# Patient Record
Sex: FEMALE | Race: WHITE | NOT HISPANIC OR LATINO | Employment: UNEMPLOYED | URBAN - METROPOLITAN AREA
[De-identification: names, ages, dates, MRNs, and addresses within clinical notes are randomized per-mention and may not be internally consistent; named-entity substitution may affect disease eponyms.]

---

## 2017-01-18 ENCOUNTER — HOSPITAL ENCOUNTER (OUTPATIENT)
Dept: BONE DENSITY | Facility: IMAGING CENTER | Age: 57
Discharge: HOME | End: 2017-01-18
Payer: COMMERCIAL

## 2017-01-18 DIAGNOSIS — Z12.31 ENCOUNTER FOR SCREENING MAMMOGRAM FOR MALIGNANT NEOPLASM OF BREAST: ICD-10-CM

## 2017-01-18 PROCEDURE — G0202 SCR MAMMO BI INCL CAD: HCPCS

## 2017-01-23 ENCOUNTER — GENERIC CONVERSION - ENCOUNTER (OUTPATIENT)
Dept: OTHER | Facility: OTHER | Age: 57
End: 2017-01-23

## 2017-01-23 LAB
ALBUMIN SERPL BCP-MCNC: 4.2 G/DL (ref 3.5–5.5)
ALP SERPL-CCNC: 121 IU/L (ref 39–117)
ALT SERPL W P-5'-P-CCNC: 73 IU/L (ref 0–32)
AMBIG ABBREV HFP7 DEFAULT (HISTORICAL): NORMAL
AST SERPL W P-5'-P-CCNC: 106 IU/L (ref 0–40)
BILIRUB SERPL-MCNC: 0.6 MG/DL (ref 0–1.2)
BILIRUBIN DIRECT (HISTORICAL): 0.18 MG/DL (ref 0–0.4)
TOTAL PROTEIN (HISTORICAL): 8 G/DL (ref 6–8.5)

## 2017-02-02 ENCOUNTER — ALLSCRIPTS OFFICE VISIT (OUTPATIENT)
Dept: OTHER | Facility: OTHER | Age: 57
End: 2017-02-02

## 2017-04-21 ENCOUNTER — ALLSCRIPTS OFFICE VISIT (OUTPATIENT)
Dept: OTHER | Facility: OTHER | Age: 57
End: 2017-04-21

## 2017-04-21 LAB
BILIRUB UR QL STRIP: NORMAL
CLARITY UR: NORMAL
COLOR UR: YELLOW
GLUCOSE (HISTORICAL): NORMAL
HGB UR QL STRIP.AUTO: NORMAL
KETONES UR STRIP-MCNC: NORMAL MG/DL
LEUKOCYTE ESTERASE UR QL STRIP: NORMAL
NITRITE UR QL STRIP: NORMAL
PH UR STRIP.AUTO: 5 [PH]
PROT UR STRIP-MCNC: NORMAL MG/DL
SP GR UR STRIP.AUTO: 1.02
UROBILINOGEN UR QL STRIP.AUTO: 0.2

## 2017-04-23 LAB
CULTURE RESULT (HISTORICAL): NORMAL
MISCELLANEOUS LAB TEST RESULT (HISTORICAL): NO GROWTH

## 2017-04-25 DIAGNOSIS — R04.2 HEMOPTYSIS: ICD-10-CM

## 2017-04-26 ENCOUNTER — GENERIC CONVERSION - ENCOUNTER (OUTPATIENT)
Dept: OTHER | Facility: OTHER | Age: 57
End: 2017-04-26

## 2017-04-28 LAB
INTERPRETATION (HISTORICAL): NORMAL
QAUNTIFERON NIL VALUE (HISTORICAL): 0.07 IU/ML
QFT TB AG MINUS NIL VALUE (HISTORICAL): <0.01 IU/ML
QUANTIFERON CRITERIA (HISTORICAL): NORMAL
QUANTIFERON INCUB COMMENT (HISTORICAL): NORMAL
QUANTIFERON MITOGEN VALUE (HISTORICAL): >10 IU/ML
QUANTIFERON TB AG VALUE (HISTORICAL): 0.03 IU/ML
QUANTIFERON TB GOLD (HISTORICAL): NEGATIVE

## 2017-05-02 ENCOUNTER — HOSPITAL ENCOUNTER (OUTPATIENT)
Dept: RADIOLOGY | Facility: HOSPITAL | Age: 57
Discharge: HOME | End: 2017-05-02
Payer: COMMERCIAL

## 2017-05-02 ENCOUNTER — TRANSCRIBE ORDERS (OUTPATIENT)
Dept: ADMINISTRATIVE | Facility: HOSPITAL | Age: 57
End: 2017-05-02

## 2017-05-02 ENCOUNTER — ALLSCRIPTS OFFICE VISIT (OUTPATIENT)
Dept: OTHER | Facility: OTHER | Age: 57
End: 2017-05-02

## 2017-05-02 DIAGNOSIS — R04.2 COUGHING BLOOD: Primary | ICD-10-CM

## 2017-05-02 DIAGNOSIS — R04.2 HEMOPTYSIS: ICD-10-CM

## 2017-05-02 PROCEDURE — 71020 HB CHEST X-RAY 2VW FRONTAL&LATL: CPT

## 2017-05-08 ENCOUNTER — TRANSCRIBE ORDERS (OUTPATIENT)
Dept: ADMINISTRATIVE | Facility: HOSPITAL | Age: 57
End: 2017-05-08

## 2017-05-08 ENCOUNTER — HOSPITAL ENCOUNTER (OUTPATIENT)
Dept: CT IMAGING | Facility: HOSPITAL | Age: 57
Discharge: HOME | End: 2017-05-08
Payer: COMMERCIAL

## 2017-05-08 DIAGNOSIS — R04.2 HEMOPTYSIS: ICD-10-CM

## 2017-05-08 PROCEDURE — 71250 CT THORAX DX C-: CPT

## 2017-05-11 ENCOUNTER — HOSPITAL ENCOUNTER (OUTPATIENT)
Dept: RADIOLOGY | Facility: HOSPITAL | Age: 57
Discharge: HOME | End: 2017-05-11
Payer: COMMERCIAL

## 2017-05-11 ENCOUNTER — TRANSCRIBE ORDERS (OUTPATIENT)
Dept: ADMINISTRATIVE | Facility: HOSPITAL | Age: 57
End: 2017-05-11

## 2017-05-11 DIAGNOSIS — R04.2 HEMOPTYSIS: ICD-10-CM

## 2017-05-11 PROCEDURE — 70220 X-RAY EXAM OF SINUSES: CPT

## 2017-05-23 ENCOUNTER — GENERIC CONVERSION - ENCOUNTER (OUTPATIENT)
Dept: OTHER | Facility: OTHER | Age: 57
End: 2017-05-23

## 2017-06-05 ENCOUNTER — GENERIC CONVERSION - ENCOUNTER (OUTPATIENT)
Dept: OTHER | Facility: OTHER | Age: 57
End: 2017-06-05

## 2017-06-07 LAB
LYME 18 KD IGG (HISTORICAL): ABNORMAL
LYME 23 KD IGG (HISTORICAL): PRESENT
LYME 23 KD IGM (HISTORICAL): PRESENT
LYME 28 KD IGG (HISTORICAL): ABNORMAL
LYME 30 KD IGG (HISTORICAL): PRESENT
LYME 39 KD IGG (HISTORICAL): ABNORMAL
LYME 39 KD IGM (HISTORICAL): PRESENT
LYME 41 KD IGG (HISTORICAL): ABNORMAL
LYME 41 KD IGM (HISTORICAL): ABNORMAL
LYME 45 KD IGG (HISTORICAL): ABNORMAL
LYME 58 KD IGG (HISTORICAL): ABNORMAL
LYME 66 KD IGG (HISTORICAL): ABNORMAL
LYME 93 KD IGG (HISTORICAL): ABNORMAL
LYME IGG WB INTERP. (HISTORICAL): NEGATIVE
LYME IGM WB INTERP. (HISTORICAL): POSITIVE

## 2017-06-23 ENCOUNTER — ALLSCRIPTS OFFICE VISIT (OUTPATIENT)
Dept: OTHER | Facility: OTHER | Age: 57
End: 2017-06-23

## 2017-07-28 LAB
A/G RATIO (HISTORICAL): 1.1 (ref 1.2–2.2)
ALBUMIN SERPL BCP-MCNC: 3.8 G/DL (ref 3.5–5.5)
ALP SERPL-CCNC: 106 IU/L (ref 39–117)
ALT SERPL W P-5'-P-CCNC: 70 IU/L (ref 0–32)
AST SERPL W P-5'-P-CCNC: 96 IU/L (ref 0–40)
BASOPHILS # BLD AUTO: 0 X10E3/UL (ref 0–0.2)
BASOPHILS # BLD AUTO: 1 %
BILIRUB SERPL-MCNC: 0.6 MG/DL (ref 0–1.2)
BUN SERPL-MCNC: 7 MG/DL (ref 6–24)
BUN/CREA RATIO (HISTORICAL): 10 (ref 9–23)
CALCIUM SERPL-MCNC: 9.2 MG/DL (ref 8.7–10.2)
CHLORIDE SERPL-SCNC: 96 MMOL/L (ref 96–106)
CHOLEST SERPL-MCNC: 159 MG/DL (ref 100–199)
CHOLEST/HDLC SERPL: 3.2 RATIO UNITS (ref 0–4.4)
CO2 SERPL-SCNC: 22 MMOL/L (ref 18–29)
CREAT SERPL-MCNC: 0.69 MG/DL (ref 0.57–1)
DEPRECATED RDW RBC AUTO: 13.4 % (ref 12.3–15.4)
EGFR AFRICAN AMERICAN (HISTORICAL): 111 ML/MIN/1.73
EGFR-AMERICAN CALC (HISTORICAL): 96 ML/MIN/1.73
EOSINOPHIL # BLD AUTO: 0.1 X10E3/UL (ref 0–0.4)
EOSINOPHIL # BLD AUTO: 1 %
GLUCOSE SERPL-MCNC: 95 MG/DL (ref 65–99)
HCT VFR BLD AUTO: 41.8 % (ref 34–46.6)
HDLC SERPL-MCNC: 49 MG/DL
HGB BLD-MCNC: 14.5 G/DL (ref 11.1–15.9)
IMM.GRANULOCYTES (CD4/8) (HISTORICAL): 0 %
IMM.GRANULOCYTES (CD4/8) (HISTORICAL): 0 X10E3/UL (ref 0–0.1)
LDLC SERPL CALC-MCNC: 88 MG/DL (ref 0–99)
LYMPHOCYTES # BLD AUTO: 2.2 X10E3/UL (ref 0.7–3.1)
LYMPHOCYTES # BLD AUTO: 37 %
MCH RBC QN AUTO: 33.8 PG (ref 26.6–33)
MCHC RBC AUTO-ENTMCNC: 34.7 G/DL (ref 31.5–35.7)
MCV RBC AUTO: 97 FL (ref 79–97)
MONOCYTES # BLD AUTO: 0.4 X10E3/UL (ref 0.1–0.9)
MONOCYTES (HISTORICAL): 7 %
NEUTROPHILS # BLD AUTO: 3.3 X10E3/UL (ref 1.4–7)
NEUTROPHILS # BLD AUTO: 54 %
PLATELET # BLD AUTO: 129 X10E3/UL (ref 150–379)
POTASSIUM SERPL-SCNC: 4.1 MMOL/L (ref 3.5–5.2)
RBC (HISTORICAL): 4.29 X10E6/UL (ref 3.77–5.28)
SODIUM SERPL-SCNC: 137 MMOL/L (ref 134–144)
TOT. GLOBULIN, SERUM (HISTORICAL): 3.6 G/DL (ref 1.5–4.5)
TOTAL PROTEIN (HISTORICAL): 7.4 G/DL (ref 6–8.5)
TRIGL SERPL-MCNC: 110 MG/DL (ref 0–149)
VLDLC SERPL CALC-MCNC: 22 MG/DL (ref 5–40)
WBC # BLD AUTO: 6 X10E3/UL (ref 3.4–10.8)

## 2017-08-10 ENCOUNTER — ALLSCRIPTS OFFICE VISIT (OUTPATIENT)
Dept: OTHER | Facility: OTHER | Age: 57
End: 2017-08-10

## 2017-09-26 ENCOUNTER — GENERIC CONVERSION - ENCOUNTER (OUTPATIENT)
Dept: OTHER | Facility: OTHER | Age: 57
End: 2017-09-26

## 2017-11-10 ENCOUNTER — ALLSCRIPTS OFFICE VISIT (OUTPATIENT)
Dept: OTHER | Facility: OTHER | Age: 57
End: 2017-11-10

## 2017-11-10 LAB
BILIRUB UR QL STRIP: NORMAL
CLARITY UR: NORMAL
COLOR UR: YELLOW
GLUCOSE (HISTORICAL): NORMAL
HGB UR QL STRIP.AUTO: NORMAL
KETONES UR STRIP-MCNC: NORMAL MG/DL
LEUKOCYTE ESTERASE UR QL STRIP: NORMAL
NITRITE UR QL STRIP: NORMAL
PH UR STRIP.AUTO: 5 [PH]
PROT UR STRIP-MCNC: NORMAL MG/DL
SP GR UR STRIP.AUTO: 1
UROBILINOGEN UR QL STRIP.AUTO: 0.2

## 2017-11-11 NOTE — PROGRESS NOTES
Assessment    1  Hypertension (401 9) (I10)   2  Fatty liver, alcoholic (697 2) (J50 8)   3  Hyperlipidemia (272 4) (E78 5)   4  Elevated LFTs (790 6) (R79 89)   5  Anxiety (300 00) (F41 9)   6  GERD without esophagitis (530 81) (K21 9)   7  Nervousness (799 21) (R45 0)   8  Nicotine dependence (305 1) (F17 200)    Plan  Elevated LFTs, Fatty liver, alcoholic, Hyperlipidemia, Hypertension    · (1) CBC/PLT/DIFF; Status:Active; Requested for:10Nov2017;    · (1) COMPREHENSIVE METABOLIC PANEL; Status:Active; Requested for:10Nov2017;    · (1) LIPID PANEL, FASTING; Status:Active; Requested for:10Nov2017;    · (1) TSH; Status:Active; Requested for:10Nov2017;                  A/P 1/ HTN:  this is good with the losartin  2  Nervousness: you are maintained on the xanax and are doing well with this  3  Hyperlipidemia: this is great  no meds  4  GERD: no meds for this and this has been good  5 fatty liver due to alcohol  you no longer want to follow with [facility]   we have refered you to Dr Don Guerra and you are scheduled to see him  6  urinary incontinence: does not want to deal with this at this time  7  tobacco use:you have done the CT screen and it was normal  Please do this again in 8/2018   8  dysuria: your urine dip is neg  please increase fluids and call me if this does not resolve  RTO 6 months with labs prior   Colonoscopy is due after 09/01/2019  Mammogram is due after 01/18/2018  Pap test is due after 05/28/2018   please come back in 3 months  no labs     Chief Complaint  Pt presents in the office today for a 3 month recheck for HTN, nervousness, lipids, GERD and tobacco use;  due 09/01/2019due 01/18/2018due 05/28/2018due 02/16/2017done 06/23/2017      History of Present Illness  Here today to carole on several chronic issues  will be seeing Dr Don Guerra in December to follow on her hepatitis and cirrhosis  still drinking alochol but states that she has limited the amount   is still smoking and does not want to quit  complaining of urinary burning over the past couple days and is concerned about a UTI      Review of Systems   Constitutional: No fever, no chills, feels well, no tiredness, no recent weight gain or weight loss  Cardiovascular: No complaints of slow heart rate, no fast heart rate, no chest pain, no palpitations, no leg claudication, no lower extremity edema  Respiratory: No complaints of shortness of breath, no wheezing, no cough, no SOB on exertion, no orthopnea, no PND  Genitourinary: as noted in HPI  Musculoskeletal: No complaints of arthralgias, no myalgias, no joint swelling or stiffness, no limb pain or swelling  Integumentary: No complaints of skin rash or lesions, no itching, no skin wounds, no breast pain or lump  Neurological: No complaints of headache, no confusion, no convulsions, no numbness, no dizziness or fainting, no tingling, no limb weakness, no difficulty walking  Active Problems  1  Anxiety (300 00) (F41 9)   2  Elevated LFTs (790 6) (R79 89)   3  Fatty liver, alcoholic (004 3) (O18 2)   4  GERD without esophagitis (530 81) (K21 9)   5  Hyperlipidemia (272 4) (E78 5)   6  Hypertension (401 9) (I10)   7  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)   8  Insomnia (780 52) (G47 00)   9  Irritable bowel syndrome (564 1) (K58 9)   10  Nervousness (799 21) (R45 0)   11  Nicotine dependence (305 1) (F17 200)    Past Medical History  1  History of insomnia (V13 89) (W30 260)    Surgical History  1  History of Knee Surgery   2  History of Laminectomy Lumbar   3  History of Total Abdominal Hysterectomy   4  History of Tubal Ligation    Family History  Mother    1  Denied: Family history of drug abuse   2  Denied: Family history of Mental health problem   3  Family history of Mother  At Age 28  Father    3  Denied: Family history of drug abuse   5  Family history of Father  At Age 80   5   Denied: Family history of Mental health problem    The family history was reviewed and updated today  Social History     · Alcohol drinker (V49 89) (Z78 9)   · Current every day smoker (305 1) (F17 200)   · Denied: History of Drug Use   · Has carbon monoxide detectors in home   · Has smoke detectors   · Uses Safety Equipment - Seatbelts  The social history was reviewed and updated today  The social history was reviewed and is unchanged  Current Meds   1  Albuterol Sulfate (2 5 MG/3ML) 0 083% Inhalation Nebulization Solution; 1 unit  3 times a day; Therapy: 54BFB5350 to (Last Rx:10Aug2017)  Requested for: 10Aug2017 Ordered   2  ALPRAZolam 1 MG Oral Tablet; take one tablet by mouth twice daily; Therapy: 75GZR1563 to (Last Rx:10Aug2017) Ordered   3  Losartan Potassium 100 MG Oral Tablet; TAKE ONE (1) TABLET(S) DAILY; Therapy: 31BKO8445 to (Lisa Christiansen)  Requested for: 41ZFF4504; Last Rx:16Vlz6046 Ordered   4  Proventil  (90 Base) MCG/ACT Inhalation Aerosol Solution; INHALE 2 PUFFS BY MOUTH EVERY 6 HOURS AS NEEDED; Therapy: 55TUB8217 to (Reserve Fines)  Requested for: 68EPI9431; Last WM:35CWC2112 Ordered   5  Symbicort 160-4 5 MCG/ACT Inhalation Aerosol; INHALE 2 PUFFS TWICE DAILY  RINSE MOUTH AFTER USE  Requested for: 98WQE7131; Last OW:23FVP5997 Ordered    Allergies  1  ACE Inhibitors  2  No Known Environmental Allergies   3  No Known Food Allergies    Vitals  Vital Signs    Recorded: 42DYI6706 03:19PM   Heart Rate 80   Respiration 16   Systolic 387   Diastolic 78   Height 5 ft 4 in   Weight 180 lb 3 2 oz   BMI Calculated 30 93   BSA Calculated 1 87       Physical Exam   Constitutional  General appearance: No acute distress, well appearing and well nourished  Pulmonary  Auscultation of lungs: Clear to auscultation  Cardiovascular  Auscultation of heart: Normal rate and rhythm, normal S1 and S2, without murmurs  Carotid pulses: Normal    Lymphatic  Palpation of lymph nodes in neck: No lymphadenopathy     Skin  Skin and subcutaneous tissue: Normal without rashes or lesions  Psychiatric  Orientation to person, place, and time: Normal    Mood and affect: Normal          Health Management  History of Colonoscopy (Fiberoptic) Screening   COLONOSCOPY; every 10 years; Last 96Qhl0582; Next Due: 99Oqr3267; Active  History of Encounter for routine gynecological examination   (1) THIN PREP PAP WITH IMAGING; every 3 years; Last 53TVA2163; Next Due: 55TDX8533; Active  History of screening mammography   * MAMMO SCREENING BILATERAL W CAD; every 1 year; Last 22BFM6203; Next Due: 54IXH1679; Active  Hypertension   EKG/ECG- POC; every 1 year; Last 86NEQ4272; Next Due: 41UKN3319;  Overdue    Future Appointments    Date/Time Provider Specialty Site   03/05/2018 01:30 PM Marty Jimenez [facility]   12/18/2017 09:00 AM Bell Cardenas MD Gastroenterology Adult [address]   Electronically signed by : JOSÉ Veliz; Nov 10 2017  3:51PM EST                       (Author)    Electronically signed by : Fuentes Reina DO; Nov 10 2017  4:29PM EST

## 2017-11-13 LAB
CULTURE RESULT (HISTORICAL): NORMAL
MISCELLANEOUS LAB TEST RESULT (HISTORICAL): NORMAL

## 2017-12-15 DIAGNOSIS — Z12.31 ENCOUNTER FOR SCREENING MAMMOGRAM FOR MALIGNANT NEOPLASM OF BREAST: ICD-10-CM

## 2017-12-18 ENCOUNTER — ALLSCRIPTS OFFICE VISIT (OUTPATIENT)
Dept: OTHER | Facility: OTHER | Age: 57
End: 2017-12-18

## 2017-12-19 NOTE — CONSULTS
Assessment  1  Irritable bowel syndrome (564 1) (K58 9)   2  Elevated LFTs (790 6) (R79 89)   3  Fatty liver, alcoholic (973 0) (C64 8)   4  Rectal bleeding (569 3) (K62 5)   5  Diarrhea in adult patient (787 91) (R19 7)    Plan  Diarrhea in adult patient, Elevated LFTs, Fatty liver, alcoholic, Irritable bowel syndrome,Rectal bleeding    · MoviPrep 100 GM Oral Solution Reconstituted; USE AS DIRECTED   Rx By: Bianca Reyes; Dispense: 0 Days ; #:1 Solution Reconstituted; Refill: 0;For: Diarrhea in adult patient, Elevated LFTs, Fatty liver, alcoholic, Irritable bowel syndrome, Rectal bleeding; MELANIA = N; Sent To: [facility] 7760   · Follow Up After Tests Complete Evaluation and Treatment  Follow-up  Status: Hold For -Scheduling  Requested for: 05FRZ6916   Ordered; For: Diarrhea in adult patient, Elevated LFTs, Fatty liver, alcoholic, Irritable bowel syndrome, Rectal bleeding; Ordered By: Bianca Reyes Performed:  Due: 47NBC0135   · COLONOSCOPY (GI, SURG); Status:Hold For - Scheduling; Requested for:78Wum2265;    Perform:[facility]; Order Comments:[facility]; Due:99Rjl4221; Ordered; Gamaliel Voss in adult patient, Elevated LFTs, Fatty liver, alcoholic, Irritable bowel syndrome, Rectal bleeding; Ordered By:China Leonardo;    Discussion/Summary  Discussion Summary:   Bloody diarrhea: She has had intermittent diarrhea and bleeding over the past here  This could be due to irritable bowel syndrome with bleeding from hemorrhoids  However it also could be due to inflammatory bowel disease, polyps, or malignancy  I will schedule her for repeat colonoscopy to evaluate further Since her last one was about nine years ago  Alcoholic liver disease: I spoke to her about the importance of alcohol abstinence  Fortunately she does not have cirrhosis but if she continues to drink she will likely develop cirrhosis  She said she understands the importance of this and will try to stop drinking alcohol        Chief Complaint  Chief Complaint Free Text Note Form: pt consult for diarrhea, blood in stool, referred by Dr Gerda Casper      History of Present Illness  HPI: She presents for evaluation because of intermittent diarrhea and intermittent rectal bleeding over the past year  She denies any abdominal pain, weight loss, reflux, or vomiting  She has never had an upper endoscopy before her last colonoscopy was normal in 2009  she denies any family history of colon polyps or colon cancer  She also has a history of alcoholic liver disease but her liver biopsy in 2016 did not show cirrhosis  History Reviewed: The history was obtained today from the patient and I agree with the documented history  Review of Systems  Complete-Female GI Adult:  Constitutional: feeling poorly-- and-- feeling tired, but-- no fever,-- no recent weight gain,-- no chills-- and-- no recent weight loss  Eyes: eyesight problems, but-- no eye pain,-- no dryness of the eyes,-- eyes not red,-- no purulent discharge from the eyes-- and-- no itching of the eyes  ENT: no complaints of earache, no loss of hearing, no nose bleeds, no nasal discharge, no sore throat, no hoarseness  Cardiovascular: No complaints of slow heart rate, no fast heart rate, no chest pain, no palpitations, no leg claudication, no lower extremity edema  Respiratory: No complaints of shortness of breath, no wheezing, no cough, no SOB on exertion, no orthopnea, no PND  Gastrointestinal: diarrhea,-- bloody stools-- and-- night sweats, but-- no abdominal pain,-- no vomiting-- and-- no constipation--   The patient presents with complaints of occasional episodes of nausea  Genitourinary: No complaints of dysuria, no incontinence, no pelvic pain, no dysmenorrhea, no vaginal discharge or bleeding  Musculoskeletal: No complaints of arthralgias, no myalgias, no joint swelling or stiffness, no limb pain or swelling  Integumentary: No complaints of skin rash or lesions, no itching, no skin wounds, no breast pain or lump  Neurological: No complaints of headache, no confusion, no convulsions, no numbness, no dizziness or fainting, no tingling, no limb weakness, no difficulty walking  Psychiatric: Not suicidal, no sleep disturbance, no anxiety or depression, no change in personality, no emotional problems  Endocrine: No complaints of proptosis, no hot flashes, no muscle weakness, no deepening of the voice, no feelings of weakness  Hematologic/Lymphatic: No complaints of swollen glands, no swollen glands in the neck, does not bleed easily, does not bruise easily  ROS Reviewed:   ROS reviewed  Active Problems  1  Anxiety (300 00) (F41 9)   2  Elevated LFTs (790 6) (R79 89)   3  Fatty liver, alcoholic (817 5) (C58 9)   4  GERD without esophagitis (530 81) (K21 9)   5  Hyperlipidemia (272 4) (E78 5)   6  Hypertension (401 9) (I10)   7  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)   8  Insomnia (780 52) (G47 00)   9  Irritable bowel syndrome (564 1) (K58 9)   10  Nervousness (799 21) (R45 0)   11  Nicotine dependence (305 1) (F17 200)   12  Symptoms involving urinary system (788 99) (R39 9)    Past Medical History  1  History of insomnia (V13 89) (Y46 703)  Active Problems And Past Medical History Reviewed: The active problems and past medical history were reviewed and updated today  Surgical History  1  History of Knee Surgery   2  History of Laminectomy Lumbar   3  History of Total Abdominal Hysterectomy   4  History of Tubal Ligation  Surgical History Reviewed: The surgical history was reviewed and updated today  Family History  Mother    1  Denied: Family history of drug abuse   2  Denied: Family history of Mental health problem   3  Family history of Mother  At Age 28  Father    3  Denied: Family history of drug abuse   5  Family history of Father  At Age 80   5  Denied: Family history of Mental health problem  Family History Reviewed:    The family history was reviewed and updated today  Social History   · Alcohol drinker (V49 89) (Z78 9)   · Current every day smoker (305 1) (F17 200)   · Denied: History of Drug Use   · Has carbon monoxide detectors in home   · Has smoke detectors   · Uses Safety Equipment - Seatbelts  Social History Reviewed: The social history was reviewed and updated today  Current Meds   1  Albuterol Sulfate (2 5 MG/3ML) 0 083% Inhalation Nebulization Solution; 1 unit  3 times a day; Therapy: 87UVZ5240 to (Last Rx:10Aug2017)  Requested for: 39Zvf3671 Ordered   2  ALPRAZolam 1 MG Oral Tablet; take one tablet by mouth twice daily; Therapy: 22DTE8323 to (Last Rx:10Aug2017) Ordered   3  Losartan Potassium 100 MG Oral Tablet; TAKE ONE (1) TABLET(S) DAILY; Therapy: 00MUY9678 to (Prudence Book)  Requested for: 72VUY8987; Last Rx:88Qci6408 Ordered   4  Proventil  (90 Base) MCG/ACT Inhalation Aerosol Solution; INHALE 2 PUFFS BY MOUTH EVERY 6 HOURS AS NEEDED; Therapy: 45HWC3723 to (Lyndia Sly)  Requested for: 45DPW5631; Last RQ:31ZHP4290 Ordered   5  Symbicort 160-4 5 MCG/ACT Inhalation Aerosol; INHALE 2 PUFFS TWICE DAILY  RINSE MOUTH AFTER USE  Requested for: 18QKK6726; Last WA:66LUO3025 Ordered  Medication List Reviewed: The medication list was reviewed and updated today  Allergies  1  ACE Inhibitors  2  No Known Environmental Allergies   3  No Known Food Allergies    Vitals  Vital Signs    Recorded: 96ISH8210 09:03AM   Temperature 98 F, Tympanic   Heart Rate 96   Systolic 513, RUE, Sitting   Diastolic 76, RUE, Sitting   Height 5 ft 4 in   Weight 179 lb 6 0 oz   BMI Calculated 30 79   BSA Calculated 1 87   O2 Saturation 95, RA     Physical Exam   Constitutional  General appearance: No acute distress, well appearing and well nourished  Eyes  Conjunctiva and lids: No swelling, erythema or discharge  Pupils and irises: Equal, round and reactive to light     Ears, Nose, Mouth, and Throat  External inspection of ears and nose: Normal    Nasal mucosa, septum, and turbinates: Normal without edema or erythema  Oropharynx: Normal with no erythema, edema, exudate or lesions  Pulmonary  Respiratory effort: No increased work of breathing or signs of respiratory distress  Auscultation of lungs: Clear to auscultation  Cardiovascular  Auscultation of heart: Normal rate and rhythm, normal S1 and S2, without murmurs  Examination of extremities for edema and/or varicosities: Normal    Abdomen  Abdomen: Non-tender, no masses  Liver and spleen: No hepatomegaly or splenomegaly  Lymphatic  Palpation of lymph nodes in neck: No lymphadenopathy  Musculoskeletal  Gait and station: Normal    Digits and nails: Normal without clubbing or cyanosis  Inspection/palpation of joints, bones, and muscles: Normal    Skin  Skin and subcutaneous tissue: Normal without rashes or lesions     Psychiatric  Orientation to person, place, and time: Normal    Mood and affect: Normal          Future Appointments    Date/Time Provider Specialty Site   03/05/2018 01:30 PM Ruby Mcmahon, [address]     Signatures   Electronically signed by : Fanta Gipson MD; Dec 18 2017 10:20AM EST                       (Author)

## 2018-01-12 VITALS
TEMPERATURE: 99.2 F | DIASTOLIC BLOOD PRESSURE: 62 MMHG | RESPIRATION RATE: 16 BRPM | HEART RATE: 100 BPM | WEIGHT: 174.5 LBS | SYSTOLIC BLOOD PRESSURE: 104 MMHG | BODY MASS INDEX: 29.07 KG/M2 | HEIGHT: 65 IN

## 2018-01-12 VITALS
HEIGHT: 64 IN | SYSTOLIC BLOOD PRESSURE: 140 MMHG | BODY MASS INDEX: 30.77 KG/M2 | WEIGHT: 180.2 LBS | RESPIRATION RATE: 16 BRPM | HEART RATE: 80 BPM | DIASTOLIC BLOOD PRESSURE: 78 MMHG

## 2018-01-12 NOTE — MISCELLANEOUS
Message  PATIENT CALLED TO SCHEDULE AN APPOINTMENT STATING SHE HAS HEMOPTYSIS I OFFERED HER AN APPOINTMENT WITH DR Angle Tompkins ON 6/12 /17 IN THE [place] OFFICE SHE ONLY WANTS TO SEE DR Aisha Mitchell AND ONLY WANTS TO GO TO [place]  I TOLD PATIENT WE WOULD CALL HER BACK I WOULD HAVE TO SHOW DR Aisha Mitchell HER CT      Active Problems    1  Anxiety (300 00) (F41 9)   2  Asthma (493 90) (J45 909)   3  Colonoscopy (Fiberoptic) Screening   4  Cough (786 2) (R05)   5  Dysuria (788 1) (R30 0)   6  Elevated LFTs (790 6) (R94 5)   7  Elevation of level of transaminase or lactic acid dehydrogenase (LDH) (790 4) (R74 0)   8  Encounter for eye exam (V72 0) (Z01 00)   9  Encounter for routine gynecological examination (V72 31) (Z01 419)   10  Encounter for screening mammogram for malignant neoplasm of breast (V76 12)    (Z12 31)   11  Fatty liver, alcoholic (410 0) (B84 5)   12  Flu vaccine need (V04 81) (Z23)   13  GERD without esophagitis (530 81) (K21 9)   14  Hemoptysis (786 30) (R04 2)   15  History of tobacco use (V15 82) (Z87 891)   16  Hyperlipidemia (272 4) (E78 5)   17  Hypertension (401 9) (I10)   18  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)   19  Insomnia (780 52) (G47 00)   20  Irritable bowel syndrome (564 1) (K58 9)   21  Irritation, nose (478 19) (J34 89)   22  Mild intermittent asthmatic bronchitis without complication (213 60) (B70 21)   23  Need for pneumococcal vaccination (V03 82) (Z23)   24  Need for Tdap vaccination (V06 1) (Z23)   25  Nervousness (799 21) (R45 0)   26  Nicotine dependence (305 1) (F17 200)   27  Other urinary incontinence (788 39) (N39 498)   28  Restless legs syndrome (333 94) (G25 81)   29  Tick bite of right lower leg (916 4,E906 4) (D74 507O,X77  XXXA)   30  Urinary symptom or sign (788 99) (R39 9)   31  Viral illness (681 99) (B34 9)    Current Meds   1  Advair Diskus 100-50 MCG/DOSE Inhalation Aerosol Powder Breath Activated; INHALE   1 PUFF EVERY 12 HOURS;    Therapy: 05BSK0930 to (Last Rx:51Xob2648)  Requested for: 87XQB4124 Ordered   2  ALPRAZolam ER 1 MG Oral Tablet Extended Release 24 Hour; TAKE ONE (1)   TABLET(S) DAILY; Therapy: 74UVK2343 to (Last F46FBR3675) Ordered   3  Cefuroxime Axetil 500 MG Oral Tablet; TAKE 2 TABLET ONCE; Therapy: 69HNT7465 to (Last OF:06JBW3710)  Requested for: 58REA0839 Ordered   4  Losartan Potassium 100 MG Oral Tablet; TAKE ONE (1) TABLET(S) DAILY; Therapy: 97LUB8423 to (Evaluate:2017)  Requested for: 15YZY9477; Last   Rx:2017 Ordered   5  Proventil  (90 Base) MCG/ACT Inhalation Aerosol Solution; INHALE 2 PUFFS 3   TIMES DAILY AS NEEDED; Therapy: 11PMR7066 to (Rah Orozco)  Requested for: 20YKX7699; Last   Rx:2015 Ordered    Allergies    1  ACE Inhibitors    2  No Known Environmental Allergies   3   No Known Food Allergies    Signatures   Electronically signed by : Radha Norris, ; May 23 2017  4:05PM EST                       (Author)

## 2018-01-13 VITALS
OXYGEN SATURATION: 96 % | HEIGHT: 65 IN | BODY MASS INDEX: 28.99 KG/M2 | TEMPERATURE: 97.2 F | SYSTOLIC BLOOD PRESSURE: 124 MMHG | HEART RATE: 80 BPM | WEIGHT: 174 LBS | RESPIRATION RATE: 14 BRPM | DIASTOLIC BLOOD PRESSURE: 82 MMHG

## 2018-01-13 VITALS
HEIGHT: 65 IN | HEART RATE: 80 BPM | WEIGHT: 168.5 LBS | BODY MASS INDEX: 28.07 KG/M2 | RESPIRATION RATE: 16 BRPM | DIASTOLIC BLOOD PRESSURE: 70 MMHG | SYSTOLIC BLOOD PRESSURE: 120 MMHG

## 2018-01-14 VITALS
HEIGHT: 65 IN | BODY MASS INDEX: 28.83 KG/M2 | WEIGHT: 173.06 LBS | RESPIRATION RATE: 16 BRPM | DIASTOLIC BLOOD PRESSURE: 70 MMHG | TEMPERATURE: 99.5 F | SYSTOLIC BLOOD PRESSURE: 146 MMHG | HEART RATE: 84 BPM

## 2018-01-14 VITALS
SYSTOLIC BLOOD PRESSURE: 116 MMHG | HEART RATE: 80 BPM | DIASTOLIC BLOOD PRESSURE: 72 MMHG | RESPIRATION RATE: 16 BRPM | BODY MASS INDEX: 28.41 KG/M2 | HEIGHT: 65 IN | WEIGHT: 170.5 LBS

## 2018-01-19 ENCOUNTER — HOSPITAL ENCOUNTER (OUTPATIENT)
Dept: BONE DENSITY | Facility: IMAGING CENTER | Age: 58
Discharge: HOME | End: 2018-01-19
Payer: COMMERCIAL

## 2018-01-19 ENCOUNTER — GENERIC CONVERSION - ENCOUNTER (OUTPATIENT)
Dept: FAMILY MEDICINE CLINIC | Facility: CLINIC | Age: 58
End: 2018-01-19

## 2018-01-19 DIAGNOSIS — Z12.31 ENCOUNTER FOR SCREENING MAMMOGRAM FOR MALIGNANT NEOPLASM OF BREAST: ICD-10-CM

## 2018-01-19 PROCEDURE — 77067 SCR MAMMO BI INCL CAD: CPT

## 2018-01-22 VITALS — BODY MASS INDEX: 30.42 KG/M2 | WEIGHT: 178.2 LBS | HEIGHT: 64 IN

## 2018-01-23 VITALS
BODY MASS INDEX: 30.63 KG/M2 | TEMPERATURE: 98 F | SYSTOLIC BLOOD PRESSURE: 126 MMHG | HEART RATE: 96 BPM | OXYGEN SATURATION: 95 % | DIASTOLIC BLOOD PRESSURE: 76 MMHG | WEIGHT: 179.38 LBS | HEIGHT: 64 IN

## 2018-01-24 ENCOUNTER — ANESTHESIA EVENT (OUTPATIENT)
Dept: GASTROENTEROLOGY | Facility: MEDICAL CENTER | Age: 58
End: 2018-01-24

## 2018-01-26 ENCOUNTER — TELEPHONE (OUTPATIENT)
Dept: GASTROENTEROLOGY | Facility: CLINIC | Age: 58
End: 2018-01-26

## 2018-01-29 ENCOUNTER — ANESTHESIA (OUTPATIENT)
Dept: GASTROENTEROLOGY | Facility: MEDICAL CENTER | Age: 58
End: 2018-01-29

## 2018-02-09 PROBLEM — R79.89 OTHER SPECIFIED ABNORMAL FINDINGS OF BLOOD CHEMISTRY: Status: ACTIVE | Noted: 2018-02-09

## 2018-02-09 PROBLEM — R19.7 DIARRHEA: Status: ACTIVE | Noted: 2018-02-09

## 2018-02-09 PROBLEM — K58.9 IRRITABLE BOWEL SYNDROME WITHOUT DIARRHEA: Status: ACTIVE | Noted: 2018-02-09

## 2018-02-09 PROBLEM — K62.5 HEMORRHAGE OF ANUS AND RECTUM: Status: ACTIVE | Noted: 2018-02-09

## 2018-02-09 PROBLEM — K70.0 ALCOHOLIC FATTY LIVER: Status: ACTIVE | Noted: 2018-02-09

## 2018-02-23 ENCOUNTER — TELEPHONE (OUTPATIENT)
Dept: FAMILY MEDICINE CLINIC | Facility: CLINIC | Age: 58
End: 2018-02-23

## 2018-02-23 NOTE — TELEPHONE ENCOUNTER
Patient called saying she has the flu  Her  HAD the flu  She is too sick to come in for a visit  She cannot afford to go to the Er  I told her that she would have to be seen before anything was called in  She didn't want to accept that  How shall we proceed?     Best number for Jose:  [phone]

## 2018-02-27 ENCOUNTER — TELEPHONE (OUTPATIENT)
Dept: FAMILY MEDICINE CLINIC | Facility: CLINIC | Age: 58
End: 2018-02-27

## 2018-02-27 NOTE — TELEPHONE ENCOUNTER
Patient calling for a refill on her xanax 1 mg 1bid  #60      [facility]      Pt # [phone]

## 2018-02-28 LAB
ALBUMIN SERPL-MCNC: 3.7 G/DL (ref 3.5–5.5)
ALBUMIN/GLOB SERPL: 0.9 {RATIO} (ref 1.2–2.2)
ALP SERPL-CCNC: 107 IU/L (ref 39–117)
ALT SERPL-CCNC: 43 IU/L (ref 0–32)
AST SERPL-CCNC: 64 IU/L (ref 0–40)
BASOPHILS # BLD AUTO: 0 X10E3/UL (ref 0–0.2)
BASOPHILS NFR BLD AUTO: 1 %
BILIRUB SERPL-MCNC: 0.5 MG/DL (ref 0–1.2)
BUN SERPL-MCNC: 11 MG/DL (ref 6–24)
BUN/CREAT SERPL: 14 (ref 9–23)
CALCIUM SERPL-MCNC: 8.9 MG/DL (ref 8.7–10.2)
CHLORIDE SERPL-SCNC: 100 MMOL/L (ref 96–106)
CHOLEST SERPL-MCNC: 153 MG/DL (ref 100–199)
CHOLEST/HDLC SERPL: 3.3 RATIO UNITS (ref 0–4.4)
CO2 SERPL-SCNC: 21 MMOL/L (ref 18–29)
CREAT SERPL-MCNC: 0.8 MG/DL (ref 0.57–1)
EOSINOPHIL # BLD AUTO: 0.1 X10E3/UL (ref 0–0.4)
EOSINOPHIL NFR BLD AUTO: 1 %
ERYTHROCYTE [DISTWIDTH] IN BLOOD BY AUTOMATED COUNT: 13 % (ref 12.3–15.4)
GLOBULIN SER-MCNC: 4.3 G/DL (ref 1.5–4.5)
GLUCOSE SERPL-MCNC: 112 MG/DL (ref 65–99)
HCT VFR BLD AUTO: 43.3 % (ref 34–46.6)
HDLC SERPL-MCNC: 46 MG/DL
HGB BLD-MCNC: 14.6 G/DL (ref 11.1–15.9)
IMM GRANULOCYTES # BLD: 0 X10E3/UL (ref 0–0.1)
IMM GRANULOCYTES NFR BLD: 0 %
LDLC SERPL CALC-MCNC: 88 MG/DL (ref 0–99)
LYMPHOCYTES # BLD AUTO: 2.6 X10E3/UL (ref 0.7–3.1)
LYMPHOCYTES NFR BLD AUTO: 36 %
MCH RBC QN AUTO: 33.6 PG (ref 26.6–33)
MCHC RBC AUTO-ENTMCNC: 33.7 G/DL (ref 31.5–35.7)
MCV RBC AUTO: 100 FL (ref 79–97)
MONOCYTES # BLD AUTO: 0.8 X10E3/UL (ref 0.1–0.9)
MONOCYTES NFR BLD AUTO: 12 %
NEUTROPHILS # BLD AUTO: 3.6 X10E3/UL (ref 1.4–7)
NEUTROPHILS NFR BLD AUTO: 50 %
PLATELET # BLD AUTO: 150 X10E3/UL (ref 150–379)
POTASSIUM SERPL-SCNC: 4.1 MMOL/L (ref 3.5–5.2)
PROT SERPL-MCNC: 8 G/DL (ref 6–8.5)
RBC # BLD AUTO: 4.34 X10E6/UL (ref 3.77–5.28)
SL AMB EGFR AFRICAN AMERICAN: 94 ML/MIN/1.73
SL AMB EGFR NON AFRICAN AMERICAN: 82 ML/MIN/1.73
SL AMB VLDL CHOLESTEROL CALC: 19 MG/DL (ref 5–40)
SODIUM SERPL-SCNC: 137 MMOL/L (ref 134–144)
TRIGL SERPL-MCNC: 96 MG/DL (ref 0–149)
TSH SERPL DL<=0.005 MIU/L-ACNC: 2.97 UIU/ML (ref 0.45–4.5)
WBC # BLD AUTO: 7.1 X10E3/UL (ref 3.4–10.8)

## 2018-03-01 ENCOUNTER — TELEPHONE (OUTPATIENT)
Dept: FAMILY MEDICINE CLINIC | Facility: CLINIC | Age: 58
End: 2018-03-01

## 2018-03-01 DIAGNOSIS — F41.9 ANXIETY: Primary | ICD-10-CM

## 2018-03-01 RX ORDER — ALPRAZOLAM 1 MG/1
1 TABLET ORAL 2 TIMES DAILY PRN
Qty: 60 TABLET | Refills: 3 | Status: SHIPPED | OUTPATIENT
Start: 2018-03-01 | End: 2018-06-26 | Stop reason: SDUPTHER

## 2018-03-01 NOTE — TELEPHONE ENCOUNTER
Patient is totally out of her Xanax and has been trying to get a refill for 4 days  She is in need of a refill      Best number for Jose:  [phone]

## 2018-03-05 ENCOUNTER — OFFICE VISIT (OUTPATIENT)
Dept: FAMILY MEDICINE CLINIC | Facility: CLINIC | Age: 58
End: 2018-03-05
Payer: COMMERCIAL

## 2018-03-05 VITALS
WEIGHT: 180 LBS | DIASTOLIC BLOOD PRESSURE: 66 MMHG | BODY MASS INDEX: 30.73 KG/M2 | RESPIRATION RATE: 16 BRPM | HEIGHT: 64 IN | SYSTOLIC BLOOD PRESSURE: 122 MMHG | HEART RATE: 82 BPM

## 2018-03-05 DIAGNOSIS — K76.0 FATTY LIVER: ICD-10-CM

## 2018-03-05 DIAGNOSIS — R79.89 ELEVATED LFTS: ICD-10-CM

## 2018-03-05 DIAGNOSIS — F41.9 ANXIETY: ICD-10-CM

## 2018-03-05 DIAGNOSIS — F17.200 NICOTINE DEPENDENCE, UNCOMPLICATED, UNSPECIFIED NICOTINE PRODUCT TYPE: ICD-10-CM

## 2018-03-05 DIAGNOSIS — K21.9 GERD WITHOUT ESOPHAGITIS: ICD-10-CM

## 2018-03-05 DIAGNOSIS — B35.9 TINEA: ICD-10-CM

## 2018-03-05 DIAGNOSIS — E78.5 HYPERLIPIDEMIA, UNSPECIFIED HYPERLIPIDEMIA TYPE: ICD-10-CM

## 2018-03-05 DIAGNOSIS — I10 ESSENTIAL HYPERTENSION: Primary | ICD-10-CM

## 2018-03-05 PROBLEM — Z00.00 HEALTHCARE MAINTENANCE: Status: ACTIVE | Noted: 2018-03-05

## 2018-03-05 PROBLEM — R19.7 DIARRHEA: Status: RESOLVED | Noted: 2018-02-09 | Resolved: 2018-03-05

## 2018-03-05 PROCEDURE — 99214 OFFICE O/P EST MOD 30 MIN: CPT | Performed by: NURSE PRACTITIONER

## 2018-03-05 RX ORDER — BUDESONIDE AND FORMOTEROL FUMARATE DIHYDRATE 160; 4.5 UG/1; UG/1
2 AEROSOL RESPIRATORY (INHALATION) 2 TIMES DAILY
COMMUNITY
End: 2018-03-05 | Stop reason: ALTCHOICE

## 2018-03-05 RX ORDER — LOSARTAN POTASSIUM 100 MG/1
1 TABLET ORAL DAILY
COMMUNITY
Start: 2014-09-30 | End: 2018-05-08 | Stop reason: SDUPTHER

## 2018-03-05 RX ORDER — ALBUTEROL SULFATE 90 UG/1
2 AEROSOL, METERED RESPIRATORY (INHALATION) EVERY 6 HOURS PRN
COMMUNITY
Start: 2015-11-06 | End: 2018-08-10 | Stop reason: SDUPTHER

## 2018-03-05 RX ORDER — ALPRAZOLAM 1 MG/1
1 TABLET ORAL 2 TIMES DAILY
COMMUNITY
Start: 2017-08-10 | End: 2018-03-05 | Stop reason: ALTCHOICE

## 2018-03-05 RX ORDER — PEG-3350, SODIUM SULFATE, SODIUM CHLORIDE, POTASSIUM CHLORIDE, SODIUM ASCORBATE AND ASCORBIC ACID 7.5-2.691G
KIT ORAL
Status: ON HOLD | COMMUNITY
Start: 2017-12-18 | End: 2018-03-19

## 2018-03-05 RX ORDER — ALBUTEROL SULFATE 2.5 MG/3ML
SOLUTION RESPIRATORY (INHALATION)
COMMUNITY
Start: 2017-08-10 | End: 2018-03-05 | Stop reason: ALTCHOICE

## 2018-03-05 NOTE — PROGRESS NOTES
Chief Complaint   Patient presents with    Follow-up     Assessment/Plan:    1  Essential hypertension  You bp is great with the losartin  We will continue this and carole this in 6 months    2  Fatty liver  This is stable and this is now 1/2 that  Good job   followed with Dr Kenneth Concepcion    3  Elevated LFTs   these have really improved form some times in the 110's to now  and is from the fatty liver  We will carole this in 6 months     4  Hyperlipidemia, unspecified hyperlipidemia type  This is great with no meds  Good job    5  Anxiety  Stable with e xanax    6  Nicotine dependence, uncomplicated, unspecified nicotine product type  Is due for the CT screen and we have given you the order for this after 5/8/2018    7  GERD without esophagitis  Will get the scope done next week with Dr Luis Grajeda may for pap and 6 months for carole      Subjective:      Patient ID: Darby Henderson is a 61 y o  female  Here today to carole on several chronic issues  Has switched her care to Dr Kenneth Concepcion from Dr Jackie Wang for her fatty liver and elevated LFT  Has a colonoscopy and egd scheduled for 2 weeks from now  Admits that she is still drinking but has cut back a lot  Still takes the xanax am and pm for her anxiety   Is coming in May for her Pap    Is due in May for her ct of  Her lungs for screening           The following portions of the patient's history were reviewed and updated as appropriate: allergies, current medications, past family history, past medical history, past social history, past surgical history and problem list     Past Medical History:   Diagnosis Date    Alcoholic fatty liver     Anxiety     Asthma     COPD (chronic obstructive pulmonary disease) ([facility] [address] )     Elevated liver function tests     GERD without esophagitis     Hyperlipidemia     Hypertension     IBS (irritable bowel syndrome)     Idiopathic benign hematuria of childhood     Insomnia     Insomnia     Nervousness     Nicotine dependence     Other specified urinary incontinence     RLS (restless legs syndrome)      Past Surgical History:   Procedure Laterality Date    BACK SURGERY      HYSTERECTOMY      KNEE SURGERY      KNEE SURGERY      LUMBAR LAMINECTOMY  04/1999    TOTAL ABDOMINAL HYSTERECTOMY  02/05/2008    TUBAL LIGATION      TUBAL LIGATION  1989     Family History   Problem Relation Age of Onset    Cancer Mother      Social History   Social History     Social History    Marital status: /Civil Union     Spouse name: N/A    Number of children: N/A    Years of education: N/A     Occupational History    Not on file  Social History Main Topics    Smoking status: Current Every Day Smoker     Packs/day: 1 00    Smokeless tobacco: Never Used      Comment: Per allscripts - 1/2 PPD cigs - smoking for 40 years    Alcohol use 1 8 oz/week     3 Cans of beer per week      Comment: Daily; just cut back from 6 beers/day  Per allscripts -  3 beers a night    Drug use: No    Sexual activity: Not on file     Other Topics Concern    Not on file     Social History Narrative    Has carbon monoxide detectors in home    Has smoke detectors    Uses safety equipment - seatbelts         Review of Systems   Constitutional: Negative  HENT: Negative  Respiratory: Negative  Cardiovascular: Negative  Musculoskeletal: Negative  Neurological: Negative            Vitals:    03/05/18 1342   BP: 122/66   BP Location: Left arm   Patient Position: Sitting   Pulse: 82   Resp: 16   Weight: 81 6 kg (180 lb)   Height: 5' 4" (1 626 m)       Objective:  Orders Only on 02/27/2018   Component Date Value Ref Range Status    SL AMB LAB WHITE BLOOD CELL COUNT 02/27/2018 7 1  3 4 - 10 8 x10E3/uL Final    SL AMB LAB RED BLOOD CELLS 02/27/2018 4 34  3 77 - 5 28 x10E6/uL Final    Hemoglobin 02/27/2018 14 6  11 1 - 15 9 g/dL Final    Hematocrit 02/27/2018 43 3  34 0 - 46 6 % Final    MCV 02/27/2018 100* 79 - 97 fL Final    MCH 02/27/2018 33 6* 26 6 - 33 0 pg Final    MCHC 02/27/2018 33 7  31 5 - 35 7 g/dL Final    RDW 02/27/2018 13 0  12 3 - 15 4 % Final    Platelet Count 83/89/7648 150  150 - 379 x10E3/uL Final    Neutrophils 02/27/2018 50  Not Estab  % Final    Lymphocytes 02/27/2018 36  Not Estab  % Final    Monocytes 02/27/2018 12  Not Estab  % Final    Eosinophils 02/27/2018 1  Not Estab  % Final    Basophils 02/27/2018 1  Not Estab  % Final    Neutrophils (Absolute) 02/27/2018 3 6  1 4 - 7 0 x10E3/uL Final    Lymphocytes (Absolute) 02/27/2018 2 6  0 7 - 3 1 x10E3/uL Final    Monocytes (Absolute) 02/27/2018 0 8  0 1 - 0 9 x10E3/uL Final    Eosinophils (Absolute) 02/27/2018 0 1  0 0 - 0 4 x10E3/uL Final    Basophils (Absolute) 02/27/2018 0 0  0 0 - 0 2 x10E3/uL Final    IMM  GRANULOCYTES (CD4/8) 02/27/2018 0  Not Estab  % Final    IIMM  GRANS,ABS (CD4/8) 02/27/2018 0 0  0 0 - 0 1 x10E3/uL Final    SL AMB GLUCOSE 02/27/2018 112* 65 - 99 mg/dL Final    BUN 02/27/2018 11  6 - 24 mg/dL Final    Creatinine, Serum 02/27/2018 0 80  0 57 - 1 00 mg/dL Final    eGFR Non  02/27/2018 82  >59 mL/min/1 73 Final    SL AMB EGFR  02/27/2018 94  >59 mL/min/1 73 Final    SL AMB BUN/CREATININE RATIO 02/27/2018 14  9 - 23 Final    SL AMB SODIUM 02/27/2018 137  134 - 144 mmol/L Final    SL AMB POTASSIUM 02/27/2018 4 1  3 5 - 5 2 mmol/L Final    SL AMB CHLORIDE 02/27/2018 100  96 - 106 mmol/L Final    SL AMB CARBON DIOXIDE 02/27/2018 21  18 - 29 mmol/L Final    CALCIUM 02/27/2018 8 9  8 7 - 10 2 mg/dL Final    SL AMB PROTEIN, TOTAL 02/27/2018 8 0  6 0 - 8 5 g/dL Final    Serum Albumin 02/27/2018 3 7  3 5 - 5 5 g/dL Final    Globulin, Total 02/27/2018 4 3  1 5 - 4 5 g/dL Final    SL AMB ALBUMIN/GLOBULIN RATIO 02/27/2018 0 9* 1 2 - 2 2 Final    SL AMB BILIRUBIN, TOTAL 02/27/2018 0 5  0 0 - 1 2 mg/dL Final    Alk Phos Isoenzymes 02/27/2018 107  39 - 117 IU/L Final    SL AMB AST 02/27/2018 64* 0 - 40 IU/L Final    SL AMB ALT 02/27/2018 43* 0 - 32 IU/L Final    Cholesterol, Total 02/27/2018 153  100 - 199 mg/dL Final    Triglycerides 02/27/2018 96  0 - 149 mg/dL Final    SL AMB HDL CHOLESTEROL 02/27/2018 46  >39 mg/dL Final    SL AMB VLDL CHOLESTEROL CALC 02/27/2018 19  5 - 40 mg/dL Final    SL AMB LDL-CHOLESTEROL 02/27/2018 88  0 - 99 mg/dL Final    T  Chol/HDL Ratio 02/27/2018 3 3  0 0 - 4 4 ratio units Final    Comment:                                   T  Chol/HDL Ratio                                              Men  Women                                1/2 Avg  Risk  3 4    3 3                                    Avg Risk  5 0    4 4                                 2X Avg  Risk  9 6    7 1                                 3X Avg  Risk 23 4   11 0      TSH 02/27/2018 2 970  0 450 - 4 500 uIU/mL Final   Allscripts Office Visit on 11/10/2017   Component Date Value Ref Range Status    Color, UA 11/10/2017 Yellow   Final    Clarity, UA 11/10/2017 Cloudy   Final    Leukocytes, UA 11/10/2017 neg   Final    Nitrite, UA 11/10/2017 neg   Final    Blood, UA 11/10/2017 large   Final    Bilirubin, UA 11/10/2017 neg   Final    Urobilinogen, UA 11/10/2017 0 2   Final    Protein, UA 11/10/2017 neg   Final    pH, UA 11/10/2017 5 0   Final    Specific Gravity, UA 11/10/2017 1 005   Final    Ketones, UA 11/10/2017 neg   Final    Glucose 11/10/2017 neg   Final    Comment:   Performed at: In Office  ,     Scarlett Finley CULTURE RESULT 11/10/2017 Final report   Final    Miscellaneous Lab Test Result 11/10/2017 No growth in 36 - 48 hours  Final    Comment: Result Comment: No growth in 36 - 48 hours    Performed at: [facility], [address], , [place], [place], [id number], [id number]  MD:  Eun Altamirano  [id number] [facility] [phone]     Generic Conversion - Encounter on 06/05/2017   Component Date Value Ref Range Status    LYME IGG WB INTERP  06/05/2017 Negative   Final    Comment: Result Comment: Positive: 5 of the following Borrelia-specific bands:                                                18,23,28,30,39,41,45,58,                                                66, and 93  Negative: No bands or banding                                                patterns which do not                                                meet positive criteria   LYME IGM WB INTERP  06/05/2017 Positive*  Final    Comment: Result Comment: Note: An equivocal or positive EIA result followed by a negative  Western Blot result is considered NEGATIVE  An equivocal or positive  EIA result followed by a positive Western Blot is considered POSITIVE  by the CDC  Positive: 2 of the following bands: 23,39 or 41  Negative: No bands or banding patterns which do not meet positive  criteria  Criteria for positivity are those recommended by CDC/ASTPHLD   p23=Osp C, t13=nxrclozkk  Note:  Sera from individuals with the following may cross react in the  Lyme Western Blot assays: other spirochetal diseases (periodontal  disease, leptospirosis, relapsing fever, yaws, and pinta);  connective autoimmune (Rheumatoid Arthritis and Systemic Lupus  Erythematosus and also individuals with Antinuclear Antibody);  other infections [facility] Spotted Fever; Nikita-Barr Virus,  and Cytomegalovirus)        LYME 18 KD IGG 06/05/2017 Absent   Final    LYME 23 KD IGG 06/05/2017 Present*  Final    LYME 28 KD IGG 06/05/2017 Absent   Final    LYME 30 KD IGG 06/05/2017 Present*  Final    LYME 39 KD IGG 06/05/2017 Absent   Final    LYME 41 KD IGG 06/05/2017 Absent   Final    LYME 45 KD IGG 06/05/2017 Absent   Final    LYME 58 KD IGG 06/05/2017 Absent   Final    LYME 66 KD IGG 06/05/2017 Absent   Final    LYME 93 KD IGG 06/05/2017 Absent   Final    LYME 23 KD IGM 06/05/2017 Present*  Final    LYME 44 KD IGM 06/05/2017 Present*  Final    LYME 41 KD IGM 06/05/2017 Absent   Final    Comment:   Performed at: [address] [place] [address] [address], , [place], [place], [id number], [id number]  MD:  [id number] [facility] [phone]     Generic Conversion - Encounter on 04/26/2017   Component Date Value Ref Range Status    QUANTIFERON INCUB COMMENT 04/26/2017 Comment   Final    Comment: Result Comment: Incubated, specimen forwarded to [place], [place] for  completion of the assay   QUANTIFERON TB GOLD 04/26/2017 Negative  Negative Final    QUANTIFERON CRITERIA (HISTORICAL) 04/26/2017 Comment   Final    Comment: Result Comment: To be considered positive a specimen should have a TB Ag minus Nil  value greater than or equal to 0 35 IU/mL and in addition the TB Ag  minus Nil value must be greater than or equal to 25% of the Nil  value  There may be insufficient information in these values to  differentiate between some negative and some indeterminate test  values   QUANTIFERON TB AG VALUE (HISTORICA* 04/26/2017 0 03  IU/mL Final    QAUNTIFERON NIL VALUE 04/26/2017 0 07  IU/mL Final    QUANTIFERON MITOGEN VALUE 04/26/2017 >10 00  IU/mL Final    QFT TB AG MINUS NIL VALUE 04/26/2017 <0 01  IU/mL Final    INTERPRETATION (HISTORICAL) 04/26/2017 Comment   Final    Comment: Result Comment: The QuantiFERON TB Gold (in Tube) assay is intended for use as an aid  in the diagnosis of TB infection  Negative results suggest that there  is no TB infection  In patients with high suspicion of exposure, a  negative test should be repeated  A positive test indicates infection  with Mycobacterium tuberculosis  Among individuals without  tuberculosis infection, a positive test may be due to exposure to  [place], M  normai or M  marinum  On the Internet, go to  cdc gov/tb for further details      Performed at: [address], , [place], [place], [id number], [id number]  MD:  [address] [phone]     [facility] Office Visit on 04/21/2017   Component Date Value Ref Range Status    Color, UA 04/21/2017 Yellow Final    Clarity, UA 04/21/2017 Transparent   Final    Leukocytes, UA 04/21/2017 neg   Final    Nitrite, UA 04/21/2017 neg   Final    Blood, UA 04/21/2017 mod   Final    Bilirubin, UA 04/21/2017 neg   Final    Urobilinogen, UA 04/21/2017 0 2   Final    Protein, UA 04/21/2017 neg   Final    pH, UA 04/21/2017 5 0   Final    Specific Gravity, UA 04/21/2017 1 020   Final    Ketones, UA 04/21/2017 neg   Final    Glucose 04/21/2017 neg   Final    Comment:   Performed at: In Office  ,     [place] CULTURE RESULT 04/21/2017 Final report   Final    Miscellaneous Lab Test Result 04/21/2017 No growth   Final    Comment:   Performed at: [facility] , , [place], [place], [id number], [id number]  MD:  [address] [phone]     Allscripts Office Visit on 02/02/2017   Component Date Value Ref Range Status    Glucose 07/28/2017 95  65 - 99 mg/dL Final    BUN 07/28/2017 7  6 - 24 mg/dL Final    Creatinine 07/28/2017 0 69  0 57 - 1 00 mg/dL Final    BUN/CREA Ratio 07/28/2017 10  9 - 23 Final    Sodium 07/28/2017 137  134 - 144 mmol/L Final    Potassium 07/28/2017 4 1  3 5 - 5 2 mmol/L Final    Chloride 07/28/2017 96  96 - 106 mmol/L Final    CO2 07/28/2017 22  18 - 29 mmol/L Final    Calcium 07/28/2017 9 2  8 7 - 10 2 mg/dL Final    Total Protein 07/28/2017 7 4  6 0 - 8 5 g/dL Final    Albumin 07/28/2017 3 8  3 5 - 5 5 g/dL Final    Tot   Globulin, Serum 07/28/2017 3 6  1 5 - 4 5 g/dL Final    A/G RATIO 07/28/2017 1 1* 1 2 - 2 2 Final    Total Bilirubin 07/28/2017 0 6  0 0 - 1 2 mg/dL Final    Alkaline Phosphatase 07/28/2017 106  39 - 117 IU/L Final    AST 07/28/2017 96* 0 - 40 IU/L Final    ALT 07/28/2017 70* 0 - 32 IU/L Final    EGFR-AMERICAN CALC 07/28/2017 96  >59 mL/min/1 73 Final    eGFR  07/28/2017 111  >59 mL/min/1 73 Final    Comment: Performed at: [facility] [address], , [place], [place], [id number], [id number]  MD:  Eun Esteves [phone]      Cholesterol 07/28/2017 159  100 - 199 mg/dL Final    Triglycerides 07/28/2017 110  0 - 149 mg/dL Final    HDL 07/28/2017 49  >39 mg/dL Final    VLDL Cholesterol Rui 07/28/2017 22  5 - 40 mg/dL Final    LDL Calculated 07/28/2017 88  0 - 99 mg/dL Final    Chol/HDL Ratio 07/28/2017 3 2  0 0 - 4 4 ratio units Final    Comment: Result Comment: T  Chol/HDL Ratio                                                             Men  Women                                               1/2 Avg  Risk  3 4    3 3                                                   Avg Risk  5 0    4 4                                                2X Avg  Risk  9 6    7 1                                                3X Avg  Risk 23 4   11 0  Performed at: [facility], [address] , , [place], [place], [id number], [id number]  MD:  [address] [phone]      WBC 07/28/2017 6 0  3 4 - 10 8 x10E3/uL Final    RBC 07/28/2017 4 29  3 77 - 5 28 x10E6/uL Final    Hemoglobin 07/28/2017 14 5  11 1 - 15 9 g/dL Final    Hematocrit 07/28/2017 41 8  34 0 - 46 6 % Final    MCV 07/28/2017 97  79 - 97 fL Final    MCH 07/28/2017 33 8* 26 6 - 33 0 pg Final    MCHC 07/28/2017 34 7  31 5 - 35 7 g/dL Final    RDW 07/28/2017 13 4  12 3 - 15 4 % Final    Platelets 90/20/5552 129* 150 - 379 x10E3/uL Final    Neutrophils Absolute 07/28/2017 54  % Final    Lymphocytes Absolute 07/28/2017 37  % Final    MONOCYTES 07/28/2017 7  % Final    Eosinophils Absolute 07/28/2017 1  % Final    Basophils Absolute 07/28/2017 1  % Final    Neutrophils Absolute 07/28/2017 3 3  1 4 - 7 0 x10E3/uL Final    Lymphocytes Absolute 07/28/2017 2 2  0 7 - 3 1 x10E3/uL Final    Monocytes Absolute 07/28/2017 0 4  0 1 - 0 9 x10E3/uL Final    Eosinophils Absolute 07/28/2017 0 1  0 0 - 0 4 x10E3/uL Final    Basophils Absolute 07/28/2017 0 0  0 0 - 0 2 x10E3/uL Final    IMM  GRANULOCYTES (CD4/8) 07/28/2017 0  % Final    IMM  GRANULOCYTES (CD4/8) 07/28/2017 0 0  0 0 - 0 1 x10E3/uL Final    Comment: Performed at: [address], , [place], [place], [id number], [id number]  MD:  Marco Freitas [phone]     Generic Conversion - Encounter on 01/23/2017   Component Date Value Ref Range Status    Vanessa Rascon HFP7 Default (HISTORI* 01/23/2017 Comment   Final    Comment: Result Comment: A hand-written panel/profile was received from your office  In  accordance with the [facility] Ambiguous Test Code Policy dated July 0190, we have completed your order by using the closest currently  or formerly recognized AMA panel  We have assigned Hepatic  Function Panel (7), Test Code #425199 to this request   If this  is not the testing you wished to receive on this specimen, please  contact the [address] [place] [address] Client Inquiry/Technical Services Department  to clarify the test order  We appreciate your business  Performed at: [address], , [place], [place], [id number], [id number]  MD:  87Yessy Freitas [phone]      Total Protein 01/23/2017 8 0  6 0 - 8 5 g/dL Final    Albumin 01/23/2017 4 2  3 5 - 5 5 g/dL Final    Total Bilirubin 01/23/2017 0 6  0 0 - 1 2 mg/dL Final    BILIRUBIN DIRECT 01/23/2017 0 18  0 00 - 0 40 mg/dL Final    Comment: Result Comment: **Effective January 30, 2017 the reference interval**                   for Bilirubin, Direct will be changing to:                               Age                Male          Female                           0 - 31 days       0 00 - 0 60    0 00 - 0 60                     Children >31 days and                     Adults:                 0 00 - 0 40    0 00 - 0 40      Alkaline Phosphatase 01/23/2017 121* 39 - 117 IU/L Final    AST 01/23/2017 106* 0 - 40 IU/L Final    ALT 01/23/2017 73* 0 - 32 IU/L Final    Comment: Performed at: [address], , [place], [place], [id number], [id number]  MD:  Eun Altamirano  [place], [place] [phone]     2333 Ayesha Dupree Visit on 07/28/2016   Component Date Value Ref Range Status    Cholesterol 01/23/2017 189  100 - 199 mg/dL Final    Triglycerides 01/23/2017 140  0 - 149 mg/dL Final    HDL 01/23/2017 43  >39 mg/dL Final    VLDL Cholesterol Rui 01/23/2017 28  5 - 40 mg/dL Final    LDL Calculated 01/23/2017 118* 0 - 99 mg/dL Final    Chol/HDL Ratio 01/23/2017 4 4  0 0 - 4 4 ratio units Final    Comment: Result Comment: T  Chol/HDL Ratio                                                             Men  Women                                               1/2 Avg  Risk  3 4    3 3                                                   Avg Risk  5 0    4 4                                                2X Avg  Risk  9 6    7 1                                                3X Avg  Risk 23 4   11 0  Performed at: [address], , [place], [place], [id number], [id number]  MD:  87Yessy Freitas [phone]      [address] 01/23/2017 2 770  0 450 - 4 500 uIU/mL Final    Comment: Performed at: [address], , [place], [place], [id number], [id number]  MD:  87Yessy Freitas [phone]      WBC 01/23/2017 6 8  3 4 - 10 8 x10E3/uL Final    RBC 01/23/2017 4 59  3 77 - 5 28 x10E6/uL Final    Hemoglobin 01/23/2017 15 5  11 1 - 15 9 g/dL Final    Hematocrit 01/23/2017 45 3  34 0 - 46 6 % Final    MCV 01/23/2017 99* 79 - 97 fL Final    MCH 01/23/2017 33 8* 26 6 - 33 0 pg Final    MCHC 01/23/2017 34 2  31 5 - 35 7 g/dL Final    RDW 01/23/2017 12 9  12 3 - 15 4 % Final    Platelets 14/53/6476 170  150 - 379 x10E3/uL Final    Neutrophils Absolute 01/23/2017 55  % Final    Lymphocytes Absolute 01/23/2017 33  % Final    MONOCYTES 01/23/2017 10  % Final    Eosinophils Absolute 01/23/2017 1  % Final    Basophils Absolute 01/23/2017 1  % Final    Neutrophils Absolute 01/23/2017 3 8  1 4 - 7 0 x10E3/uL Final    Lymphocytes Absolute 01/23/2017 2 2  0 7 - 3 1 x10E3/uL Final    Monocytes Absolute 01/23/2017 0 7  0 1 - 0 9 x10E3/uL Final    Eosinophils Absolute 01/23/2017 0 1  0 0 - 0 4 x10E3/uL Final    Basophils Absolute 01/23/2017 0 1  0 0 - 0 2 x10E3/uL Final    IMM  GRANULOCYTES (CD4/8) 01/23/2017 0  % Final    IMM  GRANULOCYTES (CD4/8) 01/23/2017 0 0  0 0 - 0 1 x10E3/uL Final    Comment: Performed at: [facility] , , [place], [place], [id number], [id number]  MD:  [phone] [facility] [phone]     Generic Conversion - Encounter on 07/20/2016   Component Date Value Ref Range Status    Please note 07/20/2016 Comment   Final    Comment: Result Comment: The date and/or time of collection was not indicated on the  requisition as required by state and federal law  The date of  receipt of the specimen was used as the collection date if not  supplied  Performed at: [address] , , [place], [place], [id number], [id number]  MD:  [address] [phone]     Hospital Outpatient Visit on 06/15/2016   Component Date Value Ref Range Status    Case Report 06/15/2016    Final                    Value:Surgical Pathology Report                         Case: [id number]                                   Authorizing Provider:  Val Wyatt MD           Collected:           06/15/2016 1234              Ordering Location:     [facility]        Received:            06/15/2016 [address] [place] [address] Radiology                                                         Pathologist:           Trinidad Qiu MD                                                               Specimen:    Liver                                                                                      Final Diagnosis 06/15/2016    Final                    Value: This result contains rich text formatting which cannot be displayed here   Microscopic Description 06/15/2016    Final                    Value: This result contains rich text formatting which cannot be displayed here   Additional Information 06/15/2016    Final                    Value: This result contains rich text formatting which cannot be displayed here  Jess Bench Description 06/15/2016    Final                    Value: This result contains rich text formatting which cannot be displayed here   Clinical Information 06/15/2016    Final                    Value:elevated LFT's and fatty liver    +EtOH: 6 cans beer/wk    6/15/16:         TB 0 80   There may be more visits with results that are not included  Physical Exam   Constitutional: She is oriented to person, place, and time  She appears well-developed and well-nourished  Cardiovascular: Normal rate, regular rhythm and normal heart sounds  Pulmonary/Chest: Effort normal and breath sounds normal    Musculoskeletal: Normal range of motion  Neurological: She is alert and oriented to person, place, and time  Skin: Skin is warm and dry  Psychiatric: She has a normal mood and affect   Her behavior is normal  Judgment and thought content normal

## 2018-03-18 ENCOUNTER — ANESTHESIA EVENT (OUTPATIENT)
Dept: GASTROENTEROLOGY | Facility: MEDICAL CENTER | Age: 58
End: 2018-03-18
Payer: COMMERCIAL

## 2018-03-19 ENCOUNTER — ANESTHESIA (OUTPATIENT)
Dept: GASTROENTEROLOGY | Facility: MEDICAL CENTER | Age: 58
End: 2018-03-19
Payer: COMMERCIAL

## 2018-03-19 ENCOUNTER — HOSPITAL ENCOUNTER (OUTPATIENT)
Facility: MEDICAL CENTER | Age: 58
Setting detail: OUTPATIENT SURGERY
Discharge: HOME | End: 2018-03-19
Attending: INTERNAL MEDICINE | Admitting: INTERNAL MEDICINE
Payer: COMMERCIAL

## 2018-03-19 VITALS
TEMPERATURE: 98 F | OXYGEN SATURATION: 96 % | WEIGHT: 179 LBS | BODY MASS INDEX: 29.82 KG/M2 | HEART RATE: 71 BPM | DIASTOLIC BLOOD PRESSURE: 62 MMHG | RESPIRATION RATE: 24 BRPM | HEIGHT: 65 IN | SYSTOLIC BLOOD PRESSURE: 107 MMHG

## 2018-03-19 DIAGNOSIS — K70.0 ALCOHOLIC FATTY LIVER: ICD-10-CM

## 2018-03-19 DIAGNOSIS — R19.7 DIARRHEA, UNSPECIFIED TYPE: ICD-10-CM

## 2018-03-19 DIAGNOSIS — R79.89 OTHER SPECIFIED ABNORMAL FINDINGS OF BLOOD CHEMISTRY: ICD-10-CM

## 2018-03-19 DIAGNOSIS — K62.5 HEMORRHAGE OF ANUS AND RECTUM: ICD-10-CM

## 2018-03-19 DIAGNOSIS — K58.9 IRRITABLE BOWEL SYNDROME WITHOUT DIARRHEA: ICD-10-CM

## 2018-03-19 PROCEDURE — 88305 TISSUE EXAM BY PATHOLOGIST: CPT | Performed by: PATHOLOGY

## 2018-03-19 PROCEDURE — 88342 IMHCHEM/IMCYTCHM 1ST ANTB: CPT | Performed by: PATHOLOGY

## 2018-03-19 PROCEDURE — 45380 COLONOSCOPY AND BIOPSY: CPT | Performed by: INTERNAL MEDICINE

## 2018-03-19 PROCEDURE — 43239 EGD BIOPSY SINGLE/MULTIPLE: CPT | Performed by: INTERNAL MEDICINE

## 2018-03-19 RX ORDER — SODIUM CHLORIDE 9 MG/ML
125 INJECTION, SOLUTION INTRAVENOUS CONTINUOUS
Status: DISCONTINUED | OUTPATIENT
Start: 2018-03-19 | End: 2018-03-19 | Stop reason: HOSPADM

## 2018-03-19 RX ORDER — PROPOFOL 10 MG/ML
INJECTION, EMULSION INTRAVENOUS AS NEEDED
Status: DISCONTINUED | OUTPATIENT
Start: 2018-03-19 | End: 2018-03-19 | Stop reason: SURG

## 2018-03-19 RX ADMIN — SODIUM CHLORIDE: 0.9 INJECTION, SOLUTION INTRAVENOUS at 12:07

## 2018-03-19 RX ADMIN — PROPOFOL 50 MG: 10 INJECTION, EMULSION INTRAVENOUS at 12:34

## 2018-03-19 RX ADMIN — PROPOFOL 100 MG: 10 INJECTION, EMULSION INTRAVENOUS at 12:24

## 2018-03-19 RX ADMIN — SODIUM CHLORIDE 125 ML/HR: 0.9 INJECTION, SOLUTION INTRAVENOUS at 10:43

## 2018-03-19 RX ADMIN — PROPOFOL 50 MG: 10 INJECTION, EMULSION INTRAVENOUS at 12:19

## 2018-03-19 RX ADMIN — PROPOFOL 100 MG: 10 INJECTION, EMULSION INTRAVENOUS at 12:09

## 2018-03-19 RX ADMIN — PROPOFOL 50 MG: 10 INJECTION, EMULSION INTRAVENOUS at 12:39

## 2018-03-19 RX ADMIN — PROPOFOL 50 MG: 10 INJECTION, EMULSION INTRAVENOUS at 12:29

## 2018-03-19 RX ADMIN — LIDOCAINE HYDROCHLORIDE 100 MG: 20 INJECTION, SOLUTION INTRAVENOUS at 12:09

## 2018-03-19 RX ADMIN — PROPOFOL 50 MG: 10 INJECTION, EMULSION INTRAVENOUS at 12:14

## 2018-03-19 NOTE — H&P
History and Physical - [facility]  Alejandrina Andersen 61 y o  female MRN: [id number]                  HPI: Alejandrina Andersen is a 61y o  year old female who presents for epigastric pain, diarrhea, and rectal bleeding  REVIEW OF SYSTEMS: Per the HPI, and otherwise unremarkable  Historical Information   Past Medical History:   Diagnosis Date    Alcoholic fatty liver     Anxiety     Asthma     COPD (chronic obstructive pulmonary disease) (HCC)     Elevated liver function tests     GERD without esophagitis     Hyperlipidemia     Hypertension     IBS (irritable bowel syndrome)     Insomnia     Insomnia     Nervousness     Nicotine dependence     Other specified urinary incontinence     RLS (restless legs syndrome)      Past Surgical History:   Procedure Laterality Date    BACK SURGERY      HYSTERECTOMY      KNEE SURGERY      KNEE SURGERY      LUMBAR LAMINECTOMY  04/1999    LYMPH NODE BIOPSY      TOTAL ABDOMINAL HYSTERECTOMY  02/05/2008    TUBAL LIGATION      TUBAL LIGATION  1989     Social History   History   Alcohol Use    1 8 oz/week    3 Cans of beer per week     Comment: Daily; just cut back from 6 beers/day   Per allscripts -  3 beers a night     History   Drug Use No     History   Smoking Status    Current Every Day Smoker    Packs/day: 0 50   Smokeless Tobacco    Never Used     Comment: Per allscripts - 1/2 PPD cigs - smoking for 40 years     Family History   Problem Relation Age of Onset    Cancer Mother     Substance Abuse Neg Hx     Mental illness Neg Hx        Meds/Allergies     Prescriptions Prior to Admission   Medication    ALPRAZolam (XANAX) 1 mg tablet    budesonide-formoterol (SYMBICORT) 160-4 5 mcg/act inhaler    econazole nitrate 1 % cream    losartan (COZAAR) 100 MG tablet    albuterol (2 5 mg/3 mL) 0 083 % nebulizer solution    albuterol (PROVENTIL HFA) 90 mcg/act inhaler    Naproxen Sodium (ALEVE PO)       Allergies   Allergen Reactions    Ace Inhibitors Cough       Objective     Blood pressure 115/62, pulse 87, temperature 98 °F (36 7 °C), temperature source Temporal, resp  rate 16, height 5' 5" (1 651 m), weight 81 2 kg (179 lb), SpO2 98 %  PHYSICAL EXAM    Gen: NAD  CV: RRR  CHEST: Clear  ABD: soft, NT/ND  EXT: no edema      ASSESSMENT/PLAN:  This is a 61y o  year old female here for epigastric pain, diarrhea, and rectal bleeding  PLAN:   Procedure:  Upper endoscopy and colonoscopy

## 2018-03-19 NOTE — ANESTHESIA PREPROCEDURE EVALUATION
Review of Systems/Medical History          Cardiovascular  Hyperlipidemia, Hypertension controlled,    Pulmonary  COPD moderate- medication dependent , Asthma: Asthma type of rescue: inhaled steroids,        GI/Hepatic    GERD well controlled, Esophageal disease: Elevated LFT's  , Liver disease, alcohol related, Bowel prep  Comment: IBS          Endo/Other     GYN       Hematology   Musculoskeletal       Neurology   Psychology   Anxiety,              Physical Exam    Airway    Mallampati score: II  TM Distance: >3 FB  Neck ROM: full     Dental   No notable dental hx     Cardiovascular  Rhythm: regular, Rate: normal, Cardiovascular exam normal    Pulmonary  Pulmonary exam normal Breath sounds clear to auscultation,     Other Findings        Anesthesia Plan  ASA Score- 3     Anesthesia Type- IV sedation with anesthesia with ASA Monitors  Additional Monitors:   Airway Plan:         Plan Factors-Patient not instructed to abstain from smoking on day of procedure  Patient did not smoke on day of surgery  Induction- intravenous  Postoperative Plan-     Informed Consent- Anesthetic plan and risks discussed with patient

## 2018-03-19 NOTE — OP NOTE
OPERATIVE REPORT  PATIENT NAME: Nedra Guerra    :  1960  MRN: [id number]  Pt Location: [facility] GI ROOM 01    SURGERY DATE: 3/19/2018    Surgeon(s) and Role:     * Seven Contreras MD - Primary    Colonoscopy Procedure Note    Procedure: Colonoscopy    Sedation: Monitored anesthesia care, check anesthesia records      ASA Class: 3    INDICATIONS:  Chronic diarrhea and rectal bleeding    POST-OP DIAGNOSIS: See the impression below    Procedure Details     Prior colonoscopy: 9 years ago  Informed consent was obtained for the procedure, including sedation  Risks of perforation, hemorrhage, adverse drug reaction and aspiration were discussed  The patient was placed in the left lateral decubitus position  Based on the pre-procedure assessment, including review of the patient's medical history, medications, allergies, and review of systems, she had been deemed to be an appropriate candidate for conscious sedation; she was therefore sedated with the medications listed below  The patient was monitored continuously with telemetry, pulse oximetry, blood pressure monitoring, and direct observations  A rectal examination was performed  The colonoscope was inserted into the rectum and advanced under direct vision to the terminal ileum  The quality of the colonic preparation was good  A careful inspection was made as the colonoscope was withdrawn, including a retroflexed view of the rectum; findings and interventions are described below  Findings: The terminal ileum appeared normal  Random biopsies were taken of the terminal ileum to rule out Crohn's disease  There were patchy areas of erythema in the colon and random biopsies were taken from these areas  Retroflexed view of the rectum revealed external hemorrhoids  Complications:  None; patient tolerated the procedure well      Impression:    Mild colitis    Recommendations:  Repeat colonoscopy in 10 years or sooner if clinically indicated    Await pathology results  Follow-up with endoscopist in the office    SIGNATURE: Colette Lala MD  DATE: March 19, 2018  TIME: 12:41 PM

## 2018-03-19 NOTE — DISCHARGE INSTRUCTIONS
Colitis   Centers for Disease Control and Prevention (CDC): Clostridium difficile infection information for patients  Centers for Disease Control and Prevention (CDC)  [place], [address] [facility]  Available from URL: Seek[employer] fr  As accessed 2017-03-30  Kamila Jackson SF: Infectious Diseases  In: Soheila Estrella PD, Amanda Sanz, et al, eds  [facility] Primary Care Geriatrics, 6th ed  [address] , [place], [place], 2014, [place] 06, [phone] M6  Daniel HANSEN & Rohit Holm MELISSA: Parasitic Infections  In: [employer], [employer] , [facility] Emergency Medicine: Concepts and Clinical Practice, 8th ed  [address] [facility], [place], [place], 2014, [place] 4444-0574 T8  Tali Bhatti: Ulcerative Colitis  In: Jameel , ed  The 5 Minute Clinical Consult Standard 2015, 23rd ed  [address], 2014  Tariq FA : Gastrointestinal System  In: Essentials of Radiology, 3rd ed  [address] [facility], [place], [place], 2014  Bel [place], & Nicole ML: Colitis, Ischemic  In: Jameel , ed  The 5-Minute Clinical Consult 2014, 22nd ed  [address], 2014  Diana HL : Approach to the patient with infectious colitis  Curr Opin Gastroenterol 2012; 28(1):39-46  Casi U & Opal RA : Infectious colitis  Curr Opin Gastroenterol 2011; 27(1):66-71  Anibal ESTRADA & Silverio Slider TL: Nonneoplastic Intestinal Diseases  In: [facility], ed  [facility] Diagnostic Surgical Pathology, 5th ed  [address] [facility],[address] [place] [address], 2010  Mariya GY : Biopsy diagnosis of non-IBD colitis  Int J Surg Pathol 2010; 18(3 Suppl):48S-52S  Maxwell LANDA & Kam SB: Inflammatory Bowel Disease and Toxic Megacolon  In: Vesna Hawthorne, & Durga: Critical Care, 4th ed  [address] [facility],[address] [place] [address], 2009 © 2017 [address] [facility] Information is for End User's use only and may not be sold, redistributed or otherwise used for commercial purposes  All illustrations and images included in CareNotes® are the copyrighted property of A D A M , Inc  or Adams Beltran  The above information is an  only  It is not intended as medical advice for individual conditions or treatments  Talk to your doctor, nurse or pharmacist before following any medical regimen to see if it is safe and effective for you  Gastritis   WHAT YOU NEED TO KNOW:   What is gastritis? Gastritis is inflammation or irritation of the lining of your stomach  What increases my risk for gastritis? · Infection with bacteria, a virus, or a parasite    · NSAIDs, aspirin, or steroid medicine    · Use of tobacco products or alcohol    · Trauma such as an injury to your stomach or intestine    · Autoimmune disorders such as diabetes, thyroid disease, or Crohn disease    · Stress    · Age older than 60 years    · Illegal drugs, such as cocaine  What are the signs and symptoms of gastritis? · Stomach pain, burning, or tenderness when you press on it    · Stomach fullness or tightness    · Nausea or vomiting    · Loss of appetite, or feeling full quickly when you eat    · Bad breath    · Fatigue or feeling more tired than usual    · Heartburn  How is gastritis diagnosed? Your healthcare provider will ask about your signs and symptoms and examine you  You may need any of the following:  · Blood tests  may be used to show an infection, dehydration, or anemia (low red blood cell levels)  · A bowel movement sample  may be tested for blood or the germ that may be causing your gastritis  · A breath test  may show if H pylori is causing your gastritis  You will be given a liquid to drink  Then you will breathe into a bag  Your healthcare provider will measure the amount of carbon dioxide in your breath  Extra amounts of carbon dioxide may mean you have an H pylori infection      · An endoscopy  may be used to look for irritation or bleeding in your stomach  Your healthcare provider will use an endoscope (tube with a light and camera on the end) during the procedure  He or she may take a sample from your stomach to be tested  How is gastritis treated? Your symptoms may go away without treatment  Treatment will depend on what is causing your gastritis  Your healthcare provider may recommend changes to the medicines you take  Medicines may be given to help treat a bacterial infection or decrease stomach acid  How can I manage or prevent gastritis? · Do not smoke  Nicotine and other chemicals in cigarettes and cigars can make your symptoms worse and cause lung damage  Ask your healthcare provider for information if you currently smoke and need help to quit  E-cigarettes or smokeless tobacco still contain nicotine  Talk to your healthcare provider before you use these products  · Do not drink alcohol  Alcohol can prevent healing and make your gastritis worse  Talk to your healthcare provider if you need help to stop drinking  · Do not take NSAIDs or aspirin unless directed  These and similar medicines can cause irritation of your stomach lining  If your healthcare provider says it is okay to take NSAIDs, take them with food  · Do not eat foods that cause irritation  Foods such as oranges and salsa can cause burning or pain  Eat a variety of healthy foods  Examples include fruits (not citrus), vegetables, low-fat dairy products, beans, whole-grain breads, and lean meats and fish  Try to eat small meals, and drink water with your meals  Do not eat for at least 3 hours before you go to bed  · Find ways to relax and decrease stress  Stress can increase stomach acid and make gastritis worse  Activities such as yoga, meditation, or listening to music can help you relax  Spend time with friends, or do things you enjoy    Call 911 for any of the following:   · You develop chest pain or shortness of breath  When should I seek immediate care? · You vomit blood  · You have black or bloody bowel movements  · You have severe stomach or back pain  When should I contact my healthcare provider? · You have a fever  · You have new or worsening symptoms, even after treatment  · You have questions or concerns about your condition or care  CARE AGREEMENT:   You have the right to help plan your care  Learn about your health condition and how it may be treated  Discuss treatment options with your caregivers to decide what care you want to receive  You always have the right to refuse treatment  The above information is an  only  It is not intended as medical advice for individual conditions or treatments  Talk to your doctor, nurse or pharmacist before following any medical regimen to see if it is safe and effective for you  © 2017 [address] Patrick Patel Information is for End User's use only and may not be sold, redistributed or otherwise used for commercial purposes  All illustrations and images included in CareNotes® are the copyrighted property of A D A M , Inc  or [facility]  Upper Endoscopy   WHAT YOU NEED TO KNOW:   An upper endoscopy is also called an upper gastrointestinal (GI) endoscopy, or an esophagogastroduodenoscopy (EGD)  You may feel bloated, gassy, or have some abdominal discomfort after your procedure  Your throat may be sore for 24 to 36 hours  You may burp or pass gas from air that is still inside your body  DISCHARGE INSTRUCTIONS:   Call 911 for any of the following:   · You have sudden chest pain or trouble breathing  Seek care immediately if:   · You feel dizzy or faint  · You have trouble swallowing  · Your bowel movements are very dark or black  · Your abdomen is hard and firm and you have severe pain  · You vomit blood  Contact your healthcare provider if:   · You feel full or bloated and cannot burp or pass gas      · You have not had a bowel movement for 3 days after your procedure  · You have neck pain  · You have a fever or chills  · You have nausea or are vomiting  · You have a rash or hives  · You have questions or concerns about your endoscopy  Relieve a sore throat:  Suck on throat lozenges or crushed ice  Gargle with a small amount of warm salt water  Mix 1 teaspoon of salt and 1 cup of warm water to make salt water  Relieve gas and discomfort from bloating:  Lie on your right side with a heating pad on your abdomen  Take short walks to help pass gas  Eat small meals until bloating is relieved  Rest after your procedure: You have been given medicine to relax you  Do not  drive or make important decisions until the day after your procedure  Return to your normal activity as directed  You can usually return to work the day after your procedure  Follow up with your healthcare provider as directed:  Write down your questions so you remember to ask them during your visits  © 2017 [address] is for End User's use only and may not be sold, redistributed or otherwise used for commercial purposes  All illustrations and images included in CareNotes® are the copyrighted property of A D A M , Inc  or Adams Beltran  The above information is an  only  It is not intended as medical advice for individual conditions or treatments  Talk to your doctor, nurse or pharmacist before following any medical regimen to see if it is safe and effective for you  Colonoscopy   WHAT YOU NEED TO KNOW:   A colonoscopy is a procedure to examine the inside of your colon (intestine) with a scope  Polyps or tissue growths may have been removed during your colonoscopy  It is normal to feel bloated and to have some abdominal discomfort  You should be passing gas  If you have hemorrhoids or you had polyps removed, you may have a small amount of bleeding         DISCHARGE INSTRUCTIONS:   Seek care immediately if:   · You have a large amount of bright red blood in your bowel movements  · Your abdomen is hard and firm and you have severe pain  · You have sudden trouble breathing  Contact your healthcare provider if:   · You develop a rash or hives  · You have a fever within 24 hours of your procedure       · You have not had a bowel movement for 3 days after your procedure  · You have questions or concerns about your condition or care  Activity:   · Do not lift, strain, or run  for 3 days after your procedure  · Rest after your procedure  You have been given medicine to relax you  Do not  drive or make important decisions until the day after your procedure  Return to your normal activity as directed  · Relieve gas and discomfort from bloating  by lying on your right side with a heating pad on your abdomen  You may need to take short walks to help the gas move out  Eat small meals until bloating is relieved  If you had polyps removed: For 7 days after your procedure:  · Do not  take aspirin  · Do not  go on long car rides  Follow up with your healthcare provider as directed:  Write down your questions so you remember to ask them during your visits  © 2017 [address] is for End User's use only and may not be sold, redistributed or otherwise used for commercial purposes  All illustrations and images included in CareNotes® are the copyrighted property of [employer]  or [address]  The above information is an  only  It is not intended as medical advice for individual conditions or treatments  Talk to your doctor, nurse or pharmacist before following any medical regimen to see if it is safe and effective for you

## 2018-03-19 NOTE — OP NOTE
OPERATIVE REPORT  PATIENT NAME: Claudio Liao    :  1960  MRN: [id number]  Pt Location: [facility] GI ROOM 01    SURGERY DATE: 3/19/2018    Surgeon(s) and Role:     * Cameron Vargas MD - Primary    ESOPHAGOGASTRODUODENOSCOPY    PROCEDURE: EGD    SEDATION: Monitored anesthesia care, check anesthesia records    ASA Class: 3    INDICATIONS:  Epigastric pain, diarrhea, and rectal bleeding    CONSENT:  Informed consent was obtained for the procedure, including sedation after explaining the risks and benefits of the procedure  Risks including but not limited to bleeding, perforation, infection, and missed lesion  PREPARATION:   Telemetry, pulse oximetry, blood pressure were monitored throughout the procedure  Patient was identified by myself both verbally and by visual inspection of ID band  DESCRIPTION:   Patient was placed in the left lateral decubitus position and was sedated with the above medication  The gastroscope was introduced in to the oropharynx and the esophagus was intubated under direct visualization  Scope was passed down the esophagus up to 2nd part of the duodenum  A careful inspection was made as the gastroscope was withdrawn, including a retroflexed view of the stomach; findings and interventions are described below  FINDINGS:    #1  Esophagus- LA class B erosive reflux esophagitis  #2  Stomach- erosive gastritis was noted in the antrum versus gastric antral vascular ectasia  Random biopsies were taken to rule out Helicobacter pylori infection  #3  Duodenum- normal  Random biopsies were taken rule out celiac sprue  IMPRESSIONS:      LA class B erosive reflux esophagitis  Erosive gastritis    RECOMMENDATIONS:     Await pathology results  Continue current medications  Colonoscopy to follow    COMPLICATIONS:  None; patient tolerated the procedure well            DISPOSITION: PACU           CONDITION: Stable      SIGNATURE: Cameron Vargas MD  DATE: 2018  TIME: 12:22 PM

## 2018-03-29 ENCOUNTER — HOSPITAL ENCOUNTER (OUTPATIENT)
Dept: CT IMAGING | Facility: HOSPITAL | Age: 58
Discharge: HOME | End: 2018-03-29
Payer: COMMERCIAL

## 2018-03-29 DIAGNOSIS — F17.200 NICOTINE DEPENDENCE, UNCOMPLICATED, UNSPECIFIED NICOTINE PRODUCT TYPE: ICD-10-CM

## 2018-04-03 ENCOUNTER — TELEPHONE (OUTPATIENT)
Dept: FAMILY MEDICINE CLINIC | Facility: CLINIC | Age: 58
End: 2018-04-03

## 2018-04-03 NOTE — TELEPHONE ENCOUNTER
Left message with results     ----- Message from Keiko Rajput, [address] Patel sent at 4/3/2018 10:19 AM EDT -----  Call pt and let her know that her CT screening was normal and she needs to carole this again in 1 year

## 2018-04-03 NOTE — TELEPHONE ENCOUNTER
Patient said the oconazol cream is not working for her , she is asking for something stronger  Should she call Dr Samuel Parrish office?    She is also asking for her CT result      [phone]

## 2018-04-03 NOTE — TELEPHONE ENCOUNTER
There is already a note in the system for her ct result: it was normal and she needs to repeat this in one year  I would recommend a derm for the rash   Dr Charli Montenegro is Gi  Dr Lv Sevilla in [facility] and his PA Morelia Cunha is great

## 2018-04-05 ENCOUNTER — OFFICE VISIT (OUTPATIENT)
Dept: FAMILY MEDICINE CLINIC | Facility: CLINIC | Age: 58
End: 2018-04-05
Payer: COMMERCIAL

## 2018-04-05 VITALS
WEIGHT: 180 LBS | DIASTOLIC BLOOD PRESSURE: 72 MMHG | HEIGHT: 65 IN | SYSTOLIC BLOOD PRESSURE: 122 MMHG | BODY MASS INDEX: 29.99 KG/M2 | HEART RATE: 71 BPM | RESPIRATION RATE: 12 BRPM

## 2018-04-05 DIAGNOSIS — L30.9 DERMATITIS: Primary | ICD-10-CM

## 2018-04-05 DIAGNOSIS — R53.83 FATIGUE, UNSPECIFIED TYPE: ICD-10-CM

## 2018-04-05 DIAGNOSIS — B35.9 TINEA: ICD-10-CM

## 2018-04-05 PROCEDURE — 99213 OFFICE O/P EST LOW 20 MIN: CPT | Performed by: NURSE PRACTITIONER

## 2018-04-05 RX ORDER — MOMETASONE FUROATE 1 MG/G
CREAM TOPICAL DAILY
Qty: 45 G | Refills: 3 | Status: SHIPPED | OUTPATIENT
Start: 2018-04-05 | End: 2019-02-05 | Stop reason: ALTCHOICE

## 2018-04-05 NOTE — PROGRESS NOTES
Assessment/Plan:   1  Dermatitis  Please use the prescription cream and call me if this is not getting better    - mometasone (ELOCON) 0 1 % cream; Apply topically daily  Dispense: 45 g; Refill: 3    2  Fatigue, unspecified type  We will carole thei lyme to reassure you  Please consider getting a sleep study/  - Lyme Antibody Profile with reflex to WB; Future  - Lyme Antibody Profile with reflex to WB    rto as scheduled  Subjective:     Patient ID: Keshia Robbins is a 61 y o  female  Here today with complaint of rash on her legs   is also concerned about fatigue  Is feeling drained  Does not snore is just fatigued  Just had labs done that were normal but is concerned about lyme  Was tested positve for this last year but now is concerned and requests testing again,        Review of Systems   Constitutional: Positive for fatigue  Respiratory: Negative  Cardiovascular: Negative  Skin: Positive for rash  Neurological: Negative  Psychiatric/Behavioral: Negative  Objective:     Physical Exam   Constitutional: She appears well-developed and well-nourished  Cardiovascular: Normal rate and regular rhythm  Pulmonary/Chest: Effort normal and breath sounds normal    Skin:   Scattered erythematous macular papular rash on legs

## 2018-05-08 DIAGNOSIS — I10 BENIGN ESSENTIAL HTN: Primary | ICD-10-CM

## 2018-05-08 RX ORDER — LOSARTAN POTASSIUM 100 MG/1
TABLET ORAL
Qty: 30 TABLET | Refills: 6 | Status: SHIPPED | OUTPATIENT
Start: 2018-05-08 | End: 2018-12-06 | Stop reason: SDUPTHER

## 2018-05-22 ENCOUNTER — DOCUMENTATION (OUTPATIENT)
Dept: PULMONOLOGY | Facility: CLINIC | Age: 58
End: 2018-05-22

## 2018-05-22 NOTE — PROGRESS NOTES
On 5/18/18, I called the patient's PCP and left a message requesting a [facility] insurance referral for their visit in the pulmonary office on 5/25/18

## 2018-05-24 ENCOUNTER — ANNUAL EXAM (OUTPATIENT)
Dept: FAMILY MEDICINE CLINIC | Facility: CLINIC | Age: 58
End: 2018-05-24
Payer: COMMERCIAL

## 2018-05-24 VITALS
HEART RATE: 93 BPM | RESPIRATION RATE: 14 BRPM | SYSTOLIC BLOOD PRESSURE: 122 MMHG | HEIGHT: 64 IN | OXYGEN SATURATION: 96 % | BODY MASS INDEX: 30.56 KG/M2 | WEIGHT: 179 LBS | DIASTOLIC BLOOD PRESSURE: 66 MMHG

## 2018-05-24 DIAGNOSIS — Z12.4 PAP SMEAR FOR CERVICAL CANCER SCREENING: ICD-10-CM

## 2018-05-24 DIAGNOSIS — Z12.31 SCREENING MAMMOGRAM, ENCOUNTER FOR: ICD-10-CM

## 2018-05-24 DIAGNOSIS — Z01.419 ENCOUNTER FOR ANNUAL ROUTINE GYNECOLOGICAL EXAMINATION: Primary | ICD-10-CM

## 2018-05-24 PROCEDURE — 99396 PREV VISIT EST AGE 40-64: CPT | Performed by: NURSE PRACTITIONER

## 2018-05-24 NOTE — PROGRESS NOTES
Subjective     Mariano Leahy is a 61 y o   female and is here for routine gyn exam The patient reports no problems  Cancer Screening  Colononoscopy Due in 2028  Mammogram Due 01/2019  Pap Done today  Abnormal pap? no  Smoker Continues to smoke with no desire to quit  Imaging Due 03/2019      Immunization History   Administered Date(s) Administered    Influenza 10/13/2008, 09/29/2009, 11/06/2010, 10/07/2011, 10/15/2016, 09/26/2017    Influenza Quadrivalent Preservative Free 3 years and older IM 09/26/2017    Influenza Quadrivalent, 6-35 Months IM 09/25/2015, 10/15/2016    Pneumococcal Polysaccharide PPV23 09/25/2015    Tdap 09/30/2008, 05/02/2017        History:  LMP: No LMP recorded  Patient has had a hysterectomy  The following portions of the patient's history were reviewed and updated as appropriate: allergies, current medications, past family history, past medical history, past social history, past surgical history and problem list       Review of Systems  Review of Systems   Constitutional: Negative  HENT: Negative  Respiratory: Negative  Cardiovascular: Negative  Genitourinary: Negative  Psychiatric/Behavioral: Negative  Objective   Vitals:    05/24/18 1321   BP: 122/66   Pulse: 93   Resp: 14   SpO2: 96%     Wt Readings from Last 3 Encounters:   05/24/18 81 2 kg (179 lb)   04/05/18 81 6 kg (180 lb)   03/19/18 81 2 kg (179 lb)     BP Readings from Last 3 Encounters:   05/24/18 122/66   04/05/18 122/72   03/19/18 107/62     Pulse Readings from Last 3 Encounters:   05/24/18 93   04/05/18 71   03/19/18 71     BMI Readings from Last 3 Encounters:   05/24/18 30 49 kg/m²   04/05/18 29 95 kg/m²   03/19/18 29 79 kg/m²     Physical Exam   Constitutional: She is oriented to person, place, and time  She appears well-developed and well-nourished  No distress  HENT:   Head: Normocephalic  Cardiovascular: Normal rate, regular rhythm and normal heart sounds      Pulmonary/Chest: Effort normal and breath sounds normal  No respiratory distress  She has no wheezes  She exhibits no tenderness  Abdominal: Soft  Bowel sounds are normal  She exhibits no distension and no mass  There is no tenderness  There is no guarding  Genitourinary: Vagina normal  No vaginal discharge found  Genitourinary Comments: Cervix present without lesions, PAP obtained  Musculoskeletal: Normal range of motion  She exhibits no edema  Neurological: She is alert and oriented to person, place, and time  Skin: Skin is warm and dry  No erythema  Psychiatric: She has a normal mood and affect  Her behavior is normal  Judgment and thought content normal        Assessment/Plan     Healthy female exam     1  GYN: pap was done and we will put a card in the mail with the results  You will be due back again in 2 years  2  Patient Counseling:  --Nutrition: Stressed importance of moderation in sodium/caffeine intake, saturated fat and cholesterol, caloric balance, sufficient intake of fresh fruits, vegetables, fiber, calcium, iron  --Exercise: Stressed the importance of regular exercise  --Injury prevention: Discussed safety belts, safety helmets, smoke detector, smoking near bedding or upholstery  --Dental health: Discussed importance of regular tooth brushing, flossing, and dental visits  --Immunizations reviewed  --Discussed benefits of screening colonoscopy    --After hours service discussed with patient     rto as scheduled

## 2018-05-25 ENCOUNTER — OFFICE VISIT (OUTPATIENT)
Dept: PULMONOLOGY | Facility: HOSPITAL | Age: 58
End: 2018-05-25
Payer: COMMERCIAL

## 2018-05-25 VITALS
DIASTOLIC BLOOD PRESSURE: 74 MMHG | OXYGEN SATURATION: 93 % | BODY MASS INDEX: 31.07 KG/M2 | SYSTOLIC BLOOD PRESSURE: 136 MMHG | HEIGHT: 64 IN | HEART RATE: 83 BPM | TEMPERATURE: 98 F | WEIGHT: 182 LBS

## 2018-05-25 DIAGNOSIS — F17.200 NICOTINE DEPENDENCE, UNCOMPLICATED, UNSPECIFIED NICOTINE PRODUCT TYPE: Primary | ICD-10-CM

## 2018-05-25 DIAGNOSIS — J44.9 MODERATE COPD (CHRONIC OBSTRUCTIVE PULMONARY DISEASE) (HCC): ICD-10-CM

## 2018-05-25 PROCEDURE — 99213 OFFICE O/P EST LOW 20 MIN: CPT | Performed by: INTERNAL MEDICINE

## 2018-05-25 NOTE — ASSESSMENT & PLAN NOTE
Overall the patient is doing okay  She continues to have some significant shortness of breath on exertion  This is likely because she is continuing to smoke and currently not using any medications  We discussed at length the importance of her stopping smoking  I have asked her to start Spiriva Respimat 2 sprays once daily as she did not tolerate Symbicort  She will continue to use albuterol MDI and nebulizer on an as-needed basis  She is up-to-date on her immunizations    She is agreeable to enroll in pulmonary rehab

## 2018-05-25 NOTE — PROGRESS NOTES
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)  Overall the patient is doing okay  She continues to have some significant shortness of breath on exertion  This is likely because she is continuing to smoke and currently not using any medications  We discussed at length the importance of her stopping smoking  I have asked her to start Spiriva Respimat 2 sprays once daily as she did not tolerate Symbicort  She will continue to use albuterol MDI and nebulizer on an as-needed basis  She is up-to-date on her immunizations  She is agreeable to enroll in pulmonary rehab    Nicotine dependence  We discussed the importance of her stopping smoking at length  She will attempt to decrease to 10 cigarettes a day by July 1st and remained there until our next office visit  She has been unable to tolerate medications to help her stop smoking in the past   At her next visit we will discuss how we can get to 5 cigarettes a day and use p r n  nicotine replacement as needed  Hopefully over the next year we will be able to get her to stop smoking completely  Unfortunately with her  and son still smoking at home this is going to be a challenge  Plan:    Diagnoses and all orders for this visit:    Nicotine dependence, uncomplicated, unspecified nicotine product type    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)  -     Tiotropium Bromide Monohydrate (SPIRIVA RESPIMAT) 2 5 MCG/ACT AERS; Inhale 2 Act (5 mcg total) daily  -     Ambulatory Referral to Pulmonary Rehabilitation;  Future        Follow-up:  4 months      HPI:  Overall doing well  Still short of breath with exertion  Still with cough, phlegm is bronw or clear - no blood  She wheezing at night, no chest pain    Still smoking - 1ppd  Can't tolerate chantix because of anxiety    She isn't taking Symbicort because of cough  She is using albuterol - 3 times a day  Nebulizer a couple times a week    Review of Systems   Constitutional: Negative for unexpected weight change  Respiratory: Positive for cough, shortness of breath and wheezing  Cardiovascular: Negative for chest pain and leg swelling  Psychiatric/Behavioral: The patient is nervous/anxious  The following portions of the patient's history were reviewed and updated as appropriate: allergies, current medications, past family history, past medical history, past social history, past surgical history and problem list     VITALS:  Vitals:    05/25/18 0923   BP: 136/74   BP Location: Right arm   Patient Position: Sitting   Pulse: 83   Temp: 98 °F (36 7 °C)   TempSrc: Tympanic   SpO2: 93%   Weight: 82 6 kg (182 lb)   Height: 5' 4" (1 626 m)       Physical Exam  General:  Patient is awake, alert, non-toxic and in no acute respiratory distress  Neck: No JVD  CV:  Regular, +S1 and S2, No murmurs, gallops or rubs appreciated  Lungs:  Decreased breath sounds in all lung fields without wheeze, rales or rhonchi   Abdomen: Soft, +BS, Non-tender, non-distended  Extremities: No clubbing, cyanosis or edema  Neuro: No focal deficits    Diagnostic Testing:    PFTs:  6/23/17:  Spirometry:  FEV1/FVC Ratio is 69  FEV1 is 1 81L which is 69% predicted  FVC is 2 61L which is 78% predicted       IMPRESSION:  Moderate obstruction with slightly decreased forced vital capacity     CBC:  Lab Results   Component Value Date    WBC 6 0 07/28/2017    HGB 14 6 02/27/2018    HCT 43 3 02/27/2018     (H) 02/27/2018     02/27/2018         BMP:   Lab Results   Component Value Date     07/28/2017    K 4 1 07/28/2017    CL 96 07/28/2017    CO2 22 07/28/2017    ANIONGAP 11 06/15/2016    BUN 11 02/27/2018    CREATININE 0 80 02/27/2018    GLUCOSE 95 07/28/2017    CALCIUM 9 2 07/28/2017    AST 96 (H) 07/28/2017    ALT 70 (H) 07/28/2017    ALKPHOS 106 07/28/2017    PROT 7 4 07/28/2017    BILITOT 0 6 07/28/2017    EGFR >60 0 06/15/2016         Imaging studies:  I have personally reviewed pertinent films in PACS  3/29/18: CT Chest  LUNGS: Upper lobe emphysema  Lungs are clear  There is no tracheal or endobronchial lesion        Lino Parrish DO

## 2018-05-25 NOTE — ASSESSMENT & PLAN NOTE
We discussed the importance of her stopping smoking at length  She will attempt to decrease to 10 cigarettes a day by July 1st and remained there until our next office visit  She has been unable to tolerate medications to help her stop smoking in the past   At her next visit we will discuss how we can get to 5 cigarettes a day and use p r n  nicotine replacement as needed  Hopefully over the next year we will be able to get her to stop smoking completely  Unfortunately with her  and son still smoking at home this is going to be a challenge

## 2018-05-29 ENCOUNTER — TELEPHONE (OUTPATIENT)
Dept: PULMONOLOGY | Facility: CLINIC | Age: 58
End: 2018-05-29

## 2018-05-29 NOTE — TELEPHONE ENCOUNTER
Spiriva is $250 00 out of pocket  She wants to know if there's an alternative for this medication  Thank you

## 2018-05-31 LAB
CYTOLOGIST CVX/VAG CYTO: NORMAL
DX ICD CODE: NORMAL
Lab: NORMAL
OTHER STN SPEC: NORMAL
OTHER STN SPEC: NORMAL
PATH REPORT.FINAL DX SPEC: NORMAL
SL AMB NOTE:: NORMAL
SL AMB SPECIMEN ADEQUACY: NORMAL

## 2018-06-11 ENCOUNTER — OFFICE VISIT (OUTPATIENT)
Dept: GASTROENTEROLOGY | Facility: MEDICAL CENTER | Age: 58
End: 2018-06-11
Payer: COMMERCIAL

## 2018-06-11 VITALS
DIASTOLIC BLOOD PRESSURE: 64 MMHG | HEIGHT: 64 IN | SYSTOLIC BLOOD PRESSURE: 100 MMHG | TEMPERATURE: 98.2 F | HEART RATE: 77 BPM | WEIGHT: 182.1 LBS | BODY MASS INDEX: 31.09 KG/M2

## 2018-06-11 DIAGNOSIS — F10.10 ALCOHOL ABUSE: ICD-10-CM

## 2018-06-11 DIAGNOSIS — K21.9 GERD WITHOUT ESOPHAGITIS: Primary | ICD-10-CM

## 2018-06-11 DIAGNOSIS — R79.89 ELEVATED LFTS: ICD-10-CM

## 2018-06-11 DIAGNOSIS — K58.0 IRRITABLE BOWEL SYNDROME WITH DIARRHEA: ICD-10-CM

## 2018-06-11 DIAGNOSIS — K76.0 FATTY LIVER: ICD-10-CM

## 2018-06-11 PROCEDURE — 99214 OFFICE O/P EST MOD 30 MIN: CPT | Performed by: PHYSICIAN ASSISTANT

## 2018-06-11 RX ORDER — OMEPRAZOLE 40 MG/1
40 CAPSULE, DELAYED RELEASE ORAL DAILY
Qty: 30 CAPSULE | Refills: 4 | Status: SHIPPED | OUTPATIENT
Start: 2018-06-11 | End: 2020-01-08

## 2018-06-11 NOTE — PROGRESS NOTES
Assessment/Plan:     Diagnoses and all orders for this visit:    GERD without esophagitis  She is found have esophagitis and gastritis on her recent upper endoscopy  She is not taking acid medication at this time  Therefore would recommend starting Prilosec 40 mg daily as well as dietary modifications  -     omeprazole (PriLOSEC) 40 MG capsule; Take 1 capsule (40 mg total) by mouth daily    Elevated LFTs/ Fatty liver/ Alcohol abuse  She has a history of elevated LFTs and fatty liver disease seen on ultrasound 3 years ago  Her LFTs continue to be elevated with her AST greater than her ALT likely related to alcohol  She continues to drink heavily on a daily basis  She was recommended to stop altogether in the past and she has not  Fortunately for her there been no signs of cirrhosis this far including no esophageal varices, or ascites  INR was not checked with last labs and therefore would recommend repeating at this time as well as repeating ultrasound  -     US right upper quadrant; Future  -     Comprehensive metabolic panel; Future  -     CBC and differential; Future  -     Protime-INR; Future  -     Comprehensive metabolic panel  -     CBC and differential  -     Protime-INR      Irritable bowel syndrome with diarrhea  She continues to complain of approximately 6 bowel movements per day  She has not tried anything to help with this  I did recommend Imodium as needed  It is possible however if she were to decrease her drinking and eating a proper diet this will also help improve her symptoms  Will see her back in 3 months and see how she is feeling or sooner if necessary  Subjective:      Patient ID: Michael Escobar is a 60 y o  female  HPI     This is a follow-up for nausea, diarrhea, elevated LFTs and to discuss her upper endoscopy and colonoscopy  The patient states she has a very poor appetite and eats very little  She describes burning sensation in her stomach   She typically has 6 bowel movements in a day  She denies any rectal bleeding  She has a history of elevated LFTs which were felt secondary to hepatic steatosis  Her last labs were checked in February which showed AST 64, ALT 43, the bilirubin and alkaline phosphatase within normal limits  Her last imaging was approximately 3 years ago  She continues to drink approximately 1 6 pack per day  She has very little other oral intake  EGD showed class B erosive reflux esophagitis, erosive gastritis versus gave  Biopsies were negative for H pylori and celiac  Colonoscopy showed patchy areas of erythema in the colon and external hemorrhoids  Biopsies showed focal hemorrhage and mild edema but no signs of active her chronic colitis, also negative for lymphocytic or collagenous colitis        Patient Active Problem List   Diagnosis    Fatty liver    Hemorrhage of anus and rectum    Other specified abnormal findings of blood chemistry    Irritable bowel syndrome    Anxiety    Elevated LFTs    GERD without esophagitis    Hyperlipidemia    Hypertension    Benign essential hematuria    Insomnia    Nicotine dependence    Healthcare maintenance    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)    Alcohol abuse     Allergies   Allergen Reactions    Ace Inhibitors Cough     Current Outpatient Prescriptions on File Prior to Visit   Medication Sig    albuterol (2 5 mg/3 mL) 0 083 % nebulizer solution Take 2 5 mg by nebulization 3 (three) times a day    albuterol (PROVENTIL HFA) 90 mcg/act inhaler Inhale 2 puffs every 6 (six) hours as needed    ALPRAZolam (XANAX) 1 mg tablet Take 1 tablet (1 mg total) by mouth 2 (two) times a day as needed (anxiety)    econazole nitrate 1 % cream Apply topically daily    losartan (COZAAR) 100 MG tablet TAKE ONE TABLET BY MOUTH EVERY DAY    mometasone (ELOCON) 0 1 % cream Apply topically daily    Tiotropium Bromide Monohydrate (SPIRIVA RESPIMAT) 2 5 MCG/ACT AERS Inhale 2 Act (5 mcg total) daily     No current facility-administered medications on file prior to visit  Family History   Problem Relation Age of Onset    Cancer Mother     Substance Abuse Neg Hx     Mental illness Neg Hx      Past Medical History:   Diagnosis Date    Alcoholic fatty liver     Anxiety     Asthma     COPD (chronic obstructive pulmonary disease) (HCC)     Elevated liver function tests     GERD without esophagitis     Hyperlipidemia     Hypertension     IBS (irritable bowel syndrome)     Insomnia     Insomnia     Nervousness     Nicotine dependence     Other specified urinary incontinence     RLS (restless legs syndrome)      Social History     Social History    Marital status: /Civil Union     Spouse name: N/A    Number of children: N/A    Years of education: N/A     Social History Main Topics    Smoking status: Current Every Day Smoker     Packs/day: 0 50     Types: Cigarettes    Smokeless tobacco: Never Used      Comment: Per allscripts - 1/2 PPD cigs - smoking for 40 years    Alcohol use 1 8 oz/week     3 Cans of beer per week      Comment: Daily; just cut back from 6 beers/day  Per allscripts -  3 beers a night    Drug use: No    Sexual activity: Not Asked     Other Topics Concern    None     Social History Narrative    Has carbon monoxide detectors in home    Has smoke detectors    Uses safety equipment - seatbelts         Past Surgical History:   Procedure Laterality Date    BACK SURGERY      EGD AND COLONOSCOPY N/A 3/19/2018    Procedure: EGD AND COLONOSCOPY;  Surgeon: Juan Alejandre MD;  Location: [facility]; Service: Gastroenterology    HYSTERECTOMY      KNEE SURGERY      KNEE SURGERY      LUMBAR LAMINECTOMY  04/1999    LYMPH NODE BIOPSY      TOTAL ABDOMINAL HYSTERECTOMY  02/05/2008    TUBAL LIGATION      TUBAL LIGATION  1989         Review of Systems   All other systems reviewed and are negative          Objective:      BP 100/64 (BP Location: Left arm, Patient Position: Sitting, Cuff Size: Large)   Pulse 77   Temp 98 2 °F (36 8 °C) (Tympanic)   Ht 5' 4 02" (1 626 m)   Wt 82 6 kg (182 lb 1 6 oz)   BMI 31 24 kg/m²          Physical Exam   Constitutional: She is oriented to person, place, and time  She appears well-developed and well-nourished  No distress  HENT:   Head: Normocephalic and atraumatic  Eyes: Conjunctivae and EOM are normal    Neck: Normal range of motion  Cardiovascular: Normal rate and regular rhythm  Pulmonary/Chest: Effort normal and breath sounds normal    Abdominal: Soft  Bowel sounds are normal  She exhibits no distension  There is no tenderness  Musculoskeletal: Normal range of motion  Neurological: She is alert and oriented to person, place, and time  Skin: Skin is warm and dry     Psychiatric:   Slightly anxious

## 2018-06-11 NOTE — LETTER
June 11, 2018     Sandor Nickerson [id number] [address]    Patient: Turner Ramirez   YOB: 1960   Date of Visit: 6/11/2018       Dear Dr Lyla Guan: Thank you for referring Rowdy Levy to me for evaluation  Below are my notes for this consultation  If you have questions, please do not hesitate to call me  I look forward to following your patient along with you  Sincerely,        Kassandra Sánchez PA-C        CC: No Recipients  René Oneill  6/11/2018 11:58 AM  Sign at close encounter  Assessment/Plan:     Diagnoses and all orders for this visit:    GERD without esophagitis  She is found have esophagitis and gastritis on her recent upper endoscopy  She is not taking acid medication at this time  Therefore would recommend starting Prilosec 40 mg daily as well as dietary modifications  -     omeprazole (PriLOSEC) 40 MG capsule; Take 1 capsule (40 mg total) by mouth daily    Elevated LFTs/ Fatty liver/ Alcohol abuse  She has a history of elevated LFTs and fatty liver disease seen on ultrasound 3 years ago  Her LFTs continue to be elevated with her AST greater than her ALT likely related to alcohol  She continues to drink heavily on a daily basis  She was recommended to stop altogether in the past and she has not  Fortunately for her there been no signs of cirrhosis this far including no esophageal varices, or ascites  INR was not checked with last labs and therefore would recommend repeating at this time as well as repeating ultrasound  -     US right upper quadrant; Future  -     Comprehensive metabolic panel; Future  -     CBC and differential; Future  -     Protime-INR; Future  -     Comprehensive metabolic panel  -     CBC and differential  -     Protime-INR      Irritable bowel syndrome with diarrhea  She continues to complain of approximately 6 bowel movements per day  She has not tried anything to help with this    I did recommend Imodium as needed  It is possible however if she were to decrease her drinking and eating a proper diet this will also help improve her symptoms  Will see her back in 3 months and see how she is feeling or sooner if necessary  Subjective:      Patient ID: Aguilar Villa is a 60 y o  female  HPI     This is a follow-up for nausea, diarrhea, elevated LFTs and to discuss her upper endoscopy and colonoscopy  The patient states she has a very poor appetite and eats very little  She describes burning sensation in her stomach  She typically has 6 bowel movements in a day  She denies any rectal bleeding  She has a history of elevated LFTs which were felt secondary to hepatic steatosis  Her last labs were checked in February which showed AST 64, ALT 43, the bilirubin and alkaline phosphatase within normal limits  Her last imaging was approximately 3 years ago  She continues to drink approximately 1 6 pack per day  She has very little other oral intake  EGD showed class B erosive reflux esophagitis, erosive gastritis versus gave  Biopsies were negative for H pylori and celiac  Colonoscopy showed patchy areas of erythema in the colon and external hemorrhoids  Biopsies showed focal hemorrhage and mild edema but no signs of active her chronic colitis, also negative for lymphocytic or collagenous colitis        Patient Active Problem List   Diagnosis    Fatty liver    Hemorrhage of anus and rectum    Other specified abnormal findings of blood chemistry    Irritable bowel syndrome    Anxiety    Elevated LFTs    GERD without esophagitis    Hyperlipidemia    Hypertension    Benign essential hematuria    Insomnia    Nicotine dependence    Healthcare maintenance    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)    Alcohol abuse     Allergies   Allergen Reactions    Ace Inhibitors Cough     Current Outpatient Prescriptions on File Prior to Visit Medication Sig    albuterol (2 5 mg/3 mL) 0 083 % nebulizer solution Take 2 5 mg by nebulization 3 (three) times a day    albuterol (PROVENTIL HFA) 90 mcg/act inhaler Inhale 2 puffs every 6 (six) hours as needed    ALPRAZolam (XANAX) 1 mg tablet Take 1 tablet (1 mg total) by mouth 2 (two) times a day as needed (anxiety)    econazole nitrate 1 % cream Apply topically daily    losartan (COZAAR) 100 MG tablet TAKE ONE TABLET BY MOUTH EVERY DAY    mometasone (ELOCON) 0 1 % cream Apply topically daily    Tiotropium Bromide Monohydrate (SPIRIVA RESPIMAT) 2 5 MCG/ACT AERS Inhale 2 Act (5 mcg total) daily     No current facility-administered medications on file prior to visit  Family History   Problem Relation Age of Onset    Cancer Mother     Substance Abuse Neg Hx     Mental illness Neg Hx      Past Medical History:   Diagnosis Date    Alcoholic fatty liver     Anxiety     Asthma     COPD (chronic obstructive pulmonary disease) (HCC)     Elevated liver function tests     GERD without esophagitis     Hyperlipidemia     Hypertension     IBS (irritable bowel syndrome)     Insomnia     Insomnia     Nervousness     Nicotine dependence     Other specified urinary incontinence     RLS (restless legs syndrome)      Social History     Social History    Marital status: /Civil Union     Spouse name: N/A    Number of children: N/A    Years of education: N/A     Social History Main Topics    Smoking status: Current Every Day Smoker     Packs/day: 0 50     Types: Cigarettes    Smokeless tobacco: Never Used      Comment: Per allscripts - 1/2 PPD cigs - smoking for 40 years    Alcohol use 1 8 oz/week     3 Cans of beer per week      Comment: Daily; just cut back from 6 beers/day   Per allscripts -  3 beers a night    Drug use: No    Sexual activity: Not Asked     Other Topics Concern    None     Social History Narrative    Has carbon monoxide detectors in home    Has smoke detectors    Uses safety equipment - seatbelts         Past Surgical History:   Procedure Laterality Date    BACK SURGERY      EGD AND COLONOSCOPY N/A 3/19/2018    Procedure: EGD AND COLONOSCOPY;  Surgeon: Shravan Patrick MD;  Location: [facility]; Service: Gastroenterology    HYSTERECTOMY      KNEE SURGERY      KNEE SURGERY      LUMBAR LAMINECTOMY  04/1999    LYMPH NODE BIOPSY      TOTAL ABDOMINAL HYSTERECTOMY  02/05/2008    TUBAL LIGATION      TUBAL LIGATION  1989         Review of Systems   All other systems reviewed and are negative  Objective:      /64 (BP Location: Left arm, Patient Position: Sitting, Cuff Size: Large)   Pulse 77   Temp 98 2 °F (36 8 °C) (Tympanic)   Ht 5' 4 02" (1 626 m)   Wt 82 6 kg (182 lb 1 6 oz)   BMI 31 24 kg/m²           Physical Exam   Constitutional: She is oriented to person, place, and time  She appears well-developed and well-nourished  No distress  HENT:   Head: Normocephalic and atraumatic  Eyes: Conjunctivae and EOM are normal    Neck: Normal range of motion  Cardiovascular: Normal rate and regular rhythm  Pulmonary/Chest: Effort normal and breath sounds normal    Abdominal: Soft  Bowel sounds are normal  She exhibits no distension  There is no tenderness  Musculoskeletal: Normal range of motion  Neurological: She is alert and oriented to person, place, and time  Skin: Skin is warm and dry     Psychiatric:   Slightly anxious

## 2018-06-13 LAB
ALBUMIN SERPL-MCNC: 3.8 G/DL (ref 3.5–5.5)
ALBUMIN/GLOB SERPL: 1 {RATIO} (ref 1.2–2.2)
ALP SERPL-CCNC: 133 IU/L (ref 39–117)
ALT SERPL-CCNC: 52 IU/L (ref 0–32)
AST SERPL-CCNC: 86 IU/L (ref 0–40)
BASOPHILS # BLD AUTO: 0 X10E3/UL (ref 0–0.2)
BASOPHILS NFR BLD AUTO: 1 %
BILIRUB SERPL-MCNC: 0.3 MG/DL (ref 0–1.2)
BUN SERPL-MCNC: 8 MG/DL (ref 6–24)
BUN/CREAT SERPL: 11 (ref 9–23)
CALCIUM SERPL-MCNC: 9.5 MG/DL (ref 8.7–10.2)
CHLORIDE SERPL-SCNC: 99 MMOL/L (ref 96–106)
CO2 SERPL-SCNC: 22 MMOL/L (ref 20–29)
CREAT SERPL-MCNC: 0.75 MG/DL (ref 0.57–1)
EOSINOPHIL # BLD AUTO: 0.1 X10E3/UL (ref 0–0.4)
EOSINOPHIL NFR BLD AUTO: 1 %
ERYTHROCYTE [DISTWIDTH] IN BLOOD BY AUTOMATED COUNT: 12.7 % (ref 12.3–15.4)
GLOBULIN SER-MCNC: 3.9 G/DL (ref 1.5–4.5)
GLUCOSE SERPL-MCNC: 108 MG/DL (ref 65–99)
HCT VFR BLD AUTO: 42.3 % (ref 34–46.6)
HGB BLD-MCNC: 15 G/DL (ref 11.1–15.9)
IMM GRANULOCYTES # BLD: 0 X10E3/UL (ref 0–0.1)
IMM GRANULOCYTES NFR BLD: 0 %
INR PPP: 1.1 (ref 0.8–1.2)
LYMPHOCYTES # BLD AUTO: 2.2 X10E3/UL (ref 0.7–3.1)
LYMPHOCYTES NFR BLD AUTO: 34 %
MCH RBC QN AUTO: 34.7 PG (ref 26.6–33)
MCHC RBC AUTO-ENTMCNC: 35.5 G/DL (ref 31.5–35.7)
MCV RBC AUTO: 98 FL (ref 79–97)
MONOCYTES # BLD AUTO: 0.6 X10E3/UL (ref 0.1–0.9)
MONOCYTES NFR BLD AUTO: 9 %
NEUTROPHILS # BLD AUTO: 3.7 X10E3/UL (ref 1.4–7)
NEUTROPHILS NFR BLD AUTO: 55 %
PLATELET # BLD AUTO: 131 X10E3/UL (ref 150–379)
POTASSIUM SERPL-SCNC: 4.4 MMOL/L (ref 3.5–5.2)
PROT SERPL-MCNC: 7.7 G/DL (ref 6–8.5)
PROTHROMBIN TIME: 11.9 SEC (ref 9.1–12)
RBC # BLD AUTO: 4.32 X10E6/UL (ref 3.77–5.28)
SL AMB EGFR AFRICAN AMERICAN: 101 ML/MIN/1.73
SL AMB EGFR NON AFRICAN AMERICAN: 88 ML/MIN/1.73
SODIUM SERPL-SCNC: 139 MMOL/L (ref 134–144)
WBC # BLD AUTO: 6.6 X10E3/UL (ref 3.4–10.8)

## 2018-06-18 ENCOUNTER — HOSPITAL ENCOUNTER (OUTPATIENT)
Dept: ULTRASOUND IMAGING | Facility: HOSPITAL | Age: 58
Discharge: HOME | End: 2018-06-18
Payer: COMMERCIAL

## 2018-06-18 DIAGNOSIS — R79.89 ELEVATED LFTS: ICD-10-CM

## 2018-06-18 PROCEDURE — 76705 ECHO EXAM OF ABDOMEN: CPT

## 2018-06-26 DIAGNOSIS — F41.9 ANXIETY: ICD-10-CM

## 2018-06-26 RX ORDER — ALPRAZOLAM 1 MG/1
1 TABLET ORAL 2 TIMES DAILY PRN
Qty: 60 TABLET | Refills: 0 | Status: SHIPPED | OUTPATIENT
Start: 2018-06-26 | End: 2018-07-17 | Stop reason: SDUPTHER

## 2018-07-02 ENCOUNTER — TELEPHONE (OUTPATIENT)
Dept: FAMILY MEDICINE CLINIC | Facility: CLINIC | Age: 58
End: 2018-07-02

## 2018-07-02 NOTE — TELEPHONE ENCOUNTER
Donnie Negron is going on mone and [facility] pharmacy told her if you put refills on the #60 just ordered, they can transfer it to the [facility] in [place]  So she is asking if you can add refills on the #60 Becca Nestor ordered 6/26    She is in a panic about not having any meds while she is on [address]

## 2018-07-10 ENCOUNTER — TELEPHONE (OUTPATIENT)
Dept: GASTROENTEROLOGY | Facility: CLINIC | Age: 58
End: 2018-07-10

## 2018-07-10 NOTE — TELEPHONE ENCOUNTER
Spoke with patient  H/O GERD,gastritis, esophagitis, Elevated LFT's, fatty liver    Patient c/o upper abdominal pain and nausea 5/10 daily  Denies vomitting  Diarrhea 3-4BM daily  She is trying to reduce her drinking  Reviewed anti-reflux measures, diet, and avoiding alcohol  She is utilizing imodium  Advised to utilize omeprazole 30 min before breakfast  She can try OTC tums at meals  Mailed patient GERD diet  She will try this and call back if symptoms persist      Any other suggestiions?

## 2018-07-10 NOTE — TELEPHONE ENCOUNTER
Dr Sarahi Louis pt    Pt called in stating she has been very nauseous to a point where she could not eat, and she has diarrhea  The medication omeprazole that was prescribed for her during the last ov is not helping   Please call back to discuss [phone]

## 2018-07-17 ENCOUNTER — TELEPHONE (OUTPATIENT)
Dept: FAMILY MEDICINE CLINIC | Facility: CLINIC | Age: 58
End: 2018-07-17

## 2018-07-17 DIAGNOSIS — F41.9 ANXIETY: ICD-10-CM

## 2018-07-17 RX ORDER — ALPRAZOLAM 1 MG/1
1 TABLET ORAL 2 TIMES DAILY PRN
Qty: 60 TABLET | Refills: 5 | Status: SHIPPED | OUTPATIENT
Start: 2018-07-17 | End: 2019-01-09 | Stop reason: SDUPTHER

## 2018-07-17 NOTE — TELEPHONE ENCOUNTER
Patient asking for her xanax to be filled and sent to [facility] in [facility] [address]        [phone]

## 2018-08-07 ENCOUNTER — OFFICE VISIT (OUTPATIENT)
Dept: FAMILY MEDICINE CLINIC | Facility: CLINIC | Age: 58
End: 2018-08-07
Payer: COMMERCIAL

## 2018-08-07 VITALS
WEIGHT: 178 LBS | HEART RATE: 80 BPM | DIASTOLIC BLOOD PRESSURE: 78 MMHG | HEIGHT: 64 IN | BODY MASS INDEX: 30.39 KG/M2 | SYSTOLIC BLOOD PRESSURE: 112 MMHG | RESPIRATION RATE: 16 BRPM

## 2018-08-07 DIAGNOSIS — Z01.818 PREOPERATIVE EXAM FOR GYNECOLOGIC SURGERY: Primary | ICD-10-CM

## 2018-08-07 DIAGNOSIS — Z01.818 PREOP EXAMINATION: ICD-10-CM

## 2018-08-07 LAB — OTHER: NORMAL

## 2018-08-07 PROCEDURE — 3008F BODY MASS INDEX DOCD: CPT | Performed by: NURSE PRACTITIONER

## 2018-08-07 PROCEDURE — 99214 OFFICE O/P EST MOD 30 MIN: CPT | Performed by: NURSE PRACTITIONER

## 2018-08-07 PROCEDURE — 93000 ELECTROCARDIOGRAM COMPLETE: CPT | Performed by: NURSE PRACTITIONER

## 2018-08-07 NOTE — PROGRESS NOTES
[facility] PRE-OPERATIVE EVALUATION  [facility] PHYSICIAN GROUP     NAME: Jose White  AGE: 60 y o  SEX: female  : 1960     DATE: 2018    [facility] Pre-Operative Evaluation      Chief Complaint: Pre-operative Evaluation     Surgery: L eye cataract then right eye   Anticipated Date of Surgery: ( OS14th, OD 28)  Referring Provider: Dr Karie Hernandez     History of Present Illness:     Joe Sierra is a 60 y o  female who presents to the office today for a preoperative consultation at the request of surgeon, Dr Karie Hernandez, who plans on performing Left and Right Cataract surgery on  and   Planned anesthesia is IV sedation  Patient has a bleeding risk of: no recent abnormal bleeding  Patient does not have objections to receiving blood products if needed  Current anti-platelet/anti-coagulation medications that the patient is prescribed includes: none  Assessment of Chronic Conditions:   COPD - Stable with prn albuterol use  Follows with pulmonary  Elevated Liver Enzymes - Follows with GI, no noted cirrhosis of liver, likely due to alcohol abuse  Alcohol Abuse - States she drinks "8 cans of beer" daily  Hypertension - Is stable on losartan 100mg daily  GERD - Seen by GI and put on omeprazole  Is currently stable  Nicotine Dependence - Currently smokes daily, last CT normal   Anxiety - Currently taking Xanax twice daily to control anxiety        Assessment of Cardiac Risk:  · Denies unstable or severe angina or MI in the last 6 weeks or history of stent placement in the last year   · Denies decompensated heart failure (e g  New onset heart failure, NYHA functional class IV heart failure, or worsening existing heart failure)  · Denies significant arrhythmias such as high grade AV block, symptomatic ventricular arrhythmia, newly recognized ventricular tachycardia, supraventricular tachycardia with resting heart rate >100, or symptomatic bradycardia  · Denies severe heart valve disease including aortic stenosis or symptomatic mitral stenosis     Exercise Capacity:  · Able to walk 4 blocks without symptoms?: No - Secondary to COPD  · Able to walk 2 flights without symptoms?: Yes    Prior Anesthesia Reactions: No     Personal history of venous thromboembolic disease? No    History of steroid use for >2 weeks within last year? No         Review of Systems:     Review of Systems   HENT: Positive for congestion and sinus pain  Negative for dental problem, mouth sores, sinus pressure and sore throat  Reports pain in bilateral nares  Eyes: Negative  Respiratory: Positive for wheezing  Negative for cough and chest tightness  Cardiovascular: Negative  Genitourinary: Negative  Musculoskeletal: Negative  Neurological: Negative  Psychiatric/Behavioral: Negative for agitation, confusion and suicidal ideas  The patient is nervous/anxious  The patient is not hyperactive  Current Problem List:     Patient Active Problem List   Diagnosis    Fatty liver    Hemorrhage of anus and rectum    Other specified abnormal findings of blood chemistry    Irritable bowel syndrome    Anxiety    Elevated LFTs    GERD without esophagitis    Hyperlipidemia    Hypertension    Benign essential hematuria    Insomnia    Nicotine dependence    Healthcare maintenance    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)    Alcohol abuse       Allergies:      Allergies   Allergen Reactions    Ace Inhibitors Cough       Current Medications:       Current Outpatient Prescriptions:     albuterol (2 5 mg/3 mL) 0 083 % nebulizer solution, Take 2 5 mg by nebulization 3 (three) times a day, Disp: , Rfl:     albuterol (PROVENTIL HFA) 90 mcg/act inhaler, Inhale 2 puffs every 6 (six) hours as needed, Disp: , Rfl:     ALPRAZolam (XANAX) 1 mg tablet, Take 1 tablet (1 mg total) by mouth 2 (two) times a day as needed (anxiety), Disp: 60 tablet, Rfl: 5   econazole nitrate 1 % cream, Apply topically daily, Disp: 30 g, Rfl: 3    losartan (COZAAR) 100 MG tablet, TAKE ONE TABLET BY MOUTH EVERY DAY, Disp: 30 tablet, Rfl: 6    mometasone (ELOCON) 0 1 % cream, Apply topically daily, Disp: 45 g, Rfl: 3    omeprazole (PriLOSEC) 40 MG capsule, Take 1 capsule (40 mg total) by mouth daily, Disp: 30 capsule, Rfl: 4    Past Medical History:       Past Medical History:   Diagnosis Date    Alcoholic fatty liver     Anxiety     Asthma     COPD (chronic obstructive pulmonary disease) (HCC)     Elevated liver function tests     GERD without esophagitis     Hyperlipidemia     Hypertension     IBS (irritable bowel syndrome)     Insomnia     Insomnia     Nervousness     Nicotine dependence     Other specified urinary incontinence     RLS (restless legs syndrome)         Past Surgical History:   Procedure Laterality Date    BACK SURGERY      EGD AND COLONOSCOPY N/A 3/19/2018    Procedure: EGD AND COLONOSCOPY;  Surgeon: Gaurav Briscoe MD;  Location: [facility] GI [facility]; Service: Gastroenterology    HYSTERECTOMY      KNEE SURGERY      KNEE SURGERY      LUMBAR LAMINECTOMY  04/1999    LYMPH NODE BIOPSY      TOTAL ABDOMINAL HYSTERECTOMY  02/05/2008    TUBAL LIGATION      TUBAL LIGATION  1989        Family History   Problem Relation Age of Onset    Cancer Mother     Substance Abuse Neg Hx     Mental illness Neg Hx         Social History     Social History    Marital status: /Civil Union     Spouse name: N/A    Number of children: N/A    Years of education: N/A     Occupational History    Not on file  Social History Main Topics    Smoking status: Current Every Day Smoker     Packs/day: 0 50     Types: Cigarettes    Smokeless tobacco: Never Used      Comment: Per allscripts - 1/2 PPD cigs - smoking for 40 years    Alcohol use 1 8 oz/week     3 Cans of beer per week      Comment: Daily; just cut back from 6 beers/day   Per allscripts -  3 beers a night  Drug use: No    Sexual activity: Yes     Partners: Male     Other Topics Concern    Not on file     Social History Narrative    Has carbon monoxide detectors in home    Has smoke detectors    Uses safety equipment - seatbelts            Physical Exam:     /78   Pulse 80   Resp 16   Ht 5' 4" (1 626 m)   Wt 80 7 kg (178 lb)   BMI 30 55 kg/m²     Physical Exam   Constitutional: She is oriented to person, place, and time  She appears well-developed and well-nourished  HENT:   Head: Normocephalic and atraumatic  Right Ear: Tympanic membrane, external ear and ear canal normal    Left Ear: Tympanic membrane, external ear and ear canal normal    Mouth/Throat: Oropharynx is clear and moist    Bilateral nasal turbinates erythematous  No noted discharge or mucus  Pulmonary/Chest: Effort normal  She has wheezes  Wheezes noted bilaterally in upper and lower lobes despite forced cough  Musculoskeletal: Normal range of motion  Neurological: She is alert and oriented to person, place, and time  She has normal reflexes  Skin: Skin is warm and dry  Psychiatric: Her behavior is normal  Judgment and thought content normal    Patient appears nervous in the exam room but has appropriate thought content and denies depressive thoughts or feelings of suicide  Data:     Pre-operative work-up    Laboratory Results: I have personally reviewed the pertinent laboratory results/reports      EKG: I have personally reviewed pertinent reports  Previous cardiopulmonary studies within the past year:  · Pulmonary Function Testing:       Assessment & Recommendations:       Pre-Op Evaluation Assessment  60 y o  female with planned surgery  Known risk factors for perioperative complications: Alcohol overuse  Current medications which may produce withdrawal symptoms if withheld perioperatively:   Pre-Op Evaluation Plan  1  Further preoperative workup as follows:   None    2   Medication Management/Recommendations:   - Patient should continue antihypertensive medications up through and including the day of surgery  3  Prophylaxis for cardiac events with perioperative beta-blockers: N/A    4  Patient requires further consultation with: None      Clearance  Patient is CLEARED for surgery without any additional cardiac testing       Christiano Beck, [address]  [address] [facility] [address] [facility] [address]  Phone#  [phone]  Fax#  [phone]

## 2018-08-10 DIAGNOSIS — J44.9 CHRONIC OBSTRUCTIVE PULMONARY DISEASE, UNSPECIFIED COPD TYPE (HCC): Primary | ICD-10-CM

## 2018-08-10 RX ORDER — ALBUTEROL SULFATE 90 UG/1
2 AEROSOL, METERED RESPIRATORY (INHALATION) EVERY 6 HOURS PRN
Qty: 1 INHALER | Refills: 5 | Status: SHIPPED | OUTPATIENT
Start: 2018-08-10 | End: 2019-01-02 | Stop reason: SDUPTHER

## 2018-08-28 LAB
ALBUMIN SERPL-MCNC: 3.6 G/DL (ref 3.5–5.5)
ALBUMIN/GLOB SERPL: 0.8 {RATIO} (ref 1.2–2.2)
ALP SERPL-CCNC: 135 IU/L (ref 39–117)
ALT SERPL-CCNC: 56 IU/L (ref 0–32)
AST SERPL-CCNC: 121 IU/L (ref 0–40)
BASOPHILS # BLD AUTO: 0.1 X10E3/UL (ref 0–0.2)
BASOPHILS NFR BLD AUTO: 1 %
BILIRUB SERPL-MCNC: 0.9 MG/DL (ref 0–1.2)
BUN SERPL-MCNC: 8 MG/DL (ref 6–24)
BUN/CREAT SERPL: 9 (ref 9–23)
CALCIUM SERPL-MCNC: 9.4 MG/DL (ref 8.7–10.2)
CHLORIDE SERPL-SCNC: 94 MMOL/L (ref 96–106)
CO2 SERPL-SCNC: 23 MMOL/L (ref 20–29)
CREAT SERPL-MCNC: 0.86 MG/DL (ref 0.57–1)
EOSINOPHIL # BLD AUTO: 0.1 X10E3/UL (ref 0–0.4)
EOSINOPHIL NFR BLD AUTO: 1 %
ERYTHROCYTE [DISTWIDTH] IN BLOOD BY AUTOMATED COUNT: 12.8 % (ref 12.3–15.4)
GLOBULIN SER-MCNC: 4.7 G/DL (ref 1.5–4.5)
GLUCOSE SERPL-MCNC: 106 MG/DL (ref 65–99)
HCT VFR BLD AUTO: 44.4 % (ref 34–46.6)
HGB BLD-MCNC: 15.3 G/DL (ref 11.1–15.9)
IMM GRANULOCYTES # BLD: 0 X10E3/UL (ref 0–0.1)
IMM GRANULOCYTES NFR BLD: 0 %
LYMPHOCYTES # BLD AUTO: 2 X10E3/UL (ref 0.7–3.1)
LYMPHOCYTES NFR BLD AUTO: 29 %
MCH RBC QN AUTO: 34.1 PG (ref 26.6–33)
MCHC RBC AUTO-ENTMCNC: 34.5 G/DL (ref 31.5–35.7)
MCV RBC AUTO: 99 FL (ref 79–97)
MONOCYTES # BLD AUTO: 0.7 X10E3/UL (ref 0.1–0.9)
MONOCYTES NFR BLD AUTO: 10 %
NEUTROPHILS # BLD AUTO: 4 X10E3/UL (ref 1.4–7)
NEUTROPHILS NFR BLD AUTO: 59 %
PLATELET # BLD AUTO: 141 X10E3/UL (ref 150–379)
POTASSIUM SERPL-SCNC: 4.7 MMOL/L (ref 3.5–5.2)
PROT SERPL-MCNC: 8.3 G/DL (ref 6–8.5)
RBC # BLD AUTO: 4.49 X10E6/UL (ref 3.77–5.28)
SL AMB EGFR AFRICAN AMERICAN: 86 ML/MIN/1.73
SL AMB EGFR NON AFRICAN AMERICAN: 74 ML/MIN/1.73
SODIUM SERPL-SCNC: 135 MMOL/L (ref 134–144)
WBC # BLD AUTO: 6.8 X10E3/UL (ref 3.4–10.8)

## 2018-09-13 ENCOUNTER — OFFICE VISIT (OUTPATIENT)
Dept: FAMILY MEDICINE CLINIC | Facility: CLINIC | Age: 58
End: 2018-09-13
Payer: COMMERCIAL

## 2018-09-13 VITALS
SYSTOLIC BLOOD PRESSURE: 120 MMHG | DIASTOLIC BLOOD PRESSURE: 80 MMHG | HEART RATE: 80 BPM | HEIGHT: 64 IN | RESPIRATION RATE: 12 BRPM | BODY MASS INDEX: 29.71 KG/M2 | WEIGHT: 174 LBS

## 2018-09-13 DIAGNOSIS — E78.5 HYPERLIPIDEMIA, UNSPECIFIED HYPERLIPIDEMIA TYPE: ICD-10-CM

## 2018-09-13 DIAGNOSIS — R79.89 ELEVATED LFTS: ICD-10-CM

## 2018-09-13 DIAGNOSIS — Z23 NEED FOR INFLUENZA VACCINATION: Primary | ICD-10-CM

## 2018-09-13 DIAGNOSIS — I10 ESSENTIAL HYPERTENSION: ICD-10-CM

## 2018-09-13 DIAGNOSIS — J44.9 MODERATE COPD (CHRONIC OBSTRUCTIVE PULMONARY DISEASE) (HCC): ICD-10-CM

## 2018-09-13 DIAGNOSIS — Z23 NEED FOR SHINGLES VACCINE: ICD-10-CM

## 2018-09-13 DIAGNOSIS — K76.0 FATTY LIVER: ICD-10-CM

## 2018-09-13 DIAGNOSIS — Z72.0 TOBACCO ABUSE: ICD-10-CM

## 2018-09-13 DIAGNOSIS — K21.9 GERD WITHOUT ESOPHAGITIS: ICD-10-CM

## 2018-09-13 DIAGNOSIS — F10.10 ALCOHOL ABUSE: ICD-10-CM

## 2018-09-13 PROCEDURE — 3079F DIAST BP 80-89 MM HG: CPT | Performed by: NURSE PRACTITIONER

## 2018-09-13 PROCEDURE — 3074F SYST BP LT 130 MM HG: CPT | Performed by: NURSE PRACTITIONER

## 2018-09-13 PROCEDURE — 99214 OFFICE O/P EST MOD 30 MIN: CPT | Performed by: NURSE PRACTITIONER

## 2018-09-13 PROCEDURE — 90472 IMMUNIZATION ADMIN EACH ADD: CPT

## 2018-09-13 PROCEDURE — 90682 RIV4 VACC RECOMBINANT DNA IM: CPT

## 2018-09-13 PROCEDURE — 90736 HZV VACCINE LIVE SUBQ: CPT

## 2018-09-13 PROCEDURE — 90471 IMMUNIZATION ADMIN: CPT

## 2018-09-13 NOTE — PROGRESS NOTES
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 ([address] )  Has a proventil MDI that she uses based on weather and has a neb that she uses when she has a uri  6  Essential hypertension  This is well controlled with the losartin  - Comprehensive metabolic panel; Future  - CBC and differential; Future  - Comprehensive metabolic panel  - CBC and differential    7  Elevated LFTs  This is followed by Dr Rebecca Mckeon  8  Hyperlipidemia, unspecified hyperlipidemia type  E will check this next ov  Is controlled with lifestyle due to the elevated LFT>   - Lipid panel; Future  - TSH, 3rd generation with Free T4 reflex; Future  - Comprehensive metabolic panel; Future  - CBC and differential; Future  - Lipid panel  - TSH, 3rd generation with Free T4 reflex  - Comprehensive metabolic panel  - CBC and differential    9  Alcohol abuse  Is working to manage this  10  Tobacco abuse  continues to smoke  Will get the CT screen of her lungs which is due  - CT lung screening program; Future    11  Anxiety: is controlled with alprazolam daily  rto 6 months or sooner  if needed  Subjective:      Patient ID: Estiven Duncan is a 60 y o  female  Here today to carole on several chronic issues  Since last ov has gotten both cataracts done and is pleased with the results  Continues to follow with Dr Chan santos for the COPD and Dr Amina Barnes for her cirrohisis     Is still consuming alcohol but states that she is drinking less due to decrease desire for it  Has no complaints  The following portions of the patient's history were reviewed and updated as appropriate: allergies, current medications, past family history, past medical history, past social history, past surgical history and problem list     Past Medical History:   Diagnosis Date    Alcoholic fatty liver     Anxiety     Asthma     COPD (chronic obstructive pulmonary disease) [address] [address]     Elevated liver function tests     GERD without esophagitis     Hyperlipidemia     Hypertension     IBS (irritable bowel syndrome)     Insomnia     Insomnia     Nervousness     Nicotine dependence     Other specified urinary incontinence     RLS (restless legs syndrome)      Past Surgical History:   Procedure Laterality Date    BACK SURGERY      CATARACT EXTRACTION, BILATERAL  08/01/2018    EGD AND COLONOSCOPY N/A 3/19/2018    Procedure: EGD AND COLONOSCOPY;  Surgeon: Jim Abdi MD;  Location: [facility]; Service: Gastroenterology    HYSTERECTOMY      KNEE SURGERY      KNEE SURGERY      LUMBAR LAMINECTOMY  04/1999    LYMPH NODE BIOPSY      TOTAL ABDOMINAL HYSTERECTOMY  02/05/2008    TUBAL LIGATION      TUBAL LIGATION  1989     Family History   Problem Relation Age of Onset    Cancer Mother     Substance Abuse Neg Hx     Mental illness Neg Hx      Social History   Social History     Social History    Marital status: /Civil Union     Spouse name: N/A    Number of children: N/A    Years of education: N/A     Occupational History    Not on file  Social History Main Topics    Smoking status: Current Every Day Smoker     Packs/day: 0 50     Types: Cigarettes    Smokeless tobacco: Never Used      Comment: Per allscripts - 1/2 PPD cigs - smoking for 40 years    Alcohol use 1 8 oz/week     3 Cans of beer per week      Comment: Daily; just cut back from 6 beers/day   Per allscripts -  3 beers a night    Drug use: No    Sexual activity: Yes     Partners: Male     Other Topics Concern    Not on file     Social History Narrative    Has carbon monoxide detectors in home    Has smoke detectors    Uses safety equipment - seatbelts         Review of Systems   Constitutional: Negative  Respiratory: Negative  Cardiovascular: Negative  Gastrointestinal: Negative  Musculoskeletal: Negative  Neurological: Negative  Psychiatric/Behavioral: Negative  Vitals:    09/13/18 1320   BP: 120/80   BP Location: Left arm   Patient Position: Sitting   Pulse: 80   Resp: 12   Weight: 78 9 kg (174 lb)   Height: 5' 4" (1 626 m)       Objective:   Wt Readings from Last 3 Encounters:   09/13/18 78 9 kg (174 lb)   08/07/18 80 7 kg (178 lb)   06/11/18 82 6 kg (182 lb 1 6 oz)     BP Readings from Last 3 Encounters:   09/13/18 120/80   08/07/18 112/78   06/11/18 100/64     Pulse Readings from Last 3 Encounters:   09/13/18 80   08/07/18 80   06/11/18 77     BMI Readings from Last 3 Encounters:   09/13/18 29 87 kg/m²   08/07/18 30 55 kg/m²   06/11/18 31 24 kg/m²     Office Visit on 08/07/2018   Component Date Value Ref Range Status    OTHER 08/07/2018 other   Corrected    08/07/2018   Office Visit on 06/11/2018   Component Date Value Ref Range Status    Glucose 06/12/2018 108* 65 - 99 mg/dL Final    BUN 06/12/2018 8  6 - 24 mg/dL Final    Creatinine 06/12/2018 0 75  0 57 - 1 00 mg/dL Final    eGFR Non  06/12/2018 88  >59 mL/min/1 73 Final    SL AMB EGFR  06/12/2018 101  >59 mL/min/1 73 Final    SL AMB BUN/CREATININE RATIO 06/12/2018 11  9 - 23 Final    Sodium 06/12/2018 139  134 - 144 mmol/L Final    SL AMB POTASSIUM 06/12/2018 4 4  3 5 - 5 2 mmol/L Final    Chloride 06/12/2018 99  96 - 106 mmol/L Final    CO2 06/12/2018 22  20 - 29 mmol/L Final                  **Please note reference interval change**    CALCIUM 06/12/2018 9 5  8 7 - 10 2 mg/dL Final    SL AMB PROTEIN, TOTAL 06/12/2018 7 7  6 0 - 8 5 g/dL Final    Albumin 06/12/2018 3 8  3 5 - 5 5 g/dL Final    Globulin, Total 06/12/2018 3 9  1 5 - 4 5 g/dL Final    SL AMB ALBUMIN/GLOBULIN RATIO 06/12/2018 1 0* 1 2 - 2 2 Final    TOTAL BILIRUBIN 06/12/2018 0 3  0 0 - 1 2 mg/dL Final    Alk Phos Isoenzymes 06/12/2018 133* 39 - 117 IU/L Final    SL AMB AST 06/12/2018 86* 0 - 40 IU/L Final    SL AMB ALT 06/12/2018 52* 0 - 32 IU/L Final    White Blood Cell Count 06/12/2018 6 6  3 4 - 10 8 x10E3/uL Final    Red Blood Cell Count 06/12/2018 4 32  3 77 - 5 28 x10E6/uL Final    Hemoglobin 06/12/2018 15 0  11 1 - 15 9 g/dL Final    HCT 06/12/2018 42 3  34 0 - 46 6 % Final    MCV 06/12/2018 98* 79 - 97 fL Final    MCH 06/12/2018 34 7* 26 6 - 33 0 pg Final    MCHC 06/12/2018 35 5  31 5 - 35 7 g/dL Final    RDW 06/12/2018 12 7  12 3 - 15 4 % Final    Platelet Count 36/54/7467 131* 150 - 379 x10E3/uL Final    Neutrophils 06/12/2018 55  Not Estab  % Final    Lymphocytes 06/12/2018 34  Not Estab  % Final    Monocytes 06/12/2018 9  Not Estab  % Final    Eosinophils 06/12/2018 1  Not Estab  % Final    Basophils Relative 06/12/2018 1  Not Estab  % Final    Neutrophils (Absolute) 06/12/2018 3 7  1 4 - 7 0 x10E3/uL Final    Lymphocytes (Absolute) 06/12/2018 2 2  0 7 - 3 1 x10E3/uL Final    Monocytes (Absolute) 06/12/2018 0 6  0 1 - 0 9 x10E3/uL Final    Eosinophils (Absolute) 06/12/2018 0 1  0 0 - 0 4 x10E3/uL Final    Basophils (Absolute) 06/12/2018 0 0  0 0 - 0 2 x10E3/uL Final    Immature Granulocytes 06/12/2018 0  Not Estab  % Final    Immature Granulocytes (Absolute) 06/12/2018 0 0  0 0 - 0 1 x10E3/uL Final    INR 06/12/2018 1 1  0 8 - 1 2 Final    Comment: Reference interval is for non-anticoagulated patients    Suggested INR therapeutic range for Vitamin K  antagonist therapy:     Standard Dose (moderate intensity                    therapeutic range):       2 0 - 3 0     Higher intensity therapeutic range       2 5 - 3  5      SL AMB PROTHROMBIN TIME 06/12/2018 11 9  9 1 - 12 0 sec Final   Annual Exam on 05/24/2018   Component Date Value Ref Range Status    Diagnosis: 05/24/2018 Comment   Final    Comment: NEGATIVE FOR INTRAEPITHELIAL LESION AND MALIGNANCY  THIS SPECIMEN WAS RESCREENED AS PART OF OUR  PROGRAM   THE CYTOLOGY PROCESSING WAS PERFORMED AT THE [facility] LOCATED AT  [facility], [address] , [address] [place] [phone]    AMB SPECIMEN ADEQUACY 05/24/2018 Comment   Final    Comment: Satisfactory for evaluation  Endocervical and/or squamous metaplastic  cells (endocervical component) are present   Clinician Provided ICD10 05/24/2018 Comment   Final    Z12 4    Performed by: 05/24/2018 Comment   Final    Angel Rankin, Cytotechnologist (ASCP)    QC reviewed by: 05/24/2018 Comment   Final    Tyrese Garcia Cytotechnologist (ASCP)     AMB   05/24/2018   Final    Note: 05/24/2018 Comment   Final    Comment: The Pap smear is a screening test designed to aid in the detection of  premalignant and malignant conditions of the uterine cervix  It is not a  diagnostic procedure and should not be used as the sole means of detecting  cervical cancer  Both false-positive and false-negative reports do occur    AMB   05/24/2018 Comment   Final    Comment: The HPV DNA reflex criteria were not met with this specimen result  therefore, no HPV testing was performed       Admission on 03/19/2018, Discharged on 03/19/2018   Component Date Value Ref Range Status    Case Report 03/19/2018    Final                    Value:Surgical Pathology Report                         Case: [id number]                                   Authorizing Provider:  Sona Castellanos MD           Collected:           03/19/2018 1216              Ordering Location:     [facility]        Received:            03/19/2018 [place] [address] Endoscopy Pathologist:           Ana Todd MD                                                              Specimens:   A) - Duodenum, cold bx's r/o celiac                                                                 B) - Stomach, gastric bx's r/o h pylori                                                             C) - Small Bowel, NOS, random TI bx's                                                               D) - Colon, random colon bx's mild colitis                                                 Final Diagnosis 03/19/2018    Final                    Value: This result contains rich text formatting which cannot be displayed here   Note 03/19/2018    Final                    Value: This result contains rich text formatting which cannot be displayed here   Additional Information 03/19/2018    Final                    Value: This result contains rich text formatting which cannot be displayed here  Lucita Chapa Gross Description 03/19/2018    Final                    Value: This result contains rich text formatting which cannot be displayed here     Office Visit on 03/05/2018   Component Date Value Ref Range Status    Glucose 08/27/2018 106* 65 - 99 mg/dL Final    BUN 08/27/2018 8  6 - 24 mg/dL Final    Creatinine 08/27/2018 0 86  0 57 - 1 00 mg/dL Final    eGFR Non  08/27/2018 74  >59 mL/min/1 73 Final    SL AMB EGFR  08/27/2018 86  >59 mL/min/1 73 Final    SL AMB BUN/CREATININE RATIO 08/27/2018 9  9 - 23 Final    Sodium 08/27/2018 135  134 - 144 mmol/L Final    SL AMB POTASSIUM 08/27/2018 4 7  3 5 - 5 2 mmol/L Final    Chloride 08/27/2018 94* 96 - 106 mmol/L Final    CO2 08/27/2018 23  20 - 29 mmol/L Final    CALCIUM 08/27/2018 9 4  8 7 - 10 2 mg/dL Final    SL AMB PROTEIN, TOTAL 08/27/2018 8 3  6 0 - 8 5 g/dL Final    Albumin 08/27/2018 3 6  3 5 - 5 5 g/dL Final    Globulin, Total 08/27/2018 4 7* 1 5 - 4 5 g/dL Final    SL AMB ALBUMIN/GLOBULIN RATIO 08/27/2018 0 8* 1 2 - 2 2 Final    TOTAL BILIRUBIN 08/27/2018 0 9  0 0 - 1 2 mg/dL Final    Alk Phos Isoenzymes 08/27/2018 135* 39 - 117 IU/L Final    SL AMB AST 08/27/2018 121* 0 - 40 IU/L Final    SL AMB ALT 08/27/2018 56* 0 - 32 IU/L Final    White Blood Cell Count 08/27/2018 6 8  3 4 - 10 8 x10E3/uL Final    Red Blood Cell Count 08/27/2018 4 49  3 77 - 5 28 x10E6/uL Final    Hemoglobin 08/27/2018 15 3  11 1 - 15 9 g/dL Final    HCT 08/27/2018 44 4  34 0 - 46 6 % Final    MCV 08/27/2018 99* 79 - 97 fL Final    MCH 08/27/2018 34 1* 26 6 - 33 0 pg Final    MCHC 08/27/2018 34 5  31 5 - 35 7 g/dL Final    RDW 08/27/2018 12 8  12 3 - 15 4 % Final    Platelet Count 64/98/6145 141* 150 - 379 x10E3/uL Final    Neutrophils 08/27/2018 59  Not Estab  % Final    Lymphocytes 08/27/2018 29  Not Estab  % Final    Monocytes 08/27/2018 10  Not Estab  % Final    Eosinophils 08/27/2018 1  Not Estab  % Final    Basophils Relative 08/27/2018 1  Not Estab  % Final    Neutrophils (Absolute) 08/27/2018 4 0  1 4 - 7 0 x10E3/uL Final    Lymphocytes (Absolute) 08/27/2018 2 0  0 7 - 3 1 x10E3/uL Final    Monocytes (Absolute) 08/27/2018 0 7  0 1 - 0 9 x10E3/uL Final    Eosinophils (Absolute) 08/27/2018 0 1  0 0 - 0 4 x10E3/uL Final    Basophils (Absolute) 08/27/2018 0 1  0 0 - 0 2 x10E3/uL Final    Immature Granulocytes 08/27/2018 0  Not Estab  % Final    Immature Granulocytes (Absolute) 08/27/2018 0 0  0 0 - 0 1 x10E3/uL Final   Orders Only on 02/27/2018   Component Date Value Ref Range Status    White Blood Cell Count 02/27/2018 7 1  3 4 - 10 8 x10E3/uL Final    Red Blood Cell Count 02/27/2018 4 34  3 77 - 5 28 x10E6/uL Final    Hemoglobin 02/27/2018 14 6  11 1 - 15 9 g/dL Final    HCT 02/27/2018 43 3  34 0 - 46 6 % Final    MCV 02/27/2018 100* 79 - 97 fL Final    MCH 02/27/2018 33 6* 26 6 - 33 0 pg Final    MCHC 02/27/2018 33 7  31 5 - 35 7 g/dL Final    RDW 02/27/2018 13 0  12 3 - 15 4 % Final    Platelet Count 25/38/3461 150  150 - 379 x10E3/uL Final    Neutrophils 02/27/2018 50  Not Estab  % Final    Lymphocytes 02/27/2018 36  Not Estab  % Final    Monocytes 02/27/2018 12  Not Estab  % Final    Eosinophils 02/27/2018 1  Not Estab  % Final    Basophils Relative 02/27/2018 1  Not Estab  % Final    Neutrophils (Absolute) 02/27/2018 3 6  1 4 - 7 0 x10E3/uL Final    Lymphocytes (Absolute) 02/27/2018 2 6  0 7 - 3 1 x10E3/uL Final    Monocytes (Absolute) 02/27/2018 0 8  0 1 - 0 9 x10E3/uL Final    Eosinophils (Absolute) 02/27/2018 0 1  0 0 - 0 4 x10E3/uL Final    Basophils (Absolute) 02/27/2018 0 0  0 0 - 0 2 x10E3/uL Final    Immature Granulocytes 02/27/2018 0  Not Estab  % Final    Immature Granulocytes (Absolute) 02/27/2018 0 0  0 0 - 0 1 x10E3/uL Final    Glucose 02/27/2018 112* 65 - 99 mg/dL Final    BUN 02/27/2018 11  6 - 24 mg/dL Final    Creatinine 02/27/2018 0 80  0 57 - 1 00 mg/dL Final    eGFR Non  02/27/2018 82  >59 mL/min/1 73 Final    SL AMB EGFR  02/27/2018 94  >59 mL/min/1 73 Final    SL AMB BUN/CREATININE RATIO 02/27/2018 14  9 - 23 Final    Sodium 02/27/2018 137  134 - 144 mmol/L Final    SL AMB POTASSIUM 02/27/2018 4 1  3 5 - 5 2 mmol/L Final    Chloride 02/27/2018 100  96 - 106 mmol/L Final    CO2 02/27/2018 21  18 - 29 mmol/L Final    CALCIUM 02/27/2018 8 9  8 7 - 10 2 mg/dL Final    SL AMB PROTEIN, TOTAL 02/27/2018 8 0  6 0 - 8 5 g/dL Final    Albumin 02/27/2018 3 7  3 5 - 5 5 g/dL Final    Globulin, Total 02/27/2018 4 3  1 5 - 4 5 g/dL Final    SL AMB ALBUMIN/GLOBULIN RATIO 02/27/2018 0 9* 1 2 - 2 2 Final    TOTAL BILIRUBIN 02/27/2018 0 5  0 0 - 1 2 mg/dL Final    Alk Phos Isoenzymes 02/27/2018 107  39 - 117 IU/L Final    SL AMB AST 02/27/2018 64* 0 - 40 IU/L Final    SL AMB ALT 02/27/2018 43* 0 - 32 IU/L Final    Cholesterol, Total 02/27/2018 153  100 - 199 mg/dL Final    Triglycerides 02/27/2018 96  0 - 149 mg/dL Final    HDL 02/27/2018 46  >39 mg/dL Final    SL AMB VLDL CHOLESTEROL CALC 02/27/2018 19  5 - 40 mg/dL Final    LDL Direct 02/27/2018 88  0 - 99 mg/dL Final    T  Chol/HDL Ratio 02/27/2018 3 3  0 0 - 4 4 ratio units Final    Comment:                                   T  Chol/HDL Ratio                                              Men  Women                                1/2 Avg  Risk  3 4    3 3                                    Avg Risk  5 0    4 4                                 2X Avg  Risk  9 6    7 1                                 3X Avg  Risk 23 4   11 0      TSH 02/27/2018 2 970  0 450 - 4 500 uIU/mL Final   Allscripts Office Visit on 11/10/2017   Component Date Value Ref Range Status    Color, UA 11/10/2017 Yellow   Final    Clarity, UA 11/10/2017 Cloudy   Final    Leukocytes, UA 11/10/2017 neg   Final    Nitrite, UA 11/10/2017 neg   Final    Blood, UA 11/10/2017 large   Final    Bilirubin, UA 11/10/2017 neg   Final    Urobilinogen, UA 11/10/2017 0 2   Final    Protein, UA 11/10/2017 neg   Final    pH, UA 11/10/2017 5 0   Final    Specific Gravity, UA 11/10/2017 1 005   Final    Ketones, UA 11/10/2017 neg   Final    Glucose 11/10/2017 neg   Final    Comment:   Performed at: In Office  ,     [facility] CULTURE RESULT 11/10/2017 Final report   Final    Miscellaneous Lab Test Result 11/10/2017 No growth in 36 - 48 hours  Final    Comment: Result Comment: No growth in 36 - 48 hours  Performed at: [facility] , [place], [place], [id number], [id number]  MD:  Eun Altamirano  [id number] [facility] [phone]     Generic Conversion - Encounter on 06/05/2017   Component Date Value Ref Range Status    LYME IGG WB INTERP  06/05/2017 Negative   Final    Comment: Result Comment: Positive: 5 of the following                                                Borrelia-specific bands:                                                18,23,28,30,39,41,45,58,                                                66, and 93  Negative: No bands or banding                                                patterns which do not                                                meet positive criteria   LYME IGM WB INTERP  06/05/2017 Positive*  Final    Comment: Result Comment: Note: An equivocal or positive EIA result followed by a negative  Western Blot result is considered NEGATIVE  An equivocal or positive  EIA result followed by a positive Western Blot is considered POSITIVE  by the CDC  Positive: 2 of the following bands: 23,39 or 41  Negative: No bands or banding patterns which do not meet positive  criteria  Criteria for positivity are those recommended by CDC/ASTPHLD   p23=Osp C, q56=[id number]  Note:  Sera from individuals with the following may cross react in the  Lyme Western Blot assays: other spirochetal diseases (periodontal  disease, leptospirosis, relapsing fever, yaws, and pinta);  connective autoimmune (Rheumatoid Arthritis and Systemic Lupus  Erythematosus and also individuals with Antinuclear Antibody);  other infections [facility] Spotted Fever; Nikita-Barr Virus,  and Cytomegalovirus)        LYME 18 KD IGG 06/05/2017 Absent   Final    LYME 23 KD IGG 06/05/2017 Present*  Final    LYME 28 KD IGG 06/05/2017 Absent   Final    LYME 30 KD IGG 06/05/2017 Present*  Final    LYME 39 KD IGG 06/05/2017 Absent   Final    LYME 41 KD IGG 06/05/2017 Absent   Final    LYME 45 KD IGG 06/05/2017 Absent   Final    LYME 58 KD IGG 06/05/2017 Absent   Final    LYME 66 KD IGG 06/05/2017 Absent   Final    LYME 93 KD IGG 06/05/2017 Absent   Final    LYME 23 KD IGM 06/05/2017 Present*  Final    LYME 39 KD IGM 06/05/2017 Present*  Final    LYME 41 KD IGM 06/05/2017 Absent   Final    Comment:   Performed at: [facility] , , [place], [place], [id number], [id number]  MD:  [id number] [facility] [phone]     Generic Conversion - Encounter on 04/26/2017   Component Date Value Ref Range Status    QUANTIFERON INCUB COMMENT 04/26/2017 Comment   Final    Comment: Result Comment: Incubated, specimen forwarded to [facility] for  completion of the assay   QUANTIFERON TB GOLD 04/26/2017 Negative  Negative Final    QUANTIFERON CRITERIA (HISTORICAL) 04/26/2017 Comment   Final    Comment: Result Comment: To be considered positive a specimen should have a TB Ag minus Nil  value greater than or equal to 0 35 IU/mL and in addition the TB Ag  minus Nil value must be greater than or equal to 25% of the Nil  value  There may be insufficient information in these values to  differentiate between some negative and some indeterminate test  values   QUANTIFERON TB AG VALUE (HISTORICA* 04/26/2017 0 03  IU/mL Final    QAUNTIFERON NIL VALUE 04/26/2017 0 07  IU/mL Final    QUANTIFERON MITOGEN VALUE 04/26/2017 >10 00  IU/mL Final    QFT TB AG MINUS NIL VALUE 04/26/2017 <0 01  IU/mL Final    INTERPRETATION (HISTORICAL) 04/26/2017 Comment   Final    Comment: Result Comment: The QuantiFERON TB Gold (in Tube) assay is intended for use as an aid  in the diagnosis of TB infection  Negative results suggest that there  is no TB infection  In patients with high suspicion of exposure, a  negative test should be repeated  A positive test indicates infection  with Mycobacterium tuberculosis  Among individuals without  tuberculosis infection, a positive test may be due to exposure to  [place], M  normai or M  marinum  On the Internet, go to  cdc gov/tb for further details      Performed at: [facility], , [place], [place], [id number], [id number]  MD:  Eun Altamirano  [place], [place] [phone]     [facility] Office Visit on 04/21/2017   Component Date Value Ref Range Status    Color, UA 04/21/2017 Yellow   Final    Clarity, UA 04/21/2017 Transparent   Final    Leukocytes, UA 04/21/2017 neg   Final    Nitrite, UA 04/21/2017 neg   Final    Blood, UA 04/21/2017 mod   Final    Bilirubin, UA 04/21/2017 neg   Final    Urobilinogen, UA 04/21/2017 0 2   Final    Protein, UA 04/21/2017 neg   Final    pH, UA 04/21/2017 5 0   Final    Specific Gravity, UA 04/21/2017 1 020   Final    Ketones, UA 04/21/2017 neg   Final    Glucose 04/21/2017 neg   Final    Comment:   Performed at: In Office  ,     [facility] CULTURE RESULT 04/21/2017 Final report   Final    Miscellaneous Lab Test Result 04/21/2017 No growth   Final    Comment:   Performed at: [facility] , , [place], [place], [id number], [id number]  MD:  Eun Altamirano  [address] [place] [phone]     There may be more visits with results that are not included  Physical Exam   Constitutional: She is oriented to person, place, and time  She appears well-developed and well-nourished  Neck: Normal range of motion  Neck supple  Cardiovascular: Normal rate and regular rhythm  Exam reveals no gallop  No murmur heard  Pulmonary/Chest: Effort normal and breath sounds normal  No respiratory distress  She has no wheezes  Musculoskeletal: Normal range of motion  Neurological: She is alert and oriented to person, place, and time  Skin: Skin is warm and dry  Psychiatric: She has a normal mood and affect   Her behavior is normal  Judgment and thought content normal

## 2018-09-27 ENCOUNTER — HOSPITAL ENCOUNTER (OUTPATIENT)
Dept: CT IMAGING | Facility: HOSPITAL | Age: 58
Discharge: HOME | End: 2018-09-27
Payer: COMMERCIAL

## 2018-09-27 DIAGNOSIS — Z72.0 TOBACCO ABUSE: ICD-10-CM

## 2018-10-01 ENCOUNTER — OFFICE VISIT (OUTPATIENT)
Dept: FAMILY MEDICINE CLINIC | Facility: CLINIC | Age: 58
End: 2018-10-01
Payer: COMMERCIAL

## 2018-10-01 VITALS
WEIGHT: 178.2 LBS | TEMPERATURE: 98.5 F | SYSTOLIC BLOOD PRESSURE: 130 MMHG | RESPIRATION RATE: 16 BRPM | BODY MASS INDEX: 30.42 KG/M2 | HEART RATE: 76 BPM | HEIGHT: 64 IN | DIASTOLIC BLOOD PRESSURE: 78 MMHG

## 2018-10-01 DIAGNOSIS — T78.40XA ALLERGIC REACTION, INITIAL ENCOUNTER: Primary | ICD-10-CM

## 2018-10-01 PROCEDURE — 94640 AIRWAY INHALATION TREATMENT: CPT | Performed by: NURSE PRACTITIONER

## 2018-10-01 PROCEDURE — 99214 OFFICE O/P EST MOD 30 MIN: CPT | Performed by: NURSE PRACTITIONER

## 2018-10-01 RX ORDER — PREDNISONE 10 MG/1
TABLET ORAL
Qty: 20 TABLET | Refills: 0 | Status: SHIPPED | OUTPATIENT
Start: 2018-10-01 | End: 2019-02-05 | Stop reason: ALTCHOICE

## 2018-10-01 NOTE — PROGRESS NOTES
Chief Complaint   Patient presents with    Rash     all over body    Facial Swelling     lip     Assessment/Plan:    1  Allergic reaction, initial encounter  Take the prednisone as prescribed  Stay cool and away from hot showers  Use your neb 3 times a day if you are feeling tight in your chest  Call if you are worsening or not getting better  - predniSONE 10 mg tablet; Take 4,4,3,3,2,2,1,1  Dispense: 20 tablet; Refill: 0    rto prn     Subjective:      Patient ID: Hanh Moscoso is a 60 y o  female  Here today with rash and whelts on her arms,  back and legs as well as swollen lips  Feels a little tight in her chest but denies any wheezing  This was abrupt onset yestrday am   Only thing that was  different was that she  ate chinese the night before   No new meds  No new laundry detergents,  Has not tried to treat this  ? Getting worse  Mini neb  Performed by: Yumiko Wright  Authorized by: Yumiko Wright     Number of treatments:  1  Treatment 1:   Pre-Procedure     Symptoms:  Wheezing and shortness of breath    Lung Sounds:  Clear hypoareated   pk fk 230    HR:  76    RR:  16    Medication Administered:  Albuterol 2 5 mg          The following portions of the patient's history were reviewed and updated as appropriate: allergies, current medications, past family history, past medical history, past social history, past surgical history and problem list     Past Medical History:   Diagnosis Date    Alcoholic fatty liver     Anxiety     Asthma     COPD (chronic obstructive pulmonary disease) ([facility] [address] )     Elevated liver function tests     GERD without esophagitis     Hyperlipidemia     Hypertension     IBS (irritable bowel syndrome)     Insomnia     Insomnia     Nervousness     Nicotine dependence     Other specified urinary incontinence     RLS (restless legs syndrome)      Past Surgical History:   Procedure Laterality Date    BACK SURGERY      CATARACT EXTRACTION, BILATERAL  08/01/2018    EGD AND COLONOSCOPY N/A 3/19/2018    Procedure: EGD AND COLONOSCOPY;  Surgeon: Juan Daniel No MD;  Location[facility]; Service: Gastroenterology    HYSTERECTOMY      KNEE SURGERY      KNEE SURGERY      LUMBAR LAMINECTOMY  04/1999    LYMPH NODE BIOPSY      TOTAL ABDOMINAL HYSTERECTOMY  02/05/2008    TUBAL LIGATION      TUBAL LIGATION  1989     Family History   Problem Relation Age of Onset    Cancer Mother     Substance Abuse Neg Hx     Mental illness Neg Hx      Social History   Social History     Social History    Marital status: /Civil Union     Spouse name: N/A    Number of children: N/A    Years of education: N/A     Occupational History    Not on file  Social History Main Topics    Smoking status: Current Every Day Smoker     Packs/day: 0 50     Types: Cigarettes    Smokeless tobacco: Never Used      Comment: Per allscripts - 1/2 PPD cigs - smoking for 40 years    Alcohol use 1 8 oz/week     3 Cans of beer per week      Comment: Daily; just cut back from 6 beers/day  Per allscripts -  3 beers a night    Drug use: No    Sexual activity: Yes     Partners: Male     Other Topics Concern    Not on file     Social History Narrative    Has carbon monoxide detectors in home    Has smoke detectors    Uses safety equipment - seatbelts         Review of Systems   Constitutional: Negative  HENT: Positive for facial swelling  Respiratory: Positive for shortness of breath  Cardiovascular: Negative  Musculoskeletal: Negative  Skin: Positive for rash  Psychiatric/Behavioral: Negative  Vitals:    10/01/18 1158   BP: 130/78   BP Location: Left arm   Patient Position: Sitting   Cuff Size: Standard   Pulse: 76   Resp: 16   Temp: 98 5 °F (36 9 °C)   Weight: 80 8 kg (178 lb 3 2 oz)   Height: 5' 4" (1 626 m)       Objective:   Wt Readings from Last 3 Encounters:   10/01/18 80 8 kg (178 lb 3 2 oz)   09/13/18 78 9 kg (174 lb)   08/07/18 80 7 kg (178 lb)     BP Readings from Last 3 Encounters:   10/01/18 130/78   09/13/18 120/80   08/07/18 112/78     Pulse Readings from Last 3 Encounters:   10/01/18 76   09/13/18 80   08/07/18 80     BMI Readings from Last 3 Encounters:   10/01/18 30 59 kg/m²   09/13/18 29 87 kg/m²   08/07/18 30 55 kg/m²     Office Visit on 08/07/2018   Component Date Value Ref Range Status    OTHER 08/07/2018 other   Corrected    08/07/2018   Office Visit on 06/11/2018   Component Date Value Ref Range Status    Glucose 06/12/2018 108* 65 - 99 mg/dL Final    BUN 06/12/2018 8  6 - 24 mg/dL Final    Creatinine 06/12/2018 0 75  0 57 - 1 00 mg/dL Final    eGFR Non  06/12/2018 88  >59 mL/min/1 73 Final    SL AMB EGFR  06/12/2018 101  >59 mL/min/1 73 Final    SL AMB BUN/CREATININE RATIO 06/12/2018 11  9 - 23 Final    Sodium 06/12/2018 139  134 - 144 mmol/L Final    SL AMB POTASSIUM 06/12/2018 4 4  3 5 - 5 2 mmol/L Final    Chloride 06/12/2018 99  96 - 106 mmol/L Final    CO2 06/12/2018 22  20 - 29 mmol/L Final                  **Please note reference interval change**    CALCIUM 06/12/2018 9 5  8 7 - 10 2 mg/dL Final    SL AMB PROTEIN, TOTAL 06/12/2018 7 7  6 0 - 8 5 g/dL Final    Albumin 06/12/2018 3 8  3 5 - 5 5 g/dL Final    Globulin, Total 06/12/2018 3 9  1 5 - 4 5 g/dL Final    SL AMB ALBUMIN/GLOBULIN RATIO 06/12/2018 1 0* 1 2 - 2 2 Final    TOTAL BILIRUBIN 06/12/2018 0 3  0 0 - 1 2 mg/dL Final    Alk Phos Isoenzymes 06/12/2018 133* 39 - 117 IU/L Final    SL AMB AST 06/12/2018 86* 0 - 40 IU/L Final    SL AMB ALT 06/12/2018 52* 0 - 32 IU/L Final    White Blood Cell Count 06/12/2018 6 6  3 4 - 10 8 x10E3/uL Final    Red Blood Cell Count 06/12/2018 4 32  3 77 - 5 28 x10E6/uL Final    Hemoglobin 06/12/2018 15 0  11 1 - 15 9 g/dL Final    HCT 06/12/2018 42 3  34 0 - 46 6 % Final    MCV 06/12/2018 98* 79 - 97 fL Final    MCH 06/12/2018 34 7* 26 6 - 33 0 pg Final    MCHC 06/12/2018 35 5  31 5 - 35 7 g/dL Final    RDW 06/12/2018 12 7  12 3 - 15 4 % Final    Platelet Count 74/15/4556 131* 150 - 379 x10E3/uL Final    Neutrophils 06/12/2018 55  Not Estab  % Final    Lymphocytes 06/12/2018 34  Not Estab  % Final    Monocytes 06/12/2018 9  Not Estab  % Final    Eosinophils 06/12/2018 1  Not Estab  % Final    Basophils Relative 06/12/2018 1  Not Estab  % Final    Neutrophils (Absolute) 06/12/2018 3 7  1 4 - 7 0 x10E3/uL Final    Lymphocytes (Absolute) 06/12/2018 2 2  0 7 - 3 1 x10E3/uL Final    Monocytes (Absolute) 06/12/2018 0 6  0 1 - 0 9 x10E3/uL Final    Eosinophils (Absolute) 06/12/2018 0 1  0 0 - 0 4 x10E3/uL Final    Basophils (Absolute) 06/12/2018 0 0  0 0 - 0 2 x10E3/uL Final    Immature Granulocytes 06/12/2018 0  Not Estab  % Final    Immature Granulocytes (Absolute) 06/12/2018 0 0  0 0 - 0 1 x10E3/uL Final    INR 06/12/2018 1 1  0 8 - 1 2 Final    Comment: Reference interval is for non-anticoagulated patients  Suggested INR therapeutic range for Vitamin K  antagonist therapy:     Standard Dose (moderate intensity                    therapeutic range):       2 0 - 3 0     Higher intensity therapeutic range       2 5 - 3 5      SL AMB PROTHROMBIN TIME 06/12/2018 11 9  9 1 - 12 0 sec Final   Annual Exam on 05/24/2018   Component Date Value Ref Range Status    Diagnosis: 05/24/2018 Comment   Final    Comment: NEGATIVE FOR INTRAEPITHELIAL LESION AND MALIGNANCY  THIS SPECIMEN WAS RESCREENED AS PART OF OUR  PROGRAM   THE CYTOLOGY PROCESSING WAS PERFORMED AT THE [facility] LOCATED AT  [facility], [address] [id number]   SL AMB SPECIMEN ADEQUACY 05/24/2018 Comment   Final    Comment: Satisfactory for evaluation  Endocervical and/or squamous metaplastic  cells (endocervical component) are present        Clinician Provided ICD10 05/24/2018 Comment   Final    Z12 4    Performed by: 05/24/2018 Comment   Final    Daron Ackerman, Cytotechnologist (ASCP)    QC reviewed by: 05/24/2018 Comment   Final    Yenny Bishop Cytotechnologist (ASCP)     AMB   05/24/2018   Final    Note: 05/24/2018 Comment   Final    Comment: The Pap smear is a screening test designed to aid in the detection of  premalignant and malignant conditions of the uterine cervix  It is not a  diagnostic procedure and should not be used as the sole means of detecting  cervical cancer  Both false-positive and false-negative reports do occur   SL AMB   05/24/2018 Comment   Final    Comment: The HPV DNA reflex criteria were not met with this specimen result  therefore, no HPV testing was performed  Admission on 03/19/2018, Discharged on 03/19/2018   Component Date Value Ref Range Status    Case Report 03/19/2018    Final                    Value:Surgical Pathology Report                         Case: [id number]                                   Authorizing Provider:  Jose Hudson MD           Collected:           03/19/2018 1216              Ordering Location:     [facility]        Received:            03/19/2018 [address]  Endoscopy                                                     Pathologist:           Idris Wilson MD                                                              Specimens:   A) - Duodenum, cold bx's r/o celiac                                                                 B) - Stomach, gastric bx's r/o h pylori                                                             C) - Small Bowel, NOS, random TI bx's                                                               D) - Colon, random colon bx's mild colitis                                                 Final Diagnosis 03/19/2018    Final                    Value: This result contains rich text formatting which cannot be displayed here   Note 03/19/2018    Final                    Value: This result contains rich text formatting which cannot be displayed here      Additional Information 03/19/2018    Final                    Value: This result contains rich text formatting which cannot be displayed here  Lashawn Courser Gross Description 03/19/2018    Final                    Value: This result contains rich text formatting which cannot be displayed here     Office Visit on 03/05/2018   Component Date Value Ref Range Status    Glucose 08/27/2018 106* 65 - 99 mg/dL Final    BUN 08/27/2018 8  6 - 24 mg/dL Final    Creatinine 08/27/2018 0 86  0 57 - 1 00 mg/dL Final    eGFR Non  08/27/2018 74  >59 mL/min/1 73 Final    SL AMB EGFR  08/27/2018 86  >59 mL/min/1 73 Final    SL AMB BUN/CREATININE RATIO 08/27/2018 9  9 - 23 Final    Sodium 08/27/2018 135  134 - 144 mmol/L Final    SL AMB POTASSIUM 08/27/2018 4 7  3 5 - 5 2 mmol/L Final    Chloride 08/27/2018 94* 96 - 106 mmol/L Final    CO2 08/27/2018 23  20 - 29 mmol/L Final    CALCIUM 08/27/2018 9 4  8 7 - 10 2 mg/dL Final    SL AMB PROTEIN, TOTAL 08/27/2018 8 3  6 0 - 8 5 g/dL Final    Albumin 08/27/2018 3 6  3 5 - 5 5 g/dL Final    Globulin, Total 08/27/2018 4 7* 1 5 - 4 5 g/dL Final    SL AMB ALBUMIN/GLOBULIN RATIO 08/27/2018 0 8* 1 2 - 2 2 Final    TOTAL BILIRUBIN 08/27/2018 0 9  0 0 - 1 2 mg/dL Final    Alk Phos Isoenzymes 08/27/2018 135* 39 - 117 IU/L Final    SL AMB AST 08/27/2018 121* 0 - 40 IU/L Final    SL AMB ALT 08/27/2018 56* 0 - 32 IU/L Final    White Blood Cell Count 08/27/2018 6 8  3 4 - 10 8 x10E3/uL Final    Red Blood Cell Count 08/27/2018 4 49  3 77 - 5 28 x10E6/uL Final    Hemoglobin 08/27/2018 15 3  11 1 - 15 9 g/dL Final    HCT 08/27/2018 44 4  34 0 - 46 6 % Final    MCV 08/27/2018 99* 79 - 97 fL Final    MCH 08/27/2018 34 1* 26 6 - 33 0 pg Final    MCHC 08/27/2018 34 5  31 5 - 35 7 g/dL Final    RDW 08/27/2018 12 8  12 3 - 15 4 % Final    Platelet Count 67/39/7909 141* 150 - 379 x10E3/uL Final    Neutrophils 08/27/2018 59  Not Estab  % Final    Lymphocytes 08/27/2018 29  Not Estab  % Final    Monocytes 08/27/2018 10  Not Estab  % Final    Eosinophils 08/27/2018 1  Not Estab  % Final    Basophils Relative 08/27/2018 1  Not Estab  % Final    Neutrophils (Absolute) 08/27/2018 4 0  1 4 - 7 0 x10E3/uL Final    Lymphocytes (Absolute) 08/27/2018 2 0  0 7 - 3 1 x10E3/uL Final    Monocytes (Absolute) 08/27/2018 0 7  0 1 - 0 9 x10E3/uL Final    Eosinophils (Absolute) 08/27/2018 0 1  0 0 - 0 4 x10E3/uL Final    Basophils (Absolute) 08/27/2018 0 1  0 0 - 0 2 x10E3/uL Final    Immature Granulocytes 08/27/2018 0  Not Estab  % Final    Immature Granulocytes (Absolute) 08/27/2018 0 0  0 0 - 0 1 x10E3/uL Final   Orders Only on 02/27/2018   Component Date Value Ref Range Status    White Blood Cell Count 02/27/2018 7 1  3 4 - 10 8 x10E3/uL Final    Red Blood Cell Count 02/27/2018 4 34  3 77 - 5 28 x10E6/uL Final    Hemoglobin 02/27/2018 14 6  11 1 - 15 9 g/dL Final    HCT 02/27/2018 43 3  34 0 - 46 6 % Final    MCV 02/27/2018 100* 79 - 97 fL Final    MCH 02/27/2018 33 6* 26 6 - 33 0 pg Final    MCHC 02/27/2018 33 7  31 5 - 35 7 g/dL Final    RDW 02/27/2018 13 0  12 3 - 15 4 % Final    Platelet Count 40/84/6039 150  150 - 379 x10E3/uL Final    Neutrophils 02/27/2018 50  Not Estab  % Final    Lymphocytes 02/27/2018 36  Not Estab  % Final    Monocytes 02/27/2018 12  Not Estab  % Final    Eosinophils 02/27/2018 1  Not Estab  % Final    Basophils Relative 02/27/2018 1  Not Estab  % Final    Neutrophils (Absolute) 02/27/2018 3 6  1 4 - 7 0 x10E3/uL Final    Lymphocytes (Absolute) 02/27/2018 2 6  0 7 - 3 1 x10E3/uL Final    Monocytes (Absolute) 02/27/2018 0 8  0 1 - 0 9 x10E3/uL Final    Eosinophils (Absolute) 02/27/2018 0 1  0 0 - 0 4 x10E3/uL Final    Basophils (Absolute) 02/27/2018 0 0  0 0 - 0 2 x10E3/uL Final    Immature Granulocytes 02/27/2018 0  Not Estab  % Final    Immature Granulocytes (Absolute) 02/27/2018 0 0  0 0 - 0 1 x10E3/uL Final    Glucose 02/27/2018 112* 65 - 99 mg/dL Final    BUN 02/27/2018 11  6 - 24 mg/dL Final    Creatinine 02/27/2018 0 80  0 57 - 1 00 mg/dL Final    eGFR Non  02/27/2018 82  >59 mL/min/1 73 Final    SL AMB EGFR  02/27/2018 94  >59 mL/min/1 73 Final    SL AMB BUN/CREATININE RATIO 02/27/2018 14  9 - 23 Final    Sodium 02/27/2018 137  134 - 144 mmol/L Final    SL AMB POTASSIUM 02/27/2018 4 1  3 5 - 5 2 mmol/L Final    Chloride 02/27/2018 100  96 - 106 mmol/L Final    CO2 02/27/2018 21  18 - 29 mmol/L Final    CALCIUM 02/27/2018 8 9  8 7 - 10 2 mg/dL Final    SL AMB PROTEIN, TOTAL 02/27/2018 8 0  6 0 - 8 5 g/dL Final    Albumin 02/27/2018 3 7  3 5 - 5 5 g/dL Final    Globulin, Total 02/27/2018 4 3  1 5 - 4 5 g/dL Final    SL AMB ALBUMIN/GLOBULIN RATIO 02/27/2018 0 9* 1 2 - 2 2 Final    TOTAL BILIRUBIN 02/27/2018 0 5  0 0 - 1 2 mg/dL Final    Alk Phos Isoenzymes 02/27/2018 107  39 - 117 IU/L Final    SL AMB AST 02/27/2018 64* 0 - 40 IU/L Final    SL AMB ALT 02/27/2018 43* 0 - 32 IU/L Final    Cholesterol, Total 02/27/2018 153  100 - 199 mg/dL Final    Triglycerides 02/27/2018 96  0 - 149 mg/dL Final    HDL 02/27/2018 46  >39 mg/dL Final    SL AMB VLDL CHOLESTEROL CALC 02/27/2018 19  5 - 40 mg/dL Final    LDL Direct 02/27/2018 88  0 - 99 mg/dL Final    T  Chol/HDL Ratio 02/27/2018 3 3  0 0 - 4 4 ratio units Final    Comment:                                   T  Chol/HDL Ratio                                              Men  Women                                1/2 Avg  Risk  3 4    3 3                                    Avg Risk  5 0    4 4                                 2X Avg  Risk  9 6    7 1                                 3X Avg  Risk 23 4   11 0      TSH 02/27/2018 2 970  0 450 - 4 500 uIU/mL Final   [employer]scripts Office Visit on 11/10/2017   Component Date Value Ref Range Status    Color, UA 11/10/2017 Yellow   Final    Clarity, UA 11/10/2017 Cloudy   Final    Leukocytes, UA 11/10/2017 neg   Final  Nitrite, UA 11/10/2017 neg   Final    Blood, UA 11/10/2017 large   Final    Bilirubin, UA 11/10/2017 neg   Final    Urobilinogen, UA 11/10/2017 0 2   Final    Protein, UA 11/10/2017 neg   Final    pH, UA 11/10/2017 5 0   Final    Specific Gravity, UA 11/10/2017 1 005   Final    Ketones, UA 11/10/2017 neg   Final    Glucose 11/10/2017 neg   Final    Comment:   Performed at: In Office  ,     Maryann Granger CULTURE RESULT 11/10/2017 Final report   Final    Miscellaneous Lab Test Result 11/10/2017 No growth in 36 - 48 hours  Final    Comment: Result Comment: No growth in 36 - 48 hours  Performed at: [facility] [address], , [place], [place], [id number], [id number]  MD:  Eun Altamirano  [id number] [facility] [phone]     Generic Conversion - Encounter on 06/05/2017   Component Date Value Ref Range Status    LYME IGG WB INTERP  06/05/2017 Negative   Final    Comment: Result Comment: Positive: 5 of the following                                                Borrelia-specific bands:                                                18,23,28,30,39,41,45,58,                                                66, and 93  Negative: No bands or banding                                                patterns which do not                                                meet positive criteria   LYME IGM WB INTERP  06/05/2017 Positive*  Final    Comment: Result Comment: Note: An equivocal or positive EIA result followed by a negative  Western Blot result is considered NEGATIVE  An equivocal or positive  EIA result followed by a positive Western Blot is considered POSITIVE  by the CDC  Positive: 2 of the following bands: 23,39 or 41  Negative: No bands or banding patterns which do not meet positive  criteria    Criteria for positivity are those recommended by CDC/ASTPHLD   p23=Osp C, h42=nmwqxjsum  Note:  Sera from individuals with the following may cross react in the  Lyme Western Blot assays: other spirochetal diseases (periodontal  disease, leptospirosis, relapsing fever, yaws, and pinta);  connective autoimmune (Rheumatoid Arthritis and Systemic Lupus  Erythematosus and also individuals with Antinuclear Antibody);  other infections COFFEE [facility] Spotted Fever; Nikita-Barr Virus,  and Cytomegalovirus)   LYME 18 KD IGG 06/05/2017 Absent   Final    LYME 23 KD IGG 06/05/2017 Present*  Final    LYME 28 KD IGG 06/05/2017 Absent   Final    LYME 30 KD IGG 06/05/2017 Present*  Final    LYME 39 KD IGG 06/05/2017 Absent   Final    LYME 41 KD IGG 06/05/2017 Absent   Final    LYME 45 KD IGG 06/05/2017 Absent   Final    LYME 58 KD IGG 06/05/2017 Absent   Final    LYME 66 KD IGG 06/05/2017 Absent   Final    LYME 93 KD IGG 06/05/2017 Absent   Final    LYME 23 KD IGM 06/05/2017 Present*  Final    LYME 44 KD IGM 06/05/2017 Present*  Final    LYME 41 KD IGM 06/05/2017 Absent   Final    Comment:   [facility] , [place], [place], [id number], [id number]  MD:  Trino [address] [phone]     Generic Conversion - Encounter on 04/26/2017   Component Date Value Ref Range Status    QUANTIFERON INCUB COMMENT 04/26/2017 Comment   Final    Comment: Result Comment: Incubated, specimen forwarded to [place], [place] for  completion of the assay   QUANTIFERON TB GOLD 04/26/2017 Negative  Negative Final    QUANTIFERON CRITERIA (HISTORICAL) 04/26/2017 Comment   Final    Comment: Result Comment: To be considered positive a specimen should have a TB Ag minus Nil  value greater than or equal to 0 35 IU/mL and in addition the TB Ag  minus Nil value must be greater than or equal to 25% of the Nil  value  There may be insufficient information in these values to  differentiate between some negative and some indeterminate test  values        QUANTIFERON TB AG VALUE (HISTORICA* 04/26/2017 0 03  IU/mL Final    QAUNTIFERON NIL VALUE 04/26/2017 0 07  IU/mL Final    QUANTIFERON MITOGEN VALUE 04/26/2017 >10 00  IU/mL Final    QFT TB AG MINUS NIL VALUE 04/26/2017 <0 01  IU/mL Final    INTERPRETATION (HISTORICAL) 04/26/2017 Comment   Final    Comment: Result Comment: The QuantiFERON TB Gold (in Tube) assay is intended for use as an aid  in the diagnosis of TB infection  Negative results suggest that there  is no TB infection  In patients with high suspicion of exposure, a  negative test should be repeated  A positive test indicates infection  with Mycobacterium tuberculosis  Among individuals without  tuberculosis infection, a positive test may be due to exposure to  [place], M  cheko or M  lakenum  On the Internet, go to  cdc gov/tb for further details  Performed at: [facility], , [place], [place], [id number], [id number]  MD:  Eun Altamirano  [place], [place] [phone]     [facility] Office Visit on 04/21/2017   Component Date Value Ref Range Status    Color, UA 04/21/2017 Yellow   Final    Clarity, UA 04/21/2017 Transparent   Final    Leukocytes, UA 04/21/2017 neg   Final    Nitrite, UA 04/21/2017 neg   Final    Blood, UA 04/21/2017 mod   Final    Bilirubin, UA 04/21/2017 neg   Final    Urobilinogen, UA 04/21/2017 0 2   Final    Protein, UA 04/21/2017 neg   Final    pH, UA 04/21/2017 5 0   Final    Specific Gravity, UA 04/21/2017 1 020   Final    Ketones, UA 04/21/2017 neg   Final    Glucose 04/21/2017 neg   Final    Comment:   Performed at: In Office  ,     Mahesh Cronin CULTURE RESULT 04/21/2017 Final report   Final    Miscellaneous Lab Test Result 04/21/2017 No growth   Final    Comment:   Performed at: [facility], , [place], [place], [id number], [id number]  MD:  Eun Altamirano  [id number] [place], [place] [phone]     There may be more visits with results that are not included  Physical Exam   Constitutional: She appears well-developed  HENT:   Lips with minor swelling  Neck: Neck supple     Cardiovascular: Normal rate, regular rhythm and normal heart sounds  Pulmonary/Chest: Effort normal and breath sounds normal    hypoaeration but cta   Skin:   Blanching hives on arms chest back and legs

## 2018-10-05 ENCOUNTER — TELEPHONE (OUTPATIENT)
Dept: FAMILY MEDICINE CLINIC | Facility: CLINIC | Age: 58
End: 2018-10-05

## 2018-10-05 NOTE — TELEPHONE ENCOUNTER
Patient had the shingles vaccine here several weeks ago  Today, her  was diagnosed with shingles  She is very concerned  What are her chances of ayah shingles?

## 2018-12-06 DIAGNOSIS — I10 BENIGN ESSENTIAL HTN: ICD-10-CM

## 2018-12-07 RX ORDER — LOSARTAN POTASSIUM 100 MG/1
TABLET ORAL
Qty: 30 TABLET | Refills: 6 | Status: SHIPPED | OUTPATIENT
Start: 2018-12-07 | End: 2019-07-07 | Stop reason: SDUPTHER

## 2018-12-31 ENCOUNTER — TELEPHONE (OUTPATIENT)
Dept: PULMONOLOGY | Facility: HOSPITAL | Age: 58
End: 2018-12-31

## 2019-01-02 ENCOUNTER — OFFICE VISIT (OUTPATIENT)
Dept: PULMONOLOGY | Facility: HOSPITAL | Age: 59
End: 2019-01-02
Payer: COMMERCIAL

## 2019-01-02 VITALS
TEMPERATURE: 98.7 F | HEIGHT: 64 IN | OXYGEN SATURATION: 95 % | BODY MASS INDEX: 29.37 KG/M2 | WEIGHT: 172 LBS | HEART RATE: 89 BPM

## 2019-01-02 DIAGNOSIS — J44.9 MODERATE COPD (CHRONIC OBSTRUCTIVE PULMONARY DISEASE) (HCC): Primary | ICD-10-CM

## 2019-01-02 DIAGNOSIS — J44.9 CHRONIC OBSTRUCTIVE PULMONARY DISEASE, UNSPECIFIED COPD TYPE (HCC): ICD-10-CM

## 2019-01-02 DIAGNOSIS — F17.200 NICOTINE DEPENDENCE, UNCOMPLICATED, UNSPECIFIED NICOTINE PRODUCT TYPE: ICD-10-CM

## 2019-01-02 PROCEDURE — 99214 OFFICE O/P EST MOD 30 MIN: CPT | Performed by: INTERNAL MEDICINE

## 2019-01-02 RX ORDER — ALBUTEROL SULFATE 90 UG/1
2 AEROSOL, METERED RESPIRATORY (INHALATION) EVERY 6 HOURS PRN
Qty: 1 INHALER | Refills: 6 | Status: SHIPPED | OUTPATIENT
Start: 2019-01-02 | End: 2021-11-17 | Stop reason: SDUPTHER

## 2019-01-02 RX ORDER — GUAIFENESIN 600 MG
1200 TABLET, EXTENDED RELEASE 12 HR ORAL EVERY 12 HOURS SCHEDULED
Qty: 60 TABLET | Refills: 2 | Status: SHIPPED | OUTPATIENT
Start: 2019-01-02 | End: 2019-09-30 | Stop reason: ALTCHOICE

## 2019-01-02 NOTE — PATIENT INSTRUCTIONS
Start Anoro 1 puff once daily, continue to use albuterol as needed for chest tightness/wheezing    Take plain Mucinex 600 mg up to twice daily for cough/phlegm production, can use as needed    Stop smoking

## 2019-01-02 NOTE — ASSESSMENT & PLAN NOTE
She will continue to work on smoking cessation  She had side effects from Chantix in the past and would like to hold off on that    She will attempt to use nicotine patches

## 2019-01-02 NOTE — ASSESSMENT & PLAN NOTE
Overall the patient is doing well however she continues to have significant symptoms likely because she is not using a chronic daily inhaler as well as the fact that she is still smoking  Medications:     I have provided her with a coupon to start Anoro 1 puff once daily  She will continue albuterol as needed  Pulmonary rehab:     She never enrolled in pulmonary rehab after our last visit     Immunizations:      Up-to-date   Lung cancer screening CT:     Due September 2019   Other testing:     No further testing needed

## 2019-01-02 NOTE — PROGRESS NOTES
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)  Overall the patient is doing well however she continues to have significant symptoms likely because she is not using a chronic daily inhaler as well as the fact that she is still smoking  Medications:     I have provided her with a coupon to start Anoro 1 puff once daily  She will continue albuterol as needed  Pulmonary rehab:     She never enrolled in pulmonary rehab after our last visit  Immunizations:      Up-to-date   Lung cancer screening CT:     Due September 2019   Other testing:     No further testing needed      Nicotine dependence  She will continue to work on smoking cessation  She had side effects from Chantix in the past and would like to hold off on that  She will attempt to use nicotine patches      Plan:    Diagnoses and all orders for this visit:    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)  -     umeclidinium-vilanterol (ANORO ELLIPTA) 62 5-25 MCG/INH inhaler; Inhale 1 puff daily  -     guaiFENesin (MUCINEX) 600 mg 12 hr tablet; Take 2 tablets (1,200 mg total) by mouth every 12 (twelve) hours  -     CT lung screening program; Future    Chronic obstructive pulmonary disease, unspecified COPD type ([facility] )  -     albuterol (PROVENTIL HFA) 90 mcg/act inhaler;  Inhale 2 puffs every 6 (six) hours as needed for wheezing or shortness of breath    Nicotine dependence, uncomplicated, unspecified nicotine product type        Follow-up:   3-4 months      HPI:  She is having coughing since our last appr with coughing  She had some hemoptysis last week - minimal amount - has resolved    Currently not taking any inhalers    She gets shortness of breath - just walking up the stairs or when lying down at night    Still smoking - 1/2 ppd but more over the holidays    No change in color or amount of phlegm  No fevers or chills    UTD on immunizations      Review of Systems Constitutional: Negative for activity change  HENT: Positive for congestion and postnasal drip  Respiratory: Positive for cough and shortness of breath  Cardiovascular: Negative for chest pain and leg swelling  Musculoskeletal: Negative for gait problem         The following portions of the patient's history were reviewed and updated as appropriate: allergies, current medications, past family history, past medical history, past social history, past surgical history and problem list     VITALS:  Vitals:    01/02/19 1210   Pulse: 89   Temp: 98 7 °F (37 1 °C)   TempSrc: Tympanic   SpO2: 95%   Weight: 78 kg (172 lb)   Height: 5' 4" (1 626 m)       Physical Exam  General:  Patient is awake, alert, non-toxic and in no acute respiratory distress  Neck: No JVD  CV:  Regular, +S1 and S2, No murmurs, gallops or rubs appreciated  Lungs:   Decreased breath sounds bilateral without significant wheeze, rales or rhonchi  Abdomen: Soft, +BS, Non-tender, non-distended  Extremities: No clubbing, cyanosis or edema  Neuro: No focal deficits        Diagnostic Testing:    CBC:  Lab Results   Component Value Date    WBC 6 8 08/27/2018    HGB 15 3 08/27/2018    HCT 44 4 08/27/2018    MCV 99 (H) 08/27/2018     (L) 08/27/2018         BMP:   Lab Results   Component Value Date     07/28/2017    K 4 7 08/27/2018    CL 94 (L) 08/27/2018    CO2 23 08/27/2018    ANIONGAP 12 05/03/2015    BUN 8 08/27/2018    CREATININE 0 69 07/28/2017    GLUCOSE 95 07/28/2017    CALCIUM 9 2 07/28/2017    AST 96 (H) 07/28/2017    ALT 70 (H) 07/28/2017    ALKPHOS 106 07/28/2017    PROT 7 4 07/28/2017    BILITOT 0 6 07/28/2017    EGFR >60 0 06/15/2016     CT chest September 2019:  Emphysema, no concerning nodules/masses    Bocanegra Seen, DO

## 2019-01-09 DIAGNOSIS — F41.9 ANXIETY: ICD-10-CM

## 2019-01-09 RX ORDER — ALPRAZOLAM 1 MG/1
TABLET ORAL
Qty: 60 TABLET | Refills: 0 | Status: SHIPPED | OUTPATIENT
Start: 2019-01-09 | End: 2019-02-05 | Stop reason: SDUPTHER

## 2019-02-05 ENCOUNTER — OFFICE VISIT (OUTPATIENT)
Dept: FAMILY MEDICINE CLINIC | Facility: CLINIC | Age: 59
End: 2019-02-05
Payer: COMMERCIAL

## 2019-02-05 VITALS
RESPIRATION RATE: 12 BRPM | WEIGHT: 172 LBS | DIASTOLIC BLOOD PRESSURE: 60 MMHG | BODY MASS INDEX: 29.37 KG/M2 | HEIGHT: 64 IN | SYSTOLIC BLOOD PRESSURE: 114 MMHG | HEART RATE: 92 BPM

## 2019-02-05 DIAGNOSIS — B37.3 YEAST VAGINITIS: Primary | ICD-10-CM

## 2019-02-05 DIAGNOSIS — F41.9 ANXIETY: ICD-10-CM

## 2019-02-05 DIAGNOSIS — Z12.31 SCREENING MAMMOGRAM, ENCOUNTER FOR: ICD-10-CM

## 2019-02-05 PROCEDURE — 3008F BODY MASS INDEX DOCD: CPT | Performed by: NURSE PRACTITIONER

## 2019-02-05 PROCEDURE — 99213 OFFICE O/P EST LOW 20 MIN: CPT | Performed by: NURSE PRACTITIONER

## 2019-02-05 RX ORDER — ALPRAZOLAM 1 MG/1
TABLET ORAL
Qty: 60 TABLET | Refills: 5 | Status: SHIPPED | OUTPATIENT
Start: 2019-02-05 | End: 2019-07-25 | Stop reason: SDUPTHER

## 2019-02-05 RX ORDER — FLUCONAZOLE 150 MG/1
TABLET ORAL
Qty: 2 TABLET | Refills: 0 | Status: SHIPPED | OUTPATIENT
Start: 2019-02-05 | End: 2019-02-09

## 2019-02-05 NOTE — PROGRESS NOTES
Assessment/Plan:    1  Tinea   you can stop the augmentin as your symptoms have resolved  Take the diflucan one now an d repeat in 3 days  Get monostat OTC and use this externally as well  Call if this is not getting better  - fluconazole (DIFLUCAN) 150 mg tablet; Take one pill and repeat in 3 days  Dispense: 2 tablet; Refill: 0      rto prn       Subjective:      Patient ID: Romain Curtis is a 60 y o  female  Here todayw ith complaint of a sore and itchy  rash in the groin  Is treating this with econozole for a couple days but this is not helping  Admits to being on augmentin for a sinus infection and has a couple more days left on the medication  States that her sinus symptoms have resolved  Denies any vaginal discharge  Rash is raw, itchy and burning  OBJECTIVE  Past Medical History:   Diagnosis Date    Alcoholic fatty liver     Anxiety     Asthma     COPD (chronic obstructive pulmonary disease) (HCC)     Elevated liver function tests     GERD without esophagitis     Hyperlipidemia     Hypertension     IBS (irritable bowel syndrome)     Insomnia     Insomnia     Nervousness     Nicotine dependence     Other specified urinary incontinence     RLS (restless legs syndrome)      temporarily  Wt Readings from Last 3 Encounters:   02/05/19 78 kg (172 lb)   01/02/19 78 kg (172 lb)   10/01/18 80 8 kg (178 lb 3 2 oz)     BP Readings from Last 3 Encounters:   02/05/19 114/60   10/01/18 130/78   09/13/18 120/80     Pulse Readings from Last 3 Encounters:   02/05/19 92   01/02/19 89   10/01/18 76     BMI Readings from Last 3 Encounters:   02/05/19 29 52 kg/m²   01/02/19 29 52 kg/m²   10/01/18 30 59 kg/m²        Physical Exam   Constitutional: She appears well-developed and well-nourished     Skin:   Groin with deeply erythematous , well demarcated macular papular rash that does not spare the creases

## 2019-02-08 ENCOUNTER — TELEPHONE (OUTPATIENT)
Dept: FAMILY MEDICINE CLINIC | Facility: CLINIC | Age: 59
End: 2019-02-08

## 2019-02-08 DIAGNOSIS — L30.9 DERMATITIS: Primary | ICD-10-CM

## 2019-02-08 RX ORDER — PREDNISONE 10 MG/1
TABLET ORAL
Qty: 20 TABLET | Refills: 0 | Status: SHIPPED | OUTPATIENT
Start: 2019-02-08 | End: 2019-03-14 | Stop reason: ALTCHOICE

## 2019-02-08 NOTE — TELEPHONE ENCOUNTER
Patient states she is not better even using the cream  She is still very sore and irritated and is wondering what else she can do

## 2019-02-11 ENCOUNTER — TELEPHONE (OUTPATIENT)
Dept: FAMILY MEDICINE CLINIC | Facility: CLINIC | Age: 59
End: 2019-02-11

## 2019-02-11 NOTE — TELEPHONE ENCOUNTER
Yes, please  She is in a lot of discomfort and will take any appointment that you can get her (day hours as she is unable to drive at night)  (Very concerned, however, about tomorrow, and is doubtful she will be able to make an appointment for tomorrow due to weather)  She is very happy that she appointment will be with a female  She can no longer handle the itching and bleeding  She thanks you

## 2019-02-11 NOTE — TELEPHONE ENCOUNTER
This rash has me baffeled   I want to send her to a derm> I can call and get her in with Kenneth Rutherford the PA at Dr Il[facility] office if Georgina Donahue can be flexible with her availability

## 2019-02-11 NOTE — TELEPHONE ENCOUNTER
Patient is still on prednisone but has run out of the cream that was prescribed as she is trying to alleviate her intense itching and discomfort  Can you prescribe another medication as the pharmacy will not refill the cream until February 19, 2019  She is very concerned as she has been scratching so badly that she has had vaginal bleeding (external) and is in desperate need of another medication to help her      Molly Dash

## 2019-03-05 LAB
ALBUMIN SERPL-MCNC: 3.5 G/DL (ref 3.5–5.5)
ALBUMIN/GLOB SERPL: 0.9 {RATIO} (ref 1.2–2.2)
ALP SERPL-CCNC: 128 IU/L (ref 39–117)
ALT SERPL-CCNC: 40 IU/L (ref 0–32)
AST SERPL-CCNC: 78 IU/L (ref 0–40)
BASOPHILS # BLD AUTO: 0.1 X10E3/UL (ref 0–0.2)
BASOPHILS NFR BLD AUTO: 2 %
BILIRUB SERPL-MCNC: 0.7 MG/DL (ref 0–1.2)
BUN SERPL-MCNC: 8 MG/DL (ref 6–24)
BUN/CREAT SERPL: 10 (ref 9–23)
CALCIUM SERPL-MCNC: 9.3 MG/DL (ref 8.7–10.2)
CHLORIDE SERPL-SCNC: 100 MMOL/L (ref 96–106)
CHOLEST SERPL-MCNC: 161 MG/DL (ref 100–199)
CHOLEST/HDLC SERPL: 3.8 RATIO (ref 0–4.4)
CO2 SERPL-SCNC: 25 MMOL/L (ref 20–29)
CREAT SERPL-MCNC: 0.79 MG/DL (ref 0.57–1)
EOSINOPHIL # BLD AUTO: 0.1 X10E3/UL (ref 0–0.4)
EOSINOPHIL NFR BLD AUTO: 1 %
ERYTHROCYTE [DISTWIDTH] IN BLOOD BY AUTOMATED COUNT: 13.6 % (ref 12.3–15.4)
GLOBULIN SER-MCNC: 4.1 G/DL (ref 1.5–4.5)
GLUCOSE SERPL-MCNC: 105 MG/DL (ref 65–99)
HCT VFR BLD AUTO: 40.8 % (ref 34–46.6)
HDLC SERPL-MCNC: 42 MG/DL
HGB BLD-MCNC: 13.7 G/DL (ref 11.1–15.9)
IMM GRANULOCYTES # BLD: 0 X10E3/UL (ref 0–0.1)
IMM GRANULOCYTES NFR BLD: 0 %
LDLC SERPL CALC-MCNC: 102 MG/DL (ref 0–99)
LYMPHOCYTES # BLD AUTO: 1.8 X10E3/UL (ref 0.7–3.1)
LYMPHOCYTES NFR BLD AUTO: 34 %
MCH RBC QN AUTO: 33.6 PG (ref 26.6–33)
MCHC RBC AUTO-ENTMCNC: 33.6 G/DL (ref 31.5–35.7)
MCV RBC AUTO: 100 FL (ref 79–97)
MONOCYTES # BLD AUTO: 0.7 X10E3/UL (ref 0.1–0.9)
MONOCYTES NFR BLD AUTO: 12 %
NEUTROPHILS # BLD AUTO: 2.8 X10E3/UL (ref 1.4–7)
NEUTROPHILS NFR BLD AUTO: 51 %
PLATELET # BLD AUTO: 104 X10E3/UL (ref 150–379)
POTASSIUM SERPL-SCNC: 4.7 MMOL/L (ref 3.5–5.2)
PROT SERPL-MCNC: 7.6 G/DL (ref 6–8.5)
RBC # BLD AUTO: 4.08 X10E6/UL (ref 3.77–5.28)
SL AMB EGFR AFRICAN AMERICAN: 95 ML/MIN/1.73
SL AMB EGFR NON AFRICAN AMERICAN: 82 ML/MIN/1.73
SL AMB VLDL CHOLESTEROL CALC: 17 MG/DL (ref 5–40)
SODIUM SERPL-SCNC: 137 MMOL/L (ref 134–144)
TRIGL SERPL-MCNC: 85 MG/DL (ref 0–149)
TSH SERPL DL<=0.005 MIU/L-ACNC: 3.49 UIU/ML (ref 0.45–4.5)
WBC # BLD AUTO: 5.4 X10E3/UL (ref 3.4–10.8)

## 2019-03-14 ENCOUNTER — OFFICE VISIT (OUTPATIENT)
Dept: FAMILY MEDICINE CLINIC | Facility: CLINIC | Age: 59
End: 2019-03-14
Payer: COMMERCIAL

## 2019-03-14 VITALS
RESPIRATION RATE: 14 BRPM | DIASTOLIC BLOOD PRESSURE: 76 MMHG | SYSTOLIC BLOOD PRESSURE: 120 MMHG | BODY MASS INDEX: 28.85 KG/M2 | WEIGHT: 169 LBS | HEART RATE: 60 BPM | HEIGHT: 64 IN

## 2019-03-14 DIAGNOSIS — E78.49 OTHER HYPERLIPIDEMIA: ICD-10-CM

## 2019-03-14 DIAGNOSIS — R79.89 ELEVATED LFTS: ICD-10-CM

## 2019-03-14 DIAGNOSIS — J44.9 MODERATE COPD (CHRONIC OBSTRUCTIVE PULMONARY DISEASE) (HCC): ICD-10-CM

## 2019-03-14 DIAGNOSIS — K21.9 GERD WITHOUT ESOPHAGITIS: Primary | ICD-10-CM

## 2019-03-14 DIAGNOSIS — Z23 NEED FOR VACCINATION: ICD-10-CM

## 2019-03-14 DIAGNOSIS — I10 ESSENTIAL HYPERTENSION: ICD-10-CM

## 2019-03-14 DIAGNOSIS — F17.200 NICOTINE DEPENDENCE, UNCOMPLICATED, UNSPECIFIED NICOTINE PRODUCT TYPE: ICD-10-CM

## 2019-03-14 PROCEDURE — 90471 IMMUNIZATION ADMIN: CPT

## 2019-03-14 PROCEDURE — 3074F SYST BP LT 130 MM HG: CPT | Performed by: NURSE PRACTITIONER

## 2019-03-14 PROCEDURE — 90750 HZV VACC RECOMBINANT IM: CPT

## 2019-03-14 PROCEDURE — 99214 OFFICE O/P EST MOD 30 MIN: CPT | Performed by: NURSE PRACTITIONER

## 2019-03-14 PROCEDURE — 3078F DIAST BP <80 MM HG: CPT | Performed by: NURSE PRACTITIONER

## 2019-03-14 RX ORDER — BETAMETHASONE DIPROPIONATE 0.5 MG/G
CREAM TOPICAL 2 TIMES DAILY
COMMUNITY
End: 2020-07-09 | Stop reason: ALTCHOICE

## 2019-03-14 NOTE — PROGRESS NOTES
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)  Doing well with the Anoro and rare use of the neb  Does follow with pulmonary   Has had a good winter  Please continue to try to stop smoking    3  Essential hypertension  BP is in good control with the losatan     4  Elevated LFTs  Remain elevated but are improved    5  Other hyperlipidemia  These remain stable and in good control with lifestyle  6  Nicotine dependence, uncomplicated, unspecified nicotine product type   refinforced need to continue to try to quit  rto 6 months       Subjective:      Patient ID: Hanh Moscoso is a 60 y o  female  Here today to carole on several chronic issues  Is following with Dr Emelia King for her fatty liver and elevated LFT in the presence of alcohol abuse  Continues to smoke but is trying to cut back  Is consistent with her medications   Does not follow her BP when she is not here         The following portions of the patient's history were reviewed and updated as appropriate: allergies, current medications, past family history, past medical history, past social history, past surgical history and problem list     Past Medical History:   Diagnosis Date    Alcoholic fatty liver     Anxiety     Asthma     COPD [facility]chronic obstructive pulmonary disease) ([facility] [address] )     Elevated liver function tests     GERD without esophagitis     Hyperlipidemia     Hypertension     IBS (irritable bowel syndrome)     Insomnia     Insomnia     Nervousness     Nicotine dependence     Other specified urinary incontinence     RLS (restless legs syndrome)      Past Surgical History:   Procedure Laterality Date    BACK SURGERY      CATARACT EXTRACTION, BILATERAL  08/01/2018    EGD AND COLONOSCOPY N/A 3/19/2018    Procedure: EGD AND COLONOSCOPY;  Surgeon: Trupti Lockhart MD;  Location: [facility]; Service: Gastroenterology    HYSTERECTOMY      KNEE SURGERY      KNEE SURGERY      LUMBAR LAMINECTOMY  04/1999    LYMPH NODE BIOPSY      TOTAL ABDOMINAL HYSTERECTOMY  02/05/2008    TUBAL LIGATION      TUBAL LIGATION  1989     Family History   Problem Relation Age of Onset    Cancer Mother     Substance Abuse Neg Hx     Mental illness Neg Hx      Social History   Social History     Socioeconomic History    Marital status: /Civil Union     Spouse name: Not on file    Number of children: Not on file    Years of education: Not on file    Highest education level: Not on file   Occupational History    Not on file   Social Needs    Financial resource strain: Not on file    Food insecurity:     Worry: Not on file     Inability: Not on file    Transportation needs:     Medical: Not on file     Non-medical: Not on file   Tobacco Use    Smoking status: Current Every Day Smoker     Packs/day: 0 50     Types: Cigarettes     Start date: 1978    Smokeless tobacco: Never Used    Tobacco comment: Per allscripts - 1/2 PPD cigs - smoking for 40 years   Substance and Sexual Activity    Alcohol use: Yes     Alcohol/week: 1 8 oz     Types: 3 Cans of beer per week     Comment: Daily; just cut back from 6 beers/day   Per allscripts -  3 beers a night    Drug use: No    Sexual activity: Yes     Partners: Male   Lifestyle    Physical activity:     Days per week: Not on file     Minutes per session: Not on file    Stress: Not on file   Relationships    Social connections:     Talks on phone: Not on file     Gets together: Not on file     Attends Taoism service: Not on file     Active member of club or organization: Not on file     Attends meetings of clubs or organizations: Not on file     Relationship status: Not on file    Intimate partner violence:     Fear of current or ex partner: Not on file     Emotionally abused: Not on file Physically abused: Not on file     Forced sexual activity: Not on file   Other Topics Concern    Not on file   Social History Narrative    Has carbon monoxide detectors in home    Has smoke detectors    Uses safety equipment - seatbelts     Review of Systems   Constitutional: Negative  Respiratory: Negative  Cardiovascular: Negative  Musculoskeletal: Negative  Skin: Negative  Neurological: Negative  Psychiatric/Behavioral: Negative  Vitals:    03/14/19 1331   BP: 120/76   BP Location: Left arm   Patient Position: Sitting   Pulse: 60   Resp: 14   Weight: 76 7 kg (169 lb)   Height: 5' 3 75" (1 619 m)       Objective: Wt Readings from Last 3 Encounters:   03/14/19 76 7 kg (169 lb)   02/05/19 78 kg (172 lb)   01/02/19 78 kg (172 lb)     BP Readings from Last 3 Encounters:   03/14/19 120/76   02/05/19 114/60   10/01/18 130/78     Pulse Readings from Last 3 Encounters:   03/14/19 60   02/05/19 92   01/02/19 89     BMI Readings from Last 3 Encounters:   03/14/19 29 24 kg/m²   02/05/19 29 52 kg/m²   01/02/19 29 52 kg/m²     Office Visit on 09/13/2018   Component Date Value Ref Range Status    Cholesterol, Total 03/04/2019 161  100 - 199 mg/dL Final    Triglycerides 03/04/2019 85  0 - 149 mg/dL Final    HDL 03/04/2019 42  >39 mg/dL Final    VLDL Cholesterol Calculated 03/04/2019 17  5 - 40 mg/dL Final    LDL Direct 03/04/2019 102* 0 - 99 mg/dL Final    T  Chol/HDL Ratio 03/04/2019 3 8  0 0 - 4 4 ratio Final    Comment:                                   T  Chol/HDL Ratio                                              Men  Women                                1/2 Avg  Risk  3 4    3 3                                    Avg Risk  5 0    4 4                                 2X Avg  Risk  9 6    7 1                                 3X Avg  Risk 23 4   11 0      TSH 03/04/2019 3 490  0 450 - 4 500 uIU/mL Final    Glucose, Random 03/04/2019 105* 65 - 99 mg/dL Final    BUN 03/04/2019 8  6 - 24 mg/dL Final    Creatinine 03/04/2019 0 79  0 57 - 1 00 mg/dL Final    eGFR Non African American 03/04/2019 82  >59 mL/min/1 73 Final    eGFR African American 03/04/2019 95  >59 mL/min/1 73 Final    SL AMB BUN/CREATININE RATIO 03/04/2019 10  9 - 23 Final    Sodium 03/04/2019 137  134 - 144 mmol/L Final    Potassium 03/04/2019 4 7  3 5 - 5 2 mmol/L Final    Chloride 03/04/2019 100  96 - 106 mmol/L Final    CO2 03/04/2019 25  20 - 29 mmol/L Final    CALCIUM 03/04/2019 9 3  8 7 - 10 2 mg/dL Final    Protein, Total 03/04/2019 7 6  6 0 - 8 5 g/dL Final    Albumin 03/04/2019 3 5  3 5 - 5 5 g/dL Final    Globulin, Total 03/04/2019 4 1  1 5 - 4 5 g/dL Final    Albumin/Globulin Ratio 03/04/2019 0 9* 1 2 - 2 2 Final    TOTAL BILIRUBIN 03/04/2019 0 7  0 0 - 1 2 mg/dL Final    Alk Phos Isoenzymes 03/04/2019 128* 39 - 117 IU/L Final    AST 03/04/2019 78* 0 - 40 IU/L Final    ALT 03/04/2019 40* 0 - 32 IU/L Final    White Blood Cell Count 03/04/2019 5 4  3 4 - 10 8 x10E3/uL Final    Red Blood Cell Count 03/04/2019 4 08  3 77 - 5 28 x10E6/uL Final    Hemoglobin 03/04/2019 13 7  11 1 - 15 9 g/dL Final    HCT 03/04/2019 40 8  34 0 - 46 6 % Final    MCV 03/04/2019 100* 79 - 97 fL Final    MCH 03/04/2019 33 6* 26 6 - 33 0 pg Final    MCHC 03/04/2019 33 6  31 5 - 35 7 g/dL Final    RDW 03/04/2019 13 6  12 3 - 15 4 % Final    Platelet Count 05/11/0037 104* 150 - 379 x10E3/uL Final    Neutrophils 03/04/2019 51  Not Estab  % Final    Lymphocytes 03/04/2019 34  Not Estab  % Final    Monocytes 03/04/2019 12  Not Estab  % Final    Eosinophils 03/04/2019 1  Not Estab  % Final    Basophils PCT 03/04/2019 2  Not Estab  % Final    Neutrophils (Absolute) 03/04/2019 2 8  1 4 - 7 0 x10E3/uL Final    Lymphocytes (Absolute) 03/04/2019 1 8  0 7 - 3 1 x10E3/uL Final    Monocytes (Absolute) 03/04/2019 0 7  0 1 - 0 9 x10E3/uL Final    Eosinophils (Absolute) 03/04/2019 0 1  0 0 - 0 4 x10E3/uL Final    Basophils ABS 03/04/2019 0 1  0 0 - 0 2 x10E3/uL Final    Immature Granulocytes 03/04/2019 0  Not Estab  % Final    Immature Granulocytes (Absolute) 03/04/2019 0 0  0 0 - 0 1 x10E3/uL Final   Office Visit on 08/07/2018   Component Date Value Ref Range Status    OTHER 08/07/2018 other   Corrected    08/07/2018   Office Visit on 06/11/2018   Component Date Value Ref Range Status    Glucose, Random 06/12/2018 108* 65 - 99 mg/dL Final    BUN 06/12/2018 8  6 - 24 mg/dL Final    Creatinine 06/12/2018 0 75  0 57 - 1 00 mg/dL Final    eGFR Non  06/12/2018 88  >59 mL/min/1 73 Final    eGFR African American 06/12/2018 101  >59 mL/min/1 73 Final    SL AMB BUN/CREATININE RATIO 06/12/2018 11  9 - 23 Final    Sodium 06/12/2018 139  134 - 144 mmol/L Final    Potassium 06/12/2018 4 4  3 5 - 5 2 mmol/L Final    Chloride 06/12/2018 99  96 - 106 mmol/L Final    CO2 06/12/2018 22  20 - 29 mmol/L Final                  **Please note reference interval change**    CALCIUM 06/12/2018 9 5  8 7 - 10 2 mg/dL Final    Protein, Total 06/12/2018 7 7  6 0 - 8 5 g/dL Final    Albumin 06/12/2018 3 8  3 5 - 5 5 g/dL Final    Globulin, Total 06/12/2018 3 9  1 5 - 4 5 g/dL Final    Albumin/Globulin Ratio 06/12/2018 1 0* 1 2 - 2 2 Final    TOTAL BILIRUBIN 06/12/2018 0 3  0 0 - 1 2 mg/dL Final    Alk Phos Isoenzymes 06/12/2018 133* 39 - 117 IU/L Final    AST 06/12/2018 86* 0 - 40 IU/L Final    ALT 06/12/2018 52* 0 - 32 IU/L Final    White Blood Cell Count 06/12/2018 6 6  3 4 - 10 8 x10E3/uL Final    Red Blood Cell Count 06/12/2018 4 32  3 77 - 5 28 x10E6/uL Final    Hemoglobin 06/12/2018 15 0  11 1 - 15 9 g/dL Final    HCT 06/12/2018 42 3  34 0 - 46 6 % Final    MCV 06/12/2018 98* 79 - 97 fL Final    MCH 06/12/2018 34 7* 26 6 - 33 0 pg Final    MCHC 06/12/2018 35 5  31 5 - 35 7 g/dL Final    RDW 06/12/2018 12 7  12 3 - 15 4 % Final    Platelet Count 00/17/0315 131* 150 - 379 x10E3/uL Final    Neutrophils 06/12/2018 55  Not Estab  % Final    Lymphocytes 06/12/2018 34  Not Estab  % Final    Monocytes 06/12/2018 9  Not Estab  % Final    Eosinophils 06/12/2018 1  Not Estab  % Final    Basophils PCT 06/12/2018 1  Not Estab  % Final    Neutrophils (Absolute) 06/12/2018 3 7  1 4 - 7 0 x10E3/uL Final    Lymphocytes (Absolute) 06/12/2018 2 2  0 7 - 3 1 x10E3/uL Final    Monocytes (Absolute) 06/12/2018 0 6  0 1 - 0 9 x10E3/uL Final    Eosinophils (Absolute) 06/12/2018 0 1  0 0 - 0 4 x10E3/uL Final    Basophils ABS 06/12/2018 0 0  0 0 - 0 2 x10E3/uL Final    Immature Granulocytes 06/12/2018 0  Not Estab  % Final    Immature Granulocytes (Absolute) 06/12/2018 0 0  0 0 - 0 1 x10E3/uL Final    INR 06/12/2018 1 1  0 8 - 1 2 Final    Comment: Reference interval is for non-anticoagulated patients  Suggested INR therapeutic range for Vitamin K  antagonist therapy:     Standard Dose (moderate intensity                    therapeutic range):       2 0 - 3 0     Higher intensity therapeutic range       2 5 - 3 5      Prothrombin Time 06/12/2018 11 9  9 1 - 12 0 sec Final   Annual Exam on 05/24/2018   Component Date Value Ref Range Status    Diagnosis: 05/24/2018 Comment   Final    Comment: NEGATIVE FOR INTRAEPITHELIAL LESION AND MALIGNANCY  THIS SPECIMEN WAS RESCREENED AS PART OF OUR  PROGRAM   THE CYTOLOGY PROCESSING WAS PERFORMED AT THE [facility] LOCATED AT  [place] [address]   Specimen Adequacy 05/24/2018 Comment   Final    Comment: Satisfactory for evaluation  Endocervical and/or squamous metaplastic  cells (endocervical component) are present   Clinician Provided ICD10 05/24/2018 Comment   Final    Z12 4    Performed by: 05/24/2018 Comment   Final    Janki Lopes, Cytotechnologist (ASCP)    QC reviewed by: 05/24/2018 Comment   Final    Krysten Gilmore, Cytotechnologist (ASCP)    SL AMB   05/24/2018      Final    Note: 05/24/2018 Comment   Final    Comment: The Pap smear is a screening test designed to aid in the detection of  premalignant and malignant conditions of the uterine cervix  It is not a  diagnostic procedure and should not be used as the sole means of detecting  cervical cancer  Both false-positive and false-negative reports do occur   SL AMB   05/24/2018 Comment   Final    Comment: The HPV DNA reflex criteria were not met with this specimen result  therefore, no HPV testing was performed  Admission on 03/19/2018, Discharged on 03/19/2018   Component Date Value Ref Range Status    Case Report 03/19/2018    Final                    Value:Surgical Pathology Report                         Case: [id number]                                   Authorizing Provider:  Dangelo Bolivar MD           Collected:           03/19/2018 1216              Ordering Location:     [address] [place] [address]        Received:            03/19/2018 [address] [facility]  [facility]                                                     Pathologist:           Rosario Bennett MD                                                              Specimens:   A) - Duodenum, cold bx's r/o celiac                                                                 B) - Stomach, gastric bx's r/o h pylori                                                             C) - Small Bowel, NOS, random TI bx's                                                               D) - Colon, random colon bx's mild colitis                                                 Final Diagnosis 03/19/2018    Final                    Value: This result contains rich text formatting which cannot be displayed here   Note 03/19/2018    Final                    Value: This result contains rich text formatting which cannot be displayed here   Additional Information 03/19/2018    Final                    Value: This result contains rich text formatting which cannot be displayed here     Cindy Blandon Description 03/19/2018 Final                    Value: This result contains rich text formatting which cannot be displayed here     Office Visit on 03/05/2018   Component Date Value Ref Range Status    Glucose, Random 08/27/2018 106* 65 - 99 mg/dL Final    BUN 08/27/2018 8  6 - 24 mg/dL Final    Creatinine 08/27/2018 0 86  0 57 - 1 00 mg/dL Final    eGFR Non  08/27/2018 74  >59 mL/min/1 73 Final    eGFR  08/27/2018 86  >59 mL/min/1 73 Final    SL AMB BUN/CREATININE RATIO 08/27/2018 9  9 - 23 Final    Sodium 08/27/2018 135  134 - 144 mmol/L Final    Potassium 08/27/2018 4 7  3 5 - 5 2 mmol/L Final    Chloride 08/27/2018 94* 96 - 106 mmol/L Final    CO2 08/27/2018 23  20 - 29 mmol/L Final    CALCIUM 08/27/2018 9 4  8 7 - 10 2 mg/dL Final    Protein, Total 08/27/2018 8 3  6 0 - 8 5 g/dL Final    Albumin 08/27/2018 3 6  3 5 - 5 5 g/dL Final    Globulin, Total 08/27/2018 4 7* 1 5 - 4 5 g/dL Final    Albumin/Globulin Ratio 08/27/2018 0 8* 1 2 - 2 2 Final    TOTAL BILIRUBIN 08/27/2018 0 9  0 0 - 1 2 mg/dL Final    Alk Phos Isoenzymes 08/27/2018 135* 39 - 117 IU/L Final    AST 08/27/2018 121* 0 - 40 IU/L Final    ALT 08/27/2018 56* 0 - 32 IU/L Final    White Blood Cell Count 08/27/2018 6 8  3 4 - 10 8 x10E3/uL Final    Red Blood Cell Count 08/27/2018 4 49  3 77 - 5 28 x10E6/uL Final    Hemoglobin 08/27/2018 15 3  11 1 - 15 9 g/dL Final    HCT 08/27/2018 44 4  34 0 - 46 6 % Final    MCV 08/27/2018 99* 79 - 97 fL Final    MCH 08/27/2018 34 1* 26 6 - 33 0 pg Final    MCHC 08/27/2018 34 5  31 5 - 35 7 g/dL Final    RDW 08/27/2018 12 8  12 3 - 15 4 % Final    Platelet Count 20/38/1362 141* 150 - 379 x10E3/uL Final    Neutrophils 08/27/2018 59  Not Estab  % Final    Lymphocytes 08/27/2018 29  Not Estab  % Final    Monocytes 08/27/2018 10  Not Estab  % Final    Eosinophils 08/27/2018 1  Not Estab  % Final    Basophils PCT 08/27/2018 1  Not Estab  % Final    Neutrophils (Absolute) 08/27/2018 4 0  1 4 - 7 0 x10E3/uL Final    Lymphocytes (Absolute) 08/27/2018 2 0  0 7 - 3 1 x10E3/uL Final    Monocytes (Absolute) 08/27/2018 0 7  0 1 - 0 9 x10E3/uL Final    Eosinophils (Absolute) 08/27/2018 0 1  0 0 - 0 4 x10E3/uL Final    Basophils ABS 08/27/2018 0 1  0 0 - 0 2 x10E3/uL Final    Immature Granulocytes 08/27/2018 0  Not Estab  % Final    Immature Granulocytes (Absolute) 08/27/2018 0 0  0 0 - 0 1 x10E3/uL Final   Orders Only on 02/27/2018   Component Date Value Ref Range Status    White Blood Cell Count 02/27/2018 7 1  3 4 - 10 8 x10E3/uL Final    Red Blood Cell Count 02/27/2018 4 34  3 77 - 5 28 x10E6/uL Final    Hemoglobin 02/27/2018 14 6  11 1 - 15 9 g/dL Final    HCT 02/27/2018 43 3  34 0 - 46 6 % Final    MCV 02/27/2018 100* 79 - 97 fL Final    MCH 02/27/2018 33 6* 26 6 - 33 0 pg Final    MCHC 02/27/2018 33 7  31 5 - 35 7 g/dL Final    RDW 02/27/2018 13 0  12 3 - 15 4 % Final    Platelet Count 87/28/2498 150  150 - 379 x10E3/uL Final    Neutrophils 02/27/2018 50  Not Estab  % Final    Lymphocytes 02/27/2018 36  Not Estab  % Final    Monocytes 02/27/2018 12  Not Estab  % Final    Eosinophils 02/27/2018 1  Not Estab  % Final    Basophils PCT 02/27/2018 1  Not Estab  % Final    Neutrophils (Absolute) 02/27/2018 3 6  1 4 - 7 0 x10E3/uL Final    Lymphocytes (Absolute) 02/27/2018 2 6  0 7 - 3 1 x10E3/uL Final    Monocytes (Absolute) 02/27/2018 0 8  0 1 - 0 9 x10E3/uL Final    Eosinophils (Absolute) 02/27/2018 0 1  0 0 - 0 4 x10E3/uL Final    Basophils ABS 02/27/2018 0 0  0 0 - 0 2 x10E3/uL Final    Immature Granulocytes 02/27/2018 0  Not Estab  % Final    Immature Granulocytes (Absolute) 02/27/2018 0 0  0 0 - 0 1 x10E3/uL Final    Glucose, Random 02/27/2018 112* 65 - 99 mg/dL Final    BUN 02/27/2018 11  6 - 24 mg/dL Final    Creatinine 02/27/2018 0 80  0 57 - 1 00 mg/dL Final    eGFR Non  02/27/2018 82  >59 mL/min/1 73 Final    eGFR  02/27/2018 94 >59 mL/min/1 73 Final    SL AMB BUN/CREATININE RATIO 02/27/2018 14  9 - 23 Final    Sodium 02/27/2018 137  134 - 144 mmol/L Final    Potassium 02/27/2018 4 1  3 5 - 5 2 mmol/L Final    Chloride 02/27/2018 100  96 - 106 mmol/L Final    CO2 02/27/2018 21  18 - 29 mmol/L Final    CALCIUM 02/27/2018 8 9  8 7 - 10 2 mg/dL Final    Protein, Total 02/27/2018 8 0  6 0 - 8 5 g/dL Final    Albumin 02/27/2018 3 7  3 5 - 5 5 g/dL Final    Globulin, Total 02/27/2018 4 3  1 5 - 4 5 g/dL Final    Albumin/Globulin Ratio 02/27/2018 0 9* 1 2 - 2 2 Final    TOTAL BILIRUBIN 02/27/2018 0 5  0 0 - 1 2 mg/dL Final    Alk Phos Isoenzymes 02/27/2018 107  39 - 117 IU/L Final    AST 02/27/2018 64* 0 - 40 IU/L Final    ALT 02/27/2018 43* 0 - 32 IU/L Final    Cholesterol, Total 02/27/2018 153  100 - 199 mg/dL Final    Triglycerides 02/27/2018 96  0 - 149 mg/dL Final    HDL 02/27/2018 46  >39 mg/dL Final    VLDL Cholesterol Calculated 02/27/2018 19  5 - 40 mg/dL Final    LDL Direct 02/27/2018 88  0 - 99 mg/dL Final    T  Chol/HDL Ratio 02/27/2018 3 3  0 0 - 4 4 ratio units Final    Comment:                                   T  Chol/HDL Ratio                                              Men  Women                                1/2 Avg  Risk  3 4    3 3                                    Avg Risk  5 0    4 4                                 2X Avg  Risk  9 6    7 1                                 3X Avg  Risk 23 4   11 0      TSH 02/27/2018 2 970  0 450 - 4 500 uIU/mL Final   [employer] Office Visit on 11/10/2017   Component Date Value Ref Range Status    Color, UA 11/10/2017 Yellow   Final    Clarity, UA 11/10/2017 Cloudy   Final    Leukocytes, UA 11/10/2017 neg   Final    Nitrite, UA 11/10/2017 neg   Final    Blood, UA 11/10/2017 large   Final    Bilirubin, UA 11/10/2017 neg   Final    Urobilinogen, UA 11/10/2017 0 2   Final    Protein, UA 11/10/2017 neg   Final    pH, UA 11/10/2017 5 0   Final    Specific Gravity, UA 11/10/2017 1 005   Final    Ketones, UA 11/10/2017 neg   Final    Glucose 11/10/2017 neg   Final    Comment:   Performed at: In Office  ,     [facility] CULTURE RESULT 11/10/2017 Final report   Final    Miscellaneous Lab Test Result 11/10/2017 No growth in 36 - 48 hours  Final    Comment: Result Comment: No growth in 36 - 48 hours  Performed at: [facility] [address], , [place], [place], [id number], [id number]  MD:  Eun Altamirano  [id number] [facility] [phone]     Generic Conversion - Encounter on 06/05/2017   Component Date Value Ref Range Status    LYME IGG WB INTERP  06/05/2017 Negative   Final    Comment: Result Comment: Positive: 5 of the following                                                Borrelia-specific bands:                                                18,23,28,30,39,41,45,58,                                                66, and 93  Negative: No bands or banding                                                patterns which do not                                                meet positive criteria   LYME IGM WB INTERP  06/05/2017 Positive*  Final    Comment: Result Comment: Note: An equivocal or positive EIA result followed by a negative  Western Blot result is considered NEGATIVE  An equivocal or positive  EIA result followed by a positive Western Blot is considered POSITIVE  by the CDC  Positive: 2 of the following bands: 23,39 or 41  Negative: No bands or banding patterns which do not meet positive  criteria    Criteria for positivity are those recommended by CDC/ASTPHLD   p23=Osp C, j57=[id number]  Note:  Sera from individuals with the following may cross react in the  Lyme Western Blot assays: other spirochetal diseases (periodontal  disease, leptospirosis, relapsing fever, yaws, and pinta);  connective autoimmune (Rheumatoid Arthritis and Systemic Lupus  Erythematosus and also individuals with Antinuclear Antibody);  other infections [facility] Spotted Fever; Nikita-Barr Virus,  and Cytomegalovirus)   LYME 18 KD IGG 06/05/2017 Absent   Final    LYME 23 KD IGG 06/05/2017 Present*  Final    LYME 28 KD IGG 06/05/2017 Absent   Final    LYME 30 KD IGG 06/05/2017 Present*  Final    LYME 39 KD IGG 06/05/2017 Absent   Final    LYME 41 KD IGG 06/05/2017 Absent   Final    LYME 45 KD IGG 06/05/2017 Absent   Final    LYME 58 KD IGG 06/05/2017 Absent   Final    LYME 66 KD IGG 06/05/2017 Absent   Final    LYME 93 KD IGG 06/05/2017 Absent   Final    LYME 23 KD IGM 06/05/2017 Present*  Final    LYME 44 KD IGM 06/05/2017 Present*  Final    LYME 41 KD IGM 06/05/2017 Absent   Final    Comment:   Performed at[facility] , [place], [place], [id number], [id number]  MD:  [address] [phone]     Generic Conversion - Encounter on 04/26/2017   Component Date Value Ref Range Status    QUANTIFERON INCUB COMMENT 04/26/2017 Comment   Final    Comment: Result Comment: Incubated, specimen forwarded to [place], [place] for  completion of the assay   QUANTIFERON TB GOLD 04/26/2017 Negative  Negative Final    QUANTIFERON CRITERIA 04/26/2017 Comment   Final    Comment: Result Comment: To be considered positive a specimen should have a TB Ag minus Nil  value greater than or equal to 0 35 IU/mL and in addition the TB Ag  minus Nil value must be greater than or equal to 25% of the Nil  value  There may be insufficient information in these values to  differentiate between some negative and some indeterminate test  values   QUANTIFERON TB AG VALUE 04/26/2017 0 03  IU/mL Final    QAUNTIFERON NIL VALUE 04/26/2017 0 07  IU/mL Final    QUANTIFERON MITOGEN VALUE 04/26/2017 >10 00  IU/mL Final    QFT TB AG MINUS NIL VALUE 04/26/2017 <0 01  IU/mL Final    INTERPRETATION 04/26/2017 Comment   Final    Comment: Result Comment: The QuantiFERON TB Gold (in Tube) assay is intended for use as an aid  in the diagnosis of TB infection  Negative results suggest that there  is no TB infection  In patients with high suspicion of exposure, a  negative test should be repeated  A positive test indicates infection  with Mycobacterium tuberculosis  Among individuals without  tuberculosis infection, a positive test may be due to exposure to  [place], M  ryl[place]ilda or M  lakek[place]  On the Internet, go to  cdc gov/tb for further details  Performed at: [facility] , , [place], [place], [id number], [id number]  MD:  Eun Altamirano  [place], [place] [phone]     There may be more visits with results that are not included  Physical Exam   Constitutional: She is oriented to person, place, and time  She appears well-developed and well-nourished  Neck: Normal range of motion  Neck supple  No thyromegaly present  Cardiovascular: Normal rate, regular rhythm, S1 normal and normal heart sounds  Exam reveals no distant heart sounds  Pulmonary/Chest: Effort normal and breath sounds normal  No respiratory distress  She has no wheezes  Musculoskeletal: Normal range of motion  Neurological: She is alert and oriented to person, place, and time  Skin: Skin is warm and dry  Psychiatric: She has a normal mood and affect   Her behavior is normal  Judgment and thought content normal

## 2019-03-18 ENCOUNTER — HOSPITAL ENCOUNTER (OUTPATIENT)
Dept: BONE DENSITY | Facility: IMAGING CENTER | Age: 59
Discharge: HOME | End: 2019-03-18
Payer: COMMERCIAL

## 2019-03-18 VITALS — WEIGHT: 169 LBS | HEIGHT: 64 IN | BODY MASS INDEX: 28.85 KG/M2

## 2019-03-18 DIAGNOSIS — Z12.31 SCREENING MAMMOGRAM, ENCOUNTER FOR: ICD-10-CM

## 2019-03-18 PROCEDURE — 77067 SCR MAMMO BI INCL CAD: CPT

## 2019-03-27 ENCOUNTER — OFFICE VISIT (OUTPATIENT)
Dept: GASTROENTEROLOGY | Facility: MEDICAL CENTER | Age: 59
End: 2019-03-27
Payer: COMMERCIAL

## 2019-03-27 VITALS
BODY MASS INDEX: 29.25 KG/M2 | WEIGHT: 170.4 LBS | HEART RATE: 76 BPM | DIASTOLIC BLOOD PRESSURE: 74 MMHG | TEMPERATURE: 96.3 F | SYSTOLIC BLOOD PRESSURE: 124 MMHG

## 2019-03-27 DIAGNOSIS — K58.0 IRRITABLE BOWEL SYNDROME WITH DIARRHEA: ICD-10-CM

## 2019-03-27 DIAGNOSIS — K21.9 GERD WITHOUT ESOPHAGITIS: Primary | ICD-10-CM

## 2019-03-27 DIAGNOSIS — K62.5 HEMORRHAGE OF ANUS AND RECTUM: ICD-10-CM

## 2019-03-27 DIAGNOSIS — R79.89 ELEVATED LFTS: ICD-10-CM

## 2019-03-27 DIAGNOSIS — K76.0 FATTY LIVER: ICD-10-CM

## 2019-03-27 PROCEDURE — 99214 OFFICE O/P EST MOD 30 MIN: CPT | Performed by: INTERNAL MEDICINE

## 2019-03-27 NOTE — PATIENT INSTRUCTIONS
Colonoscopy and EGD scheduled on 5/8/2019 with Dr Alexsander Prather at [facility]  Miralax/ Dulcolax prep instructions given to patient

## 2019-03-28 PROBLEM — K58.0 IRRITABLE BOWEL SYNDROME WITH DIARRHEA: Status: ACTIVE | Noted: 2019-03-28

## 2019-03-28 NOTE — PROGRESS NOTES
[facility] - Outpatient Follow-up Note  Richard Giles 60 y o  female MRN: [id number]  Encounter: [id number]          ASSESSMENT AND PLAN:      1  GERD without esophagitis  2  Irritable bowel syndrome with diarrhea  3  Hemorrhage of anus and rectum  Her symptoms are likely due to reflux, diarrhea predominant irritable bowel syndrome, and bleeding from known hemorrhoids  However given the severity of the worsening in her symptoms, we will plan for an upper endoscopy and colonoscopy to evaluate for peptic ulcer disease, reflux esophagitis, inflammatory bowel disease, celiac sprue, and microscopic colitis  In the meantime she can take Imodium as needed  - Case request operating room: ESOPHAGOGASTRODUODENOSCOPY (EGD), COLONOSCOPY    4  Elevated LFTs  5  Fatty liver  Her labs and imaging studies are most consistent with nonalcoholic fatty liver disease  I encouraged her to have slow sustained weight loss through a combination of diet and exercise  We will continue to monitor her labs and exam     ______________________________________________________________________    SUBJECTIVE:  She presents for evaluation of reflux, abdominal pain, diarrhea, and rectal bleeding  She said she has had the symptoms the past but she feels they are worsening  She has not had any weight loss and she denies difficulty swallowing, vomiting, and constipation  Her last upper endoscopy and colonoscopy were in March 2018 and they were notable for internal hemorrhoids, mild colitis parentheses but biopsies were negative), LA class B erosive reflux esophagitis, and erosive gastritis  She feels her symptoms have worsened dramatically since then  She has a history of elevated liver enzymes and her recent lab testing show that they are stable but still slightly elevated  Her recent ultrasound also showed evidence of hepatomegaly with steatosis  REVIEW OF SYSTEMS IS OTHERWISE NEGATIVE        Historical Information   Past Medical History:   Diagnosis Date    Alcoholic fatty liver     Anxiety     Asthma     COPD (chronic obstructive pulmonary disease) (HCC)     Elevated liver function tests     GERD without esophagitis     Hyperlipidemia     Hypertension     IBS (irritable bowel syndrome)     Insomnia     Insomnia     Nervousness     Nicotine dependence     Other specified urinary incontinence     RLS (restless legs syndrome)      Past Surgical History:   Procedure Laterality Date    BACK SURGERY      BREAST BIOPSY Right 11/01/2004    US guided, benign per MRS    CATARACT EXTRACTION, BILATERAL  08/01/2018    EGD AND COLONOSCOPY N/A 3/19/2018    Procedure: EGD AND COLONOSCOPY;  Surgeon: Ricky Guillory MD;  Location: [facility]; Service: Gastroenterology    HYSTERECTOMY      KNEE SURGERY      KNEE SURGERY      LUMBAR LAMINECTOMY  04/1999    LYMPH NODE BIOPSY      TOTAL ABDOMINAL HYSTERECTOMY  02/05/2008    TUBAL LIGATION      TUBAL LIGATION  1989     Social History   Social History     Substance and Sexual Activity   Alcohol Use Yes    Alcohol/week: 1 8 oz    Types: 3 Cans of beer per week    Comment: Daily; just cut back from 6 beers/day   Per allscripts -  3 beers a night     Social History     Substance and Sexual Activity   Drug Use No     Social History     Tobacco Use   Smoking Status Current Every Day Smoker    Packs/day: 0 50    Types: Cigarettes    Start date: 1   Smokeless Tobacco Never Used   Tobacco Comment    Per allscripts - 1/2 PPD cigs - smoking for 40 years     Family History   Problem Relation Age of Onset    Breast cancer Mother 28    Substance Abuse Neg Hx     Mental illness Neg Hx        Meds/Allergies       Current Outpatient Medications:     albuterol (2 5 mg/3 mL) 0 083 % nebulizer solution    albuterol (PROVENTIL HFA) 90 mcg/act inhaler    ALPRAZolam (XANAX) 1 mg tablet    betamethasone, augmented, (DIPROLENE-AF) 0 05 % cream    guaiFENesin (MUCINEX) 600 mg 12 hr tablet    losartan (COZAAR) 100 MG tablet    omeprazole (PriLOSEC) 40 MG capsule    umeclidinium-vilanterol (ANORO ELLIPTA) 62 5-25 MCG/INH inhaler    Allergies   Allergen Reactions    Ace Inhibitors Cough           Objective     Blood pressure 124/74, pulse 76, temperature (!) 96 3 °F (35 7 °C), temperature source Tympanic, weight 77 3 kg (170 lb 6 4 oz)  Body mass index is 29 25 kg/m²  PHYSICAL EXAM:      General Appearance:   Alert, cooperative, no distress   HEENT:   Normocephalic, atraumatic, anicteric      Neck:  Supple, symmetrical, trachea midline   Lungs:   Clear to auscultation bilaterally; no rales, rhonchi or wheezing; respirations unlabored    Heart[de-identified]   Regular rate and rhythm; no murmur, rub, or gallop  Abdomen:   Soft, lower abdominal tenderness, non-distended; normal bowel sounds; no masses, no organomegaly    Genitalia:   Deferred    Rectal:   Deferred    Extremities:  No cyanosis, clubbing or edema    Pulses:  2+ and symmetric    Skin:  No jaundice, rashes, or lesions    Lymph nodes:  No palpable cervical lymphadenopathy        Lab Results:   No visits with results within 1 Day(s) from this visit  Latest known visit with results is:   Office Visit on 09/13/2018   Component Date Value    Cholesterol, Total 03/04/2019 161     Triglycerides 03/04/2019 85     HDL 03/04/2019 42     VLDL Cholesterol Calcula* 03/04/2019 17     LDL Direct 03/04/2019 102*    T   Chol/HDL Ratio 03/04/2019 3 8     TSH 03/04/2019 3 490     Glucose, Random 03/04/2019 105*    BUN 03/04/2019 8     Creatinine 03/04/2019 0 79     eGFR Non African American 03/04/2019 82     eGFR  03/04/2019 95     SL AMB BUN/CREATININE RA* 03/04/2019 10     Sodium 03/04/2019 137     Potassium 03/04/2019 4 7     Chloride 03/04/2019 100     CO2 03/04/2019 25     CALCIUM 03/04/2019 9 3     Protein, Total 03/04/2019 7 6     Albumin 03/04/2019 3 5     Globulin, Total 03/04/2019 4 1  Albumin/Globulin Ratio 03/04/2019 0 9*    TOTAL BILIRUBIN 03/04/2019 0 7     Alk Phos Isoenzymes 03/04/2019 128*    AST 03/04/2019 78*    ALT 03/04/2019 40*    White Blood Cell Count 03/04/2019 5 4     Red Blood Cell Count 03/04/2019 4 08     Hemoglobin 03/04/2019 13 7     HCT 03/04/2019 40 8     MCV 03/04/2019 100*    MCH 03/04/2019 33 6*    MCHC 03/04/2019 33 6     RDW 03/04/2019 13 6     Platelet Count 87/38/3519 104*    Neutrophils 03/04/2019 51     Lymphocytes 03/04/2019 34     Monocytes 03/04/2019 12     Eosinophils 03/04/2019 1     Basophils PCT 03/04/2019 2     Neutrophils (Absolute) 03/04/2019 2 8     Lymphocytes (Absolute) 03/04/2019 1 8     Monocytes (Absolute) 03/04/2019 0 7     Eosinophils (Absolute) 03/04/2019 0 1     Basophils ABS 03/04/2019 0 1     Immature Granulocytes 03/04/2019 0     Immature Granulocytes (A* 03/04/2019 0 0          Radiology Results:   Mammo Screening Bilateral W Cad    Result Date: 3/19/2019  Narrative: DIAGNOSIS: Screening mammogram, encounter for RELEVANT HISTORY: Family Breast Cancer History: History of breast cancer in Mother  Family Medical history: Family medical history includes breast cancer in mother  Personal History: Hormone history includes birth control  Surgical history includes breast biopsy and hysterectomy  No relevant medical history has been documented for this patient  COMPARISONS: Prior mammograms dated: 01/19/2018, 01/18/2017, 11/06/2015, 11/05/2014, and 07/09/2013 INDICATION: Edel Parker is a 60 y o  female presenting for screening mammography  TECHNIQUE: The current study was evaluated with Computer Aided Detection  TISSUE DENSITY: There are scattered areas of fibroglandular density  The patient is scheduled in a reminder system for screening mammography  8-10% of cancers will be missed on mammography  Management of a palpable abnormality must be based on clinical grounds    Patients will be notified of their results via letter from our facility  Accredited by [employer] of Radiology and FDA  RISK ASSESSMENT: 5 Year Tyrer-Cuzick: 2 52 % 10 Year Tyrer-Cuzick: 5 38 % Lifetime Tyrer-Cuzick: 13 68 % FINDINGS: Bilateral There are no suspicious masses, grouped microcalcifications or areas of architectural distortion  The skin and nipple areolar complex are unremarkable  Impression: No mammographic evidence of malignancy  ASSESSMENT/BI-RADS CATEGORY: Left: 2 - Benign Right: 2 - Benign Overall: 2 - Benign RECOMMENDATION:      - Routine screening mammogram in 1 year for both breasts   Workstation ID: [id number]

## 2019-04-16 ENCOUNTER — OFFICE VISIT (OUTPATIENT)
Dept: PULMONOLOGY | Facility: HOSPITAL | Age: 59
End: 2019-04-16
Payer: COMMERCIAL

## 2019-04-16 VITALS
WEIGHT: 164 LBS | HEART RATE: 87 BPM | DIASTOLIC BLOOD PRESSURE: 70 MMHG | SYSTOLIC BLOOD PRESSURE: 124 MMHG | TEMPERATURE: 98.6 F | OXYGEN SATURATION: 97 % | BODY MASS INDEX: 28 KG/M2 | HEIGHT: 64 IN

## 2019-04-16 DIAGNOSIS — F17.200 NICOTINE DEPENDENCE, UNCOMPLICATED, UNSPECIFIED NICOTINE PRODUCT TYPE: ICD-10-CM

## 2019-04-16 DIAGNOSIS — J20.9 ACUTE TRACHEOBRONCHITIS: ICD-10-CM

## 2019-04-16 DIAGNOSIS — J44.9 MODERATE COPD (CHRONIC OBSTRUCTIVE PULMONARY DISEASE) (HCC): Primary | ICD-10-CM

## 2019-04-16 PROCEDURE — 99213 OFFICE O/P EST LOW 20 MIN: CPT | Performed by: INTERNAL MEDICINE

## 2019-04-16 RX ORDER — AZITHROMYCIN 250 MG/1
TABLET, FILM COATED ORAL
Qty: 6 TABLET | Refills: 0 | Status: SHIPPED | OUTPATIENT
Start: 2019-04-16 | End: 2019-04-20

## 2019-04-17 ENCOUNTER — TELEPHONE (OUTPATIENT)
Dept: PULMONOLOGY | Facility: CLINIC | Age: 59
End: 2019-04-17

## 2019-04-17 DIAGNOSIS — R05.9 COUGH: Primary | ICD-10-CM

## 2019-04-17 RX ORDER — BENZONATATE 200 MG/1
200 CAPSULE ORAL 3 TIMES DAILY PRN
Qty: 20 CAPSULE | Refills: 0 | Status: SHIPPED | OUTPATIENT
Start: 2019-04-17 | End: 2019-09-30 | Stop reason: ALTCHOICE

## 2019-05-07 ENCOUNTER — ANESTHESIA EVENT (OUTPATIENT)
Dept: GASTROENTEROLOGY | Facility: MEDICAL CENTER | Age: 59
End: 2019-05-07
Payer: COMMERCIAL

## 2019-05-08 ENCOUNTER — HOSPITAL ENCOUNTER (OUTPATIENT)
Facility: MEDICAL CENTER | Age: 59
Setting detail: OUTPATIENT SURGERY
Discharge: HOME | End: 2019-05-08
Attending: INTERNAL MEDICINE | Admitting: INTERNAL MEDICINE
Payer: COMMERCIAL

## 2019-05-08 ENCOUNTER — ANESTHESIA (OUTPATIENT)
Dept: GASTROENTEROLOGY | Facility: MEDICAL CENTER | Age: 59
End: 2019-05-08
Payer: COMMERCIAL

## 2019-05-08 VITALS
HEART RATE: 91 BPM | WEIGHT: 164 LBS | SYSTOLIC BLOOD PRESSURE: 131 MMHG | BODY MASS INDEX: 28 KG/M2 | HEIGHT: 64 IN | OXYGEN SATURATION: 98 % | DIASTOLIC BLOOD PRESSURE: 66 MMHG | TEMPERATURE: 98.5 F | RESPIRATION RATE: 16 BRPM

## 2019-05-08 DIAGNOSIS — K58.0 IRRITABLE BOWEL SYNDROME WITH DIARRHEA: ICD-10-CM

## 2019-05-08 DIAGNOSIS — K21.9 GERD WITHOUT ESOPHAGITIS: ICD-10-CM

## 2019-05-08 DIAGNOSIS — K62.5 HEMORRHAGE OF ANUS AND RECTUM: ICD-10-CM

## 2019-05-08 PROCEDURE — 88305 TISSUE EXAM BY PATHOLOGIST: CPT | Performed by: PATHOLOGY

## 2019-05-08 PROCEDURE — 45380 COLONOSCOPY AND BIOPSY: CPT | Performed by: INTERNAL MEDICINE

## 2019-05-08 PROCEDURE — 43239 EGD BIOPSY SINGLE/MULTIPLE: CPT | Performed by: INTERNAL MEDICINE

## 2019-05-08 PROCEDURE — NC001 PR NO CHARGE: Performed by: INTERNAL MEDICINE

## 2019-05-08 RX ORDER — ALBUTEROL SULFATE 2.5 MG/3ML
2.5 SOLUTION RESPIRATORY (INHALATION) ONCE
Status: COMPLETED | OUTPATIENT
Start: 2019-05-08 | End: 2019-05-08

## 2019-05-08 RX ORDER — CALCIPOTRIENE 50 UG/G
CREAM TOPICAL 2 TIMES DAILY
COMMUNITY
End: 2020-07-09 | Stop reason: ALTCHOICE

## 2019-05-08 RX ORDER — PROPOFOL 10 MG/ML
INJECTION, EMULSION INTRAVENOUS AS NEEDED
Status: DISCONTINUED | OUTPATIENT
Start: 2019-05-08 | End: 2019-05-08 | Stop reason: SURG

## 2019-05-08 RX ORDER — ALBUTEROL SULFATE 2.5 MG/3ML
2.5 SOLUTION RESPIRATORY (INHALATION) ONCE AS NEEDED
Status: DISCONTINUED | OUTPATIENT
Start: 2019-05-08 | End: 2019-05-08 | Stop reason: HOSPADM

## 2019-05-08 RX ORDER — LIDOCAINE HYDROCHLORIDE 20 MG/ML
INJECTION, SOLUTION EPIDURAL; INFILTRATION; INTRACAUDAL; PERINEURAL AS NEEDED
Status: DISCONTINUED | OUTPATIENT
Start: 2019-05-08 | End: 2019-05-08 | Stop reason: SURG

## 2019-05-08 RX ORDER — SODIUM CHLORIDE 9 MG/ML
125 INJECTION, SOLUTION INTRAVENOUS CONTINUOUS
Status: DISCONTINUED | OUTPATIENT
Start: 2019-05-08 | End: 2019-05-08 | Stop reason: HOSPADM

## 2019-05-08 RX ORDER — PROPOFOL 10 MG/ML
INJECTION, EMULSION INTRAVENOUS CONTINUOUS PRN
Status: DISCONTINUED | OUTPATIENT
Start: 2019-05-08 | End: 2019-05-08 | Stop reason: SURG

## 2019-05-08 RX ADMIN — PROPOFOL 20 MG: 10 INJECTION, EMULSION INTRAVENOUS at 09:09

## 2019-05-08 RX ADMIN — PROPOFOL 20 MG: 10 INJECTION, EMULSION INTRAVENOUS at 09:07

## 2019-05-08 RX ADMIN — PROPOFOL 120 MG: 10 INJECTION, EMULSION INTRAVENOUS at 09:03

## 2019-05-08 RX ADMIN — ALBUTEROL SULFATE 2.5 MG: 2.5 SOLUTION RESPIRATORY (INHALATION) at 08:52

## 2019-05-08 RX ADMIN — SODIUM CHLORIDE 125 ML/HR: 0.9 INJECTION, SOLUTION INTRAVENOUS at 08:22

## 2019-05-08 RX ADMIN — PROPOFOL 170 MCG/KG/MIN: 10 INJECTION, EMULSION INTRAVENOUS at 09:11

## 2019-05-08 RX ADMIN — SODIUM CHLORIDE: 0.9 INJECTION, SOLUTION INTRAVENOUS at 09:12

## 2019-05-08 RX ADMIN — PROPOFOL 20 MG: 10 INJECTION, EMULSION INTRAVENOUS at 09:05

## 2019-05-08 RX ADMIN — PROPOFOL 20 MG: 10 INJECTION, EMULSION INTRAVENOUS at 09:18

## 2019-05-08 RX ADMIN — LIDOCAINE HYDROCHLORIDE 5 ML: 20 INJECTION, SOLUTION EPIDURAL; INFILTRATION; INTRACAUDAL; PERINEURAL at 09:03

## 2019-05-16 ENCOUNTER — CLINICAL SUPPORT (OUTPATIENT)
Dept: FAMILY MEDICINE CLINIC | Facility: CLINIC | Age: 59
End: 2019-05-16
Payer: COMMERCIAL

## 2019-05-16 DIAGNOSIS — Z23 NEED FOR VACCINATION: Primary | ICD-10-CM

## 2019-05-16 PROCEDURE — 90750 HZV VACC RECOMBINANT IM: CPT

## 2019-05-16 PROCEDURE — 90471 IMMUNIZATION ADMIN: CPT

## 2019-07-07 DIAGNOSIS — I10 BENIGN ESSENTIAL HTN: ICD-10-CM

## 2019-07-08 RX ORDER — LOSARTAN POTASSIUM 100 MG/1
TABLET ORAL
Qty: 30 TABLET | Refills: 6 | Status: SHIPPED | OUTPATIENT
Start: 2019-07-08 | End: 2020-01-31 | Stop reason: SDUPTHER

## 2019-07-23 ENCOUNTER — TELEPHONE (OUTPATIENT)
Dept: PULMONOLOGY | Facility: HOSPITAL | Age: 59
End: 2019-07-23

## 2019-07-25 DIAGNOSIS — F41.9 ANXIETY: ICD-10-CM

## 2019-07-26 RX ORDER — ALPRAZOLAM 1 MG/1
TABLET ORAL
Qty: 60 TABLET | Refills: 5 | Status: SHIPPED | OUTPATIENT
Start: 2019-07-26 | End: 2020-01-20

## 2019-08-07 ENCOUNTER — OFFICE VISIT (OUTPATIENT)
Dept: FAMILY MEDICINE CLINIC | Facility: CLINIC | Age: 59
End: 2019-08-07
Payer: COMMERCIAL

## 2019-08-07 VITALS
WEIGHT: 160.6 LBS | RESPIRATION RATE: 15 BRPM | SYSTOLIC BLOOD PRESSURE: 126 MMHG | TEMPERATURE: 98.6 F | HEART RATE: 80 BPM | HEIGHT: 64 IN | DIASTOLIC BLOOD PRESSURE: 74 MMHG | BODY MASS INDEX: 27.42 KG/M2

## 2019-08-07 DIAGNOSIS — N39.0 URINARY TRACT INFECTION WITH HEMATURIA, SITE UNSPECIFIED: Primary | ICD-10-CM

## 2019-08-07 DIAGNOSIS — R31.9 URINARY TRACT INFECTION WITH HEMATURIA, SITE UNSPECIFIED: Primary | ICD-10-CM

## 2019-08-07 LAB
SL AMB  POCT GLUCOSE, UA: NEGATIVE
SL AMB LEUKOCYTE ESTERASE,UA: ABNORMAL
SL AMB POCT BILIRUBIN,UA: NEGATIVE
SL AMB POCT BLOOD,UA: ABNORMAL
SL AMB POCT CLARITY,UA: CLEAR
SL AMB POCT COLOR,UA: ABNORMAL
SL AMB POCT KETONES,UA: NEGATIVE
SL AMB POCT NITRITE,UA: NEGATIVE
SL AMB POCT PH,UA: 5
SL AMB POCT SPECIFIC GRAVITY,UA: 1.01
SL AMB POCT URINE PROTEIN: NEGATIVE
SL AMB POCT UROBILINOGEN: 0.2

## 2019-08-07 PROCEDURE — 81002 URINALYSIS NONAUTO W/O SCOPE: CPT | Performed by: PHYSICIAN ASSISTANT

## 2019-08-07 PROCEDURE — 3008F BODY MASS INDEX DOCD: CPT | Performed by: PHYSICIAN ASSISTANT

## 2019-08-07 PROCEDURE — 4004F PT TOBACCO SCREEN RCVD TLK: CPT | Performed by: PHYSICIAN ASSISTANT

## 2019-08-07 PROCEDURE — 99213 OFFICE O/P EST LOW 20 MIN: CPT | Performed by: PHYSICIAN ASSISTANT

## 2019-08-07 RX ORDER — SULFAMETHOXAZOLE AND TRIMETHOPRIM 800; 160 MG/1; MG/1
1 TABLET ORAL EVERY 12 HOURS SCHEDULED
Qty: 14 TABLET | Refills: 0 | Status: SHIPPED | OUTPATIENT
Start: 2019-08-07 | End: 2019-08-14

## 2019-08-07 NOTE — PROGRESS NOTES
Assessment/Plan:    1  Urinary tract infection with hematuria, site unspecified    - force fluids, send urine for culture, start bactrim 1 tab twice daily for 7 days, repeat urine at follow up with LB for resolution of blood  - POCT urine dip  - sulfamethoxazole-trimethoprim (BACTRIM DS) 800-160 mg per tablet; Take 1 tablet by mouth every 12 (twelve) hours for 7 days  Dispense: 14 tablet; Refill: 0    F/u 9/4 repeat urine for resolution of blood  F/u as needed      Subjective:   Chief Complaint   Patient presents with    Urinary Tract Infection      Patient ID: Mariano Leahy is a 60 y o  female  Patient here c/o dysuria, frequency, urgency, slight hematuria  Denies fever, chills, back pain  The following portions of the patient's history were reviewed and updated as appropriate: allergies, current medications, past family history, past medical history, past social history, past surgical history and problem list     Past Medical History:   Diagnosis Date    Alcoholic fatty liver     Anxiety     Asthma     COPD (chronic obstructive pulmonary disease) ([facility] [address] )     Elevated liver function tests     GERD without esophagitis     Hyperlipidemia     Hypertension     IBS (irritable bowel syndrome)     Insomnia     Insomnia     Nervousness     Nicotine dependence     Other specified urinary incontinence     RLS (restless legs syndrome)      Past Surgical History:   Procedure Laterality Date    BACK SURGERY      BREAST BIOPSY Right 11/01/2004    US guided, benign per MRS    CATARACT EXTRACTION, BILATERAL  08/01/2018    COLONOSCOPY N/A 5/8/2019    Procedure: COLONOSCOPY;  Surgeon: Gaurav Briscoe MD;  Location: [facility]; Service: Gastroenterology    EGD AND COLONOSCOPY N/A 3/19/2018    Procedure: EGD AND COLONOSCOPY;  Surgeon: Gaurav Briscoe MD;  Location: [facility];   Service: Gastroenterology    ESOPHAGOGASTRODUODENOSCOPY N/A 5/8/2019    Procedure: ESOPHAGOGASTRODUODENOSCOPY (EGD); Surgeon: Candie Meraz MD;  Location: [facility]; Service: Gastroenterology    HYSTERECTOMY      KNEE SURGERY      KNEE SURGERY      LUMBAR LAMINECTOMY  04/1999    LYMPH NODE BIOPSY      TOTAL ABDOMINAL HYSTERECTOMY  02/05/2008    TUBAL LIGATION      TUBAL LIGATION  1989     Family History   Problem Relation Age of Onset    Breast cancer Mother 28    Substance Abuse Neg Hx     Mental illness Neg Hx      Social History     Socioeconomic History    Marital status: /Civil Union     Spouse name: Not on file    Number of children: Not on file    Years of education: Not on file    Highest education level: Not on file   Occupational History    Not on file   Social Needs    Financial resource strain: Not on file    Food insecurity:     Worry: Not on file     Inability: Not on file    Transportation needs:     Medical: Not on file     Non-medical: Not on file   Tobacco Use    Smoking status: Current Every Day Smoker     Packs/day: 0 50     Types: Cigarettes     Start date: 1978    Smokeless tobacco: Never Used    Tobacco comment: Per allscripts - 1/2 PPD cigs - smoking for 40 years   Substance and Sexual Activity    Alcohol use: Yes     Alcohol/week: 3 0 standard drinks     Types: 3 Cans of beer per week     Comment: Daily; just cut back from 6 beers/day   Per allscripts -  3 beers a night    Drug use: No    Sexual activity: Yes     Partners: Male   Lifestyle    Physical activity:     Days per week: Not on file     Minutes per session: Not on file    Stress: Not on file   Relationships    Social connections:     Talks on phone: Not on file     Gets together: Not on file     Attends Protestant service: Not on file     Active member of club or organization: Not on file     Attends meetings of clubs or organizations: Not on file     Relationship status: Not on file    Intimate partner violence:     Fear of current or ex partner: Not on file     Emotionally abused: Not on file Physically abused: Not on file     Forced sexual activity: Not on file   Other Topics Concern    Not on file   Social History Narrative    Has carbon monoxide detectors in home    Has smoke detectors    Uses safety equipment - seatbelts       Current Outpatient Medications:     albuterol (2 5 mg/3 mL) 0 083 % nebulizer solution, Take 2 5 mg by nebulization 3 (three) times a day, Disp: , Rfl:     albuterol (PROVENTIL HFA) 90 mcg/act inhaler, Inhale 2 puffs every 6 (six) hours as needed for wheezing or shortness of breath, Disp: 1 Inhaler, Rfl: 6    ALPRAZolam (XANAX) 1 mg tablet, TAKE ONE TABLET BY MOUTH TWICE A DAY AS NEEDED FOR ANXIETY, Disp: 60 tablet, Rfl: 5    benzonatate (TESSALON) 200 MG capsule, Take 1 capsule (200 mg total) by mouth 3 (three) times a day as needed for cough, Disp: 20 capsule, Rfl: 0    betamethasone, augmented, (DIPROLENE-AF) 0 05 % cream, Apply topically 2 (two) times a day, Disp: , Rfl:     calcipotriene (DOVONEX) 0 005 % cream, Apply topically 2 (two) times a day, Disp: , Rfl:     guaiFENesin (MUCINEX) 600 mg 12 hr tablet, Take 2 tablets (1,200 mg total) by mouth every 12 (twelve) hours, Disp: 60 tablet, Rfl: 2    losartan (COZAAR) 100 MG tablet, TAKE ONE TABLET BY MOUTH EVERY DAY, Disp: 30 tablet, Rfl: 6    omeprazole (PriLOSEC) 40 MG capsule, Take 1 capsule (40 mg total) by mouth daily, Disp: 30 capsule, Rfl: 4    umeclidinium-vilanterol (ANORO ELLIPTA) 62 5-25 MCG/INH inhaler, Inhale 1 puff daily, Disp: 1 each, Rfl: 6    Review of Systems          Objective:    Vitals:    08/07/19 1315   BP: 126/74   BP Location: Left arm   Patient Position: Sitting   Cuff Size: Standard   Pulse: 80   Resp: 15   Temp: 98 6 °F (37 °C)   TempSrc: Oral   Weight: 72 8 kg (160 lb 9 6 oz)   Height: 5' 4" (1 626 m)        Physical Exam      Constitutional: She is oriented to person, place, and time  She appears well-developed and well-nourished     Cardiovascular: Normal rate, regular rhythm and normal heart sounds  Pulmonary/Chest: Effort normal and breath sounds normal    Abdominal: Soft  Bowel sounds are normal  She exhibits no distension and no mass  There is suprapubic tenderenss  There is no rebound and no guarding  No cva tenderness   Neurological: She is alert and oriented to person, place, and time  Skin: Skin is warm and dry  Psychiatric: She has a normal mood and affect   Judgment normal

## 2019-08-09 LAB
BACTERIA UR CULT: NORMAL
Lab: NO GROWTH

## 2019-08-12 ENCOUNTER — TELEPHONE (OUTPATIENT)
Dept: FAMILY MEDICINE CLINIC | Facility: CLINIC | Age: 59
End: 2019-08-12

## 2019-08-12 NOTE — TELEPHONE ENCOUNTER
Jose called back  She said that her symptoms have resolved on the antibiotic and will continue as she has been doing

## 2019-08-12 NOTE — TELEPHONE ENCOUNTER
----- Message from Hoa Ritter PA-C sent at 8/10/2019 12:16 PM EDT -----  Please let patient know her urine culture ws negative for growth, how are her symptoms? Patient informed

## 2019-09-04 ENCOUNTER — HOSPITAL ENCOUNTER (OUTPATIENT)
Dept: PULMONOLOGY | Facility: HOSPITAL | Age: 59
Discharge: HOME | End: 2019-09-04
Attending: INTERNAL MEDICINE
Payer: COMMERCIAL

## 2019-09-04 ENCOUNTER — TELEPHONE (OUTPATIENT)
Dept: GASTROENTEROLOGY | Facility: CLINIC | Age: 59
End: 2019-09-04

## 2019-09-04 DIAGNOSIS — J44.9 MODERATE COPD (CHRONIC OBSTRUCTIVE PULMONARY DISEASE) (HCC): ICD-10-CM

## 2019-09-04 PROCEDURE — 94060 EVALUATION OF WHEEZING: CPT | Performed by: INTERNAL MEDICINE

## 2019-09-04 PROCEDURE — 94760 N-INVAS EAR/PLS OXIMETRY 1: CPT

## 2019-09-04 PROCEDURE — 94060 EVALUATION OF WHEEZING: CPT

## 2019-09-04 PROCEDURE — 94726 PLETHYSMOGRAPHY LUNG VOLUMES: CPT | Performed by: INTERNAL MEDICINE

## 2019-09-04 PROCEDURE — 94726 PLETHYSMOGRAPHY LUNG VOLUMES: CPT

## 2019-09-04 PROCEDURE — 94729 DIFFUSING CAPACITY: CPT

## 2019-09-04 PROCEDURE — 94729 DIFFUSING CAPACITY: CPT | Performed by: INTERNAL MEDICINE

## 2019-09-04 RX ORDER — ALBUTEROL SULFATE 2.5 MG/3ML
2.5 SOLUTION RESPIRATORY (INHALATION) EVERY 6 HOURS PRN
Status: DISCONTINUED | OUTPATIENT
Start: 2019-09-04 | End: 2019-09-08 | Stop reason: HOSPADM

## 2019-09-04 RX ADMIN — ALBUTEROL SULFATE 2.5 MG: 2.5 SOLUTION RESPIRATORY (INHALATION) at 12:47

## 2019-09-04 NOTE — TELEPHONE ENCOUNTER
Patient was due for a recall colonoscopy per our last report    She had a combo done 03/9/2018 with another GI office in [facility], which mentions a 10 year recall

## 2019-09-17 ENCOUNTER — TELEPHONE (OUTPATIENT)
Dept: PULMONOLOGY | Facility: CLINIC | Age: 59
End: 2019-09-17

## 2019-09-19 ENCOUNTER — OFFICE VISIT (OUTPATIENT)
Dept: GASTROENTEROLOGY | Facility: MEDICAL CENTER | Age: 59
End: 2019-09-19
Payer: COMMERCIAL

## 2019-09-19 VITALS
SYSTOLIC BLOOD PRESSURE: 122 MMHG | HEART RATE: 68 BPM | DIASTOLIC BLOOD PRESSURE: 70 MMHG | HEIGHT: 64 IN | TEMPERATURE: 97.7 F | WEIGHT: 161 LBS | BODY MASS INDEX: 27.49 KG/M2

## 2019-09-19 DIAGNOSIS — R79.89 ELEVATED LFTS: ICD-10-CM

## 2019-09-19 DIAGNOSIS — K76.0 FATTY LIVER: ICD-10-CM

## 2019-09-19 DIAGNOSIS — K58.0 IRRITABLE BOWEL SYNDROME WITH DIARRHEA: Primary | ICD-10-CM

## 2019-09-19 DIAGNOSIS — K21.9 GERD WITHOUT ESOPHAGITIS: ICD-10-CM

## 2019-09-19 PROBLEM — K58.9 IRRITABLE BOWEL SYNDROME: Status: RESOLVED | Noted: 2018-02-09 | Resolved: 2019-09-19

## 2019-09-19 PROCEDURE — 99214 OFFICE O/P EST MOD 30 MIN: CPT | Performed by: PHYSICIAN ASSISTANT

## 2019-09-19 RX ORDER — DIPHENOXYLATE HYDROCHLORIDE AND ATROPINE SULFATE 2.5; .025 MG/1; MG/1
1 TABLET ORAL 4 TIMES DAILY PRN
Qty: 120 TABLET | Refills: 5 | Status: SHIPPED | OUTPATIENT
Start: 2019-09-19 | End: 2020-12-30 | Stop reason: ALTCHOICE

## 2019-09-19 NOTE — PROGRESS NOTES
Assessment/Plan:     Diagnoses and all orders for this visit:    Fatty liver/ Elevated LFTs  Patient has a history of fatty liver disease and elevated LFTs  She does state that she drinks approximately a 6 pack per day  She previously had a liver biopsy which showed F3 to F4 fibrosis  She also had a decreased platelet count  She states she is due for lab work next week and we will recheck her numbers  Recommend slow sustained weight loss as well as alcohol abstinence  Irritable bowel syndrome with diarrhea  She complains of diarrhea after eating  She likely has irritable bowel syndrome as her colonoscopy was negative for microscopic colitis  She has tried Imodium in the past without relief  Would recommend Lomotil as needed  GERD without esophagitis  She was previously diagnosed with GERD however she denies any symptoms at this time  She is not on any medication  She did have esophagitis and gastritis on her upper endoscopy however this is more likely associated with her alcohol consumption  Recommend alcohol abstinence as mentioned above  Subjective:      Patient ID: Lisa Villarreal is a 60 y o  female  HPI     This is a follow-up for reflux, abdominal pain, diarrhea, rectal bleeding, elevated LFTs and to discuss her endoscopy and colonoscopy  She currently denies any reflux symptoms  She is not on any reflux medication at the time  She currently denies any symptoms of this  On her endoscopy in May she did have class a esophagitis as well as gastritis  Biopsies were negative for celiac and H pylori  She states she does get some occasional right upper quadrant abdominal pain  She was previously diagnosed with fatty liver  Her most recent LFTs which were done in March show AST of 78, ALT of 40, alkaline phosphatase of 128 and total bilirubin of 0 7  She does admit to drinking approximately 1, 6 pack per day    She previously had a liver biopsy through [facility] and was told that she is F3 to F4  No signs of cirrhosis on imaging, however does have a mildly decreased platelet count of a 421  No varices were seen on endoscopy as mentioned above  INR has not been checked in more than a year but at that time was within normal limits  She also complains of diarrhea after eating  She has fecal urgency and did have an episode of incontinence  She states she has tried Imodium without relief  She did have a colonoscopy in May as well that was within normal limits  Biopsies were negative for microscopic colitis      Patient Active Problem List   Diagnosis    Fatty liver    Hemorrhage of anus and rectum    Other specified abnormal findings of blood chemistry    Anxiety    Elevated LFTs    GERD without esophagitis    Hyperlipidemia    Hypertension    Benign essential hematuria    Insomnia    Nicotine dependence    Healthcare maintenance    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)    Alcohol abuse    Irritable bowel syndrome with diarrhea    Acute tracheobronchitis     Allergies   Allergen Reactions    Ace Inhibitors Cough     Current Outpatient Medications on File Prior to Visit   Medication Sig    albuterol (2 5 mg/3 mL) 0 083 % nebulizer solution Take 2 5 mg by nebulization 3 (three) times a day    albuterol (PROVENTIL HFA) 90 mcg/act inhaler Inhale 2 puffs every 6 (six) hours as needed for wheezing or shortness of breath    ALPRAZolam (XANAX) 1 mg tablet TAKE ONE TABLET BY MOUTH TWICE A DAY AS NEEDED FOR ANXIETY    betamethasone, augmented, (DIPROLENE-AF) 0 05 % cream Apply topically 2 (two) times a day    calcipotriene (DOVONEX) 0 005 % cream Apply topically 2 (two) times a day    losartan (COZAAR) 100 MG tablet TAKE ONE TABLET BY MOUTH EVERY DAY    omeprazole (PriLOSEC) 40 MG capsule Take 1 capsule (40 mg total) by mouth daily    umeclidinium-vilanterol (ANORO ELLIPTA) 62 5-25 MCG/INH inhaler Inhale 1 puff daily    benzonatate (TESSALON) 200 MG capsule Take 1 capsule (200 mg total) by mouth 3 (three) times a day as needed for cough (Patient not taking: Reported on 9/19/2019)    guaiFENesin (MUCINEX) 600 mg 12 hr tablet Take 2 tablets (1,200 mg total) by mouth every 12 (twelve) hours (Patient not taking: Reported on 9/19/2019)     No current facility-administered medications on file prior to visit  Family History   Problem Relation Age of Onset    Breast cancer Mother 28    Substance Abuse Neg Hx     Mental illness Neg Hx      Past Medical History:   Diagnosis Date    Alcoholic fatty liver     Anxiety     Asthma     COPD (chronic obstructive pulmonary disease) (HCC)     Elevated liver function tests     GERD without esophagitis     Hyperlipidemia     Hypertension     IBS (irritable bowel syndrome)     Insomnia     Insomnia     Nervousness     Nicotine dependence     Other specified urinary incontinence     RLS (restless legs syndrome)      Social History     Socioeconomic History    Marital status: /Civil Union     Spouse name: None    Number of children: None    Years of education: None    Highest education level: None   Occupational History    None   Social Needs    Financial resource strain: None    Food insecurity:     Worry: None     Inability: None    Transportation needs:     Medical: None     Non-medical: None   Tobacco Use    Smoking status: Current Every Day Smoker     Packs/day: 0 50     Types: Cigarettes     Start date: 1978    Smokeless tobacco: Never Used    Tobacco comment: Per allscripts - 1/2 PPD cigs - smoking for 40 years   Substance and Sexual Activity    Alcohol use: Yes     Alcohol/week: 3 0 standard drinks     Types: 3 Cans of beer per week     Comment: Daily; just cut back from 6 beers/day   Per allscripts -  3 beers a night    Drug use: No    Sexual activity: Yes     Partners: Male   Lifestyle    Physical activity:     Days per week: None     Minutes per session: None    Stress: None   Relationships    Social connections:     Talks on phone: None     Gets together: None     Attends Mandaen service: None     Active member of club or organization: None     Attends meetings of clubs or organizations: None     Relationship status: None    Intimate partner violence:     Fear of current or ex partner: None     Emotionally abused: None     Physically abused: None     Forced sexual activity: None   Other Topics Concern    None   Social History Narrative    Has carbon monoxide detectors in home    Has smoke detectors    Uses safety equipment - seatbelts     Past Surgical History:   Procedure Laterality Date    BACK SURGERY      BREAST BIOPSY Right 11/01/2004    US guided, benign per MRS    CATARACT EXTRACTION, BILATERAL  08/01/2018    COLONOSCOPY N/A 5/8/2019    Procedure: COLONOSCOPY;  Surgeon: Bindu Ashley MD;  Location: [facility]; Service: Gastroenterology    EGD AND COLONOSCOPY N/A 3/19/2018    Procedure: EGD AND COLONOSCOPY;  Surgeon: Bindu Ashley MD;  Location: [facility]; Service: Gastroenterology    ESOPHAGOGASTRODUODENOSCOPY N/A 5/8/2019    Procedure: ESOPHAGOGASTRODUODENOSCOPY (EGD); Surgeon: Bindu Ashley MD;  Location: [facility]; Service: Gastroenterology    HYSTERECTOMY      KNEE SURGERY      KNEE SURGERY      LUMBAR LAMINECTOMY  04/1999    LYMPH NODE BIOPSY      TOTAL ABDOMINAL HYSTERECTOMY  02/05/2008    TUBAL LIGATION      TUBAL LIGATION  1989         Review of Systems   All other systems reviewed and are negative  Objective:      /70 (BP Location: Right arm, Patient Position: Sitting, Cuff Size: Adult)   Pulse 68   Temp 97 7 °F (36 5 °C) (Tympanic)   Ht 5' 4" (1 626 m)   Wt 73 kg (161 lb)   BMI 27 64 kg/m²          Physical Exam   Constitutional: She is oriented to person, place, and time  She appears well-developed and well-nourished  HENT:   Head: Normocephalic and atraumatic     Eyes: Conjunctivae and EOM are normal    Neck: Normal range of motion  Cardiovascular: Normal rate and regular rhythm  Pulmonary/Chest: Effort normal and breath sounds normal    Abdominal: Soft  Bowel sounds are normal  She exhibits no distension  There is tenderness (Mildly tender to palpation right upper quadrant)  Musculoskeletal: Normal range of motion  Neurological: She is alert and oriented to person, place, and time  Skin: Skin is warm and dry  Psychiatric: She has a normal mood and affect   Her behavior is normal

## 2019-09-20 ENCOUNTER — TELEPHONE (OUTPATIENT)
Dept: PULMONOLOGY | Facility: CLINIC | Age: 59
End: 2019-09-20

## 2019-09-20 NOTE — TELEPHONE ENCOUNTER
Patient of Dr Yadira Soto called requesting PFT results done 09/04/19  Patient can be reached at [phone]   Please advise

## 2019-09-20 NOTE — TELEPHONE ENCOUNTER
Called patient discussed PFT results  Stated that she is qualified as mild obstruction with evidence of air trapping with an FEV1 of 71%  Patient was told that she had no significant response with bronchodilator administration  All questions were answered and patient was advised to call our office with any further questions

## 2019-09-25 LAB
ALBUMIN SERPL-MCNC: 3.8 G/DL (ref 3.6–4.8)
ALBUMIN/GLOB SERPL: 1 {RATIO} (ref 1.2–2.2)
ALP SERPL-CCNC: 114 IU/L (ref 39–117)
ALT SERPL-CCNC: 38 IU/L (ref 0–32)
AST SERPL-CCNC: 66 IU/L (ref 0–40)
BASOPHILS # BLD AUTO: 0.1 X10E3/UL (ref 0–0.2)
BASOPHILS NFR BLD AUTO: 1 %
BILIRUB SERPL-MCNC: 0.7 MG/DL (ref 0–1.2)
BUN SERPL-MCNC: 9 MG/DL (ref 8–27)
BUN/CREAT SERPL: 12 (ref 12–28)
CALCIUM SERPL-MCNC: 8.7 MG/DL (ref 8.7–10.3)
CHLORIDE SERPL-SCNC: 102 MMOL/L (ref 96–106)
CHOLEST SERPL-MCNC: 172 MG/DL (ref 100–199)
CHOLEST/HDLC SERPL: 3.4 RATIO (ref 0–4.4)
CO2 SERPL-SCNC: 22 MMOL/L (ref 20–29)
CREAT SERPL-MCNC: 0.76 MG/DL (ref 0.57–1)
EOSINOPHIL # BLD AUTO: 0 X10E3/UL (ref 0–0.4)
EOSINOPHIL NFR BLD AUTO: 1 %
ERYTHROCYTE [DISTWIDTH] IN BLOOD BY AUTOMATED COUNT: 13.7 % (ref 12.3–15.4)
GLOBULIN SER-MCNC: 3.9 G/DL (ref 1.5–4.5)
GLUCOSE SERPL-MCNC: 89 MG/DL (ref 65–99)
HCT VFR BLD AUTO: 40.4 % (ref 34–46.6)
HDLC SERPL-MCNC: 51 MG/DL
HGB BLD-MCNC: 13.5 G/DL (ref 11.1–15.9)
IMM GRANULOCYTES # BLD: 0 X10E3/UL (ref 0–0.1)
IMM GRANULOCYTES NFR BLD: 0 %
LDLC SERPL CALC-MCNC: 99 MG/DL (ref 0–99)
LYMPHOCYTES # BLD AUTO: 2.5 X10E3/UL (ref 0.7–3.1)
LYMPHOCYTES NFR BLD AUTO: 40 %
MCH RBC QN AUTO: 33.2 PG (ref 26.6–33)
MCHC RBC AUTO-ENTMCNC: 33.4 G/DL (ref 31.5–35.7)
MCV RBC AUTO: 99 FL (ref 79–97)
MONOCYTES # BLD AUTO: 0.6 X10E3/UL (ref 0.1–0.9)
MONOCYTES NFR BLD AUTO: 9 %
NEUTROPHILS # BLD AUTO: 3 X10E3/UL (ref 1.4–7)
NEUTROPHILS NFR BLD AUTO: 49 %
PLATELET # BLD AUTO: 112 X10E3/UL (ref 150–450)
POTASSIUM SERPL-SCNC: 4.3 MMOL/L (ref 3.5–5.2)
PROT SERPL-MCNC: 7.7 G/DL (ref 6–8.5)
RBC # BLD AUTO: 4.07 X10E6/UL (ref 3.77–5.28)
SL AMB EGFR AFRICAN AMERICAN: 99 ML/MIN/1.73
SL AMB EGFR NON AFRICAN AMERICAN: 86 ML/MIN/1.73
SL AMB VLDL CHOLESTEROL CALC: 22 MG/DL (ref 5–40)
SODIUM SERPL-SCNC: 138 MMOL/L (ref 134–144)
TRIGL SERPL-MCNC: 109 MG/DL (ref 0–149)
WBC # BLD AUTO: 6.1 X10E3/UL (ref 3.4–10.8)

## 2019-09-30 ENCOUNTER — OFFICE VISIT (OUTPATIENT)
Dept: FAMILY MEDICINE CLINIC | Facility: CLINIC | Age: 59
End: 2019-09-30
Payer: COMMERCIAL

## 2019-09-30 VITALS
WEIGHT: 162 LBS | BODY MASS INDEX: 27.66 KG/M2 | RESPIRATION RATE: 14 BRPM | TEMPERATURE: 98.2 F | HEART RATE: 70 BPM | SYSTOLIC BLOOD PRESSURE: 120 MMHG | HEIGHT: 64 IN | DIASTOLIC BLOOD PRESSURE: 78 MMHG

## 2019-09-30 DIAGNOSIS — F17.200 NICOTINE DEPENDENCE, UNCOMPLICATED, UNSPECIFIED NICOTINE PRODUCT TYPE: ICD-10-CM

## 2019-09-30 DIAGNOSIS — R79.89 ELEVATED LFTS: ICD-10-CM

## 2019-09-30 DIAGNOSIS — K76.0 FATTY LIVER: ICD-10-CM

## 2019-09-30 DIAGNOSIS — I10 ESSENTIAL HYPERTENSION: Primary | ICD-10-CM

## 2019-09-30 DIAGNOSIS — J02.9 SORE THROAT: ICD-10-CM

## 2019-09-30 DIAGNOSIS — E78.49 OTHER HYPERLIPIDEMIA: ICD-10-CM

## 2019-09-30 DIAGNOSIS — K21.9 GERD WITHOUT ESOPHAGITIS: ICD-10-CM

## 2019-09-30 DIAGNOSIS — F10.10 ALCOHOL ABUSE: ICD-10-CM

## 2019-09-30 DIAGNOSIS — Z23 NEED FOR VACCINATION: ICD-10-CM

## 2019-09-30 DIAGNOSIS — N02.9 BENIGN ESSENTIAL HEMATURIA: ICD-10-CM

## 2019-09-30 DIAGNOSIS — J44.9 MODERATE COPD (CHRONIC OBSTRUCTIVE PULMONARY DISEASE) (HCC): ICD-10-CM

## 2019-09-30 LAB — S PYO AG THROAT QL: NEGATIVE

## 2019-09-30 PROCEDURE — 3074F SYST BP LT 130 MM HG: CPT | Performed by: NURSE PRACTITIONER

## 2019-09-30 PROCEDURE — 90682 RIV4 VACC RECOMBINANT DNA IM: CPT

## 2019-09-30 PROCEDURE — 93000 ELECTROCARDIOGRAM COMPLETE: CPT | Performed by: NURSE PRACTITIONER

## 2019-09-30 PROCEDURE — 87880 STREP A ASSAY W/OPTIC: CPT | Performed by: NURSE PRACTITIONER

## 2019-09-30 PROCEDURE — 3078F DIAST BP <80 MM HG: CPT | Performed by: NURSE PRACTITIONER

## 2019-09-30 PROCEDURE — 90471 IMMUNIZATION ADMIN: CPT

## 2019-09-30 PROCEDURE — 99214 OFFICE O/P EST MOD 30 MIN: CPT | Performed by: NURSE PRACTITIONER

## 2019-09-30 NOTE — PROGRESS NOTES
Chief Complaint   Patient presents with    Hypertension    Hyperlipidemia    Fever    Sore Throat     Assessment/Plan:  1  Essential hypertension  This is well controlled with the cozaar  Please try to check this at home,    2  BMI 27 0-27 9,adult  Please watch your diet and increase exercise as we discussed  Please let us know if there is anythign we can do to help you     3  Sore throat  The rapid strep was neg / please use warm salt water gargles and rest  Call if you are not feeling better and we will call if the throat culture is positive  4  Fatty liver  Numbers slightly improved  Follows with GI   5  GERD without esophagitis  This is managed with prilosec and is in good control  6  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)  Uses Anoro  Follows with pulmonary  And has has an appt there in 2 weeks     7  Other hyperlipidemia  No meds due to her elevated LFT> does watch her diet  We will carole this in     9  Elevated LFTs  These remain elevated but are down slightly  Did reinforce that she needs to quit drinking  10  Alcohol abuse  Again reinforced stopping alcohol but is not interested  rto 3 months with labs prior  Subjective:      Patient ID: Jr Starkey is a 60 y o  female  Here today to carole on several chronic issues   is still smoking but not as much   Is still followin with pulmonary and had PFT done  Is on Anoro and uses it daily  In addition continues to follow with gi for her elevated LFT but does continue to drink daily  Complaining of sore thrat that stratted 4 days ago  Had fvever to 101  Has some PND and cough   Is taking aleve for this           The following portions of the patient's history were reviewed and updated as appropriate: allergies, current medications, past family history, past medical history, past social history, past surgical history and problem list     Past Medical History:   Diagnosis Date    Alcoholic fatty liver     Anxiety     Asthma     COPD (chronic obstructive pulmonary disease) (HCC)     Elevated liver function tests     GERD without esophagitis     Hyperlipidemia     Hypertension     IBS (irritable bowel syndrome)     Insomnia     Insomnia     Nervousness     Nicotine dependence     Other specified urinary incontinence     RLS (restless legs syndrome)      Past Surgical History:   Procedure Laterality Date    BACK SURGERY      BREAST BIOPSY Right 11/01/2004    US guided, benign per MRS    CATARACT EXTRACTION, BILATERAL  08/01/2018    COLONOSCOPY N/A 5/8/2019    Procedure: COLONOSCOPY;  Surgeon: Ninfa Clemons MD;  Location: [facility] GI LAB; Service: Gastroenterology    EGD AND COLONOSCOPY N/A 3/19/2018    Procedure: EGD AND COLONOSCOPY;  Surgeon: Ninfa Clemons MD;  Location: [facility] GI LAB; Service: Gastroenterology    ESOPHAGOGASTRODUODENOSCOPY N/A 5/8/2019    Procedure: ESOPHAGOGASTRODUODENOSCOPY (EGD); Surgeon: Ninfa Clemons MD;  Location: [facility] GI LAB;   Service: Gastroenterology    HYSTERECTOMY      KNEE SURGERY      KNEE SURGERY      LUMBAR LAMINECTOMY  04/1999    LYMPH NODE BIOPSY      TOTAL ABDOMINAL HYSTERECTOMY  02/05/2008    TUBAL LIGATION      TUBAL LIGATION  1989     Family History   Problem Relation Age of Onset    Breast cancer Mother 28    Substance Abuse Neg Hx     Mental illness Neg Hx      Social History   Social History     Socioeconomic History    Marital status: /Civil Union     Spouse name: Not on file    Number of children: Not on file    Years of education: Not on file    Highest education level: Not on file   Occupational History    Not on file   Social Needs    Financial resource strain: Not on file    Food insecurity:     Worry: Not on file     Inability: Not on file    Transportation needs:     Medical: Not on file     Non-medical: Not on file   Tobacco Use    Smoking status: Current Every Day Smoker     Packs/day: 0 50 Types: Cigarettes     Start date: 1978    Smokeless tobacco: Never Used    Tobacco comment: Per allscripts - 1/2 PPD cigs - smoking for 40 years   Substance and Sexual Activity    Alcohol use: Yes     Alcohol/week: 3 0 standard drinks     Types: 3 Cans of beer per week     Comment: Daily; just cut back from 6 beers/day  Per allscripts -  3 beers a night    Drug use: No    Sexual activity: Yes     Partners: Male   Lifestyle    Physical activity:     Days per week: Not on file     Minutes per session: Not on file    Stress: Not on file   Relationships    Social connections:     Talks on phone: Not on file     Gets together: Not on file     Attends Caodaism service: Not on file     Active member of club or organization: Not on file     Attends meetings of clubs or organizations: Not on file     Relationship status: Not on file    Intimate partner violence:     Fear of current or ex partner: Not on file     Emotionally abused: Not on file     Physically abused: Not on file     Forced sexual activity: Not on file   Other Topics Concern    Not on file   Social History Narrative    Has carbon monoxide detectors in home    Has smoke detectors    Uses safety equipment - seatbelts     Review of Systems      Vitals:    09/30/19 1326   BP: 120/78   BP Location: Left arm   Patient Position: Sitting   Pulse: 70   Resp: 14   Temp: 98 2 °F (36 8 °C)   TempSrc: Oral   Weight: 73 5 kg (162 lb)   Height: 5' 4" (1 626 m)       Objective:   Wt Readings from Last 3 Encounters:   09/30/19 73 5 kg (162 lb)   09/19/19 73 kg (161 lb)   08/07/19 72 8 kg (160 lb 9 6 oz)     BP Readings from Last 3 Encounters:   09/30/19 120/78   09/19/19 122/70   08/07/19 126/74     Pulse Readings from Last 3 Encounters:   09/30/19 70   09/19/19 68   08/07/19 80     BMI Readings from Last 3 Encounters:   09/30/19 27 81 kg/m²   09/19/19 27 64 kg/m²   08/07/19 27 57 kg/m²     Office Visit on 09/30/2019   Component Date Value Ref Range Status    OTHER 09/30/2019    Final    9/30/2019   Office Visit on 08/07/2019   Component Date Value Ref Range Status    LEUKOCYTE ESTERASE,UA 08/07/2019 trace   Final    NITRITE,UA 08/07/2019 negative   Final    SL AMB POCT UROBILINOGEN 08/07/2019 0 2   Final    POCT URINE PROTEIN 08/07/2019 negative   Final     PH,UA 08/07/2019 5 0   Final    BLOOD,UA 08/07/2019 small   Final    SPECIFIC GRAVITY,UA 08/07/2019 1 010   Final    KETONES,UA 08/07/2019 negative   Final    BILIRUBIN,UA 08/07/2019 negative   Final    GLUCOSE, UA 08/07/2019 negative   Final     COLOR,UA 08/07/2019 sheryl   Final    CLARITY,UA 08/07/2019 clear   Final    Urine Culture Result 08/07/2019 Final report   Final    Result 1 08/07/2019 No growth   Final   Admission on 05/08/2019, Discharged on 05/08/2019   Component Date Value Ref Range Status    Case Report 05/08/2019    Final                    Value:Surgical Pathology Report                         Case: [id number]                                   Authorizing Provider:  Jermaine Wood MD           Collected:           05/08/2019 0907              Ordering Location:     [address] [place] [address] End        Received:            05/08/2019 [facility] Endoscopy                                                     Pathologist:           Brisa Kelsey MD                                                         Specimens:   A) - Duodenum, Duodenum r/o celiac                                                                  B) - Stomach, Stomach gastritis, r/o h pylori                                                       C) - Colon, random colon, microscopic colitis                                              Final Diagnosis 05/08/2019    Final                    Value: This result contains rich text formatting which cannot be displayed here   Additional Information 05/08/2019    Final                    Value: This result contains rich text formatting which cannot be displayed here  Marylene Castleman Description 05/08/2019    Final                    Value: This result contains rich text formatting which cannot be displayed here  Office Visit on 03/14/2019   Component Date Value Ref Range Status    Cholesterol, Total 09/24/2019 172  100 - 199 mg/dL Final    Triglycerides 09/24/2019 109  0 - 149 mg/dL Final    HDL 09/24/2019 51  >39 mg/dL Final    VLDL Cholesterol Calculated 09/24/2019 22  5 - 40 mg/dL Final    LDL Direct 09/24/2019 99  0 - 99 mg/dL Final    T  Chol/HDL Ratio 09/24/2019 3 4  0 0 - 4 4 ratio Final    Comment:                                   T  Chol/HDL Ratio                                              Men  Women                                1/2 Avg  Risk  3 4    3 3                                    Avg Risk  5 0    4 4                                 2X Avg  Risk  9 6    7 1                                 3X Avg  Risk 23 4   11 0      White Blood Cell Count 09/24/2019 6 1  3 4 - 10 8 x10E3/uL Final    Red Blood Cell Count 09/24/2019 4 07  3 77 - 5 28 x10E6/uL Final    Hemoglobin 09/24/2019 13 5  11 1 - 15 9 g/dL Final    HCT 09/24/2019 40 4  34 0 - 46 6 % Final    MCV 09/24/2019 99* 79 - 97 fL Final    MCH 09/24/2019 33 2* 26 6 - 33 0 pg Final    MCHC 09/24/2019 33 4  31 5 - 35 7 g/dL Final    RDW 09/24/2019 13 7  12 3 - 15 4 % Final    Platelet Count 00/54/7717 112* 150 - 450 x10E3/uL Final    Neutrophils 09/24/2019 49  Not Estab  % Final    Lymphocytes 09/24/2019 40  Not Estab  % Final    Monocytes 09/24/2019 9  Not Estab  % Final    Eosinophils 09/24/2019 1  Not Estab  % Final    Basophils PCT 09/24/2019 1  Not Estab  % Final    Neutrophils (Absolute) 09/24/2019 3 0  1 4 - 7 0 x10E3/uL Final    Lymphocytes (Absolute) 09/24/2019 2 5  0 7 - 3 1 x10E3/uL Final    Monocytes (Absolute) 09/24/2019 0 6  0 1 - 0 9 x10E3/uL Final    Eosinophils (Absolute) 09/24/2019 0 0  0 0 - 0 4 x10E3/uL Final    Basophils ABS 09/24/2019 0 1  0 0 - 0 2 x10E3/uL Final    Immature Granulocytes 09/24/2019 0  Not Estab  % Final    Immature Granulocytes (Absolute) 09/24/2019 0 0  0 0 - 0 1 x10E3/uL Final    Glucose, Random 09/24/2019 89  65 - 99 mg/dL Final    BUN 09/24/2019 9  8 - 27 mg/dL Final    Creatinine 09/24/2019 0 76  0 57 - 1 00 mg/dL Final    eGFR Non  09/24/2019 86  >59 mL/min/1 73 Final    eGFR  09/24/2019 99  >59 mL/min/1 73 Final    SL AMB BUN/CREATININE RATIO 09/24/2019 12  12 - 28 Final    Sodium 09/24/2019 138  134 - 144 mmol/L Final    Potassium 09/24/2019 4 3  3 5 - 5 2 mmol/L Final    Chloride 09/24/2019 102  96 - 106 mmol/L Final    CO2 09/24/2019 22  20 - 29 mmol/L Final    CALCIUM 09/24/2019 8 7  8 7 - 10 3 mg/dL Final    Protein, Total 09/24/2019 7 7  6 0 - 8 5 g/dL Final    Albumin 09/24/2019 3 8  3 6 - 4 8 g/dL Final    Globulin, Total 09/24/2019 3 9  1 5 - 4 5 g/dL Final    Albumin/Globulin Ratio 09/24/2019 1 0* 1 2 - 2 2 Final    TOTAL BILIRUBIN 09/24/2019 0 7  0 0 - 1 2 mg/dL Final    Alk Phos Isoenzymes 09/24/2019 114  39 - 117 IU/L Final    AST 09/24/2019 66* 0 - 40 IU/L Final    ALT 09/24/2019 38* 0 - 32 IU/L Final   Office Visit on 09/13/2018   Component Date Value Ref Range Status    Cholesterol, Total 03/04/2019 161  100 - 199 mg/dL Final    Triglycerides 03/04/2019 85  0 - 149 mg/dL Final    HDL 03/04/2019 42  >39 mg/dL Final    VLDL Cholesterol Calculated 03/04/2019 17  5 - 40 mg/dL Final    LDL Direct 03/04/2019 102* 0 - 99 mg/dL Final    T  Chol/HDL Ratio 03/04/2019 3 8  0 0 - 4 4 ratio Final    Comment:                                   T  Chol/HDL Ratio                                              Men  Women                                1/2 Avg  Risk  3 4    3 3                                    Avg Risk  5 0    4 4                                 2X Avg  Risk  9 6    7 1                                 3X Avg  Risk 23 4   11 0      TSH 03/04/2019 3 490  0 450 - 4 500 uIU/mL Final    Glucose, Random 03/04/2019 105* 65 - 99 mg/dL Final    BUN 03/04/2019 8  6 - 24 mg/dL Final    Creatinine 03/04/2019 0 79  0 57 - 1 00 mg/dL Final    eGFR Non African American 03/04/2019 82  >59 mL/min/1 73 Final    eGFR African American 03/04/2019 95  >59 mL/min/1 73 Final    SL AMB BUN/CREATININE RATIO 03/04/2019 10  9 - 23 Final    Sodium 03/04/2019 137  134 - 144 mmol/L Final    Potassium 03/04/2019 4 7  3 5 - 5 2 mmol/L Final    Chloride 03/04/2019 100  96 - 106 mmol/L Final    CO2 03/04/2019 25  20 - 29 mmol/L Final    CALCIUM 03/04/2019 9 3  8 7 - 10 2 mg/dL Final    Protein, Total 03/04/2019 7 6  6 0 - 8 5 g/dL Final    Albumin 03/04/2019 3 5  3 5 - 5 5 g/dL Final    Globulin, Total 03/04/2019 4 1  1 5 - 4 5 g/dL Final    Albumin/Globulin Ratio 03/04/2019 0 9* 1 2 - 2 2 Final    TOTAL BILIRUBIN 03/04/2019 0 7  0 0 - 1 2 mg/dL Final    Alk Phos Isoenzymes 03/04/2019 128* 39 - 117 IU/L Final    AST 03/04/2019 78* 0 - 40 IU/L Final    ALT 03/04/2019 40* 0 - 32 IU/L Final    White Blood Cell Count 03/04/2019 5 4  3 4 - 10 8 x10E3/uL Final    Red Blood Cell Count 03/04/2019 4 08  3 77 - 5 28 x10E6/uL Final    Hemoglobin 03/04/2019 13 7  11 1 - 15 9 g/dL Final    HCT 03/04/2019 40 8  34 0 - 46 6 % Final    MCV 03/04/2019 100* 79 - 97 fL Final    MCH 03/04/2019 33 6* 26 6 - 33 0 pg Final    MCHC 03/04/2019 33 6  31 5 - 35 7 g/dL Final    RDW 03/04/2019 13 6  12 3 - 15 4 % Final    Platelet Count 82/98/7721 104* 150 - 379 x10E3/uL Final    Neutrophils 03/04/2019 51  Not Estab  % Final    Lymphocytes 03/04/2019 34  Not Estab  % Final    Monocytes 03/04/2019 12  Not Estab  % Final    Eosinophils 03/04/2019 1  Not Estab  % Final    Basophils PCT 03/04/2019 2  Not Estab  % Final    Neutrophils (Absolute) 03/04/2019 2 8  1 4 - 7 0 x10E3/uL Final    Lymphocytes (Absolute) 03/04/2019 1 8  0 7 - 3 1 x10E3/uL Final    Monocytes (Absolute) 03/04/2019 0 7  0 1 - 0 9 x10E3/uL Final    Eosinophils (Absolute) 03/04/2019 0 1  0 0 - 0 4 x10E3/uL Final    Basophils ABS 03/04/2019 0 1  0 0 - 0 2 x10E3/uL Final    Immature Granulocytes 03/04/2019 0  Not Estab  % Final    Immature Granulocytes (Absolute) 03/04/2019 0 0  0 0 - 0 1 x10E3/uL Final   Office Visit on 08/07/2018   Component Date Value Ref Range Status    OTHER 08/07/2018 other   Corrected    08/07/2018   Office Visit on 06/11/2018   Component Date Value Ref Range Status    Glucose, Random 06/12/2018 108* 65 - 99 mg/dL Final    BUN 06/12/2018 8  6 - 24 mg/dL Final    Creatinine 06/12/2018 0 75  0 57 - 1 00 mg/dL Final    eGFR Non  06/12/2018 88  >59 mL/min/1 73 Final    eGFR African American 06/12/2018 101  >59 mL/min/1 73 Final    SL AMB BUN/CREATININE RATIO 06/12/2018 11  9 - 23 Final    Sodium 06/12/2018 139  134 - 144 mmol/L Final    Potassium 06/12/2018 4 4  3 5 - 5 2 mmol/L Final    Chloride 06/12/2018 99  96 - 106 mmol/L Final    CO2 06/12/2018 22  20 - 29 mmol/L Final                  **Please note reference interval change**    CALCIUM 06/12/2018 9 5  8 7 - 10 2 mg/dL Final    Protein, Total 06/12/2018 7 7  6 0 - 8 5 g/dL Final    Albumin 06/12/2018 3 8  3 5 - 5 5 g/dL Final    Globulin, Total 06/12/2018 3 9  1 5 - 4 5 g/dL Final    Albumin/Globulin Ratio 06/12/2018 1 0* 1 2 - 2 2 Final    TOTAL BILIRUBIN 06/12/2018 0 3  0 0 - 1 2 mg/dL Final    Alk Phos Isoenzymes 06/12/2018 133* 39 - 117 IU/L Final    AST 06/12/2018 86* 0 - 40 IU/L Final    ALT 06/12/2018 52* 0 - 32 IU/L Final    White Blood Cell Count 06/12/2018 6 6  3 4 - 10 8 x10E3/uL Final    Red Blood Cell Count 06/12/2018 4 32  3 77 - 5 28 x10E6/uL Final    Hemoglobin 06/12/2018 15 0  11 1 - 15 9 g/dL Final    HCT 06/12/2018 42 3  34 0 - 46 6 % Final    MCV 06/12/2018 98* 79 - 97 fL Final    MCH 06/12/2018 34 7* 26 6 - 33 0 pg Final    MCHC 06/12/2018 35 5  31 5 - 35 7 g/dL Final    RDW 06/12/2018 12 7  12 3 - 15 4 % Final    Platelet Count 24/96/2941 131* 150 - 379 x10E3/uL Final    Neutrophils 06/12/2018 55  Not Estab  % Final    Lymphocytes 06/12/2018 34  Not Estab  % Final    Monocytes 06/12/2018 9  Not Estab  % Final    Eosinophils 06/12/2018 1  Not Estab  % Final    Basophils PCT 06/12/2018 1  Not Estab  % Final    Neutrophils (Absolute) 06/12/2018 3 7  1 4 - 7 0 x10E3/uL Final    Lymphocytes (Absolute) 06/12/2018 2 2  0 7 - 3 1 x10E3/uL Final    Monocytes (Absolute) 06/12/2018 0 6  0 1 - 0 9 x10E3/uL Final    Eosinophils (Absolute) 06/12/2018 0 1  0 0 - 0 4 x10E3/uL Final    Basophils ABS 06/12/2018 0 0  0 0 - 0 2 x10E3/uL Final    Immature Granulocytes 06/12/2018 0  Not Estab  % Final    Immature Granulocytes (Absolute) 06/12/2018 0 0  0 0 - 0 1 x10E3/uL Final    INR 06/12/2018 1 1  0 8 - 1 2 Final    Comment: Reference interval is for non-anticoagulated patients  Suggested INR therapeutic range for Vitamin K  antagonist therapy:     Standard Dose (moderate intensity                    therapeutic range):       2 0 - 3 0     Higher intensity therapeutic range       2 5 - 3 5      Prothrombin Time 06/12/2018 11 9  9 1 - 12 0 sec Final   Annual Exam on 05/24/2018   Component Date Value Ref Range Status    Diagnosis: 05/24/2018 Comment   Final    Comment: NEGATIVE FOR INTRAEPITHELIAL LESION AND MALIGNANCY  THIS SPECIMEN WAS RESCREENED AS PART OF OUR  PROGRAM   [facility]  [place] [address], [address] [id number]   Specimen Adequacy 05/24/2018 Comment   Final    Comment: Satisfactory for evaluation  Endocervical and/or squamous metaplastic  cells (endocervical component) are present        Clinician Provided ICD10 05/24/2018 Comment   Final    Z12 4    Performed by: 05/24/2018 Comment   Final    Leonor Harvey, Cytotechnologist (ASCP)    QC reviewed by: 05/24/2018 Comment   Final    Js Milan Cytotechnologist (ASCP)     AMB   05/24/2018   Final    Note: 05/24/2018 Comment   Final    Comment: The Pap smear is a screening test designed to aid in the detection of  premalignant and malignant conditions of the uterine cervix  It is not a  diagnostic procedure and should not be used as the sole means of detecting  cervical cancer  Both false-positive and false-negative reports do occur    AMB   05/24/2018 Comment   Final    Comment: The HPV DNA reflex criteria were not met with this specimen result  therefore, no HPV testing was performed  Admission on 03/19/2018, Discharged on 03/19/2018   Component Date Value Ref Range Status    Case Report 03/19/2018    Final                    Value:Surgical Pathology Report                         Case: [id number]                                   Authorizing Provider:  Chiara Otero MD           Collected:           03/19/2018 1216              Ordering Location:     [address] [place] [address]        Received:            03/19/2018 [facility] Endoscopy                                                     Pathologist:           Genesis Graves MD                                                              Specimens:   A) - Duodenum, cold bx's r/o celiac                                                                 B) - Stomach, gastric bx's r/o h pylori                                                             C) - Small Bowel, NOS, random TI bx's                                                               D) - Colon, random colon bx's mild colitis                                                 Final Diagnosis 03/19/2018    Final                    Value: This result contains rich text formatting which cannot be displayed here   Note 03/19/2018    Final                    Value: This result contains rich text formatting which cannot be displayed here   Additional Information 03/19/2018    Final                    Value: This result contains rich text formatting which cannot be displayed here  [facility] Gross Description 03/19/2018    Final                    Value: This result contains rich text formatting which cannot be displayed here     Office Visit on 03/05/2018   Component Date Value Ref Range Status    Glucose, Random 08/27/2018 106* 65 - 99 mg/dL Final    BUN 08/27/2018 8  6 - 24 mg/dL Final    Creatinine 08/27/2018 0 86  0 57 - 1 00 mg/dL Final    eGFR Non  08/27/2018 74  >59 mL/min/1 73 Final    eGFR  08/27/2018 86  >59 mL/min/1 73 Final    SL AMB BUN/CREATININE RATIO 08/27/2018 9  9 - 23 Final    Sodium 08/27/2018 135  134 - 144 mmol/L Final    Potassium 08/27/2018 4 7  3 5 - 5 2 mmol/L Final    Chloride 08/27/2018 94* 96 - 106 mmol/L Final    CO2 08/27/2018 23  20 - 29 mmol/L Final    CALCIUM 08/27/2018 9 4  8 7 - 10 2 mg/dL Final    Protein, Total 08/27/2018 8 3  6 0 - 8 5 g/dL Final    Albumin 08/27/2018 3 6  3 5 - 5 5 g/dL Final    Globulin, Total 08/27/2018 4 7* 1 5 - 4 5 g/dL Final    Albumin/Globulin Ratio 08/27/2018 0 8* 1 2 - 2 2 Final    TOTAL BILIRUBIN 08/27/2018 0 9  0 0 - 1 2 mg/dL Final    Alk Phos Isoenzymes 08/27/2018 135* 39 - 117 IU/L Final    AST 08/27/2018 121* 0 - 40 IU/L Final    ALT 08/27/2018 56* 0 - 32 IU/L Final    White Blood Cell Count 08/27/2018 6 8  3 4 - 10 8 x10E3/uL Final    Red Blood Cell Count 08/27/2018 4 49  3 77 - 5 28 x10E6/uL Final    Hemoglobin 08/27/2018 15 3  11 1 - 15 9 g/dL Final    HCT 08/27/2018 44 4  34 0 - 46 6 % Final    MCV 08/27/2018 99* 79 - 97 fL Final    MCH 08/27/2018 34 1* 26 6 - 33 0 pg Final    MCHC 08/27/2018 34 5  31 5 - 35 7 g/dL Final    RDW 08/27/2018 12 8  12 3 - 15 4 % Final    Platelet Count 05/90/2043 141* 150 - 379 x10E3/uL Final    Neutrophils 08/27/2018 59  Not Estab  % Final    Lymphocytes 08/27/2018 29  Not Estab  % Final    Monocytes 08/27/2018 10  Not Estab  % Final    Eosinophils 08/27/2018 1 Not Estab  % Final    Basophils PCT 08/27/2018 1  Not Estab  % Final    Neutrophils (Absolute) 08/27/2018 4 0  1 4 - 7 0 x10E3/uL Final    Lymphocytes (Absolute) 08/27/2018 2 0  0 7 - 3 1 x10E3/uL Final    Monocytes (Absolute) 08/27/2018 0 7  0 1 - 0 9 x10E3/uL Final    Eosinophils (Absolute) 08/27/2018 0 1  0 0 - 0 4 x10E3/uL Final    Basophils ABS 08/27/2018 0 1  0 0 - 0 2 x10E3/uL Final    Immature Granulocytes 08/27/2018 0  Not Estab  % Final    Immature Granulocytes (Absolute) 08/27/2018 0 0  0 0 - 0 1 x10E3/uL Final   There may be more visits with results that are not included  Physical Exam   Constitutional: She is oriented to person, place, and time  She appears well-developed and well-nourished  HENT:   Right Ear: Tympanic membrane normal    Left Ear: Tympanic membrane normal    Nose: Mucosal edema and rhinorrhea present  Mouth/Throat: Mucous membranes are normal  Posterior oropharyngeal erythema present  Neck: Normal range of motion  Neck supple  No thyromegaly present  Cardiovascular: Normal rate, regular rhythm, S1 normal and normal heart sounds  Exam reveals no distant heart sounds  Pulmonary/Chest: Effort normal and breath sounds normal  No respiratory distress  She has no wheezes  Musculoskeletal: Normal range of motion  Neurological: She is alert and oriented to person, place, and time  Skin: Skin is warm and dry  Psychiatric: She has a normal mood and affect  Her behavior is normal  Judgment and thought content normal        BMI Counseling: Body mass index is 27 81 kg/m²  The BMI is above normal  Nutrition recommendations include reducing portion sizes, decreasing overall calorie intake and 3-5 servings of fruits/vegetables daily  Exercise recommendations include moderate aerobic physical activity for 150 minutes/week

## 2019-10-03 LAB — B-HEM STREP SPEC QL CULT: NEGATIVE

## 2019-11-15 ENCOUNTER — OFFICE VISIT (OUTPATIENT)
Dept: PULMONOLOGY | Facility: HOSPITAL | Age: 59
End: 2019-11-15
Payer: COMMERCIAL

## 2019-11-15 VITALS
HEART RATE: 81 BPM | TEMPERATURE: 98 F | WEIGHT: 171 LBS | BODY MASS INDEX: 29.19 KG/M2 | DIASTOLIC BLOOD PRESSURE: 80 MMHG | SYSTOLIC BLOOD PRESSURE: 114 MMHG | OXYGEN SATURATION: 96 % | HEIGHT: 64 IN

## 2019-11-15 DIAGNOSIS — F17.200 NICOTINE DEPENDENCE, UNCOMPLICATED, UNSPECIFIED NICOTINE PRODUCT TYPE: ICD-10-CM

## 2019-11-15 DIAGNOSIS — J44.9 MODERATE COPD (CHRONIC OBSTRUCTIVE PULMONARY DISEASE) (HCC): Primary | ICD-10-CM

## 2019-11-15 DIAGNOSIS — Z23 NEED FOR VACCINATION AGAINST STREPTOCOCCUS PNEUMONIAE USING PNEUMOCOCCAL CONJUGATE VACCINE 13: ICD-10-CM

## 2019-11-15 PROCEDURE — 90670 PCV13 VACCINE IM: CPT

## 2019-11-15 PROCEDURE — 99213 OFFICE O/P EST LOW 20 MIN: CPT | Performed by: PHYSICIAN ASSISTANT

## 2019-11-15 PROCEDURE — 90471 IMMUNIZATION ADMIN: CPT

## 2019-11-22 ENCOUNTER — HOSPITAL ENCOUNTER (OUTPATIENT)
Dept: CT IMAGING | Facility: HOSPITAL | Age: 59
Discharge: HOME | End: 2019-11-22
Payer: COMMERCIAL

## 2019-11-22 ENCOUNTER — TELEPHONE (OUTPATIENT)
Dept: FAMILY MEDICINE CLINIC | Facility: CLINIC | Age: 59
End: 2019-11-22

## 2019-11-22 DIAGNOSIS — F17.200 NICOTINE DEPENDENCE, UNCOMPLICATED, UNSPECIFIED NICOTINE PRODUCT TYPE: ICD-10-CM

## 2019-11-22 PROCEDURE — G0297 LDCT FOR LUNG CA SCREEN: HCPCS

## 2019-11-22 NOTE — TELEPHONE ENCOUNTER
Patient called and wants you to know that she had the pneumococcal 13 vaccine on November 15, 2019 (see immunization record)  She also would like you to copy Dr Marley Gallagher on her CT results from tonight  I explained that if she is a Mery Langford dr, the results will be in her chart but she was insistent that this is not always true  CT scan is tonight

## 2019-11-29 ENCOUNTER — TELEPHONE (OUTPATIENT)
Dept: FAMILY MEDICINE CLINIC | Facility: CLINIC | Age: 59
End: 2019-11-29

## 2019-11-29 NOTE — TELEPHONE ENCOUNTER
----- Message from Aiden Marin Dr sent at 11/29/2019  2:47 PM EST -----  Call pt and let her know that her ct screen was normal    I did send the result to Dr Eleanora Cowden per her request

## 2020-01-07 DIAGNOSIS — K21.9 GERD WITHOUT ESOPHAGITIS: ICD-10-CM

## 2020-01-08 RX ORDER — OMEPRAZOLE 40 MG/1
CAPSULE, DELAYED RELEASE ORAL
Qty: 30 CAPSULE | Refills: 5 | Status: SHIPPED | OUTPATIENT
Start: 2020-01-08 | End: 2021-10-01

## 2020-01-19 DIAGNOSIS — F41.9 ANXIETY: ICD-10-CM

## 2020-01-20 ENCOUNTER — TELEPHONE (OUTPATIENT)
Dept: FAMILY MEDICINE CLINIC | Facility: CLINIC | Age: 60
End: 2020-01-20

## 2020-01-20 RX ORDER — ALPRAZOLAM 1 MG/1
TABLET ORAL
Qty: 60 TABLET | Refills: 5 | Status: SHIPPED | OUTPATIENT
Start: 2020-01-20 | End: 2020-06-25 | Stop reason: SDUPTHER

## 2020-01-20 NOTE — TELEPHONE ENCOUNTER
Patient would like a refill sent in for her Xanax to [facility] in [facility]

## 2020-01-23 LAB
ALBUMIN SERPL-MCNC: 3.5 G/DL (ref 3.8–4.9)
ALBUMIN/GLOB SERPL: 0.8 {RATIO} (ref 1.2–2.2)
ALP SERPL-CCNC: 136 IU/L (ref 39–117)
ALT SERPL-CCNC: 35 IU/L (ref 0–32)
AST SERPL-CCNC: 64 IU/L (ref 0–40)
BASOPHILS # BLD AUTO: 0.1 X10E3/UL (ref 0–0.2)
BASOPHILS NFR BLD AUTO: 1 %
BILIRUB SERPL-MCNC: 0.4 MG/DL (ref 0–1.2)
BUN SERPL-MCNC: 13 MG/DL (ref 8–27)
BUN/CREAT SERPL: 15 (ref 12–28)
CALCIUM SERPL-MCNC: 8.4 MG/DL (ref 8.7–10.3)
CHLORIDE SERPL-SCNC: 102 MMOL/L (ref 96–106)
CHOLEST SERPL-MCNC: 171 MG/DL (ref 100–199)
CHOLEST/HDLC SERPL: 3.2 RATIO (ref 0–4.4)
CO2 SERPL-SCNC: 21 MMOL/L (ref 20–29)
CREAT SERPL-MCNC: 0.86 MG/DL (ref 0.57–1)
EOSINOPHIL # BLD AUTO: 0.1 X10E3/UL (ref 0–0.4)
EOSINOPHIL NFR BLD AUTO: 1 %
ERYTHROCYTE [DISTWIDTH] IN BLOOD BY AUTOMATED COUNT: 13.1 % (ref 11.7–15.4)
GLOBULIN SER-MCNC: 4.3 G/DL (ref 1.5–4.5)
GLUCOSE SERPL-MCNC: 87 MG/DL (ref 65–99)
HCT VFR BLD AUTO: 39.5 % (ref 34–46.6)
HDLC SERPL-MCNC: 54 MG/DL
HGB BLD-MCNC: 13.5 G/DL (ref 11.1–15.9)
IMM GRANULOCYTES # BLD: 0 X10E3/UL (ref 0–0.1)
IMM GRANULOCYTES NFR BLD: 0 %
LDLC SERPL CALC-MCNC: 97 MG/DL (ref 0–99)
LYMPHOCYTES # BLD AUTO: 2.7 X10E3/UL (ref 0.7–3.1)
LYMPHOCYTES NFR BLD AUTO: 45 %
MCH RBC QN AUTO: 33.8 PG (ref 26.6–33)
MCHC RBC AUTO-ENTMCNC: 34.2 G/DL (ref 31.5–35.7)
MCV RBC AUTO: 99 FL (ref 79–97)
MONOCYTES # BLD AUTO: 0.7 X10E3/UL (ref 0.1–0.9)
MONOCYTES NFR BLD AUTO: 11 %
NEUTROPHILS # BLD AUTO: 2.5 X10E3/UL (ref 1.4–7)
NEUTROPHILS NFR BLD AUTO: 42 %
PLATELET # BLD AUTO: 117 X10E3/UL (ref 150–450)
POTASSIUM SERPL-SCNC: 4.2 MMOL/L (ref 3.5–5.2)
PROT SERPL-MCNC: 7.8 G/DL (ref 6–8.5)
RBC # BLD AUTO: 4 X10E6/UL (ref 3.77–5.28)
SL AMB EGFR AFRICAN AMERICAN: 85 ML/MIN/1.73
SL AMB EGFR NON AFRICAN AMERICAN: 74 ML/MIN/1.73
SL AMB VLDL CHOLESTEROL CALC: 20 MG/DL (ref 5–40)
SODIUM SERPL-SCNC: 135 MMOL/L (ref 134–144)
TRIGL SERPL-MCNC: 102 MG/DL (ref 0–149)
TSH SERPL DL<=0.005 MIU/L-ACNC: 3.47 UIU/ML (ref 0.45–4.5)
WBC # BLD AUTO: 6 X10E3/UL (ref 3.4–10.8)

## 2020-01-29 ENCOUNTER — TELEPHONE (OUTPATIENT)
Dept: FAMILY MEDICINE CLINIC | Facility: CLINIC | Age: 60
End: 2020-01-29

## 2020-01-29 NOTE — TELEPHONE ENCOUNTER
Patient called, asking for her labs to be faxed to Dr Jorje Zelaya office  I told her that they could see her labs since they are [facility]il[facility] Salazar, she still wants me to fax them    What labs do you want me to fax    She does not have an appointment yet    Jorje Zelaya fax [phone] 12-Jun-2019

## 2020-01-31 ENCOUNTER — TELEPHONE (OUTPATIENT)
Dept: FAMILY MEDICINE CLINIC | Facility: CLINIC | Age: 60
End: 2020-01-31

## 2020-01-31 DIAGNOSIS — I10 BENIGN ESSENTIAL HTN: ICD-10-CM

## 2020-01-31 RX ORDER — LOSARTAN POTASSIUM 100 MG/1
100 TABLET ORAL DAILY
Qty: 30 TABLET | Refills: 6 | Status: SHIPPED | OUTPATIENT
Start: 2020-01-31 | End: 2020-08-21 | Stop reason: SDUPTHER

## 2020-02-04 ENCOUNTER — OFFICE VISIT (OUTPATIENT)
Dept: FAMILY MEDICINE CLINIC | Facility: CLINIC | Age: 60
End: 2020-02-04
Payer: COMMERCIAL

## 2020-02-04 VITALS
SYSTOLIC BLOOD PRESSURE: 132 MMHG | BODY MASS INDEX: 29.72 KG/M2 | WEIGHT: 174.1 LBS | RESPIRATION RATE: 16 BRPM | DIASTOLIC BLOOD PRESSURE: 80 MMHG | HEART RATE: 80 BPM | HEIGHT: 64 IN

## 2020-02-04 DIAGNOSIS — Z01.419 ENCOUNTER FOR GYNECOLOGICAL EXAMINATION WITHOUT ABNORMAL FINDING: Primary | ICD-10-CM

## 2020-02-04 DIAGNOSIS — Z12.39 SCREENING FOR MALIGNANT NEOPLASM OF BREAST: ICD-10-CM

## 2020-02-04 DIAGNOSIS — K76.0 FATTY LIVER: ICD-10-CM

## 2020-02-04 DIAGNOSIS — Z01.419 ENCOUNTER FOR GYNECOLOGICAL EXAMINATION WITH PAPANICOLAOU SMEAR OF CERVIX: ICD-10-CM

## 2020-02-04 DIAGNOSIS — R79.89 ELEVATED LFTS: ICD-10-CM

## 2020-02-04 DIAGNOSIS — E78.49 OTHER HYPERLIPIDEMIA: ICD-10-CM

## 2020-02-04 DIAGNOSIS — K21.9 GERD WITHOUT ESOPHAGITIS: ICD-10-CM

## 2020-02-04 DIAGNOSIS — I10 ESSENTIAL HYPERTENSION: ICD-10-CM

## 2020-02-04 DIAGNOSIS — J44.9 MODERATE COPD (CHRONIC OBSTRUCTIVE PULMONARY DISEASE) (HCC): ICD-10-CM

## 2020-02-04 PROCEDURE — 3079F DIAST BP 80-89 MM HG: CPT | Performed by: NURSE PRACTITIONER

## 2020-02-04 PROCEDURE — 3075F SYST BP GE 130 - 139MM HG: CPT | Performed by: NURSE PRACTITIONER

## 2020-02-04 PROCEDURE — 3008F BODY MASS INDEX DOCD: CPT | Performed by: NURSE PRACTITIONER

## 2020-02-04 PROCEDURE — 4004F PT TOBACCO SCREEN RCVD TLK: CPT | Performed by: NURSE PRACTITIONER

## 2020-02-04 PROCEDURE — 99396 PREV VISIT EST AGE 40-64: CPT | Performed by: NURSE PRACTITIONER

## 2020-02-04 PROCEDURE — 99214 OFFICE O/P EST MOD 30 MIN: CPT | Performed by: NURSE PRACTITIONER

## 2020-02-04 NOTE — PROGRESS NOTES
Mela Das is a 60 y o  female here for a routine gynecologic exam       Current complaints:   none     Gynecologic History  No LMP recorded  Patient has had a hysterectomy  cervis retained   Contraception: none  Last Pap: 2016  Results were: normal  Last mammogram: 2019  Results were: normal  Last colonoscopy: 2016  Results were: normal    Obstetric History  OB History        The following portions of the patient's history were reviewed and updated as appropriate: allergies, current medications, past family history, past medical history, past social history, past surgical history and problem list     Review of Systems   Constitutional: Negative  HENT: Negative  Eyes: Negative  Respiratory: Negative  Cardiovascular: Negative  Gastrointestinal: Negative  Endocrine: Negative  Genitourinary: Negative  Musculoskeletal: Negative  Skin: Negative  Allergic/Immunologic: Negative  Neurological: Negative  Hematological: Negative  Psychiatric/Behavioral: Negative  Objective    Vitals:    02/04/20 1322   BP: 132/80   BP Location: Left arm   Patient Position: Sitting   Cuff Size: Standard   Pulse: 80   Resp: 16   Weight: 79 kg (174 lb 1 6 oz)   Height: 5' 4" (1 626 m)       Physical Exam   Constitutional: She is oriented to person, place, and time  She appears well-developed and well-nourished  HENT:   Head: Normocephalic  Eyes: Pupils are equal, round, and reactive to light  Neck: Normal range of motion  Neck supple  Cardiovascular: Normal rate and regular rhythm  Pulmonary/Chest: Effort normal and breath sounds normal    Abdominal: Soft  Bowel sounds are normal    Genitourinary: Vagina normal and uterus normal  There is no rash, tenderness or lesion on the right labia  There is no rash, tenderness or lesion on the left labia  Cervix exhibits no discharge and no friability  Right adnexum displays no mass, no tenderness and no fullness   Left adnexum displays no mass, no tenderness and no fullness  Genitourinary Comments: Pap obtained    Musculoskeletal: Normal range of motion  Neurological: She is alert and oriented to person, place, and time  Skin: Skin is warm  Psychiatric: She has a normal mood and affect  Her behavior is normal  Judgment and thought content normal        Assessment     Healthy female exam     we will put a card in the mail with the results  Plan     Mammogram ordered

## 2020-02-04 NOTE — PROGRESS NOTES
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 ([address] )  Follows with pulmonary   Prevnar was updated  Consistent with the anoro  5  Essential hypertension  This good with the losartan   Try to follow you BP at home if you can  6  Other hyperlipidemia  Lipids are good  continue to carole this in 6 months     7  Elevated LFTs  These are stable  Follows with GI      8  Anxiety  This is good with the xanax  Continue same  rto 6 months  No labs as gi has been ordering these  Subjective:      Patient ID: Fran Harvey is a 60 y o  female  Here today to follow up on several chronic issues   follows with GI for her fatty liver and elevated LFT  Does continue to drink at least  1 6 pack a day  Follows with pulmonary for her COPD  Is on Anoro and is doing well with this  Did get her flu shot this year  Continues to smoke and is not interested in quitting  Is on xanax daily for her anxiety and this has been very successful for her for years and desires to continue this  Is consistent with her medications and does not check her BP when she is not here  The following portions of the patient's history were reviewed and updated as appropriate: allergies, current medications, past family history, past medical history, past social history, past surgical history and problem list   BMI Counseling: Body mass index is 29 88 kg/m²   The BMI is above normal  Nutrition recommendations include reducing portion sizes, decreasing overall calorie intake and 3-5 servings of fruits/vegetables daily  Exercise recommendations include moderate aerobic physical activity for 150 minutes/week  Past Medical History:   Diagnosis Date    Alcoholic fatty liver     Anxiety     Asthma     COPD (chronic obstructive pulmonary disease) (HCC)     Elevated liver function tests     GERD without esophagitis     Hyperlipidemia     Hypertension     IBS (irritable bowel syndrome)     Insomnia     Insomnia     Nervousness     Nicotine dependence     Other specified urinary incontinence     RLS (restless legs syndrome)      Past Surgical History:   Procedure Laterality Date    BACK SURGERY      BREAST BIOPSY Right 11/01/2004    US guided, benign per MRS    CATARACT EXTRACTION, BILATERAL  08/01/2018    COLONOSCOPY N/A 5/8/2019    Procedure: COLONOSCOPY;  Surgeon: Laura Egan MD;  Location: [facility]; Service: Gastroenterology    EGD AND COLONOSCOPY N/A 3/19/2018    Procedure: EGD AND COLONOSCOPY;  Surgeon: Laura Egan MD;  Location: [facility]; Service: Gastroenterology    ESOPHAGOGASTRODUODENOSCOPY N/A 5/8/2019    Procedure: ESOPHAGOGASTRODUODENOSCOPY (EGD); Surgeon: Laura Egan MD;  Location: [facility];   Service: Gastroenterology    HYSTERECTOMY      KNEE SURGERY      KNEE SURGERY      LUMBAR LAMINECTOMY  04/1999    LYMPH NODE BIOPSY      TOTAL ABDOMINAL HYSTERECTOMY  02/05/2008    TUBAL LIGATION      TUBAL LIGATION  1989     Family History   Problem Relation Age of Onset    Breast cancer Mother 28    Substance Abuse Neg Hx     Mental illness Neg Hx      Social History   Social History     Socioeconomic History    Marital status: /Civil Union     Spouse name: Not on file    Number of children: Not on file    Years of education: Not on file    Highest education level: Not on file   Occupational History    Not on file   Social Needs    Financial resource strain: Not on file    Food insecurity:     Worry: Not on file     Inability: Not on file    Transportation needs:     Medical: Not on file     Non-medical: Not on file   Tobacco Use    Smoking status: Current Every Day Smoker     Packs/day: 0 50     Types: Cigarettes     Start date: 1    Smokeless tobacco: Never Used    Tobacco comment: Per allscripts - 1/2 PPD cigs - smoking for 40 years   Substance and Sexual Activity    Alcohol use: Yes     Alcohol/week: 3 0 standard drinks     Types: 3 Cans of beer per week     Comment: Daily; just cut back from 6 beers/day  Per allscripts -  3 beers a night    Drug use: No    Sexual activity: Yes     Partners: Male   Lifestyle    Physical activity:     Days per week: Not on file     Minutes per session: Not on file    Stress: Not on file   Relationships    Social connections:     Talks on phone: Not on file     Gets together: Not on file     Attends Congregational service: Not on file     Active member of club or organization: Not on file     Attends meetings of clubs or organizations: Not on file     Relationship status: Not on file    Intimate partner violence:     Fear of current or ex partner: Not on file     Emotionally abused: Not on file     Physically abused: Not on file     Forced sexual activity: Not on file   Other Topics Concern    Not on file   Social History Narrative    Has carbon monoxide detectors in home    Has smoke detectors    Uses safety equipment - seatbelts     Review of Systems   Constitutional: Negative  Respiratory: Negative  Cardiovascular: Negative  Musculoskeletal: Negative  Skin: Negative  Neurological: Negative  Psychiatric/Behavioral: Negative  Vitals:    02/04/20 1322   BP: 132/80   BP Location: Left arm   Patient Position: Sitting   Cuff Size: Standard   Pulse: 80   Resp: 16   Weight: 79 kg (174 lb 1 6 oz)   Height: 5' 4" (1 626 m)       Objective:   Wt Readings from Last 3 Encounters:   02/04/20 79 kg (174 lb 1 6 oz)   11/15/19 77 6 kg (171 lb)   09/30/19 73 5 kg (162 lb)     BP Readings from Last 3 Encounters:   02/04/20 132/80   11/15/19 114/80   09/30/19 120/78     Pulse Readings from Last 3 Encounters:   02/04/20 80   11/15/19 81   09/30/19 70     BMI Readings from Last 3 Encounters:   02/04/20 29 88 kg/m²   11/15/19 29 35 kg/m²   09/30/19 27 81 kg/m²     Office Visit on 09/30/2019   Component Date Value Ref Range Status    OTHER 09/30/2019    Final    9/30/2019     RAPID STREP A 09/30/2019 Negative  Negative Final    Cholesterol, Total 01/22/2020 171  100 - 199 mg/dL Final    Triglycerides 01/22/2020 102  0 - 149 mg/dL Final    HDL 01/22/2020 54  >39 mg/dL Final    VLDL Cholesterol Calculated 01/22/2020 20  5 - 40 mg/dL Final    LDL Direct 01/22/2020 97  0 - 99 mg/dL Final    T  Chol/HDL Ratio 01/22/2020 3 2  0 0 - 4 4 ratio Final    Comment:                                   T  Chol/HDL Ratio                                              Men  Women                                1/2 Avg  Risk  3 4    3 3                                    Avg Risk  5 0    4 4                                 2X Avg  Risk  9 6    7 1                                 3X Avg  Risk 23 4   11 0      White Blood Cell Count 01/22/2020 6 0  3 4 - 10 8 x10E3/uL Final    Red Blood Cell Count 01/22/2020 4 00  3 77 - 5 28 x10E6/uL Final    Hemoglobin 01/22/2020 13 5  11 1 - 15 9 g/dL Final    HCT 01/22/2020 39 5  34 0 - 46 6 % Final    MCV 01/22/2020 99* 79 - 97 fL Final    MCH 01/22/2020 33 8* 26 6 - 33 0 pg Final    MCHC 01/22/2020 34 2  31 5 - 35 7 g/dL Final    RDW 01/22/2020 13 1  11 7 - 15 4 % Final    Platelet Count 27/05/8060 117* 150 - 450 x10E3/uL Final    Neutrophils 01/22/2020 42  Not Estab  % Final    Lymphocytes 01/22/2020 45  Not Estab  % Final    Monocytes 01/22/2020 11  Not Estab  % Final    Eosinophils 01/22/2020 1  Not Estab  % Final    Basophils PCT 01/22/2020 1  Not Estab  % Final    Neutrophils (Absolute) 01/22/2020 2 5  1 4 - 7 0 x10E3/uL Final    Lymphocytes (Absolute) 01/22/2020 2 7  0 7 - 3 1 x10E3/uL Final    Monocytes (Absolute) 01/22/2020 0 7  0 1 - 0 9 x10E3/uL Final    Eosinophils (Absolute) 01/22/2020 0 1  0 0 - 0 4 x10E3/uL Final    Basophils ABS 01/22/2020 0 1  0 0 - 0 2 x10E3/uL Final    Immature Granulocytes 01/22/2020 0  Not Estab  % Final    Immature Granulocytes (Absolute) 01/22/2020 0 0  0 0 - 0 1 x10E3/uL Final    Glucose, Random 01/22/2020 87  65 - 99 mg/dL Final    BUN 01/22/2020 13  8 - 27 mg/dL Final    Creatinine 01/22/2020 0 86  0 57 - 1 00 mg/dL Final    eGFR Non African American 01/22/2020 74  >59 mL/min/1 73 Final    eGFR African American 01/22/2020 85  >59 mL/min/1 73 Final    SL AMB BUN/CREATININE RATIO 01/22/2020 15  12 - 28 Final    Sodium 01/22/2020 135  134 - 144 mmol/L Final    Potassium 01/22/2020 4 2  3 5 - 5 2 mmol/L Final    Chloride 01/22/2020 102  96 - 106 mmol/L Final    CO2 01/22/2020 21  20 - 29 mmol/L Final    CALCIUM 01/22/2020 8 4* 8 7 - 10 3 mg/dL Final    Protein, Total 01/22/2020 7 8  6 0 - 8 5 g/dL Final    Albumin 01/22/2020 3 5* 3 8 - 4 9 g/dL Final                  **Please note reference interval change**    Globulin, Total 01/22/2020 4 3  1 5 - 4 5 g/dL Final    Albumin/Globulin Ratio 01/22/2020 0 8* 1 2 - 2 2 Final    TOTAL BILIRUBIN 01/22/2020 0 4  0 0 - 1 2 mg/dL Final    Alk Phos Isoenzymes 01/22/2020 136* 39 - 117 IU/L Final    AST 01/22/2020 64* 0 - 40 IU/L Final    ALT 01/22/2020 35* 0 - 32 IU/L Final    TSH 01/22/2020 3 470  0 450 - 4 500 uIU/mL Final    Beta Strep Grp A Culture 09/30/2019 Negative   Final   Office Visit on 08/07/2019   Component Date Value Ref Range Status    LEUKOCYTE ESTERASE,UA 08/07/2019 trace   Final    NITRITE,UA 08/07/2019 negative   Final    SL AMB POCT UROBILINOGEN 08/07/2019 0 2   Final    POCT URINE PROTEIN 08/07/2019 negative   Final     PH,UA 08/07/2019 5 0   Final    BLOOD,UA 08/07/2019 small   Final    SPECIFIC GRAVITY,UA 08/07/2019 1 010   Final    KETONES,UA 08/07/2019 negative   Final    BILIRUBIN,UA 08/07/2019 negative   Final    GLUCOSE, UA 08/07/2019 negative   Final     COLOR,UA 08/07/2019 sheryl   Final    CLARITY,UA 08/07/2019 clear   Final    Urine Culture Result 08/07/2019 Final report   Final    Result 1 08/07/2019 No growth   Final   Admission on 05/08/2019, Discharged on 05/08/2019   Component Date Value Ref Range Status    Case Report 05/08/2019    Final                    Value:Surgical Pathology Report                         Case: [id number]                                   Authorizing Provider:  Mart Neri MD           Collected:           05/08/2019 7671              Ordering Location:     [address] [place] [address]        Received:            05/08/2019 [facility]                                                     Pathologist:           Jolie Wolfe MD                                                         Specimens:   A) - Duodenum, Duodenum r/o celiac                                                                  B) - Stomach, Stomach gastritis, r/o h pylori                                                       C) - Colon, random colon, microscopic colitis                                              Final Diagnosis 05/08/2019    Final                    Value: This result contains rich text formatting which cannot be displayed here   Additional Information 05/08/2019    Final                    Value: This result contains rich text formatting which cannot be displayed here  Francis Andrew Description 05/08/2019    Final                    Value: This result contains rich text formatting which cannot be displayed here     Office Visit on 03/14/2019   Component Date Value Ref Range Status    Cholesterol, Total 09/24/2019 172  100 - 199 mg/dL Final    Triglycerides 09/24/2019 109  0 - 149 mg/dL Final    HDL 09/24/2019 51  >39 mg/dL Final    VLDL Cholesterol Calculated 09/24/2019 22  5 - 40 mg/dL Final    LDL Direct 09/24/2019 99  0 - 99 mg/dL Final    T  Chol/HDL Ratio 09/24/2019 3 4  0 0 - 4 4 ratio Final    Comment:                                   T  Chol/HDL Ratio                                              Men  Women                                1/2 Avg  Risk  3 4    3 3                                    Avg Risk  5 0    4 4                                 2X Avg  Risk  9 6    7 1                                 3X Avg  Risk 23 4   11 0      White Blood Cell Count 09/24/2019 6 1  3 4 - 10 8 x10E3/uL Final    Red Blood Cell Count 09/24/2019 4 07  3 77 - 5 28 x10E6/uL Final    Hemoglobin 09/24/2019 13 5  11 1 - 15 9 g/dL Final    HCT 09/24/2019 40 4  34 0 - 46 6 % Final    MCV 09/24/2019 99* 79 - 97 fL Final    MCH 09/24/2019 33 2* 26 6 - 33 0 pg Final    MCHC 09/24/2019 33 4  31 5 - 35 7 g/dL Final    RDW 09/24/2019 13 7  12 3 - 15 4 % Final    Platelet Count 99/28/1300 112* 150 - 450 x10E3/uL Final    Neutrophils 09/24/2019 49  Not Estab  % Final    Lymphocytes 09/24/2019 40  Not Estab  % Final    Monocytes 09/24/2019 9  Not Estab  % Final    Eosinophils 09/24/2019 1  Not Estab  % Final    Basophils PCT 09/24/2019 1  Not Estab  % Final    Neutrophils (Absolute) 09/24/2019 3 0  1 4 - 7 0 x10E3/uL Final    Lymphocytes (Absolute) 09/24/2019 2 5  0 7 - 3 1 x10E3/uL Final    Monocytes (Absolute) 09/24/2019 0 6  0 1 - 0 9 x10E3/uL Final    Eosinophils (Absolute) 09/24/2019 0 0  0 0 - 0 4 x10E3/uL Final    Basophils ABS 09/24/2019 0 1  0 0 - 0 2 x10E3/uL Final    Immature Granulocytes 09/24/2019 0  Not Estab  % Final    Immature Granulocytes (Absolute) 09/24/2019 0 0  0 0 - 0 1 x10E3/uL Final    Glucose, Random 09/24/2019 89  65 - 99 mg/dL Final    BUN 09/24/2019 9  8 - 27 mg/dL Final    Creatinine 09/24/2019 0 76  0 57 - 1 00 mg/dL Final    eGFR Non  09/24/2019 86  >59 mL/min/1 73 Final    eGFR  09/24/2019 99  >59 mL/min/1 73 Final    SL AMB BUN/CREATININE RATIO 09/24/2019 12  12 - 28 Final    Sodium 09/24/2019 138  134 - 144 mmol/L Final    Potassium 09/24/2019 4 3  3 5 - 5 2 mmol/L Final    Chloride 09/24/2019 102  96 - 106 mmol/L Final    CO2 09/24/2019 22  20 - 29 mmol/L Final    CALCIUM 09/24/2019 8 7  8 7 - 10 3 mg/dL Final    Protein, Total 09/24/2019 7 7  6 0 - 8 5 g/dL Final    Albumin 09/24/2019 3 8  3 6 - 4 8 g/dL Final    Globulin, Total 09/24/2019 3 9  1 5 - 4 5 g/dL Final    Albumin/Globulin Ratio 09/24/2019 1 0* 1 2 - 2 2 Final    TOTAL BILIRUBIN 09/24/2019 0 7  0 0 - 1 2 mg/dL Final    Alk Phos Isoenzymes 09/24/2019 114  39 - 117 IU/L Final    AST 09/24/2019 66* 0 - 40 IU/L Final    ALT 09/24/2019 38* 0 - 32 IU/L Final   Office Visit on 09/13/2018   Component Date Value Ref Range Status    Cholesterol, Total 03/04/2019 161  100 - 199 mg/dL Final    Triglycerides 03/04/2019 85  0 - 149 mg/dL Final    HDL 03/04/2019 42  >39 mg/dL Final    VLDL Cholesterol Calculated 03/04/2019 17  5 - 40 mg/dL Final    LDL Direct 03/04/2019 102* 0 - 99 mg/dL Final    T  Chol/HDL Ratio 03/04/2019 3 8  0 0 - 4 4 ratio Final    Comment:                                   T  Chol/HDL Ratio                                              Men  Women                                1/2 Avg  Risk  3 4    3 3                                    Avg Risk  5 0    4 4                                 2X Avg  Risk  9 6    7 1                                 3X Avg  Risk 23 4   11 0      TSH 03/04/2019 3 490  0 450 - 4 500 uIU/mL Final    Glucose, Random 03/04/2019 105* 65 - 99 mg/dL Final    BUN 03/04/2019 8  6 - 24 mg/dL Final    Creatinine 03/04/2019 0 79  0 57 - 1 00 mg/dL Final    eGFR Non African American 03/04/2019 82  >59 mL/min/1 73 Final    eGFR African American 03/04/2019 95  >59 mL/min/1 73 Final    SL AMB BUN/CREATININE RATIO 03/04/2019 10  9 - 23 Final    Sodium 03/04/2019 137  134 - 144 mmol/L Final    Potassium 03/04/2019 4 7  3 5 - 5 2 mmol/L Final    Chloride 03/04/2019 100  96 - 106 mmol/L Final    CO2 03/04/2019 25  20 - 29 mmol/L Final    CALCIUM 03/04/2019 9 3  8 7 - 10 2 mg/dL Final    Protein, Total 03/04/2019 7 6  6 0 - 8 5 g/dL Final    Albumin 03/04/2019 3 5  3 5 - 5 5 g/dL Final    Globulin, Total 03/04/2019 4 1  1 5 - 4 5 g/dL Final    Albumin/Globulin Ratio 03/04/2019 0 9* 1 2 - 2 2 Final    TOTAL BILIRUBIN 03/04/2019 0 7  0 0 - 1 2 mg/dL Final    Alk Phos Isoenzymes 03/04/2019 128* 39 - 117 IU/L Final    AST 03/04/2019 78* 0 - 40 IU/L Final    ALT 03/04/2019 40* 0 - 32 IU/L Final    White Blood Cell Count 03/04/2019 5 4  3 4 - 10 8 x10E3/uL Final    Red Blood Cell Count 03/04/2019 4 08  3 77 - 5 28 x10E6/uL Final    Hemoglobin 03/04/2019 13 7  11 1 - 15 9 g/dL Final    HCT 03/04/2019 40 8  34 0 - 46 6 % Final    MCV 03/04/2019 100* 79 - 97 fL Final    MCH 03/04/2019 33 6* 26 6 - 33 0 pg Final    MCHC 03/04/2019 33 6  31 5 - 35 7 g/dL Final    RDW 03/04/2019 13 6  12 3 - 15 4 % Final    Platelet Count 46/03/2887 104* 150 - 379 x10E3/uL Final    Neutrophils 03/04/2019 51  Not Estab  % Final    Lymphocytes 03/04/2019 34  Not Estab  % Final    Monocytes 03/04/2019 12  Not Estab  % Final    Eosinophils 03/04/2019 1  Not Estab  % Final    Basophils PCT 03/04/2019 2  Not Estab  % Final    Neutrophils (Absolute) 03/04/2019 2 8  1 4 - 7 0 x10E3/uL Final    Lymphocytes (Absolute) 03/04/2019 1 8  0 7 - 3 1 x10E3/uL Final    Monocytes (Absolute) 03/04/2019 0 7  0 1 - 0 9 x10E3/uL Final    Eosinophils (Absolute) 03/04/2019 0 1  0 0 - 0 4 x10E3/uL Final    Basophils ABS 03/04/2019 0 1  0 0 - 0 2 x10E3/uL Final    Immature Granulocytes 03/04/2019 0  Not Estab  % Final    Immature Granulocytes (Absolute) 03/04/2019 0 0  0 0 - 0 1 x10E3/uL Final   Office Visit on 08/07/2018   Component Date Value Ref Range Status    OTHER 08/07/2018 other   Corrected    08/07/2018   Office Visit on 06/11/2018   Component Date Value Ref Range Status    Glucose, Random 06/12/2018 108* 65 - 99 mg/dL Final    BUN 06/12/2018 8  6 - 24 mg/dL Final    Creatinine 06/12/2018 0 75  0 57 - 1 00 mg/dL Final    eGFR Non  06/12/2018 88  >59 mL/min/1 73 Final    eGFR African American 06/12/2018 101  >59 mL/min/1 73 Final    SL AMB BUN/CREATININE RATIO 06/12/2018 11  9 - 23 Final    Sodium 06/12/2018 139  134 - 144 mmol/L Final    Potassium 06/12/2018 4 4  3 5 - 5 2 mmol/L Final    Chloride 06/12/2018 99  96 - 106 mmol/L Final    CO2 06/12/2018 22  20 - 29 mmol/L Final                  **Please note reference interval change**    CALCIUM 06/12/2018 9 5  8 7 - 10 2 mg/dL Final    Protein, Total 06/12/2018 7 7  6 0 - 8 5 g/dL Final    Albumin 06/12/2018 3 8  3 5 - 5 5 g/dL Final    Globulin, Total 06/12/2018 3 9  1 5 - 4 5 g/dL Final    Albumin/Globulin Ratio 06/12/2018 1 0* 1 2 - 2 2 Final    TOTAL BILIRUBIN 06/12/2018 0 3  0 0 - 1 2 mg/dL Final    Alk Phos Isoenzymes 06/12/2018 133* 39 - 117 IU/L Final    AST 06/12/2018 86* 0 - 40 IU/L Final    ALT 06/12/2018 52* 0 - 32 IU/L Final    White Blood Cell Count 06/12/2018 6 6  3 4 - 10 8 x10E3/uL Final    Red Blood Cell Count 06/12/2018 4 32  3 77 - 5 28 x10E6/uL Final    Hemoglobin 06/12/2018 15 0  11 1 - 15 9 g/dL Final    HCT 06/12/2018 42 3  34 0 - 46 6 % Final    MCV 06/12/2018 98* 79 - 97 fL Final    MCH 06/12/2018 34 7* 26 6 - 33 0 pg Final    MCHC 06/12/2018 35 5  31 5 - 35 7 g/dL Final    RDW 06/12/2018 12 7  12 3 - 15 4 % Final    Platelet Count 51/33/9090 131* 150 - 379 x10E3/uL Final    Neutrophils 06/12/2018 55  Not Estab  % Final    Lymphocytes 06/12/2018 34  Not Estab  % Final    Monocytes 06/12/2018 9  Not Estab  % Final    Eosinophils 06/12/2018 1  Not Estab  % Final    Basophils PCT 06/12/2018 1  Not Estab  % Final    Neutrophils (Absolute) 06/12/2018 3 7  1 4 - 7 0 x10E3/uL Final    Lymphocytes (Absolute) 06/12/2018 2 2  0 7 - 3 1 x10E3/uL Final    Monocytes (Absolute) 06/12/2018 0 6  0 1 - 0 9 x10E3/uL Final    Eosinophils (Absolute) 06/12/2018 0 1  0 0 - 0 4 x10E3/uL Final    Basophils ABS 06/12/2018 0 0  0 0 - 0 2 x10E3/uL Final    Immature Granulocytes 06/12/2018 0  Not Estab  % Final    Immature Granulocytes (Absolute) 06/12/2018 0 0  0 0 - 0 1 x10E3/uL Final    INR 06/12/2018 1 1  0 8 - 1 2 Final    Comment: Reference interval is for non-anticoagulated patients  Suggested INR therapeutic range for Vitamin K  antagonist therapy:     Standard Dose (moderate intensity                    therapeutic range):       2 0 - 3 0     Higher intensity therapeutic range       2 5 - 3 5      Prothrombin Time 06/12/2018 11 9  9 1 - 12 0 sec Final   Annual Exam on 05/24/2018   Component Date Value Ref Range Status    Diagnosis: 05/24/2018 Comment   Final    Comment: NEGATIVE FOR INTRAEPITHELIAL LESION AND MALIGNANCY  THIS SPECIMEN WAS RESCREENED AS PART OF OUR  PROGRAM   THE CYTOLOGY PROCESSING WAS PERFORMED AT THE [facility] LOCATED AT  [place] [address], [address] [place], [place] [id number]   Specimen Adequacy 05/24/2018 Comment   Final    Comment: Satisfactory for evaluation  Endocervical and/or squamous metaplastic  cells (endocervical component) are present   Clinician Provided ICD10 05/24/2018 Comment   Final    Z12 4    Performed by: 05/24/2018 Comment   Final    Liz Dueñas, Cytotechnologist (ASCP)    QC reviewed by: 05/24/2018 Comment   Final    Lizette Perez, Cytotechnologist (ASCP)    SL AMB   05/24/2018   Final    Note: 05/24/2018 Comment   Final    Comment: The Pap smear is a screening test designed to aid in the detection of  premalignant and malignant conditions of the uterine cervix  It is not a  diagnostic procedure and should not be used as the sole means of detecting  cervical cancer  Both false-positive and false-negative reports do occur   SL AMB   05/24/2018 Comment   Final    Comment: The HPV DNA reflex criteria were not met with this specimen result  therefore, no HPV testing was performed  Admission on 03/19/2018, Discharged on 03/19/2018   Component Date Value Ref Range Status    Case Report 03/19/2018    Final                    Value:Surgical Pathology Report                         Case: [id number]                                   Authorizing Provider:  Garry Mena MD           Collected:           03/19/2018 1216              Ordering Location:     [address] [place] [address]        Received:            03/19/2018 [address] [place] [address] Endoscopy                                                     Pathologist:           Toby Yuan MD                                                              Specimens:   A) - Duodenum, cold bx's r/o celiac                                                                 B) - Stomach, gastric bx's r/o h pylori                                                             C) - Small Bowel, NOS, random TI bx's                                                               D) - Colon, random colon bx's mild colitis                                                 Final Diagnosis 03/19/2018    Final                    Value: This result contains rich text formatting which cannot be displayed here   Note 03/19/2018    Final                    Value: This result contains rich text formatting which cannot be displayed here   Additional Information 03/19/2018    Final                    Value: This result contains rich text formatting which cannot be displayed here  Thelma Nair Gross Description 03/19/2018    Final                    Value: This result contains rich text formatting which cannot be displayed here     Office Visit on 03/05/2018   Component Date Value Ref Range Status    Glucose, Random 08/27/2018 106* 65 - 99 mg/dL Final    BUN 08/27/2018 8  6 - 24 mg/dL Final    Creatinine 08/27/2018 0  86  0 57 - 1 00 mg/dL Final    eGFR Non  08/27/2018 74  >59 mL/min/1 73 Final    eGFR  08/27/2018 86  >59 mL/min/1 73 Final    SL AMB BUN/CREATININE RATIO 08/27/2018 9  9 - 23 Final    Sodium 08/27/2018 135  134 - 144 mmol/L Final    Potassium 08/27/2018 4 7  3 5 - 5 2 mmol/L Final    Chloride 08/27/2018 94* 96 - 106 mmol/L Final    CO2 08/27/2018 23  20 - 29 mmol/L Final    CALCIUM 08/27/2018 9 4  8 7 - 10 2 mg/dL Final    Protein, Total 08/27/2018 8 3  6 0 - 8 5 g/dL Final    Albumin 08/27/2018 3 6  3 5 - 5 5 g/dL Final    Globulin, Total 08/27/2018 4 7* 1 5 - 4 5 g/dL Final    Albumin/Globulin Ratio 08/27/2018 0 8* 1 2 - 2 2 Final    TOTAL BILIRUBIN 08/27/2018 0 9  0 0 - 1 2 mg/dL Final    Alk Phos Isoenzymes 08/27/2018 135* 39 - 117 IU/L Final    AST 08/27/2018 121* 0 - 40 IU/L Final    ALT 08/27/2018 56* 0 - 32 IU/L Final    White Blood Cell Count 08/27/2018 6 8  3 4 - 10 8 x10E3/uL Final    Red Blood Cell Count 08/27/2018 4 49  3 77 - 5 28 x10E6/uL Final    Hemoglobin 08/27/2018 15 3  11 1 - 15 9 g/dL Final    HCT 08/27/2018 44 4  34 0 - 46 6 % Final    MCV 08/27/2018 99* 79 - 97 fL Final    MCH 08/27/2018 34 1* 26 6 - 33 0 pg Final    MCHC 08/27/2018 34 5  31 5 - 35 7 g/dL Final    RDW 08/27/2018 12 8  12 3 - 15 4 % Final    Platelet Count 73/97/2418 141* 150 - 379 x10E3/uL Final    Neutrophils 08/27/2018 59  Not Estab  % Final    Lymphocytes 08/27/2018 29  Not Estab  % Final    Monocytes 08/27/2018 10  Not Estab  % Final    Eosinophils 08/27/2018 1  Not Estab  % Final    Basophils PCT 08/27/2018 1  Not Estab  % Final    Neutrophils (Absolute) 08/27/2018 4 0  1 4 - 7 0 x10E3/uL Final    Lymphocytes (Absolute) 08/27/2018 2 0  0 7 - 3 1 x10E3/uL Final    Monocytes (Absolute) 08/27/2018 0 7  0 1 - 0 9 x10E3/uL Final    Eosinophils (Absolute) 08/27/2018 0 1  0 0 - 0 4 x10E3/uL Final    Basophils ABS 08/27/2018 0 1  0 0 - 0 2 x10E3/uL Final    Immature Granulocytes 08/27/2018 0  Not Estab  % Final    Immature Granulocytes (Absolute) 08/27/2018 0 0  0 0 - 0 1 x10E3/uL Final   There may be more visits with results that are not included  Physical Exam   Constitutional: She is oriented to person, place, and time  She appears well-developed and well-nourished  Neck: Normal range of motion  Neck supple  No thyromegaly present  Cardiovascular: Normal rate, regular rhythm, S1 normal and normal heart sounds  Exam reveals no distant heart sounds  Pulmonary/Chest: Effort normal and breath sounds normal  No respiratory distress  She has no wheezes  Musculoskeletal: Normal range of motion  Neurological: She is alert and oriented to person, place, and time  Skin: Skin is warm and dry  Psychiatric: She has a normal mood and affect   Her behavior is normal  Judgment and thought content normal

## 2020-02-09 LAB
CYTOLOGIST CVX/VAG CYTO: NORMAL
DX ICD CODE: NORMAL
OTHER STN SPEC: NORMAL
OTHER STN SPEC: NORMAL
PATH REPORT.FINAL DX SPEC: NORMAL
PATHOLOGIST CVX/VAG CYTO: NORMAL
RECOM F/U CVX/VAG CYTO: NORMAL
SL AMB NOTE:: NORMAL
SL AMB SPECIMEN ADEQUACY: NORMAL

## 2020-03-19 ENCOUNTER — HOSPITAL ENCOUNTER (OUTPATIENT)
Dept: BONE DENSITY | Facility: IMAGING CENTER | Age: 60
Discharge: HOME | End: 2020-03-19
Payer: COMMERCIAL

## 2020-03-19 VITALS — BODY MASS INDEX: 29.02 KG/M2 | WEIGHT: 170 LBS | HEIGHT: 64 IN

## 2020-03-19 DIAGNOSIS — Z12.39 SCREENING FOR MALIGNANT NEOPLASM OF BREAST: ICD-10-CM

## 2020-03-19 PROCEDURE — 77063 BREAST TOMOSYNTHESIS BI: CPT

## 2020-03-19 PROCEDURE — 77067 SCR MAMMO BI INCL CAD: CPT

## 2020-06-24 ENCOUNTER — TELEPHONE (OUTPATIENT)
Dept: FAMILY MEDICINE CLINIC | Facility: CLINIC | Age: 60
End: 2020-06-24

## 2020-06-25 DIAGNOSIS — F41.9 ANXIETY: ICD-10-CM

## 2020-06-25 RX ORDER — ALPRAZOLAM 1 MG/1
TABLET ORAL
Qty: 60 TABLET | Refills: 5 | Status: SHIPPED | OUTPATIENT
Start: 2020-06-25 | End: 2020-12-30 | Stop reason: SDUPTHER

## 2020-07-09 ENCOUNTER — TELEMEDICINE (OUTPATIENT)
Dept: FAMILY MEDICINE CLINIC | Facility: CLINIC | Age: 60
End: 2020-07-09
Payer: COMMERCIAL

## 2020-07-09 VITALS — TEMPERATURE: 97.6 F | WEIGHT: 171.8 LBS | BODY MASS INDEX: 29.49 KG/M2

## 2020-07-09 DIAGNOSIS — Z20.828 EXPOSURE TO SARS-ASSOCIATED CORONAVIRUS: ICD-10-CM

## 2020-07-09 DIAGNOSIS — Z20.828 EXPOSURE TO SARS-ASSOCIATED CORONAVIRUS: Primary | ICD-10-CM

## 2020-07-09 PROBLEM — Z20.822 SUSPECTED COVID-19 VIRUS INFECTION: Status: ACTIVE | Noted: 2020-07-09

## 2020-07-09 PROCEDURE — U0003 INFECTIOUS AGENT DETECTION BY NUCLEIC ACID (DNA OR RNA); SEVERE ACUTE RESPIRATORY SYNDROME CORONAVIRUS 2 (SARS-COV-2) (CORONAVIRUS DISEASE [COVID-19]), AMPLIFIED PROBE TECHNIQUE, MAKING USE OF HIGH THROUGHPUT TECHNOLOGIES AS DESCRIBED BY CMS-2020-01-R: HCPCS | Performed by: FAMILY MEDICINE

## 2020-07-09 PROCEDURE — 99214 OFFICE O/P EST MOD 30 MIN: CPT | Performed by: FAMILY MEDICINE

## 2020-07-09 RX ORDER — GUSELKUMAB 100 MG/ML
INJECTION SUBCUTANEOUS
COMMUNITY
Start: 2020-07-02 | End: 2021-10-08 | Stop reason: ALTCHOICE

## 2020-07-09 NOTE — PROGRESS NOTES
COVID-19 Virtual Visit     Assessment/Plan    The patient is lost taste and smell for about 2 weeks, does not have a fever but woke up with a red rash that blanches all over her body  On her stomach back and chest   It is not as red as it was earlier this morning  She is not eating anything different  She is not taking any different medication or using new products  She has been at home this whole time but her  works and she baby-sits her grandchild  Her son comes to pick the grandchild up  Plan  COVID testing today  Isolation discussed  Patient has vacation the Saturday down [facility] and just spent 6200 dollars to rent a place  Told her my advice is the isolation as per the [facility]    Patient's 's iPhone is [phone]        Problem List Items Addressed This Visit     None        This virtual check-in was done via [employer] and patient was informed that this is not a secure, HIPAA-complaint platform  She agrees to proceed     Disposition:      I referred Jose to one of our centralized sites for a COVID-19 swab    I spent 15 minutes directly with the patient during this visit    Encounter provider Cole Schwarz DO    Provider located at [address] [place] [phone]    Recent Visits  No visits were found meeting these conditions  Showing recent visits within past 7 days and meeting all other requirements     Today's Visits  Date Type Provider Dept   07/09/20 Telemedicine Cole Schwarz DO [address]   Showing today's visits and meeting all other requirements     Future Appointments  No visits were found meeting these conditions     Showing future appointments within next 150 days and meeting all other requirements        Patient agrees to participate in a virtual check in via telephone or video visit instead of presenting to the office to address urgent/immediate medical needs  Patient is aware this is a billable service  After connecting through [employer], the patient was identified by name and date of birth  Louie Burkitt was informed that this was a telemedicine visit and that the exam was being conducted confidentially over secure lines  My office door was closed  No one else was in the room  Louie Burkitt acknowledged consent and understanding of privacy and security of the telemedicine visit  I informed the patient that I have reviewed her record in Epic and presented the opportunity for her to ask any questions regarding the visit today  The patient agreed to participate  Subjective  Louie Burkitt is a 63 y o  female who is concerned about COVID-19  She reports anosmia  She has not experienced fever She has not had contact with a person who is under investigation for or who is positive for COVID-19 within the last 14 days  She has not been hospitalized recently for fever and/or lower respiratory symptoms  Past Medical History:   Diagnosis Date    Alcoholic fatty liver     Anxiety     Asthma     COPD (chronic obstructive pulmonary disease) (HCC)     Elevated liver function tests     GERD without esophagitis     Hyperlipidemia     Hypertension     IBS (irritable bowel syndrome)     Insomnia     Insomnia     Nervousness     Nicotine dependence     Other specified urinary incontinence     RLS (restless legs syndrome)        Past Surgical History:   Procedure Laterality Date    BACK SURGERY      BREAST EXCISIONAL BIOPSY Right 11/01/2004    benign    CATARACT EXTRACTION, BILATERAL  08/01/2018    COLONOSCOPY N/A 5/8/2019    Procedure: COLONOSCOPY;  Surgeon: Dangelo Bolivar MD;  Location: [facility]; Service: Gastroenterology    EGD AND COLONOSCOPY N/A 3/19/2018    Procedure: EGD AND COLONOSCOPY;  Surgeon: Dangelo Bolivar MD;  Location: [facility];   Service: Gastroenterology   Lyndsay Donis ESOPHAGOGASTRODUODENOSCOPY N/A 5/8/2019    Procedure: ESOPHAGOGASTRODUODENOSCOPY (EGD); Surgeon: Keyla Alcala MD;  Location: [facility]; Service: Gastroenterology    KNEE SURGERY      KNEE SURGERY      LUMBAR LAMINECTOMY  04/1999    LYMPH NODE BIOPSY      TOTAL ABDOMINAL HYSTERECTOMY  02/05/2008    TUBAL LIGATION      TUBAL LIGATION  1989       Current Outpatient Medications   Medication Sig Dispense Refill    albuterol (2 5 mg/3 mL) 0 083 % nebulizer solution Take 2 5 mg by nebulization 3 (three) times a day      albuterol (PROVENTIL HFA) 90 mcg/act inhaler Inhale 2 puffs every 6 (six) hours as needed for wheezing or shortness of breath 1 Inhaler 6    ALPRAZolam (XANAX) 1 mg tablet TAKE ONE TABLET TWO TIMES A DAY AS NEEDED FOR ANXIETY 60 tablet 5    diphenoxylate-atropine (LOMOTIL) 2 5-0 025 mg per tablet Take 1 tablet by mouth 4 (four) times a day as needed for diarrhea 120 tablet 5    losartan (COZAAR) 100 MG tablet Take 1 tablet (100 mg total) by mouth daily 30 tablet 6    omeprazole (PriLOSEC) 40 MG capsule TAKE ONE CAPSULE BY MOUTH EVERY DAY 30 capsule 5    TREMFYA subcutaneous injection       umeclidinium-vilanterol (ANORO ELLIPTA) 62 5-25 MCG/INH inhaler Inhale 1 puff daily 1 each 6     No current facility-administered medications for this visit  Allergies   Allergen Reactions    Ace Inhibitors Cough       Review of Systems    Video Exam    Vitals:    07/09/20 1053   Temp: 97 6 °F (36 4 °C)   TempSrc: Oral   Weight: 77 9 kg (171 lb 12 8 oz)         Jose appears healthy  Physical Exam     General  Patient in no acute distress, well appearing, well nourished and appears stated age    Mental status  Usual nervous self  Good judgment and insight, oriented to time person and place, recent and remote memory is intact, mood and affect are normal, cooperative, and patient is reasonable      Skin  Small patches of pink skin that tremayne when pressed  VIRTUAL VISIT DISCLAIMER    Jose CONNER Cindy acknowledges that she has consented to an online visit or consultation  She understands that the online visit is based solely on information provided by her, and that, in the absence of a face-to-face physical evaluation by the physician, the diagnosis she receives is both limited and provisional in terms of accuracy and completeness  This is not intended to replace a full medical face-to-face evaluation by the physician  Rashida White understands and accepts these terms

## 2020-07-15 LAB — SARS-COV-2 RNA SPEC QL NAA+PROBE: NOT DETECTED

## 2020-07-16 ENCOUNTER — TELEPHONE (OUTPATIENT)
Dept: FAMILY MEDICINE CLINIC | Facility: CLINIC | Age: 60
End: 2020-07-16

## 2020-07-16 NOTE — RESULT ENCOUNTER NOTE
Call patient regarding [facility] test which is NEGATIVE  Patient to continue to socially distancing and wearing a mask when in public

## 2020-07-16 NOTE — TELEPHONE ENCOUNTER
----- Message from Chavez Espinal DO sent at 7/15/2020  8:40 PM EDT -----  Call patient regarding [address] test which is NEGATIVE  Patient to continue to socially distancing and wearing a mask when in public    Left message to call back

## 2020-08-21 ENCOUNTER — OFFICE VISIT (OUTPATIENT)
Dept: FAMILY MEDICINE CLINIC | Facility: CLINIC | Age: 60
End: 2020-08-21
Payer: COMMERCIAL

## 2020-08-21 VITALS
WEIGHT: 173.6 LBS | OXYGEN SATURATION: 97 % | HEIGHT: 64 IN | BODY MASS INDEX: 29.64 KG/M2 | HEART RATE: 84 BPM | RESPIRATION RATE: 16 BRPM | DIASTOLIC BLOOD PRESSURE: 70 MMHG | TEMPERATURE: 98.2 F | SYSTOLIC BLOOD PRESSURE: 118 MMHG

## 2020-08-21 DIAGNOSIS — E78.49 OTHER HYPERLIPIDEMIA: ICD-10-CM

## 2020-08-21 DIAGNOSIS — R79.89 ELEVATED LFTS: ICD-10-CM

## 2020-08-21 DIAGNOSIS — I10 ESSENTIAL HYPERTENSION: ICD-10-CM

## 2020-08-21 DIAGNOSIS — Z12.31 SCREENING MAMMOGRAM, ENCOUNTER FOR: Primary | ICD-10-CM

## 2020-08-21 DIAGNOSIS — J44.9 MODERATE COPD (CHRONIC OBSTRUCTIVE PULMONARY DISEASE) (HCC): ICD-10-CM

## 2020-08-21 DIAGNOSIS — I10 BENIGN ESSENTIAL HTN: ICD-10-CM

## 2020-08-21 DIAGNOSIS — F10.10 ALCOHOL ABUSE: ICD-10-CM

## 2020-08-21 DIAGNOSIS — F17.200 NICOTINE DEPENDENCE, UNCOMPLICATED, UNSPECIFIED NICOTINE PRODUCT TYPE: ICD-10-CM

## 2020-08-21 PROCEDURE — 99214 OFFICE O/P EST MOD 30 MIN: CPT | Performed by: NURSE PRACTITIONER

## 2020-08-21 PROCEDURE — 3074F SYST BP LT 130 MM HG: CPT | Performed by: NURSE PRACTITIONER

## 2020-08-21 PROCEDURE — 3078F DIAST BP <80 MM HG: CPT | Performed by: NURSE PRACTITIONER

## 2020-08-21 PROCEDURE — 3008F BODY MASS INDEX DOCD: CPT | Performed by: NURSE PRACTITIONER

## 2020-08-21 PROCEDURE — 4004F PT TOBACCO SCREEN RCVD TLK: CPT | Performed by: NURSE PRACTITIONER

## 2020-08-21 RX ORDER — LOSARTAN POTASSIUM 100 MG/1
100 TABLET ORAL DAILY
Qty: 30 TABLET | Refills: 6 | Status: SHIPPED | OUTPATIENT
Start: 2020-08-21 | End: 2020-12-11 | Stop reason: HOSPADM

## 2020-08-21 NOTE — PROGRESS NOTES
@[email or web address]  Chief Complaint   Patient presents with    Hypertension    Hyperlipidemia     Assessment/Plan:    1  Screening mammogram, encounter for  Script given for this and will get this in November when due   2  Elevated LFTs  Follows with GI and due to get this   3  Other hyperlipidemia  Diet controlled due to her elevated LFT> has been good  4  Essential hypertension  Losartan with good control  5  Nicotine dependence, uncomplicated, unspecified nicotine product type  BMI Counseling: Body mass index is 29 8 kg/m²  The BMI is above normal  Nutrition recommendations include reducing portion sizes, decreasing overall calorie intake and 3-5 servings of fruits/vegetables daily  Exercise recommendations include moderate aerobic physical activity for 150 minutes/week  Please get the ct screening done when due in Feb 2021  - CT lung screening program; Future      6  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 ([address] )  Has Anoro and is consistent with its use  Rare use of albuterol MDI  Follows with pulmonary   Does continue to smoke with no desire to quit  7  Alcohol abuse  Continued to stess importance of cutting back and to quit  rto 3 months for carole   Will get labs done soon and I will call her with the results  Subjective:      Patient ID: Martina Goodman is a 63 y o  female  Here today to carole on several chronic issues  States that she is due for her follow up with gi that was scheduled during [facility]   Will reschedule this   continues to follow with Dr Leeann Man for her COPD> does continue to smoke with no desire to quit     Is still using the zanax for her anxiety and refuses to try anything else  Does continue to consume alcohol but states that it is less than in the past  Has not gotten the blood work done that was ordered for todays appt but will do that   Is UTD on mammo,pap and colon      The following portions of the patient's history were reviewed and updated as appropriate: allergies, current medications, past family history, past medical history, past social history, past surgical history and problem list     Past Medical History:   Diagnosis Date    Alcoholic fatty liver     Anxiety     Asthma     COPD (chronic obstructive pulmonary disease) ([facility] [address] )     Elevated liver function tests     GERD without esophagitis     Hyperlipidemia     Hypertension     IBS (irritable bowel syndrome)     Insomnia     Insomnia     Nervousness     Nicotine dependence     Other specified urinary incontinence     RLS (restless legs syndrome)      Past Surgical History:   Procedure Laterality Date    BACK SURGERY      BREAST EXCISIONAL BIOPSY Right 11/01/2004    benign    CATARACT EXTRACTION, BILATERAL  08/01/2018    COLONOSCOPY N/A 5/8/2019    Procedure: COLONOSCOPY;  Surgeon: Colette Lala MD;  Location: [facility]; Service: Gastroenterology    EGD AND COLONOSCOPY N/A 3/19/2018    Procedure: EGD AND COLONOSCOPY;  Surgeon: Colette Lala MD;  Location: [facility]; Service: Gastroenterology    ESOPHAGOGASTRODUODENOSCOPY N/A 5/8/2019    Procedure: ESOPHAGOGASTRODUODENOSCOPY (EGD); Surgeon: Colette Lala MD;  Location: [facility];   Service: Gastroenterology    KNEE SURGERY      KNEE SURGERY      LUMBAR LAMINECTOMY  54/3044    LYMPH NODE BIOPSY      TOTAL ABDOMINAL HYSTERECTOMY  02/05/2008    TUBAL LIGATION      TUBAL LIGATION  1989     Family History   Problem Relation Age of Onset    Breast cancer Mother 28    No Known Problems Father     No Known Problems Sister     No Known Problems Brother     No Known Problems Son     No Known Problems Maternal Grandmother     No Known Problems Maternal Grandfather     No Known Problems Paternal Grandmother     No Known Problems Paternal Grandfather     No Known Problems Maternal Aunt     No Known Problems Maternal Aunt     No Known Problems Paternal Aunt     Substance Abuse Neg Hx     Mental illness Neg Hx      Social History   Social History     Socioeconomic History    Marital status: /Civil Union     Spouse name: Not on file    Number of children: Not on file    Years of education: Not on file    Highest education level: Not on file   Occupational History    Not on file   Social Needs    Financial resource strain: Not on file    Food insecurity     Worry: Not on file     Inability: Not on file   [employer] needs     Medical: Not on file     Non-medical: Not on file   Tobacco Use    Smoking status: Current Every Day Smoker     Packs/day: 0 50     Types: Cigarettes     Start date: 1978    Smokeless tobacco: Never Used    Tobacco comment: Per allscripts - 1/2 PPD cigs - smoking for 40 years   Substance and Sexual Activity    Alcohol use: Yes     Alcohol/week: 3 0 standard drinks     Types: 3 Cans of beer per week     Comment: Daily; just cut back from 6 beers/day  Per allscripts -  3 beers a night    Drug use: No    Sexual activity: Yes     Partners: Male   Lifestyle    Physical activity     Days per week: Not on file     Minutes per session: Not on file    Stress: Not on file   Relationships    Social connections     Talks on phone: Not on file     Gets together: Not on file     Attends Islam service: Not on file     Active member of club or organization: Not on file     Attends meetings of clubs or organizations: Not on file     Relationship status: Not on file    Intimate partner violence     Fear of current or ex partner: Not on file     Emotionally abused: Not on file     Physically abused: Not on file     Forced sexual activity: Not on file   Other Topics Concern    Not on file   Social History Narrative    Has carbon monoxide detectors in home    Has smoke detectors    Uses safety equipment - seatbelts     Review of Systems   Constitutional: Negative  Respiratory: Negative  Cardiovascular: Negative  Musculoskeletal: Negative  Skin: Negative  Neurological: Negative  Psychiatric/Behavioral: Negative  Vitals:    08/21/20 1407   BP: 118/70   BP Location: Left arm   Patient Position: Sitting   Cuff Size: Standard   Pulse: 84   Resp: 16   Temp: 98 2 °F (36 8 °C)   SpO2: 97%   Weight: 78 7 kg (173 lb 9 6 oz)   Height: 5' 4" (1 626 m)       Objective: Wt Readings from Last 3 Encounters:   08/21/20 78 7 kg (173 lb 9 6 oz)   07/09/20 77 9 kg (171 lb 12 8 oz)   03/19/20 77 1 kg (170 lb)     BP Readings from Last 3 Encounters:   08/21/20 118/70   02/04/20 132/80   11/15/19 114/80     Pulse Readings from Last 3 Encounters:   08/21/20 84   02/04/20 80   11/15/19 81     BMI Readings from Last 3 Encounters:   08/21/20 29 80 kg/m²   07/09/20 29 49 kg/m²   03/19/20 29 18 kg/m²     Orders Only on 07/09/2020   Component Date Value Ref Range Status    SARS-CoV-2  07/09/2020 Not Detected  Not Detected Final    Comment: Testing was performed using the .comima SARS-CoV-2 assay  This test was developed and its performance characteristics determined  by [employer]  This test has not been FDA cleared or  approved  This test has been authorized by FDA under an Emergency Use  Authorization (EUA)  This test is only authorized for the duration of  time the declaration that circumstances exist justifying the  authorization of the emergency use of in vitro diagnostic tests for  detection of SARS-CoV-2 virus and/or diagnosis of COVID-19 infection  under section 564(b)(1) of the Act, 21 U  S C  972XLO-8(L)(1), unless  the authorization is terminated or revoked sooner  When diagnostic testing is negative, the possibility of a false  negative result should be considered in the context of a patient's  recent exposures and the presence of clinical signs and symptoms  consistent with COVID-19   An individual without symptoms of COVID-19  and who is not shedding SARS-CoV-2 virus would expect to have                            a  negative (not detected) result in this assay    Office Visit on 02/04/2020   Component Date Value Ref Range Status    Diagnosis: 02/04/2020 Comment   Final    Comment: NEGATIVE FOR INTRAEPITHELIAL LESION OR MALIGNANCY  REACTIVE CELLULAR CHANGES AND/OR REPAIR ARE PRESENT  THE CYTOLOGY PROCESSING WAS PERFORMED AT THE [facility] LOCATED AT  [place] [address], [address], [address] [place] [phone]   Recommendation: 02/04/2020 Comment   Final    Suggest follow up as clinically appropriate   Specimen Adequacy 02/04/2020 Comment   Final    Comment: Satisfactory for evaluation  Endocervical and/or squamous metaplastic  cells (endocervical component) are present   Clinician Provided ICD10 02/04/2020 Comment   Final    [id number]    Performed by: 02/04/2020 Comment   Final    Joe Lepe, Cytotechnologist (ASCP)    Electronically signed by: 02/04/2020 Comment   Final    Diogenes Garces MD, Pathologist     AMB   02/04/2020   Final    Note: 02/04/2020 Comment   Final    Comment: The Pap smear is a screening test designed to aid in the detection of  premalignant and malignant conditions of the uterine cervix  It is not a  diagnostic procedure and should not be used as the sole means of detecting  cervical cancer  Both false-positive and false-negative reports do occur    AMB   02/04/2020 Comment   Final    Comment: The HPV DNA reflex criteria were not met with this specimen result  therefore, no HPV testing was performed  Office Visit on 09/30/2019   Component Date Value Ref Range Status    OTHER 09/30/2019    Final    9/30/2019     RAPID STREP A 09/30/2019 Negative  Negative Final    Cholesterol, Total 01/22/2020 171  100 - 199 mg/dL Final    Triglycerides 01/22/2020 102  0 - 149 mg/dL Final    HDL 01/22/2020 54  >39 mg/dL Final    VLDL Cholesterol Calculated 01/22/2020 20  5 - 40 mg/dL Final    LDL Calculated 01/22/2020 97  0 - 99 mg/dL Final    T   Chol/HDL Ratio 01/22/2020 3 2  0 0 - 4 4 ratio Final    Comment: T  Chol/HDL Ratio                                              Men  Women                                1/2 Avg  Risk  3 4    3 3                                    Avg Risk  5 0    4 4                                 2X Avg  Risk  9 6    7 1                                 3X Avg  Risk 23 4   11 0      White Blood Cell Count 01/22/2020 6 0  3 4 - 10 8 x10E3/uL Final    Red Blood Cell Count 01/22/2020 4 00  3 77 - 5 28 x10E6/uL Final    Hemoglobin 01/22/2020 13 5  11 1 - 15 9 g/dL Final    HCT 01/22/2020 39 5  34 0 - 46 6 % Final    MCV 01/22/2020 99* 79 - 97 fL Final    MCH 01/22/2020 33 8* 26 6 - 33 0 pg Final    MCHC 01/22/2020 34 2  31 5 - 35 7 g/dL Final    RDW 01/22/2020 13 1  11 7 - 15 4 % Final    Platelet Count 57/06/0994 117* 150 - 450 x10E3/uL Final    Neutrophils 01/22/2020 42  Not Estab  % Final    Lymphocytes 01/22/2020 45  Not Estab  % Final    Monocytes 01/22/2020 11  Not Estab  % Final    Eosinophils 01/22/2020 1  Not Estab  % Final    Basophils PCT 01/22/2020 1  Not Estab  % Final    Neutrophils (Absolute) 01/22/2020 2 5  1 4 - 7 0 x10E3/uL Final    Lymphocytes (Absolute) 01/22/2020 2 7  0 7 - 3 1 x10E3/uL Final    Monocytes (Absolute) 01/22/2020 0 7  0 1 - 0 9 x10E3/uL Final    Eosinophils (Absolute) 01/22/2020 0 1  0 0 - 0 4 x10E3/uL Final    Basophils ABS 01/22/2020 0 1  0 0 - 0 2 x10E3/uL Final    Immature Granulocytes 01/22/2020 0  Not Estab  % Final    Immature Granulocytes (Absolute) 01/22/2020 0 0  0 0 - 0 1 x10E3/uL Final    Glucose, Random 01/22/2020 87  65 - 99 mg/dL Final    BUN 01/22/2020 13  8 - 27 mg/dL Final    Creatinine 01/22/2020 0 86  0 57 - 1 00 mg/dL Final    eGFR Non African American 01/22/2020 74  >59 mL/min/1 73 Final    eGFR African American 01/22/2020 85  >59 mL/min/1 73 Final    SL AMB BUN/CREATININE RATIO 01/22/2020 15  12 - 28 Final    Sodium 01/22/2020 135  134 - 144 mmol/L Final    Potassium 01/22/2020 4 2  3 5 - 5 2 mmol/L Final    Chloride 01/22/2020 102  96 - 106 mmol/L Final    CO2 01/22/2020 21  20 - 29 mmol/L Final    CALCIUM 01/22/2020 8 4* 8 7 - 10 3 mg/dL Final    Protein, Total 01/22/2020 7 8  6 0 - 8 5 g/dL Final    Albumin 01/22/2020 3 5* 3 8 - 4 9 g/dL Final                  **Please note reference interval change**    Globulin, Total 01/22/2020 4 3  1 5 - 4 5 g/dL Final    Albumin/Globulin Ratio 01/22/2020 0 8* 1 2 - 2 2 Final    TOTAL BILIRUBIN 01/22/2020 0 4  0 0 - 1 2 mg/dL Final    Alk Phos Isoenzymes 01/22/2020 136* 39 - 117 IU/L Final    AST 01/22/2020 64* 0 - 40 IU/L Final    ALT 01/22/2020 35* 0 - 32 IU/L Final    TSH 01/22/2020 3 470  0 450 - 4 500 uIU/mL Final    Beta Strep Grp A Culture 09/30/2019 Negative   Final   Office Visit on 08/07/2019   Component Date Value Ref Range Status    LEUKOCYTE ESTERASE,UA 08/07/2019 trace   Final    NITRITE,UA 08/07/2019 negative   Final    SL AMB POCT UROBILINOGEN 08/07/2019 0 2   Final    POCT URINE PROTEIN 08/07/2019 negative   Final     PH,UA 08/07/2019 5 0   Final    BLOOD,UA 08/07/2019 small   Final    SPECIFIC GRAVITY,UA 08/07/2019 1 010   Final    KETONES,UA 08/07/2019 negative   Final    BILIRUBIN,UA 08/07/2019 negative   Final    GLUCOSE, UA 08/07/2019 negative   Final     COLOR,UA 08/07/2019 sheryl   Final    CLARITY,UA 08/07/2019 clear   Final    Urine Culture Result 08/07/2019 Final report   Final    Result 1 08/07/2019 No growth   Final   Admission on 05/08/2019, Discharged on 05/08/2019   Component Date Value Ref Range Status    Case Report 05/08/2019    Final                    Value:Surgical Pathology Report                         Case: [id number]                                   Authorizing Provider:  Seven Contreras MD           Collected:           05/08/2019 0907              Ordering Location:     [address] [place] [address] End        [facility]            [facility]ologist:           Denzel Garza MD                                                         Specimens:   A) - Duodenum, Duodenum r/o celiac                                                                  B) - Stomach, Stomach gastritis, r/o h pylori                                                       C) - Colon, random colon, microscopic colitis                                              Final Diagnosis 05/08/2019    Final                    Value: This result contains rich text formatting which cannot be displayed here   Additional Information 05/08/2019    Final                    Value: This result contains rich text formatting which cannot be displayed here  Marcelo Pastel Description 05/08/2019    Final                    Value: This result contains rich text formatting which cannot be displayed here  Office Visit on 03/14/2019   Component Date Value Ref Range Status    Cholesterol, Total 09/24/2019 172  100 - 199 mg/dL Final    Triglycerides 09/24/2019 109  0 - 149 mg/dL Final    HDL 09/24/2019 51  >39 mg/dL Final    VLDL Cholesterol Calculated 09/24/2019 22  5 - 40 mg/dL Final    LDL Calculated 09/24/2019 99  0 - 99 mg/dL Final    T  Chol/HDL Ratio 09/24/2019 3 4  0 0 - 4 4 ratio Final    Comment:                                   T  Chol/HDL Ratio                                              Men  Women                                1/2 Avg  Risk  3 4    3 3                                    Avg Risk  5 0    4 4                                 2X Avg  Risk  9 6    7 1                                 3X Avg  Risk 23 4   11 0      White Blood Cell Count 09/24/2019 6 1  3 4 - 10 8 x10E3/uL Final    Red Blood Cell Count 09/24/2019 4 07  3 77 - 5 28 x10E6/uL Final    Hemoglobin 09/24/2019 13 5  11 1 - 15 9 g/dL Final    HCT 09/24/2019 40 4  34 0 - 46 6 % Final    MCV 09/24/2019 99* 79 - 97 fL Final    MCH 09/24/2019 33 2* 26 6 - 33 0 pg Final  MCHC 09/24/2019 33 4  31 5 - 35 7 g/dL Final    RDW 09/24/2019 13 7  12 3 - 15 4 % Final    Platelet Count 99/16/5978 112* 150 - 450 x10E3/uL Final    Neutrophils 09/24/2019 49  Not Estab  % Final    Lymphocytes 09/24/2019 40  Not Estab  % Final    Monocytes 09/24/2019 9  Not Estab  % Final    Eosinophils 09/24/2019 1  Not Estab  % Final    Basophils PCT 09/24/2019 1  Not Estab  % Final    Neutrophils (Absolute) 09/24/2019 3 0  1 4 - 7 0 x10E3/uL Final    Lymphocytes (Absolute) 09/24/2019 2 5  0 7 - 3 1 x10E3/uL Final    Monocytes (Absolute) 09/24/2019 0 6  0 1 - 0 9 x10E3/uL Final    Eosinophils (Absolute) 09/24/2019 0 0  0 0 - 0 4 x10E3/uL Final    Basophils ABS 09/24/2019 0 1  0 0 - 0 2 x10E3/uL Final    Immature Granulocytes 09/24/2019 0  Not Estab  % Final    Immature Granulocytes (Absolute) 09/24/2019 0 0  0 0 - 0 1 x10E3/uL Final    Glucose, Random 09/24/2019 89  65 - 99 mg/dL Final    BUN 09/24/2019 9  8 - 27 mg/dL Final    Creatinine 09/24/2019 0 76  0 57 - 1 00 mg/dL Final    eGFR Non  09/24/2019 86  >59 mL/min/1 73 Final    eGFR  09/24/2019 99  >59 mL/min/1 73 Final    SL AMB BUN/CREATININE RATIO 09/24/2019 12  12 - 28 Final    Sodium 09/24/2019 138  134 - 144 mmol/L Final    Potassium 09/24/2019 4 3  3 5 - 5 2 mmol/L Final    Chloride 09/24/2019 102  96 - 106 mmol/L Final    CO2 09/24/2019 22  20 - 29 mmol/L Final    CALCIUM 09/24/2019 8 7  8 7 - 10 3 mg/dL Final    Protein, Total 09/24/2019 7 7  6 0 - 8 5 g/dL Final    Albumin 09/24/2019 3 8  3 6 - 4 8 g/dL Final    Globulin, Total 09/24/2019 3 9  1 5 - 4 5 g/dL Final    Albumin/Globulin Ratio 09/24/2019 1 0* 1 2 - 2 2 Final    TOTAL BILIRUBIN 09/24/2019 0 7  0 0 - 1 2 mg/dL Final    Alk Phos Isoenzymes 09/24/2019 114  39 - 117 IU/L Final    AST 09/24/2019 66* 0 - 40 IU/L Final    ALT 09/24/2019 38* 0 - 32 IU/L Final   Office Visit on 09/13/2018   Component Date Value Ref Range Status  Cholesterol, Total 03/04/2019 161  100 - 199 mg/dL Final    Triglycerides 03/04/2019 85  0 - 149 mg/dL Final    HDL 03/04/2019 42  >39 mg/dL Final    VLDL Cholesterol Calculated 03/04/2019 17  5 - 40 mg/dL Final    LDL Calculated 03/04/2019 102* 0 - 99 mg/dL Final    T  Chol/HDL Ratio 03/04/2019 3 8  0 0 - 4 4 ratio Final    Comment:                                   T  Chol/HDL Ratio                                              Men  Women                                1/2 Avg  Risk  3 4    3 3                                    Avg Risk  5 0    4 4                                 2X Avg  Risk  9 6    7 1                                 3X Avg  Risk 23 4   11 0      TSH 03/04/2019 3 490  0 450 - 4 500 uIU/mL Final    Glucose, Random 03/04/2019 105* 65 - 99 mg/dL Final    BUN 03/04/2019 8  6 - 24 mg/dL Final    Creatinine 03/04/2019 0 79  0 57 - 1 00 mg/dL Final    eGFR Non African American 03/04/2019 82  >59 mL/min/1 73 Final    eGFR African American 03/04/2019 95  >59 mL/min/1 73 Final    SL AMB BUN/CREATININE RATIO 03/04/2019 10  9 - 23 Final    Sodium 03/04/2019 137  134 - 144 mmol/L Final    Potassium 03/04/2019 4 7  3 5 - 5 2 mmol/L Final    Chloride 03/04/2019 100  96 - 106 mmol/L Final    CO2 03/04/2019 25  20 - 29 mmol/L Final    CALCIUM 03/04/2019 9 3  8 7 - 10 2 mg/dL Final    Protein, Total 03/04/2019 7 6  6 0 - 8 5 g/dL Final    Albumin 03/04/2019 3 5  3 5 - 5 5 g/dL Final    Globulin, Total 03/04/2019 4 1  1 5 - 4 5 g/dL Final    Albumin/Globulin Ratio 03/04/2019 0 9* 1 2 - 2 2 Final    TOTAL BILIRUBIN 03/04/2019 0 7  0 0 - 1 2 mg/dL Final    Alk Phos Isoenzymes 03/04/2019 128* 39 - 117 IU/L Final    AST 03/04/2019 78* 0 - 40 IU/L Final    ALT 03/04/2019 40* 0 - 32 IU/L Final    White Blood Cell Count 03/04/2019 5 4  3 4 - 10 8 x10E3/uL Final    Red Blood Cell Count 03/04/2019 4 08  3 77 - 5 28 x10E6/uL Final    Hemoglobin 03/04/2019 13 7  11 1 - 15 9 g/dL Final    HCT 03/04/2019 40 8  34 0 - 46 6 % Final    MCV 03/04/2019 100* 79 - 97 fL Final    MCH 03/04/2019 33 6* 26 6 - 33 0 pg Final    MCHC 03/04/2019 33 6  31 5 - 35 7 g/dL Final    RDW 03/04/2019 13 6  12 3 - 15 4 % Final    Platelet Count 17/88/1141 104* 150 - 379 x10E3/uL Final    Neutrophils 03/04/2019 51  Not Estab  % Final    Lymphocytes 03/04/2019 34  Not Estab  % Final    Monocytes 03/04/2019 12  Not Estab  % Final    Eosinophils 03/04/2019 1  Not Estab  % Final    Basophils PCT 03/04/2019 2  Not Estab  % Final    Neutrophils (Absolute) 03/04/2019 2 8  1 4 - 7 0 x10E3/uL Final    Lymphocytes (Absolute) 03/04/2019 1 8  0 7 - 3 1 x10E3/uL Final    Monocytes (Absolute) 03/04/2019 0 7  0 1 - 0 9 x10E3/uL Final    Eosinophils (Absolute) 03/04/2019 0 1  0 0 - 0 4 x10E3/uL Final    Basophils ABS 03/04/2019 0 1  0 0 - 0 2 x10E3/uL Final    Immature Granulocytes 03/04/2019 0  Not Estab  % Final    Immature Granulocytes (Absolute) 03/04/2019 0 0  0 0 - 0 1 x10E3/uL Final          Physical Exam  Constitutional:       Appearance: She is well-developed  Neck:      Musculoskeletal: Normal range of motion and neck supple  Thyroid: No thyromegaly  Cardiovascular:      Rate and Rhythm: Normal rate and regular rhythm  Heart sounds: Normal heart sounds and S1 normal  Heart sounds not distant  Pulmonary:      Effort: Pulmonary effort is normal  No respiratory distress  Breath sounds: Normal breath sounds  No wheezing  Musculoskeletal: Normal range of motion  Skin:     General: Skin is warm and dry  Neurological:      Mental Status: She is alert and oriented to person, place, and time  Psychiatric:         Behavior: Behavior normal          Thought Content:  Thought content normal          Judgment: Judgment normal

## 2020-08-27 LAB
ALBUMIN SERPL-MCNC: 3 G/DL (ref 3.8–4.8)
ALBUMIN/GLOB SERPL: 0.7 {RATIO} (ref 1.2–2.2)
ALP SERPL-CCNC: 144 IU/L (ref 39–117)
ALT SERPL-CCNC: 54 IU/L (ref 0–32)
AST SERPL-CCNC: 136 IU/L (ref 0–40)
BASOPHILS # BLD AUTO: 0.1 X10E3/UL (ref 0–0.2)
BASOPHILS NFR BLD AUTO: 1 %
BILIRUB SERPL-MCNC: 1.6 MG/DL (ref 0–1.2)
BUN SERPL-MCNC: 8 MG/DL (ref 8–27)
BUN/CREAT SERPL: 11 (ref 12–28)
CALCIUM SERPL-MCNC: 8.7 MG/DL (ref 8.7–10.3)
CHLORIDE SERPL-SCNC: 97 MMOL/L (ref 96–106)
CHOLEST SERPL-MCNC: 128 MG/DL (ref 100–199)
CHOLEST/HDLC SERPL: 3.4 RATIO (ref 0–4.4)
CO2 SERPL-SCNC: 23 MMOL/L (ref 20–29)
CREAT SERPL-MCNC: 0.73 MG/DL (ref 0.57–1)
EOSINOPHIL # BLD AUTO: 0.1 X10E3/UL (ref 0–0.4)
EOSINOPHIL NFR BLD AUTO: 1 %
ERYTHROCYTE [DISTWIDTH] IN BLOOD BY AUTOMATED COUNT: 13 % (ref 11.7–15.4)
GLOBULIN SER-MCNC: 4.6 G/DL (ref 1.5–4.5)
GLUCOSE SERPL-MCNC: 92 MG/DL (ref 65–99)
HCT VFR BLD AUTO: 31 % (ref 34–46.6)
HDLC SERPL-MCNC: 38 MG/DL
HGB BLD-MCNC: 10.9 G/DL (ref 11.1–15.9)
IMM GRANULOCYTES # BLD: 0 X10E3/UL (ref 0–0.1)
IMM GRANULOCYTES NFR BLD: 0 %
LDLC SERPL CALC-MCNC: 74 MG/DL (ref 0–99)
LYMPHOCYTES # BLD AUTO: 1.7 X10E3/UL (ref 0.7–3.1)
LYMPHOCYTES NFR BLD AUTO: 28 %
MCH RBC QN AUTO: 34.9 PG (ref 26.6–33)
MCHC RBC AUTO-ENTMCNC: 35.2 G/DL (ref 31.5–35.7)
MCV RBC AUTO: 99 FL (ref 79–97)
MONOCYTES # BLD AUTO: 0.7 X10E3/UL (ref 0.1–0.9)
MONOCYTES NFR BLD AUTO: 12 %
MORPHOLOGY BLD-IMP: ABNORMAL
NEUTROPHILS # BLD AUTO: 3.4 X10E3/UL (ref 1.4–7)
NEUTROPHILS NFR BLD AUTO: 58 %
PLATELET # BLD AUTO: 76 X10E3/UL (ref 150–450)
POTASSIUM SERPL-SCNC: 4.7 MMOL/L (ref 3.5–5.2)
PROT SERPL-MCNC: 7.6 G/DL (ref 6–8.5)
RBC # BLD AUTO: 3.12 X10E6/UL (ref 3.77–5.28)
SL AMB EGFR AFRICAN AMERICAN: 103 ML/MIN/1.73
SL AMB EGFR NON AFRICAN AMERICAN: 89 ML/MIN/1.73
SL AMB VLDL CHOLESTEROL CALC: 16 MG/DL (ref 5–40)
SODIUM SERPL-SCNC: 129 MMOL/L (ref 134–144)
TRIGL SERPL-MCNC: 81 MG/DL (ref 0–149)
TSH SERPL DL<=0.005 MIU/L-ACNC: 3.72 UIU/ML (ref 0.45–4.5)
WBC # BLD AUTO: 5.9 X10E3/UL (ref 3.4–10.8)

## 2020-09-02 ENCOUNTER — OFFICE VISIT (OUTPATIENT)
Dept: GASTROENTEROLOGY | Facility: CLINIC | Age: 60
End: 2020-09-02
Payer: COMMERCIAL

## 2020-09-02 ENCOUNTER — TRANSCRIBE ORDERS (OUTPATIENT)
Dept: GASTROENTEROLOGY | Facility: CLINIC | Age: 60
End: 2020-09-02

## 2020-09-02 ENCOUNTER — TELEPHONE (OUTPATIENT)
Dept: FAMILY MEDICINE CLINIC | Facility: CLINIC | Age: 60
End: 2020-09-02

## 2020-09-02 VITALS
HEIGHT: 64 IN | DIASTOLIC BLOOD PRESSURE: 68 MMHG | BODY MASS INDEX: 29.67 KG/M2 | WEIGHT: 173.8 LBS | TEMPERATURE: 98.4 F | HEART RATE: 87 BPM | SYSTOLIC BLOOD PRESSURE: 120 MMHG

## 2020-09-02 DIAGNOSIS — K70.10 STEATOHEPATITIS, ALCOHOLIC: Primary | ICD-10-CM

## 2020-09-02 DIAGNOSIS — R79.89 ELEVATED LFTS: ICD-10-CM

## 2020-09-02 DIAGNOSIS — D53.9 MACROCYTIC ANEMIA: ICD-10-CM

## 2020-09-02 DIAGNOSIS — K92.1 MELENA: ICD-10-CM

## 2020-09-02 PROCEDURE — 99214 OFFICE O/P EST MOD 30 MIN: CPT | Performed by: PHYSICIAN ASSISTANT

## 2020-09-02 NOTE — PROGRESS NOTES
[facility] - Outpatient Follow-up Note  Martina Goodman 63 y o  female MRN: [id number]  Encounter: [id number]          ASSESSMENT AND PLAN:      1  Steatohepatitis, alcoholic  She has biopsy proven alcoholic steatohepatitis and now presents with worsening liver enzymes, thrombocytopenia, hypoalbuminemia concerning for progression to cirrhosis  , ALT 54, Alk Phos 144, Total Bili 1 6, platelets 76, albumin 3 0  No recent INR to review  Most recent liver US from 2018 shows hepatomegaly and steatosis  At this time, I recommend checking INR to assess synthetic liver function  Order complete abdominal ultrasound to reassess hepatic architecture and rule out liver mass and ascites  Schedule EGD to assess for esophageal and gastric varices given recent melena  She will follow-up closely in the office to discuss work-up and further management  I explained the critical importance of complete alcohol cessation - patient states she will stop drinking after the Labor Day holiday but I told her she must stop drinking NOW  She expressed understanding of the risks of continued alcohol use  - Protime-INR; Future  - US abdomen complete; Future  - Protime-INR  - EGD; Future    2  Elevated LFTs  Her worsening liver enzymes are likely related underlying alcoholic steatohepatitis but cannot rule out cirrhosis  She also started Tremfya for psoriasis a few months ago which can cause elevated liver enzymes  Check INR to assess synthetic liver function  Work-up for cirrhosis as discussed above  Complete alcohol cessation is essential      3  Melena  She reports intermittent dark stools for the past few months  She is anemic with hemoglobin 10 9 (down from 13 5 in January)  EGD from May 2019 showed mild esophagitis and gastritis  Differential diagnosis includes erosive esophagitis/gastritis, peptic ulcer disease, esophageal/gastric varices, AVM  We will repeat EGD now      - EGD; Future    4  Macrocytic anemia  She has macrocytic anemia hemoglobin 10 9 and MCV 99  Potential etiologies include GI bleeding, bone marrow suppression from alcohol, vitamin B12 and folate deficiency  Check iron panel, vitamin B12 and folate  Perform EGD to rule out sources of upper GI bleeding      - Iron Panel (Includes Ferritin, Iron Sat%, Iron, and TIBC); Future  - Vitamin B12; Future  - Folate; Future  - Vitamin B12  - Folate  - EGD; Future    Follow-up closely after EGD  ______________________________________________________________________    SUBJECTIVE:  70-year-old female with history of alcoholic steatohepatitis (biopsy proven from 2016) presenting for evaluation of multiple GI complaints and abnormal blood work  She reports feeling generally unwell for the past few months  She has been drinking 8 Bush beers per day  She complains of nausea, postprandial epigastric discomfort, poor appetite, abdominal distention, intermittent diarrhea, melena, and occasional hematochezia over this time  Blood work last week shows macrocytic anemia, thrombocytopenia, hyponatremia, hypoalbuminemia, and worsening liver enzymes  Last abdominal imaging from 2018 shows hepatomegaly and steatosis  Liver biopsy from 2016 showed steatohepatitis and bridging and perisinusoidal fibrosis  She had EGD and colonoscopy from May 2019 showed mild esophagitis and gastritis (biopsies negative) and normal entire colon (random biopsies negative)  REVIEW OF SYSTEMS IS OTHERWISE NEGATIVE        Historical Information   Past Medical History:   Diagnosis Date    Alcoholic fatty liver     Anxiety     Asthma     COPD (chronic obstructive pulmonary disease) (HCC)     Elevated liver function tests     GERD without esophagitis     Hyperlipidemia     Hypertension     IBS (irritable bowel syndrome)     Insomnia     Insomnia     Nervousness     Nicotine dependence     Other specified urinary incontinence     RLS (restless legs syndrome) Past Surgical History:   Procedure Laterality Date    BACK SURGERY      BREAST EXCISIONAL BIOPSY Right 11/01/2004    benign    CATARACT EXTRACTION, BILATERAL  08/01/2018    COLONOSCOPY N/A 5/8/2019    Procedure: COLONOSCOPY;  Surgeon: Nani Dominguez MD;  Location: [facility] GI [facility]; Service: Gastroenterology    EGD AND COLONOSCOPY N/A 3/19/2018    Procedure: EGD AND COLONOSCOPY;  Surgeon: Nani Dominguez MD;  Location: [facility] GI [facility]; Service: Gastroenterology    ESOPHAGOGASTRODUODENOSCOPY N/A 5/8/2019    Procedure: ESOPHAGOGASTRODUODENOSCOPY (EGD); Surgeon: Nani Dominguez MD;  Location: [facility]; Service: Gastroenterology    KNEE SURGERY      KNEE SURGERY      LUMBAR LAMINECTOMY  04/1999    LYMPH NODE BIOPSY      TOTAL ABDOMINAL HYSTERECTOMY  02/05/2008    TUBAL LIGATION      TUBAL LIGATION  1989    UPPER GASTROINTESTINAL ENDOSCOPY       Social History   Social History     Substance and Sexual Activity   Alcohol Use Yes    Alcohol/week: 3 0 standard drinks    Types: 3 Cans of beer per week    Comment: Daily; just cut back from 6 beers/day   Per allscripts -  3 beers a night     Social History     Substance and Sexual Activity   Drug Use No     Social History     Tobacco Use   Smoking Status Current Every Day Smoker    Packs/day: 0 50    Types: Cigarettes    Start date: 1   Smokeless Tobacco Never Used   Tobacco Comment    Per allscripts - 1/2 PPD cigs - smoking for 40 years     Family History   Problem Relation Age of Onset    Breast cancer Mother 28    No Known Problems Father     No Known Problems Sister     No Known Problems Brother     No Known Problems Son     No Known Problems Maternal Grandmother     No Known Problems Maternal Grandfather     No Known Problems Paternal Grandmother     No Known Problems Paternal Grandfather     No Known Problems Maternal Aunt     No Known Problems Maternal Aunt     No Known Problems Paternal Aunt     Substance Abuse Neg Hx     Mental illness Neg Hx        Meds/Allergies       Current Outpatient Medications:     albuterol (2 5 mg/3 mL) 0 083 % nebulizer solution    albuterol (PROVENTIL HFA) 90 mcg/act inhaler    ALPRAZolam (XANAX) 1 mg tablet    diphenoxylate-atropine (LOMOTIL) 2 5-0 025 mg per tablet    losartan (COZAAR) 100 MG tablet    omeprazole (PriLOSEC) 40 MG capsule    TREMFYA subcutaneous injection    umeclidinium-vilanterol (ANORO ELLIPTA) 62 5-25 MCG/INH inhaler    Allergies   Allergen Reactions    Ace Inhibitors Cough           Objective     Blood pressure 120/68, pulse 87, temperature 98 4 °F (36 9 °C), temperature source Tympanic, height 5' 4" (1 626 m), weight 78 8 kg (173 lb 12 8 oz)  Body mass index is 29 83 kg/m²  PHYSICAL EXAM:      General Appearance:   Alert and oriented x 3, cooperative, no distress   HEENT:   Normocephalic, atraumatic, anicteric      Neck:  Supple, symmetrical, trachea midline   Lungs:   Clear to auscultation bilaterally    Heart[de-identified]   Regular rate and rhythm   Abdomen:   Mild distention, no appreciable ascites, soft, non-tender, non-distended, normal bowel sounds    Genitalia:   Deferred    Rectal:   Deferred    Extremities:  No cyanosis, clubbing or edema    Pulses:  2+ and symmetric    Skin:  No jaundice, rashes, or lesions    Lymph nodes:  No palpable cervical lymphadenopathy        Lab Results:   No visits with results within 1 Day(s) from this visit  Latest known visit with results is:   Office Visit on 08/21/2020   Component Date Value    Cholesterol, Total 08/26/2020 128     Triglycerides 08/26/2020 81     HDL 08/26/2020 38*    VLDL Cholesterol Calcula* 08/26/2020 16     LDL Calculated 08/26/2020 74     T   Chol/HDL Ratio 08/26/2020 3 4     White Blood Cell Count 08/26/2020 5 9     Red Blood Cell Count 08/26/2020 3 12*    Hemoglobin 08/26/2020 10 9*    HCT 08/26/2020 31 0*    MCV 08/26/2020 99*    MCH 08/26/2020 34 9*    MCHC 08/26/2020 35 2     RDW 08/26/2020 13 0     Platelet Count 09/93/9862 76*    Neutrophils 08/26/2020 58     Lymphocytes 08/26/2020 28     Monocytes 08/26/2020 12     Eosinophils 08/26/2020 1     Basophils PCT 08/26/2020 1     Neutrophils (Absolute) 08/26/2020 3 4     Lymphocytes (Absolute) 08/26/2020 1 7     Monocytes (Absolute) 08/26/2020 0 7     Eosinophils (Absolute) 08/26/2020 0 1     Basophils ABS 08/26/2020 0 1     Immature Granulocytes 08/26/2020 0     Immature Granulocytes (A* 08/26/2020 0 0     Hematology Comments 08/26/2020 Note:     Glucose, Random 08/26/2020 92     BUN 08/26/2020 8     Creatinine 08/26/2020 0 73     eGFR Non  08/26/2020 89     eGFR  08/26/2020 103     SL AMB BUN/CREATININE RA* 08/26/2020 11*    Sodium 08/26/2020 129*    Potassium 08/26/2020 4 7     Chloride 08/26/2020 97     CO2 08/26/2020 23     CALCIUM 08/26/2020 8 7     Protein, Total 08/26/2020 7 6     Albumin 08/26/2020 3 0*    Globulin, Total 08/26/2020 4 6*    Albumin/Globulin Ratio 08/26/2020 0 7*    TOTAL BILIRUBIN 08/26/2020 1 6*    Alk Phos Isoenzymes 08/26/2020 144*    AST 08/26/2020 136*    ALT 08/26/2020 54*    TSH 08/26/2020 3 720          Radiology Results:   No results found

## 2020-09-02 NOTE — LETTER
September 2, 2020     Kirill Ashby, [address]  [address] [place] [address]    Patient: Michelle Lares   YOB: 1960   Date of Visit: 9/2/2020       Dear Dr Avis Marshall:    Thank you for referring Greg Billy to me for evaluation  Below are my notes for this consultation  If you have questions, please do not hesitate to call me  I look forward to following your patient along with you  Sincerely,        Marcello Robles PA-C        CC: No Recipients  Marcello Robles PA-C  9/2/2020  1:24 PM  Sign when Signing Visit  [facility] - Outpatient Follow-up Note  Michelle Lares 63 y o  female MRN: [id number]  Encounter: [id number]          ASSESSMENT AND PLAN:      1  Steatohepatitis, alcoholic  She has biopsy proven alcoholic steatohepatitis and now presents with worsening liver enzymes, thrombocytopenia, hypoalbuminemia concerning for progression to cirrhosis  , ALT 54, Alk Phos 144, Total Bili 1 6, platelets 76, albumin 3 0  No recent INR to review  Most recent liver US from 2018 shows hepatomegaly and steatosis  At this time, I recommend checking INR to assess synthetic liver function  Order complete abdominal ultrasound to reassess hepatic architecture and rule out liver mass and ascites  Schedule EGD to assess for esophageal and gastric varices given recent melena  She will follow-up closely in the office to discuss work-up and further management  I explained the critical importance of complete alcohol cessation - patient states she will stop drinking after the Labor Day holiday but I told her she must stop drinking NOW  She expressed understanding of the risks of continued alcohol use  - Protime-INR; Future  - US abdomen complete; Future  - Protime-INR  - EGD; Future    2  Elevated LFTs  Her worsening liver enzymes are likely related underlying alcoholic steatohepatitis but cannot rule out cirrhosis   She also started Tremfya for psoriasis a few months ago which can cause elevated liver enzymes  Check INR to assess synthetic liver function  Work-up for cirrhosis as discussed above  Complete alcohol cessation is essential      3  Melena  She reports intermittent dark stools for the past few months  She is anemic with hemoglobin 10 9 (down from 13 5 in January)  EGD from May 2019 showed mild esophagitis and gastritis  Differential diagnosis includes erosive esophagitis/gastritis, peptic ulcer disease, esophageal/gastric varices, AVM  We will repeat EGD now      - EGD; Future    4  Macrocytic anemia  She has macrocytic anemia hemoglobin 10 9 and MCV 99  Potential etiologies include GI bleeding, bone marrow suppression from alcohol, vitamin B12 and folate deficiency  Check iron panel, vitamin B12 and folate  Perform EGD to rule out sources of upper GI bleeding      - Iron Panel (Includes Ferritin, Iron Sat%, Iron, and TIBC); Future  - Vitamin B12; Future  - Folate; Future  - Vitamin B12  - Folate  - EGD; Future    Follow-up closely after EGD  ______________________________________________________________________    SUBJECTIVE:  78-year-old female with history of alcoholic steatohepatitis (biopsy proven from 2016) presenting for evaluation of multiple GI complaints and abnormal blood work  She reports feeling generally unwell for the past few months  She has been drinking 8 Bush beers per day  She complains of nausea, postprandial epigastric discomfort, poor appetite, abdominal distention, intermittent diarrhea, melena, and occasional hematochezia over this time  Blood work last week shows macrocytic anemia, thrombocytopenia, hyponatremia, hypoalbuminemia, and worsening liver enzymes  Last abdominal imaging from 2018 shows hepatomegaly and steatosis  Liver biopsy from 2016 showed steatohepatitis and bridging and perisinusoidal fibrosis   She had EGD and colonoscopy from May 2019 showed mild esophagitis and gastritis (biopsies negative) and normal entire colon (random biopsies negative)  REVIEW OF SYSTEMS IS OTHERWISE NEGATIVE  Historical Information   Past Medical History:   Diagnosis Date    Alcoholic fatty liver     Anxiety     Asthma     COPD (chronic obstructive pulmonary disease) (HCC)     Elevated liver function tests     GERD without esophagitis     Hyperlipidemia     Hypertension     IBS (irritable bowel syndrome)     Insomnia     Insomnia     Nervousness     Nicotine dependence     Other specified urinary incontinence     RLS (restless legs syndrome)      Past Surgical History:   Procedure Laterality Date    BACK SURGERY      BREAST EXCISIONAL BIOPSY Right 11/01/2004    benign    CATARACT EXTRACTION, BILATERAL  08/01/2018    COLONOSCOPY N/A 5/8/2019    Procedure: COLONOSCOPY;  Surgeon: Madi Ruff MD;  Location: [facility] GI LAB; Service: Gastroenterology    EGD AND COLONOSCOPY N/A 3/19/2018    Procedure: EGD AND COLONOSCOPY;  Surgeon: Madi Ruff MD;  Location: [facility]; Service: Gastroenterology    ESOPHAGOGASTRODUODENOSCOPY N/A 5/8/2019    Procedure: ESOPHAGOGASTRODUODENOSCOPY (EGD); Surgeon: Madi Ruff MD;  Location: [facility]; Service: Gastroenterology    KNEE SURGERY      KNEE SURGERY      LUMBAR LAMINECTOMY  04/1999    LYMPH NODE BIOPSY      TOTAL ABDOMINAL HYSTERECTOMY  02/05/2008    TUBAL LIGATION      TUBAL LIGATION  1989    UPPER GASTROINTESTINAL ENDOSCOPY       Social History   Social History     Substance and Sexual Activity   Alcohol Use Yes    Alcohol/week: 3 0 standard drinks    Types: 3 Cans of beer per week    Comment: Daily; just cut back from 6 beers/day   Per allscripts -  3 beers a night     Social History     Substance and Sexual Activity   Drug Use No     Social History     Tobacco Use   Smoking Status Current Every Day Smoker    Packs/day: 0 50    Types: Cigarettes    Start date: 1   Smokeless Tobacco Never Used   Tobacco Comment    Per allscripts - 1/2 PPD cigs - smoking for 40 years     Family History   Problem Relation Age of Onset    Breast cancer Mother 28    No Known Problems Father     No Known Problems Sister     No Known Problems Brother     No Known Problems Son     No Known Problems Maternal Grandmother     No Known Problems Maternal Grandfather     No Known Problems Paternal Grandmother     No Known Problems Paternal Grandfather     No Known Problems Maternal Aunt     No Known Problems Maternal Aunt     No Known Problems Paternal Aunt     Substance Abuse Neg Hx     Mental illness Neg Hx        Meds/Allergies       Current Outpatient Medications:     albuterol (2 5 mg/3 mL) 0 083 % nebulizer solution    albuterol (PROVENTIL HFA) 90 mcg/act inhaler    ALPRAZolam (XANAX) 1 mg tablet    diphenoxylate-atropine (LOMOTIL) 2 5-0 025 mg per tablet    losartan (COZAAR) 100 MG tablet    omeprazole (PriLOSEC) 40 MG capsule    TREMFYA subcutaneous injection    umeclidinium-vilanterol (ANORO ELLIPTA) 62 5-25 MCG/INH inhaler    Allergies   Allergen Reactions    Ace Inhibitors Cough           Objective     Blood pressure 120/68, pulse 87, temperature 98 4 °F (36 9 °C), temperature source Tympanic, height 5' 4" (1 626 m), weight 78 8 kg (173 lb 12 8 oz)  Body mass index is 29 83 kg/m²        PHYSICAL EXAM:      General Appearance:   Alert and oriented x 3, cooperative, no distress   HEENT:   Normocephalic, atraumatic, anicteric      Neck:  Supple, symmetrical, trachea midline   Lungs:   Clear to auscultation bilaterally    Heart[de-identified]   Regular rate and rhythm   Abdomen:   Mild distention, no appreciable ascites, soft, non-tender, non-distended, normal bowel sounds    Genitalia:   Deferred    Rectal:   Deferred    Extremities:  No cyanosis, clubbing or edema    Pulses:  2+ and symmetric    Skin:  No jaundice, rashes, or lesions    Lymph nodes:  No palpable cervical lymphadenopathy        Lab Results:   No visits with results within 1 Day(s) from this visit  Latest known visit with results is:   Office Visit on 08/21/2020   Component Date Value    Cholesterol, Total 08/26/2020 128     Triglycerides 08/26/2020 81     HDL 08/26/2020 38*    VLDL Cholesterol Calcula* 08/26/2020 16     LDL Calculated 08/26/2020 74     T  Chol/HDL Ratio 08/26/2020 3 4     White Blood Cell Count 08/26/2020 5 9     Red Blood Cell Count 08/26/2020 3 12*    Hemoglobin 08/26/2020 10 9*    HCT 08/26/2020 31 0*    MCV 08/26/2020 99*    MCH 08/26/2020 34 9*    MCHC 08/26/2020 35 2     RDW 08/26/2020 13 0     Platelet Count 35/34/3913 76*    Neutrophils 08/26/2020 58     Lymphocytes 08/26/2020 28     Monocytes 08/26/2020 12     Eosinophils 08/26/2020 1     Basophils PCT 08/26/2020 1     Neutrophils (Absolute) 08/26/2020 3 4     Lymphocytes (Absolute) 08/26/2020 1 7     Monocytes (Absolute) 08/26/2020 0 7     Eosinophils (Absolute) 08/26/2020 0 1     Basophils ABS 08/26/2020 0 1     Immature Granulocytes 08/26/2020 0     Immature Granulocytes (A* 08/26/2020 0 0     Hematology Comments 08/26/2020 Note:     Glucose, Random 08/26/2020 92     BUN 08/26/2020 8     Creatinine 08/26/2020 0 73     eGFR Non  08/26/2020 89     eGFR  08/26/2020 103     SL AMB BUN/CREATININE RA* 08/26/2020 11*    Sodium 08/26/2020 129*    Potassium 08/26/2020 4 7     Chloride 08/26/2020 97     CO2 08/26/2020 23     CALCIUM 08/26/2020 8 7     Protein, Total 08/26/2020 7 6     Albumin 08/26/2020 3 0*    Globulin, Total 08/26/2020 4 6*    Albumin/Globulin Ratio 08/26/2020 0 7*    TOTAL BILIRUBIN 08/26/2020 1 6*    Alk Phos Isoenzymes 08/26/2020 144*    AST 08/26/2020 136*    ALT 08/26/2020 54*    TSH 08/26/2020 3 720          Radiology Results:   No results found

## 2020-09-02 NOTE — PATIENT INSTRUCTIONS
1  If you cannot eat a full meal, I would recommend Boost or Ensure (high protein) shakes once or twice a day  2  Check blood work  3  Check abdominal ultrasound to look at liver and make sure you don't have fluid in the abdomen  4  Schedule upper endoscopy to assess for source of bleeding   5  Do not drink any more alcohol       EGD scheduled on 9/17/20 with Dr Leonardo at [facility] gave patient verbal instructions/paper work given  Patient will schedule US

## 2020-09-10 LAB
FERRITIN SERPL-MCNC: 1498 NG/ML (ref 15–150)
FOLATE SERPL-MCNC: 4.9 NG/ML
INR PPP: 1.5 (ref 0.8–1.2)
IRON SATN MFR SERPL: >89 % (ref 15–55)
IRON SERPL-MCNC: 133 UG/DL (ref 27–139)
PROTHROMBIN TIME: 14.7 SEC (ref 9.1–12)
TIBC SERPL-MCNC: <150 UG/DL (ref 250–450)
UIBC SERPL-MCNC: <17 UG/DL (ref 118–369)
VIT B12 SERPL-MCNC: 1232 PG/ML (ref 232–1245)

## 2020-09-14 ENCOUNTER — HOSPITAL ENCOUNTER (OUTPATIENT)
Dept: ULTRASOUND IMAGING | Facility: HOSPITAL | Age: 60
Discharge: HOME | End: 2020-09-14
Payer: COMMERCIAL

## 2020-09-14 DIAGNOSIS — K70.10 STEATOHEPATITIS, ALCOHOLIC: ICD-10-CM

## 2020-09-14 PROCEDURE — 76700 US EXAM ABDOM COMPLETE: CPT

## 2020-09-15 ENCOUNTER — TELEPHONE (OUTPATIENT)
Dept: OTHER | Facility: HOSPITAL | Age: 60
End: 2020-09-15

## 2020-09-15 ENCOUNTER — TELEPHONE (OUTPATIENT)
Dept: FAMILY MEDICINE CLINIC | Facility: CLINIC | Age: 60
End: 2020-09-15

## 2020-09-15 DIAGNOSIS — L50.9 HIVES: Primary | ICD-10-CM

## 2020-09-15 DIAGNOSIS — E83.19 IRON OVERLOAD: ICD-10-CM

## 2020-09-15 DIAGNOSIS — K70.10 STEATOHEPATITIS, ALCOHOLIC: ICD-10-CM

## 2020-09-15 DIAGNOSIS — R74.8 ELEVATED LIVER ENZYMES: ICD-10-CM

## 2020-09-15 DIAGNOSIS — K83.8 DILATED BILE DUCT: Primary | ICD-10-CM

## 2020-09-15 RX ORDER — PREDNISONE 10 MG/1
TABLET ORAL
Qty: 20 TABLET | Refills: 0 | Status: SHIPPED | OUTPATIENT
Start: 2020-09-15 | End: 2020-10-02 | Stop reason: ALTCHOICE

## 2020-09-15 NOTE — TELEPHONE ENCOUNTER
Patient called to let you know that she has welts on her hands and feet  This has been happening for a week now and her GI doctor recommended that she call us  She said you know of all of the  Medication she is on and doesn't know what to take  She did make an appointment with Corinna Aguilar but could only get in on 9/29/20      She did not want to go to an urgent care due to the cost     She wanted to know what you would recommend that she do?    [phone]

## 2020-09-16 ENCOUNTER — ANESTHESIA EVENT (OUTPATIENT)
Dept: GASTROENTEROLOGY | Facility: HOSPITAL | Age: 60
End: 2020-09-16

## 2020-09-16 RX ORDER — SODIUM CHLORIDE, SODIUM LACTATE, POTASSIUM CHLORIDE, CALCIUM CHLORIDE 600; 310; 30; 20 MG/100ML; MG/100ML; MG/100ML; MG/100ML
125 INJECTION, SOLUTION INTRAVENOUS CONTINUOUS
Status: CANCELLED | OUTPATIENT
Start: 2020-09-16

## 2020-09-17 ENCOUNTER — ANESTHESIA (OUTPATIENT)
Dept: GASTROENTEROLOGY | Facility: HOSPITAL | Age: 60
End: 2020-09-17

## 2020-09-17 ENCOUNTER — HOSPITAL ENCOUNTER (OUTPATIENT)
Dept: GASTROENTEROLOGY | Facility: HOSPITAL | Age: 60
Setting detail: OUTPATIENT SURGERY
Discharge: HOME | End: 2020-09-17
Attending: INTERNAL MEDICINE
Payer: COMMERCIAL

## 2020-09-17 VITALS
HEART RATE: 77 BPM | WEIGHT: 173 LBS | RESPIRATION RATE: 17 BRPM | BODY MASS INDEX: 29.53 KG/M2 | DIASTOLIC BLOOD PRESSURE: 56 MMHG | TEMPERATURE: 97.6 F | OXYGEN SATURATION: 98 % | HEIGHT: 64 IN | SYSTOLIC BLOOD PRESSURE: 112 MMHG

## 2020-09-17 VITALS — HEART RATE: 80 BPM

## 2020-09-17 DIAGNOSIS — K92.1 MELENA: ICD-10-CM

## 2020-09-17 DIAGNOSIS — D53.9 MACROCYTIC ANEMIA: ICD-10-CM

## 2020-09-17 DIAGNOSIS — K70.10 STEATOHEPATITIS, ALCOHOLIC: ICD-10-CM

## 2020-09-17 PROCEDURE — C1726 CATH, BAL DIL, NON-VASCULAR: HCPCS

## 2020-09-17 PROCEDURE — 88305 TISSUE EXAM BY PATHOLOGIST: CPT | Performed by: PATHOLOGY

## 2020-09-17 PROCEDURE — 43249 ESOPH EGD DILATION <30 MM: CPT | Performed by: INTERNAL MEDICINE

## 2020-09-17 PROCEDURE — 43239 EGD BIOPSY SINGLE/MULTIPLE: CPT | Performed by: INTERNAL MEDICINE

## 2020-09-17 RX ORDER — PROPOFOL 10 MG/ML
INJECTION, EMULSION INTRAVENOUS AS NEEDED
Status: DISCONTINUED | OUTPATIENT
Start: 2020-09-17 | End: 2020-09-17

## 2020-09-17 RX ORDER — LIDOCAINE HYDROCHLORIDE 10 MG/ML
INJECTION, SOLUTION EPIDURAL; INFILTRATION; INTRACAUDAL; PERINEURAL AS NEEDED
Status: DISCONTINUED | OUTPATIENT
Start: 2020-09-17 | End: 2020-09-17

## 2020-09-17 RX ORDER — SODIUM CHLORIDE 9 MG/ML
125 INJECTION, SOLUTION INTRAVENOUS CONTINUOUS
Status: DISCONTINUED | OUTPATIENT
Start: 2020-09-17 | End: 2020-09-21 | Stop reason: HOSPADM

## 2020-09-17 RX ADMIN — PROPOFOL 100 MG: 10 INJECTION, EMULSION INTRAVENOUS at 09:49

## 2020-09-17 RX ADMIN — SODIUM CHLORIDE 125 ML/HR: 0.9 INJECTION, SOLUTION INTRAVENOUS at 08:47

## 2020-09-17 RX ADMIN — PROPOFOL 30 MG: 10 INJECTION, EMULSION INTRAVENOUS at 09:51

## 2020-09-17 RX ADMIN — LIDOCAINE HYDROCHLORIDE 50 MG: 10 INJECTION, SOLUTION EPIDURAL; INFILTRATION; INTRACAUDAL; PERINEURAL at 09:49

## 2020-09-17 RX ADMIN — PROPOFOL 20 MG: 10 INJECTION, EMULSION INTRAVENOUS at 09:57

## 2020-09-17 RX ADMIN — PROPOFOL 20 MG: 10 INJECTION, EMULSION INTRAVENOUS at 10:04

## 2020-09-17 RX ADMIN — PROPOFOL 20 MG: 10 INJECTION, EMULSION INTRAVENOUS at 09:53

## 2020-09-17 RX ADMIN — PROPOFOL 20 MG: 10 INJECTION, EMULSION INTRAVENOUS at 10:00

## 2020-09-17 NOTE — ANESTHESIA POSTPROCEDURE EVALUATION
Post-Op Assessment Note    CV Status:  Stable  Pain Score: 0    Pain management: adequate     Mental Status:  Awake   Hydration Status:  Euvolemic   PONV Controlled:  None   Airway Patency:  Patent      Post Op Vitals Reviewed: Yes      Staff: Anesthesiologist, CRNA   Comments: report given to RN; VSS        No complications documented      /53 (09/17/20 1010)    Temp      Pulse 86 (09/17/20 1010)   Resp 18 (09/17/20 1010)    SpO2 96 % (09/17/20 1010)

## 2020-09-17 NOTE — ANESTHESIA PREPROCEDURE EVALUATION
Procedure:  EGD    ECG: NSR  Liver disease INR 1 5  COPD at baseline well controlled      Denies CP/SOB with exertion, MIKE, stroke/TIA, seizure    Relevant Problems   CARDIO   (+) Hyperlipidemia   (+) Hypertension      GI/HEPATIC   (+) Fatty liver   (+) GERD without esophagitis   (+) Hemorrhage of anus and rectum      NEURO/PSYCH   (+) Anxiety      PULMONARY   (+) 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)        Physical Exam    Airway    Mallampati score: I  TM Distance: >3 FB  Neck ROM: full     Dental   No notable dental hx     Cardiovascular  Rhythm: regular, Rate: normal,     Pulmonary  Breath sounds clear to auscultation,     Other Findings        Anesthesia Plan  ASA Score- 3     Anesthesia Type- IV sedation with anesthesia with ASA Monitors  Additional Monitors:   Airway Plan:           Plan Factors-Exercise tolerance (METS): >4 METS  Chart reviewed  EKG reviewed  Existing labs reviewed  Patient summary reviewed  Obstructive sleep apnea risk education given perioperatively  Induction- intravenous  Postoperative Plan-     Informed Consent- Anesthetic plan and risks discussed with patient and spouse  I personally reviewed this patient with the CRNA  Discussed and agreed on the Anesthesia Plan with the CRNA  Ulises Orta

## 2020-09-17 NOTE — H&P
History and Physical - [facility]  Jennifer Rey 63 y o  female MRN: [id number]                  HPI: Jennifer Rey is a 63y o  year old female who presents for egd for melena  REVIEW OF SYSTEMS: Per the HPI, and otherwise unremarkable  Historical Information   Past Medical History:   Diagnosis Date    Alcoholic fatty liver     Anxiety     Asthma     COPD (chronic obstructive pulmonary disease) (HCC)     Elevated liver function tests     GERD without esophagitis     Hyperlipidemia     Hypertension     IBS (irritable bowel syndrome)     Insomnia     Insomnia     Nervousness     Nicotine dependence     Other specified urinary incontinence     RLS (restless legs syndrome)      Past Surgical History:   Procedure Laterality Date    BACK SURGERY      BREAST EXCISIONAL BIOPSY Right 11/01/2004    benign    CATARACT EXTRACTION, BILATERAL  08/01/2018    COLONOSCOPY N/A 5/8/2019    Procedure: COLONOSCOPY;  Surgeon: Isa Davis MD;  Location: [facility]; Service: Gastroenterology    EGD AND COLONOSCOPY N/A 3/19/2018    Procedure: EGD AND COLONOSCOPY;  Surgeon: Isa Davis MD;  Location: [facility]; Service: Gastroenterology    ESOPHAGOGASTRODUODENOSCOPY N/A 5/8/2019    Procedure: ESOPHAGOGASTRODUODENOSCOPY (EGD); Surgeon: Isa Davis MD;  Location: [facility]; Service: Gastroenterology    KNEE SURGERY      KNEE SURGERY      LUMBAR LAMINECTOMY  04/1999    LYMPH NODE BIOPSY      TOTAL ABDOMINAL HYSTERECTOMY  02/05/2008    TUBAL LIGATION      TUBAL LIGATION  1989    UPPER GASTROINTESTINAL ENDOSCOPY       Social History   Social History     Substance and Sexual Activity   Alcohol Use Yes    Alcohol/week: 3 0 standard drinks    Types: 3 Cans of beer per week    Comment: Daily; just cut back from 6 beers/day   Per allscripts -  3 beers a night     Social History     Substance and Sexual Activity   Drug Use No     Social History     Tobacco Use Smoking Status Current Every Day Smoker    Packs/day: 0 50    Types: Cigarettes    Start date: 1   Smokeless Tobacco Never Used   Tobacco Comment    Per allscripts - 1/2 PPD cigs - smoking for 40 years     Family History   Problem Relation Age of Onset    Breast cancer Mother 28    No Known Problems Father     No Known Problems Sister     No Known Problems Brother     No Known Problems Son     No Known Problems Maternal Grandmother     No Known Problems Maternal Grandfather     No Known Problems Paternal Grandmother     No Known Problems Paternal Grandfather     No Known Problems Maternal Aunt     No Known Problems Maternal Aunt     No Known Problems Paternal Aunt     Substance Abuse Neg Hx     Mental illness Neg Hx        Meds/Allergies     (Not in a hospital admission)      Allergies   Allergen Reactions    Ace Inhibitors Cough       Objective     /59   Pulse 84   Temp 97 6 °F (36 4 °C) (Oral)   Resp 18   Ht 5' 4" (1 626 m)   Wt 78 5 kg (173 lb)   SpO2 98%   BMI 29 70 kg/m²       PHYSICAL EXAM    Gen: NAD  CV: RRR  CHEST: Clear  ABD: soft, NT/ND  EXT: no edema      ASSESSMENT/PLAN:   Claudio Liao is a 63y o  year old female who presents for egd for melena  The patient is stable and optimized for the procedure, we reviewed risk and benefits  Risk include but not limited to infection, bleeding, perforation and missing a lesion

## 2020-09-18 ENCOUNTER — TELEPHONE (OUTPATIENT)
Dept: GASTROENTEROLOGY | Facility: CLINIC | Age: 60
End: 2020-09-18

## 2020-09-18 ENCOUNTER — OFFICE VISIT (OUTPATIENT)
Dept: FAMILY MEDICINE CLINIC | Facility: CLINIC | Age: 60
End: 2020-09-18
Payer: COMMERCIAL

## 2020-09-18 VITALS
HEIGHT: 64 IN | WEIGHT: 173.6 LBS | DIASTOLIC BLOOD PRESSURE: 70 MMHG | HEART RATE: 68 BPM | BODY MASS INDEX: 29.64 KG/M2 | TEMPERATURE: 97.8 F | SYSTOLIC BLOOD PRESSURE: 110 MMHG | RESPIRATION RATE: 16 BRPM

## 2020-09-18 DIAGNOSIS — Z23 NEED FOR PROPHYLACTIC VACCINATION AND INOCULATION AGAINST INFLUENZA: Primary | ICD-10-CM

## 2020-09-18 DIAGNOSIS — R21 RASH: ICD-10-CM

## 2020-09-18 PROCEDURE — 99213 OFFICE O/P EST LOW 20 MIN: CPT | Performed by: NURSE PRACTITIONER

## 2020-09-18 RX ORDER — HYDROXYZINE HYDROCHLORIDE 10 MG/1
10 TABLET, FILM COATED ORAL EVERY 6 HOURS PRN
Qty: 60 TABLET | Refills: 0 | Status: SHIPPED | OUTPATIENT
Start: 2020-09-18 | End: 2020-10-02 | Stop reason: ALTCHOICE

## 2020-09-18 NOTE — PROGRESS NOTES
Assessment/Plan:      1  Rash  Atarax for the itch  Cool compresses  Call derm ( is established) on Monday if not resolving    - hydrOXYzine HCL (ATARAX) 10 mg tablet; Take 1 tablet (10 mg total) by mouth every 6 (six) hours as needed for itching  Dispense: 60 tablet; Refill: 0    Rto prn     Subjective:      Patient ID: Jennifer Chambers is a 63 y o  female  Here today with complaint of ongoing rash  Is on her arms and legs  Is very itchy  Has been on a pred taper for this and is not helping  No known exposures  No new products  OBJECTIVE  Past Medical History:   Diagnosis Date    Alcoholic fatty liver     Anxiety     Asthma     COPD (chronic obstructive pulmonary disease) (HCC)     Elevated liver function tests     GERD without esophagitis     Hyperlipidemia     Hypertension     IBS (irritable bowel syndrome)     Insomnia     Insomnia     Nervousness     Nicotine dependence     Other specified urinary incontinence     RLS (restless legs syndrome)      temporarily  Wt Readings from Last 3 Encounters:   09/18/20 78 7 kg (173 lb 9 6 oz)   09/17/20 78 5 kg (173 lb)   09/02/20 78 8 kg (173 lb 12 8 oz)     BP Readings from Last 3 Encounters:   09/18/20 110/70   09/17/20 112/56   09/02/20 120/68     Pulse Readings from Last 3 Encounters:   09/18/20 68   09/17/20 80   09/17/20 77     BMI Readings from Last 3 Encounters:   09/18/20 29 80 kg/m²   09/17/20 29 70 kg/m²   09/02/20 29 83 kg/m²        Physical Exam  Constitutional:       Appearance: Normal appearance  Skin:     Comments: Arms and legs with diffuse macular erythematous blanching rash  No vesicles  No welts or hives  areas of excoriation from scratching  Neurological:      Mental Status: She is alert

## 2020-09-18 NOTE — TELEPHONE ENCOUNTER
----- Message from Shana Dinh MA sent at 9/18/2020 11:41 AM EDT -----    ----- Message -----  From: John Rosa PA-C  Sent: 9/15/2020   5:48 PM EDT  To: , #    Hi can someone please fax outstanding labs (hepatic function panel and hemochromatosis mutation) to [place], [place]? Thanks!

## 2020-09-22 ENCOUNTER — TELEPHONE (OUTPATIENT)
Dept: GASTROENTEROLOGY | Facility: AMBULARY SURGERY CENTER | Age: 60
End: 2020-09-22

## 2020-09-22 NOTE — TELEPHONE ENCOUNTER
[address] [facility] Gastroenterology Nurse               Hi,     I called into [facility] to start the auth for this patient  Per Luanne Hein  It did not meet medical necessity and they suggest ordering provider calls in to do peer to peer  [facility] only allows doctors or PA's to call in  Please call [phone] and her ID is [id number]       Thank you,     Dalila

## 2020-09-24 ENCOUNTER — HOSPITAL ENCOUNTER (OUTPATIENT)
Dept: MRI IMAGING | Facility: HOSPITAL | Age: 60
Discharge: HOME | End: 2020-09-24
Payer: COMMERCIAL

## 2020-09-24 DIAGNOSIS — K83.8 DILATED BILE DUCT: ICD-10-CM

## 2020-09-24 DIAGNOSIS — K70.10 STEATOHEPATITIS, ALCOHOLIC: ICD-10-CM

## 2020-09-24 PROCEDURE — 74183 MRI ABD W/O CNTR FLWD CNTR: CPT

## 2020-09-24 PROCEDURE — G1004 CDSM NDSC: HCPCS

## 2020-09-24 PROCEDURE — A9585 GADOBUTROL INJECTION: HCPCS | Performed by: PHYSICIAN ASSISTANT

## 2020-09-24 RX ADMIN — GADOBUTROL 7 ML: 604.72 INJECTION INTRAVENOUS at 12:13

## 2020-09-25 ENCOUNTER — TELEPHONE (OUTPATIENT)
Dept: GASTROENTEROLOGY | Facility: AMBULARY SURGERY CENTER | Age: 60
End: 2020-09-25

## 2020-09-25 ENCOUNTER — TELEPHONE (OUTPATIENT)
Dept: FAMILY MEDICINE CLINIC | Facility: CLINIC | Age: 60
End: 2020-09-25

## 2020-09-25 DIAGNOSIS — R79.89 ELEVATED LFTS: Primary | ICD-10-CM

## 2020-09-25 DIAGNOSIS — K80.50 CHOLEDOCHOLITHIASIS: ICD-10-CM

## 2020-09-25 NOTE — TELEPHONE ENCOUNTER
I have entered MRI order as well as ERCP order and let surgery coordinator know however, was unable to speak with the patient today

## 2020-09-25 NOTE — TELEPHONE ENCOUNTER
Patients GI provider:  Kyrie Calderón    Number to return call: (  [phone]    Reason for call: Pt has immediate findings on mri abdomen ready in epic, sent tiger text    Scheduled procedure/appointment date if applicable: Apt/procedure 10-28-20 ov

## 2020-09-26 ENCOUNTER — TELEPHONE (OUTPATIENT)
Dept: GASTROENTEROLOGY | Facility: CLINIC | Age: 60
End: 2020-09-26

## 2020-09-26 NOTE — TELEPHONE ENCOUNTER
ERCP scheduled on 10/2/20 with Dr Ann at [facility]  I gave patient verbal instructions/mailed

## 2020-09-26 NOTE — TELEPHONE ENCOUNTER
----- Message from Fadia Ashford PA-C sent at 9/25/2020  4:09 PM EDT -----  Please schedule patient for ERCP at next available for choledocholithiasis, preferably next week   Thank you

## 2020-09-26 NOTE — TELEPHONE ENCOUNTER
On call telephone:    Called patient and updated her about her recent MR abdomen findings  Will need a repeat MR which was ordered and scheduled for December for LIRADS 3 lesion  Also planning on scheduling ERCP for choledocholithiasis  Patient understood and appreciated the call

## 2020-09-28 ENCOUNTER — TELEPHONE (OUTPATIENT)
Dept: FAMILY MEDICINE CLINIC | Facility: CLINIC | Age: 60
End: 2020-09-28

## 2020-09-28 ENCOUNTER — IMMUNIZATIONS (OUTPATIENT)
Dept: FAMILY MEDICINE CLINIC | Facility: CLINIC | Age: 60
End: 2020-09-28
Payer: COMMERCIAL

## 2020-09-28 DIAGNOSIS — Z23 ENCOUNTER FOR IMMUNIZATION: ICD-10-CM

## 2020-09-28 LAB
ALBUMIN SERPL-MCNC: 2.9 G/DL (ref 3.8–4.8)
ALP SERPL-CCNC: 126 IU/L (ref 39–117)
ALT SERPL-CCNC: 45 IU/L (ref 0–32)
AST SERPL-CCNC: 70 IU/L (ref 0–40)
BILIRUB DIRECT SERPL-MCNC: 0.41 MG/DL (ref 0–0.4)
BILIRUB SERPL-MCNC: 1.2 MG/DL (ref 0–1.2)
HFE GENE MUT ANL BLD/T: NORMAL
PROT SERPL-MCNC: 7.3 G/DL (ref 6–8.5)

## 2020-09-28 PROCEDURE — 90471 IMMUNIZATION ADMIN: CPT

## 2020-09-28 PROCEDURE — 90682 RIV4 VACC RECOMBINANT DNA IM: CPT

## 2020-09-28 NOTE — TELEPHONE ENCOUNTER
Patient wanted to let you know she had a MRI done , and she is going to have a procedure done   ( FYI)

## 2020-09-29 ENCOUNTER — TRANSCRIBE ORDERS (OUTPATIENT)
Dept: GASTROENTEROLOGY | Facility: CLINIC | Age: 60
End: 2020-09-29

## 2020-09-30 ENCOUNTER — TELEPHONE (OUTPATIENT)
Dept: FAMILY MEDICINE CLINIC | Facility: CLINIC | Age: 60
End: 2020-09-30

## 2020-10-02 ENCOUNTER — HOSPITAL ENCOUNTER (OUTPATIENT)
Dept: RADIOLOGY | Facility: HOSPITAL | Age: 60
Discharge: HOME | End: 2020-10-02
Payer: COMMERCIAL

## 2020-10-02 ENCOUNTER — ANESTHESIA (OUTPATIENT)
Dept: GASTROENTEROLOGY | Facility: HOSPITAL | Age: 60
End: 2020-10-02

## 2020-10-02 ENCOUNTER — HOSPITAL ENCOUNTER (OUTPATIENT)
Dept: GASTROENTEROLOGY | Facility: HOSPITAL | Age: 60
Setting detail: OUTPATIENT SURGERY
Discharge: HOME | End: 2020-10-02
Attending: INTERNAL MEDICINE | Admitting: INTERNAL MEDICINE
Payer: COMMERCIAL

## 2020-10-02 ENCOUNTER — ANESTHESIA EVENT (OUTPATIENT)
Dept: GASTROENTEROLOGY | Facility: HOSPITAL | Age: 60
End: 2020-10-02

## 2020-10-02 VITALS — HEART RATE: 102 BPM

## 2020-10-02 VITALS
HEART RATE: 61 BPM | SYSTOLIC BLOOD PRESSURE: 115 MMHG | WEIGHT: 173 LBS | BODY MASS INDEX: 29.53 KG/M2 | RESPIRATION RATE: 18 BRPM | OXYGEN SATURATION: 100 % | HEIGHT: 64 IN | TEMPERATURE: 98.9 F | DIASTOLIC BLOOD PRESSURE: 56 MMHG

## 2020-10-02 DIAGNOSIS — K80.50 CHOLEDOCHOLITHIASIS: ICD-10-CM

## 2020-10-02 DIAGNOSIS — K80.50 CHOLEDOCHOLITHIASIS: Primary | ICD-10-CM

## 2020-10-02 PROCEDURE — 43264 ERCP REMOVE DUCT CALCULI: CPT | Performed by: INTERNAL MEDICINE

## 2020-10-02 PROCEDURE — 43274 ERCP DUCT STENT PLACEMENT: CPT | Performed by: INTERNAL MEDICINE

## 2020-10-02 PROCEDURE — C1769 GUIDE WIRE: HCPCS

## 2020-10-02 PROCEDURE — C2617 STENT, NON-COR, TEM W/O DEL: HCPCS

## 2020-10-02 PROCEDURE — 74328 X-RAY BILE DUCT ENDOSCOPY: CPT

## 2020-10-02 RX ORDER — PROPOFOL 10 MG/ML
INJECTION, EMULSION INTRAVENOUS CONTINUOUS PRN
Status: DISCONTINUED | OUTPATIENT
Start: 2020-10-02 | End: 2020-10-02

## 2020-10-02 RX ORDER — BETAMETHASONE DIPROPIONATE 0.5 MG/G
CREAM TOPICAL 2 TIMES DAILY
COMMUNITY
End: 2020-12-30 | Stop reason: ALTCHOICE

## 2020-10-02 RX ORDER — PROPOFOL 10 MG/ML
INJECTION, EMULSION INTRAVENOUS AS NEEDED
Status: DISCONTINUED | OUTPATIENT
Start: 2020-10-02 | End: 2020-10-02

## 2020-10-02 RX ORDER — SUCCINYLCHOLINE/SOD CL,ISO/PF 100 MG/5ML
SYRINGE (ML) INTRAVENOUS AS NEEDED
Status: DISCONTINUED | OUTPATIENT
Start: 2020-10-02 | End: 2020-10-02

## 2020-10-02 RX ORDER — ONDANSETRON 2 MG/ML
INJECTION INTRAMUSCULAR; INTRAVENOUS AS NEEDED
Status: DISCONTINUED | OUTPATIENT
Start: 2020-10-02 | End: 2020-10-02

## 2020-10-02 RX ORDER — SODIUM CHLORIDE, SODIUM LACTATE, POTASSIUM CHLORIDE, CALCIUM CHLORIDE 600; 310; 30; 20 MG/100ML; MG/100ML; MG/100ML; MG/100ML
INJECTION, SOLUTION INTRAVENOUS CONTINUOUS PRN
Status: DISCONTINUED | OUTPATIENT
Start: 2020-10-02 | End: 2020-10-02

## 2020-10-02 RX ORDER — DEXAMETHASONE SODIUM PHOSPHATE 10 MG/ML
INJECTION, SOLUTION INTRAMUSCULAR; INTRAVENOUS AS NEEDED
Status: DISCONTINUED | OUTPATIENT
Start: 2020-10-02 | End: 2020-10-02

## 2020-10-02 RX ORDER — SODIUM CHLORIDE 9 MG/ML
INJECTION, SOLUTION INTRAVENOUS CONTINUOUS PRN
Status: DISCONTINUED | OUTPATIENT
Start: 2020-10-02 | End: 2020-10-02

## 2020-10-02 RX ORDER — ROCURONIUM BROMIDE 10 MG/ML
INJECTION, SOLUTION INTRAVENOUS AS NEEDED
Status: DISCONTINUED | OUTPATIENT
Start: 2020-10-02 | End: 2020-10-02

## 2020-10-02 RX ORDER — NEOSTIGMINE METHYLSULFATE 1 MG/ML
INJECTION INTRAVENOUS AS NEEDED
Status: DISCONTINUED | OUTPATIENT
Start: 2020-10-02 | End: 2020-10-02

## 2020-10-02 RX ORDER — GLYCOPYRROLATE 0.2 MG/ML
INJECTION INTRAMUSCULAR; INTRAVENOUS AS NEEDED
Status: DISCONTINUED | OUTPATIENT
Start: 2020-10-02 | End: 2020-10-02

## 2020-10-02 RX ORDER — FENTANYL CITRATE 50 UG/ML
INJECTION, SOLUTION INTRAMUSCULAR; INTRAVENOUS AS NEEDED
Status: DISCONTINUED | OUTPATIENT
Start: 2020-10-02 | End: 2020-10-02

## 2020-10-02 RX ORDER — LIDOCAINE HYDROCHLORIDE 10 MG/ML
INJECTION, SOLUTION EPIDURAL; INFILTRATION; INTRACAUDAL; PERINEURAL AS NEEDED
Status: DISCONTINUED | OUTPATIENT
Start: 2020-10-02 | End: 2020-10-02

## 2020-10-02 RX ADMIN — GLYCOPYRROLATE 0.4 MG: 0.2 INJECTION, SOLUTION INTRAMUSCULAR; INTRAVENOUS at 11:06

## 2020-10-02 RX ADMIN — PROPOFOL 120 MCG/KG/MIN: 10 INJECTION, EMULSION INTRAVENOUS at 10:05

## 2020-10-02 RX ADMIN — NEOSTIGMINE METHYLSULFATE 3 MG: 1 INJECTION, SOLUTION INTRAVENOUS at 11:06

## 2020-10-02 RX ADMIN — DEXAMETHASONE SODIUM PHOSPHATE 5 MG: 10 INJECTION, SOLUTION INTRAMUSCULAR; INTRAVENOUS at 10:26

## 2020-10-02 RX ADMIN — INDOMETHACIN 100 MG: 50 SUPPOSITORY RECTAL at 10:18

## 2020-10-02 RX ADMIN — PHENYLEPHRINE HYDROCHLORIDE 200 MCG: 10 INJECTION INTRAVENOUS at 10:38

## 2020-10-02 RX ADMIN — FENTANYL CITRATE 100 MCG: 50 INJECTION, SOLUTION INTRAMUSCULAR; INTRAVENOUS at 09:59

## 2020-10-02 RX ADMIN — SODIUM CHLORIDE: 0.9 INJECTION, SOLUTION INTRAVENOUS at 09:59

## 2020-10-02 RX ADMIN — ONDANSETRON 4 MG: 2 INJECTION INTRAMUSCULAR; INTRAVENOUS at 10:52

## 2020-10-02 RX ADMIN — GLYCOPYRROLATE 0.1 MG: 0.2 INJECTION, SOLUTION INTRAMUSCULAR; INTRAVENOUS at 09:59

## 2020-10-02 RX ADMIN — LIDOCAINE HYDROCHLORIDE 50 MG: 10 INJECTION, SOLUTION EPIDURAL; INFILTRATION; INTRACAUDAL; PERINEURAL at 10:03

## 2020-10-02 RX ADMIN — SODIUM CHLORIDE: 0.9 INJECTION, SOLUTION INTRAVENOUS at 10:45

## 2020-10-02 RX ADMIN — PROPOFOL 180 MG: 10 INJECTION, EMULSION INTRAVENOUS at 10:03

## 2020-10-02 RX ADMIN — ROCURONIUM BROMIDE 20 MG: 10 INJECTION, SOLUTION INTRAVENOUS at 10:23

## 2020-10-02 RX ADMIN — PHENYLEPHRINE HYDROCHLORIDE 200 MCG: 10 INJECTION INTRAVENOUS at 10:32

## 2020-10-02 RX ADMIN — Medication 100 MG: at 10:04

## 2020-10-02 RX ADMIN — IOHEXOL 24 ML: 240 INJECTION, SOLUTION INTRATHECAL; INTRAVASCULAR; INTRAVENOUS; ORAL at 10:27

## 2020-10-02 RX ADMIN — SODIUM CHLORIDE, SODIUM LACTATE, POTASSIUM CHLORIDE, AND CALCIUM CHLORIDE: .6; .31; .03; .02 INJECTION, SOLUTION INTRAVENOUS at 10:19

## 2020-10-02 RX ADMIN — PHENYLEPHRINE HYDROCHLORIDE 300 MCG: 10 INJECTION INTRAVENOUS at 10:45

## 2020-10-07 ENCOUNTER — TELEPHONE (OUTPATIENT)
Dept: PULMONOLOGY | Facility: CLINIC | Age: 60
End: 2020-10-07

## 2020-10-08 ENCOUNTER — OFFICE VISIT (OUTPATIENT)
Dept: PULMONOLOGY | Facility: HOSPITAL | Age: 60
End: 2020-10-08
Payer: COMMERCIAL

## 2020-10-08 ENCOUNTER — HOSPITAL ENCOUNTER (OUTPATIENT)
Dept: RADIOLOGY | Facility: HOSPITAL | Age: 60
Discharge: HOME | End: 2020-10-08
Attending: INTERNAL MEDICINE
Payer: COMMERCIAL

## 2020-10-08 VITALS
OXYGEN SATURATION: 98 % | TEMPERATURE: 96.8 F | SYSTOLIC BLOOD PRESSURE: 118 MMHG | BODY MASS INDEX: 30.59 KG/M2 | WEIGHT: 179.2 LBS | HEART RATE: 82 BPM | HEIGHT: 64 IN | DIASTOLIC BLOOD PRESSURE: 72 MMHG

## 2020-10-08 DIAGNOSIS — F17.200 NICOTINE DEPENDENCE, UNCOMPLICATED, UNSPECIFIED NICOTINE PRODUCT TYPE: ICD-10-CM

## 2020-10-08 DIAGNOSIS — R05.9 COUGH: ICD-10-CM

## 2020-10-08 DIAGNOSIS — M79.89 LEG SWELLING: ICD-10-CM

## 2020-10-08 DIAGNOSIS — J44.9 MODERATE COPD (CHRONIC OBSTRUCTIVE PULMONARY DISEASE) (HCC): Primary | ICD-10-CM

## 2020-10-08 DIAGNOSIS — J30.1 NON-SEASONAL ALLERGIC RHINITIS DUE TO POLLEN: ICD-10-CM

## 2020-10-08 PROBLEM — J20.9 ACUTE TRACHEOBRONCHITIS: Status: RESOLVED | Noted: 2019-04-16 | Resolved: 2020-10-08

## 2020-10-08 PROBLEM — Z20.822 SUSPECTED COVID-19 VIRUS INFECTION: Status: RESOLVED | Noted: 2020-07-09 | Resolved: 2020-10-08

## 2020-10-08 PROBLEM — R60.0 LOWER EXTREMITY EDEMA: Status: ACTIVE | Noted: 2020-10-08

## 2020-10-08 PROCEDURE — 4004F PT TOBACCO SCREEN RCVD TLK: CPT | Performed by: INTERNAL MEDICINE

## 2020-10-08 PROCEDURE — 71046 X-RAY EXAM CHEST 2 VIEWS: CPT

## 2020-10-08 PROCEDURE — 3078F DIAST BP <80 MM HG: CPT | Performed by: INTERNAL MEDICINE

## 2020-10-08 PROCEDURE — 99215 OFFICE O/P EST HI 40 MIN: CPT | Performed by: INTERNAL MEDICINE

## 2020-10-08 PROCEDURE — 3074F SYST BP LT 130 MM HG: CPT | Performed by: INTERNAL MEDICINE

## 2020-10-08 RX ORDER — BENZONATATE 100 MG/1
100 CAPSULE ORAL 3 TIMES DAILY PRN
Qty: 20 CAPSULE | Refills: 0 | Status: SHIPPED | OUTPATIENT
Start: 2020-10-08 | End: 2020-12-17 | Stop reason: SDUPTHER

## 2020-10-08 RX ORDER — FLUTICASONE PROPIONATE 50 MCG
2 SPRAY, SUSPENSION (ML) NASAL DAILY
Qty: 1 BOTTLE | Refills: 5 | Status: SHIPPED | OUTPATIENT
Start: 2020-10-08 | End: 2020-12-30 | Stop reason: ALTCHOICE

## 2020-10-08 RX ORDER — FEXOFENADINE HCL 180 MG/1
180 TABLET ORAL DAILY
Qty: 30 TABLET | Refills: 5 | Status: SHIPPED | OUTPATIENT
Start: 2020-10-08 | End: 2020-12-30 | Stop reason: ALTCHOICE

## 2020-10-08 RX ORDER — BETAMETHASONE DIPROPIONATE 0.5 MG/G
CREAM TOPICAL
COMMUNITY
Start: 2020-10-01 | End: 2020-12-30 | Stop reason: ALTCHOICE

## 2020-10-08 RX ORDER — ALBUTEROL SULFATE 2.5 MG/3ML
2.5 SOLUTION RESPIRATORY (INHALATION) AS NEEDED
Qty: 90 VIAL | Refills: 5 | Status: SHIPPED | OUTPATIENT
Start: 2020-10-08 | End: 2020-12-30 | Stop reason: ALTCHOICE

## 2020-10-26 ENCOUNTER — HOSPITAL ENCOUNTER (OUTPATIENT)
Dept: NON INVASIVE DIAGNOSTICS | Age: 60
Discharge: HOME | End: 2020-10-26
Payer: COMMERCIAL

## 2020-10-26 DIAGNOSIS — M79.89 LEG SWELLING: ICD-10-CM

## 2020-10-26 PROCEDURE — 93306 TTE W/DOPPLER COMPLETE: CPT

## 2020-10-26 PROCEDURE — 93306 TTE W/DOPPLER COMPLETE: CPT | Performed by: INTERNAL MEDICINE

## 2020-10-28 ENCOUNTER — OFFICE VISIT (OUTPATIENT)
Dept: GASTROENTEROLOGY | Facility: CLINIC | Age: 60
End: 2020-10-28
Payer: COMMERCIAL

## 2020-10-28 VITALS
TEMPERATURE: 98.1 F | DIASTOLIC BLOOD PRESSURE: 70 MMHG | WEIGHT: 175 LBS | SYSTOLIC BLOOD PRESSURE: 114 MMHG | HEIGHT: 64 IN | BODY MASS INDEX: 29.88 KG/M2

## 2020-10-28 DIAGNOSIS — K21.9 GERD WITHOUT ESOPHAGITIS: ICD-10-CM

## 2020-10-28 DIAGNOSIS — R79.89 ELEVATED LFTS: ICD-10-CM

## 2020-10-28 DIAGNOSIS — K58.0 IRRITABLE BOWEL SYNDROME WITH DIARRHEA: ICD-10-CM

## 2020-10-28 DIAGNOSIS — K76.0 FATTY LIVER: Primary | ICD-10-CM

## 2020-10-28 PROCEDURE — 99214 OFFICE O/P EST MOD 30 MIN: CPT | Performed by: INTERNAL MEDICINE

## 2020-11-02 ENCOUNTER — HOSPITAL ENCOUNTER (OUTPATIENT)
Dept: NON INVASIVE DIAGNOSTICS | Facility: HOSPITAL | Age: 60
Discharge: HOME | End: 2020-11-02
Attending: INTERNAL MEDICINE
Payer: COMMERCIAL

## 2020-11-02 ENCOUNTER — HOSPITAL ENCOUNTER (OUTPATIENT)
Dept: RADIOLOGY | Facility: HOSPITAL | Age: 60
Discharge: HOME | End: 2020-11-02
Attending: INTERNAL MEDICINE
Payer: COMMERCIAL

## 2020-11-02 DIAGNOSIS — R05.9 COUGH: ICD-10-CM

## 2020-11-02 DIAGNOSIS — R05.9 COUGH: Primary | ICD-10-CM

## 2020-11-02 DIAGNOSIS — M79.89 LEG SWELLING: ICD-10-CM

## 2020-11-02 DIAGNOSIS — K80.50 CHOLEDOCHOLITHIASIS: ICD-10-CM

## 2020-11-02 PROCEDURE — 93970 EXTREMITY STUDY: CPT

## 2020-11-02 PROCEDURE — U0003 INFECTIOUS AGENT DETECTION BY NUCLEIC ACID (DNA OR RNA); SEVERE ACUTE RESPIRATORY SYNDROME CORONAVIRUS 2 (SARS-COV-2) (CORONAVIRUS DISEASE [COVID-19]), AMPLIFIED PROBE TECHNIQUE, MAKING USE OF HIGH THROUGHPUT TECHNOLOGIES AS DESCRIBED BY CMS-2020-01-R: HCPCS | Performed by: NURSE PRACTITIONER

## 2020-11-02 PROCEDURE — 74018 RADEX ABDOMEN 1 VIEW: CPT

## 2020-11-02 PROCEDURE — 93970 EXTREMITY STUDY: CPT | Performed by: INTERNAL MEDICINE

## 2020-11-02 RX ORDER — AZITHROMYCIN 250 MG/1
TABLET, FILM COATED ORAL
Qty: 6 TABLET | Refills: 0 | Status: SHIPPED | OUTPATIENT
Start: 2020-11-02 | End: 2020-11-07

## 2020-11-03 LAB — SARS-COV-2 RNA SPEC QL NAA+PROBE: NOT DETECTED

## 2020-11-05 ENCOUNTER — OFFICE VISIT (OUTPATIENT)
Dept: FAMILY MEDICINE CLINIC | Facility: CLINIC | Age: 60
End: 2020-11-05
Payer: COMMERCIAL

## 2020-11-05 VITALS
TEMPERATURE: 97.8 F | DIASTOLIC BLOOD PRESSURE: 76 MMHG | SYSTOLIC BLOOD PRESSURE: 118 MMHG | OXYGEN SATURATION: 95 % | HEIGHT: 64 IN | RESPIRATION RATE: 14 BRPM | HEART RATE: 80 BPM | BODY MASS INDEX: 30.37 KG/M2 | WEIGHT: 177.9 LBS

## 2020-11-05 DIAGNOSIS — J44.1 ACUTE EXACERBATION OF CHRONIC OBSTRUCTIVE PULMONARY DISEASE (COPD) (HCC): Primary | ICD-10-CM

## 2020-11-05 PROCEDURE — 3008F BODY MASS INDEX DOCD: CPT | Performed by: NURSE PRACTITIONER

## 2020-11-05 PROCEDURE — 4004F PT TOBACCO SCREEN RCVD TLK: CPT | Performed by: NURSE PRACTITIONER

## 2020-11-05 PROCEDURE — 99213 OFFICE O/P EST LOW 20 MIN: CPT | Performed by: NURSE PRACTITIONER

## 2020-11-05 PROCEDURE — 3074F SYST BP LT 130 MM HG: CPT | Performed by: NURSE PRACTITIONER

## 2020-11-05 PROCEDURE — 3078F DIAST BP <80 MM HG: CPT | Performed by: NURSE PRACTITIONER

## 2020-11-09 ENCOUNTER — TELEPHONE (OUTPATIENT)
Dept: GASTROENTEROLOGY | Facility: CLINIC | Age: 60
End: 2020-11-09

## 2020-11-09 DIAGNOSIS — R93.89 ABNORMAL FINDING ON IMAGING: Primary | ICD-10-CM

## 2020-11-20 ENCOUNTER — HOSPITAL ENCOUNTER (OUTPATIENT)
Dept: CT IMAGING | Facility: HOSPITAL | Age: 60
Discharge: HOME | End: 2020-11-20
Attending: INTERNAL MEDICINE
Payer: COMMERCIAL

## 2020-11-20 DIAGNOSIS — R93.89 ABNORMAL FINDING ON IMAGING: ICD-10-CM

## 2020-11-20 PROCEDURE — 74176 CT ABD & PELVIS W/O CONTRAST: CPT

## 2020-11-20 PROCEDURE — G1004 CDSM NDSC: HCPCS

## 2020-11-23 ENCOUNTER — HOSPITAL ENCOUNTER (OUTPATIENT)
Dept: MRI IMAGING | Facility: HOSPITAL | Age: 60
Discharge: HOME | End: 2020-11-23
Payer: COMMERCIAL

## 2020-11-23 DIAGNOSIS — K70.30 ALCOHOLIC CIRRHOSIS OF LIVER WITHOUT ASCITES (HCC): ICD-10-CM

## 2020-11-23 DIAGNOSIS — K76.9 LIVER LESION: ICD-10-CM

## 2020-11-23 PROCEDURE — G1004 CDSM NDSC: HCPCS

## 2020-11-23 PROCEDURE — A9585 GADOBUTROL INJECTION: HCPCS | Performed by: PHYSICIAN ASSISTANT

## 2020-11-23 PROCEDURE — 74183 MRI ABD W/O CNTR FLWD CNTR: CPT

## 2020-11-23 RX ADMIN — GADOBUTROL 8 ML: 604.72 INJECTION INTRAVENOUS at 13:18

## 2020-11-25 ENCOUNTER — HOSPITAL ENCOUNTER (EMERGENCY)
Facility: HOSPITAL | Age: 60
Discharge: HOME | End: 2020-11-25
Attending: EMERGENCY MEDICINE
Payer: COMMERCIAL

## 2020-11-25 ENCOUNTER — TELEPHONE (OUTPATIENT)
Dept: GASTROENTEROLOGY | Facility: MEDICAL CENTER | Age: 60
End: 2020-11-25

## 2020-11-25 ENCOUNTER — TELEPHONE (OUTPATIENT)
Dept: GASTROENTEROLOGY | Facility: AMBULARY SURGERY CENTER | Age: 60
End: 2020-11-25

## 2020-11-25 VITALS
RESPIRATION RATE: 22 BRPM | TEMPERATURE: 97.9 F | DIASTOLIC BLOOD PRESSURE: 61 MMHG | HEIGHT: 64 IN | SYSTOLIC BLOOD PRESSURE: 114 MMHG | BODY MASS INDEX: 31.58 KG/M2 | OXYGEN SATURATION: 98 % | HEART RATE: 80 BPM | WEIGHT: 185 LBS

## 2020-11-25 DIAGNOSIS — R18.8 CIRRHOSIS OF LIVER WITH ASCITES, UNSPECIFIED HEPATIC CIRRHOSIS TYPE (HCC): Primary | ICD-10-CM

## 2020-11-25 DIAGNOSIS — E87.1 HYPONATREMIA: ICD-10-CM

## 2020-11-25 DIAGNOSIS — K70.11 ASCITES DUE TO CHRONIC ALCOHOLIC HEPATITIS: Primary | ICD-10-CM

## 2020-11-25 DIAGNOSIS — D64.9 ANEMIA: ICD-10-CM

## 2020-11-25 DIAGNOSIS — R18.8 OTHER ASCITES: Primary | ICD-10-CM

## 2020-11-25 DIAGNOSIS — K74.60 CIRRHOSIS OF LIVER WITH ASCITES, UNSPECIFIED HEPATIC CIRRHOSIS TYPE (HCC): Primary | ICD-10-CM

## 2020-11-25 LAB
ALBUMIN SERPL BCP-MCNC: 1.9 G/DL (ref 3.5–5)
ALP SERPL-CCNC: 143 U/L (ref 46–116)
ALT SERPL W P-5'-P-CCNC: 35 U/L (ref 12–78)
ANION GAP SERPL CALCULATED.3IONS-SCNC: 6 MMOL/L (ref 4–13)
APTT PPP: 38 SECONDS (ref 23–37)
AST SERPL W P-5'-P-CCNC: 87 U/L (ref 5–45)
BASOPHILS # BLD AUTO: 0.07 THOUSANDS/ΜL (ref 0–0.1)
BASOPHILS NFR BLD AUTO: 1 % (ref 0–1)
BILIRUB SERPL-MCNC: 1.5 MG/DL (ref 0.2–1)
BUN SERPL-MCNC: 8 MG/DL (ref 5–25)
CALCIUM ALBUM COR SERPL-MCNC: 9.5 MG/DL (ref 8.3–10.1)
CALCIUM SERPL-MCNC: 7.8 MG/DL (ref 8.3–10.1)
CHLORIDE SERPL-SCNC: 94 MMOL/L (ref 100–108)
CO2 SERPL-SCNC: 24 MMOL/L (ref 21–32)
CREAT SERPL-MCNC: 0.91 MG/DL (ref 0.6–1.3)
EOSINOPHIL # BLD AUTO: 0.16 THOUSAND/ΜL (ref 0–0.61)
EOSINOPHIL NFR BLD AUTO: 3 % (ref 0–6)
ERYTHROCYTE [DISTWIDTH] IN BLOOD BY AUTOMATED COUNT: 13.6 % (ref 11.6–15.1)
ETHANOL SERPL-MCNC: 63 MG/DL (ref 0–3)
GFR SERPL CREATININE-BSD FRML MDRD: 68 ML/MIN/1.73SQ M
GLUCOSE SERPL-MCNC: 100 MG/DL (ref 65–140)
HCT VFR BLD AUTO: 26.4 % (ref 34.8–46.1)
HGB BLD-MCNC: 9.4 G/DL (ref 11.5–15.4)
IMM GRANULOCYTES # BLD AUTO: 0.02 THOUSAND/UL (ref 0–0.2)
IMM GRANULOCYTES NFR BLD AUTO: 0 % (ref 0–2)
INR PPP: 1.76 (ref 0.84–1.19)
LIPASE SERPL-CCNC: 225 U/L (ref 73–393)
LYMPHOCYTES # BLD AUTO: 1.65 THOUSANDS/ΜL (ref 0.6–4.47)
LYMPHOCYTES NFR BLD AUTO: 27 % (ref 14–44)
MCH RBC QN AUTO: 34.7 PG (ref 26.8–34.3)
MCHC RBC AUTO-ENTMCNC: 35.6 G/DL (ref 31.4–37.4)
MCV RBC AUTO: 97 FL (ref 82–98)
MONOCYTES # BLD AUTO: 0.73 THOUSAND/ΜL (ref 0.17–1.22)
MONOCYTES NFR BLD AUTO: 12 % (ref 4–12)
NEUTROPHILS # BLD AUTO: 3.39 THOUSANDS/ΜL (ref 1.85–7.62)
NEUTS SEG NFR BLD AUTO: 57 % (ref 43–75)
NRBC BLD AUTO-RTO: 0 /100 WBCS
PLATELET # BLD AUTO: 60 THOUSANDS/UL (ref 149–390)
PMV BLD AUTO: 11 FL (ref 8.9–12.7)
POTASSIUM SERPL-SCNC: 4.1 MMOL/L (ref 3.5–5.3)
PROT SERPL-MCNC: 8.2 G/DL (ref 6.4–8.2)
PROTHROMBIN TIME: 20.5 SECONDS (ref 11.6–14.5)
RBC # BLD AUTO: 2.71 MILLION/UL (ref 3.81–5.12)
SODIUM SERPL-SCNC: 124 MMOL/L (ref 136–145)
WBC # BLD AUTO: 6.02 THOUSAND/UL (ref 4.31–10.16)

## 2020-11-25 PROCEDURE — 99284 EMERGENCY DEPT VISIT MOD MDM: CPT

## 2020-11-25 PROCEDURE — 85025 COMPLETE CBC W/AUTO DIFF WBC: CPT

## 2020-11-25 PROCEDURE — 83690 ASSAY OF LIPASE: CPT

## 2020-11-25 PROCEDURE — NC001 PR NO CHARGE: Performed by: RADIOLOGY

## 2020-11-25 PROCEDURE — 85610 PROTHROMBIN TIME: CPT | Performed by: EMERGENCY MEDICINE

## 2020-11-25 PROCEDURE — 99284 EMERGENCY DEPT VISIT MOD MDM: CPT | Performed by: EMERGENCY MEDICINE

## 2020-11-25 PROCEDURE — 80053 COMPREHEN METABOLIC PANEL: CPT

## 2020-11-25 PROCEDURE — 96374 THER/PROPH/DIAG INJ IV PUSH: CPT

## 2020-11-25 PROCEDURE — 85730 THROMBOPLASTIN TIME PARTIAL: CPT | Performed by: EMERGENCY MEDICINE

## 2020-11-25 PROCEDURE — 36415 COLL VENOUS BLD VENIPUNCTURE: CPT

## 2020-11-25 PROCEDURE — 80320 DRUG SCREEN QUANTALCOHOLS: CPT | Performed by: EMERGENCY MEDICINE

## 2020-11-25 RX ORDER — LORAZEPAM 2 MG/ML
1 INJECTION INTRAMUSCULAR ONCE
Status: COMPLETED | OUTPATIENT
Start: 2020-11-25 | End: 2020-11-25

## 2020-11-25 RX ADMIN — LORAZEPAM 1 MG: 2 INJECTION INTRAMUSCULAR; INTRAVENOUS at 17:15

## 2020-11-27 ENCOUNTER — HOSPITAL ENCOUNTER (OUTPATIENT)
Dept: INTERVENTIONAL RADIOLOGY/VASCULAR | Facility: HOSPITAL | Age: 60
Discharge: HOME | End: 2020-11-27
Admitting: RADIOLOGY
Payer: COMMERCIAL

## 2020-11-27 ENCOUNTER — TELEPHONE (OUTPATIENT)
Dept: GASTROENTEROLOGY | Facility: AMBULARY SURGERY CENTER | Age: 60
End: 2020-11-27

## 2020-11-27 VITALS — DIASTOLIC BLOOD PRESSURE: 59 MMHG | OXYGEN SATURATION: 100 % | HEART RATE: 76 BPM | SYSTOLIC BLOOD PRESSURE: 117 MMHG

## 2020-11-27 DIAGNOSIS — R18.8 OTHER ASCITES: ICD-10-CM

## 2020-11-27 LAB
ALBUMIN FLD-MCNC: 0.6 G/DL
APPEARANCE FLD: NORMAL
COLOR FLD: NORMAL
HISTIOCYTES NFR FLD: 45 %
LDH SERPL-CCNC: 74 U/L (ref 81–234)
LYMPHOCYTES NFR BLD AUTO: 16 %
MONO+MESO NFR FLD MANUAL: 27 %
MONOCYTES NFR BLD AUTO: 3 %
NEUTS SEG NFR BLD AUTO: 9 %
PROT FLD-MCNC: <2 G/DL
SITE: NORMAL
TOTAL CELLS COUNTED SPEC: 100
WBC # FLD MANUAL: 173 /UL

## 2020-11-27 PROCEDURE — 83615 LACTATE (LD) (LDH) ENZYME: CPT | Performed by: RADIOLOGY

## 2020-11-27 PROCEDURE — 49083 ABD PARACENTESIS W/IMAGING: CPT | Performed by: RADIOLOGY

## 2020-11-27 PROCEDURE — 82042 OTHER SOURCE ALBUMIN QUAN EA: CPT | Performed by: RADIOLOGY

## 2020-11-27 PROCEDURE — 49083 ABD PARACENTESIS W/IMAGING: CPT

## 2020-11-27 PROCEDURE — 87205 SMEAR GRAM STAIN: CPT | Performed by: RADIOLOGY

## 2020-11-27 PROCEDURE — 88305 TISSUE EXAM BY PATHOLOGIST: CPT | Performed by: PATHOLOGY

## 2020-11-27 PROCEDURE — 84157 ASSAY OF PROTEIN OTHER: CPT | Performed by: RADIOLOGY

## 2020-11-27 PROCEDURE — 88112 CYTOPATH CELL ENHANCE TECH: CPT | Performed by: PATHOLOGY

## 2020-11-27 PROCEDURE — 87070 CULTURE OTHR SPECIMN AEROBIC: CPT | Performed by: RADIOLOGY

## 2020-11-27 PROCEDURE — 89051 BODY FLUID CELL COUNT: CPT | Performed by: RADIOLOGY

## 2020-11-27 RX ORDER — LIDOCAINE WITH 8.4% SOD BICARB 0.9%(10ML)
SYRINGE (ML) INJECTION CODE/TRAUMA/SEDATION MEDICATION
Status: COMPLETED | OUTPATIENT
Start: 2020-11-27 | End: 2020-11-27

## 2020-11-27 RX ADMIN — Medication 10 ML: at 13:42

## 2020-11-29 DIAGNOSIS — R18.8 CIRRHOSIS OF LIVER WITH ASCITES, UNSPECIFIED HEPATIC CIRRHOSIS TYPE (HCC): Primary | ICD-10-CM

## 2020-11-29 DIAGNOSIS — E87.1 HYPONATREMIA: ICD-10-CM

## 2020-11-29 DIAGNOSIS — K74.60 CIRRHOSIS OF LIVER WITH ASCITES, UNSPECIFIED HEPATIC CIRRHOSIS TYPE (HCC): Primary | ICD-10-CM

## 2020-11-30 ENCOUNTER — APPOINTMENT (OUTPATIENT)
Dept: LAB | Facility: HOSPITAL | Age: 60
End: 2020-11-30
Attending: INTERNAL MEDICINE
Payer: COMMERCIAL

## 2020-11-30 ENCOUNTER — TRANSCRIBE ORDERS (OUTPATIENT)
Dept: ADMINISTRATIVE | Facility: HOSPITAL | Age: 60
End: 2020-11-30

## 2020-11-30 DIAGNOSIS — K74.60 HEPATIC CIRRHOSIS, UNSPECIFIED HEPATIC CIRRHOSIS TYPE, UNSPECIFIED WHETHER ASCITES PRESENT (HCC): ICD-10-CM

## 2020-11-30 DIAGNOSIS — K74.60 HEPATIC CIRRHOSIS, UNSPECIFIED HEPATIC CIRRHOSIS TYPE, UNSPECIFIED WHETHER ASCITES PRESENT (HCC): Primary | ICD-10-CM

## 2020-11-30 LAB
ANION GAP SERPL CALCULATED.3IONS-SCNC: 6 MMOL/L (ref 4–13)
BACTERIA SPEC BFLD CULT: NO GROWTH
BUN SERPL-MCNC: 12 MG/DL (ref 5–25)
CALCIUM SERPL-MCNC: 7.6 MG/DL (ref 8.3–10.1)
CHLORIDE SERPL-SCNC: 101 MMOL/L (ref 100–108)
CO2 SERPL-SCNC: 24 MMOL/L (ref 21–32)
CREAT SERPL-MCNC: 1.19 MG/DL (ref 0.6–1.3)
GFR SERPL CREATININE-BSD FRML MDRD: 49 ML/MIN/1.73SQ M
GLUCOSE SERPL-MCNC: 86 MG/DL (ref 65–140)
GRAM STN SPEC: NORMAL
OSMOLALITY UR/SERPL-RTO: 280 MMOL/KG (ref 282–298)
OSMOLALITY UR: 259 MMOL/KG
POTASSIUM SERPL-SCNC: 4.4 MMOL/L (ref 3.5–5.3)
SODIUM SERPL-SCNC: 131 MMOL/L (ref 136–145)
URATE SERPL-MCNC: 4.5 MG/DL (ref 2–6.8)

## 2020-11-30 PROCEDURE — 36415 COLL VENOUS BLD VENIPUNCTURE: CPT

## 2020-11-30 PROCEDURE — 83930 ASSAY OF BLOOD OSMOLALITY: CPT

## 2020-11-30 PROCEDURE — 84550 ASSAY OF BLOOD/URIC ACID: CPT

## 2020-11-30 PROCEDURE — 80048 BASIC METABOLIC PNL TOTAL CA: CPT

## 2020-11-30 PROCEDURE — 83935 ASSAY OF URINE OSMOLALITY: CPT | Performed by: INTERNAL MEDICINE

## 2020-12-05 ENCOUNTER — APPOINTMENT (EMERGENCY)
Dept: RADIOLOGY | Facility: HOSPITAL | Age: 60
DRG: 433 | End: 2020-12-05
Payer: COMMERCIAL

## 2020-12-05 ENCOUNTER — HOSPITAL ENCOUNTER (INPATIENT)
Facility: HOSPITAL | Age: 60
LOS: 6 days | Discharge: HOME | DRG: 433 | End: 2020-12-11
Attending: EMERGENCY MEDICINE | Admitting: INTERNAL MEDICINE
Payer: COMMERCIAL

## 2020-12-05 ENCOUNTER — APPOINTMENT (EMERGENCY)
Dept: CT IMAGING | Facility: HOSPITAL | Age: 60
DRG: 433 | End: 2020-12-05
Payer: COMMERCIAL

## 2020-12-05 DIAGNOSIS — E87.1 HYPONATREMIA: ICD-10-CM

## 2020-12-05 DIAGNOSIS — N17.9 ACUTE KIDNEY INJURY SUPERIMPOSED ON CHRONIC KIDNEY DISEASE (HCC): ICD-10-CM

## 2020-12-05 DIAGNOSIS — N18.9 ACUTE KIDNEY INJURY SUPERIMPOSED ON CHRONIC KIDNEY DISEASE (HCC): ICD-10-CM

## 2020-12-05 DIAGNOSIS — K76.6 PORTAL HYPERTENSION (HCC): ICD-10-CM

## 2020-12-05 DIAGNOSIS — K70.31 ALCOHOLIC CIRRHOSIS OF LIVER WITH ASCITES (HCC): ICD-10-CM

## 2020-12-05 DIAGNOSIS — E87.1 ACUTE HYPONATREMIA: Primary | ICD-10-CM

## 2020-12-05 PROBLEM — F10.230 ALCOHOL DEPENDENCE WITH UNCOMPLICATED WITHDRAWAL (HCC): Status: ACTIVE | Noted: 2020-12-05

## 2020-12-05 LAB
ABO GROUP BLD: NORMAL
ALBUMIN SERPL BCP-MCNC: 1.9 G/DL (ref 3.5–5)
ALP SERPL-CCNC: 143 U/L (ref 46–116)
ALT SERPL W P-5'-P-CCNC: 35 U/L (ref 12–78)
ANION GAP SERPL CALCULATED.3IONS-SCNC: 9 MMOL/L (ref 4–13)
APTT PPP: 36 SECONDS (ref 23–37)
AST SERPL W P-5'-P-CCNC: 78 U/L (ref 5–45)
BASOPHILS # BLD AUTO: 0.09 THOUSANDS/ΜL (ref 0–0.1)
BASOPHILS NFR BLD AUTO: 2 % (ref 0–1)
BILIRUB SERPL-MCNC: 1.5 MG/DL (ref 0.2–1)
BLD GP AB SCN SERPL QL: NEGATIVE
BUN SERPL-MCNC: 11 MG/DL (ref 5–25)
CALCIUM ALBUM COR SERPL-MCNC: 9.5 MG/DL (ref 8.3–10.1)
CALCIUM SERPL-MCNC: 7.8 MG/DL (ref 8.3–10.1)
CHLORIDE SERPL-SCNC: 93 MMOL/L (ref 100–108)
CO2 SERPL-SCNC: 22 MMOL/L (ref 21–32)
CREAT SERPL-MCNC: 1.02 MG/DL (ref 0.6–1.3)
EOSINOPHIL # BLD AUTO: 0.1 THOUSAND/ΜL (ref 0–0.61)
EOSINOPHIL NFR BLD AUTO: 2 % (ref 0–6)
ERYTHROCYTE [DISTWIDTH] IN BLOOD BY AUTOMATED COUNT: 14 % (ref 11.6–15.1)
ETHANOL SERPL-MCNC: 43 MG/DL (ref 0–3)
FLUAV RNA RESP QL NAA+PROBE: NEGATIVE
FLUBV RNA RESP QL NAA+PROBE: NEGATIVE
GFR SERPL CREATININE-BSD FRML MDRD: 60 ML/MIN/1.73SQ M
GLUCOSE SERPL-MCNC: 95 MG/DL (ref 65–140)
HCT VFR BLD AUTO: 26.3 % (ref 34.8–46.1)
HGB BLD-MCNC: 9.4 G/DL (ref 11.5–15.4)
IMM GRANULOCYTES # BLD AUTO: 0.03 THOUSAND/UL (ref 0–0.2)
IMM GRANULOCYTES NFR BLD AUTO: 1 % (ref 0–2)
INR PPP: 1.64 (ref 0.84–1.19)
LACTATE SERPL-SCNC: 2.1 MMOL/L (ref 0.5–2)
LACTATE SERPL-SCNC: 2.3 MMOL/L (ref 0.5–2)
LIPASE SERPL-CCNC: 157 U/L (ref 73–393)
LYMPHOCYTES # BLD AUTO: 2.08 THOUSANDS/ΜL (ref 0.6–4.47)
LYMPHOCYTES NFR BLD AUTO: 34 % (ref 14–44)
MCH RBC QN AUTO: 34.8 PG (ref 26.8–34.3)
MCHC RBC AUTO-ENTMCNC: 35.7 G/DL (ref 31.4–37.4)
MCV RBC AUTO: 97 FL (ref 82–98)
MONOCYTES # BLD AUTO: 0.75 THOUSAND/ΜL (ref 0.17–1.22)
MONOCYTES NFR BLD AUTO: 12 % (ref 4–12)
NEUTROPHILS # BLD AUTO: 3.12 THOUSANDS/ΜL (ref 1.85–7.62)
NEUTS SEG NFR BLD AUTO: 49 % (ref 43–75)
NRBC BLD AUTO-RTO: 0 /100 WBCS
PLATELET # BLD AUTO: 81 THOUSANDS/UL (ref 149–390)
PMV BLD AUTO: 10.8 FL (ref 8.9–12.7)
POTASSIUM SERPL-SCNC: 3.9 MMOL/L (ref 3.5–5.3)
PROT SERPL-MCNC: 8.3 G/DL (ref 6.4–8.2)
PROTHROMBIN TIME: 19.4 SECONDS (ref 11.6–14.5)
RBC # BLD AUTO: 2.7 MILLION/UL (ref 3.81–5.12)
RH BLD: POSITIVE
RSV RNA RESP QL NAA+PROBE: NEGATIVE
SARS-COV-2 RNA RESP QL NAA+PROBE: NEGATIVE
SODIUM SERPL-SCNC: 124 MMOL/L (ref 136–145)
SPECIMEN EXPIRATION DATE: NORMAL
WBC # BLD AUTO: 6.17 THOUSAND/UL (ref 4.31–10.16)

## 2020-12-05 PROCEDURE — 85730 THROMBOPLASTIN TIME PARTIAL: CPT | Performed by: EMERGENCY MEDICINE

## 2020-12-05 PROCEDURE — 71045 X-RAY EXAM CHEST 1 VIEW: CPT

## 2020-12-05 PROCEDURE — 83690 ASSAY OF LIPASE: CPT | Performed by: EMERGENCY MEDICINE

## 2020-12-05 PROCEDURE — 99285 EMERGENCY DEPT VISIT HI MDM: CPT | Performed by: EMERGENCY MEDICINE

## 2020-12-05 PROCEDURE — 83605 ASSAY OF LACTIC ACID: CPT | Performed by: EMERGENCY MEDICINE

## 2020-12-05 PROCEDURE — 36415 COLL VENOUS BLD VENIPUNCTURE: CPT | Performed by: EMERGENCY MEDICINE

## 2020-12-05 PROCEDURE — G1004 CDSM NDSC: HCPCS

## 2020-12-05 PROCEDURE — 85610 PROTHROMBIN TIME: CPT | Performed by: EMERGENCY MEDICINE

## 2020-12-05 PROCEDURE — 85025 COMPLETE CBC W/AUTO DIFF WBC: CPT | Performed by: EMERGENCY MEDICINE

## 2020-12-05 PROCEDURE — 96374 THER/PROPH/DIAG INJ IV PUSH: CPT

## 2020-12-05 PROCEDURE — 93005 ELECTROCARDIOGRAM TRACING: CPT

## 2020-12-05 PROCEDURE — 86900 BLOOD TYPING SEROLOGIC ABO: CPT | Performed by: EMERGENCY MEDICINE

## 2020-12-05 PROCEDURE — 99223 1ST HOSP IP/OBS HIGH 75: CPT | Performed by: INTERNAL MEDICINE

## 2020-12-05 PROCEDURE — 96376 TX/PRO/DX INJ SAME DRUG ADON: CPT

## 2020-12-05 PROCEDURE — 86901 BLOOD TYPING SEROLOGIC RH(D): CPT | Performed by: EMERGENCY MEDICINE

## 2020-12-05 PROCEDURE — 80320 DRUG SCREEN QUANTALCOHOLS: CPT | Performed by: EMERGENCY MEDICINE

## 2020-12-05 PROCEDURE — 80053 COMPREHEN METABOLIC PANEL: CPT | Performed by: EMERGENCY MEDICINE

## 2020-12-05 PROCEDURE — 0241U HB NFCT DS VIR RESP RNA 4 TRGT: CPT | Performed by: EMERGENCY MEDICINE

## 2020-12-05 PROCEDURE — 74177 CT ABD & PELVIS W/CONTRAST: CPT

## 2020-12-05 PROCEDURE — 86850 RBC ANTIBODY SCREEN: CPT | Performed by: EMERGENCY MEDICINE

## 2020-12-05 PROCEDURE — 99285 EMERGENCY DEPT VISIT HI MDM: CPT

## 2020-12-05 RX ORDER — FOLIC ACID 1 MG/1
1 TABLET ORAL DAILY
Status: DISCONTINUED | OUTPATIENT
Start: 2020-12-06 | End: 2020-12-11 | Stop reason: HOSPADM

## 2020-12-05 RX ORDER — HEPARIN SODIUM 5000 [USP'U]/ML
5000 INJECTION, SOLUTION INTRAVENOUS; SUBCUTANEOUS EVERY 8 HOURS SCHEDULED
Status: DISCONTINUED | OUTPATIENT
Start: 2020-12-05 | End: 2020-12-11 | Stop reason: HOSPADM

## 2020-12-05 RX ORDER — SPIRONOLACTONE 25 MG/1
100 TABLET ORAL EVERY MORNING
Status: DISCONTINUED | OUTPATIENT
Start: 2020-12-06 | End: 2020-12-06

## 2020-12-05 RX ORDER — FUROSEMIDE 40 MG/1
40 TABLET ORAL ONCE
Status: COMPLETED | OUTPATIENT
Start: 2020-12-05 | End: 2020-12-05

## 2020-12-05 RX ORDER — LORAZEPAM 1 MG/1
1 TABLET ORAL ONCE
Status: DISCONTINUED | OUTPATIENT
Start: 2020-12-05 | End: 2020-12-05

## 2020-12-05 RX ORDER — NICOTINE 21 MG/24HR
1 PATCH, TRANSDERMAL 24 HOURS TRANSDERMAL DAILY
Status: DISCONTINUED | OUTPATIENT
Start: 2020-12-06 | End: 2020-12-11 | Stop reason: HOSPADM

## 2020-12-05 RX ORDER — PANTOPRAZOLE SODIUM 40 MG/1
40 TABLET, DELAYED RELEASE ORAL
Status: DISCONTINUED | OUTPATIENT
Start: 2020-12-06 | End: 2020-12-11 | Stop reason: HOSPADM

## 2020-12-05 RX ORDER — FUROSEMIDE 40 MG/1
40 TABLET ORAL EVERY MORNING
Status: DISCONTINUED | OUTPATIENT
Start: 2020-12-06 | End: 2020-12-06

## 2020-12-05 RX ORDER — MULTIVITAMIN/IRON/FOLIC ACID 18MG-0.4MG
1 TABLET ORAL DAILY
Status: DISCONTINUED | OUTPATIENT
Start: 2020-12-06 | End: 2020-12-11 | Stop reason: HOSPADM

## 2020-12-05 RX ORDER — THIAMINE MONONITRATE (VIT B1) 100 MG
100 TABLET ORAL DAILY
Status: DISCONTINUED | OUTPATIENT
Start: 2020-12-06 | End: 2020-12-11 | Stop reason: HOSPADM

## 2020-12-05 RX ORDER — FENTANYL CITRATE 50 UG/ML
50 INJECTION, SOLUTION INTRAMUSCULAR; INTRAVENOUS
Status: DISCONTINUED | OUTPATIENT
Start: 2020-12-05 | End: 2020-12-05

## 2020-12-05 RX ORDER — SPIRONOLACTONE 25 MG/1
100 TABLET ORAL ONCE
Status: COMPLETED | OUTPATIENT
Start: 2020-12-05 | End: 2020-12-05

## 2020-12-05 RX ORDER — ALBUTEROL SULFATE 90 UG/1
2 AEROSOL, METERED RESPIRATORY (INHALATION) EVERY 6 HOURS PRN
Status: DISCONTINUED | OUTPATIENT
Start: 2020-12-05 | End: 2020-12-11 | Stop reason: HOSPADM

## 2020-12-05 RX ORDER — LORAZEPAM 2 MG/ML
1 INJECTION INTRAMUSCULAR EVERY 4 HOURS PRN
Status: DISCONTINUED | OUTPATIENT
Start: 2020-12-05 | End: 2020-12-06

## 2020-12-05 RX ORDER — OXYCODONE HYDROCHLORIDE 5 MG/1
2.5 TABLET ORAL EVERY 6 HOURS PRN
Status: DISCONTINUED | OUTPATIENT
Start: 2020-12-05 | End: 2020-12-11 | Stop reason: HOSPADM

## 2020-12-05 RX ORDER — FLUTICASONE FUROATE AND VILANTEROL 100; 25 UG/1; UG/1
1 POWDER RESPIRATORY (INHALATION) DAILY
Status: DISCONTINUED | OUTPATIENT
Start: 2020-12-06 | End: 2020-12-11 | Stop reason: HOSPADM

## 2020-12-05 RX ORDER — ONDANSETRON 2 MG/ML
4 INJECTION INTRAMUSCULAR; INTRAVENOUS EVERY 6 HOURS PRN
Status: DISCONTINUED | OUTPATIENT
Start: 2020-12-05 | End: 2020-12-11 | Stop reason: HOSPADM

## 2020-12-05 RX ORDER — OXAZEPAM 10 MG
10 CAPSULE ORAL EVERY 8 HOURS SCHEDULED
Status: DISCONTINUED | OUTPATIENT
Start: 2020-12-05 | End: 2020-12-06

## 2020-12-05 RX ORDER — FLUTICASONE PROPIONATE 50 MCG
2 SPRAY, SUSPENSION (ML) NASAL DAILY
Status: DISCONTINUED | OUTPATIENT
Start: 2020-12-06 | End: 2020-12-11 | Stop reason: HOSPADM

## 2020-12-05 RX ADMIN — SPIRONOLACTONE 100 MG: 25 TABLET, FILM COATED ORAL at 19:09

## 2020-12-05 RX ADMIN — FUROSEMIDE 40 MG: 40 TABLET ORAL at 19:09

## 2020-12-05 RX ADMIN — OXAZEPAM 10 MG: 10 CAPSULE, GELATIN COATED ORAL at 21:37

## 2020-12-05 RX ADMIN — HEPARIN SODIUM 5000 UNITS: 5000 INJECTION INTRAVENOUS; SUBCUTANEOUS at 21:38

## 2020-12-05 RX ADMIN — FENTANYL CITRATE 50 MCG: 50 INJECTION, SOLUTION INTRAMUSCULAR; INTRAVENOUS at 14:52

## 2020-12-05 RX ADMIN — FENTANYL CITRATE 50 MCG: 50 INJECTION, SOLUTION INTRAMUSCULAR; INTRAVENOUS at 15:57

## 2020-12-05 RX ADMIN — IOHEXOL 100 ML: 350 INJECTION, SOLUTION INTRAVENOUS at 15:58

## 2020-12-06 LAB
ALBUMIN SERPL BCP-MCNC: 1.5 G/DL (ref 3.5–5)
ALP SERPL-CCNC: 94 U/L (ref 46–116)
ALT SERPL W P-5'-P-CCNC: 26 U/L (ref 12–78)
AMMONIA PLAS-SCNC: 33 UMOL/L (ref 11–35)
ANION GAP SERPL CALCULATED.3IONS-SCNC: 8 MMOL/L (ref 4–13)
AST SERPL W P-5'-P-CCNC: 60 U/L (ref 5–45)
ATRIAL RATE: 76 BPM
BILIRUB SERPL-MCNC: 2.2 MG/DL (ref 0.2–1)
BUN SERPL-MCNC: 11 MG/DL (ref 5–25)
CALCIUM ALBUM COR SERPL-MCNC: 10 MG/DL (ref 8.3–10.1)
CALCIUM SERPL-MCNC: 8 MG/DL (ref 8.3–10.1)
CHLORIDE SERPL-SCNC: 100 MMOL/L (ref 100–108)
CO2 SERPL-SCNC: 22 MMOL/L (ref 21–32)
CREAT SERPL-MCNC: 0.98 MG/DL (ref 0.6–1.3)
ERYTHROCYTE [DISTWIDTH] IN BLOOD BY AUTOMATED COUNT: 14.3 % (ref 11.6–15.1)
GFR SERPL CREATININE-BSD FRML MDRD: 62 ML/MIN/1.73SQ M
GLUCOSE SERPL-MCNC: 86 MG/DL (ref 65–140)
HCT VFR BLD AUTO: 22.6 % (ref 34.8–46.1)
HGB BLD-MCNC: 7.9 G/DL (ref 11.5–15.4)
MCH RBC QN AUTO: 34.3 PG (ref 26.8–34.3)
MCHC RBC AUTO-ENTMCNC: 35 G/DL (ref 31.4–37.4)
MCV RBC AUTO: 98 FL (ref 82–98)
P AXIS: 66 DEGREES
PLATELET # BLD AUTO: 70 THOUSANDS/UL (ref 149–390)
PMV BLD AUTO: 11.2 FL (ref 8.9–12.7)
POTASSIUM SERPL-SCNC: 4.3 MMOL/L (ref 3.5–5.3)
PR INTERVAL: 132 MS
PROT SERPL-MCNC: 6.5 G/DL (ref 6.4–8.2)
QRS AXIS: 89 DEGREES
QRSD INTERVAL: 84 MS
QT INTERVAL: 404 MS
QTC INTERVAL: 454 MS
RBC # BLD AUTO: 2.3 MILLION/UL (ref 3.81–5.12)
SODIUM SERPL-SCNC: 130 MMOL/L (ref 136–145)
T WAVE AXIS: 73 DEGREES
VENTRICULAR RATE: 76 BPM
WBC # BLD AUTO: 5.36 THOUSAND/UL (ref 4.31–10.16)

## 2020-12-06 PROCEDURE — 99232 SBSQ HOSP IP/OBS MODERATE 35: CPT | Performed by: INTERNAL MEDICINE

## 2020-12-06 PROCEDURE — 85027 COMPLETE CBC AUTOMATED: CPT | Performed by: INTERNAL MEDICINE

## 2020-12-06 PROCEDURE — 93010 ELECTROCARDIOGRAM REPORT: CPT | Performed by: INTERNAL MEDICINE

## 2020-12-06 PROCEDURE — 99254 IP/OBS CNSLTJ NEW/EST MOD 60: CPT | Performed by: INTERNAL MEDICINE

## 2020-12-06 PROCEDURE — 80053 COMPREHEN METABOLIC PANEL: CPT | Performed by: INTERNAL MEDICINE

## 2020-12-06 PROCEDURE — 82140 ASSAY OF AMMONIA: CPT | Performed by: INTERNAL MEDICINE

## 2020-12-06 RX ORDER — ALBUMIN (HUMAN) 12.5 G/50ML
25 SOLUTION INTRAVENOUS ONCE
Status: COMPLETED | OUTPATIENT
Start: 2020-12-06 | End: 2020-12-06

## 2020-12-06 RX ORDER — FUROSEMIDE 10 MG/ML
20 INJECTION INTRAMUSCULAR; INTRAVENOUS DAILY
Status: DISCONTINUED | OUTPATIENT
Start: 2020-12-06 | End: 2020-12-08

## 2020-12-06 RX ORDER — SPIRONOLACTONE 25 MG/1
50 TABLET ORAL DAILY
Status: DISCONTINUED | OUTPATIENT
Start: 2020-12-06 | End: 2020-12-08

## 2020-12-06 RX ORDER — ALPRAZOLAM 0.5 MG/1
2 TABLET ORAL
Status: DISCONTINUED | OUTPATIENT
Start: 2020-12-06 | End: 2020-12-11 | Stop reason: HOSPADM

## 2020-12-06 RX ORDER — ACETAMINOPHEN 325 MG/1
650 TABLET ORAL EVERY 6 HOURS PRN
Status: DISCONTINUED | OUTPATIENT
Start: 2020-12-06 | End: 2020-12-11 | Stop reason: HOSPADM

## 2020-12-06 RX ADMIN — OXAZEPAM 10 MG: 10 CAPSULE, GELATIN COATED ORAL at 05:36

## 2020-12-06 RX ADMIN — OXYCODONE HYDROCHLORIDE 2.5 MG: 5 TABLET ORAL at 09:09

## 2020-12-06 RX ADMIN — PANTOPRAZOLE SODIUM 40 MG: 40 TABLET, DELAYED RELEASE ORAL at 05:36

## 2020-12-06 RX ADMIN — HEPARIN SODIUM 5000 UNITS: 5000 INJECTION INTRAVENOUS; SUBCUTANEOUS at 16:11

## 2020-12-06 RX ADMIN — FLUTICASONE FUROATE AND VILANTEROL TRIFENATATE 1 PUFF: 100; 25 POWDER RESPIRATORY (INHALATION) at 08:07

## 2020-12-06 RX ADMIN — ALPRAZOLAM 2 MG: 0.5 TABLET ORAL at 22:33

## 2020-12-06 RX ADMIN — ALBUMIN (HUMAN) 25 G: 0.25 INJECTION, SOLUTION INTRAVENOUS at 08:04

## 2020-12-06 RX ADMIN — THIAMINE HCL TAB 100 MG 100 MG: 100 TAB at 08:05

## 2020-12-06 RX ADMIN — HEPARIN SODIUM 5000 UNITS: 5000 INJECTION INTRAVENOUS; SUBCUTANEOUS at 22:28

## 2020-12-06 RX ADMIN — FOLIC ACID 1 MG: 1 TABLET ORAL at 08:05

## 2020-12-06 RX ADMIN — OXYCODONE HYDROCHLORIDE 2.5 MG: 5 TABLET ORAL at 16:19

## 2020-12-06 RX ADMIN — ACETAMINOPHEN 650 MG: 325 TABLET ORAL at 19:42

## 2020-12-06 RX ADMIN — ALBUMIN (HUMAN) 25 G: 0.25 INJECTION, SOLUTION INTRAVENOUS at 19:35

## 2020-12-06 RX ADMIN — HEPARIN SODIUM 5000 UNITS: 5000 INJECTION INTRAVENOUS; SUBCUTANEOUS at 05:36

## 2020-12-06 RX ADMIN — Medication 1 TABLET: at 08:05

## 2020-12-07 PROBLEM — R50.9 FEVER: Status: ACTIVE | Noted: 2020-12-07

## 2020-12-07 PROBLEM — D64.9 ANEMIA: Status: ACTIVE | Noted: 2020-12-07

## 2020-12-07 PROBLEM — D69.6 THROMBOCYTOPENIA (HCC): Status: ACTIVE | Noted: 2020-12-07

## 2020-12-07 LAB
ALBUMIN SERPL BCP-MCNC: 1.9 G/DL (ref 3.5–5)
ALP SERPL-CCNC: 76 U/L (ref 46–116)
ALT SERPL W P-5'-P-CCNC: 23 U/L (ref 12–78)
ANION GAP SERPL CALCULATED.3IONS-SCNC: 6 MMOL/L (ref 4–13)
AST SERPL W P-5'-P-CCNC: 50 U/L (ref 5–45)
BILIRUB SERPL-MCNC: 2.6 MG/DL (ref 0.2–1)
BUN SERPL-MCNC: 12 MG/DL (ref 5–25)
CALCIUM ALBUM COR SERPL-MCNC: 9.8 MG/DL (ref 8.3–10.1)
CALCIUM SERPL-MCNC: 8.1 MG/DL (ref 8.3–10.1)
CHLORIDE SERPL-SCNC: 101 MMOL/L (ref 100–108)
CO2 SERPL-SCNC: 23 MMOL/L (ref 21–32)
CREAT SERPL-MCNC: 1.21 MG/DL (ref 0.6–1.3)
ERYTHROCYTE [DISTWIDTH] IN BLOOD BY AUTOMATED COUNT: 14.6 % (ref 11.6–15.1)
FERRITIN SERPL-MCNC: 1132 NG/ML (ref 8–388)
GFR SERPL CREATININE-BSD FRML MDRD: 48 ML/MIN/1.73SQ M
GLUCOSE SERPL-MCNC: 109 MG/DL (ref 65–140)
HAV AB SER QL IA: NORMAL
HBV SURFACE AB SER-ACNC: 13.26 MIU/ML
HBV SURFACE AG SER QL: NORMAL
HCT VFR BLD AUTO: 19.7 % (ref 34.8–46.1)
HCV AB SER QL: NORMAL
HGB BLD-MCNC: 7 G/DL (ref 11.5–15.4)
IRON SATN MFR SERPL: 64 %
IRON SERPL-MCNC: 77 UG/DL (ref 50–170)
LIPASE SERPL-CCNC: 90 U/L (ref 73–393)
MCH RBC QN AUTO: 35.2 PG (ref 26.8–34.3)
MCHC RBC AUTO-ENTMCNC: 35.5 G/DL (ref 31.4–37.4)
MCV RBC AUTO: 99 FL (ref 82–98)
PLATELET # BLD AUTO: 57 THOUSANDS/UL (ref 149–390)
POTASSIUM SERPL-SCNC: 4.4 MMOL/L (ref 3.5–5.3)
PROT SERPL-MCNC: 6.4 G/DL (ref 6.4–8.2)
RBC # BLD AUTO: 1.99 MILLION/UL (ref 3.81–5.12)
SODIUM SERPL-SCNC: 130 MMOL/L (ref 136–145)
TIBC SERPL-MCNC: 121 UG/DL (ref 250–450)
WBC # BLD AUTO: 6.01 THOUSAND/UL (ref 4.31–10.16)

## 2020-12-07 PROCEDURE — 87340 HEPATITIS B SURFACE AG IA: CPT | Performed by: INTERNAL MEDICINE

## 2020-12-07 PROCEDURE — 83690 ASSAY OF LIPASE: CPT | Performed by: INTERNAL MEDICINE

## 2020-12-07 PROCEDURE — 86803 HEPATITIS C AB TEST: CPT | Performed by: INTERNAL MEDICINE

## 2020-12-07 PROCEDURE — 86708 HEPATITIS A ANTIBODY: CPT | Performed by: INTERNAL MEDICINE

## 2020-12-07 PROCEDURE — 80053 COMPREHEN METABOLIC PANEL: CPT | Performed by: INTERNAL MEDICINE

## 2020-12-07 PROCEDURE — 86706 HEP B SURFACE ANTIBODY: CPT | Performed by: INTERNAL MEDICINE

## 2020-12-07 PROCEDURE — 83540 ASSAY OF IRON: CPT | Performed by: NURSE PRACTITIONER

## 2020-12-07 PROCEDURE — 82728 ASSAY OF FERRITIN: CPT | Performed by: NURSE PRACTITIONER

## 2020-12-07 PROCEDURE — 83550 IRON BINDING TEST: CPT | Performed by: NURSE PRACTITIONER

## 2020-12-07 PROCEDURE — 85027 COMPLETE CBC AUTOMATED: CPT | Performed by: INTERNAL MEDICINE

## 2020-12-07 PROCEDURE — 99232 SBSQ HOSP IP/OBS MODERATE 35: CPT | Performed by: NURSE PRACTITIONER

## 2020-12-07 RX ORDER — CEFTRIAXONE 1 G/50ML
1000 INJECTION, SOLUTION INTRAVENOUS EVERY 24 HOURS
Status: DISCONTINUED | OUTPATIENT
Start: 2020-12-07 | End: 2020-12-10

## 2020-12-07 RX ORDER — BENZONATATE 100 MG/1
100 CAPSULE ORAL 3 TIMES DAILY PRN
Status: DISCONTINUED | OUTPATIENT
Start: 2020-12-07 | End: 2020-12-11 | Stop reason: HOSPADM

## 2020-12-07 RX ADMIN — ACETAMINOPHEN 650 MG: 325 TABLET ORAL at 03:51

## 2020-12-07 RX ADMIN — THIAMINE HCL TAB 100 MG 100 MG: 100 TAB at 08:48

## 2020-12-07 RX ADMIN — Medication 1 TABLET: at 08:48

## 2020-12-07 RX ADMIN — ALPRAZOLAM 2 MG: 0.5 TABLET ORAL at 20:11

## 2020-12-07 RX ADMIN — HEPARIN SODIUM 5000 UNITS: 5000 INJECTION INTRAVENOUS; SUBCUTANEOUS at 21:48

## 2020-12-07 RX ADMIN — BENZONATATE 100 MG: 100 CAPSULE ORAL at 22:31

## 2020-12-07 RX ADMIN — HEPARIN SODIUM 5000 UNITS: 5000 INJECTION INTRAVENOUS; SUBCUTANEOUS at 13:43

## 2020-12-07 RX ADMIN — FOLIC ACID 1 MG: 1 TABLET ORAL at 08:48

## 2020-12-07 RX ADMIN — FUROSEMIDE 20 MG: 10 INJECTION, SOLUTION INTRAVENOUS at 08:48

## 2020-12-07 RX ADMIN — OXYCODONE HYDROCHLORIDE 2.5 MG: 5 TABLET ORAL at 22:31

## 2020-12-07 RX ADMIN — FLUTICASONE FUROATE AND VILANTEROL TRIFENATATE 1 PUFF: 100; 25 POWDER RESPIRATORY (INHALATION) at 08:51

## 2020-12-07 RX ADMIN — PANTOPRAZOLE SODIUM 40 MG: 40 TABLET, DELAYED RELEASE ORAL at 05:55

## 2020-12-07 RX ADMIN — ACETAMINOPHEN 650 MG: 325 TABLET ORAL at 13:46

## 2020-12-07 RX ADMIN — HEPARIN SODIUM 5000 UNITS: 5000 INJECTION INTRAVENOUS; SUBCUTANEOUS at 05:55

## 2020-12-07 RX ADMIN — OXYCODONE HYDROCHLORIDE 2.5 MG: 5 TABLET ORAL at 13:49

## 2020-12-07 RX ADMIN — CEFTRIAXONE 1000 MG: 1 INJECTION, SOLUTION INTRAVENOUS at 08:49

## 2020-12-08 ENCOUNTER — APPOINTMENT (INPATIENT)
Dept: INTERVENTIONAL RADIOLOGY/VASCULAR | Facility: HOSPITAL | Age: 60
DRG: 433 | End: 2020-12-08
Attending: INTERNAL MEDICINE
Payer: COMMERCIAL

## 2020-12-08 LAB
ANION GAP SERPL CALCULATED.3IONS-SCNC: 6 MMOL/L (ref 4–13)
ANION GAP SERPL CALCULATED.3IONS-SCNC: 6 MMOL/L (ref 4–13)
APPEARANCE FLD: CLEAR
BASOPHILS # BLD MANUAL: 0.04 THOUSAND/UL (ref 0–0.1)
BASOPHILS NFR MAR MANUAL: 1 % (ref 0–1)
BUN SERPL-MCNC: 11 MG/DL (ref 5–25)
BUN SERPL-MCNC: 11 MG/DL (ref 5–25)
CALCIUM SERPL-MCNC: 7.9 MG/DL (ref 8.3–10.1)
CALCIUM SERPL-MCNC: 8.2 MG/DL (ref 8.3–10.1)
CHLORIDE SERPL-SCNC: 98 MMOL/L (ref 100–108)
CHLORIDE SERPL-SCNC: 99 MMOL/L (ref 100–108)
CO2 SERPL-SCNC: 23 MMOL/L (ref 21–32)
CO2 SERPL-SCNC: 23 MMOL/L (ref 21–32)
COLOR FLD: YELLOW
CREAT SERPL-MCNC: 1.18 MG/DL (ref 0.6–1.3)
CREAT SERPL-MCNC: 1.23 MG/DL (ref 0.6–1.3)
EOSINOPHIL # BLD MANUAL: 0 THOUSAND/UL (ref 0–0.4)
EOSINOPHIL NFR BLD MANUAL: 0 % (ref 0–6)
ERYTHROCYTE [DISTWIDTH] IN BLOOD BY AUTOMATED COUNT: 14.9 % (ref 11.6–15.1)
GFR SERPL CREATININE-BSD FRML MDRD: 47 ML/MIN/1.73SQ M
GFR SERPL CREATININE-BSD FRML MDRD: 50 ML/MIN/1.73SQ M
GLUCOSE SERPL-MCNC: 116 MG/DL (ref 65–140)
GLUCOSE SERPL-MCNC: 93 MG/DL (ref 65–140)
HCT VFR BLD AUTO: 21 % (ref 34.8–46.1)
HGB BLD-MCNC: 7.4 G/DL (ref 11.5–15.4)
LYMPHOCYTES # BLD AUTO: 0.85 THOUSAND/UL (ref 0.6–4.47)
LYMPHOCYTES # BLD AUTO: 20 % (ref 14–44)
LYMPHOCYTES NFR BLD AUTO: 16 %
MCH RBC QN AUTO: 35.2 PG (ref 26.8–34.3)
MCHC RBC AUTO-ENTMCNC: 35.2 G/DL (ref 31.4–37.4)
MCV RBC AUTO: 100 FL (ref 82–98)
MONOCYTES # BLD AUTO: 0.34 THOUSAND/UL (ref 0–1.22)
MONOCYTES NFR BLD AUTO: 80 %
MONOCYTES NFR BLD: 8 % (ref 4–12)
NEUTROPHILS # BLD MANUAL: 2.99 THOUSAND/UL (ref 1.85–7.62)
NEUTS SEG NFR BLD AUTO: 4 %
NEUTS SEG NFR BLD AUTO: 70 % (ref 43–75)
NRBC BLD AUTO-RTO: 0 /100 WBCS
OSMOLALITY UR/SERPL-RTO: 278 MMOL/KG (ref 282–298)
OSMOLALITY UR: 512 MMOL/KG
PLATELET # BLD AUTO: 51 THOUSANDS/UL (ref 149–390)
PLATELET BLD QL SMEAR: ABNORMAL
PMV BLD AUTO: 11.5 FL (ref 8.9–12.7)
POTASSIUM SERPL-SCNC: 4.1 MMOL/L (ref 3.5–5.3)
POTASSIUM SERPL-SCNC: 4.3 MMOL/L (ref 3.5–5.3)
PROCALCITONIN SERPL-MCNC: 0.28 NG/ML
RBC # BLD AUTO: 2.1 MILLION/UL (ref 3.81–5.12)
SITE: NORMAL
SODIUM 24H UR-SCNC: 61 MOL/L
SODIUM SERPL-SCNC: 127 MMOL/L (ref 136–145)
SODIUM SERPL-SCNC: 128 MMOL/L (ref 136–145)
SODIUM SERPL-SCNC: 128 MMOL/L (ref 136–145)
T4 FREE SERPL-MCNC: 0.84 NG/DL (ref 0.76–1.46)
TOTAL CELLS COUNTED SPEC: 100
TOTAL CELLS COUNTED SPEC: 100
TSH SERPL DL<=0.05 MIU/L-ACNC: 6.89 UIU/ML (ref 0.36–3.74)
VARIANT LYMPHS # BLD AUTO: 1 %
WBC # BLD AUTO: 4.27 THOUSAND/UL (ref 4.31–10.16)
WBC # FLD MANUAL: 141 /UL

## 2020-12-08 PROCEDURE — 99255 IP/OBS CONSLTJ NEW/EST HI 80: CPT | Performed by: INTERNAL MEDICINE

## 2020-12-08 PROCEDURE — 49083 ABD PARACENTESIS W/IMAGING: CPT

## 2020-12-08 PROCEDURE — 80048 BASIC METABOLIC PNL TOTAL CA: CPT | Performed by: NURSE PRACTITIONER

## 2020-12-08 PROCEDURE — 87070 CULTURE OTHR SPECIMN AEROBIC: CPT | Performed by: NURSE PRACTITIONER

## 2020-12-08 PROCEDURE — 84295 ASSAY OF SERUM SODIUM: CPT | Performed by: NURSE PRACTITIONER

## 2020-12-08 PROCEDURE — 83935 ASSAY OF URINE OSMOLALITY: CPT | Performed by: NURSE PRACTITIONER

## 2020-12-08 PROCEDURE — 85027 COMPLETE CBC AUTOMATED: CPT | Performed by: NURSE PRACTITIONER

## 2020-12-08 PROCEDURE — 89051 BODY FLUID CELL COUNT: CPT | Performed by: NURSE PRACTITIONER

## 2020-12-08 PROCEDURE — 87205 SMEAR GRAM STAIN: CPT | Performed by: NURSE PRACTITIONER

## 2020-12-08 PROCEDURE — 85007 BL SMEAR W/DIFF WBC COUNT: CPT | Performed by: NURSE PRACTITIONER

## 2020-12-08 PROCEDURE — 0W9G3ZX DRAINAGE OF PERITONEAL CAVITY, PERCUTANEOUS APPROACH, DIAGNOSTIC: ICD-10-PCS | Performed by: RADIOLOGY

## 2020-12-08 PROCEDURE — 84300 ASSAY OF URINE SODIUM: CPT | Performed by: NURSE PRACTITIONER

## 2020-12-08 PROCEDURE — 83930 ASSAY OF BLOOD OSMOLALITY: CPT | Performed by: NURSE PRACTITIONER

## 2020-12-08 PROCEDURE — 99232 SBSQ HOSP IP/OBS MODERATE 35: CPT | Performed by: NURSE PRACTITIONER

## 2020-12-08 PROCEDURE — 84439 ASSAY OF FREE THYROXINE: CPT | Performed by: NURSE PRACTITIONER

## 2020-12-08 PROCEDURE — 84145 PROCALCITONIN (PCT): CPT | Performed by: NURSE PRACTITIONER

## 2020-12-08 PROCEDURE — 99232 SBSQ HOSP IP/OBS MODERATE 35: CPT | Performed by: INTERNAL MEDICINE

## 2020-12-08 PROCEDURE — 84443 ASSAY THYROID STIM HORMONE: CPT | Performed by: INTERNAL MEDICINE

## 2020-12-08 RX ORDER — ALBUMIN (HUMAN) 12.5 G/50ML
25 SOLUTION INTRAVENOUS EVERY 8 HOURS
Status: DISCONTINUED | OUTPATIENT
Start: 2020-12-08 | End: 2020-12-09

## 2020-12-08 RX ADMIN — OXYCODONE HYDROCHLORIDE 2.5 MG: 5 TABLET ORAL at 20:10

## 2020-12-08 RX ADMIN — ALBUMIN (HUMAN) 25 G: 0.25 INJECTION, SOLUTION INTRAVENOUS at 17:45

## 2020-12-08 RX ADMIN — THIAMINE HCL TAB 100 MG 100 MG: 100 TAB at 08:32

## 2020-12-08 RX ADMIN — ALPRAZOLAM 2 MG: 0.5 TABLET ORAL at 20:02

## 2020-12-08 RX ADMIN — OXYCODONE HYDROCHLORIDE 2.5 MG: 5 TABLET ORAL at 11:31

## 2020-12-08 RX ADMIN — METRONIDAZOLE 500 MG: 500 INJECTION, SOLUTION INTRAVENOUS at 11:33

## 2020-12-08 RX ADMIN — BENZONATATE 100 MG: 100 CAPSULE ORAL at 05:28

## 2020-12-08 RX ADMIN — FOLIC ACID 1 MG: 1 TABLET ORAL at 08:32

## 2020-12-08 RX ADMIN — ACETAMINOPHEN 650 MG: 325 TABLET ORAL at 11:33

## 2020-12-08 RX ADMIN — ALBUMIN (HUMAN) 25 G: 0.25 INJECTION, SOLUTION INTRAVENOUS at 11:33

## 2020-12-08 RX ADMIN — CEFTRIAXONE 1000 MG: 1 INJECTION, SOLUTION INTRAVENOUS at 08:31

## 2020-12-08 RX ADMIN — HEPARIN SODIUM 5000 UNITS: 5000 INJECTION INTRAVENOUS; SUBCUTANEOUS at 05:27

## 2020-12-08 RX ADMIN — METRONIDAZOLE 500 MG: 500 INJECTION, SOLUTION INTRAVENOUS at 17:45

## 2020-12-08 RX ADMIN — Medication 1 TABLET: at 08:32

## 2020-12-08 RX ADMIN — BENZONATATE 100 MG: 100 CAPSULE ORAL at 20:02

## 2020-12-08 RX ADMIN — FLUTICASONE FUROATE AND VILANTEROL TRIFENATATE 1 PUFF: 100; 25 POWDER RESPIRATORY (INHALATION) at 08:34

## 2020-12-08 RX ADMIN — OXYCODONE HYDROCHLORIDE 2.5 MG: 5 TABLET ORAL at 05:27

## 2020-12-08 RX ADMIN — HEPARIN SODIUM 5000 UNITS: 5000 INJECTION INTRAVENOUS; SUBCUTANEOUS at 14:31

## 2020-12-08 RX ADMIN — PANTOPRAZOLE SODIUM 40 MG: 40 TABLET, DELAYED RELEASE ORAL at 05:27

## 2020-12-08 RX ADMIN — NICOTINE 1 PATCH: 21 PATCH, EXTENDED RELEASE TRANSDERMAL at 08:31

## 2020-12-09 PROBLEM — N17.9 ACUTE KIDNEY INJURY SUPERIMPOSED ON CHRONIC KIDNEY DISEASE (HCC): Status: ACTIVE | Noted: 2020-12-09

## 2020-12-09 PROBLEM — N18.9 ACUTE KIDNEY INJURY SUPERIMPOSED ON CHRONIC KIDNEY DISEASE (HCC): Status: ACTIVE | Noted: 2020-12-09

## 2020-12-09 LAB
ALBUMIN SERPL BCP-MCNC: 2.6 G/DL (ref 3.5–5)
ALP SERPL-CCNC: 64 U/L (ref 46–116)
ALT SERPL W P-5'-P-CCNC: 21 U/L (ref 12–78)
ANION GAP SERPL CALCULATED.3IONS-SCNC: 7 MMOL/L (ref 4–13)
AST SERPL W P-5'-P-CCNC: 50 U/L (ref 5–45)
BILIRUB SERPL-MCNC: 0.9 MG/DL (ref 0.2–1)
BUN SERPL-MCNC: 10 MG/DL (ref 5–25)
CALCIUM ALBUM COR SERPL-MCNC: 9.4 MG/DL (ref 8.3–10.1)
CALCIUM SERPL-MCNC: 8.3 MG/DL (ref 8.3–10.1)
CHLORIDE SERPL-SCNC: 100 MMOL/L (ref 100–108)
CO2 SERPL-SCNC: 24 MMOL/L (ref 21–32)
CORTIS AM PEAK SERPL-MCNC: 4.8 UG/DL (ref 4.2–22.4)
CREAT SERPL-MCNC: 1.14 MG/DL (ref 0.6–1.3)
ERYTHROCYTE [DISTWIDTH] IN BLOOD BY AUTOMATED COUNT: 15 % (ref 11.6–15.1)
GFR SERPL CREATININE-BSD FRML MDRD: 52 ML/MIN/1.73SQ M
GLUCOSE SERPL-MCNC: 93 MG/DL (ref 65–140)
HCT VFR BLD AUTO: 21 % (ref 34.8–46.1)
HGB BLD-MCNC: 7.2 G/DL (ref 11.5–15.4)
MAGNESIUM SERPL-MCNC: 1.5 MG/DL (ref 1.6–2.6)
MCH RBC QN AUTO: 34.4 PG (ref 26.8–34.3)
MCHC RBC AUTO-ENTMCNC: 34.3 G/DL (ref 31.4–37.4)
MCV RBC AUTO: 101 FL (ref 82–98)
NRBC BLD AUTO-RTO: 0 /100 WBCS
PLATELET # BLD AUTO: 52 THOUSANDS/UL (ref 149–390)
PMV BLD AUTO: 11 FL (ref 8.9–12.7)
POTASSIUM SERPL-SCNC: 4.2 MMOL/L (ref 3.5–5.3)
PROCALCITONIN SERPL-MCNC: 0.26 NG/ML
PROT SERPL-MCNC: 6.7 G/DL (ref 6.4–8.2)
RBC # BLD AUTO: 2.09 MILLION/UL (ref 3.81–5.12)
SODIUM SERPL-SCNC: 131 MMOL/L (ref 136–145)
WBC # BLD AUTO: 4.92 THOUSAND/UL (ref 4.31–10.16)

## 2020-12-09 PROCEDURE — 84145 PROCALCITONIN (PCT): CPT | Performed by: NURSE PRACTITIONER

## 2020-12-09 PROCEDURE — 85027 COMPLETE CBC AUTOMATED: CPT | Performed by: NURSE PRACTITIONER

## 2020-12-09 PROCEDURE — 82533 TOTAL CORTISOL: CPT | Performed by: INTERNAL MEDICINE

## 2020-12-09 PROCEDURE — 99232 SBSQ HOSP IP/OBS MODERATE 35: CPT | Performed by: NURSE PRACTITIONER

## 2020-12-09 PROCEDURE — 99232 SBSQ HOSP IP/OBS MODERATE 35: CPT | Performed by: INTERNAL MEDICINE

## 2020-12-09 PROCEDURE — 83735 ASSAY OF MAGNESIUM: CPT | Performed by: NURSE PRACTITIONER

## 2020-12-09 PROCEDURE — 99233 SBSQ HOSP IP/OBS HIGH 50: CPT | Performed by: INTERNAL MEDICINE

## 2020-12-09 PROCEDURE — 80053 COMPREHEN METABOLIC PANEL: CPT | Performed by: NURSE PRACTITIONER

## 2020-12-09 RX ORDER — ALBUMIN (HUMAN) 12.5 G/50ML
25 SOLUTION INTRAVENOUS EVERY 8 HOURS
Status: COMPLETED | OUTPATIENT
Start: 2020-12-09 | End: 2020-12-09

## 2020-12-09 RX ORDER — MAGNESIUM SULFATE HEPTAHYDRATE 40 MG/ML
2 INJECTION, SOLUTION INTRAVENOUS ONCE
Status: COMPLETED | OUTPATIENT
Start: 2020-12-09 | End: 2020-12-09

## 2020-12-09 RX ADMIN — HEPARIN SODIUM 5000 UNITS: 5000 INJECTION INTRAVENOUS; SUBCUTANEOUS at 00:03

## 2020-12-09 RX ADMIN — HEPARIN SODIUM 5000 UNITS: 5000 INJECTION INTRAVENOUS; SUBCUTANEOUS at 06:02

## 2020-12-09 RX ADMIN — HEPARIN SODIUM 5000 UNITS: 5000 INJECTION INTRAVENOUS; SUBCUTANEOUS at 23:41

## 2020-12-09 RX ADMIN — Medication 1 TABLET: at 09:09

## 2020-12-09 RX ADMIN — METRONIDAZOLE 500 MG: 500 INJECTION, SOLUTION INTRAVENOUS at 17:15

## 2020-12-09 RX ADMIN — BENZONATATE 100 MG: 100 CAPSULE ORAL at 20:59

## 2020-12-09 RX ADMIN — PANTOPRAZOLE SODIUM 40 MG: 40 TABLET, DELAYED RELEASE ORAL at 06:02

## 2020-12-09 RX ADMIN — CEFTRIAXONE 1000 MG: 1 INJECTION, SOLUTION INTRAVENOUS at 09:07

## 2020-12-09 RX ADMIN — METRONIDAZOLE 500 MG: 500 INJECTION, SOLUTION INTRAVENOUS at 02:12

## 2020-12-09 RX ADMIN — OXYCODONE HYDROCHLORIDE 2.5 MG: 5 TABLET ORAL at 20:58

## 2020-12-09 RX ADMIN — METRONIDAZOLE 500 MG: 500 INJECTION, SOLUTION INTRAVENOUS at 09:58

## 2020-12-09 RX ADMIN — HEPARIN SODIUM 5000 UNITS: 5000 INJECTION INTRAVENOUS; SUBCUTANEOUS at 14:06

## 2020-12-09 RX ADMIN — FOLIC ACID 1 MG: 1 TABLET ORAL at 09:09

## 2020-12-09 RX ADMIN — THIAMINE HCL TAB 100 MG 100 MG: 100 TAB at 09:10

## 2020-12-09 RX ADMIN — BENZONATATE 100 MG: 100 CAPSULE ORAL at 09:17

## 2020-12-09 RX ADMIN — MAGNESIUM SULFATE HEPTAHYDRATE 2 G: 40 INJECTION, SOLUTION INTRAVENOUS at 11:39

## 2020-12-09 RX ADMIN — ALPRAZOLAM 2 MG: 0.5 TABLET ORAL at 20:50

## 2020-12-09 RX ADMIN — ALBUMIN (HUMAN) 25 G: 0.25 INJECTION, SOLUTION INTRAVENOUS at 14:01

## 2020-12-09 RX ADMIN — OXYCODONE HYDROCHLORIDE 2.5 MG: 5 TABLET ORAL at 09:17

## 2020-12-09 RX ADMIN — ALBUMIN (HUMAN) 25 G: 0.25 INJECTION, SOLUTION INTRAVENOUS at 02:48

## 2020-12-09 RX ADMIN — ALBUMIN (HUMAN) 25 G: 0.25 INJECTION, SOLUTION INTRAVENOUS at 23:41

## 2020-12-10 PROBLEM — K72.90 DECOMPENSATED HEPATIC CIRRHOSIS (HCC): Status: ACTIVE | Noted: 2020-12-10

## 2020-12-10 PROBLEM — K74.60 DECOMPENSATED HEPATIC CIRRHOSIS (HCC): Status: ACTIVE | Noted: 2020-12-10

## 2020-12-10 LAB
ABO GROUP BLD: NORMAL
ALBUMIN SERPL BCP-MCNC: 2.9 G/DL (ref 3.5–5)
ALP SERPL-CCNC: 58 U/L (ref 46–116)
ALT SERPL W P-5'-P-CCNC: 23 U/L (ref 12–78)
ANION GAP SERPL CALCULATED.3IONS-SCNC: 7 MMOL/L (ref 4–13)
AST SERPL W P-5'-P-CCNC: 46 U/L (ref 5–45)
BASOPHILS NFR MAR MANUAL: 1 % (ref 0–1)
BILIRUB SERPL-MCNC: 1 MG/DL (ref 0.2–1)
BLD GP AB SCN SERPL QL: NEGATIVE
BUN SERPL-MCNC: 10 MG/DL (ref 5–25)
CALCIUM ALBUM COR SERPL-MCNC: 9.1 MG/DL (ref 8.3–10.1)
CALCIUM SERPL-MCNC: 8.2 MG/DL (ref 8.3–10.1)
CHLORIDE SERPL-SCNC: 100 MMOL/L (ref 100–108)
CO2 SERPL-SCNC: 24 MMOL/L (ref 21–32)
CREAT SERPL-MCNC: 0.97 MG/DL (ref 0.6–1.3)
EOSINOPHIL NFR BLD MANUAL: 3 % (ref 0–6)
ERYTHROCYTE [DISTWIDTH] IN BLOOD BY AUTOMATED COUNT: 15.3 % (ref 11.6–15.1)
GFR SERPL CREATININE-BSD FRML MDRD: 63 ML/MIN/1.73SQ M
GLUCOSE SERPL-MCNC: 97 MG/DL (ref 65–140)
HCT VFR BLD AUTO: 20.1 % (ref 34.8–46.1)
HGB BLD-MCNC: 6.9 G/DL (ref 11.5–15.4)
LYMPHOCYTES # BLD AUTO: 38 % (ref 14–44)
MAGNESIUM SERPL-MCNC: 2.2 MG/DL (ref 1.6–2.6)
MCH RBC QN AUTO: 35 PG (ref 26.8–34.3)
MCHC RBC AUTO-ENTMCNC: 34.3 G/DL (ref 31.4–37.4)
MCV RBC AUTO: 102 FL (ref 82–98)
MONOCYTES NFR BLD: 18 % (ref 4–12)
NEUTS SEG NFR BLD AUTO: 40 % (ref 43–75)
NRBC BLD AUTO-RTO: 0 /100 WBCS
PHOSPHATE SERPL-MCNC: 3.5 MG/DL (ref 2.3–4.1)
PLATELET # BLD AUTO: 52 THOUSANDS/UL (ref 149–390)
PLATELET BLD QL SMEAR: ABNORMAL
PMV BLD AUTO: 11.3 FL (ref 8.9–12.7)
POTASSIUM SERPL-SCNC: 4.2 MMOL/L (ref 3.5–5.3)
PROT SERPL-MCNC: 6.6 G/DL (ref 6.4–8.2)
RBC # BLD AUTO: 1.97 MILLION/UL (ref 3.81–5.12)
RH BLD: POSITIVE
SODIUM SERPL-SCNC: 131 MMOL/L (ref 136–145)
SPECIMEN EXPIRATION DATE: NORMAL
WBC # BLD AUTO: 5.1 THOUSAND/UL (ref 4.31–10.16)

## 2020-12-10 PROCEDURE — 86850 RBC ANTIBODY SCREEN: CPT | Performed by: INTERNAL MEDICINE

## 2020-12-10 PROCEDURE — 84100 ASSAY OF PHOSPHORUS: CPT | Performed by: NURSE PRACTITIONER

## 2020-12-10 PROCEDURE — 85007 BL SMEAR W/DIFF WBC COUNT: CPT | Performed by: NURSE PRACTITIONER

## 2020-12-10 PROCEDURE — 99232 SBSQ HOSP IP/OBS MODERATE 35: CPT | Performed by: INTERNAL MEDICINE

## 2020-12-10 PROCEDURE — 86900 BLOOD TYPING SEROLOGIC ABO: CPT | Performed by: INTERNAL MEDICINE

## 2020-12-10 PROCEDURE — 85027 COMPLETE CBC AUTOMATED: CPT | Performed by: NURSE PRACTITIONER

## 2020-12-10 PROCEDURE — 80053 COMPREHEN METABOLIC PANEL: CPT | Performed by: NURSE PRACTITIONER

## 2020-12-10 PROCEDURE — 86923 COMPATIBILITY TEST ELECTRIC: CPT

## 2020-12-10 PROCEDURE — 30233N1 TRANSFUSION OF NONAUTOLOGOUS RED BLOOD CELLS INTO PERIPHERAL VEIN, PERCUTANEOUS APPROACH: ICD-10-PCS | Performed by: INTERNAL MEDICINE

## 2020-12-10 PROCEDURE — 83735 ASSAY OF MAGNESIUM: CPT | Performed by: NURSE PRACTITIONER

## 2020-12-10 PROCEDURE — P9016 RBC LEUKOCYTES REDUCED: HCPCS

## 2020-12-10 PROCEDURE — 86901 BLOOD TYPING SEROLOGIC RH(D): CPT | Performed by: INTERNAL MEDICINE

## 2020-12-10 RX ORDER — FUROSEMIDE 40 MG/1
40 TABLET ORAL DAILY
Status: DISCONTINUED | OUTPATIENT
Start: 2020-12-10 | End: 2020-12-10

## 2020-12-10 RX ORDER — SPIRONOLACTONE 25 MG/1
50 TABLET ORAL DAILY
Status: DISCONTINUED | OUTPATIENT
Start: 2020-12-11 | End: 2020-12-11 | Stop reason: HOSPADM

## 2020-12-10 RX ORDER — FUROSEMIDE 40 MG/1
40 TABLET ORAL DAILY
Status: DISCONTINUED | OUTPATIENT
Start: 2020-12-10 | End: 2020-12-11 | Stop reason: HOSPADM

## 2020-12-10 RX ORDER — SPIRONOLACTONE 25 MG/1
50 TABLET ORAL DAILY
Status: DISCONTINUED | OUTPATIENT
Start: 2020-12-10 | End: 2020-12-10

## 2020-12-10 RX ADMIN — FLUTICASONE FUROATE AND VILANTEROL TRIFENATATE 1 PUFF: 100; 25 POWDER RESPIRATORY (INHALATION) at 09:07

## 2020-12-10 RX ADMIN — HEPARIN SODIUM 5000 UNITS: 5000 INJECTION INTRAVENOUS; SUBCUTANEOUS at 07:32

## 2020-12-10 RX ADMIN — PANTOPRAZOLE SODIUM 40 MG: 40 TABLET, DELAYED RELEASE ORAL at 07:32

## 2020-12-10 RX ADMIN — ALPRAZOLAM 2 MG: 0.5 TABLET ORAL at 21:30

## 2020-12-10 RX ADMIN — BENZONATATE 100 MG: 100 CAPSULE ORAL at 07:37

## 2020-12-10 RX ADMIN — OXYCODONE HYDROCHLORIDE 2.5 MG: 5 TABLET ORAL at 07:37

## 2020-12-10 RX ADMIN — FOLIC ACID 1 MG: 1 TABLET ORAL at 09:06

## 2020-12-10 RX ADMIN — Medication 1 TABLET: at 09:06

## 2020-12-10 RX ADMIN — BENZONATATE 100 MG: 100 CAPSULE ORAL at 21:30

## 2020-12-10 RX ADMIN — THIAMINE HCL TAB 100 MG 100 MG: 100 TAB at 09:06

## 2020-12-10 RX ADMIN — OXYCODONE HYDROCHLORIDE 2.5 MG: 5 TABLET ORAL at 21:30

## 2020-12-10 RX ADMIN — HEPARIN SODIUM 5000 UNITS: 5000 INJECTION INTRAVENOUS; SUBCUTANEOUS at 14:17

## 2020-12-10 RX ADMIN — METRONIDAZOLE 500 MG: 500 INJECTION, SOLUTION INTRAVENOUS at 02:08

## 2020-12-10 RX ADMIN — HEPARIN SODIUM 5000 UNITS: 5000 INJECTION INTRAVENOUS; SUBCUTANEOUS at 21:29

## 2020-12-11 ENCOUNTER — TRANSITIONAL CARE MANAGEMENT (OUTPATIENT)
Dept: FAMILY MEDICINE CLINIC | Facility: CLINIC | Age: 60
End: 2020-12-11

## 2020-12-11 ENCOUNTER — TELEPHONE (OUTPATIENT)
Dept: GASTROENTEROLOGY | Facility: CLINIC | Age: 60
End: 2020-12-11

## 2020-12-11 VITALS
OXYGEN SATURATION: 96 % | HEART RATE: 71 BPM | SYSTOLIC BLOOD PRESSURE: 114 MMHG | TEMPERATURE: 98.6 F | HEIGHT: 64 IN | WEIGHT: 183.6 LBS | RESPIRATION RATE: 18 BRPM | BODY MASS INDEX: 31.34 KG/M2 | DIASTOLIC BLOOD PRESSURE: 53 MMHG

## 2020-12-11 LAB
ABO GROUP BLD BPU: NORMAL
ANION GAP SERPL CALCULATED.3IONS-SCNC: 6 MMOL/L (ref 4–13)
BACTERIA SPEC BFLD CULT: NO GROWTH
BASOPHILS # BLD AUTO: 0.06 THOUSANDS/ΜL (ref 0–0.1)
BASOPHILS NFR BLD AUTO: 1 % (ref 0–1)
BPU ID: NORMAL
BUN SERPL-MCNC: 11 MG/DL (ref 5–25)
CALCIUM SERPL-MCNC: 8.3 MG/DL (ref 8.3–10.1)
CHLORIDE SERPL-SCNC: 100 MMOL/L (ref 100–108)
CO2 SERPL-SCNC: 24 MMOL/L (ref 21–32)
CREAT SERPL-MCNC: 0.9 MG/DL (ref 0.6–1.3)
CROSSMATCH: NORMAL
EOSINOPHIL # BLD AUTO: 0.15 THOUSAND/ΜL (ref 0–0.61)
EOSINOPHIL NFR BLD AUTO: 3 % (ref 0–6)
ERYTHROCYTE [DISTWIDTH] IN BLOOD BY AUTOMATED COUNT: 15.9 % (ref 11.6–15.1)
GFR SERPL CREATININE-BSD FRML MDRD: 69 ML/MIN/1.73SQ M
GLUCOSE SERPL-MCNC: 91 MG/DL (ref 65–140)
GRAM STN SPEC: NORMAL
HCT VFR BLD AUTO: 23.1 % (ref 34.8–46.1)
HGB BLD-MCNC: 8.1 G/DL (ref 11.5–15.4)
IMM GRANULOCYTES # BLD AUTO: 0.02 THOUSAND/UL (ref 0–0.2)
IMM GRANULOCYTES NFR BLD AUTO: 0 % (ref 0–2)
LYMPHOCYTES # BLD AUTO: 1.95 THOUSANDS/ΜL (ref 0.6–4.47)
LYMPHOCYTES NFR BLD AUTO: 37 % (ref 14–44)
MCH RBC QN AUTO: 34.3 PG (ref 26.8–34.3)
MCHC RBC AUTO-ENTMCNC: 35.1 G/DL (ref 31.4–37.4)
MCV RBC AUTO: 98 FL (ref 82–98)
MONOCYTES # BLD AUTO: 0.8 THOUSAND/ΜL (ref 0.17–1.22)
MONOCYTES NFR BLD AUTO: 15 % (ref 4–12)
NEUTROPHILS # BLD AUTO: 2.26 THOUSANDS/ΜL (ref 1.85–7.62)
NEUTS SEG NFR BLD AUTO: 44 % (ref 43–75)
NRBC BLD AUTO-RTO: 0 /100 WBCS
PLATELET # BLD AUTO: 52 THOUSANDS/UL (ref 149–390)
PMV BLD AUTO: 11.4 FL (ref 8.9–12.7)
POTASSIUM SERPL-SCNC: 5 MMOL/L (ref 3.5–5.3)
RBC # BLD AUTO: 2.36 MILLION/UL (ref 3.81–5.12)
SODIUM SERPL-SCNC: 130 MMOL/L (ref 136–145)
UNIT DISPENSE STATUS: NORMAL
UNIT PRODUCT CODE: NORMAL
UNIT RH: NORMAL
WBC # BLD AUTO: 5.24 THOUSAND/UL (ref 4.31–10.16)

## 2020-12-11 PROCEDURE — 85025 COMPLETE CBC W/AUTO DIFF WBC: CPT | Performed by: INTERNAL MEDICINE

## 2020-12-11 PROCEDURE — 99239 HOSP IP/OBS DSCHRG MGMT >30: CPT | Performed by: INTERNAL MEDICINE

## 2020-12-11 PROCEDURE — 99232 SBSQ HOSP IP/OBS MODERATE 35: CPT | Performed by: NURSE PRACTITIONER

## 2020-12-11 PROCEDURE — 80048 BASIC METABOLIC PNL TOTAL CA: CPT | Performed by: NURSE PRACTITIONER

## 2020-12-11 RX ORDER — SPIRONOLACTONE 50 MG/1
50 TABLET, FILM COATED ORAL DAILY
Qty: 30 TABLET | Refills: 0 | Status: SHIPPED | OUTPATIENT
Start: 2020-12-12 | End: 2020-12-30 | Stop reason: SDUPTHER

## 2020-12-11 RX ORDER — FUROSEMIDE 40 MG/1
40 TABLET ORAL DAILY
Qty: 30 TABLET | Refills: 0 | Status: SHIPPED | OUTPATIENT
Start: 2020-12-12 | End: 2020-12-30 | Stop reason: SDUPTHER

## 2020-12-11 RX ADMIN — THIAMINE HCL TAB 100 MG 100 MG: 100 TAB at 08:18

## 2020-12-11 RX ADMIN — Medication 1 TABLET: at 08:17

## 2020-12-11 RX ADMIN — HEPARIN SODIUM 5000 UNITS: 5000 INJECTION INTRAVENOUS; SUBCUTANEOUS at 05:09

## 2020-12-11 RX ADMIN — PANTOPRAZOLE SODIUM 40 MG: 40 TABLET, DELAYED RELEASE ORAL at 05:08

## 2020-12-11 RX ADMIN — FOLIC ACID 1 MG: 1 TABLET ORAL at 08:17

## 2020-12-11 RX ADMIN — SPIRONOLACTONE 50 MG: 25 TABLET, FILM COATED ORAL at 08:21

## 2020-12-11 RX ADMIN — FUROSEMIDE 40 MG: 40 TABLET ORAL at 08:21

## 2020-12-15 ENCOUNTER — TELEPHONE (OUTPATIENT)
Dept: NEPHROLOGY | Facility: HOSPITAL | Age: 60
End: 2020-12-15

## 2020-12-16 ENCOUNTER — TELEPHONE (OUTPATIENT)
Dept: NEPHROLOGY | Facility: CLINIC | Age: 60
End: 2020-12-16

## 2020-12-16 ENCOUNTER — DOCUMENTATION (OUTPATIENT)
Dept: NEPHROLOGY | Facility: CLINIC | Age: 60
End: 2020-12-16

## 2020-12-16 ENCOUNTER — TELEPHONE (OUTPATIENT)
Dept: GASTROENTEROLOGY | Facility: AMBULARY SURGERY CENTER | Age: 60
End: 2020-12-16

## 2020-12-16 ENCOUNTER — TELEPHONE (OUTPATIENT)
Dept: GASTROENTEROLOGY | Facility: CLINIC | Age: 60
End: 2020-12-16

## 2020-12-16 LAB
EXT GLUCOSE BLD: 95
EXTERNAL BUN: 13
EXTERNAL CALCIUM: 8.9
EXTERNAL CHLORIDE: 101
EXTERNAL CO2: 24
EXTERNAL CREATININE: 0.87
EXTERNAL POTASSIUM: 3.9
EXTERNAL SODIUM: 137

## 2020-12-17 ENCOUNTER — TELEMEDICINE (OUTPATIENT)
Dept: GASTROENTEROLOGY | Facility: CLINIC | Age: 60
End: 2020-12-17
Payer: COMMERCIAL

## 2020-12-17 DIAGNOSIS — R18.8 CIRRHOSIS OF LIVER WITH ASCITES, UNSPECIFIED HEPATIC CIRRHOSIS TYPE (HCC): Primary | ICD-10-CM

## 2020-12-17 DIAGNOSIS — K58.0 IRRITABLE BOWEL SYNDROME WITH DIARRHEA: ICD-10-CM

## 2020-12-17 DIAGNOSIS — R05.9 COUGH: ICD-10-CM

## 2020-12-17 DIAGNOSIS — R53.83 OTHER FATIGUE: ICD-10-CM

## 2020-12-17 DIAGNOSIS — K74.60 CIRRHOSIS OF LIVER WITH ASCITES, UNSPECIFIED HEPATIC CIRRHOSIS TYPE (HCC): Primary | ICD-10-CM

## 2020-12-17 DIAGNOSIS — E87.1 HYPONATREMIA: ICD-10-CM

## 2020-12-17 DIAGNOSIS — K21.9 GERD WITHOUT ESOPHAGITIS: ICD-10-CM

## 2020-12-17 PROCEDURE — 1111F DSCHRG MED/CURRENT MED MERGE: CPT | Performed by: INTERNAL MEDICINE

## 2020-12-17 PROCEDURE — 99214 OFFICE O/P EST MOD 30 MIN: CPT | Performed by: INTERNAL MEDICINE

## 2020-12-17 RX ORDER — BENZONATATE 100 MG/1
100 CAPSULE ORAL 2 TIMES DAILY PRN
Qty: 60 CAPSULE | Refills: 2 | Status: SHIPPED | OUTPATIENT
Start: 2020-12-17 | End: 2021-04-07

## 2020-12-18 ENCOUNTER — OFFICE VISIT (OUTPATIENT)
Dept: NEPHROLOGY | Facility: HOSPITAL | Age: 60
End: 2020-12-18
Attending: INTERNAL MEDICINE
Payer: COMMERCIAL

## 2020-12-18 VITALS — WEIGHT: 163.4 LBS | HEIGHT: 64 IN | BODY MASS INDEX: 27.9 KG/M2 | RESPIRATION RATE: 16 BRPM

## 2020-12-18 DIAGNOSIS — K70.31 ALCOHOLIC CIRRHOSIS OF LIVER WITH ASCITES (HCC): Chronic | ICD-10-CM

## 2020-12-18 DIAGNOSIS — F10.230 ALCOHOL DEPENDENCE WITH UNCOMPLICATED WITHDRAWAL (HCC): ICD-10-CM

## 2020-12-18 DIAGNOSIS — K21.9 GERD WITHOUT ESOPHAGITIS: ICD-10-CM

## 2020-12-18 DIAGNOSIS — I10 ESSENTIAL HYPERTENSION: ICD-10-CM

## 2020-12-18 DIAGNOSIS — E87.1 HYPONATREMIA: Primary | ICD-10-CM

## 2020-12-18 PROCEDURE — 99214 OFFICE O/P EST MOD 30 MIN: CPT | Performed by: NURSE PRACTITIONER

## 2020-12-18 PROCEDURE — 3008F BODY MASS INDEX DOCD: CPT | Performed by: NURSE PRACTITIONER

## 2020-12-18 PROCEDURE — 1111F DSCHRG MED/CURRENT MED MERGE: CPT | Performed by: NURSE PRACTITIONER

## 2020-12-18 PROCEDURE — 4004F PT TOBACCO SCREEN RCVD TLK: CPT | Performed by: NURSE PRACTITIONER

## 2020-12-28 DIAGNOSIS — F41.9 ANXIETY: ICD-10-CM

## 2020-12-29 LAB
ALBUMIN SERPL-MCNC: 3.4 G/DL (ref 3.8–4.8)
ALBUMIN/GLOB SERPL: 0.5 {RATIO} (ref 1.2–2.2)
ALP SERPL-CCNC: 86 IU/L (ref 39–117)
ALT SERPL-CCNC: 29 IU/L (ref 0–32)
AST SERPL-CCNC: 65 IU/L (ref 0–40)
BILIRUB SERPL-MCNC: 1.1 MG/DL (ref 0–1.2)
BUN SERPL-MCNC: 21 MG/DL (ref 8–27)
BUN/CREAT SERPL: 21 (ref 12–28)
CALCIUM SERPL-MCNC: 8.8 MG/DL (ref 8.7–10.3)
CHLORIDE SERPL-SCNC: 100 MMOL/L (ref 96–106)
CO2 SERPL-SCNC: 24 MMOL/L (ref 20–29)
CREAT SERPL-MCNC: 1 MG/DL (ref 0.57–1)
ERYTHROCYTE [DISTWIDTH] IN BLOOD BY AUTOMATED COUNT: 14.8 % (ref 11.7–15.4)
GLOBULIN SER-MCNC: 6.2 G/DL (ref 1.5–4.5)
GLUCOSE SERPL-MCNC: 94 MG/DL (ref 65–99)
HCT VFR BLD AUTO: 33.3 % (ref 34–46.6)
HGB BLD-MCNC: 11.8 G/DL (ref 11.1–15.9)
MCH RBC QN AUTO: 34.5 PG (ref 26.6–33)
MCHC RBC AUTO-ENTMCNC: 35.4 G/DL (ref 31.5–35.7)
MCV RBC AUTO: 97 FL (ref 79–97)
PLATELET # BLD AUTO: 114 X10E3/UL (ref 150–450)
POTASSIUM SERPL-SCNC: 4.1 MMOL/L (ref 3.5–5.2)
PROT SERPL-MCNC: 9.6 G/DL (ref 6–8.5)
RBC # BLD AUTO: 3.42 X10E6/UL (ref 3.77–5.28)
SL AMB EGFR AFRICAN AMERICAN: 70 ML/MIN/1.73
SL AMB EGFR NON AFRICAN AMERICAN: 61 ML/MIN/1.73
SODIUM SERPL-SCNC: 135 MMOL/L (ref 134–144)
WBC # BLD AUTO: 6.5 X10E3/UL (ref 3.4–10.8)

## 2020-12-30 ENCOUNTER — OFFICE VISIT (OUTPATIENT)
Dept: FAMILY MEDICINE CLINIC | Facility: CLINIC | Age: 60
End: 2020-12-30
Payer: COMMERCIAL

## 2020-12-30 VITALS
WEIGHT: 150.4 LBS | DIASTOLIC BLOOD PRESSURE: 82 MMHG | RESPIRATION RATE: 16 BRPM | SYSTOLIC BLOOD PRESSURE: 122 MMHG | BODY MASS INDEX: 25.68 KG/M2 | HEIGHT: 64 IN | TEMPERATURE: 98.5 F | HEART RATE: 76 BPM

## 2020-12-30 DIAGNOSIS — F41.9 ANXIETY: ICD-10-CM

## 2020-12-30 DIAGNOSIS — K70.31 ALCOHOLIC CIRRHOSIS OF LIVER WITH ASCITES (HCC): Primary | ICD-10-CM

## 2020-12-30 DIAGNOSIS — J40 BRONCHITIS: ICD-10-CM

## 2020-12-30 LAB
APPEARANCE UR: CLEAR
BACTERIA UR CULT: NORMAL
BACTERIA URNS QL MICRO: ABNORMAL
BILIRUB UR QL STRIP: NEGATIVE
COLOR UR: YELLOW
EPI CELLS #/AREA URNS HPF: ABNORMAL /HPF (ref 0–10)
GLUCOSE UR QL: NEGATIVE
HGB UR QL STRIP: ABNORMAL
KETONES UR QL STRIP: NEGATIVE
LEUKOCYTE ESTERASE UR QL STRIP: ABNORMAL
Lab: NORMAL
MICRO URNS: ABNORMAL
MUCOUS THREADS URNS QL MICRO: PRESENT
NITRITE UR QL STRIP: NEGATIVE
PH UR STRIP: 6 [PH] (ref 5–7.5)
PROT UR QL STRIP: NEGATIVE
RBC #/AREA URNS HPF: ABNORMAL /HPF (ref 0–2)
SL AMB URINALYSIS REFLEX: ABNORMAL
SP GR UR: 1.01 (ref 1–1.03)
UROBILINOGEN UR STRIP-ACNC: 0.2 MG/DL (ref 0.2–1)
WBC #/AREA URNS HPF: ABNORMAL /HPF (ref 0–5)

## 2020-12-30 PROCEDURE — 3008F BODY MASS INDEX DOCD: CPT | Performed by: NURSE PRACTITIONER

## 2020-12-30 PROCEDURE — 99214 OFFICE O/P EST MOD 30 MIN: CPT | Performed by: PHYSICIAN ASSISTANT

## 2020-12-30 RX ORDER — SPIRONOLACTONE 50 MG/1
50 TABLET, FILM COATED ORAL DAILY
Qty: 30 TABLET | Refills: 0 | Status: SHIPPED | OUTPATIENT
Start: 2020-12-30 | End: 2021-02-09 | Stop reason: SDUPTHER

## 2020-12-30 RX ORDER — CEFUROXIME AXETIL 500 MG/1
500 TABLET ORAL EVERY 12 HOURS SCHEDULED
Qty: 20 TABLET | Refills: 0 | Status: SHIPPED | OUTPATIENT
Start: 2020-12-30 | End: 2021-01-09

## 2020-12-30 RX ORDER — FUROSEMIDE 40 MG/1
40 TABLET ORAL DAILY
Qty: 30 TABLET | Refills: 0 | Status: SHIPPED | OUTPATIENT
Start: 2020-12-30 | End: 2021-02-09 | Stop reason: SDUPTHER

## 2020-12-30 RX ORDER — PREDNISONE 10 MG/1
TABLET ORAL
Qty: 20 TABLET | Refills: 0 | Status: SHIPPED | OUTPATIENT
Start: 2020-12-30 | End: 2021-05-07

## 2020-12-30 RX ORDER — ALPRAZOLAM 1 MG/1
TABLET ORAL
Qty: 60 TABLET | Refills: 5 | OUTPATIENT
Start: 2020-12-30

## 2020-12-30 RX ORDER — ALPRAZOLAM 1 MG/1
TABLET ORAL
Qty: 60 TABLET | Refills: 5 | Status: SHIPPED | OUTPATIENT
Start: 2020-12-30 | End: 2021-06-22

## 2020-12-30 NOTE — PROGRESS NOTES
Assessment/Plan:    1  Alcoholic cirrhosis of liver with ascites (HCC)    - c/w Dr Farzaneh Garcia, labs stable  Reviewed labs, GI plan at length with patient  - furosemide (LASIX) 40 mg tablet; Take 1 tablet (40 mg total) by mouth daily  Dispense: 30 tablet; Refill: 0  - spironolactone (ALDACTONE) 50 mg tablet; Take 1 tablet (50 mg total) by mouth daily  Dispense: 30 tablet; Refill: 0    2  Bronchitis    - covid negative while inpatient  will treat with prednisone and ceftin as prescribed, if no improvement in 72 hours call and will do CXR  - predniSONE 10 mg tablet; Take 4 tabs on day 1,2 with food, 3 tabs on day 3,4 with food, 2 tabs on day 5,6 with food, 1 tab on day 7,8 with food  Dispense: 20 tablet; Refill: 0  - cefuroxime (CEFTIN) 500 mg tablet; Take 1 tablet (500 mg total) by mouth every 12 (twelve) hours for 10 days  Dispense: 20 tablet; Refill: 0    F/u as needed      Subjective:   Chief Complaint   Patient presents with    Follow-up     From Hospital     Nasal Congestion    Cough      Patient ID: Annie Wilson is a 63 y o  female  Patient here in follow up  Was inpatient from 12/5 to 12/11 for cirrhosis  Following with [facility], plans to quit alcohol all together and not go back this time  Shortly after discharge started with head congestion and coughing  Productive yellow mucus  Denies fever, chills, sob  Has been "covid tested many times, negative"  Taking no OTC, hard due to multiple restrictions due to cirrohsis now        The following portions of the patient's history were reviewed and updated as appropriate: allergies, current medications, past family history, past medical history, past social history, past surgical history and problem list     Past Medical History:   Diagnosis Date    Alcoholic fatty liver     Anxiety     Asthma     COPD (chronic obstructive pulmonary disease) ([facility] [address] )     Elevated liver function tests     GERD (gastroesophageal reflux disease)     GERD without esophagitis  Hyperlipidemia     Hypertension     IBS (irritable bowel syndrome)     Insomnia     Insomnia     Liver disease     Nervousness     Nicotine dependence     Other specified urinary incontinence     RLS (restless legs syndrome)      Past Surgical History:   Procedure Laterality Date    BACK SURGERY      BREAST EXCISIONAL BIOPSY Right 11/01/2004    benign    CATARACT EXTRACTION, BILATERAL  08/01/2018    COLONOSCOPY N/A 5/8/2019    Procedure: COLONOSCOPY;  Surgeon: Brayden Gallegos MD;  Location: [facility]; Service: Gastroenterology    EGD AND COLONOSCOPY N/A 3/19/2018    Procedure: EGD AND COLONOSCOPY;  Surgeon: Brayden Gallegos MD;  Location: [facility]; Service: Gastroenterology    ESOPHAGOGASTRODUODENOSCOPY N/A 5/8/2019    Procedure: ESOPHAGOGASTRODUODENOSCOPY (EGD); Surgeon: Brayden Gallegos MD;  Location: [facility];   Service: Gastroenterology    IR PARACENTESIS  11/27/2020    IR PARACENTESIS  12/8/2020    KNEE SURGERY      KNEE SURGERY      LUMBAR LAMINECTOMY  04/1999    LYMPH NODE BIOPSY      TOTAL ABDOMINAL HYSTERECTOMY  02/05/2008    TUBAL LIGATION      TUBAL LIGATION  1989    UPPER GASTROINTESTINAL ENDOSCOPY       Family History   Problem Relation Age of Onset    Breast cancer Mother 28    No Known Problems Father     No Known Problems Sister     No Known Problems Brother     No Known Problems Son     No Known Problems Maternal Grandmother     No Known Problems Maternal Grandfather     No Known Problems Paternal Grandmother     No Known Problems Paternal Grandfather     No Known Problems Maternal Aunt     No Known Problems Maternal Aunt     No Known Problems Paternal Aunt     Substance Abuse Neg Hx     Mental illness Neg Hx      Social History     Socioeconomic History    Marital status: /Civil Union     Spouse name: Not on file    Number of children: Not on file    Years of education: Not on file    Highest education level: Not on file Occupational History    Not on file   Social Needs    Financial resource strain: Not on file    Food insecurity     Worry: Not on file     Inability: Not on file    Transportation needs     Medical: Not on file     Non-medical: Not on file   Tobacco Use    Smoking status: Current Every Day Smoker     Packs/day: 0 50     Years: 45 00     Pack years: 22 50     Types: Cigarettes     Start date: 1978    Smokeless tobacco: Never Used    Tobacco comment: Per allscripts - 1/2 PPD cigs - smoking for 40 years   Substance and Sexual Activity    Alcohol use: Not Currently     Alcohol/week: 24 0 standard drinks     Types: 24 Cans of beer per week     Frequency: 4 or more times a week     Drinks per session: 5 or 6     Binge frequency: Daily or almost daily     Comment: Daily; just cut back from 6 beers/day   Per allscripts -  3 beers a night    Drug use: No    Sexual activity: Yes     Partners: Male   Lifestyle    Physical activity     Days per week: Not on file     Minutes per session: Not on file    Stress: Not on file   Relationships    Social connections     Talks on phone: Not on file     Gets together: Not on file     Attends [employer] service: Not on file     Active member of club or organization: Not on file     Attends meetings of clubs or organizations: Not on file     Relationship status: Not on file    Intimate partner violence     Fear of current or ex partner: Not on file     Emotionally abused: Not on file     Physically abused: Not on file     Forced sexual activity: Not on file   Other Topics Concern    Not on file   Social History Narrative    Has carbon monoxide detectors in home    Has smoke detectors    Uses safety equipment - seatbelts       Current Outpatient Medications:     ALPRAZolam (XANAX) 1 mg tablet, TAKE ONE TABLET TWO TIMES A DAY AS NEEDED FOR ANXIETY, Disp: 60 tablet, Rfl: 5    benzonatate (TESSALON PERLES) 100 mg capsule, Take 1 capsule (100 mg total) by mouth 2 (two) times a day as needed for cough, Disp: 60 capsule, Rfl: 2    furosemide (LASIX) 40 mg tablet, Take 1 tablet (40 mg total) by mouth daily, Disp: 30 tablet, Rfl: 0    omeprazole (PriLOSEC) 40 MG capsule, TAKE ONE CAPSULE BY MOUTH EVERY DAY (Patient taking differently: as needed ), Disp: 30 capsule, Rfl: 5    spironolactone (ALDACTONE) 50 mg tablet, Take 1 tablet (50 mg total) by mouth daily, Disp: 30 tablet, Rfl: 0    TREMFYA subcutaneous injection, , Disp: , Rfl:     umeclidinium-vilanterol (ANORO ELLIPTA) 62 5-25 MCG/INH inhaler, Inhale 1 puff daily (Patient taking differently: Inhale 1 puff as needed ), Disp: 1 each, Rfl: 6    albuterol (PROVENTIL HFA) 90 mcg/act inhaler, Inhale 2 puffs every 6 (six) hours as needed for wheezing or shortness of breath (Patient not taking: Reported on 12/30/2020), Disp: 1 Inhaler, Rfl: 6    Review of Systems          Objective:    Vitals:    12/30/20 1407   BP: 122/82   Pulse: 76   Resp: 16   Temp: 98 5 °F (36 9 °C)   Weight: 68 2 kg (150 lb 6 4 oz)   Height: 5' 4" (1 626 m)        Physical Exam  Constitutional:       Appearance: She is well-developed  HENT:      Head: Normocephalic and atraumatic  Right Ear: Tympanic membrane normal       Left Ear: Tympanic membrane normal       Nose: Congestion present  Mouth/Throat:      Mouth: Mucous membranes are moist       Pharynx: Oropharynx is clear  Neck:      Musculoskeletal: Normal range of motion and neck supple  Cardiovascular:      Rate and Rhythm: Normal rate and regular rhythm  Pulses: Normal pulses  Heart sounds: Normal heart sounds  Pulmonary:      Effort: Pulmonary effort is normal       Breath sounds: Wheezing and rhonchi present  Lymphadenopathy:      Cervical: No cervical adenopathy  Skin:     General: Skin is warm  Neurological:      General: No focal deficit present  Mental Status: She is alert and oriented to person, place, and time     Psychiatric:         Mood and Affect: Mood normal  Behavior: Behavior normal          Thought Content:  Thought content normal          Judgment: Judgment normal

## 2020-12-31 ENCOUNTER — TELEPHONE (OUTPATIENT)
Dept: NEPHROLOGY | Facility: HOSPITAL | Age: 60
End: 2020-12-31

## 2021-01-26 LAB
ALBUMIN SERPL-MCNC: 3 G/DL (ref 3.8–4.8)
ALBUMIN/GLOB SERPL: 0.6 {RATIO} (ref 1.2–2.2)
ALP SERPL-CCNC: 108 IU/L (ref 39–117)
ALT SERPL-CCNC: 25 IU/L (ref 0–32)
AST SERPL-CCNC: 48 IU/L (ref 0–40)
BILIRUB SERPL-MCNC: 1.2 MG/DL (ref 0–1.2)
BUN SERPL-MCNC: 19 MG/DL (ref 8–27)
BUN/CREAT SERPL: 23 (ref 12–28)
CALCIUM SERPL-MCNC: 8.8 MG/DL (ref 8.7–10.3)
CHLORIDE SERPL-SCNC: 102 MMOL/L (ref 96–106)
CO2 SERPL-SCNC: 23 MMOL/L (ref 20–29)
CREAT SERPL-MCNC: 0.83 MG/DL (ref 0.57–1)
ERYTHROCYTE [DISTWIDTH] IN BLOOD BY AUTOMATED COUNT: 13.6 % (ref 11.7–15.4)
GLOBULIN SER-MCNC: 4.8 G/DL (ref 1.5–4.5)
GLUCOSE SERPL-MCNC: 93 MG/DL (ref 65–99)
HCT VFR BLD AUTO: 35.5 % (ref 34–46.6)
HGB BLD-MCNC: 12.8 G/DL (ref 11.1–15.9)
MCH RBC QN AUTO: 35.4 PG (ref 26.6–33)
MCHC RBC AUTO-ENTMCNC: 36.1 G/DL (ref 31.5–35.7)
MCV RBC AUTO: 98 FL (ref 79–97)
MORPHOLOGY BLD-IMP: NORMAL
PLATELET # BLD AUTO: 92 X10E3/UL (ref 150–450)
POTASSIUM SERPL-SCNC: 3.8 MMOL/L (ref 3.5–5.2)
PROT SERPL-MCNC: 7.8 G/DL (ref 6–8.5)
RBC # BLD AUTO: 3.62 X10E6/UL (ref 3.77–5.28)
SL AMB EGFR AFRICAN AMERICAN: 88 ML/MIN/1.73
SL AMB EGFR NON AFRICAN AMERICAN: 76 ML/MIN/1.73
SODIUM SERPL-SCNC: 135 MMOL/L (ref 134–144)
WBC # BLD AUTO: 5.1 X10E3/UL (ref 3.4–10.8)

## 2021-02-07 DIAGNOSIS — K70.31 ALCOHOLIC CIRRHOSIS OF LIVER WITH ASCITES (HCC): ICD-10-CM

## 2021-02-08 RX ORDER — FUROSEMIDE 40 MG/1
TABLET ORAL
Qty: 30 TABLET | Refills: 0 | OUTPATIENT
Start: 2021-02-08

## 2021-02-08 RX ORDER — SPIRONOLACTONE 50 MG/1
TABLET, FILM COATED ORAL
Qty: 30 TABLET | Refills: 0 | OUTPATIENT
Start: 2021-02-08

## 2021-02-08 NOTE — TELEPHONE ENCOUNTER
Patient has an appointment with GI on tomorrow  She is going to ask them at her appointment if she should be taking the meds still and if they will be prescribing them   MRP

## 2021-02-09 ENCOUNTER — TELEMEDICINE (OUTPATIENT)
Dept: GASTROENTEROLOGY | Facility: CLINIC | Age: 61
End: 2021-02-09
Payer: COMMERCIAL

## 2021-02-09 VITALS
BODY MASS INDEX: 26.02 KG/M2 | HEIGHT: 64 IN | DIASTOLIC BLOOD PRESSURE: 55 MMHG | WEIGHT: 152.4 LBS | SYSTOLIC BLOOD PRESSURE: 89 MMHG

## 2021-02-09 DIAGNOSIS — K58.0 IRRITABLE BOWEL SYNDROME WITH DIARRHEA: ICD-10-CM

## 2021-02-09 DIAGNOSIS — K62.5 HEMORRHAGE OF ANUS AND RECTUM: ICD-10-CM

## 2021-02-09 DIAGNOSIS — K21.9 GERD WITHOUT ESOPHAGITIS: ICD-10-CM

## 2021-02-09 DIAGNOSIS — K70.31 ALCOHOLIC CIRRHOSIS OF LIVER WITH ASCITES (HCC): Primary | Chronic | ICD-10-CM

## 2021-02-09 PROCEDURE — 99442 PR PHYS/QHP TELEPHONE EVALUATION 11-20 MIN: CPT | Performed by: INTERNAL MEDICINE

## 2021-02-09 RX ORDER — FUROSEMIDE 40 MG/1
40 TABLET ORAL DAILY
Qty: 30 TABLET | Refills: 5 | Status: SHIPPED | OUTPATIENT
Start: 2021-02-09 | End: 2021-08-09

## 2021-02-09 RX ORDER — SPIRONOLACTONE 50 MG/1
50 TABLET, FILM COATED ORAL DAILY
Qty: 30 TABLET | Refills: 5 | Status: SHIPPED | OUTPATIENT
Start: 2021-02-09 | End: 2021-08-09

## 2021-02-09 NOTE — PROGRESS NOTES
Virtual Brief Visit    Assessment/Plan:    Problem List Items Addressed This Visit        Digestive    Alcoholic cirrhosis of liver with ascites ([facility] [address] ) - Primary (Chronic)    Relevant Medications    furosemide (LASIX) 40 mg tablet    spironolactone (ALDACTONE) 50 mg tablet    Hemorrhage of anus and rectum    GERD without esophagitis    Irritable bowel syndrome with diarrhea        1  Alcoholic cirrhosis of liver with ascites ([facility] [address] )    She has alcoholic cirrhosis complicated by ascites but denies any signs of bleeding or encephalopathy  Fortunately her sodium and renal function have improved and her last sodium was 135 with a normal creatinine  I will continue the Lasix and Aldactone at the current doses for now and have her follow up in the office in one month  At that time if there is no clinical evidence of ascites I will try stopping her diuretics  If the ascites recurs then I will restart the diuretics  I again spoke to her about the importance of complete alcohol avoidance  - furosemide (LASIX) 40 mg tablet; Take 1 tablet (40 mg total) by mouth daily  Dispense: 30 tablet; Refill: 5  - spironolactone (ALDACTONE) 50 mg tablet; Take 1 tablet (50 mg total) by mouth daily  Dispense: 30 tablet; Refill: 5    2  GERD without esophagitis    I encouraged her to take Pepcid as needed for the reflux since there is a concern about omeprazole affecting her diuretics and renal function  3  Hemorrhage of anus and rectum    Her rectal bleeding was probably due to hemorrhoids I have asked her to let me know if this recurs  4  Irritable bowel syndrome with diarrhea    She has diarrhea predominant irritable bowel syndrome but this seems to have improved recently  I encouraged her to take Imodium as needed if the diarrhea recurs          Reason for visit is   Chief Complaint   Patient presents with    Virtual Regular Visit     F/U 2000 [facility] Virtual Brief Visit        Encounter provider Sally Severe, MD    Provider located at [address] [place] [address] [address]  [address]  [phone]    Recent Visits  No visits were found meeting these conditions  Showing recent visits within past 7 days and meeting all other requirements     Today's Visits  Date Type Provider Dept   02/09/21 Telemedicine Sally Severe, [address] today's visits and meeting all other requirements     Future Appointments  No visits were found meeting these conditions  Showing future appointments within next 150 days and meeting all other requirements        After connecting through telephone, the patient was identified by name and date of birth  Yolie Delatorre was informed that this is a telemedicine visit and that the visit is being conducted through telephone  My office door was closed  No one else was in the room  She acknowledged consent and understanding of privacy and security of the platform  The patient has agreed to participate and understands she can discontinue the visit at any time  Patient is aware this is a billable service  Subjective    Yolie Delatorre is a 63 y o  female  with alcoholic cirrhosis, reflux, rectal bleeding, and diarrhea predominant irritable bowel syndrome who presents for follow-up  She has not drank any alcohol since her last visit  She has occasional rectal bleeding and occasional diarrhea symptoms but denies any abdominal pain, vomiting, or weight loss  She occasionally has reflux symptoms and said she is not taking any medication for this now        Past Medical History:   Diagnosis Date    Alcoholic fatty liver     Anxiety     Asthma     COPD (chronic obstructive pulmonary disease) (HCC)     Elevated liver function tests     GERD (gastroesophageal reflux disease)     GERD without esophagitis     Hyperlipidemia     Hypertension     IBS (irritable bowel syndrome)     Insomnia     Insomnia     Liver disease     Nervousness     Nicotine dependence     Other specified urinary incontinence     RLS (restless legs syndrome)        Past Surgical History:   Procedure Laterality Date    BACK SURGERY      BREAST EXCISIONAL BIOPSY Right 11/01/2004    benign    CATARACT EXTRACTION, BILATERAL  08/01/2018    COLONOSCOPY N/A 5/8/2019    Procedure: COLONOSCOPY;  Surgeon: Gaurang Camilo MD;  Location: [facility]; Service: Gastroenterology    EGD AND COLONOSCOPY N/A 3/19/2018    Procedure: EGD AND COLONOSCOPY;  Surgeon: Gaurang Camilo MD;  Location: [facility]; Service: Gastroenterology    ESOPHAGOGASTRODUODENOSCOPY N/A 5/8/2019    Procedure: ESOPHAGOGASTRODUODENOSCOPY (EGD); Surgeon: Gaurang Camilo MD;  Location: [facility];   Service: Gastroenterology    IR PARACENTESIS  11/27/2020    IR PARACENTESIS  12/8/2020    KNEE SURGERY      KNEE SURGERY      LUMBAR LAMINECTOMY  04/1999    LYMPH NODE BIOPSY      TOTAL ABDOMINAL HYSTERECTOMY  02/05/2008    TUBAL LIGATION      TUBAL LIGATION  1989    UPPER GASTROINTESTINAL ENDOSCOPY         Current Outpatient Medications   Medication Sig Dispense Refill    ALPRAZolam (XANAX) 1 mg tablet TAKE ONE TABLET TWO TIMES A DAY AS NEEDED FOR ANXIETY 60 tablet 5    benzonatate (TESSALON PERLES) 100 mg capsule Take 1 capsule (100 mg total) by mouth 2 (two) times a day as needed for cough 60 capsule 2    furosemide (LASIX) 40 mg tablet Take 1 tablet (40 mg total) by mouth daily 30 tablet 5    predniSONE 10 mg tablet Take 4 tabs on day 1,2 with food, 3 tabs on day 3,4 with food, 2 tabs on day 5,6 with food, 1 tab on day 7,8 with food 20 tablet 0    spironolactone (ALDACTONE) 50 mg tablet Take 1 tablet (50 mg total) by mouth daily 30 tablet 5    albuterol (PROVENTIL HFA) 90 mcg/act inhaler Inhale 2 puffs every 6 (six) hours as needed for wheezing or shortness of breath (Patient not taking: Reported on 12/30/2020) 1 Inhaler 6    omeprazole (PriLOSEC) 40 MG capsule TAKE ONE CAPSULE BY MOUTH EVERY DAY (Patient not taking: No sig reported) 30 capsule 5    TREMFYA subcutaneous injection       umeclidinium-vilanterol (ANORO ELLIPTA) 62 5-25 MCG/INH inhaler Inhale 1 puff daily (Patient not taking: Reported on 2/9/2021) 1 each 6     No current facility-administered medications for this visit  Allergies   Allergen Reactions    Ace Inhibitors Cough       REVIEW OF SYSTEMS:    CONSTITUTIONAL: Denies any fever, chills, rigors, and weight loss, but feels tired  HEENT: No earache or tinnitus  Denies hearing loss or visual disturbances  CARDIOVASCULAR: No chest pain or palpitations  RESPIRATORY: Denies any cough, hemoptysis, shortness of breath or dyspnea on exertion  GASTROINTESTINAL: As noted in the History of Present Illness  GENITOURINARY: No problems with urination  Denies any hematuria or dysuria  NEUROLOGIC: No dizziness or vertigo, denies headaches  MUSCULOSKELETAL: Denies any muscle or joint pain  SKIN: Denies skin rashes or itching  ENDOCRINE: Feels thirsty  Denies intolerance to heat or cold  PSYCHOSOCIAL: Denies depression or anxiety  Denies any recent memory loss  Vitals:    02/09/21 0803   BP: (!) 89/55   Weight: 69 1 kg (152 lb 6 4 oz)   Height: 5' 4" (1 626 m)         I spent 15 minutes directly with the patient during this visit    VIRTUAL VISIT [facility] acknowledges that she has consented to an online visit or consultation  She understands that the online visit is based solely on information provided by her, and that, in the absence of a face-to-face physical evaluation by the physician, the diagnosis she receives is both limited and provisional in terms of accuracy and completeness  This is not intended to replace a full medical face-to-face evaluation by the physician  Kadie White understands and accepts these terms

## 2021-02-19 ENCOUNTER — HOSPITAL ENCOUNTER (EMERGENCY)
Facility: HOSPITAL | Age: 61
Discharge: HOME | End: 2021-02-19
Attending: EMERGENCY MEDICINE
Payer: COMMERCIAL

## 2021-02-19 ENCOUNTER — APPOINTMENT (EMERGENCY)
Dept: CT IMAGING | Facility: HOSPITAL | Age: 61
End: 2021-02-19
Payer: COMMERCIAL

## 2021-02-19 ENCOUNTER — APPOINTMENT (OUTPATIENT)
Dept: RADIOLOGY | Facility: CLINIC | Age: 61
End: 2021-02-19
Payer: COMMERCIAL

## 2021-02-19 ENCOUNTER — OFFICE VISIT (OUTPATIENT)
Dept: URGENT CARE | Facility: CLINIC | Age: 61
End: 2021-02-19
Payer: COMMERCIAL

## 2021-02-19 VITALS
SYSTOLIC BLOOD PRESSURE: 133 MMHG | HEART RATE: 73 BPM | WEIGHT: 159 LBS | OXYGEN SATURATION: 97 % | RESPIRATION RATE: 30 BRPM | TEMPERATURE: 97.3 F | DIASTOLIC BLOOD PRESSURE: 68 MMHG | BODY MASS INDEX: 27.29 KG/M2

## 2021-02-19 VITALS
WEIGHT: 159 LBS | BODY MASS INDEX: 27.14 KG/M2 | TEMPERATURE: 97.3 F | OXYGEN SATURATION: 96 % | SYSTOLIC BLOOD PRESSURE: 126 MMHG | HEIGHT: 64 IN | HEART RATE: 88 BPM | DIASTOLIC BLOOD PRESSURE: 68 MMHG | RESPIRATION RATE: 18 BRPM

## 2021-02-19 DIAGNOSIS — R06.03: Primary | ICD-10-CM

## 2021-02-19 DIAGNOSIS — J90 PLEURAL EFFUSION, BILATERAL: ICD-10-CM

## 2021-02-19 DIAGNOSIS — K74.60 CIRRHOSIS (HCC): ICD-10-CM

## 2021-02-19 DIAGNOSIS — J43.9 EMPHYSEMA LUNG (HCC): ICD-10-CM

## 2021-02-19 DIAGNOSIS — R07.81 RIB PAIN ON LEFT SIDE: ICD-10-CM
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
## 2021-02-19 DIAGNOSIS — S22.49XA RIB FRACTURES: Primary | ICD-10-CM

## 2021-02-19 LAB
ALBUMIN SERPL BCP-MCNC: 2.7 G/DL (ref 3.5–5)
ALP SERPL-CCNC: 137 U/L (ref 46–116)
ALT SERPL W P-5'-P-CCNC: 41 U/L (ref 12–78)
ANION GAP SERPL CALCULATED.3IONS-SCNC: 9 MMOL/L (ref 4–13)
AST SERPL W P-5'-P-CCNC: 57 U/L (ref 5–45)
BASOPHILS # BLD AUTO: 0.08 THOUSANDS/ΜL (ref 0–0.1)
BASOPHILS NFR BLD AUTO: 1 % (ref 0–1)
BILIRUB SERPL-MCNC: 1.2 MG/DL (ref 0.2–1)
BUN SERPL-MCNC: 22 MG/DL (ref 5–25)
CALCIUM ALBUM COR SERPL-MCNC: 9.9 MG/DL (ref 8.3–10.1)
CALCIUM SERPL-MCNC: 8.9 MG/DL (ref 8.3–10.1)
CHLORIDE SERPL-SCNC: 102 MMOL/L (ref 100–108)
CO2 SERPL-SCNC: 26 MMOL/L (ref 21–32)
CREAT SERPL-MCNC: 1.19 MG/DL (ref 0.6–1.3)
EOSINOPHIL # BLD AUTO: 0.1 THOUSAND/ΜL (ref 0–0.61)
EOSINOPHIL NFR BLD AUTO: 2 % (ref 0–6)
ERYTHROCYTE [DISTWIDTH] IN BLOOD BY AUTOMATED COUNT: 13.8 % (ref 11.6–15.1)
GFR SERPL CREATININE-BSD FRML MDRD: 49 ML/MIN/1.73SQ M
GLUCOSE SERPL-MCNC: 92 MG/DL (ref 65–140)
HCT VFR BLD AUTO: 41 % (ref 34.8–46.1)
HGB BLD-MCNC: 14 G/DL (ref 11.5–15.4)
IMM GRANULOCYTES # BLD AUTO: 0.01 THOUSAND/UL (ref 0–0.2)
IMM GRANULOCYTES NFR BLD AUTO: 0 % (ref 0–2)
LYMPHOCYTES # BLD AUTO: 2.62 THOUSANDS/ΜL (ref 0.6–4.47)
LYMPHOCYTES NFR BLD AUTO: 38 % (ref 14–44)
MCH RBC QN AUTO: 34 PG (ref 26.8–34.3)
MCHC RBC AUTO-ENTMCNC: 34.1 G/DL (ref 31.4–37.4)
MCV RBC AUTO: 100 FL (ref 82–98)
MONOCYTES # BLD AUTO: 0.8 THOUSAND/ΜL (ref 0.17–1.22)
MONOCYTES NFR BLD AUTO: 12 % (ref 4–12)
NEUTROPHILS # BLD AUTO: 3.25 THOUSANDS/ΜL (ref 1.85–7.62)
NEUTS SEG NFR BLD AUTO: 47 % (ref 43–75)
NRBC BLD AUTO-RTO: 0 /100 WBCS
PLATELET # BLD AUTO: 96 THOUSANDS/UL (ref 149–390)
PMV BLD AUTO: 10.7 FL (ref 8.9–12.7)
POTASSIUM SERPL-SCNC: 4 MMOL/L (ref 3.5–5.3)
PROT SERPL-MCNC: 8.8 G/DL (ref 6.4–8.2)
RBC # BLD AUTO: 4.12 MILLION/UL (ref 3.81–5.12)
SODIUM SERPL-SCNC: 137 MMOL/L (ref 136–145)
TROPONIN I SERPL-MCNC: <0.02 NG/ML
WBC # BLD AUTO: 6.86 THOUSAND/UL (ref 4.31–10.16)

## 2021-02-19 PROCEDURE — 71046 X-RAY EXAM CHEST 2 VIEWS: CPT

## 2021-02-19 PROCEDURE — 99213 OFFICE O/P EST LOW 20 MIN: CPT | Performed by: FAMILY MEDICINE

## 2021-02-19 PROCEDURE — 71260 CT THORAX DX C+: CPT

## 2021-02-19 PROCEDURE — 99285 EMERGENCY DEPT VISIT HI MDM: CPT | Performed by: EMERGENCY MEDICINE

## 2021-02-19 PROCEDURE — 99284 EMERGENCY DEPT VISIT MOD MDM: CPT

## 2021-02-19 PROCEDURE — 93005 ELECTROCARDIOGRAM TRACING: CPT

## 2021-02-19 PROCEDURE — 36415 COLL VENOUS BLD VENIPUNCTURE: CPT | Performed by: EMERGENCY MEDICINE

## 2021-02-19 PROCEDURE — 80053 COMPREHEN METABOLIC PANEL: CPT | Performed by: EMERGENCY MEDICINE

## 2021-02-19 PROCEDURE — 84484 ASSAY OF TROPONIN QUANT: CPT | Performed by: EMERGENCY MEDICINE

## 2021-02-19 PROCEDURE — 96374 THER/PROPH/DIAG INJ IV PUSH: CPT

## 2021-02-19 PROCEDURE — 96376 TX/PRO/DX INJ SAME DRUG ADON: CPT

## 2021-02-19 PROCEDURE — 96361 HYDRATE IV INFUSION ADD-ON: CPT

## 2021-02-19 PROCEDURE — 85025 COMPLETE CBC W/AUTO DIFF WBC: CPT | Performed by: EMERGENCY MEDICINE

## 2021-02-19 RX ORDER — LIDOCAINE 50 MG/G
1 PATCH TOPICAL ONCE
Status: DISCONTINUED | OUTPATIENT
Start: 2021-02-19 | End: 2021-02-19 | Stop reason: HOSPADM

## 2021-02-19 RX ORDER — OXYCODONE HYDROCHLORIDE 5 MG/1
5 TABLET ORAL EVERY 6 HOURS PRN
Qty: 15 TABLET | Refills: 0 | Status: SHIPPED | OUTPATIENT
Start: 2021-02-19 | End: 2021-02-26 | Stop reason: SDUPTHER

## 2021-02-19 RX ORDER — METHOCARBAMOL 500 MG/1
500 TABLET, FILM COATED ORAL 2 TIMES DAILY
Qty: 20 TABLET | Refills: 0 | Status: SHIPPED | OUTPATIENT
Start: 2021-02-19 | End: 2021-10-08 | Stop reason: ALTCHOICE

## 2021-02-19 RX ORDER — IBUPROFEN 600 MG/1
600 TABLET ORAL EVERY 6 HOURS PRN
Qty: 20 TABLET | Refills: 0 | Status: SHIPPED | OUTPATIENT
Start: 2021-02-19 | End: 2021-10-08 | Stop reason: ALTCHOICE

## 2021-02-19 RX ORDER — KETOROLAC TROMETHAMINE 30 MG/ML
30 INJECTION, SOLUTION INTRAMUSCULAR; INTRAVENOUS ONCE
Status: COMPLETED | OUTPATIENT
Start: 2021-02-19 | End: 2021-02-19

## 2021-02-19 RX ORDER — METHOCARBAMOL 500 MG/1
500 TABLET, FILM COATED ORAL ONCE
Status: COMPLETED | OUTPATIENT
Start: 2021-02-19 | End: 2021-02-19

## 2021-02-19 RX ORDER — HYDROMORPHONE HCL/PF 1 MG/ML
0.5 SYRINGE (ML) INJECTION ONCE
Status: COMPLETED | OUTPATIENT
Start: 2021-02-19 | End: 2021-02-19

## 2021-02-19 RX ADMIN — IOHEXOL 85 ML: 350 INJECTION, SOLUTION INTRAVENOUS at 17:00

## 2021-02-19 RX ADMIN — METHOCARBAMOL TABLETS 500 MG: 500 TABLET, COATED ORAL at 15:47

## 2021-02-19 RX ADMIN — SODIUM CHLORIDE 1000 ML: 0.9 INJECTION, SOLUTION INTRAVENOUS at 15:45

## 2021-02-19 RX ADMIN — KETOROLAC TROMETHAMINE 30 MG: 30 INJECTION, SOLUTION INTRAMUSCULAR; INTRAVENOUS at 13:53

## 2021-02-19 RX ADMIN — LIDOCAINE 1 PATCH: 50 PATCH TOPICAL at 15:47

## 2021-02-19 RX ADMIN — HYDROMORPHONE HYDROCHLORIDE 0.5 MG: 1 INJECTION, SOLUTION INTRAMUSCULAR; INTRAVENOUS; SUBCUTANEOUS at 15:47

## 2021-02-19 RX ADMIN — HYDROMORPHONE HYDROCHLORIDE 0.5 MG: 1 INJECTION, SOLUTION INTRAMUSCULAR; INTRAVENOUS; SUBCUTANEOUS at 17:14

## 2021-02-19 NOTE — ED PROVIDER NOTES
History  Chief Complaint   Patient presents with    Rib Injury     To ED with left sided rib/chest wall pain after falling 5 days ago  Was referred from urgent care  42-year-old female presents for evaluation left-sided chest wall pain after mechanical fall 4 days ago  Patient was walking her dog and the dog ran out in front of her and pulled her down  Patient landed on her left chest wall  Denies any further injuries  Was evaluated at urgent care today and chest x-ray revealed:    "Trace left effusion and mild left base atelectasis  Focal increased opacity along the left lateral chest wall, new from 12/5/2020  While there is no displaced rib fracture, this could represent a small extrapleural hematoma from a nondisplaced rib fracture "    Patient was advised to come to the emergency department for further evaluation  Patient received pain medication at Urgent Care prior to arrival   Denies fever, cough  Pain is worse with palpation and movement  Prior to Admission Medications   Prescriptions Last Dose Informant Patient Reported? Taking?    ALPRAZolam (XANAX) 1 mg tablet  Self No No   Sig: TAKE ONE TABLET TWO TIMES A DAY AS NEEDED FOR ANXIETY   TREMFYA subcutaneous injection  Self Yes No   albuterol (PROVENTIL HFA) 90 mcg/act inhaler  Self No No   Sig: Inhale 2 puffs every 6 (six) hours as needed for wheezing or shortness of breath   Patient not taking: Reported on 12/30/2020   benzonatate (TESSALON PERLES) 100 mg capsule  Self No No   Sig: Take 1 capsule (100 mg total) by mouth 2 (two) times a day as needed for cough   furosemide (LASIX) 40 mg tablet   No No   Sig: Take 1 tablet (40 mg total) by mouth daily   omeprazole (PriLOSEC) 40 MG capsule  Self No No   Sig: TAKE ONE CAPSULE BY MOUTH EVERY DAY   Patient not taking: No sig reported   predniSONE 10 mg tablet  Self No No   Sig: Take 4 tabs on day 1,2 with food, 3 tabs on day 3,4 with food, 2 tabs on day 5,6 with food, 1 tab on day 7,8 with food   Patient not taking: Reported on 2/19/2021   spironolactone (ALDACTONE) 50 mg tablet   No No   Sig: Take 1 tablet (50 mg total) by mouth daily   umeclidinium-vilanterol (ANORO ELLIPTA) 62 5-25 MCG/INH inhaler  Self No No   Sig: Inhale 1 puff daily   Patient not taking: Reported on 2/9/2021      Facility-Administered Medications Last Administration Doses Remaining   ketorolac (TORADOL) injection 30 mg 2/19/2021  1:53 PM 0          Past Medical History:   Diagnosis Date    Alcoholic fatty liver     Anxiety     Asthma     COPD (chronic obstructive pulmonary disease) (HCC)     Elevated liver function tests     GERD (gastroesophageal reflux disease)     GERD without esophagitis     Hyperlipidemia     Hypertension     IBS (irritable bowel syndrome)     Insomnia     Insomnia     Liver disease     Nervousness     Nicotine dependence     Other specified urinary incontinence     RLS (restless legs syndrome)        Past Surgical History:   Procedure Laterality Date    BACK SURGERY      BREAST EXCISIONAL BIOPSY Right 11/01/2004    benign    CATARACT EXTRACTION, BILATERAL  08/01/2018    COLONOSCOPY N/A 5/8/2019    Procedure: COLONOSCOPY;  Surgeon: Latasha Reddy MD;  Location: [facility]; Service: Gastroenterology    EGD AND COLONOSCOPY N/A 3/19/2018    Procedure: EGD AND COLONOSCOPY;  Surgeon: Latasha Reddy MD;  Location: [facility]; Service: Gastroenterology    ESOPHAGOGASTRODUODENOSCOPY N/A 5/8/2019    Procedure: ESOPHAGOGASTRODUODENOSCOPY (EGD); Surgeon: Latasha Reddy MD;  Location: [facility];   Service: Gastroenterology    IR PARACENTESIS  11/27/2020    IR PARACENTESIS  12/8/2020    KNEE SURGERY      KNEE SURGERY      LUMBAR LAMINECTOMY  04/1999    LYMPH NODE BIOPSY      TOTAL ABDOMINAL HYSTERECTOMY  02/05/2008    TUBAL LIGATION      TUBAL LIGATION  1989    UPPER GASTROINTESTINAL ENDOSCOPY         Family History   Problem Relation Age of Onset    Breast cancer Mother 28  No Known Problems Father     No Known Problems Sister     No Known Problems Brother     No Known Problems Son     No Known Problems Maternal Grandmother     No Known Problems Maternal Grandfather     No Known Problems Paternal Grandmother     No Known Problems Paternal Grandfather     No Known Problems Maternal Aunt     No Known Problems Maternal Aunt     No Known Problems Paternal Aunt     Substance Abuse Neg Hx     Mental illness Neg Hx      I have reviewed and agree with the history as documented  E-Cigarette/Vaping    E-Cigarette Use Never User      E-Cigarette/Vaping Substances     Social History     Tobacco Use    Smoking status: Current Every Day Smoker     Packs/day: 0 50     Years: 45 00     Pack years: 22 50     Types: Cigarettes     Start date: 1978    Smokeless tobacco: Never Used    Tobacco comment: Per allscripts - 1/2 PPD cigs - smoking for 40 years   Substance Use Topics    Alcohol use: Not Currently     Alcohol/week: 24 0 standard drinks     Types: 24 Cans of beer per week     Frequency: 4 or more times a week     Drinks per session: 5 or 6     Binge frequency: Daily or almost daily     Comment: Daily; just cut back from 6 beers/day  Per allscripts -  3 beers a night    Drug use: No       Review of Systems   Constitutional: Negative for fever  Respiratory: Positive for shortness of breath  Cardiovascular: Positive for chest pain  All other systems reviewed and are negative  Physical Exam  Physical Exam  Vitals signs and nursing note reviewed  Constitutional:       Appearance: She is well-developed  HENT:      Head: Normocephalic and atraumatic  Eyes:      Conjunctiva/sclera: Conjunctivae normal    Neck:      Musculoskeletal: Normal range of motion and neck supple  Cardiovascular:      Rate and Rhythm: Normal rate and regular rhythm  Pulmonary:      Effort: Pulmonary effort is normal  No respiratory distress     Abdominal:      Palpations: Abdomen is soft       Tenderness: There is no abdominal tenderness  Musculoskeletal: Normal range of motion  General: Tenderness present  Comments: Left lateral chest wall tender to palpation  No ecchymosis or erythema  No CTL spine tenderness  Full ROM B/L UE and LE   Skin:     General: Skin is warm  Findings: No erythema  Neurological:      Mental Status: She is alert and oriented to person, place, and time     Psychiatric:         Behavior: Behavior normal          Vital Signs  ED Triage Vitals [02/19/21 1521]   Temperature Pulse Respirations Blood Pressure SpO2   (!) 97 3 °F (36 3 °C) 89 20 150/73 99 %      Temp Source Heart Rate Source Patient Position - Orthostatic VS BP Location FiO2 (%)   Tympanic Monitor Sitting Right arm --      Pain Score       Worst Possible Pain           Vitals:    02/19/21 1521 02/19/21 1630 02/19/21 1715 02/19/21 1730   BP: 150/73 129/59 144/66 133/68   Pulse: 89 71 75 73   Patient Position - Orthostatic VS: Sitting            Visual Acuity      ED Medications  Medications   lidocaine (LIDODERM) 5 % patch 1 patch (1 patch Topical Medication Applied 2/19/21 1547)   HYDROmorphone (DILAUDID) injection 0 5 mg (0 5 mg Intravenous Given 2/19/21 1547)   sodium chloride 0 9 % bolus 1,000 mL (0 mL Intravenous Stopped 2/19/21 1642)   methocarbamol (ROBAXIN) tablet 500 mg (500 mg Oral Given 2/19/21 1547)   iohexol (OMNIPAQUE) 350 MG/ML injection (SINGLE-DOSE) 85 mL (85 mL Intravenous Given 2/19/21 1700)   HYDROmorphone (DILAUDID) injection 0 5 mg (0 5 mg Intravenous Given 2/19/21 1714)       Diagnostic Studies  Results Reviewed     Procedure Component Value Units Date/Time    CBC and differential [[id number]]  (Abnormal) Collected: 02/19/21 1538    Lab Status: Final result Specimen: Blood from Arm, Left Updated: 02/19/21 1634     WBC 6 86 Thousand/uL      RBC 4 12 Million/uL      Hemoglobin 14 0 g/dL      Hematocrit 41 0 %       fL      MCH 34 0 pg      MCHC 34 1 g/dL      RDW 13 8 %      MPV 10 7 fL      Platelets 96 Thousands/uL      nRBC 0 /100 WBCs      Neutrophils Relative 47 %      Immat GRANS % 0 %      Lymphocytes Relative 38 %      Monocytes Relative 12 %      Eosinophils Relative 2 %      Basophils Relative 1 %      Neutrophils Absolute 3 25 Thousands/µL      Immature Grans Absolute 0 01 Thousand/uL      Lymphocytes Absolute 2 62 Thousands/µL      Monocytes Absolute 0 80 Thousand/µL      Eosinophils Absolute 0 10 Thousand/µL      Basophils Absolute 0 08 Thousands/µL     Troponin I [[id number]]  (Normal) Collected: 02/19/21 1544    Lab Status: Final result Specimen: Blood from Arm, Left Updated: 02/19/21 1621     Troponin I <0 02 ng/mL     Comprehensive metabolic panel [[id number]]  (Abnormal) Collected: 02/19/21 1538    Lab Status: Final result Specimen: Blood from Arm, Left Updated: 02/19/21 1619     Sodium 137 mmol/L      Potassium 4 0 mmol/L      Chloride 102 mmol/L      CO2 26 mmol/L      ANION GAP 9 mmol/L      BUN 22 mg/dL      Creatinine 1 19 mg/dL      Glucose 92 mg/dL      Calcium 8 9 mg/dL      Corrected Calcium 9 9 mg/dL      AST 57 U/L      ALT 41 U/L      Alkaline Phosphatase 137 U/L      Total Protein 8 8 g/dL      Albumin 2 7 g/dL      Total Bilirubin 1 20 mg/dL      eGFR 49 ml/min/1 73sq m     Narrative:      [place] guidelines for Chronic Kidney Disease (CKD):     Stage 1 with normal or high GFR (GFR > 90 mL/min/1 73 square meters)    Stage 2 Mild CKD (GFR = 60-89 mL/min/1 73 square meters)    Stage 3A Moderate CKD (GFR = 45-59 mL/min/1 73 square meters)    Stage 3B Moderate CKD (GFR = 30-44 mL/min/1 73 square meters)    Stage 4 Severe CKD (GFR = 15-29 mL/min/1 73 square meters)    Stage 5 End Stage CKD (GFR <15 mL/min/1 73 square meters)  Note: GFR calculation is accurate only with a steady state creatinine                 CT chest with contrast   Final Result by Darnell Reyes MD (02/19 1749)      There are acute nondisplaced left lateral 6th, 7th, and 8th rib fractures  There is no associated pneumothorax or hemothorax  Moderate emphysema  Cirrhotic liver  Workstation performed: [id number]                    Procedures  Procedures         ED Course                             SBIRT 22yo+      Most Recent Value   SBIRT (24 yo +)   In order to provide better care to our patients, we are screening all of our patients for alcohol and drug use  Would it be okay to ask you these screening questions? Yes Filed at: 02/19/2021 1642   Initial Alcohol Screen: US AUDIT-C    1  How often do you have a drink containing alcohol? 1 Filed at: 02/19/2021 1642   2  How many drinks containing alcohol do you have on a typical day you are drinking? 0 Filed at: 02/19/2021 1642   3a  Male UNDER 65: How often do you have five or more drinks on one occasion? 0 Filed at: 02/19/2021 1642   3b  FEMALE Any Age, or MALE 65+: How often do you have 4 or more drinks on one occassion? 0 Filed at: 02/19/2021 1642   Audit-C Score  1 Filed at: 02/19/2021 1642   CELSO: How many times in the past year have you    Used an illegal drug or used a prescription medication for non-medical reasons? Never Filed at: 02/19/2021 1642                    MDM  Number of Diagnoses or Management Options  Cirrhosis ([facility] ):   Emphysema lung [facility]):   Rib fractures:   Diagnosis management comments: 63 yof with mech fall 4 days ago  Labs, ekg, CT chest  Pain control, IS  Discussed all results with patient  Discussed importance of incentive spirometry  Follow up with primary care provider  Return precautions discussed        Disposition  Final diagnoses:   Rib fractures   Cirrhosis ([facility] )   Emphysema lung ([facility] )     Time reflects when diagnosis was documented in both MDM as applicable and the Disposition within this note     Time User Action Codes Description Comment    2/19/2021  5:57 PM Alvina Mcdonald Add [S22 39XA] Rib fractures     2/19/2021  5:57 PM Alvina Mcdonald Add [K74 60] Cirrhosis ([address] )     2/19/2021  5:57 PM Jules Knox Add [J43 9] Emphysema lung [facility])       ED Disposition     ED Disposition Condition Date/Time Comment    Discharge Stable Fri Feb 19, 2021  5:57 PM Clint White discharge to home/self care  Follow-up Information     Follow up With Specialties Details Why Contact Info Additional Information    Mere Chavez PA-C Family Medicine, Physician Assistant In 1 week  [address] [phone]  [facility] Emergency Department Emergency Medicine  If symptoms worsen [address] [id number]  [address] [facility] Emergency Department, [address] [place], [facility] 10          Patient's Medications   Discharge Prescriptions    IBUPROFEN (MOTRIN) 600 MG TABLET    Take 1 tablet (600 mg total) by mouth every 6 (six) hours as needed for mild pain       Start Date: 2/19/2021 End Date: --       Order Dose: 600 mg       Quantity: 20 tablet    Refills: 0    METHOCARBAMOL (ROBAXIN) 500 MG TABLET    Take 1 tablet (500 mg total) by mouth 2 (two) times a day       Start Date: 2/19/2021 End Date: --       Order Dose: 500 mg       Quantity: 20 tablet    Refills: 0    OXYCODONE (ROXICODONE) 5 MG IMMEDIATE RELEASE TABLET    Take 1 tablet (5 mg total) by mouth every 6 (six) hours as needed for moderate pain for up to 10 daysMax Daily Amount: 20 mg       Start Date: 2/19/2021 End Date: 3/1/2021       Order Dose: 5 mg       Quantity: 15 tablet    Refills: 0     No discharge procedures on file      PDMP Review       Value Time User    PDMP Reviewed  Yes 12/30/2020  2:48 PM Mere Chavez PA-C          ED Provider  Electronically Signed by           Joo Sapp DO  02/19/21 [id number]

## 2021-02-19 NOTE — PROGRESS NOTES
[address] Now        NAME: Ariadne Cadet is a 63 y o  female  : 1960    MRN: [id number]  DATE: 2021  TIME: 2:50 PM    Assessment and Plan   Respiratory distress following trauma [R06 03]  1  Respiratory distress following trauma  Ambulatory Referral to Emergency Medicine   2  Rib pain on left side  ketorolac (TORADOL) injection 30 mg    XR chest pa & lateral    Ambulatory Referral to Emergency Medicine   3  Pleural effusion, bilateral  Ambulatory Referral to Emergency Medicine         Patient Instructions       Follow up with PCP in 3-5 days  Proceed to  ER if symptoms worsen  Chief Complaint     Chief Complaint   Patient presents with    Fall     fell on left side 4 days ago   Rib Injury     after fall on snow         History of Present Illness        60-year-old female reports falling 4 days ago and striking her left ribcage on the ground  Since that time she has had ongoing pain in the left ribs  She states last evening falling in bed, she attempted to reach up and grab a bedpost and doing self cell increased straining along the left ribs  Pain is a 10/10 at this time  Patient also reports having difficulty with breathing and is feeling short of breath  She denies any fevers chills  She denies any chest pain or headaches  Review of Systems   Review of Systems   Constitutional: Negative  HENT: Negative  Eyes: Negative  Respiratory: Positive for shortness of breath  Cardiovascular: Negative  Negative for chest pain  Gastrointestinal: Negative  Genitourinary: Negative  Musculoskeletal: Positive for arthralgias and myalgias  Skin: Negative  Allergic/Immunologic: Negative  Neurological: Negative  Hematological: Negative  Psychiatric/Behavioral: Negative            Current Medications       Current Outpatient Medications:     ALPRAZolam (XANAX) 1 mg tablet, TAKE ONE TABLET TWO TIMES A DAY AS NEEDED FOR ANXIETY, Disp: 60 tablet, Rfl: 5    benzonatate (TESSALON PERLES) 100 mg capsule, Take 1 capsule (100 mg total) by mouth 2 (two) times a day as needed for cough, Disp: 60 capsule, Rfl: 2    furosemide (LASIX) 40 mg tablet, Take 1 tablet (40 mg total) by mouth daily, Disp: 30 tablet, Rfl: 5    spironolactone (ALDACTONE) 50 mg tablet, Take 1 tablet (50 mg total) by mouth daily, Disp: 30 tablet, Rfl: 5    albuterol (PROVENTIL HFA) 90 mcg/act inhaler, Inhale 2 puffs every 6 (six) hours as needed for wheezing or shortness of breath (Patient not taking: Reported on 12/30/2020), Disp: 1 Inhaler, Rfl: 6    omeprazole (PriLOSEC) 40 MG capsule, TAKE ONE CAPSULE BY MOUTH EVERY DAY (Patient not taking: No sig reported), Disp: 30 capsule, Rfl: 5    predniSONE 10 mg tablet, Take 4 tabs on day 1,2 with food, 3 tabs on day 3,4 with food, 2 tabs on day 5,6 with food, 1 tab on day 7,8 with food (Patient not taking: Reported on 2/19/2021), Disp: 20 tablet, Rfl: 0    TREMFYA subcutaneous injection, , Disp: , Rfl:     umeclidinium-vilanterol (ANORO ELLIPTA) 62 5-25 MCG/INH inhaler, Inhale 1 puff daily (Patient not taking: Reported on 2/9/2021), Disp: 1 each, Rfl: 6  No current facility-administered medications for this visit       Current Allergies     Allergies as of 02/19/2021 - Reviewed 02/19/2021   Allergen Reaction Noted    Ace inhibitors Cough 08/29/2014            The following portions of the patient's history were reviewed and updated as appropriate: allergies, current medications, past family history, past medical history, past social history, past surgical history and problem list      Past Medical History:   Diagnosis Date    Alcoholic fatty liver     Anxiety     Asthma     COPD (chronic obstructive pulmonary disease) ([facility] )     Elevated liver function tests     GERD (gastroesophageal reflux disease)     GERD without esophagitis     Hyperlipidemia     Hypertension     IBS (irritable bowel syndrome)     Insomnia     Insomnia     Liver disease     Nervousness     Nicotine dependence     Other specified urinary incontinence     RLS (restless legs syndrome)        Past Surgical History:   Procedure Laterality Date    BACK SURGERY      BREAST EXCISIONAL BIOPSY Right 11/01/2004    benign    CATARACT EXTRACTION, BILATERAL  08/01/2018    COLONOSCOPY N/A 5/8/2019    Procedure: COLONOSCOPY;  Surgeon: Oliver Crenshaw MD;  Location: [facility]; Service: Gastroenterology    EGD AND COLONOSCOPY N/A 3/19/2018    Procedure: EGD AND COLONOSCOPY;  Surgeon: Oliver Crenshaw MD;  Location: [facility]; Service: Gastroenterology    ESOPHAGOGASTRODUODENOSCOPY N/A 5/8/2019    Procedure: ESOPHAGOGASTRODUODENOSCOPY (EGD); Surgeon: Oliver Crenshaw MD;  Location: [facility]; Service: Gastroenterology    IR PARACENTESIS  11/27/2020    IR PARACENTESIS  12/8/2020    KNEE SURGERY      KNEE SURGERY      LUMBAR LAMINECTOMY  04/1999    LYMPH NODE BIOPSY      TOTAL ABDOMINAL HYSTERECTOMY  02/05/2008    TUBAL LIGATION      TUBAL LIGATION  1989    UPPER GASTROINTESTINAL ENDOSCOPY         Family History   Problem Relation Age of Onset    Breast cancer Mother 28    No Known Problems Father     No Known Problems Sister     No Known Problems Brother     No Known Problems Son     No Known Problems Maternal Grandmother     No Known Problems Maternal Grandfather     No Known Problems Paternal Grandmother     No Known Problems Paternal Grandfather     No Known Problems Maternal Aunt     No Known Problems Maternal Aunt     No Known Problems Paternal Aunt     Substance Abuse Neg Hx     Mental illness Neg Hx          Medications have been verified  Objective   /68   Pulse 88   Temp (!) 97 3 °F (36 3 °C)   Resp 18   Ht 5' 4" (1 626 m)   Wt 72 1 kg (159 lb)   SpO2 96%   BMI 27 29 kg/m²   No LMP recorded  Patient has had a hysterectomy  Physical Exam     Physical Exam  Vitals signs and nursing note reviewed     Constitutional: Appearance: She is well-developed  HENT:      Head: Normocephalic  Eyes:      Pupils: Pupils are equal, round, and reactive to light  Pulmonary:      Effort: Tachypnea, accessory muscle usage and respiratory distress present  Breath sounds: Examination of the right-upper field reveals decreased breath sounds  Examination of the left-upper field reveals decreased breath sounds  Decreased breath sounds present  Chest:      Chest wall: Tenderness present  Abdominal:      General: There is distension  Musculoskeletal: Normal range of motion  General: Tenderness and signs of injury present  Skin:     General: Skin is warm and dry  Neurological:      Mental Status: She is alert and oriented to person, place, and time

## 2021-02-22 ENCOUNTER — TELEPHONE (OUTPATIENT)
Dept: FAMILY MEDICINE CLINIC | Facility: CLINIC | Age: 61
End: 2021-02-22

## 2021-02-22 NOTE — TELEPHONE ENCOUNTER
Patient states that she is extremely sore  The patient fell on Monday and she went to the ER and a CT scan was done showing 3 fractured ribs  She was given pain meds and with the punctured lung she would have possibly gotten pneumonia

## 2021-02-24 LAB
ATRIAL RATE: 82 BPM
P AXIS: 60 DEGREES
PR INTERVAL: 114 MS
QRS AXIS: 79 DEGREES
QRSD INTERVAL: 84 MS
QT INTERVAL: 402 MS
QTC INTERVAL: 469 MS
T WAVE AXIS: 62 DEGREES
VENTRICULAR RATE: 82 BPM

## 2021-02-24 PROCEDURE — 93010 ELECTROCARDIOGRAM REPORT: CPT | Performed by: INTERNAL MEDICINE

## 2021-02-26 ENCOUNTER — OFFICE VISIT (OUTPATIENT)
Dept: FAMILY MEDICINE CLINIC | Facility: CLINIC | Age: 61
End: 2021-02-26
Payer: COMMERCIAL

## 2021-02-26 VITALS
DIASTOLIC BLOOD PRESSURE: 70 MMHG | TEMPERATURE: 98 F | RESPIRATION RATE: 16 BRPM | SYSTOLIC BLOOD PRESSURE: 120 MMHG | BODY MASS INDEX: 26.84 KG/M2 | HEART RATE: 72 BPM | HEIGHT: 64 IN | WEIGHT: 157.2 LBS

## 2021-02-26 DIAGNOSIS — S22.49XA RIB FRACTURES: Primary | ICD-10-CM

## 2021-02-26 DIAGNOSIS — R07.81 RIB PAIN: ICD-10-CM

## 2021-02-26 DIAGNOSIS — Z78.0 MENOPAUSE: ICD-10-CM

## 2021-02-26 DIAGNOSIS — Z12.31 ENCOUNTER FOR SCREENING MAMMOGRAM FOR MALIGNANT NEOPLASM OF BREAST: ICD-10-CM

## 2021-02-26 PROCEDURE — 99214 OFFICE O/P EST MOD 30 MIN: CPT | Performed by: PHYSICIAN ASSISTANT

## 2021-02-26 PROCEDURE — 3074F SYST BP LT 130 MM HG: CPT | Performed by: PHYSICIAN ASSISTANT

## 2021-02-26 PROCEDURE — 3008F BODY MASS INDEX DOCD: CPT | Performed by: PHYSICIAN ASSISTANT

## 2021-02-26 PROCEDURE — 4004F PT TOBACCO SCREEN RCVD TLK: CPT | Performed by: PHYSICIAN ASSISTANT

## 2021-02-26 PROCEDURE — 3078F DIAST BP <80 MM HG: CPT | Performed by: PHYSICIAN ASSISTANT

## 2021-02-26 RX ORDER — LIDOCAINE 50 MG/G
1 PATCH TOPICAL EVERY 12 HOURS
Qty: 15 PATCH | Refills: 0 | Status: SHIPPED | OUTPATIENT
Start: 2021-02-26 | End: 2021-10-08 | Stop reason: ALTCHOICE

## 2021-02-26 RX ORDER — OXYCODONE HYDROCHLORIDE 5 MG/1
5 TABLET ORAL EVERY 6 HOURS PRN
Qty: 10 TABLET | Refills: 0 | Status: SHIPPED | OUTPATIENT
Start: 2021-02-26 | End: 2021-03-08

## 2021-02-26 NOTE — PROGRESS NOTES
Assessment/Plan:    1  Rib fractures    - reviewed ER records with patient  - will try lidoderm patch during day and oxycodone at night as needed to be used sparingly, cannot take tylenol due to cirrhosis, NSAIDS due to HTN and decrease platelets  Continue spirometry, reassurance  - oxyCODONE (ROXICODONE) 5 mg immediate release tablet; Take 1 tablet (5 mg total) by mouth every 6 (six) hours as needed for moderate pain for up to 10 daysMax Daily Amount: 20 mg  Dispense: 10 tablet; Refill: 0    2  BMI 26 0-26 9,adult    - encouraged patient to work on [employer], exercise  and adequate sleep for weight loss  Follow up if more help is needed    F/u March as scheduled  F/u as needed      Subjective:   Chief Complaint   Patient presents with    ER follow-up      Patient ID: Aleksey Flores is a 63 y o  female  Patient here for follow up  Jahaira Mendez talking out dog on Monday, went to ER on Friday and they found rib fracture 6, 7, 8  No pneumothorax or pulmonary contusion on CT, just fracture of 3 ribs  Was given oxycodone at night, cannot take tylenol or NSAIDs due to medical conditions  Feels she is improving, her pain level was over a 10 now down to a 7  Nights are worse, can't get comfortable  Doing incentive spirometry getting up to 1200  Denies fever and chills        The following portions of the patient's history were reviewed and updated as appropriate: allergies, current medications, past family history, past medical history, past social history, past surgical history and problem list     Past Medical History:   Diagnosis Date    Alcoholic fatty liver     Anxiety     Asthma     COPD (chronic obstructive pulmonary disease) ([facility] [address] )     Elevated liver function tests     GERD (gastroesophageal reflux disease)     GERD without esophagitis     Hyperlipidemia     Hypertension     IBS (irritable bowel syndrome)     Insomnia     Insomnia     Liver disease     Nervousness     Nicotine dependence     Other specified urinary incontinence     RLS (restless legs syndrome)      Past Surgical History:   Procedure Laterality Date    BACK SURGERY      BREAST EXCISIONAL BIOPSY Right 11/01/2004    benign    CATARACT EXTRACTION, BILATERAL  08/01/2018    COLONOSCOPY N/A 5/8/2019    Procedure: COLONOSCOPY;  Surgeon: Sally Severe, MD;  Location: [facility]; Service: Gastroenterology    EGD AND COLONOSCOPY N/A 3/19/2018    Procedure: EGD AND COLONOSCOPY;  Surgeon: Sally Severe, MD;  Location: [facility]; Service: Gastroenterology    ESOPHAGOGASTRODUODENOSCOPY N/A 5/8/2019    Procedure: ESOPHAGOGASTRODUODENOSCOPY (EGD); Surgeon: Sally Severe, MD;  Location: [facility];   Service: Gastroenterology    IR PARACENTESIS  11/27/2020    IR PARACENTESIS  12/8/2020    KNEE SURGERY      KNEE SURGERY      LUMBAR LAMINECTOMY  04/1999    LYMPH NODE BIOPSY      TOTAL ABDOMINAL HYSTERECTOMY  02/05/2008    TUBAL LIGATION      TUBAL LIGATION  1989    UPPER GASTROINTESTINAL ENDOSCOPY       Family History   Problem Relation Age of Onset    Breast cancer Mother 28    No Known Problems Father     No Known Problems Sister     No Known Problems Brother     No Known Problems Son     No Known Problems Maternal Grandmother     No Known Problems Maternal Grandfather     No Known Problems Paternal Grandmother     No Known Problems Paternal Grandfather     No Known Problems Maternal Aunt     No Known Problems Maternal Aunt     No Known Problems Paternal Aunt     Substance Abuse Neg Hx     Mental illness Neg Hx      Social History     Socioeconomic History    Marital status: /Civil Union     Spouse name: Not on file    Number of children: Not on file    Years of education: Not on file    Highest education level: Not on file   Occupational History    Not on file   Social Needs    Financial resource strain: Not on file    Food insecurity     Worry: Not on file     Inability: Not on file   [facility] Transportation needs     Medical: Not on file     Non-medical: Not on file   Tobacco Use    Smoking status: Current Every Day Smoker     Packs/day: 0 50     Years: 45 00     Pack years: 22 50     Types: Cigarettes     Start date: 1978    Smokeless tobacco: Never Used    Tobacco comment: Per allscripts - 1/2 PPD cigs - smoking for 40 years   Substance and Sexual Activity    Alcohol use: Not Currently     Alcohol/week: 24 0 standard drinks     Types: 24 Cans of beer per week     Frequency: 4 or more times a week     Drinks per session: 5 or 6     Binge frequency: Daily or almost daily     Comment: Daily; just cut back from 6 beers/day   Per allscripts -  3 beers a night    Drug use: No    Sexual activity: Yes     Partners: Male   Lifestyle    Physical activity     Days per week: Not on file     Minutes per session: Not on file    Stress: Not on file   Relationships    Social connections     Talks on phone: Not on file     Gets together: Not on file     Attends [employer] service: Not on file     Active member of club or organization: Not on file     Attends meetings of clubs or organizations: Not on file     Relationship status: Not on file    Intimate partner violence     Fear of current or ex partner: Not on file     Emotionally abused: Not on file     Physically abused: Not on file     Forced sexual activity: Not on file   Other Topics Concern    Not on file   Social History Narrative    Has carbon monoxide detectors in home    Has smoke detectors    Uses safety equipment - seatbelts       Current Outpatient Medications:     ALPRAZolam (XANAX) 1 mg tablet, TAKE ONE TABLET TWO TIMES A DAY AS NEEDED FOR ANXIETY, Disp: 60 tablet, Rfl: 5    benzonatate (TESSALON PERLES) 100 mg capsule, Take 1 capsule (100 mg total) by mouth 2 (two) times a day as needed for cough, Disp: 60 capsule, Rfl: 2    furosemide (LASIX) 40 mg tablet, Take 1 tablet (40 mg total) by mouth daily, Disp: 30 tablet, Rfl: 5    ibuprofen (MOTRIN) 600 mg tablet, Take 1 tablet (600 mg total) by mouth every 6 (six) hours as needed for mild pain, Disp: 20 tablet, Rfl: 0    methocarbamol (ROBAXIN) 500 mg tablet, Take 1 tablet (500 mg total) by mouth 2 (two) times a day, Disp: 20 tablet, Rfl: 0    omeprazole (PriLOSEC) 40 MG capsule, TAKE ONE CAPSULE BY MOUTH EVERY DAY, Disp: 30 capsule, Rfl: 5    oxyCODONE (ROXICODONE) 5 mg immediate release tablet, Take 1 tablet (5 mg total) by mouth every 6 (six) hours as needed for moderate pain for up to 10 daysMax Daily Amount: 20 mg, Disp: 15 tablet, Rfl: 0    predniSONE 10 mg tablet, Take 4 tabs on day 1,2 with food, 3 tabs on day 3,4 with food, 2 tabs on day 5,6 with food, 1 tab on day 7,8 with food, Disp: 20 tablet, Rfl: 0    spironolactone (ALDACTONE) 50 mg tablet, Take 1 tablet (50 mg total) by mouth daily, Disp: 30 tablet, Rfl: 5    TREMFYA subcutaneous injection, , Disp: , Rfl:     umeclidinium-vilanterol (ANORO ELLIPTA) 62 5-25 MCG/INH inhaler, Inhale 1 puff daily, Disp: 1 each, Rfl: 6    albuterol (PROVENTIL HFA) 90 mcg/act inhaler, Inhale 2 puffs every 6 (six) hours as needed for wheezing or shortness of breath (Patient not taking: Reported on 2/26/2021), Disp: 1 Inhaler, Rfl: 6    Review of Systems          Objective:    Vitals:    02/26/21 1332   BP: 120/70   BP Location: Left arm   Patient Position: Sitting   Cuff Size: Standard   Pulse: 72   Resp: 16   Temp: 98 °F (36 7 °C)   TempSrc: Oral   Weight: 71 3 kg (157 lb 3 2 oz)   Height: 5' 4" (1 626 m)        Physical Exam  Constitutional:       Appearance: Normal appearance  She is well-developed  Cardiovascular:      Rate and Rhythm: Normal rate and regular rhythm  Pulses: Normal pulses  Heart sounds: Normal heart sounds  Pulmonary:      Effort: Pulmonary effort is normal       Breath sounds: Normal breath sounds     Musculoskeletal:      Comments: Left ribs area of 6-7-8 mid axillary with slight swelling and tender, no ecchymosis Skin:     General: Skin is warm  Neurological:      General: No focal deficit present  Mental Status: She is alert and oriented to person, place, and time  Psychiatric:         Mood and Affect: Mood normal          Behavior: Behavior normal          Thought Content: Thought content normal          Judgment: Judgment normal            BMI Counseling: Body mass index is 26 98 kg/m²  The BMI is above normal  Nutrition recommendations include reducing portion sizes

## 2021-03-15 ENCOUNTER — TELEPHONE (OUTPATIENT)
Dept: NEPHROLOGY | Facility: CLINIC | Age: 61
End: 2021-03-15

## 2021-03-15 NOTE — TELEPHONE ENCOUNTER
I called and left a detail message asking patient to contact the office to schedule the April follow up in [place] with Dr Tovar  Called patient from the recall list  Please schedule when patient returns the call back

## 2021-03-16 NOTE — TELEPHONE ENCOUNTER
I called and left a second detail message asking patient to contact the office to schedule the April follow up appointment with Susan Cruz at the [place] office  Called patient from the recall list  Please schedule when patient returns the call back  I will mail patient a reminder letter asking to contact the office to schedule the follow up visit

## 2021-03-17 DIAGNOSIS — K70.31 ALCOHOLIC CIRRHOSIS OF LIVER WITH ASCITES (HCC): Primary | ICD-10-CM

## 2021-03-29 DIAGNOSIS — Z23 ENCOUNTER FOR IMMUNIZATION: ICD-10-CM

## 2021-04-02 LAB
ALBUMIN SERPL-MCNC: 3.2 G/DL (ref 3.8–4.8)
ALBUMIN/GLOB SERPL: 0.7 {RATIO} (ref 1.2–2.2)
ALP SERPL-CCNC: 168 IU/L (ref 39–117)
ALT SERPL-CCNC: 29 IU/L (ref 0–32)
AST SERPL-CCNC: 50 IU/L (ref 0–40)
BILIRUB SERPL-MCNC: 1.3 MG/DL (ref 0–1.2)
BUN SERPL-MCNC: 23 MG/DL (ref 8–27)
BUN SERPL-MCNC: 23 MG/DL (ref 8–27)
BUN/CREAT SERPL: 23 (ref 12–28)
BUN/CREAT SERPL: 25 (ref 12–28)
CALCIUM SERPL-MCNC: 9 MG/DL (ref 8.7–10.3)
CALCIUM SERPL-MCNC: 9.1 MG/DL (ref 8.7–10.3)
CHLORIDE SERPL-SCNC: 104 MMOL/L (ref 96–106)
CHLORIDE SERPL-SCNC: 105 MMOL/L (ref 96–106)
CO2 SERPL-SCNC: 20 MMOL/L (ref 20–29)
CO2 SERPL-SCNC: 21 MMOL/L (ref 20–29)
CREAT SERPL-MCNC: 0.93 MG/DL (ref 0.57–1)
CREAT SERPL-MCNC: 0.98 MG/DL (ref 0.57–1)
ERYTHROCYTE [DISTWIDTH] IN BLOOD BY AUTOMATED COUNT: 13.2 % (ref 11.7–15.4)
ERYTHROCYTE [DISTWIDTH] IN BLOOD BY AUTOMATED COUNT: 13.5 % (ref 11.7–15.4)
GLOBULIN SER-MCNC: 4.8 G/DL (ref 1.5–4.5)
GLUCOSE SERPL-MCNC: 101 MG/DL (ref 65–99)
GLUCOSE SERPL-MCNC: 98 MG/DL (ref 65–99)
HCT VFR BLD AUTO: 37.6 % (ref 34–46.6)
HCT VFR BLD AUTO: 38.7 % (ref 34–46.6)
HGB BLD-MCNC: 13.4 G/DL (ref 11.1–15.9)
HGB BLD-MCNC: 13.6 G/DL (ref 11.1–15.9)
MCH RBC QN AUTO: 33.3 PG (ref 26.6–33)
MCH RBC QN AUTO: 34.2 PG (ref 26.6–33)
MCHC RBC AUTO-ENTMCNC: 35.1 G/DL (ref 31.5–35.7)
MCHC RBC AUTO-ENTMCNC: 35.6 G/DL (ref 31.5–35.7)
MCV RBC AUTO: 93 FL (ref 79–97)
MCV RBC AUTO: 97 FL (ref 79–97)
MORPHOLOGY BLD-IMP: NORMAL
MORPHOLOGY BLD-IMP: NORMAL
PLATELET # BLD AUTO: 92 X10E3/UL (ref 150–450)
PLATELET # BLD AUTO: 95 X10E3/UL (ref 150–450)
POTASSIUM SERPL-SCNC: 4.5 MMOL/L (ref 3.5–5.2)
POTASSIUM SERPL-SCNC: 4.5 MMOL/L (ref 3.5–5.2)
PROT SERPL-MCNC: 8 G/DL (ref 6–8.5)
RBC # BLD AUTO: 3.98 X10E6/UL (ref 3.77–5.28)
RBC # BLD AUTO: 4.03 X10E6/UL (ref 3.77–5.28)
SL AMB EGFR AFRICAN AMERICAN: 72 ML/MIN/1.73
SL AMB EGFR AFRICAN AMERICAN: 77 ML/MIN/1.73
SL AMB EGFR NON AFRICAN AMERICAN: 62 ML/MIN/1.73
SL AMB EGFR NON AFRICAN AMERICAN: 67 ML/MIN/1.73
SODIUM SERPL-SCNC: 138 MMOL/L (ref 134–144)
SODIUM SERPL-SCNC: 138 MMOL/L (ref 134–144)
WBC # BLD AUTO: 6.5 X10E3/UL (ref 3.4–10.8)
WBC # BLD AUTO: 6.6 X10E3/UL (ref 3.4–10.8)

## 2021-04-03 NOTE — RESULT ENCOUNTER NOTE
I called her with her results and left a voicemail to call me back  She has known thrombocytopenia and mild elevation of liver enzymes (slightly higher than before)   Potassium and creatinine are normal

## 2021-04-07 DIAGNOSIS — R05.9 COUGH: ICD-10-CM

## 2021-04-07 RX ORDER — BENZONATATE 100 MG/1
CAPSULE ORAL
Qty: 60 CAPSULE | Refills: 2 | Status: SHIPPED | OUTPATIENT
Start: 2021-04-07 | End: 2021-05-07

## 2021-04-08 ENCOUNTER — OFFICE VISIT (OUTPATIENT)
Dept: GASTROENTEROLOGY | Facility: CLINIC | Age: 61
End: 2021-04-08
Payer: COMMERCIAL

## 2021-04-08 VITALS
HEART RATE: 81 BPM | SYSTOLIC BLOOD PRESSURE: 120 MMHG | HEIGHT: 64 IN | BODY MASS INDEX: 27.79 KG/M2 | WEIGHT: 162.8 LBS | DIASTOLIC BLOOD PRESSURE: 70 MMHG | TEMPERATURE: 98.7 F

## 2021-04-08 DIAGNOSIS — K21.9 GERD WITHOUT ESOPHAGITIS: ICD-10-CM

## 2021-04-08 DIAGNOSIS — K70.31 ALCOHOLIC CIRRHOSIS OF LIVER WITH ASCITES (HCC): Primary | Chronic | ICD-10-CM

## 2021-04-08 DIAGNOSIS — K58.0 IRRITABLE BOWEL SYNDROME WITH DIARRHEA: ICD-10-CM

## 2021-04-08 PROCEDURE — 99214 OFFICE O/P EST MOD 30 MIN: CPT | Performed by: INTERNAL MEDICINE

## 2021-04-08 NOTE — PROGRESS NOTES
[facility] - Outpatient Follow-up Note  Janki Neal 63 y o  female MRN: [id number]  Encounter: [id number]          ASSESSMENT AND PLAN:      1  Alcoholic cirrhosis of liver with ascites ([facility] [address] )    I strongly encouraged her to avoid  All alcohol  She said she drank some alcohol last weekend but is planning to quit all alcohol again  I will recheck her labs in approximately three months and continue her current diuretic dosing at Lasix 40 mg daily and spironolactone 50 mg daily  If she continues to have improvement in her ascites based on exam and weight, then I may decrease her dosing at that time  - Comprehensive metabolic panel; Future  - Comprehensive metabolic panel    2  GERD without esophagitis    I encouraged her to continue diet lifestyle modification for her reflux  She can take omeprazole if her symptoms worsen  3  Irritable bowel syndrome with diarrhea    Her diarrhea predominant irritable bowel syndrome has been well controlled  She can take Imodium as needed  ______________________________________________________________________    SUBJECTIVE:   She presents for follow-up of her alcoholic cirrhosis complicated by ascites  She  Denies any confusion or bleeding  She feels her ascites has been stable and denies any abdominal pain  Her recent labs showed a normal creatinine and potassium but mild elevation of her liver enzymes  She denies any diarrhea, constipation, bleeding, or weight loss and her reflux has been well controlled with diet and lifestyle modifications  REVIEW OF SYSTEMS IS OTHERWISE NEGATIVE        Historical Information   Past Medical History:   Diagnosis Date    Alcoholic fatty liver     Anxiety     Asthma     COPD (chronic obstructive pulmonary disease) (HCC)     Elevated liver function tests     GERD (gastroesophageal reflux disease)     GERD without esophagitis     Hyperlipidemia     Hypertension     IBS (irritable bowel syndrome)     Insomnia     Insomnia     Liver disease     Nervousness     Nicotine dependence     Other specified urinary incontinence     RLS (restless legs syndrome)      Past Surgical History:   Procedure Laterality Date    BACK SURGERY      BREAST EXCISIONAL BIOPSY Right 11/01/2004    benign    CATARACT EXTRACTION, BILATERAL  08/01/2018    COLONOSCOPY N/A 5/8/2019    Procedure: COLONOSCOPY;  Surgeon: Karo Mendoza MD;  Location: [facility]; Service: Gastroenterology    EGD AND COLONOSCOPY N/A 3/19/2018    Procedure: EGD AND COLONOSCOPY;  Surgeon: Karo Mendoza MD;  Location: [facility]; Service: Gastroenterology    ESOPHAGOGASTRODUODENOSCOPY N/A 5/8/2019    Procedure: ESOPHAGOGASTRODUODENOSCOPY (EGD); Surgeon: Karo Mendoza MD;  Location: [facility]; Service: Gastroenterology    IR PARACENTESIS  11/27/2020    IR PARACENTESIS  12/8/2020    KNEE SURGERY      KNEE SURGERY      LUMBAR LAMINECTOMY  04/1999    LYMPH NODE BIOPSY      TOTAL ABDOMINAL HYSTERECTOMY  02/05/2008    TUBAL LIGATION      TUBAL LIGATION  1989    UPPER GASTROINTESTINAL ENDOSCOPY       Social History   Social History     Substance and Sexual Activity   Alcohol Use Not Currently    Alcohol/week: 24 0 standard drinks    Types: 24 Cans of beer per week    Frequency: 4 or more times a week    Drinks per session: 5 or 6    Binge frequency: Daily or almost daily    Comment: Daily; just cut back from 6 beers/day   Per allscripts -  3 beers a night     Social History     Substance and Sexual Activity   Drug Use No     Social History     Tobacco Use   Smoking Status Current Every Day Smoker    Packs/day: 0 50    Years: 45 00    Pack years: 22 50    Types: Cigarettes    Start date: 1978   Smokeless Tobacco Never Used   Tobacco Comment    Per allscripts - 1/2 PPD cigs - smoking for 40 years     Family History   Problem Relation Age of Onset    Breast cancer Mother 28    No Known Problems Father     No Known Problems Sister     No Known Problems Brother     No Known Problems Son     No Known Problems Maternal Grandmother     No Known Problems Maternal Grandfather     No Known Problems Paternal Grandmother     No Known Problems Paternal Grandfather     No Known Problems Maternal Aunt     No Known Problems Maternal Aunt     No Known Problems Paternal Aunt     Substance Abuse Neg Hx     Mental illness Neg Hx        Meds/Allergies       Current Outpatient Medications:     ALPRAZolam (XANAX) 1 mg tablet    benzonatate (TESSALON PERLES) 100 mg capsule    furosemide (LASIX) 40 mg tablet    ibuprofen (MOTRIN) 600 mg tablet    spironolactone (ALDACTONE) 50 mg tablet    albuterol (PROVENTIL HFA) 90 mcg/act inhaler    lidocaine (LIDODERM) 5 %    methocarbamol (ROBAXIN) 500 mg tablet    omeprazole (PriLOSEC) 40 MG capsule    predniSONE 10 mg tablet    TREMFYA subcutaneous injection    umeclidinium-vilanterol (ANORO ELLIPTA) 62 5-25 MCG/INH inhaler    Allergies   Allergen Reactions    Ace Inhibitors Cough           Objective     Blood pressure 120/70, pulse 81, temperature 98 7 °F (37 1 °C), temperature source Tympanic, height 5' 4" (1 626 m), weight 73 8 kg (162 lb 12 8 oz), not currently breastfeeding  Body mass index is 27 94 kg/m²  PHYSICAL EXAM:      General Appearance:   Alert, cooperative, no distress, no asterixis   HEENT:   Normocephalic, atraumatic, anicteric      Neck:  Supple, symmetrical, trachea midline   Lungs:   Clear to auscultation bilaterally; no rales, rhonchi or wheezing; respirations unlabored    Heart[de-identified]   Regular rate and rhythm; no murmur, rub, or gallop     Abdomen:   Soft, mild to moderate ascites, non-distended; normal bowel sounds; no masses, no organomegaly    Genitalia:   Deferred    Rectal:   Deferred    Extremities:  No cyanosis, clubbing or edema    Pulses:  2+ and symmetric    Skin:  No jaundice, rashes, or lesions    Lymph nodes:  No palpable cervical lymphadenopathy  Lab Results:   No visits with results within 1 Day(s) from this visit  Latest known visit with results is:   Orders Only on 04/01/2021   Component Date Value    Glucose, Random 04/01/2021 101*    BUN 04/01/2021 23     Creatinine 04/01/2021 0 93     eGFR Non  04/01/2021 67     eGFR  04/01/2021 77     SL AMB BUN/CREATININE RA* 04/01/2021 25     Sodium 04/01/2021 138     Potassium 04/01/2021 4 5     Chloride 04/01/2021 104     CO2 04/01/2021 21     CALCIUM 04/01/2021 9 1     Protein, Total 04/01/2021 8 0     Albumin 04/01/2021 3 2*    Globulin, Total 04/01/2021 4 8*    Albumin/Globulin Ratio 04/01/2021 0 7*    TOTAL BILIRUBIN 04/01/2021 1 3*    Alk Phos Isoenzymes 04/01/2021 168*    AST 04/01/2021 50*    ALT 04/01/2021 29     White Blood Cell Count 04/01/2021 6 5     Red Blood Cell Count 04/01/2021 4 03     Hemoglobin 04/01/2021 13 4     HCT 04/01/2021 37 6     MCV 04/01/2021 93     MCH 04/01/2021 33 3*    MCHC 04/01/2021 35 6     RDW 04/01/2021 13 2     Platelet Count 89/80/2095 95*    Hematology Comments 04/01/2021 Note:          Radiology Results:   No results found

## 2021-04-23 ENCOUNTER — OFFICE VISIT (OUTPATIENT)
Dept: NEPHROLOGY | Facility: HOSPITAL | Age: 61
End: 2021-04-23
Payer: COMMERCIAL

## 2021-04-23 VITALS
HEIGHT: 64 IN | BODY MASS INDEX: 27.14 KG/M2 | SYSTOLIC BLOOD PRESSURE: 116 MMHG | DIASTOLIC BLOOD PRESSURE: 72 MMHG | RESPIRATION RATE: 16 BRPM | WEIGHT: 159 LBS | HEART RATE: 80 BPM

## 2021-04-23 DIAGNOSIS — N17.9 ACUTE KIDNEY INJURY SUPERIMPOSED ON CHRONIC KIDNEY DISEASE (HCC): Primary | ICD-10-CM

## 2021-04-23 DIAGNOSIS — R60.0 LOWER EXTREMITY EDEMA: ICD-10-CM

## 2021-04-23 DIAGNOSIS — N18.9 ACUTE KIDNEY INJURY SUPERIMPOSED ON CHRONIC KIDNEY DISEASE (HCC): Primary | ICD-10-CM

## 2021-04-23 DIAGNOSIS — I10 ESSENTIAL HYPERTENSION: ICD-10-CM

## 2021-04-23 PROBLEM — D64.9 ANEMIA: Status: RESOLVED | Noted: 2020-12-07 | Resolved: 2021-04-23

## 2021-04-23 PROCEDURE — 99213 OFFICE O/P EST LOW 20 MIN: CPT | Performed by: INTERNAL MEDICINE

## 2021-04-23 PROCEDURE — 4004F PT TOBACCO SCREEN RCVD TLK: CPT | Performed by: INTERNAL MEDICINE

## 2021-04-23 NOTE — PROGRESS NOTES
NEPHROLOGY OUTPATIENT PROGRESS NOTE   Donnie Cali 63 y o  female MRN: [id number]  DATE: 4/23/2021  Reason for visit:   Chief Complaint   Patient presents with    Chronic Kidney Disease    Follow-up        Patient Instructions   1  CORY on CKD - CORY resolved    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 hypertensive nephrosclerosis +/- alcoholic cirrhosis   -would avoid ibuprofen as this can lead to worsened kidney function  -on furosemide 40mg daily, spironolactone 50mg daily    2  HTN - BP well controlled in office on diuretics as above    3  Hyponatremia - sNa normal on diuretics as above, last sNa 138 as of 4/1/21    4  Anemia - resolved on last CBC performed Feb 2021    5  Hx cirrhosis with ascites - on diuretics as above, monitor daily weight, s/p 4L paracentesis in Dec  2020    6  LE edema - in part due to hypoalbuminemia in setting of cirrhosis  Avoid high salt/sodium diet  Consider compression stockings  Continue diuretics as above  Monitor daily weight  Call GI office if weight increases 3-5lbs in a short period of time  RTC in 6 months  Obtain blood and urine testing on Oct  2020  Mamta Hernández was seen today for chronic kidney disease and follow-up  Diagnoses and all orders for this visit:    Acute kidney injury superimposed on chronic kidney disease ([facility] [address] )  -     Comprehensive metabolic panel; Future  -     Urinalysis with microscopic; Future  -     Protein / creatinine ratio, urine; Future  -     Comprehensive metabolic panel  -     Urinalysis with microscopic  -     Protein / creatinine ratio, urine    Essential hypertension    Lower extremity edema        Assessment/Plan:  1  CORY on CKD - CORY resolved    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 hypertensive nephrosclerosis +/- alcoholic cirrhosis   -would avoid ibuprofen as this can lead to worsened kidney function  -on furosemide 40mg daily, spironolactone 50mg daily    2  HTN - BP well controlled in office on diuretics as above    3   Hyponatremia - sNa normal on diuretics as above, last sNa 138 as of 4/1/21    4  Anemia - resolved on last CBC performed Feb 2021    5  Hx cirrhosis with ascites - on diuretics as above, monitor daily weight, s/p 4L paracentesis in Dec  2020    6  LE edema - in part due to hypoalbuminemia in setting of cirrhosis  Avoid high salt/sodium diet  Consider compression stockings  Continue diuretics as above  Monitor daily weight  Call GI office if weight increases 3-5lbs in a short period of time  SUBJECTIVE / INTERVAL HISTORY:  63 y o  female presents in follow up of CORY [address] denies any recent illness/hospitalizations/medication changes since last office visit  Denies regular NSAID use  HTN - does not check BP at home  Sometimes orthostatic  Denies recent falls  Denies LE edema  Does have some abdominal distension  Review of Systems   Constitutional: Negative for chills and fever  HENT: Negative for sore throat and trouble swallowing  Eyes: Negative for visual disturbance  Respiratory: Positive for cough ("chronic, smokes")  Negative for shortness of breath  Cardiovascular: Negative for chest pain and leg swelling  Gastrointestinal: Positive for constipation  Negative for abdominal pain, diarrhea, nausea and vomiting  Endocrine: Negative for polyuria  Genitourinary: Negative for difficulty urinating, dysuria and hematuria  Musculoskeletal: Negative for back pain and neck pain  Skin: Negative for rash  Itches over right breast   Neurological: Negative for dizziness, light-headedness and numbness  Psychiatric/Behavioral: The patient is not nervous/anxious  OBJECTIVE:  /72 (BP Location: Left arm, Patient Position: Sitting, Cuff Size: Standard)   Pulse 80   Resp 16   Ht 5' 4" (1 626 m)   Wt 72 1 kg (159 lb)   BMI 27 29 kg/m²  Body mass index is 27 29 kg/m²  Physical exam:  Physical Exam  Vitals signs and nursing note reviewed     Constitutional:       General: She is not in acute distress  Appearance: Normal appearance  She is well-developed  She is not diaphoretic  HENT:      Head: Normocephalic and atraumatic  Nose: Nose normal       Mouth/Throat:      Mouth: Mucous membranes are moist       Pharynx: No oropharyngeal exudate  Eyes:      General: No scleral icterus  Right eye: No discharge  Left eye: No discharge  Neck:      Musculoskeletal: Normal range of motion and neck supple  Thyroid: No thyromegaly  Cardiovascular:      Rate and Rhythm: Normal rate and regular rhythm  Heart sounds: No murmur  Pulmonary:      Effort: Pulmonary effort is normal  No respiratory distress  Breath sounds: Normal breath sounds  No wheezing  Abdominal:      General: Bowel sounds are normal  There is no distension  Palpations: Abdomen is soft  Musculoskeletal: Normal range of motion  General: Swelling (trace LE ankles) present  Skin:     General: Skin is warm and dry  Findings: No rash  Neurological:      General: No focal deficit present  Mental Status: She is alert  Motor: No abnormal muscle tone        Comments: awake   Psychiatric:         Mood and Affect: Mood normal          Behavior: Behavior normal          Medications:    Current Outpatient Medications:     albuterol (PROVENTIL HFA) 90 mcg/act inhaler, Inhale 2 puffs every 6 (six) hours as needed for wheezing or shortness of breath, Disp: 1 Inhaler, Rfl: 6    ALPRAZolam (XANAX) 1 mg tablet, TAKE ONE TABLET TWO TIMES A DAY AS NEEDED FOR ANXIETY, Disp: 60 tablet, Rfl: 5    furosemide (LASIX) 40 mg tablet, Take 1 tablet (40 mg total) by mouth daily, Disp: 30 tablet, Rfl: 5    ibuprofen (MOTRIN) 600 mg tablet, Take 1 tablet (600 mg total) by mouth every 6 (six) hours as needed for mild pain, Disp: 20 tablet, Rfl: 0    spironolactone (ALDACTONE) 50 mg tablet, Take 1 tablet (50 mg total) by mouth daily, Disp: 30 tablet, Rfl: 5    umeclidinium-vilanterol (ANORO ELLIPTA) 62 5-25 MCG/INH inhaler, Inhale 1 puff daily, Disp: 1 each, Rfl: 6    benzonatate (TESSALON PERLES) 100 mg capsule, TAKE 1 CAPSULE BY MOUTH 2 TIMES A DAY AS NEEDED FOR COUGH (Patient not taking: Reported on 4/23/2021), Disp: 60 capsule, Rfl: 2    lidocaine (LIDODERM) 5 %, Apply 1 patch topically every 12 (twelve) hours Remove & Discard patch within 12 hours or as directed by MD (Patient not taking: Reported on 4/8/2021), Disp: 15 patch, Rfl: 0    methocarbamol (ROBAXIN) 500 mg tablet, Take 1 tablet (500 mg total) by mouth 2 (two) times a day (Patient not taking: Reported on 4/8/2021), Disp: 20 tablet, Rfl: 0    omeprazole (PriLOSEC) 40 MG capsule, TAKE ONE CAPSULE BY MOUTH EVERY DAY (Patient not taking: Reported on 4/8/2021), Disp: 30 capsule, Rfl: 5    predniSONE 10 mg tablet, Take 4 tabs on day 1,2 with food, 3 tabs on day 3,4 with food, 2 tabs on day 5,6 with food, 1 tab on day 7,8 with food (Patient not taking: Reported on 4/8/2021), Disp: 20 tablet, Rfl: 0    TREMFYA subcutaneous injection, , Disp: , Rfl:     Allergies:   Allergies as of 04/23/2021 - Reviewed 04/23/2021   Allergen Reaction Noted    Ace inhibitors Cough 08/29/2014       The following portions of the patient's history were reviewed and updated as appropriate: past family history, past surgical history and problem list     Laboratory Results:  Lab Results   Component Value Date    SODIUM 138 04/01/2021    SODIUM 138 04/01/2021    K 4 5 04/01/2021    K 4 5 04/01/2021     04/01/2021     04/01/2021    CO2 20 04/01/2021    CO2 21 04/01/2021    BUN 23 04/01/2021    BUN 23 04/01/2021    CREATININE 0 98 04/01/2021    CREATININE 0 93 04/01/2021    GLUC 98 04/01/2021    GLUC 101 (H) 04/01/2021    CALCIUM 8 9 02/19/2021        Lab Results   Component Value Date    CALCIUM 8 9 02/19/2021    PHOS 3 5 12/10/2020       Portions of the record may have been created with voice recognition software   Occasional wrong word or "sound a like" substitutions may have occurred due to the inherent limitations of voice recognition software   Read the chart carefully and recognize, using context, where substitutions have occurred

## 2021-04-23 NOTE — PATIENT INSTRUCTIONS
1  CORY on CKD - CORY resolved    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 hypertensive nephrosclerosis +/- alcoholic cirrhosis   -would avoid ibuprofen as this can lead to worsened kidney function  -on furosemide 40mg daily, spironolactone 50mg daily    2  HTN - BP well controlled in office on diuretics as above    3  Hyponatremia - sNa normal on diuretics as above, last sNa 138 as of 4/1/21    4  Anemia - resolved on last CBC performed Feb 2021    5  Hx cirrhosis with ascites - on diuretics as above, monitor daily weight, s/p 4L paracentesis in Dec  2020    6  LE edema - in part due to hypoalbuminemia in setting of cirrhosis  Avoid high salt/sodium diet  Consider compression stockings  Continue diuretics as above  Monitor daily weight  Call GI office if weight increases 3-5lbs in a short period of time  RTC in 6 months  Obtain blood and urine testing on Oct  2020

## 2021-05-03 ENCOUNTER — TELEPHONE (OUTPATIENT)
Dept: FAMILY MEDICINE CLINIC | Facility: CLINIC | Age: 61
End: 2021-05-03

## 2021-05-03 ENCOUNTER — OFFICE VISIT (OUTPATIENT)
Dept: FAMILY MEDICINE CLINIC | Facility: CLINIC | Age: 61
End: 2021-05-03
Payer: COMMERCIAL

## 2021-05-03 VITALS
WEIGHT: 159.4 LBS | HEIGHT: 64 IN | SYSTOLIC BLOOD PRESSURE: 120 MMHG | HEART RATE: 72 BPM | BODY MASS INDEX: 27.21 KG/M2 | RESPIRATION RATE: 16 BRPM | DIASTOLIC BLOOD PRESSURE: 70 MMHG

## 2021-05-03 DIAGNOSIS — I10 ESSENTIAL HYPERTENSION: ICD-10-CM

## 2021-05-03 DIAGNOSIS — F41.9 ANXIETY: ICD-10-CM

## 2021-05-03 DIAGNOSIS — K70.31 ALCOHOLIC CIRRHOSIS OF LIVER WITH ASCITES (HCC): Chronic | ICD-10-CM

## 2021-05-03 DIAGNOSIS — R79.89 ABNORMAL TSH: Primary | ICD-10-CM

## 2021-05-03 DIAGNOSIS — J44.9 MODERATE COPD (CHRONIC OBSTRUCTIVE PULMONARY DISEASE) (HCC): ICD-10-CM

## 2021-05-03 DIAGNOSIS — E78.49 OTHER HYPERLIPIDEMIA: ICD-10-CM

## 2021-05-03 DIAGNOSIS — F10.10 ALCOHOL ABUSE: ICD-10-CM

## 2021-05-03 DIAGNOSIS — L30.9 DERMATITIS: ICD-10-CM

## 2021-05-03 DIAGNOSIS — R07.81 RIB PAIN: ICD-10-CM

## 2021-05-03 PROCEDURE — 99214 OFFICE O/P EST MOD 30 MIN: CPT | Performed by: PHYSICIAN ASSISTANT

## 2021-05-03 PROCEDURE — 3008F BODY MASS INDEX DOCD: CPT | Performed by: INTERNAL MEDICINE

## 2021-05-03 RX ORDER — MOMETASONE FUROATE 1 MG/G
CREAM TOPICAL DAILY
Qty: 45 G | Refills: 0 | Status: SHIPPED | OUTPATIENT
Start: 2021-05-03 | End: 2021-10-08 | Stop reason: ALTCHOICE

## 2021-05-03 RX ORDER — PREDNISOLONE ACETATE 10 MG/ML
SUSPENSION/ DROPS OPHTHALMIC
COMMUNITY
Start: 2021-04-20 | End: 2021-10-08 | Stop reason: ALTCHOICE

## 2021-05-03 NOTE — TELEPHONE ENCOUNTER
Pt called regarding her scripts from today's visit  She said you gave her an order for a DEXA scan, but she already had that done  She was told she is due for a lung scan, and that is the order that she needs  Can you put that in and cancel the DEXA?

## 2021-05-03 NOTE — PROGRESS NOTES
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)    - on anora ellipta, rare use albuterol    7  Essential hypertension    - controlled with diet     8  Anxiety    - takes xanax prn    9  Alcohol abuse    - drinking again, again discussed abstaining    F/u 6 months with labs or sooner if needed    Subjective:   Chief Complaint   Patient presents with    COPD    Hyperlipidemia    Hypertension      Patient ID: Ashia Paz is a 63 y o  female  Patient here in follow up  No complaints  Continues to see GI  Has started drinking again "only a few beers, can control it"  Saw nephro for CKD, discussed fluid restrictions  Will continue to follow with them  Continues to smoke  Anxiety controlled with xanx as needed        The following portions of the patient's history were reviewed and updated as appropriate: allergies, current medications, past family history, past medical history, past social history, past surgical history and problem list     Past Medical History:   Diagnosis Date    Alcoholic fatty liver     Anxiety     Asthma     COPD (chronic obstructive pulmonary disease) ([facility] )     Elevated liver function tests     GERD (gastroesophageal reflux disease)     GERD without esophagitis     Hyperlipidemia     Hypertension     IBS (irritable bowel syndrome)     Insomnia     Insomnia     Liver disease     Nervousness     Nicotine dependence     Other specified urinary incontinence     RLS (restless legs syndrome)      Past Surgical History:   Procedure Laterality Date    BACK SURGERY      BREAST EXCISIONAL BIOPSY Right 11/01/2004    benign    CATARACT EXTRACTION, BILATERAL  08/01/2018    COLONOSCOPY N/A 5/8/2019    Procedure: COLONOSCOPY;  Surgeon: Robb Campos MD;  Location: [facility] GI LAB; Service: Gastroenterology    EGD AND COLONOSCOPY N/A 3/19/2018    Procedure: EGD AND COLONOSCOPY;  Surgeon: Robb Campos MD;  Location: [facility] GI LAB; Service: Gastroenterology    ESOPHAGOGASTRODUODENOSCOPY N/A 5/8/2019    Procedure: ESOPHAGOGASTRODUODENOSCOPY (EGD); Surgeon: Robb Campos MD;  Location: [facility] GI LAB;   Service: Gastroenterology    IR PARACENTESIS  11/27/2020    IR PARACENTESIS  12/8/2020    KNEE SURGERY      KNEE SURGERY      LUMBAR LAMINECTOMY  04/1999    LYMPH NODE BIOPSY      TOTAL ABDOMINAL HYSTERECTOMY  02/05/2008    TUBAL LIGATION      TUBAL LIGATION  1989    UPPER GASTROINTESTINAL ENDOSCOPY       Family History   Problem Relation Age of Onset    Breast cancer Mother 28    No Known Problems Father     No Known Problems Sister     No Known Problems Brother     No Known Problems Son     No Known Problems Maternal Grandmother     No Known Problems Maternal Grandfather     No Known Problems Paternal Grandmother     No Known Problems Paternal Grandfather     No Known Problems Maternal Aunt     No Known Problems Maternal Aunt     No Known Problems Paternal Aunt     Substance Abuse Neg Hx     Mental illness Neg Hx      Social History     Socioeconomic History    Marital status: /Civil Union     Spouse name: Not on file    Number of children: Not on file    Years of education: Not on file    Highest education level: Not on file   Occupational History    Not on file   Social Needs    Financial resource strain: Not on file    Food insecurity     Worry: Not on file     Inability: Not on file    Transportation needs     Medical: Not on file     Non-medical: Not on file   Tobacco Use    Smoking status: Current Every Day Smoker     Packs/day: 0 50     Years: 45 00     Pack years: 22 50     Types: Cigarettes     Start date: 1978    Smokeless tobacco: Never Used    Tobacco comment: Per allscripts - 1/2 PPD cigs - smoking for 40 years   Substance and Sexual Activity    Alcohol use: Not Currently     Alcohol/week: 24 0 standard drinks     Types: 24 Cans of beer per week     Frequency: 4 or more times a week     Drinks per session: 5 or 6     Binge frequency: Daily or almost daily     Comment: Daily; just cut back from 6 beers/day   Per allscripts -  3 beers a night    Drug use: No    Sexual activity: Yes     Partners: Male   Lifestyle    Physical activity     Days per week: Not on file     Minutes per session: Not on file    Stress: Not on file   Relationships    Social connections     Talks on phone: Not on file     Gets together: Not on file     Attends Yazdanism service: Not on file     Active member of club or organization: Not on file     Attends meetings of clubs or organizations: Not on file     Relationship status: Not on file    Intimate partner violence     Fear of current or ex partner: Not on file     Emotionally abused: Not on file     Physically abused: Not on file     Forced sexual activity: Not on file   Other Topics Concern    Not on file   Social History Narrative    Has carbon monoxide detectors in home    Has smoke detectors    Uses safety equipment - seatbelts       Current Outpatient Medications:     albuterol (PROVENTIL HFA) 90 mcg/act inhaler, Inhale 2 puffs every 6 (six) hours as needed for wheezing or shortness of breath, Disp: 1 Inhaler, Rfl: 6    ALPRAZolam (XANAX) 1 mg tablet, TAKE ONE TABLET TWO TIMES A DAY AS NEEDED FOR ANXIETY, Disp: 60 tablet, Rfl: 5    furosemide (LASIX) 40 mg tablet, Take 1 tablet (40 mg total) by mouth daily, Disp: 30 tablet, Rfl: 5    ibuprofen (MOTRIN) 600 mg tablet, Take 1 tablet (600 mg total) by mouth every 6 (six) hours as needed for mild pain, Disp: 20 tablet, Rfl: 0    lidocaine (LIDODERM) 5 %, Apply 1 patch topically every 12 (twelve) hours Remove & Discard patch within 12 hours or as directed by MD, Disp: 15 patch, Rfl: 0    methocarbamol (ROBAXIN) 500 mg tablet, Take 1 tablet (500 mg total) by mouth 2 (two) times a day, Disp: 20 tablet, Rfl: 0    omeprazole (PriLOSEC) 40 MG capsule, TAKE ONE CAPSULE BY MOUTH EVERY DAY, Disp: 30 capsule, Rfl: 5    prednisoLONE acetate (PRED FORTE) 1 % ophthalmic suspension, PLACE 1 DROP IN LEFT EYE 4 TIMES DAILY FOR 3 DAYS STARTING AFTER LASER PROCEDURE, Disp: , Rfl:     predniSONE 10 mg tablet, Take 4 tabs on day 1,2 with food, 3 tabs on day 3,4 with food, 2 tabs on day 5,6 with food, 1 tab on day 7,8 with food, Disp: 20 tablet, Rfl: 0    spironolactone (ALDACTONE) 50 mg tablet, Take 1 tablet (50 mg total) by mouth daily, Disp: 30 tablet, Rfl: 5    TREMFYA subcutaneous injection, , Disp: , Rfl:     umeclidinium-vilanterol (ANORO ELLIPTA) 62 5-25 MCG/INH inhaler, Inhale 1 puff daily, Disp: 1 each, Rfl: 6    benzonatate (TESSALON PERLES) 100 mg capsule, TAKE 1 CAPSULE BY MOUTH 2 TIMES A DAY AS NEEDED FOR COUGH (Patient not taking: Reported on 5/3/2021), Disp: 60 capsule, Rfl: 2    Review of Systems   Constitutional: Negative for chills and fever  HENT: Negative for ear pain and sore throat  Eyes: Negative for pain and visual disturbance  Respiratory: Negative for cough and shortness of breath  Cardiovascular: Negative for chest pain and palpitations  Gastrointestinal: Negative for abdominal pain and vomiting  Genitourinary: Negative for dysuria and hematuria  Musculoskeletal: Negative for arthralgias and back pain  Skin: Negative for color change and rash  Neurological: Negative for seizures and syncope  All other systems reviewed and are negative  Objective:    Vitals:    05/03/21 1134   BP: 120/70   BP Location: Left arm   Patient Position: Sitting   Cuff Size: Standard   Pulse: 72   Resp: 16   Weight: 72 3 kg (159 lb 6 4 oz)   Height: 5' 4 25" (1 632 m)        Physical Exam  Constitutional:       Appearance: Normal appearance  She is well-developed  HENT:      Head: Normocephalic and atraumatic  Cardiovascular:      Rate and Rhythm: Normal rate and regular rhythm  Heart sounds: Normal heart sounds  Pulmonary:      Effort: Pulmonary effort is normal       Breath sounds: Normal breath sounds  Skin:     General: Skin is warm  Neurological:      General: No focal deficit present  Mental Status: She is alert and oriented to person, place, and time  Psychiatric:         Mood and Affect: Mood normal          Behavior: Behavior normal          Thought Content:  Thought content normal          Judgment: Judgment normal

## 2021-05-03 NOTE — TELEPHONE ENCOUNTER
DEXA, Mammo, Lung Ct are all ordered, all she has to do is call and schedule  I believe both mammo and dexa are scheduled 5/11  So she just needs to call and schedule Lung CT

## 2021-05-07 PROBLEM — N17.9 ACUTE KIDNEY INJURY SUPERIMPOSED ON CHRONIC KIDNEY DISEASE (HCC): Status: RESOLVED | Noted: 2020-12-09 | Resolved: 2021-05-07

## 2021-05-07 PROBLEM — R60.0 LOWER EXTREMITY EDEMA: Status: RESOLVED | Noted: 2020-10-08 | Resolved: 2021-05-07

## 2021-05-07 PROBLEM — K62.5 HEMORRHAGE OF ANUS AND RECTUM: Status: RESOLVED | Noted: 2018-02-09 | Resolved: 2021-05-07

## 2021-05-07 PROBLEM — N18.9 ACUTE KIDNEY INJURY SUPERIMPOSED ON CHRONIC KIDNEY DISEASE (HCC): Status: RESOLVED | Noted: 2020-12-09 | Resolved: 2021-05-07

## 2021-05-07 PROBLEM — R06.03 RESPIRATORY DISTRESS FOLLOWING TRAUMA: Status: RESOLVED | Noted: 2021-02-19 | Resolved: 2021-05-07

## 2021-05-07 PROBLEM — R50.9 FEVER: Status: RESOLVED | Noted: 2020-12-07 | Resolved: 2021-05-07

## 2021-05-07 PROBLEM — R05.9 COUGH: Status: RESOLVED | Noted: 2020-10-08 | Resolved: 2021-05-07

## 2021-05-07 PROBLEM — E87.1 HYPONATREMIA: Status: RESOLVED | Noted: 2020-12-05 | Resolved: 2021-05-07

## 2021-05-07 PROBLEM — R07.81 RIB PAIN ON LEFT SIDE: Status: RESOLVED | Noted: 2021-02-19 | Resolved: 2021-05-07

## 2021-05-07 PROBLEM — J90 PLEURAL EFFUSION, BILATERAL: Status: RESOLVED | Noted: 2021-02-19 | Resolved: 2021-05-07

## 2021-05-11 ENCOUNTER — HOSPITAL ENCOUNTER (OUTPATIENT)
Dept: BONE DENSITY | Facility: IMAGING CENTER | Age: 61
Discharge: HOME | End: 2021-05-11
Payer: COMMERCIAL

## 2021-05-11 ENCOUNTER — HOSPITAL ENCOUNTER (OUTPATIENT)
Dept: MAMMOGRAPHY | Facility: IMAGING CENTER | Age: 61
Discharge: HOME | End: 2021-05-11
Payer: COMMERCIAL

## 2021-05-11 VITALS — BODY MASS INDEX: 28.42 KG/M2 | WEIGHT: 160.38 LBS | HEIGHT: 63 IN

## 2021-05-11 DIAGNOSIS — Z78.0 MENOPAUSE: ICD-10-CM

## 2021-05-11 DIAGNOSIS — Z12.31 ENCOUNTER FOR SCREENING MAMMOGRAM FOR MALIGNANT NEOPLASM OF BREAST: ICD-10-CM

## 2021-05-11 PROCEDURE — 77063 BREAST TOMOSYNTHESIS BI: CPT

## 2021-05-11 PROCEDURE — 77067 SCR MAMMO BI INCL CAD: CPT

## 2021-05-11 PROCEDURE — 77080 DXA BONE DENSITY AXIAL: CPT

## 2021-05-12 ENCOUNTER — OFFICE VISIT (OUTPATIENT)
Dept: PULMONOLOGY | Facility: HOSPITAL | Age: 61
End: 2021-05-12
Payer: COMMERCIAL

## 2021-05-12 VITALS
DIASTOLIC BLOOD PRESSURE: 72 MMHG | TEMPERATURE: 98.5 F | SYSTOLIC BLOOD PRESSURE: 128 MMHG | WEIGHT: 161.2 LBS | HEART RATE: 93 BPM | OXYGEN SATURATION: 94 % | HEIGHT: 63 IN | BODY MASS INDEX: 28.56 KG/M2

## 2021-05-12 DIAGNOSIS — J44.9 MODERATE COPD (CHRONIC OBSTRUCTIVE PULMONARY DISEASE) (HCC): Primary | ICD-10-CM

## 2021-05-12 DIAGNOSIS — F17.200 NICOTINE DEPENDENCE, UNCOMPLICATED, UNSPECIFIED NICOTINE PRODUCT TYPE: ICD-10-CM

## 2021-05-12 PROCEDURE — 99214 OFFICE O/P EST MOD 30 MIN: CPT | Performed by: INTERNAL MEDICINE

## 2021-05-12 RX ORDER — BENZONATATE 100 MG/1
100 CAPSULE ORAL 3 TIMES DAILY PRN
Qty: 60 CAPSULE | Refills: 2 | Status: SHIPPED | OUTPATIENT
Start: 2021-05-12 | End: 2021-10-05

## 2021-05-12 NOTE — ASSESSMENT & PLAN NOTE
We discussed the importance of smoking cessation  I recommended that she do smoking cessation Clinic, but she states that she is unable to use her phone for video visit  At this time she does not appear interested in stopping

## 2021-05-12 NOTE — PROGRESS NOTES
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)    Overall the patient  Is doing well  She clearly would benefit most from smoking cessation  She is agreeable to enrolling in pulmonary rehabilitation  I have asked her to start using Stiolto 2 sprays once daily in the morning (  I demonstrated use in the office and provided 2 samples)  She can use albuterol p r n  Bowen Manifold She is up-to-date on influenza, pneumonia and COVID vaccines  she will be due for lung cancer screening CT February 2022    Nicotine dependence   We discussed the importance of smoking cessation  I recommended that she do smoking cessation Clinic, but she states that she is unable to use her phone for video visit  At this time she does not appear interested in stopping  Plan:    Diagnoses and all orders for this visit:    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)  -     benzonatate (TESSALON PERLES) 100 mg capsule; Take 1 capsule (100 mg total) by mouth 3 (three) times a day as needed for cough  -     tiotropium-olodaterol (STIOLTO RESPIMAT) 2 5-2 5 MCG/ACT inhaler; Inhale 2 puffs daily  -     Ambulatory Referral to Pulmonary Rehabilitation; Future    Nicotine dependence, uncomplicated, unspecified nicotine product type        Follow-up:   4 months      HPI:  She was hospitalized in Dec 2020 for decompensated liver cirrhosis  She had 2 paracentesis while hospitalized  With medications she has remained stable  Her breathing is ok  She gets dyspnea when climbing stairs  She has a cough that is worse at night  Tessalon perles help  Wheezing and chest pain happen consistently   Not using inhalers at all  Smoking 0 5 ppd    She fractured 3 ribs in Feb  CT chest showed emphysema without nodules    Review of Systems   Constitutional: Negative for activity change  HENT: Negative for congestion  Respiratory: Positive for cough and shortness of breath   Negative for wheezing  Cardiovascular: Positive for leg swelling  Negative for chest pain  Gastrointestinal: Positive for abdominal distention  Musculoskeletal: Negative for gait problem  Skin: Negative for rash  Psychiatric/Behavioral: Negative for sleep disturbance         The following portions of the patient's history were reviewed and updated as appropriate: allergies, current medications, past family history, past medical history, past social history, past surgical history and problem list     VITALS:  Vitals:    05/12/21 1541   BP: 128/72   BP Location: Left arm   Patient Position: Sitting   Pulse: 93   Temp: 98 5 °F (36 9 °C)   TempSrc: Tympanic   SpO2: 94%   Weight: 73 1 kg (161 lb 3 2 oz)   Height: 5' 3" (1 6 m)       Physical Exam  General:  Patient is awake, alert, non-toxic and in no acute respiratory distress  Neck: No JVD  CV:  Regular, +S1 and S2, No murmurs, gallops or rubs appreciated  Lungs:  Slightly diminished breath sounds bilateral without wheeze, rales or rhonchi  Abdomen: Soft, +BS, Non-tender, non-distended  Extremities: No clubbing, cyanosis or edema  Neuro: No focal deficits        Diagnostic Testing:    CBC:  Lab Results   Component Value Date    WBC 6 6 04/01/2021    WBC 6 5 04/01/2021    HGB 13 6 04/01/2021    HGB 13 4 04/01/2021    HCT 38 7 04/01/2021    HCT 37 6 04/01/2021    MCV 97 04/01/2021    MCV 93 04/01/2021    PLT 92 (LL) 04/01/2021    PLT 95 (LL) 04/01/2021         BMP:   Lab Results   Component Value Date     07/28/2017    K 4 5 04/01/2021    K 4 5 04/01/2021     04/01/2021     04/01/2021    CO2 20 04/01/2021    CO2 21 04/01/2021    ANIONGAP 12 05/03/2015    BUN 23 04/01/2021    BUN 23 04/01/2021    CREATININE 0 98 04/01/2021    CREATININE 0 93 04/01/2021    GLUCOSE 95 07/28/2017    CALCIUM 8 9 02/19/2021    CORRECTEDCA 9 9 02/19/2021    AST 50 (H) 04/01/2021    ALT 29 04/01/2021    ALKPHOS 137 (H) 02/19/2021    PROT 7 4 07/28/2017    BILITOT 0 6 07/28/2017    EGFR 49 02/19/2021         Imaging studies:  I have personally reviewed pertinent films in PACS   CT chest February 2021-emphysema, no focal infiltrates or pneumothorax, rib fractures      Marii Whitley DO

## 2021-05-12 NOTE — ASSESSMENT & PLAN NOTE
Overall the patient  Is doing well  She clearly would benefit most from smoking cessation  She is agreeable to enrolling in pulmonary rehabilitation  I have asked her to start using Stiolto 2 sprays once daily in the morning (  I demonstrated use in the office and provided 2 samples)  She can use albuterol p r n  Lajean Cassette She is up-to-date on influenza, pneumonia and COVID vaccines     she will be due for lung cancer screening CT February 2022

## 2021-05-13 ENCOUNTER — TELEPHONE (OUTPATIENT)
Dept: FAMILY MEDICINE CLINIC | Facility: CLINIC | Age: 61
End: 2021-05-13

## 2021-05-13 NOTE — TELEPHONE ENCOUNTER
----- Message from Dread Avalos PA-C sent at 5/12/2021  8:02 PM EDT -----  Please let patient know DEXA is normal

## 2021-05-19 ENCOUNTER — HOSPITAL ENCOUNTER (OUTPATIENT)
Dept: ULTRASOUND IMAGING | Facility: CLINIC | Age: 61
Discharge: HOME | End: 2021-05-19
Payer: COMMERCIAL

## 2021-05-19 ENCOUNTER — HOSPITAL ENCOUNTER (OUTPATIENT)
Dept: MAMMOGRAPHY | Facility: CLINIC | Age: 61
Discharge: HOME | End: 2021-05-19
Payer: COMMERCIAL

## 2021-05-19 VITALS — BODY MASS INDEX: 28.53 KG/M2 | HEIGHT: 63 IN | WEIGHT: 161 LBS

## 2021-05-19 DIAGNOSIS — R92.8 ABNORMAL SCREENING MAMMOGRAM: ICD-10-CM

## 2021-05-19 PROCEDURE — 77065 DX MAMMO INCL CAD UNI: CPT

## 2021-05-19 NOTE — PROGRESS NOTES
Met with patient and Dr Rhona Beach                  regarding recommendation for;      __x___ RIGHT ______LEFT      _____Ultrasound guided  __x____Stereotactic  Breast biopsy  __x___Verbalized understanding  Blood thinners:  _____yes ___x__no    Date stopped: _____x______    Biopsy teaching sheet given:  _____x__yes ______no    Pertinent medical history includes Right breast benign exc  Biopsy in 2004, COPD, HTN, and anxiety  Consent reviewed  I encouraged Jose to wait for call from [facility] for results

## 2021-05-21 ENCOUNTER — HOSPITAL ENCOUNTER (OUTPATIENT)
Dept: MAMMOGRAPHY | Facility: CLINIC | Age: 61
Discharge: HOME | End: 2021-05-21
Payer: COMMERCIAL

## 2021-05-21 VITALS
DIASTOLIC BLOOD PRESSURE: 62 MMHG | HEART RATE: 76 BPM | BODY MASS INDEX: 28.53 KG/M2 | SYSTOLIC BLOOD PRESSURE: 118 MMHG | HEIGHT: 63 IN | WEIGHT: 161 LBS

## 2021-05-21 DIAGNOSIS — R92.8 ABNORMAL MAMMOGRAM: ICD-10-CM

## 2021-05-21 DIAGNOSIS — Z98.890 STATUS POST BREAST BIOPSY: ICD-10-CM

## 2021-05-21 PROCEDURE — A4648 IMPLANTABLE TISSUE MARKER: HCPCS

## 2021-05-21 PROCEDURE — 19082 BX BREAST ADD LESION STRTCTC: CPT

## 2021-05-21 PROCEDURE — 88341 IMHCHEM/IMCYTCHM EA ADD ANTB: CPT | Performed by: PATHOLOGY

## 2021-05-21 PROCEDURE — 88305 TISSUE EXAM BY PATHOLOGIST: CPT | Performed by: PATHOLOGY

## 2021-05-21 PROCEDURE — 19081 BX BREAST 1ST LESION STRTCTC: CPT

## 2021-05-21 PROCEDURE — 88342 IMHCHEM/IMCYTCHM 1ST ANTB: CPT | Performed by: PATHOLOGY

## 2021-05-21 PROCEDURE — 88361 TUMOR IMMUNOHISTOCHEM/COMPUT: CPT | Performed by: PATHOLOGY

## 2021-05-21 RX ORDER — LIDOCAINE HYDROCHLORIDE 10 MG/ML
5 INJECTION, SOLUTION EPIDURAL; INFILTRATION; INTRACAUDAL; PERINEURAL ONCE
Status: COMPLETED | OUTPATIENT
Start: 2021-05-21 | End: 2021-05-21

## 2021-05-21 RX ORDER — LIDOCAINE HYDROCHLORIDE AND EPINEPHRINE 10; 10 MG/ML; UG/ML
10 INJECTION, SOLUTION INFILTRATION; PERINEURAL ONCE
Status: COMPLETED | OUTPATIENT
Start: 2021-05-21 | End: 2021-05-21

## 2021-05-21 RX ORDER — LIDOCAINE HYDROCHLORIDE AND EPINEPHRINE 10; 10 MG/ML; UG/ML
10 INJECTION, SOLUTION INFILTRATION; PERINEURAL ONCE
Status: DISCONTINUED | OUTPATIENT
Start: 2021-05-21 | End: 2021-05-21

## 2021-05-21 RX ORDER — LIDOCAINE HYDROCHLORIDE 10 MG/ML
5 INJECTION, SOLUTION EPIDURAL; INFILTRATION; INTRACAUDAL; PERINEURAL ONCE
Status: DISCONTINUED | OUTPATIENT
Start: 2021-05-21 | End: 2021-05-21

## 2021-05-21 RX ADMIN — LIDOCAINE HYDROCHLORIDE,EPINEPHRINE BITARTRATE 10 ML: 10; .01 INJECTION, SOLUTION INFILTRATION; PERINEURAL at 09:53

## 2021-05-21 RX ADMIN — LIDOCAINE HYDROCHLORIDE,EPINEPHRINE BITARTRATE 10 ML: 10; .01 INJECTION, SOLUTION INFILTRATION; PERINEURAL at 09:30

## 2021-05-21 RX ADMIN — LIDOCAINE HYDROCHLORIDE 5 ML: 10 INJECTION, SOLUTION EPIDURAL; INFILTRATION; INTRACAUDAL; PERINEURAL at 09:30

## 2021-05-21 RX ADMIN — LIDOCAINE HYDROCHLORIDE 5 ML: 10 INJECTION, SOLUTION EPIDURAL; INFILTRATION; INTRACAUDAL; PERINEURAL at 09:53

## 2021-05-21 NOTE — PROGRESS NOTES
Ice pack given:    __x___yes _____no    Discharge instructions signed by patient:    __x___yes _____no    Discharge instructions given to patient:    __x___yes _____no    Discharged via:    _x____ambulatory    _____wheelchair    _____stretcher    Stable on discharge:    __x___yes ____no    Site assessed before discharge-steri strips intact; tape and guaze applied

## 2021-05-21 NOTE — DISCHARGE INSTR - OTHER ORDERS
POST LARGE CORE BREAST BIOPSY PATIENT INFORMATION      1  Place an ice pack inside your bra over the top of the dressing every hour for 20 minutes (20 minutes on, 60 minutes off)  Do this until bedtime  2  Do not shower or bathe until the following morning  3  You may bathe your breast carefully with the steri-strips in place  Be careful    Not to loosen them  The steri-strips will fall off in 3-5 days  4  You may have mild discomfort, and you may have some bruising where the   Needle entered the skin  This should clear within 5-7 days  5  If you need medicine for discomfort, take acetaminophen products such as   Tylenol  You may also take Advil or Motrin products  6  Do not participate in strenuous activities such as-tennis, aerobics, skiing,  Weight lifting, etc  for 24 hours  Refrain from swimming/soaking for 72 hours  7  Wearing a bra for sleeping may be more comfortable for the first 24-48 hours  8  Watch for continued bleeding, pain or fever over 101; please call with any questions or concerns  For procedures done at the [facility] "[place] call:  Liliam Car RN at \[facility]\"" 81 RN at [phone]                    *After 4 PM call the Interventional Radiology Department                    [phone] and ask to speak with the nurse on call  For procedures done at the [address] [place] [address] call:         Randy Stevenson RN at   *After 4 PM call the Interventional Radiology Department   [phone] and ask to speak with the nurse on call  For procedures done at [address] [place] [address] call: The Radiology Nurse at [phone]  *After 4 PM call your physician, or go to the Emergency Department  9          The final results of your biopsy are usually available within one week

## 2021-05-21 NOTE — PROGRESS NOTES
Procedure type:    _____ultrasound guided __x___stereotactic    Breast:    _____Left __x___Right    Location: 3 oclock     Needle: 8g Revolve    # of passes: 1 core with calcs     Clip: Mammormark Triple Twist    Performed by: Sam Grady held for 5 minutes by: Liliana Mcgee    Steri Strips:    __x___yes _____no    Band aid:    __x___yes_____no    Tape and guaze:    ___x__yes _____no    Tolerated procedure:    __x___yes _____no            Procedure type:    _____ultrasound guided __x___stereotactic    Breast:    _____Left __x___Right    Location: 9 oclock     Needle: 8g Revolve    # of passes: 3 cores (2 cores with calcs, 1 core without calcs)    Clip: Mammomark Bowtie    Performed by: Dr Sandy Asrhaf held for 5 minutes by: Liliana Rocki Strips:    ___x__yes _____no    Band aid:    ___x__yes_____no    Tape and guaze:    _x___yes _____no    Tolerated procedure:    __x___yes _____no

## 2021-05-24 ENCOUNTER — TELEPHONE (OUTPATIENT)
Dept: MAMMOGRAPHY | Facility: CLINIC | Age: 61
End: 2021-05-24

## 2021-05-24 NOTE — PROGRESS NOTES
Post procedure call completed 5/24/21 at 11:24    Bleeding: _____yes __X___no    Pain: _____yes ___X___no    Redness/Swelling: ______yes ___X___no    Band aid removed: __X___yes _____no    Steri-Strips intact: ___X___yes _____no (discussed with patient to remove steri strips on     if they have not come off on their own)    Pt with no questions at this time, adv will call when results available, adv to call with any questions or concerns, has name/# for contact

## 2021-05-26 ENCOUNTER — TELEPHONE (OUTPATIENT)
Dept: HEMATOLOGY ONCOLOGY | Facility: CLINIC | Age: 61
End: 2021-05-26

## 2021-05-26 ENCOUNTER — TELEPHONE (OUTPATIENT)
Dept: MAMMOGRAPHY | Facility: CLINIC | Age: 61
End: 2021-05-26

## 2021-05-26 NOTE — TELEPHONE ENCOUNTER
I just wanted to inform you that the above patient was given her positive breast biopsy results today by Dr Isatu Soto  The patient was referred and set up with surgeon Dr Lupe Gómez, and has an appointment on June 14, 2021 at 9:00 am     If you have any questions or if I can be of any further assistance please let me know      Thank you,  Kwabena Cedeno, LORIN, RN  Breast Nurse Navigator

## 2021-05-26 NOTE — TELEPHONE ENCOUNTER
Call placed to patient after pt given biopsy results from radiologist, questions answered, adv next step is to set patient up with surgeon, options discussed and patient wanted to have appt made with Dr Ash Batista, determined pt availability and adv pt I would make appt for her and call her back

## 2021-05-26 NOTE — TELEPHONE ENCOUNTER
New Patient Breast Form   Patient Details:     Torin Leong     1960     [id number]     Background Information:   [address] starts by opening a telephone encounter and gathering the following information   Who is calling to schedule and relationship?  patient navigator   Referring Provider MILAGROS / Carole Newell   To which speciality is the referral?  Surgical Oncology   Reason for Visit? 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 Yes   Date of Tissue Diagnosis (If done outside of [facility] please obtain report and slides) 5/2021   Is patient aware of diagnosis, and who made them aware? Yes / RBC   If no tissue diagnosis, please stop and discuss with Navigator prior to scheduling   When was the diagnosis made? 5/2021   Were outside slides requested  NA   If biopsy done externally, obtain reports and slides for internal review   Have you had any imaging or labs done? Yes   If YES, when and  where was the blood work and imaging done? [facility]   If outside of [facility] obtain records   Was the patient told to bring a disk? NA   Are records in EPIC? Yes   Is there a personal history of cancer? No   If YES please list type and YR diagnosed    If patient has a prior history of breast cancer were old records obtained? NA   Have you ever had genetic testing done for breast cancer? If so, can you please bring a copy to appointment for timely treatment planning No   Is there a family history of cancer? yes   If YES please list type breast   Scheduling Information:   Preferred [address]   Are there any days the patient cannot be seen?  no   How was New Patient Packet Sent? [employer] Postal [employer]   Miscellaneous:   After completing the above information, please route to finance, nurse navigation and clinical trials for review

## 2021-05-27 NOTE — TELEPHONE ENCOUNTER
Intake received  Spoke with Corazon Melendez from [facility], who confirm that practice is Tier 1 Call ref# [id number]  Patient outreach to follow

## 2021-06-14 ENCOUNTER — CONSULT (OUTPATIENT)
Dept: SURGICAL ONCOLOGY | Facility: CLINIC | Age: 61
End: 2021-06-14
Payer: COMMERCIAL

## 2021-06-14 ENCOUNTER — TELEPHONE (OUTPATIENT)
Dept: FAMILY MEDICINE CLINIC | Facility: CLINIC | Age: 61
End: 2021-06-14

## 2021-06-14 ENCOUNTER — PATIENT OUTREACH (OUTPATIENT)
Dept: SURGICAL ONCOLOGY | Facility: CLINIC | Age: 61
End: 2021-06-14

## 2021-06-14 VITALS
WEIGHT: 160 LBS | TEMPERATURE: 97.3 F | SYSTOLIC BLOOD PRESSURE: 130 MMHG | DIASTOLIC BLOOD PRESSURE: 80 MMHG | HEART RATE: 94 BPM | BODY MASS INDEX: 28.35 KG/M2 | HEIGHT: 63 IN

## 2021-06-14 DIAGNOSIS — D05.11 DUCTAL CARCINOMA IN SITU (DCIS) OF RIGHT BREAST: Primary | ICD-10-CM

## 2021-06-14 DIAGNOSIS — K70.31 ALCOHOLIC CIRRHOSIS OF LIVER WITH ASCITES (HCC): Chronic | ICD-10-CM

## 2021-06-14 DIAGNOSIS — Z78.9 NEED FOR FOLLOW-UP BY SOCIAL WORKER: ICD-10-CM

## 2021-06-14 LAB
ALBUMIN SERPL-MCNC: 3.6 G/DL (ref 3.8–4.8)
ALBUMIN/GLOB SERPL: 0.7 {RATIO} (ref 1.2–2.2)
ALP SERPL-CCNC: 197 IU/L (ref 48–121)
ALT SERPL-CCNC: 44 IU/L (ref 0–32)
AST SERPL-CCNC: 95 IU/L (ref 0–40)
BILIRUB SERPL-MCNC: 1.2 MG/DL (ref 0–1.2)
BUN SERPL-MCNC: 19 MG/DL (ref 8–27)
BUN/CREAT SERPL: 20 (ref 12–28)
CALCIUM SERPL-MCNC: 9.2 MG/DL (ref 8.7–10.3)
CHLORIDE SERPL-SCNC: 96 MMOL/L (ref 96–106)
CO2 SERPL-SCNC: 27 MMOL/L (ref 20–29)
CREAT SERPL-MCNC: 0.95 MG/DL (ref 0.57–1)
GLOBULIN SER-MCNC: 4.9 G/DL (ref 1.5–4.5)
GLUCOSE SERPL-MCNC: 107 MG/DL (ref 65–99)
POTASSIUM SERPL-SCNC: 3.8 MMOL/L (ref 3.5–5.2)
PROT SERPL-MCNC: 8.5 G/DL (ref 6–8.5)
SL AMB EGFR AFRICAN AMERICAN: 74 ML/MIN/1.73
SL AMB EGFR NON AFRICAN AMERICAN: 64 ML/MIN/1.73
SODIUM SERPL-SCNC: 135 MMOL/L (ref 134–144)

## 2021-06-14 PROCEDURE — 99245 OFF/OP CONSLTJ NEW/EST HI 55: CPT | Performed by: SURGERY

## 2021-06-14 PROCEDURE — 3008F BODY MASS INDEX DOCD: CPT | Performed by: SURGERY

## 2021-06-14 PROCEDURE — 3079F DIAST BP 80-89 MM HG: CPT | Performed by: SURGERY

## 2021-06-14 PROCEDURE — 3075F SYST BP GE 130 - 139MM HG: CPT | Performed by: SURGERY

## 2021-06-14 NOTE — LETTER
2021     [address]    Patient: Ashia Paz   YOB: 1960   Date of Visit: 2021       Dear Dr Melvina Cheadle: Thank you for referring Ashutosh Rose to me for evaluation  Below are my notes for this consultation  If you have questions, please do not hesitate to call me  I look forward to following your patient along with you  Sincerely,        Melanie Martinez MD        CC: MD Melanie Núñez MD  2021  9:52 AM  Incomplete    Breast Consultation-Surgical Oncology     [facility] SURGICAL ONCOLOGY [place] [facility] [address]    Name:  Ashia Paz  YOB: 1960  MRN:  [id number]    Assessment/Plan   {Assess/Plan SmartLinks:42096}    Assessment:***    Plan:***    HPI: Ashia Paz is a 57y o  year old female who presents with ***    Surgical treatment to date consisted of {BREAST CA TREATMENT:79055}      Oncology History:    Oncology History   Ductal carcinoma in situ (DCIS) of right breast   2021 Initial Diagnosis    Ductal carcinoma in situ (DCIS) of right breast     2021 -  Cancer Staged    Staging form: Breast, AJCC 8th Edition  - Clinical: Stage 0 (cTis (DCIS), cN0, cM0, ER+, IN+, HER2: Not Assessed) - Signed by Melanie Martinez MD on 2021  Stage prefix: Initial diagnosis  Method of lymph node assessment: Clinical  Nuclear grade: G2           Pertinent reproductive history:  Age at menarche:  ***  OB History        2    Para   2    Term   2            AB        Living           SAB        TAB        Ectopic        Multiple        Live Births               Obstetric Comments   Menarche-13  Age at first pregnancy-23  bcp- about 5yrs           Age at first live birth:  ***  Age at menopause:  ***  Hysterectomy/Oophrectomy:  {YES / ZQ:72122}  Hormone replacement therapy:  {YES/NO:34855}  Birth control pills:  {YES/NO:91370}    Problem List:   Patient Active Problem List   Diagnosis    Fatty liver    Other specified abnormal findings of blood chemistry    Anxiety    Elevated LFTs    GERD without esophagitis    Hyperlipidemia    Essential hypertension    Insomnia    Nicotine dependence    Healthcare maintenance    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)    Alcohol abuse    Irritable bowel syndrome with diarrhea    Non-seasonal allergic rhinitis due to pollen    Alcoholic cirrhosis of liver with ascites ([facility] [address] )    [facility] dependence [facility]complicated withdrawal [facility]    Thrombocytopenia (HCC)    Decompensated hepatic cirrhosis ([facility] )    Ductal carcinoma in situ (DCIS) of right breast     Past Medical History:   Diagnosis Date    Alcoholic fatty liver     Anxiety     Asthma     COPD (chronic obstructive pulmonary disease) (HCC)     Elevated liver function tests     GERD (gastroesophageal reflux disease)     GERD without esophagitis     Hyperlipidemia     Hypertension     IBS (irritable bowel syndrome)     Insomnia     Insomnia     Liver disease     Nervousness     Nicotine dependence     Other specified urinary incontinence     RLS (restless legs syndrome)      Past Surgical History:   Procedure Laterality Date    BACK SURGERY      BREAST EXCISIONAL BIOPSY Right 11/01/2004    benign    CATARACT EXTRACTION, BILATERAL  08/01/2018    COLONOSCOPY N/A 5/8/2019    Procedure: COLONOSCOPY;  Surgeon: Radha Xaveir MD;  Location: [facility]; Service: Gastroenterology    EGD AND COLONOSCOPY N/A 3/19/2018    Procedure: EGD AND COLONOSCOPY;  Surgeon: Radha Xavier MD;  Location: [facility]; Service: Gastroenterology    ESOPHAGOGASTRODUODENOSCOPY N/A 5/8/2019    Procedure: ESOPHAGOGASTRODUODENOSCOPY (EGD); Surgeon: Radha Xavier MD;  Location: [facility];   Service: Gastroenterology    IR PARACENTESIS  11/27/2020    IR PARACENTESIS 12/8/2020    KNEE SURGERY      KNEE SURGERY      LUMBAR LAMINECTOMY  04/1999    LYMPH NODE BIOPSY      MAMMO STEREOTACTIC BREAST BIOPSY RIGHT (ALL INC) Right 5/21/2021    MAMMO STEREOTACTIC BREAST BIOPSY RIGHT (ALL INC) EACH ADD Right 5/21/2021    TOTAL ABDOMINAL HYSTERECTOMY  02/05/2008    TUBAL LIGATION      TUBAL LIGATION  1989    UPPER GASTROINTESTINAL ENDOSCOPY       Family History   Problem Relation Age of Onset    Breast cancer Mother 28    No Known Problems Father     No Known Problems Sister     No Known Problems Brother     No Known Problems Son     No Known Problems Maternal Grandmother     No Known Problems Maternal Grandfather     No Known Problems Paternal Grandmother     No Known Problems Paternal Grandfather     No Known Problems Maternal Aunt     No Known Problems Maternal Aunt     No Known Problems Paternal Aunt     Substance Abuse Neg Hx     Mental illness Neg Hx      Social History     Socioeconomic History    Marital status: /Civil Union     Spouse name: Not on file    Number of children: Not on file    Years of education: Not on file    Highest education level: Not on file   Occupational History    Not on file   Tobacco Use    Smoking status: Current Every Day Smoker     Packs/day: 0 50     Years: 45 00     Pack years: 22 50     Types: Cigarettes     Start date: 1978    Smokeless tobacco: Never Used    Tobacco comment: Per allscripts - 1/2 PPD cigs - smoking for 40 years   Vaping Use    Vaping Use: Never used   Substance and Sexual Activity    Alcohol use: Not Currently     Alcohol/week: 24 0 standard drinks     Types: 24 Cans of beer per week     Comment: Daily; just cut back from 6 beers/day   Per allscripts -  3 beers a night    Drug use: No    Sexual activity: Yes     Partners: Male   Other Topics Concern    Not on file   Social History Narrative    Has carbon monoxide detectors in home    Has smoke detectors    Uses safety equipment - seatbelts Social Determinants of Health     Financial Resource Strain:     Difficulty of Paying Living Expenses:    Food Insecurity:     Worried About Running Out of Food in the Last Year:     [address] in the Last Year:    Transportation Needs:     Lack of Transportation (Medical):      Lack of Transportation (Non-Medical):    Physical Activity:     Days of Exercise per Week:     Minutes of Exercise per Session:    Stress:     Feeling of Stress :    Social Connections:     Frequency of Communication with Friends and Family:     Frequency of Social Gatherings with Friends and Family:     Attends Jainism Services:     Active Member of Clubs or Organizations:     Attends Club or Organization Meetings:     Marital Status:    Intimate Partner Violence:     Fear of Current or Ex-Partner:     Emotionally Abused:     Physically Abused:     Sexually Abused:      Current Outpatient Medications   Medication Sig Dispense Refill    albuterol (PROVENTIL HFA) 90 mcg/act inhaler Inhale 2 puffs every 6 (six) hours as needed for wheezing or shortness of breath 1 Inhaler 6    ALPRAZolam (XANAX) 1 mg tablet TAKE ONE TABLET TWO TIMES A DAY AS NEEDED FOR ANXIETY 60 tablet 5    benzonatate (TESSALON PERLES) 100 mg capsule Take 1 capsule (100 mg total) by mouth 3 (three) times a day as needed for cough 60 capsule 2    furosemide (LASIX) 40 mg tablet Take 1 tablet (40 mg total) by mouth daily 30 tablet 5    spironolactone (ALDACTONE) 50 mg tablet Take 1 tablet (50 mg total) by mouth daily 30 tablet 5    ibuprofen (MOTRIN) 600 mg tablet Take 1 tablet (600 mg total) by mouth every 6 (six) hours as needed for mild pain (Patient not taking: Reported on 5/12/2021) 20 tablet 0    lidocaine (LIDODERM) 5 % Apply 1 patch topically every 12 (twelve) hours Remove & Discard patch within 12 hours or as directed by MD (Patient not taking: Reported on 5/12/2021) 15 patch 0    methocarbamol (ROBAXIN) 500 mg tablet Take 1 tablet (500 mg total) by mouth 2 (two) times a day (Patient not taking: Reported on 2021) 20 tablet 0    mometasone (ELOCON) 0 1 % cream Apply topically daily (Patient not taking: Reported on 2021) 45 g 0    omeprazole (PriLOSEC) 40 MG capsule TAKE ONE CAPSULE BY MOUTH EVERY DAY (Patient not taking: Reported on 2021) 30 capsule 5    prednisoLONE acetate (PRED FORTE) 1 % ophthalmic suspension PLACE 1 DROP IN LEFT EYE 4 TIMES DAILY FOR 3 DAYS STARTING AFTER LASER PROCEDURE      tiotropium-olodaterol (STIOLTO RESPIMAT) 2 5-2 5 MCG/ACT inhaler Inhale 2 puffs daily (Patient not taking: Reported on 2021) 4 g 6    TREMFYA subcutaneous injection        No current facility-administered medications for this visit       Allergies   Allergen Reactions    Ace Inhibitors Cough         {Common H&P Review SmartLinks:38399}    Review of Systems:  Review of Systems    Physical Exam:  Physical Exam    Laboratory:  {LABS HEM/ONC:48976}    Pathology revealed: {PATHOLOGY BREAST:14425}    Histologic grade: {HISTOLOGIC GRADE:08080}     Angiolymphatic invasion:  {PRESENT (GRADE )/ GKSKIC:54480}    Tumor node status: *** of *** {POSITIVE/NE}    Hormone receptor status:  {BREAST HORMONE:25588}    Other studies: {IMAGING IVIGAER:29043}    Imaging:  ***        Treatment options include:  ***    Discussion/Summary:***

## 2021-06-14 NOTE — PROGRESS NOTES
Breast Consultation-Surgical Oncology     [facility]  [facility] [address]  [facility] [address]    Name:  Nedra Guerra  YOB: 1960  MRN:  [id number]    Assessment/Plan   Diagnoses and all orders for this visit:    Ductal carcinoma in situ (DCIS) of right breast  -     CBC and differential; Future  -     Protime-INR; Future  -     AFP tumor marker; Future  -     CBC and differential  -     Protime-INR  -     AFP tumor marker    Need for follow-up by   -     Ambulatory referral to social work care management program; Future    Alcoholic cirrhosis of liver with ascites ([facility] [address] )  -     CBC and differential; Future  -     Protime-INR; Future  -     AFP tumor marker; Future  -     CBC and differential  -     Protime-INR  -     AFP tumor marker            HPI: Nedra Guerra is a 57y o  year old female who presents with  Newly diagnosed ductal carcinoma in situ of the right breast   She was asymptomatic referable to the breast   She does report family history of breast cancer in her mother  She had an abnormal screening mammogram which led to diagnostic imaging and a biopsy  Surgical treatment to date consisted of   Not applicable      Oncology History:    Oncology History   Ductal carcinoma in situ (DCIS) of right breast   2021 Initial Diagnosis    Ductal carcinoma in situ (DCIS) of right breast     2021 -  Cancer Staged    Staging form: Breast, AJCC 8th Edition  - Clinical: Stage 0 (cTis (DCIS), cN0, cM0, ER+, OR+, HER2: Not Assessed) - Signed by Kanu Jorge MD on 2021  Stage prefix: Initial diagnosis  Method of lymph node assessment: Clinical  Nuclear grade: G2           Pertinent reproductive history:  Age at menarche:    OB History        2    Para   2    Term   2            AB        Living           SAB        TAB        Ectopic        Multiple        Live Births               Obstetric Comments   Menarche-13  Age at first pregnancy-23  bcp- about 5yrs               Problem List:   Patient Active Problem List   Diagnosis    Fatty liver    Other specified abnormal findings of blood chemistry    Anxiety    Elevated LFTs    GERD without esophagitis    Hyperlipidemia    Essential hypertension    Insomnia    Nicotine dependence    Healthcare maintenance    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)    Alcohol abuse    Irritable bowel syndrome with diarrhea    Non-seasonal allergic rhinitis due to pollen    Alcoholic cirrhosis of liver with ascites ([facility] [address] )    Alcohol dependence with uncomplicated withdrawal ([facility] )    Thrombocytopenia (HCC)    Decompensated hepatic cirrhosis ([facility] )    Ductal carcinoma in situ (DCIS) of right breast     Past Medical History:   Diagnosis Date    Alcoholic fatty liver     Anxiety     Asthma     COPD (chronic obstructive pulmonary disease) (HCC)     Elevated liver function tests     GERD (gastroesophageal reflux disease)     GERD without esophagitis     Hyperlipidemia     Hypertension     IBS (irritable bowel syndrome)     Insomnia     Insomnia     Liver disease     Nervousness     Nicotine dependence     Other specified urinary incontinence     RLS (restless legs syndrome)      Past Surgical History:   Procedure Laterality Date    BACK SURGERY      BREAST EXCISIONAL BIOPSY Right 11/01/2004    benign    CATARACT EXTRACTION, BILATERAL  08/01/2018    COLONOSCOPY N/A 5/8/2019    Procedure: COLONOSCOPY;  Surgeon: Marcella Gallego MD;  Location: [facility]; Service: Gastroenterology    EGD AND COLONOSCOPY N/A 3/19/2018    Procedure: EGD AND COLONOSCOPY;  Surgeon: Marcella Gallego MD;  Location: [facility]; Service: Gastroenterology    ESOPHAGOGASTRODUODENOSCOPY N/A 5/8/2019    Procedure: ESOPHAGOGASTRODUODENOSCOPY (EGD); Surgeon: Marcella Gallego MD;  Location: [facility];   Service: Gastroenterology    IR PARACENTESIS  11/27/2020    IR PARACENTESIS  12/8/2020    KNEE SURGERY      KNEE SURGERY      LUMBAR LAMINECTOMY  04/1999    LYMPH NODE BIOPSY      MAMMO STEREOTACTIC BREAST BIOPSY RIGHT (ALL INC) Right 5/21/2021    MAMMO STEREOTACTIC BREAST BIOPSY RIGHT (ALL INC) EACH ADD Right 5/21/2021    TOTAL ABDOMINAL HYSTERECTOMY  02/05/2008    TUBAL LIGATION      TUBAL LIGATION  1989    UPPER GASTROINTESTINAL ENDOSCOPY       Family History   Problem Relation Age of Onset    Breast cancer Mother 28    No Known Problems Father     No Known Problems Sister     No Known Problems Brother     No Known Problems Son     No Known Problems Maternal Grandmother     No Known Problems Maternal Grandfather     No Known Problems Paternal Grandmother     No Known Problems Paternal Grandfather     No Known Problems Maternal Aunt     No Known Problems Maternal Aunt     No Known Problems Paternal Aunt     Substance Abuse Neg Hx     Mental illness Neg Hx      Social History     Socioeconomic History    Marital status: /Civil Union     Spouse name: Not on file    Number of children: Not on file    Years of education: Not on file    Highest education level: Not on file   Occupational History    Not on file   Tobacco Use    Smoking status: Current Every Day Smoker     Packs/day: 0 50     Years: 45 00     Pack years: 22 50     Types: Cigarettes     Start date: 1978    Smokeless tobacco: Never Used    Tobacco comment: Per allscripts - 1/2 PPD cigs - smoking for 40 years   Vaping Use    Vaping Use: Never used   Substance and Sexual Activity    Alcohol use: Not Currently     Alcohol/week: 24 0 standard drinks     Types: 24 Cans of beer per week     Comment: Daily; just cut back from 6 beers/day   Per allscripts -  3 beers a night    Drug use: No    Sexual activity: Yes     Partners: Male   Other Topics Concern    Not on file   Social History Narrative    Has carbon monoxide detectors in home    Has smoke detectors    Uses safety equipment - seatbelts     Social Determinants of Health     Financial Resource Strain:     Difficulty of Paying Living Expenses:    Food Insecurity:     Worried About Running Out of Food in the Last Year:     [address] in the Last Year:    Transportation Needs:     Lack of Transportation (Medical):      Lack of Transportation (Non-Medical):    Physical Activity:     Days of Exercise per Week:     Minutes of Exercise per Session:    Stress:     Feeling of Stress :    Social Connections:     Frequency of Communication with Friends and Family:     Frequency of Social Gatherings with Friends and Family:     Attends [employer] Services:     Active Member of Clubs or Organizations:     Attends Club or Organization Meetings:     Marital Status:    Intimate Partner Violence:     Fear of Current or Ex-Partner:     Emotionally Abused:     Physically Abused:     Sexually Abused:      Current Outpatient Medications   Medication Sig Dispense Refill    albuterol (PROVENTIL HFA) 90 mcg/act inhaler Inhale 2 puffs every 6 (six) hours as needed for wheezing or shortness of breath 1 Inhaler 6    ALPRAZolam (XANAX) 1 mg tablet TAKE ONE TABLET TWO TIMES A DAY AS NEEDED FOR ANXIETY 60 tablet 5    benzonatate (TESSALON PERLES) 100 mg capsule Take 1 capsule (100 mg total) by mouth 3 (three) times a day as needed for cough 60 capsule 2    furosemide (LASIX) 40 mg tablet Take 1 tablet (40 mg total) by mouth daily 30 tablet 5    spironolactone (ALDACTONE) 50 mg tablet Take 1 tablet (50 mg total) by mouth daily 30 tablet 5    ibuprofen (MOTRIN) 600 mg tablet Take 1 tablet (600 mg total) by mouth every 6 (six) hours as needed for mild pain (Patient not taking: Reported on 5/12/2021) 20 tablet 0    lidocaine (LIDODERM) 5 % Apply 1 patch topically every 12 (twelve) hours Remove & Discard patch within 12 hours or as directed by MD (Patient not taking: Reported on 5/12/2021) 15 patch 0    methocarbamol (ROBAXIN) 500 mg tablet Take 1 tablet (500 mg total) by mouth 2 (two) times a day (Patient not taking: Reported on 5/12/2021) 20 tablet 0    mometasone (ELOCON) 0 1 % cream Apply topically daily (Patient not taking: Reported on 5/12/2021) 45 g 0    omeprazole (PriLOSEC) 40 MG capsule TAKE ONE CAPSULE BY MOUTH EVERY DAY (Patient not taking: Reported on 5/12/2021) 30 capsule 5    prednisoLONE acetate (PRED FORTE) 1 % ophthalmic suspension PLACE 1 DROP IN LEFT EYE 4 TIMES DAILY FOR 3 DAYS STARTING AFTER LASER PROCEDURE      tiotropium-olodaterol (STIOLTO RESPIMAT) 2 5-2 5 MCG/ACT inhaler Inhale 2 puffs daily (Patient not taking: Reported on 6/14/2021) 4 g 6    TREMFYA subcutaneous injection        No current facility-administered medications for this visit  Allergies   Allergen Reactions    Ace Inhibitors Cough         The following portions of the patient's history were reviewed and updated as appropriate: allergies, current medications, past family history, past medical history, past social history, past surgical history and problem list     Review of Systems:  Review of Systems   Constitutional: Negative  Negative for appetite change and fever  Eyes: Negative  Respiratory: Positive for cough  Negative for shortness of breath  Cardiovascular: Negative  Gastrointestinal: Negative  Endocrine: Negative  Genitourinary: Negative  Musculoskeletal: Negative  Negative for arthralgias and myalgias  Skin: Negative  Allergic/Immunologic: Negative  Neurological: Negative  Hematological: Negative  Negative for adenopathy  Does not bruise/bleed easily  Psychiatric/Behavioral: Negative  Physical Exam:  Physical Exam  Constitutional:       Appearance: She is well-developed  HENT:      Head: Normocephalic and atraumatic  Cardiovascular:      Heart sounds: Normal heart sounds  Pulmonary:      Breath sounds: Normal breath sounds     Chest:      Breasts: Right: Skin change (  Resolving ecchymosis from recent biopsies) present  No inverted nipple, mass, nipple discharge or tenderness  Left: No inverted nipple, mass, nipple discharge, skin change or tenderness  Abdominal:      General: There is distension (mild)  Musculoskeletal:      Right lower leg: No edema  Left lower leg: No edema  Lymphadenopathy:      Upper Body:      Right upper body: No supraclavicular, axillary or pectoral adenopathy  Left upper body: No supraclavicular, axillary or pectoral adenopathy  Neurological:      Mental Status: She is alert and oriented to person, place, and time  Psychiatric:         Mood and Affect: Mood normal          Laboratory:   2021 CBC with decreased platelet count at 95   2021 CMP with normal BUN and creatinine as well as sodium, decreased albumin at 3 2, elevated bilirubin at 1 3, alk-phos 168, AST 50, ALT normal   2020 PT- INR were elevated at 19 4 and 1 64 respectively     2021 stereotactic biopsy of the right breast 0900 hours was benign and concordant   2021 stereotactic biopsy of the right breast 0300 hours    Pathology revealed: ductal carcinoma in situ    Histologic grade:  Intermediate grade     Angiolymphatic invasion:  absent    Tumor node status:  clinically Negative    Hormone receptor status:   %, LA 70%    Other studies:  Meld score is 13 based on the above laboratory values    Imagin2021 bilateral 3D screening mammogram was a BI-RADS 0 secondary to calcifications   2021 right diagnostic mammogram was a BI-RADS four secondary to calcifications in both the inner and outer breast,  Biopsy results as noted above            Discussion/Summary: 78-year-old female here today secondary to newly diagnosed ductal carcinoma in situ of the right breast   She does have family history of breast cancer in her mother  She is not aware of any genetic testing    Given the patient's complex medical history, I am not referring her for genetic testing at this time  She does have alcoholic cirrhosis with ascites  Her meld score based on available labs is 13; however her last INR was over six months ago  The additional labs were done about two and half months ago  I discussed her early stage noninvasive breast cancer with her  If we were to pursue surgery, I would recommend a lumpectomy  She would need to have a BETSY reflector placed preoperatively for localization purposes  I would refer her to Radiation and Medical Oncology postoperatively  However, prior to proceeding with any surgery, I am ordering additional labs now and referring her back to her gastroenterologist   I did communicate with him and her PCP via the electronic medical record  In addition, I will present her at our breast working group

## 2021-06-14 NOTE — TELEPHONE ENCOUNTER
Patient is calling just found out she has breast cancer  She can not sleep and would like you to prescribe something to help her sleep  She asked if I could message you has too many apts to schedule another doctor apt

## 2021-06-14 NOTE — PROGRESS NOTES
Breast Oncology Nurse Navigator    Met with patient, who was accompanied by her spouse, during consult with Dr Chavarria Man  Explained the role of breast oncology nurse navigator as well as additional cancer support services available to utilize as needed  Patient appears to have good understanding of pathology finding and treatment recommendations at this time  She has no current questions or support needs  Provided with my contact information and availability, encouraged to call as needed throughout treatment course with any questions or support needs  I will follow up with patient as needed

## 2021-06-14 NOTE — LETTER
June 14, 2021     Mavis Davila [address], [address]    Patient: Edel Parker   YOB: 1960   Date of Visit: 6/14/2021       Dear Dr Juana Marte: Thank you for referring Johnnie Joe to me for evaluation  Below are my notes for this consultation  If you have questions, please do not hesitate to call me  I look forward to following your patient along with you  Sincerely,        Leila Ernst MD        CC: MD Leila Villaseñor MD  6/14/2021 10:12 AM  Sign when Signing Visit    Breast Consultation-Surgical Oncology     [facility]  [address] [id number]    Name:  Edel Parker  YOB: 1960  MRN:  [id number]    Assessment/Plan   Diagnoses and all orders for this visit:    Ductal carcinoma in situ (DCIS) of right breast  -     CBC and differential; Future  -     Protime-INR; Future  -     AFP tumor marker; Future  -     CBC and differential  -     Protime-INR  -     AFP tumor marker    Need for follow-up by   -     Ambulatory referral to social work care management program; Future    Alcoholic cirrhosis of liver with ascites ([facility] [address] )  -     CBC and differential; Future  -     Protime-INR; Future  -     AFP tumor marker; Future  -     CBC and differential  -     Protime-INR  -     AFP tumor marker            HPI: Edel Parker is a 57y o  year old female who presents with  Newly diagnosed ductal carcinoma in situ of the right breast   She was asymptomatic referable to the breast   She does report family history of breast cancer in her mother  She had an abnormal screening mammogram which led to diagnostic imaging and a biopsy  Surgical treatment to date consisted of   Not applicable      Oncology History:    Oncology History   Ductal carcinoma in situ (DCIS) of right breast   6/14/2021 Initial Diagnosis    Ductal carcinoma in situ (DCIS) of right breast     2021 -  Cancer Staged    Staging form: Breast, AJCC 8th Edition  - Clinical: Stage 0 (cTis (DCIS), cN0, cM0, ER+, VA+, HER2: Not Assessed) - Signed by Leila Ernst MD on 2021  Stage prefix: Initial diagnosis  Method of lymph node assessment: Clinical  Nuclear grade: G2           Pertinent reproductive history:  Age at menarche:    OB History        2    Para   2    Term   2            AB        Living           SAB        TAB        Ectopic        Multiple        Live Births               Obstetric Comments   Menarche-13  Age at first pregnancy-23  bcp- about 5yrs               Problem List:   Patient Active Problem List   Diagnosis    Fatty liver    Other specified abnormal findings of blood chemistry    Anxiety    Elevated LFTs    GERD without esophagitis    Hyperlipidemia    Essential hypertension    Insomnia    Nicotine dependence    Healthcare maintenance    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)    Alcohol abuse    Irritable bowel syndrome with diarrhea    Non-seasonal allergic rhinitis due to pollen    Alcoholic cirrhosis of liver with ascites ([address] )    Alcohol dependence with uncomplicated withdrawal ([address] )    Thrombocytopenia ([address]    Decompensated hepatic cirrhosis ([address] )    Ductal carcinoma in situ (DCIS) of right breast     Past Medical History:   Diagnosis Date    Alcoholic fatty liver     Anxiety     Asthma     COPD (chronic obstructive pulmonary disease) (HCC)     Elevated liver function tests     GERD (gastroesophageal reflux disease)     GERD without esophagitis     Hyperlipidemia     Hypertension     IBS (irritable bowel syndrome)     Insomnia     Insomnia     Liver disease     Nervousness     Nicotine dependence     Other specified urinary incontinence     RLS (restless legs syndrome)      Past Surgical History:   Procedure Laterality Date    BACK SURGERY      BREAST EXCISIONAL BIOPSY Right 11/01/2004    benign    CATARACT EXTRACTION, BILATERAL  08/01/2018    COLONOSCOPY N/A 5/8/2019    Procedure: COLONOSCOPY;  Surgeon: Clif Gaffney MD;  Location: [facility]; Service: Gastroenterology    EGD AND COLONOSCOPY N/A 3/19/2018    Procedure: EGD AND COLONOSCOPY;  Surgeon: Clif Gaffney MD;  Location: [facility]; Service: Gastroenterology    ESOPHAGOGASTRODUODENOSCOPY N/A 5/8/2019    Procedure: ESOPHAGOGASTRODUODENOSCOPY (EGD); Surgeon: Clif Gaffney MD;  Location: [facility];   Service: Gastroenterology    IR PARACENTESIS  11/27/2020    IR PARACENTESIS  12/8/2020    KNEE SURGERY      KNEE SURGERY      LUMBAR LAMINECTOMY  04/1999    LYMPH NODE BIOPSY      MAMMO STEREOTACTIC BREAST BIOPSY RIGHT (ALL INC) Right 5/21/2021    MAMMO STEREOTACTIC BREAST BIOPSY RIGHT (ALL INC) EACH ADD Right 5/21/2021    TOTAL ABDOMINAL HYSTERECTOMY  02/05/2008    TUBAL LIGATION      TUBAL LIGATION  1989    UPPER GASTROINTESTINAL ENDOSCOPY       Family History   Problem Relation Age of Onset    Breast cancer Mother 28    No Known Problems Father     No Known Problems Sister     No Known Problems Brother     No Known Problems Son     No Known Problems Maternal Grandmother     No Known Problems Maternal Grandfather     No Known Problems Paternal Grandmother     No Known Problems Paternal Grandfather     No Known Problems Maternal Aunt     No Known Problems Maternal Aunt     No Known Problems Paternal Aunt     Substance Abuse Neg Hx     Mental illness Neg Hx      Social History     Socioeconomic History    Marital status: /Civil Union     Spouse name: Not on file    Number of children: Not on file    Years of education: Not on file    Highest education level: Not on file   Occupational History    Not on file   Tobacco Use    Smoking status: Current Every Day Smoker     Packs/day: 0 50     Years: 45 00     Pack years: 22 50     Types: Cigarettes     Start date: 1978    Smokeless tobacco: Never Used    Tobacco comment: Per allscripts - 1/2 PPD cigs - smoking for 40 years   Vaping Use    Vaping Use: Never used   Substance and Sexual Activity    Alcohol use: Not Currently     Alcohol/week: 24 0 standard drinks     Types: 24 Cans of beer per week     Comment: Daily; just cut back from 6 beers/day  Per allscripts -  3 beers a night    Drug use: No    Sexual activity: Yes     Partners: Male   Other Topics Concern    Not on file   Social History Narrative    Has carbon monoxide detectors in home    Has smoke detectors    Uses safety equipment - seatbelts     Social Determinants of Health     Financial Resource Strain:     Difficulty of Paying Living Expenses:    Food Insecurity:     Worried About Running Out of Food in the Last Year:     [address] in the Last Year:    Transportation Needs:     Lack of Transportation (Medical):      Lack of Transportation (Non-Medical):    Physical Activity:     Days of Exercise per Week:     Minutes of Exercise per Session:    Stress:     Feeling of Stress :    Social Connections:     Frequency of Communication with Friends and Family:     Frequency of Social Gatherings with Friends and Family:     Attends Hoahaoism Services:     Active Member of Clubs or Organizations:     Attends Club or Organization Meetings:     Marital Status:    Intimate Partner Violence:     Fear of Current or Ex-Partner:     Emotionally Abused:     Physically Abused:     Sexually Abused:      Current Outpatient Medications   Medication Sig Dispense Refill    albuterol (PROVENTIL HFA) 90 mcg/act inhaler Inhale 2 puffs every 6 (six) hours as needed for wheezing or shortness of breath 1 Inhaler 6    ALPRAZolam (XANAX) 1 mg tablet TAKE ONE TABLET TWO TIMES A DAY AS NEEDED FOR ANXIETY 60 tablet 5    benzonatate (TESSALON PERLES) 100 mg capsule Take 1 capsule (100 mg total) by mouth 3 (three) times a day as needed for cough 60 capsule 2  furosemide (LASIX) 40 mg tablet Take 1 tablet (40 mg total) by mouth daily 30 tablet 5    spironolactone (ALDACTONE) 50 mg tablet Take 1 tablet (50 mg total) by mouth daily 30 tablet 5    ibuprofen (MOTRIN) 600 mg tablet Take 1 tablet (600 mg total) by mouth every 6 (six) hours as needed for mild pain (Patient not taking: Reported on 5/12/2021) 20 tablet 0    lidocaine (LIDODERM) 5 % Apply 1 patch topically every 12 (twelve) hours Remove & Discard patch within 12 hours or as directed by MD (Patient not taking: Reported on 5/12/2021) 15 patch 0    methocarbamol (ROBAXIN) 500 mg tablet Take 1 tablet (500 mg total) by mouth 2 (two) times a day (Patient not taking: Reported on 5/12/2021) 20 tablet 0    mometasone (ELOCON) 0 1 % cream Apply topically daily (Patient not taking: Reported on 5/12/2021) 45 g 0    omeprazole (PriLOSEC) 40 MG capsule TAKE ONE CAPSULE BY MOUTH EVERY DAY (Patient not taking: Reported on 5/12/2021) 30 capsule 5    prednisoLONE acetate (PRED FORTE) 1 % ophthalmic suspension PLACE 1 DROP IN LEFT EYE 4 TIMES DAILY FOR 3 DAYS STARTING AFTER LASER PROCEDURE      tiotropium-olodaterol (STIOLTO RESPIMAT) 2 5-2 5 MCG/ACT inhaler Inhale 2 puffs daily (Patient not taking: Reported on 6/14/2021) 4 g 6    TREMFYA subcutaneous injection        No current facility-administered medications for this visit  Allergies   Allergen Reactions    Ace Inhibitors Cough         The following portions of the patient's history were reviewed and updated as appropriate: allergies, current medications, past family history, past medical history, past social history, past surgical history and problem list     Review of Systems:  Review of Systems   Constitutional: Negative  Negative for appetite change and fever  Eyes: Negative  Respiratory: Positive for cough  Negative for shortness of breath  Cardiovascular: Negative  Gastrointestinal: Negative  Endocrine: Negative  Genitourinary: Negative  Musculoskeletal: Negative  Negative for arthralgias and myalgias  Skin: Negative  Allergic/Immunologic: Negative  Neurological: Negative  Hematological: Negative  Negative for adenopathy  Does not bruise/bleed easily  Psychiatric/Behavioral: Negative  Physical Exam:  Physical Exam  Constitutional:       Appearance: She is well-developed  HENT:      Head: Normocephalic and atraumatic  Cardiovascular:      Heart sounds: Normal heart sounds  Pulmonary:      Breath sounds: Normal breath sounds  Chest:      Breasts:         Right: Skin change (  Resolving ecchymosis from recent biopsies) present  No inverted nipple, mass, nipple discharge or tenderness  Left: No inverted nipple, mass, nipple discharge, skin change or tenderness  Abdominal:      General: There is distension (mild)  Musculoskeletal:      Right lower leg: No edema  Left lower leg: No edema  Lymphadenopathy:      Upper Body:      Right upper body: No supraclavicular, axillary or pectoral adenopathy  Left upper body: No supraclavicular, axillary or pectoral adenopathy  Neurological:      Mental Status: She is alert and oriented to person, place, and time     Psychiatric:         Mood and Affect: Mood normal          Laboratory:   04/01/2021 CBC with decreased platelet count at 95   04/01/2021 CMP with normal BUN and creatinine as well as sodium, decreased albumin at 3 2, elevated bilirubin at 1 3, alk-phos 168, AST 50, ALT normal   12/05/2020 PT- INR were elevated at 19 4 and 1 64 respectively     05/21/2021 stereotactic biopsy of the right breast 0900 hours was benign and concordant   05/21/2021 stereotactic biopsy of the right breast 0300 hours    Pathology revealed: ductal carcinoma in situ    Histologic grade:  Intermediate grade     Angiolymphatic invasion:  absent    Tumor node status:  clinically Negative    Hormone receptor status:   %, ND 70%    Other studies:  Meld score is 13 based on the above laboratory values    Imagin2021 bilateral 3D screening mammogram was a BI-RADS 0 secondary to calcifications   2021 right diagnostic mammogram was a BI-RADS four secondary to calcifications in both the inner and outer breast,  Biopsy results as noted above            Discussion/Summary: 79-year-old female here today secondary to newly diagnosed ductal carcinoma in situ of the right breast   She does have family history of breast cancer in her mother  She is not aware of any genetic testing  Given the patient's complex medical history, I am not referring her for genetic testing at this time  She does have alcoholic cirrhosis with ascites  Her meld score based on available labs is 13; however her last INR was over six months ago  The additional labs were done about two and half months ago  I discussed her early stage noninvasive breast cancer with her  If we were to pursue surgery, I would recommend a lumpectomy  She would need to have a BETSY reflector placed preoperatively for localization purposes  I would refer her to Radiation and Medical Oncology postoperatively  However, prior to proceeding with any surgery, I am ordering additional labs now and referring her back to her gastroenterologist   I did communicate with him and her PCP via the electronic medical record  In addition, I will present her at our breast working group

## 2021-06-15 ENCOUNTER — PATIENT OUTREACH (OUTPATIENT)
Dept: CASE MANAGEMENT | Facility: HOSPITAL | Age: 61
End: 2021-06-15

## 2021-06-15 ENCOUNTER — TELEPHONE (OUTPATIENT)
Dept: GASTROENTEROLOGY | Facility: CLINIC | Age: 61
End: 2021-06-15

## 2021-06-15 ENCOUNTER — TELEPHONE (OUTPATIENT)
Dept: FAMILY MEDICINE CLINIC | Facility: CLINIC | Age: 61
End: 2021-06-15

## 2021-06-15 LAB
AFP-TM SERPL-MCNC: 7.1 NG/ML (ref 0–8.3)
BASOPHILS # BLD AUTO: 0.1 X10E3/UL (ref 0–0.2)
BASOPHILS NFR BLD AUTO: 1 %
EOSINOPHIL # BLD AUTO: 0.1 X10E3/UL (ref 0–0.4)
EOSINOPHIL NFR BLD AUTO: 1 %
ERYTHROCYTE [DISTWIDTH] IN BLOOD BY AUTOMATED COUNT: 13.6 % (ref 11.7–15.4)
HCT VFR BLD AUTO: 40.4 % (ref 34–46.6)
HGB BLD-MCNC: 14.1 G/DL (ref 11.1–15.9)
IMM GRANULOCYTES # BLD: 0 X10E3/UL (ref 0–0.1)
IMM GRANULOCYTES NFR BLD: 0 %
INR PPP: 1.3 (ref 0.9–1.2)
LYMPHOCYTES # BLD AUTO: 1.9 X10E3/UL (ref 0.7–3.1)
LYMPHOCYTES NFR BLD AUTO: 31 %
MCH RBC QN AUTO: 34.7 PG (ref 26.6–33)
MCHC RBC AUTO-ENTMCNC: 34.9 G/DL (ref 31.5–35.7)
MCV RBC AUTO: 100 FL (ref 79–97)
MONOCYTES # BLD AUTO: 0.8 X10E3/UL (ref 0.1–0.9)
MONOCYTES NFR BLD AUTO: 13 %
MORPHOLOGY BLD-IMP: ABNORMAL
NEUTROPHILS # BLD AUTO: 3.4 X10E3/UL (ref 1.4–7)
NEUTROPHILS NFR BLD AUTO: 54 %
PLATELET # BLD AUTO: 91 X10E3/UL (ref 150–450)
PROTHROMBIN TIME: 13.8 SEC (ref 9.1–12)
RBC # BLD AUTO: 4.06 X10E6/UL (ref 3.77–5.28)
WBC # BLD AUTO: 6.3 X10E3/UL (ref 3.4–10.8)

## 2021-06-15 NOTE — TELEPHONE ENCOUNTER
Patient is asking if she can take 1 xanax in the morning and 2 xanax at night see if that would help her relax  Or if you think another anxiety medication which one

## 2021-06-15 NOTE — PROGRESS NOTES
Oncology LSW received pt's completed distress thermometer in which pt self scored a 5/10 and noted fears, nervousness, sadness, and worry as emotional concerns  Pt has been diagnosed with breast cancer  Supportive outreach call placed today  Pt's spouse answered phone and explained Lizeth Medel is sleeping, which she has been endorsing poor sleep since her diagnosis  LSW provided message and return call information  Will continue to follow

## 2021-06-15 NOTE — TELEPHONE ENCOUNTER
Patient would like to be contacted for procedure clearance she stated that Dr Jacqueline Romeo (cancer specialist) will not schedule her for breast surgery without clearance from you due to cirrhosis of the liver  Patient would like to be contacted on [phone]

## 2021-06-15 NOTE — TELEPHONE ENCOUNTER
Sorry, but with the Xanax 1 mg twice daily I am hesitant to add on another medication to help her sleep  We could add a daily anxiety medication, but I do not feel another sedative type medication is needed

## 2021-06-15 NOTE — TELEPHONE ENCOUNTER
I would try something like Zoloft daily in the morning to help with anxiety, no I do not want her taking 2 mg of Xanax

## 2021-06-16 DIAGNOSIS — F41.9 ANXIETY: ICD-10-CM

## 2021-06-16 RX ORDER — ALPRAZOLAM 1 MG/1
TABLET ORAL
Qty: 60 TABLET | Refills: 5 | Status: CANCELLED | OUTPATIENT
Start: 2021-06-16

## 2021-06-16 RX ORDER — SERTRALINE HYDROCHLORIDE 25 MG/1
25 TABLET, FILM COATED ORAL DAILY
Qty: 90 TABLET | Refills: 1 | Status: SHIPPED | OUTPATIENT
Start: 2021-06-16 | End: 2021-10-08 | Stop reason: ALTCHOICE

## 2021-06-16 NOTE — TELEPHONE ENCOUNTER
I'm going to call the patient this AM and see if she is willing to try the Tramadol that was suggested   I will let you know what happens and if you can add it to her meds

## 2021-06-16 NOTE — TELEPHONE ENCOUNTER
I discussed with Dr LARIOS last night  Due to her liver we really should not be changing or increasing any benzo or sleeping meds  We can certainly try the daily anti anxiety if she would like, Zoloft       (it was trazodone, not tramadol)

## 2021-06-16 NOTE — TELEPHONE ENCOUNTER
I sent the Zoloft 25 mg daily in morning with water and food, however we last refilled her Xanax 5/22/2021, I cannot authorize a refill at this time 5/21/2021

## 2021-06-16 NOTE — TELEPHONE ENCOUNTER
Spoke to Vicki De La Fuente, she is willing now to try Zoloft, however she still wants her Xanax rx filled 1am, 1 pm

## 2021-06-16 NOTE — TELEPHONE ENCOUNTER
Patient called asking again for the Xanax, I explained to her that the rx was too soon to be filled and she started crying, she said she just can not stop the med  I told her that she has to take them as prescribed and not any extra! She said she was doubling up because she has been so upset about her breast cancer  She sounded intoxicated, her speech was slurred and she was belching   I told her that I'll remind you to fill the xanax in 6 days'

## 2021-06-17 ENCOUNTER — PATIENT OUTREACH (OUTPATIENT)
Dept: FAMILY MEDICINE CLINIC | Facility: CLINIC | Age: 61
End: 2021-06-17

## 2021-06-17 ENCOUNTER — PATIENT OUTREACH (OUTPATIENT)
Dept: CASE MANAGEMENT | Facility: HOSPITAL | Age: 61
End: 2021-06-17

## 2021-06-17 DIAGNOSIS — F10.10 ALCOHOL ABUSE: ICD-10-CM

## 2021-06-17 DIAGNOSIS — K76.0 FATTY LIVER: ICD-10-CM

## 2021-06-17 DIAGNOSIS — K72.90 DECOMPENSATED HEPATIC CIRRHOSIS (HCC): ICD-10-CM

## 2021-06-17 DIAGNOSIS — F10.230 ALCOHOL DEPENDENCE WITH UNCOMPLICATED WITHDRAWAL (HCC): ICD-10-CM

## 2021-06-17 DIAGNOSIS — K74.60 DECOMPENSATED HEPATIC CIRRHOSIS (HCC): ICD-10-CM

## 2021-06-17 DIAGNOSIS — K70.31 ALCOHOLIC CIRRHOSIS OF LIVER WITH ASCITES (HCC): Primary | ICD-10-CM

## 2021-06-17 NOTE — PROGRESS NOTES
Oncology LSW received call from outpatient LONA VALLE regarding recent discussion between care team providers  It appears Sis Bañuelos is not a surgical candidate for her breast cancer because she is actively drinking, and has a h/o cirrhosis  Providers discussed possibility of pt pursuing substance use disorder rehabilitation if she agrees  Because this writer attempted outreach to pt earlier this week, LSW agreed to contact pt again to discuss her options for detox/rehab if she is interested  Phoned Sis Board today  She stated "they can't do anything for my cancer "  Initially, she reported this is due to low platelets, however as she continued to talk she disclosed she has liver cirrhosis and that she has 1 glass of red wine/night and drinks on weekends  LSW explained she is likely high risk and would need to stop actively drinking to be considered for surgery  She stated "once this bottle of wine is done, then that will be it " LSW explained she could start ETOH withdrawal and that a substance use disorder rehabilitation center may be helpful  She declined, and stated she can manage ok, because she did when she was in the hospital in December  Sis Board then stated she wants to live her life and has plans to go on vacation in August with her  and friends who also drink alcohol  She is aware of a "meeting" next week of providers who will discuss her case  She is likely referring to the surgical optimization clinic  LSW again tried to discuss medical concerns if she quits "cold turkey" and offered help for rehab options, however she declined  She stated her insurance won't cover rehab and she would owe $1000 00/week (LSW would need to verify this as accurate by calling her insurance, and would need pt permission)  Pt expressed appreciation for call, and politely stated she will be alright  LSW sent updated information to care team  Will assist with any other support in the future if needed

## 2021-06-17 NOTE — PROGRESS NOTES
Request received from Marla SANCHEZ and surgical oncology team for [facility] to outreach patient, discuss alcohol cessation, and inform of available resources  Per chart review, Oncology Social Worker Mel Mcwilliams is following patient as well  OPCM SW spoke with Shyanne Kwon who will talk with Nurse Navigator and Dr Trell Batista in greater detail regarding this  Shyanne Kwon will then outreach patient to further address alcohol cessation resources/programs  OPCM SW will remain available to assist as needed  Informed Mansoor Odom and Oncology team of this plan

## 2021-06-18 NOTE — TELEPHONE ENCOUNTER
Amendment to note written 6/16/21:  I also wanted to note that the patient said she did not believe in the way  that Brittany Cervantes practices, and she does not feel you are helping her or want to help her either  She is very upset that no one here will prescribe her Xanax, she said she has been on it for 30 years and she admitted that she is addicted to it  She said if we don't give it to her, she will go see another Dr  Like Dr Bibiana Gore  She said he is a very good doctor and she has been hearing good things about him  She then went on to plea her case again about the Xanax refill  I encouraged her to start the Zoloft, she said she would try

## 2021-06-18 NOTE — TELEPHONE ENCOUNTER
If patient is unhappy with the care provided she should find a new PCP that can care for her, we can forward all notes as needed

## 2021-06-21 ENCOUNTER — DOCUMENTATION (OUTPATIENT)
Dept: SURGICAL ONCOLOGY | Facility: CLINIC | Age: 61
End: 2021-06-21

## 2021-06-21 ENCOUNTER — TELEPHONE (OUTPATIENT)
Dept: FAMILY MEDICINE CLINIC | Facility: CLINIC | Age: 61
End: 2021-06-21

## 2021-06-21 DIAGNOSIS — F41.9 ANXIETY: ICD-10-CM

## 2021-06-21 DIAGNOSIS — D05.11 DUCTAL CARCINOMA IN SITU (DCIS) OF RIGHT BREAST: Primary | ICD-10-CM

## 2021-06-21 NOTE — TELEPHONE ENCOUNTER
She threatened to go there , but has not gone to [facility] yet   She did not ask for a record release as of yet either

## 2021-06-21 NOTE — PROGRESS NOTES
Try calling Diamond Moe on her home phone number  There was no answer and no voicemail  I therefore called her  spouse Roni Degroot on his cellphone as per her communication consent  I wanted to relay what we discussed at our breast working group  The group did agree with holding off on surgery for now  Recommendation is for Jose to meet with Medical Oncology to discuss hormone therapy  A consult was placed in the chart for this

## 2021-06-21 NOTE — TELEPHONE ENCOUNTER
She has not seen [facility] yet, and did not ask me for a record release either   I have a feeling she was threatening to go there since we did not refill her rx

## 2021-06-21 NOTE — TELEPHONE ENCOUNTER
Patient calling for her Xanax refill, she said " she has to give it to me, my  called our insurance company and they told him that you could order it 4 days in advance"  I asked her if she started the Zoloft, she said she  took it for a few days and then stopped as it made her :" too jittery" I explained to her that is one of the side effects until her body gets use to it, it would only last a few days if that  and she still refused to listen or go on it  She also said it " makes my stomach upset, and people were looking at me like I was crazy"      [facility]

## 2021-06-21 NOTE — TELEPHONE ENCOUNTER
I thought she was transferring to Dr Sofía Perez? Elizabeth, I think it is time we get you involved in this  Patient does not want to follow our recommendations, increased her Xanax dose even though we advised against it  Is mixing alcohol and xanax  This was a [facility] patient that switched her care to me recently  What are my options?

## 2021-06-21 NOTE — TELEPHONE ENCOUNTER
Patient called again regarding her xanax prescription  Agitated  Said she never had a problem before and she doesn't have meds for [facility]  Patient waiting for a call tomorrow      [phone]

## 2021-06-22 RX ORDER — ALPRAZOLAM 1 MG/1
TABLET ORAL
Qty: 60 TABLET | Refills: 5 | Status: SHIPPED | OUTPATIENT
Start: 2021-06-22 | End: 2021-12-09 | Stop reason: SDUPTHER

## 2021-06-22 NOTE — TELEPHONE ENCOUNTER
Patient called again for her Xanax refill , she said   " this is getting ridicules now, she's messing around with my meds and I don't like that  I've been on these meds for 30 years and I can't not have them, I HAVE TO HAVE THEM  And another thing, She and Kaci Robert kept hanging up on me last night and that's not right, my  Roni Degroot is pissed off and he's going to come up there and find out what is going on  I called Salo Handley again last night, and she did not call me back"  I told her that that is just simply not true that they would not hang up, I told her when we put people on hold, there is no music  And maybe she thought the phone was disconnected  She said " you trust your co-workers too much Nita"  I told her I was going to put her on hold to check if her refill has been sent    Came back and told her it was  Her attitude changed and she became very nice and thankful

## 2021-06-23 ENCOUNTER — TELEPHONE (OUTPATIENT)
Dept: SURGICAL ONCOLOGY | Facility: CLINIC | Age: 61
End: 2021-06-23

## 2021-06-23 NOTE — TELEPHONE ENCOUNTER
Intra-Department Referral    Patient Details:  Analilia Gonzalez  1960  [id number]   Background Information:  [address] starts by opening a telephone encounter and gathering the following information   Who is calling to schedule and relationship? Patient   Diagnosis Breast ca   Is this Cancer or Non-Cancer? Cancer   What department was patient seen in last? Surg Onc   Scheduling Information:    Preferred [address]   Are there any days the patient cannot be seen? na   Miscellaneous:    After completing the above information, please route to nurse navigation, clinical trials (for re-evaluation) and Linda

## 2021-06-24 ENCOUNTER — TELEPHONE (OUTPATIENT)
Dept: HEMATOLOGY ONCOLOGY | Facility: CLINIC | Age: 61
End: 2021-06-24

## 2021-06-24 NOTE — PROGRESS NOTES
06/21/2021 Breast Working Group Recommended Treatment plan to be considered       Patient is not the best surgical candidate due to current liver disease and alcoholism  Referral to medical oncology for possible AI therapy  The final treatment plan will be left to the discretion of the patient and the treating physician  DISCLAIMERS:    TO THE TREATING PHYSICIAN:  This conference is a meeting of clinicians from various specialty areas who evaluate and discuss patients for whom a multidisciplinary treatment approach is being considered  Please note that the above opinion was a consensus of the conference attendees and is intended only to assist in quality care of the discussed patient  The responsibility for follow up on the input given during the conference, along with any final decisions regarding plan of care, is that of the patient and the patient's provider  Accordingly, appointments have only been recommended based on this information and have NOT been scheduled unless otherwise noted  TO THE PATIENT:  This summary is a brief record of major aspects of your cancer treatment  You may choose to share a copy with any of your doctors or nurses  However, this is not a detailed or comprehensive record of your care

## 2021-06-24 NOTE — TELEPHONE ENCOUNTER
I left a message to see if the patient will be willing to be seen earlier in the day on Monday 6/28 with Dr Lakeisha Loza

## 2021-06-25 ENCOUNTER — TELEPHONE (OUTPATIENT)
Dept: HEMATOLOGY ONCOLOGY | Facility: HOSPITAL | Age: 61
End: 2021-06-25

## 2021-06-28 ENCOUNTER — CONSULT (OUTPATIENT)
Dept: HEMATOLOGY ONCOLOGY | Facility: HOSPITAL | Age: 61
End: 2021-06-28
Payer: COMMERCIAL

## 2021-06-28 VITALS
WEIGHT: 158 LBS | DIASTOLIC BLOOD PRESSURE: 60 MMHG | OXYGEN SATURATION: 95 % | HEART RATE: 101 BPM | SYSTOLIC BLOOD PRESSURE: 100 MMHG | BODY MASS INDEX: 28 KG/M2 | TEMPERATURE: 98.3 F | HEIGHT: 63 IN | RESPIRATION RATE: 16 BRPM

## 2021-06-28 DIAGNOSIS — D05.11 DUCTAL CARCINOMA IN SITU (DCIS) OF RIGHT BREAST: Primary | ICD-10-CM

## 2021-06-28 PROCEDURE — 3078F DIAST BP <80 MM HG: CPT | Performed by: INTERNAL MEDICINE

## 2021-06-28 PROCEDURE — 3074F SYST BP LT 130 MM HG: CPT | Performed by: INTERNAL MEDICINE

## 2021-06-28 PROCEDURE — 99245 OFF/OP CONSLTJ NEW/EST HI 55: CPT | Performed by: INTERNAL MEDICINE

## 2021-06-28 PROCEDURE — 4004F PT TOBACCO SCREEN RCVD TLK: CPT | Performed by: INTERNAL MEDICINE

## 2021-06-28 PROCEDURE — 3008F BODY MASS INDEX DOCD: CPT | Performed by: INTERNAL MEDICINE

## 2021-06-28 RX ORDER — ANASTROZOLE 1 MG/1
1 TABLET ORAL DAILY
Qty: 90 TABLET | Refills: 3 | Status: SHIPPED | OUTPATIENT
Start: 2021-06-28 | End: 2022-06-02 | Stop reason: SDUPTHER

## 2021-06-28 NOTE — PROGRESS NOTES
Oncology Outpatient Consult Note  Ad Prasad 57 y o  female MRN: @ Encounter: [id number]        Date:  6/28/2021        CC:   DCIS of the breast  On breast biopsy which is ER positive IL positive      HPI:  Ad Prasad is seen for initial consultation 6/28/2021 regarding  Newly diagnosed DCIS of the breast on the right side status post stereotactic biopsy which revealed ER positive IL positive disease  This was in relation to an abnormal mammogram   The patient apparently was discussed in breast tumor board  And based on her alcoholic cirrhosis along with active drinking and  Ascites she was recommended to see us for consideration of an aromatase inhibitor until she was a candidate for surgery  Per the note from breast working groove she was not the best surgical candidate due to current liver disease and alcoholism  Liver function tests are slightly elevated  The patient also has extensive documentation where she appears to be very dependent/addicted to Xanax  As far symptoms today are concerned denies any nausea denies any vomiting denies any diarrhea  The rest of her 14 point review of systems today was negative      Cancer Staging:  Cancer Staging  Ductal carcinoma in situ (DCIS) of right breast  Staging form: Breast, AJCC 8th Edition  - Clinical: Stage 0 (cTis (DCIS), cN0, cM0, ER+, IL+, HER2: Not Assessed) - Signed by Jacklyn Riggins MD on 6/14/2021  Stage prefix: Initial diagnosis  Method of lymph node assessment: Clinical  Nuclear grade: G2      Molecular Testing:     Previous Hematologic/ Oncologic History:    Oncology History   Ductal carcinoma in situ (DCIS) of right breast   6/14/2021 Initial Diagnosis    Ductal carcinoma in situ (DCIS) of right breast     6/14/2021 -  Cancer Staged    Staging form: Breast, AJCC 8th Edition  - Clinical: Stage 0 (cTis (DCIS), cN0, cM0, ER+, IL+, HER2: Not Assessed) - Signed by Jacklyn Riggins MD on 6/14/2021  Stage prefix: Initial diagnosis  Method of lymph node assessment: Clinical  Nuclear grade: G2           Test Results:    Imaging: No results found  Labs:   Lab Results   Component Value Date    WBC 6 3 06/14/2021    HGB 14 1 06/14/2021    HCT 40 4 06/14/2021     (H) 06/14/2021    PLT 91 (LL) 06/14/2021     Lab Results   Component Value Date     07/28/2017    K 3 8 06/14/2021    CL 96 06/14/2021    CO2 27 06/14/2021    ANIONGAP 12 05/03/2015    BUN 19 06/14/2021    CREATININE 0 95 06/14/2021    GLUCOSE 95 07/28/2017    CALCIUM 8 9 02/19/2021    CORRECTEDCA 9 9 02/19/2021    AST 95 (H) 06/14/2021    ALT 44 (H) 06/14/2021    ALKPHOS 137 (H) 02/19/2021    PROT 7 4 07/28/2017    BILITOT 0 6 07/28/2017    EGFR 49 02/19/2021       Lab Results   Component Value Date    IRON 77 12/07/2020    TIBC 121 (L) 12/07/2020    FERRITIN 1,132 (H) 12/07/2020       Lab Results   Component Value Date    WOLURGKP17 1,232 09/09/2020           ROS: As stated in history of present illness otherwise her 14 point review of systems today was negative          Active Problems:   Patient Active Problem List   Diagnosis    Fatty liver    Other specified abnormal findings of blood chemistry    Anxiety    Elevated LFTs    GERD without esophagitis    Hyperlipidemia    Essential hypertension    Insomnia    Nicotine dependence    Healthcare maintenance    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)    Alcohol abuse    Irritable bowel syndrome with diarrhea    Non-seasonal allergic rhinitis due to pollen    Alcoholic cirrhosis of liver with ascites (HCC)    Alcohol dependence with uncomplicated withdrawal (HCC)    Thrombocytopenia (HCC)    Decompensated hepatic cirrhosis (HCC)    Ductal carcinoma in situ (DCIS) of right breast       Past Medical History:   Past Medical History:   Diagnosis Date    Alcoholic fatty liver     Anxiety     Asthma     COPD (chronic obstructive pulmonary disease) (HCC)     Elevated liver function tests     GERD (gastroesophageal reflux disease)     GERD without esophagitis     Hyperlipidemia     Hypertension     IBS (irritable bowel syndrome)     Insomnia     Insomnia     Liver disease     Nervousness     Nicotine dependence     Other specified urinary incontinence     RLS (restless legs syndrome)     Wears glasses        Surgical History:   Past Surgical History:   Procedure Laterality Date    BACK SURGERY      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    BREAST EXCISIONAL BIOPSY Right 11/01/2004    benign    CATARACT EXTRACTION, BILATERAL  08/01/2018    COLONOSCOPY N/A 5/8/2019    Procedure: COLONOSCOPY;  Surgeon: Jaclyn Hwang MD;  Location: [facility]; Service: Gastroenterology    EGD AND COLONOSCOPY N/A 3/19/2018    Procedure: EGD AND COLONOSCOPY;  Surgeon: Jaclyn Hwang MD;  Location: [facility]; Service: Gastroenterology    ESOPHAGOGASTRODUODENOSCOPY N/A 5/8/2019    Procedure: ESOPHAGOGASTRODUODENOSCOPY (EGD); Surgeon: Jaclyn Hwang MD;  Location: [facility];   Service: Gastroenterology    IR PARACENTESIS  11/27/2020    IR PARACENTESIS  12/8/2020    KNEE SURGERY      KNEE SURGERY      LUMBAR LAMINECTOMY  04/1999    LYMPH NODE BIOPSY      MAMMO STEREOTACTIC BREAST BIOPSY RIGHT (ALL INC) Right 5/21/2021    MAMMO STEREOTACTIC BREAST BIOPSY RIGHT (ALL INC) EACH ADD Right 5/21/2021    TOTAL ABDOMINAL HYSTERECTOMY  02/05/2008    TUBAL LIGATION      TUBAL LIGATION  1989    UPPER GASTROINTESTINAL ENDOSCOPY         Family History:    Family History   Problem Relation Age of Onset    Breast cancer Mother 28    No Known Problems Father     No Known Problems Sister     No Known Problems Brother     No Known Problems Son     No Known Problems Maternal Grandmother     No Known Problems Maternal Grandfather     No Known Problems Paternal Grandmother     No Known Problems Paternal Grandfather     No Known Problems Maternal Aunt     No Known Problems Maternal Aunt  No Known Problems Paternal Aunt     Substance Abuse Neg Hx     Mental illness Neg Hx        Cancer-related family history includes Breast cancer (age of onset: 28) in her mother  Social History:   Social History     Socioeconomic History    Marital status: /Civil Union     Spouse name: Not on file    Number of children: Not on file    Years of education: Not on file    Highest education level: Not on file   Occupational History    Not on file   Tobacco Use    Smoking status: Current Every Day Smoker     Packs/day: 0 50     Years: 45 00     Pack years: 22 50     Types: Cigarettes     Start date: 1978    Smokeless tobacco: Never Used    Tobacco comment: Per allscripts - 1/2 PPD cigs - smoking for 40 years   Vaping Use    Vaping Use: Never used   Substance and Sexual Activity    Alcohol use: Not Currently     Alcohol/week: 24 0 standard drinks     Types: 24 Cans of beer per week     Comment: Daily; just cut back from 6 beers/day  Per allscripts -  3 beers a night    Drug use: No    Sexual activity: Yes     Partners: Male   Other Topics Concern    Not on file   Social History Narrative    Has carbon monoxide detectors in home    Has smoke detectors    Uses safety equipment - seatbelts     Social Determinants of Health     Financial Resource Strain:     Difficulty of Paying Living Expenses:    Food Insecurity:     Worried About Running Out of Food in the Last Year:     [address] in the Last Year:    Transportation Needs:     Lack of Transportation (Medical):      Lack of Transportation (Non-Medical):    Physical Activity:     Days of Exercise per Week:     Minutes of Exercise per Session:    Stress:     Feeling of Stress :    Social Connections:     Frequency of Communication with Friends and Family:     Frequency of Social Gatherings with Friends and Family:     Attends Mosque Services:     Active Member of Clubs or Organizations:     Attends Club or Organization Meetings:     Marital Status:    Intimate Partner Violence:     Fear of Current or Ex-Partner:     Emotionally Abused:     Physically Abused:     Sexually Abused:        Current Medications:   Current Outpatient Medications   Medication Sig Dispense Refill    albuterol (PROVENTIL HFA) 90 mcg/act inhaler Inhale 2 puffs every 6 (six) hours as needed for wheezing or shortness of breath 1 Inhaler 6    ALPRAZolam (XANAX) 1 mg tablet TAKE ONE TABLET BY MOUTH TWICE A DAY AS NEEDED FOR ANXIETY 60 tablet 5    benzonatate (TESSALON PERLES) 100 mg capsule Take 1 capsule (100 mg total) by mouth 3 (three) times a day as needed for cough 60 capsule 2    furosemide (LASIX) 40 mg tablet Take 1 tablet (40 mg total) by mouth daily 30 tablet 5    ibuprofen (MOTRIN) 600 mg tablet Take 1 tablet (600 mg total) by mouth every 6 (six) hours as needed for mild pain (Patient not taking: Reported on 5/12/2021) 20 tablet 0    lidocaine (LIDODERM) 5 % Apply 1 patch topically every 12 (twelve) hours Remove & Discard patch within 12 hours or as directed by MD (Patient not taking: Reported on 5/12/2021) 15 patch 0    methocarbamol (ROBAXIN) 500 mg tablet Take 1 tablet (500 mg total) by mouth 2 (two) times a day (Patient not taking: Reported on 5/12/2021) 20 tablet 0    mometasone (ELOCON) 0 1 % cream Apply topically daily (Patient not taking: Reported on 5/12/2021) 45 g 0    omeprazole (PriLOSEC) 40 MG capsule TAKE ONE CAPSULE BY MOUTH EVERY DAY (Patient not taking: Reported on 5/12/2021) 30 capsule 5    prednisoLONE acetate (PRED FORTE) 1 % ophthalmic suspension PLACE 1 DROP IN LEFT EYE 4 TIMES DAILY FOR 3 DAYS STARTING AFTER LASER PROCEDURE      sertraline (ZOLOFT) 25 mg tablet Take 1 tablet (25 mg total) by mouth daily 90 tablet 1    spironolactone (ALDACTONE) 50 mg tablet Take 1 tablet (50 mg total) by mouth daily 30 tablet 5    tiotropium-olodaterol (STIOLTO RESPIMAT) 2 5-2 5 MCG/ACT inhaler Inhale 2 puffs daily (Patient not taking: Reported on 6/14/2021) 4 g 6    TREMFYA subcutaneous injection        No current facility-administered medications for this visit  Allergies: Allergies   Allergen Reactions    Ace Inhibitors Cough         Physical Exam:    There is no height or weight on file to calculate BSA  Wt Readings from Last 3 Encounters:   06/14/21 72 6 kg (160 lb)   05/21/21 73 kg (161 lb)   05/19/21 73 kg (161 lb)        Temp Readings from Last 3 Encounters:   06/14/21 (!) 97 3 °F (36 3 °C) (Tympanic)   05/12/21 98 5 °F (36 9 °C) (Tympanic)   04/08/21 98 7 °F (37 1 °C) (Tympanic)        BP Readings from Last 3 Encounters:   06/14/21 130/80   05/21/21 118/62   05/12/21 128/72         Pulse Readings from Last 3 Encounters:   06/14/21 94   05/21/21 76   05/12/21 93       Physical Exam     Constitutional   General appearance: No acute distress, well appearing and well nourished  Eyes   Conjunctiva and lids: No swelling, erythema or discharge  Pupils and irises: Equal, round and reactive to light  Ears, Nose, Mouth, and Throat   External inspection of ears and nose: Normal     Nasal mucosa, septum, and turbinates: Normal without edema or erythema  Oropharynx: Normal with no erythema, edema, exudate or lesions  Pulmonary   Respiratory effort: No increased work of breathing or signs of respiratory distress  Auscultation of lungs: Clear to auscultation  Cardiovascular   Palpation of heart: Normal PMI, no thrills  Auscultation of heart: Normal rate and rhythm, normal S1 and S2, without murmurs  Examination of extremities for edema and/or varicosities: Normal     Carotid pulses: Normal     Abdomen   Abdomen: Non-tender, no masses  Liver and spleen: No hepatomegaly or splenomegaly  Lymphatic   Palpation of lymph nodes in neck: No lymphadenopathy  Musculoskeletal   Gait and station: Normal     Digits and nails: Normal without clubbing or cyanosis      Inspection/palpation of joints, bones, and muscles: Normal     Skin   Skin and subcutaneous tissue: Normal without rashes or lesions  Neurologic   Cranial nerves: Cranial nerves 2-12 intact  Sensation: No sensory loss  Psychiatric   Orientation to person, place, and time: Normal     Mood and affect: Normal           Assessment/ Plan:      The patient is an unfortunate 50-year-old female with a past medical history of alcoholic liver cirrhosis and dependence on benzodiazepines was referred to see us for newly diagnosed DCIS which is ER positive  She was discussed at breast working group and it was decided to start her on an aromatase inhibitor to evaluate for disease response and I suspect until she is optimized medically for surgery which would be the ultimate treatment for this with consideration of radiation  I went over the risks benefits and alternatives of antiestrogen therapy  I explained the possible side effects to include but not limited to hot flashes, vaginal dryness, signs and symptoms of menopause, osteoporosis  The patient is agreeable to proceed and will sign a consent form today  I will initiate her aromatase inhibitor  Eventually she will have to see our colleagues in breast surgery and consider her lumpectomy with possible radiation  I explained that would be up to the patient, her primary care physician and the breast surgeon  They stated they may  Seek a 2nd opinion for the surgery which I think is reasonable  Keeping her on aromatase inhibitor while it would have a survival benefit is not curative for what she has  I explained this to her and she understands  I advised her she needs to stop drinking so she can go for surgery  Again this will be determined by our colleagues in breast surgery  Goals and Barriers:  Current Goal:  Prolong Survival from   DCIS  Barriers: None  Patient's Capacity to Self Care:  Patient able to self care      Portions of the record may have been created with voice recognition software   Occasional wrong word or "sound a like" substitutions may have occurred due to the inherent limitations of voice recognition software   Read the chart carefully and recognize, using context, where substitutions have occurred

## 2021-07-30 ENCOUNTER — TELEPHONE (OUTPATIENT)
Dept: HEMATOLOGY ONCOLOGY | Facility: CLINIC | Age: 61
End: 2021-07-30

## 2021-07-30 NOTE — TELEPHONE ENCOUNTER
Reschedule Appointment     Who is calling in Patient    Doctor Appointment Scheduled with Dr Birder Mcardle date and time 09/27 at 10:40am   New date and time 10/08 at 10:40am   Location [facility]   Patient verbalized understanding    yes

## 2021-08-09 DIAGNOSIS — K70.31 ALCOHOLIC CIRRHOSIS OF LIVER WITH ASCITES (HCC): Chronic | ICD-10-CM

## 2021-08-09 RX ORDER — SPIRONOLACTONE 50 MG/1
50 TABLET, FILM COATED ORAL DAILY
Qty: 30 TABLET | Refills: 5 | OUTPATIENT
Start: 2021-08-09

## 2021-08-09 RX ORDER — FUROSEMIDE 40 MG/1
TABLET ORAL
Qty: 30 TABLET | Refills: 5 | Status: SHIPPED | OUTPATIENT
Start: 2021-08-09 | End: 2022-01-28 | Stop reason: SDUPTHER

## 2021-08-09 RX ORDER — SPIRONOLACTONE 50 MG/1
TABLET, FILM COATED ORAL
Qty: 30 TABLET | Refills: 5 | Status: SHIPPED | OUTPATIENT
Start: 2021-08-09 | End: 2022-01-28 | Stop reason: SDUPTHER

## 2021-08-09 RX ORDER — FUROSEMIDE 40 MG/1
40 TABLET ORAL DAILY
Qty: 30 TABLET | Refills: 5 | OUTPATIENT
Start: 2021-08-09

## 2021-08-09 NOTE — TELEPHONE ENCOUNTER
Pt of Dr Geronimo Paris last seen in office 2/9/1989  HX: alcoholic cirrhosis of liver w/ ascites, GERD, IBS w/diarrha     Requesting refill of furosemide 40mg and spirolactone 50 mg

## 2021-08-09 NOTE — TELEPHONE ENCOUNTER
GI Physician: Dr Demian Watson     Refill Request for Medication: Furosemide    Dose: 40 mg    Quantity: 30    Pharmacy and Location: [facility]      Refill Request for Medication: Spironolactone    Dose: 50 mg    Quantity: 30    Pharmacy and Location: [facility]

## 2021-08-26 ENCOUNTER — TELEPHONE (OUTPATIENT)
Dept: FAMILY MEDICINE CLINIC | Facility: CLINIC | Age: 61
End: 2021-08-26

## 2021-08-26 NOTE — TELEPHONE ENCOUNTER
You can answer this Friday, no hurry today    [place] was [place] for a [place] , she was in [place]  She is vaccinated, however wants to know if she should be tested for covid before her 3rd booster   She is also scheduled for breast surgery mid Sept    [phone]

## 2021-09-04 ENCOUNTER — IMMUNIZATIONS (OUTPATIENT)
Dept: FAMILY MEDICINE CLINIC | Facility: MEDICAL CENTER | Age: 61
End: 2021-09-04

## 2021-09-04 DIAGNOSIS — Z23 ENCOUNTER FOR IMMUNIZATION: Primary | ICD-10-CM

## 2021-09-04 PROCEDURE — 91300 SARSCOV2 VAC 30MCG/0.3ML IM: CPT

## 2021-09-07 ENCOUNTER — TELEMEDICINE (OUTPATIENT)
Dept: FAMILY MEDICINE CLINIC | Facility: CLINIC | Age: 61
End: 2021-09-07
Payer: COMMERCIAL

## 2021-09-07 VITALS — WEIGHT: 158 LBS | TEMPERATURE: 99.4 F | HEIGHT: 63 IN | BODY MASS INDEX: 28 KG/M2

## 2021-09-07 DIAGNOSIS — Z20.822 CLOSE EXPOSURE TO COVID-19 VIRUS: Primary | ICD-10-CM

## 2021-09-07 PROCEDURE — 4004F PT TOBACCO SCREEN RCVD TLK: CPT | Performed by: NURSE PRACTITIONER

## 2021-09-07 PROCEDURE — 3008F BODY MASS INDEX DOCD: CPT | Performed by: NURSE PRACTITIONER

## 2021-09-07 PROCEDURE — 99213 OFFICE O/P EST LOW 20 MIN: CPT | Performed by: NURSE PRACTITIONER

## 2021-09-07 PROCEDURE — U0003 INFECTIOUS AGENT DETECTION BY NUCLEIC ACID (DNA OR RNA); SEVERE ACUTE RESPIRATORY SYNDROME CORONAVIRUS 2 (SARS-COV-2) (CORONAVIRUS DISEASE [COVID-19]), AMPLIFIED PROBE TECHNIQUE, MAKING USE OF HIGH THROUGHPUT TECHNOLOGIES AS DESCRIBED BY CMS-2020-01-R: HCPCS | Performed by: NURSE PRACTITIONER

## 2021-09-07 PROCEDURE — U0005 INFEC AGEN DETEC AMPLI PROBE: HCPCS | Performed by: NURSE PRACTITIONER

## 2021-09-07 NOTE — PROGRESS NOTES
COVID-19 Outpatient Progress Note    Assessment/Plan:    Pt presents via televideo for symptoms of cough, loss of taste/smell  She was exposed to her son all of last week 08/30-09/03 who tested positive over the weekend  Pt had a booster covid vaccine of [place] on 09/04  She has an upcoming surgery on 09/15 and wanted to get covid tested given exposure and symptoms  Covid swab ordered  Patient encouraged to continue symptom management & maintain adequate oral fluid intake at home  Pt notified that our office will contact them once their results are final  In the meantime, pt is to self-isolate & quarantine at home until results final  Pt to call office if symptoms worsen  Pt states they understand & agree with treatment plan  Problem List Items Addressed This Visit     None      Visit Diagnoses     Close exposure to COVID-19 virus    -  Primary    Relevant Orders    Novel Coronavirus (COVID-19), PCR [facility] Collected in Office         Disposition:     I recommended self-quarantine for 10 days and to watch for symptoms until 14 days after exposure  If patient were to develop symptoms, they should self isolate and call our office for further guidance  I referred patient to one of our centralized sites for a COVID-19 swab  I have spent 15 minutes directly with the patient  Greater than 50% of this time was spent in counseling/coordination of care regarding: instructions for management and patient and family education  Verification of patient location:    Patient is located in the following state in which I hold an active license [place]    Encounter provider JOSÉ Ferreira    Provider located at [address] [place] [address]  [phone]    Recent Visits  No visits were found meeting these conditions    Showing recent visits within past 7 days and meeting all other requirements  Today's Visits  Date Type Provider Dept   09/07/21 Telemedicine JOSÉ [address]   Showing today's visits and meeting all other requirements  Future Appointments  No visits were found meeting these conditions  Showing future appointments within next 150 days and meeting all other requirements     This virtual check-in was done via [employer] and patient was informed that this is a secure, HIPAA-compliant platform  She agrees to proceed  Patient agrees to participate in a virtual check in via telephone or video visit instead of presenting to the office to address urgent/immediate medical needs  Patient is aware this is a billable service  After connecting through [facility], the patient was identified by name and date of birth  Jr Starkey was informed that this was a telemedicine visit and that the exam was being conducted confidentially over secure lines  My office door was closed  No one else was in the room  Jr Starkey acknowledged consent and understanding of privacy and security of the telemedicine visit  I informed the patient that I have reviewed her record in Epic and presented the opportunity for her to ask any questions regarding the visit today  The patient agreed to participate  Subjective:   Jr Starkey is a 57 y o  female who is concerned about COVID-19  Patient's symptoms include nasal congestion, rhinorrhea, sore throat, anosmia, loss of taste and cough  Patient denies fever, chills, fatigue, malaise, shortness of breath, chest tightness, abdominal pain, nausea, vomiting, diarrhea, myalgias and headaches       Date of symptom onset: 9/5/2021  Date of exposure: 8/30/2021  COVID-19 vaccination status: Fully vaccinated    Exposure:   Contact with a person who is under investigation (PUI) for or who is positive for COVID-19 within the last 14 days?: Yes    Hospitalized recently for fever and/or lower respiratory symptoms?: No      Currently a healthcare worker that is involved in direct patient care?: No      Works in a special setting where the risk of COVID-19 transmission may be high? (this may include long-term care, correctional and [facility] facilities; homeless shelters; assisted-living facilities and group homes ): No      Resident in a special setting where the risk of COVID-19 transmission may be high? (this may include long-term care, correctional and [facility] facilities; homeless shelters; assisted-living facilities and group homes ): No      Pt presents via televideo for symptoms of cough, loss of taste/smell  She was exposed to her son all of last week 08/30-09/03 who tested positive over the weekend  Pt had a booster covid vaccine of [employer] on 09/04  She has an upcoming surgery on 09/15 and wanted to get covid tested given exposure and symptoms  Lab Results   Component Value Date    SARSCOV2 Negative 12/05/2020    SARSCOV2 Not Detected 11/02/2020     Past Medical History:   Diagnosis Date    Alcoholic fatty liver     Anxiety     Asthma     COPD (chronic obstructive pulmonary disease) (HCC)     Elevated liver function tests     GERD (gastroesophageal reflux disease)     GERD without esophagitis     Hyperlipidemia     Hypertension     IBS (irritable bowel syndrome)     Insomnia     Insomnia     Liver disease     Nervousness     Nicotine dependence     Other specified urinary incontinence     RLS (restless legs syndrome)     Wears glasses      Past Surgical History:   Procedure Laterality Date    BACK SURGERY      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    BREAST EXCISIONAL BIOPSY Right 11/01/2004    benign    CATARACT EXTRACTION, BILATERAL  08/01/2018    COLONOSCOPY N/A 5/8/2019    Procedure: COLONOSCOPY;  Surgeon: Fanta Gipson MD;  Location: [facility] GI LAB;   Service: Gastroenterology    EGD AND COLONOSCOPY N/A 3/19/2018    Procedure: EGD AND COLONOSCOPY;  Surgeon: Fanta Gipson MD;  Location: [facility] GI LAB; Service: Gastroenterology    ESOPHAGOGASTRODUODENOSCOPY N/A 5/8/2019    Procedure: ESOPHAGOGASTRODUODENOSCOPY (EGD); Surgeon: Juan Alejandre MD;  Location[facility];   Service: Gastroenterology    IR PARACENTESIS  11/27/2020    IR PARACENTESIS  12/8/2020    KNEE SURGERY      KNEE SURGERY      LUMBAR LAMINECTOMY  04/1999    LYMPH NODE BIOPSY      MAMMO STEREOTACTIC BREAST BIOPSY RIGHT (ALL INC) Right 5/21/2021    MAMMO STEREOTACTIC BREAST BIOPSY RIGHT (ALL INC) EACH ADD Right 5/21/2021    TOTAL ABDOMINAL HYSTERECTOMY  02/05/2008    TUBAL LIGATION      TUBAL LIGATION  1989    UPPER GASTROINTESTINAL ENDOSCOPY       Current Outpatient Medications   Medication Sig Dispense Refill    albuterol (PROVENTIL HFA) 90 mcg/act inhaler Inhale 2 puffs every 6 (six) hours as needed for wheezing or shortness of breath 1 Inhaler 6    ALPRAZolam (XANAX) 1 mg tablet TAKE ONE TABLET BY MOUTH TWICE A DAY AS NEEDED FOR ANXIETY 60 tablet 5    anastrozole (ARIMIDEX) 1 mg tablet Take 1 tablet (1 mg total) by mouth daily 90 tablet 3    benzonatate (TESSALON PERLES) 100 mg capsule Take 1 capsule (100 mg total) by mouth 3 (three) times a day as needed for cough 60 capsule 2    furosemide (LASIX) 40 mg tablet TAKE ONE TABLET BY MOUTH EVERY DAY 30 tablet 5    ibuprofen (MOTRIN) 600 mg tablet Take 1 tablet (600 mg total) by mouth every 6 (six) hours as needed for mild pain 20 tablet 0    lidocaine (LIDODERM) 5 % Apply 1 patch topically every 12 (twelve) hours Remove & Discard patch within 12 hours or as directed by MD 15 patch 0    methocarbamol (ROBAXIN) 500 mg tablet Take 1 tablet (500 mg total) by mouth 2 (two) times a day 20 tablet 0    mometasone (ELOCON) 0 1 % cream Apply topically daily 45 g 0    omeprazole (PriLOSEC) 40 MG capsule TAKE ONE CAPSULE BY MOUTH EVERY DAY 30 capsule 5    prednisoLONE acetate (PRED FORTE) 1 % ophthalmic suspension PLACE 1 DROP IN LEFT EYE 4 TIMES DAILY FOR 3 DAYS STARTING AFTER LASER PROCEDURE      sertraline (ZOLOFT) 25 mg tablet Take 1 tablet (25 mg total) by mouth daily 90 tablet 1    spironolactone (ALDACTONE) 50 mg tablet TAKE ONE TABLET BY MOUTH EVERY DAY 30 tablet 5    tiotropium-olodaterol (STIOLTO RESPIMAT) 2 5-2 5 MCG/ACT inhaler Inhale 2 puffs daily 4 g 6    TREMFYA subcutaneous injection        No current facility-administered medications for this visit  Allergies   Allergen Reactions    Ace Inhibitors Cough and Other (See Comments)     coughing       Review of Systems   Constitutional: Negative for chills, fatigue and fever  HENT: Positive for congestion, rhinorrhea and sore throat  Respiratory: Positive for cough  Negative for chest tightness and shortness of breath  Gastrointestinal: Negative for abdominal pain, diarrhea, nausea and vomiting  Musculoskeletal: Negative for myalgias  Neurological: Negative for headaches  Objective:    Vitals:    09/07/21 0901   Temp: 99 4 °F (37 4 °C)   TempSrc: Tympanic   Weight: 71 7 kg (158 lb)   Height: 5' 3" (1 6 m)       Physical Exam  Constitutional:       Appearance: Normal appearance  Pulmonary:      Effort: Pulmonary effort is normal    Neurological:      Mental Status: She is alert and oriented to person, place, and time  Psychiatric:         Mood and Affect: Mood normal          Behavior: Behavior normal          Thought Content: Thought content normal          Judgment: Judgment normal          VIRTUAL VISIT DISCLAIMER    Jose White verbally agrees to participate in [employer]  Pt is aware that [employer] could be limited without vital signs or the ability to perform a full hands-on physical Ricardo Toyin understands she or the provider may request at any time to terminate the video visit and request the patient to seek care or treatment in person

## 2021-09-08 ENCOUNTER — TELEPHONE (OUTPATIENT)
Dept: FAMILY MEDICINE CLINIC | Facility: CLINIC | Age: 61
End: 2021-09-08

## 2021-09-08 LAB — SARS-COV-2 RNA RESP QL NAA+PROBE: NEGATIVE

## 2021-09-08 NOTE — TELEPHONE ENCOUNTER
----- Message from [address] [place] [address] sent at 9/8/2021  1:35 PM EDT -----  Please let patient know her covid test results are negative  Thanks!

## 2021-09-21 ENCOUNTER — TELEPHONE (OUTPATIENT)
Dept: FAMILY MEDICINE CLINIC | Facility: CLINIC | Age: 61
End: 2021-09-21

## 2021-09-21 NOTE — TELEPHONE ENCOUNTER
Patient would like to schedule her prevnar and Flu together am I able to schedule that? Patient had her 3rd booster for Covid through [address]  Had it 3rd booster 9/4/2021

## 2021-09-22 ENCOUNTER — IMMUNIZATIONS (OUTPATIENT)
Dept: FAMILY MEDICINE CLINIC | Facility: CLINIC | Age: 61
End: 2021-09-22
Payer: COMMERCIAL

## 2021-09-22 DIAGNOSIS — Z23 NEED FOR INFLUENZA VACCINATION: Primary | ICD-10-CM

## 2021-09-23 ENCOUNTER — TELEPHONE (OUTPATIENT)
Dept: FAMILY MEDICINE CLINIC | Facility: CLINIC | Age: 61
End: 2021-09-23

## 2021-09-23 NOTE — TELEPHONE ENCOUNTER
Patient is asking for something for pain, she had her biop and she is in a lot of pain  I told her to call [facility] for the refill and she said they would not give her anything  She is currently taking her husbands Tramadol    FYI  She also wanted to come in today to be seen because she said she tore her ACL and is in a lot of pain with that as well  I told her yesterday that I will call her this morning for an appointment and that I could get her in, I called and there was no answer and no way to leave a voice mail

## 2021-09-23 NOTE — TELEPHONE ENCOUNTER
I cannot call in pain medications for a procedure done by someone else  Person who did procedure if responsible for pain management for (I believe) 30 days

## 2021-09-24 PROCEDURE — 90682 RIV4 VACC RECOMBINANT DNA IM: CPT

## 2021-09-24 PROCEDURE — 90471 IMMUNIZATION ADMIN: CPT

## 2021-09-28 LAB
ALBUMIN SERPL-MCNC: 3.2 G/DL (ref 3.8–4.8)
ALBUMIN SERPL-MCNC: 3.3 G/DL (ref 3.8–4.8)
ALBUMIN/GLOB SERPL: 0.6 {RATIO} (ref 1.2–2.2)
ALBUMIN/GLOB SERPL: 0.7 {RATIO} (ref 1.2–2.2)
ALP SERPL-CCNC: 154 IU/L (ref 44–121)
ALP SERPL-CCNC: 157 IU/L (ref 44–121)
ALT SERPL-CCNC: 44 IU/L (ref 0–32)
ALT SERPL-CCNC: 46 IU/L (ref 0–32)
APPEARANCE UR: CLEAR
AST SERPL-CCNC: 90 IU/L (ref 0–40)
AST SERPL-CCNC: 93 IU/L (ref 0–40)
BACTERIA URNS QL MICRO: ABNORMAL
BASOPHILS # BLD AUTO: 0.1 X10E3/UL (ref 0–0.2)
BASOPHILS NFR BLD AUTO: 2 %
BILIRUB SERPL-MCNC: 1 MG/DL (ref 0–1.2)
BILIRUB SERPL-MCNC: 1 MG/DL (ref 0–1.2)
BILIRUB UR QL STRIP: NEGATIVE
BUN SERPL-MCNC: 6 MG/DL (ref 8–27)
BUN SERPL-MCNC: 7 MG/DL (ref 8–27)
BUN/CREAT SERPL: 11 (ref 12–28)
BUN/CREAT SERPL: 8 (ref 12–28)
CALCIUM SERPL-MCNC: 9 MG/DL (ref 8.7–10.3)
CALCIUM SERPL-MCNC: 9.3 MG/DL (ref 8.7–10.3)
CANCER AG27-29 SERPL-ACNC: 28.4 U/ML (ref 0–38.6)
CASTS URNS QL MICRO: ABNORMAL /LPF
CHLORIDE SERPL-SCNC: 101 MMOL/L (ref 96–106)
CHLORIDE SERPL-SCNC: 102 MMOL/L (ref 96–106)
CO2 SERPL-SCNC: 24 MMOL/L (ref 20–29)
CO2 SERPL-SCNC: 24 MMOL/L (ref 20–29)
COLOR UR: YELLOW
CREAT SERPL-MCNC: 0.62 MG/DL (ref 0.57–1)
CREAT SERPL-MCNC: 0.74 MG/DL (ref 0.57–1)
CREAT UR-MCNC: 82.7 MG/DL
EOSINOPHIL # BLD AUTO: 0.2 X10E3/UL (ref 0–0.4)
EOSINOPHIL NFR BLD AUTO: 2 %
EPI CELLS #/AREA URNS HPF: ABNORMAL /HPF (ref 0–10)
ERYTHROCYTE [DISTWIDTH] IN BLOOD BY AUTOMATED COUNT: 12.3 % (ref 11.7–15.4)
GLOBULIN SER-MCNC: 5 G/DL (ref 1.5–4.5)
GLOBULIN SER-MCNC: 5.4 G/DL (ref 1.5–4.5)
GLUCOSE SERPL-MCNC: 95 MG/DL (ref 65–99)
GLUCOSE SERPL-MCNC: 99 MG/DL (ref 65–99)
GLUCOSE UR QL: NEGATIVE
HCT VFR BLD AUTO: 37 % (ref 34–46.6)
HGB BLD-MCNC: 13 G/DL (ref 11.1–15.9)
HGB UR QL STRIP: NEGATIVE
IMM GRANULOCYTES # BLD: 0.1 X10E3/UL (ref 0–0.1)
IMM GRANULOCYTES NFR BLD: 1 %
KETONES UR QL STRIP: NEGATIVE
LEUKOCYTE ESTERASE UR QL STRIP: NEGATIVE
LYMPHOCYTES # BLD AUTO: 3.2 X10E3/UL (ref 0.7–3.1)
LYMPHOCYTES NFR BLD AUTO: 35 %
MCH RBC QN AUTO: 37 PG (ref 26.6–33)
MCHC RBC AUTO-ENTMCNC: 35.1 G/DL (ref 31.5–35.7)
MCV RBC AUTO: 105 FL (ref 79–97)
MICRO URNS: NORMAL
MICRO URNS: NORMAL
MONOCYTES # BLD AUTO: 0.8 X10E3/UL (ref 0.1–0.9)
MONOCYTES NFR BLD AUTO: 8 %
MUCOUS THREADS URNS QL MICRO: PRESENT
NEUTROPHILS # BLD AUTO: 4.8 X10E3/UL (ref 1.4–7)
NEUTROPHILS NFR BLD AUTO: 52 %
NITRITE UR QL STRIP: NEGATIVE
PH UR STRIP: 6.5 [PH] (ref 5–7.5)
PLATELET # BLD AUTO: 117 X10E3/UL (ref 150–450)
POTASSIUM SERPL-SCNC: 4.7 MMOL/L (ref 3.5–5.2)
POTASSIUM SERPL-SCNC: 4.8 MMOL/L (ref 3.5–5.2)
PROT SERPL-MCNC: 8.3 G/DL (ref 6–8.5)
PROT SERPL-MCNC: 8.6 G/DL (ref 6–8.5)
PROT UR QL STRIP: NEGATIVE
PROT UR-MCNC: 13.2 MG/DL
PROT/CREAT UR: 160 MG/G CREAT (ref 0–200)
RBC # BLD AUTO: 3.51 X10E6/UL (ref 3.77–5.28)
RBC #/AREA URNS HPF: ABNORMAL /HPF (ref 0–2)
SL AMB EGFR AFRICAN AMERICAN: 100 ML/MIN/1.73
SL AMB EGFR AFRICAN AMERICAN: 112 ML/MIN/1.73
SL AMB EGFR NON AFRICAN AMERICAN: 87 ML/MIN/1.73
SL AMB EGFR NON AFRICAN AMERICAN: 97 ML/MIN/1.73
SODIUM SERPL-SCNC: 135 MMOL/L (ref 134–144)
SODIUM SERPL-SCNC: 137 MMOL/L (ref 134–144)
SP GR UR: 1.01 (ref 1–1.03)
UROBILINOGEN UR STRIP-ACNC: 0.2 MG/DL (ref 0.2–1)
WBC # BLD AUTO: 9.2 X10E3/UL (ref 3.4–10.8)
WBC #/AREA URNS HPF: ABNORMAL /HPF (ref 0–5)

## 2021-09-29 ENCOUNTER — TELEPHONE (OUTPATIENT)
Dept: HEMATOLOGY ONCOLOGY | Facility: CLINIC | Age: 61
End: 2021-09-29

## 2021-10-01 ENCOUNTER — OFFICE VISIT (OUTPATIENT)
Dept: GASTROENTEROLOGY | Facility: MEDICAL CENTER | Age: 61
End: 2021-10-01
Payer: COMMERCIAL

## 2021-10-01 VITALS
TEMPERATURE: 97.7 F | DIASTOLIC BLOOD PRESSURE: 75 MMHG | HEART RATE: 80 BPM | BODY MASS INDEX: 27.42 KG/M2 | WEIGHT: 154.8 LBS | SYSTOLIC BLOOD PRESSURE: 117 MMHG

## 2021-10-01 DIAGNOSIS — K70.31 ALCOHOLIC CIRRHOSIS OF LIVER WITH ASCITES (HCC): Primary | Chronic | ICD-10-CM

## 2021-10-01 PROCEDURE — 99213 OFFICE O/P EST LOW 20 MIN: CPT | Performed by: PHYSICIAN ASSISTANT

## 2021-10-02 ENCOUNTER — HOSPITAL ENCOUNTER (OUTPATIENT)
Dept: ULTRASOUND IMAGING | Facility: HOSPITAL | Age: 61
Discharge: HOME | End: 2021-10-02
Payer: COMMERCIAL

## 2021-10-02 DIAGNOSIS — K70.31 ALCOHOLIC CIRRHOSIS OF LIVER WITH ASCITES (HCC): Chronic | ICD-10-CM

## 2021-10-02 PROCEDURE — 76705 ECHO EXAM OF ABDOMEN: CPT

## 2021-10-04 DIAGNOSIS — J44.9 MODERATE COPD (CHRONIC OBSTRUCTIVE PULMONARY DISEASE) (HCC): ICD-10-CM

## 2021-10-05 RX ORDER — BENZONATATE 100 MG/1
CAPSULE ORAL
Qty: 60 CAPSULE | Refills: 2 | Status: SHIPPED | OUTPATIENT
Start: 2021-10-05 | End: 2021-12-21

## 2021-10-08 ENCOUNTER — TELEPHONE (OUTPATIENT)
Dept: HEMATOLOGY ONCOLOGY | Facility: HOSPITAL | Age: 61
End: 2021-10-08

## 2021-10-08 ENCOUNTER — OFFICE VISIT (OUTPATIENT)
Dept: HEMATOLOGY ONCOLOGY | Facility: HOSPITAL | Age: 61
End: 2021-10-08
Payer: COMMERCIAL

## 2021-10-08 VITALS
BODY MASS INDEX: 27.46 KG/M2 | HEART RATE: 81 BPM | HEIGHT: 63 IN | WEIGHT: 155 LBS | DIASTOLIC BLOOD PRESSURE: 60 MMHG | TEMPERATURE: 97.8 F | RESPIRATION RATE: 16 BRPM | SYSTOLIC BLOOD PRESSURE: 108 MMHG

## 2021-10-08 DIAGNOSIS — D05.11 DUCTAL CARCINOMA IN SITU (DCIS) OF RIGHT BREAST: Primary | ICD-10-CM

## 2021-10-08 PROCEDURE — 99214 OFFICE O/P EST MOD 30 MIN: CPT | Performed by: INTERNAL MEDICINE

## 2021-10-12 ENCOUNTER — TELEPHONE (OUTPATIENT)
Dept: RADIATION ONCOLOGY | Facility: CLINIC | Age: 61
End: 2021-10-12

## 2021-10-13 ENCOUNTER — TELEMEDICINE (OUTPATIENT)
Dept: FAMILY MEDICINE CLINIC | Facility: CLINIC | Age: 61
End: 2021-10-13
Payer: COMMERCIAL

## 2021-10-13 VITALS — TEMPERATURE: 98.9 F | HEIGHT: 63 IN | BODY MASS INDEX: 27.46 KG/M2 | WEIGHT: 155 LBS

## 2021-10-13 DIAGNOSIS — B34.9 VIRAL INFECTION, UNSPECIFIED: Primary | ICD-10-CM

## 2021-10-13 PROCEDURE — 3008F BODY MASS INDEX DOCD: CPT | Performed by: PHYSICIAN ASSISTANT

## 2021-10-13 PROCEDURE — 99213 OFFICE O/P EST LOW 20 MIN: CPT | Performed by: PHYSICIAN ASSISTANT

## 2021-10-13 PROCEDURE — 4004F PT TOBACCO SCREEN RCVD TLK: CPT | Performed by: PHYSICIAN ASSISTANT

## 2021-10-13 PROCEDURE — 3725F SCREEN DEPRESSION PERFORMED: CPT | Performed by: PHYSICIAN ASSISTANT

## 2021-10-13 PROCEDURE — U0005 INFEC AGEN DETEC AMPLI PROBE: HCPCS | Performed by: PHYSICIAN ASSISTANT

## 2021-10-13 PROCEDURE — U0003 INFECTIOUS AGENT DETECTION BY NUCLEIC ACID (DNA OR RNA); SEVERE ACUTE RESPIRATORY SYNDROME CORONAVIRUS 2 (SARS-COV-2) (CORONAVIRUS DISEASE [COVID-19]), AMPLIFIED PROBE TECHNIQUE, MAKING USE OF HIGH THROUGHPUT TECHNOLOGIES AS DESCRIBED BY CMS-2020-01-R: HCPCS | Performed by: PHYSICIAN ASSISTANT

## 2021-10-14 ENCOUNTER — TELEPHONE (OUTPATIENT)
Dept: FAMILY MEDICINE CLINIC | Facility: CLINIC | Age: 61
End: 2021-10-14

## 2021-10-14 LAB — SARS-COV-2 RNA RESP QL NAA+PROBE: NEGATIVE

## 2021-10-18 ENCOUNTER — TELEPHONE (OUTPATIENT)
Dept: FAMILY MEDICINE CLINIC | Facility: CLINIC | Age: 61
End: 2021-10-18

## 2021-10-30 LAB
CHOLEST SERPL-MCNC: 142 MG/DL (ref 100–199)
HDLC SERPL-MCNC: 48 MG/DL
LDLC SERPL CALC-MCNC: 80 MG/DL (ref 0–99)
LDLC/HDLC SERPL: 1.7 RATIO (ref 0–3.2)
SL AMB VLDL CHOLESTEROL CALC: 14 MG/DL (ref 5–40)
TRIGL SERPL-MCNC: 67 MG/DL (ref 0–149)
TSH SERPL DL<=0.005 MIU/L-ACNC: 2.09 UIU/ML (ref 0.45–4.5)

## 2021-11-08 ENCOUNTER — OFFICE VISIT (OUTPATIENT)
Dept: FAMILY MEDICINE CLINIC | Facility: CLINIC | Age: 61
End: 2021-11-08
Payer: COMMERCIAL

## 2021-11-08 VITALS
BODY MASS INDEX: 25.03 KG/M2 | SYSTOLIC BLOOD PRESSURE: 122 MMHG | DIASTOLIC BLOOD PRESSURE: 62 MMHG | RESPIRATION RATE: 16 BRPM | HEIGHT: 64 IN | WEIGHT: 146.6 LBS | HEART RATE: 100 BPM

## 2021-11-08 DIAGNOSIS — D05.11 BREAST NEOPLASM, TIS (DCIS), RIGHT: ICD-10-CM

## 2021-11-08 DIAGNOSIS — D05.11 DUCTAL CARCINOMA IN SITU (DCIS) OF RIGHT BREAST: ICD-10-CM

## 2021-11-08 DIAGNOSIS — I10 ESSENTIAL HYPERTENSION: ICD-10-CM

## 2021-11-08 DIAGNOSIS — K70.31 ALCOHOLIC CIRRHOSIS OF LIVER WITH ASCITES (HCC): Chronic | ICD-10-CM

## 2021-11-08 DIAGNOSIS — E78.49 OTHER HYPERLIPIDEMIA: ICD-10-CM

## 2021-11-08 DIAGNOSIS — J44.9 MODERATE COPD (CHRONIC OBSTRUCTIVE PULMONARY DISEASE) (HCC): ICD-10-CM

## 2021-11-08 DIAGNOSIS — F10.10 ALCOHOL ABUSE: ICD-10-CM

## 2021-11-08 DIAGNOSIS — D69.6 THROMBOCYTOPENIA (HCC): ICD-10-CM

## 2021-11-08 DIAGNOSIS — F10.230 ALCOHOL DEPENDENCE WITH UNCOMPLICATED WITHDRAWAL (HCC): ICD-10-CM

## 2021-11-08 PROBLEM — N60.91 ATYPICAL DUCTAL HYPERPLASIA OF RIGHT BREAST: Status: ACTIVE | Noted: 2021-07-20

## 2021-11-08 PROCEDURE — 99214 OFFICE O/P EST MOD 30 MIN: CPT | Performed by: PHYSICIAN ASSISTANT

## 2021-11-17 ENCOUNTER — OFFICE VISIT (OUTPATIENT)
Dept: PULMONOLOGY | Facility: HOSPITAL | Age: 61
End: 2021-11-17
Payer: COMMERCIAL

## 2021-11-17 VITALS
WEIGHT: 149.8 LBS | HEIGHT: 64 IN | HEART RATE: 86 BPM | OXYGEN SATURATION: 96 % | BODY MASS INDEX: 25.57 KG/M2 | DIASTOLIC BLOOD PRESSURE: 64 MMHG | TEMPERATURE: 97.3 F | SYSTOLIC BLOOD PRESSURE: 110 MMHG

## 2021-11-17 DIAGNOSIS — D05.11 DUCTAL CARCINOMA IN SITU (DCIS) OF RIGHT BREAST: ICD-10-CM

## 2021-11-17 DIAGNOSIS — J43.1 PANLOBULAR EMPHYSEMA (HCC): Primary | ICD-10-CM

## 2021-11-17 DIAGNOSIS — J44.9 MODERATE COPD (CHRONIC OBSTRUCTIVE PULMONARY DISEASE) (HCC): ICD-10-CM

## 2021-11-17 DIAGNOSIS — Z72.0 TOBACCO ABUSE: ICD-10-CM

## 2021-11-17 DIAGNOSIS — F17.210 NICOTINE DEPENDENCE, CIGARETTES, UNCOMPLICATED: ICD-10-CM

## 2021-11-17 DIAGNOSIS — K70.31 ALCOHOLIC CIRRHOSIS OF LIVER WITH ASCITES (HCC): Chronic | ICD-10-CM

## 2021-11-17 PROCEDURE — 99214 OFFICE O/P EST MOD 30 MIN: CPT | Performed by: PHYSICIAN ASSISTANT

## 2021-11-17 PROCEDURE — 3008F BODY MASS INDEX DOCD: CPT | Performed by: PHYSICIAN ASSISTANT

## 2021-11-17 PROCEDURE — 4004F PT TOBACCO SCREEN RCVD TLK: CPT | Performed by: PHYSICIAN ASSISTANT

## 2021-11-17 RX ORDER — ALBUTEROL SULFATE 90 UG/1
2 AEROSOL, METERED RESPIRATORY (INHALATION) EVERY 6 HOURS PRN
Qty: 18 G | Refills: 3 | Status: SHIPPED | OUTPATIENT
Start: 2021-11-17 | End: 2022-02-16 | Stop reason: SDUPTHER

## 2021-11-17 RX ORDER — AMOXICILLIN 875 MG/1
TABLET, COATED ORAL
COMMUNITY
Start: 2021-11-16 | End: 2022-02-16 | Stop reason: ALTCHOICE

## 2021-12-09 DIAGNOSIS — F41.9 ANXIETY: ICD-10-CM

## 2021-12-09 RX ORDER — ALPRAZOLAM 1 MG/1
TABLET ORAL
Qty: 60 TABLET | Refills: 5 | Status: SHIPPED | OUTPATIENT
Start: 2021-12-09 | End: 2022-05-11 | Stop reason: SDUPTHER

## 2021-12-20 DIAGNOSIS — J44.9 MODERATE COPD (CHRONIC OBSTRUCTIVE PULMONARY DISEASE) (HCC): ICD-10-CM

## 2021-12-21 RX ORDER — BENZONATATE 100 MG/1
CAPSULE ORAL
Qty: 60 CAPSULE | Refills: 2 | Status: SHIPPED | OUTPATIENT
Start: 2021-12-21 | End: 2022-03-30 | Stop reason: SDUPTHER

## 2022-01-03 ENCOUNTER — OFFICE VISIT (OUTPATIENT)
Dept: URGENT CARE | Facility: CLINIC | Age: 62
End: 2022-01-03
Payer: COMMERCIAL

## 2022-01-03 VITALS
TEMPERATURE: 98.4 F | OXYGEN SATURATION: 95 % | HEART RATE: 86 BPM | WEIGHT: 149 LBS | RESPIRATION RATE: 16 BRPM | SYSTOLIC BLOOD PRESSURE: 102 MMHG | BODY MASS INDEX: 25.58 KG/M2 | DIASTOLIC BLOOD PRESSURE: 62 MMHG

## 2022-01-03 DIAGNOSIS — L03.90 CELLULITIS, UNSPECIFIED CELLULITIS SITE: ICD-10-CM

## 2022-01-03 DIAGNOSIS — T23.232A 2ND DEGREE BURN OF MULTIPLE FINGERS OF LEFT HAND NOT INCLUDING THUMB, INITIAL ENCOUNTER: Primary | ICD-10-CM

## 2022-01-03 PROCEDURE — 16020 DRESS/DEBRID P-THICK BURN S: CPT | Performed by: PHYSICIAN ASSISTANT

## 2022-01-03 PROCEDURE — 99213 OFFICE O/P EST LOW 20 MIN: CPT | Performed by: PHYSICIAN ASSISTANT

## 2022-01-03 RX ORDER — CEPHALEXIN 500 MG/1
500 CAPSULE ORAL EVERY 8 HOURS SCHEDULED
Qty: 21 CAPSULE | Refills: 0 | Status: SHIPPED | OUTPATIENT
Start: 2022-01-03 | End: 2022-01-10

## 2022-01-03 NOTE — PATIENT INSTRUCTIONS
Cellulitis   WHAT YOU NEED TO KNOW:   Cellulitis is a skin infection caused by bacteria  Cellulitis is common and can become severe  Cellulitis usually appears on the lower legs  It can also appear on the arms, face, and other areas  Cellulitis develops when bacteria enter a crack or break in your skin, such as a scratch, bite, or cut  DISCHARGE INSTRUCTIONS:   Return to the emergency department if:   · Your wound gets larger and more painful  · You feel a crackling under your skin when you touch it  · You have purple dots or bumps on your skin, or you see bleeding under your skin  · You see red streaks coming from the infected area  Call your doctor if:   · The red, warm, swollen area gets larger  · Your fever or pain does not go away or gets worse  · The area does not get smaller after 3 days of antibiotics  · You have questions or concerns about your condition or care  Medicines: You should start to see improvement in 3 days  If cellulitis is not treated, the infection can spread through your body and become life-threatening  You may need any of the following medicines:  · Antibiotics  help treat the bacterial infection  · Acetaminophen  decreases pain and fever  It is available without a doctor's order  Ask how much to take and how often to take it  Follow directions  Read the labels of all other medicines you are using to see if they also contain acetaminophen, or ask your doctor or pharmacist  Acetaminophen can cause liver damage if not taken correctly  Do not use more than 4 grams (4,000 milligrams) total of acetaminophen in one day  · NSAIDs , such as ibuprofen, help decrease swelling, pain, and fever  This medicine is available with or without a doctor's order  NSAIDs can cause stomach bleeding or kidney problems in certain people  If you take blood thinner medicine, always ask your healthcare provider if NSAIDs are safe for you   Always read the medicine label and follow directions  · Take your medicine as directed  Contact your healthcare provider if you think your medicine is not helping or if you have side effects  Tell him or her if you are allergic to any medicine  Keep a list of the medicines, vitamins, and herbs you take  Include the amounts, and when and why you take them  Bring the list or the pill bottles to follow-up visits  Carry your medicine list with you in case of an emergency  Self-care:   · Wash the area with soap and water every day  Gently pat dry  Use bandages if directed by your healthcare provider  · Elevate the area above the level of your heart  as often as you can  This will help decrease swelling and pain  Prop the area on pillows or blankets to keep it elevated comfortably  · Place a cool, damp cloth on the area  Use clean cloths and clean water  You can do this as often as you need to  Cool, damp cloths may help decrease pain  · Apply cream or ointment as directed  These help protect the area  Most over-the-counter products, such as petroleum jelly, are good to use  Ask your healthcare provider about specific creams or ointments you should use  Prevent cellulitis:   · Do not scratch bug bites or areas of injury  You increase your risk for cellulitis by scratching these areas  · Do not share personal items, such as towels, clothing, and razors  · Clean exercise equipment  with germ-killing  before and after you use it  · Treat athlete's foot  This can help prevent the spread of a bacterial skin infection  · Wash your hands often  Use soap and water  Wash your hands after you use the bathroom, change a child's diapers, or sneeze  Wash your hands before you prepare or eat food  Use lotion to prevent dry, cracked skin  Follow up with your doctor within 3 days, or as directed:  He or she will check if your cellulitis is getting better   Write down your questions so you remember to ask them during your visits  © Copyright [employer] 2021 Information is for End User's use only and may not be sold, redistributed or otherwise used for commercial purposes  All illustrations and images included in CareNotes® are the copyrighted property of A D A M , Inc  or Damon Patel  The above information is an  only  It is not intended as medical advice for individual conditions or treatments  Talk to your doctor, nurse or pharmacist before following any medical regimen to see if it is safe and effective for you  Second-Degree Burn   WHAT YOU NEED TO KNOW:   A second-degree burn is also called a partial-thickness burn  A second-degree burn occurs when the first layer and some of the second layer of skin are burned  A superficial second-degree burn usually heals within 2 to 3 weeks with some scarring  A deep second-degree burn can take longer to heal  A second-degree burn can also get worse after a few days and become a third-degree burn  DISCHARGE INSTRUCTIONS:   Return to the emergency department if:   · You have a fast heartbeat or breathing  · You are not urinating  Call your doctor or burn specialist if:   · You have a fever  · You have increased redness, numbness, or swelling in the burn area  · Your wound or bandage is leaking pus and has a bad smell  · Your pain does not get better, or gets worse, even after you take pain medicine  · You have a dry mouth or eyes  · You are overly thirsty or tired  · You have dark yellow urine or urinate less than usual     · You have a headache or feel dizzy  · You have questions or concerns about your condition or care  Medicines:   · Medicines  may be given to decrease pain, prevent infection, or help your burn heal  They may be given as a pill or as an ointment applied to your skin  · Take your medicine as directed  Contact your healthcare provider if you think your medicine is not helping or if you have side effects  Tell him or her if you are allergic to any medicine  Keep a list of the medicines, vitamins, and herbs you take  Include the amounts, and when and why you take them  Bring the list or the pill bottles to follow-up visits  Carry your medicine list with you in case of an emergency  Burn care:   · Wash your hands with soap and water  Dry your hands with a clean towel or paper towel  · Remove old bandages  You may need to soak the bandage in water before you remove it so it will not stick to your wound  · Gently clean the burned area daily with mild soap and water  Pat the area dry  Look for any swelling or redness around the burn  Do not break closed blisters  You may cause a skin infection  · Apply cream or ointment to the burn with a cotton swab  Place a nonstick bandage over your burn  · Wrap a layer of gauze around the bandage to hold it in place  The wrap should be snug but not tight  It is too tight if you feel tingling or lose feeling in that area  · Apply gentle pressure for a few minutes if bleeding occurs  · Elevate your burned arm or leg above the level of your heart as often as you can  This will help decrease swelling and pain  Prop your burned arm or leg on pillows or blankets to keep it elevated comfortably  Self-care:   · Drink liquids as directed  You may need to drink extra liquid to help prevent dehydration  Ask how much liquid to drink each day and which liquids are best for you  · Go to physical therapy, if directed  Your muscles and joints may not work well after a second-degree burn  A physical therapist teaches you exercises to help improve movement and strength, and to decrease pain  Prevent second-degree burns:   · Do not leave cups, mugs, or bowls containing hot liquids at the edge of a table  Keep pot handles turned away from the stove front  · Do not leave a lit cigarette  Make sure it is no longer lit  Then dispose of it safely      · Store dangerous items out of the reach of children  Store cigarette lighters, matches, and chemicals where children cannot reach them  Use child safety latches on the door of the safe storage area  · Keep your water heater setting to low or medium  (90°F to 120°F, or 32°C to 48°C)  · Wear sunscreen that has a sun protectant factor (SPF) of 15 or higher  The sunscreen should also have ultraviolet A (UVA) and ultraviolet B (UVB) protection  Follow the directions on the label when you use sunscreen  Put on more sunscreen if you are in the sun for more than an hour  Reapply sunscreen often if you go swimming or are sweating  Follow up with your doctor or burn specialist as directed: You may need to return to have your wound checked and your bandage changed  Write down your questions so you remember to ask them during your visits  © Copyright [address] 2021 Information is for End User's use only and may not be sold, redistributed or otherwise used for commercial purposes  All illustrations and images included in CareNotes® are the copyrighted property of [employer]  or [address] [place], [place] [address]hima   The above information is an  only  It is not intended as medical advice for individual conditions or treatments  Talk to your doctor, nurse or pharmacist before following any medical regimen to see if it is safe and effective for you  Acute Wound Care   AMBULATORY CARE:   An acute wound  is an injury that causes a break in the skin  An acute wound can happen suddenly, last a short time, and may heal on its own     Common signs and symptoms of an acute wound:   · A cut, tear, or gash in your skin    · Bleeding, swelling, pain, or trouble moving the affected area    · Dirt or foreign objects inside the wound     · Milky, yellow, green, or brown pus in the wound     · Red, tender, or warm area around the pus    · Fever  Seek care immediately if:   · You have pus or a foul odor coming from the wound     · You have sudden trouble breathing or chest pain  · Blood soaks through your bandage  Contact your healthcare provider if:   · You have muscle, joint, or body aches, sweating, or a fever  · You have more swelling, redness, or bleeding in your wound  · Your skin is itchy, swollen, or you have a rash  · You have questions or concerns about your condition or care  Treatment for an acute wound  may include any of the following:  · Cleansing  is done with soap and water to wash away germs and decrease the risk of infection  Sterile water further cleans the wound  The cleaning is done under high pressure with a catheter tip and large syringe  A solution that kills germs may also be used  · Debridement  is done to clean and remove objects, dirt, or dead tissues from the open wound  Healthcare providers may also drain the wound to clean out pus  · Closure of the wound  is done with stitches, staples, skin adhesive, or other treatments  This may be done if the wound is wide or deep  Stitches may be needed if the wound is in an area that moves a lot, such as the hands, feet, and joints  Stitches may help to keep the wound from getting infected  They may also decrease the amount of scarring you have  Some wounds may heal better without stitches  Wound care:   · If your wound was closed with thin strips of medical tape, keep them clean and dry  The strips of medical tape will fall off on their own  Do not pull them off  · Keep the bandage clean and dry  Do not remove the bandage over your wound unless your healthcare provider says it is okay  · Wash your hands before and after you take care of your wound to prevent infection  · Clean the wound as directed  If you cannot reach the wound, have someone help you  · If you have packing, make sure all the gauze used to pack the wound is taken out and replaced as directed   Keep track of how many gauze dressings are placed inside the wound   Follow up with your healthcare provider as directed:  Write down your questions so you remember to ask them during your visits  © 2016 [address] is for End User's use only and may not be sold, redistributed or otherwise used for commercial purposes  All illustrations and images included in CareNotes® are the copyrighted property of A [employer] M [employer] Inc  or Adams Beltran  The above information is an  only  It is not intended as medical advice for individual conditions or treatments  Talk to your doctor, nurse or pharmacist before following any medical regimen to see if it is safe and effective for you
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
## 2022-01-10 NOTE — TELEPHONE ENCOUNTER
Call made to patient regarding her upcoming appointment, Iona Webster on Monday June 14, 2021 at 9:00 , directions given to office, adv new patient paperwork would be sent mail  to her for completion prior to arrival, pt with no questions at this time, pt has name/# for contact, adv to contact me anytime for questions/concerns/help 7 no

## 2022-01-18 ENCOUNTER — CLINICAL SUPPORT (OUTPATIENT)
Dept: FAMILY MEDICINE CLINIC | Facility: CLINIC | Age: 62
End: 2022-01-18

## 2022-01-18 DIAGNOSIS — B34.9 VIRAL ILLNESS: Primary | ICD-10-CM

## 2022-01-18 LAB — AFP-TM SERPL-MCNC: 4.9 NG/ML (ref 0–8.3)

## 2022-01-18 PROCEDURE — U0005 INFEC AGEN DETEC AMPLI PROBE: HCPCS | Performed by: PHYSICIAN ASSISTANT

## 2022-01-18 PROCEDURE — U0003 INFECTIOUS AGENT DETECTION BY NUCLEIC ACID (DNA OR RNA); SEVERE ACUTE RESPIRATORY SYNDROME CORONAVIRUS 2 (SARS-COV-2) (CORONAVIRUS DISEASE [COVID-19]), AMPLIFIED PROBE TECHNIQUE, MAKING USE OF HIGH THROUGHPUT TECHNOLOGIES AS DESCRIBED BY CMS-2020-01-R: HCPCS | Performed by: PHYSICIAN ASSISTANT

## 2022-01-20 LAB — SARS-COV-2 RNA RESP QL NAA+PROBE: NEGATIVE

## 2022-01-21 ENCOUNTER — TELEPHONE (OUTPATIENT)
Dept: FAMILY MEDICINE CLINIC | Facility: CLINIC | Age: 62
End: 2022-01-21

## 2022-01-21 NOTE — TELEPHONE ENCOUNTER
Pt called asking about her covid test results  I gave her your message  She did not see it because she cannot get into her [employer] account  She asked if you could please not message her that way because she can't see anything

## 2022-01-24 ENCOUNTER — OFFICE VISIT (OUTPATIENT)
Dept: URGENT CARE | Facility: CLINIC | Age: 62
End: 2022-01-24
Payer: COMMERCIAL

## 2022-01-24 VITALS
OXYGEN SATURATION: 96 % | HEART RATE: 86 BPM | HEIGHT: 64 IN | BODY MASS INDEX: 25.44 KG/M2 | TEMPERATURE: 97.9 F | WEIGHT: 149 LBS | RESPIRATION RATE: 18 BRPM

## 2022-01-24 DIAGNOSIS — J01.90 ACUTE SINUSITIS, RECURRENCE NOT SPECIFIED, UNSPECIFIED LOCATION: Primary | ICD-10-CM

## 2022-01-24 PROCEDURE — 99213 OFFICE O/P EST LOW 20 MIN: CPT | Performed by: PHYSICIAN ASSISTANT

## 2022-01-24 RX ORDER — AMOXICILLIN AND CLAVULANATE POTASSIUM 875; 125 MG/1; MG/1
1 TABLET, FILM COATED ORAL EVERY 12 HOURS SCHEDULED
Qty: 20 TABLET | Refills: 0 | Status: SHIPPED | OUTPATIENT
Start: 2022-01-24 | End: 2022-02-03

## 2022-01-24 RX ORDER — BENZONATATE 100 MG/1
100 CAPSULE ORAL 3 TIMES DAILY PRN
Qty: 20 CAPSULE | Refills: 0 | Status: SHIPPED | OUTPATIENT
Start: 2022-01-24 | End: 2022-05-10

## 2022-01-24 NOTE — PROGRESS NOTES
NAME: Shane Rosario is a 57 y o  female  : 1960    MRN: [id number]      Assessment and Plan   Acute sinusitis, recurrence not specified, unspecified location [J01 90]  1  Acute sinusitis, recurrence not specified, unspecified location  benzonatate (TESSALON PERLES) 100 mg capsule    amoxicillin-clavulanate (AUGMENTIN) 875-125 mg per tablet     discussed trial of augmentin  Continue OTC meds, fluids and rest  If no improvement in 3-4 days f/u with PCP  She acknowledges  Patient Instructions     Patient Instructions   augmentin as directed  Continue OTC meds, fluids and rest  If no improvement f/u with PCP    Proceed to ER if symptoms worsen  Chief Complaint     Chief Complaint   Patient presents with    Sinusitis     started last Monday, stuffiness, coughing, sinus pressure  History of Present Illness   Patient with hx of HTN, hyperlipidemia, cirrhosis, and COPD presents complaining of congestion x 1 week  States she has sinus p/p, cough, PND, runny nose, frontal headache, sore throat and fatigue  Was having fevers earlier in the week  Has been taking OTC meds without relief  Denies CP, palpitations, SOB, dyspnea, nausea, vomiting or diarrhea  Had covid test last week and it was negative  Review of Systems   Review of Systems   Constitutional: Positive for chills, fatigue and fever  HENT: Positive for congestion, postnasal drip, rhinorrhea, sinus pressure, sinus pain and sore throat  Respiratory: Positive for cough  Negative for chest tightness, shortness of breath, wheezing and stridor  Cardiovascular: Negative for chest pain and palpitations  Gastrointestinal: Negative for diarrhea, nausea and vomiting  Musculoskeletal: Negative for myalgias  Neurological: Positive for headaches  Negative for dizziness, weakness and numbness           Current Medications       Current Outpatient Medications:     albuterol (Proventil HFA) 90 mcg/act inhaler, Inhale 2 puffs every 6 (six) hours as needed for wheezing or shortness of breath, Disp: 18 g, Rfl: 3    ALPRAZolam (XANAX) 1 mg tablet, TAKE ONE TABLET BY MOUTH TWICE A DAY AS NEEDED FOR ANXIETY, Disp: 60 tablet, Rfl: 5    anastrozole (ARIMIDEX) 1 mg tablet, Take 1 tablet (1 mg total) by mouth daily, Disp: 90 tablet, Rfl: 3    benzonatate (TESSALON PERLES) 100 mg capsule, TAKE ONE CAPSULE BY MOUTH THREE TIMES A DAY AS NEEDED FOR COUGH, Disp: 60 capsule, Rfl: 2    furosemide (LASIX) 40 mg tablet, TAKE ONE TABLET BY MOUTH EVERY DAY, Disp: 30 tablet, Rfl: 5    spironolactone (ALDACTONE) 50 mg tablet, TAKE ONE TABLET BY MOUTH EVERY DAY, Disp: 30 tablet, Rfl: 5    amoxicillin (AMOXIL) 875 mg tablet, , Disp: , Rfl:     amoxicillin-clavulanate (AUGMENTIN) 875-125 mg per tablet, Take 1 tablet by mouth every 12 (twelve) hours for 10 days, Disp: 20 tablet, Rfl: 0    benzonatate (TESSALON PERLES) 100 mg capsule, Take 1 capsule (100 mg total) by mouth 3 (three) times a day as needed for cough, Disp: 20 capsule, Rfl: 0    predniSONE 10 mg tablet, Take 4 tabs on day 1,2 with food, 3 tabs on day 3,4 with food, 2 tabs on day 5,6 with food, 1 tab on day 7,8 with food (Patient not taking: Reported on 1/3/2022 ), Disp: 20 tablet, Rfl: 0    tiotropium-olodaterol (STIOLTO RESPIMAT) 2 5-2 5 MCG/ACT inhaler, Inhale 2 puffs daily (Patient not taking: Reported on 1/24/2022 ), Disp: 4 g, Rfl: 6    Current Allergies     Allergies as of 01/24/2022 - Reviewed 01/24/2022   Allergen Reaction Noted    Sulfa antibiotics Shortness Of Breath 01/24/2022    Ace inhibitors Cough and Other (See Comments) 08/29/2014              Past Medical History:   Diagnosis Date    Alcoholic fatty liver     Anxiety     Asthma     COPD (chronic obstructive pulmonary disease) (HCC)     Elevated liver function tests     GERD (gastroesophageal reflux disease)     GERD without esophagitis     Hyperlipidemia     Hypertension     IBS (irritable bowel syndrome)     Insomnia  Insomnia     Liver disease     Nervousness     Nicotine dependence     Other specified urinary incontinence     RLS (restless legs syndrome)     Wears glasses        Past Surgical History:   Procedure Laterality Date    BACK SURGERY      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    BREAST EXCISIONAL BIOPSY Right 11/01/2004    benign    CATARACT EXTRACTION, BILATERAL  08/01/2018    COLONOSCOPY N/A 5/8/2019    Procedure: COLONOSCOPY;  Surgeon: Robb Campos MD;  Location: [facility] LAB; Service: Gastroenterology    EGD AND COLONOSCOPY N/A 3/19/2018    Procedure: EGD AND COLONOSCOPY;  Surgeon: Robb Campos MD;  Location: [facility] LAB; Service: Gastroenterology    ESOPHAGOGASTRODUODENOSCOPY N/A 5/8/2019    Procedure: ESOPHAGOGASTRODUODENOSCOPY (EGD); Surgeon: Robb Campos MD;  Location: [facility]; Service: Gastroenterology    IR PARACENTESIS  11/27/2020    IR PARACENTESIS  12/8/2020    KNEE SURGERY      KNEE SURGERY      LUMBAR LAMINECTOMY  04/1999    LYMPH NODE BIOPSY      MAMMO STEREOTACTIC BREAST BIOPSY RIGHT (ALL INC) Right 5/21/2021    MAMMO STEREOTACTIC BREAST BIOPSY RIGHT (ALL INC) EACH ADD Right 5/21/2021    TOTAL ABDOMINAL HYSTERECTOMY  02/05/2008    TUBAL LIGATION      TUBAL LIGATION  1989    UPPER GASTROINTESTINAL ENDOSCOPY         Family History   Problem Relation Age of Onset    Breast cancer Mother 28    No Known Problems Father     No Known Problems Sister     No Known Problems Brother     No Known Problems Son     No Known Problems Maternal Grandmother     No Known Problems Maternal Grandfather     No Known Problems Paternal Grandmother     No Known Problems Paternal Grandfather     No Known Problems Maternal Aunt     No Known Problems Maternal Aunt     No Known Problems Paternal Aunt     Substance Abuse Neg Hx     Mental illness Neg Hx          Medications have been verified      The following portions of the patient's history were reviewed and updated as appropriate: allergies, current medications, past family history, past medical history, past social history, past surgical history and problem list     Objective   Pulse 86   Temp 97 9 °F (36 6 °C)   Resp 18   Ht 5' 4" (1 626 m)   Wt 67 6 kg (149 lb)   SpO2 96%   BMI 25 58 kg/m²      Physical Exam     Physical Exam  Vitals and nursing note reviewed  Constitutional:       General: She is not in acute distress  Appearance: Normal appearance  She is not ill-appearing, toxic-appearing or diaphoretic  HENT:      Right Ear: Tympanic membrane normal       Left Ear: Tympanic membrane normal       Mouth/Throat:      Mouth: Mucous membranes are moist       Pharynx: Oropharynx is clear  No oropharyngeal exudate or posterior oropharyngeal erythema  Cardiovascular:      Rate and Rhythm: Normal rate and regular rhythm  Heart sounds: Normal heart sounds  Pulmonary:      Effort: Pulmonary effort is normal  No respiratory distress  Breath sounds: No stridor  No wheezing, rhonchi or rales  Comments: Decreased breath sounds throughout, no absent breath sounds  Musculoskeletal:      Cervical back: Normal range of motion  Skin:     Capillary Refill: Capillary refill takes less than 2 seconds  Neurological:      Mental Status: She is alert and oriented to person, place, and time

## 2022-01-28 ENCOUNTER — OFFICE VISIT (OUTPATIENT)
Dept: GASTROENTEROLOGY | Facility: CLINIC | Age: 62
End: 2022-01-28
Payer: COMMERCIAL

## 2022-01-28 VITALS
HEIGHT: 64 IN | WEIGHT: 149.2 LBS | SYSTOLIC BLOOD PRESSURE: 120 MMHG | TEMPERATURE: 98.3 F | BODY MASS INDEX: 25.47 KG/M2 | DIASTOLIC BLOOD PRESSURE: 70 MMHG

## 2022-01-28 DIAGNOSIS — K58.0 IRRITABLE BOWEL SYNDROME WITH DIARRHEA: ICD-10-CM

## 2022-01-28 DIAGNOSIS — K21.9 GERD WITHOUT ESOPHAGITIS: ICD-10-CM

## 2022-01-28 DIAGNOSIS — K70.31 ASCITES DUE TO ALCOHOLIC CIRRHOSIS (HCC): Primary | ICD-10-CM

## 2022-01-28 DIAGNOSIS — K70.31 ALCOHOLIC CIRRHOSIS OF LIVER WITH ASCITES (HCC): Chronic | ICD-10-CM

## 2022-01-28 PROCEDURE — 3008F BODY MASS INDEX DOCD: CPT | Performed by: INTERNAL MEDICINE

## 2022-01-28 PROCEDURE — 99214 OFFICE O/P EST MOD 30 MIN: CPT | Performed by: INTERNAL MEDICINE

## 2022-01-28 PROCEDURE — 4004F PT TOBACCO SCREEN RCVD TLK: CPT | Performed by: INTERNAL MEDICINE

## 2022-01-28 RX ORDER — FUROSEMIDE 40 MG/1
40 TABLET ORAL DAILY
Qty: 30 TABLET | Refills: 5 | Status: SHIPPED | OUTPATIENT
Start: 2022-01-28 | End: 2022-05-13 | Stop reason: SDUPTHER

## 2022-01-28 RX ORDER — SPIRONOLACTONE 50 MG/1
50 TABLET, FILM COATED ORAL DAILY
Qty: 30 TABLET | Refills: 5 | Status: SHIPPED | OUTPATIENT
Start: 2022-01-28 | End: 2022-05-13 | Stop reason: SDUPTHER

## 2022-01-28 NOTE — PROGRESS NOTES
[facility] - Outpatient Follow-up Note  Anatoliy Perez 57 y o  female MRN: [id number]  Encounter: [id number]          ASSESSMENT AND PLAN:      1  Ascites due to [facility] cirrhosis ([facility] )  2  Alcoholic cirrhosis of liver with ascites ([facility] )  I will renew her Lasix and Aldactone and recheck her labs to make sure her potassium, creatinine, sodium, and liver function are stable  She did not appear to have a lot of ascites on exam today  I spoke to her about the importance of complete alcohol cessation and she agreed to pursue this  - furosemide (LASIX) 40 mg tablet; Take 1 tablet (40 mg total) by mouth daily  Dispense: 30 tablet; Refill: 5  - spironolactone (ALDACTONE) 50 mg tablet; Take 1 tablet (50 mg total) by mouth daily  Dispense: 30 tablet; Refill: 5  - CBC; Future  - Comprehensive metabolic panel; Future  - Protime-INR; Future    3  Irritable bowel syndrome with diarrhea  She has diarrhea predominant irritable bowel syndrome but this has been well controlled  She can take Imodium as needed  4  GERD without esophagitis  I encouraged her to continue diet and lifestyle modification for her reflux     ______________________________________________________________________    SUBJECTIVE:  She presents for follow-up of her alcoholic cirrhosis complicated by ascites  She recently had surgical resection of breast cancer at [facility]  She recovered well from that surgery  She feels her ascites has been well controlled with Lasix and spironolactone  She believes he had lab work checked last week at [facility] although I do not see them in our system yet  She feels her reflux is well controlled with diet and lifestyle modification  She denies any bleeding or weight loss  REVIEW OF SYSTEMS IS OTHERWISE NEGATIVE        Historical Information   Past Medical History:   Diagnosis Date    Alcoholic fatty liver     Anxiety     Asthma     COPD [facility]chronic obstructive [facility] disease) ([facility] )     Elevated liver function tests     GERD (gastroesophageal reflux disease)     GERD without esophagitis     Hyperlipidemia     Hypertension     IBS (irritable bowel syndrome)     Insomnia     Insomnia     Liver disease     Nervousness     Nicotine dependence     Other specified urinary incontinence     RLS (restless legs syndrome)     Wears glasses      Past Surgical History:   Procedure Laterality Date    BACK SURGERY      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    BREAST EXCISIONAL BIOPSY Right 11/01/2004    benign    CATARACT EXTRACTION, BILATERAL  08/01/2018    COLONOSCOPY N/A 5/8/2019    Procedure: COLONOSCOPY;  Surgeon: Desmond Carr MD;  Location: [facility] GI LAB; Service: Gastroenterology    EGD AND COLONOSCOPY N/A 3/19/2018    Procedure: EGD AND COLONOSCOPY;  Surgeon: Desmond Carr MD;  Location: [facility] GI LAB; Service: Gastroenterology    ESOPHAGOGASTRODUODENOSCOPY N/A 5/8/2019    Procedure: ESOPHAGOGASTRODUODENOSCOPY (EGD); Surgeon: Desmond Carr MD;  Location: [facility] GI LAB; Service: Gastroenterology    IR PARACENTESIS  11/27/2020    IR PARACENTESIS  12/8/2020    KNEE SURGERY      KNEE SURGERY      LUMBAR LAMINECTOMY  04/1999    LYMPH NODE BIOPSY      MAMMO STEREOTACTIC BREAST BIOPSY RIGHT (ALL INC) Right 5/21/2021    MAMMO STEREOTACTIC BREAST BIOPSY RIGHT (ALL INC) EACH ADD Right 5/21/2021    TOTAL ABDOMINAL HYSTERECTOMY  02/05/2008    TUBAL LIGATION      TUBAL LIGATION  1989    UPPER GASTROINTESTINAL ENDOSCOPY       Social History   Social History     Substance and Sexual Activity   Alcohol Use Not Currently    Alcohol/week: 24 0 standard drinks    Types: 24 Cans of beer per week    Comment: Daily; just cut back from 6 beers/day   Per allscripts -  3 beers a night     Social History     Substance and Sexual Activity   Drug Use No     Social History     Tobacco Use   Smoking Status Current Every Day Smoker    Packs/day: 1 00    Years: 45 00    Pack years: 45 00    Types: Cigarettes    Start date: 1978   Smokeless Tobacco Never Used     Family History   Problem Relation Age of Onset    Breast cancer Mother 28    No Known Problems Father     No Known Problems Sister     No Known Problems Brother     No Known Problems Son     No Known Problems Maternal Grandmother     No Known Problems Maternal Grandfather     No Known Problems Paternal Grandmother     No Known Problems Paternal Grandfather     No Known Problems Maternal Aunt     No Known Problems Maternal Aunt     No Known Problems Paternal Aunt     Substance Abuse Neg Hx     Mental illness Neg Hx        Meds/Allergies       Current Outpatient Medications:     albuterol (Proventil HFA) 90 mcg/act inhaler    ALPRAZolam (XANAX) 1 mg tablet    amoxicillin-clavulanate (AUGMENTIN) 875-125 mg per tablet    anastrozole (ARIMIDEX) 1 mg tablet    benzonatate (TESSALON PERLES) 100 mg capsule    benzonatate (TESSALON PERLES) 100 mg capsule    furosemide (LASIX) 40 mg tablet    amoxicillin (AMOXIL) 875 mg tablet    predniSONE 10 mg tablet    spironolactone (ALDACTONE) 50 mg tablet    tiotropium-olodaterol (STIOLTO RESPIMAT) 2 5-2 5 MCG/ACT inhaler    Allergies   Allergen Reactions    Sulfa Antibiotics Shortness Of Breath    Ace Inhibitors Cough and Other (See Comments)     coughing           Objective     Blood pressure 120/70, temperature 98 3 °F (36 8 °C), temperature source Tympanic, height 5' 4" (1 626 m), weight 67 7 kg (149 lb 3 2 oz), not currently breastfeeding  Body mass index is 25 61 kg/m²  PHYSICAL EXAM:      General Appearance:   Alert, cooperative, no distress, no asterixis   HEENT:   Normocephalic, atraumatic, anicteric      Neck:  Supple, symmetrical, trachea midline   Lungs:   Clear to auscultation bilaterally; no rales, rhonchi or wheezing; respirations unlabored    Heart[de-identified]   Regular rate and rhythm; no murmur, rub, or gallop     Abdomen:   Soft, mild ascites, non-tender, non-distended; normal bowel sounds; no masses, no organomegaly    Genitalia:   Deferred    Rectal:   Deferred    Extremities:  No cyanosis, clubbing or edema    Pulses:  2+ and symmetric    Skin:  No jaundice, rashes, or lesions    Lymph nodes:  No palpable cervical lymphadenopathy        Lab Results:   No visits with results within 1 Day(s) from this visit  Latest known visit with results is:   Clinical Support on 01/18/2022   Component Date Value    SARS-CoV-2 01/18/2022 Negative          Radiology Results:   No results found

## 2022-02-14 ENCOUNTER — CLINICAL SUPPORT (OUTPATIENT)
Dept: FAMILY MEDICINE CLINIC | Facility: CLINIC | Age: 62
End: 2022-02-14

## 2022-02-14 DIAGNOSIS — Z20.822 SUSPECTED COVID-19 VIRUS INFECTION: Primary | ICD-10-CM

## 2022-02-14 PROCEDURE — U0003 INFECTIOUS AGENT DETECTION BY NUCLEIC ACID (DNA OR RNA); SEVERE ACUTE RESPIRATORY SYNDROME CORONAVIRUS 2 (SARS-COV-2) (CORONAVIRUS DISEASE [COVID-19]), AMPLIFIED PROBE TECHNIQUE, MAKING USE OF HIGH THROUGHPUT TECHNOLOGIES AS DESCRIBED BY CMS-2020-01-R: HCPCS | Performed by: PHYSICIAN ASSISTANT

## 2022-02-14 PROCEDURE — U0005 INFEC AGEN DETEC AMPLI PROBE: HCPCS | Performed by: PHYSICIAN ASSISTANT

## 2022-02-15 ENCOUNTER — TELEPHONE (OUTPATIENT)
Dept: FAMILY MEDICINE CLINIC | Facility: CLINIC | Age: 62
End: 2022-02-15

## 2022-02-15 LAB — SARS-COV-2 RNA RESP QL NAA+PROBE: POSITIVE

## 2022-02-15 NOTE — TELEPHONE ENCOUNTER
----- Message from Chad Yeager PA-C sent at 2/15/2022  3:26 PM EST -----  Please let patient know she is positive for covid, see if she needs a telemedicine visit

## 2022-02-16 ENCOUNTER — NURSE TRIAGE (OUTPATIENT)
Dept: OTHER | Facility: OTHER | Age: 62
End: 2022-02-16

## 2022-02-16 ENCOUNTER — TELEMEDICINE (OUTPATIENT)
Dept: FAMILY MEDICINE CLINIC | Facility: CLINIC | Age: 62
End: 2022-02-16
Payer: COMMERCIAL

## 2022-02-16 VITALS
HEIGHT: 64 IN | SYSTOLIC BLOOD PRESSURE: 99 MMHG | DIASTOLIC BLOOD PRESSURE: 66 MMHG | BODY MASS INDEX: 25.27 KG/M2 | WEIGHT: 148 LBS | TEMPERATURE: 99 F | HEART RATE: 89 BPM

## 2022-02-16 DIAGNOSIS — K70.31 ALCOHOLIC CIRRHOSIS OF LIVER WITH ASCITES (HCC): Chronic | ICD-10-CM

## 2022-02-16 DIAGNOSIS — U07.1 COVID-19: Primary | ICD-10-CM

## 2022-02-16 DIAGNOSIS — R51.9 ACUTE NONINTRACTABLE HEADACHE, UNSPECIFIED HEADACHE TYPE: ICD-10-CM

## 2022-02-16 DIAGNOSIS — D05.11 DUCTAL CARCINOMA IN SITU (DCIS) OF RIGHT BREAST: ICD-10-CM

## 2022-02-16 DIAGNOSIS — J44.9 MODERATE COPD (CHRONIC OBSTRUCTIVE PULMONARY DISEASE) (HCC): ICD-10-CM

## 2022-02-16 PROCEDURE — 4004F PT TOBACCO SCREEN RCVD TLK: CPT | Performed by: PHYSICIAN ASSISTANT

## 2022-02-16 PROCEDURE — 3008F BODY MASS INDEX DOCD: CPT | Performed by: PHYSICIAN ASSISTANT

## 2022-02-16 PROCEDURE — 99214 OFFICE O/P EST MOD 30 MIN: CPT | Performed by: PHYSICIAN ASSISTANT

## 2022-02-16 RX ORDER — TRAMADOL HYDROCHLORIDE 50 MG/1
50 TABLET ORAL EVERY 8 HOURS PRN
Qty: 10 TABLET | Refills: 0 | Status: SHIPPED | OUTPATIENT
Start: 2022-02-16 | End: 2022-05-10

## 2022-02-16 RX ORDER — ALBUTEROL SULFATE 90 UG/1
2 AEROSOL, METERED RESPIRATORY (INHALATION) EVERY 6 HOURS PRN
Qty: 18 G | Refills: 3 | Status: SHIPPED | OUTPATIENT
Start: 2022-02-16

## 2022-02-16 NOTE — TELEPHONE ENCOUNTER
Patient states she heard back from her PCP and was advised not to take the Tramadol due to the fact that she was having black stools and has a history of Cirrhosis  She also stated she was advised to go to the ED to be evaluated for her black stools  Tramadol is not a recommended treatment for COVID headaches      Reason for Disposition   Caller has already spoken with the PCP (or office), and has no further questions    Protocols used: NO CONTACT OR DUPLICATE CONTACT CALL-ADULT-[place]

## 2022-02-16 NOTE — TELEPHONE ENCOUNTER
Regarding: Tramadol question  ----- Message from Jakob i[employer] sent at 2/16/2022 10:08 AM EST -----  "I have COVID and I would like to know if I am able to take Tramadol for my pain "

## 2022-02-16 NOTE — PROGRESS NOTES
COVID-19 Outpatient Progress Note    Assessment/Plan:    1  COVID-19    - on day 5, coughing, congestion, nausea and diarrhea  Staying hydrated, eating  Had some blood bowel movements, per patient she spoke with GI who advised her "they were not concerned about bloody bowel movements because it was only one incident and has resolved"  Discussed possible ER evaluation but patient said her GI is "not concerned"  I have reached out to GI twice and have not received a response  Advised hydration, fluids, rest  Will take Tramadol for headache as she was told not to take tylenol or NSAIDs but GI  Discussed monoclonal antibody treatment and patient is not interested at this time, will revisit this again tomorrow  Patient does not have a pulse ox and  is now at work  Albuterol helps with cough, also taking robuitussin  Patient only notes SOB on exertion but gets that with her COPD  Advised if s/s get worse should go to ER  2  Alcoholic cirrhosis of liver with ascites ([facility] [address] )    - under care Dr Debbie Sebastian, reached out to his office with regards to melena/hematochezia with no response  Per patient she was told because this was one isolated incidence yesterday and has returned to normal today no concern    3  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)    - smoking cessation, c/w albuterol prn  - albuterol (Proventil HFA) 90 mcg/act inhaler; Inhale 2 puffs every 6 (six) hours as needed for wheezing or shortness of breath  Dispense: 18 g; Refill: 3    4  Ductal carcinoma in situ (DCIS) of right breast    - finished treatment with [facility] last year    5  Acute nonintractable headache, unspecified headache type    - from covid, tramadol working well, requesting refill  Will call in 10 tablets only  - traMADol (Ultram) 50 mg tablet; Take 1 tablet (50 mg total) by mouth every 8 (eight) hours as needed for moderate pain  Dispense: 10 tablet;  Refill: 0     f/u as needed  F/u tomorrow    Disposition: I recommended continued isolation until at least 24 hours have passed since recovery defined as resolution of fever without the use of fever-reducing medications AND improvement in COVID symptoms AND 10 days have passed since onset of symptoms (or 10 days have passed since date of first positive viral diagnostic test for asymptomatic patients)  I have spent 15 minutes directly with the patient  Greater than 50% of this time was spent in counseling/coordination of care regarding: diagnostic results, prognosis, risks and benefits of treatment options, instructions for management, patient and family education, importance of treatment compliance, risk factor reductions and impressions  Encounter provider Marianne Khalil PA-C    Provider located at [address] [place] [id number] [phone]    Recent Visits  Date Type Provider Dept   02/15/22 Telephone [facility] [address] [place] [address],[address]    02/15/22 Telephone [id number]  [facility]   Showing recent visits within past 7 days and meeting all other requirements  Today's Visits  Date Type Provider Dept   02/16/22 Telemedicine Marianne Khalil PA-C [address] [facility]   Showing today's visits and meeting all other requirements  Future Appointments  No visits were found meeting these conditions  Showing future appointments within next 150 days and meeting all other requirements     This virtual check-in was done via [email or web address] and patient was informed that this is a secure, HIPAA-compliant platform  She agrees to proceed  Patient agrees to participate in a virtual check in via telephone or video visit instead of presenting to the office to address urgent/immediate medical needs  Patient is aware this is a billable service  After connecting through [facility], the patient was identified by name and date of birth  Omar Gonzalez was informed that this was a telemedicine visit and that the exam was being conducted confidentially over secure lines  My office door was closed  No one else was in the room  Omar Gonzalez acknowledged consent and understanding of privacy and security of the telemedicine visit  I informed the patient that I have reviewed her record in Epic and presented the opportunity for her to ask any questions regarding the visit today  The patient agreed to participate  Verification of patient location:  Patient is located in the following state in which I hold an active license: [place]    Subjective:   Omar Gonzalez is a 57 y o  female who has been screened for COVID-19  Symptom change since last report: unchanged  Patient's symptoms include fever, chills, fatigue, malaise, nasal congestion, rhinorrhea, cough, shortness of breath, nausea, diarrhea, myalgias and headache  Patient denies sore throat, anosmia, loss of taste, chest tightness, abdominal pain and vomiting      - Date of exposure: 2/12/2022  - Date of positive COVID-19 test: 2/14/2022  Type of test: PCR  COVID-19 vaccination status: Fully vaccinated with booster    [facility] has been staying home and has isolated themselves in her home  She is taking care to not share personal items and is cleaning all surfaces that are touched often, like counters, tabletops, and doorknobs using household cleaning sprays or wipes  She is wearing a mask when she leaves her room       Lab Results   Component Value Date    SARSCOV2 Positive (A) 02/14/2022    SARSCOV2 Not Detected 11/02/2020     Past Medical History:   Diagnosis Date    Alcoholic fatty liver     Anxiety     Asthma     COPD (chronic obstructive pulmonary disease) (HCC)     Elevated liver function tests     GERD (gastroesophageal reflux disease)     GERD without esophagitis     Hyperlipidemia     Hypertension     IBS (irritable bowel syndrome)     Insomnia     Insomnia     Liver disease     Nervousness     Nicotine dependence     Other specified urinary incontinence     RLS (restless legs syndrome)     Wears glasses      Past Surgical History:   Procedure Laterality Date    BACK SURGERY      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    BREAST EXCISIONAL BIOPSY Right 11/01/2004    benign    CATARACT EXTRACTION, BILATERAL  08/01/2018    COLONOSCOPY N/A 5/8/2019    Procedure: COLONOSCOPY;  Surgeon: Juan Daniel No MD;  Location: [facility]; Service: Gastroenterology    EGD AND COLONOSCOPY N/A 3/19/2018    Procedure: EGD AND COLONOSCOPY;  Surgeon: Juan Daniel No MD;  Location: [facility]; Service: Gastroenterology    ESOPHAGOGASTRODUODENOSCOPY N/A 5/8/2019    Procedure: ESOPHAGOGASTRODUODENOSCOPY (EGD); Surgeon: Juan Daniel No MD;  Location: [facility];   Service: Gastroenterology    IR PARACENTESIS  11/27/2020    IR PARACENTESIS  12/8/2020    KNEE SURGERY      KNEE SURGERY      LUMBAR LAMINECTOMY  04/1999    LYMPH NODE BIOPSY      MAMMO STEREOTACTIC BREAST BIOPSY RIGHT (ALL INC) Right 5/21/2021    MAMMO STEREOTACTIC BREAST BIOPSY RIGHT (ALL INC) EACH ADD Right 5/21/2021    TOTAL ABDOMINAL HYSTERECTOMY  02/05/2008    TUBAL LIGATION      TUBAL LIGATION  1989    UPPER GASTROINTESTINAL ENDOSCOPY       Current Outpatient Medications   Medication Sig Dispense Refill    albuterol (Proventil HFA) 90 mcg/act inhaler Inhale 2 puffs every 6 (six) hours as needed for wheezing or shortness of breath 18 g 3    ALPRAZolam (XANAX) 1 mg tablet TAKE ONE TABLET BY MOUTH TWICE A DAY AS NEEDED FOR ANXIETY 60 tablet 5    anastrozole (ARIMIDEX) 1 mg tablet Take 1 tablet (1 mg total) by mouth daily 90 tablet 3    benzonatate (TESSALON PERLES) 100 mg capsule TAKE ONE CAPSULE BY MOUTH THREE TIMES A DAY AS NEEDED FOR COUGH 60 capsule 2    benzonatate (TESSALON PERLES) 100 mg capsule Take 1 capsule (100 mg total) by mouth 3 (three) times a day as needed for cough 20 capsule 0    furosemide (LASIX) 40 mg tablet Take 1 tablet (40 mg total) by mouth daily 30 tablet 5    spironolactone (ALDACTONE) 50 mg tablet Take 1 tablet (50 mg total) by mouth daily 30 tablet 5    amoxicillin (AMOXIL) 875 mg tablet        predniSONE 10 mg tablet Take 4 tabs on day 1,2 with food, 3 tabs on day 3,4 with food, 2 tabs on day 5,6 with food, 1 tab on day 7,8 with food (Patient not taking: Reported on 1/3/2022 ) 20 tablet 0    tiotropium-olodaterol (STIOLTO RESPIMAT) 2 5-2 5 MCG/ACT inhaler Inhale 2 puffs daily (Patient not taking: Reported on 1/24/2022 ) 4 g 6     No current facility-administered medications for this visit  Allergies   Allergen Reactions    Sulfa Antibiotics Shortness Of Breath    Ace Inhibitors Cough and Other (See Comments)     coughing       Review of Systems   Constitutional: Positive for chills, fatigue and fever  HENT: Positive for congestion and rhinorrhea  Negative for sore throat  Respiratory: Positive for cough and shortness of breath  Negative for chest tightness  Gastrointestinal: Positive for diarrhea and nausea  Negative for abdominal pain and vomiting  Musculoskeletal: Positive for myalgias  Neurological: Positive for headaches  Objective:    Vitals:    02/16/22 1233   BP: 99/66   BP Location: Left arm   Patient Position: Sitting   Cuff Size: Standard   Pulse: 89   Temp: 99 °F (37 2 °C)   TempSrc: Temporal   Weight: 67 1 kg (148 lb)   Height: 5' 4" (1 626 m)       Physical Exam  Constitutional:       General: She is not in acute distress  Appearance: Normal appearance  She is ill-appearing  She is not toxic-appearing  Pulmonary:      Effort: Pulmonary effort is normal  No respiratory distress  Neurological:      General: No focal deficit present  Mental Status: She is alert and oriented to person, place, and time  Psychiatric:         Mood and Affect: Mood normal          Behavior: Behavior normal          Thought Content:  Thought content normal          Judgment: Judgment normal          VIRTUAL VISIT DISCLAIMER    Jose White verbally agrees to participate in [employer]  Pt is aware that [employer] could be limited without vital signs or the ability to perform a full hands-on physical Mar Galeano understands she or the provider may request at any time to terminate the video visit and request the patient to seek care or treatment in person

## 2022-02-16 NOTE — TELEPHONE ENCOUNTER
We actually told her we don't prescribe tramadol for covid related headaches  She told us yesterday she was having bloody and black bowel movements  She also has coughed up blood recently  I asked that she relay this to you for Dr Mon More opinion on her next steps  I am not sure if she relayed this information to you and if any advise was given with regards to these symptoms  I also send you a message in epic regarding this

## 2022-02-17 ENCOUNTER — TELEPHONE (OUTPATIENT)
Dept: OTHER | Facility: OTHER | Age: 62
End: 2022-02-17

## 2022-02-17 ENCOUNTER — TELEPHONE (OUTPATIENT)
Dept: FAMILY MEDICINE CLINIC | Facility: CLINIC | Age: 62
End: 2022-02-17

## 2022-02-17 NOTE — TELEPHONE ENCOUNTER
Thank you  Tomorrow will be day 7, check in with her tomorrow just incase she starts to decline before the weekend  Also it would be the last day she could do the monoclonal antibody treatment  Thanks

## 2022-02-17 NOTE — TELEPHONE ENCOUNTER
Patient of Dr Vel Canales is calling stating she has to complete lab work for GI prior to an appointment however does not have labs and has an appt at [id number] tomorrow at 56 and is wondering if labs can be faxed to [place] Clerk stated she needed to complete hgb

## 2022-02-17 NOTE — TELEPHONE ENCOUNTER
Spoke to patient today, she said:  Tramadol really helped with the headache and the body aches  Inhaler helped with the breathing  Eating ok  Drinking a lot  Temp is 98 7  She has a little more energy  She feels " much better"  She will call Monday and let us know how she is doing

## 2022-02-18 ENCOUNTER — TELEPHONE (OUTPATIENT)
Dept: GASTROENTEROLOGY | Facility: CLINIC | Age: 62
End: 2022-02-18

## 2022-02-18 ENCOUNTER — TELEPHONE (OUTPATIENT)
Dept: FAMILY MEDICINE CLINIC | Facility: CLINIC | Age: 62
End: 2022-02-18

## 2022-02-18 NOTE — TELEPHONE ENCOUNTER
Tough breathing, post nasal drip, coughing still has fever of 100 drinking water and Gatorade  Face pain feels like sinus inf   Took hot shower to loosen things up

## 2022-02-18 NOTE — TELEPHONE ENCOUNTER
Instructed pt, she said she will get a pulse ox and  her  new meds   I asked if she is drinking enough fluids and she said she is and she heats up wine and has been drinking that because it makes her feel better and she falls asleep

## 2022-02-18 NOTE — TELEPHONE ENCOUNTER
Sarah Guadarrama said she feels good this AM  I asked her to call me before 3 today and let me know how she is feeling

## 2022-02-18 NOTE — TELEPHONE ENCOUNTER
Patients GI provider:  Dr Nahun Cullen    Number to return call: [phone]    Reason for call: Pt calling stating she needs her blood work faxed over to [facility]  [employer] is [phone]    Scheduled procedure/appointment date if applicable: Appt: 7/05/5188

## 2022-02-18 NOTE — TELEPHONE ENCOUNTER
----- Message from Yvette Blancas MD sent at 2/17/2022 12:02 PM EST -----  Tim Smith, thank you for the update  I spoke with her and asked her to get her CBC, CMP, and INR checked (already ordered)  She said the bleeding resolved but if she is anemic, I will send her to the ER     ----- Message -----  From: Samantha Licea PA-C  Sent: 2/16/2022   1:38 PM EST  To: Yvette Blancas MD, MELECIO Vazquez and Dr Alexsander Prather    I can see in the encounters that Larisa Puente reached out to your office today  I was doing a virtual visit with her for her covid infection when you called her  Prior to her talking with you, she told me yesterday she had bloody, black diarrhea  Today she said she had a brown bowel movement  She also said she was coughing up blood with forceful coughs  I am not sure if she brought this up as I asked her to  If she did bring it up where there any recommendations from your office about what you would like her to do      Initially I was going to recommend monoclonal antibody treatment but due to the new symptoms I was going to recommend ER eval  Thank you for your help  Tim Smith

## 2022-02-18 NOTE — TELEPHONE ENCOUNTER
Her  needs to go buy a pulse oximeter  If it stays below 90% she needs to go to ER  I called in a budensonide inhaler she needs to do 4 INH twice daily for 2 weeks  She needs to use her albuterol inhaler every 4 hours   Fluids and rest  If she is feeling worse must go to ER  She does not have a sinus infection, she has covid  Moist steam, nasal saline spray

## 2022-02-21 NOTE — TELEPHONE ENCOUNTER
Spoke to patient this morning, she said she feels good today  She is still coughing, feels sluggish and tired a bit  She did not  the Pulmicort inhales as it was 110 00 with insurance and her spouse said NO    She is looking for something else that would be cheaper  She would have to call her insurance for that    I'm not thinking she will  I called the pharmacy to ask them and their computers are down and can not check

## 2022-02-22 LAB
ALBUMIN SERPL-MCNC: 3 G/DL (ref 3.8–4.8)
ALBUMIN/GLOB SERPL: 0.6 {RATIO} (ref 1.2–2.2)
ALP SERPL-CCNC: 198 IU/L (ref 44–121)
ALT SERPL-CCNC: 35 IU/L (ref 0–32)
AST SERPL-CCNC: 73 IU/L (ref 0–40)
BILIRUB SERPL-MCNC: 0.8 MG/DL (ref 0–1.2)
BUN SERPL-MCNC: 10 MG/DL (ref 8–27)
BUN/CREAT SERPL: 12 (ref 12–28)
CALCIUM SERPL-MCNC: 8.6 MG/DL (ref 8.7–10.3)
CHLORIDE SERPL-SCNC: 99 MMOL/L (ref 96–106)
CO2 SERPL-SCNC: 23 MMOL/L (ref 20–29)
CREAT SERPL-MCNC: 0.83 MG/DL (ref 0.57–1)
ERYTHROCYTE [DISTWIDTH] IN BLOOD BY AUTOMATED COUNT: 12.3 % (ref 11.7–15.4)
GLOBULIN SER-MCNC: 5.3 G/DL (ref 1.5–4.5)
GLUCOSE SERPL-MCNC: 101 MG/DL (ref 65–99)
HCT VFR BLD AUTO: 35.8 % (ref 34–46.6)
HGB BLD-MCNC: 12.9 G/DL (ref 11.1–15.9)
INR PPP: 1.3 (ref 0.9–1.2)
MCH RBC QN AUTO: 36.3 PG (ref 26.6–33)
MCHC RBC AUTO-ENTMCNC: 36 G/DL (ref 31.5–35.7)
MCV RBC AUTO: 101 FL (ref 79–97)
MORPHOLOGY BLD-IMP: NORMAL
PLATELET # BLD AUTO: 64 X10E3/UL (ref 150–450)
POTASSIUM SERPL-SCNC: 4.1 MMOL/L (ref 3.5–5.2)
PROT SERPL-MCNC: 8.3 G/DL (ref 6–8.5)
PROTHROMBIN TIME: 13.5 SEC (ref 9.1–12)
RBC # BLD AUTO: 3.55 X10E6/UL (ref 3.77–5.28)
SL AMB EGFR AFRICAN AMERICAN: 87 ML/MIN/1.73
SL AMB EGFR NON AFRICAN AMERICAN: 76 ML/MIN/1.73
SODIUM SERPL-SCNC: 133 MMOL/L (ref 134–144)
WBC # BLD AUTO: 5.2 X10E3/UL (ref 3.4–10.8)

## 2022-02-28 ENCOUNTER — PREP FOR PROCEDURE (OUTPATIENT)
Dept: GASTROENTEROLOGY | Facility: CLINIC | Age: 62
End: 2022-02-28

## 2022-03-03 ENCOUNTER — TELEPHONE (OUTPATIENT)
Dept: HEMATOLOGY ONCOLOGY | Facility: CLINIC | Age: 62
End: 2022-03-03

## 2022-03-03 NOTE — TELEPHONE ENCOUNTER
Spoke with pt informing Dr Luis Cruz will be out of office and confirmed with pt and scheduled a new appt  Pt is happy with the date and time  Pt also knows to call our office there is any questions until then

## 2022-03-30 ENCOUNTER — TELEPHONE (OUTPATIENT)
Dept: HEMATOLOGY ONCOLOGY | Facility: CLINIC | Age: 62
End: 2022-03-30

## 2022-03-30 ENCOUNTER — OFFICE VISIT (OUTPATIENT)
Dept: PULMONOLOGY | Facility: HOSPITAL | Age: 62
End: 2022-03-30
Payer: COMMERCIAL

## 2022-03-30 VITALS
DIASTOLIC BLOOD PRESSURE: 72 MMHG | HEIGHT: 64 IN | WEIGHT: 151 LBS | RESPIRATION RATE: 20 BRPM | SYSTOLIC BLOOD PRESSURE: 138 MMHG | BODY MASS INDEX: 25.78 KG/M2

## 2022-03-30 DIAGNOSIS — K70.31 ALCOHOLIC CIRRHOSIS OF LIVER WITH ASCITES (HCC): Chronic | ICD-10-CM

## 2022-03-30 DIAGNOSIS — D05.11 DUCTAL CARCINOMA IN SITU (DCIS) OF RIGHT BREAST: ICD-10-CM

## 2022-03-30 DIAGNOSIS — J44.9 MODERATE COPD (CHRONIC OBSTRUCTIVE PULMONARY DISEASE) (HCC): Primary | ICD-10-CM

## 2022-03-30 DIAGNOSIS — T65.294A TOXIC EFFECT OF TOBACCO, UNDETERMINED INTENT, INITIAL ENCOUNTER: ICD-10-CM

## 2022-03-30 PROCEDURE — 99214 OFFICE O/P EST MOD 30 MIN: CPT | Performed by: PHYSICIAN ASSISTANT

## 2022-03-30 PROCEDURE — 3008F BODY MASS INDEX DOCD: CPT | Performed by: PHYSICIAN ASSISTANT

## 2022-03-30 RX ORDER — BENZONATATE 100 MG/1
100 CAPSULE ORAL 3 TIMES DAILY PRN
Qty: 60 CAPSULE | Refills: 1 | Status: SHIPPED | OUTPATIENT
Start: 2022-03-30 | End: 2022-05-10 | Stop reason: SDUPTHER

## 2022-03-30 NOTE — PROGRESS NOTES
Assessment & Plan:    1  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)  benzonatate (TESSALON PERLES) 100 mg capsule    Complete PFT with post bronchodilator   2  Toxic effect of tobacco, undetermined intent, initial encounter     3  Alcoholic cirrhosis of liver with ascites ([facility] [address] )     4  Ductal carcinoma in situ (DCIS) of right breast         · Patient presenting for follow-up, respiratory symptoms have been stable  · Obtain up-to-date complete PFTs  · Patient discontinued use of Stiolto on her own  Currently only using albuterol PRN  Refusing RX for maintenance inhaler at this time despite education provided  Will re-evaluate following PFT results  · Reminded her to go for CT lung screening  · Advised to quit smoking and drinking  · Currently on hormone therapy, following with [facility]    Subjective:     Patient ID: Leeann Rosas is a 57 y o  female  Chief Complaint:    Domingo Neri is a 57-year-old female with past medical history including COPD, cirrhosis of the liver, DCIS, tobacco and alcohol abuse presenting for follow-up  Patient reports no changes in her breathing  She stopped using her Stiolto  She does not require albuterol every single day  She has chronic cough and congestion  She is still smoking and drinking alcohol and does not want to quit  She had COVID-19 infection last month however thankfully symptoms were mild  The following portions of the patient's history were reviewed in this encounter and updated as appropriate: Past medical, social, surgical, family, allergies    Review of Systems   Respiratory: Positive for shortness of breath  Cardiovascular: Negative for chest pain  All other systems reviewed and are negative          Objective:  Vitals:    03/30/22 1340   BP: 138/72   BP Location: Left arm   Patient Position: Sitting   Cuff Size: Adult   Resp: 20   Weight: 68 5 kg (151 lb)   Height: 5' 4" (1 626 m)       Physical Exam  Vitals and nursing note reviewed  Constitutional:       General: She is not in acute distress  Appearance: She is well-developed  She is not diaphoretic  HENT:      Head: Normocephalic and atraumatic  Right Ear: External ear normal       Left Ear: External ear normal       Nose: Nose normal    Eyes:      General: No scleral icterus  Right eye: No discharge  Left eye: No discharge  Conjunctiva/sclera: Conjunctivae normal    Neck:      Trachea: No tracheal deviation  Cardiovascular:      Rate and Rhythm: Normal rate and regular rhythm  Heart sounds: Normal heart sounds  No murmur heard  No friction rub  No gallop  Pulmonary:      Effort: Pulmonary effort is normal  No respiratory distress  Breath sounds: Normal breath sounds  No stridor  No wheezing or rales  Abdominal:      General: There is no distension  Tenderness: There is no guarding  Musculoskeletal:         General: No tenderness or deformity  Normal range of motion  Cervical back: Normal range of motion and neck supple  Skin:     General: Skin is warm and dry  Coloration: Skin is not pale  Findings: No erythema or rash  Neurological:      Mental Status: She is alert and oriented to person, place, and time  Cranial Nerves: No cranial nerve deficit  Psychiatric:         Behavior: Behavior normal          Thought Content:  Thought content normal          Judgment: Judgment normal          Lab Review:   Clinical Support on 02/14/2022   Component Date Value    SARS-CoV-2 02/14/2022 Positive*

## 2022-03-30 NOTE — TELEPHONE ENCOUNTER
Appointment Confirmation (to confirm pre existing appointments - ONLY)     Appointment with  Dr Shirley Larose   Appointment date & time  4/25 2:20P   Location [place]   Patient verbilized Understanding  Yes

## 2022-04-14 LAB
ALBUMIN SERPL-MCNC: 2.9 G/DL (ref 3.8–4.8)
ALBUMIN/GLOB SERPL: 0.5 {RATIO} (ref 1.2–2.2)
ALP SERPL-CCNC: 183 IU/L (ref 44–121)
ALT SERPL-CCNC: 36 IU/L (ref 0–32)
AST SERPL-CCNC: 73 IU/L (ref 0–40)
BASOPHILS # BLD AUTO: 0.1 X10E3/UL (ref 0–0.2)
BASOPHILS NFR BLD AUTO: 1 %
BILIRUB SERPL-MCNC: 1.3 MG/DL (ref 0–1.2)
BUN SERPL-MCNC: 9 MG/DL (ref 8–27)
BUN/CREAT SERPL: 13 (ref 12–28)
CALCIUM SERPL-MCNC: 8.5 MG/DL (ref 8.7–10.3)
CHLORIDE SERPL-SCNC: 95 MMOL/L (ref 96–106)
CO2 SERPL-SCNC: 22 MMOL/L (ref 20–29)
CREAT SERPL-MCNC: 0.7 MG/DL (ref 0.57–1)
EGFR: 98 ML/MIN/1.73
EOSINOPHIL # BLD AUTO: 0.1 X10E3/UL (ref 0–0.4)
EOSINOPHIL NFR BLD AUTO: 2 %
ERYTHROCYTE [DISTWIDTH] IN BLOOD BY AUTOMATED COUNT: 12.8 % (ref 11.7–15.4)
GLOBULIN SER-MCNC: 5.5 G/DL (ref 1.5–4.5)
GLUCOSE SERPL-MCNC: 83 MG/DL (ref 65–99)
HCT VFR BLD AUTO: 34.5 % (ref 34–46.6)
HGB BLD-MCNC: 12.5 G/DL (ref 11.1–15.9)
IMM GRANULOCYTES # BLD: 0 X10E3/UL (ref 0–0.1)
IMM GRANULOCYTES NFR BLD: 0 %
LYMPHOCYTES # BLD AUTO: 1.6 X10E3/UL (ref 0.7–3.1)
LYMPHOCYTES NFR BLD AUTO: 27 %
MCH RBC QN AUTO: 36.4 PG (ref 26.6–33)
MCHC RBC AUTO-ENTMCNC: 36.2 G/DL (ref 31.5–35.7)
MCV RBC AUTO: 101 FL (ref 79–97)
MONOCYTES # BLD AUTO: 0.8 X10E3/UL (ref 0.1–0.9)
MONOCYTES NFR BLD AUTO: 13 %
MORPHOLOGY BLD-IMP: ABNORMAL
NEUTROPHILS # BLD AUTO: 3.3 X10E3/UL (ref 1.4–7)
NEUTROPHILS NFR BLD AUTO: 57 %
PLATELET # BLD AUTO: 85 X10E3/UL (ref 150–450)
POTASSIUM SERPL-SCNC: 3.5 MMOL/L (ref 3.5–5.2)
PROT SERPL-MCNC: 8.4 G/DL (ref 6–8.5)
RBC # BLD AUTO: 3.43 X10E6/UL (ref 3.77–5.28)
SODIUM SERPL-SCNC: 134 MMOL/L (ref 134–144)
WBC # BLD AUTO: 5.9 X10E3/UL (ref 3.4–10.8)

## 2022-04-22 ENCOUNTER — TELEPHONE (OUTPATIENT)
Dept: FAMILY MEDICINE CLINIC | Facility: CLINIC | Age: 62
End: 2022-04-22

## 2022-04-22 NOTE — TELEPHONE ENCOUNTER
Patient says she is due for a mammo and received a call from [facility] and they said she needs us to put a mammo order in  I asked what kind of order and she said she doesn't know, she had breast cancer so whatever we think is appropriate      Can you put the order in?

## 2022-04-25 ENCOUNTER — OFFICE VISIT (OUTPATIENT)
Dept: HEMATOLOGY ONCOLOGY | Facility: HOSPITAL | Age: 62
End: 2022-04-25
Payer: COMMERCIAL

## 2022-04-25 VITALS
DIASTOLIC BLOOD PRESSURE: 76 MMHG | HEIGHT: 64 IN | RESPIRATION RATE: 14 BRPM | TEMPERATURE: 98 F | WEIGHT: 152 LBS | SYSTOLIC BLOOD PRESSURE: 120 MMHG | BODY MASS INDEX: 25.95 KG/M2 | OXYGEN SATURATION: 98 % | HEART RATE: 90 BPM

## 2022-04-25 DIAGNOSIS — D05.11 DUCTAL CARCINOMA IN SITU (DCIS) OF RIGHT BREAST: Primary | ICD-10-CM

## 2022-04-25 PROCEDURE — 3008F BODY MASS INDEX DOCD: CPT | Performed by: INTERNAL MEDICINE

## 2022-04-25 PROCEDURE — 99214 OFFICE O/P EST MOD 30 MIN: CPT | Performed by: INTERNAL MEDICINE

## 2022-04-25 PROCEDURE — 4004F PT TOBACCO SCREEN RCVD TLK: CPT | Performed by: INTERNAL MEDICINE

## 2022-04-25 NOTE — PROGRESS NOTES
Hematology/Oncology Outpatient Follow- up Note  Bora Mccullough 57 y o  female MRN: @ Encounter: [id number]        Date:  4/25/2022    Presenting Complaint/Diagnosis : DCIS of the breast  On breast biopsy which is ER positive OH positive    HPI:    Jose White is seen for initial consultation 6/28/2021 regarding [facility] diagnosed DCIS of the breast on the right side status post stereotactic biopsy which revealed ER positive OH positive disease   This was in relation to an abnormal mammogram   The patient apparently was discussed in breast tumor board  And based on her alcoholic cirrhosis along with active drinking and  Ascites she was recommended to see us for consideration of an aromatase inhibitor until she was a candidate for surgery   Per the note from breast working groove she was not the best surgical candidate due to current liver disease and alcoholism   Liver function tests are slightly elevated   The patient also has extensive documentation where she appears to be very dependent/addicted to Xanax  Previous Hematologic/ Oncologic History:    Oncology History   Ductal carcinoma in situ (DCIS) of right breast   6/14/2021 Initial Diagnosis    Ductal carcinoma in situ (DCIS) of right breast     6/14/2021 -  Cancer Staged    Staging form: Breast, AJCC 8th Edition  - Clinical: Stage 0 (cTis (DCIS), cN0, cM0, ER+, OH+, HER2: Not Assessed) - Signed by Carol Tao MD on 6/14/2021  Stage prefix: Initial diagnosis  Method of lymph node assessment: Clinical  Nuclear grade: G2         Surgery  Radiation  Current Hematologic/ Oncologic Treatment:    the patient is currently on Arimidex  Interval History:    Patient returns for follow-up visit  She is doing well on her aromatase inhibitor  She previously had surgery followed by radiation  Denies any nausea denies any vomiting denies any diarrhea  Overall is doing well  The rest of her 14 point review of systems today is negative    She is following regularly with her GI physician  Still has a beer now and then according to her so has not completely stop drinking  Cancer Staging:  Cancer Staging  Ductal carcinoma in situ (DCIS) of right breast  Staging form: Breast, AJCC 8th Edition  - Clinical: Stage 0 (cTis (DCIS), cN0, cM0, ER+, [place]+, HER2: Not Assessed) - Signed by Renetta Craig MD on 6/14/2021  Stage prefix: Initial diagnosis  Method of lymph node assessment: Clinical  Nuclear grade: G2      Test Results:    Imaging: No results found  Labs:   Lab Results   Component Value Date    WBC 5 9 04/13/2022    HGB 12 5 04/13/2022    HCT 34 5 04/13/2022     (H) 04/13/2022    PLT 85 (LL) 04/13/2022     Lab Results   Component Value Date     07/28/2017    K 3 5 04/13/2022    CL 95 (L) 04/13/2022    CO2 22 04/13/2022    ANIONGAP 12 05/03/2015    BUN 9 04/13/2022    CREATININE 0 70 04/13/2022    GLUCOSE 95 07/28/2017    CALCIUM 8 9 02/19/2021    CORRECTEDCA 9 9 02/19/2021    AST 73 (H) 04/13/2022    ALT 36 (H) 04/13/2022    ALKPHOS 137 (H) 02/19/2021    PROT 7 4 07/28/2017    BILITOT 0 6 07/28/2017    EGFR 98 04/13/2022       Lab Results   Component Value Date    IRON 77 12/07/2020    TIBC 121 (L) 12/07/2020    FERRITIN 1,132 (H) 12/07/2020       Lab Results   Component Value Date    TBRWFRZH86 1,232 09/09/2020         ROS: As stated in the history of present illness otherwise his 14 point review of systems today was negative        Active Problems:   Patient Active Problem List   Diagnosis    Fatty liver    Other specified abnormal findings of blood chemistry    Anxiety    Elevated LFTs    GERD without esophagitis    Hyperlipidemia    Essential hypertension    Insomnia    Nicotine dependence    Healthcare maintenance    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)    Alcohol abuse    Irritable bowel syndrome with diarrhea    Non-seasonal allergic rhinitis due to pollen    Alcoholic cirrhosis of liver with ascites (HCC)    Alcohol dependence with uncomplicated withdrawal (HCC)    Thrombocytopenia (HCC)    Decompensated hepatic cirrhosis ([facility] [address] )    Ductal carcinoma in situ (DCIS) of right breast    Atypical ductal hyperplasia of right breast    Breast neoplasm, Tis (DCIS), right       Past Medical History:   Past Medical History:   Diagnosis Date    Alcoholic fatty liver     Anxiety     Asthma     COPD (chronic obstructive pulmonary disease) (HCC)     Elevated liver function tests     GERD (gastroesophageal reflux disease)     GERD without esophagitis     Hyperlipidemia     Hypertension     IBS (irritable bowel syndrome)     Insomnia     Insomnia     Liver disease     Nervousness     Nicotine dependence     Other specified urinary incontinence     RLS (restless legs syndrome)     Wears glasses        Surgical History:   Past Surgical History:   Procedure Laterality Date    BACK SURGERY      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    BREAST EXCISIONAL BIOPSY Right 11/01/2004    benign    CATARACT EXTRACTION, BILATERAL  08/01/2018    COLONOSCOPY N/A 5/8/2019    Procedure: COLONOSCOPY;  Surgeon: Gustavo Hart MD;  Location: [facility]; Service: Gastroenterology    EGD AND COLONOSCOPY N/A 3/19/2018    Procedure: EGD AND COLONOSCOPY;  Surgeon: Gustavo Hart MD;  Location: [facility]; Service: Gastroenterology    ESOPHAGOGASTRODUODENOSCOPY N/A 5/8/2019    Procedure: ESOPHAGOGASTRODUODENOSCOPY (EGD); Surgeon: Gustavo Hart MD;  Location: [facility];   Service: Gastroenterology    IR PARACENTESIS  11/27/2020    IR PARACENTESIS  12/8/2020    KNEE SURGERY      KNEE SURGERY      LUMBAR LAMINECTOMY  04/1999    LYMPH NODE BIOPSY      MAMMO STEREOTACTIC BREAST BIOPSY RIGHT (ALL INC) Right 5/21/2021    MAMMO STEREOTACTIC BREAST BIOPSY RIGHT (ALL INC) EACH ADD Right 5/21/2021    TOTAL ABDOMINAL HYSTERECTOMY  02/05/2008    TUBAL LIGATION      TUBAL LIGATION  1989    UPPER GASTROINTESTINAL ENDOSCOPY         Family History:    Family History   Problem Relation Age of Onset    Breast cancer Mother 28    No Known Problems Father     No Known Problems Sister     No Known Problems Brother     No Known Problems Son     No Known Problems Maternal Grandmother     No Known Problems Maternal Grandfather     No Known Problems Paternal Grandmother     No Known Problems Paternal Grandfather     No Known Problems Maternal Aunt     No Known Problems Maternal Aunt     No Known Problems Paternal Aunt     Substance Abuse Neg Hx     Mental illness Neg Hx        Cancer-related family history includes Breast cancer (age of onset: 28) in her mother  Social History:   Social History     Socioeconomic History    Marital status: /Civil Union     Spouse name: Not on file    Number of children: Not on file    Years of education: Not on file    Highest education level: Not on file   Occupational History    Not on file   Tobacco Use    Smoking status: Current Every Day Smoker     Packs/day: 1 00     Years: 45 00     Pack years: 45 00     Types: Cigarettes     Start date: 1978    Smokeless tobacco: Never Used   Vaping Use    Vaping Use: Never used   Substance and Sexual Activity    Alcohol use: Not Currently     Alcohol/week: 24 0 standard drinks     Types: 24 Cans of beer per week     Comment: Daily; just cut back from 6 beers/day   Per allscripts -  3 beers a night    Drug use: No    Sexual activity: Yes     Partners: Male   Other Topics Concern    Not on file   Social History Narrative    Has carbon monoxide detectors in home    Has smoke detectors    Uses safety equipment - seatbelts     Social Determinants of Health     Financial Resource Strain: Not on file   Food Insecurity: Not on file   Transportation Needs: Not on file   Physical Activity: Not on file   Stress: Not on file   Social Connections: Not on file   Intimate Partner Violence: Not on file   Housing Stability: Not on file       Current Medications:   Current Outpatient Medications   Medication Sig Dispense Refill    albuterol (Proventil HFA) 90 mcg/act inhaler Inhale 2 puffs every 6 (six) hours as needed for wheezing or shortness of breath 18 g 3    ALPRAZolam (XANAX) 1 mg tablet TAKE ONE TABLET BY MOUTH TWICE A DAY AS NEEDED FOR ANXIETY 60 tablet 5    anastrozole (ARIMIDEX) 1 mg tablet Take 1 tablet (1 mg total) by mouth daily 90 tablet 3    benzonatate (TESSALON PERLES) 100 mg capsule Take 1 capsule (100 mg total) by mouth 3 (three) times a day as needed for cough 20 capsule 0    furosemide (LASIX) 40 mg tablet Take 1 tablet (40 mg total) by mouth daily 30 tablet 5    spironolactone (ALDACTONE) 50 mg tablet Take 1 tablet (50 mg total) by mouth daily 30 tablet 5    benzonatate (TESSALON PERLES) 100 mg capsule Take 1 capsule (100 mg total) by mouth 3 (three) times a day as needed for cough (Patient not taking: Reported on 4/25/2022 ) 60 capsule 1    budesonide (Pulmicort Flexhaler) 180 MCG/ACT inhaler Inhale 4 puffs 2 (two) times a day for 14 days Rinse mouth after use  (Patient not taking: Reported on 4/25/2022 ) 1 each 0    traMADol (Ultram) 50 mg tablet Take 1 tablet (50 mg total) by mouth every 8 (eight) hours as needed for moderate pain (Patient not taking: Reported on 4/25/2022 ) 10 tablet 0     No current facility-administered medications for this visit  Allergies: Allergies   Allergen Reactions    Sulfa Antibiotics Shortness Of Breath    Ace Inhibitors Cough and Other (See Comments)     coughing       Physical Exam:    Body surface area is 1 74 meters squared      Wt Readings from Last 3 Encounters:   04/25/22 68 9 kg (152 lb)   03/30/22 68 5 kg (151 lb)   02/16/22 67 1 kg (148 lb)        Temp Readings from Last 3 Encounters:   04/25/22 98 °F (36 7 °C) (Temporal)   02/16/22 99 °F (37 2 °C) (Temporal)   01/28/22 98 3 °F (36 8 °C) (Tympanic)        BP Readings from Last 3 Encounters:   04/25/22 120/76   03/30/22 138/72   02/16/22 99/66         Pulse Readings from Last 3 Encounters:   04/25/22 90   02/16/22 89   01/24/22 86        Physical Exam     Constitutional   General appearance: No acute distress, well appearing and well nourished  Eyes   Conjunctiva and lids: No swelling, erythema or discharge  Pupils and irises: Equal, round and reactive to light  Ears, Nose, Mouth, and Throat   External inspection of ears and nose: Normal     Nasal mucosa, septum, and turbinates: Normal without edema or erythema  Oropharynx: Normal with no erythema, edema, exudate or lesions  Pulmonary   Respiratory effort: No increased work of breathing or signs of respiratory distress  Auscultation of lungs: Clear to auscultation  Cardiovascular   Palpation of heart: Normal PMI, no thrills  Auscultation of heart: Normal rate and rhythm, normal S1 and S2, without murmurs  Examination of extremities for edema and/or varicosities: Normal     Carotid pulses: Normal     Abdomen   Abdomen: Non-tender, no masses  Liver and spleen: No hepatomegaly or splenomegaly  Lymphatic   Palpation of lymph nodes in neck: No lymphadenopathy  Musculoskeletal   Gait and station: Normal     Digits and nails: Normal without clubbing or cyanosis  Inspection/palpation of joints, bones, and muscles: Normal     Skin   Skin and subcutaneous tissue: Normal without rashes or lesions  Neurologic   Cranial nerves: Cranial nerves 2-12 intact  Sensation: No sensory loss      Psychiatric   Orientation to person, place, and time: Normal     Mood and affect: Normal         Assessment / Plan:    The patient is an unfortunate 57-year-old female with a past medical history of alcoholic liver cirrhosis and dependence on benzodiazepines was referred to see us for newly diagnosed DCIS which is ER positive   She was discussed at breast working group and it was decided to start her on an aromatase inhibitor to evaluate for disease response  We did this at her last visit  She has done well on it  She then went to [facility] and had her surgery  She is doing well on her aromatase inhibitor  Last imaging of the liver had shown an enlarged liver with cirrhotic morphology  Liver function tests are elevated in relation to her alcoholic liver cirrhosis probably  At this point she will stay on aromatase inhibitor  I will see her back in 6 months with repeat blood work and imaging  Goals and Barriers:  Current Goal:  Prolong Survival from alcoholic liver cirrhosis with DCIS  Barriers: None  Patient's Capacity to Self Care:  Patient  able to self care  Portions of the record may have been created with voice recognition software  Occasional wrong word or "sound a like" substitutions may have occurred due to the inherent limitations of voice recognition software  Read the chart carefully and recognize, using context, where substitutions have occurred

## 2022-05-09 ENCOUNTER — HOSPITAL ENCOUNTER (OUTPATIENT)
Dept: PULMONOLOGY | Facility: HOSPITAL | Age: 62
Discharge: HOME | End: 2022-05-09
Payer: COMMERCIAL

## 2022-05-09 DIAGNOSIS — J44.9 MODERATE COPD (CHRONIC OBSTRUCTIVE PULMONARY DISEASE) (HCC): ICD-10-CM

## 2022-05-09 PROCEDURE — 94060 EVALUATION OF WHEEZING: CPT | Performed by: INTERNAL MEDICINE

## 2022-05-09 PROCEDURE — 94729 DIFFUSING CAPACITY: CPT | Performed by: INTERNAL MEDICINE

## 2022-05-09 PROCEDURE — 94060 EVALUATION OF WHEEZING: CPT

## 2022-05-09 PROCEDURE — 94726 PLETHYSMOGRAPHY LUNG VOLUMES: CPT | Performed by: INTERNAL MEDICINE

## 2022-05-09 PROCEDURE — 94760 N-INVAS EAR/PLS OXIMETRY 1: CPT

## 2022-05-09 PROCEDURE — 94726 PLETHYSMOGRAPHY LUNG VOLUMES: CPT

## 2022-05-09 PROCEDURE — 94729 DIFFUSING CAPACITY: CPT

## 2022-05-09 RX ORDER — ALBUTEROL SULFATE 2.5 MG/3ML
2.5 SOLUTION RESPIRATORY (INHALATION) EVERY 6 HOURS PRN
Status: DISCONTINUED | OUTPATIENT
Start: 2022-05-09 | End: 2022-05-10

## 2022-05-09 RX ADMIN — ALBUTEROL SULFATE 2.5 MG: 2.5 SOLUTION RESPIRATORY (INHALATION) at 14:24

## 2022-05-10 ENCOUNTER — OFFICE VISIT (OUTPATIENT)
Dept: FAMILY MEDICINE CLINIC | Facility: CLINIC | Age: 62
End: 2022-05-10
Payer: COMMERCIAL

## 2022-05-10 VITALS
SYSTOLIC BLOOD PRESSURE: 122 MMHG | HEIGHT: 64 IN | OXYGEN SATURATION: 98 % | RESPIRATION RATE: 18 BRPM | WEIGHT: 151.8 LBS | DIASTOLIC BLOOD PRESSURE: 80 MMHG | BODY MASS INDEX: 25.92 KG/M2 | HEART RATE: 70 BPM

## 2022-05-10 DIAGNOSIS — K70.31 ALCOHOLIC CIRRHOSIS OF LIVER WITH ASCITES (HCC): Chronic | ICD-10-CM

## 2022-05-10 DIAGNOSIS — R69 TAKING MEDICATION FOR CHRONIC DISEASE: ICD-10-CM

## 2022-05-10 DIAGNOSIS — K76.0 FATTY LIVER: ICD-10-CM

## 2022-05-10 DIAGNOSIS — D69.6 THROMBOCYTOPENIA (HCC): ICD-10-CM

## 2022-05-10 DIAGNOSIS — F10.10 ALCOHOL ABUSE: ICD-10-CM

## 2022-05-10 DIAGNOSIS — E78.49 OTHER HYPERLIPIDEMIA: ICD-10-CM

## 2022-05-10 DIAGNOSIS — I10 ESSENTIAL HYPERTENSION: ICD-10-CM

## 2022-05-10 DIAGNOSIS — F10.20 UNCOMPLICATED ALCOHOL DEPENDENCE (HCC): ICD-10-CM

## 2022-05-10 DIAGNOSIS — F17.200 SMOKER: Primary | ICD-10-CM

## 2022-05-10 DIAGNOSIS — F41.9 ANXIETY: ICD-10-CM

## 2022-05-10 DIAGNOSIS — L08.9 SKIN INFECTION: ICD-10-CM

## 2022-05-10 DIAGNOSIS — J44.9 MODERATE COPD (CHRONIC OBSTRUCTIVE PULMONARY DISEASE) (HCC): ICD-10-CM

## 2022-05-10 DIAGNOSIS — F11.20 CONTINUOUS OPIOID DEPENDENCE (HCC): ICD-10-CM

## 2022-05-10 DIAGNOSIS — D05.11 DUCTAL CARCINOMA IN SITU (DCIS) OF RIGHT BREAST: ICD-10-CM

## 2022-05-10 DIAGNOSIS — K72.10 CHRONIC LIVER FAILURE WITHOUT HEPATIC COMA (HCC): ICD-10-CM

## 2022-05-10 PROCEDURE — 3725F SCREEN DEPRESSION PERFORMED: CPT | Performed by: PHYSICIAN ASSISTANT

## 2022-05-10 PROCEDURE — 99214 OFFICE O/P EST MOD 30 MIN: CPT | Performed by: PHYSICIAN ASSISTANT

## 2022-05-10 NOTE — PROGRESS NOTES
Assessment/Plan:    1  Abnormal TSH     - TSH normal now, repeat 1 year 10/2022     2  Other hyperlipidemia     - LDL 80, currently controls with diet, continue as is repeat 10/2022     3  Breast cancer     - under care Deanne Cortez, s/p lumpectomy, doing radiation at 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)     - rare use albuterol, had PFT yesterday, CT lung screen ordered  - Due for appt with Dr Augustina Marks     7  Essential hypertension     - controlled with diet      8  Anxiety     - takes xanax prn    9  Burn on left digit 4 with infection    - apply bactroban ointment 3 x a day for 7 days, keep covered     PAP due 2023  F/u 6 months or sooner if needed        Subjective:   Chief Complaint   Patient presents with    Heartburn    Hypertension    Hyperlipidemia      Patient ID: Yanni Connor is a 57 y o  female  Patient here in follow up  Admits drinking again  Has follow up with Beulah Sotelo next week, compliant with diuretics  Had PFT done yesterday, has appointment with pulmonary coming up  Platlets still low, saw Dr Melodie Boggs, repeat labs in 6 months  Has follow up for breast cancer with Deanne Cortez next week, mammo scheduled, on arimidex  Burtn left hand finger 4 on steam a week ago, tight, sore, bleeds still        The following portions of the patient's history were reviewed and updated as appropriate: allergies, current medications, past family history, past medical history, past social history, past surgical history and problem list     Past Medical History:   Diagnosis Date    Alcoholic fatty liver     Anxiety     Asthma     COPD (chronic obstructive pulmonary disease) ([facility] [address] )     Elevated liver function tests     GERD (gastroesophageal reflux disease)     GERD without esophagitis     Hyperlipidemia     Hypertension     IBS (irritable bowel syndrome)     Insomnia     Insomnia     Liver disease     Nervousness     Nicotine dependence     Other specified urinary incontinence     RLS (restless legs syndrome)     Wears glasses      Past Surgical History:   Procedure Laterality Date    BACK SURGERY      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    BREAST EXCISIONAL BIOPSY Right 11/01/2004    benign    CATARACT EXTRACTION, BILATERAL  08/01/2018    COLONOSCOPY N/A 5/8/2019    Procedure: COLONOSCOPY;  Surgeon: Kehinde Murry MD;  Location: [facility]; Service: Gastroenterology    EGD AND COLONOSCOPY N/A 3/19/2018    Procedure: EGD AND COLONOSCOPY;  Surgeon: Kehinde Murry MD;  Location: [facility]; Service: Gastroenterology    ESOPHAGOGASTRODUODENOSCOPY N/A 5/8/2019    Procedure: ESOPHAGOGASTRODUODENOSCOPY (EGD); Surgeon: Kehinde Murry MD;  Location: [facility];   Service: Gastroenterology    IR PARACENTESIS  11/27/2020    IR PARACENTESIS  12/8/2020    KNEE SURGERY      KNEE SURGERY      LUMBAR LAMINECTOMY  04/1999    LYMPH NODE BIOPSY      MAMMO STEREOTACTIC BREAST BIOPSY RIGHT (ALL INC) Right 5/21/2021    MAMMO STEREOTACTIC BREAST BIOPSY RIGHT (ALL INC) EACH ADD Right 5/21/2021    TOTAL ABDOMINAL HYSTERECTOMY  02/05/2008    TUBAL LIGATION      TUBAL LIGATION  1989    UPPER GASTROINTESTINAL ENDOSCOPY       Family History   Problem Relation Age of Onset    Breast cancer Mother 28    No Known Problems Father     No Known Problems Sister     No Known Problems Brother     No Known Problems Son     No Known Problems Maternal Grandmother     No Known Problems Maternal Grandfather     No Known Problems Paternal Grandmother     No Known Problems Paternal Grandfather     No Known Problems Maternal Aunt     No Known Problems Maternal Aunt     No Known Problems Paternal Aunt     Substance Abuse Neg Hx     Mental illness Neg Hx      Social History     Socioeconomic History    Marital status: /Civil Union     Spouse name: Not on file    Number of children: Not on file    Years of education: Not on file    Highest education level: Not on file   Occupational History    Not on file   Tobacco Use    Smoking status: Current Every Day Smoker     Packs/day: 1 00     Years: 45 00     Pack years: 45 00     Types: Cigarettes     Start date: 1978    Smokeless tobacco: Never Used   Vaping Use    Vaping Use: Never used   Substance and Sexual Activity    Alcohol use: Not Currently     Alcohol/week: 24 0 standard drinks     Types: 24 Cans of beer per week     Comment: Daily; just cut back from 6 beers/day   Per allscripts -  3 beers a night    Drug use: No    Sexual activity: Yes     Partners: Male   Other Topics Concern    Not on file   Social History Narrative    Has carbon monoxide detectors in home    Has smoke detectors    Uses safety equipment - seatbelts     Social Determinants of Health     Financial Resource Strain: Not on file   Food Insecurity: Not on file   Transportation Needs: Not on file   Physical Activity: Not on file   Stress: Not on file   Social Connections: Not on file   Intimate Partner Violence: Not on file   Housing Stability: Not on file       Current Outpatient Medications:     albuterol (Proventil HFA) 90 mcg/act inhaler, Inhale 2 puffs every 6 (six) hours as needed for wheezing or shortness of breath, Disp: 18 g, Rfl: 3    ALPRAZolam (XANAX) 1 mg tablet, TAKE ONE TABLET BY MOUTH TWICE A DAY AS NEEDED FOR ANXIETY, Disp: 60 tablet, Rfl: 5    anastrozole (ARIMIDEX) 1 mg tablet, Take 1 tablet (1 mg total) by mouth daily, Disp: 90 tablet, Rfl: 3    benzonatate (TESSALON PERLES) 100 mg capsule, Take 1 capsule (100 mg total) by mouth 3 (three) times a day as needed for cough, Disp: 20 capsule, Rfl: 0    furosemide (LASIX) 40 mg tablet, Take 1 tablet (40 mg total) by mouth daily, Disp: 30 tablet, Rfl: 5    spironolactone (ALDACTONE) 50 mg tablet, Take 1 tablet (50 mg total) by mouth daily, Disp: 30 tablet, Rfl: 5    benzonatate (TESSALON PERLES) 100 mg capsule, Take 1 capsule (100 mg total) by mouth 3 (three) times a day as needed for cough, Disp: 60 capsule, Rfl: 1    budesonide (Pulmicort Flexhaler) 180 MCG/ACT inhaler, Inhale 4 puffs 2 (two) times a day for 14 days Rinse mouth after use  (Patient not taking: Reported on 4/25/2022 ), Disp: 1 each, Rfl: 0    traMADol (Ultram) 50 mg tablet, Take 1 tablet (50 mg total) by mouth every 8 (eight) hours as needed for moderate pain (Patient not taking: Reported on 5/10/2022 ), Disp: 10 tablet, Rfl: 0  No current facility-administered medications for this visit  Facility-Administered Medications Ordered in Other Visits:     albuterol inhalation solution 2 5 mg, 2 5 mg, Nebulization, Q6H PRN, Joselyn Zazueta PA-C, 2 5 mg at 05/09/22 1424    Review of Systems   Constitutional: Negative for chills and fever  HENT: Negative for ear pain and sore throat  Eyes: Negative for pain and visual disturbance  Respiratory: Negative for cough and shortness of breath  Cardiovascular: Negative for chest pain and palpitations  Gastrointestinal: Negative for abdominal pain and vomiting  Genitourinary: Negative for dysuria and hematuria  Musculoskeletal: Negative for arthralgias and back pain  Skin: Negative for color change and rash  Neurological: Negative for seizures and syncope  All other systems reviewed and are negative  Objective:    Vitals:    05/10/22 1055   BP: 122/80   Pulse: 70   Resp: 18   SpO2: 98%   Weight: 68 9 kg (151 lb 12 8 oz)   Height: 5' 3 75" (1 619 m)        Physical Exam  Constitutional:       Appearance: Normal appearance  She is well-developed  HENT:      Head: Normocephalic and atraumatic  Neck:      Vascular: No carotid bruit     Cardiovascular:      Rate and Rhythm: Normal rate and regular rhythm  Pulses: Normal pulses  Heart sounds: Normal heart sounds  Pulmonary:      Effort: Pulmonary effort is normal       Breath sounds: Normal breath sounds  Musculoskeletal:         General: Normal range of motion  Cervical back: Normal range of motion and neck supple  Right lower leg: No edema  Left lower leg: No edema  Lymphadenopathy:      Cervical: No cervical adenopathy  Skin:     General: Skin is warm  Neurological:      General: No focal deficit present  Mental Status: She is alert and oriented to person, place, and time  Psychiatric:         Mood and Affect: Mood normal          Behavior: Behavior normal          Thought Content: Thought content normal          Judgment: Judgment normal              BMI Counseling: Body mass index is 26 26 kg/m²  The BMI is above normal  Nutrition recommendations include reducing portion sizes

## 2022-05-13 ENCOUNTER — OFFICE VISIT (OUTPATIENT)
Dept: GASTROENTEROLOGY | Facility: CLINIC | Age: 62
End: 2022-05-13
Payer: COMMERCIAL

## 2022-05-13 VITALS
SYSTOLIC BLOOD PRESSURE: 120 MMHG | WEIGHT: 151 LBS | DIASTOLIC BLOOD PRESSURE: 60 MMHG | HEIGHT: 63 IN | BODY MASS INDEX: 26.75 KG/M2 | TEMPERATURE: 98.4 F

## 2022-05-13 DIAGNOSIS — K70.31 ALCOHOLIC CIRRHOSIS OF LIVER WITH ASCITES (HCC): Primary | Chronic | ICD-10-CM

## 2022-05-13 DIAGNOSIS — K21.9 GERD WITHOUT ESOPHAGITIS: ICD-10-CM

## 2022-05-13 DIAGNOSIS — K58.0 IRRITABLE BOWEL SYNDROME WITH DIARRHEA: ICD-10-CM

## 2022-05-13 PROCEDURE — 99214 OFFICE O/P EST MOD 30 MIN: CPT | Performed by: INTERNAL MEDICINE

## 2022-05-13 PROCEDURE — 3008F BODY MASS INDEX DOCD: CPT | Performed by: INTERNAL MEDICINE

## 2022-05-13 PROCEDURE — 4004F PT TOBACCO SCREEN RCVD TLK: CPT | Performed by: INTERNAL MEDICINE

## 2022-05-13 RX ORDER — FUROSEMIDE 40 MG/1
40 TABLET ORAL DAILY
Qty: 30 TABLET | Refills: 5 | Status: SHIPPED | OUTPATIENT
Start: 2022-05-13

## 2022-05-13 RX ORDER — SPIRONOLACTONE 50 MG/1
50 TABLET, FILM COATED ORAL DAILY
Qty: 30 TABLET | Refills: 5 | Status: SHIPPED | OUTPATIENT
Start: 2022-05-13

## 2022-05-13 NOTE — PROGRESS NOTES
Anastacia Contreras [facility] - Outpatient Follow-up Note  Yolie Delatorre 57 y o  female MRN: [id number]  Encounter: [id number]          ASSESSMENT AND PLAN:      1  Alcoholic cirrhosis of liver with ascites ([facility] [address] )  She has cirrhosis complicated by ascites but she does not have encephalopathy or bleeding  She feels increased abdominal distention so I will check an abdominal ultrasound to evaluate for ascites  This will also be an opportunity to make sure she does not have a liver lesion since her last ultrasound was about six months ago  I will refill her Lasix and Aldactone but may adjust the dose based on her ultrasound results  - furosemide (LASIX) 40 mg tablet; Take 1 tablet (40 mg total) by mouth in the morning  Dispense: 30 tablet; Refill: 5  - spironolactone (ALDACTONE) 50 mg tablet; Take 1 tablet (50 mg total) by mouth in the morning  Dispense: 30 tablet; Refill: 5  - US abdomen complete; Future    2  GERD without esophagitis  Her reflux has been well controlled with diet and lifestyle modifications so I asked her to continue this  3  Irritable bowel syndrome with diarrhea  She has diarrhea predominant irritable bowel syndrome but has not had any recent diarrhea  If her diarrhea recurs she can take Imodium as needed  ______________________________________________________________________    SUBJECTIVE:  She presents for follow-up of her alcoholic cirrhosis complicated by ascites  Fortunately she has not had any bleeding or confusion  She feels the ascites has been well controlled with the Lasix and spironolactone but she has noted slight increased abdominal distention  She denies any nausea, vomiting and feels like her reflux is well controlled with diet and lifestyle changes  She denies bleeding, weight loss, and any recent diarrhea or constipation  REVIEW OF SYSTEMS IS OTHERWISE NEGATIVE        Historical Information   Past Medical History:   Diagnosis Date    Alcoholic fatty liver     Anxiety     Asthma     COPD (chronic obstructive pulmonary disease) (HCC)     Elevated liver function tests     GERD (gastroesophageal reflux disease)     GERD without esophagitis     Hyperlipidemia     Hypertension     IBS (irritable bowel syndrome)     Insomnia     Insomnia     Liver disease     Nervousness     Nicotine dependence     Other specified urinary incontinence     RLS (restless legs syndrome)     Wears glasses      Past Surgical History:   Procedure Laterality Date    BACK SURGERY      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    BREAST EXCISIONAL BIOPSY Right 11/01/2004    benign    CATARACT EXTRACTION, BILATERAL  08/01/2018    COLONOSCOPY N/A 5/8/2019    Procedure: COLONOSCOPY;  Surgeon: Oliver Crenshaw MD;  Location: [facility]; Service: Gastroenterology    EGD AND COLONOSCOPY N/A 3/19/2018    Procedure: EGD AND COLONOSCOPY;  Surgeon: Oliver Crenshaw MD;  Location: [facility]; Service: Gastroenterology    ESOPHAGOGASTRODUODENOSCOPY N/A 5/8/2019    Procedure: ESOPHAGOGASTRODUODENOSCOPY (EGD); Surgeon: Oliver Crenshaw MD;  Location: [facility]; Service: Gastroenterology    IR PARACENTESIS  11/27/2020    IR PARACENTESIS  12/8/2020    KNEE SURGERY      KNEE SURGERY      LUMBAR LAMINECTOMY  04/1999    LYMPH NODE BIOPSY      MAMMO STEREOTACTIC BREAST BIOPSY RIGHT (ALL INC) Right 5/21/2021    MAMMO STEREOTACTIC BREAST BIOPSY RIGHT (ALL INC) EACH ADD Right 5/21/2021    TOTAL ABDOMINAL HYSTERECTOMY  02/05/2008    TUBAL LIGATION      TUBAL LIGATION  1989    UPPER GASTROINTESTINAL ENDOSCOPY       Social History   Social History     Substance and Sexual Activity   Alcohol Use Not Currently    Alcohol/week: 24 0 standard drinks    Types: 24 Cans of beer per week    Comment: Daily; just cut back from 6 beers/day   Per allscripts -  3 beers a night     Social History     Substance and Sexual Activity   Drug Use No     Social History     Tobacco Use Smoking Status Current Every Day Smoker    Packs/day: 1 00    Years: 45 00    Pack years: 45 00    Types: Cigarettes    Start date: 1978   Smokeless Tobacco Never Used     Family History   Problem Relation Age of Onset    Breast cancer Mother 28    No Known Problems Father     No Known Problems Sister     No Known Problems Brother     No Known Problems Son     No Known Problems Maternal Grandmother     No Known Problems Maternal Grandfather     No Known Problems Paternal Grandmother     No Known Problems Paternal Grandfather     No Known Problems Maternal Aunt     No Known Problems Maternal Aunt     No Known Problems Paternal Aunt     Substance Abuse Neg Hx     Mental illness Neg Hx        Meds/Allergies       Current Outpatient Medications:     albuterol (Proventil HFA) 90 mcg/act inhaler    ALPRAZolam (XANAX) 1 mg tablet    anastrozole (ARIMIDEX) 1 mg tablet    furosemide (LASIX) 40 mg tablet    mupirocin (BACTROBAN) 2 % ointment    spironolactone (ALDACTONE) 50 mg tablet    Allergies   Allergen Reactions    Sulfa Antibiotics Shortness Of Breath    Ace Inhibitors Cough and Other (See Comments)     coughing           Objective     Blood pressure 120/60, temperature 98 4 °F (36 9 °C), temperature source Tympanic, height 5' 3" (1 6 m), weight 68 5 kg (151 lb), not currently breastfeeding  Body mass index is 26 75 kg/m²  PHYSICAL EXAM:      General Appearance:   Alert, cooperative, no distress, no asterixis   HEENT:   Normocephalic, atraumatic, anicteric      Neck:  Supple, symmetrical, trachea midline   Lungs:   Clear to auscultation bilaterally; no rales, rhonchi or wheezing; respirations unlabored    Heart[de-identified]   Regular rate and rhythm; no murmur, rub, or gallop     Abdomen:   Soft, mild tenderness, non-distended; normal bowel sounds; no masses, no organomegaly    Genitalia:   Deferred    Rectal:   Deferred    Extremities:  No cyanosis, clubbing or edema    Pulses:  2+ and symmetric  Skin:  No jaundice, rashes, or lesions    Lymph nodes:  No palpable cervical lymphadenopathy        Lab Results:   No visits with results within 1 Day(s) from this visit  Latest known visit with results is:   Clinical Support on 2022   Component Date Value    SARS-CoV-2 2022 Positive (A)         Radiology Results:   Complete PFT with post bronchodilator    Result Date: 2022  Narrative: Anand White 57 y o  :1960 [id number] PULMONARY FUNCTION TEST Indication: Moderate COPD Requesting provider: Colie Hatchet Results: Patient gave good effort and cooperation  The testing met ATS Standards for Acceptability and Repeatability  Baseline oxygen saturation was 96% on room air with a heart rate of 90  Spirometry: FEV1/FVC Ratio is 69  FEV1 is 1 93L which is 79% predicted  FVC is 2 81L which is 90% predicted  There is no significant postbronchodilator improvement in FEV1 or FVC  Flow volume loop: Mild obstructive pattern Lung volumes: Total lung capacity is 5 55L which is 09% predicted  Residual volume is 135% predicted  Diffusing capacity: 69% predicted     Impression: 1  Mild obstruction without bronchodilator responsiveness 2  Normal lung capacity with elevated residual volume consistent with mild air trapping 3  Mild diffusion impairment Portions of the record may have been created with voice recognition software  Occasional wrong word or "sound a like" substitutions may have occurred due to the inherent limitations of voice recognition software  Read the chart carefully and recognize, using context, where substitutions have occurred

## 2022-05-14 DIAGNOSIS — J44.9 MODERATE COPD (CHRONIC OBSTRUCTIVE PULMONARY DISEASE) (HCC): Primary | ICD-10-CM

## 2022-05-17 RX ORDER — BENZONATATE 100 MG/1
CAPSULE ORAL
Qty: 60 CAPSULE | Refills: 2 | Status: SHIPPED | OUTPATIENT
Start: 2022-05-17 | End: 2022-07-22

## 2022-05-26 ENCOUNTER — HOSPITAL ENCOUNTER (OUTPATIENT)
Dept: ULTRASOUND IMAGING | Facility: HOSPITAL | Age: 62
Discharge: HOME | End: 2022-05-26
Attending: INTERNAL MEDICINE
Payer: COMMERCIAL

## 2022-05-26 DIAGNOSIS — K70.31 ALCOHOLIC CIRRHOSIS OF LIVER WITH ASCITES (HCC): Chronic | ICD-10-CM

## 2022-05-26 PROCEDURE — 76700 US EXAM ABDOM COMPLETE: CPT

## 2022-06-02 ENCOUNTER — TELEPHONE (OUTPATIENT)
Dept: HEMATOLOGY ONCOLOGY | Facility: HOSPITAL | Age: 62
End: 2022-06-02

## 2022-06-02 ENCOUNTER — HOSPITAL ENCOUNTER (OUTPATIENT)
Dept: CT IMAGING | Facility: HOSPITAL | Age: 62
Discharge: HOME | End: 2022-06-02
Payer: COMMERCIAL

## 2022-06-02 DIAGNOSIS — F17.200 SMOKER: ICD-10-CM

## 2022-06-02 DIAGNOSIS — D05.11 DUCTAL CARCINOMA IN SITU (DCIS) OF RIGHT BREAST: ICD-10-CM

## 2022-06-02 PROCEDURE — 71271 CT THORAX LUNG CANCER SCR C-: CPT

## 2022-06-02 RX ORDER — ANASTROZOLE 1 MG/1
1 TABLET ORAL DAILY
Qty: 90 TABLET | Refills: 3 | Status: SHIPPED | OUTPATIENT
Start: 2022-06-02 | End: 2022-06-09 | Stop reason: SDUPTHER

## 2022-06-02 NOTE — TELEPHONE ENCOUNTER
Patient came into the office today needing a refill on Arimidex (Anastrozole) 90 tablets  Patient is going away 7/1/22 and needed the refill  [facility]

## 2022-06-04 NOTE — RESULT ENCOUNTER NOTE
I called her with her results  US unchanged (cirrhosis with small ascites and probable gallstones, but no liver mass)

## 2022-06-07 ENCOUNTER — TELEPHONE (OUTPATIENT)
Dept: FAMILY MEDICINE CLINIC | Facility: CLINIC | Age: 62
End: 2022-06-07

## 2022-06-07 NOTE — TELEPHONE ENCOUNTER
----- Message from Glenis Pinzon PA-C sent at 6/7/2022  1:29 PM EDT -----  CT lung stable, repeat 1 year  Please let patient know

## 2022-06-09 ENCOUNTER — TELEPHONE (OUTPATIENT)
Dept: HEMATOLOGY ONCOLOGY | Facility: CLINIC | Age: 62
End: 2022-06-09

## 2022-06-09 DIAGNOSIS — D05.11 DUCTAL CARCINOMA IN SITU (DCIS) OF RIGHT BREAST: Primary | ICD-10-CM

## 2022-06-09 RX ORDER — ANASTROZOLE 1 MG/1
1 TABLET ORAL DAILY
Qty: 90 TABLET | Refills: 3 | Status: SHIPPED | OUTPATIENT
Start: 2022-06-09

## 2022-06-09 NOTE — TELEPHONE ENCOUNTER
Medication Refill     Who is Calling  Patient   Medication  anastrozole (ARIMIDEX) 1 mg tablet        How many pills left none   Preferred Pharmacy / Address  [facility]  ([facility]) [address]  [address], [address]   [address] [address]   Phone:  [phone]  Fax:  [phone]    Who is your Physician?  Dr Opal Gamboa   Call back number [phone]   Relevant Information  [phone] [facility] may be handling this [facility] account

## 2022-06-20 DIAGNOSIS — F41.9 ANXIETY: ICD-10-CM

## 2022-06-20 RX ORDER — ALPRAZOLAM 1 MG/1
TABLET ORAL
Qty: 60 TABLET | Refills: 0 | Status: SHIPPED | OUTPATIENT
Start: 2022-06-20 | End: 2022-08-01 | Stop reason: SDUPTHER

## 2022-06-20 NOTE — TELEPHONE ENCOUNTER
Patient asked if you can please send this script to the pharmacy  The pharmacy will not fill them though till 6/31 she said  [place] them

## 2022-07-21 DIAGNOSIS — J44.9 MODERATE COPD (CHRONIC OBSTRUCTIVE PULMONARY DISEASE) (HCC): ICD-10-CM

## 2022-07-22 RX ORDER — BENZONATATE 100 MG/1
CAPSULE ORAL
Qty: 60 CAPSULE | Refills: 2 | Status: SHIPPED | OUTPATIENT
Start: 2022-07-22 | End: 2022-09-29

## 2022-08-01 ENCOUNTER — TELEPHONE (OUTPATIENT)
Dept: FAMILY MEDICINE CLINIC | Facility: CLINIC | Age: 62
End: 2022-08-01

## 2022-08-01 DIAGNOSIS — F41.9 ANXIETY: ICD-10-CM

## 2022-08-01 RX ORDER — ALPRAZOLAM 1 MG/1
TABLET ORAL
Qty: 60 TABLET | Refills: 0 | Status: SHIPPED | OUTPATIENT
Start: 2022-08-01 | End: 2022-08-26 | Stop reason: SDUPTHER

## 2022-08-03 ENCOUNTER — OFFICE VISIT (OUTPATIENT)
Dept: FAMILY MEDICINE CLINIC | Facility: CLINIC | Age: 62
End: 2022-08-03
Payer: COMMERCIAL

## 2022-08-03 VITALS
HEIGHT: 63 IN | TEMPERATURE: 99 F | WEIGHT: 149.3 LBS | DIASTOLIC BLOOD PRESSURE: 70 MMHG | BODY MASS INDEX: 26.45 KG/M2 | RESPIRATION RATE: 19 BRPM | SYSTOLIC BLOOD PRESSURE: 118 MMHG | HEART RATE: 66 BPM | OXYGEN SATURATION: 98 %

## 2022-08-03 DIAGNOSIS — Z17.0 MALIGNANT NEOPLASM OF LOWER-OUTER QUADRANT OF RIGHT BREAST OF FEMALE, ESTROGEN RECEPTOR POSITIVE (HCC): ICD-10-CM

## 2022-08-03 DIAGNOSIS — C50.511 MALIGNANT NEOPLASM OF LOWER-OUTER QUADRANT OF RIGHT BREAST OF FEMALE, ESTROGEN RECEPTOR POSITIVE (HCC): ICD-10-CM

## 2022-08-03 DIAGNOSIS — J44.1 COPD WITH ACUTE EXACERBATION (HCC): Primary | ICD-10-CM

## 2022-08-03 PROCEDURE — 99214 OFFICE O/P EST MOD 30 MIN: CPT | Performed by: PHYSICIAN ASSISTANT

## 2022-08-03 RX ORDER — CEFUROXIME AXETIL 500 MG/1
500 TABLET ORAL EVERY 12 HOURS SCHEDULED
Qty: 20 TABLET | Refills: 0 | Status: SHIPPED | OUTPATIENT
Start: 2022-08-03 | End: 2022-08-13

## 2022-08-03 RX ORDER — ALBUTEROL SULFATE 2.5 MG/3ML
2.5 SOLUTION RESPIRATORY (INHALATION) EVERY 6 HOURS PRN
Qty: 180 ML | Refills: 5 | Status: SHIPPED | OUTPATIENT
Start: 2022-08-03

## 2022-08-03 RX ORDER — PREDNISONE 10 MG/1
TABLET ORAL
Qty: 20 TABLET | Refills: 0 | Status: ON HOLD | OUTPATIENT
Start: 2022-08-03 | End: 2022-09-17

## 2022-08-03 NOTE — PROGRESS NOTES
Assessment/Plan:    1  Exacerbation of COPD - will start prednisone taper, ceftin1 tab twice daily for 10 days and albuterol nebulizer, fluids, rest  If no better by Friday patient is to call and we will order a CXR    F/u as needed        Subjective:   Chief Complaint   Patient presents with    cough for the past week       Patient ID: Michelle Lares is a 60 y o  female  Patient here complaining of coughing for a week and a half, temp variable   Coughing productive, sob, wheezing  Taking robitussin dm, not helping coughing all night  Nasal congestion  The following portions of the patient's history were reviewed and updated as appropriate: allergies, current medications, past family history, past medical history, past social history, past surgical history and problem list     Past Medical History:   Diagnosis Date    Alcoholic fatty liver     Anxiety     Asthma     COPD (chronic obstructive pulmonary disease) ([facility] [address] )     Elevated liver function tests     GERD (gastroesophageal reflux disease)     GERD without esophagitis     Hyperlipidemia     Hypertension     IBS (irritable bowel syndrome)     Insomnia     Insomnia     Liver disease     Nervousness     Nicotine dependence     Other specified urinary incontinence     RLS (restless legs syndrome)     Wears glasses      Past Surgical History:   Procedure Laterality Date    BACK SURGERY      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    BREAST EXCISIONAL BIOPSY Right 11/01/2004    benign    CATARACT EXTRACTION, BILATERAL  08/01/2018    COLONOSCOPY N/A 5/8/2019    Procedure: COLONOSCOPY;  Surgeon: Keyla Alcala MD;  Location: [facility]; Service: Gastroenterology    EGD AND COLONOSCOPY N/A 3/19/2018    Procedure: EGD AND COLONOSCOPY;  Surgeon: Keyla Alcala MD;  Location: [facility]; Service: Gastroenterology    ESOPHAGOGASTRODUODENOSCOPY N/A 5/8/2019    Procedure: ESOPHAGOGASTRODUODENOSCOPY (EGD);   Surgeon: Keyla Alcala MD;  Location: [facility]; Service: Gastroenterology    IR PARACENTESIS  11/27/2020    IR PARACENTESIS  12/8/2020    KNEE SURGERY      KNEE SURGERY      LUMBAR LAMINECTOMY  04/1999    LYMPH NODE BIOPSY      MAMMO STEREOTACTIC BREAST BIOPSY RIGHT (ALL INC) Right 5/21/2021    MAMMO STEREOTACTIC BREAST BIOPSY RIGHT (ALL INC) EACH ADD Right 5/21/2021    TOTAL ABDOMINAL HYSTERECTOMY  02/05/2008    TUBAL LIGATION      TUBAL LIGATION  1989    UPPER GASTROINTESTINAL ENDOSCOPY       Family History   Problem Relation Age of Onset    Breast cancer Mother 28    No Known Problems Father     No Known Problems Sister     No Known Problems Brother     No Known Problems Son     No Known Problems Maternal Grandmother     No Known Problems Maternal Grandfather     No Known Problems Paternal Grandmother     No Known Problems Paternal Grandfather     No Known Problems Maternal Aunt     No Known Problems Maternal Aunt     No Known Problems Paternal Aunt     Substance Abuse Neg Hx     Mental illness Neg Hx      Social History     Socioeconomic History    Marital status: /Civil Union     Spouse name: Not on file    Number of children: Not on file    Years of education: Not on file    Highest education level: Not on file   Occupational History    Not on file   Tobacco Use    Smoking status: Current Every Day Smoker     Packs/day: 1 00     Years: 45 00     Pack years: 45 00     Types: Cigarettes     Start date: 1978    Smokeless tobacco: Never Used   Vaping Use    Vaping Use: Never used   Substance and Sexual Activity    Alcohol use: Not Currently     Alcohol/week: 24 0 standard drinks     Types: 24 Cans of beer per week     Comment: Daily; just cut back from 6 beers/day   Per allscripts -  3 beers a night    Drug use: No    Sexual activity: Yes     Partners: Male   Other Topics Concern    Not on file   Social History Narrative    Has carbon monoxide detectors in home    Has smoke detectors Uses safety equipment - seatbelts     Social Determinants of Health     Financial Resource Strain: Not on file   Food Insecurity: Not on file   Transportation Needs: Not on file   Physical Activity: Not on file   Stress: Not on file   Social Connections: Not on file   Intimate Partner Violence: Not on file   Housing Stability: Not on file       Current Outpatient Medications:     albuterol (Proventil HFA) 90 mcg/act inhaler, Inhale 2 puffs every 6 (six) hours as needed for wheezing or shortness of breath, Disp: 18 g, Rfl: 3    ALPRAZolam (XANAX) 1 mg tablet, TAKE ONE TABLET BY MOUTH TWICE A DAY AS NEEDED FOR ANXIETY, Disp: 60 tablet, Rfl: 0    anastrozole (ARIMIDEX) 1 mg tablet, Take 1 tablet (1 mg total) by mouth daily, Disp: 90 tablet, Rfl: 3    benzonatate (TESSALON PERLES) 100 mg capsule, TAKE ONE CAPSULE BY MOUTH THREE TIMES A DAY AS NEEDED FOR COUGH, Disp: 60 capsule, Rfl: 2    furosemide (LASIX) 40 mg tablet, Take 1 tablet (40 mg total) by mouth in the morning , Disp: 30 tablet, Rfl: 5    mupirocin (BACTROBAN) 2 % ointment, Apply topically 3 (three) times a day, Disp: 22 g, Rfl: 0    spironolactone (ALDACTONE) 50 mg tablet, Take 1 tablet (50 mg total) by mouth in the morning , Disp: 30 tablet, Rfl: 5    Review of Systems          Objective:    Vitals:    08/03/22 1305   BP: 118/70   Pulse: 66   Resp: 19   Temp: 99 °F (37 2 °C)   TempSrc: Oral   SpO2: 98%   Weight: 67 7 kg (149 lb 4 8 oz)   Height: 5' 3" (1 6 m)        Physical Exam  Constitutional:       Appearance: Normal appearance  HENT:      Head: Normocephalic and atraumatic  Right Ear: Tympanic membrane, ear canal and external ear normal       Left Ear: Tympanic membrane, ear canal and external ear normal       Nose: Congestion and rhinorrhea present  Mouth/Throat:      Mouth: Mucous membranes are moist       Pharynx: Oropharynx is clear     Eyes:      Conjunctiva/sclera: Conjunctivae normal    Cardiovascular:      Rate and Rhythm: Normal rate and regular rhythm  Pulses: Normal pulses  Heart sounds: Normal heart sounds  Pulmonary:      Effort: Pulmonary effort is normal  No respiratory distress  Breath sounds: No stridor  Wheezing and rhonchi present  No rales  Musculoskeletal:      Cervical back: Normal range of motion and neck supple  Lymphadenopathy:      Cervical: No cervical adenopathy  Skin:     General: Skin is warm  Neurological:      General: No focal deficit present  Mental Status: She is alert  Psychiatric:         Mood and Affect: Mood normal          Thought Content:  Thought content normal          Judgment: Judgment normal

## 2022-08-10 ENCOUNTER — HOSPITAL ENCOUNTER (EMERGENCY)
Facility: HOSPITAL | Age: 62
Discharge: HOME | End: 2022-08-11
Attending: EMERGENCY MEDICINE | Admitting: EMERGENCY MEDICINE
Payer: COMMERCIAL

## 2022-08-10 ENCOUNTER — APPOINTMENT (EMERGENCY)
Dept: RADIOLOGY | Facility: HOSPITAL | Age: 62
End: 2022-08-10
Payer: COMMERCIAL

## 2022-08-10 ENCOUNTER — APPOINTMENT (EMERGENCY)
Dept: CT IMAGING | Facility: HOSPITAL | Age: 62
End: 2022-08-10
Payer: COMMERCIAL

## 2022-08-10 DIAGNOSIS — E87.6 HYPOKALEMIA: Primary | ICD-10-CM

## 2022-08-10 DIAGNOSIS — N39.0 URINARY TRACT INFECTION: ICD-10-CM

## 2022-08-10 DIAGNOSIS — R05.9 COUGH: ICD-10-CM

## 2022-08-10 DIAGNOSIS — R53.1 GENERALIZED WEAKNESS: ICD-10-CM

## 2022-08-10 DIAGNOSIS — R93.89 ABNORMAL CHEST CT: ICD-10-CM

## 2022-08-10 LAB
ALBUMIN SERPL BCP-MCNC: 2 G/DL (ref 3.5–5)
ALP SERPL-CCNC: 119 U/L (ref 46–116)
ALT SERPL W P-5'-P-CCNC: 54 U/L (ref 12–78)
ANION GAP SERPL CALCULATED.3IONS-SCNC: 5 MMOL/L (ref 4–13)
ANISOCYTOSIS BLD QL SMEAR: PRESENT
APTT PPP: 33 SECONDS (ref 23–37)
AST SERPL W P-5'-P-CCNC: 87 U/L (ref 5–45)
BASOPHILS # BLD MANUAL: 0.06 THOUSAND/UL (ref 0–0.1)
BASOPHILS NFR MAR MANUAL: 1 % (ref 0–1)
BILIRUB SERPL-MCNC: 2.1 MG/DL (ref 0.2–1)
BUN SERPL-MCNC: 11 MG/DL (ref 5–25)
CALCIUM ALBUM COR SERPL-MCNC: 10.5 MG/DL (ref 8.3–10.1)
CALCIUM SERPL-MCNC: 8.9 MG/DL (ref 8.3–10.1)
CHLORIDE SERPL-SCNC: 94 MMOL/L (ref 96–108)
CO2 SERPL-SCNC: 32 MMOL/L (ref 21–32)
CREAT SERPL-MCNC: 1.12 MG/DL (ref 0.6–1.3)
D DIMER PPP FEU-MCNC: 8.84 UG/ML FEU
EOSINOPHIL # BLD MANUAL: 0 THOUSAND/UL (ref 0–0.4)
EOSINOPHIL NFR BLD MANUAL: 0 % (ref 0–6)
ERYTHROCYTE [DISTWIDTH] IN BLOOD BY AUTOMATED COUNT: 14.8 % (ref 11.6–15.1)
FLUAV RNA RESP QL NAA+PROBE: NEGATIVE
FLUBV RNA RESP QL NAA+PROBE: NEGATIVE
GFR SERPL CREATININE-BSD FRML MDRD: 52 ML/MIN/1.73SQ M
GLUCOSE SERPL-MCNC: 183 MG/DL (ref 65–140)
HCT VFR BLD AUTO: 39 % (ref 34.8–46.1)
HGB BLD-MCNC: 13.8 G/DL (ref 11.5–15.4)
INR PPP: 1.69 (ref 0.84–1.19)
LACTATE SERPL-SCNC: 2 MMOL/L (ref 0.5–2)
LYMPHOCYTES # BLD AUTO: 0.8 THOUSAND/UL (ref 0.6–4.47)
LYMPHOCYTES # BLD AUTO: 13 % (ref 14–44)
MACROCYTES BLD QL AUTO: PRESENT
MCH RBC QN AUTO: 38.9 PG (ref 26.8–34.3)
MCHC RBC AUTO-ENTMCNC: 35.4 G/DL (ref 31.4–37.4)
MCV RBC AUTO: 110 FL (ref 82–98)
METAMYELOCYTES NFR BLD MANUAL: 2 % (ref 0–1)
MONOCYTES # BLD AUTO: 0.37 THOUSAND/UL (ref 0–1.22)
MONOCYTES NFR BLD: 6 % (ref 4–12)
NEUTROPHILS # BLD MANUAL: 4.79 THOUSAND/UL (ref 1.85–7.62)
NEUTS SEG NFR BLD AUTO: 78 % (ref 43–75)
NRBC BLD AUTO-RTO: 1 /100 WBC (ref 0–2)
NT-PROBNP SERPL-MCNC: 168 PG/ML
PLATELET # BLD AUTO: 76 THOUSANDS/UL (ref 149–390)
PLATELET BLD QL SMEAR: ABNORMAL
PMV BLD AUTO: 10.2 FL (ref 8.9–12.7)
POLYCHROMASIA BLD QL SMEAR: PRESENT
POTASSIUM SERPL-SCNC: 2.9 MMOL/L (ref 3.5–5.3)
PROCALCITONIN SERPL-MCNC: 0.1 NG/ML
PROT SERPL-MCNC: 8.9 G/DL (ref 6.4–8.4)
PROTHROMBIN TIME: 20.9 SECONDS (ref 11.6–14.5)
RBC # BLD AUTO: 3.55 MILLION/UL (ref 3.81–5.12)
RSV RNA RESP QL NAA+PROBE: NEGATIVE
SARS-COV-2 RNA RESP QL NAA+PROBE: NEGATIVE
SODIUM SERPL-SCNC: 131 MMOL/L (ref 135–147)
WBC # BLD AUTO: 6.14 THOUSAND/UL (ref 4.31–10.16)

## 2022-08-10 PROCEDURE — 0241U HB NFCT DS VIR RESP RNA 4 TRGT: CPT | Performed by: EMERGENCY MEDICINE

## 2022-08-10 PROCEDURE — 99285 EMERGENCY DEPT VISIT HI MDM: CPT

## 2022-08-10 PROCEDURE — 80053 COMPREHEN METABOLIC PANEL: CPT | Performed by: EMERGENCY MEDICINE

## 2022-08-10 PROCEDURE — 83605 ASSAY OF LACTIC ACID: CPT | Performed by: EMERGENCY MEDICINE

## 2022-08-10 PROCEDURE — 85610 PROTHROMBIN TIME: CPT | Performed by: EMERGENCY MEDICINE

## 2022-08-10 PROCEDURE — 85027 COMPLETE CBC AUTOMATED: CPT | Performed by: EMERGENCY MEDICINE

## 2022-08-10 PROCEDURE — 99285 EMERGENCY DEPT VISIT HI MDM: CPT | Performed by: EMERGENCY MEDICINE

## 2022-08-10 PROCEDURE — 87040 BLOOD CULTURE FOR BACTERIA: CPT | Performed by: EMERGENCY MEDICINE

## 2022-08-10 PROCEDURE — 96361 HYDRATE IV INFUSION ADD-ON: CPT

## 2022-08-10 PROCEDURE — 85007 BL SMEAR W/DIFF WBC COUNT: CPT | Performed by: EMERGENCY MEDICINE

## 2022-08-10 PROCEDURE — 85379 FIBRIN DEGRADATION QUANT: CPT | Performed by: EMERGENCY MEDICINE

## 2022-08-10 PROCEDURE — 93005 ELECTROCARDIOGRAM TRACING: CPT

## 2022-08-10 PROCEDURE — 96365 THER/PROPH/DIAG IV INF INIT: CPT

## 2022-08-10 PROCEDURE — 96368 THER/DIAG CONCURRENT INF: CPT

## 2022-08-10 PROCEDURE — 84145 PROCALCITONIN (PCT): CPT | Performed by: EMERGENCY MEDICINE

## 2022-08-10 PROCEDURE — 71275 CT ANGIOGRAPHY CHEST: CPT

## 2022-08-10 PROCEDURE — 36415 COLL VENOUS BLD VENIPUNCTURE: CPT | Performed by: EMERGENCY MEDICINE

## 2022-08-10 PROCEDURE — 85730 THROMBOPLASTIN TIME PARTIAL: CPT | Performed by: EMERGENCY MEDICINE

## 2022-08-10 PROCEDURE — 71046 X-RAY EXAM CHEST 2 VIEWS: CPT

## 2022-08-10 PROCEDURE — 96366 THER/PROPH/DIAG IV INF ADDON: CPT

## 2022-08-10 PROCEDURE — G1004 CDSM NDSC: HCPCS

## 2022-08-10 PROCEDURE — 83880 ASSAY OF NATRIURETIC PEPTIDE: CPT | Performed by: EMERGENCY MEDICINE

## 2022-08-10 RX ORDER — BENZONATATE 100 MG/1
100 CAPSULE ORAL ONCE
Status: COMPLETED | OUTPATIENT
Start: 2022-08-10 | End: 2022-08-10

## 2022-08-10 RX ORDER — SODIUM CHLORIDE 9 MG/ML
3 INJECTION INTRAVENOUS
Status: DISCONTINUED | OUTPATIENT
Start: 2022-08-10 | End: 2022-08-11 | Stop reason: HOSPADM

## 2022-08-10 RX ORDER — POTASSIUM CHLORIDE 20 MEQ/1
20 TABLET, EXTENDED RELEASE ORAL ONCE
Status: COMPLETED | OUTPATIENT
Start: 2022-08-10 | End: 2022-08-10

## 2022-08-10 RX ORDER — SODIUM CHLORIDE, SODIUM GLUCONATE, SODIUM ACETATE, POTASSIUM CHLORIDE, MAGNESIUM CHLORIDE, SODIUM PHOSPHATE, DIBASIC, AND POTASSIUM PHOSPHATE .53; .5; .37; .037; .03; .012; .00082 G/100ML; G/100ML; G/100ML; G/100ML; G/100ML; G/100ML; G/100ML
500 INJECTION, SOLUTION INTRAVENOUS ONCE
Status: COMPLETED | OUTPATIENT
Start: 2022-08-10 | End: 2022-08-10

## 2022-08-10 RX ORDER — LIDOCAINE 50 MG/G
1 PATCH TOPICAL ONCE
Status: DISCONTINUED | OUTPATIENT
Start: 2022-08-10 | End: 2022-08-11 | Stop reason: HOSPADM

## 2022-08-10 RX ORDER — MAGNESIUM SULFATE HEPTAHYDRATE 40 MG/ML
2 INJECTION, SOLUTION INTRAVENOUS ONCE
Status: COMPLETED | OUTPATIENT
Start: 2022-08-10 | End: 2022-08-11

## 2022-08-10 RX ORDER — POTASSIUM CHLORIDE 14.9 MG/ML
20 INJECTION INTRAVENOUS ONCE
Status: COMPLETED | OUTPATIENT
Start: 2022-08-10 | End: 2022-08-11

## 2022-08-10 RX ADMIN — SODIUM CHLORIDE 1000 ML: 0.9 INJECTION, SOLUTION INTRAVENOUS at 18:37

## 2022-08-10 RX ADMIN — IOHEXOL 70 ML: 350 INJECTION, SOLUTION INTRAVENOUS at 23:08

## 2022-08-10 RX ADMIN — SODIUM CHLORIDE, SODIUM GLUCONATE, SODIUM ACETATE, POTASSIUM CHLORIDE, MAGNESIUM CHLORIDE, SODIUM PHOSPHATE, DIBASIC, AND POTASSIUM PHOSPHATE 500 ML: .53; .5; .37; .037; .03; .012; .00082 INJECTION, SOLUTION INTRAVENOUS at 21:34

## 2022-08-10 RX ADMIN — POTASSIUM CHLORIDE 20 MEQ: 14.9 INJECTION, SOLUTION INTRAVENOUS at 21:17

## 2022-08-10 RX ADMIN — LIDOCAINE 5% 1 PATCH: 700 PATCH TOPICAL at 18:37

## 2022-08-10 RX ADMIN — MAGNESIUM SULFATE HEPTAHYDRATE 2 G: 40 INJECTION, SOLUTION INTRAVENOUS at 21:17

## 2022-08-10 RX ADMIN — BENZONATATE 100 MG: 100 CAPSULE ORAL at 18:37

## 2022-08-10 RX ADMIN — POTASSIUM CHLORIDE 20 MEQ: 1500 TABLET, EXTENDED RELEASE ORAL at 21:15

## 2022-08-10 NOTE — ED PROVIDER NOTES
History  Chief Complaint   Patient presents with    Weakness - Generalized     Patient presents to the ED with c/o weakness that has worsened since early July, has been taking steroids for a URI recently      40-year-old female with history of COPD, asthma and hypertension complains of dyspnea and paroxysmal cough with sputum of unknown color  This started about 10-12 days ago  She was seen by the family doctor at that time hand was started on Ceftin, prednisone taper and albuterol nebulizer but she has had no relief  She also complains of progressive generalized weakness since July 4th  She has history of alcoholic cirrhosis but does not feel that her abdomen is any more tender or distended than usual   Her  developed similar symptoms about 7 days ago and tested positive for COVID at home  She tested negative for COVID today at home  Patient has been eating less and therefore has had less bowel movements but denies bloody stool and diarrhea  Patient has been urinating less; this looks orange in color but there is no dysuria or flank pain  Patient denies skin infection or wounds, retrosternal chest pain and palpitations  Prior to Admission Medications   Prescriptions Last Dose Informant Patient Reported? Taking?    ALPRAZolam (XANAX) 1 mg tablet   No No   Sig: TAKE ONE TABLET BY MOUTH TWICE A DAY AS NEEDED FOR ANXIETY   albuterol (2 5 mg/3 mL) 0 083 % nebulizer solution   No No   Sig: Take 3 mL (2 5 mg total) by nebulization every 6 (six) hours as needed for wheezing or shortness of breath   albuterol (Proventil HFA) 90 mcg/act inhaler  Self No No   Sig: Inhale 2 puffs every 6 (six) hours as needed for wheezing or shortness of breath   anastrozole (ARIMIDEX) 1 mg tablet   No No   Sig: Take 1 tablet (1 mg total) by mouth daily   benzonatate (TESSALON PERLES) 100 mg capsule   No No   Sig: TAKE ONE CAPSULE BY MOUTH THREE TIMES A DAY AS NEEDED FOR COUGH   cefuroxime (CEFTIN) 500 mg tablet   No No Sig: Take 1 tablet (500 mg total) by mouth every 12 (twelve) hours for 10 days   furosemide (LASIX) 40 mg tablet   No No   Sig: Take 1 tablet (40 mg total) by mouth in the morning  mupirocin (BACTROBAN) 2 % ointment Not Taking at Unknown time Self No No   Sig: Apply topically 3 (three) times a day   Patient not taking: Reported on 8/10/2022   predniSONE 10 mg tablet   No No   Sig: Take 4 tabs on day 1,2 with food, 3 tabs on day 3,4 with food, 2 tabs on day 5,6 with food, 1 tab on day 7,8 with food   spironolactone (ALDACTONE) 50 mg tablet   No No   Sig: Take 1 tablet (50 mg total) by mouth in the morning  Facility-Administered Medications: None       Past Medical History:   Diagnosis Date    Alcoholic fatty liver     Anxiety     Asthma     COPD (chronic obstructive pulmonary disease) (HCC)     Elevated liver function tests     GERD (gastroesophageal reflux disease)     GERD without esophagitis     Hyperlipidemia     Hypertension     IBS (irritable bowel syndrome)     Insomnia     Insomnia     Liver disease     Nervousness     Nicotine dependence     Other specified urinary incontinence     RLS (restless legs syndrome)     Wears glasses        Past Surgical History:   Procedure Laterality Date    BACK SURGERY      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    BREAST EXCISIONAL BIOPSY Right 11/01/2004    benign    CATARACT EXTRACTION, BILATERAL  08/01/2018    COLONOSCOPY N/A 5/8/2019    Procedure: COLONOSCOPY;  Surgeon: Keyla Alcala MD;  Location: [facility]; Service: Gastroenterology    EGD AND COLONOSCOPY N/A 3/19/2018    Procedure: EGD AND COLONOSCOPY;  Surgeon: Keyla Alcala MD;  Location: [facility]; Service: Gastroenterology    ESOPHAGOGASTRODUODENOSCOPY N/A 5/8/2019    Procedure: ESOPHAGOGASTRODUODENOSCOPY (EGD); Surgeon: Keyla Alcala MD;  Location: [facility];   Service: Gastroenterology    IR PARACENTESIS  11/27/2020    IR PARACENTESIS  12/8/2020    KNEE SURGERY      KNEE SURGERY      LUMBAR LAMINECTOMY  04/1999    LYMPH NODE BIOPSY      MAMMO STEREOTACTIC BREAST BIOPSY RIGHT (ALL INC) Right 5/21/2021    MAMMO STEREOTACTIC BREAST BIOPSY RIGHT (ALL INC) EACH ADD Right 5/21/2021    TOTAL ABDOMINAL HYSTERECTOMY  02/05/2008    TUBAL LIGATION      TUBAL LIGATION  1989    UPPER GASTROINTESTINAL ENDOSCOPY         Family History   Problem Relation Age of Onset    Breast cancer Mother 28    No Known Problems Father     No Known Problems Sister     No Known Problems Brother     No Known Problems Son     No Known Problems Maternal Grandmother     No Known Problems Maternal Grandfather     No Known Problems Paternal Grandmother     No Known Problems Paternal Grandfather     No Known Problems Maternal Aunt     No Known Problems Maternal Aunt     No Known Problems Paternal Aunt     Substance Abuse Neg Hx     Mental illness Neg Hx      I have reviewed and agree with the history as documented  E-Cigarette/Vaping    E-Cigarette Use Never User      E-Cigarette/Vaping Substances     Social History     Tobacco Use    Smoking status: Current Every Day Smoker     Packs/day: 1 00     Years: 45 00     Pack years: 45 00     Types: Cigarettes     Start date: 1978    Smokeless tobacco: Never Used   Vaping Use    Vaping Use: Never used   Substance Use Topics    Alcohol use: Not Currently     Alcohol/week: 24 0 standard drinks     Types: 24 Cans of beer per week     Comment: not lately    Drug use: No       Review of Systems   Constitutional: Positive for activity change, appetite change and fatigue  Negative for fever  HENT: Negative for congestion, ear pain, postnasal drip, rhinorrhea and sore throat  Respiratory: Positive for cough and shortness of breath  Cardiovascular: Positive for chest pain (Chest wall pain, left costal margin)  Negative for palpitations and leg swelling     Gastrointestinal: Negative for abdominal pain, blood in stool, constipation, diarrhea, nausea and vomiting  Genitourinary: Positive for decreased urine volume  Negative for difficulty urinating and dysuria  Musculoskeletal: Positive for myalgias  Skin: Negative for rash and wound  Neurological: Positive for weakness  Negative for seizures, syncope, numbness and headaches  All other systems reviewed and are negative  Physical Exam  Physical Exam  Vitals and nursing note reviewed  Constitutional:       General: She is not in acute distress  Appearance: She is well-developed  She is ill-appearing  She is not diaphoretic  HENT:      Head: Normocephalic and atraumatic  Right Ear: External ear normal       Left Ear: External ear normal       Nose: Nose normal       Mouth/Throat:      Mouth: Mucous membranes are moist       Pharynx: Oropharynx is clear  No posterior oropharyngeal erythema  Eyes:      General: No scleral icterus  Conjunctiva/sclera: Conjunctivae normal       Pupils: Pupils are equal, round, and reactive to light  Cardiovascular:      Rate and Rhythm: Normal rate and regular rhythm  Pulses: Normal pulses  Heart sounds: Normal heart sounds  No murmur heard  Pulmonary:      Effort: Pulmonary effort is normal       Breath sounds: Normal breath sounds  Chest:      Chest wall: Tenderness (Left costal margin  No crepitance or deformity ) present  Abdominal:      General: Bowel sounds are normal       Palpations: Abdomen is soft  Tenderness: There is no abdominal tenderness  There is no guarding or rebound  Musculoskeletal:         General: No tenderness  Normal range of motion  Cervical back: Normal range of motion and neck supple  No tenderness  Right lower leg: No edema  Left lower leg: No edema  Lymphadenopathy:      Cervical: No cervical adenopathy  Skin:     General: Skin is warm and dry  Capillary Refill: Capillary refill takes less than 2 seconds  Findings: No lesion or rash  Neurological:      General: No focal deficit present  Mental Status: She is alert and oriented to person, place, and time  Mental status is at baseline  Cranial Nerves: No cranial nerve deficit  Sensory: No sensory deficit  Coordination: Coordination normal       Deep Tendon Reflexes: Reflexes are normal and symmetric     Psychiatric:         Mood and Affect: Mood normal          Behavior: Behavior normal          Vital Signs  ED Triage Vitals   Temperature Pulse Respirations Blood Pressure SpO2   08/10/22 1722 08/10/22 1722 08/10/22 1722 08/10/22 1722 08/10/22 1722   98 8 °F (37 1 °C) 79 22 124/62 (!) 87 %      Temp Source Heart Rate Source Patient Position - Orthostatic VS BP Location FiO2 (%)   08/10/22 1722 08/10/22 1722 08/10/22 2200 08/10/22 1722 --   Oral Monitor Lying Left arm       Pain Score       08/10/22 1722       9           Vitals:    08/10/22 2200 08/10/22 2230 08/10/22 2300 08/11/22 0030   BP: 120/58 118/60 110/58 129/65   Pulse: 55 58 68 65   Patient Position - Orthostatic VS: Lying Lying Lying          Visual Acuity  Visual Acuity    Flowsheet Row Most Recent Value   L Pupil Size (mm) 3   R Pupil Size (mm) 3          ED Medications  Medications   sodium chloride 0 9 % bolus 1,000 mL (0 mL Intravenous Stopped 8/10/22 1937)   benzonatate (TESSALON PERLES) capsule 100 mg (100 mg Oral Given 8/10/22 1837)   potassium chloride 20 mEq IVPB (premix) (0 mEq Intravenous Stopped 8/11/22 0045)   potassium chloride (K-DUR,KLOR-CON) CR tablet 20 mEq (20 mEq Oral Given 8/10/22 2115)   magnesium sulfate 2 g/50 mL IVPB (premix) 2 g (0 g Intravenous Stopped 8/11/22 0045)   multi-electrolyte (PLASMALYTE-A/ISOLYTE-S PH 7 4) IV solution 500 mL (0 mL Intravenous Stopped 8/10/22 2319)   iohexol (OMNIPAQUE) 350 MG/ML injection (SINGLE-DOSE) 100 mL (70 mL Intravenous Given 8/10/22 2308)   nitrofurantoin (MACROBID) extended-release capsule 100 mg (100 mg Oral Given 8/11/22 0127)       Diagnostic Studies  Results Reviewed     Procedure Component Value Units Date/Time    Blood culture #1 [[id number]] Collected: 08/10/22 1818    Lab Status: Preliminary result Specimen: Blood from Line, Venous Updated: 08/11/22 0804     Blood Culture Received in Microbiology Lab  Culture in Progress  Blood culture #2 [[id number]] Collected: 08/10/22 1851    Lab Status: Preliminary result Specimen: Blood Updated: 08/11/22 0804     Blood Culture Received in Microbiology Lab  Culture in Progress  Urine Microscopic [[id number]]  (Normal) Collected: 08/11/22 0106    Lab Status: Final result Specimen: Urine, Clean Catch Updated: 08/11/22 0133     RBC, UA 2-4 /hpf      WBC, UA 1-2 /hpf      Epithelial Cells Occasional /hpf      Bacteria, UA Occasional /hpf     UA w Reflex to Microscopic w Reflex to Culture [[id number]]  (Abnormal) Collected: 08/11/22 0106    Lab Status: Final result Specimen: Urine, Clean Catch Updated: 08/11/22 0114     Color, UA Yellow     Clarity, UA Clear     Specific Gravity, UA 1 010     pH, UA 7 5     Leukocytes, UA Small     Nitrite, UA Positive     Protein, UA Negative mg/dl      Glucose, UA Negative mg/dl      Ketones, UA Negative mg/dl      Urobilinogen, UA 0 2 E U /dl      Bilirubin, UA Negative     Occult Blood, UA Trace-Intact    D-Dimer [[id number]]  (Abnormal) Collected: 08/10/22 1818    Lab Status: Final result Specimen: Blood Updated: 08/10/22 2215     D-Dimer, Quant 8 84 ug/ml FEU     Narrative: In the evaluation for possible pulmonary embolism, in the appropriate (Well's Score of 4 or less) patient, the age adjusted d-dimer cutoff for this patient can be calculated as:    Age x 0 01 (in ug/mL) for Age-adjusted D-dimer exclusion threshold for a patient over 50 years      CBC and differential [[id number]]  (Abnormal) Collected: 08/10/22 1818    Lab Status: Final result Specimen: Blood from Arm, Left Updated: 08/10/22 1949     WBC 6 14 Thousand/uL      RBC 3 55 Million/uL      Hemoglobin 13 8 g/dL Hematocrit 39 0 %       fL      MCH 38 9 pg      MCHC 35 4 g/dL      RDW 14 8 %      MPV 10 2 fL      Platelets 76 Thousands/uL     Manual Differential(PHLEBS Do Not Order) [[id number]]  (Abnormal) Collected: 08/10/22 1818    Lab Status: Final result Specimen: Blood from Arm, Left Updated: 08/10/22 1949     Segmented % 78 %      Lymphocytes % 13 %      Monocytes % 6 %      Eosinophils, % 0 %      Basophils % 1 %      Metamyelocytes% 2 %      Absolute Neutrophils 4 79 Thousand/uL      Lymphocytes Absolute 0 80 Thousand/uL      Monocytes Absolute 0 37 Thousand/uL      Eosinophils Absolute 0 00 Thousand/uL      Basophils Absolute 0 06 Thousand/uL      Total Counted --     nRBC 1 /100 WBC      Anisocytosis Present     Macrocytes Present     Polychromasia Present     Platelet Estimate Decreased    Procalcitonin [[id number]]  (Normal) Collected: 08/10/22 1818    Lab Status: Final result Specimen: Blood from Arm, Left Updated: 08/10/22 1946     Procalcitonin 0 10 ng/ml     Comprehensive metabolic panel [[id number]]  (Abnormal) Collected: 08/10/22 1818    Lab Status: Final result Specimen: Blood from Arm, Left Updated: 08/10/22 1934     Sodium 131 mmol/L      Potassium 2 9 mmol/L      Chloride 94 mmol/L      CO2 32 mmol/L      ANION GAP 5 mmol/L      BUN 11 mg/dL      Creatinine 1 12 mg/dL      Glucose 183 mg/dL      Calcium 8 9 mg/dL      Corrected Calcium 10 5 mg/dL      AST 87 U/L      ALT 54 U/L      Alkaline Phosphatase 119 U/L      Total Protein 8 9 g/dL      Albumin 2 0 g/dL      Total Bilirubin 2 10 mg/dL      eGFR 52 ml/min/1 73sq m     Narrative:      Destiney guidelines for Chronic Kidney Disease (CKD):     Stage 1 with normal or high GFR (GFR > 90 mL/min/1 73 square meters)    Stage 2 Mild CKD (GFR = 60-89 mL/min/1 73 square meters)    Stage 3A Moderate CKD (GFR = 45-59 mL/min/1 73 square meters)    Stage 3B Moderate CKD (GFR = 30-44 mL/min/1 73 square meters)    Stage 4 Severe CKD (GFR = 15-29 mL/min/1 73 square meters)    Stage 5 End Stage CKD (GFR <15 mL/min/1 73 square meters)  Note: GFR calculation is accurate only with a steady state creatinine    FLU/RSV/COVID - if FLU/RSV clinically relevant [[id number]]  (Normal) Collected: 08/10/22 1830    Lab Status: Final result Specimen: Nares from Nasopharyngeal Swab Updated: 08/10/22 1932     SARS-CoV-2 Negative     INFLUENZA A PCR Negative     INFLUENZA B PCR Negative     RSV PCR Negative    Narrative:      FOR PEDIATRIC PATIENTS - copy/paste COVID Guidelines URL to browser: [email or web address]  [employer]    SARS-CoV-2 assay is a Nucleic Acid Amplification assay intended for the  qualitative detection of nucleic acid from SARS-CoV-2 in nasopharyngeal  swabs  Results are for the presumptive identification of SARS-CoV-2 RNA  Positive results are indicative of infection with SARS-CoV-2, the virus  causing COVID-19, but do not rule out bacterial infection or co-infection  with other viruses  Laboratories within the [place] and its  territories are required to report all positive results to the appropriate  public health authorities  Negative results do not preclude SARS-CoV-2  infection and should not be used as the sole basis for treatment or other  patient management decisions  Negative results must be combined with  clinical observations, patient history, and epidemiological information  This test has not been FDA cleared or approved  This test has been authorized by FDA under an Emergency Use Authorization  (EUA)  This test is only authorized for the duration of time the  declaration that circumstances exist justifying the authorization of the  emergency use of an in vitro diagnostic tests for detection of SARS-CoV-2  virus and/or diagnosis of COVID-19 infection under section 564(b)(1) of  the Act, 21 U  S C  008AFI-2(W)(5), unless the authorization is terminated  or revoked sooner  The test has been validated but independent review by FDA  and CLIA is pending  Test performed using [employer] GeneXpert: This RT-PCR assay targets N2,  a region unique to SARS-CoV-2  A conserved region in the E-gene was chosen  for pan-Sarbecovirus detection which includes SARS-CoV-2  NT-BNP PRO [[id number]]  (Abnormal) Collected: 08/10/22 1818    Lab Status: Final result Specimen: Blood from Arm, Left Updated: 08/10/22 1923     NT-proBNP 168 pg/mL     Lactic Acid [[id number]]  (Normal) Collected: 08/10/22 1818    Lab Status: Final result Specimen: Blood from Arm, Left Updated: 08/10/22 1920     LACTIC ACID 2 0 mmol/L     Narrative:      Result may be elevated if tourniquet was used during collection  Protime-INR [[id number]]  (Abnormal) Collected: 08/10/22 1818    Lab Status: Final result Specimen: Blood from Arm, Left Updated: 08/10/22 1901     Protime 20 9 seconds      INR 1 69    APTT [[id number]]  (Normal) Collected: 08/10/22 1818    Lab Status: Final result Specimen: Blood from Arm, Left Updated: 08/10/22 1901     PTT 33 seconds                  CTA ED chest PE Study   Final Result by Mariella Michel DO (08/10 2346)      No pulmonary embolus or other acute abnormality  Groundglass opacity with central lucency in the right upper lobe measuring 1 1 cm   This may represent focal emphysema and scarring  Six-month follow-up noncontrast CT of the chest recommended to assess stability  Emphysema         The study was marked in EPIC for immediate notification  Workstation performed: [id number]         XR chest 2 views   ED Interpretation by Shelton Leigh DO (08/10 2052)   No active disease      Final Result by Morgan Crockett MD (08/11 0645)      No acute cardiopulmonary disease  Please refer to subsequent chest CT for follow-up recommendations                 Workstation performed: [id number]                    Procedures  ECG 12 Lead Documentation Only    Date/Time: 8/10/2022 6:05 PM  Performed by: Medina Leonardo DO  Authorized by: Medina Leonardo DO     ECG reviewed by me, the ED Provider: yes    Patient location:  ED  Previous ECG:     Previous ECG:  Compared to current    Similarity:  No change    Comparison to cardiac monitor: Yes    Interpretation:     Interpretation: normal               ED Course  ED Course as of 08/11/22 2108   Wed Aug 10, 2022   2057 Chest x-ray appears clear  Patient hypokalemia  Will give IV and p o  potassium as well as IV magnesium  Patient was hypoxemic on arrival has no pneumothorax or pneumonia, no sign of CHF  Wells score of 1 5 but will check D-dimer  SBIRT 22yo+    Flowsheet Row Most Recent Value   SBIRT (23 yo +)    In order to provide better care to our patients, we are screening all of our patients for alcohol and drug use  Would it be okay to ask you these screening questions? Yes Filed at: 08/10/2022 1741   Initial Alcohol Screen: US AUDIT-C     1  How often do you have a drink containing alcohol? 6  [States that she drinks regullary but stopped a few weeks ago] Filed at: 08/10/2022 1741   2  How many drinks containing alcohol do you have on a typical day you are drinking? 5 Filed at: 08/10/2022 1741   3b  FEMALE Any Age, or MALE 65+: How often do you have 4 or more drinks on one occassion? 6 Filed at: 08/10/2022 1741   Audit-C Score 17 Filed at: 08/10/2022 1741   Full Alcohol Screen: US AUDIT    4  How often during the last year have you found that you were not able to stop drinking once you had started? 0 Filed at: 08/10/2022 1741   5  How often during past year have you failed to do what was normally expected of you because of drinking? 0 Filed at: 08/10/2022 1741   6  How often in past year have you needed a first drink in the morning to get yourself going after a heavy drinking session? 0 Filed at: 08/10/2022 1741   7   How often in past year have you had feeling of guilt or remorse after drinking?  0 Filed at: 08/10/2022 1741   8  How often in past year have you been unable to remember what happened night before because you had been drinking? 0 Filed at: 08/10/2022 1741   9  Have you or someone else been injured as a result of your drinking? 0 Filed at: 08/10/2022 1741   10  Has a relative, friend, doctor or other health worker been concerned about your drinking and suggested you cut down? 2 Filed at: 08/10/2022 1741   AUDIT Total Score 19 Filed at: 08/10/2022 1741   CELSO: How many times in the past year have you    Used an illegal drug or used a prescription medication for non-medical reasons? Never Filed at: 08/10/2022 1741          Wells' Criteria for PE    Flowsheet Row Most Recent Value   Wells' Criteria for PE    Clinical signs and symptoms of DVT 0 Filed at: 08/10/2022 2052   PE is primary diagnosis or equally likely 0 Filed at: 08/10/2022 2052   HR >100 0 Filed at: 08/10/2022 2052   Immobilization at least 3 days or Surgery in the previous 4 weeks 1 5 Filed at: 08/10/2022 2052   Previous, objectively diagnosed PE or DVT 0 Filed at: 08/10/2022 2052   Hemoptysis 0 Filed at: 08/10/2022 2052   Malignancy with treatment within 6 months or palliative 0 Filed at: 08/10/2022 2052   Abad' Criteria Total 1 5 Filed at: 08/10/2022 2052                MDM  Number of Diagnoses or Management Options  Abnormal chest CT: new and requires workup  Cough: established and worsening  Generalized weakness: established and worsening  Hypokalemia: new and requires workup  Urinary tract infection: new and requires workup  Diagnosis management comments: Lays female with emphysema history has had weakness for several months as well as symptoms of exacerbation COPD for several weeks  No relief with medications prescribed recently  It patient was hypoxemic the waiting  Will assess for pneumonia, pneumothorax, PE, electrolyte abnormality, CORY, UTI etc   Doubt CHF and ACS    Appears stable on supplemental oxygen currently  Chest x-ray unremarkable  Potassium and magnesium supplementation ongoing  Signed out to Dr Stiven Villa at end of shift  If D-dimer elevated will check CTA of chest to rule out PE  Amount and/or Complexity of Data Reviewed  Clinical lab tests: ordered and reviewed  Tests in the radiology section of CPT®: ordered and reviewed  Review and summarize past medical records: yes  Discuss the patient with other providers: yes  Independent visualization of images, tracings, or specimens: yes        Disposition  Final diagnoses:   Hypokalemia   Cough   Abnormal chest CT   Generalized weakness   Urinary tract infection     Time reflects when diagnosis was documented in both MDM as applicable and the Disposition within this note     Time User Action Codes Description Comment    8/11/2022 12:01 AM Moiz Bernardo Add [E87 6] Hypokalemia     8/11/2022 12:01 AM Moiz Bernardo Add [R05 9] Cough     8/11/2022 12:01 AM Moiz Bernardo Add [R93 89] Abnormal chest CT     8/11/2022 12:02 AM Moiz Bernardo Add [R53 1] Generalized weakness     8/11/2022  1:21 AM Moiz Bernardo Add [N39 0] Urinary tract infection       ED Disposition     ED Disposition   Discharge    Condition   Stable    Date/Time   Th Aug 11, 2022 12:49 AM    Comment   Yaima White discharge to home/self care                 Follow-up Information     Follow up With Specialties Details Why Contact Info Additional Information    Oh Arndt PA-C Family Medicine, Physician Assistant Schedule an appointment as soon as possible for a visit  Potassium recheck, Repeat CT scan [address] [address] [address] [address] Emergency Department Emergency Medicine  If symptoms worsen [address] [place], [id number]  [address] [facility] Emergency Department, [address] [place] [address] Rosi Marc Luige Tee [address]          Discharge Medication List as of 8/11/2022  1:22 AM      START taking these medications    Details   nitrofurantoin (MACROBID) 100 mg capsule Take 1 capsule (100 mg total) by mouth 2 (two) times a day, Starting Thu 8/11/2022, Normal         CONTINUE these medications which have NOT CHANGED    Details   albuterol (2 5 mg/3 mL) 0 083 % nebulizer solution Take 3 mL (2 5 mg total) by nebulization every 6 (six) hours as needed for wheezing or shortness of breath, Starting Wed 8/3/2022, Normal      albuterol (Proventil HFA) 90 mcg/act inhaler Inhale 2 puffs every 6 (six) hours as needed for wheezing or shortness of breath, Starting Wed 2/16/2022, Normal      ALPRAZolam (XANAX) 1 mg tablet TAKE ONE TABLET BY MOUTH TWICE A DAY AS NEEDED FOR ANXIETY, Normal      anastrozole (ARIMIDEX) 1 mg tablet Take 1 tablet (1 mg total) by mouth daily, Starting Thu 6/9/2022, Normal      benzonatate (TESSALON PERLES) 100 mg capsule TAKE ONE CAPSULE BY MOUTH THREE TIMES A DAY AS NEEDED FOR COUGH, Normal      cefuroxime (CEFTIN) 500 mg tablet Take 1 tablet (500 mg total) by mouth every 12 (twelve) hours for 10 days, Starting Wed 8/3/2022, Until Sat 8/13/2022, Normal      furosemide (LASIX) 40 mg tablet Take 1 tablet (40 mg total) by mouth in the morning , Starting Fri 5/13/2022, Normal      mupirocin (BACTROBAN) 2 % ointment Apply topically 3 (three) times a day, Starting Tue 5/10/2022, Normal      predniSONE 10 mg tablet Take 4 tabs on day 1,2 with food, 3 tabs on day 3,4 with food, 2 tabs on day 5,6 with food, 1 tab on day 7,8 with food, Normal      spironolactone (ALDACTONE) 50 mg tablet Take 1 tablet (50 mg total) by mouth in the morning , Starting Fri 5/13/2022, Normal             No discharge procedures on file      PDMP Review       Value Time User    PDMP Reviewed  Yes 8/1/2022 12:35 PM Cristhian Stevenson PA-C          ED Provider  Electronically Signed by           Kelli Menendez DO  08/11/22 [id number]

## 2022-08-11 ENCOUNTER — TELEPHONE (OUTPATIENT)
Dept: GASTROENTEROLOGY | Facility: CLINIC | Age: 62
End: 2022-08-11

## 2022-08-11 VITALS
HEART RATE: 65 BPM | BODY MASS INDEX: 26.58 KG/M2 | HEIGHT: 63 IN | OXYGEN SATURATION: 97 % | TEMPERATURE: 98.8 F | DIASTOLIC BLOOD PRESSURE: 65 MMHG | RESPIRATION RATE: 19 BRPM | WEIGHT: 150 LBS | SYSTOLIC BLOOD PRESSURE: 129 MMHG

## 2022-08-11 DIAGNOSIS — K70.31 ALCOHOLIC CIRRHOSIS OF LIVER WITH ASCITES (HCC): Primary | ICD-10-CM

## 2022-08-11 LAB
BACTERIA UR QL AUTO: NORMAL /HPF
BILIRUB UR QL STRIP: NEGATIVE
CLARITY UR: CLEAR
COLOR UR: YELLOW
GLUCOSE UR STRIP-MCNC: NEGATIVE MG/DL
HGB UR QL STRIP.AUTO: ABNORMAL
KETONES UR STRIP-MCNC: NEGATIVE MG/DL
LEUKOCYTE ESTERASE UR QL STRIP: ABNORMAL
NITRITE UR QL STRIP: POSITIVE
NON-SQ EPI CELLS URNS QL MICRO: NORMAL /HPF
PH UR STRIP.AUTO: 7.5 [PH]
PROT UR STRIP-MCNC: NEGATIVE MG/DL
RBC #/AREA URNS AUTO: NORMAL /HPF
SP GR UR STRIP.AUTO: 1.01 (ref 1–1.03)
UROBILINOGEN UR QL STRIP.AUTO: 0.2 E.U./DL
WBC #/AREA URNS AUTO: NORMAL /HPF

## 2022-08-11 PROCEDURE — 81001 URINALYSIS AUTO W/SCOPE: CPT | Performed by: EMERGENCY MEDICINE

## 2022-08-11 RX ORDER — FUROSEMIDE 40 MG/1
40 TABLET ORAL DAILY
Qty: 10 TABLET | Refills: 0 | Status: SHIPPED | OUTPATIENT
Start: 2022-08-11 | End: 2022-08-23

## 2022-08-11 RX ORDER — POTASSIUM CHLORIDE 20 MEQ/1
20 TABLET, EXTENDED RELEASE ORAL DAILY
Qty: 7 TABLET | Refills: 0 | Status: CANCELLED | OUTPATIENT
Start: 2022-08-11 | End: 2022-08-18

## 2022-08-11 RX ORDER — NITROFURANTOIN 25; 75 MG/1; MG/1
100 CAPSULE ORAL 2 TIMES DAILY
Qty: 10 CAPSULE | Refills: 0 | Status: SHIPPED | OUTPATIENT
Start: 2022-08-11 | End: 2022-09-19

## 2022-08-11 RX ORDER — CEPHALEXIN 250 MG/1
500 CAPSULE ORAL ONCE
Status: DISCONTINUED | OUTPATIENT
Start: 2022-08-11 | End: 2022-08-11

## 2022-08-11 RX ORDER — NITROFURANTOIN 25; 75 MG/1; MG/1
100 CAPSULE ORAL 2 TIMES DAILY WITH MEALS
Status: DISCONTINUED | OUTPATIENT
Start: 2022-08-11 | End: 2022-08-11

## 2022-08-11 RX ORDER — CIPROFLOXACIN 500 MG/1
500 TABLET, FILM COATED ORAL ONCE
Status: DISCONTINUED | OUTPATIENT
Start: 2022-08-11 | End: 2022-08-11

## 2022-08-11 RX ORDER — NITROFURANTOIN 25; 75 MG/1; MG/1
100 CAPSULE ORAL ONCE
Status: COMPLETED | OUTPATIENT
Start: 2022-08-11 | End: 2022-08-11

## 2022-08-11 RX ADMIN — NITROFURANTOIN (MONOHYDRATE/MACROCRYSTALS) 100 MG: 75; 25 CAPSULE ORAL at 01:27

## 2022-08-11 NOTE — TELEPHONE ENCOUNTER
Patients GI provider:  Dr Mon More    Number to return call: [phone]    Reason for call: Pt called in stating that she was in the ED yesterday and was given Lasix  When discharged she had all of her meds and now cant find the lasix  Pt's  was able to get a 90 day supply with [facility] but will not be available for 10 days  Pt states she does not have anything and is requesting a 10 day supply until it comes in  Above is her number       Scheduled procedure/appointment date if applicable: Appt 0/69/49

## 2022-08-11 NOTE — DISCHARGE INSTRUCTIONS
Your potassium was low at today's visit you were given a potassium supplement please have your primary care provider recheck  your potassium level at the next visit, please schedule a follow-up visit within the next week given continued symptoms      If symptoms worsen please return to emergency department    You had an abnormality noted on the CT scan of your chest which will need follow-up with your primary care provider with a repeat CT scan in the next 6 months  Ct chest:   CTA ED chest PE Study: No pulmonary embolus or other acute abnormality  , Groundglass opacity with central lucency in the right upper lobe measuring 1 1 cm   This may represent focal emphysema and scarring  Six-month follow-up noncontrast CT of the chest recommended to assess stability  , Emphysema

## 2022-08-12 ENCOUNTER — TELEPHONE (OUTPATIENT)
Dept: FAMILY MEDICINE CLINIC | Facility: CLINIC | Age: 62
End: 2022-08-12

## 2022-08-12 LAB
ATRIAL RATE: 67 BPM
P AXIS: 84 DEGREES
PR INTERVAL: 138 MS
QRS AXIS: 83 DEGREES
QRSD INTERVAL: 90 MS
QT INTERVAL: 424 MS
QTC INTERVAL: 448 MS
T WAVE AXIS: 81 DEGREES
VENTRICULAR RATE: 67 BPM

## 2022-08-12 PROCEDURE — 93010 ELECTROCARDIOGRAM REPORT: CPT | Performed by: INTERNAL MEDICINE

## 2022-08-12 NOTE — TELEPHONE ENCOUNTER
She needs an appointment for an ER follow up  A lot happened at that Er visit  She can try OTC cough medicine  Mucinex

## 2022-08-12 NOTE — TELEPHONE ENCOUNTER
Pt states her other doctor told her she can't take Mucinex  I offered her an appt with Dr Lauren Purdy on Monday, but would not schedule an appt with anyone but Tim Smith or [facility] and made an anand for 8/26  I told her that her only option is to go back to the ER if she is getting worse

## 2022-08-12 NOTE — TELEPHONE ENCOUNTER
Pt is calling because she was in the [place] she still can't stop coughing  She would like to know what she can do about it at this point

## 2022-08-16 LAB
BACTERIA BLD CULT: NORMAL
BACTERIA BLD CULT: NORMAL

## 2022-08-23 ENCOUNTER — OFFICE VISIT (OUTPATIENT)
Dept: GASTROENTEROLOGY | Facility: CLINIC | Age: 62
End: 2022-08-23
Payer: COMMERCIAL

## 2022-08-23 VITALS
DIASTOLIC BLOOD PRESSURE: 70 MMHG | SYSTOLIC BLOOD PRESSURE: 110 MMHG | TEMPERATURE: 97.7 F | WEIGHT: 152 LBS | HEART RATE: 65 BPM | RESPIRATION RATE: 18 BRPM | BODY MASS INDEX: 26.93 KG/M2 | OXYGEN SATURATION: 98 % | HEIGHT: 63 IN

## 2022-08-23 DIAGNOSIS — K70.31 ALCOHOLIC CIRRHOSIS OF LIVER WITH ASCITES (HCC): Primary | Chronic | ICD-10-CM

## 2022-08-23 DIAGNOSIS — K21.9 GERD WITHOUT ESOPHAGITIS: ICD-10-CM

## 2022-08-23 DIAGNOSIS — K58.0 IRRITABLE BOWEL SYNDROME WITH DIARRHEA: ICD-10-CM

## 2022-08-23 PROCEDURE — 99214 OFFICE O/P EST MOD 30 MIN: CPT | Performed by: INTERNAL MEDICINE

## 2022-08-23 RX ORDER — SPIRONOLACTONE 100 MG/1
100 TABLET, FILM COATED ORAL DAILY
Qty: 30 TABLET | Refills: 5 | Status: SHIPPED | OUTPATIENT
Start: 2022-08-23 | End: 2022-09-19

## 2022-08-23 RX ORDER — FUROSEMIDE 40 MG/1
40 TABLET ORAL DAILY
Qty: 30 TABLET | Refills: 5 | Status: SHIPPED | OUTPATIENT
Start: 2022-08-23 | End: 2022-10-07 | Stop reason: SDUPTHER

## 2022-08-23 NOTE — PROGRESS NOTES
[facility] - Outpatient Follow-up Note  Anatoliy Perez 60 y o  female MRN: [id number]  Encounter: [id number]          ASSESSMENT AND PLAN:      1  Alcoholic cirrhosis of liver with ascites ([facility] )  She has alcoholic cirrhosis complicated by ascites and since her ascites has increased recently and she had hypokalemia, I will increase her spironolactone from 50 mg to 100 mg per day and continue her Lasix at 40 mg daily  I will schedule her for a lab draw next week to recheck a comprehensive metabolic panel and specifically her potassium and creatinine  I'll also schedule her for a follow-up visit with my advanced practitioner in 4 to 6 weeks and with myself in 2 to 3 months  - furosemide (LASIX) 40 mg tablet; Take 1 tablet (40 mg total) by mouth daily  Dispense: 30 tablet; Refill: 5  - spironolactone (ALDACTONE) 100 mg tablet; Take 1 tablet (100 mg total) by mouth daily  Dispense: 30 tablet; Refill: 5  - Comprehensive metabolic panel; Future  - Comprehensive metabolic panel    2  GERD without esophagitis  Her reflux has been stable so I have asked her to continue her diet and lifestyle modifications  3  Irritable bowel syndrome with diarrhea  She has not had any recent diarrhea or abdominal pain  I asked her to let me know if this recurs  ______________________________________________________________________    SUBJECTIVE:  She presents for follow-up of her alcoholic cirrhosis complicated by ascites and her reflux and diarrhea predominant irritable bowel syndrome  She has not had any issues with encephalopathy or bleeding  She feels her ascites has increased recently and she had an issue with hypokalemia requiring intravenous potassium supplementation  She feels her reflux and irritable bowel syndrome symptoms have been at baseline  REVIEW OF SYSTEMS IS OTHERWISE NEGATIVE        Historical Information   Past Medical History:   Diagnosis Date    Alcoholic fatty liver  Anxiety     Asthma     COPD (chronic obstructive pulmonary disease) (HCC)     Elevated liver function tests     GERD (gastroesophageal reflux disease)     GERD without esophagitis     Hyperlipidemia     Hypertension     IBS (irritable bowel syndrome)     Insomnia     Insomnia     Liver disease     Nervousness     Nicotine dependence     Other specified urinary incontinence     RLS (restless legs syndrome)     Wears glasses      Past Surgical History:   Procedure Laterality Date    BACK SURGERY      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    BREAST EXCISIONAL BIOPSY Right 11/01/2004    benign    CATARACT EXTRACTION, BILATERAL  08/01/2018    COLONOSCOPY N/A 5/8/2019    Procedure: COLONOSCOPY;  Surgeon: Radha Xavier MD;  Location: [facility] GI LAB; Service: Gastroenterology    EGD AND COLONOSCOPY N/A 3/19/2018    Procedure: EGD AND COLONOSCOPY;  Surgeon: Radha Xavier MD;  Location: [facility] GI LAB; Service: Gastroenterology    ESOPHAGOGASTRODUODENOSCOPY N/A 5/8/2019    Procedure: ESOPHAGOGASTRODUODENOSCOPY (EGD); Surgeon: Radha Xavier MD;  Location: [facility] GI LAB;   Service: Gastroenterology    IR PARACENTESIS  11/27/2020    IR PARACENTESIS  12/8/2020    KNEE SURGERY      KNEE SURGERY      LUMBAR LAMINECTOMY  04/1999    LYMPH NODE BIOPSY      MAMMO STEREOTACTIC BREAST BIOPSY RIGHT (ALL INC) Right 5/21/2021    MAMMO STEREOTACTIC BREAST BIOPSY RIGHT (ALL INC) EACH ADD Right 5/21/2021    TOTAL ABDOMINAL HYSTERECTOMY  02/05/2008    TUBAL LIGATION      TUBAL LIGATION  1989    UPPER GASTROINTESTINAL ENDOSCOPY       Social History   Social History     Substance and Sexual Activity   Alcohol Use Not Currently    Alcohol/week: 24 0 standard drinks    Types: 24 Cans of beer per week    Comment: not lately     Social History     Substance and Sexual Activity   Drug Use No     Social History     Tobacco Use   Smoking Status Current Every Day Smoker    Packs/day: 1 00    Years: 45 00    Pack years: 45 00    Types: Cigarettes    Start date: 1978   Smokeless Tobacco Never Used     Family History   Problem Relation Age of Onset    Breast cancer Mother 28    No Known Problems Father     No Known Problems Sister     No Known Problems Brother     No Known Problems Son     No Known Problems Maternal Grandmother     No Known Problems Maternal Grandfather     No Known Problems Paternal Grandmother     No Known Problems Paternal Grandfather     No Known Problems Maternal Aunt     No Known Problems Maternal Aunt     No Known Problems Paternal Aunt     Substance Abuse Neg Hx     Mental illness Neg Hx        Meds/Allergies       Current Outpatient Medications:     albuterol (2 5 mg/3 mL) 0 083 % nebulizer solution    albuterol (Proventil HFA) 90 mcg/act inhaler    ALPRAZolam (XANAX) 1 mg tablet    anastrozole (ARIMIDEX) 1 mg tablet    benzonatate (TESSALON PERLES) 100 mg capsule    furosemide (LASIX) 40 mg tablet    spironolactone (ALDACTONE) 100 mg tablet    mupirocin (BACTROBAN) 2 % ointment    nitrofurantoin (MACROBID) 100 mg capsule    predniSONE 10 mg tablet    Allergies   Allergen Reactions    Sulfa Antibiotics Shortness Of Breath    Ace Inhibitors Cough and Other (See Comments)     coughing           Objective     Blood pressure 110/70, pulse 65, temperature 97 7 °F (36 5 °C), temperature source Tympanic, resp  rate 18, height 5' 3" (1 6 m), weight 68 9 kg (152 lb), SpO2 98 %, not currently breastfeeding  Body mass index is 26 93 kg/m²  PHYSICAL EXAM:      General Appearance:   Alert, cooperative, no distress, no asterixis   HEENT:   Normocephalic, atraumatic, anicteric      Neck:  Supple, symmetrical, trachea midline   Lungs:   Clear to auscultation bilaterally; no rales, rhonchi or wheezing; respirations unlabored    Heart[de-identified]   Regular rate and rhythm; no murmur, rub, or gallop     Abdomen:   Soft, non-tender, moderate ascites; normal bowel sounds; no masses, no organomegaly    Genitalia:   Deferred    Rectal:   Deferred    Extremities:  No cyanosis, clubbing or edema    Pulses:  2+ and symmetric    Skin:  No jaundice, rashes, or lesions    Lymph nodes:  No palpable cervical lymphadenopathy        Lab Results:   No visits with results within 1 Day(s) from this visit  Latest known visit with results is:   Admission on 08/10/2022, Discharged on 08/11/2022   Component Date Value    WBC 08/10/2022 6 14     RBC 08/10/2022 3 55 (A)    Hemoglobin 08/10/2022 13 8     Hematocrit 08/10/2022 39 0     MCV 08/10/2022 110 (A)    MCH 08/10/2022 38 9 (A)    MCHC 08/10/2022 35 4     RDW 08/10/2022 14 8     MPV 08/10/2022 10 2     Platelets 42/91/5745 76 (A)    Sodium 08/10/2022 131 (A)    Potassium 08/10/2022 2 9 (A)    Chloride 08/10/2022 94 (A)    CO2 08/10/2022 32     ANION GAP 08/10/2022 5     BUN 08/10/2022 11     Creatinine 08/10/2022 1 12     Glucose 08/10/2022 183 (A)    Calcium 08/10/2022 8 9     Corrected Calcium 08/10/2022 10 5 (A)    AST 08/10/2022 87 (A)    ALT 08/10/2022 54     Alkaline Phosphatase 08/10/2022 119 (A)    Total Protein 08/10/2022 8 9 (A)    Albumin 08/10/2022 2 0 (A)    Total Bilirubin 08/10/2022 2 10 (A)    eGFR 08/10/2022 52     LACTIC ACID 08/10/2022 2 0     Protime 08/10/2022 20 9 (A)    INR 08/10/2022 1 69 (A)    PTT 08/10/2022 33     Procalcitonin 08/10/2022 0 10     Blood Culture 08/10/2022 No Growth After 5 Days   Blood Culture 08/10/2022 No Growth After 5 Days       Color, UA 08/11/2022 Yellow     Clarity, UA 08/11/2022 Clear     Specific Gravity, UA 08/11/2022 1 010     pH, UA 08/11/2022 7 5     Leukocytes, UA 08/11/2022 Small (A)    Nitrite, UA 08/11/2022 Positive (A)    Protein, UA 08/11/2022 Negative     Glucose, UA 08/11/2022 Negative     Ketones, UA 08/11/2022 Negative     Urobilinogen, UA 08/11/2022 0 2     Bilirubin, UA 08/11/2022 Negative     Occult Blood, UA 08/11/2022 Trace-Intact (A)    NT-proBNP 08/10/2022 168 (A)    SARS-CoV-2 08/10/2022 Negative     INFLUENZA A PCR 08/10/2022 Negative     INFLUENZA B PCR 08/10/2022 Negative     RSV PCR 08/10/2022 Negative     Segmented % 08/10/2022 78 (A)    Lymphocytes % 08/10/2022 13 (A)    Monocytes % 08/10/2022 6     Eosinophils, % 08/10/2022 0     Basophils % 08/10/2022 1     Metamyelocytes% 08/10/2022 2 (A)    Absolute Neutrophils 08/10/2022 4 79     Lymphocytes Absolute 08/10/2022 0 80     Monocytes Absolute 08/10/2022 0 37     Eosinophils Absolute 08/10/2022 0 00     Basophils Absolute 08/10/2022 0 06     nRBC 08/10/2022 1     Anisocytosis 08/10/2022 Present     Macrocytes 08/10/2022 Present     Polychromasia 08/10/2022 Present     Platelet Estimate 78/12/9555 Decreased (A)    D-Dimer, Quant 08/10/2022 8 84 (A)    RBC, UA 08/11/2022 2-4     WBC, UA 08/11/2022 1-2     Epithelial Cells 08/11/2022 Occasional     Bacteria, UA 08/11/2022 Occasional     Ventricular Rate 08/10/2022 67     Atrial Rate 08/10/2022 67     CO Interval 08/10/2022 138     QRSD Interval 08/10/2022 90     QT Interval 08/10/2022 424     QTC Interval 08/10/2022 448     P Axis 08/10/2022 84     QRS Axis 08/10/2022 83     T Wave Axis 08/10/2022 81          Radiology Results:   XR chest 2 views    Result Date: 8/11/2022  Narrative: CHEST INDICATION:   Dyspnea, hypoxemia, cough  COMPARISON:  Chest radiograph February 19, 2021  Correlation with subsequently performed chest CT (August 10, 2022 at 23:08 EXAM PERFORMED/VIEWS:  XR CHEST PA & LATERAL FINDINGS: Cardiomediastinal silhouette appears unremarkable  No focal consolidation, pneumothorax or pleural effusion  No acute osseous abnormality  Right breast surgical clips  Impression: No acute cardiopulmonary disease  Please refer to subsequent chest CT for follow-up recommendations   Workstation performed: [id number]     CTA ED chest PE Study    Result Date: 8/10/2022  Narrative: CTA - CHEST WITH IV CONTRAST - PULMONARY ANGIOGRAM INDICATION:   Pulmonary embolism (PE) suspected, positive D-dimer SOB, elevated D-dimer  COMPARISON: Multiple priors most recently same day chest radiographs TECHNIQUE: CTA examination of the chest was performed using angiographic technique according to a protocol specifically tailored to evaluate for pulmonary embolism  Axial, sagittal, and coronal 2D reformatted images were created from the source data and  submitted for interpretation  In addition, coronal 3D MIP postprocessing was performed on the acquisition scanner  Radiation dose length product (DLP) for this visit:  219 61 mGy-cm   This examination, like all CT scans performed in the [facility], was performed utilizing techniques to minimize radiation dose exposure, including the use of iterative  reconstruction and automated exposure control  IV Contrast:  70 mL of iohexol (OMNIPAQUE)  FINDINGS: PULMONARY ARTERIAL TREE:  No pulmonary embolus is seen  LUNGS:  Emphysema  Bibasilar subsegmental atelectasis/scarring  No focal consolidation  Groundglass opacity with central lucency in the right upper lobe measuring 1 1 cm (series 3, image 44)  This is not definitively visualized on recent CT  PLEURA:  Thickening of the anterior pleural along the right upper and middle lobes, probably postradiation change  HEART/GREAT VESSELS:  Coronary artery atherosclerosis  No thoracic aortic aneurysm  MEDIASTINUM AND DIEGO:  Unremarkable  CHEST WALL AND LOWER NECK:   Postprocedural changes of the right breast  VISUALIZED STRUCTURES IN THE UPPER ABDOMEN:  Cirrhosis and ascites  OSSEOUS STRUCTURES:  Spinal degenerative changes are noted  No acute fracture or destructive osseous lesion  Impression: No pulmonary embolus or other acute abnormality  Groundglass opacity with central lucency in the right upper lobe measuring 1 1 cm   This may represent focal emphysema and scarring    Six-month follow-up noncontrast CT of the chest recommended to assess stability  Emphysema The study was marked in EPIC for immediate notification   Workstation performed: [id number]

## 2022-08-26 ENCOUNTER — OFFICE VISIT (OUTPATIENT)
Dept: FAMILY MEDICINE CLINIC | Facility: CLINIC | Age: 62
End: 2022-08-26
Payer: COMMERCIAL

## 2022-08-26 VITALS
HEART RATE: 84 BPM | WEIGHT: 153.5 LBS | TEMPERATURE: 99.3 F | BODY MASS INDEX: 27.2 KG/M2 | DIASTOLIC BLOOD PRESSURE: 74 MMHG | OXYGEN SATURATION: 98 % | HEIGHT: 63 IN | RESPIRATION RATE: 15 BRPM | SYSTOLIC BLOOD PRESSURE: 112 MMHG

## 2022-08-26 DIAGNOSIS — R93.89 ABNORMAL CHEST CT: ICD-10-CM

## 2022-08-26 DIAGNOSIS — E87.6 HYPOKALEMIA: Primary | ICD-10-CM

## 2022-08-26 DIAGNOSIS — R39.9 URINARY SYMPTOM OR SIGN: ICD-10-CM

## 2022-08-26 DIAGNOSIS — M25.552 LEFT HIP PAIN: ICD-10-CM

## 2022-08-26 DIAGNOSIS — F41.9 ANXIETY: ICD-10-CM

## 2022-08-26 DIAGNOSIS — K70.31 ALCOHOLIC CIRRHOSIS OF LIVER WITH ASCITES (HCC): ICD-10-CM

## 2022-08-26 LAB
SL AMB  POCT GLUCOSE, UA: NEGATIVE
SL AMB LEUKOCYTE ESTERASE,UA: NEGATIVE
SL AMB POCT BILIRUBIN,UA: NEGATIVE
SL AMB POCT BLOOD,UA: ABNORMAL
SL AMB POCT CLARITY,UA: CLEAR
SL AMB POCT COLOR,UA: YELLOW
SL AMB POCT KETONES,UA: NEGATIVE
SL AMB POCT NITRITE,UA: NEGATIVE
SL AMB POCT PH,UA: 5
SL AMB POCT SPECIFIC GRAVITY,UA: 1.01
SL AMB POCT URINE PROTEIN: NEGATIVE
SL AMB POCT UROBILINOGEN: 0.2

## 2022-08-26 PROCEDURE — 99214 OFFICE O/P EST MOD 30 MIN: CPT | Performed by: NURSE PRACTITIONER

## 2022-08-26 PROCEDURE — 81002 URINALYSIS NONAUTO W/O SCOPE: CPT | Performed by: NURSE PRACTITIONER

## 2022-08-26 RX ORDER — ALPRAZOLAM 1 MG/1
TABLET ORAL
Qty: 60 TABLET | Refills: 0 | Status: SHIPPED | OUTPATIENT
Start: 2022-08-26 | End: 2022-09-23 | Stop reason: SDUPTHER

## 2022-08-26 NOTE — PROGRESS NOTES
Assessment/Plan:     Diagnoses and all orders for this visit:    Hypokalemia    Last K at 2 9 in ER on 08/10/22  Replaced with IV and PO potassium  Level never rechecked  Pt is on Spironolactone and Lasix  Repeat CMP  This was already ordered by GI  Urinary symptom or sign  -     POCT urine dip  -     UA (URINE) with reflex to Scope  -     Urine culture    Urine dip showing moderate blood  Will send for UA and culture  Will contact pt with results once available  Patient is encouraged to call our office for any questions/concerns, persistent or worsening symptoms  Patient states they understand and agree with treatment plan  Alcoholic cirrhosis of liver with ascites (HCC)  -     CBC and differential; Future    I did try to  patient today about her ETOH consumption and she did cut me off and states "I don't want to hear it or talk about this"  Will check CBC  Anxiety  -     ALPRAZolam (XANAX) 1 mg tablet; TAKE ONE TABLET BY MOUTH TWICE A DAY AS NEEDED FOR ANXIETY    Refills prescribed per pt request  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, 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 substances  She verbalizes understanding  Left hip pain  -     XR hip/pelv 2-3 vws left if performed; Future      Normal exam  Xray ordered  We will contact pt with results once available  Patient is encouraged to call our office for any questions/concerns, persistent or worsening symptoms  Patient states they understand and agree with treatment plan  Abnormal Chest CT    CT showing "Groundglass opacity with central lucency in the right upper lobe measuring 1 1 cm   This may represent focal emphysema and scarring  Six-month follow-up noncontrast CT of the chest recommended to assess stability " Repeat CT ordered in 6 months      Emphysema    Pt to f/u PRN or as scheduled  Subjective:      Patient ID: Mitzi Gotti is a 60 y o  female      Pt presents today for an ER follow up from 08/10  Per ER notes pt "with history of COPD, asthma and hypertension complains of dyspnea and paroxysmal cough with sputum of unknown color  This started about 10-12 days ago  She was seen by the family doctor at that time hand was started on Ceftin, prednisone taper and albuterol nebulizer but she has had no relief  She also complains of progressive generalized weakness since July 4th  She has history of alcoholic cirrhosis but does not feel that her abdomen is any more tender or distended than usual   Her  developed similar symptoms about 7 days ago and tested positive for COVID at home  She tested negative for COVID today at home  Patient has been eating less and therefore has had less bowel movements but denies bloody stool and diarrhea  Patient has been urinating less; this looks orange in color but there is no dysuria or flank pain  Patient denies skin infection or wounds, retrosternal chest pain and palpitations "  Pt was found to have potassium of 2 9 that was replaced via IV and PO supplementation  This level was never rechecked  Pt was also treated for UTI with Macrobid  She feels like she may still have some urgency, frequency and her urine seems to be more orange colored  Pt denies fever, chills  Pt was seen by her GI doctor on 08/23 and she complained of worsening ascites  Plan from GI was to increase Spironolactone from  mg and check BMP next week  Pt is also on 40 mg Lasix  Today she notes her breathing and coughing have improved since the ER  Additionally, pt did have CT scan during ER visit which shows "No pulmonary embolus or other acute abnormality  Groundglass opacity with central lucency in the right upper lobe measuring 1 1 cm   This may represent focal emphysema and scarring    Six-month follow-up noncontrast CT of the chest recommended to assess stability "    During the end of my visit w/ Trina Ormond, she does note some right hip pain that has been going on for some time  No decrease in ROM  Pt would like xray  The following portions of the patient's history were reviewed and updated as appropriate: allergies, current medications, past family history, past medical history, past social history, past surgical history and problem list     Review of Systems    As noted per HPI  Objective:      /74   Pulse 84   Temp 99 3 °F (37 4 °C) (Oral)   Resp 15   Ht 5' 3" (1 6 m)   Wt 69 6 kg (153 lb 8 oz)   SpO2 98%   BMI 27 19 kg/m²          Physical Exam  Vitals reviewed  Constitutional:       General: She is not in acute distress  Appearance: Normal appearance  She is not ill-appearing  Cardiovascular:      Rate and Rhythm: Normal rate and regular rhythm  Pulses: Normal pulses  Heart sounds: Normal heart sounds  No murmur heard  Pulmonary:      Effort: Pulmonary effort is normal  No respiratory distress  Breath sounds: Normal breath sounds  No wheezing  Abdominal:      Palpations: Abdomen is soft  Comments: Moderate ascites   Musculoskeletal:         General: Normal range of motion  Neurological:      Mental Status: She is alert  Psychiatric:         Mood and Affect: Mood normal          Behavior: Behavior normal          Thought Content:  Thought content normal          Judgment: Judgment normal

## 2022-08-28 LAB
APPEARANCE UR: CLEAR
BACTERIA UR CULT: NORMAL
BILIRUB UR QL STRIP: NEGATIVE
COLOR UR: YELLOW
GLUCOSE UR QL: NEGATIVE
HGB UR QL STRIP: NEGATIVE
KETONES UR QL STRIP: NEGATIVE
LEUKOCYTE ESTERASE UR QL STRIP: NEGATIVE
Lab: NORMAL
MICRO URNS: NORMAL
NITRITE UR QL STRIP: NEGATIVE
PH UR STRIP: 6 [PH] (ref 5–7.5)
PROT UR QL STRIP: NEGATIVE
SP GR UR: 1.01 (ref 1–1.03)
UROBILINOGEN UR STRIP-ACNC: 0.2 MG/DL (ref 0.2–1)

## 2022-08-29 ENCOUNTER — TELEPHONE (OUTPATIENT)
Dept: FAMILY MEDICINE CLINIC | Facility: CLINIC | Age: 62
End: 2022-08-29

## 2022-08-29 NOTE — TELEPHONE ENCOUNTER
----- Message from [place], [place] sent at 8/29/2022  7:43 AM EDT -----  Please let patient know her urine culture was negative for UTI      patient was informed

## 2022-09-16 ENCOUNTER — TELEPHONE (OUTPATIENT)
Dept: OTHER | Facility: OTHER | Age: 62
End: 2022-09-16

## 2022-09-16 ENCOUNTER — TELEPHONE (OUTPATIENT)
Dept: GASTROENTEROLOGY | Facility: AMBULARY SURGERY CENTER | Age: 62
End: 2022-09-16

## 2022-09-16 ENCOUNTER — HOSPITAL ENCOUNTER (INPATIENT)
Facility: HOSPITAL | Age: 62
LOS: 3 days | Discharge: HOME | DRG: 641 | End: 2022-09-19
Attending: EMERGENCY MEDICINE | Admitting: INTERNAL MEDICINE
Payer: COMMERCIAL

## 2022-09-16 ENCOUNTER — TELEPHONE (OUTPATIENT)
Dept: HEMATOLOGY ONCOLOGY | Facility: CLINIC | Age: 62
End: 2022-09-16

## 2022-09-16 DIAGNOSIS — K70.31 ALCOHOLIC CIRRHOSIS OF LIVER WITH ASCITES (HCC): Chronic | ICD-10-CM

## 2022-09-16 DIAGNOSIS — E87.1 HYPONATREMIA: Primary | ICD-10-CM

## 2022-09-16 DIAGNOSIS — E87.1 ACUTE HYPONATREMIA: Primary | ICD-10-CM

## 2022-09-16 DIAGNOSIS — I10 ESSENTIAL HYPERTENSION: ICD-10-CM

## 2022-09-16 LAB
ALBUMIN SERPL BCP-MCNC: 1.9 G/DL (ref 3.5–5)
ALBUMIN SERPL-MCNC: 2.4 G/DL (ref 3.8–4.8)
ALBUMIN SERPL-MCNC: 2.6 G/DL (ref 3.8–4.8)
ALBUMIN/GLOB SERPL: 0.4 {RATIO} (ref 1.2–2.2)
ALBUMIN/GLOB SERPL: 0.5 {RATIO} (ref 1.2–2.2)
ALP SERPL-CCNC: 144 IU/L (ref 44–121)
ALP SERPL-CCNC: 153 IU/L (ref 44–121)
ALP SERPL-CCNC: 172 U/L (ref 46–116)
ALT SERPL W P-5'-P-CCNC: 52 U/L (ref 12–78)
ALT SERPL-CCNC: 41 IU/L (ref 0–32)
ALT SERPL-CCNC: 42 IU/L (ref 0–32)
AMMONIA PLAS-SCNC: <10 UMOL/L (ref 11–35)
ANION GAP SERPL CALCULATED.3IONS-SCNC: 4 MMOL/L (ref 4–13)
ANION GAP SERPL CALCULATED.3IONS-SCNC: 4 MMOL/L (ref 4–13)
APPEARANCE UR: CLEAR
APTT PPP: 37 SECONDS (ref 23–37)
AST SERPL W P-5'-P-CCNC: 96 U/L (ref 5–45)
AST SERPL-CCNC: 101 IU/L (ref 0–40)
AST SERPL-CCNC: 98 IU/L (ref 0–40)
ATRIAL RATE: 83 BPM
BACTERIA UR QL AUTO: ABNORMAL /HPF
BASOPHILS # BLD AUTO: 0.07 THOUSANDS/ΜL (ref 0–0.1)
BASOPHILS # BLD AUTO: 0.1 X10E3/UL (ref 0–0.2)
BASOPHILS # BLD AUTO: 0.1 X10E3/UL (ref 0–0.2)
BASOPHILS NFR BLD AUTO: 1 %
BASOPHILS NFR BLD AUTO: 1 %
BASOPHILS NFR BLD AUTO: 1 % (ref 0–1)
BILIRUB SERPL-MCNC: 2.3 MG/DL (ref 0.2–1)
BILIRUB SERPL-MCNC: 2.5 MG/DL (ref 0–1.2)
BILIRUB SERPL-MCNC: 2.7 MG/DL (ref 0–1.2)
BILIRUB UR QL STRIP: ABNORMAL
BILIRUB UR QL STRIP: NEGATIVE
BUN SERPL-MCNC: 5 MG/DL (ref 8–27)
BUN SERPL-MCNC: 6 MG/DL (ref 8–27)
BUN SERPL-MCNC: 7 MG/DL (ref 5–25)
BUN SERPL-MCNC: 7 MG/DL (ref 5–25)
BUN/CREAT SERPL: 7 (ref 12–28)
BUN/CREAT SERPL: 9 (ref 12–28)
CALCIUM ALBUM COR SERPL-MCNC: 10.2 MG/DL (ref 8.3–10.1)
CALCIUM SERPL-MCNC: 8.4 MG/DL (ref 8.7–10.3)
CALCIUM SERPL-MCNC: 8.5 MG/DL (ref 8.3–10.1)
CALCIUM SERPL-MCNC: 8.6 MG/DL (ref 8.3–10.1)
CALCIUM SERPL-MCNC: 8.6 MG/DL (ref 8.7–10.3)
CHLORIDE SERPL-SCNC: 80 MMOL/L (ref 96–106)
CHLORIDE SERPL-SCNC: 82 MMOL/L (ref 96–106)
CHLORIDE SERPL-SCNC: 87 MMOL/L (ref 96–108)
CHLORIDE SERPL-SCNC: 87 MMOL/L (ref 96–108)
CHOLEST SERPL-MCNC: 102 MG/DL (ref 100–199)
CLARITY UR: CLEAR
CO2 SERPL-SCNC: 24 MMOL/L (ref 20–29)
CO2 SERPL-SCNC: 26 MMOL/L (ref 20–29)
CO2 SERPL-SCNC: 29 MMOL/L (ref 21–32)
CO2 SERPL-SCNC: 29 MMOL/L (ref 21–32)
COLOR UR: YELLOW
COLOR UR: YELLOW
CREAT SERPL-MCNC: 0.68 MG/DL (ref 0.57–1)
CREAT SERPL-MCNC: 0.71 MG/DL (ref 0.57–1)
CREAT SERPL-MCNC: 0.82 MG/DL (ref 0.6–1.3)
CREAT SERPL-MCNC: 0.9 MG/DL (ref 0.6–1.3)
EGFR: 95 ML/MIN/1.73
EGFR: 98 ML/MIN/1.73
EOSINOPHIL # BLD AUTO: 0 X10E3/UL (ref 0–0.4)
EOSINOPHIL # BLD AUTO: 0 X10E3/UL (ref 0–0.4)
EOSINOPHIL # BLD AUTO: 0.05 THOUSAND/ΜL (ref 0–0.61)
EOSINOPHIL NFR BLD AUTO: 0 %
EOSINOPHIL NFR BLD AUTO: 1 %
EOSINOPHIL NFR BLD AUTO: 1 % (ref 0–6)
ERYTHROCYTE [DISTWIDTH] IN BLOOD BY AUTOMATED COUNT: 12.2 % (ref 11.7–15.4)
ERYTHROCYTE [DISTWIDTH] IN BLOOD BY AUTOMATED COUNT: 12.8 % (ref 11.7–15.4)
ERYTHROCYTE [DISTWIDTH] IN BLOOD BY AUTOMATED COUNT: 14.3 % (ref 11.6–15.1)
ETHANOL SERPL-MCNC: 34 MG/DL (ref 0–3)
GFR SERPL CREATININE-BSD FRML MDRD: 68 ML/MIN/1.73SQ M
GFR SERPL CREATININE-BSD FRML MDRD: 76 ML/MIN/1.73SQ M
GLOBULIN SER-MCNC: 5.6 G/DL (ref 1.5–4.5)
GLOBULIN SER-MCNC: 5.8 G/DL (ref 1.5–4.5)
GLUCOSE SERPL-MCNC: 102 MG/DL (ref 65–140)
GLUCOSE SERPL-MCNC: 132 MG/DL (ref 65–140)
GLUCOSE SERPL-MCNC: 96 MG/DL (ref 65–99)
GLUCOSE SERPL-MCNC: 98 MG/DL (ref 65–99)
GLUCOSE UR QL: NEGATIVE
GLUCOSE UR STRIP-MCNC: NEGATIVE MG/DL
HCT VFR BLD AUTO: 29.4 % (ref 34–46.6)
HCT VFR BLD AUTO: 29.6 % (ref 34.8–46.1)
HCT VFR BLD AUTO: 30 % (ref 34–46.6)
HDLC SERPL-MCNC: 39 MG/DL
HGB BLD-MCNC: 10.8 G/DL (ref 11.5–15.4)
HGB BLD-MCNC: 11 G/DL (ref 11.1–15.9)
HGB BLD-MCNC: 11.8 G/DL (ref 11.1–15.9)
HGB UR QL STRIP.AUTO: NEGATIVE
HGB UR QL STRIP: NEGATIVE
IMM GRANULOCYTES # BLD AUTO: 0.04 THOUSAND/UL (ref 0–0.2)
IMM GRANULOCYTES # BLD: 0 X10E3/UL (ref 0–0.1)
IMM GRANULOCYTES NFR BLD AUTO: 1 % (ref 0–2)
IMM GRANULOCYTES NFR BLD: 1 %
INR PPP: 1.74 (ref 0.84–1.19)
KETONES UR QL STRIP: NEGATIVE
KETONES UR STRIP-MCNC: NEGATIVE MG/DL
LDLC SERPL CALC-MCNC: 49 MG/DL (ref 0–99)
LDLC/HDLC SERPL: 1.3 RATIO (ref 0–3.2)
LEUKOCYTE ESTERASE UR QL STRIP: NEGATIVE
LEUKOCYTE ESTERASE UR QL STRIP: NEGATIVE
LYMPHOCYTES # BLD AUTO: 0.6 X10E3/UL (ref 0.7–3.1)
LYMPHOCYTES # BLD AUTO: 0.8 THOUSANDS/ΜL (ref 0.6–4.47)
LYMPHOCYTES # BLD AUTO: 0.8 X10E3/UL (ref 0.7–3.1)
LYMPHOCYTES NFR BLD AUTO: 11 %
LYMPHOCYTES NFR BLD AUTO: 14 % (ref 14–44)
LYMPHOCYTES NFR BLD AUTO: 15 %
MCH RBC QN AUTO: 39.3 PG (ref 26.6–33)
MCH RBC QN AUTO: 39.3 PG (ref 26.8–34.3)
MCH RBC QN AUTO: 40.1 PG (ref 26.6–33)
MCHC RBC AUTO-ENTMCNC: 36.5 G/DL (ref 31.4–37.4)
MCHC RBC AUTO-ENTMCNC: 37.4 G/DL (ref 31.5–35.7)
MCHC RBC AUTO-ENTMCNC: 39.3 G/DL (ref 31.5–35.7)
MCV RBC AUTO: 102 FL (ref 79–97)
MCV RBC AUTO: 105 FL (ref 79–97)
MCV RBC AUTO: 108 FL (ref 82–98)
MICRO URNS: NORMAL
MONOCYTES # BLD AUTO: 0.7 X10E3/UL (ref 0.1–0.9)
MONOCYTES # BLD AUTO: 0.8 X10E3/UL (ref 0.1–0.9)
MONOCYTES # BLD AUTO: 0.86 THOUSAND/ΜL (ref 0.17–1.22)
MONOCYTES NFR BLD AUTO: 13 %
MONOCYTES NFR BLD AUTO: 15 %
MONOCYTES NFR BLD AUTO: 15 % (ref 4–12)
MORPHOLOGY BLD-IMP: ABNORMAL
MORPHOLOGY BLD-IMP: ABNORMAL
NEUTROPHILS # BLD AUTO: 3.7 X10E3/UL (ref 1.4–7)
NEUTROPHILS # BLD AUTO: 3.75 THOUSANDS/ΜL (ref 1.85–7.62)
NEUTROPHILS # BLD AUTO: 3.8 X10E3/UL (ref 1.4–7)
NEUTROPHILS NFR BLD AUTO: 69 %
NEUTROPHILS NFR BLD AUTO: 73 %
NEUTS SEG NFR BLD AUTO: 68 % (ref 43–75)
NITRITE UR QL STRIP: NEGATIVE
NITRITE UR QL STRIP: NEGATIVE
NON-SQ EPI CELLS URNS QL MICRO: ABNORMAL /HPF
NRBC BLD AUTO-RTO: 0 /100 WBCS
OSMOLALITY UR/SERPL-RTO: 256 MMOL/KG (ref 282–298)
OSMOLALITY UR: 267 MMOL/KG
P AXIS: 56 DEGREES
PH UR STRIP.AUTO: 6 [PH]
PH UR STRIP: 5.5 [PH] (ref 5–7.5)
PLATELET # BLD AUTO: 75 X10E3/UL (ref 150–450)
PLATELET # BLD AUTO: 81 THOUSANDS/UL (ref 149–390)
PLATELET # BLD AUTO: 86 X10E3/UL (ref 150–450)
PMV BLD AUTO: 9.7 FL (ref 8.9–12.7)
POTASSIUM SERPL-SCNC: 3.6 MMOL/L (ref 3.5–5.3)
POTASSIUM SERPL-SCNC: 3.7 MMOL/L (ref 3.5–5.3)
POTASSIUM SERPL-SCNC: 3.9 MMOL/L (ref 3.5–5.2)
POTASSIUM SERPL-SCNC: 4 MMOL/L (ref 3.5–5.2)
PR INTERVAL: 148 MS
PROT SERPL-MCNC: 8.2 G/DL (ref 6–8.5)
PROT SERPL-MCNC: 8.2 G/DL (ref 6–8.5)
PROT SERPL-MCNC: 8.5 G/DL (ref 6.4–8.4)
PROT UR QL STRIP: NEGATIVE
PROT UR STRIP-MCNC: NEGATIVE MG/DL
PROTHROMBIN TIME: 21.3 SECONDS (ref 11.6–14.5)
QRS AXIS: 79 DEGREES
QRSD INTERVAL: 92 MS
QT INTERVAL: 392 MS
QTC INTERVAL: 460 MS
RBC # BLD AUTO: 2.75 MILLION/UL (ref 3.81–5.12)
RBC # BLD AUTO: 2.8 X10E6/UL (ref 3.77–5.28)
RBC # BLD AUTO: 2.94 X10E6/UL (ref 3.77–5.28)
RBC #/AREA URNS AUTO: ABNORMAL /HPF
SL AMB VLDL CHOLESTEROL CALC: 14 MG/DL (ref 5–40)
SODIUM 24H UR-SCNC: 13 MOL/L
SODIUM SERPL-SCNC: 117 MMOL/L (ref 134–144)
SODIUM SERPL-SCNC: 117 MMOL/L (ref 134–144)
SODIUM SERPL-SCNC: 120 MMOL/L (ref 135–147)
SODIUM SERPL-SCNC: 120 MMOL/L (ref 135–147)
SP GR UR STRIP.AUTO: 1.02 (ref 1–1.03)
SP GR UR: 1.01 (ref 1–1.03)
T WAVE AXIS: 46 DEGREES
TRIGL SERPL-MCNC: 61 MG/DL (ref 0–149)
TSH SERPL DL<=0.005 MIU/L-ACNC: 2.58 UIU/ML (ref 0.45–4.5)
TSH SERPL DL<=0.05 MIU/L-ACNC: 3.9 UIU/ML (ref 0.45–4.5)
UROBILINOGEN UR QL STRIP.AUTO: 1 E.U./DL
UROBILINOGEN UR STRIP-ACNC: 1 MG/DL (ref 0.2–1)
VENTRICULAR RATE: 83 BPM
WBC # BLD AUTO: 5.2 X10E3/UL (ref 3.4–10.8)
WBC # BLD AUTO: 5.4 X10E3/UL (ref 3.4–10.8)
WBC # BLD AUTO: 5.57 THOUSAND/UL (ref 4.31–10.16)
WBC #/AREA URNS AUTO: ABNORMAL /HPF

## 2022-09-16 PROCEDURE — 83935 ASSAY OF URINE OSMOLALITY: CPT | Performed by: PHYSICIAN ASSISTANT

## 2022-09-16 PROCEDURE — 99285 EMERGENCY DEPT VISIT HI MDM: CPT

## 2022-09-16 PROCEDURE — 84443 ASSAY THYROID STIM HORMONE: CPT | Performed by: EMERGENCY MEDICINE

## 2022-09-16 PROCEDURE — 85730 THROMBOPLASTIN TIME PARTIAL: CPT | Performed by: EMERGENCY MEDICINE

## 2022-09-16 PROCEDURE — 84540 ASSAY OF URINE/UREA-N: CPT | Performed by: PHYSICIAN ASSISTANT

## 2022-09-16 PROCEDURE — 99285 EMERGENCY DEPT VISIT HI MDM: CPT | Performed by: EMERGENCY MEDICINE

## 2022-09-16 PROCEDURE — 82607 VITAMIN B-12: CPT | Performed by: PHYSICIAN ASSISTANT

## 2022-09-16 PROCEDURE — 84300 ASSAY OF URINE SODIUM: CPT | Performed by: PHYSICIAN ASSISTANT

## 2022-09-16 PROCEDURE — 81001 URINALYSIS AUTO W/SCOPE: CPT | Performed by: PHYSICIAN ASSISTANT

## 2022-09-16 PROCEDURE — 99223 1ST HOSP IP/OBS HIGH 75: CPT | Performed by: INTERNAL MEDICINE

## 2022-09-16 PROCEDURE — 83930 ASSAY OF BLOOD OSMOLALITY: CPT | Performed by: PHYSICIAN ASSISTANT

## 2022-09-16 PROCEDURE — 93010 ELECTROCARDIOGRAM REPORT: CPT | Performed by: INTERNAL MEDICINE

## 2022-09-16 PROCEDURE — 82746 ASSAY OF FOLIC ACID SERUM: CPT | Performed by: PHYSICIAN ASSISTANT

## 2022-09-16 PROCEDURE — 82077 ASSAY SPEC XCP UR&BREATH IA: CPT | Performed by: EMERGENCY MEDICINE

## 2022-09-16 PROCEDURE — 82570 ASSAY OF URINE CREATININE: CPT | Performed by: NURSE PRACTITIONER

## 2022-09-16 PROCEDURE — 85025 COMPLETE CBC W/AUTO DIFF WBC: CPT | Performed by: EMERGENCY MEDICINE

## 2022-09-16 PROCEDURE — 82140 ASSAY OF AMMONIA: CPT | Performed by: PHYSICIAN ASSISTANT

## 2022-09-16 PROCEDURE — 80053 COMPREHEN METABOLIC PANEL: CPT | Performed by: EMERGENCY MEDICINE

## 2022-09-16 PROCEDURE — 36415 COLL VENOUS BLD VENIPUNCTURE: CPT | Performed by: EMERGENCY MEDICINE

## 2022-09-16 PROCEDURE — 99291 CRITICAL CARE FIRST HOUR: CPT | Performed by: INTERNAL MEDICINE

## 2022-09-16 PROCEDURE — 80048 BASIC METABOLIC PNL TOTAL CA: CPT | Performed by: PHYSICIAN ASSISTANT

## 2022-09-16 PROCEDURE — 85610 PROTHROMBIN TIME: CPT | Performed by: EMERGENCY MEDICINE

## 2022-09-16 PROCEDURE — 93005 ELECTROCARDIOGRAM TRACING: CPT

## 2022-09-16 RX ORDER — LANOLIN ALCOHOL/MO/W.PET/CERES
100 CREAM (GRAM) TOPICAL DAILY
Status: DISCONTINUED | OUTPATIENT
Start: 2022-09-16 | End: 2022-09-19 | Stop reason: HOSPADM

## 2022-09-16 RX ORDER — HEPARIN SODIUM 5000 [USP'U]/ML
5000 INJECTION, SOLUTION INTRAVENOUS; SUBCUTANEOUS EVERY 8 HOURS SCHEDULED
Status: DISCONTINUED | OUTPATIENT
Start: 2022-09-16 | End: 2022-09-17

## 2022-09-16 RX ORDER — ANASTROZOLE 1 MG/1
1 TABLET ORAL DAILY
Status: DISCONTINUED | OUTPATIENT
Start: 2022-09-16 | End: 2022-09-19 | Stop reason: HOSPADM

## 2022-09-16 RX ORDER — BENZONATATE 100 MG/1
100 CAPSULE ORAL 3 TIMES DAILY PRN
Status: DISCONTINUED | OUTPATIENT
Start: 2022-09-16 | End: 2022-09-19 | Stop reason: HOSPADM

## 2022-09-16 RX ORDER — SPIRONOLACTONE 25 MG/1
50 TABLET ORAL DAILY
Status: DISCONTINUED | OUTPATIENT
Start: 2022-09-16 | End: 2022-09-17

## 2022-09-16 RX ORDER — ALBUTEROL SULFATE 90 UG/1
2 AEROSOL, METERED RESPIRATORY (INHALATION) EVERY 6 HOURS PRN
Status: DISCONTINUED | OUTPATIENT
Start: 2022-09-16 | End: 2022-09-19 | Stop reason: HOSPADM

## 2022-09-16 RX ORDER — FOLIC ACID 1 MG/1
1 TABLET ORAL DAILY
Status: DISCONTINUED | OUTPATIENT
Start: 2022-09-16 | End: 2022-09-19 | Stop reason: HOSPADM

## 2022-09-16 RX ORDER — FUROSEMIDE 10 MG/ML
40 INJECTION INTRAMUSCULAR; INTRAVENOUS DAILY
Status: DISCONTINUED | OUTPATIENT
Start: 2022-09-16 | End: 2022-09-17

## 2022-09-16 RX ORDER — ALPRAZOLAM 0.5 MG/1
1 TABLET ORAL 2 TIMES DAILY PRN
Status: DISCONTINUED | OUTPATIENT
Start: 2022-09-16 | End: 2022-09-19 | Stop reason: HOSPADM

## 2022-09-16 RX ORDER — FUROSEMIDE 10 MG/ML
40 INJECTION INTRAMUSCULAR; INTRAVENOUS ONCE
Status: DISCONTINUED | OUTPATIENT
Start: 2022-09-16 | End: 2022-09-16

## 2022-09-16 RX ADMIN — Medication 100 MG: at 14:43

## 2022-09-16 RX ADMIN — ANASTROZOLE 1 MG: 1 TABLET, COATED ORAL at 14:55

## 2022-09-16 RX ADMIN — ALPRAZOLAM 1 MG: 0.5 TABLET ORAL at 19:48

## 2022-09-16 RX ADMIN — HEPARIN SODIUM 5000 UNITS: 5000 INJECTION INTRAVENOUS; SUBCUTANEOUS at 14:44

## 2022-09-16 RX ADMIN — SPIRONOLACTONE 50 MG: 25 TABLET ORAL at 16:34

## 2022-09-16 RX ADMIN — BENZONATATE 100 MG: 100 CAPSULE ORAL at 19:48

## 2022-09-16 RX ADMIN — FOLIC ACID 1 MG: 1 TABLET ORAL at 14:43

## 2022-09-16 RX ADMIN — FUROSEMIDE 40 MG: 10 INJECTION, SOLUTION INTRAMUSCULAR; INTRAVENOUS at 16:34

## 2022-09-16 RX ADMIN — HEPARIN SODIUM 5000 UNITS: 5000 INJECTION INTRAVENOUS; SUBCUTANEOUS at 21:25

## 2022-09-16 RX ADMIN — MULTIPLE VITAMINS W/ MINERALS TAB 1 TABLET: TAB ORAL at 14:43

## 2022-09-16 NOTE — ASSESSMENT & PLAN NOTE
· History of alcoholic cirrhosis, follows with [facility] GI outpatient- Dr Alvis Closs  · Recently seen on 8/23/22 and spironolactone increased from 50 mg to 100 mg daily 2/2 increased ascites and hypokalemia  · Hyponatremia likely partially caused by increased dose of spironolactone  · Hold spironolactone for now, will likely restart lasix- will discuss with Nephrology  · Check an ammonia  · INR: 1 74  · Serial abdominal exams to monitor for ascites

## 2022-09-16 NOTE — ASSESSMENT & PLAN NOTE
· Presented to the ED after outpatient labs revealed hyponatremia with Na < 117; asymptomatic  · Na 120 on admission  · Pt reports hx of alcoholic cirrhosis and drinks about 6-7 beers a night   Last drank last evening  · Hyponatremia likely 2/2 beer potomania and increased dose of spironolactone  · Nephrology consulted  · Check UA, urine Na, urine osm, serum osm, urine urea- will likely be skewed 2/2 diuretic use  · BMP q4 hours  · Slow correction of Na with goal to increase Na by 6-8 mmol/L in 24 hours  · Current Na 123 with goal of 124-126 by 1300 today  · 250 ml D5 bolus given overnight 9/17

## 2022-09-16 NOTE — ASSESSMENT & PLAN NOTE
· Hx of COPD, recent PFTs showed mild obstructive pattern  · Not in acute exacerbation  · Continue PRN albuterol

## 2022-09-16 NOTE — ASSESSMENT & PLAN NOTE
· Hx of COPD, recent PFTs showed mild obstructive pattern  · Not in acute exacerbation  · Continue PRN albuterol  · Tessalon added PRN

## 2022-09-16 NOTE — PLAN OF CARE
Problem: Potential for Falls  Goal: Patient will remain free of falls  Description: INTERVENTIONS:  - Educate patient/family on patient safety including physical limitations  - Instruct patient to call for assistance with activity   - Consult OT/PT to assist with strengthening/mobility   - Keep Call bell within reach  - Keep bed low and locked with side rails adjusted as appropriate  - Keep care items and personal belongings within reach  - Initiate and maintain comfort rounds  - Make Fall Risk Sign visible to staff  - Offer Toileting every 2 Hours, in advance of need  - Initiate/Maintain bed alarm  - Apply yellow socks and bracelet for high fall risk patients  - Consider moving patient to room near nurses station  Outcome: Progressing     Problem: PAIN - ADULT  Goal: Verbalizes/displays adequate comfort level or baseline comfort level  Description: Interventions:  - Encourage patient to monitor pain and request assistance  - Assess pain using appropriate pain scale  - Administer analgesics based on type and severity of pain and evaluate response  - Implement non-pharmacological measures as appropriate and evaluate response  - Consider cultural and social influences on pain and pain management  - Notify physician/advanced practitioner if interventions unsuccessful or patient reports new pain  Outcome: Progressing     Problem: INFECTION - ADULT  Goal: Absence or prevention of progression during hospitalization  Description: INTERVENTIONS:  - Assess and monitor for signs and symptoms of infection  - Monitor lab/diagnostic results  - Monitor all insertion sites, i e  indwelling lines, tubes, and drains  - Monitor endotracheal if appropriate and nasal secretions for changes in amount and color  - [place] appropriate cooling/warming therapies per order  - Administer medications as ordered  - Instruct and encourage patient and family to use good hand hygiene technique  - Identify and instruct in appropriate isolation precautions for identified infection/condition  Outcome: Progressing  Goal: Absence of fever/infection during neutropenic period  Description: INTERVENTIONS:  - Monitor WBC    Outcome: Progressing     Problem: SAFETY ADULT  Goal: Patient will remain free of falls  Description: INTERVENTIONS:  - Educate patient/family on patient safety including physical limitations  - Instruct patient to call for assistance with activity   - Consult OT/PT to assist with strengthening/mobility   - Keep Call bell within reach  - Keep bed low and locked with side rails adjusted as appropriate  - Keep care items and personal belongings within reach  - Initiate and maintain comfort rounds  - Make Fall Risk Sign visible to staff  - Offer Toileting every 2 Hours, in advance of need  - Apply yellow socks and bracelet for high fall risk patients  - Consider moving patient to room near nurses station  Outcome: Progressing  Goal: Maintain or return to baseline ADL function  Description: INTERVENTIONS:  -  Assess patient's ability to carry out ADLs; assess patient's baseline for ADL function and identify physical deficits which impact ability to perform ADLs (bathing, care of mouth/teeth, toileting, grooming, dressing, etc )  - Assess/evaluate cause of self-care deficits   - Assess range of motion  - Assess patient's mobility; develop plan if impaired  - Assess patient's need for assistive devices and provide as appropriate  - Encourage maximum independence but intervene and supervise when necessary  - Involve family in performance of ADLs  - Assess for home care needs following discharge   - Consider OT consult to assist with ADL evaluation and planning for discharge  - Provide patient education as appropriate  Outcome: Progressing  Goal: Maintains/Returns to pre admission functional level  Description: INTERVENTIONS:  - Perform BMAT or MOVE assessment daily    - Set and communicate daily mobility goal to care team and patient/family/caregiver  - Collaborate with rehabilitation services on mobility goals if consulted  - Perform Range of Motion 2 times a day  - Reposition patient every 2 hours  - Out of bed for toileting  - Record patient progress and toleration of activity level   Outcome: Progressing     Problem: DISCHARGE PLANNING  Goal: Discharge to home or other facility with appropriate resources  Description: INTERVENTIONS:  - Identify barriers to discharge w/patient and caregiver  - Arrange for needed discharge resources and transportation as appropriate  - Identify discharge learning needs (meds, wound care, etc )  - Arrange for interpretive services to assist at discharge as needed  - Refer to Case Management Department for coordinating discharge planning if the patient needs post-hospital services based on physician/advanced practitioner order or complex needs related to functional status, cognitive ability, or social support system  Outcome: Progressing     Problem: Knowledge Deficit  Goal: Patient/family/caregiver demonstrates understanding of disease process, treatment plan, medications, and discharge instructions  Description: Complete learning assessment and assess knowledge base  Interventions:  - Provide teaching at level of understanding  - Provide teaching via preferred learning methods  Outcome: Progressing     Pt educated on treatment for hyponatremia  Pt educated on the purpose of a fluid restriction and that it would be dangerous for her sodium levels to increase too quickly  Pt verbalized understanding of all education  Pt chooses to drink more fluids than what is ordered and eating Rhetta Camera despite understanding of education

## 2022-09-16 NOTE — H&P
[place]  H&P- Modi Liter 1960, 60 y o  female MRN: [id number]  Unit/Bed#: -01 Encounter: [id number]  Primary Care Provider: Oh Arndt PA-C   Date and time admitted to hospital: 9/16/2022 10:06 AM    * Hyponatremia  Assessment & Plan  · Presented to the ED after outpatient labs revealed hyponatremia with Na < 117; asymptomatic  · Na 120 on admission  · Pt reports hx of alcoholic cirrhosis and drinks about 6-7 beers a night   Last drank last evening  · Hyponatremia likely 2/2 beer potomania and increased dose of spironolactone  · Nephrology consulted  · Check UA, urine Na, urine osm, serum osm, urine urea- will likely be skewed 2/2 diuretic use  · Will likely hold spironolactone and continue lasix- will discuss with Nephro  · Bmp q 4 hours  · Slow correction of Na with goal to increase Na by 6-8 mmol/L in 24 hours    Malignant neoplasm of lower-outer quadrant of right breast of female, estrogen receptor positive (HCC)  Assessment & Plan  · Current diagnosis of breast cancer, follows with Heme/Onc  · Currently on anastrozole until she is a surgical candidate    Anemia  Assessment & Plan  · Chronic anemia in the setting of cirrhosis  · Hgb 10 8  · Check Q03 and Folic acid level with macrocytic anemia  · Continue to trend hgb and maintain hgb > 6    Thrombocytopenia (HCC)  Assessment & Plan  · Chronic thrombocytopenia 2/2 cirrhosis, currently 81k  · Continue to trend    Alcoholic cirrhosis of liver with ascites ([facility] [address] )  Assessment & Plan  · History of alcoholic cirrhosis, follows with [facility] GI outpatient- Dr Mon More  · Recently seen on 8/23/22 and spironolactone increased from 50 mg to 100 mg daily 2/2 increased ascites and hypokalemia  · Hyponatremia likely partially caused by increased dose of spironolactone  · Hold spironolactone for now, will likely restart lasix- will discuss with Nephrology  · Check an ammonia  · INR: 1 74  · Serial abdominal exams to monitor for ascites    Alcohol abuse  Assessment & Plan  · History of alcohol abuse with alcoholic cirrhosis  Pt states she has never gone through alcohol withdrawal or had a seizure  However, pt does admit to developing tremors if she doesn't drink  · Current drinks a 6-7 cans of beer a day  · Last drink on 9/15 around 10pm; Ethanol 34 on admission  · CIWA ordered  · PRN IV valium if developing symptoms of withdrawal  · Start thiamine, folic acid, and multivitamin    COPD (chronic obstructive pulmonary disease) (HCC)  Assessment & Plan  · Hx of COPD, recent PFTs showed mild obstructive pattern  · Not in acute exacerbation  · Continue PRN albuterol    Nicotine dependence  Assessment & Plan  · Will offer NRT if patient requests  · Encourage cessation    Elevated LFTs  Assessment & Plan  · In the setting of alcoholic cirrhosis  · AST: 96, ALT: 52, ALP: 172, T  Bili: 2 3  · Continue to trend    Anxiety  Assessment & Plan  · Pt takes Xanax 2mg q hs for many years  · Will continue for now but if patient requires IV valium 2/2 alcohol withdrawal, will likely discontinue PO Xanax    -------------------------------------------------------------------------------------------------------------  Chief Complaint: "My sodium was low and my doctor told me to come to the hospital "    History of Present Illness     Anatoliy Perez is a 60 y o  female with PMHx significant for alcoholic cirrhosis, COPD, breast cancer, and nicotine abuse who presented to the ED today at the recommendation of her doctor 2/2 hyponatremia on outpatient labs  Pt states that she was recently seen by GI and her spironolactone was increased from 50 mg daily to 100 mg daily 2/2 increased ascites and hypokalemia  She had repeat labs and her Na was found to be 117- pt reports no associated symptoms  On arrival to the ED her Na was 120  Pt admits to drinking 6-7 cans of Devin light beer every night- last drink was around 10pm last night   She states that she has never had a seizure or gone through alcohol withdrawal, but she states that she will get tremulous if she doesn't drink for a prolonged period of time  She will be admitted SD1 under CC service for management of hyponatremia and likely alcohol withdrawal      History obtained from chart review and the patient   -------------------------------------------------------------------------------------------------------------  Dispo: Admit to Stepdown Level 1    Code Status: Level 1 - Full Code  --------------------------------------------------------------------------------------------------------------  Review of Systems   Constitutional: Negative for activity change, appetite change and fever  HENT: Negative for congestion  Respiratory: Negative for cough, shortness of breath and wheezing  Cardiovascular: Negative for chest pain, palpitations and leg swelling  Gastrointestinal: Positive for abdominal distention  Negative for abdominal pain, constipation, diarrhea, nausea and vomiting  Genitourinary: Negative for dysuria  Neurological: Negative for tremors, weakness, light-headedness and headaches  All other systems reviewed and are negative  A 12-point, complete review of systems was reviewed and negative except as stated above     Physical Exam  Vitals reviewed  Constitutional:       General: She is not in acute distress  Appearance: She is not ill-appearing, toxic-appearing or diaphoretic  HENT:      Head: Normocephalic and atraumatic  Nose: Nose normal       Mouth/Throat:      Mouth: Mucous membranes are moist    Eyes:      Extraocular Movements: Extraocular movements intact  Pupils: Pupils are equal, round, and reactive to light  Cardiovascular:      Rate and Rhythm: Normal rate and regular rhythm  Heart sounds: No murmur heard  No friction rub  No gallop  Pulmonary:      Effort: Pulmonary effort is normal       Breath sounds: Normal breath sounds   No wheezing, rhonchi or rales  Abdominal:      General: There is distension  Palpations: Abdomen is soft  Tenderness: There is no abdominal tenderness  There is no guarding or rebound  Musculoskeletal:      Right lower leg: No edema  Left lower leg: No edema  Skin:     General: Skin is warm and dry  Capillary Refill: Capillary refill takes less than 2 seconds  Neurological:      General: No focal deficit present  Mental Status: She is alert and oriented to person, place, and time  Cranial Nerves: No cranial nerve deficit  Psychiatric:         Mood and Affect: Mood is anxious        --------------------------------------------------------------------------------------------------------------  Vitals:   Vitals:    09/16/22 1330 09/16/22 1340 09/16/22 1348 09/16/22 1429   BP: 123/57   120/64   BP Location: Right arm   Right arm   Pulse: 87   83   Resp: 20   (!) 43   Temp:    97 9 °F (36 6 °C)   TempSrc:    Oral   SpO2: 98%  99%    Weight:  67 kg (147 lb 11 3 oz)     Height:         Temp  Min: 97 8 °F (36 6 °C)  Max: 97 9 °F (36 6 °C)     Height: 5' 3" (160 cm)  Body mass index is 26 17 kg/m²      Laboratory and Diagnostics:  Results from last 7 days   Lab Units 09/16/22  1045 09/15/22  1341 09/15/22  1332   WBC Thousand/uL 5 57  --   --    WHITE BLOOD CELL COUNT  x10E3/uL  --  5 2 5 4   HEMOGLOBIN g/dL 10 8*  --   --    HEMOGLOBIN  g/dL  --  11 8 11 0*   HEMATOCRIT % 29 6*  --   --    HEMATOCRIT  %  --  30 0* 29 4*   PLATELETS Thousands/uL 81*  --   --    PLATELETS  Q67P9/MORRIS  --  86* 75*   NEUTROS PCT % 68  --   --    NEUTROS PCT  %  --  73 69   MONOS PCT % 15*  --   --    MONOS PCT  %  --  15 13     Results from last 7 days   Lab Units 09/16/22  1045 09/15/22  1341 09/15/22  1332   SODIUM mmol/L 120* 117* 117*   POTASSIUM mmol/L 3 6 3 9 4 0   CHLORIDE mmol/L 87* 82* 80*   CO2 mmol/L 29 26 24   ANION GAP mmol/L 4  --   --    BUN mg/dL 7 6* 5*   CREATININE mg/dL 0 90 0 68 0 71 CALCIUM mg/dL 8 5  --   --    GLUCOSE RANDOM mg/dL 132 98 96   ALT U/L 52 42* 41*   AST U/L 96* 101* 98*   ALK PHOS U/L 172*  --   --    ALBUMIN g/dL 1 9* 2 6* 2 4*   TOTAL BILIRUBIN mg/dL 2 30* 2 7* 2 5*          Results from last 7 days   Lab Units 09/16/22  1045   INR  1 74*   PTT seconds 37              ABG:    VBG:          Micro:        EKG: NSR  Imaging: I have personally reviewed pertinent reports  Historical Information   Past Medical History:   Diagnosis Date    Alcoholic fatty liver     Anxiety     Asthma     COPD (chronic obstructive pulmonary disease) (HCC)     Elevated liver function tests     GERD (gastroesophageal reflux disease)     GERD without esophagitis     Hyperlipidemia     Hypertension     IBS (irritable bowel syndrome)     Insomnia     Insomnia     Liver disease     Nervousness     Nicotine dependence     Other specified urinary incontinence     RLS (restless legs syndrome)     Wears glasses      Past Surgical History:   Procedure Laterality Date    BACK SURGERY      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    BREAST EXCISIONAL BIOPSY Right 11/01/2004    benign    CATARACT EXTRACTION, BILATERAL  08/01/2018    COLONOSCOPY N/A 5/8/2019    Procedure: COLONOSCOPY;  Surgeon: Ricky Guillory MD;  Location: [facility] GI LAB; Service: Gastroenterology    EGD AND COLONOSCOPY N/A 3/19/2018    Procedure: EGD AND COLONOSCOPY;  Surgeon: Ricky Guillory MD;  Location: [facility] GI [facility]; Service: Gastroenterology    ESOPHAGOGASTRODUODENOSCOPY N/A 5/8/2019    Procedure: ESOPHAGOGASTRODUODENOSCOPY (EGD); Surgeon: Ricky Guillory MD;  Location: [facility] GI [facility];   Service: Gastroenterology    IR PARACENTESIS  11/27/2020    IR PARACENTESIS  12/8/2020    KNEE SURGERY      KNEE SURGERY      LUMBAR LAMINECTOMY  04/1999    LYMPH NODE BIOPSY      MAMMO STEREOTACTIC BREAST BIOPSY RIGHT (ALL INC) Right 5/21/2021    MAMMO STEREOTACTIC BREAST BIOPSY RIGHT (ALL INC) EACH ADD Right 5/21/2021  TOTAL ABDOMINAL HYSTERECTOMY  02/05/2008    TUBAL LIGATION      TUBAL LIGATION  1989    UPPER GASTROINTESTINAL ENDOSCOPY       Social History   Social History     Substance and Sexual Activity   Alcohol Use Not Currently    Alcohol/week: 24 0 standard drinks    Types: 24 Cans of beer per week    Comment: not lately     Social History     Substance and Sexual Activity   Drug Use No     Social History     Tobacco Use   Smoking Status Current Every Day Smoker    Packs/day: 1 00    Years: 45 00    Pack years: 45 00    Types: Cigarettes    Start date: 1978   Smokeless Tobacco Never Used       Family History:   Family History   Problem Relation Age of Onset    Breast cancer Mother 28    No Known Problems Father     No Known Problems Sister     No Known Problems Brother     No Known Problems Son     No Known Problems Maternal Grandmother     No Known Problems Maternal Grandfather     No Known Problems Paternal Grandmother     No Known Problems Paternal Grandfather     No Known Problems Maternal Aunt     No Known Problems Maternal Aunt     No Known Problems Paternal Aunt     Substance Abuse Neg Hx     Mental illness Neg Hx      I have reviewed this patient's family history and commented on sigificant items within the HPI      Medications:  Current Facility-Administered Medications   Medication Dose Route Frequency    albuterol (PROVENTIL HFA,VENTOLIN HFA) inhaler 2 puff  2 puff Inhalation Q6H PRN    anastrozole (ARIMIDEX) tablet 1 mg  1 mg Oral Daily    folic acid (FOLVITE) tablet 1 mg  1 mg Oral Daily    heparin (porcine) subcutaneous injection 5,000 Units  5,000 Units Subcutaneous Q8H [facility]    multivitamin-minerals (CENTRUM) tablet 1 tablet  1 tablet Oral Daily    thiamine tablet 100 mg  100 mg Oral Daily     Home medications:  Prior to Admission Medications   Prescriptions Last Dose Informant Patient Reported? Taking?    ALPRAZolam (XANAX) 1 mg tablet   No No   Sig: TAKE ONE TABLET BY MOUTH TWICE A DAY AS NEEDED FOR ANXIETY   albuterol (2 5 mg/3 mL) 0 083 % nebulizer solution  Self No No   Sig: Take 3 mL (2 5 mg total) by nebulization every 6 (six) hours as needed for wheezing or shortness of breath   albuterol (Proventil HFA) 90 mcg/act inhaler  Self No No   Sig: Inhale 2 puffs every 6 (six) hours as needed for wheezing or shortness of breath   anastrozole (ARIMIDEX) 1 mg tablet  Self No No   Sig: Take 1 tablet (1 mg total) by mouth daily   benzonatate (TESSALON PERLES) 100 mg capsule  Self No No   Sig: TAKE ONE CAPSULE BY MOUTH THREE TIMES A DAY AS NEEDED FOR COUGH   furosemide (LASIX) 40 mg tablet   No No   Sig: Take 1 tablet (40 mg total) by mouth daily   mupirocin (BACTROBAN) 2 % ointment   No No   Sig: Apply topically 3 (three) times a day   Patient not taking: Reported on 8/26/2022   nitrofurantoin (MACROBID) 100 mg capsule  Self No No   Sig: Take 1 capsule (100 mg total) by mouth 2 (two) times a day   predniSONE 10 mg tablet  Self No No   Sig: Take 4 tabs on day 1,2 with food, 3 tabs on day 3,4 with food, 2 tabs on day 5,6 with food, 1 tab on day 7,8 with food   Patient not taking: Reported on 8/26/2022   spironolactone (ALDACTONE) 100 mg tablet   No No   Sig: Take 1 tablet (100 mg total) by mouth daily      Facility-Administered Medications: None     Allergies: Allergies   Allergen Reactions    Sulfa Antibiotics Shortness Of Breath    Ace Inhibitors Cough and Other (See Comments)     coughing       ------------------------------------------------------------------------------------------------------------  Advance Directive and Living Will:      Power of :    POLST:    ------------------------------------------------------------------------------------------------------------  Anticipated Length of Stay is > 2 midnights    Care Time Delivered:   No Critical Care time spent       Marvel Reynoso PA-C        Portions of the record may have been created with voice recognition software  Occasional wrong word or "sound a like" substitutions may have occurred due to the inherent limitations of voice recognition software    Read the chart carefully and recognize, using context, where substitutions have occurred

## 2022-09-16 NOTE — ASSESSMENT & PLAN NOTE
· Current diagnosis of breast cancer, follows with Heme/Onc  · Pt reports she is s/p breast resection at [facility]  · Continue home on anastrozole

## 2022-09-16 NOTE — TELEPHONE ENCOUNTER
Received a critical lab of Na 117  Called both numbers listed  Did not answer  Left sa message to give a call back to Dr Ty Steele [facility] to discuss lab results

## 2022-09-16 NOTE — ASSESSMENT & PLAN NOTE
· Chronic anemia in the setting of cirrhosis  · Hgb 10 8  · Check K46 and Folic acid level with macrocytic anemia  · Continue to trend hgb and maintain hgb > 6  · Continue folic acid

## 2022-09-16 NOTE — TELEPHONE ENCOUNTER
Lab Result: Critical Lab: Sodium 117 verified by repeat analysis   Date/Time Drawn: 09-15-22 at 1332   Ordering Provider: Dr René Espinoza Name: [phone]/FTT [id number]/[id number] from [facility]       The following critical/stat result was read back to the lab as stated above and [employer] to the on-call provider  The provider confirmed receipt of the message

## 2022-09-16 NOTE — ASSESSMENT & PLAN NOTE
· Chronic anemia in the setting of cirrhosis  · Hgb 10 8  · Check H73 and Folic acid level with macrocytic anemia  · Continue to trend hgb and maintain hgb > 6

## 2022-09-16 NOTE — ED PROVIDER NOTES
History  Chief Complaint   Patient presents with    Abnormal Lab     Patient presents to the ED with c/o low sodium levels from bloodwork taken yesterday       This is a 58-year-old female who had routine blood work done yesterday  Results showed hyponatremia and she was referred here for further evaluation  Does have a history of alcohol abuse and cirrhosis did have alcohol yesterday denies any nausea vomiting fevers or chills  Did have some recent diarrhea  Reading was reported at 117 for the sodium      History provided by:  Patient  Medical Problem  Location:   sodium  Quality:   low  Severity:  Severe  Onset quality:  Unable to specify  Chronicity:  New  Context:   low sodium on blood work done yesterday  Worsened by:   alcohol abuse  Associated symptoms: diarrhea and fatigue        Prior to Admission Medications   Prescriptions Last Dose Informant Patient Reported? Taking?    ALPRAZolam (XANAX) 1 mg tablet   No No   Sig: TAKE ONE TABLET BY MOUTH TWICE A DAY AS NEEDED FOR ANXIETY   albuterol (2 5 mg/3 mL) 0 083 % nebulizer solution  Self No No   Sig: Take 3 mL (2 5 mg total) by nebulization every 6 (six) hours as needed for wheezing or shortness of breath   albuterol (Proventil HFA) 90 mcg/act inhaler  Self No No   Sig: Inhale 2 puffs every 6 (six) hours as needed for wheezing or shortness of breath   anastrozole (ARIMIDEX) 1 mg tablet  Self No No   Sig: Take 1 tablet (1 mg total) by mouth daily   benzonatate (TESSALON PERLES) 100 mg capsule  Self No No   Sig: TAKE ONE CAPSULE BY MOUTH THREE TIMES A DAY AS NEEDED FOR COUGH   furosemide (LASIX) 40 mg tablet   No No   Sig: Take 1 tablet (40 mg total) by mouth daily   mupirocin (BACTROBAN) 2 % ointment   No No   Sig: Apply topically 3 (three) times a day   Patient not taking: Reported on 8/26/2022   nitrofurantoin (MACROBID) 100 mg capsule  Self No No   Sig: Take 1 capsule (100 mg total) by mouth 2 (two) times a day   predniSONE 10 mg tablet  Self No No   Sig: Take 4 tabs on day 1,2 with food, 3 tabs on day 3,4 with food, 2 tabs on day 5,6 with food, 1 tab on day 7,8 with food   Patient not taking: Reported on 8/26/2022   spironolactone (ALDACTONE) 100 mg tablet   No No   Sig: Take 1 tablet (100 mg total) by mouth daily      Facility-Administered Medications: None       Past Medical History:   Diagnosis Date    Alcoholic fatty liver     Anxiety     Asthma     COPD (chronic obstructive pulmonary disease) (HCC)     Elevated liver function tests     GERD (gastroesophageal reflux disease)     GERD without esophagitis     Hyperlipidemia     Hypertension     IBS (irritable bowel syndrome)     Insomnia     Insomnia     Liver disease     Nervousness     Nicotine dependence     Other specified urinary incontinence     RLS (restless legs syndrome)     Wears glasses        Past Surgical History:   Procedure Laterality Date    BACK SURGERY      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    BREAST EXCISIONAL BIOPSY Right 11/01/2004    benign    CATARACT EXTRACTION, BILATERAL  08/01/2018    COLONOSCOPY N/A 5/8/2019    Procedure: COLONOSCOPY;  Surgeon: Mariusz Kent MD;  Location: [facility]; Service: Gastroenterology    EGD AND COLONOSCOPY N/A 3/19/2018    Procedure: EGD AND COLONOSCOPY;  Surgeon: Mariusz Kent MD;  Location: [facility]; Service: Gastroenterology    ESOPHAGOGASTRODUODENOSCOPY N/A 5/8/2019    Procedure: ESOPHAGOGASTRODUODENOSCOPY (EGD); Surgeon: Mariusz Kent MD;  Location: [facility];   Service: Gastroenterology    IR PARACENTESIS  11/27/2020    IR PARACENTESIS  12/8/2020    KNEE SURGERY      KNEE SURGERY      LUMBAR LAMINECTOMY  04/1999    LYMPH NODE BIOPSY      MAMMO STEREOTACTIC BREAST BIOPSY RIGHT (ALL INC) Right 5/21/2021    MAMMO STEREOTACTIC BREAST BIOPSY RIGHT (ALL INC) EACH ADD Right 5/21/2021    TOTAL ABDOMINAL HYSTERECTOMY  02/05/2008    TUBAL LIGATION      TUBAL LIGATION  1989    UPPER GASTROINTESTINAL ENDOSCOPY         Family History   Problem Relation Age of Onset    Breast cancer Mother 28    No Known Problems Father     No Known Problems Sister     No Known Problems Brother     No Known Problems Son     No Known Problems Maternal Grandmother     No Known Problems Maternal Grandfather     No Known Problems Paternal Grandmother     No Known Problems Paternal Grandfather     No Known Problems Maternal Aunt     No Known Problems Maternal Aunt     No Known Problems Paternal Aunt     Substance Abuse Neg Hx     Mental illness Neg Hx      I have reviewed and agree with the history as documented  E-Cigarette/Vaping    E-Cigarette Use Never User      E-Cigarette/Vaping Substances     Social History     Tobacco Use    Smoking status: Current Every Day Smoker     Packs/day: 1 00     Years: 45 00     Pack years: 45 00     Types: Cigarettes     Start date: 1978    Smokeless tobacco: Never Used   Vaping Use    Vaping Use: Never used   Substance Use Topics    Alcohol use: Not Currently     Alcohol/week: 24 0 standard drinks     Types: 24 Cans of beer per week     Comment: not lately    Drug use: No       Review of Systems   Constitutional: Positive for fatigue  Gastrointestinal: Positive for diarrhea  All other systems reviewed and are negative  Physical Exam  Physical Exam  Vitals and nursing note reviewed  Constitutional:       General: She is not in acute distress  Appearance: She is not ill-appearing, toxic-appearing or diaphoretic  HENT:      Head: Normocephalic and atraumatic  Right Ear: Tympanic membrane, ear canal and external ear normal       Left Ear: Tympanic membrane, ear canal and external ear normal       Nose: Nose normal       Mouth/Throat:      Mouth: Mucous membranes are moist    Eyes:      General: No scleral icterus  Right eye: No discharge  Left eye: No discharge  Extraocular Movements: Extraocular movements intact        Pupils: Pupils are equal, round, and reactive to light  Cardiovascular:      Rate and Rhythm: Regular rhythm  Tachycardia present  Pulses: Normal pulses  Heart sounds: No murmur heard  No friction rub  No gallop  Pulmonary:      Effort: Pulmonary effort is normal  No respiratory distress  Breath sounds: No stridor  No wheezing, rhonchi or rales  Abdominal:      General: There is distension  Tenderness: There is no abdominal tenderness  There is no guarding or rebound  Musculoskeletal:         General: No swelling, tenderness, deformity or signs of injury  Normal range of motion  Cervical back: Normal range of motion and neck supple  No rigidity or tenderness  Right lower leg: No edema  Left lower leg: No edema  Skin:     General: Skin is warm and dry  Coloration: Skin is not jaundiced  Findings: No bruising, erythema or rash  Neurological:      General: No focal deficit present  Mental Status: She is alert and oriented to person, place, and time  Sensory: No sensory deficit        Coordination: Coordination normal    Psychiatric:         Mood and Affect: Mood normal          Behavior: Behavior normal          Vital Signs  ED Triage Vitals   Temperature Pulse Respirations Blood Pressure SpO2   09/16/22 1009 09/16/22 1009 09/16/22 1009 09/16/22 1009 09/16/22 1009   97 8 °F (36 6 °C) 103 18 136/62 96 %      Temp Source Heart Rate Source Patient Position - Orthostatic VS BP Location FiO2 (%)   09/16/22 1009 09/16/22 1009 09/16/22 1119 09/16/22 1119 --   Temporal Monitor Lying Right arm       Pain Score       09/16/22 1009       No Pain           Vitals:    09/16/22 1009 09/16/22 1119 09/16/22 1230   BP: 136/62 108/56 109/57   Pulse: 103 77 77   Patient Position - Orthostatic VS:  Lying Lying         Visual Acuity      ED Medications  Medications - No data to display    Diagnostic Studies  Results Reviewed     Procedure Component Value Units Date/Time    Ethanol [[id number]]  (Abnormal) Collected: 09/16/22 1045    Lab Status: Final result Specimen: Blood from Arm, Left Updated: 09/16/22 1135     Ethanol Lvl 34 mg/dL     Protime-INR [[id number]]  (Abnormal) Collected: 09/16/22 1045    Lab Status: Final result Specimen: Blood from Arm, Left Updated: 09/16/22 1129     Protime 21 3 seconds      INR 1 74    APTT [[id number]]  (Normal) Collected: 09/16/22 1045    Lab Status: Final result Specimen: Blood from Arm, Left Updated: 09/16/22 1129     PTT 37 seconds     Comprehensive metabolic panel [[id number]]  (Abnormal) Collected: 09/16/22 1045    Lab Status: Final result Specimen: Blood from Arm, Left Updated: 09/16/22 1127     Sodium 120 mmol/L      Potassium 3 6 mmol/L      Chloride 87 mmol/L      CO2 29 mmol/L      ANION GAP 4 mmol/L      BUN 7 mg/dL      Creatinine 0 90 mg/dL      Glucose 132 mg/dL      Calcium 8 5 mg/dL      Corrected Calcium 10 2 mg/dL      AST 96 U/L      ALT 52 U/L      Alkaline Phosphatase 172 U/L      Total Protein 8 5 g/dL      Albumin 1 9 g/dL      Total Bilirubin 2 30 mg/dL      eGFR 68 ml/min/1 73sq m     Narrative:      [place] guidelines for Chronic Kidney Disease (CKD):     Stage 1 with normal or high GFR (GFR > 90 mL/min/1 73 square meters)    Stage 2 Mild CKD (GFR = 60-89 mL/min/1 73 square meters)    Stage 3A Moderate CKD (GFR = 45-59 mL/min/1 73 square meters)    Stage 3B Moderate CKD (GFR = 30-44 mL/min/1 73 square meters)    Stage 4 Severe CKD (GFR = 15-29 mL/min/1 73 square meters)    Stage 5 End Stage CKD (GFR <15 mL/min/1 73 square meters)  Note: GFR calculation is accurate only with a steady state creatinine    TSH [[id number]]  (Normal) Collected: 09/16/22 1045    Lab Status: Final result Specimen: Blood from Arm, Left Updated: 09/16/22 1127     TSH 3RD GENERATON 3 903 uIU/mL     Narrative:      Patients undergoing fluorescein dye angiography may retain small amounts of fluorescein in the body for 48-72 hours post procedure  Samples containing fluorescein can produce falsely depressed TSH values  If the patient had this procedure,a specimen should be resubmitted post fluorescein clearance        CBC and differential [[id number]]  (Abnormal) Collected: 09/16/22 1045    Lab Status: Final result Specimen: Blood from Arm, Left Updated: 09/16/22 1122     WBC 5 57 Thousand/uL      RBC 2 75 Million/uL      Hemoglobin 10 8 g/dL      Hematocrit 29 6 %       fL      MCH 39 3 pg      MCHC 36 5 g/dL      RDW 14 3 %      MPV 9 7 fL      Platelets 81 Thousands/uL      nRBC 0 /100 WBCs      Neutrophils Relative 68 %      Immat GRANS % 1 %      Lymphocytes Relative 14 %      Monocytes Relative 15 %      Eosinophils Relative 1 %      Basophils Relative 1 %      Neutrophils Absolute 3 75 Thousands/µL      Immature Grans Absolute 0 04 Thousand/uL      Lymphocytes Absolute 0 80 Thousands/µL      Monocytes Absolute 0 86 Thousand/µL      Eosinophils Absolute 0 05 Thousand/µL      Basophils Absolute 0 07 Thousands/µL                  No orders to display              Procedures  ECG 12 Lead Documentation Only    Date/Time: 9/16/2022 10:37 AM  Performed by: Jennifer Bower DO  Authorized by: Jennifer Bower DO     ECG reviewed by me, the ED Provider: yes    Patient location:  ED  Rate:     ECG rate:  83  Rhythm:     Rhythm: sinus rhythm    Conduction:     Conduction: normal    ST segments:     ST segments:  Non-specific             ED Course                                             MDM  Number of Diagnoses or Management Options  Diagnosis management comments:  Hyponatremia will need to recheck labs today may be secondary to alcohol  abuse       Amount and/or Complexity of Data Reviewed  Clinical lab tests: ordered        Disposition  Final diagnoses:   Hyponatremia     Time reflects when diagnosis was documented in both MDM as applicable and the Disposition within this note     Time User Action Codes Description Comment    9/16/2022 1:08 PM Naye Cordero Add [E87 1] Hyponatremia       ED Disposition     ED Disposition   Admit    Condition   Stable    Date/Time   Fri Sep 16, 2022  1:08 PM    Comment   Case was discussed with **Dr Remington Bunch* and the patient's admission status was agreed to be Admission Status: inpatient status to the service of Dr Remington Bunch   Follow-up Information    None         Patient's Medications   Discharge Prescriptions    No medications on file       No discharge procedures on file      PDMP Review       Value Time User    PDMP Reviewed  Yes 8/26/2022 11:23 AM JOSÉ Levy          ED Provider  Electronically Signed by           Merry Fischer DO  09/16/22 2617

## 2022-09-16 NOTE — ASSESSMENT & PLAN NOTE
· History of alcoholic cirrhosis, follows with [facility] GI outpatient- Dr Gary Mckinney  · Recently seen on 8/23/22 and spironolactone increased from 50 mg to 100 mg daily 2/2 increased ascites and hypokalemia  · Hyponatremia likely partially caused by increased dose of spironolactone  · Spironolactone decreased to 50 mg daily this admission, continue  · Continue lasix   · 9/16 Ammonia <10  · INR: 1 74  · Serial abdominal exams to monitor for ascites  · Soft blood pressure overnight 9/17; 5% albumin 500 ml given

## 2022-09-16 NOTE — ASSESSMENT & PLAN NOTE
· Current diagnosis of breast cancer, follows with Heme/Onc  · Currently on anastrozole until she is a surgical candidate

## 2022-09-16 NOTE — ASSESSMENT & PLAN NOTE
· History of alcohol abuse with alcoholic cirrhosis  Pt states she has never gone through alcohol withdrawal or had a seizure   However, pt does admit to developing tremors if she doesn't drink  · Current drinks a 6-7 cans of beer a day  · Last drink on 9/15 around 10pm; Ethanol 34 on admission  · CIWA ordered  · PRN IV valium if developing symptoms of withdrawal  · Continue thiamine, folic acid, and multivitamin

## 2022-09-16 NOTE — ASSESSMENT & PLAN NOTE
· History of alcohol abuse with alcoholic cirrhosis  Pt states she has never gone through alcohol withdrawal or had a seizure   However, pt does admit to developing tremors if she doesn't drink  · Current drinks a 6-7 cans of beer a day  · Last drink on 9/15 around 10pm; Ethanol 34 on admission  · CIWA ordered  · PRN IV valium if developing symptoms of withdrawal

## 2022-09-16 NOTE — TELEPHONE ENCOUNTER
Spoke with patients spouse, reviewed critical Na level and the need to proceed to the ED as soon as possible for evaluation  Reviewed the importance of evaluation today  He was agreeable to this and stated he will notify Sarah Guadarrama  Pt will report to [id number]

## 2022-09-16 NOTE — ASSESSMENT & PLAN NOTE
· Presented to the ED after outpatient labs revealed hyponatremia with Na < 117; asymptomatic  · Na 120 on admission  · Pt reports hx of alcoholic cirrhosis and drinks about 6-7 beers a night   Last drank last evening  · Hyponatremia likely 2/2 beer potomania and increased dose of spironolactone  · Nephrology consulted  · Check UA, urine Na, urine osm, serum osm, urine urea- will likely be skewed 2/2 diuretic use  · Will likely hold spironolactone and continue lasix- will discuss with Nephro  · Bmp q 4 hours  · Slow correction of Na with goal to increase Na by 6-8 mmol/L in 24 hours

## 2022-09-16 NOTE — ASSESSMENT & PLAN NOTE
· In the setting of alcoholic cirrhosis  · AST: 96, ALT: 52, ALP: 172, T   Bili: 2 3  · Continue to trend

## 2022-09-16 NOTE — ASSESSMENT & PLAN NOTE
· History of alcoholic cirrhosis, follows with [facility]- Dr Hamilton Hidden  · Recently seen on 8/23/22 and spironolactone increased from 50 mg to 100 mg daily 2/2 increased ascites and hypokalemia  · Bedside u/s completed--sm to moderate ascites will attempt to manage with diuretics first as least invasive

## 2022-09-16 NOTE — ASSESSMENT & PLAN NOTE
· Pt takes Xanax 2mg q hs for many years  · Will continue for now but if patient requires IV valium 2/2 alcohol withdrawal, will likely discontinue PO Xanax

## 2022-09-16 NOTE — CONSULTS
Consultation - Nephrology   Rai Salazar 60 y o  female MRN: [id number]  Unit/Bed#: -01 Encounter: [id number]      ASSESSMENT & PLAN    69-year-old female with a past medical history of alcoholic cirrhosis, actively drinking, breast cancer, who was found to be hyponatremic    1  Hyponatremia-multifactorial likely SIADH in the setting of active malignancy, beer potomania in the setting of cirrhosis  o Serum sodium on presentation was 120 outpatient labs showed a sodium of 117  o Patient was drinking upwards of 6 to 8 beers daily  o Baseline serum sodium has been above 130 in August was 131  o Clinically with signs of volume overload, tense abdomen some lower extremity edema, oral mucosa is moist, blood pressure is not low  o Checking urine sodium, urine osmolality, serum osmolality, can check a uric acid,  o Would start furosemide 40 mg IV daily to b i d , continue spironolactone  o Check BMPs every 6 hours with a target sodium of 124-126 his she is at high risk for CPM  o Fluid restriction 1 2 L and increase solute intake  o Therapeutic paracentesis can be done as well     2  Breast CA  o She does follow with Oncology currently is not a surgical candidate on Anostrozole  o Some mild hyponatremia in the past but overall sodiums have been above 130    3  Decompensated alcohol cirrhosis with likely ascites  o Her abdomen is somewhat tense no documented imaging has had therapeutic paracenteses in the past does not get them routinely  o Does follow with gastroenterology  o Recent increase in her spironolactone  o Continue to monitor  o No signs of hepatorenal syndrome  o Will attempt diuresis  o Monitor for withdrawal symptoms  o Consider detox unit if she is willing    4   hypercalcemia  o Corrected calcium 10 2 mildly elevated  o Continue to monitor with diuresis    5   Anemia in the setting of cirrhosis and malignancy  o Will trend at this point per primary team    This case was discussed with the ICU team specifically regarding hyponatremia treatment and management     HISTORY OF PRESENT ILLNESS:  Requesting Physician: Bishop Shan MD  Reason for Consult:  Hyponatremia    Louie Burkitt is a 60y o  year old female who was admitted for hyponatremia and abnormal labs  She has a history breast CA, currently awaiting potential surgery and being treated with anistrozole, alcoholic cirrhosis actively drinking 6-8 beers a day  She presents with abnormal labs as an outpatient with a sodium down to 117 on presentation her sodium was 120 she states she has had some confusion but overall has been stable she has not had any seizures she has no chest pain or shortness of breath currently no fevers or chills nausea vomiting diarrhea or constipation    PAST MEDICAL HISTORY:  Past Medical History:   Diagnosis Date    Alcoholic fatty liver     Anxiety     Asthma     COPD (chronic obstructive pulmonary disease) (HCC)     Elevated liver function tests     GERD (gastroesophageal reflux disease)     GERD without esophagitis     Hyperlipidemia     Hypertension     IBS (irritable bowel syndrome)     Insomnia     Insomnia     Liver disease     Nervousness     Nicotine dependence     Other specified urinary incontinence     RLS (restless legs syndrome)     Wears glasses        PAST SURGICAL HISTORY:  Past Surgical History:   Procedure Laterality Date    BACK SURGERY      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    BREAST EXCISIONAL BIOPSY Right 11/01/2004    benign    CATARACT EXTRACTION, BILATERAL  08/01/2018    COLONOSCOPY N/A 5/8/2019    Procedure: COLONOSCOPY;  Surgeon: Dangelo Bolivar MD;  Location: [facility] GI LAB; Service: Gastroenterology    EGD AND COLONOSCOPY N/A 3/19/2018    Procedure: EGD AND COLONOSCOPY;  Surgeon: Dangelo Bolivar MD;  Location: [facility]; Service: Gastroenterology    ESOPHAGOGASTRODUODENOSCOPY N/A 5/8/2019    Procedure: ESOPHAGOGASTRODUODENOSCOPY (EGD);   Surgeon: Jayy Reddy Lianet Olvera MD;  [facility];   Service: Gastroenterology    IR PARACENTESIS  11/27/2020    IR PARACENTESIS  12/8/2020    KNEE SURGERY      KNEE SURGERY      LUMBAR LAMINECTOMY  04/1999    LYMPH NODE BIOPSY      MAMMO STEREOTACTIC BREAST BIOPSY RIGHT (ALL INC) Right 5/21/2021    MAMMO STEREOTACTIC BREAST BIOPSY RIGHT (ALL INC) EACH ADD Right 5/21/2021    TOTAL ABDOMINAL HYSTERECTOMY  02/05/2008    TUBAL LIGATION      TUBAL LIGATION  1989    UPPER GASTROINTESTINAL ENDOSCOPY         ALLERGIES:  Allergies   Allergen Reactions    Sulfa Antibiotics Shortness Of Breath    Ace Inhibitors Cough and Other (See Comments)     coughing       SOCIAL HISTORY:  Social History     Substance and Sexual Activity   Alcohol Use Not Currently    Alcohol/week: 24 0 standard drinks    Types: 24 Cans of beer per week    Comment: not lately     Social History     Substance and Sexual Activity   Drug Use No     Social History     Tobacco Use   Smoking Status Current Every Day Smoker    Packs/day: 1 00    Years: 45 00    Pack years: 45 00    Types: Cigarettes    Start date: 1978   Smokeless Tobacco Never Used       FAMILY HISTORY:  Family History   Problem Relation Age of Onset    Breast cancer Mother 28    No Known Problems Father     No Known Problems Sister     No Known Problems Brother     No Known Problems Son     No Known Problems Maternal Grandmother     No Known Problems Maternal Grandfather     No Known Problems Paternal Grandmother     No Known Problems Paternal Grandfather     No Known Problems Maternal Aunt     No Known Problems Maternal Aunt     No Known Problems Paternal Aunt     Substance Abuse Neg Hx     Mental illness Neg Hx        MEDICATIONS:    Current Facility-Administered Medications:     albuterol (PROVENTIL HFA,VENTOLIN HFA) inhaler 2 puff, 2 puff, Inhalation, Q6H PRN, Alexa Frazier PA-C    anastrozole (ARIMIDEX) tablet 1 mg, 1 mg, Oral, Daily, Alexa Frazier PA-C, 1 mg at 99/77/88 7062    folic acid (FOLVITE) tablet 1 mg, 1 mg, Oral, Daily, Welford Cue, PA-C, 1 mg at 09/16/22 1443    furosemide (LASIX) injection 40 mg, 40 mg, Intravenous, Once, Welford Cue, PA-C    heparin (porcine) subcutaneous injection 5,000 Units, 5,000 Units, Subcutaneous, Q8H [address], 5,000 Units at 09/16/22 1444 **AND** [CANCELED] Platelet count, , , Once, Welford Cue, PA-C    multivitamin-minerals (CENTRUM) tablet 1 tablet, 1 tablet, Oral, Daily, Welford Cue, PA-C, 1 tablet at 09/16/22 1443    spironolactone (ALDACTONE) tablet 50 mg, 50 mg, Oral, Daily, Welford Cue, PA-C    thiamine tablet 100 mg, 100 mg, Oral, Daily, Welford Cue, PA-C, 100 mg at 09/16/22 1443    REVIEW OF SYSTEMS:    A 12 point review of systems was performed and was negative besides what is mentioned in HPI    OBJECTIVE:    Vitals:    09/16/22 1330 09/16/22 1340 09/16/22 1348 09/16/22 1429   BP: 123/57   120/64   BP Location: Right arm   Right arm   Pulse: 87   83   Resp: 20   (!) 30   Temp:    97 9 °F (36 6 °C)   TempSrc:    Oral   SpO2: 98%  99%    Weight:  67 kg (147 lb 11 3 oz)     Height:            Temp:  [97 8 °F (36 6 °C)-97 9 °F (36 6 °C)] 97 9 °F (36 6 °C)  HR:  [] 83  Resp:  [18-30] 30  BP: (108-136)/(56-64) 120/64  SpO2:  [96 %-99 %] 99 %   Body mass index is 26 17 kg/m²  No intake/output data recorded  No intake/output data recorded      Weight (last 2 days)     Date/Time Weight    09/16/22 1340 67 (147 71)    09/16/22 1009 66 2 (146)           Physical exam:    General: no acute distress, cooperative  Eyes:  Positive scleral icterus  ENT: lips and mucous membranes moist, no exudates, normal external ears  Neck: ROM intact, no JVD  Chest: No respiratory distress, no accessory muscle use  CVS: normal rate, non pericardial friction rub  Abdomen:  Distended abdomen mildly tender  Extremities:  SCDs, trace edema  Skin: no rash  Neuro:  No asterixis or myoclonus  Psych:  Pleasant affect      Invasive Devices:      Lab Results: Results from last 7 days   Lab Units 09/16/22  1045 09/15/22  1341 09/15/22  1339 09/15/22  1332   WBC Thousand/uL 5 57  --   --   --    WHITE BLOOD CELL COUNT  x10E3/uL  --  5 2  --  5 4   HEMOGLOBIN g/dL 10 8*  --   --   --    HEMOGLOBIN  g/dL  --  11 8  --  11 0*   HEMATOCRIT % 29 6*  --   --   --    HEMATOCRIT  %  --  30 0*  --  29 4*   PLATELETS Thousands/uL 81*  --   --   --    PLATELETS  M64I7/MZ  --  86*  --  75*   POTASSIUM mmol/L 3 6 3 9  --  4 0   CHLORIDE mmol/L 87* 82*  --  80*   CO2 mmol/L 29 26  --  24   BUN mg/dL 7 6*  --  5*   CREATININE mg/dL 0 90 0 68  --  0 71   CALCIUM mg/dL 8 5  --   --   --    ALK PHOS U/L 172*  --   --   --    ALT U/L 52 42*  --  41*   AST U/L 96* 101*  --  98*   NITRITE UA   --   --  Negative  --    BLOOD UA   --   --  Negative  --    LEUKOCYTES UA   --   --  Negative  --          Portions of the record may have been created with voice recognition software  Occasional wrong word or "sound a like" substitutions may have occurred due to the inherent limitations of voice recognition software  Read the chart carefully and recognize, using context, where substitutions have occurred  If you have any questions, please contact the dictating provider

## 2022-09-17 LAB
ALBUMIN SERPL BCP-MCNC: 1.8 G/DL (ref 3.5–5)
ALP SERPL-CCNC: 125 U/L (ref 46–116)
ALT SERPL W P-5'-P-CCNC: 38 U/L (ref 12–78)
ANION GAP SERPL CALCULATED.3IONS-SCNC: 3 MMOL/L (ref 4–13)
ANION GAP SERPL CALCULATED.3IONS-SCNC: 5 MMOL/L (ref 4–13)
ANION GAP SERPL CALCULATED.3IONS-SCNC: 6 MMOL/L (ref 4–13)
ANION GAP SERPL CALCULATED.3IONS-SCNC: 6 MMOL/L (ref 4–13)
AST SERPL W P-5'-P-CCNC: 76 U/L (ref 5–45)
BILIRUB DIRECT SERPL-MCNC: 0.95 MG/DL (ref 0–0.2)
BILIRUB SERPL-MCNC: 2.9 MG/DL (ref 0.2–1)
BUN SERPL-MCNC: 8 MG/DL (ref 5–25)
BUN SERPL-MCNC: 8 MG/DL (ref 5–25)
BUN SERPL-MCNC: 9 MG/DL (ref 5–25)
BUN SERPL-MCNC: 9 MG/DL (ref 5–25)
CALCIUM SERPL-MCNC: 8.3 MG/DL (ref 8.3–10.1)
CALCIUM SERPL-MCNC: 8.6 MG/DL (ref 8.3–10.1)
CALCIUM SERPL-MCNC: 8.8 MG/DL (ref 8.3–10.1)
CALCIUM SERPL-MCNC: 8.9 MG/DL (ref 8.3–10.1)
CHLORIDE SERPL-SCNC: 89 MMOL/L (ref 96–108)
CHLORIDE SERPL-SCNC: 90 MMOL/L (ref 96–108)
CHLORIDE SERPL-SCNC: 93 MMOL/L (ref 96–108)
CHLORIDE SERPL-SCNC: 95 MMOL/L (ref 96–108)
CO2 SERPL-SCNC: 27 MMOL/L (ref 21–32)
CO2 SERPL-SCNC: 28 MMOL/L (ref 21–32)
CO2 SERPL-SCNC: 29 MMOL/L (ref 21–32)
CO2 SERPL-SCNC: 30 MMOL/L (ref 21–32)
CREAT SERPL-MCNC: 0.84 MG/DL (ref 0.6–1.3)
CREAT SERPL-MCNC: 0.87 MG/DL (ref 0.6–1.3)
CREAT SERPL-MCNC: 0.92 MG/DL (ref 0.6–1.3)
CREAT SERPL-MCNC: 0.97 MG/DL (ref 0.6–1.3)
CREAT UR-MCNC: 103 MG/DL
ERYTHROCYTE [DISTWIDTH] IN BLOOD BY AUTOMATED COUNT: 14.1 % (ref 11.6–15.1)
FOLATE SERPL-MCNC: >20 NG/ML (ref 3.1–17.5)
GFR SERPL CREATININE-BSD FRML MDRD: 62 ML/MIN/1.73SQ M
GFR SERPL CREATININE-BSD FRML MDRD: 66 ML/MIN/1.73SQ M
GFR SERPL CREATININE-BSD FRML MDRD: 71 ML/MIN/1.73SQ M
GFR SERPL CREATININE-BSD FRML MDRD: 74 ML/MIN/1.73SQ M
GLUCOSE SERPL-MCNC: 100 MG/DL (ref 65–140)
GLUCOSE SERPL-MCNC: 101 MG/DL (ref 65–140)
GLUCOSE SERPL-MCNC: 107 MG/DL (ref 65–140)
GLUCOSE SERPL-MCNC: 107 MG/DL (ref 65–140)
HCT VFR BLD AUTO: 26.3 % (ref 34.8–46.1)
HGB BLD-MCNC: 9.5 G/DL (ref 11.5–15.4)
INR PPP: 1.91 (ref 0.84–1.19)
MCH RBC QN AUTO: 38.5 PG (ref 26.8–34.3)
MCHC RBC AUTO-ENTMCNC: 36.1 G/DL (ref 31.4–37.4)
MCV RBC AUTO: 107 FL (ref 82–98)
PLATELET # BLD AUTO: 63 THOUSANDS/UL (ref 149–390)
PMV BLD AUTO: 9.5 FL (ref 8.9–12.7)
POTASSIUM SERPL-SCNC: 3.6 MMOL/L (ref 3.5–5.3)
POTASSIUM SERPL-SCNC: 3.6 MMOL/L (ref 3.5–5.3)
POTASSIUM SERPL-SCNC: 4 MMOL/L (ref 3.5–5.3)
POTASSIUM SERPL-SCNC: 4.1 MMOL/L (ref 3.5–5.3)
PROT SERPL-MCNC: 7.4 G/DL (ref 6.4–8.4)
PROTHROMBIN TIME: 23 SECONDS (ref 11.6–14.5)
RBC # BLD AUTO: 2.47 MILLION/UL (ref 3.81–5.12)
SODIUM SERPL-SCNC: 123 MMOL/L (ref 135–147)
SODIUM SERPL-SCNC: 123 MMOL/L (ref 135–147)
SODIUM SERPL-SCNC: 126 MMOL/L (ref 135–147)
SODIUM SERPL-SCNC: 129 MMOL/L (ref 135–147)
UUN 24H UR-MCNC: 354 MG/DL
VIT B12 SERPL-MCNC: 2355 PG/ML (ref 100–900)
WBC # BLD AUTO: 4.68 THOUSAND/UL (ref 4.31–10.16)

## 2022-09-17 PROCEDURE — 80048 BASIC METABOLIC PNL TOTAL CA: CPT | Performed by: NURSE PRACTITIONER

## 2022-09-17 PROCEDURE — 80048 BASIC METABOLIC PNL TOTAL CA: CPT | Performed by: INTERNAL MEDICINE

## 2022-09-17 PROCEDURE — 99291 CRITICAL CARE FIRST HOUR: CPT | Performed by: INTERNAL MEDICINE

## 2022-09-17 PROCEDURE — 85610 PROTHROMBIN TIME: CPT | Performed by: PHYSICIAN ASSISTANT

## 2022-09-17 PROCEDURE — 99232 SBSQ HOSP IP/OBS MODERATE 35: CPT | Performed by: INTERNAL MEDICINE

## 2022-09-17 PROCEDURE — 85027 COMPLETE CBC AUTOMATED: CPT | Performed by: PHYSICIAN ASSISTANT

## 2022-09-17 PROCEDURE — 80076 HEPATIC FUNCTION PANEL: CPT | Performed by: PHYSICIAN ASSISTANT

## 2022-09-17 PROCEDURE — 93308 TTE F-UP OR LMTD: CPT | Performed by: NURSE PRACTITIONER

## 2022-09-17 PROCEDURE — 80048 BASIC METABOLIC PNL TOTAL CA: CPT | Performed by: PHYSICIAN ASSISTANT

## 2022-09-17 RX ORDER — FUROSEMIDE 40 MG/1
40 TABLET ORAL DAILY
Status: DISCONTINUED | OUTPATIENT
Start: 2022-09-17 | End: 2022-09-19 | Stop reason: HOSPADM

## 2022-09-17 RX ORDER — ALBUMIN (HUMAN) 12.5 G/50ML
25 SOLUTION INTRAVENOUS 2 TIMES DAILY
Status: COMPLETED | OUTPATIENT
Start: 2022-09-17 | End: 2022-09-18

## 2022-09-17 RX ORDER — ALBUMIN, HUMAN INJ 5% 5 %
12.5 SOLUTION INTRAVENOUS ONCE
Status: COMPLETED | OUTPATIENT
Start: 2022-09-17 | End: 2022-09-17

## 2022-09-17 RX ORDER — DEXTROSE AND SODIUM CHLORIDE 5; .45 G/100ML; G/100ML
125 INJECTION, SOLUTION INTRAVENOUS CONTINUOUS
Status: DISCONTINUED | OUTPATIENT
Start: 2022-09-17 | End: 2022-09-17

## 2022-09-17 RX ORDER — ENOXAPARIN SODIUM 100 MG/ML
40 INJECTION SUBCUTANEOUS DAILY
Status: DISCONTINUED | OUTPATIENT
Start: 2022-09-18 | End: 2022-09-19 | Stop reason: HOSPADM

## 2022-09-17 RX ORDER — SPIRONOLACTONE 25 MG/1
100 TABLET ORAL DAILY
Status: DISCONTINUED | OUTPATIENT
Start: 2022-09-17 | End: 2022-09-17

## 2022-09-17 RX ORDER — POTASSIUM CHLORIDE 20 MEQ/1
40 TABLET, EXTENDED RELEASE ORAL ONCE
Status: COMPLETED | OUTPATIENT
Start: 2022-09-17 | End: 2022-09-17

## 2022-09-17 RX ORDER — ALBUMIN, HUMAN INJ 5% 5 %
12.5 SOLUTION INTRAVENOUS ONCE
Status: COMPLETED | OUTPATIENT
Start: 2022-09-17 | End: 2022-09-18

## 2022-09-17 RX ORDER — SPIRONOLACTONE 25 MG/1
50 TABLET ORAL DAILY
Status: DISCONTINUED | OUTPATIENT
Start: 2022-09-18 | End: 2022-09-19 | Stop reason: HOSPADM

## 2022-09-17 RX ORDER — MIDODRINE HYDROCHLORIDE 5 MG/1
2.5 TABLET ORAL
Status: DISCONTINUED | OUTPATIENT
Start: 2022-09-17 | End: 2022-09-18

## 2022-09-17 RX ADMIN — FUROSEMIDE 40 MG: 40 TABLET ORAL at 11:24

## 2022-09-17 RX ADMIN — ALBUMIN (HUMAN) 12.5 G: 12.5 INJECTION, SOLUTION INTRAVENOUS at 04:28

## 2022-09-17 RX ADMIN — ALPRAZOLAM 1 MG: 0.5 TABLET ORAL at 09:17

## 2022-09-17 RX ADMIN — FOLIC ACID 1 MG: 1 TABLET ORAL at 08:53

## 2022-09-17 RX ADMIN — MIDODRINE HYDROCHLORIDE 2.5 MG: 5 TABLET ORAL at 11:23

## 2022-09-17 RX ADMIN — ALBUMIN (HUMAN) 25 G: 0.25 INJECTION, SOLUTION INTRAVENOUS at 17:09

## 2022-09-17 RX ADMIN — MIDODRINE HYDROCHLORIDE 2.5 MG: 5 TABLET ORAL at 16:30

## 2022-09-17 RX ADMIN — ALPRAZOLAM 1 MG: 0.5 TABLET ORAL at 21:22

## 2022-09-17 RX ADMIN — DEXTROSE AND SODIUM CHLORIDE 125 ML/HR: 5; .45 INJECTION, SOLUTION INTRAVENOUS at 00:47

## 2022-09-17 RX ADMIN — Medication 100 MG: at 08:51

## 2022-09-17 RX ADMIN — ANASTROZOLE 1 MG: 1 TABLET, COATED ORAL at 08:53

## 2022-09-17 RX ADMIN — ALBUMIN (HUMAN) 12.5 G: 12.5 INJECTION, SOLUTION INTRAVENOUS at 05:34

## 2022-09-17 RX ADMIN — SPIRONOLACTONE 100 MG: 25 TABLET ORAL at 11:24

## 2022-09-17 RX ADMIN — HEPARIN SODIUM 5000 UNITS: 5000 INJECTION INTRAVENOUS; SUBCUTANEOUS at 05:05

## 2022-09-17 RX ADMIN — MULTIPLE VITAMINS W/ MINERALS TAB 1 TABLET: TAB ORAL at 08:53

## 2022-09-17 RX ADMIN — POTASSIUM CHLORIDE 40 MEQ: 20 TABLET, EXTENDED RELEASE ORAL at 08:53

## 2022-09-17 RX ADMIN — BENZONATATE 100 MG: 100 CAPSULE ORAL at 21:22

## 2022-09-17 NOTE — PROCEDURES
POC FAST US    Date/Time: 9/17/2022 11:04 AM  Performed by: Alfreda Redd by: Neha Onofre, [address]     Patient location:  Bedside  Other Assisting Provider: Yes (comment) (dr Tiera Kern)    Procedure details:     Exam Type:  Diagnostic    Assess for:  Intra-abdominal fluid    Image quality: limited diagnostic      Image availability:  Images available in PACS (via media unable to transfer from u/s)  Comments:      sm-mod ascites

## 2022-09-17 NOTE — ASSESSMENT & PLAN NOTE
· Acute on chronic baseline 130-133  · 2/2 cirrhosis/ascites and beer potomania  · W/u: Serum osm 256/urine osm 267/U na 13 (however on diuretics)/TSH 3 9/UA  · Nephrology following  · Spirinolactone recently increased outpt to 100mg on 8/23  · IV lasix change back to oral po lasix today 9/17  · nephro following, IV albumin per nephrology  · Na 130 today  · Fluid restrict  · Educated pt and son's on need to stop drinking beer

## 2022-09-17 NOTE — H&P
Naveen Avila  H&P- Martina Goodman 1960, 60 y o  female MRN: [id number]  Unit/Bed#: -01 Encounter: [id number]  Primary Care Provider: Corbin Hough PA-C   Date and time admitted to hospital: 9/16/2022 10:06 AM    * Hyponatremia  Assessment & Plan  · Presented to the ED after outpatient labs revealed hyponatremia with Na < 117; asymptomatic  · Na 120 on admission  · Pt reports hx of alcoholic cirrhosis and drinks about 6-7 beers a night   Last drank last evening  · Hyponatremia likely 2/2 beer potomania and increased dose of spironolactone  · Nephrology consulted  · Check UA, urine Na, urine osm, serum osm, urine urea- will likely be skewed 2/2 diuretic use  · BMP q4 hours  · Slow correction of Na with goal to increase Na by 6-8 mmol/L in 24 hours  · Current Na 123 with goal of 124-126 by 1300 today  · 250 ml D5 bolus given overnight 9/17    Malignant neoplasm of lower-outer quadrant of right breast of female, estrogen receptor positive ([facility] [address] )  Assessment & Plan  · Current diagnosis of breast cancer, follows with Heme/Onc  · Pt reports she is s/p breast resection at [facility]  · Continue home on anastrozole     Anemia  Assessment & Plan  · Chronic anemia in the setting of cirrhosis  · Hgb 10 8  · Check F94 and Folic acid level with macrocytic anemia  · Continue to trend hgb and maintain hgb > 6  · Continue folic acid     Thrombocytopenia (HCC)  Assessment & Plan  · Chronic thrombocytopenia 2/2 cirrhosis, currently 81k  · Continue to trend    Alcoholic cirrhosis of liver with ascites (HCC)  Assessment & Plan  · History of alcoholic cirrhosis, follows with [facility] GI outpatient- Dr Bisi Nesbitt  · Recently seen on 8/23/22 and spironolactone increased from 50 mg to 100 mg daily 2/2 increased ascites and hypokalemia  · Hyponatremia likely partially caused by increased dose of spironolactone  · Spironolactone decreased to 50 mg daily this admission, continue  · Continue lasix   · 9/16 Ammonia <10  · INR: 1 74  · Serial abdominal exams to monitor for ascites  · Soft blood pressure overnight 9/17; 5% albumin 500 ml given     Alcohol abuse  Assessment & Plan  · History of alcohol abuse with alcoholic cirrhosis  Pt states she has never gone through alcohol withdrawal or had a seizure  However, pt does admit to developing tremors if she doesn't drink  · Current drinks a 6-7 cans of beer a day  · Last drink on 9/15 around 10pm; Ethanol 34 on admission  · CIWA ordered  · PRN IV valium if developing symptoms of withdrawal  · Continue thiamine, folic acid, and multivitamin    COPD (chronic obstructive pulmonary disease) (HCC)  Assessment & Plan  · Hx of COPD, recent PFTs showed mild obstructive pattern  · Not in acute exacerbation  · Continue PRN albuterol  · Tessalon added PRN    Nicotine dependence  Assessment & Plan  · Will offer NRT if patient requests  · Encourage cessation    Elevated LFTs  Assessment & Plan  · In the setting of alcoholic cirrhosis  · Continue to trend LFTs    Anxiety  Assessment & Plan  · Pt takes Xanax 2mg q hs for many years  · Will continue for now but if patient requires IV valium 2/2 alcohol withdrawal, will likely discontinue PO Xanax    ----------------------------------------------------------------------------------------  HPI/24hr events: Spironolactone dose decreased and lasix continued  We will continue to monitor BMP q4 hour to ensure slow correction of hyponatremia  Current Na 123 with goal to be 124-126 at 1300 today  Overnight 250 ml bolus of D5 was given  Two albumin boluses ordered overnight for blood pressures with MAPs in the low 60's          Patient appropriate for transfer out of the ICU today?: No  Disposition: Continue Critical Care   Code Status: Level 1 - Full Code  ---------------------------------------------------------------------------------------  SUBJECTIVE  Patient states she feels fine and would like to go home     Review of Systems   TIA: Negative  Respiratory: Negative  Gastrointestinal: Positive for abdominal distention and abdominal pain  Patient reports occasional abdominal cramping when moving around in bed    Genitourinary: Negative  Musculoskeletal: Negative  Skin: Negative  Neurological: Negative  Hematological: Negative  Psychiatric/Behavioral: Negative  All other systems reviewed and are negative  Review of systems was reviewed and negative unless stated above in HPI/24-hour events   ---------------------------------------------------------------------------------------  OBJECTIVE    Vitals   Vitals:    22 0000 22 0100 22 0200 22 0300   BP: (!) 89/53 97/50 90/51 93/54   Pulse: 87 85 83 79   Resp: 19 19 18 18   Temp:       TempSrc:       SpO2: 95% 95% 95% 96%   Weight:       Height:         Temp (24hrs), Av 1 °F (36 7 °C), Min:97 8 °F (36 6 °C), Max:98 7 °F (37 1 °C)  Current: Temperature: 98 7 °F (37 1 °C)          Respiratory:  SpO2: SpO2: 96 %   Room Air    Physical Exam  Vitals and nursing note reviewed  Constitutional:       General: She is awake  Appearance: Normal appearance  HENT:      Head: Normocephalic  Mouth/Throat:      Mouth: Mucous membranes are moist       Pharynx: Oropharynx is clear  Eyes:      Pupils: Pupils are equal, round, and reactive to light  Cardiovascular:      Rate and Rhythm: Normal rate and regular rhythm  Pulses: Normal pulses  Radial pulses are 2+ on the right side and 2+ on the left side  Dorsalis pedis pulses are 2+ on the right side and 2+ on the left side  Heart sounds: Normal heart sounds  Pulmonary:      Effort: Pulmonary effort is normal       Breath sounds: Normal breath sounds and air entry  Abdominal:      General: Bowel sounds are normal  There is distension  Palpations: Abdomen is soft  Musculoskeletal:         General: Normal range of motion        Cervical back: Normal range of motion  Right lower le+ Edema present  Left lower le+ Edema present  Skin:     General: Skin is warm and dry  Capillary Refill: Capillary refill takes less than 2 seconds  Neurological:      General: No focal deficit present  Mental Status: She is alert and oriented to person, place, and time  Mental status is at baseline  GCS: GCS eye subscore is 4  GCS verbal subscore is 5  GCS motor subscore is 6  Psychiatric:         Attention and Perception: Attention normal          Mood and Affect: Mood and affect normal          Speech: Speech normal          Behavior: Behavior normal  Behavior is cooperative               Laboratory and Diagnostics:  Results from last 7 days   Lab Units 22  1045 09/15/22  1341 09/15/22  1332   WBC Thousand/uL 5 57  --   --    WHITE BLOOD CELL COUNT  x10E3/uL  --  5 2 5 4   HEMOGLOBIN g/dL 10 8*  --   --    HEMOGLOBIN  g/dL  --  11 8 11 0*   HEMATOCRIT % 29 6*  --   --    HEMATOCRIT  %  --  30 0* 29 4*   PLATELETS Thousands/uL 81*  --   --    PLATELETS  X96N7/LV  --  86* 75*   NEUTROS PCT % 68  --   --    NEUTROS PCT  %  --  73 69   MONOS PCT % 15*  --   --    MONOS PCT  %  --  15 13     Results from last 7 days   Lab Units 22  2333 22  1753 22  1045 09/15/22  1341 09/15/22  1332   SODIUM mmol/L 123* 120* 120* 117* 117*   POTASSIUM mmol/L 3 6 3 7 3 6 3 9 4 0   CHLORIDE mmol/L 89* 87* 87* 82* 80*   CO2 mmol/L 28 29 29 26 24   ANION GAP mmol/L 6 4 4  --   --    BUN mg/dL 8 7 7 6* 5*   CREATININE mg/dL 0 84 0 82 0 90 0 68 0 71   CALCIUM mg/dL 8 8 8 6 8 5  --   --    GLUCOSE RANDOM mg/dL 107 102 132 98 96   ALT U/L  --   --  52 42* 41*   AST U/L  --   --  96* 101* 98*   ALK PHOS U/L  --   --  172*  --   --    ALBUMIN g/dL  --   --  1 9* 2 6* 2 4*   TOTAL BILIRUBIN mg/dL  --   --  2 30* 2 7* 2 5*          Results from last 7 days   Lab Units 22  1045   INR  1 74*   PTT seconds 37                Imaging: I have personally reviewed pertinent reports  Intake and Output  I/O       09/15 0701 09/16 0700 09/16 0701  09/17 0700    P  O   750    I V  (mL/kg)  250 (3 7)    Total Intake(mL/kg)  1000 (14 9)    Urine (mL/kg/hr)  1180    Total Output  1180    Net  -180                Height and Weights   Height: 5' 3" (160 cm)     Body mass index is 26 17 kg/m²  Weight (last 2 days)     Date/Time Weight    09/16/22 1340 67 (147 71)    09/16/22 1009 66 2 (146)            Nutrition       Diet Orders   (From admission, onward)             Start     Ordered    09/16/22 1535  Diet Regular; Regular House; Fluid Restriction 1200 ML  Diet effective now        References:    Nutrtion Support Algorithm Enteral vs  Parenteral   Question Answer Comment   Diet Type Regular    Regular Regular House    Other Restriction(s): Fluid Restriction 1200 ML    RD to adjust diet per protocol?  No        09/16/22 1535                  Active Medications  Scheduled Meds:  Current Facility-Administered Medications   Medication Dose Route Frequency Provider Last Rate    albuterol  2 puff Inhalation Q6H PRN Cathy Mccarty PA-C      ALPRAZolam  1 mg Oral BID PRN JOSÉ Patrick      anastrozole  1 mg Oral Daily Cathy Mccarty PA-C      benzonatate  100 mg Oral TID PRN JOSÉ Patrick      folic acid  1 mg Oral Daily Cathy Mccarty PA-C      furosemide  40 mg Intravenous Daily Cathy Mccarty PA-C      heparin (porcine)  5,000 Units Subcutaneous [facility] Cathy Mccarty PA-C      multivitamin-minerals  1 tablet Oral Daily Cathy Mccarty PA-C      spironolactone  50 mg Oral Daily Cathy Mccarty PA-C      thiamine  100 mg Oral Daily Cathy Mccarty PA-C       Continuous Infusions:     PRN Meds:   albuterol, 2 puff, Q6H PRN  ALPRAZolam, 1 mg, BID PRN  benzonatate, 100 mg, TID PRN        Invasive Devices Review  Invasive Devices  Report    Peripheral Intravenous Line  Duration           Peripheral IV 09/16/22 Left Antecubital <1 day    Peripheral IV 09/16/22 Right Antecubital <1 day Rationale for remaining devices: n/a  ---------------------------------------------------------------------------------------  Advance Directive and Living Will:      Power of :    POLST:    ---------------------------------------------------------------------------------------  Care Time Delivered:   Upon my evaluation, this patient had a high probability of imminent or life-threatening deterioration due to hyponatremia , which required my direct attention, intervention, and personal management  I have personally provided 30 minutes (0300 to 0330) of critical care time, exclusive of procedures, teaching, family meetings, and any prior time recorded by providers other than myself  JOSÉ James      Portions of the record may have been created with voice recognition software  Occasional wrong word or "sound a like" substitutions may have occurred due to the inherent limitations of voice recognition software    Read the chart carefully and recognize, using context, where substitutions have occurred

## 2022-09-17 NOTE — PROGRESS NOTES
[address] [facility] NOTE   Anatoliy Perez 60 y o  female MRN: [id number]  Unit/Bed#: -01 Encounter: [id number]  Reason for Consult:  Hyponatremia    ASSESSMENT and PLAN:  69-year-old female with a history of alcoholic cirrhosis, actively drinking, breast cancer who is being followed for management of hyponatremia  Hypoosmolar Hyponatremia:  · Etiology:  · Drinking 6-8 beers per day  · Sodium level 120 on admission (outpatient labs sodium level 117)  · Presented on 09/16 at approximately 1800 hours  · Six point increase in sodium level in approximately 24 hours which is appropriate  · Prior baseline sodium level 130-131  · Presented with signs of volume overload  Started on 40 mg of Lasix IV b i d  And spirolactone  · Currently on Lasix 40 mg p o  And spirolactone  · Workup:  · Urine sodium 13, urine osmolality 267  · Serum osmolality 256  · TSH normal  · Imaging:  CTA of the chest:  Ground-glass opacity right upper lobe which may represent emphysema/scarring follow-up recommended   · On some fluid restriction 1 2 L, encouraged to eat normal meals, increase solute intake  · Status post therapeutic paracentesis  · Plan:  · Goal sodium level:  132-134 by 1000 hours 9/18  · Severe hypoalbuminemia:  Will give 2 doses of albumin 25 g to promote water excretion along with loop diuretic and spirolactone  · Encourage normal oral intake  · Fluid restriction 1 2 L  · Check labs in the a m  Breast cancer:  · Follows with Hematology/Oncology    Alcoholic cirrhosis with ascites:  · Abnormal liver function studies, elevated total bilirubin, AST elevated  · On spirolactone and loop diuretic  Adding 2 doses of albumin  · Given 500 mL of 5% albumin overnight due to low blood pressures    Alcohol dependence:  Monitor for withdrawal    COPD/nicotine dependence:  Stable, no acute exacerbation  Encourage cessation      SUBJECTIVE / 24H INTERVAL HISTORY:  Became tearful, wants to go home today    States that she is more comfortable outside of the ICU  OBJECTIVE:  Current Weight: Weight - Scale: 72 kg (158 lb 11 7 oz)  Vitals:    09/17/22 0800 09/17/22 0900 09/17/22 1122 09/17/22 1440   BP: 100/57 122/57 97/54 (!) 90/45   BP Location:    Right arm   Pulse: 87 96 79 83   Resp: (!) 28 20 (!) 23    Temp: 97 9 °F (36 6 °C)  98 1 °F (36 7 °C) 98 3 °F (36 8 °C)   TempSrc: Oral  Oral Oral   SpO2: 95% 95% 98% 98%   Weight:       Height:           Intake/Output Summary (Last 24 hours) at 9/17/2022 1633  Last data filed at 9/17/2022 0800  Gross per 24 hour   Intake 1500 ml   Output 1880 ml   Net -380 ml     General: NAD, chronically ill-appearing female  Skin: no rash, warm and dry  Eyes: anicteric sclera  ENT: moist mucous membrane  Neck: supple  Chest: CTA b/l, no ronchii, no wheeze, no rubs, no rales  CVS: s1s2, no murmur, no gallop, no rub  Abdomen: soft, nontender, nl sounds    Distended, positive fluid wave  Extremities: no edema LE b/l, no mottling or cyanosis  : no blanco  Neuro: AAOX3  Psych:  Tearful, upset  Medications:    Current Facility-Administered Medications:     albuterol (PROVENTIL HFA,VENTOLIN HFA) inhaler 2 puff, 2 puff, Inhalation, Q6H PRN, Polo Remak, CRNP    ALPRAZolam Osie Cools) tablet 1 mg, 1 mg, Oral, BID PRN, Polo Remak, CRNP, 1 mg at 09/17/22 7897    anastrozole (ARIMIDEX) tablet 1 mg, 1 mg, Oral, Daily, JOSÉ Kevin, 1 mg at 09/17/22 4436    benzonatate (TESSALON PERLES) capsule 100 mg, 100 mg, Oral, TID PRN, Polo Remak, CRNP, 100 mg at 09/16/22 1948    [START ON 9/18/2022] enoxaparin (LOVENOX) subcutaneous injection 40 mg, 40 mg, Subcutaneous, Daily, Polo Remak, CRNP    folic acid (FOLVITE) tablet 1 mg, 1 mg, Oral, Daily, JOSÉ Kevin, 1 mg at 09/17/22 0853    furosemide (LASIX) tablet 40 mg, 40 mg, Oral, Daily, JOSÉ Kevin, 40 mg at 09/17/22 1124    midodrine (PROAMATINE) tablet 2 5 mg, 2 5 mg, Oral, TID AC, Leif Haque, CRNP, 2 5 mg at 09/17/22 1123    multivitamin-minerals (CENTRUM) tablet 1 tablet, 1 tablet, Oral, Daily, Jorge Luis Lomeli, CRNP, 1 tablet at 09/17/22 0853    spironolactone (ALDACTONE) tablet 100 mg, 100 mg, Oral, Daily, Karrie Barboza, CRNP, 100 mg at 09/17/22 1124    thiamine tablet 100 mg, 100 mg, Oral, Daily, Jorge Luis Lomeli, CRNP, 100 mg at 09/17/22 0645    Laboratory Results:  Results from last 7 days   Lab Units 09/17/22  1435 09/17/22  0459 09/17/22  0443 09/16/22  2333 09/16/22  1753 09/16/22  1045 09/15/22  1341 09/15/22  1332   WBC Thousand/uL  --   --  4 68  --   --  5 57  --   --    WHITE BLOOD CELL COUNT  x10E3/uL  --   --   --   --   --   --  5 2 5 4   HEMOGLOBIN g/dL  --   --  9 5*  --   --  10 8*  --   --    HEMOGLOBIN  g/dL  --   --   --   --   --   --  11 8 11 0*   HEMATOCRIT %  --   --  26 3*  --   --  29 6*  --   --    HEMATOCRIT  %  --   --   --   --   --   --  30 0* 29 4*   PLATELETS Thousands/uL  --   --  63*  --   --  81*  --   --    PLATELETS  G75T6/LC  --   --   --   --   --   --  86* 75*   POTASSIUM mmol/L 4 1 3 6  --  3 6 3 7 3 6 3 9 4 0   CHLORIDE mmol/L 93* 90*  --  89* 87* 87* 82* 80*   CO2 mmol/L 30 27  --  28 29 29 26 24   BUN mg/dL 9 9  --  8 7 7 6* 5*   CREATININE mg/dL 0 97 0 87  --  0 84 0 82 0 90 0 68 0 71   CALCIUM mg/dL 8 6 8 3  --  8 8 8 6 8 5  --   --

## 2022-09-17 NOTE — PLAN OF CARE
Problem: Potential for Falls  Goal: Patient will remain free of falls  Description: INTERVENTIONS:  - Educate patient/family on patient safety including physical limitations  - Instruct patient to call for assistance with activity   - Consult OT/PT to assist with strengthening/mobility   - Keep Call bell within reach  - Keep bed low and locked with side rails adjusted as appropriate  - Keep care items and personal belongings within reach  - Initiate and maintain comfort rounds  - Make Fall Risk Sign visible to staff  - Offer Toileting every 2 Hours, in advance of need  - Initiate/Maintain bed alarm  - Apply yellow socks and bracelet for high fall risk patients  - Consider moving patient to room near nurses station  Outcome: Progressing     Problem: PAIN - ADULT  Goal: Verbalizes/displays adequate comfort level or baseline comfort level  Description: Interventions:  - Encourage patient to monitor pain and request assistance  - Assess pain using appropriate pain scale  - Administer analgesics based on type and severity of pain and evaluate response  - Implement non-pharmacological measures as appropriate and evaluate response  - Consider cultural and social influences on pain and pain management  - Notify physician/advanced practitioner if interventions unsuccessful or patient reports new pain  Outcome: Progressing     Problem: INFECTION - ADULT  Goal: Absence or prevention of progression during hospitalization  Description: INTERVENTIONS:  - Assess and monitor for signs and symptoms of infection  - Monitor lab/diagnostic results  - Monitor all insertion sites, i e  indwelling lines, tubes, and drains  - Monitor endotracheal if appropriate and nasal secretions for changes in amount and color  - [place] appropriate cooling/warming therapies per order  - Administer medications as ordered  - Instruct and encourage patient and family to use good hand hygiene technique  - Identify and instruct in appropriate isolation precautions for identified infection/condition  Outcome: Progressing  Goal: Absence of fever/infection during neutropenic period  Description: INTERVENTIONS:  - Monitor WBC    Outcome: Progressing     Problem: SAFETY ADULT  Goal: Patient will remain free of falls  Description: INTERVENTIONS:  - Educate patient/family on patient safety including physical limitations  - Instruct patient to call for assistance with activity   - Consult OT/PT to assist with strengthening/mobility   - Keep Call bell within reach  - Keep bed low and locked with side rails adjusted as appropriate  - Keep care items and personal belongings within reach  - Initiate and maintain comfort rounds  - Make Fall Risk Sign visible to staff  - Offer Toileting every 2 Hours, in advance of need  - Initiate/Maintain bed alarm  - Apply yellow socks and bracelet for high fall risk patients  - Consider moving patient to room near nurses station  Outcome: Progressing  Goal: Maintain or return to baseline ADL function  Description: INTERVENTIONS:  -  Assess patient's ability to carry out ADLs; assess patient's baseline for ADL function and identify physical deficits which impact ability to perform ADLs (bathing, care of mouth/teeth, toileting, grooming, dressing, etc )  - Assess/evaluate cause of self-care deficits   - Assess range of motion  - Assess patient's mobility; develop plan if impaired  - Assess patient's need for assistive devices and provide as appropriate  - Encourage maximum independence but intervene and supervise when necessary  - Involve family in performance of ADLs  - Assess for home care needs following discharge   - Consider OT consult to assist with ADL evaluation and planning for discharge  - Provide patient education as appropriate  Outcome: Progressing  Goal: Maintains/Returns to pre admission functional level  Description: INTERVENTIONS:  - Perform BMAT or MOVE assessment daily    - Set and communicate daily mobility goal to care team and patient/family/caregiver  - Collaborate with rehabilitation services on mobility goals if consulted  - Perform Range of Motion 3 times a day  - Reposition patient every 2 hours  - Dangle patient 3 times a day  - Stand patient 3 times a day  - Ambulate patient 3 times a day  - Out of bed to chair 3 times a day   - Out of bed for meals 3 times a day  - Out of bed for toileting  - Record patient progress and toleration of activity level   Outcome: Progressing     Problem: DISCHARGE PLANNING  Goal: Discharge to home or other facility with appropriate resources  Description: INTERVENTIONS:  - Identify barriers to discharge w/patient and caregiver  - Arrange for needed discharge resources and transportation as appropriate  - Identify discharge learning needs (meds, wound care, etc )  - Arrange for interpretive services to assist at discharge as needed  - Refer to Case Management Department for coordinating discharge planning if the patient needs post-hospital services based on physician/advanced practitioner order or complex needs related to functional status, cognitive ability, or social support system  Outcome: Progressing     Problem: Knowledge Deficit  Goal: Patient/family/caregiver demonstrates understanding of disease process, treatment plan, medications, and discharge instructions  Description: Complete learning assessment and assess knowledge base    Interventions:  - Provide teaching at level of understanding  - Provide teaching via preferred learning methods  Outcome: Progressing

## 2022-09-18 LAB
ANION GAP SERPL CALCULATED.3IONS-SCNC: 6 MMOL/L (ref 4–13)
BUN SERPL-MCNC: 9 MG/DL (ref 5–25)
CALCIUM SERPL-MCNC: 8.4 MG/DL (ref 8.3–10.1)
CHLORIDE SERPL-SCNC: 97 MMOL/L (ref 96–108)
CO2 SERPL-SCNC: 27 MMOL/L (ref 21–32)
CREAT SERPL-MCNC: 0.83 MG/DL (ref 0.6–1.3)
GFR SERPL CREATININE-BSD FRML MDRD: 75 ML/MIN/1.73SQ M
GLUCOSE SERPL-MCNC: 89 MG/DL (ref 65–140)
POTASSIUM SERPL-SCNC: 3.9 MMOL/L (ref 3.5–5.3)
SODIUM SERPL-SCNC: 130 MMOL/L (ref 135–147)

## 2022-09-18 PROCEDURE — 99232 SBSQ HOSP IP/OBS MODERATE 35: CPT | Performed by: INTERNAL MEDICINE

## 2022-09-18 PROCEDURE — 99232 SBSQ HOSP IP/OBS MODERATE 35: CPT | Performed by: HOSPITALIST

## 2022-09-18 PROCEDURE — 80048 BASIC METABOLIC PNL TOTAL CA: CPT | Performed by: INTERNAL MEDICINE

## 2022-09-18 RX ORDER — MIDODRINE HYDROCHLORIDE 5 MG/1
5 TABLET ORAL
Status: DISCONTINUED | OUTPATIENT
Start: 2022-09-18 | End: 2022-09-19 | Stop reason: HOSPADM

## 2022-09-18 RX ADMIN — ANASTROZOLE 1 MG: 1 TABLET, COATED ORAL at 07:21

## 2022-09-18 RX ADMIN — FOLIC ACID 1 MG: 1 TABLET ORAL at 07:21

## 2022-09-18 RX ADMIN — MIDODRINE HYDROCHLORIDE 2.5 MG: 5 TABLET ORAL at 12:07

## 2022-09-18 RX ADMIN — BENZONATATE 100 MG: 100 CAPSULE ORAL at 22:02

## 2022-09-18 RX ADMIN — MIDODRINE HYDROCHLORIDE 2.5 MG: 5 TABLET ORAL at 06:26

## 2022-09-18 RX ADMIN — Medication 100 MG: at 07:21

## 2022-09-18 RX ADMIN — ALBUMIN (HUMAN) 25 G: 0.25 INJECTION, SOLUTION INTRAVENOUS at 07:32

## 2022-09-18 RX ADMIN — MIDODRINE HYDROCHLORIDE 5 MG: 5 TABLET ORAL at 17:00

## 2022-09-18 RX ADMIN — ALPRAZOLAM 1 MG: 0.5 TABLET ORAL at 22:02

## 2022-09-18 RX ADMIN — MULTIPLE VITAMINS W/ MINERALS TAB 1 TABLET: TAB ORAL at 07:21

## 2022-09-18 NOTE — PROGRESS NOTES
[place]  Progress Note - Analilia Gonzalez 1960, 60 y o  female MRN: [id number]  Unit/Bed#: -Nitesh Encounter: [id number]  Primary Care Provider: Burak Phan PA-C   Date and time admitted to hospital: 9/16/2022 10:06 AM    Anemia  Assessment & Plan  hgb 9 5  No sx of active bleeding      Alcoholic cirrhosis of liver with ascites (HCC)  Assessment & Plan  · History of alcoholic cirrhosis, follows with [facility] GI outpatient- Dr Harmeet Giles  · Recently seen on 8/23/22 and spironolactone increased from 50 mg to 100 mg daily 2/2 increased ascites and hypokalemia  · Bedside u/s completed--sm to moderate ascites will attempt to manage with diuretics first as least invasive    COPD (chronic obstructive pulmonary disease) ([facility] [address] )  Assessment & Plan  Not in exacerbation  Cont inhalers    Elevated LFTs  Assessment & Plan  · With ETOH cirrhosis      Anxiety  Assessment & Plan  · Xanax prn home dose    * Hyponatremia  Assessment & Plan  · Acute on chronic baseline 130-133  · 2/2 cirrhosis/ascites and beer potomania  · W/u: Serum osm 256/urine osm 267/U na 13 (however on diuretics)/TSH 3 9/UA  · Nephrology following  · Spirinolactone recently increased outpt to 100mg on 8/23  · IV lasix change back to oral po lasix today 9/17  · nephro following, IV albumin per nephrology  · Na 130 today  · Fluid restrict  · Educated pt and son's on need to stop drinking beer         VTE  Prophylaxis:   Pharmacologic: in place    Patient Centered Rounds: I have performed bedside rounds with nursing staff today  Discussions with Specialists or Other Care Team Provider: case management    Education and Discussions with Family / Patient: pt      Current Length of Stay: 2 day(s)    Current Patient Status: Inpatient        Code Status: Level 1 - Full Code      Subjective:   Pt states  She is going home tomorrow  No complaints  No fatigue or nausea/vomiting      Patient is seen and examined at bedside    All other ROS are negative  Objective:     Vitals:   Temp (24hrs), Av 2 °F (36 8 °C), Min:98 1 °F (36 7 °C), Max:98 3 °F (36 8 °C)    Temp:  [98 1 °F (36 7 °C)-98 3 °F (36 8 °C)] 98 1 °F (36 7 °C)  HR:  [77-83] 77  Resp:  [20-21] 21  BP: (88-98)/(45-56) 97/53  SpO2:  [96 %-98 %] 96 %  Body mass index is 27 46 kg/m²  Input and Output Summary (last 24 hours): Intake/Output Summary (Last 24 hours) at 2022 1145  Last data filed at 2022 0901  Gross per 24 hour   Intake 700 ml   Output --   Net 700 ml       Physical Exam:       GEN: No acute distress, comfortable  HEEENT: No JVD, PERRLA, no scleral icterus  RESP: Lungs clear to auscultation bilaterally  CV: RRR, +s1/s2   ABD: SOFT NON TENDER, POSITIVE BOWEL SOUNDS, NO DISTENTION  PSYCH: CALM  NEURO: A X O X 3, NO FOCAL DEFICITS  SKIN: NO RASH  EXTREM: NO EDEMA    Additional Data:     Labs:    Results from last 7 days   Lab Units 22  0443 22  1045   WBC Thousand/uL 4 68 5 57   HEMOGLOBIN g/dL 9 5* 10 8*   HEMATOCRIT % 26 3* 29 6*   PLATELETS Thousands/uL 63* 81*   NEUTROS PCT %  --  68   LYMPHS PCT %  --  14   MONOS PCT %  --  15*   EOS PCT %  --  1     Results from last 7 days   Lab Units 22  0450 22  0459 22  0443   SODIUM mmol/L 130*   < >  --    POTASSIUM mmol/L 3 9   < >  --    CHLORIDE mmol/L 97   < >  --    CO2 mmol/L 27   < >  --    BUN mg/dL 9   < >  --    CREATININE mg/dL 0 83   < >  --    ANION GAP mmol/L 6   < >  --    CALCIUM mg/dL 8 4   < >  --    ALBUMIN g/dL  --   --  1 8*   TOTAL BILIRUBIN mg/dL  --   --  2 90*   ALK PHOS U/L  --   --  125*   ALT U/L  --   --  38   AST U/L  --   --  76*   GLUCOSE RANDOM mg/dL 89   < >  --     < > = values in this interval not displayed  Results from last 7 days   Lab Units 22  0443   INR  1 91*                       * I Have Reviewed All Lab Data Listed Above             Imaging:     Results for orders placed during the hospital encounter of 20    XR chest 1 view portable    Narrative  CHEST    INDICATION:   Shortness of breath  Patient has suspected COVID-19  COMPARISON:  10/8/2020    EXAM PERFORMED/VIEWS:  XR CHEST PORTABLE      FINDINGS:    Cardiomediastinal silhouette appears unremarkable  The lungs are clear  No pneumothorax or pleural effusion  Osseous structures appear within normal limits for patient age  Impression  No acute cardiopulmonary disease  Workstation performed: [id number]    Results for orders placed during the hospital encounter of 08/10/22    XR chest 2 views    Narrative  CHEST    INDICATION:   Dyspnea, hypoxemia, cough  COMPARISON:  Chest radiograph February 19, 2021  Correlation with subsequently performed chest CT (August 10, 2022 at 23:08    EXAM PERFORMED/VIEWS:  XR CHEST PA & LATERAL      FINDINGS:    Cardiomediastinal silhouette appears unremarkable  No focal consolidation, pneumothorax or pleural effusion  No acute osseous abnormality  Right breast surgical clips  Impression  No acute cardiopulmonary disease  Please refer to subsequent chest CT for follow-up recommendations            Workstation performed: [id number]      *I have reviewed all imaging reports listed above      Recent Cultures (last 7 days):           Last 24 Hours Medication List:   Current Facility-Administered Medications   Medication Dose Route Frequency Provider Last Rate    albuterol  2 puff Inhalation Q6H PRN Floria Millet, CRNP      ALPRAZolam  1 mg Oral BID PRN Floria Millet, CRNP      anastrozole  1 mg Oral Daily Floria Millet, CRNP      benzonatate  100 mg Oral TID PRN Floria Millet, CRNP      enoxaparin  40 mg Subcutaneous Daily Karrie Barboza, CRNP      folic acid  1 mg Oral Daily Karrie Barboza, CRNP      furosemide  40 mg Oral Daily Karrie Barboza, CRNP      midodrine  2 5 mg Oral TID AC Floria Millet, CRNP      multivitamin-minerals  1 tablet Oral Daily Matthew Sotelo JOSÉ Barboza      spironolactone  50 mg Oral Daily Zoran Puente MD      thiamine  100 mg Oral Daily JOSÉ Gallagher          Today, Patient Was Seen By: Og Hassan MD    ** Please Note: Dictation voice to text software may have been used in the creation of this document   **

## 2022-09-18 NOTE — PROGRESS NOTES
[address] [facility] NOTE   Rai Salazar 60 y o  female MRN: [id number]  Unit/Bed#: -01 Encounter: [id number]  Reason for Consult:  Hyponatremia    ASSESSMENT and PLAN:  58-year-old female with a history of alcoholic cirrhosis, actively drinking, breast cancer who is being followed for management of hyponatremia  Hypoosmolar Hyponatremia:  · Etiology:  · Drinking 6-8 beers per day  · Sodium level 120 on admission (outpatient labs sodium level 117)  · Presented on 09/16 at approximately 1800 hours  · Six point increase in sodium level in approximately 24 hours which is appropriate  · Prior baseline sodium level 130-131  · Presented with signs of volume overload  Started on 40 mg of Lasix IV b i d  And spirolactone  · Currently on Lasix 40 mg p o  And spirolactone-dose reduced to 50 mg daily due to lower blood pressures  · Workup:  · Urine sodium 13, urine osmolality 267  · Serum osmolality 256  · TSH normal  · Imaging:  CTA of the chest:  Ground-glass opacity right upper lobe which may represent emphysema/scarring follow-up recommended   · Status post therapeutic paracentesis  · Plan:  · Sodium level improving appropriately  Will likely continue to stabilize with normal diet, modest fluid restriction  · Encouraged to avoid alcohol intake  · Avoiding salt tablets in the setting of cirrhosis  · Severe hypoalbuminemia:  Given 2 more doses of albumin 25 g which completed this morning  · Continue Fluid restriction 1 2 L  · BMP every 12 hours    Breast cancer:  · Follows with Hematology/Oncology    Alcoholic cirrhosis with ascites:  · Abnormal liver function studies, elevated total bilirubin, AST elevated  · On spirolactone and loop diuretic  Given 2 doses of albumin which completed this morning  · Blood pressure on the low side/intermittently low  · Spirolactone dose decreased from 100 mg to 50 mg  Patient concerned about potassium level  · Plan:  · Increase midodrine to 5 mg t i d      Alcohol dependence:  Monitor for withdrawal    Anemia  · Hemoglobin 9 5 as of 9/17  No sign of acute blood loss  · Thrombocytopenia in the setting of cirrhosis    COPD/nicotine dependence:    · Stable, no acute exacerbation  · Encourage tobacco cessation    SUBJECTIVE / 24H INTERVAL HISTORY:  States she is going home tomorrow  States that she feels fine at this time  She tells me she is eating well  Her son is in attendance and encouraging good nutrition  OBJECTIVE:  Current Weight: Weight - Scale: 70 3 kg (155 lb)  Vitals:    09/17/22 2333 09/18/22 0526 09/18/22 0625 09/18/22 0751   BP: 98/56  (!) 88/49 97/53   BP Location: Right arm  Right arm Right arm   Pulse: 79   77   Resp:    21   Temp: 98 3 °F (36 8 °C)   98 1 °F (36 7 °C)   TempSrc: Oral   Oral   SpO2: 96%   96%   Weight:  70 3 kg (155 lb)     Height:           Intake/Output Summary (Last 24 hours) at 9/18/2022 1308  Last data filed at 9/18/2022 0901  Gross per 24 hour   Intake 700 ml   Output --   Net 700 ml     General: NAD  Skin: no rash warm and dry  Eyes: anicteric sclera  ENT: moist mucous membrane  Neck: supple  Chest: CTA b/l, no ronchii, no wheeze, no rubs, no rales  Normal effort  CVS: s1s2, no murmur, no gallop, no rub    Normal rate regular rhythm  Abdomen: soft, nontender, nl sounds, nondistended  Extremities: no edema LE b/l  : no blanco  Neuro: AAOX3  Psych: normal affect  Medications:    Current Facility-Administered Medications:     albuterol (PROVENTIL HFA,VENTOLIN HFA) inhaler 2 puff, 2 puff, Inhalation, Q6H PRN, JOSÉ Galvez    ALPRAZolam Lees Niece) tablet 1 mg, 1 mg, Oral, BID PRN, JOSÉ Galvez, 1 mg at 09/17/22 2122    anastrozole (ARIMIDEX) tablet 1 mg, 1 mg, Oral, Daily, JOSÉ Kevin, 1 mg at 09/18/22 4002    benzonatate (TESSALON PERLES) capsule 100 mg, 100 mg, Oral, TID PRN, JOSÉ Galvez, 100 mg at 09/17/22 2122    enoxaparin (LOVENOX) subcutaneous injection 40 mg, 40 mg, Subcutaneous, Daily, JOSÉ Hardy    folic acid (FOLVITE) tablet 1 mg, 1 mg, Oral, Daily, JOSÉ Kevin, 1 mg at 09/18/22 9843    furosemide (LASIX) tablet 40 mg, 40 mg, Oral, Daily, JOSÉ Kevin, 40 mg at 09/17/22 1124    midodrine (PROAMATINE) tablet 2 5 mg, 2 5 mg, Oral, TID AC, JOSÉ Kevin, 2 5 mg at 09/18/22 1207    multivitamin-minerals (CENTRUM) tablet 1 tablet, 1 tablet, Oral, Daily, JOSÉ Hardy, 1 tablet at 09/18/22 0929    spironolactone (ALDACTONE) tablet 50 mg, 50 mg, Oral, Daily, Nazia Kulkarni MD    thiamine tablet 100 mg, 100 mg, Oral, Daily, JOSÉ Hardy, 100 mg at 09/18/22 0539    Laboratory Results:  Results from last 7 days   Lab Units 09/18/22  0450 09/17/22  1801 09/17/22  1435 09/17/22  0459 09/17/22  0443 09/16/22  2333 09/16/22  1753 09/16/22  1045 09/15/22  1341 09/15/22  1332   WBC Thousand/uL  --   --   --   --  4 68  --   --  5 57  --   --    WHITE BLOOD CELL COUNT  x10E3/uL  --   --   --   --   --   --   --   --  5 2 5 4   HEMOGLOBIN g/dL  --   --   --   --  9 5*  --   --  10 8*  --   --    HEMOGLOBIN  g/dL  --   --   --   --   --   --   --   --  11 8 11 0*   HEMATOCRIT %  --   --   --   --  26 3*  --   --  29 6*  --   --    HEMATOCRIT  %  --   --   --   --   --   --   --   --  30 0* 29 4*   PLATELETS Thousands/uL  --   --   --   --  63*  --   --  81*  --   --    PLATELETS  W85V8/LF  --   --   --   --   --   --   --   --  86* 75*   POTASSIUM mmol/L 3 9 4 0 4 1 3 6  --  3 6 3 7 3 6 3 9 4 0   CHLORIDE mmol/L 97 95* 93* 90*  --  89* 87* 87* 82* 80*   CO2 mmol/L 27 29 30 27  --  28 29 29 26 24   BUN mg/dL 9 8 9 9  --  8 7 7 6* 5*   CREATININE mg/dL 0 83 0 92 0 97 0 87  --  0 84 0 82 0 90 0 68 0 71   CALCIUM mg/dL 8 4 8 9 8 6 8 3  --  8 8 8 6 8 5  --   --

## 2022-09-18 NOTE — PLAN OF CARE
Problem: Potential for Falls  Goal: Patient will remain free of falls  Description: INTERVENTIONS:  - Educate patient/family on patient safety including physical limitations  - Instruct patient to call for assistance with activity   - Consult OT/PT to assist with strengthening/mobility   - Keep Call bell within reach  - Keep bed low and locked with side rails adjusted as appropriate  - Keep care items and personal belongings within reach  - Initiate and maintain comfort rounds  - Make Fall Risk Sign visible to staff  - Offer Toileting every self   - Initiate/Maintain N/A alarm  - Obtain necessary fall risk management equipment: independent   - Apply yellow socks and bracelet for high fall risk patients  - Consider moving patient to room near nurses station  Outcome: Progressing

## 2022-09-18 NOTE — PLAN OF CARE
Problem: PAIN - ADULT  Goal: Verbalizes/displays adequate comfort level or baseline comfort level  Description: Interventions:  - Encourage patient to monitor pain and request assistance  - Assess pain using appropriate pain scale  - Administer analgesics based on type and severity of pain and evaluate response  - Implement non-pharmacological measures as appropriate and evaluate response  - Consider cultural and social influences on pain and pain management  - Notify physician/advanced practitioner if interventions unsuccessful or patient reports new pain  Outcome: Progressing     Problem: INFECTION - ADULT  Goal: Absence or prevention of progression during hospitalization  Description: INTERVENTIONS:  - Assess and monitor for signs and symptoms of infection  - Monitor lab/diagnostic results  - Monitor all insertion sites, i e  indwelling lines, tubes, and drains  - Monitor endotracheal if appropriate and nasal secretions for changes in amount and color  - Avenue appropriate cooling/warming therapies per order  - Administer medications as ordered  - Instruct and encourage patient and family to use good hand hygiene technique  - Identify and instruct in appropriate isolation precautions for identified infection/condition  Outcome: Progressing     Problem: DISCHARGE PLANNING  Goal: Discharge to home or other facility with appropriate resources  Description: INTERVENTIONS:  - Identify barriers to discharge w/patient and caregiver  - Arrange for needed discharge resources and transportation as appropriate  - Identify discharge learning needs (meds, wound care, etc )  - Arrange for interpretive services to assist at discharge as needed  - Refer to Case Management Department for coordinating discharge planning if the patient needs post-hospital services based on physician/advanced practitioner order or complex needs related to functional status, cognitive ability, or social support system  Outcome: Progressing Problem: Knowledge Deficit  Goal: Patient/family/caregiver demonstrates understanding of disease process, treatment plan, medications, and discharge instructions  Description: Complete learning assessment and assess knowledge base    Interventions:  - Provide teaching at level of understanding  - Provide teaching via preferred learning methods  Outcome: Progressing

## 2022-09-18 NOTE — UTILIZATION REVIEW
Initial Clinical Review    Admission: Date/Time/Statement:   Admission Orders (From admission, onward)     Ordered        09/16/22 1335  INPATIENT ADMISSION  Once                      Orders Placed This Encounter   Procedures    INPATIENT ADMISSION     Standing Status:   Standing     Number of Occurrences:   1     Order Specific Question:   Level of Care     Answer:   Level 1 Stepdown [13]     Order Specific Question:   Estimated length of stay     Answer:   More than 2 Midnights     Order Specific Question:   Certification     Answer:   I certify that inpatient services are medically necessary for this patient for a duration of greater than two midnights  See H&P and MD Progress Notes for additional information about the patient's course of treatment  ED Arrival Information     Expected   9/16/2022     Arrival   9/16/2022 10:05    Acuity   Emergent            Means of arrival   Walk-In    Escorted by   Self    Service   Hospitalist    Admission type   Emergency            Arrival complaint   Acute hyponatremia           Chief Complaint   Patient presents with    Abnormal Lab     Patient presents to the ED with c/o low sodium levels from bloodwork taken yesterday        Initial Presentation: 60 y o  female presented to ED from home as inpatient admission for hyponatremia  PMHx significant for alcoholic cirrhosis, COPD, breast cancer, and nicotine abuse  Pt states that she was recently seen by GI and her spironolactone was increased from 50 mg daily to 100 mg daily 2/2 increased ascites and hypokalemia  She had repeat labs and her Na was found to be 117- pt reports no associated symptoms  On arrival to the ED her Na was 120  Pt admits to drinking 6-7 cans of Devin light beer every night- last drink was around 10pm last night  She states that she has never had a seizure or gone through alcohol withdrawal, but she states that she will get tremulous if she doesn't drink for a prolonged period of time     On exam abdominal distention and anxious low b/l leg edema  Plan 1200 ml fluid restriction, BMP q 4 hrs CIWA 2    Consult Nephrology:  1  Hyponatremia-multifactorial likely SIADH in the setting of active malignancy, beer potomania in the setting of cirrhosis  ? Serum sodium on presentation was 120 outpatient labs showed a sodium of 117  ? Patient was drinking upwards of 6 to 8 beers daily  ? Baseline serum sodium has been above 130 in August was 131  ? Clinically with signs of volume overload, tense abdomen some lower extremity edema, oral mucosa is moist, blood pressure is not low  ? Checking urine sodium, urine osmolality, serum osmolality, can check a uric acid,  ? Would start furosemide 40 mg IV daily to b i d , continue spironolactone  ? Check BMPs every 6 hours with a target sodium of 124-126 his she is at high risk for CPM  ? Fluid restriction 1 2 L and increase solute intake  ? Therapeutic paracentesis can be done as well    · Date: 09-17-22 cont fluid restriction Electrolytes monitor and replete Current Na 123 with goal of 124-126 by 1300 today Hyponatremia likely partially caused by increased dose of spironolactone  Spironolactone decreased to 50 mg daily this admission, continue Continue lasix Soft blood pressure overnight 9/17; 5% albumin 500 ml given X 2 continues to had abdominal distention and cramping  (+) lower leg b/l edema  1200 ml fluid restriction Decrease Aldactone to 50 mg p o   Q day continue daily wts and I/O  CIWA 4/11/4/4  (+) tremors and anxiety agitation       Intake/Output Summary (Last 24 hours) at 9/17/2022 1633  Last data filed at 9/17/2022 0800  Gross per 24 hour   Intake 1500 ml   Output 1880 ml   Net -380 ml         ED Triage Vitals   Temperature Pulse Respirations Blood Pressure SpO2   09/16/22 1009 09/16/22 1009 09/16/22 1009 09/16/22 1009 09/16/22 1009   97 8 °F (36 6 °C) 103 18 136/62 96 %      Temp Source Heart Rate Source Patient Position - Orthostatic VS BP Location FiO2 (%) 09/16/22 1009 09/16/22 1009 09/16/22 1119 09/16/22 1119 --   Temporal Monitor Lying Right arm       Pain Score       09/16/22 1009       No Pain          Wt Readings from Last 1 Encounters:   09/18/22 70 3 kg (155 lb)     Additional Vital Signs:   09/17/22 2333 98 3 °F (36 8 °C) 79 -- 98/56 66 96 % None (Room air) Lying   09/17/22 2108 -- -- -- -- -- 98 % None (Room air) --   09/17/22 1440 98 3 °F (36 8 °C) 83 20 90/45 Abnormal  65 98 % None (Room air) Lying   09/17/22 1122 98 1 °F (36 7 °C) 79 23 Abnormal  97/54 72 98 % None (Room air) --   09/17/22 0900 -- 96 20 122/57 82 95 % -- --   09/17/22 0800 97 9 °F (36 6 °C) 87 28 Abnormal  100/57 74 95 % None (Room air) --   09/17/22 0400 98 4 °F (36 9 °C) 82 19 83/49 Abnormal  60 Abnormal  93 % None (Room air) Lying   09/17/22 0300 -- 79 18 93/54 68 96 % -- --   09/17/22 0200 -- 83 18 90/51 64 Abnormal  95 % -- --   09/17/22 0100 -- 85 19 97/50 68 95 % -- --   09/17/22 0000 -- 87 19 89/53 Abnormal  66 95 % -- --   09/16/22 2300 98 7 °F (37 1 °C) 89 21 93/54 70 97 % None (Room air) Lying   09/16/22 2000 -- 87 22 103/52 74 97 % -- --   09/16/22 1800 -- 89 29 Abnormal  125/58 83 97 % None (Room air) Sitting   09/16/22 1600 -- 81 28 Abnormal  119/57 82 97 % None (Room air) Sitting   09/16/22 1548 97 9 °F (36 6 °C) 80 18 119/57 82 97 % None (Room air) Sitting   09/16/22 1429 97 9 °F (36 6 °C) 83 30 Abnormal  120/64 86 -- -- Sitting   09/16/22 1348 -- -- -- -- -- 99 % None (Room air) --   09/16/22 1330 -- 87 20 123/57 82 98 % -- --   09/16/22 1230 -- 77 23 Abnormal  109/57 77 99 % None (Room air) Lying   09/16/22 1119 -- 77 18 108/56 76 --         09/17/22 0539 72 kg (158 lb 11 7 oz)   09/16/22 1340 67 kg (147 lb 11 3 oz)   09/16/22 1009 66 2 kg (146 lb)           Pertinent Labs/Diagnostic Test Results:   EKG 09-16-22  NSR      Results from last 7 days   Lab Units 09/17/22  0443 09/16/22  1045 09/15/22  1341 09/15/22  1332   WBC Thousand/uL 4 68 5 57  --   --    WHITE BLOOD CELL COUNT  x10E3/uL  --   --  5 2 5 4   HEMOGLOBIN g/dL 9 5* 10 8*  --   --    HEMOGLOBIN  g/dL  --   --  11 8 11 0*   HEMATOCRIT % 26 3* 29 6*  --   --    HEMATOCRIT  %  --   --  30 0* 29 4*   PLATELETS Thousands/uL 63* 81*  --   --    PLATELETS  Z99W2/ZT  --   --  86* 75*   NEUTROS ABS Thousands/µL  --  3 75  --   --    NEUTROS ABS   x10E3/uL  --   --  3 8 3 7         Results from last 7 days   Lab Units 09/18/22  0450 09/17/22  1801 09/17/22  1435 09/17/22  0459 09/16/22  2333   SODIUM mmol/L 130* 129* 126* 123* 123*   POTASSIUM mmol/L 3 9 4 0 4 1 3 6 3 6   CHLORIDE mmol/L 97 95* 93* 90* 89*   CO2 mmol/L 27 29 30 27 28   ANION GAP mmol/L 6 5 3* 6 6   BUN mg/dL 9 8 9 9 8   CREATININE mg/dL 0 83 0 92 0 97 0 87 0 84   EGFR ml/min/1 73sq m 75 66 62 71 74   CALCIUM mg/dL 8 4 8 9 8 6 8 3 8 8     Results from last 7 days   Lab Units 09/17/22  0443 09/16/22  1606 09/16/22  1045 09/15/22  1341 09/15/22  1332   AST U/L 76*  --  96* 101* 98*   ALT U/L 38  --  52 42* 41*   ALK PHOS U/L 125*  --  172*  --   --    TOTAL PROTEIN g/dL 7 4  --  8 5* 8 2 8 2   ALBUMIN g/dL 1 8*  --  1 9* 2 6* 2 4*   TOTAL BILIRUBIN mg/dL 2 90*  --  2 30* 2 7* 2 5*   BILIRUBIN DIRECT mg/dL 0 95*  --   --   --   --    AMMONIA umol/L  --  <10*  --   --   --          Results from last 7 days   Lab Units 09/18/22  0450 09/17/22  1801 09/17/22  1435 09/17/22  0459 09/16/22  2333 09/16/22  1753 09/16/22  1045 09/15/22  1341 09/15/22  1332   GLUCOSE RANDOM mg/dL 89 101 107 100 107 102 132 98 96     Results from last 7 days   Lab Units 09/16/22  1606   OSMOLALITY, SERUM mmol/*       Results from last 7 days   Lab Units 09/15/22  1339   PH  5 5     Results from last 7 days   Lab Units 09/17/22  0443 09/16/22  1045   PROTIME seconds 23 0* 21 3*   INR  1 91* 1 74*   PTT seconds  --  37     Results from last 7 days   Lab Units 09/16/22  1045   TSH 3RD GENERATON uIU/mL 3 903       Results from last 7 days   Lab Units 09/16/22  1606 09/16/22  1457 OSMOLALITY, SERUM mmol/*  --    OSMO UR mmol/KG  --  267     Results from last 7 days   Lab Units 09/16/22  1457 09/15/22  1339   CLARITY UA  Clear  --    COLOR UA  Yellow Yellow   SPEC GRAV UA  1 020 1 010   PH UA  6 0  --    GLUCOSE UA mg/dl Negative  --    KETONES UA mg/dl Negative Negative   BLOOD UA  Negative Negative   PROTEIN UA mg/dl Negative  --    NITRITE UA  Negative Negative   BILIRUBIN UA  Small*  --    BILIRUBIN, URINE   --  Negative   UROBILINOGEN UA E U /dl 1 0 1 0   LEUKOCYTES UA  Negative Negative   WBC UA /hpf 0-1*  --    RBC UA /hpf None Seen  --    BACTERIA UA /hpf Occasional  --    EPITHELIAL CELLS WET PREP /hpf Occasional  --    SODIUM UR  13  --    CREATININE UR mg/dL 103 0  --          Results from last 7 days   Lab Units 09/16/22  1045   ETHANOL LVL mg/dL 34*           Past Medical History:   Diagnosis Date    Alcoholic fatty liver     Anxiety     Asthma     COPD (chronic obstructive pulmonary disease) (HCC)     Elevated liver function tests     GERD (gastroesophageal reflux disease)     GERD without esophagitis     Hyperlipidemia     Hypertension     IBS (irritable bowel syndrome)     Insomnia     Insomnia     Liver disease     Nervousness     Nicotine dependence     Other specified urinary incontinence     RLS (restless legs syndrome)     Wears glasses      Present on Admission:   Hyponatremia   Alcohol abuse   Alcoholic cirrhosis of liver with ascites (HCC)   Anxiety   Elevated LFTs   Thrombocytopenia (HCC)   Nicotine dependence      Admitting Diagnosis: Hyponatremia [E87 1]  Abnormal laboratory test [R89 9]  Age/Sex: 60 y o  female  Admission Orders:  1200 ml fluid restrictions   Daily wts  I/O  CIWA  Neuro q 4 hrs  SCD    Scheduled Medications:  albumin human (FLEXBUMIN) 25 % injection 25 g daily   anastrozole, 1 mg, Oral, Daily  enoxaparin, 40 mg, Subcutaneous, Daily  folic acid, 1 mg, Oral, Daily  furosemide, 40 mg, Oral, Daily  midodrine, 5 mg, Oral, TID AC  multivitamin-minerals, 1 tablet, Oral, Daily  spironolactone, 50 mg, Oral, Daily  thiamine, 100 mg, Oral, Daily      Continuous IV Infusions:  dextrose 5 % and sodium chloride 0 45 % infusion @ 125 ml/hr     PRN Meds:  albuterol, 2 puff, Inhalation, Q6H PRN  ALPRAZolam, 1 mg, Oral, BID PRN  X 3   benzonatate, 100 mg, Oral, TID PRN  X 2         IP CONSULT TO CASE MANAGEMENT  IP CONSULT TO NEPHROLOGY    Network Utilization Review Department  ATTENTION: Please call with any questions or concerns to [phone] and carefully listen to the prompts so that you are directed to the right person  All voicemails are confidential   Candia Siemens all requests for admission clinical reviews, approved or denied determinations and any other requests to dedicated fax number below belonging to the campus where the patient is receiving treatment   List of dedicated fax numbers for the Facilities:  [address] DENIALS (Administrative/Medical Necessity) [phone]   [address] (Maternity/NICU/Pediatrics) [phone]   [address]  [phone] [place] [address] [address] [facility] [place] [address] [phone]

## 2022-09-19 ENCOUNTER — TRANSITIONAL CARE MANAGEMENT (OUTPATIENT)
Dept: FAMILY MEDICINE CLINIC | Facility: CLINIC | Age: 62
End: 2022-09-19

## 2022-09-19 VITALS
HEART RATE: 79 BPM | OXYGEN SATURATION: 100 % | DIASTOLIC BLOOD PRESSURE: 54 MMHG | TEMPERATURE: 98.3 F | WEIGHT: 156.21 LBS | SYSTOLIC BLOOD PRESSURE: 113 MMHG | HEIGHT: 63 IN | BODY MASS INDEX: 27.68 KG/M2 | RESPIRATION RATE: 18 BRPM

## 2022-09-19 LAB
ALBUMIN SERPL BCP-MCNC: 2.1 G/DL (ref 3.5–5)
ALP SERPL-CCNC: 105 U/L (ref 46–116)
ALT SERPL W P-5'-P-CCNC: 28 U/L (ref 12–78)
ANION GAP SERPL CALCULATED.3IONS-SCNC: 7 MMOL/L (ref 4–13)
AST SERPL W P-5'-P-CCNC: 49 U/L (ref 5–45)
BASOPHILS # BLD AUTO: 0.08 THOUSANDS/ΜL (ref 0–0.1)
BASOPHILS NFR BLD AUTO: 2 % (ref 0–1)
BILIRUB SERPL-MCNC: 2.2 MG/DL (ref 0.2–1)
BUN SERPL-MCNC: 8 MG/DL (ref 5–25)
CALCIUM ALBUM COR SERPL-MCNC: 10 MG/DL (ref 8.3–10.1)
CALCIUM SERPL-MCNC: 8.5 MG/DL (ref 8.3–10.1)
CHLORIDE SERPL-SCNC: 99 MMOL/L (ref 96–108)
CO2 SERPL-SCNC: 24 MMOL/L (ref 21–32)
CREAT SERPL-MCNC: 0.76 MG/DL (ref 0.6–1.3)
EOSINOPHIL # BLD AUTO: 0.11 THOUSAND/ΜL (ref 0–0.61)
EOSINOPHIL NFR BLD AUTO: 2 % (ref 0–6)
ERYTHROCYTE [DISTWIDTH] IN BLOOD BY AUTOMATED COUNT: 14.8 % (ref 11.6–15.1)
GFR SERPL CREATININE-BSD FRML MDRD: 83 ML/MIN/1.73SQ M
GLUCOSE SERPL-MCNC: 96 MG/DL (ref 65–140)
HCT VFR BLD AUTO: 24.4 % (ref 34.8–46.1)
HGB BLD-MCNC: 8.7 G/DL (ref 11.5–15.4)
IMM GRANULOCYTES # BLD AUTO: 0.02 THOUSAND/UL (ref 0–0.2)
IMM GRANULOCYTES NFR BLD AUTO: 0 % (ref 0–2)
LYMPHOCYTES # BLD AUTO: 0.96 THOUSANDS/ΜL (ref 0.6–4.47)
LYMPHOCYTES NFR BLD AUTO: 20 % (ref 14–44)
MCH RBC QN AUTO: 39.4 PG (ref 26.8–34.3)
MCHC RBC AUTO-ENTMCNC: 35.7 G/DL (ref 31.4–37.4)
MCV RBC AUTO: 110 FL (ref 82–98)
MONOCYTES # BLD AUTO: 0.96 THOUSAND/ΜL (ref 0.17–1.22)
MONOCYTES NFR BLD AUTO: 20 % (ref 4–12)
NEUTROPHILS # BLD AUTO: 2.61 THOUSANDS/ΜL (ref 1.85–7.62)
NEUTS SEG NFR BLD AUTO: 56 % (ref 43–75)
NRBC BLD AUTO-RTO: 0 /100 WBCS
PLATELET # BLD AUTO: 51 THOUSANDS/UL (ref 149–390)
PMV BLD AUTO: 9.4 FL (ref 8.9–12.7)
POTASSIUM SERPL-SCNC: 4.2 MMOL/L (ref 3.5–5.3)
PROT SERPL-MCNC: 6.6 G/DL (ref 6.4–8.4)
RBC # BLD AUTO: 2.21 MILLION/UL (ref 3.81–5.12)
SODIUM SERPL-SCNC: 130 MMOL/L (ref 135–147)
WBC # BLD AUTO: 4.74 THOUSAND/UL (ref 4.31–10.16)

## 2022-09-19 PROCEDURE — 80053 COMPREHEN METABOLIC PANEL: CPT | Performed by: HOSPITALIST

## 2022-09-19 PROCEDURE — 99239 HOSP IP/OBS DSCHRG MGMT >30: CPT | Performed by: STUDENT IN AN ORGANIZED HEALTH CARE EDUCATION/TRAINING PROGRAM

## 2022-09-19 PROCEDURE — 85025 COMPLETE CBC W/AUTO DIFF WBC: CPT | Performed by: HOSPITALIST

## 2022-09-19 PROCEDURE — 99232 SBSQ HOSP IP/OBS MODERATE 35: CPT | Performed by: NURSE PRACTITIONER

## 2022-09-19 RX ORDER — MIDODRINE HYDROCHLORIDE 5 MG/1
5 TABLET ORAL
Qty: 90 TABLET | Refills: 0 | Status: SHIPPED | OUTPATIENT
Start: 2022-09-19 | End: 2022-09-19 | Stop reason: SDUPTHER

## 2022-09-19 RX ORDER — SPIRONOLACTONE 50 MG/1
50 TABLET, FILM COATED ORAL DAILY
Qty: 30 TABLET | Refills: 0 | Status: SHIPPED | OUTPATIENT
Start: 2022-09-19 | End: 2022-10-07

## 2022-09-19 RX ORDER — MIDODRINE HYDROCHLORIDE 5 MG/1
5 TABLET ORAL
Qty: 90 TABLET | Refills: 0 | Status: SHIPPED | OUTPATIENT
Start: 2022-09-19 | End: 2022-10-28

## 2022-09-19 RX ORDER — SPIRONOLACTONE 50 MG/1
50 TABLET, FILM COATED ORAL DAILY
Qty: 30 TABLET | Refills: 0 | Status: SHIPPED | OUTPATIENT
Start: 2022-09-19 | End: 2022-09-19 | Stop reason: SDUPTHER

## 2022-09-19 RX ADMIN — FOLIC ACID 1 MG: 1 TABLET ORAL at 10:59

## 2022-09-19 RX ADMIN — MIDODRINE HYDROCHLORIDE 5 MG: 5 TABLET ORAL at 10:59

## 2022-09-19 RX ADMIN — ANASTROZOLE 1 MG: 1 TABLET, COATED ORAL at 10:59

## 2022-09-19 RX ADMIN — MULTIPLE VITAMINS W/ MINERALS TAB 1 TABLET: TAB ORAL at 10:59

## 2022-09-19 RX ADMIN — Medication 100 MG: at 11:00

## 2022-09-19 NOTE — ASSESSMENT & PLAN NOTE
· Acute on chronic baseline 130-133  · Presented with sodium of 120  · 2/2 cirrhosis/ascites and beer potomania  · W/u: Serum osm 256/urine osm 267/U na 13 (however on diuretics)/TSH 3 9/UA  · Nephrology following  · Spirinolactone recently increased outpt to 100mg on 8/23  · IV lasix change back to oral po lasix today 9/17  · Patient was also give IV albumin per nephrology  · Na 130 today  · Fluid restrict  · Educated pt and son's on need to stop drinking beer  · Continue lasix and aldactone 50 mg on discharge  · Nephro to schedule follow up appointment

## 2022-09-19 NOTE — DISCHARGE INSTRUCTIONS
Hyponatremia   WHAT YOU NEED TO KNOW:   Hyponatremia occurs when the amount of sodium (salt) in your blood is lower than normal  Sodium is an electrolyte (mineral) that helps your muscles, heart, and digestive system work properly  It helps control blood pressure and fluid balance  DISCHARGE INSTRUCTIONS:   Follow up with your healthcare provider as directed: You may need to return for more tests  Write down your questions so you remember to ask them during your visits  Nutrition:  You may need to increase your intake of sodium  Foods that are high in sodium include milk, packaged snacks such as pretzels, or processed meats (harrington, sausage, and ham)  Ask your dietitian to help you create a meal plan that is right for you  Liquids: Follow your healthcare provider's advice if you need to limit the amount of liquid you drink  Ask how much liquid to drink each day and which liquids are best for you  You may be asked to drink liquids that have water, sugar, and salt, such as juices, milk, or sports drinks  These liquids help your body hold in fluid and prevent dehydration  Contact your healthcare provider if:   You have muscle cramps or twitching  You feel very weak or tired  You have nausea or are vomiting  You have questions or concerns about your condition or care  Return to the emergency department if:   You have a seizure  You have an irregular heartbeat  You have trouble breathing  You cannot move your arms and legs  You are confused or cannot think clearly  © Copyright [employer] 2022 Information is for End User's use only and may not be sold, redistributed or otherwise used for commercial purposes  All illustrations and images included in CareNotes® are the copyrighted property of [employer]  or Damon Patel  The above information is an  only  It is not intended as medical advice for individual conditions or treatments   Talk to your doctor, nurse or pharmacist before following any medical regimen to see if it is safe and effective for you

## 2022-09-19 NOTE — ASSESSMENT & PLAN NOTE
· History of alcoholic cirrhosis, follows [facility]- Dr Jerrilyn Spatz  · Recently seen on 8/23/22 and spironolactone increased from 50 mg to 100 mg daily 2/2 increased ascites and hypokalemia  · Bedside u/s completed--sm to moderate ascites will attempt to manage with diuretics first as least invasive

## 2022-09-19 NOTE — PROGRESS NOTES
NEPHROLOGY PROGRESS NOTE   Bruce Austin 60 y o  female MRN: [id number]  Unit/Bed#: -01 Encounter: [id number]  Reason for Consult: acute on chronic hyponatremia    ASSESSMENT/PLAN:  Acute on chronic hyponatremia:  Suspected due to alcohol intake   -presented with sodium of 120   -baseline sodium 130-131   -repeat sodium level this morning 130    -currently on Lasix 40 mg daily + aldactone 50 mg po daily  -maintained on fluid restriction 1 2 L daily   -received albumin   -creatinine remains stable   -avoiding salt tablets in the setting of cirrhosis  -workup:  Urine sodium 13, urine osmolality 267, serum osmolality 256, TSH normal   -imaging showed ground-glass opacity in the right upper lobe which may represent emphysema/scarring   -trending BMP  -message sent office to arrange outpatient follow-up with repeat BMP prior to appointment  Blood pressure/hypotension:  Blood pressure in the 90s to 100s  -currently on Lasix 40 mg daily, Aldactone 50 mg daily, + midodrine 5 mg t i d  (increased this admission)  -recommend avoiding hypotension or high fluctuations in blood pressure  Alcoholic cirrhosis:  Reports drinking 6-8 beers per day  -s/p paracentesis  -continues on fluid restriction  -currently on Lasix and Aldactone + midodrine   -completed albumin   -maintained on folic acid, thiamine, and multivitamin  Anemia:  -hemoglobin currently 8 7   -continue to monitor and transfuse as needed for hemoglobin less than 7 0  Other:  COPD  Disposition:  Okay to discharge from [facility] on fluid restriction, midodrine, Lasix, and Aldactone  Message sent office to arrange follow-up  SUBJECTIVE:  The patient is resting in her bed  She denies chest pain or diarrhea  She denies nausea, vomiting, or diarrhea, She is urinating well  She is eating and drinking well  We discussed fluid restriction and her diuretics       OBJECTIVE:  Current Weight: Weight - Scale: 70 9 kg (156 lb 3 4 oz)  Vitals: 09/18/22 2323 09/19/22 0440 09/19/22 0545 09/19/22 0751   BP: (!) 98/47 97/51  113/54   BP Location: Right arm Right arm  Right arm   Pulse: 82 79     Resp:    18   Temp: 98 °F (36 7 °C)   98 3 °F (36 8 °C)   TempSrc: Oral   Oral   SpO2: 98% 100%     Weight:  70 9 kg (156 lb 3 4 oz) 70 9 kg (156 lb 3 4 oz)    Height:           Intake/Output Summary (Last 24 hours) at 9/19/2022 5942  Last data filed at 9/19/2022 0800  Gross per 24 hour   Intake 720 ml   Output --   Net 720 ml     General: NAD  Skin: warm, dry, intact, no rash  HEENT: Moist mucous membranes, sclera anicteric, normocephalic, atraumatic  Neck: No apparent JVD appreciated  Chest: lung sounds clear B/L, on RA   CVS:Regular rate and rhythm, no murmer   Abdomen: Soft, round, non-tender, +BS  Extremities: No B/L LE edema present  Neuro: alert and oriented  Psych: appropriate mood and affect     Medications:    Current Facility-Administered Medications:     albuterol (PROVENTIL HFA,VENTOLIN HFA) inhaler 2 puff, 2 puff, Inhalation, Q6H PRN, JOSÉ Koch    ALPRAZolam Bharat Delgado) tablet 1 mg, 1 mg, Oral, BID PRN, JOSÉ Koch, 1 mg at 09/18/22 2202    anastrozole (ARIMIDEX) tablet 1 mg, 1 mg, Oral, Daily, JOSÉ Kevin, 1 mg at 09/18/22 9643    benzonatate (TESSALON PERLES) capsule 100 mg, 100 mg, Oral, TID PRN, JOSÉ Koch, 100 mg at 09/18/22 2202    enoxaparin (LOVENOX) subcutaneous injection 40 mg, 40 mg, Subcutaneous, Daily, JOSÉ Koch    folic acid (FOLVITE) tablet 1 mg, 1 mg, Oral, Daily, JOSÉ Kevin, 1 mg at 09/18/22 2629    furosemide (LASIX) tablet 40 mg, 40 mg, Oral, Daily, JOSÉ Kevin, 40 mg at 09/17/22 1124    midodrine (PROAMATINE) tablet 5 mg, 5 mg, Oral, TID AC, JOSÉ Marrero, 5 mg at 09/18/22 1700    multivitamin-minerals (CENTRUM) tablet 1 tablet, 1 tablet, Oral, Daily, JOSÉ Koch, 1 tablet at 09/18/22 1232    spironolactone (ALDACTONE) tablet 50 mg, 50 mg, Oral, Daily, Hiwot Gunn MD    thiamine tablet 100 mg, 100 mg, Oral, Daily, JOSÉ Antoine, 100 mg at 09/18/22 0050    Laboratory Results:  Results from last 7 days   Lab Units 09/19/22  0439 09/18/22  0450 09/17/22  1801 09/17/22  0459 09/17/22  0443 09/16/22  1753 09/16/22  1045   WBC Thousand/uL 4 74  --   --   --  4 68  --  5 57   HEMOGLOBIN g/dL 8 7*  --   --   --  9 5*  --  10 8*   HEMATOCRIT % 24 4*  --   --   --  26 3*  --  29 6*   PLATELETS Thousands/uL 51*  --   --   --  63*  --  81*   SODIUM mmol/L 130* 130* 129*   < >  --    < > 120*   POTASSIUM mmol/L 4 2 3 9 4 0   < >  --    < > 3 6   CHLORIDE mmol/L 99 97 95*   < >  --    < > 87*   CO2 mmol/L 24 27 29   < >  --    < > 29   BUN mg/dL 8 9 8   < >  --    < > 7   CREATININE mg/dL 0 76 0 83 0 92   < >  --    < > 0 90   CALCIUM mg/dL 8 5 8 4 8 9   < >  --    < > 8 5   ALK PHOS U/L 105  --   --   --  125*  --  172*   ALT U/L 28  --   --   --  38  --  52   AST U/L 49*  --   --   --  76*  --  96*    < > = values in this interval not displayed

## 2022-09-19 NOTE — DISCHARGE SUMMARY
[place]  Discharge- Darby Henderson 1960, 60 y o  female MRN: [id number]  Unit/Bed#: -01 Encounter: [id number]  Primary Care Provider: Jasmyn Schafer PA-C   Date and time admitted to hospital: 9/16/2022 10:06 AM    * Hyponatremia  Assessment & Plan  · Acute on chronic baseline 130-133  · Presented with sodium of 120  · 2/2 cirrhosis/ascites and beer potomania  · W/u: Serum osm 256/urine osm 267/U na 13 (however on diuretics)/TSH 3 9/UA  · Nephrology following  · Spirinolactone recently increased outpt to 100mg on 8/23  · IV lasix change back to oral po lasix today 9/17  · Patient was also give IV albumin per nephrology  · Na 130 today  · Fluid restrict  · Educated pt and son's on need to stop drinking beer  · Continue lasix and aldactone 50 mg on discharge  · Nephro to schedule follow up appointment    Anemia  Assessment & Plan  hgb 9 5  No sx of active bleeding      Alcoholic cirrhosis of liver with ascites ([facility] [address] )  Assessment & Plan  · History of alcoholic cirrhosis, follows with [facility] GI outpatient- Dr Hank Smith  · Recently seen on 8/23/22 and spironolactone increased from 50 mg to 100 mg daily 2/2 increased ascites and hypokalemia  · Bedside u/s completed--sm to moderate ascites will attempt to manage with diuretics first as least invasive    COPD (chronic obstructive pulmonary disease) (HCC)  Assessment & Plan  Not in exacerbation  Cont inhalers    Elevated LFTs  Assessment & Plan  · With ETOH cirrhosis  · Ciwa protocol  · Folate, thiamine, MV      Anxiety  Assessment & Plan  · Xanax prn home dose      Medical Problems             Resolved Problems  Date Reviewed: 9/19/2022   None               Discharging Physician / Practitioner: Radha Morse DO  PCP: Jasmyn Schafer PA-C  Admission Date:   Admission Orders (From admission, onward)     Ordered        09/16/22 1335  INPATIENT ADMISSION  Once                      Discharge Date: 09/19/22    Consultations During Hospital Stay:  · nephrology    Procedures Performed:   · none    Significant Findings / Test Results:   · Hyponatremia    Incidental Findings:   · none     Test Results Pending at Discharge (will require follow up):   · none     Outpatient Tests Requested:  · none    Complications:  none    Reason for Admission: hyponatremia    Hospital Course:   Cherelle Alvarez is a 60 y o  female patient who originally presented to the hospital on 9/16/2022 due to hyponatremia in the setting of cirrhosis and beer potomania  Sodium on presentation was noted to be  120  With baseline being around 130-133  Nephrology was consulted  Initially patient was placed on IV Lasix then transition back to oral Lasix  Patient's Aldactone was also decreased to 50 mg daily from 100 mg daily  Patient's sodium slowly improved to her baseline and is being discharged with sodium of 130  Nephrology also started patient on midodrine 5 mg t i d  For borderline blood pressures and recommend continuing on discharge  Please see above list of diagnoses and related plan for additional information  Condition at Discharge: fair    Discharge Day Visit / Exam:   Subjective:  Patient seen examined bedside  No acute events overnight  States that she feels well like to be discharged  Vitals: Blood Pressure: 113/54 (09/19/22 0751)  Pulse: 79 (09/19/22 0440)  Temperature: 98 3 °F (36 8 °C) (09/19/22 0751)  Temp Source: Oral (09/19/22 0751)  Respirations: 18 (09/19/22 0751)  Height: 5' 3" (160 cm) (09/16/22 1009)  Weight - Scale: 70 9 kg (156 lb 3 4 oz) (09/19/22 0545)  SpO2: 100 % (09/19/22 0440)  Exam:   Physical Exam  Vitals reviewed  HENT:      Head: Normocephalic and atraumatic  Right Ear: External ear normal       Left Ear: External ear normal       Nose: Nose normal       Mouth/Throat:      Mouth: Mucous membranes are moist       Pharynx: Oropharynx is clear  Eyes:      Extraocular Movements: Extraocular movements intact  Cardiovascular:      Rate and Rhythm: Normal rate and regular rhythm  Heart sounds: Normal heart sounds  Pulmonary:      Effort: Pulmonary effort is normal       Breath sounds: Normal breath sounds  Abdominal:      General: Abdomen is flat  Palpations: Abdomen is soft  Tenderness: There is no abdominal tenderness  Musculoskeletal:      Cervical back: Normal range of motion  Right lower leg: No edema  Left lower leg: No edema  Skin:     General: Skin is warm and dry  Neurological:      Mental Status: She is alert  Mental status is at baseline  Psychiatric:         Mood and Affect: Mood normal          Behavior: Behavior normal           Discussion with Family: Patient declined call to   Discharge instructions/Information to patient and family:   See after visit summary for information provided to patient and family  Provisions for Follow-Up Care:  See after visit summary for information related to follow-up care and any pertinent home health orders  Disposition:   Home    Planned Readmission: no     Discharge Statement:  I spent 45 minutes discharging the patient  This time was spent on the day of discharge  I had direct contact with the patient on the day of discharge  Greater than 50% of the total time was spent examining patient, answering all patient questions, arranging and discussing plan of care with patient as well as directly providing post-discharge instructions  Additional time then spent on discharge activities  Discharge Medications:  See after visit summary for reconciled discharge medications provided to patient and/or family        **Please Note: This note may have been constructed using a voice recognition system**

## 2022-09-19 NOTE — PLAN OF CARE
Problem: Potential for Falls  Goal: Patient will remain free of falls  Description: INTERVENTIONS:  - Educate patient/family on patient safety including physical limitations  - Instruct patient to call for assistance with activity   - Consult OT/PT to assist with strengthening/mobility   - Keep Call bell within reach  - Keep bed low and locked with side rails adjusted as appropriate  - Keep care items and personal belongings within reach  - Initiate and maintain comfort rounds  - Make Fall Risk Sign visible to staff  - Offer Toileting every   independent   - Initiate/Maintain N/A alarm  - Obtain necessary fall risk management equipment: independent   - Apply yellow socks and bracelet for high fall risk patients  - Consider moving patient to room near nurses station  Outcome: Progressing

## 2022-09-19 NOTE — CASE MANAGEMENT
Case Management Assessment & Discharge Planning Note    Patient name Ashia Paz  Location /-27 MRN [id number]  : 1960 Date 2022       Current Admission Date: 2022  Current Admission Diagnosis:Hyponatremia   Patient Active Problem List    Diagnosis Date Noted    Malignant neoplasm of lower-outer quadrant of right breast of female, estrogen receptor positive ([address] ) 2022    Continuous opioid dependence ([facility] [address] ) 05/10/2022    Breast neoplasm, Tis (DCIS), right 2021    Atypical ductal hyperplasia of right breast 2021    Ductal carcinoma in situ (DCIS) of right breast 2021    Decompensated hepatic cirrhosis ([address] ) 12/10/2020    Thrombocytopenia ([facility] [address] ) 2020    Anemia     Alcoholic cirrhosis of liver with ascites ([facility] [address] ) 2020    Hyponatremia     Uncomplicated alcohol dependence ([facility] [address] ) 2020    Non-seasonal allergic rhinitis due to pollen 10/08/2020    Irritable bowel syndrome with diarrhea 2019    Alcohol abuse 2018    COPD (chronic obstructive pulmonary disease) ([address] ) 2018   [address] maintenance 2018    Fatty liver 2018    Other specified abnormal findings of blood chemistry 2018    Elevated LFTs 2016    Insomnia 2015    Essential hypertension 2014    GERD without esophagitis 2012    Hyperlipidemia 2012    Nicotine dependence 2012    Anxiety 2012      LOS (days): 3  Geometric Mean LOS (GMLOS) (days):   Days to GMLOS:     OBJECTIVE:    Risk of Unplanned Readmission Score: 25 6         Current admission status: Inpatient       Preferred Pharmacy:   [facility]  ([address] [facility]  [address] [facility]  [address] [address]  Phone: [phone] Fax: [address] [phone] [facility]   [address]  [place]  [address]   [facility] [address]  Phone: [phone] Fax: [phone]    Primary Care Provider: Octavio Flores PA-C    Primary Insurance: BLUE CROSS  Secondary Insurance:     ASSESSMENT:  Tima Watt Representative - Spouse   Primary Phone: [phone] (Home)               Advance Directives  Does patient have a [address]?: No  Was patient offered paperwork?: Yes (declined)  Does patient currently have a Health Care decision maker?: Yes, please see Health Care Proxy section  Does patient have Advance Directives?: No  Was patient offered paperwork?: Yes (declined)    Readmission Root Cause  30 Day Readmission: No    Patient Information  Admitted from[de-identified] Home  Mental Status: Alert  During Assessment patient was accompanied by: Not accompanied during assessment  Assessment information provided by[de-identified] Patient  Primary Caregiver: Self  Support Systems: Spouse/significant other  Home entry access options   Select all that apply : Stairs  Number of steps to enter home : 1  Type of Current Residence: Ranch  In the last 12 months, was there a time when you were not able to pay the mortgage or rent on time?: No  In the last 12 months, how many places have you lived?: 1  In the last 12 months, was there a time when you did not have a steady place to sleep or slept in a shelter (including now)?: No  Homeless/housing insecurity resource given?: N/A  Living Arrangements: Lives w/ Spouse/significant other  Is patient a ?: No    Activities of Daily Living Prior to Admission  Functional Status: Independent  Completes ADLs independently?: Yes  Ambulates independently?: Yes  Does patient use assisted devices?: No  Does patient currently own DME?: No  Does patient have a history of Outpatient Therapy (PT/OT)?: No  Does the patient have a history of Short-Term Rehab?: No  Does patient have a history of HHC?: No  Does patient currently have [facility]?: No    Patient Information Continued  Does patient have prescription coverage?: Yes  Within the past 12 months, you worried that your food would run out before you got the money to buy more : Never true  Within the past 12 months, the food you bought just didn't last and you didn't have money to get more : Never true  Food insecurity resource given?: N/A  Does patient receive dialysis treatments?: No  Does patient have a history of substance abuse?: No  Does patient have a history of Mental Health Diagnosis?: No    Means of Transportation  Means of Transport to Appts[de-identified] Drives Self  In the past 12 months, has lack of transportation kept you from medical appointments or from getting medications?: No  In the past 12 months, has lack of transportation kept you from meetings, work, or from getting things needed for daily living?: No  Was application for public transport provided?: N/A    DISCHARGE DETAILS:    Discharge planning discussed with[de-identified] patient  Freedom of Choice: Yes  Comments - Freedom of Choice: no anticipated dc needs  CM contacted family/caregiver?: No- see comments (declined; reports being in contact with spouse)  Were Treatment Team discharge recommendations reviewed with patient/caregiver?: Yes  Did patient/caregiver verbalize understanding of patient care needs?: Yes  Were patient/caregiver advised of the risks associated with not following Treatment Team discharge recommendations?: Yes    [address]         Is the patient [facility] in [facility] at discharge?: No    DME Referral Provided  Referral made for DME?: No    Treatment Team Recommendation: Home  Discharge Destination Plan[de-identified] Home  Transport at Discharge : Family    Additional Comments: Met with pt to discuss the role of CM and to discuss any help pt may need prior to dc  Pt lives with her  Essence Garcia in a ranch home with [address]  Pt performed ADL's indptly pta, no use of DME  NO hx of HHC or rehab  No hx of mental health or D&A treatment  [facility]   Pt drives  Pt's  will transport home at dc

## 2022-09-19 NOTE — PLAN OF CARE
Problem: PAIN - ADULT  Goal: Verbalizes/displays adequate comfort level or baseline comfort level  Description: Interventions:  - Encourage patient to monitor pain and request assistance  - Assess pain using appropriate pain scale  - Administer analgesics based on type and severity of pain and evaluate response  - Implement non-pharmacological measures as appropriate and evaluate response  - Consider cultural and social influences on pain and pain management  - Notify physician/advanced practitioner if interventions unsuccessful or patient reports new pain  Outcome: Progressing     Problem: INFECTION - ADULT  Goal: Absence or prevention of progression during hospitalization  Description: INTERVENTIONS:  - Assess and monitor for signs and symptoms of infection  - Monitor lab/diagnostic results  - Monitor all insertion sites, i e  indwelling lines, tubes, and drains  - Monitor endotracheal if appropriate and nasal secretions for changes in amount and color  - [place] appropriate cooling/warming therapies per order  - Administer medications as ordered  - Instruct and encourage patient and family to use good hand hygiene technique  - Identify and instruct in appropriate isolation precautions for identified infection/condition  Outcome: Progressing     Problem: Knowledge Deficit  Goal: Patient/family/caregiver demonstrates understanding of disease process, treatment plan, medications, and discharge instructions  Description: Complete learning assessment and assess knowledge base    Interventions:  - Provide teaching at level of understanding  - Provide teaching via preferred learning methods  Outcome: Progressing     Problem: DISCHARGE PLANNING  Goal: Discharge to home or other facility with appropriate resources  Description: INTERVENTIONS:  - Identify barriers to discharge w/patient and caregiver  - Arrange for needed discharge resources and transportation as appropriate  - Identify discharge learning needs (meds, wound care, etc )  - Arrange for interpretive services to assist at discharge as needed  - Refer to Case Management Department for coordinating discharge planning if the patient needs post-hospital services based on physician/advanced practitioner order or complex needs related to functional status, cognitive ability, or social support system  Outcome: Progressing

## 2022-09-20 ENCOUNTER — OFFICE VISIT (OUTPATIENT)
Dept: FAMILY MEDICINE CLINIC | Facility: CLINIC | Age: 62
End: 2022-09-20
Payer: COMMERCIAL

## 2022-09-20 ENCOUNTER — TELEPHONE (OUTPATIENT)
Dept: NEPHROLOGY | Facility: CLINIC | Age: 62
End: 2022-09-20

## 2022-09-20 VITALS
DIASTOLIC BLOOD PRESSURE: 62 MMHG | WEIGHT: 152.2 LBS | RESPIRATION RATE: 15 BRPM | HEART RATE: 60 BPM | OXYGEN SATURATION: 98 % | HEIGHT: 63 IN | BODY MASS INDEX: 26.97 KG/M2 | SYSTOLIC BLOOD PRESSURE: 100 MMHG

## 2022-09-20 DIAGNOSIS — F10.10 ALCOHOL ABUSE: ICD-10-CM

## 2022-09-20 DIAGNOSIS — K74.60 DECOMPENSATED HEPATIC CIRRHOSIS (HCC): ICD-10-CM

## 2022-09-20 DIAGNOSIS — D69.6 THROMBOCYTOPENIA (HCC): ICD-10-CM

## 2022-09-20 DIAGNOSIS — E87.1 HYPONATREMIA: Primary | ICD-10-CM

## 2022-09-20 DIAGNOSIS — I10 ESSENTIAL HYPERTENSION: ICD-10-CM

## 2022-09-20 DIAGNOSIS — D05.11 DUCTAL CARCINOMA IN SITU (DCIS) OF RIGHT BREAST: ICD-10-CM

## 2022-09-20 DIAGNOSIS — Z17.0 MALIGNANT NEOPLASM OF LOWER-OUTER QUADRANT OF RIGHT BREAST OF FEMALE, ESTROGEN RECEPTOR POSITIVE (HCC): ICD-10-CM

## 2022-09-20 DIAGNOSIS — K72.90 DECOMPENSATED HEPATIC CIRRHOSIS (HCC): ICD-10-CM

## 2022-09-20 DIAGNOSIS — J44.9 CHRONIC OBSTRUCTIVE PULMONARY DISEASE, UNSPECIFIED COPD TYPE (HCC): ICD-10-CM

## 2022-09-20 DIAGNOSIS — K70.31 ALCOHOLIC CIRRHOSIS OF LIVER WITH ASCITES (HCC): Chronic | ICD-10-CM

## 2022-09-20 DIAGNOSIS — C50.511 MALIGNANT NEOPLASM OF LOWER-OUTER QUADRANT OF RIGHT BREAST OF FEMALE, ESTROGEN RECEPTOR POSITIVE (HCC): ICD-10-CM

## 2022-09-20 PROBLEM — F10.20 UNCOMPLICATED ALCOHOL DEPENDENCE (HCC): Status: RESOLVED | Noted: 2020-12-05 | Resolved: 2022-09-20

## 2022-09-20 PROCEDURE — 99496 TRANSJ CARE MGMT HIGH F2F 7D: CPT | Performed by: PHYSICIAN ASSISTANT

## 2022-09-20 NOTE — UTILIZATION REVIEW
Notification of Discharge   This is a Notification of Discharge from our facility [address]  Please be advised that this patient has been discharge from our facility  Below you will find the admission and discharge date and time including the patients disposition  UTILIZATION REVIEW CONTACT:  Jose Recio  Utilization   Network Utilization Review Department  Phone: [phone] carefully listen to the prompts  All voicemails are confidential   Email: [email or web address]     PHYSICIAN ADVISORY SERVICES:  FOR [id number] REVIEW - MEDICAL NECESSITY DENIAL  Phone: [phone]  Fax: [phone]  Email: [email or web address]     PRESENTATION DATE: 9/16/2022 10:06 AM  OBERVATION ADMISSION DATE:   INPATIENT ADMISSION DATE: 9/16/22  1:09 PM   DISCHARGE DATE: 9/19/2022 12:25 PM  DISPOSITION: Home/Self Care Home/Self Care      IMPORTANT INFORMATION:  Send all requests for admission clinical reviews, approved or denied determinations and any other requests to dedicated fax number below belonging to the campus where the patient is receiving treatment   List of dedicated fax numbers:  [address] DENIALS (Administrative/Medical Necessity) [phone]   [address] (Maternity/NICU/Pediatrics) [phone]   Tiffanie Gastelum [phone]   130 [facility] [phone]   [address] [place] [address] [phone]   2000 [facility] [address] [place] [address],[address] [place] [address] [phone]   [facility]  [phone]   [address] [facility] [address]  24[address] [facility] 1000 [facility] [phone]

## 2022-09-20 NOTE — PROGRESS NOTES
TCM Call     Date and time call was made  9/19/2022 12:41 PM    Hospital care reviewed  Records reviewed    Patient was hospitialized at  [address]  [facility]    Date of Admission  09/16/22    Date of discharge  09/19/22    Diagnosis  Hyponatremia    Disposition  Home    Were the patients medications reviewed and updated  No    Current Symptoms  None      TCM Call     Post hospital issues  None    Should patient be enrolled in anticoag monitoring? No    Scheduled for follow up? Yes    I have advised the patient to call PCP with any new or worsening symptoms  Chan Kinney        Assessment/Plan:    1  Hyponatremia - will repeat sodium today for specialist appt, referral placed to nephro, patient to continue lasix 40 mg and aldactone 50 mg as discharged, STRONGLY discussed that the root cause of this is drinking beer and she must not start again, appt with GI tomorrow    2  Alcoholic cirrhosis - appt with GI 9/21/2022    3  COPD - c/w albuterol    4  HTN - c/w midodrine 1 tab 3 x a day, continue have BP monitored at many appts pending and with nephrology    5  Thrombocytopenia - under care Dr Jesús Sheridan    6  DCIS right breast - on anatrazole, under care Rhodes    F/u as needed  F/u as scheduled in November        Subjective:   Chief Complaint   Patient presents with    Transition of Care Management      Patient ID: Francoise Patricia is a 60 y o  female  Francoise Patricia is a 60 y o  female patient who originally presented to the hospital on 9/16/2022 due to hyponatremia in the setting of cirrhosis and beer potomania  Sodium on presentation was noted to be  120  With baseline being around 130-133  Nephrology was consulted  Initially patient was placed on IV Lasix then transition back to oral Lasix  Patient's Aldactone was also decreased to 50 mg daily from 100 mg daily  Patient's sodium slowly improved to her baseline and is being discharged with sodium of 130   Nephrology also started patient on midodrine 5 mg t i d  For borderline blood pressures and recommend continuing on discharge  The following portions of the patient's history were reviewed and updated as appropriate: allergies, current medications, past family history, past medical history, past social history, past surgical history and problem list     Past Medical History:   Diagnosis Date    Alcoholic fatty liver     Anxiety     Asthma     COPD (chronic obstructive pulmonary disease) ([facility] [address] )     Elevated liver function tests     GERD (gastroesophageal reflux disease)     GERD without esophagitis     Hyperlipidemia     Hypertension     IBS (irritable bowel syndrome)     Insomnia     Insomnia     Liver disease     Nervousness     Nicotine dependence     Other specified urinary incontinence     RLS (restless legs syndrome)     Wears glasses      Past Surgical History:   Procedure Laterality Date    BACK SURGERY      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    BREAST EXCISIONAL BIOPSY Right 11/01/2004    benign    CATARACT EXTRACTION, BILATERAL  08/01/2018    COLONOSCOPY N/A 5/8/2019    Procedure: COLONOSCOPY;  Surgeon: Milli Guerrero MD;  Location: [facility]; Service: Gastroenterology    EGD AND COLONOSCOPY N/A 3/19/2018    Procedure: EGD AND COLONOSCOPY;  Surgeon: Milli Guerrero MD;  Location: [facility]; Service: Gastroenterology    ESOPHAGOGASTRODUODENOSCOPY N/A 5/8/2019    Procedure: ESOPHAGOGASTRODUODENOSCOPY (EGD); Surgeon: Milli Guerrero MD;  Location: [facility];   Service: Gastroenterology    IR PARACENTESIS  11/27/2020    IR PARACENTESIS  12/8/2020    KNEE SURGERY      KNEE SURGERY      LUMBAR LAMINECTOMY  04/1999    LYMPH NODE BIOPSY      MAMMO STEREOTACTIC BREAST BIOPSY RIGHT (ALL INC) Right 5/21/2021    MAMMO STEREOTACTIC BREAST BIOPSY RIGHT (ALL INC) EACH ADD Right 5/21/2021    TOTAL ABDOMINAL HYSTERECTOMY  02/05/2008    TUBAL LIGATION      TUBAL LIGATION  1989    UPPER GASTROINTESTINAL ENDOSCOPY       Family History   Problem Relation Age of Onset    Breast cancer Mother 28    No Known Problems Father     No Known Problems Sister     No Known Problems Brother     No Known Problems Son     No Known Problems Maternal Grandmother     No Known Problems Maternal Grandfather     No Known Problems Paternal Grandmother     No Known Problems Paternal Grandfather     No Known Problems Maternal Aunt     No Known Problems Maternal Aunt     No Known Problems Paternal Aunt     Substance Abuse Neg Hx     Mental illness Neg Hx      Social History     Socioeconomic History    Marital status: /Civil Union     Spouse name: Not on file    Number of children: Not on file    Years of education: Not on file    Highest education level: Not on file   Occupational History    Not on file   Tobacco Use    Smoking status: Current Every Day Smoker     Packs/day: 1 00     Years: 45 00     Pack years: 45 00     Types: Cigarettes     Start date: 1978    Smokeless tobacco: Never Used   Vaping Use    Vaping Use: Never used   Substance and Sexual Activity    Alcohol use: Not Currently     Alcohol/week: 24 0 standard drinks     Types: 24 Cans of beer per week     Comment: not lately    Drug use: No    Sexual activity: Yes     Partners: Male   Other Topics Concern    Not on file   Social History Narrative    Has carbon monoxide detectors in home    Has smoke detectors    Uses safety equipment - seatbelts     Social Determinants of Health     Financial Resource Strain: Not on file   Food Insecurity: No Food Insecurity    Worried About Running Out of Food in the Last Year: Never true    Jorje of Food in the Last Year: Never true   Transportation Needs: No Transportation Needs    Lack of Transportation (Medical): No    Lack of Transportation (Non-Medical):  No   Physical Activity: Not on file   Stress: Not on file   Social Connections: Not on file   Intimate Partner Violence: Not on file   Housing Stability: Low Risk     Unable to Pay for Housing in the Last Year: No    Number of Places Lived in the Last Year: 1    Unstable Housing in the Last Year: No       Current Outpatient Medications:     albuterol (2 5 mg/3 mL) 0 083 % nebulizer solution, Take 3 mL (2 5 mg total) by nebulization every 6 (six) hours as needed for wheezing or shortness of breath, Disp: 180 mL, Rfl: 5    albuterol (Proventil HFA) 90 mcg/act inhaler, Inhale 2 puffs every 6 (six) hours as needed for wheezing or shortness of breath, Disp: 18 g, Rfl: 3    ALPRAZolam (XANAX) 1 mg tablet, TAKE ONE TABLET BY MOUTH TWICE A DAY AS NEEDED FOR ANXIETY, Disp: 60 tablet, Rfl: 0    anastrozole (ARIMIDEX) 1 mg tablet, Take 1 tablet (1 mg total) by mouth daily, Disp: 90 tablet, Rfl: 3    benzonatate (TESSALON PERLES) 100 mg capsule, TAKE ONE CAPSULE BY MOUTH THREE TIMES A DAY AS NEEDED FOR COUGH, Disp: 60 capsule, Rfl: 2    furosemide (LASIX) 40 mg tablet, Take 1 tablet (40 mg total) by mouth daily, Disp: 30 tablet, Rfl: 5    midodrine (PROAMATINE) 5 mg tablet, Take 1 tablet (5 mg total) by mouth 3 (three) times a day before meals, Disp: 90 tablet, Rfl: 0    spironolactone (ALDACTONE) 50 mg tablet, Take 1 tablet (50 mg total) by mouth daily, Disp: 30 tablet, Rfl: 0  No current facility-administered medications for this visit  Review of Systems   Constitutional: Negative for chills and fever  HENT: Negative for ear pain and sore throat  Eyes: Negative for pain and visual disturbance  Respiratory: Negative for cough and shortness of breath  Cardiovascular: Negative for chest pain and palpitations  Gastrointestinal: Negative for abdominal pain and vomiting  Genitourinary: Negative for dysuria and hematuria  Musculoskeletal: Negative for arthralgias and back pain  Skin: Negative for color change and rash  Neurological: Negative for seizures and syncope  All other systems reviewed and are negative  Objective:    Vitals:    09/20/22 1229   BP: 100/62   Pulse: 60   Resp: 15   SpO2: 98%   Weight: 69 kg (152 lb 3 2 oz)   Height: 5' 3" (1 6 m)        Physical Exam  Constitutional:       Appearance: Normal appearance  She is normal weight  HENT:      Head: Normocephalic and atraumatic  Skin:     General: Skin is warm  Neurological:      General: No focal deficit present  Mental Status: She is alert and oriented to person, place, and time  Psychiatric:         Mood and Affect: Mood normal          Behavior: Behavior normal          Thought Content:  Thought content normal          Judgment: Judgment normal

## 2022-09-20 NOTE — TELEPHONE ENCOUNTER
----- Message from Dagmar Fullerreagankaiser Houser sent at 9/19/2022  9:12 AM EDT -----  Please arrange hyponatremia hospital follow-up with repeat BMP prior to the appointment   Thank you

## 2022-09-21 ENCOUNTER — OFFICE VISIT (OUTPATIENT)
Dept: GASTROENTEROLOGY | Facility: CLINIC | Age: 62
End: 2022-09-21
Payer: COMMERCIAL

## 2022-09-21 VITALS
HEIGHT: 63 IN | DIASTOLIC BLOOD PRESSURE: 50 MMHG | SYSTOLIC BLOOD PRESSURE: 110 MMHG | BODY MASS INDEX: 27.04 KG/M2 | WEIGHT: 152.6 LBS | TEMPERATURE: 97.5 F

## 2022-09-21 DIAGNOSIS — K58.0 IRRITABLE BOWEL SYNDROME WITH DIARRHEA: ICD-10-CM

## 2022-09-21 DIAGNOSIS — K70.31 ALCOHOLIC CIRRHOSIS OF LIVER WITH ASCITES (HCC): Primary | Chronic | ICD-10-CM

## 2022-09-21 DIAGNOSIS — E87.1 HYPONATREMIA: ICD-10-CM

## 2022-09-21 DIAGNOSIS — K21.9 GERD WITHOUT ESOPHAGITIS: ICD-10-CM

## 2022-09-21 PROCEDURE — 99214 OFFICE O/P EST MOD 30 MIN: CPT | Performed by: PHYSICIAN ASSISTANT

## 2022-09-21 NOTE — PROGRESS NOTES
[facility] - Outpatient Follow-up Note  Leeann Rosas 60 y o  female MRN: [id number]  Encounter: [id number]          ASSESSMENT AND PLAN:      1  Alcoholic cirrhosis of liver with ascites (HCC)  MELD-Na 21 based on labs from 9/19  She has been sober for the past 5 days since she was admitted to the hospital  I encouraged continued alcohol abstinence  Her  is supportive, so if she can remain sober, we can refer to liver transplant    Ascites     Her abdomen is distended although soft and non-tender  If her abdominal distention worsens or her abdomen becomes tense/tight, she will contact the office so we can schedule paracentesis  Continue Lasix 40 mg daily and Aldactone 50 mg daily  Follow-up with Nephrology as scheduled for diuretic management  Stressed importance of 2 g sodium diet    Varices screening  She had no evidence of varices per EGD from 2020  She is due for repeat EGD to screen for varices however would prefer to see stable sodium level prior to scheduling  We can schedule EGD at next visit if upcoming blood work shows stability    - EGD; Future    [address]  screening  She is due for liver US for [address]  screening in November - [address] [facility]; Future    Hepatic encephalopathy  None    2  Hyponatremia  She was hospitalized a few days ago for severe hyponatremia (117)  She was treated in the ICU and sodium on discharge improved (130)  Nephrology believes her hyponatremia is due to alcohol abuse  She has been sober for the past 5 days  She is due to get repeat BMP later this week   Follow-up with PCP and Nephrology     3  GERD without esophagitis  Stable off PPI therapy    4  Irritable bowel syndrome with diarrhea  Stable off medications    Follow-up in 2 weeks with Dr Virginia Acuna as scheduled  ______________________________________________________________________    SUBJECTIVE:  79-year-old female with alcoholic cirrhosis complicated by ascites presenting for follow-up   She was hospitalized last week with severe hyponatremia  She was treated in the ICU  She saw Nephrology who did a work-up  They feel her low-sodium is due to alcohol abuse  She has been sober for the past 5 days  Her primary complaint today is abdominal distention  Her abdomen is soft and she denies pain  She is having good bowel movements  She denies any lower extremity edema  She denies confusion  She denies GI bleeding  REVIEW OF SYSTEMS IS OTHERWISE NEGATIVE  Historical Information   Past Medical History:   Diagnosis Date    Alcoholic fatty liver     Anxiety     Asthma     COPD (chronic obstructive pulmonary disease) (HCC)     Elevated liver function tests     GERD (gastroesophageal reflux disease)     GERD without esophagitis     Hyperlipidemia     Hypertension     IBS (irritable bowel syndrome)     Insomnia     Insomnia     Liver disease     Nervousness     Nicotine dependence     Other specified urinary incontinence     RLS (restless legs syndrome)     Wears glasses      Past Surgical History:   Procedure Laterality Date    BACK SURGERY      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    BREAST EXCISIONAL BIOPSY Right 11/01/2004    benign    CATARACT EXTRACTION, BILATERAL  08/01/2018    COLONOSCOPY N/A 5/8/2019    Procedure: COLONOSCOPY;  Surgeon: Nils Moritz, MD;  Location: [facility]; Service: Gastroenterology    EGD AND COLONOSCOPY N/A 3/19/2018    Procedure: EGD AND COLONOSCOPY;  Surgeon: Nils Moritz, MD;  Location: [facility]; Service: Gastroenterology    ESOPHAGOGASTRODUODENOSCOPY N/A 5/8/2019    Procedure: ESOPHAGOGASTRODUODENOSCOPY (EGD); Surgeon: Nils Moritz, MD;  Location: [facility];   Service: Gastroenterology    IR PARACENTESIS  11/27/2020    IR PARACENTESIS  12/8/2020    KNEE SURGERY      KNEE SURGERY      LUMBAR LAMINECTOMY  04/1999    LYMPH NODE BIOPSY      MAMMO STEREOTACTIC BREAST BIOPSY RIGHT (ALL INC) Right 5/21/2021    MAMMO STEREOTACTIC BREAST BIOPSY RIGHT (ALL INC) EACH ADD Right 5/21/2021    TOTAL ABDOMINAL HYSTERECTOMY  02/05/2008    TUBAL LIGATION      TUBAL LIGATION  1989    UPPER GASTROINTESTINAL ENDOSCOPY       Social History   Social History     Substance and Sexual Activity   Alcohol Use Not Currently    Alcohol/week: 24 0 standard drinks    Types: 24 Cans of beer per week    Comment: not lately     Social History     Substance and Sexual Activity   Drug Use No     Social History     Tobacco Use   Smoking Status Current Every Day Smoker    Packs/day: 1 00    Years: 45 00    Pack years: 45 00    Types: Cigarettes    Start date: 1978   Smokeless Tobacco Never Used     Family History   Problem Relation Age of Onset    Breast cancer Mother 28    No Known Problems Father     No Known Problems Sister     No Known Problems Brother     No Known Problems Son     No Known Problems Maternal Grandmother     No Known Problems Maternal Grandfather     No Known Problems Paternal Grandmother     No Known Problems Paternal Grandfather     No Known Problems Maternal Aunt     No Known Problems Maternal Aunt     No Known Problems Paternal Aunt     Substance Abuse Neg Hx     Mental illness Neg Hx        Meds/Allergies       Current Outpatient Medications:     albuterol (2 5 mg/3 mL) 0 083 % nebulizer solution    albuterol (Proventil HFA) 90 mcg/act inhaler    ALPRAZolam (XANAX) 1 mg tablet    anastrozole (ARIMIDEX) 1 mg tablet    benzonatate (TESSALON PERLES) 100 mg capsule    furosemide (LASIX) 40 mg tablet    midodrine (PROAMATINE) 5 mg tablet    spironolactone (ALDACTONE) 50 mg tablet    Allergies   Allergen Reactions    Sulfa Antibiotics Shortness Of Breath    Ace Inhibitors Cough and Other (See Comments)     coughing           Objective     not currently breastfeeding  There is no height or weight on file to calculate BMI        PHYSICAL EXAM:      General Appearance:   Alert and oriented x 3, chronically ill-appearing female, cooperative, no distress   HEENT:   Normocephalic, atraumatic, anicteric      Neck:  Supple, symmetrical, trachea midline   Lungs:   Clear to auscultation bilaterally   Heart[de-identified]   Regular rate and rhythm   Abdomen:   Distended  Diastasis recti  Soft, non-tender  No significant fluid wave  Genitalia:   Deferred    Rectal:   Deferred    Extremities:  No cyanosis, clubbing or edema    Pulses:  2+ and symmetric    Skin:  No jaundice   Lymph nodes:  No palpable cervical lymphadenopathy        Lab Results:   No visits with results within 1 Day(s) from this visit     Latest known visit with results is:   Admission on 09/16/2022, Discharged on 09/19/2022   Component Date Value    Ventricular Rate 09/16/2022 83     Atrial Rate 09/16/2022 83     CO Interval 09/16/2022 148     QRSD Interval 09/16/2022 92     QT Interval 09/16/2022 392     QTC Interval 09/16/2022 460     P Axis 09/16/2022 56     QRS Axis 09/16/2022 79     T Wave Axis 09/16/2022 46     WBC 09/16/2022 5 57     RBC 09/16/2022 2 75 (A)    Hemoglobin 09/16/2022 10 8 (A)    Hematocrit 09/16/2022 29 6 (A)    MCV 09/16/2022 108 (A)    MCH 09/16/2022 39 3 (A)    MCHC 09/16/2022 36 5     RDW 09/16/2022 14 3     MPV 09/16/2022 9 7     Platelets 38/78/7687 81 (A)    nRBC 09/16/2022 0     Neutrophils Relative 09/16/2022 68     Immat GRANS % 09/16/2022 1     Lymphocytes Relative 09/16/2022 14     Monocytes Relative 09/16/2022 15 (A)    Eosinophils Relative 09/16/2022 1     Basophils Relative 09/16/2022 1     Neutrophils Absolute 09/16/2022 3 75     Immature Grans Absolute 09/16/2022 0 04     Lymphocytes Absolute 09/16/2022 0 80     Monocytes Absolute 09/16/2022 0 86     Eosinophils Absolute 09/16/2022 0 05     Basophils Absolute 09/16/2022 0 07     Protime 09/16/2022 21 3 (A)    INR 09/16/2022 1 74 (A)    PTT 09/16/2022 37     Sodium 09/16/2022 120 (A)    Potassium 09/16/2022 3 6     Chloride 09/16/2022 87 (A)    CO2 09/16/2022 [place] 09/16/2022 4     BUN 09/16/2022 7     Creatinine 09/16/2022 0 90     Glucose 09/16/2022 132     Calcium 09/16/2022 8 5     Corrected Calcium 09/16/2022 10 2 (A)    AST 09/16/2022 96 (A)    ALT 09/16/2022 52     Alkaline Phosphatase 09/16/2022 172 (A)    Total Protein 09/16/2022 8 5 (A)    Albumin 09/16/2022 1 9 (A)    Total Bilirubin 09/16/2022 2 30 (A)    eGFR 09/16/2022 68     TSH 3RD GENERATON 09/16/2022 3 903     Ethanol Lvl 09/16/2022 34 (A)    Sodium, Ur 09/16/2022 13     Osmolality, Ur 09/16/2022 267     Osmolality Serum 09/16/2022 256 (A)    Urea Nitrogen, Ur 09/16/2022 354     Color, UA 09/16/2022 Yellow     Clarity, UA 09/16/2022 Clear     Specific Gravity, UA 09/16/2022 1 020     pH, UA 09/16/2022 6 0     Leukocytes, UA 09/16/2022 Negative     Nitrite, UA 09/16/2022 Negative     Protein, UA 09/16/2022 Negative     Glucose, UA 09/16/2022 Negative     Ketones, UA 09/16/2022 Negative     Urobilinogen, UA 09/16/2022 1 0     Bilirubin, UA 09/16/2022 Small (A)    Occult Blood, UA 09/16/2022 Negative     RBC, UA 09/16/2022 None Seen     WBC, UA 09/16/2022 0-1 (A)    Epithelial Cells 09/16/2022 Occasional     Bacteria, UA 09/16/2022 Occasional     Folate 09/16/2022 >20 0 (A)    Vitamin B-12 09/16/2022 2,355 (A)    Ammonia 09/16/2022 <10 (A)    Sodium 09/16/2022 120 (A)    Potassium 09/16/2022 3 7     Chloride 09/16/2022 87 (A)    CO2 09/16/2022 29     ANION GAP 09/16/2022 4     BUN 09/16/2022 7     Creatinine 09/16/2022 0 82     Glucose 09/16/2022 102     Calcium 09/16/2022 8 6     eGFR 09/16/2022 76     Sodium 09/16/2022 123 (A)    Potassium 09/16/2022 3 6     Chloride 09/16/2022 89 (A)    CO2 09/16/2022 28     ANION GAP 09/16/2022 6     BUN 09/16/2022 8     Creatinine 09/16/2022 0 84     Glucose 09/16/2022 107     Calcium 09/16/2022 8 8     eGFR 09/16/2022 74     WBC 09/17/2022 4 68     RBC 09/17/2022 2 47 (A)    Hemoglobin 09/17/2022 9 5 (A)    Hematocrit 09/17/2022 26 3 (A)    MCV 09/17/2022 107 (A)    MCH 09/17/2022 38 5 (A)    MCHC 09/17/2022 36 1     RDW 09/17/2022 14 1     Platelets 20/74/3533 63 (A)    MPV 09/17/2022 9 5     Protime 09/17/2022 23 0 (A)    INR 09/17/2022 1 91 (A)    Total Bilirubin 09/17/2022 2 90 (A)    Bilirubin, Direct 09/17/2022 0 95 (A)    Alkaline Phosphatase 09/17/2022 125 (A)    AST 09/17/2022 76 (A)    ALT 09/17/2022 38     Total Protein 09/17/2022 7 4     Albumin 09/17/2022 1 8 (A)    Sodium 09/17/2022 123 (A)    Potassium 09/17/2022 3 6     Chloride 09/17/2022 90 (A)    CO2 09/17/2022 27     ANION GAP 09/17/2022 6     BUN 09/17/2022 9     Creatinine 09/17/2022 0 87     Glucose 09/17/2022 100     Calcium 09/17/2022 8 3     eGFR 09/17/2022 71     Creatinine, Ur 09/16/2022 103 0     Sodium 09/17/2022 126 (A)    Potassium 09/17/2022 4 1     Chloride 09/17/2022 93 (A)    CO2 09/17/2022 30     ANION GAP 09/17/2022 3 (A)    BUN 09/17/2022 9     Creatinine 09/17/2022 0 97     Glucose 09/17/2022 107     Calcium 09/17/2022 8 6     eGFR 09/17/2022 62     Sodium 09/17/2022 129 (A)    Potassium 09/17/2022 4 0     Chloride 09/17/2022 95 (A)    CO2 09/17/2022 29     ANION GAP 09/17/2022 5     BUN 09/17/2022 8     Creatinine 09/17/2022 0 92     Glucose 09/17/2022 101     Calcium 09/17/2022 8 9     eGFR 09/17/2022 66     Sodium 09/18/2022 130 (A)    Potassium 09/18/2022 3 9     Chloride 09/18/2022 97     CO2 09/18/2022 27     ANION GAP 09/18/2022 6     BUN 09/18/2022 9     Creatinine 09/18/2022 0 83     Glucose 09/18/2022 89     Calcium 09/18/2022 8 4     eGFR 09/18/2022 75     WBC 09/19/2022 4 74     RBC 09/19/2022 2 21 (A)    Hemoglobin 09/19/2022 8 7 (A)    Hematocrit 09/19/2022 24 4 (A)    MCV 09/19/2022 110 (A)    MCH 09/19/2022 39 4 (A)    MCHC 09/19/2022 35 7     RDW 09/19/2022 14 8     MPV 09/19/2022 9 4     Platelets 79/19/9004 51 (A)    nRBC 09/19/2022 0     Neutrophils Relative 09/19/2022 56     Immat GRANS % 09/19/2022 0     Lymphocytes Relative 09/19/2022 20     Monocytes Relative 09/19/2022 20 (A)    Eosinophils Relative 09/19/2022 2     Basophils Relative 09/19/2022 2 (A)    Neutrophils Absolute 09/19/2022 2 61     Immature Grans Absolute 09/19/2022 0 02     Lymphocytes Absolute 09/19/2022 0 96     Monocytes Absolute 09/19/2022 0 96     Eosinophils Absolute 09/19/2022 0 11     Basophils Absolute 09/19/2022 0 08     Sodium 09/19/2022 130 (A)    Potassium 09/19/2022 4 2     Chloride 09/19/2022 99     CO2 09/19/2022 24     ANION GAP 09/19/2022 7     BUN 09/19/2022 8     Creatinine 09/19/2022 0 76     Glucose 09/19/2022 96     Calcium 09/19/2022 8 5     Corrected Calcium 09/19/2022 10 0     AST 09/19/2022 49 (A)    ALT 09/19/2022 28     Alkaline Phosphatase 09/19/2022 105     Total Protein 09/19/2022 6 6     Albumin 09/19/2022 2 1 (A)    Total Bilirubin 09/19/2022 2 20 (A)    eGFR 09/19/2022 83          Radiology Results:   US bedside procedure    Result Date: 9/18/2022  Narrative: 1 2 [phone] [id number]  2 [id number]  1 1

## 2022-09-21 NOTE — LETTER
September 21, 2022     Joni Montalvo PA-C  [address] [address] [address] [place] [address]    Patient: Philly Hall   YOB: 1960   Date of Visit: 9/21/2022       Dear Dr Garcia Drop: Thank you for referring Stefanie Ivey to me for evaluation  Below are my notes for this consultation  If you have questions, please do not hesitate to call me  I look forward to following your patient along with you  Sincerely,        FAYE Hermosillo        CC: No Recipients  FAYE Hermosillo  9/21/2022  1:24 PM  Sign when Signing Visit  [facility] - Outpatient Follow-up Note  Philly Hall 60 y o  female MRN: [id number]  Encounter: [id number]          ASSESSMENT AND PLAN:      1  Alcoholic cirrhosis of liver with ascites (HCC)  MELD-Na 21 based on labs from 9/19  She has been sober for the past 5 days since she was admitted to the hospital  I encouraged continued alcohol abstinence  Her  is supportive, so if she can remain sober, we can refer to liver transplant    Ascites     Her abdomen is distended although soft and non-tender  If her abdominal distention worsens or her abdomen becomes tense/tight, she will contact the office so we can schedule paracentesis  Continue Lasix 40 mg daily and Aldactone 50 mg daily  Follow-up with Nephrology as scheduled for diuretic management  Stressed importance of 2 g sodium diet    Varices screening  She had no evidence of varices per EGD from 2020  She is due for repeat EGD to screen for varices however would prefer to see stable sodium level prior to scheduling  We can schedule EGD at next visit if upcoming blood work shows stability    - EGD; Future    [address]  screening  She is due for liver US for [address]  screening in November - [facility]; Future    Hepatic encephalopathy  None    2   Hyponatremia  She was hospitalized a few days ago for severe hyponatremia (117)  She was treated in the ICU and sodium on discharge improved (130)  Nephrology believes her hyponatremia is due to alcohol abuse  She has been sober for the past 5 days  She is due to get repeat BMP later this week   Follow-up with PCP and Nephrology     3  GERD without esophagitis  Stable off PPI therapy    4  Irritable bowel syndrome with diarrhea  Stable off medications    Follow-up in 2 weeks with Dr Alexsander Prather as scheduled  ______________________________________________________________________    SUBJECTIVE:  58-year-old female with alcoholic cirrhosis complicated by ascites presenting for follow-up  She was hospitalized last week with severe hyponatremia  She was treated in the ICU  She saw Nephrology who did a work-up  They feel her low-sodium is due to alcohol abuse  She has been sober for the past 5 days  Her primary complaint today is abdominal distention  Her abdomen is soft and she denies pain  She is having good bowel movements  She denies any lower extremity edema  She denies confusion  She denies GI bleeding  REVIEW OF SYSTEMS IS OTHERWISE NEGATIVE  Historical Information   Past Medical History:   Diagnosis Date    Alcoholic fatty liver     Anxiety     Asthma     COPD (chronic obstructive pulmonary disease) (HCC)     Elevated liver function tests     GERD (gastroesophageal reflux disease)     GERD without esophagitis     Hyperlipidemia     Hypertension     IBS (irritable bowel syndrome)     Insomnia     Insomnia     Liver disease     Nervousness     Nicotine dependence     Other specified urinary incontinence     RLS (restless legs syndrome)     Wears glasses      Past Surgical History:   Procedure Laterality Date    BACK SURGERY      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    BREAST EXCISIONAL BIOPSY Right 11/01/2004    benign    CATARACT EXTRACTION, BILATERAL  08/01/2018    COLONOSCOPY N/A 5/8/2019    Procedure: COLONOSCOPY;  Surgeon: Yvette Blancas MD;  Location: [facility] GI LAB;   Service: Gastroenterology   Olga Child EGD AND COLONOSCOPY N/A 3/19/2018    Procedure: EGD AND COLONOSCOPY;  Surgeon: Marcella Gallego MD;  Location: [facility] GI LAB; Service: Gastroenterology    ESOPHAGOGASTRODUODENOSCOPY N/A 5/8/2019    Procedure: ESOPHAGOGASTRODUODENOSCOPY (EGD); Surgeon: Marcella Gallego MD;  Location: [facility] GI LAB;   Service: Gastroenterology    IR PARACENTESIS  11/27/2020    IR PARACENTESIS  12/8/2020    KNEE SURGERY      KNEE SURGERY      LUMBAR LAMINECTOMY  04/1999    LYMPH NODE BIOPSY      MAMMO STEREOTACTIC BREAST BIOPSY RIGHT (ALL INC) Right 5/21/2021    MAMMO STEREOTACTIC BREAST BIOPSY RIGHT (ALL INC) EACH ADD Right 5/21/2021    TOTAL ABDOMINAL HYSTERECTOMY  02/05/2008    TUBAL LIGATION      TUBAL LIGATION  1989    UPPER GASTROINTESTINAL ENDOSCOPY       Social History   Social History     Substance and Sexual Activity   Alcohol Use Not Currently    Alcohol/week: 24 0 standard drinks    Types: 24 Cans of beer per week    Comment: not lately     Social History     Substance and Sexual Activity   Drug Use No     Social History     Tobacco Use   Smoking Status Current Every Day Smoker    Packs/day: 1 00    Years: 45 00    Pack years: 45 00    Types: Cigarettes    Start date: 1978   Smokeless Tobacco Never Used     Family History   Problem Relation Age of Onset    Breast cancer Mother 28    No Known Problems Father     No Known Problems Sister     No Known Problems Brother     No Known Problems Son     No Known Problems Maternal Grandmother     No Known Problems Maternal Grandfather     No Known Problems Paternal Grandmother     No Known Problems Paternal Grandfather     No Known Problems Maternal Aunt     No Known Problems Maternal Aunt     No Known Problems Paternal Aunt     Substance Abuse Neg Hx     Mental illness Neg Hx        Meds/Allergies       Current Outpatient Medications:     albuterol (2 5 mg/3 mL) 0 083 % nebulizer solution    albuterol (Proventil HFA) 90 mcg/act inhaler    ALPRAZolam Chrissie Tomás) 1 mg tablet    anastrozole (ARIMIDEX) 1 mg tablet    benzonatate (TESSALON PERLES) 100 mg capsule    furosemide (LASIX) 40 mg tablet    midodrine (PROAMATINE) 5 mg tablet    spironolactone (ALDACTONE) 50 mg tablet    Allergies   Allergen Reactions    Sulfa Antibiotics Shortness Of Breath    Ace Inhibitors Cough and Other (See Comments)     coughing           Objective     not currently breastfeeding  There is no height or weight on file to calculate BMI  PHYSICAL EXAM:      General Appearance:   Alert and oriented x 3, chronically ill-appearing female, cooperative, no distress   HEENT:   Normocephalic, atraumatic, anicteric      Neck:  Supple, symmetrical, trachea midline   Lungs:   Clear to auscultation bilaterally   Heart[de-identified]   Regular rate and rhythm   Abdomen:   Distended  Diastasis recti  Soft, non-tender  No significant fluid wave  Genitalia:   Deferred    Rectal:   Deferred    Extremities:  No cyanosis, clubbing or edema    Pulses:  2+ and symmetric    Skin:  No jaundice   Lymph nodes:  No palpable cervical lymphadenopathy        Lab Results:   No visits with results within 1 Day(s) from this visit     Latest known visit with results is:   Admission on 09/16/2022, Discharged on 09/19/2022   Component Date Value    Ventricular Rate 09/16/2022 83     Atrial Rate 09/16/2022 83     TX Interval 09/16/2022 148     QRSD Interval 09/16/2022 92     QT Interval 09/16/2022 392     QTC Interval 09/16/2022 460     P Axis 09/16/2022 56     QRS Axis 09/16/2022 79     T Wave Axis 09/16/2022 46     WBC 09/16/2022 5 57     RBC 09/16/2022 2 75 (A)    Hemoglobin 09/16/2022 10 8 (A)    Hematocrit 09/16/2022 29 6 (A)    MCV 09/16/2022 108 (A)    MCH 09/16/2022 39 3 (A)    MCHC 09/16/2022 36 5     RDW 09/16/2022 14 3     MPV 09/16/2022 9 7     Platelets 91/45/7524 81 (A)    nRBC 09/16/2022 0     Neutrophils Relative 09/16/2022 68     Immat GRANS % 09/16/2022 1     Lymphocytes Relative 09/16/2022 14     Monocytes Relative 09/16/2022 15 (A)    Eosinophils Relative 09/16/2022 1     Basophils Relative 09/16/2022 1     Neutrophils Absolute 09/16/2022 3 75     Immature Grans Absolute 09/16/2022 0 04     Lymphocytes Absolute 09/16/2022 0 80     Monocytes Absolute 09/16/2022 0 86     Eosinophils Absolute 09/16/2022 0 05     Basophils Absolute 09/16/2022 0 07     Protime 09/16/2022 21 3 (A)    INR 09/16/2022 1 74 (A)    PTT 09/16/2022 37     Sodium 09/16/2022 120 (A)    Potassium 09/16/2022 3 6     Chloride 09/16/2022 87 (A)    CO2 09/16/2022 29     ANION GAP 09/16/2022 4     BUN 09/16/2022 7     Creatinine 09/16/2022 0 90     Glucose 09/16/2022 132     Calcium 09/16/2022 8 5     Corrected Calcium 09/16/2022 10 2 (A)    AST 09/16/2022 96 (A)    ALT 09/16/2022 52     Alkaline Phosphatase 09/16/2022 172 (A)    Total Protein 09/16/2022 8 5 (A)    Albumin 09/16/2022 1 9 (A)    Total Bilirubin 09/16/2022 2 30 (A)    eGFR 09/16/2022 68     TSH 3RD GENERATON 09/16/2022 3 903     Ethanol Lvl 09/16/2022 34 (A)    Sodium, Ur 09/16/2022 13     Osmolality, Ur 09/16/2022 267     Osmolality Serum 09/16/2022 256 (A)    Urea Nitrogen, Ur 09/16/2022 354     Color, UA 09/16/2022 Yellow     Clarity, UA 09/16/2022 Clear     Specific Gravity, UA 09/16/2022 1 020     pH, UA 09/16/2022 6 0     Leukocytes, UA 09/16/2022 Negative     Nitrite, UA 09/16/2022 Negative     Protein, UA 09/16/2022 Negative     Glucose, UA 09/16/2022 Negative     Ketones, UA 09/16/2022 Negative     Urobilinogen, UA 09/16/2022 1 0     Bilirubin, UA 09/16/2022 Small (A)    Occult Blood, UA 09/16/2022 Negative     RBC, UA 09/16/2022 None Seen     WBC, UA 09/16/2022 0-1 (A)    Epithelial Cells 09/16/2022 Occasional     Bacteria, UA 09/16/2022 Occasional     Folate 09/16/2022 >20 0 (A)    Vitamin B-12 09/16/2022 2,355 (A)    Ammonia 09/16/2022 <10 (A)    Sodium 09/16/2022 120 (A)    Potassium 09/16/2022 3 7     Chloride 09/16/2022 87 (A)    CO2 09/16/2022 29     ANION GAP 09/16/2022 4     BUN 09/16/2022 7     Creatinine 09/16/2022 0 82     Glucose 09/16/2022 102     Calcium 09/16/2022 8 6     eGFR 09/16/2022 76     Sodium 09/16/2022 123 (A)    Potassium 09/16/2022 3 6     Chloride 09/16/2022 89 (A)    CO2 09/16/2022 28     ANION GAP 09/16/2022 6     BUN 09/16/2022 8     Creatinine 09/16/2022 0 84     Glucose 09/16/2022 107     Calcium 09/16/2022 8 8     eGFR 09/16/2022 74     WBC 09/17/2022 4 68     RBC 09/17/2022 2 47 (A)    Hemoglobin 09/17/2022 9 5 (A)    Hematocrit 09/17/2022 26 3 (A)    MCV 09/17/2022 107 (A)    MCH 09/17/2022 38 5 (A)    MCHC 09/17/2022 36 1     RDW 09/17/2022 14 1     Platelets 18/58/4187 63 (A)    MPV 09/17/2022 9 5     Protime 09/17/2022 23 0 (A)    INR 09/17/2022 1 91 (A)    Total Bilirubin 09/17/2022 2 90 (A)    Bilirubin, Direct 09/17/2022 0 95 (A)    Alkaline Phosphatase 09/17/2022 125 (A)    AST 09/17/2022 76 (A)    ALT 09/17/2022 38     Total Protein 09/17/2022 7 4     Albumin 09/17/2022 1 8 (A)    Sodium 09/17/2022 123 (A)    Potassium 09/17/2022 3 6     Chloride 09/17/2022 90 (A)    CO2 09/17/2022 27     ANION GAP 09/17/2022 6     BUN 09/17/2022 9     Creatinine 09/17/2022 0 87     Glucose 09/17/2022 100     Calcium 09/17/2022 8 3     eGFR 09/17/2022 71     Creatinine, Ur 09/16/2022 103 0     Sodium 09/17/2022 126 (A)    Potassium 09/17/2022 4 1     Chloride 09/17/2022 93 (A)    CO2 09/17/2022 30     ANION GAP 09/17/2022 3 (A)    BUN 09/17/2022 9     Creatinine 09/17/2022 0 97     Glucose 09/17/2022 107     Calcium 09/17/2022 8 6     eGFR 09/17/2022 62     Sodium 09/17/2022 129 (A)    Potassium 09/17/2022 4 0     Chloride 09/17/2022 95 (A)    CO2 09/17/2022 29     ANION GAP 09/17/2022 5     BUN 09/17/2022 8     Creatinine 09/17/2022 0 92     Glucose 09/17/2022 101     Calcium 09/17/2022 8 9     eGFR 09/17/2022 66  Sodium 09/18/2022 130 (A)    Potassium 09/18/2022 3 9     Chloride 09/18/2022 97     CO2 09/18/2022 27     ANION GAP 09/18/2022 6     BUN 09/18/2022 9     Creatinine 09/18/2022 0 83     Glucose 09/18/2022 89     Calcium 09/18/2022 8 4     eGFR 09/18/2022 75     WBC 09/19/2022 4 74     RBC 09/19/2022 2 21 (A)    Hemoglobin 09/19/2022 8 7 (A)    Hematocrit 09/19/2022 24 4 (A)    MCV 09/19/2022 110 (A)    MCH 09/19/2022 39 4 (A)    MCHC 09/19/2022 35 7     RDW 09/19/2022 14 8     MPV 09/19/2022 9 4     Platelets 03/32/6761 51 (A)    nRBC 09/19/2022 0     Neutrophils Relative 09/19/2022 56     Immat GRANS % 09/19/2022 0     Lymphocytes Relative 09/19/2022 20     Monocytes Relative 09/19/2022 20 (A)    Eosinophils Relative 09/19/2022 2     Basophils Relative 09/19/2022 2 (A)    Neutrophils Absolute 09/19/2022 2 61     Immature Grans Absolute 09/19/2022 0 02     Lymphocytes Absolute 09/19/2022 0 96     Monocytes Absolute 09/19/2022 0 96     Eosinophils Absolute 09/19/2022 0 11     Basophils Absolute 09/19/2022 0 08     Sodium 09/19/2022 130 (A)    Potassium 09/19/2022 4 2     Chloride 09/19/2022 99     CO2 09/19/2022 24     ANION GAP 09/19/2022 7     BUN 09/19/2022 8     Creatinine 09/19/2022 0 76     Glucose 09/19/2022 96     Calcium 09/19/2022 8 5     Corrected Calcium 09/19/2022 10 0     AST 09/19/2022 49 (A)    ALT 09/19/2022 28     Alkaline Phosphatase 09/19/2022 105     Total Protein 09/19/2022 6 6     Albumin 09/19/2022 2 1 (A)    Total Bilirubin 09/19/2022 2 20 (A)    eGFR 09/19/2022 83          Radiology Results:   US bedside procedure    Result Date: 9/18/2022  Narrative: 1 2 [phone] [id number]  2 446 921 [id number]  1 1

## 2022-09-23 DIAGNOSIS — F41.9 ANXIETY: ICD-10-CM

## 2022-09-23 RX ORDER — ALPRAZOLAM 1 MG/1
TABLET ORAL
Qty: 60 TABLET | Refills: 0 | Status: SHIPPED | OUTPATIENT
Start: 2022-09-23 | End: 2022-10-21

## 2022-09-24 LAB
ALBUMIN SERPL-MCNC: 2.9 G/DL (ref 3.8–4.8)
ALBUMIN/GLOB SERPL: 0.6 {RATIO} (ref 1.2–2.2)
ALP SERPL-CCNC: 112 IU/L (ref 44–121)
ALT SERPL-CCNC: 24 IU/L (ref 0–32)
AST SERPL-CCNC: 55 IU/L (ref 0–40)
BASOPHILS # BLD AUTO: 0.1 X10E3/UL (ref 0–0.2)
BASOPHILS NFR BLD AUTO: 2 %
BILIRUB SERPL-MCNC: 2.5 MG/DL (ref 0–1.2)
BUN SERPL-MCNC: 10 MG/DL (ref 8–27)
BUN SERPL-MCNC: 10 MG/DL (ref 8–27)
BUN/CREAT SERPL: 12 (ref 12–28)
BUN/CREAT SERPL: 12 (ref 12–28)
CALCIUM SERPL-MCNC: 8.3 MG/DL (ref 8.7–10.3)
CALCIUM SERPL-MCNC: 8.3 MG/DL (ref 8.7–10.3)
CHLORIDE SERPL-SCNC: 95 MMOL/L (ref 96–106)
CHLORIDE SERPL-SCNC: 95 MMOL/L (ref 96–106)
CO2 SERPL-SCNC: 20 MMOL/L (ref 20–29)
CO2 SERPL-SCNC: 21 MMOL/L (ref 20–29)
CREAT SERPL-MCNC: 0.82 MG/DL (ref 0.57–1)
CREAT SERPL-MCNC: 0.85 MG/DL (ref 0.57–1)
EGFR: 77 ML/MIN/1.73
EGFR: 80 ML/MIN/1.73
EOSINOPHIL # BLD AUTO: 0.1 X10E3/UL (ref 0–0.4)
EOSINOPHIL NFR BLD AUTO: 1 %
ERYTHROCYTE [DISTWIDTH] IN BLOOD BY AUTOMATED COUNT: 12.3 % (ref 11.7–15.4)
GLOBULIN SER-MCNC: 4.9 G/DL (ref 1.5–4.5)
GLUCOSE SERPL-MCNC: 89 MG/DL (ref 65–99)
GLUCOSE SERPL-MCNC: 92 MG/DL (ref 65–99)
HCT VFR BLD AUTO: 29 % (ref 34–46.6)
HGB BLD-MCNC: 10.9 G/DL (ref 11.1–15.9)
IMM GRANULOCYTES # BLD: 0 X10E3/UL (ref 0–0.1)
IMM GRANULOCYTES NFR BLD: 0 %
LYMPHOCYTES # BLD AUTO: 1 X10E3/UL (ref 0.7–3.1)
LYMPHOCYTES NFR BLD AUTO: 17 %
MCH RBC QN AUTO: 39.2 PG (ref 26.6–33)
MCHC RBC AUTO-ENTMCNC: 37.6 G/DL (ref 31.5–35.7)
MCV RBC AUTO: 104 FL (ref 79–97)
MONOCYTES # BLD AUTO: 0.6 X10E3/UL (ref 0.1–0.9)
MONOCYTES NFR BLD AUTO: 11 %
MORPHOLOGY BLD-IMP: ABNORMAL
NEUTROPHILS # BLD AUTO: 3.8 X10E3/UL (ref 1.4–7)
NEUTROPHILS NFR BLD AUTO: 69 %
PLATELET # BLD AUTO: 86 X10E3/UL (ref 150–450)
POTASSIUM SERPL-SCNC: 3.9 MMOL/L (ref 3.5–5.2)
POTASSIUM SERPL-SCNC: 4 MMOL/L (ref 3.5–5.2)
PROT SERPL-MCNC: 7.8 G/DL (ref 6–8.5)
RBC # BLD AUTO: 2.78 X10E6/UL (ref 3.77–5.28)
SODIUM SERPL-SCNC: 128 MMOL/L (ref 134–144)
SODIUM SERPL-SCNC: 129 MMOL/L (ref 134–144)
WBC # BLD AUTO: 5.6 X10E3/UL (ref 3.4–10.8)

## 2022-09-26 ENCOUNTER — TELEPHONE (OUTPATIENT)
Dept: NEPHROLOGY | Facility: CLINIC | Age: 62
End: 2022-09-26

## 2022-09-26 ENCOUNTER — TELEPHONE (OUTPATIENT)
Dept: FAMILY MEDICINE CLINIC | Facility: CLINIC | Age: 62
End: 2022-09-26

## 2022-09-26 NOTE — TELEPHONE ENCOUNTER
----- Message from Dagmar Houser sent at 9/26/2022  2:55 PM EDT -----  Please call patient and let her know that we received her most recent blood work results  Her sodium is 129  She should continue on a strict 1 2 L fluid restriction as well as her diuretics    She has an appointment in November for follow-up   ----- Message -----  From: Yolande [facility] Results In  Sent: 9/24/2022   8:06 AM EDT  To: Dagmar Houser

## 2022-09-26 NOTE — TELEPHONE ENCOUNTER
Pt is calling because she is upset about the instructions from nephrology to restrict her liquids to 1 2L per day  She is asking if there is something else you can put her on to help with her sodium levels  I did advise her to discuss with nephrology and wait for the doctor's response, but she wanted me to message you anyway

## 2022-09-26 NOTE — TELEPHONE ENCOUNTER
Patient was advised  Stated that she is not on a fluid restriction that she will be thirsty all day on only 1 2L   Stated "that's not enough water to flush down my pills "

## 2022-09-29 DIAGNOSIS — J44.9 MODERATE COPD (CHRONIC OBSTRUCTIVE PULMONARY DISEASE) (HCC): ICD-10-CM

## 2022-09-29 RX ORDER — BENZONATATE 100 MG/1
CAPSULE ORAL
Qty: 60 CAPSULE | Refills: 2 | Status: SHIPPED | OUTPATIENT
Start: 2022-09-29

## 2022-10-05 ENCOUNTER — TELEPHONE (OUTPATIENT)
Dept: PULMONOLOGY | Facility: CLINIC | Age: 62
End: 2022-10-05

## 2022-10-05 NOTE — TELEPHONE ENCOUNTER
LM for patient to give a call back to set up a follow up Appt in [facility] with Dr Gerhardt Leriche or Christiana Mena

## 2022-10-07 ENCOUNTER — IMMUNIZATIONS (OUTPATIENT)
Dept: FAMILY MEDICINE CLINIC | Facility: CLINIC | Age: 62
End: 2022-10-07
Payer: COMMERCIAL

## 2022-10-07 ENCOUNTER — OFFICE VISIT (OUTPATIENT)
Dept: GASTROENTEROLOGY | Facility: CLINIC | Age: 62
End: 2022-10-07
Payer: COMMERCIAL

## 2022-10-07 ENCOUNTER — TELEPHONE (OUTPATIENT)
Dept: OTHER | Facility: OTHER | Age: 62
End: 2022-10-07

## 2022-10-07 VITALS
DIASTOLIC BLOOD PRESSURE: 60 MMHG | HEIGHT: 63 IN | TEMPERATURE: 99.8 F | SYSTOLIC BLOOD PRESSURE: 124 MMHG | WEIGHT: 161.4 LBS | BODY MASS INDEX: 28.6 KG/M2

## 2022-10-07 DIAGNOSIS — K70.31 ALCOHOLIC CIRRHOSIS OF LIVER WITH ASCITES (HCC): Primary | Chronic | ICD-10-CM

## 2022-10-07 DIAGNOSIS — K58.0 IRRITABLE BOWEL SYNDROME WITH DIARRHEA: ICD-10-CM

## 2022-10-07 DIAGNOSIS — K21.9 GERD WITHOUT ESOPHAGITIS: ICD-10-CM

## 2022-10-07 DIAGNOSIS — Z23 ENCOUNTER FOR IMMUNIZATION: Primary | ICD-10-CM

## 2022-10-07 PROCEDURE — 90471 IMMUNIZATION ADMIN: CPT

## 2022-10-07 PROCEDURE — 90682 RIV4 VACC RECOMBINANT DNA IM: CPT

## 2022-10-07 PROCEDURE — 99214 OFFICE O/P EST MOD 30 MIN: CPT | Performed by: INTERNAL MEDICINE

## 2022-10-07 RX ORDER — FUROSEMIDE 40 MG/1
40 TABLET ORAL DAILY
Qty: 30 TABLET | Refills: 5 | Status: SHIPPED | OUTPATIENT
Start: 2022-10-07

## 2022-10-07 RX ORDER — SPIRONOLACTONE 100 MG/1
100 TABLET, FILM COATED ORAL DAILY
Qty: 30 TABLET | Refills: 5 | Status: SHIPPED | OUTPATIENT
Start: 2022-10-07

## 2022-10-07 NOTE — PROGRESS NOTES
[facility] - Outpatient Follow-up Note  Timbo Conway 60 y o  female MRN: [id number]  Encounter: [id number]          ASSESSMENT AND PLAN:      1  Alcoholic cirrhosis of liver with ascites ([facility] [address] )  She has alcoholic cirrhosis complicated by ascites and hyponatremia  Because she has had worsening ascites I checked and Nephrology is okay with me increasing her spironolactone 100 mg daily  We will continue Lasix 40 mg daily and recheck her metabolic panel in two weeks  She should have an elective upper endoscopy to screen for varices but we will wait until her sodium has improved  She does not currently have any encephalopathy  Since she has now quit alcohol she is open to referral for liver transplant evaluation so I will schedule her for an appointment with our transplant hepatologist Dr Elicia Dodson  - Comprehensive metabolic panel; Future  - furosemide (LASIX) 40 mg tablet; Take 1 tablet (40 mg total) by mouth daily  Dispense: 30 tablet; Refill: 5  - spironolactone (ALDACTONE) 100 mg tablet; Take 1 tablet (100 mg total) by mouth daily  Dispense: 30 tablet; Refill: 5    2  Irritable bowel syndrome with diarrhea  She has diarrhea predominant irritable bowel syndrome but has not had any recent symptoms  3  GERD without esophagitis  She has reflux but has not required any recent proton pump inhibitor therapy  I encouraged her to continue diet and lifestyle modification  ______________________________________________________________________    SUBJECTIVE:  She presents for follow-up of her alcoholic cirrhosis complicated by ascites  She has been following up with Nephrology and has been taking Lasix 40 mg daily and spironolactone 50 mg daily  She feels the ascites has worsened  She has also had issues with hyponatremia and she finally agreed to stop drinking alcohol due to the effect on her sodium and her liver  She denies any confusion, bleeding, or weight loss    She has not had any recent reflux symptoms, diarrhea, or abdominal pain  REVIEW OF SYSTEMS IS OTHERWISE NEGATIVE  Historical Information   Past Medical History:   Diagnosis Date    Alcoholic fatty liver     Anxiety     Asthma     COPD (chronic obstructive pulmonary disease) (HCC)     Elevated liver function tests     GERD (gastroesophageal reflux disease)     GERD without esophagitis     Hyperlipidemia     Hypertension     IBS (irritable bowel syndrome)     Insomnia     Insomnia     Liver disease     Nervousness     Nicotine dependence     Other specified urinary incontinence     RLS (restless legs syndrome)     Wears glasses      Past Surgical History:   Procedure Laterality Date    BACK SURGERY      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    BREAST EXCISIONAL BIOPSY Right 11/01/2004    benign    CATARACT EXTRACTION, BILATERAL  08/01/2018    COLONOSCOPY N/A 5/8/2019    Procedure: COLONOSCOPY;  Surgeon: Jaclyn Hwang MD;  Location: [facility] GI LAB; Service: Gastroenterology    EGD AND COLONOSCOPY N/A 3/19/2018    Procedure: EGD AND COLONOSCOPY;  Surgeon: Jaclyn Hwang MD;  Location: [facility] GI LAB; Service: Gastroenterology    ESOPHAGOGASTRODUODENOSCOPY N/A 5/8/2019    Procedure: ESOPHAGOGASTRODUODENOSCOPY (EGD); Surgeon: Jaclyn Hwang MD;  Location: [facility] GI LAB;   Service: Gastroenterology    IR PARACENTESIS  11/27/2020    IR PARACENTESIS  12/8/2020    KNEE SURGERY      KNEE SURGERY      LUMBAR LAMINECTOMY  04/1999    LYMPH NODE BIOPSY      MAMMO STEREOTACTIC BREAST BIOPSY RIGHT (ALL INC) Right 5/21/2021    MAMMO STEREOTACTIC BREAST BIOPSY RIGHT (ALL INC) EACH ADD Right 5/21/2021    TOTAL ABDOMINAL HYSTERECTOMY  02/05/2008    TUBAL LIGATION      TUBAL LIGATION  1989    UPPER GASTROINTESTINAL ENDOSCOPY       Social History   Social History     Substance and Sexual Activity   Alcohol Use Not Currently    Alcohol/week: 24 0 standard drinks    Types: 24 Cans of beer per week Comment: not lately     Social History     Substance and Sexual Activity   Drug Use No     Social History     Tobacco Use   Smoking Status Current Every Day Smoker    Packs/day: 1 00    Years: 45 00    Pack years: 45 00    Types: Cigarettes    Start date: 1978   Smokeless Tobacco Never Used     Family History   Problem Relation Age of Onset    Breast cancer Mother 28    No Known Problems Father     No Known Problems Sister     No Known Problems Brother     No Known Problems Son     No Known Problems Maternal Grandmother     No Known Problems Maternal Grandfather     No Known Problems Paternal Grandmother     No Known Problems Paternal Grandfather     No Known Problems Maternal Aunt     No Known Problems Maternal Aunt     No Known Problems Paternal Aunt     Substance Abuse Neg Hx     Mental illness Neg Hx        Meds/Allergies       Current Outpatient Medications:     albuterol (2 5 mg/3 mL) 0 083 % nebulizer solution    albuterol (Proventil HFA) 90 mcg/act inhaler    ALPRAZolam (XANAX) 1 mg tablet    anastrozole (ARIMIDEX) 1 mg tablet    benzonatate (TESSALON PERLES) 100 mg capsule    furosemide (LASIX) 40 mg tablet    midodrine (PROAMATINE) 5 mg tablet    spironolactone (ALDACTONE) 100 mg tablet    Allergies   Allergen Reactions    Sulfa Antibiotics Shortness Of Breath    Ace Inhibitors Cough and Other (See Comments)     coughing           Objective     Blood pressure 124/60, temperature 99 8 °F (37 7 °C), temperature source Tympanic, height 5' 3" (1 6 m), weight 73 2 kg (161 lb 6 4 oz), not currently breastfeeding  Body mass index is 28 59 kg/m²        PHYSICAL EXAM:      General Appearance:   Alert, cooperative, no distress, no asterixis   HEENT:   Normocephalic, atraumatic, anicteric      Neck:  Supple, symmetrical, trachea midline   Lungs:   Clear to auscultation bilaterally; no rales, rhonchi or wheezing; respirations unlabored    Heart[de-identified]   Regular rate and rhythm; no murmur, rub, or gallop  Abdomen:   Soft, non-tender, moderate ascites; normal bowel sounds; no masses, no organomegaly    Genitalia:   Deferred    Rectal:   Deferred    Extremities:  No cyanosis, clubbing or edema    Pulses:  2+ and symmetric    Skin:  No rashes or lesions, but has jaundice    Lymph nodes:  No palpable cervical lymphadenopathy        Lab Results:   No visits with results within 1 Day(s) from this visit  Latest known visit with results is:   Telephone on 09/20/2022   Component Date Value    Glucose, Random 09/23/2022 92     BUN 09/23/2022 10     Creatinine 09/23/2022 0 85     eGFR 09/23/2022 77     SL AMB BUN/CREATININE RA* 09/23/2022 12     Sodium 09/23/2022 129 (A)    Potassium 09/23/2022 4 0     Chloride 09/23/2022 95 (A)    CO2 09/23/2022 20     CALCIUM 09/23/2022 8 3 (A)         Radiology Results:   [address] bedside procedure    Result Date: 9/18/2022  Narrative: 1 2 840 [id number]  2 [phone] [id number]  1 1

## 2022-10-07 NOTE — PATIENT INSTRUCTIONS
Scheduled date of EGD(as of today):10 13 22  Physician performing EGD:dr kaufman  Location of EGD:[facility]  Instructions reviewed with patient by:RODOLFO  Clearances: N/A

## 2022-10-13 ENCOUNTER — HOSPITAL ENCOUNTER (OUTPATIENT)
Dept: GASTROENTEROLOGY | Facility: HOSPITAL | Age: 62
Setting detail: OUTPATIENT SURGERY
End: 2022-10-13
Attending: INTERNAL MEDICINE
Payer: COMMERCIAL

## 2022-10-13 ENCOUNTER — ANESTHESIA EVENT (OUTPATIENT)
Dept: GASTROENTEROLOGY | Facility: HOSPITAL | Age: 62
End: 2022-10-13

## 2022-10-13 ENCOUNTER — HOSPITAL ENCOUNTER (EMERGENCY)
Facility: HOSPITAL | Age: 62
Discharge: HOME | End: 2022-10-13
Attending: EMERGENCY MEDICINE
Payer: COMMERCIAL

## 2022-10-13 ENCOUNTER — ANESTHESIA (OUTPATIENT)
Dept: GASTROENTEROLOGY | Facility: HOSPITAL | Age: 62
End: 2022-10-13

## 2022-10-13 ENCOUNTER — APPOINTMENT (OUTPATIENT)
Dept: RADIOLOGY | Facility: HOSPITAL | Age: 62
End: 2022-10-13
Payer: COMMERCIAL

## 2022-10-13 VITALS
BODY MASS INDEX: 28.52 KG/M2 | TEMPERATURE: 97.8 F | SYSTOLIC BLOOD PRESSURE: 114 MMHG | WEIGHT: 161 LBS | DIASTOLIC BLOOD PRESSURE: 58 MMHG | RESPIRATION RATE: 29 BRPM | HEART RATE: 78 BPM | OXYGEN SATURATION: 98 %

## 2022-10-13 VITALS
WEIGHT: 161 LBS | HEART RATE: 77 BPM | HEIGHT: 63 IN | RESPIRATION RATE: 18 BRPM | TEMPERATURE: 97 F | DIASTOLIC BLOOD PRESSURE: 56 MMHG | OXYGEN SATURATION: 97 % | BODY MASS INDEX: 28.53 KG/M2 | SYSTOLIC BLOOD PRESSURE: 118 MMHG

## 2022-10-13 DIAGNOSIS — S80.219A KNEE ABRASION: Primary | ICD-10-CM
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
## 2022-10-13 DIAGNOSIS — K70.31 ALCOHOLIC CIRRHOSIS OF LIVER WITH ASCITES (HCC): Chronic | ICD-10-CM

## 2022-10-13 DIAGNOSIS — K74.60 DECOMPENSATED HEPATIC CIRRHOSIS (HCC): ICD-10-CM

## 2022-10-13 DIAGNOSIS — K72.90 DECOMPENSATED HEPATIC CIRRHOSIS (HCC): ICD-10-CM

## 2022-10-13 LAB
ALBUMIN SERPL BCP-MCNC: 2.2 G/DL (ref 3.5–5)
ALP SERPL-CCNC: 82 U/L (ref 46–116)
ALT SERPL W P-5'-P-CCNC: 25 U/L (ref 12–78)
ANION GAP SERPL CALCULATED.3IONS-SCNC: 6 MMOL/L (ref 4–13)
APPEARANCE FLD: CLEAR
AST SERPL W P-5'-P-CCNC: 43 U/L (ref 5–45)
BASOPHILS # BLD AUTO: 0.05 THOUSANDS/ΜL (ref 0–0.1)
BASOPHILS NFR BLD AUTO: 1 % (ref 0–1)
BILIRUB SERPL-MCNC: 1.67 MG/DL (ref 0.2–1)
BUN SERPL-MCNC: 9 MG/DL (ref 5–25)
CALCIUM ALBUM COR SERPL-MCNC: 10 MG/DL (ref 8.3–10.1)
CALCIUM SERPL-MCNC: 8.6 MG/DL (ref 8.3–10.1)
CHLORIDE SERPL-SCNC: 102 MMOL/L (ref 96–108)
CO2 SERPL-SCNC: 25 MMOL/L (ref 21–32)
COLOR FLD: YELLOW
CREAT SERPL-MCNC: 0.98 MG/DL (ref 0.6–1.3)
EOSINOPHIL # BLD AUTO: 0.06 THOUSAND/ΜL (ref 0–0.61)
EOSINOPHIL NFR BLD AUTO: 1 % (ref 0–6)
ERYTHROCYTE [DISTWIDTH] IN BLOOD BY AUTOMATED COUNT: 13.2 % (ref 11.6–15.1)
GFR SERPL CREATININE-BSD FRML MDRD: 61 ML/MIN/1.73SQ M
GLUCOSE SERPL-MCNC: 121 MG/DL (ref 65–140)
GLUCOSE SERPL-MCNC: 131 MG/DL (ref 65–140)
HCT VFR BLD AUTO: 33.1 % (ref 34.8–46.1)
HGB BLD-MCNC: 10.9 G/DL (ref 11.5–15.4)
IMM GRANULOCYTES # BLD AUTO: 0.02 THOUSAND/UL (ref 0–0.2)
IMM GRANULOCYTES NFR BLD AUTO: 0 % (ref 0–2)
LYMPHOCYTES # BLD AUTO: 1.01 THOUSANDS/ΜL (ref 0.6–4.47)
LYMPHOCYTES NFR BLD AUTO: 21 % (ref 14–44)
LYMPHOCYTES NFR BLD AUTO: 45 %
MCH RBC QN AUTO: 35.2 PG (ref 26.8–34.3)
MCHC RBC AUTO-ENTMCNC: 32.9 G/DL (ref 31.4–37.4)
MCV RBC AUTO: 107 FL (ref 82–98)
MONO+MESO NFR FLD MANUAL: 14 %
MONOCYTES # BLD AUTO: 0.43 THOUSAND/ΜL (ref 0.17–1.22)
MONOCYTES NFR BLD AUTO: 36 %
MONOCYTES NFR BLD AUTO: 9 % (ref 4–12)
NEUTROPHILS # BLD AUTO: 3.17 THOUSANDS/ΜL (ref 1.85–7.62)
NEUTS SEG NFR BLD AUTO: 5 %
NEUTS SEG NFR BLD AUTO: 68 % (ref 43–75)
NRBC BLD AUTO-RTO: 0 /100 WBCS
PLATELET # BLD AUTO: 111 THOUSANDS/UL (ref 149–390)
PMV BLD AUTO: 10.1 FL (ref 8.9–12.7)
POTASSIUM SERPL-SCNC: 3.3 MMOL/L (ref 3.5–5.3)
PROT SERPL-MCNC: 8.8 G/DL (ref 6.4–8.4)
RBC # BLD AUTO: 3.1 MILLION/UL (ref 3.81–5.12)
SITE: NORMAL
SODIUM SERPL-SCNC: 133 MMOL/L (ref 135–147)
TOTAL CELLS COUNTED SPEC: 100
WBC # BLD AUTO: 4.74 THOUSAND/UL (ref 4.31–10.16)
WBC # FLD MANUAL: 74 /UL

## 2022-10-13 PROCEDURE — 43235 EGD DIAGNOSTIC BRUSH WASH: CPT | Performed by: INTERNAL MEDICINE

## 2022-10-13 PROCEDURE — 80053 COMPREHEN METABOLIC PANEL: CPT | Performed by: EMERGENCY MEDICINE

## 2022-10-13 PROCEDURE — 99284 EMERGENCY DEPT VISIT MOD MDM: CPT

## 2022-10-13 PROCEDURE — 36415 COLL VENOUS BLD VENIPUNCTURE: CPT

## 2022-10-13 PROCEDURE — 49083 ABD PARACENTESIS W/IMAGING: CPT | Performed by: EMERGENCY MEDICINE

## 2022-10-13 PROCEDURE — 89051 BODY FLUID CELL COUNT: CPT

## 2022-10-13 PROCEDURE — 82948 REAGENT STRIP/BLOOD GLUCOSE: CPT

## 2022-10-13 PROCEDURE — 99285 EMERGENCY DEPT VISIT HI MDM: CPT | Performed by: EMERGENCY MEDICINE

## 2022-10-13 PROCEDURE — 93005 ELECTROCARDIOGRAM TRACING: CPT

## 2022-10-13 PROCEDURE — 85025 COMPLETE CBC W/AUTO DIFF WBC: CPT | Performed by: EMERGENCY MEDICINE

## 2022-10-13 RX ORDER — FENTANYL CITRATE 50 UG/ML
INJECTION, SOLUTION INTRAMUSCULAR; INTRAVENOUS AS NEEDED
Status: DISCONTINUED | OUTPATIENT
Start: 2022-10-13 | End: 2022-10-13

## 2022-10-13 RX ORDER — SUCCINYLCHOLINE/SOD CL,ISO/PF 100 MG/5ML
SYRINGE (ML) INTRAVENOUS AS NEEDED
Status: DISCONTINUED | OUTPATIENT
Start: 2022-10-13 | End: 2022-10-13

## 2022-10-13 RX ORDER — LIDOCAINE HYDROCHLORIDE 10 MG/ML
INJECTION, SOLUTION EPIDURAL; INFILTRATION; INTRACAUDAL; PERINEURAL AS NEEDED
Status: DISCONTINUED | OUTPATIENT
Start: 2022-10-13 | End: 2022-10-13

## 2022-10-13 RX ORDER — SODIUM CHLORIDE 9 MG/ML
INJECTION, SOLUTION INTRAVENOUS CONTINUOUS PRN
Status: DISCONTINUED | OUTPATIENT
Start: 2022-10-13 | End: 2022-10-13

## 2022-10-13 RX ORDER — ONDANSETRON 2 MG/ML
INJECTION INTRAMUSCULAR; INTRAVENOUS AS NEEDED
Status: DISCONTINUED | OUTPATIENT
Start: 2022-10-13 | End: 2022-10-13

## 2022-10-13 RX ORDER — PROPOFOL 10 MG/ML
INJECTION, EMULSION INTRAVENOUS AS NEEDED
Status: DISCONTINUED | OUTPATIENT
Start: 2022-10-13 | End: 2022-10-13

## 2022-10-13 RX ORDER — LIDOCAINE HYDROCHLORIDE AND EPINEPHRINE 10; 10 MG/ML; UG/ML
5 INJECTION, SOLUTION INFILTRATION; PERINEURAL ONCE
Status: COMPLETED | OUTPATIENT
Start: 2022-10-13 | End: 2022-10-13

## 2022-10-13 RX ADMIN — PROPOFOL 120 MG: 10 INJECTION, EMULSION INTRAVENOUS at 11:32

## 2022-10-13 RX ADMIN — LIDOCAINE HYDROCHLORIDE 50 MG: 10 INJECTION, SOLUTION EPIDURAL; INFILTRATION; INTRACAUDAL; PERINEURAL at 11:32

## 2022-10-13 RX ADMIN — LIDOCAINE HYDROCHLORIDE,EPINEPHRINE BITARTRATE 5 ML: 10; .01 INJECTION, SOLUTION INFILTRATION; PERINEURAL at 14:07

## 2022-10-13 RX ADMIN — Medication 100 MG: at 11:32

## 2022-10-13 RX ADMIN — SODIUM CHLORIDE: 0.9 INJECTION, SOLUTION INTRAVENOUS at 11:27

## 2022-10-13 RX ADMIN — FENTANYL CITRATE 50 MCG: 50 INJECTION INTRAMUSCULAR; INTRAVENOUS at 11:32

## 2022-10-13 RX ADMIN — ONDANSETRON 4 MG: 2 INJECTION INTRAMUSCULAR; INTRAVENOUS at 11:40

## 2022-10-13 NOTE — ANESTHESIA PREPROCEDURE EVALUATION
Procedure:  EGD    Relevant Problems   CARDIO   (+) Essential hypertension   (+) Hyperlipidemia      GI/HEPATIC   (+) Alcoholic cirrhosis of liver with ascites (HCC)   (+) Decompensated hepatic cirrhosis (HCC)   (+) Fatty liver   (+) GERD without esophagitis      GYN   (+) Malignant neoplasm of lower-outer quadrant of right breast of female, estrogen receptor positive (HCC)      HEMATOLOGY   (+) Anemia   (+) Thrombocytopenia (HCC)      NEURO/PSYCH   (+) Anxiety   (+) Continuous opioid dependence (HCC)      PULMONARY   (+) COPD (chronic obstructive pulmonary disease) (HCC)        Physical Exam    Airway    Mallampati score: I  TM Distance: >3 FB  Neck ROM: full     Dental   No notable dental hx     Cardiovascular  Cardiovascular exam normal    Pulmonary  Pulmonary exam normal     Other Findings      Pt has significant ascites which makes it hard to breath when lying flat  Says she desires another tap  Last earlier this year  Compensate COPD    HGB 10 9  Plt 86  Anesthesia Plan  ASA Score- 4     Anesthesia Type- general with ASA Monitors  Additional Monitors:   Airway Plan: ETT  Plan Factors-Exercise tolerance (METS): >4 METS  Chart reviewed  EKG reviewed  Existing labs reviewed  Patient summary reviewed  Patient is a current smoker  Patient smoked on day of surgery  Induction- intravenous  Postoperative Plan-     Informed Consent- Anesthetic plan and risks discussed with patient  I personally reviewed this patient with the CRNA  Discussed and agreed on the Anesthesia Plan with the CRNA  Franky Leigh SUMMARY     LEFT VENTRICLE:  Systolic function was normal  Ejection fraction was estimated to be 60 %  There were no regional wall motion abnormalities      MITRAL VALVE:  There was trace regurgitation      TRICUSPID VALVE:  There was mild regurgitation  Pulmonary artery systolic pressure was within the normal range      PERICARDIUM:  Ascites was noted

## 2022-10-13 NOTE — DISCHARGE INSTRUCTIONS
Thank you for coming to the ED for your care  Please continue to follow up with your outside providers as previously scheduled  Please return to the ED with any new or worsening symptoms 
Statement Selected

## 2022-10-13 NOTE — ED PROVIDER NOTES
History  Chief Complaint   Patient presents with   • Syncope     Patient at GI lab for esophageal dilation today  Upon discharge patient's legs felt weak and she had a syncope episode  - headstrike - thinners -loc +abrasion to right knee     60 y o  female with PMH significant for alcoholic fatty liver c/b ascites, COPD, HLD, HTN, presents due to a fall after leaving GI suite for evaluation of variceal screening  She notes that when she was walking out of the office, her legs felt weak and collapsed underneath her so she landed on her right knee before her  was able to catch her  She denies any prodromal symptoms such as chest pain, lightheadedness, or others  She does note that her abdomen has significant swelling in the past 2-3 weeks  She notes it is causing her to have some generalized abdominal discomfort and difficulty taking deep breaths  No other complaints at this time  She is scheduled to f/u for ultrasound in 2 weeks prior to potential further paracentesis on outpt basis  Prior to Admission Medications   Prescriptions Last Dose Informant Patient Reported? Taking?    ALPRAZolam (XANAX) 1 mg tablet  Self No No   Sig: TAKE ONE TABLET BY MOUTH TWICE A DAY AS NEEDED FOR ANXIETY   albuterol (2 5 mg/3 mL) 0 083 % nebulizer solution  Self No No   Sig: Take 3 mL (2 5 mg total) by nebulization every 6 (six) hours as needed for wheezing or shortness of breath   albuterol (Proventil HFA) 90 mcg/act inhaler  Self No No   Sig: Inhale 2 puffs every 6 (six) hours as needed for wheezing or shortness of breath   anastrozole (ARIMIDEX) 1 mg tablet  Self No No   Sig: Take 1 tablet (1 mg total) by mouth daily   benzonatate (TESSALON PERLES) 100 mg capsule  Self No No   Sig: TAKE 1 CAPSULE BY MOUTH 3 TIMES DAILY AS NEEDED FOR COUGH   furosemide (LASIX) 40 mg tablet   No No   Sig: Take 1 tablet (40 mg total) by mouth daily   midodrine (PROAMATINE) 5 mg tablet  Self No No   Sig: Take 1 tablet (5 mg total) by mouth 3 (three) times a day before meals   spironolactone (ALDACTONE) 100 mg tablet   No No   Sig: Take 1 tablet (100 mg total) by mouth daily      Facility-Administered Medications: None       Past Medical History:   Diagnosis Date   • Alcoholic fatty liver    • Anxiety    • Asthma    • COPD (chronic obstructive pulmonary disease) (HCC)    • Elevated liver function tests    • GERD (gastroesophageal reflux disease)    • GERD without esophagitis    • Hyperlipidemia    • Hypertension    • IBS (irritable bowel syndrome)    • Insomnia    • Insomnia    • Liver disease    • Nervousness    • Nicotine dependence    • Other specified urinary incontinence    • RLS (restless legs syndrome)    • Wears glasses        Past Surgical History:   Procedure Laterality Date   • BACK SURGERY     • 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   • BREAST EXCISIONAL BIOPSY Right 11/01/2004    benign   • CATARACT EXTRACTION, BILATERAL  08/01/2018   • COLONOSCOPY N/A 5/8/2019    Procedure: COLONOSCOPY;  Surgeon: Caitlyn Ace MD;  Location: [facility] GI LAB; Service: Gastroenterology   • EGD AND COLONOSCOPY N/A 3/19/2018    Procedure: EGD AND COLONOSCOPY;  Surgeon: Caitlyn Ace MD;  Location: [facility] GI LAB; Service: Gastroenterology   • ESOPHAGOGASTRODUODENOSCOPY N/A 5/8/2019    Procedure: ESOPHAGOGASTRODUODENOSCOPY (EGD); Surgeon: Caitlyn Ace MD;  Location: [facility] GI LAB;   Service: Gastroenterology   • IR PARACENTESIS  11/27/2020   • IR PARACENTESIS  12/8/2020   • KNEE SURGERY     • KNEE SURGERY     • LUMBAR LAMINECTOMY  04/1999   • LYMPH NODE BIOPSY     • MAMMO STEREOTACTIC BREAST BIOPSY RIGHT (ALL INC) Right 5/21/2021   • MAMMO STEREOTACTIC BREAST BIOPSY RIGHT (ALL INC) EACH ADD Right 5/21/2021   • TOTAL ABDOMINAL HYSTERECTOMY  02/05/2008   • TUBAL LIGATION     • TUBAL LIGATION  1989   • UPPER GASTROINTESTINAL ENDOSCOPY         Family History   Problem Relation Age of Onset   • Breast cancer Mother 28   • No Known Problems Father    • No Known Problems Sister    • No Known Problems Brother    • No Known Problems Son    • No Known Problems Maternal Grandmother    • No Known Problems Maternal Grandfather    • No Known Problems Paternal Grandmother    • No Known Problems Paternal Grandfather    • No Known Problems Maternal Aunt    • No Known Problems Maternal Aunt    • No Known Problems Paternal Aunt    • Substance Abuse Neg Hx    • Mental illness Neg Hx      I have reviewed and agree with the history as documented  E-Cigarette/Vaping   • E-Cigarette Use Never User      E-Cigarette/Vaping Substances     Social History     Tobacco Use   • Smoking status: Current Every Day Smoker     Packs/day: 1 00     Years: 45 00     Pack years: 45 00     Types: Cigarettes     Start date: 1978   • Smokeless tobacco: Never Used   Vaping Use   • Vaping Use: Never used   Substance Use Topics   • Alcohol use: Not Currently     Alcohol/week: 24 0 standard drinks     Types: 24 Cans of beer per week     Comment: not lately, non alcoholic beer   • Drug use: No        Review of Systems   Constitutional: Negative for chills and fever  HENT: Negative for ear pain and sore throat  Eyes: Negative for pain and visual disturbance  Respiratory: Positive for shortness of breath  Negative for cough  Cardiovascular: Negative for chest pain and palpitations  Gastrointestinal: Positive for abdominal distention and abdominal pain  Negative for vomiting  Genitourinary: Negative for dysuria and hematuria  Musculoskeletal: Negative for arthralgias and back pain  Skin: Negative for color change and rash  Neurological: Negative for seizures and syncope  All other systems reviewed and are negative        Physical Exam  ED Triage Vitals [10/13/22 1325]   Temperature Pulse Respirations Blood Pressure SpO2   97 8 °F (36 6 °C) 78 (!) 24 115/66 96 %      Temp Source Heart Rate Source Patient Position - Orthostatic VS BP Location FiO2 (%)   Oral Monitor Lying Left arm --      Pain Score       --             Orthostatic Vital Signs  Vitals:    10/13/22 1325 10/13/22 1530 10/13/22 1600 10/13/22 1700   BP: 115/66 108/58 121/60 114/58   Pulse: 78 72 70 78   Patient Position - Orthostatic VS: Lying Lying Lying Lying       Physical Exam  Vitals and nursing note reviewed  Constitutional:       General: She is not in acute distress  Appearance: She is well-developed  HENT:      Head: Normocephalic and atraumatic  Right Ear: External ear normal       Left Ear: External ear normal       Nose: Nose normal    Eyes:      Conjunctiva/sclera: Conjunctivae normal    Cardiovascular:      Rate and Rhythm: Normal rate and regular rhythm  Heart sounds: No murmur heard  Pulmonary:      Effort: No respiratory distress  Breath sounds: Normal breath sounds  Comments: tachypneic  Abdominal:      General: There is distension  Palpations: Abdomen is soft  Tenderness: There is abdominal tenderness (diffusely)  Musculoskeletal:         General: No tenderness  Normal range of motion  Cervical back: Neck supple  Right lower leg: No edema  Left lower leg: No edema  Skin:     General: Skin is warm and dry  Comments: Superficial abrasion to R knee  Neurological:      General: No focal deficit present  Mental Status: She is alert  Psychiatric:         Mood and Affect: Mood normal          ED Medications  Medications   lidocaine-epinephrine (XYLOCAINE/EPINEPHRINE) 1 %-1:100,000 injection 5 mL (5 mL Infiltration Given 10/13/22 1407)       Diagnostic Studies  Results Reviewed     Procedure Component Value Units Date/Time    Body fluid white cell count with differential [[id number]] Collected: 10/13/22 1452    Lab Status: Final result Specimen:  Body Fluid from Paracentesis Updated: 10/13/22 1835     Site Paracentesis     Color, Fluid Yellow     Clarity, Fluid Clear     WBC, Fluid 74 /ul     Body Fluid Diff [[id number]] Collected: 10/13/22 1452    Lab Status: Final result Specimen: Body Fluid from Paracentesis Updated: 10/13/22 1835     Total Counted 100     Neutrophils % (Fluid) 5 %      Lymphs % (Fluid) 45 %      Mesothelial % (Fluid) 14 %      Monocytes % (Fluid) 36 %     Body fluid culture and Gram stain [[id number]] Collected: 10/13/22 4599    Lab Status: In process Specimen:  Body Fluid from Paracentesis Updated: 10/13/22 1504    Comprehensive metabolic panel [[id number]]  (Abnormal) Collected: 10/13/22 1334    Lab Status: Final result Specimen: Blood from Arm, Right Updated: 10/13/22 1424     Sodium 133 mmol/L      Potassium 3 3 mmol/L      Chloride 102 mmol/L      CO2 25 mmol/L      ANION GAP 6 mmol/L      BUN 9 mg/dL      Creatinine 0 98 mg/dL      Glucose 131 mg/dL      Calcium 8 6 mg/dL      Corrected Calcium 10 0 mg/dL      AST 43 U/L      ALT 25 U/L      Alkaline Phosphatase 82 U/L      Total Protein 8 8 g/dL      Albumin 2 2 g/dL      Total Bilirubin 1 67 mg/dL      eGFR 61 ml/min/1 73sq m     Narrative:      [place] guidelines for Chronic Kidney Disease (CKD):   •  Stage 1 with normal or high GFR (GFR > 90 mL/min/1 73 square meters)  •  Stage 2 Mild CKD (GFR = 60-89 mL/min/1 73 square meters)  •  Stage 3A Moderate CKD (GFR = 45-59 mL/min/1 73 square meters)  •  Stage 3B Moderate CKD (GFR = 30-44 mL/min/1 73 square meters)  •  Stage 4 Severe CKD (GFR = 15-29 mL/min/1 73 square meters)  •  Stage 5 End Stage CKD (GFR <15 mL/min/1 73 square meters)  Note: GFR calculation is accurate only with a steady state creatinine    CBC and differential [[id number]]  (Abnormal) Collected: 10/13/22 1334    Lab Status: Final result Specimen: Blood from Arm, Right Updated: 10/13/22 1359     WBC 4 74 Thousand/uL      RBC 3 10 Million/uL      Hemoglobin 10 9 g/dL      Hematocrit 33 1 %       fL      MCH 35 2 pg      MCHC 32 9 g/dL      RDW 13 2 %      MPV 10 1 fL      Platelets 784 Thousands/uL      nRBC 0 /100 WBCs      Neutrophils Relative 68 % Immat GRANS % 0 %      Lymphocytes Relative 21 %      Monocytes Relative 9 %      Eosinophils Relative 1 %      Basophils Relative 1 %      Neutrophils Absolute 3 17 Thousands/µL      Immature Grans Absolute 0 02 Thousand/uL      Lymphocytes Absolute 1 01 Thousands/µL      Monocytes Absolute 0 43 Thousand/µL      Eosinophils Absolute 0 06 Thousand/µL      Basophils Absolute 0 05 Thousands/µL                  No orders to display         Procedures  General Procedure    Date/Time: 10/13/2022 5:05 PM  Performed by: Komal Chang MD  Authorized by: Komal Chang MD     Patient location:  ED  Assisting Provider(s): Yes (comment) (Pester)    Consent:     Consent obtained:  Verbal    Consent given by:  Patient    Risks discussed:  Bleeding, infection, pain and incomplete drainage    Alternatives discussed:  No treatment and delayed treatment  Indications:     Indications:  Ascites w/   Pre-procedure details:     Skin preparation:  ChloraPrep    Preparation: Patient was prepped and draped in the usual sterile fashion    Anesthesia (see MAR for exact dosages): Anesthesia method:  Local infiltration    Local anesthetic:  Lidocaine 1% WITH epi  Procedure Detail:     Equipment used:  Paracentesis kit    Procedure note (site, laterality, method, findings):  Ultrasound visualization of pocket of fluid for placement of paracentesis catheter  Also visualized inferior epigastric arteries to ensure this was not injured with placement  Patient tolerated placement and 7 1L was drained  Patient reports significant improvement in symptoms  Post-procedure details:     Patient tolerance of procedure:   Tolerated well, no immediate complications          ED Course  ED Course as of 10/17/22 0730   Thu Oct 13, 2022   1715 7 1 L of clear/yellow ascitic fluid removed                             SBIRT 22yo+    Flowsheet Row Most Recent Value   SBIRT (23 yo +)    In order to provide better care to our patients, we are screening all of our patients for alcohol and drug use  Would it be okay to ask you these screening questions? No Filed at: 10/13/2022 1421                MDM  Number of Diagnoses or Management Options  Decompensated hepatic cirrhosis ([facility] )  Fall, initial encounter  Knee abrasion  Diagnosis management comments: 60 y o F w/ mechanical fall and minor abrasion to right knee  Paracentesis performed for patient symptomatic relief as she was not scheduled for this in about 2+ weeks  Patient tolerated procedure well and had near-resolution of symptoms  Discharged with return precautions and recommend continued f/u as planned with outpt providers  Disposition  Final diagnoses:   Fall, initial encounter   Knee abrasion   Decompensated hepatic cirrhosis ([facility] )     Time reflects when diagnosis was documented in both MDM as applicable and the Disposition within this note     Time User Action Codes Description Comment    10/13/2022  5:06 PM Derby Danger Add [W19  TBAW] Fall, initial encounter     10/13/2022  5:06 PM Derby Danger Add [E46 316O] Knee abrasion     10/13/2022  5:06 PM Yokubaitis, Tomasz Add [K72 90,  V88 50] Decompensated hepatic cirrhosis ([facility] )     10/13/2022  5:06 PM Derby Danger Modify [Q22  PVHN] Fall, initial encounter     10/13/2022  5:06 PM Derby Danger Modify [W27 698R] Knee abrasion       ED Disposition     ED Disposition   Discharge    Condition   Stable    Date/Time   Thu Oct 13, 2022  5:05 PM    Comment   Jono White discharge to home/self care                 Follow-up Information     Follow up With Specialties Details Why Contact Info    Rose Wen PA-C Family Medicine, Physician Assistant   [address] [place] [address]  [phone]            Discharge Medication List as of 10/13/2022  5:07 PM      CONTINUE these medications which have NOT CHANGED    Details   ALPRAZolam (XANAX) 1 mg tablet TAKE ONE TABLET BY MOUTH TWICE A DAY AS NEEDED FOR ANXIETY, Normal      anastrozole (ARIMIDEX) 1 mg tablet Take 1 tablet (1 mg total) by mouth daily, Starting Thu 6/9/2022, Normal      benzonatate (TESSALON PERLES) 100 mg capsule TAKE 1 CAPSULE BY MOUTH 3 TIMES DAILY AS NEEDED FOR COUGH, Normal      furosemide (LASIX) 40 mg tablet Take 1 tablet (40 mg total) by mouth daily, Starting Fri 10/7/2022, Normal      midodrine (PROAMATINE) 5 mg tablet Take 1 tablet (5 mg total) by mouth 3 (three) times a day before meals, Starting Mon 9/19/2022, Until Wed 10/19/2022, Normal      spironolactone (ALDACTONE) 100 mg tablet Take 1 tablet (100 mg total) by mouth daily, Starting Fri 10/7/2022, Normal      albuterol (2 5 mg/3 mL) 0 083 % nebulizer solution Take 3 mL (2 5 mg total) by nebulization every 6 (six) hours as needed for wheezing or shortness of breath, Starting Wed 8/3/2022, Normal      albuterol (Proventil HFA) 90 mcg/act inhaler Inhale 2 puffs every 6 (six) hours as needed for wheezing or shortness of breath, Starting Wed 2/16/2022, Normal           No discharge procedures on file  PDMP Review       Value Time User    PDMP Reviewed  Yes 8/26/2022 11:23 AM Omar Luna, [facility]           ED Provider  Attending physically available and evaluated Jose White I managed the patient along with the ED Attending      Electronically Signed by         Shae Jauregui MD  10/17/22 7395

## 2022-10-13 NOTE — H&P
History and Physical - [facility]  Hanh Moscoso 60 y o  female MRN: [id number]                  HPI: Hanh Moscoso is a 60y o  year old female who presents for varcieal screening  REVIEW OF SYSTEMS: Per the HPI, and otherwise unremarkable  Historical Information   Past Medical History:   Diagnosis Date   • Alcoholic fatty liver    • Anxiety    • Asthma    • COPD (chronic obstructive pulmonary disease) ([facility] )    • Elevated liver function tests    • GERD (gastroesophageal reflux disease)    • GERD without esophagitis    • Hyperlipidemia    • Hypertension    • IBS (irritable bowel syndrome)    • Insomnia    • Insomnia    • Liver disease    • Nervousness    • Nicotine dependence    • Other specified urinary incontinence    • RLS (restless legs syndrome)    • Wears glasses      Past Surgical History:   Procedure Laterality Date   • BACK SURGERY     • 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   • BREAST EXCISIONAL BIOPSY Right 11/01/2004    benign   • CATARACT EXTRACTION, BILATERAL  08/01/2018   • COLONOSCOPY N/A 5/8/2019    Procedure: COLONOSCOPY;  Surgeon: Naa Gomez MD;  Location: [facility] GI LAB; Service: Gastroenterology   • EGD AND COLONOSCOPY N/A 3/19/2018    Procedure: EGD AND COLONOSCOPY;  Surgeon: Naa Gomez MD;  Location: [facility] GI LAB; Service: Gastroenterology   • ESOPHAGOGASTRODUODENOSCOPY N/A 5/8/2019    Procedure: ESOPHAGOGASTRODUODENOSCOPY (EGD); Surgeon: Naa Gomez MD;  Location: [facility] GI LAB;   Service: Gastroenterology   • IR PARACENTESIS  11/27/2020   • IR PARACENTESIS  12/8/2020   • KNEE SURGERY     • KNEE SURGERY     • LUMBAR LAMINECTOMY  04/1999   • LYMPH NODE BIOPSY     • MAMMO STEREOTACTIC BREAST BIOPSY RIGHT (ALL INC) Right 5/21/2021   • MAMMO STEREOTACTIC BREAST BIOPSY RIGHT (ALL INC) EACH ADD Right 5/21/2021   • TOTAL ABDOMINAL HYSTERECTOMY  02/05/2008   • TUBAL LIGATION     • TUBAL LIGATION  1989   • UPPER GASTROINTESTINAL ENDOSCOPY Social History   Social History     Substance and Sexual Activity   Alcohol Use Not Currently   • Alcohol/week: 24 0 standard drinks   • Types: 24 Cans of beer per week    Comment: not lately, non alcoholic beer     Social History     Substance and Sexual Activity   Drug Use No     Social History     Tobacco Use   Smoking Status Current Every Day Smoker   • Packs/day: 1 00   • Years: 45 00   • Pack years: 45 00   • Types: Cigarettes   • Start date: 1978   Smokeless Tobacco Never Used     Family History   Problem Relation Age of Onset   • Breast cancer Mother 28   • No Known Problems Father    • No Known Problems Sister    • No Known Problems Brother    • No Known Problems Son    • No Known Problems Maternal Grandmother    • No Known Problems Maternal Grandfather    • No Known Problems Paternal Grandmother    • No Known Problems Paternal Grandfather    • No Known Problems Maternal Aunt    • No Known Problems Maternal Aunt    • No Known Problems Paternal Aunt    • Substance Abuse Neg Hx    • Mental illness Neg Hx        Meds/Allergies       Current Outpatient Medications:   •  ALPRAZolam (XANAX) 1 mg tablet  •  anastrozole (ARIMIDEX) 1 mg tablet  •  benzonatate (TESSALON PERLES) 100 mg capsule  •  furosemide (LASIX) 40 mg tablet  •  midodrine (PROAMATINE) 5 mg tablet  •  spironolactone (ALDACTONE) 100 mg tablet  •  albuterol (2 5 mg/3 mL) 0 083 % nebulizer solution  •  albuterol (Proventil HFA) 90 mcg/act inhaler    Allergies   Allergen Reactions   • Sulfa Antibiotics Shortness Of Breath   • Ace Inhibitors Cough and Other (See Comments)     coughing       Objective     /58   Pulse 79   Temp 97 6 °F (36 4 °C) (Tympanic)   Resp 18   Ht 5' 3" (1 6 m)   Wt 73 kg (161 lb)   SpO2 96%   BMI 28 52 kg/m²       PHYSICAL EXAM    Gen: NAD  Head: NCAT  CV: RRR  CHEST: Clear  ABD: soft, NT/ND  EXT: no edema      ASSESSMENT/PLAN:  This is a 60y o  year old female here for EGD, and she is stable and optimized for her procedure

## 2022-10-13 NOTE — ED ATTENDING ATTESTATION
10/13/2022  Selam Leblanc DO, saw and evaluated the patient  I have discussed the patient with the resident/non-physician practitioner and agree with the resident's/non-physician practitioner's findings, Plan of Care, and MDM as documented in the resident's/non-physician practitioner's note, except where noted  All available labs and Radiology studies were reviewed  I was present for key portions of any procedure(s) performed by the resident/non-physician practitioner and I was immediately available to provide assistance  At this point I agree with the current assessment done in the Emergency Department  I have conducted an independent evaluation of this patient a history and physical is as follows:      61 yo female presents for evaluation of near syncope following EGD+dilation  Didn't strike head, no LOC   helped her down  Has chronic cirrhosis, ascites  Currently really only c/o abd distention causing dyspnea  No other c/o at this time  abd soft distended, no TTP  No r/g bs+ +succussion splash    Imp: near syncope following EGD  Currently only c/o ascites  Plan: ECG, therapeutic paracentesis        ED Course         Critical Care Time  Procedures

## 2022-10-13 NOTE — ANESTHESIA POSTPROCEDURE EVALUATION
Post-Op Assessment Note    CV Status:  Stable    Pain management: adequate     Mental Status:  Awake   Hydration Status:  Euvolemic   PONV Controlled:  Controlled   Airway Patency:  Patent      Post Op Vitals Reviewed: Yes            No complications documented      BP      Temp      Pulse     Resp      SpO2

## 2022-10-14 DIAGNOSIS — K70.31 ALCOHOLIC CIRRHOSIS OF LIVER WITH ASCITES (HCC): Primary | ICD-10-CM

## 2022-10-14 DIAGNOSIS — I85.00 ESOPHAGEAL VARICES WITHOUT BLEEDING, UNSPECIFIED ESOPHAGEAL VARICES TYPE (HCC): ICD-10-CM

## 2022-10-14 LAB
ATRIAL RATE: 76 BPM
P AXIS: 61 DEGREES
PR INTERVAL: 160 MS
QRS AXIS: 78 DEGREES
QRSD INTERVAL: 82 MS
QT INTERVAL: 400 MS
QTC INTERVAL: 450 MS
T WAVE AXIS: 26 DEGREES
VENTRICULAR RATE: 76 BPM

## 2022-10-14 PROCEDURE — 93010 ELECTROCARDIOGRAM REPORT: CPT | Performed by: INTERNAL MEDICINE

## 2022-10-14 RX ORDER — NADOLOL 40 MG/1
40 TABLET ORAL DAILY
Qty: 60 TABLET | Refills: 0 | Status: SHIPPED | OUTPATIENT
Start: 2022-10-14 | End: 2022-12-13

## 2022-10-21 DIAGNOSIS — F41.9 ANXIETY: ICD-10-CM

## 2022-10-21 RX ORDER — ALPRAZOLAM 1 MG/1
TABLET ORAL
Qty: 60 TABLET | Refills: 0 | Status: SHIPPED | OUTPATIENT
Start: 2022-10-21

## 2022-10-26 ENCOUNTER — TELEPHONE (OUTPATIENT)
Dept: GASTROENTEROLOGY | Facility: CLINIC | Age: 62
End: 2022-10-26

## 2022-10-26 DIAGNOSIS — K70.31 ALCOHOLIC CIRRHOSIS OF LIVER WITH ASCITES (HCC): Primary | ICD-10-CM

## 2022-10-26 DIAGNOSIS — K70.31 ASCITES DUE TO ALCOHOLIC CIRRHOSIS (HCC): ICD-10-CM

## 2022-10-26 NOTE — TELEPHONE ENCOUNTER
Patients GI provider:  Dr Dee Giordano    Number to return call: [phone]    Reason for call: Pt calling stating her ED co-pay is too expensive for her to be going back and forth for fluid drainage  Pt would like to know if there is another less expensive option  Scheduled procedure/appointment date if applicable: Appt   12/1/22

## 2022-10-26 NOTE — TELEPHONE ENCOUNTER
Spoke with pt who complains of extreme ascites  She sounded SOB and appeared to be in pain  I explained she should go to ED if she is SOB and in pain and I would discuss with Dr Aparna Burch a plan of care for the future  Pt however stated for now she can handle symptoms  I stated although a paracentesis can be ordered now, pt might not be able to get in asap which is why I advised ED  Pt verbalized understanding and explained she cannot afford to go to ED and therefore would prefer to have paracentesis ordered outpatient  I stated I would reach out to provider for advice  Pt has recent paracentesis on 10/13 where she had 7L removed  She mentioned multiple times she does not understand why this is happening and why her stomach is filling up so quickly  She stated she has been off of alcohol for 2 months  I explained to pt that her disease process causes ascites  We reviewed alarm symptoms that would prompt ED visit  She verbalized understanding  Pt also had questions about nadolol and if she should take it  I explained reasoning for medication and instructed her to start medication  Pt also had questions about Dr Aba Acosta and office visit 12/1  Pt had confusion about provider as she was under the impression that the provider she was mycharting with/the one that sent the nadalol was the same as the one seeing her in December  I explained that Dr Carolyn Solomon is the hepatologist she will see in December but Dr Manan Hernandez is the one who competed EGD and prescribed nadolol  Pt verbalized understanding  Pt would really like a call back on how to proceed and if paracentesis outpatient can be arranged

## 2022-10-26 NOTE — TELEPHONE ENCOUNTER
I called and spoke to patient  She says that she does not have any abdominal pain, fevers or chills, or chest pain; she says she is only getting SOB with certain movements, which is similar to what occurred prior to her last paracentesis  She is very hesitant to go to the ED and continued to refuse  Thus, I placed an OP paracentesis order STAT and wrote in the order that I would like this done by tomorrow  I did explain to the pt that if for whatever reason they cannot get her in by tomorrow, or if she experiences any of the above alarm symptoms, I would strongly encourage going to the ED  Thanks! Hepatology clerical: pt is wondering if her appt with Dr Real Thompson can be earlier than December, which I think is reasonable due to her ongoing issues  If no availability with him, she is ok with seeing Dr Sandra Ball

## 2022-10-27 ENCOUNTER — TELEPHONE (OUTPATIENT)
Dept: GASTROENTEROLOGY | Facility: CLINIC | Age: 62
End: 2022-10-27

## 2022-10-27 LAB
ALBUMIN SERPL-MCNC: 2.2 G/DL (ref 3.8–4.8)
ALBUMIN/GLOB SERPL: 0.4 {RATIO} (ref 1.2–2.2)
ALP SERPL-CCNC: 87 IU/L (ref 44–121)
ALT SERPL-CCNC: 21 IU/L (ref 0–32)
AST SERPL-CCNC: 39 IU/L (ref 0–40)
BILIRUB SERPL-MCNC: 1.7 MG/DL (ref 0–1.2)
BUN SERPL-MCNC: 8 MG/DL (ref 8–27)
BUN/CREAT SERPL: 10 (ref 12–28)
CALCIUM SERPL-MCNC: 8.3 MG/DL (ref 8.7–10.3)
CHLORIDE SERPL-SCNC: 99 MMOL/L (ref 96–106)
CO2 SERPL-SCNC: 22 MMOL/L (ref 20–29)
CREAT SERPL-MCNC: 0.84 MG/DL (ref 0.57–1)
EGFR: 78 ML/MIN/1.73
GLOBULIN SER-MCNC: 5.3 G/DL (ref 1.5–4.5)
GLUCOSE SERPL-MCNC: 90 MG/DL (ref 70–99)
POTASSIUM SERPL-SCNC: 3.9 MMOL/L (ref 3.5–5.2)
PROT SERPL-MCNC: 7.5 G/DL (ref 6–8.5)
SODIUM SERPL-SCNC: 132 MMOL/L (ref 134–144)

## 2022-10-27 NOTE — TELEPHONE ENCOUNTER
Spoke with IR and they explained that they no longer needed albumin orders given that they scheduled pt at [facility] who have albumin in house  For future, if pt needs to be scheduled for [place] or locally, will need albumin orders

## 2022-10-27 NOTE — TELEPHONE ENCOUNTER
Patients GI provider:  LAURA Nath    Number to return call: [phone]     Reason for call:  [facility] called and requested a call back to speak with a nurse regarding IR Paracentesis PLEASE REACH OUT AT [phone] Thank you       Scheduled procedure/appointment date if applicable: n/a

## 2022-10-27 NOTE — TELEPHONE ENCOUNTER
Called patient and scheduled ov with Dr Tamiko Lopez on Mon 10/31 at 9:30 AM at the [facility]

## 2022-10-27 NOTE — TELEPHONE ENCOUNTER
Pt was put on for paracentesis but OV with hepatology needs to be moved up asap   Currently on for 12/, please reschedule this to earlier date

## 2022-10-28 ENCOUNTER — OFFICE VISIT (OUTPATIENT)
Dept: HEMATOLOGY ONCOLOGY | Facility: HOSPITAL | Age: 62
End: 2022-10-28

## 2022-10-28 ENCOUNTER — HOSPITAL ENCOUNTER (OUTPATIENT)
Dept: INTERVENTIONAL RADIOLOGY/VASCULAR | Facility: HOSPITAL | Age: 62
Discharge: HOME | End: 2022-10-28
Payer: COMMERCIAL

## 2022-10-28 VITALS
DIASTOLIC BLOOD PRESSURE: 57 MMHG | RESPIRATION RATE: 18 BRPM | SYSTOLIC BLOOD PRESSURE: 98 MMHG | OXYGEN SATURATION: 100 % | HEART RATE: 60 BPM

## 2022-10-28 VITALS
HEART RATE: 72 BPM | DIASTOLIC BLOOD PRESSURE: 54 MMHG | RESPIRATION RATE: 14 BRPM | OXYGEN SATURATION: 98 % | SYSTOLIC BLOOD PRESSURE: 96 MMHG | WEIGHT: 140 LBS | BODY MASS INDEX: 24.8 KG/M2 | TEMPERATURE: 98 F | HEIGHT: 63 IN

## 2022-10-28 DIAGNOSIS — R18.8 CIRRHOSIS OF LIVER WITH ASCITES, UNSPECIFIED HEPATIC CIRRHOSIS TYPE (HCC): ICD-10-CM

## 2022-10-28 DIAGNOSIS — R91.1 LUNG NODULE: ICD-10-CM

## 2022-10-28 DIAGNOSIS — K70.31 ASCITES DUE TO ALCOHOLIC CIRRHOSIS (HCC): ICD-10-CM

## 2022-10-28 DIAGNOSIS — D05.11 DUCTAL CARCINOMA IN SITU (DCIS) OF RIGHT BREAST: Primary | ICD-10-CM

## 2022-10-28 DIAGNOSIS — K74.60 CIRRHOSIS OF LIVER WITH ASCITES, UNSPECIFIED HEPATIC CIRRHOSIS TYPE (HCC): ICD-10-CM

## 2022-10-28 DIAGNOSIS — K70.31 ALCOHOLIC CIRRHOSIS OF LIVER WITH ASCITES (HCC): ICD-10-CM

## 2022-10-28 PROCEDURE — 49083 ABD PARACENTESIS W/IMAGING: CPT

## 2022-10-28 RX ORDER — ALBUMIN (HUMAN) 12.5 G/50ML
SOLUTION INTRAVENOUS
Status: COMPLETED | OUTPATIENT
Start: 2022-10-28 | End: 2022-10-28

## 2022-10-28 RX ADMIN — ALBUMIN (HUMAN) 25 G: 0.25 INJECTION, SOLUTION INTRAVENOUS at 11:12

## 2022-10-28 RX ADMIN — ALBUMIN (HUMAN) 25 G: 0.25 INJECTION, SOLUTION INTRAVENOUS at 11:20

## 2022-10-28 NOTE — PROGRESS NOTES
Hematology/Oncology Outpatient Follow- up Note  Christina Ernst 60 y o  female MRN: @ Encounter: [id number]        Date:  10/28/2022    Presenting Complaint/Diagnosis : DCIS of the breast  On breast biopsy which is ER positive MT positive    HPI:    Jose White is seen for initial consultation 6/28/2021 regarding  Newly diagnosed DCIS of the breast on the right side status post stereotactic biopsy which revealed ER positive MT positive disease   This was in relation to an abnormal mammogram   The patient apparently was discussed in breast tumor board  And based on her alcoholic cirrhosis along with active drinking and  Ascites she was recommended to see us for consideration of an aromatase inhibitor until she was a candidate for surgery   Per the note from breast working groove she was not the best surgical candidate due to current liver disease and alcoholism   Liver function tests are slightly elevated   The patient also has extensive documentation where she appears to be very dependent/addicted to Xanax  Previous Hematologic/ Oncologic History:    Oncology History   Ductal carcinoma in situ (DCIS) of right breast   6/14/2021 Initial Diagnosis    Ductal carcinoma in situ (DCIS) of right breast     6/14/2021 -  Cancer Staged    Staging form: Breast, AJCC 8th Edition  - Clinical: Stage 0 (cTis (DCIS), cN0, cM0, ER+, MT+, HER2: Not Assessed) - Signed by Rocael De La Rosa MD on 6/14/2021  Stage prefix: Initial diagnosis  Method of lymph node assessment: Clinical  Nuclear grade: G2         Surgery  Radiation    Current Hematologic/ Oncologic Treatment:    Arimidex daily    Interval History:    Patient returns for follow-up visit  She is doing well  Had ascites drained today  Is in slight discomfort from this  Overall states she is otherwise doing reasonably well  Has stop drinking    Was in the emergency room in August and had a CT angiogram done which showed some lung findings for his 6 month follow-up was recommended and I will arrange this  I will also do a CT scan of the abdomen pelvis at the time because of her cirrhosis and ascites  I do not see any liver imaging in the last year  She is taking Arimidex  States she is losing insurance so will be getting her medications at [facility] so will be updating her pharmacy for [facility]  The rest of her 14 point review of systems today was negative  Cancer Staging:  Cancer Staging  Ductal carcinoma in situ (DCIS) of right breast  Staging form: Breast, AJCC 8th Edition  - Clinical: Stage 0 (cTis (DCIS), cN0, cM0, ER+, CO+, HER2: Not Assessed) - Signed by Ruby Allen MD on 6/14/2021  Stage prefix: Initial diagnosis  Method of lymph node assessment: Clinical  Nuclear grade: G2        Test Results:    Imaging: EGD    Result Date: 10/13/2022  Narrative: [facility] [id number] [address] [phone] DATE OF SERVICE: 10/13/22 PHYSICIAN(S): Attending: Maddy Rinaldi MD Fellow: Derek Sanchez MD INDICATION: Alcoholic cirrhosis of liver with ascites ([facility] [address] ) POST-OP DIAGNOSIS: See the impression below  PREPROCEDURE: Informed consent was obtained for the procedure, including sedation  Risks of perforation, hemorrhage, adverse drug reaction and aspiration were discussed  The patient was placed in the left lateral decubitus position  Patient was explained about the risks and benefits of the procedure  Risks including but not limited to bleeding, infection, and perforation were explained in detail  Also explained about less than 100% sensitivity with the exam and other alternatives  DETAILS OF PROCEDURE: Patient was taken to the procedure room where a time out was performed to confirm correct patient and correct procedure   The patient underwent monitored anesthesia care, which was administered by an anesthesia professional  The patient's blood pressure, heart rate, level of consciousness, respirations and oxygen were monitored throughout the procedure  The scope was advanced to the second part of the duodenum  Retroflexion was performed in the fundus  The patient experienced no blood loss  The procedure was not difficult  The patient tolerated the procedure well  There were no apparent complications  ANESTHESIA INFORMATION: ASA: IV Anesthesia Type: Anesthesia type not filed in the log  MEDICATIONS: No administrations occurring from 1127 to 1144 on 10/13/22 FINDINGS: Grade I varices with no bleeding (no red carol sign) in the lower third of the esophagus Abnormal mucosa  Consistent with mild portal hypertensive gastropathy  Normal duodenum SPECIMENS: * No specimens in log *     Impression: Small grade 1 varices disappeared on insufflation  Mild portal hypertensive gastropathy  Normal duodenum RECOMMENDATION: Repeat EGD in 1 year  Recommend starting nadolol 40 mg daily  Continue outpatient GI follow up  Resume diet  ATTENDING ATTESTATION:  I was present throughout the entire procedure from insertion to complete withdrawal of the scope  I performed all interventions myself or oversaw the fellow  Wilmer Allen MD     US bedside procedure    Result Date: 10/13/2022  Narrative: [id number] [id number]  2 446 [id number] 1      Labs:   Lab Results   Component Value Date    WBC 4 74 10/13/2022    HGB 10 9 (L) 10/13/2022    HCT 33 1 (L) 10/13/2022     (H) 10/13/2022     (L) 10/13/2022     Lab Results   Component Value Date     07/28/2017    K 3 9 10/26/2022    CL 99 10/26/2022    CO2 22 10/26/2022    ANIONGAP 12 05/03/2015    BUN 8 10/26/2022    CREATININE 0 84 10/26/2022    GLUCOSE 95 07/28/2017    CALCIUM 8 6 10/13/2022    CORRECTEDCA 10 0 10/13/2022    AST 39 10/26/2022    ALT 21 10/26/2022    ALKPHOS 82 10/13/2022    PROT 7 4 07/28/2017    BILITOT 0 6 07/28/2017    EGFR 78 10/26/2022     Lab Results   Component Value Date    IRON 77 12/07/2020    TIBC 121 (L) 12/07/2020    FERRITIN 1,132 (H) 12/07/2020       Lab Results   Component Value Date    OSDQJZAC83 2,355 (H) 09/16/2022         ROS: As stated in the history of present illness otherwise his 14 point review of systems today was negative        Active Problems:   Patient Active Problem List   Diagnosis   • Fatty liver   • Other specified abnormal findings of blood chemistry   • Anxiety   • Elevated LFTs   • GERD without esophagitis   • Hyperlipidemia   • Essential hypertension   • Insomnia   • Nicotine dependence   • Healthcare maintenance   • COPD (chronic obstructive pulmonary disease) (HCC)   • Alcohol abuse   • Irritable bowel syndrome with diarrhea   • Non-seasonal allergic rhinitis due to pollen   • Alcoholic cirrhosis of liver with ascites ([facility] [address] )   • Hyponatremia   • Thrombocytopenia (HCC)   • Anemia   • Decompensated hepatic cirrhosis (HCC)   • Ductal carcinoma in situ (DCIS) of right breast   • Atypical ductal hyperplasia of right breast   • Breast neoplasm, Tis (DCIS), right   • Continuous opioid dependence ([facility] [address] )   • Malignant neoplasm of lower-outer quadrant of right breast of female, estrogen receptor positive ([facility] [address] )       Past Medical History:   Past Medical History:   Diagnosis Date   • Alcoholic fatty liver    • Anxiety    • Asthma    • COPD (chronic obstructive pulmonary disease) (HCC)    • Elevated liver function tests    • GERD (gastroesophageal reflux disease)    • GERD without esophagitis    • Hyperlipidemia    • Hypertension    • IBS (irritable bowel syndrome)    • Insomnia    • Insomnia    • Liver disease    • Nervousness    • Nicotine dependence    • Other specified urinary incontinence    • RLS (restless legs syndrome)    • Wears glasses        Surgical History:   Past Surgical History:   Procedure Laterality Date   • BACK SURGERY     • 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   • BREAST EXCISIONAL BIOPSY Right 11/01/2004    benign   • CATARACT EXTRACTION, BILATERAL  08/01/2018   • COLONOSCOPY N/A 5/8/2019    Procedure: COLONOSCOPY;  Surgeon: Zulma Humphrey Prem Lowry MD;  Location: [facility] GI LAB; Service: Gastroenterology   • EGD AND COLONOSCOPY N/A 3/19/2018    Procedure: EGD AND COLONOSCOPY;  Surgeon: Andre Estrella MD;  Location: [facility] GI LAB; Service: Gastroenterology   • ESOPHAGOGASTRODUODENOSCOPY N/A 5/8/2019    Procedure: ESOPHAGOGASTRODUODENOSCOPY (EGD); Surgeon: Andre Estrella MD;  Location: [facility] GI LAB; Service: Gastroenterology   • IR PARACENTESIS  11/27/2020   • IR PARACENTESIS  12/8/2020   • KNEE SURGERY     • KNEE SURGERY     • LUMBAR LAMINECTOMY  04/1999   • LYMPH NODE BIOPSY     • MAMMO STEREOTACTIC BREAST BIOPSY RIGHT (ALL INC) Right 5/21/2021   • MAMMO STEREOTACTIC BREAST BIOPSY RIGHT (ALL INC) EACH ADD Right 5/21/2021   • TOTAL ABDOMINAL HYSTERECTOMY  02/05/2008   • TUBAL LIGATION     • TUBAL LIGATION  1989   • UPPER GASTROINTESTINAL ENDOSCOPY         Family History:    Family History   Problem Relation Age of Onset   • Breast cancer Mother 28   • No Known Problems Father    • No Known Problems Sister    • No Known Problems Brother    • No Known Problems Son    • No Known Problems Maternal Grandmother    • No Known Problems Maternal Grandfather    • No Known Problems Paternal Grandmother    • No Known Problems Paternal Grandfather    • No Known Problems Maternal Aunt    • No Known Problems Maternal Aunt    • No Known Problems Paternal Aunt    • Substance Abuse Neg Hx    • Mental illness Neg Hx        Cancer-related family history includes Breast cancer (age of onset: 28) in her mother      Social History:   Social History     Socioeconomic History   • Marital status: /Civil Union     Spouse name: Not on file   • Number of children: Not on file   • Years of education: Not on file   • Highest education level: Not on file   Occupational History   • Not on file   Tobacco Use   • Smoking status: Current Every Day Smoker     Packs/day: 1 00     Years: 45 00     Pack years: 45 00     Types: Cigarettes     Start date: 1   • Smokeless tobacco: Never Used   Vaping Use   • Vaping Use: Never used   Substance and Sexual Activity   • Alcohol use: Not Currently     Alcohol/week: 24 0 standard drinks     Types: 24 Cans of beer per week     Comment: not lately, non alcoholic beer   • Drug use: No   • Sexual activity: Yes     Partners: Male   Other Topics Concern   • Not on file   Social History Narrative    Has carbon monoxide detectors in home    Has smoke detectors    Uses safety equipment - seatbelts     Social Determinants of Health     Financial Resource Strain: Not on file   Food Insecurity: No Food Insecurity   • Worried About Running Out of Food in the Last Year: Never true   • Ran Out of Food in the Last Year: Never true   Transportation Needs: No Transportation Needs   • Lack of Transportation (Medical): No   • Lack of Transportation (Non-Medical):  No   Physical Activity: Not on file   Stress: Not on file   Social Connections: Not on file   Intimate Partner Violence: Not on file   Housing Stability: Low Risk    • Unable to Pay for Housing in the Last Year: No   • Number of Places Lived in the Last Year: 1   • Unstable Housing in the Last Year: No       Current Medications:   Current Outpatient Medications   Medication Sig Dispense Refill   • albuterol (2 5 mg/3 mL) 0 083 % nebulizer solution Take 3 mL (2 5 mg total) by nebulization every 6 (six) hours as needed for wheezing or shortness of breath 180 mL 5   • albuterol (Proventil HFA) 90 mcg/act inhaler Inhale 2 puffs every 6 (six) hours as needed for wheezing or shortness of breath 18 g 3   • ALPRAZolam (XANAX) 1 mg tablet TAKE ONE TABLET BY MOUTH TWICE A DAY AS NEEDED FOR ANXIETY 60 tablet 0   • anastrozole (ARIMIDEX) 1 mg tablet Take 1 tablet (1 mg total) by mouth daily 90 tablet 3   • benzonatate (TESSALON PERLES) 100 mg capsule TAKE 1 CAPSULE BY MOUTH 3 TIMES DAILY AS NEEDED FOR COUGH 60 capsule 2   • furosemide (LASIX) 40 mg tablet Take 1 tablet (40 mg total) by mouth daily 30 tablet 5   • midodrine (PROAMATINE) 5 mg tablet Take 1 tablet (5 mg total) by mouth 3 (three) times a day before meals 90 tablet 0   • nadolol (CORGARD) 40 mg tablet Take 1 tablet (40 mg total) by mouth daily 60 tablet 0   • spironolactone (ALDACTONE) 100 mg tablet Take 1 tablet (100 mg total) by mouth daily 30 tablet 5     No current facility-administered medications for this visit  Allergies: Allergies   Allergen Reactions   • Sulfa Antibiotics Shortness Of Breath   • Ace Inhibitors Cough and Other (See Comments)     coughing       Physical Exam:    Body surface area is 1 66 meters squared  Wt Readings from Last 3 Encounters:   10/28/22 63 5 kg (140 lb)   10/13/22 73 kg (161 lb)   10/13/22 73 kg (161 lb)        Temp Readings from Last 3 Encounters:   10/28/22 98 °F (36 7 °C) (Temporal)   10/13/22 97 8 °F (36 6 °C) (Oral)   10/13/22 (!) 97 °F (36 1 °C) (Tympanic)        BP Readings from Last 3 Encounters:   10/28/22 96/54   10/28/22 98/57   10/13/22 114/58         Pulse Readings from Last 3 Encounters:   10/28/22 72   10/28/22 60   10/13/22 78        Physical Exam     Constitutional   General appearance: No acute distress, well appearing and well nourished  Eyes   Conjunctiva and lids: No swelling, erythema or discharge  Pupils and irises: Equal, round and reactive to light  Ears, Nose, Mouth, and Throat   External inspection of ears and nose: Normal     Nasal mucosa, septum, and turbinates: Normal without edema or erythema  Oropharynx: Normal with no erythema, edema, exudate or lesions  Pulmonary   Respiratory effort: No increased work of breathing or signs of respiratory distress  Auscultation of lungs: Clear to auscultation  Cardiovascular   Palpation of heart: Normal PMI, no thrills  Auscultation of heart: Normal rate and rhythm, normal S1 and S2, without murmurs      Examination of extremities for edema and/or varicosities: Normal     Carotid pulses: Normal     Abdomen   Abdomen: Non-tender, no masses  Liver and spleen: No hepatomegaly or splenomegaly  Lymphatic   Palpation of lymph nodes in neck: No lymphadenopathy  Musculoskeletal   Gait and station: Normal     Digits and nails: Normal without clubbing or cyanosis  Inspection/palpation of joints, bones, and muscles: Normal     Skin   Skin and subcutaneous tissue: Normal without rashes or lesions  Neurologic   Cranial nerves: Cranial nerves 2-12 intact  Sensation: No sensory loss  Psychiatric   Orientation to person, place, and time: Normal     Mood and affect: Normal         Assessment / Plan:      The patient is an unfortunate 27-year-old female with a past medical history of alcoholic liver cirrhosis and dependence on benzodiazepines was referred to see us for newly diagnosed DCIS which is ER positive   She was discussed at breast working group and it was decided to start her on an aromatase inhibitor to evaluate for disease response  We did this  She then went to [facility] and had her surgery  She is doing well on her aromatase inhibitor  Just had a paracentesis today because of her alcoholic liver cirrhosis  Most recent CT of the chest was in the emergency room in August which showed a ground-glass opacity with central lucency in the right upper lobe measuring 1 1 cm which may represent focal emphysema and scarring and a six-month follow-up was recommended  The patient is coming due for this as she also has a history of emphysema  CMP shows an elevated bilirubin but creatinine was normal at 0 84  Most recent white count was 4 74 with a hemoglobin of 10 9 and platelet count of 202  Patient will stay on aromatase inhibitor  I will repeat imaging to include a CT scan of the abdomen for her cirrhosis along with the lung findings and see her back in March which will be her 6 month follow-up for the pulmonary findings also    Her pharmacy has changed because of insurance so she will call us 10 days before her next prescription is due and her a new prescription for Arimidex will go to [facility] in [address]  Goals and Barriers:  Current Goal:  Prolong Survival from DCIS and cirrhosis  Barriers: None  Patient's Capacity to Self Care:  Patient  able to self care  Portions of the record may have been created with voice recognition software  Occasional wrong word or "sound a like" substitutions may have occurred due to the inherent limitations of voice recognition software  Read the chart carefully and recognize, using context, where substitutions have occurred

## 2022-10-28 NOTE — DISCHARGE INSTRUCTIONS
Abdominal Paracentesis     WHAT YOU NEED TO KNOW:   Abdominal paracentesis is a procedure to remove abnormal fluid buildup in your abdomen  Fluid builds up because of liver problems, such as swelling and scarring  Heart failure, kidney disease, a mass, or problems with your pancreas may also cause fluid buildup  DISCHARGE INSTRUCTIONS:     Follow up with your healthcare provider as directed: Write down your questions so you remember to ask them during your visits  Wound care: Remove dressing after 24 hours  Leave glue in place  Return to your normal activities    Contact Interventional Radiology at [phone] Nga PATIENTS: Contact Interventional Radiology at [phone]) Bessie Conti PATIENTS: Contact Interventional Radiology at [phone]) if:  You have a fever and your wound is red and swollen  You have yellow, green, or bad-smelling discharge coming from your wound  You have pain or swelling in your abdomen  You have an upset stomach or you vomit  You have sudden, sharp pain in your abdomen  You urinate very little or not at all  You feel confused and more tired than usual    Your arm or leg feels warm, tender, and painful  It may look swollen and red  You suddenly feel lightheaded and have trouble breathing

## 2022-10-31 ENCOUNTER — OFFICE VISIT (OUTPATIENT)
Dept: GASTROENTEROLOGY | Facility: CLINIC | Age: 62
End: 2022-10-31

## 2022-10-31 VITALS
SYSTOLIC BLOOD PRESSURE: 96 MMHG | HEIGHT: 63 IN | BODY MASS INDEX: 25.16 KG/M2 | TEMPERATURE: 97.3 F | DIASTOLIC BLOOD PRESSURE: 56 MMHG | OXYGEN SATURATION: 99 % | HEART RATE: 60 BPM | WEIGHT: 142 LBS

## 2022-10-31 DIAGNOSIS — K70.31 ALCOHOLIC CIRRHOSIS OF LIVER WITH ASCITES (HCC): Primary | ICD-10-CM

## 2022-10-31 DIAGNOSIS — I85.00 ESOPHAGEAL VARICES WITHOUT BLEEDING, UNSPECIFIED ESOPHAGEAL VARICES TYPE (HCC): ICD-10-CM

## 2022-10-31 DIAGNOSIS — E87.1 HYPONATREMIA: ICD-10-CM

## 2022-10-31 DIAGNOSIS — K70.31 ALCOHOLIC CIRRHOSIS OF LIVER WITH ASCITES (HCC): Chronic | ICD-10-CM

## 2022-10-31 RX ORDER — FUROSEMIDE 40 MG/1
40 TABLET ORAL DAILY
Qty: 90 TABLET | Refills: 3 | OUTPATIENT
Start: 2022-10-31 | End: 2023-01-29

## 2022-10-31 RX ORDER — SPIRONOLACTONE 100 MG/1
100 TABLET, FILM COATED ORAL DAILY
Qty: 90 TABLET | Refills: 3 | OUTPATIENT
Start: 2022-10-31 | End: 2023-01-29

## 2022-10-31 RX ORDER — ALBUMIN (HUMAN) 12.5 G/50ML
12.5 SOLUTION INTRAVENOUS ONCE
Status: CANCELLED | OUTPATIENT
Start: 2022-10-31 | End: 2022-10-31

## 2022-10-31 NOTE — PATIENT INSTRUCTIONS
As discussed today:    Medications:  Continue taking your current medications without changes  Laboratory Testing (Listed below):  Please have blood work drawn in 2 weeks  Schedule a paracentesis if needed  I placed the order  Avoid alcohol and tobacco products  Also avoid raw seafood/oysters and avoid swimming/wading in unchlorinated neely, particularly from the [facility] of [address] [place] [address], due to increased risk of infection from a bacteria called Vibrio Vulnificus  Avoid medications called NSAID's (Motrin/Ibuprofen, Naproxen/Naprosyn/Aleve) if possible, due to increase risk of kidney dysfunction, and if you use medications containing acetaminophen (Tylenol, percocet, Endocet, and many cough/cold medications), the dose should not exceed 2 grams/day  Seek immediate medical attention if you develop fevers over 101 F, have evidence of GI bleeding (black or bloody stools, bloody or coffee ground emesis) or confusion  Call our office if you develop worsening symptoms related to lower extremity edema, ascites, jaundice or excessive bruising/bleeding  Patient will schedule Paracentesis

## 2022-10-31 NOTE — PROGRESS NOTES
[facility] - Outpatient Follow-up Note  Robbie Richardson 60 y o  female MRN: [id number]  Encounter: [id number]          ASSESSMENT AND PLAN:      Summary:    Ms Tomas Marlow is a 60y o  year old female with cirrhosis secondary to Chronic alcohol abuse which is decompensated with ascites, lower extremity edema, thrombocytopenia, esophageal varices, hypoalbuminemia and hypoprothrombinemia  Problem List and Plan:    End Stage Liver Disease: Current MELD-Na Score is 22  She has clinical indication for liver transplant, however with recent breast Ca diagnosis with resection and current hormonal therapy, she is not a transplant candidate  Volume: Ascites/Edema not well controlled:  Diuretics have been limited due to hyponatremia  Ok with continuing lasix 40 mg daily and aldactone 100 mg daily for now and continuing to follow-up BMP  Advised to follow a low sodium diet (she has been eating a lot of salt)  Paracentesis as needed  Order placed  Discussed TIPS including indications, risks and benefits  Current MELD of 22 places patient at too high a high, however I have requested updated blood work  Hepatic Encephalopathy: No history of hepatic encephalopathy  Ms Tomas Marlow and her family/care giver are aware of the signs/symptoms of hepatic encephalopathy and understand to seek immediate medical attention should they arise     Esophageal Varices: Last EGD done on 10/13/22 showing non-bleeding grade 1 varices and mild PHG  Patient started on nadolol 40 mg daily  If ascites is indeed refractory (required paracentesis every 2 weeks or more frequently), I will d/c nadolol  Repeat EGD due in 1 year  Hepatoma Screening: Last imaging done in May  Ultrasound ordered for November  Nutritional status: Mild to moderate sarcopenia noted  Encouraged high protein snacking, low salt diet  If weight loss evident, will refer to nutritional counseling       Health Maintenance:  Ms Tomas Marlow was counseled to avoid alcohol and tobacco products  Ms Maxwell Herman was also advised to avoid raw seafood/oysters and from swimming in unchlorinated neely, particularly from the [facility], due to increased risk of infection from Vibrio Vulnificus  Ms Maxwell Herman was also instructed to seek immediate medical attention should she develop fevers over 80 F, evidence of GI bleeding (black or bloody stools, bloody or coffee ground emesis) or confusion  Ms Maxwell Herman was advised to avoid NSAIDs, if possible, due to increase risk of renal dysfunction, and advised that if she should use acetaminophen, dose should not exceed 2 grams/day  Ms Maxwell Herman was recommend to remain up to date with adult vaccination, including influenza, pneumovax and meningococcus  Inactivated HSV and zoster vaccine is safe and encouraged by PCP  Ms Maxwell Herman was instructed to call either our office or that of her referring doctor should she develop worsening symptoms related to lower extremity edema, ascites, jaundice or excessive bruising/bleeding  FOLLOW-UP:  Return in about 5 weeks (around 12/5/2022)  VISIT DIAGNOSES AND ORDERS:      1  Alcoholic cirrhosis of liver with ascites ([address] )    2  Esophageal varices without bleeding, unspecified esophageal varices type ([address] )    3   Hyponatremia      Orders Placed This Encounter   Procedures   • IR AMB Paracentesis   • AFP tumor marker   • CBC and differential   • Protime-INR   • Comprehensive metabolic panel     ______________________________________________________________________    SUBJECTIVE:           Ms Analilia Gonzalez is a 60 y o  female with medical history as outlined below including cirrhosis secondary to Chronic alcohol abuse which is decompensated with ascites, lower extremity edema, thrombocytopenia, esophageal varices, hypoalbuminemia and hypoprothrombinemia, who comes in today to establish care with hepatology after last seeing my colleague, Dr Harmeet Giles, 3 weeks ago  At that time, her spironolactone dose was increased to 100 mg daily and lasix 40 mg daily was continued as she continued to have worsening ascites  Repeat labs were requested  She feels the ascites is marginally better, but she did require a 7 7L paracentesis a few days ago  She had an EGD done on 9/21/22 showing grade 1 varices with no bleeding and mild PHG  She denies jaundice, GI bleeding, confusion, pruritus  She does complain of easy bruising  She had GERD and IBS-D, neither of which has been significantly symptomatic  REVIEW OF SYSTEMS     Review of Systems   All other systems reviewed and are negative        Historical Information   Patient Active Problem List   Diagnosis   • Fatty liver   • Other specified abnormal findings of blood chemistry   • Anxiety   • Elevated LFTs   • GERD without esophagitis   • Hyperlipidemia   • Essential hypertension   • Insomnia   • Nicotine dependence   • Healthcare maintenance   • COPD (chronic obstructive pulmonary disease) (HCC)   • Alcohol abuse   • Irritable bowel syndrome with diarrhea   • Non-seasonal allergic rhinitis due to pollen   • Alcoholic cirrhosis of liver with ascites (HCC)   • Hyponatremia   • Thrombocytopenia (HCC)   • Anemia   • Decompensated hepatic cirrhosis (HCC)   • Ductal carcinoma in situ (DCIS) of right breast   • Atypical ductal hyperplasia of right breast   • Breast neoplasm, Tis (DCIS), right   • Continuous opioid dependence ([facility] )   • Malignant neoplasm of lower-outer quadrant of right breast of female, estrogen receptor positive (HCC)     Social History     Substance and Sexual Activity   Alcohol Use Not Currently   • Alcohol/week: 24 0 standard drinks   • Types: 24 Cans of beer per week    Comment: not lately, non alcoholic beer     Social History     Substance and Sexual Activity   Drug Use No     Social History     Tobacco Use   Smoking Status Every Day   • Packs/day: 1 00   • Years: 45 00   • Pack years: 45 00   • Types: Cigarettes   • Start date: 1/1/1978   Smokeless Tobacco Never       Meds/Allergies       Current Outpatient Medications:   •  albuterol (2 5 mg/3 mL) 0 083 % nebulizer solution  •  albuterol (Proventil HFA) 90 mcg/act inhaler  •  ALPRAZolam (XANAX) 1 mg tablet  •  anastrozole (ARIMIDEX) 1 mg tablet  •  benzonatate (TESSALON PERLES) 100 mg capsule  •  furosemide (LASIX) 40 mg tablet  •  midodrine (PROAMATINE) 5 mg tablet  •  nadolol (CORGARD) 40 mg tablet  •  Promethazine-DM (PHENERGAN-DM) 6 25-15 mg/5 mL oral syrup  •  spironolactone (ALDACTONE) 100 mg tablet    Allergies   Allergen Reactions   • Sulfa Antibiotics Shortness Of Breath   • Ace Inhibitors Cough and Other (See Comments)     coughing           Objective     Blood pressure 96/56, pulse 60, temperature (!) 97 3 °F (36 3 °C), temperature source Tympanic, height 5' 3" (1 6 m), weight 64 4 kg (142 lb), SpO2 99 %, not currently breastfeeding  Body mass index is 25 15 kg/m²  PHYSICAL EXAM:      Physical Exam  Vitals reviewed  Constitutional:       General: She is not in acute distress  Appearance: She is ill-appearing  HENT:      Head: Normocephalic and atraumatic  Nose: Nose normal       Mouth/Throat:      Mouth: Mucous membranes are moist       Pharynx: Oropharynx is clear  Eyes:      General: No scleral icterus  Extraocular Movements: Extraocular movements intact  Cardiovascular:      Rate and Rhythm: Normal rate and regular rhythm  Heart sounds: No murmur heard  Pulmonary:      Effort: Pulmonary effort is normal  No respiratory distress  Breath sounds: Normal breath sounds  Abdominal:      General: Abdomen is flat  There is distension  Palpations: Abdomen is soft  There is shifting dullness  There is no fluid wave, hepatomegaly or splenomegaly  Tenderness: There is no abdominal tenderness  Hernia: No hernia is present     Genitourinary:     Comments: deferred  Musculoskeletal: General: Swelling (trace) present  Normal range of motion  Cervical back: Normal range of motion and neck supple  No tenderness  Skin:     General: Skin is warm  Coloration: Skin is not jaundiced  Findings: No bruising or rash  Neurological:      General: No focal deficit present  Mental Status: She is alert and oriented to person, place, and time  Psychiatric:         Mood and Affect: Mood normal          Lab Results:   Lab Results   Component Value Date     07/28/2017    K 4 3 12/19/2022    CO2 25 12/19/2022    CL 99 12/19/2022    BUN 15 12/19/2022    CREATININE 1 10 (H) 12/19/2022    GLUCOSE 95 07/28/2017     Lab Results   Component Value Date    WBC 5 9 12/19/2022    HGB 11 9 12/19/2022    HCT 33 0 (L) 12/19/2022    MCV 91 12/19/2022     (L) 12/19/2022     Lab Results   Component Value Date    TP 7 9 12/19/2022    AST 43 (H) 12/19/2022    ALT 20 12/19/2022    BILITOT 0 6 07/28/2017    BILIDIR 0 95 (H) 09/17/2022    INR 1 4 (H) 12/19/2022      Lab Results   Component Value Date    IRON 77 12/07/2020    FERRITIN 1,132 (H) 12/07/2020     Lab Results   Component Value Date    CHOL 159 07/28/2017    HDL 39 (L) 09/15/2022    TRIG 61 09/15/2022         Radiology Results:   EGD    Result Date: 10/13/2022  Narrative: [address] [place] [id number] [phone] DATE OF SERVICE: 10/13/22 PHYSICIAN(S): Attending: Deepa Cuellar MD Fellow: Say Smith MD INDICATION: Alcoholic cirrhosis of liver with ascites ([facility] [address] ) POST-OP DIAGNOSIS: See the impression below  PREPROCEDURE: Informed consent was obtained for the procedure, including sedation  Risks of perforation, hemorrhage, adverse drug reaction and aspiration were discussed  The patient was placed in the left lateral decubitus position  Patient was explained about the risks and benefits of the procedure   Risks including but not limited to bleeding, infection, and perforation were explained in detail  Also explained about less than 100% sensitivity with the exam and other alternatives  DETAILS OF PROCEDURE: Patient was taken to the procedure room where a time out was performed to confirm correct patient and correct procedure  The patient underwent monitored anesthesia care, which was administered by an anesthesia professional  The patient's blood pressure, heart rate, level of consciousness, respirations and oxygen were monitored throughout the procedure  The scope was advanced to the second part of the duodenum  Retroflexion was performed in the fundus  The patient experienced no blood loss  The procedure was not difficult  The patient tolerated the procedure well  There were no apparent complications  ANESTHESIA INFORMATION: ASA: IV Anesthesia Type: Anesthesia type not filed in the log  MEDICATIONS: No administrations occurring from 1127 to 1144 on 10/13/22 FINDINGS: Grade I varices with no bleeding (no red carol sign) in the lower third of the esophagus Abnormal mucosa  Consistent with mild portal hypertensive gastropathy  Normal duodenum SPECIMENS: * No specimens in log *     Impression: Small grade 1 varices disappeared on insufflation  Mild portal hypertensive gastropathy  Normal duodenum RECOMMENDATION: Repeat EGD in 1 year  Recommend starting nadolol 40 mg daily  Continue outpatient GI follow up  Resume diet  ATTENDING ATTESTATION:  I was present throughout the entire procedure from insertion to complete withdrawal of the scope  I performed all interventions myself or oversaw the fellow  Erasto Mcmahon MD     IR paracentesis    Result Date: 10/28/2022  Narrative: INDICATION: Recurrent ascites PROCEDURE: 1  Ultrasound-guided paracentesis FINDINGS: 1   7750 mL clear yellow ascites removed  Impression: Paracentesis   _________________________________________________________________ COMPARISON: None PROCEDURE DETAILS: Operators: Dr Diana Chew Anesthesia: Local Medications: 1% lidocaine COMMENTS: A preprocedure timeout was performed per [facility] protocol  The right abdomen was prepped and draped in the usual sterile fashion  5-Kyrgyz Yueh catheter was advanced using ultrasound guidance into ascites  Following drainage, the skin was cleansed and sterile dressings were applied  Workstation performed: [id number]     [address] [facility] bedside procedure    Result Date: 10/13/2022  Narrative: 1 [phone]  [phone] [id number] [id number]        Elicia Dodson MD

## 2022-10-31 NOTE — TELEPHONE ENCOUNTER
Medication Refill Request     Name spironolactone (ALDACTONE) 100 mg tablet  Dose/Frequency Take 1 tablet (100 mg total) by mouth daily  Quantity 90  Verified pharmacy   [x]  Verified ordering Provider   [x]  Does patient have enough for the next 3 days? Yes [] No [x]    Medication Refill Request     Name  furosemide (LASIX) 40 mg tablet  Dose/Frequency Take 1 tablet (40 mg total) by mouth daily  Quantity 90  Verified pharmacy   [x]  Verified ordering Provider   [x]  Does patient have enough for the next 3 days?  Yes [] No [x]

## 2022-11-01 ENCOUNTER — TELEPHONE (OUTPATIENT)
Dept: NEPHROLOGY | Facility: CLINIC | Age: 62
End: 2022-11-01

## 2022-11-01 NOTE — TELEPHONE ENCOUNTER
Reschedule Appointment   Person speaking to  patient    Date of original appointment 11/16   New appointment date 11/02   Patient on dialysis NO   Location [place]    Provider SISSY   Additional Information SISSY HAD A SOONER APPOINTMENT

## 2022-11-02 ENCOUNTER — TELEPHONE (OUTPATIENT)
Dept: FAMILY MEDICINE CLINIC | Facility: CLINIC | Age: 62
End: 2022-11-02

## 2022-11-02 ENCOUNTER — OFFICE VISIT (OUTPATIENT)
Dept: NEPHROLOGY | Facility: HOSPITAL | Age: 62
End: 2022-11-02

## 2022-11-02 VITALS
SYSTOLIC BLOOD PRESSURE: 98 MMHG | HEIGHT: 64 IN | DIASTOLIC BLOOD PRESSURE: 60 MMHG | BODY MASS INDEX: 24.92 KG/M2 | HEART RATE: 55 BPM | WEIGHT: 146 LBS | OXYGEN SATURATION: 99 %

## 2022-11-02 DIAGNOSIS — C50.511 MALIGNANT NEOPLASM OF LOWER-OUTER QUADRANT OF RIGHT BREAST OF FEMALE, ESTROGEN RECEPTOR POSITIVE (HCC): ICD-10-CM

## 2022-11-02 DIAGNOSIS — I10 ESSENTIAL HYPERTENSION: ICD-10-CM

## 2022-11-02 DIAGNOSIS — J44.9 CHRONIC OBSTRUCTIVE PULMONARY DISEASE, UNSPECIFIED COPD TYPE (HCC): ICD-10-CM

## 2022-11-02 DIAGNOSIS — Z17.0 MALIGNANT NEOPLASM OF LOWER-OUTER QUADRANT OF RIGHT BREAST OF FEMALE, ESTROGEN RECEPTOR POSITIVE (HCC): ICD-10-CM

## 2022-11-02 DIAGNOSIS — K21.9 GERD WITHOUT ESOPHAGITIS: ICD-10-CM

## 2022-11-02 DIAGNOSIS — K58.0 IRRITABLE BOWEL SYNDROME WITH DIARRHEA: ICD-10-CM

## 2022-11-02 DIAGNOSIS — E87.1 HYPONATREMIA: Primary | ICD-10-CM

## 2022-11-02 DIAGNOSIS — K70.31 ALCOHOLIC CIRRHOSIS OF LIVER WITH ASCITES (HCC): Chronic | ICD-10-CM

## 2022-11-02 RX ORDER — MIDODRINE HYDROCHLORIDE 5 MG/1
7.5 TABLET ORAL
Qty: 135 TABLET | Refills: 0 | Status: SHIPPED | OUTPATIENT
Start: 2022-11-02 | End: 2022-12-02

## 2022-11-02 NOTE — TELEPHONE ENCOUNTER
Spoke to patient she took her blood pressure and it was 97/61 right now on her home blood pressure cuff  Pt does not want to go to ER      Is patient ok to wait to discuss at nephrology apt?

## 2022-11-02 NOTE — PROGRESS NOTES
OFFICE FOLLOW UP - Nephrology   Ayah Clifford 60 y o  female MRN: [id number]       ASSESSMENT and PLAN:  Janee Abarca was seen today for follow-up  Diagnoses and all orders for this visit:    Hyponatremia  -     Ambulatory Referral to Nephrology    Alcoholic cirrhosis of liver with ascites ([facility] )  -     midodrine (PROAMATINE) 5 mg tablet; Take 1 5 tablets (7 5 mg total) by mouth 3 (three) times a day before meals    GERD without esophagitis    Irritable bowel syndrome with diarrhea    Chronic obstructive pulmonary disease, unspecified COPD type ([facility] )    Essential hypertension    Malignant neoplasm of lower-outer quadrant of right breast of female, estrogen receptor positive ([facility] )        Hyponatremia:  The patient had hospitalization in mid September and was found to have acute on chronic hyponatremia  Baseline sodium level 130-133  Etiology suspected due to cirrhosis/recurrent ascites, and beer potomania  -repeat sodium level 132  -continue to recommend 1 2 L fluid restriction, Lasix 40 mg daily, Aldactone 100 mg daily  Not on sodium chloride tabs due to ascites  -continue paracentesis as needed  Receiving weekly paracentesis  -continue abstinence of alcohol   -creatinine remains stable between 0 8 and 0 9   -check BMP in 3 months  Blood pressure/hypotension: Blood pressure currently   This morning her BP <90 mmHg  Today she brought her cuff into the office and her cuff BP was 105/58 mmHg  Manual reading 98/60 mmHg  Reports feeling symptomatic last night    -Current medications:  Midodrine 5 mg t i d  -medication changes: will increase midodrine to 7 5 mg po TID  -recommend low salt diet    -please check blood pressure daily and keep record   If home BP <100 mmHg, will increase midodrine to 10 mg TID  Cirrhosis:  With end-stage liver disease, decompensated with ascites, lower extremity edema, and esophageal varices  Following with GI team   Currently not a transplant candidate due to recent diagnosis of breast cancer with resection and current hormonal therapy  -currently on Lasix 40 mg daily and Aldactone 100 mg daily  Diuretics have been limited due to hyponatremia   -will continue paracentesis as needed  Last paracentesis 10/28/2022  Received weekly paracentesis  -continue to recommend low-sodium diet as well as fluid restriction  Ductal carcinoma in-situ:  Following with Hematology/Oncology and Maksim brooks  Newly diagnosed, status post right-sided stereotactic biopsy and radiation  Other: COPD, anxiety, IBS  Patient will follow up in 4 months  with Dr Mart Pineda  Age related screening: completed  Your primary caregiver may do yearly screening for colorectal cancer  It is recommended in all men and women over 48years old  You may have screening earlier if you have colon disease or a family history of colorectal cancer  HPI: Galen Beltran is a 60 y o  female with multiple comorbidities including cirrhosis complicated by esophageal varices, ascites, hypotension, hyponatremia, COPD, recent ductal carcinoma diagnosis, who is here for hyponatremia follow up  The patient reports feeling symptomatic last night  She felt weak and reports blacking out  She states that her blood pressure this morning was less than 90  She contacted her PCP who told her to go to the emergency department  She took a dose of her midodrine  Her repeat blood pressure was in the 90s  She reports feeling slightly better  She does report having a severe headache  She denies any fevers but reports persistent chills  She denies nausea, vomiting, diarrhea  She continues to abstain from alcohol  She is receiving her paracentesis every week    Currently in the office she denies feeling dizzy  She was able to drive herself here today  We did discuss that if she continues to have the symptoms, she should go to the emergency room for further evaluation  ROS:   A complete review of systems was done  Pertinent positives and negatives as noted in the HPI, otherwise the review of systems is negative      Allergies: Sulfa antibiotics and Ace inhibitors    Medications:   Current Outpatient Medications:   •  albuterol (2 5 mg/3 mL) 0 083 % nebulizer solution, Take 3 mL (2 5 mg total) by nebulization every 6 (six) hours as needed for wheezing or shortness of breath, Disp: 180 mL, Rfl: 5  •  albuterol (Proventil HFA) 90 mcg/act inhaler, Inhale 2 puffs every 6 (six) hours as needed for wheezing or shortness of breath, Disp: 18 g, Rfl: 3  •  ALPRAZolam (XANAX) 1 mg tablet, TAKE ONE TABLET BY MOUTH TWICE A DAY AS NEEDED FOR ANXIETY, Disp: 60 tablet, Rfl: 0  •  anastrozole (ARIMIDEX) 1 mg tablet, Take 1 tablet (1 mg total) by mouth daily, Disp: 90 tablet, Rfl: 3  •  benzonatate (TESSALON PERLES) 100 mg capsule, TAKE 1 CAPSULE BY MOUTH 3 TIMES DAILY AS NEEDED FOR COUGH, Disp: 60 capsule, Rfl: 2  •  furosemide (LASIX) 40 mg tablet, Take 1 tablet (40 mg total) by mouth daily, Disp: 30 tablet, Rfl: 5  •  midodrine (PROAMATINE) 5 mg tablet, Take 1 5 tablets (7 5 mg total) by mouth 3 (three) times a day before meals, Disp: 135 tablet, Rfl: 0  •  nadolol (CORGARD) 40 mg tablet, Take 1 tablet (40 mg total) by mouth daily, Disp: 60 tablet, Rfl: 0  •  spironolactone (ALDACTONE) 100 mg tablet, Take 1 tablet (100 mg total) by mouth daily, Disp: 30 tablet, Rfl: 5    Past Medical History:   Diagnosis Date   • Alcoholic fatty liver    • Anxiety    • Asthma    • COPD (chronic obstructive pulmonary disease) (HCC)    • Elevated liver function tests    • GERD (gastroesophageal reflux disease)    • GERD without esophagitis    • Hyperlipidemia    • Hypertension    • IBS (irritable bowel syndrome)    • Insomnia    • Insomnia    • Liver disease    • Nervousness    • Nicotine dependence    • Other specified urinary incontinence    • RLS (restless legs syndrome)    • Wears glasses      Past Surgical History:   Procedure Laterality Date   • BACK SURGERY     • 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   • BREAST EXCISIONAL BIOPSY Right 11/01/2004    benign   • CATARACT EXTRACTION, BILATERAL  08/01/2018   • COLONOSCOPY N/A 5/8/2019    Procedure: COLONOSCOPY;  Surgeon: Lester Deluna MD;  Location: [facility]; Service: Gastroenterology   • EGD AND COLONOSCOPY N/A 3/19/2018    Procedure: EGD AND COLONOSCOPY;  Surgeon: Lester Deluna MD;  Location: [facility]; Service: Gastroenterology   • ESOPHAGOGASTRODUODENOSCOPY N/A 5/8/2019    Procedure: ESOPHAGOGASTRODUODENOSCOPY (EGD); Surgeon: Lester Deluna MD;  Location: [facility] GI [facility]; Service: Gastroenterology   • IR PARACENTESIS  11/27/2020   • IR PARACENTESIS  12/8/2020   • IR PARACENTESIS  10/28/2022   • KNEE SURGERY     • KNEE SURGERY     • LUMBAR LAMINECTOMY  04/1999   • LYMPH NODE BIOPSY     • MAMMO STEREOTACTIC BREAST BIOPSY RIGHT (ALL INC) Right 5/21/2021   • MAMMO STEREOTACTIC BREAST BIOPSY RIGHT (ALL INC) EACH ADD Right 5/21/2021   • TOTAL ABDOMINAL HYSTERECTOMY  02/05/2008   • TUBAL LIGATION     • TUBAL LIGATION  1989   • UPPER GASTROINTESTINAL ENDOSCOPY       Family History   Problem Relation Age of Onset   • Breast cancer Mother 28   • No Known Problems Father    • No Known Problems Sister    • No Known Problems Brother    • No Known Problems Son    • No Known Problems Maternal Grandmother    • No Known Problems Maternal Grandfather    • No Known Problems Paternal Grandmother    • No Known Problems Paternal Grandfather    • No Known Problems Maternal Aunt    • No Known Problems Maternal Aunt    • No Known Problems Paternal Aunt    • Substance Abuse Neg Hx    • Mental illness Neg Hx       reports that she has been smoking cigarettes  She started smoking about 44 years ago  She has a 45 00 pack-year smoking history  She has never used smokeless tobacco  She reports previous alcohol use of about 24 0 standard drinks of alcohol per week  She reports that she does not use drugs  Physical Exam:   Vitals:    11/02/22 1257   BP: 98/60   BP Location: Left arm   Patient Position: Sitting   Cuff Size: Large   Pulse: 55   SpO2: 99%   Weight: 66 2 kg (146 lb)   Height: 5' 4" (1 626 m)     Body mass index is 25 06 kg/m²  General: no acute distress   Eyes: conjunctivae pink, anicteric sclerae  ENT: mucous membranes moist  Neck: supple, no JVD  Chest: clear to auscultation bilaterally with no wheezes, rale or rhochi  CVS: regular rate and rhythm   Abdomen: soft, non-tender, non-distended  Extremities: no lower extremity edema   Skin: no rash  Neuro: awake and alert       Lab Results:  Results for orders placed or performed in visit on 10/26/22   Comprehensive metabolic panel   Result Value Ref Range    Glucose, Random 90 70 - 99 mg/dL    BUN 8 8 - 27 mg/dL    Creatinine 0 84 0 57 - 1 00 mg/dL    eGFR 78 >59 mL/min/1 73    SL AMB BUN/CREATININE RATIO 10 (L) 12 - 28    Sodium 132 (L) 134 - 144 mmol/L    Potassium 3 9 3 5 - 5 2 mmol/L    Chloride 99 96 - 106 mmol/L    CO2 22 20 - 29 mmol/L    CALCIUM 8 3 (L) 8 7 - 10 3 mg/dL    Protein, Total 7 5 6 0 - 8 5 g/dL    Albumin 2 2 (L) 3 8 - 4 8 g/dL    Globulin, Total 5 3 (H) 1 5 - 4 5 g/dL    Albumin/Globulin Ratio 0 4 (L) 1 2 - 2 2    TOTAL BILIRUBIN 1 7 (H) 0 0 - 1 2 mg/dL    Alk Phos Isoenzymes 87 44 - 121 IU/L    AST 39 0 - 40 IU/L    ALT 21 0 - 32 IU/L             Invalid input(s): ALBUMIN      Portions of the record may have been created with voice recognition software  Occasional wrong word or "sound a like" substitutions may have occurred due to the inherent limitations of voice recognition software  Read the chart carefully and recognize, using context, where substitutions have occurred  If you have any questions, please contact the dictating provider

## 2022-11-02 NOTE — PATIENT INSTRUCTIONS
We are seeing you today for follow-up on your sodium level  Your most recent sodium level is at baseline  Please continue to monitor your fluid intake throughout the day  Please continue to take your medications as prescribed  Please continue with your paracentesis as prescribed by your GI doctor  Your blood pressure is low  We have increased your midodrine to 7 5 mg 3 times per day  This was sent to your pharmacy  If your feeling symptomatic, please check your blood pressure at that time  Our goal is to get your blood pressure greater than 100  If this is not achieved with the increase in her medication, please call the office so that we can increase your medication further  Hyponatremia   WHAT YOU NEED TO KNOW:   What is hyponatremia? Hyponatremia occurs when the amount of sodium (salt) in your blood is lower than normal  Sodium is an electrolyte (mineral) that helps your muscles, heart, and digestive system work properly  It helps control blood pressure and fluid balance  What causes hyponatremia? Hyponatremia happens when too much sodium leaves your body, or when more water than sodium stays in your blood  Any of the following conditions can lead to hyponatremia:  A diet that is low in sodium    Drinking too much water or receiving too much fluid through an IV    Intense and prolonged exercise that causes excessive sweating    Medical conditions, such as Tim[place] disease, kidney disease, congestive heart failure, liver cirrhosis, or cancer    Medicines, such as diuretics, antidepressants, pain medicines, or illegal drugs such as ecstasy    Dehydration    What are the signs and symptoms of hyponatremia? You may have no signs or symptoms   Symptoms may start to appear when the amount of sodium in your blood drops too low or too fast  You may have any of the following:  Abdominal cramps, nausea, or vomiting    Headache, confusion, hallucinations, or trouble staying awake    Muscle weakness or cramps     Seizures or coma    How is hyponatremia diagnosed? Your healthcare provider will ask you about the medicines you take  He will do a physical exam to look for signs of swelling caused by fluid retention (extra water in your body)  Blood tests  will be done to check the level of sodium in your blood  They may also be done to find the cause of your hyponatremia  Urine sodium  is a test that checks the level of sodium in your urine  A sample of your urine is collected and is sent to a lab for tests  How is hyponatremia treated? Treatment depends on the cause of your hyponatremia and how severe it is  Healthcare providers may limit the amount of liquids you drink if you are retaining water  A salt solution may be given through an IV to increase the amount of sodium in your blood  Medicines may also be given to help get rid of extra fluid in your body  You may urinate more often while taking these medicines  When should I contact my healthcare provider? You have muscle cramps or twitching  You feel very weak or tired  You have nausea or are vomiting  You have questions or concerns about your condition or care  When should I seek immediate care or call 911? You have a seizure  You have an irregular heartbeat  You have trouble breathing  You cannot move your arms and legs  You are confused or cannot think clearly  CARE AGREEMENT:   You have the right to help plan your care  Learn about your health condition and how it may be treated  Discuss treatment options with your healthcare providers to decide what care you want to receive  You always have the right to refuse treatment  The above information is an  only  It is not intended as medical advice for individual conditions or treatments  Talk to your doctor, nurse or pharmacist before following any medical regimen to see if it is safe and effective for you    © Copyright [employer] 2022 Information is for End User's use only and may not be sold, redistributed or otherwise used for commercial purposes   All illustrations and images included in CareNotes® are the copyrighted property of A D A M , [employer]  or Damon Patel

## 2022-11-02 NOTE — TELEPHONE ENCOUNTER
Patient is calling because her blood pressure has been low lately  Today her Blood pressure was 88/58    Patient could not come in today because seeing her kidney doctor  Last night patient was confused did not even know who she was  Patient wanted you aware of what is going on  How did you want me to handle this? No more openings after 10:30 and she can not make it in time  Pt did state she will not go to ER  Patient stated she is on blood pressure medication but I do not see anything for blood pressure

## 2022-11-02 NOTE — TELEPHONE ENCOUNTER
If her BP is 88/58 she should go to the ER  That is too low  Especially if she was confused  If she rechecks it and it is within a normal range, nephrology can address this at 3 pm today at her appointment  However, if her BP stays at 88/58 she SHOULD GO TO THE ER!

## 2022-11-03 ENCOUNTER — APPOINTMENT (EMERGENCY)
Dept: RADIOLOGY | Facility: HOSPITAL | Age: 62
End: 2022-11-03

## 2022-11-03 ENCOUNTER — HOSPITAL ENCOUNTER (EMERGENCY)
Facility: HOSPITAL | Age: 62
Discharge: HOME | End: 2022-11-03
Attending: EMERGENCY MEDICINE

## 2022-11-03 VITALS
HEART RATE: 52 BPM | RESPIRATION RATE: 20 BRPM | DIASTOLIC BLOOD PRESSURE: 50 MMHG | OXYGEN SATURATION: 99 % | TEMPERATURE: 97.9 F | SYSTOLIC BLOOD PRESSURE: 102 MMHG

## 2022-11-03 DIAGNOSIS — R53.1 WEAKNESS: ICD-10-CM

## 2022-11-03 DIAGNOSIS — R55 SYNCOPE: Primary | ICD-10-CM

## 2022-11-03 LAB
2HR DELTA HS TROPONIN: 0 NG/L
ALBUMIN SERPL BCP-MCNC: 1.9 G/DL (ref 3.5–5)
ALP SERPL-CCNC: 83 U/L (ref 46–116)
ALT SERPL W P-5'-P-CCNC: 27 U/L (ref 12–78)
AMMONIA PLAS-SCNC: 51 UMOL/L (ref 11–35)
ANION GAP SERPL CALCULATED.3IONS-SCNC: 6 MMOL/L (ref 4–13)
AST SERPL W P-5'-P-CCNC: 41 U/L (ref 5–45)
ATRIAL RATE: 51 BPM
ATRIAL RATE: 53 BPM
BASOPHILS # BLD AUTO: 0.05 THOUSANDS/ÂΜL (ref 0–0.1)
BASOPHILS NFR BLD AUTO: 1 % (ref 0–1)
BILIRUB SERPL-MCNC: 1.51 MG/DL (ref 0.2–1)
BUN SERPL-MCNC: 13 MG/DL (ref 5–25)
CALCIUM ALBUM COR SERPL-MCNC: 10 MG/DL (ref 8.3–10.1)
CALCIUM SERPL-MCNC: 8.3 MG/DL (ref 8.3–10.1)
CARDIAC TROPONIN I PNL SERPL HS: 5 NG/L
CARDIAC TROPONIN I PNL SERPL HS: 5 NG/L
CHLORIDE SERPL-SCNC: 102 MMOL/L (ref 96–108)
CO2 SERPL-SCNC: 25 MMOL/L (ref 21–32)
CREAT SERPL-MCNC: 1.14 MG/DL (ref 0.6–1.3)
EOSINOPHIL # BLD AUTO: 0.11 THOUSAND/ÂΜL (ref 0–0.61)
EOSINOPHIL NFR BLD AUTO: 2 % (ref 0–6)
ERYTHROCYTE [DISTWIDTH] IN BLOOD BY AUTOMATED COUNT: 13.6 % (ref 11.6–15.1)
GFR SERPL CREATININE-BSD FRML MDRD: 51 ML/MIN/1.73SQ M
GLUCOSE SERPL-MCNC: 167 MG/DL (ref 65–140)
HCT VFR BLD AUTO: 32.4 % (ref 34.8–46.1)
HGB BLD-MCNC: 11 G/DL (ref 11.5–15.4)
IMM GRANULOCYTES # BLD AUTO: 0.02 THOUSAND/UL (ref 0–0.2)
IMM GRANULOCYTES NFR BLD AUTO: 0 % (ref 0–2)
LYMPHOCYTES # BLD AUTO: 1.24 THOUSANDS/ÂΜL (ref 0.6–4.47)
LYMPHOCYTES NFR BLD AUTO: 22 % (ref 14–44)
MCH RBC QN AUTO: 33.8 PG (ref 26.8–34.3)
MCHC RBC AUTO-ENTMCNC: 34 G/DL (ref 31.4–37.4)
MCV RBC AUTO: 100 FL (ref 82–98)
MONOCYTES # BLD AUTO: 0.71 THOUSAND/ÂΜL (ref 0.17–1.22)
MONOCYTES NFR BLD AUTO: 13 % (ref 4–12)
NEUTROPHILS # BLD AUTO: 3.51 THOUSANDS/ÂΜL (ref 1.85–7.62)
NEUTS SEG NFR BLD AUTO: 62 % (ref 43–75)
NRBC BLD AUTO-RTO: 0 /100 WBCS
P AXIS: 42 DEGREES
P AXIS: 74 DEGREES
PLATELET # BLD AUTO: 122 THOUSANDS/UL (ref 149–390)
PMV BLD AUTO: 10.2 FL (ref 8.9–12.7)
POTASSIUM SERPL-SCNC: 3.5 MMOL/L (ref 3.5–5.3)
PR INTERVAL: 148 MS
PR INTERVAL: 168 MS
PROT SERPL-MCNC: 8.1 G/DL (ref 6.4–8.4)
QRS AXIS: 73 DEGREES
QRS AXIS: 79 DEGREES
QRSD INTERVAL: 76 MS
QRSD INTERVAL: 84 MS
QT INTERVAL: 444 MS
QT INTERVAL: 452 MS
QTC INTERVAL: 416 MS
QTC INTERVAL: 416 MS
RBC # BLD AUTO: 3.25 MILLION/UL (ref 3.81–5.12)
SODIUM SERPL-SCNC: 133 MMOL/L (ref 135–147)
T WAVE AXIS: 63 DEGREES
T WAVE AXIS: 75 DEGREES
TSH SERPL DL<=0.05 MIU/L-ACNC: 2.98 UIU/ML (ref 0.45–4.5)
VENTRICULAR RATE: 51 BPM
VENTRICULAR RATE: 53 BPM
WBC # BLD AUTO: 5.64 THOUSAND/UL (ref 4.31–10.16)

## 2022-11-03 RX ADMIN — SODIUM CHLORIDE 1000 ML: 0.9 INJECTION, SOLUTION INTRAVENOUS at 17:56

## 2022-11-03 NOTE — DISCHARGE INSTRUCTIONS
You were seen in the ED for syncope and weakness  Return to the ED for any worsening symptoms or new symptoms  Follow up with your primary care doctor as soon as possible

## 2022-11-03 NOTE — ED PROVIDER NOTES
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, presenting with episodes of syncope and weakness onset 3 days ago  Patient states has been feeling generalized weakness  States 2 days ago, during the night, had an episode of syncope  States was sititng when episode occurred  States was witnessed by her son  Denies any seizure-like activity  Patient had another episode last night where she fell and hit her head  Patient states episode of vomiting after her previous syncope  Patient denies any fever, chest pain, shortness of breath, abdominal pain, nausea, vomiting, diarrhea but admits to lightheadedness  Denies previous history of this in the past   Patient states she has some mild abdominal discomfort due to her ascites  States that she is getting paracentesis next week  Prior to Admission Medications   Prescriptions Last Dose Informant Patient Reported? Taking?    ALPRAZolam (XANAX) 1 mg tablet   No No   Sig: TAKE ONE TABLET BY MOUTH TWICE A DAY AS NEEDED FOR ANXIETY   albuterol (2 5 mg/3 mL) 0 083 % nebulizer solution  Self No No   Sig: Take 3 mL (2 5 mg total) by nebulization every 6 (six) hours as needed for wheezing or shortness of breath   albuterol (Proventil HFA) 90 mcg/act inhaler  Self No No   Sig: Inhale 2 puffs every 6 (six) hours as needed for wheezing or shortness of breath   anastrozole (ARIMIDEX) 1 mg tablet  Self No No   Sig: Take 1 tablet (1 mg total) by mouth daily   benzonatate (TESSALON PERLES) 100 mg capsule  Self No No   Sig: TAKE 1 CAPSULE BY MOUTH 3 TIMES DAILY AS NEEDED FOR COUGH   furosemide (LASIX) 40 mg tablet   No No   Sig: Take 1 tablet (40 mg total) by mouth daily   midodrine (PROAMATINE) 5 mg tablet   No No   Sig: Take 1 5 tablets (7 5 mg total) by mouth 3 (three) times a day before meals   nadolol (CORGARD) 40 mg tablet   No No   Sig: Take 1 tablet (40 mg total) by mouth daily   spironolactone (ALDACTONE) 100 mg tablet   No No   Sig: Take 1 tablet (100 mg total) by mouth daily      Facility-Administered Medications: None       Past Medical History:   Diagnosis Date   • Alcoholic fatty liver    • Anxiety    • Asthma    • COPD (chronic obstructive pulmonary disease) (HCC)    • Elevated liver function tests    • GERD (gastroesophageal reflux disease)    • GERD without esophagitis    • Hyperlipidemia    • Hypertension    • IBS (irritable bowel syndrome)    • Insomnia    • Insomnia    • Liver disease    • Nervousness    • Nicotine dependence    • Other specified urinary incontinence    • RLS (restless legs syndrome)    • Wears glasses        Past Surgical History:   Procedure Laterality Date   • BACK SURGERY     • 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   • BREAST EXCISIONAL BIOPSY Right 11/01/2004    benign   • CATARACT EXTRACTION, BILATERAL  08/01/2018   • COLONOSCOPY N/A 5/8/2019    Procedure: COLONOSCOPY;  Surgeon: Dominga Mittal MD;  Location: [facility]; Service: Gastroenterology   • EGD AND COLONOSCOPY N/A 3/19/2018    Procedure: EGD AND COLONOSCOPY;  Surgeon: Dominga Mittal MD;  Location: [facility]; Service: Gastroenterology   • ESOPHAGOGASTRODUODENOSCOPY N/A 5/8/2019    Procedure: ESOPHAGOGASTRODUODENOSCOPY (EGD); Surgeon: Dominga Mittal MD;  Location: [facility];   Service: Gastroenterology   • IR PARACENTESIS  11/27/2020   • IR PARACENTESIS  12/8/2020   • IR PARACENTESIS  10/28/2022   • KNEE SURGERY     • KNEE SURGERY     • LUMBAR LAMINECTOMY  04/1999   • LYMPH NODE BIOPSY     • MAMMO STEREOTACTIC BREAST BIOPSY RIGHT (ALL INC) Right 5/21/2021   • MAMMO STEREOTACTIC BREAST BIOPSY RIGHT (ALL INC) EACH ADD Right 5/21/2021   • TOTAL ABDOMINAL HYSTERECTOMY  02/05/2008   • TUBAL LIGATION     • TUBAL LIGATION  1989   • UPPER GASTROINTESTINAL ENDOSCOPY         Family History   Problem Relation Age of Onset   • Breast cancer Mother 28   • No Known Problems Father    • No Known Problems Sister    • No Known Problems Brother    • No Known Problems Son    • No Known Problems Maternal Grandmother    • No Known Problems Maternal Grandfather    • No Known Problems Paternal Grandmother    • No Known Problems Paternal Grandfather    • No Known Problems Maternal Aunt    • No Known Problems Maternal Aunt    • No Known Problems Paternal Aunt    • Substance Abuse Neg Hx    • Mental illness Neg Hx      I have reviewed and agree with the history as documented  E-Cigarette/Vaping   • E-Cigarette Use Never User      E-Cigarette/Vaping Substances     Social History     Tobacco Use   • Smoking status: Current Every Day Smoker     Packs/day: 1 00     Years: 45 00     Pack years: 45 00     Types: Cigarettes     Start date: 1978   • Smokeless tobacco: Never Used   Vaping Use   • Vaping Use: Never used   Substance Use Topics   • Alcohol use: Not Currently     Alcohol/week: 24 0 standard drinks     Types: 24 Cans of beer per week     Comment: not lately, non alcoholic beer   • Drug use: No        Review of Systems   Constitutional: Negative for chills, fatigue and fever  HENT: Negative for congestion, rhinorrhea and sore throat  Eyes: Negative for pain and visual disturbance  Respiratory: Negative for cough, chest tightness and shortness of breath  Cardiovascular: Negative for chest pain and leg swelling  Gastrointestinal: Negative for abdominal distention, abdominal pain, diarrhea, nausea and vomiting  Genitourinary: Negative for difficulty urinating and dysuria  Musculoskeletal: Negative for arthralgias, back pain and myalgias  Skin: Negative for rash and wound  Neurological: Positive for syncope, weakness and light-headedness  Negative for dizziness and headaches  All other systems reviewed and are negative        Physical Exam  ED Triage Vitals [11/03/22 1320]   Temperature Pulse Respirations Blood Pressure SpO2   97 9 °F (36 6 °C) (!) 54 18 110/61 98 %      Temp Source Heart Rate Source Patient Position - Orthostatic VS BP Location FiO2 (%)   Temporal Monitor Sitting Left arm --      Pain Score       No Pain             Orthostatic Vital Signs  Vitals:    11/03/22 1430 11/03/22 1700 11/03/22 1800 11/03/22 1830   BP: 102/52 92/55 99/51 102/50   Pulse: (!) 50 (!) 52 (!) 52 (!) 52   Patient Position - Orthostatic VS: Lying Lying Lying Lying       Physical Exam  Vitals reviewed  Constitutional:       Appearance: Normal appearance  HENT:      Head: Normocephalic and atraumatic  Nose: Nose normal       Mouth/Throat:      Mouth: Mucous membranes are moist       Pharynx: Oropharynx is clear  Eyes:      Extraocular Movements: Extraocular movements intact  Conjunctiva/sclera: Conjunctivae normal    Cardiovascular:      Rate and Rhythm: Regular rhythm  Bradycardia present  Pulses: Normal pulses  Heart sounds: Normal heart sounds  Pulmonary:      Effort: Pulmonary effort is normal       Breath sounds: Normal breath sounds  Abdominal:      General: Bowel sounds are normal  There is distension (mild abdominal distension)  Palpations: Abdomen is soft  Tenderness: There is no abdominal tenderness  Musculoskeletal:         General: Normal range of motion  Cervical back: Normal range of motion  Skin:     General: Skin is warm and dry  Neurological:      General: No focal deficit present  Mental Status: She is alert and oriented to person, place, and time  Mental status is at baseline  Cranial Nerves: No cranial nerve deficit  Sensory: No sensory deficit  Motor: No weakness        Coordination: Coordination normal          ED Medications  Medications   sodium chloride 0 9 % bolus 1,000 mL (0 mL Intravenous Stopped 11/3/22 6846)       Diagnostic Studies  Results Reviewed     Procedure Component Value Units Date/Time    HS Troponin I 2hr [[id number]]  (Normal) Collected: 11/03/22 1725    Lab Status: Final result Specimen: Blood from Arm, Left Updated: 11/03/22 1852     hs TnI 2hr 5 ng/L      Delta 2hr hsTnI 0 ng/L     Comprehensive metabolic panel [[id number]]  (Abnormal) Collected: 11/03/22 1446    Lab Status: Final result Specimen: Blood from Arm, Left Updated: 11/03/22 1531     Sodium 133 mmol/L      Potassium 3 5 mmol/L      Chloride 102 mmol/L      CO2 25 mmol/L      ANION GAP 6 mmol/L      BUN 13 mg/dL      Creatinine 1 14 mg/dL      Glucose 167 mg/dL      Calcium 8 3 mg/dL      Corrected Calcium 10 0 mg/dL      AST 41 U/L      ALT 27 U/L      Alkaline Phosphatase 83 U/L      Total Protein 8 1 g/dL      Albumin 1 9 g/dL      Total Bilirubin 1 51 mg/dL      eGFR 51 ml/min/1 73sq m     Narrative:      [facility] guidelines for Chronic Kidney Disease (CKD):   •  Stage 1 with normal or high GFR (GFR > 90 mL/min/1 73 square meters)  •  Stage 2 Mild CKD (GFR = 60-89 mL/min/1 73 square meters)  •  Stage 3A Moderate CKD (GFR = 45-59 mL/min/1 73 square meters)  •  Stage 3B Moderate CKD (GFR = 30-44 mL/min/1 73 square meters)  •  Stage 4 Severe CKD (GFR = 15-29 mL/min/1 73 square meters)  •  Stage 5 End Stage CKD (GFR <15 mL/min/1 73 square meters)  Note: GFR calculation is accurate only with a steady state creatinine    Ammonia [[id number]]  (Abnormal) Collected: 11/03/22 1446    Lab Status: Final result Specimen: Blood from Arm, Left Updated: 11/03/22 1531     Ammonia 51 umol/L     TSH, 3rd generation with Free T4 reflex [[id number]]  (Normal) Collected: 11/03/22 1446    Lab Status: Final result Specimen: Blood from Arm, Left Updated: 11/03/22 1531     TSH 3RD GENERATON 2 980 uIU/mL     Narrative:      Patients undergoing fluorescein dye angiography may retain small amounts of fluorescein in the body for 48-72 hours post procedure   Samples containing fluorescein can produce falsely depressed TSH values  If the patient had this procedure,a specimen should be resubmitted post fluorescein clearance  HS Troponin 0hr (reflex protocol) [[id number]]  (Normal) Collected: 11/03/22 1446    Lab Status: Final result Specimen: Blood from Arm, Left Updated: 11/03/22 1522     hs TnI 0hr 5 ng/L     CBC and differential [[id number]]  (Abnormal) Collected: 11/03/22 1446    Lab Status: Final result Specimen: Blood from Arm, Left Updated: 11/03/22 1456     WBC 5 64 Thousand/uL      RBC 3 25 Million/uL      Hemoglobin 11 0 g/dL      Hematocrit 32 4 %       fL      MCH 33 8 pg      MCHC 34 0 g/dL      RDW 13 6 %      MPV 10 2 fL      Platelets 200 Thousands/uL      nRBC 0 /100 WBCs      Neutrophils Relative 62 %      Immat GRANS % 0 %      Lymphocytes Relative 22 %      Monocytes Relative 13 %      Eosinophils Relative 2 %      Basophils Relative 1 %      Neutrophils Absolute 3 51 Thousands/µL      Immature Grans Absolute 0 02 Thousand/uL      Lymphocytes Absolute 1 24 Thousands/µL      Monocytes Absolute 0 71 Thousand/µL      Eosinophils Absolute 0 11 Thousand/µL      Basophils Absolute 0 05 Thousands/µL                  XR shoulder 2+ vw right   ED Interpretation by J Luis Hernandez DO (11/03 1918)   Right shoulder x-ray interpreted me shows no fracture, no dislocation, no acute osseous abnormality      Final Result by Tio Russell MD (11/04 0601)      No acute osseous abnormality  Mild acromioclavicular joint osteoarthritis  Workstation performed: [id number]         CT head without contrast   Final Result by Marsha Edwards MD (11/03 1627)      No acute intracranial abnormality  Workstation performed: [id number]         CT spine cervical without contrast   Final Result by Marsha Edwards MD (11/03 1634)      No cervical spine fracture or traumatic malalignment                     Workstation performed: [id number]         XR chest 1 view portable   ED Interpretation by Dana Ragland DO (11/03 1623)   Chest x-ray interpreted me shows no acute cardiopulmonary disease      Final Result by Maegan Medeiros MD (11/03 1625)      No acute cardiopulmonary disease  Workstation performed: [id number]               Procedures  Procedures      ED Course  ED Course as of 11/05/22 1801   Thu Nov 03, 2022   1752 Spoke with patient and family  Offered admission for observation however patient wants to tbe discharged  States she will be with her son that will watch over her  Discussed that I recommended stay for observation however patient wants to go home  SBIRT 20yo+    Flowsheet Row Most Recent Value   SBIRT (23 yo +)    In order to provide better care to our patients, we are screening all of our patients for alcohol and drug use  Would it be okay to ask you these screening questions? No Filed at: 11/03/2022 1359                MDM  Number of Diagnoses or Management Options  Syncope  Weakness  Diagnosis management comments: 68-year-old female patient with history of cirrhosis presenting with an episodes of syncope onset 2 days ago  Patient states that she had an episode of syncope couple days ago and then had another episode last night  Patient states had an episode of vomiting after her last syncope  Denies any chest pain or shortness of breath  Patient states that she has been feeling lightheaded  Exam shows abdominal distention secondary to ascites  Labs show mildly elevated ammonia, negative troponin  X-ray of right shoulder negative for acute fractures  CT head and CT C-spine negative  Patient was given fluids in the ED  Discussed with patient and family about admission versus discharge  Offered patient admission however patient states that she would like to go home  Discussed risks and benefits including missed arrhythmia she goes home  Patient states she would like to go home    Stable for discharge with return precautions given  Follow up with PCP         Amount and/or Complexity of Data Reviewed  Clinical lab tests: ordered and reviewed  Tests in the radiology section of CPT®: ordered and reviewed        Disposition  Final diagnoses:   Syncope   Weakness     Time reflects when diagnosis was documented in both MDM as applicable and the Disposition within this note     Time User Action Codes Description Comment    11/3/2022  7:07 PM Kirk Jt ISABELLE [facility] Add [R55] Syncope     11/3/2022  7:07 PM Julio César Navarrete Add [R53 1] Weakness       ED Disposition     ED Disposition   Discharge    Condition   --    Date/Time   u Nov 3, 2022  6:15 PM    Comment                Follow-up Information     Follow up With Specialties Details Why Contact Info    Alicia Pastor PA-C Family Medicine, Physician Assistant Schedule an appointment as soon as possible for a visit   [address] [place] [address], [address] [place] [address]  [phone]            Discharge Medication List as of 11/3/2022  7:07 PM      CONTINUE these medications which have NOT CHANGED    Details   albuterol (2 5 mg/3 mL) 0 083 % nebulizer solution Take 3 mL (2 5 mg total) by nebulization every 6 (six) hours as needed for wheezing or shortness of breath, Starting Wed 8/3/2022, Normal      albuterol (Proventil HFA) 90 mcg/act inhaler Inhale 2 puffs every 6 (six) hours as needed for wheezing or shortness of breath, Starting Wed 2/16/2022, Normal      ALPRAZolam (XANAX) 1 mg tablet TAKE ONE TABLET BY MOUTH TWICE A DAY AS NEEDED FOR ANXIETY, Normal      anastrozole (ARIMIDEX) 1 mg tablet Take 1 tablet (1 mg total) by mouth daily, Starting Thu 6/9/2022, Normal      benzonatate (TESSALON PERLES) 100 mg capsule TAKE 1 CAPSULE BY MOUTH 3 TIMES DAILY AS NEEDED FOR COUGH, Normal      furosemide (LASIX) 40 mg tablet Take 1 tablet (40 mg total) by mouth daily, Starting Fri 10/7/2022, Normal      midodrine (PROAMATINE) 5 mg tablet Take 1 5 tablets (7 5 mg total) by mouth 3 (three) times a day before meals, Starting Wed 11/2/2022, Until Fri 12/2/2022, Normal      nadolol (CORGARD) 40 mg tablet Take 1 tablet (40 mg total) by mouth daily, Starting Fri 10/14/2022, Until Tue 12/13/2022, Normal      spironolactone (ALDACTONE) 100 mg tablet Take 1 tablet (100 mg total) by mouth daily, Starting Fri 10/7/2022, Normal           No discharge procedures on file  PDMP Review       Value Time User    PDMP Reviewed  Yes 10/21/2022 12:16 PM Katty Medrano PA-C           ED Provider  Attending physically available and evaluated [address]ol[address]ie [address]  I managed the patient along with the ED Attending      Electronically Signed by         Ela Mak MD  11/05/22 1338

## 2022-11-03 NOTE — ED NOTES
Pt endorses intermittent dizziness  MD Miller notified, states plan for EKG and fluid bolus  EKG performed with 2 hr troponin draw         Courtney Frankel, RN  11/03/22 2774

## 2022-11-03 NOTE — ED NOTES
Report given to MELECIO Morrow for transition of care  Pt's  left and son at bedside       Caridad Rico RN  11/03/22 1919

## 2022-11-03 NOTE — ED ATTENDING ATTESTATION
11/3/2022  IJ Luis DO, saw and evaluated the patient  I have discussed the patient with the resident/non-physician practitioner and agree with the resident's/non-physician practitioner's findings, Plan of Care, and MDM as documented in the resident's/non-physician practitioner's note, except where noted  All available labs and Radiology studies were reviewed  I was present for key portions of any procedure(s) performed by the resident/non-physician practitioner and I was immediately available to provide assistance  At this point I agree with the current assessment done in the Emergency Department  I have conducted an independent evaluation of this patient a history and physical is as follows:    Patient is 70-year-old female, says 2 days ago she got up from the table, felt lightheaded, thought she may pass out but her son caught her, she does not believe there was any particular direct trauma  Yesterday she was down in her basement when she moved quickly, felt lightheaded, fell forward landing on her head in her right shoulder  Did not pass out, able to ambulate afterwards but since then has had a mild dull headache which actually started for 5 days ago, as well as some mild pain in her right shoulder  No chest pain, no palpitations, no dysuria or hematuria  Does have chronic ascites which she says her abdomen is a little swollen and that is relatively normal for her  She has no difficulty speaking, thinking, or concentrating    No one else sick with similar headaches at home, no recent head or neck infections or surgeries    General:  Patient is well-appearing  Head:  Atraumatic  Eyes:  Conjunctiva pink, Extraocular muscle intact, no periorbital ecchymosis, PERRL  ENT:  Mucous membranes are moist, no dental malocclusion, no craniofacial instability, no Sainz signs  Neck:  No midline tenderness or step-offs or deformities  Cardiac:  S1-S2, without murmurs, no chest wall tenderness  Lungs: Clear to auscultation bilaterally  Abdomen:  Soft, nontender, normal bowel sounds, no CVA tenderness, slightly distended and tympanitic,no guarding  Extremities:  Right shoulder has slight tenderness to the humeral head, slight discomfort to range of motion but no limitations to passive range of motion  No tenderness to the clavicle or acromioclavicular joint  No tenderness admitted as humerus, elbow, forearm, wrist or hand  Normal, painless range of motion of the right elbow and wrist   Normal sensation throughout the right arm including no sensory deficit over the deltoid  Radial pulses equal symmetric bilaterally  The other 3 extremities are atraumatic, nontender with normal, painless range of motion  Back: No midline thoracic, lumbar, sacral tenderness, deformities, or step-offs  Neurologic:  Awake, fluent speech, normal comprehension, AAOx3, No deficit on finger to nose testing, no pronator drift, cranial nerves II through XII are intact, no facial droop, no slurred speech, normal sensation, strength 5/5 in b/l upper & lower extremities  Skin:  Pink warm and dry  Psychiatric:  Alert, pleasant, cooperative      ED Course  ED Course as of 11/03/22 2203   Thu Nov 03, 2022   1640 ECG interpreted me, sinus rhythm, rate 51, no ischemic or infarct changes, no delta wave, normal intervals, no acute change from October 13, 2022     XR shoulder 2+ vw right   ED Interpretation   Right shoulder x-ray interpreted me shows no fracture, no dislocation, no acute osseous abnormality      CT head without contrast   Final Result      No acute intracranial abnormality  Workstation performed: [id number]         CT spine cervical without contrast   Final Result      No cervical spine fracture or traumatic malalignment                     Workstation performed: [id number]         XR chest 1 view portable   ED Interpretation   Chest x-ray interpreted me shows no acute cardiopulmonary disease      Final Result No acute cardiopulmonary disease  Workstation performed: [id number]           Labs Reviewed   CBC AND DIFFERENTIAL - Abnormal       Result Value Ref Range Status    WBC 5 64  4 31 - 10 16 Thousand/uL Final    RBC 3 25 (*) 3 81 - 5 12 Million/uL Final    Hemoglobin 11 0 (*) 11 5 - 15 4 g/dL Final    Hematocrit 32 4 (*) 34 8 - 46 1 % Final     (*) 82 - 98 fL Final    MCH 33 8  26 8 - 34 3 pg Final    MCHC 34 0  31 4 - 37 4 g/dL Final    RDW 13 6  11 6 - 15 1 % Final    MPV 10 2  8 9 - 12 7 fL Final    Platelets 652 (*) 392 - 390 Thousands/uL Final    nRBC 0  /100 WBCs Final    Neutrophils Relative 62  43 - 75 % Final    Immat GRANS % 0  0 - 2 % Final    Lymphocytes Relative 22  14 - 44 % Final    Monocytes Relative 13 (*) 4 - 12 % Final    Eosinophils Relative 2  0 - 6 % Final    Basophils Relative 1  0 - 1 % Final    Neutrophils Absolute 3 51  1 85 - 7 62 Thousands/µL Final    Immature Grans Absolute 0 02  0 00 - 0 20 Thousand/uL Final    Lymphocytes Absolute 1 24  0 60 - 4 47 Thousands/µL Final    Monocytes Absolute 0 71  0 17 - 1 22 Thousand/µL Final    Eosinophils Absolute 0 11  0 00 - 0 61 Thousand/µL Final    Basophils Absolute 0 05  0 00 - 0 10 Thousands/µL Final   COMPREHENSIVE METABOLIC PANEL - Abnormal    Sodium 133 (*) 135 - 147 mmol/L Final    Potassium 3 5  3 5 - 5 3 mmol/L Final    Chloride 102  96 - 108 mmol/L Final    CO2 25  21 - 32 mmol/L Final    ANION GAP 6  4 - 13 mmol/L Final    BUN 13  5 - 25 mg/dL Final    Creatinine 1 14  0 60 - 1 30 mg/dL Final    Comment: Standardized to IDMS reference method    Glucose 167 (*) 65 - 140 mg/dL Final    Comment: If the patient is fasting, the ADA then defines impaired fasting glucose as > 100 mg/dL and diabetes as > or equal to 123 mg/dL  Specimen collection should occur prior to Sulfasalazine administration due to the potential for falsely depressed results   Specimen collection should occur prior to Sulfapyridine administration due to the potential for falsely elevated results  Calcium 8 3  8 3 - 10 1 mg/dL Final    Corrected Calcium 10 0  8 3 - 10 1 mg/dL Final    AST 41  5 - 45 U/L Final    Comment: Specimen collection should occur prior to Sulfasalazine administration due to the potential for falsely depressed results  ALT 27  12 - 78 U/L Final    Comment: Specimen collection should occur prior to Sulfasalazine and/or Sulfapyridine administration due to the potential for falsely depressed results  Alkaline Phosphatase 83  46 - 116 U/L Final    Total Protein 8 1  6 4 - 8 4 g/dL Final    Albumin 1 9 (*) 3 5 - 5 0 g/dL Final    Total Bilirubin 1 51 (*) 0 20 - 1 00 mg/dL Final    Comment: Use of this assay is not recommended for patients undergoing treatment with eltrombopag due to the potential for falsely elevated results  eGFR 51  ml/min/1 73sq m Final    Narrative:     [place] guidelines for Chronic Kidney Disease (CKD):   •  Stage 1 with normal or high GFR (GFR > 90 mL/min/1 73 square meters)  •  Stage 2 Mild CKD (GFR = 60-89 mL/min/1 73 square meters)  •  Stage 3A Moderate CKD (GFR = 45-59 mL/min/1 73 square meters)  •  Stage 3B Moderate CKD (GFR = 30-44 mL/min/1 73 square meters)  •  Stage 4 Severe CKD (GFR = 15-29 mL/min/1 73 square meters)  •  Stage 5 End Stage CKD (GFR <15 mL/min/1 73 square meters)  Note: GFR calculation is accurate only with a steady state creatinine   AMMONIA - Abnormal    Ammonia 51 (*) 11 - 35 umol/L Final    Comment: Specimen collection should occur prior to Sulfasalazine administration due to the potential for falsely elevated results  Specimen collection should occur prior to Sulfapyridine administration due to the potential for falsely depressed results     HS TROPONIN I 0HR - Normal    hs TnI 0hr 5  "Refer to ACS Flowchart"- see link ng/L Final    Comment:                                              Initial (time 0) result  If >=50 ng/L, Myocardial injury suggested ; Type of myocardial injury and treatment strategy  to be determined  If 5-49 ng/L, a delta result at 2 hours and or 4 hours will be needed to further evaluate  If <4 ng/L, and chest pain has been >3 hours since onset, patient may qualify for discharge based on the HEART score in the ED  If <5 ng/L and <3hours since onset of chest pain, a delta result at 2 hours will be needed to further evaluate  HS Troponin 99th Percentile URL of a Health Population=12 ng/L with a 95% Confidence Interval of 8-18 ng/L  Second Troponin (time 2 hours)  If calculated delta >= 20 ng/L,  Myocardial injury suggested ; Type of myocardial injury and treatment strategy to be determined  If 5-49 ng/L and the calculated delta is 5-19 ng/L, consult medical service for evaluation  Continue evaluation for ischemia on ecg and other possible etiology and repeat hs troponin at 4 hours  If delta is <5 ng/L at 2 hours, consider discharge based on risk stratification via the HEART score (if in ED), or CYNTHIA risk score in IP/Observation  HS Troponin 99th Percentile URL of a Health Population=12 ng/L with a 95% Confidence Interval of 8-18 ng/L    TSH, 3RD GENERATION WITH FREE T4 REFLEX - Normal    TSH 3RD GENERATON 2 980  0 450 - 4 500 uIU/mL Final    Comment: The recommended reference ranges for TSH during pregnancy are as follows:   First trimester 0 1 to 2 5 uIU/mL   Second trimester  0 2 to 3 0 uIU/mL   Third trimester 0 3 to 3 0 uIU/m    Note: Normal ranges may not apply to patients who are transgender, non-binary, or whose legal sex, sex at birth, and gender identity differ  Adult TSH (3rd generation) reference range follows the recommended guidelines of the [employer], January, 2020  Narrative:     Patients undergoing fluorescein dye angiography may retain small amounts of fluorescein in the body for 48-72 hours post procedure  Samples containing fluorescein can produce falsely depressed TSH values   If the patient had this procedure,a specimen should be resubmitted post fluorescein clearance  HS TROPONIN I 2HR - Normal    hs TnI 2hr 5  "Refer to ACS Flowchart"- see link ng/L Final    Comment:                                              Initial (time 0) result  If >=50 ng/L, Myocardial injury suggested ;  Type of myocardial injury and treatment strategy  to be determined  If 5-49 ng/L, a delta result at 2 hours and or 4 hours will be needed to further evaluate  If <4 ng/L, and chest pain has been >3 hours since onset, patient may qualify for discharge based on the HEART score in the ED  If <5 ng/L and <3hours since onset of chest pain, a delta result at 2 hours will be needed to further evaluate  HS Troponin 99th Percentile URL of a Health Population=12 ng/L with a 95% Confidence Interval of 8-18 ng/L  Second Troponin (time 2 hours)  If calculated delta >= 20 ng/L,  Myocardial injury suggested ; Type of myocardial injury and treatment strategy to be determined  If 5-49 ng/L and the calculated delta is 5-19 ng/L, consult medical service for evaluation  Continue evaluation for ischemia on ecg and other possible etiology and repeat hs troponin at 4 hours  If delta is <5 ng/L at 2 hours, consider discharge based on risk stratification via the HEART score (if in ED), or CYNTHIA risk score in IP/Observation  HS Troponin 99th Percentile URL of a Health Population=12 ng/L with a 95% Confidence Interval of 8-18 ng/L  Delta 2hr hsTnI 0  <20 ng/L Final       Patient overall well appearing, given the increasing frequency of her falls, patient was offered admission however she said she felt comfortable going home, that if she had recurrence of her symptoms or felt were she would come back to the ED  Supportive care, importance of follow-up and return precautions were discussed with the patient, who expressed understanding            Time reflects when diagnosis was documented in both MDM as applicable and the Disposition within this note     Time User Action Codes Description Comment    11/3/2022  7:07 PM Lisa WALTON [facility] Add [R55] Syncope     11/3/2022  7:07 PM Dominguez Buck Add [R53 1] Weakness       ED Disposition     ED Disposition   Discharge    Condition   --    Date/Time   Thu Nov 3, 2022  6:15 PM    Comment                Follow-up Information     Follow up With Specialties Details Why Contact Info    Jovon Gottlieb PA-C Family Medicine, Physician Assistant Schedule an appointment as soon as possible for a visit   [address] [place] [address]  [phone]              Critical Care Time  Procedures

## 2022-11-07 ENCOUNTER — HOSPITAL ENCOUNTER (OUTPATIENT)
Dept: INTERVENTIONAL RADIOLOGY/VASCULAR | Facility: HOSPITAL | Age: 62
Discharge: HOME | End: 2022-11-07
Attending: INTERNAL MEDICINE

## 2022-11-07 DIAGNOSIS — K70.31 ALCOHOLIC CIRRHOSIS OF LIVER WITH ASCITES (HCC): ICD-10-CM

## 2022-11-07 DIAGNOSIS — E87.1 HYPONATREMIA: ICD-10-CM

## 2022-11-07 DIAGNOSIS — I85.00 ESOPHAGEAL VARICES WITHOUT BLEEDING, UNSPECIFIED ESOPHAGEAL VARICES TYPE (HCC): ICD-10-CM

## 2022-11-07 RX ORDER — LIDOCAINE HYDROCHLORIDE 10 MG/ML
INJECTION, SOLUTION EPIDURAL; INFILTRATION; INTRACAUDAL; PERINEURAL CODE/TRAUMA/SEDATION MEDICATION
Status: COMPLETED | OUTPATIENT
Start: 2022-11-07 | End: 2022-11-07

## 2022-11-07 RX ORDER — ALBUMIN (HUMAN) 12.5 G/50ML
SOLUTION INTRAVENOUS
Status: COMPLETED | OUTPATIENT
Start: 2022-11-07 | End: 2022-11-07

## 2022-11-07 RX ADMIN — LIDOCAINE HYDROCHLORIDE 9 ML: 10 INJECTION, SOLUTION EPIDURAL; INFILTRATION; INTRACAUDAL; PERINEURAL at 10:17

## 2022-11-07 RX ADMIN — ALBUMIN (HUMAN) 25 G: 0.25 INJECTION, SOLUTION INTRAVENOUS at 10:45

## 2022-11-07 NOTE — SEDATION DOCUMENTATION
4700ml clear yellow fluid removed before patient accidentally pulled catheter out  Re-scanned by Dr Mervat Cyr and no additional paracentesis performed at this time  Albumin replacement given and scheduled for next paracentesis in 7-10 days  AVS reviewed and ambulated home

## 2022-11-07 NOTE — DISCHARGE INSTRUCTIONS
Abdominal Paracentesis     WHAT YOU NEED TO KNOW:   Abdominal paracentesis is a procedure to remove abnormal fluid buildup in your abdomen  Fluid builds up because of liver problems, such as swelling and scarring  Heart failure, kidney disease, a mass, or problems with your pancreas may also cause fluid buildup  DISCHARGE INSTRUCTIONS:     Follow up with your healthcare provider as directed: Write down your questions so you remember to ask them during your visits  Wound care: Remove dressing after 24 hours  Leave glue in place  Return to your normal activities    Contact Interventional Radiology at [phone] Nga PATIENTS: Contact Interventional Radiology at [phone]) Dm Eddy PATIENTS: Contact Interventional Radiology at [phone]) if:  You have a fever and your wound is red and swollen  You have yellow, green, or bad-smelling discharge coming from your wound  You have pain or swelling in your abdomen  You have an upset stomach or you vomit  You have sudden, sharp pain in your abdomen  You urinate very little or not at all  You feel confused and more tired than usual    Your arm or leg feels warm, tender, and painful  It may look swollen and red  You suddenly feel lightheaded and have trouble breathing

## 2022-11-11 ENCOUNTER — OFFICE VISIT (OUTPATIENT)
Dept: FAMILY MEDICINE CLINIC | Facility: CLINIC | Age: 62
End: 2022-11-11

## 2022-11-11 VITALS
OXYGEN SATURATION: 99 % | DIASTOLIC BLOOD PRESSURE: 52 MMHG | WEIGHT: 143 LBS | SYSTOLIC BLOOD PRESSURE: 98 MMHG | HEART RATE: 53 BPM | TEMPERATURE: 97 F | HEIGHT: 64 IN | BODY MASS INDEX: 24.41 KG/M2

## 2022-11-11 DIAGNOSIS — J01.00 ACUTE NON-RECURRENT MAXILLARY SINUSITIS: ICD-10-CM

## 2022-11-11 DIAGNOSIS — J44.9 MODERATE COPD (CHRONIC OBSTRUCTIVE PULMONARY DISEASE) (HCC): ICD-10-CM

## 2022-11-11 DIAGNOSIS — K70.31 ALCOHOLIC CIRRHOSIS OF LIVER WITH ASCITES (HCC): Chronic | ICD-10-CM

## 2022-11-11 DIAGNOSIS — R05.1 ACUTE COUGH: Primary | ICD-10-CM

## 2022-11-11 RX ORDER — DEXTROMETHORPHAN HYDROBROMIDE AND PROMETHAZINE HYDROCHLORIDE 15; 6.25 MG/5ML; MG/5ML
5 SOLUTION ORAL 4 TIMES DAILY PRN
Qty: 118 ML | Refills: 0 | Status: SHIPPED | OUTPATIENT
Start: 2022-11-11

## 2022-11-11 RX ORDER — CEFUROXIME AXETIL 500 MG/1
500 TABLET ORAL EVERY 12 HOURS SCHEDULED
Qty: 20 TABLET | Refills: 0 | Status: SHIPPED | OUTPATIENT
Start: 2022-11-11 | End: 2022-11-21

## 2022-11-11 RX ORDER — BENZONATATE 100 MG/1
100 CAPSULE ORAL 3 TIMES DAILY PRN
Qty: 60 CAPSULE | Refills: 2 | Status: SHIPPED | OUTPATIENT
Start: 2022-11-11

## 2022-11-11 NOTE — PROGRESS NOTES
Assessment/Plan:    1  Sinusitis - start ceftin 1 tab twice daily for 10 days, tessalon during day, promethazine dm at night    2  Moderate COPD - continue with albuterol as needed    3  Alcoholic cirrhosis - under care GI    F/u as needed      Subjective:   Chief Complaint   Patient presents with   • Cold Like Symptoms     Patient started with cough runny nose sinus pressure on Tuesday covid negative 2 days ago    • Follow-up      Patient ID: Annie Wilson is a 60 y o  female  Patient here feeling sick since Tuesday night, post nasal drip, cough keeping patient up all night  Mucus green, ears clogged, sore throat  Denies fever, chills  The following portions of the patient's history were reviewed and updated as appropriate: allergies, current medications, past family history, past medical history, past social history, past surgical history and problem list     Past Medical History:   Diagnosis Date   • Alcoholic fatty liver    • Anxiety    • Asthma    • COPD (chronic obstructive pulmonary disease) ([facility] [address] )    • Elevated liver function tests    • GERD (gastroesophageal reflux disease)    • GERD without esophagitis    • Hyperlipidemia    • Hypertension    • IBS (irritable bowel syndrome)    • Insomnia    • Insomnia    • Liver disease    • Nervousness    • Nicotine dependence    • Other specified urinary incontinence    • RLS (restless legs syndrome)    • Wears glasses      Past Surgical History:   Procedure Laterality Date   • BACK SURGERY     • 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   • BREAST EXCISIONAL BIOPSY Right 11/01/2004    benign   • CATARACT EXTRACTION, BILATERAL  08/01/2018   • COLONOSCOPY N/A 5/8/2019    Procedure: COLONOSCOPY;  Surgeon: John Franklin MD;  Location: [facility] GI LAB; Service: Gastroenterology   • EGD AND COLONOSCOPY N/A 3/19/2018    Procedure: EGD AND COLONOSCOPY;  Surgeon: John Franklin MD;  Location: [facility] GI LAB;   Service: Gastroenterology   • ESOPHAGOGASTRODUODENOSCOPY N/A 5/8/2019    Procedure: ESOPHAGOGASTRODUODENOSCOPY (EGD); Surgeon: Chetna Hoskins MD;  Location: [facility] GI LAB;   Service: Gastroenterology   • IR PARACENTESIS  11/27/2020   • IR PARACENTESIS  12/8/2020   • IR PARACENTESIS  10/28/2022   • IR PARACENTESIS  11/7/2022   • KNEE SURGERY     • KNEE SURGERY     • LUMBAR LAMINECTOMY  04/1999   • LYMPH NODE BIOPSY     • MAMMO STEREOTACTIC BREAST BIOPSY RIGHT (ALL INC) Right 5/21/2021   • MAMMO STEREOTACTIC BREAST BIOPSY RIGHT (ALL INC) EACH ADD Right 5/21/2021   • TOTAL ABDOMINAL HYSTERECTOMY  02/05/2008   • TUBAL LIGATION     • TUBAL LIGATION  1989   • UPPER GASTROINTESTINAL ENDOSCOPY       Family History   Problem Relation Age of Onset   • Breast cancer Mother 28   • No Known Problems Father    • No Known Problems Sister    • No Known Problems Brother    • No Known Problems Son    • No Known Problems Maternal Grandmother    • No Known Problems Maternal Grandfather    • No Known Problems Paternal Grandmother    • No Known Problems Paternal Grandfather    • No Known Problems Maternal Aunt    • No Known Problems Maternal Aunt    • No Known Problems Paternal Aunt    • Substance Abuse Neg Hx    • Mental illness Neg Hx      Social History     Socioeconomic History   • Marital status: /Civil Union     Spouse name: Not on file   • Number of children: Not on file   • Years of education: Not on file   • Highest education level: Not on file   Occupational History   • Not on file   Tobacco Use   • Smoking status: Current Every Day Smoker     Packs/day: 1 00     Years: 45 00     Pack years: 45 00     Types: Cigarettes     Start date: 1/1/1978   • Smokeless tobacco: Never Used   Vaping Use   • Vaping Use: Never used   Substance and Sexual Activity   • Alcohol use: Not Currently     Alcohol/week: 24 0 standard drinks     Types: 24 Cans of beer per week     Comment: not lately, non alcoholic beer   • Drug use: No   • Sexual activity: Yes     Partners: Male   Other Topics Concern   • Not on file   Social History Narrative    Has carbon monoxide detectors in home    Has smoke detectors    Uses safety equipment - seatbelts     Social Determinants of Health     Financial Resource Strain: Not on file   Food Insecurity: No Food Insecurity   • Worried About Running Out of Food in the Last Year: Never true   • Ran Out of Food in the Last Year: Never true   Transportation Needs: No Transportation Needs   • Lack of Transportation (Medical): No   • Lack of Transportation (Non-Medical):  No   Physical Activity: Not on file   Stress: Not on file   Social Connections: Not on file   Intimate Partner Violence: Not on file   Housing Stability: Low Risk    • Unable to Pay for Housing in the Last Year: No   • Number of Places Lived in the Last Year: 1   • Unstable Housing in the Last Year: No       Current Outpatient Medications:   •  albuterol (2 5 mg/3 mL) 0 083 % nebulizer solution, Take 3 mL (2 5 mg total) by nebulization every 6 (six) hours as needed for wheezing or shortness of breath, Disp: 180 mL, Rfl: 5  •  albuterol (Proventil HFA) 90 mcg/act inhaler, Inhale 2 puffs every 6 (six) hours as needed for wheezing or shortness of breath, Disp: 18 g, Rfl: 3  •  ALPRAZolam (XANAX) 1 mg tablet, TAKE ONE TABLET BY MOUTH TWICE A DAY AS NEEDED FOR ANXIETY, Disp: 60 tablet, Rfl: 0  •  anastrozole (ARIMIDEX) 1 mg tablet, Take 1 tablet (1 mg total) by mouth daily, Disp: 90 tablet, Rfl: 3  •  benzonatate (TESSALON PERLES) 100 mg capsule, TAKE 1 CAPSULE BY MOUTH 3 TIMES DAILY AS NEEDED FOR COUGH, Disp: 60 capsule, Rfl: 2  •  furosemide (LASIX) 40 mg tablet, Take 1 tablet (40 mg total) by mouth daily, Disp: 30 tablet, Rfl: 5  •  midodrine (PROAMATINE) 5 mg tablet, Take 1 5 tablets (7 5 mg total) by mouth 3 (three) times a day before meals, Disp: 135 tablet, Rfl: 0  •  nadolol (CORGARD) 40 mg tablet, Take 1 tablet (40 mg total) by mouth daily, Disp: 60 tablet, Rfl: 0  •  Promethazine-DM (PHENERGAN-DM) 6 25-15 mg/5 mL oral syrup, Take 5 mL by mouth 4 (four) times a day as needed for cough, Disp: 118 mL, Rfl: 0  •  spironolactone (ALDACTONE) 100 mg tablet, Take 1 tablet (100 mg total) by mouth daily, Disp: 30 tablet, Rfl: 5    Review of Systems          Objective:    Vitals:    11/11/22 1057   BP: 98/52   Pulse: (!) 53   Temp: (!) 97 °F (36 1 °C)   SpO2: 99%   Weight: 64 9 kg (143 lb)   Height: 5' 4" (1 626 m)        Physical Exam      Constitutional: Patient is oriented to person, place, and time  appears well-developed and well-nourished  HENT:   Head: Normocephalic and atraumatic  Right Ear: Tympanic membrane, external ear and ear canal normal    Left Ear: Tympanic membrane, external ear and ear canal normal    Nose: Mucosal edema present  Mouth/Throat: Posterior oropharyngeal erythema present  Turbinates inflamed with purulent mucus, pharynx with purulent post nasal drip and erythema   Eyes: Conjunctivae are normal    Neck: Neck supple  Cardiovascular: Normal rate, regular rhythm and normal heart sounds  Pulmonary/Chest: Effort normal and breath sounds normal    Lymphadenopathy: no cervical adenopathy  Neurological: Patient is alert and oriented to person, place, and time  Skin: Skin is warm  Psychiatric: Patient has a normal mood and affect

## 2022-11-21 ENCOUNTER — HOSPITAL ENCOUNTER (OUTPATIENT)
Dept: ULTRASOUND IMAGING | Facility: HOSPITAL | Age: 62
Discharge: HOME | End: 2022-11-21

## 2022-11-21 ENCOUNTER — TELEPHONE (OUTPATIENT)
Dept: OTHER | Facility: OTHER | Age: 62
End: 2022-11-21

## 2022-11-21 DIAGNOSIS — F41.9 ANXIETY: ICD-10-CM

## 2022-11-21 DIAGNOSIS — K70.31 ALCOHOLIC CIRRHOSIS OF LIVER WITH ASCITES (HCC): Chronic | ICD-10-CM

## 2022-11-21 RX ORDER — ALPRAZOLAM 1 MG/1
1 TABLET ORAL 2 TIMES DAILY PRN
Qty: 60 TABLET | Refills: 0 | Status: SHIPPED | OUTPATIENT
Start: 2022-11-21

## 2022-11-21 NOTE — TELEPHONE ENCOUNTER
Patient is requesting that her stomach needs to be drained again there is a lot of fluid building up  She wants an order placed in and she usually gets this done at [address]  Please call her back to advise, she is leaving for a [place] at noon leave info with her

## 2022-11-21 NOTE — TELEPHONE ENCOUNTER
Attempted to call patient's home and mobile numbers to discuss concerns  No answer, LVM to return call

## 2022-11-22 ENCOUNTER — TELEPHONE (OUTPATIENT)
Dept: GASTROENTEROLOGY | Facility: CLINIC | Age: 62
End: 2022-11-22

## 2022-11-22 NOTE — TELEPHONE ENCOUNTER
----- Message from Tali Damian PA-C sent at 11/22/2022  9:30 AM EST -----  Hankabdullahikaiser Solomonjose miguel needs a large volume paracentesis  I entered IR paracentesis and IV albumin orders  Can a staff member assist her with scheduling?  Thank you

## 2022-11-22 NOTE — TELEPHONE ENCOUNTER
Called pt to schedule paracentesis  Was able to schedule for Wednesday nov 30th at 11:15am  Pt is aware

## 2022-11-23 ENCOUNTER — TELEPHONE (OUTPATIENT)
Dept: NEPHROLOGY | Facility: CLINIC | Age: 62
End: 2022-11-23

## 2022-11-23 NOTE — TELEPHONE ENCOUNTER
Spoke with Patient and schedule appointment for 5 [phone] 23  10:00 with Dr Александр Beck in the [facility]

## 2022-11-30 ENCOUNTER — HOSPITAL ENCOUNTER (OUTPATIENT)
Dept: INTERVENTIONAL RADIOLOGY/VASCULAR | Facility: HOSPITAL | Age: 62
Discharge: HOME | End: 2022-11-30

## 2022-11-30 VITALS
RESPIRATION RATE: 18 BRPM | SYSTOLIC BLOOD PRESSURE: 104 MMHG | HEART RATE: 55 BPM | OXYGEN SATURATION: 100 % | DIASTOLIC BLOOD PRESSURE: 74 MMHG

## 2022-11-30 DIAGNOSIS — K70.31 ALCOHOLIC CIRRHOSIS OF LIVER WITH ASCITES (HCC): ICD-10-CM

## 2022-11-30 RX ORDER — ALBUMIN (HUMAN) 12.5 G/50ML
SOLUTION INTRAVENOUS
Status: COMPLETED | OUTPATIENT
Start: 2022-11-30 | End: 2022-11-30

## 2022-11-30 RX ORDER — LIDOCAINE HYDROCHLORIDE 10 MG/ML
INJECTION, SOLUTION EPIDURAL; INFILTRATION; INTRACAUDAL; PERINEURAL AS NEEDED
Status: COMPLETED | OUTPATIENT
Start: 2022-11-30 | End: 2022-11-30

## 2022-11-30 RX ADMIN — LIDOCAINE HYDROCHLORIDE 10 ML: 10 INJECTION, SOLUTION EPIDURAL; INFILTRATION; INTRACAUDAL; PERINEURAL at 11:11

## 2022-11-30 RX ADMIN — ALBUMIN (HUMAN) 25 G: 0.25 INJECTION, SOLUTION INTRAVENOUS at 11:07

## 2022-11-30 NOTE — SEDATION DOCUMENTATION
Therapeutic paracentesis  Removed 5550ml clear yellow fluid, RLQ  Patient tolerated the procedure well

## 2022-11-30 NOTE — DISCHARGE INSTRUCTIONS
Abdominal Paracentesis     WHAT YOU NEED TO KNOW:   Abdominal paracentesis is a procedure to remove abnormal fluid buildup in your abdomen  Fluid builds up because of liver problems, such as swelling and scarring  Heart failure, kidney disease, a mass, or problems with your pancreas may also cause fluid buildup  DISCHARGE INSTRUCTIONS:     Follow up with your healthcare provider as directed: Write down your questions so you remember to ask them during your visits  Wound care: Remove dressing after 24 hours  Leave glue in place  Return to your normal activities    Contact Interventional Radiology at [phone] Nga PATIENTS: Contact Interventional Radiology at [phone]) Cristin Tatevyesteal PATIENTS: Contact Interventional Radiology at [phone]) if:  You have a fever and your wound is red and swollen  You have yellow, green, or bad-smelling discharge coming from your wound  You have pain or swelling in your abdomen  You have an upset stomach or you vomit  You have sudden, sharp pain in your abdomen  You urinate very little or not at all  You feel confused and more tired than usual    Your arm or leg feels warm, tender, and painful  It may look swollen and red  You suddenly feel lightheaded and have trouble breathing

## 2022-12-19 ENCOUNTER — TELEPHONE (OUTPATIENT)
Dept: HEMATOLOGY ONCOLOGY | Facility: CLINIC | Age: 62
End: 2022-12-19

## 2022-12-19 ENCOUNTER — OFFICE VISIT (OUTPATIENT)
Dept: GASTROENTEROLOGY | Facility: CLINIC | Age: 62
End: 2022-12-19

## 2022-12-19 VITALS
TEMPERATURE: 97.9 F | HEIGHT: 64 IN | DIASTOLIC BLOOD PRESSURE: 70 MMHG | SYSTOLIC BLOOD PRESSURE: 116 MMHG | WEIGHT: 146.6 LBS | BODY MASS INDEX: 25.03 KG/M2

## 2022-12-19 DIAGNOSIS — K21.9 GERD WITHOUT ESOPHAGITIS: ICD-10-CM

## 2022-12-19 DIAGNOSIS — K70.31 ALCOHOLIC CIRRHOSIS OF LIVER WITH ASCITES (HCC): Primary | Chronic | ICD-10-CM

## 2022-12-19 DIAGNOSIS — D05.11 DUCTAL CARCINOMA IN SITU (DCIS) OF RIGHT BREAST: ICD-10-CM

## 2022-12-19 DIAGNOSIS — K74.60 ESOPHAGEAL VARICES IN CIRRHOSIS (HCC): ICD-10-CM

## 2022-12-19 DIAGNOSIS — K76.0 FATTY LIVER: ICD-10-CM

## 2022-12-19 DIAGNOSIS — I85.10 ESOPHAGEAL VARICES IN CIRRHOSIS (HCC): ICD-10-CM

## 2022-12-19 DIAGNOSIS — R19.7 DIARRHEA OF PRESUMED INFECTIOUS ORIGIN: ICD-10-CM

## 2022-12-19 DIAGNOSIS — K58.0 IRRITABLE BOWEL SYNDROME WITH DIARRHEA: ICD-10-CM

## 2022-12-19 DIAGNOSIS — F41.9 ANXIETY: ICD-10-CM

## 2022-12-19 RX ORDER — ANASTROZOLE 1 MG/1
1 TABLET ORAL DAILY
Qty: 90 TABLET | Refills: 1 | Status: SHIPPED | OUTPATIENT
Start: 2022-12-19

## 2022-12-19 RX ORDER — ANASTROZOLE 1 MG/1
1 TABLET ORAL DAILY
Qty: 90 TABLET | Refills: 3 | Status: CANCELLED | OUTPATIENT
Start: 2022-12-19

## 2022-12-19 RX ORDER — ALPRAZOLAM 1 MG/1
TABLET ORAL
Qty: 60 TABLET | Refills: 0 | Status: SHIPPED | OUTPATIENT
Start: 2022-12-19

## 2022-12-19 NOTE — TELEPHONE ENCOUNTER
MEDICATION REFILL ROUTE TO RN    Who is Calling  Patient   Medication anastrozole (ARIMIDEX) 1 mg tablet       How many pills left 3   Preferred Pharmacy / Address [facility] [address]    Who is your Physician?  Juan F Hart   Call back number [phone]   Relevant Information She wants to know if a 90day supply can be sent

## 2022-12-19 NOTE — PROGRESS NOTES
[facility] - Outpatient Follow-up Note  Mann Buchanan 60 y o  female MRN: [id number]  Encounter: [id number]          ASSESSMENT AND PLAN:      1  Alcoholic cirrhosis of liver with ascites ([address] )  2  Fatty liver  3  Esophageal varices in cirrhosis (HCC)  She has alcoholic cirrhosis complicated by ascites and esophageal varices but she does not have any hepatic encephalopathy  She will be seeing her hepatologist Dr Yanet Tay tomorrow so I asked her to have her labs drawn today in anticipation of that visit  Depending on her labs and her clinical course, Dr Yanet Tay may increase her diuretics or hold her nadolol to improve the diuresis she received with her current diuretics  4  Irritable bowel syndrome with diarrhea  5  Diarrhea of presumed infectious origin  She has a history of diarrhea predominant irritable bowel syndrome and had a recent change with an acute worsening of her diarrhea 1 week ago  This is probably due to an infectious gastroenteritis so I will check her stools  If this is negative and her symptoms persist we may consider a colonoscopy as the next step to rule out inflammatory bowel disease and microscopic colitis  - Stool Enteric Bacterial Panel by PCR; Future  - Occult Blood, Fecal Immunochemical; Future  - Clostridium difficile toxin by PCR; Future  - Calprotectin,Fecal; Future  - Stool Enteric Bacterial Panel by PCR  - Clostridium difficile toxin by PCR  - Calprotectin,Fecal    6  GERD without esophagitis  She has reflux but has not had any recent symptoms so she will continue diet and lifestyle modification and not take any medication for this     ______________________________________________________________________    SUBJECTIVE: She presents for follow-up of her alcoholic cirrhosis complicated by ascites and small esophageal varices  Her recent upper endoscopy 2 months ago showed small esophageal varices but no red carol signs    She has been taking nadolol for this  She denies any bleeding  She has been taking Lasix 40 mg daily and spironolactone 100 mg daily for her ascites and feels it is slowly accumulating despite the diuretics  She has a history of reflux but denies any recent reflux and she denies nausea, vomiting, and dysphagia  Over the past week she has had diarrhea and lower abdominal cramping  She denies any sick contacts or recent travel history  REVIEW OF SYSTEMS IS OTHERWISE NEGATIVE  Historical Information   Past Medical History:   Diagnosis Date   • Alcoholic fatty liver    • Anxiety    • Asthma    • COPD (chronic obstructive pulmonary disease) ([facility] [address] )    • Elevated liver function tests    • GERD (gastroesophageal reflux disease)    • GERD without esophagitis    • Hyperlipidemia    • Hypertension    • IBS (irritable bowel syndrome)    • Insomnia    • Insomnia    • Liver disease    • Nervousness    • Nicotine dependence    • Other specified urinary incontinence    • RLS (restless legs syndrome)    • Wears glasses      Past Surgical History:   Procedure Laterality Date   • BACK SURGERY     • 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   • BREAST EXCISIONAL BIOPSY Right 11/01/2004    benign   • CATARACT EXTRACTION, BILATERAL  08/01/2018   • COLONOSCOPY N/A 5/8/2019    Procedure: COLONOSCOPY;  Surgeon: Bhavna Hernandez MD;  Location: [facility] GI LAB; Service: Gastroenterology   • EGD AND COLONOSCOPY N/A 3/19/2018    Procedure: EGD AND COLONOSCOPY;  Surgeon: Bhavna Hernandez MD;  Location: [facility] GI LAB; Service: Gastroenterology   • ESOPHAGOGASTRODUODENOSCOPY N/A 5/8/2019    Procedure: ESOPHAGOGASTRODUODENOSCOPY (EGD); Surgeon: Bhavna Hernandez MD;  Location: [facility] GI LAB;   Service: Gastroenterology   • IR PARACENTESIS  11/27/2020   • IR PARACENTESIS  12/8/2020   • IR PARACENTESIS  10/28/2022   • IR PARACENTESIS  11/7/2022   • IR PARACENTESIS  11/30/2022   • KNEE SURGERY     • KNEE SURGERY     • LUMBAR LAMINECTOMY  04/1999   • LYMPH NODE BIOPSY     • MAMMO STEREOTACTIC BREAST BIOPSY RIGHT (ALL INC) Right 5/21/2021   • MAMMO STEREOTACTIC BREAST BIOPSY RIGHT (ALL INC) EACH ADD Right 5/21/2021   • TOTAL ABDOMINAL HYSTERECTOMY  02/05/2008   • TUBAL LIGATION     • TUBAL LIGATION  1989   • UPPER GASTROINTESTINAL ENDOSCOPY       Social History   Social History     Substance and Sexual Activity   Alcohol Use Not Currently   • Alcohol/week: 24 0 standard drinks   • Types: 24 Cans of beer per week    Comment: not lately, non alcoholic beer     Social History     Substance and Sexual Activity   Drug Use No     Social History     Tobacco Use   Smoking Status Every Day   • Packs/day: 1 00   • Years: 45 00   • Pack years: 45 00   • Types: Cigarettes   • Start date: 1/1/1978   Smokeless Tobacco Never     Family History   Problem Relation Age of Onset   • Breast cancer Mother 28   • No Known Problems Father    • No Known Problems Sister    • No Known Problems Brother    • No Known Problems Son    • No Known Problems Maternal Grandmother    • No Known Problems Maternal Grandfather    • No Known Problems Paternal Grandmother    • No Known Problems Paternal Grandfather    • No Known Problems Maternal Aunt    • No Known Problems Maternal Aunt    • No Known Problems Paternal Aunt    • Substance Abuse Neg Hx    • Mental illness Neg Hx        Meds/Allergies       Current Outpatient Medications:   •  albuterol (2 5 mg/3 mL) 0 083 % nebulizer solution  •  albuterol (Proventil HFA) 90 mcg/act inhaler  •  ALPRAZolam (XANAX) 1 mg tablet  •  anastrozole (ARIMIDEX) 1 mg tablet  •  benzonatate (TESSALON PERLES) 100 mg capsule  •  furosemide (LASIX) 40 mg tablet  •  Promethazine-DM (PHENERGAN-DM) 6 25-15 mg/5 mL oral syrup  •  spironolactone (ALDACTONE) 100 mg tablet  •  midodrine (PROAMATINE) 5 mg tablet  •  nadolol (CORGARD) 40 mg tablet    Allergies   Allergen Reactions   • Sulfa Antibiotics Shortness Of Breath   • Ace Inhibitors Cough and Other (See Comments)     coughing Objective     Blood pressure 116/70, temperature 97 9 °F (36 6 °C), temperature source Tympanic, height 5' 4" (1 626 m), weight 66 5 kg (146 lb 9 6 oz), not currently breastfeeding  Body mass index is 25 16 kg/m²  PHYSICAL EXAM:      General Appearance:   Alert, cooperative, no distress, no asterixis   HEENT:   Normocephalic, atraumatic, anicteric      Neck:  Supple, symmetrical, trachea midline   Lungs:   Clear to auscultation bilaterally; no rales, rhonchi or wheezing; respirations unlabored    Heart[de-identified]   Regular rate and rhythm; no murmur, rub, or gallop  Abdomen:   Soft, mildly tender with moderate ascites; normal bowel sounds; no masses, no organomegaly    Genitalia:   Deferred    Rectal:   Deferred    Extremities:  No cyanosis, clubbing or edema    Pulses:  2+ and symmetric    Skin:  No rashes, or lesions, but has jaundice    Lymph nodes:  No palpable cervical lymphadenopathy        Lab Results:   No visits with results within 1 Day(s) from this visit     Latest known visit with results is:   Admission on 11/03/2022, Discharged on 11/03/2022   Component Date Value   • Ventricular Rate 11/03/2022 51    • Atrial Rate 11/03/2022 51    • LA Interval 11/03/2022 148    • QRSD Interval 11/03/2022 84    • QT Interval 11/03/2022 452    • QTC Interval 11/03/2022 416    • P Axis 11/03/2022 74    • QRS Axis 11/03/2022 73    • T Wave Axis 11/03/2022 63    • WBC 11/03/2022 5 64    • RBC 11/03/2022 3 25 (L)    • Hemoglobin 11/03/2022 11 0 (L)    • Hematocrit 11/03/2022 32 4 (L)    • MCV 11/03/2022 100 (H)    • MCH 11/03/2022 33 8    • MCHC 11/03/2022 34 0    • RDW 11/03/2022 13 6    • MPV 11/03/2022 10 2    • Platelets 81/70/8264 122 (L)    • nRBC 11/03/2022 0    • Neutrophils Relative 11/03/2022 62    • Immat GRANS % 11/03/2022 0    • Lymphocytes Relative 11/03/2022 22    • Monocytes Relative 11/03/2022 13 (H)    • Eosinophils Relative 11/03/2022 2    • Basophils Relative 11/03/2022 1    • Neutrophils Absolute 11/03/2022 3 51    • Immature Grans Absolute 11/03/2022 0 02    • Lymphocytes Absolute 11/03/2022 1 24    • Monocytes Absolute 11/03/2022 0 71    • Eosinophils Absolute 11/03/2022 0 11    • Basophils Absolute 11/03/2022 0 05    • Sodium 11/03/2022 133 (L)    • Potassium 11/03/2022 3 5    • Chloride 11/03/2022 102    • CO2 11/03/2022 25    • ANION GAP 11/03/2022 6    • BUN 11/03/2022 13    • Creatinine 11/03/2022 1 14    • Glucose 11/03/2022 167 (H)    • Calcium 11/03/2022 8 3    • Corrected Calcium 11/03/2022 10 0    • AST 11/03/2022 41    • ALT 11/03/2022 27    • Alkaline Phosphatase 11/03/2022 83    • Total Protein 11/03/2022 8 1    • Albumin 11/03/2022 1 9 (L)    • Total Bilirubin 11/03/2022 1 51 (H)    • eGFR 11/03/2022 51    • hs TnI 0hr 11/03/2022 5    • Ammonia 11/03/2022 51 (H)    • TSH 3RD GENERATON 11/03/2022 2 980    • hs TnI 2hr 11/03/2022 5    • Delta 2hr hsTnI 11/03/2022 0    • Ventricular Rate 11/03/2022 53    • Atrial Rate 11/03/2022 53    • OR Interval 11/03/2022 168    • QRSD Interval 11/03/2022 76    • QT Interval 11/03/2022 444    • QTC Interval 11/03/2022 416    • P Axis 11/03/2022 42    • QRS Axis 11/03/2022 79    • T Wave [place] 11/03/2022 75          Radiology Results:   IR AMB Paracentesis    Result Date: 11/30/2022  Narrative: INDICATION: Recurrent ascites PROCEDURE: 1  Ultrasound-guided paracentesis FINDINGS: 1   5550 mL clear yellow ascites removed  Impression: Paracentesis  _________________________________________________________________ COMPARISON: None PROCEDURE DETAILS: Operators: Dr Kadie Rajput Anesthesia: Local Medications: 1% lidocaine COMMENTS: A preprocedure timeout was performed per [facility] protocol  The right abdomen was prepped and draped in the usual sterile fashion  5-Albanian Yueh catheter was advanced using ultrasound guidance into ascites  Following drainage, the skin was cleansed and sterile dressings were applied   Workstation performed: [id number]     [address] [facility] right upper quadrant    Result Date: 11/26/2022  Narrative: RIGHT UPPER QUADRANT ULTRASOUND INDICATION:     K70 31: Alcoholic cirrhosis of liver with ascites  COMPARISON:  Abdominal ultrasound 5/26/2022 TECHNIQUE:   Real-time ultrasound of the right upper quadrant was performed with a curvilinear transducer with both volumetric sweeps and still imaging techniques  FINDINGS: PANCREAS:  Visualized portions of the pancreas are within normal limits  AORTA AND IVC:  Visualized portions are normal for patient age  LIVER: Size:  Enlarged  The liver measures 18 4 cm in the midclavicular line  Contour:  Surface contour is nodular  Parenchyma: There is diffuse coarsened heterogeneous echotexture suggesting underlying cirrhotic changes  No liver mass identified  Limited imaging of the main portal vein shows it to be patent and hepatopetal  Recanalized umbilical vein noted  BILIARY: The gallbladder is normal in caliber  No pericholecystic fluid  There is gallbladder wall thickening to 6 mm, likely reactive in the setting of cirrhosis and ascites  Shadowing gallstone(s) identified  No sonographic Head's sign  No intrahepatic biliary dilatation  CBD measures 5 0 mm  No choledocholithiasis  KIDNEY: Right kidney measures 8 8 x 4 3 x 4 7 cm  Volume 94 1 mL Kidney within normal limits  ASCITES:   Small to moderate volume of ascites  Impression: Cirrhotic liver with evidence of portal hypertension given ascites and recanalized umbilical vein  No solid hepatic mass  Cholelithiasis   Workstation performed: [id number]

## 2022-12-19 NOTE — LETTER
December 19, 2022     Nima Saucedo, [address]  [address]  [address] [place] [address]    Patient: Luciana Oliveira   YOB: 1960   Date of Visit: 12/19/2022       Dear Dr Mayi Jeffries: Thank you for referring Soni Walters to me for evaluation  Below are my notes for this consultation  If you have questions, please do not hesitate to call me  I look forward to following your patient along with you  Sincerely,        Jackelin Lewis MD        CC: No Recipients  Jackelin Lewis MD  12/19/2022  2:58 PM  Sign when Signing Visit  [facility] - Outpatient Follow-up Note  Luciana Oliveira 60 y o  female MRN: [id number]  Encounter: [id number]          ASSESSMENT AND PLAN:      1  Alcoholic cirrhosis of liver with ascites ([address] )  2  Fatty liver  3  Esophageal varices in cirrhosis (HCC)  She has alcoholic cirrhosis complicated by ascites and esophageal varices but she does not have any hepatic encephalopathy  She will be seeing her hepatologist Dr Mayi Jeffries tomorrow so I asked her to have her labs drawn today in anticipation of that visit  Depending on her labs and her clinical course, Dr Mayi Jeffries may increase her diuretics or hold her nadolol to improve the diuresis she received with her current diuretics  4  Irritable bowel syndrome with diarrhea  5  Diarrhea of presumed infectious origin  She has a history of diarrhea predominant irritable bowel syndrome and had a recent change with an acute worsening of her diarrhea 1 week ago  This is probably due to an infectious gastroenteritis so I will check her stools  If this is negative and her symptoms persist we may consider a colonoscopy as the next step to rule out inflammatory bowel disease and microscopic colitis  - Stool Enteric Bacterial Panel by PCR; Future  - Occult Blood, Fecal Immunochemical; Future  - Clostridium difficile toxin by PCR;  Future  - Calprotectin,Fecal; Future  - Stool Enteric Bacterial Panel by PCR  - Clostridium difficile toxin by PCR  - Calprotectin,Fecal    6  GERD without esophagitis  She has reflux but has not had any recent symptoms so she will continue diet and lifestyle modification and not take any medication for this     ______________________________________________________________________    SUBJECTIVE: She presents for follow-up of her alcoholic cirrhosis complicated by ascites and small esophageal varices  Her recent upper endoscopy 2 months ago showed small esophageal varices but no red carol signs  She has been taking nadolol for this  She denies any bleeding  She has been taking Lasix 40 mg daily and spironolactone 100 mg daily for her ascites and feels it is slowly accumulating despite the diuretics  She has a history of reflux but denies any recent reflux and she denies nausea, vomiting, and dysphagia  Over the past week she has had diarrhea and lower abdominal cramping  She denies any sick contacts or recent travel history  REVIEW OF SYSTEMS IS OTHERWISE NEGATIVE  Historical Information   Past Medical History:   Diagnosis Date   • Alcoholic fatty liver    • Anxiety    • Asthma    • COPD (chronic obstructive pulmonary disease) ([facility] [address] )    • Elevated liver function tests    • GERD (gastroesophageal reflux disease)    • GERD without esophagitis    • Hyperlipidemia    • Hypertension    • IBS (irritable bowel syndrome)    • Insomnia    • Insomnia    • Liver disease    • Nervousness    • Nicotine dependence    • Other specified urinary incontinence    • RLS (restless legs syndrome)    • Wears glasses      Past Surgical History:   Procedure Laterality Date   • BACK SURGERY     • 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   • BREAST EXCISIONAL BIOPSY Right 11/01/2004    benign   • CATARACT EXTRACTION, BILATERAL  08/01/2018   • COLONOSCOPY N/A 5/8/2019    Procedure: COLONOSCOPY;  Surgeon: Letitia Esparza MD;  Location: [facility] GI LAB;   Service: Gastroenterology   • EGD AND COLONOSCOPY N/A 3/19/2018    Procedure: EGD AND COLONOSCOPY;  Surgeon: Eleuterio Escobar MD;  Location: [facility] GI LAB; Service: Gastroenterology   • ESOPHAGOGASTRODUODENOSCOPY N/A 5/8/2019    Procedure: ESOPHAGOGASTRODUODENOSCOPY (EGD); Surgeon: Eleuterio Escobar MD;  Location: [facility] GI LAB;   Service: Gastroenterology   • IR PARACENTESIS  11/27/2020   • IR PARACENTESIS  12/8/2020   • IR PARACENTESIS  10/28/2022   • IR PARACENTESIS  11/7/2022   • IR PARACENTESIS  11/30/2022   • KNEE SURGERY     • KNEE SURGERY     • LUMBAR LAMINECTOMY  04/1999   • LYMPH NODE BIOPSY     • MAMMO STEREOTACTIC BREAST BIOPSY RIGHT (ALL INC) Right 5/21/2021   • MAMMO STEREOTACTIC BREAST BIOPSY RIGHT (ALL INC) EACH ADD Right 5/21/2021   • TOTAL ABDOMINAL HYSTERECTOMY  02/05/2008   • TUBAL LIGATION     • TUBAL LIGATION  1989   • UPPER GASTROINTESTINAL ENDOSCOPY       Social History   Social History     Substance and Sexual Activity   Alcohol Use Not Currently   • Alcohol/week: 24 0 standard drinks   • Types: 24 Cans of beer per week    Comment: not lately, non alcoholic beer     Social History     Substance and Sexual Activity   Drug Use No     Social History     Tobacco Use   Smoking Status Every Day   • Packs/day: 1 00   • Years: 45 00   • Pack years: 45 00   • Types: Cigarettes   • Start date: 1/1/1978   Smokeless Tobacco Never     Family History   Problem Relation Age of Onset   • Breast cancer Mother 28   • No Known Problems Father    • No Known Problems Sister    • No Known Problems Brother    • No Known Problems Son    • No Known Problems Maternal Grandmother    • No Known Problems Maternal Grandfather    • No Known Problems Paternal Grandmother    • No Known Problems Paternal Grandfather    • No Known Problems Maternal Aunt    • No Known Problems Maternal Aunt    • No Known Problems Paternal Aunt    • Substance Abuse Neg Hx    • Mental illness Neg Hx        Meds/Allergies        Current Outpatient Medications:   •  albuterol (2 5 mg/3 mL) 0 083 % nebulizer solution  •  albuterol (Proventil HFA) 90 mcg/act inhaler  •  ALPRAZolam (XANAX) 1 mg tablet  •  anastrozole (ARIMIDEX) 1 mg tablet  •  benzonatate (TESSALON PERLES) 100 mg capsule  •  furosemide (LASIX) 40 mg tablet  •  Promethazine-DM (PHENERGAN-DM) 6 25-15 mg/5 mL oral syrup  •  spironolactone (ALDACTONE) 100 mg tablet  •  midodrine (PROAMATINE) 5 mg tablet  •  nadolol (CORGARD) 40 mg tablet    Allergies   Allergen Reactions   • Sulfa Antibiotics Shortness Of Breath   • Ace Inhibitors Cough and Other (See Comments)     coughing           Objective      Blood pressure 116/70, temperature 97 9 °F (36 6 °C), temperature source Tympanic, height 5' 4" (1 626 m), weight 66 5 kg (146 lb 9 6 oz), not currently breastfeeding  Body mass index is 25 16 kg/m²  PHYSICAL EXAM:      General Appearance:   Alert, cooperative, no distress, no asterixis   HEENT:   Normocephalic, atraumatic, anicteric      Neck:  Supple, symmetrical, trachea midline   Lungs:   Clear to auscultation bilaterally; no rales, rhonchi or wheezing; respirations unlabored    Heart[de-identified]   Regular rate and rhythm; no murmur, rub, or gallop  Abdomen:   Soft, mildly tender with moderate ascites; normal bowel sounds; no masses, no organomegaly    Genitalia:   Deferred    Rectal:   Deferred    Extremities:  No cyanosis, clubbing or edema    Pulses:  2+ and symmetric    Skin:  No rashes, or lesions, but has jaundice    Lymph nodes:  No palpable cervical lymphadenopathy        Lab Results:   No visits with results within 1 Day(s) from this visit     Latest known visit with results is:   Admission on 11/03/2022, Discharged on 11/03/2022   Component Date Value   • Ventricular Rate 11/03/2022 51    • Atrial Rate 11/03/2022 51    • [place] Interval 11/03/2022 148    • QRSD Interval 11/03/2022 84    • QT Interval 11/03/2022 452    • QTC Interval 11/03/2022 416    • P Axis 11/03/2022 74    • QRS Axis 11/03/2022 73    • T Wave Axis 11/03/2022 63    • WBC 11/03/2022 5 64    • RBC 11/03/2022 3 25 (L)    • Hemoglobin 11/03/2022 11 0 (L)    • Hematocrit 11/03/2022 32 4 (L)    • MCV 11/03/2022 100 (H)    • MCH 11/03/2022 33 8    • MCHC 11/03/2022 34 0    • RDW 11/03/2022 13 6    • MPV 11/03/2022 10 2    • Platelets 58/77/5527 122 (L)    • nRBC 11/03/2022 0    • Neutrophils Relative 11/03/2022 62    • Immat GRANS % 11/03/2022 0    • Lymphocytes Relative 11/03/2022 22    • Monocytes Relative 11/03/2022 13 (H)    • Eosinophils Relative 11/03/2022 2    • Basophils Relative 11/03/2022 1    • Neutrophils Absolute 11/03/2022 3 51    • Immature Grans Absolute 11/03/2022 0 02    • Lymphocytes Absolute 11/03/2022 1 24    • Monocytes Absolute 11/03/2022 0 71    • Eosinophils Absolute 11/03/2022 0 11    • Basophils Absolute 11/03/2022 0 05    • Sodium 11/03/2022 133 (L)    • Potassium 11/03/2022 3 5    • Chloride 11/03/2022 102    • CO2 11/03/2022 25    • ANION GAP 11/03/2022 6    • BUN 11/03/2022 13    • Creatinine 11/03/2022 1 14    • Glucose 11/03/2022 167 (H)    • Calcium 11/03/2022 8 3    • Corrected Calcium 11/03/2022 10 0    • AST 11/03/2022 41    • ALT 11/03/2022 27    • Alkaline Phosphatase 11/03/2022 83    • Total Protein 11/03/2022 8 1    • Albumin 11/03/2022 1 9 (L)    • Total Bilirubin 11/03/2022 1 51 (H)    • eGFR 11/03/2022 51    • hs TnI 0hr 11/03/2022 5    • Ammonia 11/03/2022 51 (H)    • TSH 3RD GENERATON 11/03/2022 2 980    • hs TnI 2hr 11/03/2022 5    • Delta 2hr hsTnI 11/03/2022 0    • Ventricular Rate 11/03/2022 53    • Atrial Rate 11/03/2022 53    • [place] Interval 11/03/2022 168    • QRSD Interval 11/03/2022 76    • QT Interval 11/03/2022 444    • QTC Interval 11/03/2022 416    • P Axis 11/03/2022 42    • QRS Axis 11/03/2022 79    • T Wave [place] 11/03/2022 75          Radiology Results:   IR AMB Paracentesis    Result Date: 11/30/2022  Narrative: INDICATION: Recurrent ascites PROCEDURE: 1   Ultrasound-guided paracentesis FINDINGS: 1   5550 mL clear yellow ascites removed  Impression: Paracentesis  _________________________________________________________________ COMPARISON: None PROCEDURE DETAILS: Operators: Dr Yarelis Diggs Anesthesia: Local Medications: 1% lidocaine COMMENTS: A preprocedure timeout was performed per [facility] protocol  The right abdomen was prepped and draped in the usual sterile fashion  5-Martiniquais Yueh catheter was advanced using ultrasound guidance into ascites  Following drainage, the skin was cleansed and sterile dressings were applied  Workstation performed: [id number]     US right upper quadrant    Result Date: 11/26/2022  Narrative: RIGHT UPPER QUADRANT ULTRASOUND INDICATION:     K70 31: Alcoholic cirrhosis of liver with ascites  COMPARISON:  Abdominal ultrasound 5/26/2022 TECHNIQUE:   Real-time ultrasound of the right upper quadrant was performed with a curvilinear transducer with both volumetric sweeps and still imaging techniques  FINDINGS: PANCREAS:  Visualized portions of the pancreas are within normal limits  AORTA AND IVC:  Visualized portions are normal for patient age  LIVER: Size:  Enlarged  The liver measures 18 4 cm in the midclavicular line  Contour:  Surface contour is nodular  Parenchyma: There is diffuse coarsened heterogeneous echotexture suggesting underlying cirrhotic changes  No liver mass identified  Limited imaging of the main portal vein shows it to be patent and hepatopetal  Recanalized umbilical vein noted  BILIARY: The gallbladder is normal in caliber  No pericholecystic fluid  There is gallbladder wall thickening to 6 mm, likely reactive in the setting of cirrhosis and ascites  Shadowing gallstone(s) identified  No sonographic Head's sign  No intrahepatic biliary dilatation  CBD measures 5 0 mm  No choledocholithiasis  KIDNEY: Right kidney measures 8 8 x 4 3 x 4 7 cm  Volume 94 1 mL Kidney within normal limits  ASCITES:   Small to moderate volume of ascites       Impression: Cirrhotic liver with evidence of portal hypertension given ascites and recanalized umbilical vein  No solid hepatic mass  Cholelithiasis   Workstation performed: [id number]

## 2022-12-20 ENCOUNTER — OFFICE VISIT (OUTPATIENT)
Dept: GASTROENTEROLOGY | Facility: CLINIC | Age: 62
End: 2022-12-20

## 2022-12-20 VITALS
HEART RATE: 59 BPM | BODY MASS INDEX: 24.92 KG/M2 | DIASTOLIC BLOOD PRESSURE: 68 MMHG | HEIGHT: 64 IN | WEIGHT: 146 LBS | TEMPERATURE: 97.2 F | SYSTOLIC BLOOD PRESSURE: 118 MMHG | OXYGEN SATURATION: 99 %

## 2022-12-20 DIAGNOSIS — R18.8 REFRACTORY ASCITES: ICD-10-CM

## 2022-12-20 DIAGNOSIS — K70.31 ALCOHOLIC CIRRHOSIS OF LIVER WITH ASCITES (HCC): Primary | ICD-10-CM

## 2022-12-20 DIAGNOSIS — Z00.00 HEALTHCARE MAINTENANCE: ICD-10-CM

## 2022-12-20 DIAGNOSIS — I85.00 ESOPHAGEAL VARICES DETERMINED BY ENDOSCOPY (HCC): ICD-10-CM

## 2022-12-20 LAB
AFP-TM SERPL-MCNC: 3.9 NG/ML (ref 0–9.2)
ALBUMIN SERPL-MCNC: 2.7 G/DL (ref 3.8–4.8)
ALBUMIN/GLOB SERPL: 0.5 {RATIO} (ref 1.2–2.2)
ALP SERPL-CCNC: 121 IU/L (ref 44–121)
ALT SERPL-CCNC: 20 IU/L (ref 0–32)
AST SERPL-CCNC: 43 IU/L (ref 0–40)
BASOPHILS # BLD AUTO: 0.1 X10E3/UL (ref 0–0.2)
BASOPHILS NFR BLD AUTO: 1 %
BILIRUB SERPL-MCNC: 1.1 MG/DL (ref 0–1.2)
BUN SERPL-MCNC: 15 MG/DL (ref 8–27)
BUN/CREAT SERPL: 14 (ref 12–28)
CALCIUM SERPL-MCNC: 8.5 MG/DL (ref 8.7–10.3)
CHLORIDE SERPL-SCNC: 99 MMOL/L (ref 96–106)
CO2 SERPL-SCNC: 25 MMOL/L (ref 20–29)
CREAT SERPL-MCNC: 1.1 MG/DL (ref 0.57–1)
EGFR: 56 ML/MIN/1.73
EOSINOPHIL # BLD AUTO: 0.1 X10E3/UL (ref 0–0.4)
EOSINOPHIL NFR BLD AUTO: 2 %
ERYTHROCYTE [DISTWIDTH] IN BLOOD BY AUTOMATED COUNT: 14.9 % (ref 11.7–15.4)
GLOBULIN SER-MCNC: 5.2 G/DL (ref 1.5–4.5)
GLUCOSE SERPL-MCNC: 94 MG/DL (ref 70–99)
HCT VFR BLD AUTO: 33 % (ref 34–46.6)
HGB BLD-MCNC: 11.9 G/DL (ref 11.1–15.9)
IMM GRANULOCYTES # BLD: 0 X10E3/UL (ref 0–0.1)
IMM GRANULOCYTES NFR BLD: 0 %
INR PPP: 1.4 (ref 0.9–1.2)
LYMPHOCYTES # BLD AUTO: 1.6 X10E3/UL (ref 0.7–3.1)
LYMPHOCYTES NFR BLD AUTO: 26 %
MCH RBC QN AUTO: 32.9 PG (ref 26.6–33)
MCHC RBC AUTO-ENTMCNC: 36.1 G/DL (ref 31.5–35.7)
MCV RBC AUTO: 91 FL (ref 79–97)
MONOCYTES # BLD AUTO: 0.8 X10E3/UL (ref 0.1–0.9)
MONOCYTES NFR BLD AUTO: 14 %
NEUTROPHILS # BLD AUTO: 3.4 X10E3/UL (ref 1.4–7)
NEUTROPHILS NFR BLD AUTO: 57 %
PLATELET # BLD AUTO: 116 X10E3/UL (ref 150–450)
POTASSIUM SERPL-SCNC: 4.3 MMOL/L (ref 3.5–5.2)
PROT SERPL-MCNC: 7.9 G/DL (ref 6–8.5)
PROTHROMBIN TIME: 14 SEC (ref 9.1–12)
RBC # BLD AUTO: 3.62 X10E6/UL (ref 3.77–5.28)
SODIUM SERPL-SCNC: 132 MMOL/L (ref 134–144)
WBC # BLD AUTO: 5.9 X10E3/UL (ref 3.4–10.8)

## 2022-12-20 RX ORDER — ALBUMIN (HUMAN) 12.5 G/50ML
12.5 SOLUTION INTRAVENOUS ONCE
Status: CANCELLED | OUTPATIENT
Start: 2022-12-20 | End: 2022-12-20

## 2022-12-20 NOTE — PROGRESS NOTES
[facility] - Outpatient Follow-up  Barbara Boucher 60 y o  female MRN: [id number]  Encounter: [id number]          ASSESSMENT AND PLAN:      1  Alcoholic cirrhosis of liver with ascites ([facility] [address] )  2  Esophageal varices determined by endoscopy ([facility] )  3  Refractory ascites    Summary: Patient is a 60 y o  female with cirrhosis secondary to EtOH d/b ascites and esophageal varcies  Current MELD-Na (11)  Serologies also reflect thrombocytopenia, hypoalbuminemia, and coagulopathy  Patient with ascites, previously well controlled with oral diuretics, but has developed refractory ascites requiring q2-3 week paracentesis  Patient reports maintained sobriety since 9/2022, previous LVP with fluid analysis negative for SBP, and recent U/S without focal hepatic lesion/AFP within normal limits  Unfortunately, patient's renal function will not allow for an increase of her diuretics  Patient is interested in possible TIPS to prevent the need for frequent LVPs - No history of HE  Patient was also seen by Dr Mariella Morales for further discussion regarding TIPS and it was agreed to present the patient at our [facility] multidisciplinary discussion for further evaluation  Ascites   - Previously well controlled on oral diuretics with new refractory ascites requiring q2-3 week LVP  - Plan for U/S with Doppler and Echo prior to presenting patient for discussion at the portal HTN multidisciplinary meeting for further evaluation of TIPS  - Continue with IR paracentesis q2 weeks PRN, standing order placed  - Continue Lasix 40/Aldactone 100 and recheck BMP x2 weeks  - If Cr continues to rise, would recommend decreasing/holding diuretics  Esophageal Varices   -  EGD 10/13/2022 with small grade 1 varices and mild portal hypertensive gastropathy    - Patient advised to d/c nadolol due to worsening ascites  - Plan for repeat EGD x1 year (10/2023) for EV screening      Hepatic Encephalopathy   - No history or evidence of HE on exam    - Patient aware of signs/sxs's of HE and advised to promptly inform provider if experiencing such  [address]  Screening   - UTD with RUQ U/S 11/21 without focal hepatic lesion and AFP 12/19 WNL  - Continue imaging q6 months with either US or MRI and AFP annually  Nutritional Status  - Sarcopenia noted on exam today  - Encouraged high-protein and low-sodium (<2000 mg daily) diet  Transplant Candidacy   - 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     - Patient must demonstrate 5 years of remission prior to evaluation for transplant     - Echo complete w/ contrast if indicated; Future  - US abdomen complete with doppler; Future  - CBC and Platelet; Future  - Comprehensive metabolic panel; Future  - Protime-INR; Future  - CBC and Platelet  - Comprehensive metabolic panel  - Protime-INR  - IR AMB Paracentesis; Future    4  Healthcare maintenance  Patient overdue for CRC screening  Follows with gastroenterology, Dr Aamir Mckee, and may undergo colonoscopy for further evaluation of diarrhea  Follow-up in 3 months      ______________________________________________________________________    HPI: Patient is a 60 y o  female with PMH significant for anxiety, COPD, hyperlipidemia, hypertension, insomnia, GERD, and recent diagnosis of breast CA who presents today for a follow-up regarding cirrhosis secondary to EtOH d/b ascites and esophageal varices  In short, patient was diagnosed with cirrhosis in late 2020 based on imaging and labs  She had had a previous biopsy in 2016 with steatohepatitis and bridging and perisoinousidal firboris, but has not had a biopsy since diagnosis of cirrhosis  Her liver disease has been decompensated by ascites requiring oral diuresis (Lasix 40/Aldactone 100) and recently q2 week paracentesis since 10/28/2022  States she has maintained sobriety since 9/2022 and previous LVP with fluid analysis negative for SBP   She has had recent U/S with focal hepatic mass and AFP WNL, but did not have Dopplers to evaluate for thrombus  Patient also has a history of choledocholithiasis requiring ERCP with pancreatic stent placement (2020) with subsequent cholecystectomy  Denies pruritus, confusion, dark urine, bruising easily, yellowing of the eyes and/or skin, new/worsening abdominal distension or swelling of the lower extremities, abdominal pain, overt evidence of GI bleeding (hematemesis, coffee-ground emesis, hematochezia, melena), or unintentional weight loss  MELD-Na score: 11 at 12/19/2022 12:41 PM  MELD score: 11 at 12/19/2022 12:41 PM  Calculated from:  Serum Creatinine: 1 10 mg/dL at 12/19/2022 12:41 PM  Serum Sodium: 132 mmol/L at 12/19/2022 12:41 PM  Total Bilirubin: 1 1 mg/dL at 12/19/2022 12:41 PM  INR(ratio): 1 4 at 12/19/2022 12:41 PM  Age: 60 years      REVIEW OF SYSTEMS:    CONSTITUTIONAL: Denies any fever, chills, rigors, and weight loss  HEENT: No earache or tinnitus  Denies hearing loss or visual disturbances  CARDIOVASCULAR: No chest pain or palpitations  RESPIRATORY: Denies any cough, hemoptysis, shortness of breath or dyspnea on exertion  GASTROINTESTINAL: As noted in the History of Present Illness  GENITOURINARY: No problems with urination  Denies any hematuria or dysuria  NEUROLOGIC: No dizziness or vertigo, denies headaches  MUSCULOSKELETAL: Denies any muscle or joint pain  SKIN: Denies skin rashes or itching  ENDOCRINE: Denies excessive thirst  Denies intolerance to heat or cold  PSYCHOSOCIAL: Denies depression or anxiety  Denies any recent memory loss         Historical Information   Past Medical History:   Diagnosis Date   • Alcoholic fatty liver    • Anxiety    • Asthma    • COPD (chronic obstructive pulmonary disease) ([facility] [address] )    • Elevated liver function tests    • GERD (gastroesophageal reflux disease)    • GERD without esophagitis    • Hyperlipidemia    • Hypertension    • IBS (irritable bowel syndrome) • Insomnia    • Insomnia    • Liver disease    • Nervousness    • Nicotine dependence    • Other specified urinary incontinence    • RLS (restless legs syndrome)    • Wears glasses      Past Surgical History:   Procedure Laterality Date   • BACK SURGERY     • 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   • BREAST EXCISIONAL BIOPSY Right 11/01/2004    benign   • CATARACT EXTRACTION, BILATERAL  08/01/2018   • COLONOSCOPY N/A 5/8/2019    Procedure: COLONOSCOPY;  Surgeon: Shala Honeycutt MD;  Location: [facility] GI LAB; Service: Gastroenterology   • EGD AND COLONOSCOPY N/A 3/19/2018    Procedure: EGD AND COLONOSCOPY;  Surgeon: Shala Honeycutt MD;  Location: [facility] GI LAB; Service: Gastroenterology   • ESOPHAGOGASTRODUODENOSCOPY N/A 5/8/2019    Procedure: ESOPHAGOGASTRODUODENOSCOPY (EGD); Surgeon: Shala Honeycutt MD;  Location: [facility] GI LAB;   Service: Gastroenterology   • IR PARACENTESIS  11/27/2020   • IR PARACENTESIS  12/8/2020   • IR PARACENTESIS  10/28/2022   • IR PARACENTESIS  11/7/2022   • IR PARACENTESIS  11/30/2022   • KNEE SURGERY     • KNEE SURGERY     • LUMBAR LAMINECTOMY  04/1999   • LYMPH NODE BIOPSY     • MAMMO STEREOTACTIC BREAST BIOPSY RIGHT (ALL INC) Right 5/21/2021   • MAMMO STEREOTACTIC BREAST BIOPSY RIGHT (ALL INC) EACH ADD Right 5/21/2021   • TOTAL ABDOMINAL HYSTERECTOMY  02/05/2008   • TUBAL LIGATION     • TUBAL LIGATION  1989   • UPPER GASTROINTESTINAL ENDOSCOPY       Social History   Social History     Substance and Sexual Activity   Alcohol Use Not Currently   • Alcohol/week: 24 0 standard drinks   • Types: 24 Cans of beer per week    Comment: not lately, non alcoholic beer     Social History     Substance and Sexual Activity   Drug Use No     Social History     Tobacco Use   Smoking Status Every Day   • Packs/day: 1 00   • Years: 45 00   • Pack years: 45 00   • Types: Cigarettes   • Start date: 1/1/1978   Smokeless Tobacco Never     Family History   Problem Relation Age of Onset   • Breast cancer Mother 28   • No Known Problems Father    • No Known Problems Sister    • No Known Problems Brother    • No Known Problems Son    • No Known Problems Maternal Grandmother    • No Known Problems Maternal Grandfather    • No Known Problems Paternal Grandmother    • No Known Problems Paternal Grandfather    • No Known Problems Maternal Aunt    • No Known Problems Maternal Aunt    • No Known Problems Paternal Aunt    • Substance Abuse Neg Hx    • Mental illness Neg Hx        Meds/Allergies       Current Outpatient Medications:   •  albuterol (2 5 mg/3 mL) 0 083 % nebulizer solution  •  albuterol (Proventil HFA) 90 mcg/act inhaler  •  ALPRAZolam (XANAX) 1 mg tablet  •  anastrozole (ARIMIDEX) 1 mg tablet  •  benzonatate (TESSALON PERLES) 100 mg capsule  •  furosemide (LASIX) 40 mg tablet  •  midodrine (PROAMATINE) 5 mg tablet  •  nadolol (CORGARD) 40 mg tablet  •  Promethazine-DM (PHENERGAN-DM) 6 25-15 mg/5 mL oral syrup  •  spironolactone (ALDACTONE) 100 mg tablet    Allergies   Allergen Reactions   • Sulfa Antibiotics Shortness Of Breath   • Ace Inhibitors Cough and Other (See Comments)     coughing           Objective     Blood pressure 118/68, pulse 59, temperature (!) 97 2 °F (36 2 °C), temperature source Tympanic, height 5' 4" (1 626 m), weight 66 2 kg (146 lb), SpO2 99 %, not currently breastfeeding  Body mass index is 25 06 kg/m²  PHYSICAL EXAM:      General Appearance:   Alert, cooperative, no distress; AAOx3, no asterixis   HEENT:   Normocephalic, atraumatic, anicteric; No scleral icterus     Neck:  Supple, symmetrical, trachea midline   Lungs:   Clear to auscultation bilaterally; no rales, rhonchi or wheezing; respirations unlabored    Heart[de-identified]   Regular rate and rhythm; no murmur, rub, or gallop  Abdomen:   +Moderate abdominal distention with fluid wave present;  Soft, non-tender; normal bowel sounds; no masses, no organomegaly    Genitalia:   Deferred    Rectal:   Deferred    Extremities:  No palmar erythema, cyanosis, clubbing or edema    Pulses:  2+ and symmetric    Skin:  No spider angiomas, jaundice, rashes, or lesions    Lymph nodes:  No palpable cervical lymphadenopathy        Lab Results:   No visits with results within 1 Day(s) from this visit  Latest known visit with results is:   Orders Only on 12/19/2022   Component Date Value   • White Blood Cell Count 12/19/2022 5 9    • Red Blood Cell Count 12/19/2022 3 62 (L)    • Hemoglobin 12/19/2022 11 9    • HCT 12/19/2022 33 0 (L)    • MCV 12/19/2022 91    • MCH 12/19/2022 32 9    • MCHC 12/19/2022 36 1 (H)    • RDW 12/19/2022 14 9    • Platelet Count 53/71/3691 116 (L)    • Neutrophils 12/19/2022 57    • Lymphocytes 12/19/2022 26    • Monocytes 12/19/2022 14    • Eosinophils 12/19/2022 2    • Basophils PCT 12/19/2022 1    • Neutrophils (Absolute) 12/19/2022 3 4    • Lymphocytes (Absolute) 12/19/2022 1 6    • Monocytes (Absolute) 12/19/2022 0 8    • Eosinophils (Absolute) 12/19/2022 0 1    • Basophils ABS 12/19/2022 0 1    • Immature Granulocytes 12/19/2022 0    • Immature Granulocytes (A* 12/19/2022 0 0    • Glucose, Random 12/19/2022 94    • BUN 12/19/2022 15    • Creatinine 12/19/2022 1 10 (H)    • eGFR 12/19/2022 56 (L)    • SL AMB BUN/CREATININE RA* 12/19/2022 14    • Sodium 12/19/2022 132 (L)    • Potassium 12/19/2022 4 3    • Chloride 12/19/2022 99    • CO2 12/19/2022 25    • CALCIUM 12/19/2022 8 5 (L)    • Protein, Total 12/19/2022 7 9    • Albumin 12/19/2022 2 7 (L)    • Globulin, Total 12/19/2022 5 2 (H)    • Albumin/Globulin Ratio 12/19/2022 0 5 (L)    • TOTAL BILIRUBIN 12/19/2022 1 1    • Alk Phos Isoenzymes 12/19/2022 121    • AST 12/19/2022 43 (H)    • ALT 12/19/2022 20    • INR 12/19/2022 1 4 (H)    • Prothrombin Time 12/19/2022 14 0 (H)    • AFP-Tumor Marker 12/19/2022 3 9          Radiology Results:   IR AMB Paracentesis    Result Date: 11/30/2022  Narrative: INDICATION: Recurrent ascites PROCEDURE: 1   Ultrasound-guided paracentesis FINDINGS: 1   5550 mL clear yellow ascites removed  Impression: Paracentesis  _________________________________________________________________ COMPARISON: None PROCEDURE DETAILS: Operators: Dr Yarelis Diggs Anesthesia: Local Medications: 1% lidocaine COMMENTS: A preprocedure timeout was performed per [facility] protocol  The right abdomen was prepped and draped in the usual sterile fashion  5-Nepali Yueh catheter was advanced using ultrasound guidance into ascites  Following drainage, the skin was cleansed and sterile dressings were applied  Workstation performed: [id number]     US right upper quadrant    Result Date: 11/26/2022  Narrative: RIGHT UPPER QUADRANT ULTRASOUND INDICATION:     K70 31: Alcoholic cirrhosis of liver with ascites  COMPARISON:  Abdominal ultrasound 5/26/2022 TECHNIQUE:   Real-time ultrasound of the right upper quadrant was performed with a curvilinear transducer with both volumetric sweeps and still imaging techniques  FINDINGS: PANCREAS:  Visualized portions of the pancreas are within normal limits  AORTA AND IVC:  Visualized portions are normal for patient age  LIVER: Size:  Enlarged  The liver measures 18 4 cm in the midclavicular line  Contour:  Surface contour is nodular  Parenchyma: There is diffuse coarsened heterogeneous echotexture suggesting underlying cirrhotic changes  No liver mass identified  Limited imaging of the main portal vein shows it to be patent and hepatopetal  Recanalized umbilical vein noted  BILIARY: The gallbladder is normal in caliber  No pericholecystic fluid  There is gallbladder wall thickening to 6 mm, likely reactive in the setting of cirrhosis and ascites  Shadowing gallstone(s) identified  No sonographic Head's sign  No intrahepatic biliary dilatation  CBD measures 5 0 mm  No choledocholithiasis  KIDNEY: Right kidney measures 8 8 x 4 3 x 4 7 cm  Volume 94 1 mL Kidney within normal limits  ASCITES:   Small to moderate volume of ascites       Impression: Cirrhotic liver with evidence of portal hypertension given ascites and recanalized umbilical vein  No solid hepatic mass  Cholelithiasis   Workstation performed: [id number]

## 2022-12-20 NOTE — PATIENT INSTRUCTIONS
Paracentesis scheduled on 12/30 at [facility]  ECHO scheduled on 1/9 at [address] scheduled on 1/9 at [address] [place] [address]

## 2022-12-30 ENCOUNTER — HOSPITAL ENCOUNTER (OUTPATIENT)
Dept: INTERVENTIONAL RADIOLOGY/VASCULAR | Facility: HOSPITAL | Age: 62
Discharge: HOME | End: 2022-12-30

## 2022-12-30 VITALS
OXYGEN SATURATION: 100 % | RESPIRATION RATE: 18 BRPM | HEART RATE: 53 BPM | SYSTOLIC BLOOD PRESSURE: 114 MMHG | DIASTOLIC BLOOD PRESSURE: 59 MMHG

## 2022-12-30 DIAGNOSIS — K70.31 ALCOHOLIC CIRRHOSIS OF LIVER WITH ASCITES (HCC): ICD-10-CM

## 2022-12-30 NOTE — DISCHARGE INSTRUCTIONS
Abdominal Paracentesis     WHAT YOU NEED TO KNOW:   Abdominal paracentesis is a procedure to remove abnormal fluid buildup in your abdomen  Fluid builds up because of liver problems, such as swelling and scarring  Heart failure, kidney disease, a mass, or problems with your pancreas may also cause fluid buildup  DISCHARGE INSTRUCTIONS:     Follow up with your healthcare provider as directed: Write down your questions so you remember to ask them during your visits  Wound care: Remove dressing after 24 hours  Leave glue in place  Return to your normal activities    Contact Interventional Radiology at [phone] N[place] PATIENTS: Contact Interventional Radiology at [phone]) Hector Oliveros PATIENTS: Contact Interventional Radiology at [phone]) if:  You have a fever and your wound is red and swollen  You have yellow, green, or bad-smelling discharge coming from your wound  You have pain or swelling in your abdomen  You have an upset stomach or you vomit  You have sudden, sharp pain in your abdomen  You urinate very little or not at all  You feel confused and more tired than usual    Your arm or leg feels warm, tender, and painful  It may look swollen and red  You suddenly feel lightheaded and have trouble breathing

## 2023-01-09 ENCOUNTER — HOSPITAL ENCOUNTER (OUTPATIENT)
Dept: NON INVASIVE DIAGNOSTICS | Facility: HOSPITAL | Age: 63
Discharge: HOME | End: 2023-01-09

## 2023-01-09 ENCOUNTER — HOSPITAL ENCOUNTER (OUTPATIENT)
Dept: ULTRASOUND IMAGING | Facility: HOSPITAL | Age: 63
Discharge: HOME | End: 2023-01-09

## 2023-01-09 VITALS
HEART RATE: 59 BPM | SYSTOLIC BLOOD PRESSURE: 114 MMHG | HEIGHT: 64 IN | DIASTOLIC BLOOD PRESSURE: 59 MMHG | BODY MASS INDEX: 24.92 KG/M2 | WEIGHT: 146 LBS

## 2023-01-09 DIAGNOSIS — K70.31 ALCOHOLIC CIRRHOSIS OF LIVER WITH ASCITES (HCC): ICD-10-CM

## 2023-01-09 LAB
AORTIC ROOT: 3.1 CM
AORTIC VALVE MEAN VELOCITY: 7 M/S
APICAL FOUR CHAMBER EJECTION FRACTION: 57 %
ASCENDING AORTA: 3 CM
AV LVOT MEAN GRADIENT: 2 MMHG
AV LVOT PEAK GRADIENT: 3 MMHG
AV MEAN GRADIENT: 2 MMHG
AV PEAK GRADIENT: 6 MMHG
DOP CALC AO VTI: 22 CM
DOP CALC LVOT PEAK VEL VTI: 18.4 CM
DOP CALC LVOT PEAK VEL: 0.93 M/S
DOP CALC MV VTI: 25.3 CM
E WAVE DECELERATION TIME: 282 MS
FRACTIONAL SHORTENING: 37 % (ref 28–44)
INTERVENTRICULAR SEPTUM IN DIASTOLE (PARASTERNAL SHORT AXIS VIEW): 0.7 CM
INTERVENTRICULAR SEPTUM: 0.7 CM (ref 0.6–1.1)
LA/AORTA RATIO 2D: 1.1
LAAS-AP2: 18.9 CM2
LAAS-AP4: 14.8 CM2
LEFT ATRIUM SIZE: 3.4 CM
LEFT INTERNAL DIMENSION IN SYSTOLE: 2.9 CM (ref 2.1–4)
LEFT VENTRICLE DIASTOLIC VOLUME (MOD BIPLANE): 80 ML
LEFT VENTRICLE SYSTOLIC VOLUME (MOD BIPLANE): 35 ML
LEFT VENTRICULAR INTERNAL DIMENSION IN DIASTOLE: 4.6 CM (ref 3.5–6)
LEFT VENTRICULAR POSTERIOR WALL IN END DIASTOLE: 0.7 CM
LEFT VENTRICULAR STROKE VOLUME: 66 ML
LV EF: 56 %
LVSV (TEICH): 66 ML
MV E'TISSUE VEL-LAT: 9 CM/S
MV E'TISSUE VEL-SEP: 8 CM/S
MV MEAN GRADIENT: 1 MMHG
MV PEAK A VEL: 0.56 M/S
MV PEAK E VEL: 67 CM/S
MV PEAK GRADIENT: 2 MMHG
MV STENOSIS PRESSURE HALF TIME: 83 MS
MV VALVE AREA P 1/2 METHOD: 2.65 CM2
RIGHT VENTRICLE ID DIMENSION: 3.5 CM
SL CV LV EF: 56
SL CV PED ECHO LEFT VENTRICLE DIASTOLIC VOLUME (MOD BIPLANE) 2D: 99 ML
SL CV PED ECHO LEFT VENTRICLE SYSTOLIC VOLUME (MOD BIPLANE) 2D: 33 ML
TR MAX PG: 20 MMHG
TR PEAK VELOCITY: 2.2 M/S
TRICUSPID ANNULAR PLANE SYSTOLIC EXCURSION: 2.3 CM
TRICUSPID VALVE PEAK REGURGITATION VELOCITY: 2.24 M/S

## 2023-01-10 ENCOUNTER — TELEPHONE (OUTPATIENT)
Dept: OTHER | Facility: OTHER | Age: 63
End: 2023-01-10

## 2023-01-10 NOTE — TELEPHONE ENCOUNTER
Patient calling to confirm her next appt with Dr Melissa Byers  She has no appt scheduled at this time  She states that she is supposed to be seen by Dr Melissa Byers every 3 months but no follow up is scheduled and her last appt was on 12/19/22  Can someone call her back in regards to this?

## 2023-01-12 LAB
ALBUMIN SERPL-MCNC: 2.9 G/DL (ref 3.8–4.8)
ALBUMIN/GLOB SERPL: 0.6 {RATIO} (ref 1.2–2.2)
ALP SERPL-CCNC: 120 IU/L (ref 44–121)
ALT SERPL-CCNC: 25 IU/L (ref 0–32)
AST SERPL-CCNC: 48 IU/L (ref 0–40)
BILIRUB SERPL-MCNC: 1.1 MG/DL (ref 0–1.2)
BUN SERPL-MCNC: 16 MG/DL (ref 8–27)
BUN/CREAT SERPL: 19 (ref 12–28)
CALCIUM SERPL-MCNC: 8.8 MG/DL (ref 8.7–10.3)
CHLORIDE SERPL-SCNC: 97 MMOL/L (ref 96–106)
CO2 SERPL-SCNC: 27 MMOL/L (ref 20–29)
CREAT SERPL-MCNC: 0.86 MG/DL (ref 0.57–1)
EGFR: 76 ML/MIN/1.73
ERYTHROCYTE [DISTWIDTH] IN BLOOD BY AUTOMATED COUNT: 14.7 % (ref 11.7–15.4)
GLOBULIN SER-MCNC: 5.1 G/DL (ref 1.5–4.5)
GLUCOSE SERPL-MCNC: 76 MG/DL (ref 70–99)
HCT VFR BLD AUTO: 37 % (ref 34–46.6)
HGB BLD-MCNC: 12.9 G/DL (ref 11.1–15.9)
INR PPP: 1.4 (ref 0.9–1.2)
MCH RBC QN AUTO: 32.9 PG (ref 26.6–33)
MCHC RBC AUTO-ENTMCNC: 34.9 G/DL (ref 31.5–35.7)
MCV RBC AUTO: 94 FL (ref 79–97)
PLATELET # BLD AUTO: 121 X10E3/UL (ref 150–450)
POTASSIUM SERPL-SCNC: 4.5 MMOL/L (ref 3.5–5.2)
PROT SERPL-MCNC: 8 G/DL (ref 6–8.5)
PROTHROMBIN TIME: 14.3 SEC (ref 9.1–12)
RBC # BLD AUTO: 3.92 X10E6/UL (ref 3.77–5.28)
SODIUM SERPL-SCNC: 132 MMOL/L (ref 134–144)
WBC # BLD AUTO: 5.4 X10E3/UL (ref 3.4–10.8)

## 2023-01-16 ENCOUNTER — APPOINTMENT (EMERGENCY)
Dept: INTERVENTIONAL RADIOLOGY/VASCULAR | Facility: HOSPITAL | Age: 63
End: 2023-01-16
Attending: EMERGENCY MEDICINE

## 2023-01-16 ENCOUNTER — APPOINTMENT (EMERGENCY)
Dept: RADIOLOGY | Facility: HOSPITAL | Age: 63
End: 2023-01-16

## 2023-01-16 ENCOUNTER — APPOINTMENT (EMERGENCY)
Dept: CT IMAGING | Facility: HOSPITAL | Age: 63
End: 2023-01-16

## 2023-01-16 ENCOUNTER — HOSPITAL ENCOUNTER (OUTPATIENT)
Facility: HOSPITAL | Age: 63
Setting detail: OBSERVATION
Discharge: HOME | End: 2023-01-17
Attending: EMERGENCY MEDICINE | Admitting: HOSPITALIST

## 2023-01-16 DIAGNOSIS — R10.9 ABDOMINAL PAIN: ICD-10-CM

## 2023-01-16 DIAGNOSIS — J90 PLEURAL EFFUSION ON RIGHT: Primary | ICD-10-CM

## 2023-01-16 DIAGNOSIS — R07.89 CHEST TIGHTNESS: ICD-10-CM

## 2023-01-16 DIAGNOSIS — K80.20 CHOLELITHIASIS: ICD-10-CM

## 2023-01-16 PROBLEM — R06.02 SHORTNESS OF BREATH: Status: ACTIVE | Noted: 2023-01-16

## 2023-01-16 PROBLEM — R53.1 WEAKNESS: Status: ACTIVE | Noted: 2023-01-16

## 2023-01-16 LAB
2HR DELTA HS TROPONIN: 3 NG/L
4HR DELTA HS TROPONIN: 7 NG/L
ALBUMIN SERPL BCP-MCNC: 2 G/DL (ref 3.5–5)
ALP SERPL-CCNC: 112 U/L (ref 46–116)
ALT SERPL W P-5'-P-CCNC: 32 U/L (ref 12–78)
AMMONIA PLAS-SCNC: 16 UMOL/L (ref 11–35)
ANION GAP SERPL CALCULATED.3IONS-SCNC: 9 MMOL/L (ref 4–13)
APPEARANCE FLD: NORMAL
APTT PPP: 34 SECONDS (ref 23–37)
AST SERPL W P-5'-P-CCNC: 54 U/L (ref 5–45)
BACTERIA UR QL AUTO: NORMAL /HPF
BASOPHILS # BLD AUTO: 0.07 THOUSANDS/ÂΜL (ref 0–0.1)
BASOPHILS NFR BLD AUTO: 1 % (ref 0–1)
BILIRUB SERPL-MCNC: 0.8 MG/DL (ref 0.2–1)
BILIRUB UR QL STRIP: NEGATIVE
BUN SERPL-MCNC: 25 MG/DL (ref 5–25)
CALCIUM ALBUM COR SERPL-MCNC: 9.7 MG/DL (ref 8.3–10.1)
CALCIUM SERPL-MCNC: 8.1 MG/DL (ref 8.3–10.1)
CARDIAC TROPONIN I PNL SERPL HS: 18 NG/L
CARDIAC TROPONIN I PNL SERPL HS: 21 NG/L
CARDIAC TROPONIN I PNL SERPL HS: 25 NG/L
CHLORIDE SERPL-SCNC: 99 MMOL/L (ref 96–108)
CLARITY UR: ABNORMAL
CO2 SERPL-SCNC: 22 MMOL/L (ref 21–32)
COLOR FLD: YELLOW
COLOR UR: YELLOW
CREAT SERPL-MCNC: 1.12 MG/DL (ref 0.6–1.3)
EOSINOPHIL # BLD AUTO: 0.08 THOUSAND/ÂΜL (ref 0–0.61)
EOSINOPHIL NFR BLD AUTO: 1 % (ref 0–6)
ERYTHROCYTE [DISTWIDTH] IN BLOOD BY AUTOMATED COUNT: 16.2 % (ref 11.6–15.1)
FLUAV RNA RESP QL NAA+PROBE: NEGATIVE
FLUBV RNA RESP QL NAA+PROBE: NEGATIVE
GFR SERPL CREATININE-BSD FRML MDRD: 52 ML/MIN/1.73SQ M
GLUCOSE FLD-MCNC: 103 MG/DL
GLUCOSE SERPL-MCNC: 105 MG/DL (ref 65–140)
GLUCOSE UR STRIP-MCNC: NEGATIVE MG/DL
HCT VFR BLD AUTO: 39.2 % (ref 34.8–46.1)
HGB BLD-MCNC: 13.7 G/DL (ref 11.5–15.4)
HGB UR QL STRIP.AUTO: ABNORMAL
HISTIOCYTES NFR FLD: 85 %
IMM GRANULOCYTES # BLD AUTO: 0.02 THOUSAND/UL (ref 0–0.2)
IMM GRANULOCYTES NFR BLD AUTO: 0 % (ref 0–2)
INR PPP: 1.46 (ref 0.84–1.19)
KETONES UR STRIP-MCNC: NEGATIVE MG/DL
LACTATE SERPL-SCNC: 1.7 MMOL/L (ref 0.5–2)
LDH FLD L TO P-CCNC: 126 U/L
LEUKOCYTE ESTERASE UR QL STRIP: NEGATIVE
LIPASE SERPL-CCNC: 173 U/L (ref 73–393)
LYMPHOCYTES # BLD AUTO: 1.23 THOUSANDS/ÂΜL (ref 0.6–4.47)
LYMPHOCYTES NFR BLD AUTO: 19 % (ref 14–44)
LYMPHOCYTES NFR BLD AUTO: 8 %
MAGNESIUM SERPL-MCNC: 1.7 MG/DL (ref 1.6–2.6)
MCH RBC QN AUTO: 33.1 PG (ref 26.8–34.3)
MCHC RBC AUTO-ENTMCNC: 34.9 G/DL (ref 31.4–37.4)
MCV RBC AUTO: 95 FL (ref 82–98)
MONO+MESO NFR FLD MANUAL: 2 %
MONOCYTES # BLD AUTO: 1.2 THOUSAND/ÂΜL (ref 0.17–1.22)
MONOCYTES NFR BLD AUTO: 18 % (ref 4–12)
MONOCYTES NFR BLD AUTO: 4 %
NEUTROPHILS # BLD AUTO: 4.06 THOUSANDS/ÂΜL (ref 1.85–7.62)
NEUTS SEG NFR BLD AUTO: 1 %
NEUTS SEG NFR BLD AUTO: 61 % (ref 43–75)
NITRITE UR QL STRIP: NEGATIVE
NON-SQ EPI CELLS URNS QL MICRO: NORMAL /HPF
NRBC BLD AUTO-RTO: 0 /100 WBCS
NT-PROBNP SERPL-MCNC: 162 PG/ML
PH BODY FLUID: 7.8
PH UR STRIP.AUTO: 6 [PH]
PLATELET # BLD AUTO: 85 THOUSANDS/UL (ref 149–390)
PMV BLD AUTO: 9.7 FL (ref 8.9–12.7)
POTASSIUM SERPL-SCNC: 3.8 MMOL/L (ref 3.5–5.3)
PROCALCITONIN SERPL-MCNC: 0.35 NG/ML
PROT FLD-MCNC: 2.3 G/DL
PROT SERPL-MCNC: 8.3 G/DL (ref 6.4–8.4)
PROT UR STRIP-MCNC: NEGATIVE MG/DL
PROTHROMBIN TIME: 18.7 SECONDS (ref 11.6–14.5)
RBC # BLD AUTO: 4.14 MILLION/UL (ref 3.81–5.12)
RBC #/AREA URNS AUTO: NORMAL /HPF
RSV RNA RESP QL NAA+PROBE: NEGATIVE
SARS-COV-2 RNA RESP QL NAA+PROBE: NEGATIVE
SITE: NORMAL
SODIUM SERPL-SCNC: 130 MMOL/L (ref 135–147)
SP GR UR STRIP.AUTO: 1.02 (ref 1–1.03)
TOTAL CELLS COUNTED SPEC: 100
UROBILINOGEN UR STRIP-ACNC: <2 MG/DL
WBC # BLD AUTO: 6.66 THOUSAND/UL (ref 4.31–10.16)
WBC # FLD MANUAL: 496 /UL
WBC #/AREA URNS AUTO: NORMAL /HPF

## 2023-01-16 RX ORDER — NICOTINE 21 MG/24HR
1 PATCH, TRANSDERMAL 24 HOURS TRANSDERMAL DAILY
Status: DISCONTINUED | OUTPATIENT
Start: 2023-01-17 | End: 2023-01-17 | Stop reason: HOSPADM

## 2023-01-16 RX ORDER — ALBUTEROL SULFATE 90 UG/1
2 AEROSOL, METERED RESPIRATORY (INHALATION) EVERY 6 HOURS PRN
Status: DISCONTINUED | OUTPATIENT
Start: 2023-01-16 | End: 2023-01-17 | Stop reason: HOSPADM

## 2023-01-16 RX ORDER — SPIRONOLACTONE 25 MG/1
100 TABLET ORAL DAILY
Status: DISCONTINUED | OUTPATIENT
Start: 2023-01-17 | End: 2023-01-17 | Stop reason: HOSPADM

## 2023-01-16 RX ORDER — ANASTROZOLE 1 MG/1
1 TABLET ORAL DAILY
Status: DISCONTINUED | OUTPATIENT
Start: 2023-01-17 | End: 2023-01-17 | Stop reason: HOSPADM

## 2023-01-16 RX ORDER — ALBUMIN, HUMAN INJ 5% 5 %
50 SOLUTION INTRAVENOUS ONCE
Status: COMPLETED | OUTPATIENT
Start: 2023-01-16 | End: 2023-01-16

## 2023-01-16 RX ORDER — ENOXAPARIN SODIUM 100 MG/ML
40 INJECTION SUBCUTANEOUS DAILY
Status: DISCONTINUED | OUTPATIENT
Start: 2023-01-17 | End: 2023-01-17 | Stop reason: HOSPADM

## 2023-01-16 RX ORDER — LIDOCAINE HYDROCHLORIDE 10 MG/ML
INJECTION, SOLUTION EPIDURAL; INFILTRATION; INTRACAUDAL; PERINEURAL AS NEEDED
Status: COMPLETED | OUTPATIENT
Start: 2023-01-16 | End: 2023-01-16

## 2023-01-16 RX ORDER — MIDODRINE HYDROCHLORIDE 5 MG/1
7.5 TABLET ORAL
Status: DISCONTINUED | OUTPATIENT
Start: 2023-01-16 | End: 2023-01-17 | Stop reason: HOSPADM

## 2023-01-16 RX ORDER — FUROSEMIDE 40 MG/1
40 TABLET ORAL DAILY
Status: DISCONTINUED | OUTPATIENT
Start: 2023-01-17 | End: 2023-01-17 | Stop reason: HOSPADM

## 2023-01-16 RX ORDER — ALPRAZOLAM 0.5 MG/1
1 TABLET ORAL
Status: DISCONTINUED | OUTPATIENT
Start: 2023-01-16 | End: 2023-01-17 | Stop reason: HOSPADM

## 2023-01-16 RX ORDER — ONDANSETRON 2 MG/ML
4 INJECTION INTRAMUSCULAR; INTRAVENOUS EVERY 6 HOURS PRN
Status: DISCONTINUED | OUTPATIENT
Start: 2023-01-16 | End: 2023-01-17 | Stop reason: HOSPADM

## 2023-01-16 RX ADMIN — IOHEXOL 100 ML: 350 INJECTION, SOLUTION INTRAVENOUS at 15:43

## 2023-01-16 RX ADMIN — MIDODRINE HYDROCHLORIDE 7.5 MG: 5 TABLET ORAL at 19:34

## 2023-01-16 RX ADMIN — LIDOCAINE HYDROCHLORIDE 8 ML: 10 INJECTION, SOLUTION EPIDURAL; INFILTRATION; INTRACAUDAL; PERINEURAL at 16:54

## 2023-01-16 RX ADMIN — ALPRAZOLAM 1 MG: 0.5 TABLET ORAL at 22:04

## 2023-01-16 RX ADMIN — ALBUMIN (HUMAN) 50 G: 12.5 INJECTION, SOLUTION INTRAVENOUS at 19:45

## 2023-01-16 NOTE — ED PROVIDER NOTES
History  Chief Complaint   Patient presents with   • Abdominal Pain     Pt to ED c/o abd pain for about a week  Pt states she is having some SOB for about a week  Pt states her abd feels tight and bloated and having some increased weakness  History from patient and medical records, past medical history alcohol fatty liver with cirrhosis and has had previous paracentesis, also history of COPD anxiety presents with concern for chest tight and short of breath worse with exertion even just standing up, associated dry cough, abdominal distention and pain generalized gradually worsening over the last week  She did not notice any significant swelling in her legs  She has chills  Generalized weakness  She can only take 1 dose of Tylenol daily and it didn't help  Outpatient abdominal ultrasound from January 10 does reveal moderate ascites and a right pleural effusion  It does also show gallstones but no inflammation of the gallbladder  She did have small amount of diarrhea and vomited yesterday  Not eating much  Records show that someone had called her to schedule follow-up with Dr Mamadou Thomas but she said that she did not get the message  Prior to Admission Medications   Prescriptions Last Dose Informant Patient Reported? Taking?    ALPRAZolam (XANAX) 1 mg tablet   No No   Sig: TAKE ONE TABLET BY MOUTH TWICE A DAY AS NEEDED FOR ANXIETY   Promethazine-DM (PHENERGAN-DM) 6 25-15 mg/5 mL oral syrup   No No   Sig: Take 5 mL by mouth 4 (four) times a day as needed for cough   albuterol (2 5 mg/3 mL) 0 083 % nebulizer solution   No No   Sig: Take 3 mL (2 5 mg total) by nebulization every 6 (six) hours as needed for wheezing or shortness of breath   albuterol (Proventil HFA) 90 mcg/act inhaler   No No   Sig: Inhale 2 puffs every 6 (six) hours as needed for wheezing or shortness of breath   anastrozole (ARIMIDEX) 1 mg tablet   No No   Sig: Take 1 tablet (1 mg total) by mouth daily   benzonatate (TESSALON PERLES) 100 mg capsule   No No   Sig: Take 1 capsule (100 mg total) by mouth 3 (three) times a day as needed for cough   furosemide (LASIX) 40 mg tablet   No No   Sig: Take 1 tablet (40 mg total) by mouth daily   midodrine (PROAMATINE) 5 mg tablet   No No   Sig: Take 1 5 tablets (7 5 mg total) by mouth 3 (three) times a day before meals   nadolol (CORGARD) 40 mg tablet   No No   Sig: Take 1 tablet (40 mg total) by mouth daily   spironolactone (ALDACTONE) 100 mg tablet   No No   Sig: Take 1 tablet (100 mg total) by mouth daily      Facility-Administered Medications: None       Past Medical History:   Diagnosis Date   • Alcoholic fatty liver    • Anxiety    • Asthma    • COPD (chronic obstructive pulmonary disease) (HCC)    • Elevated liver function tests    • GERD (gastroesophageal reflux disease)    • GERD without esophagitis    • Hyperlipidemia    • Hypertension    • IBS (irritable bowel syndrome)    • Insomnia    • Insomnia    • Liver disease    • Nervousness    • Nicotine dependence    • Other specified urinary incontinence    • RLS (restless legs syndrome)    • Wears glasses        Past Surgical History:   Procedure Laterality Date   • BACK SURGERY     • 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   • BREAST EXCISIONAL BIOPSY Right 11/01/2004    benign   • CATARACT EXTRACTION, BILATERAL  08/01/2018   • COLONOSCOPY N/A 5/8/2019    Procedure: COLONOSCOPY;  Surgeon: Letitia Esparza MD;  Location: [facility] GI LAB; Service: Gastroenterology   • EGD AND COLONOSCOPY N/A 3/19/2018    Procedure: EGD AND COLONOSCOPY;  Surgeon: Letitia Esparza MD;  Location: [facility] GI LAB; Service: Gastroenterology   • ESOPHAGOGASTRODUODENOSCOPY N/A 5/8/2019    Procedure: ESOPHAGOGASTRODUODENOSCOPY (EGD); Surgeon: Letitia Esparza MD;  Location: [facility] GI LAB;   Service: Gastroenterology   • IR PARACENTESIS  11/27/2020   • IR PARACENTESIS  12/8/2020   • IR PARACENTESIS  10/28/2022   • IR PARACENTESIS  11/7/2022   • IR PARACENTESIS  11/30/2022   • IR PARACENTESIS  12/30/2022   • IR PARACENTESIS  1/16/2023   • IR THORACENTESIS  1/16/2023   • KNEE SURGERY     • KNEE SURGERY     • LUMBAR LAMINECTOMY  04/1999   • LYMPH NODE BIOPSY     • MAMMO STEREOTACTIC BREAST BIOPSY RIGHT (ALL INC) Right 5/21/2021   • MAMMO STEREOTACTIC BREAST BIOPSY RIGHT (ALL INC) EACH ADD Right 5/21/2021   • TOTAL ABDOMINAL HYSTERECTOMY  02/05/2008   • TUBAL LIGATION     • TUBAL LIGATION  1989   • UPPER GASTROINTESTINAL ENDOSCOPY         Family History   Problem Relation Age of Onset   • Breast cancer Mother 28   • No Known Problems Father    • No Known Problems Sister    • No Known Problems Brother    • No Known Problems Son    • No Known Problems Maternal Grandmother    • No Known Problems Maternal Grandfather    • No Known Problems Paternal Grandmother    • No Known Problems Paternal Grandfather    • No Known Problems Maternal Aunt    • No Known Problems Maternal Aunt    • No Known Problems Paternal Aunt    • Substance Abuse Neg Hx    • Mental illness Neg Hx      I have reviewed and agree with the history as documented  E-Cigarette/Vaping   • E-Cigarette Use Never User      E-Cigarette/Vaping Substances     Social History     Tobacco Use   • Smoking status: Every Day     Packs/day: 1 00     Years: 45 00     Pack years: 45 00     Types: Cigarettes     Start date: 1/1/1978   • Smokeless tobacco: Never   Vaping Use   • Vaping Use: Never used   Substance Use Topics   • Alcohol use: Not Currently     Alcohol/week: 24 0 standard drinks     Types: 24 Cans of beer per week     Comment: not lately, non alcoholic beer   • Drug use: No       Review of Systems    Physical Exam  Physical Exam  Vitals and nursing note reviewed  Constitutional:       General: She is in acute distress  Appearance: She is well-developed  HENT:      Head: Normocephalic and atraumatic        Right Ear: External ear normal       Left Ear: External ear normal       Nose: Nose normal    Eyes:      Conjunctiva/sclera: Conjunctivae normal       Pupils: Pupils are equal, round, and reactive to light  Cardiovascular:      Rate and Rhythm: Normal rate and regular rhythm  Heart sounds: Normal heart sounds  Pulmonary:      Effort: Pulmonary effort is normal  No respiratory distress  Breath sounds: Examination of the right-upper field reveals decreased breath sounds  Examination of the right-middle field reveals decreased breath sounds  Examination of the right-lower field reveals decreased breath sounds  Decreased breath sounds present  No wheezing  Abdominal:      General: Bowel sounds are normal  There is no distension  Palpations: Abdomen is soft  Tenderness: There is generalized abdominal tenderness  There is no guarding or rebound  Musculoskeletal:         General: No deformity  Normal range of motion  Cervical back: Normal range of motion and neck supple  No spinous process tenderness  Skin:     General: Skin is warm and dry  Findings: No rash  Neurological:      General: No focal deficit present  Mental Status: She is alert  GCS: GCS eye subscore is 4  GCS verbal subscore is 5  GCS motor subscore is 6  Sensory: No sensory deficit     Psychiatric:         Mood and Affect: Mood normal          Vital Signs  ED Triage Vitals   Temperature Pulse Respirations Blood Pressure SpO2   01/16/23 1426 01/16/23 1427 01/16/23 1427 01/16/23 1427 01/16/23 1427   97 8 °F (36 6 °C) 90 18 116/60 97 %      Temp Source Heart Rate Source Patient Position - Orthostatic VS BP Location FiO2 (%)   01/16/23 1426 01/16/23 1427 01/16/23 1427 01/16/23 1427 --   Temporal Monitor Sitting Left arm       Pain Score       01/16/23 1427       7           Vitals:    01/16/23 1427 01/16/23 1722   BP: 116/60 112/55   Pulse: 90 85   Patient Position - Orthostatic VS: Sitting Lying         Visual Acuity      ED Medications  Medications   albumin human (FLEXBUMIN) 5 % injection 50 g (has no administration in time range)   iohexol (OMNIPAQUE) 350 MG/ML injection (SINGLE-DOSE) 100 mL (100 mL Intravenous Given 1/16/23 1543)   lidocaine (PF) (XYLOCAINE-MPF) 1 % injection (8 mL Infiltration Given 1/16/23 1654)       Diagnostic Studies  Results Reviewed     Procedure Component Value Units Date/Time    COVID/FLU/RSV [[id number]] Collected: 01/16/23 1751    Lab Status: In process Specimen: Nares from Nose Updated: 01/16/23 1754    Body fluid white cell count with differential [[id number]] Collected: 01/16/23 1657    Lab Status: Final result Specimen: Body Fluid from Pleural, Right Updated: 01/16/23 1753     Site Pleural, Right     Color, Fluid Yellow     Clarity, Fluid Slightly Cloudy     WBC, Fluid 496 /ul     Body Fluid Diff [[id number]] Collected: 01/16/23 1657    Lab Status: In process Specimen: Body Fluid from Pleural, Right Updated: 01/16/23 1752    HS Troponin I 2hr [[id number]] Collected: 01/16/23 1746    Lab Status: In process Specimen: Blood from Arm, Left Updated: 01/16/23 1750    Urine Microscopic [[id number]]  (Normal) Collected: 01/16/23 1611    Lab Status: Final result Specimen: Urine, Clean Catch Updated: 01/16/23 1711     RBC, UA 2-4 /hpf      WBC, UA None Seen /hpf      Epithelial Cells Occasional /hpf      Bacteria, UA Occasional /hpf     Body fluid culture (Pleural Fluid Culture) and Gram stain [[id number]] Collected: 01/16/23 1657    Lab Status: In process Specimen: Body Fluid from Pleural, Right Updated: 01/16/23 1710    Procalcitonin [[id number]]  (Abnormal) Collected: 01/16/23 1436    Lab Status: Final result Specimen: Blood from Arm, Left Updated: 01/16/23 1710     Procalcitonin 0 35 ng/ml     pH, body fluid [[id number]] Collected: 01/16/23 1657    Lab Status: In process Specimen: Body Fluid from Lung Updated: 01/16/23 1710    Glucose, body fluid [[id number]] Collected: 01/16/23 1657    Lab Status: In process Specimen:  Body Fluid from Lung Updated: 01/16/23 1710    LD (LDH), Body Fluid [[id number]] Collected: 01/16/23 1657    Lab Status: In process Specimen: Body Fluid from Lung Updated: 01/16/23 1710    Total Protein, body fluid [[id number]] Collected: 01/16/23 1657    Lab Status: In process Specimen: Body Fluid from Lung Updated: 01/16/23 1710    NT-BNP PRO [[id number]]  (Abnormal) Collected: 01/16/23 1436    Lab Status: Final result Specimen: Blood from Arm, Left Updated: 01/16/23 1647     NT-proBNP 162 pg/mL     Magnesium [[id number]]  (Normal) Collected: 01/16/23 1436    Lab Status: Final result Specimen: Blood from Arm, Left Updated: 01/16/23 1647     Magnesium 1 7 mg/dL     UA w Reflex to Microscopic w Reflex to Culture [[id number]]  (Abnormal) Collected: 01/16/23 1611    Lab Status: Final result Specimen: Urine, Clean Catch Updated: 01/16/23 1644     Color, UA Yellow     Clarity, UA Slightly Cloudy     Specific [place], UA 1 020     pH, UA 6 0     Leukocytes, UA Negative     Nitrite, UA Negative     Protein, UA Negative mg/dl      Glucose, UA Negative mg/dl      Ketones, UA Negative mg/dl      Urobilinogen, UA <2 0 mg/dl      Bilirubin, UA Negative     Occult Blood, UA Trace    HS Troponin 0hr (reflex protocol) [[id number]]  (Normal) Collected: 01/16/23 1522    Lab Status: Final result Specimen: Blood from Arm, Left Updated: 01/16/23 1623     hs TnI 0hr 18 ng/L     HS Troponin I 4hr [[id number]]     Lab Status: No result Specimen: Blood     Lactic acid [[id number]]  (Normal) Collected: 01/16/23 1522    Lab Status: Final result Specimen: Blood from Arm, Left Updated: 01/16/23 1600     LACTIC ACID 1 7 mmol/L     Narrative:      Result may be elevated if tourniquet was used during collection      APTT [[id number]]  (Normal) Collected: 01/16/23 1522    Lab Status: Final result Specimen: Blood from Arm, Left Updated: 01/16/23 1549     PTT 34 seconds     Protime-INR [[id number]]  (Abnormal) Collected: 01/16/23 1522    Lab Status: Final result Specimen: Blood from Arm, Left Updated: 01/16/23 1549     Protime 18 7 seconds      INR 1 46 CBC and differential [[id number]]  (Abnormal) Collected: 01/16/23 1436    Lab Status: Final result Specimen: Blood from Arm, Left Updated: 01/16/23 1535     WBC 6 66 Thousand/uL      RBC 4 14 Million/uL      Hemoglobin 13 7 g/dL      Hematocrit 39 2 %      MCV 95 fL      MCH 33 1 pg      MCHC 34 9 g/dL      RDW 16 2 %      MPV 9 7 fL      Platelets 85 Thousands/uL      nRBC 0 /100 WBCs      Neutrophils Relative 61 %      Immat GRANS % 0 %      Lymphocytes Relative 19 %      Monocytes Relative 18 %      Eosinophils Relative 1 %      Basophils Relative 1 %      Neutrophils Absolute 4 06 Thousands/µL      Immature Grans Absolute 0 02 Thousand/uL      Lymphocytes Absolute 1 23 Thousands/µL      Monocytes Absolute 1 20 Thousand/µL      Eosinophils Absolute 0 08 Thousand/µL      Basophils Absolute 0 07 Thousands/µL     Blood culture #2 [[id number]] Collected: 01/16/23 1522    Lab Status: In process Specimen: Blood from Arm, Right Updated: 01/16/23 1529    Blood culture #1 [[id number]] Collected: 01/16/23 1522    Lab Status:  In process Specimen: Blood from Arm, Left Updated: 01/16/23 1529    Lipase [[id number]]  (Normal) Collected: 01/16/23 1436    Lab Status: Final result Specimen: Blood from Arm, Left Updated: 01/16/23 1507     Lipase 173 u/L     Comprehensive metabolic panel [[id number]]  (Abnormal) Collected: 01/16/23 1436    Lab Status: Final result Specimen: Blood from Arm, Left Updated: 01/16/23 1507     Sodium 130 mmol/L      Potassium 3 8 mmol/L      Chloride 99 mmol/L      CO2 22 mmol/L      ANION GAP 9 mmol/L      BUN 25 mg/dL      Creatinine 1 12 mg/dL      Glucose 105 mg/dL      Calcium 8 1 mg/dL      Corrected Calcium 9 7 mg/dL      AST 54 U/L      ALT 32 U/L      Alkaline Phosphatase 112 U/L      Total Protein 8 3 g/dL      Albumin 2 0 g/dL      Total Bilirubin 0 80 mg/dL      eGFR 52 ml/min/1 73sq m     Narrative:      [place] guidelines for Chronic Kidney Disease (CKD):   •  Stage 1 with normal or high GFR (GFR > 90 mL/min/1 73 square meters)  •  Stage 2 Mild CKD (GFR = 60-89 mL/min/1 73 square meters)  •  Stage 3A Moderate CKD (GFR = 45-59 mL/min/1 73 square meters)  •  Stage 3B Moderate CKD (GFR = 30-44 mL/min/1 73 square meters)  •  Stage 4 Severe CKD (GFR = 15-29 mL/min/1 73 square meters)  •  Stage 5 End Stage CKD (GFR <15 mL/min/1 73 square meters)  Note: GFR calculation is accurate only with a steady state creatinine                 IR paracentesis   Final Result by Salvador Lozano MD (01/16 1712)      Focused abdominal ultrasound showed no ascites  Paracentesis was not performed  Plan:       Resume care by clinical team       Workstation performed: [id number]         IR Thoracentesis   Final Result by Salvador Lozano MD (01/16 1710)      Ultrasound-guided right thoracentesis with drainage of 1650 mL of clear yellow pleural fluid  Plan:       Resume care by clinical team       Workstation performed: [id number]         CT chest abdomen pelvis w contrast   Final Result by Davonte Carter MD (01/16 1648)      1  Large right pleural effusion with overlying compressive atelectasis  Additional opacity in the right upper and middle lobes also likely represents atelectasis given volume loss  Previously seen groundglass opacity is not visualized and was likely    inflammatory though this could be obscured by atelectasis  Follow-up CT following resolution of effusion and atelectasis is recommended  2   Cirrhosis with mild to moderate ascites and recanalized umbilical vein  3   Distended gallbladder with gallstones though no pericholecystic inflammatory change  Common bile duct is also mildly dilated though there is no obstruction identified  Correlation with liver enzymes is recommended  The study was marked in [facility] for immediate notification        Workstation performed: [id number]         XR chest portable   ED Interpretation by Mami Morales DO (01/16 5790)   Right pleural effusion                   Procedures  ECG 12 Lead Documentation Only    Date/Time: 1/16/2023 4:38 PM  Performed by: Melodie Humphrey DO  Authorized by: Melodie Humphrey DO     Indications / Diagnosis:  Chest tight  ECG reviewed by me, the ED Provider: yes    Patient location:  ED  Previous ECG:     Previous ECG:  Unavailable  Interpretation:     Interpretation: non-specific    Rate:     ECG rate:  81    ECG rate assessment: normal    Rhythm:     Rhythm: sinus rhythm    Ectopy:     Ectopy: none    QRS:     QRS axis:  Normal    QRS intervals:  Normal  Conduction:     Conduction: normal    ST segments:     ST segments:  Normal  T waves:     T waves: non-specific, flattening and inverted      Inverted:  V3  Other findings:     Other findings: prolonged qTc interval               ED Course             HEART Risk Score    Flowsheet Row Most Recent Value   Heart Score Risk Calculator    History 0 Filed at: 01/16/2023 1640   ECG 1 Filed at: 01/16/2023 1640   Age 1 Filed at: 01/16/2023 1640   Risk Factors 2 Filed at: 01/16/2023 1640   Troponin 1 Filed at: 01/16/2023 1640   HEART Score 5 Filed at: 01/16/2023 1640                                      Medical Decision Making  Differential SBP but less likely, no significant ascites on bedside ultrasound or CT scan  Most concerning is new large right pleural effusion possibly secondary to cirrhosis but must consider infection or tumor  Consulted IR - fluid being analyzed, will need observation to confirm no recurrence of effusion, improvement of symptoms, and trend troponins to confirm no coronary syndrome    Abdominal pain: acute illness or injury  Chest tightness: acute illness or injury  Cholelithiasis: acute illness or injury  Pleural effusion on right: acute illness or injury  Amount and/or Complexity of Data Reviewed  Labs: ordered  Radiology: ordered and independent interpretation performed  Risk  Prescription drug management    Decision regarding hospitalization  Disposition  Final diagnoses:   Pleural effusion on right - acute new, symptomatic   Chest tightness   Abdominal pain   Cholelithiasis - with distended gallbladder, no sign of cholecystitis     Time reflects when diagnosis was documented in both MDM as applicable and the Disposition within this note     Time User Action Codes Description Comment    1/16/2023  5:17 PM Anuj Shores Add [J90] Pleural effusion on right     1/16/2023  5:18 PM Anuj Shores Modify [J90] Pleural effusion on right acute new, symptomatic    1/16/2023  5:18 PM Anuj Shores Add [R07 89] Chest tightness     1/16/2023  5:18 PM Anuj Shores Add [R10 9] Abdominal pain     1/16/2023  5:18 PM Anuj Shores Add [K80 20] Cholelithiasis     1/16/2023  5:19 PM Anuj Shores Modify [K80 20] Cholelithiasis with distended gallbladder, no sign of cholecystitis      ED Disposition     ED Disposition   Admit    Condition   Stable    Date/Time   Mon Jan 16, 2023  5:17 PM    Comment   Case was discussed with *PETTY Marquez* and the patient's admission status was agreed to be Admission Status: observation status to the service of Dr Shravan Abdi**   Follow-up Information    None         Patient's Medications   Discharge Prescriptions    No medications on file       No discharge procedures on file      PDMP Review       Value Time User    PDMP Reviewed  Yes 10/21/2022 12:16 PM Doron Henriquez PA-C          ED Provider  Electronically Signed by           Elian Glez DO  01/16/23 3763

## 2023-01-16 NOTE — ASSESSMENT & PLAN NOTE
· Na 130 on admission, baseline is 129-133  · SIADH labs pending  · PO fluid restriction  · Monitor BMP

## 2023-01-16 NOTE — ASSESSMENT & PLAN NOTE
· Hepatic US 1/9: Cirrhotic liver with sequelae of portal hypertension again noted  No focal hepatic mass identified  Cholelithiasis without evidence for acute cholecystitis or significant biliary ductal dilatation  Right pleural effusion and perihepatic ascites  · CT A/P: Cirrhosis with mild to moderate ascites and recanalized umbilical vein  Distended gallbladder with gallstones though no pericholecystic inflammatory change  Common bile duct is also mildly dilated though there is no obstruction identified  Correlation with liver enzymes is recommended    · AST slightly elevated however LFTs otherwise unremarkable  · Lipase WNL    · IR attempted paracentesis 1/16 however no ascites to drain  · Continue home diuretics   · Lasix 40 mg daily   · Spironolactone 100 mg daily   · Monitor volume status   · Recommending OP follow up with GI

## 2023-01-16 NOTE — ASSESSMENT & PLAN NOTE
· Continue home medications:  · Lasix 40 mg daily  · Spironolactone 100 mg daily   · Midodrine 7 5 mg TID

## 2023-01-16 NOTE — ASSESSMENT & PLAN NOTE
· No SIRS criteria  · UA without infection  · CT CAP without evidence of infection   · COVID/FLU/RSV pending   · Ammonia pending   · Suspect possible viral illness

## 2023-01-16 NOTE — ASSESSMENT & PLAN NOTE
Presented for several days of chills, generalized weakness, poor PO intake, abdominal bloating/discomfort  SOB/PAREKH and chest tightness  · COVID/FLU/RSV pending   · CT chest: Large right pleural effusion with overlying compressive atelectasis  Additional opacity in the right upper and middle lobes also likely represents atelectasis given volume loss  Previously seen groundglass opacity is not visualized and was likely inflammatory though this could be obscured by atelectasis  Follow-up CT following resolution of effusion and atelectasis is recommended    · S/P IR thoracentesis 1/16, 1L removed   · Follow up labs   · Repeat CT chest in am to ensure resolution   · SIRS: none, do not suspect underlying infection/pneumonia, monitor off abx   · Procal elevated, repeat in am   · BC x 2 pending   · Echo 6/1749: EF 84%, systolic/diastolic function normal   ·   · Patient satting well on RA, breathing feels improved since thoracentesis   · Encourage IS

## 2023-01-16 NOTE — SEDATION DOCUMENTATION
Called to ER to evaluate for Paracentesis  U/S performed by Dr Anastacio Mcmahan, no paracentesis indicated at this time

## 2023-01-16 NOTE — SEDATION DOCUMENTATION
Called to ER for diagnostic right thoracentesis  1650ml of clear yellow fluid removed  Specimen sent to lab  Band aid to site  Care assumed by ER staff

## 2023-01-16 NOTE — PROGRESS NOTES
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)  PNEUMOCOCCAL CONJUGATE VACCINE 13-VALENT GREATER THAN 6 MONTHS   2  Nicotine dependence, uncomplicated, unspecified nicotine product type     3  Need for vaccination against Streptococcus pneumoniae using pneumococcal conjugate vaccine 13  PNEUMOCOCCAL CONJUGATE VACCINE 13-VALENT GREATER THAN 6 MONTHS     09/27/2018 CT lung screening reviewed  Positive mild centrilobular emphysema bilaterally  No lung nodule or consolidation noted  Hyperinflation  No mediastinal or hilar adenopathy  Plan:   Patient remains stable in regards to her COPD/emphysema  She will continue her current pulmonary medications consisting of albuterol nebulization/HFA and Anoro once daily  Two samples of Anoro given the patient  Patient's influenza and pneumococcal vaccines are up-to-date  Smoking cessation discussed and encouraged  Patient states does not tolerate Chantix or Wellbutrin  Nicotine replacement patches discussed however patient states not ready to quit smoking  Lung cancer screening CT is ordered and pending  Patient scheduled to return in 6 months or sooner if needed  Subjective:     Patient ID: Blanquita Severino is a 60 y o  female  Chief Complaint:  HPI  60-year-old female with history of moderate COPD and ongoing tobacco abuse presents for follow-up visit  Patient states her COPD symptoms are stable and has not used her albuterol in 6 months  Patient also admits to not using her Anoro on a daily basis which I recommended her to start doing  Patient denies any shortness of breath at rest or with activity  Denies any significant cough or hemoptysis  Denies any fevers, chills or chest pain  I reviewed patient's medications, allergies, social history, past medical history and family history  Review of Systems   Constitutional: Negative for activity change, chills, fatigue and unexpected weight change  Pt has BS of 247, pt does not want insulin coverage. Pt also refused pepcid. HENT: Negative  Respiratory: Negative for cough, chest tightness, shortness of breath and wheezing  Cardiovascular: Negative  Negative for chest pain, palpitations and leg swelling  Gastrointestinal: Negative  Negative for abdominal pain  Genitourinary: Negative for decreased urine volume  Musculoskeletal: Negative for myalgias  Skin: Negative for rash  Neurological: Negative for dizziness and headaches  Hematological: Negative for adenopathy  Psychiatric/Behavioral: Negative for confusion  All other systems reviewed and are negative  Objective:  /80 (BP Location: Left arm, Patient Position: Sitting, Cuff Size: Standard)   Pulse 81   Temp 98 °F (36 7 °C) (Tympanic)   Ht 5' 4" (1 626 m)   Wt 77 6 kg (171 lb)   SpO2 96%   BMI 29 35 kg/m²   Room-air  Physical Exam   Constitutional: She is oriented to person, place, and time  She appears well-developed and well-nourished  No distress  HENT:   Head: Normocephalic  Mouth/Throat: Oropharynx is clear and moist    Eyes: Pupils are equal, round, and reactive to light  Conjunctivae are normal  No scleral icterus  Neck: Neck supple  No tracheal deviation present  Cardiovascular: Normal rate, regular rhythm, normal heart sounds and intact distal pulses  No murmur heard  Pulmonary/Chest: Effort normal and breath sounds normal  No stridor  No respiratory distress  She has no wheezes  She has no rales  She exhibits no tenderness  Abdominal: Soft  Bowel sounds are normal  There is no tenderness  There is no guarding  Musculoskeletal: She exhibits no edema or tenderness  Lymphadenopathy:     She has no cervical adenopathy  Neurological: She is alert and oriented to person, place, and time  Skin: Skin is warm and dry  Capillary refill takes less than 2 seconds  She is not diaphoretic  Psychiatric: She has a normal mood and affect  Nursing note and vitals reviewed

## 2023-01-16 NOTE — DISCHARGE INSTRUCTIONS
Thoracentesis   WHAT YOU NEED TO KNOW:   A thoracentesis is a procedure to remove extra fluid or air from between your lungs and your inner chest wall  Air or fluid buildup may make it hard for you to breathe  A thoracentesis allows your lungs to expand fully so you can breathe more easily  DISCHARGE INSTRUCTIONS:   Medicines:   Pain medicine: You may be given a prescription medicine to decrease pain  Do not wait until the pain is severe before you take your medicine  Antibiotics: This medicine helps fight or prevent an infection  Take your medicine as directed  Call your healthcare provider if you think your medicine is not helping or if you have side effects  Tell him if you are allergic to any medicine  Keep a list of the medicines, vitamins, and herbs you take  Include the amounts, and when and why you take them  Bring the list or the pill bottles to follow-up visits  Carry your medicine list with you in case of an emergency  Follow up with your healthcare provider as directed:  Write down your questions so you remember to ask them during your visits  Rest:  Rest when you feel it is needed  Slowly start to do more each day  Return to your daily activities as directed  Do not smoke: If you smoke, it is never too late to quit  Ask for information about how to stop smoking if you need help  Contact your healthcare provider if:   You have a fever  Your puncture site is red, warm, swollen, or draining pus  You have questions or concerns about your procedure, medicine, or care  If you have any questions regarding, call the IR department @ [phone]  Seek care immediately or call [phone] if:   Blood soaks through your bandage  There is blood in your spit

## 2023-01-16 NOTE — H&P
Naveen Avila  H&P- Zonia Friday 1960, 60 y o  female MRN: [id number]  Unit/Bed#: -Nitesh Encounter: [id number]  Primary Care Provider: Tristian Acosta PA-C   Date and time admitted to hospital: 1/16/2023  2:40 PM    * Pleural effusion  Assessment & Plan  Presented for several days of chills, generalized weakness, poor PO intake, abdominal bloating/discomfort  SOB/PAREKH and chest tightness  · COVID/FLU/RSV pending   · CT chest: Large right pleural effusion with overlying compressive atelectasis  Additional opacity in the right upper and middle lobes also likely represents atelectasis given volume loss  Previously seen groundglass opacity is not visualized and was likely inflammatory though this could be obscured by atelectasis  Follow-up CT following resolution of effusion and atelectasis is recommended  · S/P IR thoracentesis 1/16, 1L removed   · Follow up labs   · Repeat CT chest in am to ensure resolution   · SIRS: none, do not suspect underlying infection/pneumonia, monitor off abx   · Procal elevated, repeat in am   · BC x 2 pending   · Echo 8/2363: EF 23%, systolic/diastolic function normal   ·   · Patient satting well on RA, breathing feels improved since thoracentesis   · Encourage IS    Alcoholic cirrhosis of liver with ascites ([address] )  Assessment & Plan  · Hepatic US 1/9: Cirrhotic liver with sequelae of portal hypertension again noted  No focal hepatic mass identified  Cholelithiasis without evidence for acute cholecystitis or significant biliary ductal dilatation  Right pleural effusion and perihepatic ascites  · CT A/P: Cirrhosis with mild to moderate ascites and recanalized umbilical vein  Distended gallbladder with gallstones though no pericholecystic inflammatory change  Common bile duct is also mildly dilated though there is no obstruction identified  Correlation with liver enzymes is recommended    · AST slightly elevated however LFTs otherwise unremarkable  · Lipase WNL    · IR attempted paracentesis 1/16 however no ascites to drain  · Continue home diuretics   · Lasix 40 mg daily   · Spironolactone 100 mg daily   · Monitor volume status   · Recommending OP follow up with GI     Weakness  Assessment & Plan  · No SIRS criteria  · UA without infection  · CT CAP without evidence of infection   · COVID/FLU/RSV pending   · Ammonia pending   · Suspect possible viral illness     Ductal carcinoma in situ (DCIS) of right breast  Assessment & Plan  · S/P radiation, in remission [facility]  · Cont anastrazole    Thrombocytopenia ([address] )  Assessment & Plan  · PLTs 85 on admission, baseline is   · Monitor     Hyponatremia  Assessment & Plan  · Na 130 on admission, baseline is 129-133  · SIADH labs pending  · PO fluid restriction  · Monitor BMP    COPD (chronic obstructive pulmonary disease) (HCC)  Assessment & Plan  · No acute exacerbation  · Albuterol PRN    Essential hypertension  Assessment & Plan  · Continue home medications:  · Lasix 40 mg daily  · Spironolactone 100 mg daily   · Midodrine 7 5 mg TID     VTE Pharmacologic Prophylaxis: VTE Score: 4 Moderate Risk (Score 3-4) - Pharmacological DVT Prophylaxis Ordered: enoxaparin (Lovenox)  Code Status: Level 1 - Full Code patient and    Discussion with family: Updated  () at bedside  Anticipated Length of Stay: Patient will be admitted on an observation basis with an anticipated length of stay of less than 2 midnights secondary to monitoring pleural effusion  Total Time for Visit, including Counseling / Coordination of Care: 60 minutes Greater than 50% of this total time spent on direct patient counseling and coordination of care      Chief Complaint: abdominal bloating/distension, weakness, chest pressure, SOB/PAREKH    History of Present Illness:  Pedro Barnes is a 60 y o  female with a PMH of hyponatremia, alcoholic cirrhosis, HTN, thrombocytopenia, HX of breast cancer s/p radiation who presents with generalized weakness, abdominal discomfort (epigastric bloating/distension), chest pressure, SOB with exertion/ "shallow breathing" x 1 week  She does note 1 small episode of nonbloody emesis and small amount of non-bloody diarrhea yesterday 1/15, none since  She states her upper abdomen feels full/bloated but is not specifically painful  She presented to the ED due to worsening chest pressure today  She denies fevers/chills ("chills at baseline, always cold"), chest pain, cough/sore throat/congestion, wheezing, further N/V/D, urinary symptoms or other complaints  CT CAP large left pleural effusion  IR thoracentesis was performed in ED, 1 6 L fluid removed  Paracentesis was attempted however not enough fluid to drain  Review of Systems:  Review of Systems   Constitutional: Positive for activity change, appetite change and fatigue  Negative for chills and fever  HENT: Negative for congestion, rhinorrhea and sore throat  Eyes: Negative for visual disturbance  Respiratory: Positive for chest tightness and shortness of breath  Negative for cough and wheezing  Cardiovascular: Negative for chest pain, palpitations and leg swelling  Gastrointestinal: Positive for abdominal distention  Negative for abdominal pain, blood in stool, constipation, diarrhea, nausea and vomiting  Genitourinary: Negative for difficulty urinating, dysuria, frequency and urgency  Musculoskeletal: Negative for arthralgias, back pain and myalgias  Skin: Negative for rash and wound  Neurological: Negative for dizziness, light-headedness and headaches  All other systems reviewed and are negative        Past Medical and Surgical History:   Past Medical History:   Diagnosis Date   • Alcoholic fatty liver    • Anxiety    • Asthma    • COPD (chronic obstructive pulmonary disease) ([facility] [address] )    • Elevated liver function tests    • GERD (gastroesophageal reflux disease)    • GERD without esophagitis    • Hyperlipidemia    • Hypertension    • IBS (irritable bowel syndrome)    • Insomnia    • Insomnia    • Liver disease    • Nervousness    • Nicotine dependence    • Other specified urinary incontinence    • RLS (restless legs syndrome)    • Wears glasses        Past Surgical History:   Procedure Laterality Date   • BACK SURGERY     • 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   • BREAST EXCISIONAL BIOPSY Right 11/01/2004    benign   • CATARACT EXTRACTION, BILATERAL  08/01/2018   • COLONOSCOPY N/A 5/8/2019    Procedure: COLONOSCOPY;  Surgeon: Herbert Nicole MD;  Location: [facility] GI LAB; Service: Gastroenterology   • EGD AND COLONOSCOPY N/A 3/19/2018    Procedure: EGD AND COLONOSCOPY;  Surgeon: Herbert Nicole MD;  Location: [facility] GI LAB; Service: Gastroenterology   • ESOPHAGOGASTRODUODENOSCOPY N/A 5/8/2019    Procedure: ESOPHAGOGASTRODUODENOSCOPY (EGD); Surgeon: Herbert Nicole MD;  Location: [facility] GI LAB; Service: Gastroenterology   • IR PARACENTESIS  11/27/2020   • IR PARACENTESIS  12/8/2020   • IR PARACENTESIS  10/28/2022   • IR PARACENTESIS  11/7/2022   • IR PARACENTESIS  11/30/2022   • IR PARACENTESIS  12/30/2022   • IR PARACENTESIS  1/16/2023   • IR THORACENTESIS  1/16/2023   • KNEE SURGERY     • KNEE SURGERY     • LUMBAR LAMINECTOMY  04/1999   • LYMPH NODE BIOPSY     • MAMMO STEREOTACTIC BREAST BIOPSY RIGHT (ALL INC) Right 5/21/2021   • MAMMO STEREOTACTIC BREAST BIOPSY RIGHT (ALL INC) EACH ADD Right 5/21/2021   • TOTAL ABDOMINAL HYSTERECTOMY  02/05/2008   • TUBAL LIGATION     • TUBAL LIGATION  1989   • UPPER GASTROINTESTINAL ENDOSCOPY         Meds/Allergies:  Prior to Admission medications    Medication Sig Start Date End Date Taking?  Authorizing Provider   albuterol (2 5 mg/3 mL) 0 083 % nebulizer solution Take 3 mL (2 5 mg total) by nebulization every 6 (six) hours as needed for wheezing or shortness of breath 8/3/22   Dede Stovall PA-C   albuterol (Proventil HFA) 90 mcg/act inhaler Inhale 2 puffs every 6 (six) hours as needed for wheezing or shortness of breath 2/16/22   Tamiko Marvin PA-C   ALPRAZolam [place]) 1 mg tablet TAKE ONE TABLET BY MOUTH TWICE A DAY AS NEEDED FOR ANXIETY 12/19/22   Tamiko Marvin PA-C   anastrozole (ARIMIDEX) 1 mg tablet Take 1 tablet (1 mg total) by mouth daily 12/19/22   Elizabeth Goncalves MD   benzonatate (TESSALON PERLES) 100 mg capsule Take 1 capsule (100 mg total) by mouth 3 (three) times a day as needed for cough 11/11/22   Tamiko Marvin PA-C   furosemide (LASIX) 40 mg tablet Take 1 tablet (40 mg total) by mouth daily 10/7/22   Mami Ochoa MD   midodrine (PROAMATINE) 5 mg tablet Take 1 5 tablets (7 5 mg total) by mouth 3 (three) times a day before meals 11/2/22 12/2/22  JOSÉ Kessler   nadolol (CORGARD) 40 mg tablet Take 1 tablet (40 mg total) by mouth daily 10/14/22 12/13/22  Jahaira Miranda MD   Promethazine-DM (PHENERGAN-DM) 6 25-15 mg/5 mL oral syrup Take 5 mL by mouth 4 (four) times a day as needed for cough 11/11/22   Tamiko Marvin PA-C   spironolactone (ALDACTONE) 100 mg tablet Take 1 tablet (100 mg total) by mouth daily 10/7/22   Mami Ochoa MD   mupirocin [facility]) 2 % ointment Apply topically 3 (three) times a day  Patient not taking: Reported on 8/26/2022 5/10/22 9/17/22  Tamiko Marvin PA-C     I have reviewed home medications with patient personally  Allergies:    Allergies   Allergen Reactions   • Sulfa Antibiotics Shortness Of Breath   • Ace Inhibitors Cough and Other (See Comments)     coughing       Social History:  Marital Status: /Civil Union   Occupation: not assessed   Patient Pre-hospital Living Situation: Home, With spouse  Patient Pre-hospital Level of Mobility: walks  Patient Pre-hospital Diet Restrictions: none   Substance Use History:   Social History     Substance and Sexual Activity   Alcohol Use Not Currently   • Alcohol/week: 24 0 standard drinks   • Types: 24 Cans of beer per week Comment: not lately, non alcoholic beer     Social History     Tobacco Use   Smoking Status Every Day   • Packs/day: 1 00   • Years: 45 00   • Pack years: 45 00   • Types: Cigarettes   • Start date: 1/1/1978   Smokeless Tobacco Never     Social History     Substance and Sexual Activity   Drug Use No       Family History:  Family History   Problem Relation Age of Onset   • Breast cancer Mother 28   • No Known Problems Father    • No Known Problems Sister    • No Known Problems Brother    • No Known Problems Son    • No Known Problems Maternal Grandmother    • No Known Problems Maternal Grandfather    • No Known Problems Paternal Grandmother    • No Known Problems Paternal Grandfather    • No Known Problems Maternal Aunt    • No Known Problems Maternal Aunt    • No Known Problems Paternal Aunt    • Substance Abuse Neg Hx    • Mental illness Neg Hx        Physical Exam:     Vitals:   Blood Pressure: 126/70 (01/16/23 1836)  Pulse: 77 (01/16/23 1836)  Temperature: (!) 97 1 °F (36 2 °C) (01/16/23 1836)  Temp Source: Temporal (01/16/23 1426)  Respirations: 19 (01/16/23 1836)  Height: 5' 4" (162 6 cm) (01/16/23 1427)  Weight - Scale: 60 7 kg (133 lb 12 8 oz) (01/16/23 1841)  SpO2: 94 % (01/16/23 1836)    Physical Exam  Vitals and nursing note reviewed  Constitutional:       General: She is not in acute distress  Appearance: She is well-developed  HENT:      Head: Normocephalic and atraumatic  Eyes:      General:         Right eye: No discharge  Left eye: No discharge  Conjunctiva/sclera: Conjunctivae normal    Cardiovascular:      Rate and Rhythm: Normal rate and regular rhythm  Heart sounds: No murmur heard  Pulmonary:      Effort: Pulmonary effort is normal  No respiratory distress  Breath sounds: No wheezing, rhonchi or rales  Comments: Diminished BS on R  Abdominal:      General: Bowel sounds are normal  There is no distension  Palpations: Abdomen is soft        Tenderness: There is no abdominal tenderness  Comments: Soft, nondistended    Musculoskeletal:      Cervical back: Neck supple  Right lower leg: No edema  Left lower leg: No edema  Skin:     General: Skin is warm and dry  Capillary Refill: Capillary refill takes less than 2 seconds  Neurological:      Mental Status: She is alert and oriented to person, place, and time  Psychiatric:         Mood and Affect: Mood normal          Behavior: Behavior normal           Additional Data:     Lab Results:  Results from last 7 days   Lab Units 01/16/23  1436   WBC Thousand/uL 6 66   HEMOGLOBIN g/dL 13 7   HEMATOCRIT % 39 2   PLATELETS Thousands/uL 85*   NEUTROS PCT % 61   LYMPHS PCT % 19   MONOS PCT % 18*   EOS PCT % 1     Results from last 7 days   Lab Units 01/16/23  1436   SODIUM mmol/L 130*   POTASSIUM mmol/L 3 8   CHLORIDE mmol/L 99   CO2 mmol/L 22   BUN mg/dL 25   CREATININE mg/dL 1 12   ANION GAP mmol/L 9   CALCIUM mg/dL 8 1*   ALBUMIN g/dL 2 0*   TOTAL BILIRUBIN mg/dL 0 80   ALK PHOS U/L 112   ALT U/L 32   AST U/L 54*   GLUCOSE RANDOM mg/dL 105     Results from last 7 days   Lab Units 01/16/23  1522   INR  1 46*             Results from last 7 days   Lab Units 01/16/23  1522 01/16/23  1436   LACTIC ACID mmol/L 1 7  --    PROCALCITONIN ng/ml  --  0 35*       Lines/Drains:  Invasive Devices     Peripheral Intravenous Line  Duration           Peripheral IV 01/16/23 Left;Proximal;Ventral (anterior) Forearm <1 day                    Imaging: Reviewed radiology reports from this admission including: chest CT scan and abdominal/pelvic CT  IR paracentesis   Final Result by Nani Millard MD (01/16 1712)      Focused abdominal ultrasound showed no ascites  Paracentesis was not performed        Plan:       Resume care by clinical team       Workstation performed: [id number]         IR Thoracentesis   Final Result by Nani Millard MD (01/16 1710)      Ultrasound-guided right thoracentesis with drainage of 1650 mL of clear yellow pleural fluid  Plan:       Resume care by clinical team       Workstation performed: [id number]         CT chest abdomen pelvis w contrast   Final Result by Otf Ramos MD (01/16 1648)      1  Large right pleural effusion with overlying compressive atelectasis  Additional opacity in the right upper and middle lobes also likely represents atelectasis given volume loss  Previously seen groundglass opacity is not visualized and was likely    inflammatory though this could be obscured by atelectasis  Follow-up CT following resolution of effusion and atelectasis is recommended  2   Cirrhosis with mild to moderate ascites and recanalized umbilical vein  3   Distended gallbladder with gallstones though no pericholecystic inflammatory change  Common bile duct is also mildly dilated though there is no obstruction identified  Correlation with liver enzymes is recommended  The study was marked in [facility] for immediate notification  Workstation performed: [id number]         XR chest portable   ED Interpretation by Charles Calle DO (01/16 1536)   Right pleural effusion        Final Result by Josef Smith MD (01/16 1821)      Development of right hemithorax opacity likely representing pleural effusion and underlying basal infiltrate                  Workstation performed: [id number]             EKG and Other Studies Reviewed on Admission:   · EKG: NSR  HR 81     ** Please Note: This note has been constructed using a voice recognition system   **

## 2023-01-16 NOTE — BRIEF OP NOTE (RAD/CATH)
INTERVENTIONAL RADIOLOGY PROCEDURE NOTE    Date: 1/16/2023    Procedure:   1  Right thoracentesis  2  Focused US of abdomen    Procedure Summary     Date:  Room / Location:     Anesthesia Start:  Anesthesia Stop:     Procedure:  Diagnosis:     Scheduled Providers:  Responsible Provider:     Anesthesia Type: Not recorded ASA Status: Not recorded          Preoperative diagnosis: No diagnosis found  Postoperative diagnosis: Same  Surgeon: Gonzalo May MD     Assistant: None  No qualified resident was available  Blood loss: None    Specimens: 1650 mL clear yellow pleural fluid removed  Findings:   1  Successful right thoracentesis  2  Focused abdominal US showed no ascites  Paracentesis was not performed  Complications: None immediate      Anesthesia: local

## 2023-01-17 ENCOUNTER — TELEPHONE (OUTPATIENT)
Dept: GASTROENTEROLOGY | Facility: CLINIC | Age: 63
End: 2023-01-17

## 2023-01-17 ENCOUNTER — APPOINTMENT (OUTPATIENT)
Dept: RADIOLOGY | Facility: HOSPITAL | Age: 63
End: 2023-01-17

## 2023-01-17 ENCOUNTER — DOCUMENTATION (OUTPATIENT)
Dept: GASTROENTEROLOGY | Facility: CLINIC | Age: 63
End: 2023-01-17

## 2023-01-17 ENCOUNTER — TRANSITIONAL CARE MANAGEMENT (OUTPATIENT)
Dept: FAMILY MEDICINE CLINIC | Facility: CLINIC | Age: 63
End: 2023-01-17

## 2023-01-17 VITALS
WEIGHT: 135.3 LBS | TEMPERATURE: 98 F | HEART RATE: 85 BPM | SYSTOLIC BLOOD PRESSURE: 92 MMHG | RESPIRATION RATE: 14 BRPM | HEIGHT: 64 IN | OXYGEN SATURATION: 95 % | BODY MASS INDEX: 23.1 KG/M2 | DIASTOLIC BLOOD PRESSURE: 49 MMHG

## 2023-01-17 DIAGNOSIS — F41.9 ANXIETY: ICD-10-CM

## 2023-01-17 LAB
ALBUMIN SERPL BCP-MCNC: 2.5 G/DL (ref 3.5–5)
ALP SERPL-CCNC: 73 U/L (ref 46–116)
ALT SERPL W P-5'-P-CCNC: 21 U/L (ref 12–78)
ANION GAP SERPL CALCULATED.3IONS-SCNC: 7 MMOL/L (ref 4–13)
AST SERPL W P-5'-P-CCNC: 37 U/L (ref 5–45)
ATRIAL RATE: 81 BPM
BASOPHILS # BLD AUTO: 0.05 THOUSANDS/ÂΜL (ref 0–0.1)
BASOPHILS NFR BLD AUTO: 1 % (ref 0–1)
BILIRUB SERPL-MCNC: 0.8 MG/DL (ref 0.2–1)
BUN SERPL-MCNC: 18 MG/DL (ref 5–25)
CALCIUM ALBUM COR SERPL-MCNC: 9 MG/DL (ref 8.3–10.1)
CALCIUM SERPL-MCNC: 7.8 MG/DL (ref 8.3–10.1)
CHLORIDE SERPL-SCNC: 103 MMOL/L (ref 96–108)
CO2 SERPL-SCNC: 23 MMOL/L (ref 21–32)
CREAT SERPL-MCNC: 0.86 MG/DL (ref 0.6–1.3)
EOSINOPHIL # BLD AUTO: 0.14 THOUSAND/ÂΜL (ref 0–0.61)
EOSINOPHIL NFR BLD AUTO: 3 % (ref 0–6)
ERYTHROCYTE [DISTWIDTH] IN BLOOD BY AUTOMATED COUNT: 16.3 % (ref 11.6–15.1)
GFR SERPL CREATININE-BSD FRML MDRD: 72 ML/MIN/1.73SQ M
GLUCOSE P FAST SERPL-MCNC: 83 MG/DL (ref 65–99)
GLUCOSE SERPL-MCNC: 83 MG/DL (ref 65–140)
HCT VFR BLD AUTO: 28.6 % (ref 34.8–46.1)
HGB BLD-MCNC: 10 G/DL (ref 11.5–15.4)
IMM GRANULOCYTES # BLD AUTO: 0.05 THOUSAND/UL (ref 0–0.2)
IMM GRANULOCYTES NFR BLD AUTO: 1 % (ref 0–2)
LDH SERPL-CCNC: 198 U/L (ref 81–234)
LYMPHOCYTES # BLD AUTO: 1.18 THOUSANDS/ÂΜL (ref 0.6–4.47)
LYMPHOCYTES NFR BLD AUTO: 22 % (ref 14–44)
MCH RBC QN AUTO: 33.3 PG (ref 26.8–34.3)
MCHC RBC AUTO-ENTMCNC: 35 G/DL (ref 31.4–37.4)
MCV RBC AUTO: 95 FL (ref 82–98)
MONOCYTES # BLD AUTO: 1.08 THOUSAND/ÂΜL (ref 0.17–1.22)
MONOCYTES NFR BLD AUTO: 20 % (ref 4–12)
NEUTROPHILS # BLD AUTO: 2.8 THOUSANDS/ÂΜL (ref 1.85–7.62)
NEUTS SEG NFR BLD AUTO: 53 % (ref 43–75)
NRBC BLD AUTO-RTO: 0 /100 WBCS
P AXIS: 50 DEGREES
PLATELET # BLD AUTO: 66 THOUSANDS/UL (ref 149–390)
PMV BLD AUTO: 10.1 FL (ref 8.9–12.7)
POTASSIUM SERPL-SCNC: 3.7 MMOL/L (ref 3.5–5.3)
PR INTERVAL: 146 MS
PROT SERPL-MCNC: 6.5 G/DL (ref 6.4–8.4)
QRS AXIS: 87 DEGREES
QRSD INTERVAL: 80 MS
QT INTERVAL: 400 MS
QTC INTERVAL: 464 MS
RBC # BLD AUTO: 3 MILLION/UL (ref 3.81–5.12)
SODIUM SERPL-SCNC: 133 MMOL/L (ref 135–147)
T WAVE AXIS: 50 DEGREES
VENTRICULAR RATE: 81 BPM
WBC # BLD AUTO: 5.3 THOUSAND/UL (ref 4.31–10.16)

## 2023-01-17 RX ORDER — ALPRAZOLAM 1 MG/1
1 TABLET ORAL 2 TIMES DAILY PRN
Qty: 60 TABLET | Refills: 0 | Status: SHIPPED | OUTPATIENT
Start: 2023-01-17

## 2023-01-17 RX ADMIN — ANASTROZOLE 1 MG: 1 TABLET, COATED ORAL at 08:09

## 2023-01-17 RX ADMIN — MIDODRINE HYDROCHLORIDE 7.5 MG: 5 TABLET ORAL at 11:52

## 2023-01-17 RX ADMIN — MIDODRINE HYDROCHLORIDE 7.5 MG: 5 TABLET ORAL at 08:05

## 2023-01-17 RX ADMIN — ENOXAPARIN SODIUM 40 MG: 100 INJECTION SUBCUTANEOUS at 08:07

## 2023-01-17 NOTE — TELEPHONE ENCOUNTER
Patients GI provider:  Dr Signe Goltz    Number to return call: [phone]    Reason for call:  Karie SANCHEZ of dermatology called requesting to speak with someone to get medication clearance     Scheduled procedure/appointment date if applicable: Apt/procedure 3-

## 2023-01-17 NOTE — NURSING NOTE
Pt slept less than half of hrly rounds overnight; denied pain, used BR to void qs, SR on the monitor overnight

## 2023-01-17 NOTE — DISCHARGE SUMMARY
[place]  Discharge- Romelia Ends 1960, 60 y o  female MRN: [id number]  Unit/Bed#: -Nites[facility] Encounter: [id number]  Primary Care Provider: Sanjay Gamble PA-C   Date and time admitted to hospital: 1/16/2023  2:40 PM    Weakness  Assessment & Plan  · No SIRS criteria  · UA without infection  · CT CAP without evidence of infection   · COVID/FLU/RSV neg  · Pt feels better today  Screened out of PT  Ductal carcinoma in situ (DCIS) of right breast  Assessment & Plan  · S/P radiation, in remission (treated at [facility])  · Cont anastrazole    Thrombocytopenia ([facility] )  Assessment & Plan  · PLTs 85 on admission, baseline is   · Monitor  On low end of baseline    Hyponatremia  Assessment & Plan  · Na 130 on admission, baseline is 129-133  · SIADH labs pending  · PO fluid restriction  · Monitor BMP    Alcoholic cirrhosis of liver with ascites ([facility] )  Assessment & Plan  · Hepatic US 1/9: Cirrhotic liver with sequelae of portal hypertension again noted  No focal hepatic mass identified  Cholelithiasis without evidence for acute cholecystitis or significant biliary ductal dilatation  Right pleural effusion and perihepatic ascites  · CT A/P: Cirrhosis with mild to moderate ascites and recanalized umbilical vein  Distended gallbladder with gallstones though no pericholecystic inflammatory change  Common bile duct is also mildly dilated though there is no obstruction identified  Correlation with liver enzymes is recommended    · AST slightly elevated however LFTs otherwise unremarkable  · Lipase WNL    · IR attempted paracentesis 1/16 however no ascites to drain  · Continue home diuretics   · Lasix 40 mg daily   · Spironolactone 100 mg daily   · Monitor volume status   · Recommending OP follow up with GI     COPD (chronic obstructive pulmonary disease) ([facility] )  Assessment & Plan  · No acute exacerbation  · Albuterol PRN    Essential hypertension  Assessment & Plan  · Continue home medications:  · Lasix 40 mg daily  · Spironolactone 100 mg daily   · Midodrine 7 5 mg TID     * Pleural effusion  Assessment & Plan  Presented for several days of chills, generalized weakness, poor PO intake, abdominal bloating/discomfort  SOB/PAREKH and chest tightness  · COVID/FLU/RSV pending   · CT chest: Large right pleural effusion with overlying compressive atelectasis  Additional opacity in the right upper and middle lobes also likely represents atelectasis given volume loss  Previously seen groundglass opacity is not visualized and was likely inflammatory though this could be obscured by atelectasis  Follow-up CT following resolution of effusion and atelectasis is recommended  · S/P IR thoracentesis 1/16, 1L removed   · Follow up labs, cultures as outpt, pending  · Repeat Chest XR neg thus far  · SIRS: none, do not suspect underlying infection/pneumonia, monitor off abx   · Procal elevated, repeat in am   · BC x 2 pending   · Echo 7/2077: EF 89%, systolic/diastolic function normal   ·   · Patient satting well on RA, breathing feels improved since thoracentesis   · Encourage IS       Hospital Course:     Prince Romano is a 60 y o  female patient who originally presented to the hospital on   Admission Orders (From admission, onward)     Ordered        01/16/23 1719  Place in Observation  Once                     due to pleural effusion  Patient had thoracentesis  Patient has marked improvement of respiratory status at the time of discharge  Patient has no complaints at the time of discharge  Finalized cultures from the thoracentesis are pending  Patient offered to remain in hospital for culture data, she refused  Please see above list of diagnoses and related plan for additional information       Physical Exam:    GEN: No acute distress, comfortable  HEEENT: No JVD, PERRLA, no scleral icterus  RESP: Lungs clear to auscultation bilaterally  CV: RRR, +s1/s2   ABD: SOFT NON TENDER, POSITIVE BOWEL SOUNDS, NO DISTENTION  PSYCH: CALM  NEURO: Mentation baseline, NO FOCAL DEFICITS  SKIN: NO RASH  EXTREM: NO EDEMA    CONSULTING PROVIDERS   None    PROCEDURES PERFORMED  * No surgery found *    RADIOLOGY RESULTS  XR chest portable    Result Date: 1/16/2023  Narrative: CHEST INDICATION:   tight  COMPARISON:  11/3/2022 EXAM PERFORMED/VIEWS:  XR CHEST PORTABLE Images: 2 FINDINGS: Development of diffuse opacity inferior half right hemithorax a large part of which represents pleural effusion  Underlying infiltrate suspected  Cardiomediastinal silhouette appears unremarkable  No pneumothorax Osseous structures appear within normal limits for patient age  Impression: Development of right hemithorax opacity likely representing pleural effusion and underlying basal infiltrate Workstation performed: [id number]     XR chest pa & lateral    Result Date: 1/17/2023  Narrative: CHEST INDICATION:   pleural effusion evaluation  COMPARISON:  January 16, 2023 EXAM PERFORMED/VIEWS:  XR CHEST PA & LATERAL FINDINGS: Cardiomediastinal silhouette appears unremarkable  Small right pleural effusion, significantly decreased in size when compared with January 16, 2023  Right basilar atelectasis  Left lung is clear  No pneumothorax  Surgical clips over the right chest wall  Osseous structures appear within normal limits for patient age  Impression: Decreasing right pleural effusion  Workstation performed: [id number]     IR paracentesis    Result Date: 1/16/2023  Narrative: PROCEDURE: Focused abdominal ultrasound Procedural Personnel Attending physician(s): Dr María Bass Pre-procedure diagnosis: Cirrhosis Post-procedure diagnosis: Same Indication: Patient with history of recurrent ascites PROCEDURE SUMMARY: - Limited abdominal ultrasound PROCEDURE DETAILS: Initial abdominal ultrasound Initial abdominal ultrasound was performed which showed no ascites   Additional Details Estimated blood loss (mL): None Complications: No immediate complications  Impression: Focused abdominal ultrasound showed no ascites  Paracentesis was not performed  Plan: Resume care by clinical team  Workstation performed: [id number]     IR AMB Paracentesis    Result Date: 12/30/2022  Narrative: PROCEDURE: Therapeutic paracentesis STAFF: CELINA Chaudhry  [address] [place]: Multiple  COMPLICATIONS: None  MEDICATIONS: 1% lidocaine subcutaneous  INDICATION: Symptomatic ascites  PROCEDURE: Using ultrasound guidance, the right paracolic gutter was punctured and a catheter was placed into the peritoneal cavity  A total of 3500 milliliters of fluid was removed  The catheter was then removed  FINDINGS: Moderate ascites  Straw colored ascites was removed  Impression: Therapeutic paracentesis  Workstation performed: [id number]     IR Thoracentesis    Result Date: 1/16/2023  Narrative: PROCEDURE: Ultrasound-guided right thoracentesis Procedural Personnel Attending physician(s): Dr Ashley Lewis Pre-procedure diagnosis: Pleural effusion Post-procedure diagnosis: Same Indication: Right-sided pleural effusion PROCEDURE SUMMARY: - Limited thoracic ultrasound - Ultrasound-guided right thoracentesis PROCEDURE DETAILS: Pre-procedure Consent: Informed consent for the procedure including risks, benefits and alternatives was obtained and time-out was performed prior to the procedure  Preparation: The site was prepared and draped using maximal sterile barrier technique including cutaneous antisepsis  Limited thoracic ultrasound Limited thoracic ultrasound was performed using a curved transducer  A safe window for thoracentesis was identified  Right hemithorax findings: Large pleural effusion Right thoracentesis Local anesthesia was administered  The pleural space was accessed under real-time ultrasound guidance  and fluid return confirmed position  The fluid was drained  The catheter was removed, and a sterile bandage was applied   Catheter placed: 5F Harish Additional Details Specimens removed: Pleural fluid Estimated blood loss (mL): None Complications: No immediate complications  Impression: Ultrasound-guided right thoracentesis with drainage of 1650 mL of clear yellow pleural fluid  Plan: Resume care by clinical team  Workstation performed: [id number]     CT chest abdomen pelvis w contrast    Result Date: 1/16/2023  Narrative: CT CHEST, ABDOMEN AND PELVIS WITH IV CONTRAST INDICATION:   chest pain abdomen pain  COMPARISON:  8/10/2022, 6/2/2022, 12/5/2020 TECHNIQUE: CT examination of the chest, abdomen and pelvis was performed  In addition to portal venous phase postcontrast scanning through the abdomen and pelvis, delayed phase postcontrast scanning was performed through the upper abdominal viscera  Axial, sagittal, and coronal 2D reformatted images were created from the source data and submitted for interpretation  Radiation dose length product (DLP) for this visit:  947 91 mGy-cm   This examination, like all CT scans performed in the [facility], was performed utilizing techniques to minimize radiation dose exposure, including the use of iterative  reconstruction and automated exposure control  IV Contrast:  100 mL of iohexol (OMNIPAQUE) Enteric Contrast: Enteric contrast was not administered  FINDINGS: CHEST LUNGS:  There is compressive atelectasis along the pleural effusion  There is lucency within the consolidation on series 3 images 94 through 100 likely corresponding to subpleural blebs in the posterior right lower lobe seen on prior studies  There is additional consolidation along the medial aspects of the right upper and middle lobes which likely represents atelectasis as there is mild volume loss  There is a focal groundglass opacity in the right upper lobe seen on the prior study  This is not visualized though could be obscured by the atelectasis  There is centrilobular emphysema  There is no tracheal or endobronchial lesion   PLEURA:  There is a large right pleural effusion  HEART/GREAT VESSELS: Heart is unremarkable for patient's age  No thoracic aortic aneurysm  MEDIASTINUM AND DIEGO:  Unremarkable  CHEST WALL AND LOWER NECK:  There are postoperative changes in the right breast with stable seroma  ABDOMEN LIVER/BILIARY TREE:  The liver demonstrates cirrhotic morphology  Within the limitations of this examination there is no evidence of suspicious hepatic mass  The common bile duct is dilated measuring up to 9 mm  This is increased from 2020  No stones  or obstruction are seen  Portal vein is patent  There is a recanalized umbilical vein  GALLBLADDER:  There are gallstone(s) within the distended gallbladder, without pericholecystic inflammatory changes  SPLEEN:  Unremarkable  PANCREAS:  Unremarkable  ADRENAL GLANDS:  Unremarkable  KIDNEYS/URETERS:  Unremarkable  No hydronephrosis  STOMACH AND BOWEL:  Unremarkable  APPENDIX:  No findings to suggest appendicitis  ABDOMINOPELVIC CAVITY:  There is mild to moderate ascites  No pneumoperitoneum  No lymphadenopathy  VESSELS:  Unremarkable for patient's age  PELVIS REPRODUCTIVE ORGANS:  Surgical changes of prior hysterectomy  URINARY BLADDER:  Unremarkable  ABDOMINAL WALL/INGUINAL REGIONS:  Unremarkable  OSSEOUS STRUCTURES:  No acute fracture or destructive osseous lesion  Impression: 1  Large right pleural effusion with overlying compressive atelectasis  Additional opacity in the right upper and middle lobes also likely represents atelectasis given volume loss  Previously seen groundglass opacity is not visualized and was likely inflammatory though this could be obscured by atelectasis  Follow-up CT following resolution of effusion and atelectasis is recommended  2   Cirrhosis with mild to moderate ascites and recanalized umbilical vein  3   Distended gallbladder with gallstones though no pericholecystic inflammatory change    Common bile duct is also mildly dilated though there is no obstruction identified  Correlation with liver enzymes is recommended  The study was marked in [facility] for immediate notification  Workstation performed: [id number]     US abdomen complete with doppler    Result Date: 1/12/2023  Narrative: ABDOMEN ULTRASOUND, COMPLETE WITH DOPPLER INDICATION:    K70 31: Alcoholic cirrhosis of liver with ascites   COMPARISON:  11/21/2022 TECHNIQUE:   Real-time ultrasound of the abdomen was performed with a curvilinear transducer with both volumetric sweeps and still imaging techniques  FINDINGS: PANCREAS:  Visualized portions of the pancreas are within normal limits  AORTA AND IVC:  Visualized portions are normal for patient age  LIVER: Size:  Within upper limits of normal range  The liver measures 16 7 cm in the midclavicular line  Contour:  Surface contour is nodular  Parenchyma: There is diffuse coarsened heterogeneous echotexture suggesting underlying cirrhotic changes  No liver mass identified  LIVER DOPPLER: The main portal vein and primary branch segments are patent and hepatopetal with normal spectral waveform  Hepatic veins are patent  Spectral waveforms within normal limits  Main hepatic artery appears normal size, patent with normal spectral waveform  Recanalized umbilical vein indicating sequelae of portal hypertension  Incidentally noted right pleural effusion and moderate to large amount of perihepatic ascites  BILIARY: The gallbladder is normal in caliber  No wall thickening or pericholecystic fluid  Shadowing gallstone(s) identified  No sonographic Head's sign  No intrahepatic biliary dilatation  CBD measures 6 0 mm  No choledocholithiasis  KIDNEY: Right kidney measures 9 6 x 5 0 x 4 4 cm  Volume 110 5 mL Left kidney measures 10 6 x 3 7 x 4 4 cm  Volume 90 1 mL SPLEEN: Measures 10 8 x 10 6 x 4 3 cm  Volume 257 7 mL Within normal limits  ASCITES:  None  Impression: Cirrhotic liver with sequelae of portal hypertension again noted  No focal hepatic mass identified  Cholelithiasis without evidence for acute cholecystitis or significant biliary ductal dilatation  Right pleural effusion and perihepatic ascites  No significant interval change compared to the prior study  Workstation performed: [id number]     Echo complete w/ contrast if indicated    Result Date: 1/9/2023  Narrative: •  Left Ventricle: Left ventricular cavity size is normal  Wall thickness is normal  The left ventricular ejection fraction is 56% by biplane measurement  Systolic function is normal  Although no diagnostic regional wall motion abnormality was identified, this possibility cannot be completely excluded on the basis of this study  Diastolic function is normal  •  Right Ventricle: Right ventricular cavity size is normal  Systolic function is normal  •  Left Atrium: The atrium is normal in size  •  Right Atrium: The atrium is normal in size  •  Mitral Valve: There is trace regurgitation  •  Tricuspid Valve: There is trace regurgitation  •  Pericardium: Ascites is present  LABS  Results from last 7 days   Lab Units 01/17/23 0444 01/16/23  1522 01/16/23  1436 01/11/23  1247   WBC Thousand/uL 5 30  --  6 66  --    WHITE BLOOD CELL COUNT  x10E3/uL  --   --   --  5 4   HEMOGLOBIN g/dL 10 0*  --  13 7  --    HEMOGLOBIN  g/dL  --   --   --  12 9   HEMATOCRIT % 28 6*  --  39 2  --    HEMATOCRIT  %  --   --   --  37 0   MCV fL 95  --  95  --    MCV   fL  --   --   --  94   PLATELETS Thousands/uL 66*  --  85*  --    PLATELETS  D43F7/ZZ  --   --   --  121*   INR   --  1 46*  --  1 4*     Results from last 7 days   Lab Units 01/17/23 0444 01/16/23  1436 01/11/23  1247   SODIUM mmol/L 133* 130* 132*   POTASSIUM mmol/L 3 7 3 8 4 5   CHLORIDE mmol/L 103 99 97   CO2 mmol/L 23 22 27   BUN mg/dL 18 25 16   CREATININE mg/dL 0 86 1 12 0 86   CALCIUM mg/dL 7 8* 8 1*  --    ALBUMIN g/dL 2 5* 2 0* 2 9*   TOTAL BILIRUBIN mg/dL 0 80 0 80 1 1   ALK PHOS U/L 73 112  --    ALT U/L 21 32 25   AST U/L 37 54* 48*   EGFR ml/min/1 73sq m 72 52 76   GLUCOSE RANDOM mg/dL 83 105 76     Results from last 7 days   Lab Units 01/16/23  2031 01/16/23  1746 01/16/23  1522   HS TNI 0HR ng/L  --   --  18   HS TNI 2HR ng/L  --  21  --    HS TNI 4HR ng/L 25  --   --      Results from last 7 days   Lab Units 01/16/23  1436   NT-PRO BNP pg/mL 162*                      Results from last 7 days   Lab Units 01/16/23  1522 01/16/23  1436   LACTIC ACID mmol/L 1 7  --    PROCALCITONIN ng/ml  --  0 35*           Cultures:   Results from last 7 days   Lab Units 01/16/23  1611   COLOR UA  Yellow   CLARITY UA  Slightly Cloudy   SPEC GRAV UA  1 020   PH UA  6 0   LEUKOCYTES UA  Negative   NITRITE UA  Negative   GLUCOSE UA mg/dl Negative   KETONES UA mg/dl Negative   BILIRUBIN UA  Negative   BLOOD UA  Trace*      Results from last 7 days   Lab Units 01/16/23  1611   RBC UA /hpf 2-4   WBC UA /hpf None Seen   EPITHELIAL CELLS WET PREP /hpf Occasional   BACTERIA UA /hpf Occasional      Results from last 7 days   Lab Units 01/16/23  1751 01/16/23  1657 01/16/23  1522   BLOOD CULTURE   --   --  Received in Microbiology Lab  Culture in Progress  Received in Microbiology Lab  Culture in Progress  BODY FLUID CULTURE, STERILE   --  No growth  --    INFLUENZA A PCR  Negative  --   --          Condition at Discharge:  good      Discharge instructions/Information to patient and family:   See after visit summary for information provided to patient and family  Provisions for Follow-Up Care:  See after visit summary for information related to follow-up care and any pertinent home health orders  Disposition:     Home       Discharge Statement:  I spent 35 minutes discharging the patient  This time was spent on the day of discharge  I had direct contact with the patient on the day of discharge   Greater than 50% of the total time was spent examining patient, answering all patient questions, arranging and discussing plan of care with patient as well as directly providing post-discharge instructions  Additional time then spent on discharge activities  Discharge Medications:  See after visit summary for reconciled discharge medications provided to patient and family        ** Please Note: This note has been constructed using a voice recognition system **

## 2023-01-17 NOTE — TELEPHONE ENCOUNTER
Clerical -patient is scheduled with Dr Luca Cutler 3/16/23   Please contact patient to schedule sooner appt as per provider request   Thank you

## 2023-01-17 NOTE — TELEPHONE ENCOUNTER
Called patient, scheduled sooner appt in [address] [place] [address] [address] for Thursday 1/19 at 9 AM with Dr Kelly Langford  (appt is rescheduled from 3/16 to 1/19)

## 2023-01-17 NOTE — NURSING NOTE
Reviewed d/c instructions with pt  Pt  Was d/c'd to home and transported by her son  Pt  Taken to lobby via [facility] by RN

## 2023-01-17 NOTE — ASSESSMENT & PLAN NOTE
· No SIRS criteria  · UA without infection  · CT CAP without evidence of infection   · COVID/FLU/RSV neg  · Pt feels better today   Screened out of PT

## 2023-01-17 NOTE — PROGRESS NOTES
HEPATOLOGY/IR MULTIDISCIPLINARY CASE REVIEW    DATE: 1/16/2023    PRESENTING PHYSICIAN: Dr Shira Ramachandran    DIAGNOSIS: Decompensated cirrhosis secondary to alcohol use disorder d/b ascites, non bleeding EV  Martine Kennedy was presented at the Hepatology/IR Multidisciplinary Conference today  PHYSICIAN RECOMMENDED PLAN:    -Schedule office visit -Dr Heri Mata contrast study  -Re discuss at future portal hypertension meeting    Team agreed to plan  The final treatment plan will be left to the discretion of the patient and the treating physician  DISCLAIMERS:   TO THE TREATING PHYSICIAN: This conference is a meeting of clinicians from various specialty areas who evaluate and discuss patients for whom a multidisciplinary treatment approach is being considered  Please note that the above opinion was a consensus of the conference attendees and is intended only to assist in quality care of the discussed patient  The responsibility for follow up on the input given during the conference, along with any final decisions regarding plan of care, is that of the patient and the patient's provider  Accordingly, appointments have only been recommended based on this information and have NOT been scheduled unless otherwise noted  TO THE PATIENT: This summary is a brief record of major aspects of your treatment  You may choose to share a copy with any of your doctors or nurses  However, this is not a detailed or comprehensive record of your care

## 2023-01-17 NOTE — UTILIZATION REVIEW
Initial Clinical Review    Admission: Date/Time/Statement:  1/16/23 1719 observation   Admission Orders (From admission, onward)     Ordered        01/16/23 1719  Place in Observation  Once                      Orders Placed This Encounter   Procedures   • Place in Observation     Standing Status:   Standing     Number of Occurrences:   1     Order Specific Question:   Level of Care     Answer:   Med Surg [16]     ED Arrival Information     Expected   -    Arrival   1/16/2023 13:44    Acuity   Emergent            Means of arrival   Walk-In    Escorted by   Spouse    Service   Hospitalist    Admission type   Emergency            Arrival complaint   ab pain             Chief Complaint   Patient presents with   • Abdominal Pain     Pt to ED c/o abd pain for about a week  Pt states she is having some SOB for about a week  Pt states her abd feels tight and bloated and having some increased weakness  Initial Presentation: 60 y o  female from home to ED admitted to observation due to Pleural Effusion/alcoholic cirrhosis of liver with ascites/Weakness  Presented due to chest tightness and shortness of breath worse with exertion starting week prior to arrival  + chills, weakness  Yesterday with diarrhea and episode of vomiting  Poor intake  On exam: appears in distress  Lungs decreased breath sounds  Generalized abdominal tenderness  Procalcitonin 0 35  NT-proBNP 162  INR 1 46  Platelets 85  Na 130  Ct chest showed large right effusion with atelectasis  Cirrhosis with ascites  Distended gallbladder  Plan is consult IR for paracentesis and thoracentesis  Monitor oxygen sats  Encourage incentive spirometry  Continue home Lasix and spirolactone, OP follow up with GI  Check ammonia  Fluid restriction and telemetry in progress  Procedure 1/16/23 IR - Right thoracentesis - 1650ml of clear yellow fluid removed  2  Focused US of abdomen  Findings:  Successful right thoracentesis    2  Focused abdominal US showed no ascites  Paracentesis was not performed  ED Triage Vitals   Temperature Pulse Respirations Blood Pressure SpO2   01/16/23 1426 01/16/23 1427 01/16/23 1427 01/16/23 1427 01/16/23 1427   97 8 °F (36 6 °C) 90 18 116/60 97 %      Temp Source Heart Rate Source Patient Position - Orthostatic VS BP Location FiO2 (%)   01/16/23 1426 01/16/23 1427 01/16/23 1427 01/16/23 1427 --   Temporal Monitor Sitting Left arm       Pain Score       01/16/23 1427       7          Wt Readings from Last 1 Encounters:   01/17/23 61 4 kg (135 lb 4 8 oz)     Additional Vital Signs:   01/16/23 22:10:30 97 7 °F (36 5 °C) 76 16 133/55 81 96 % -- -- -- --   01/16/23 18:36:19 97 1 °F (36 2 °C) Abnormal  77 19 126/70 89 94 % -- -- -- --   01/16/23 1722 -- 85 19 112/55 -- 98 % 24 1 L/min Nasal cannula      Pertinent Labs/Diagnostic Test Results:   IR paracentesis   Final Result by Lizeth Lemus MD (01/16 1712)      Focused abdominal ultrasound showed no ascites  Paracentesis was not performed  Plan:       Resume care by clinical team       Workstation performed: [id number]         IR Thoracentesis   Final Result by Lizteh Lemus MD (01/16 1710)      Ultrasound-guided right thoracentesis with drainage of 1650 mL of clear yellow pleural fluid  Plan:       Resume care by clinical team       Workstation performed: [id number]         CT chest abdomen pelvis w contrast   Final Result by Nilo Mendez MD (01/16 1648)      1  Large right pleural effusion with overlying compressive atelectasis  Additional opacity in the right upper and middle lobes also likely represents atelectasis given volume loss  Previously seen groundglass opacity is not visualized and was likely    inflammatory though this could be obscured by atelectasis  Follow-up CT following resolution of effusion and atelectasis is recommended  2   Cirrhosis with mild to moderate ascites and recanalized umbilical vein        3   Distended gallbladder with gallstones though no pericholecystic inflammatory change  Common bile duct is also mildly dilated though there is no obstruction identified  Correlation with liver enzymes is recommended  The study was marked in [facility] for immediate notification        Workstation performed: [id number]         XR chest portable   ED Interpretation by Charles Calle DO (01/16 1536)   Right pleural effusion        Final Result by Josef Smith MD (01/16 1821)      Development of right hemithorax opacity likely representing pleural effusion and underlying basal infiltrate                  Workstation performed: [id number]         CT chest wo contrast    (Results Pending)       1/16/23 ecg Interpretation: non-specific    Rate:     ECG rate:  81    ECG rate assessment: normal    Rhythm:     Rhythm: sinus rhythm    Ectopy:     Ectopy: none    QRS:     QRS axis:  Normal    QRS intervals:  Normal  Conduction:     Conduction: normal    ST segments:     ST segments:  Normal  T waves:     T waves: non-specific, flattening and inverted      Inverted:  V3  Other findings:     Other findings: prolonged qTc interval   Results from last 7 days   Lab Units 01/16/23  1751   SARS-COV-2  Negative     Results from last 7 days   Lab Units 01/17/23  0444 01/16/23  1436 01/11/23  1247   WBC Thousand/uL 5 30 6 66  --    WHITE BLOOD CELL COUNT  x10E3/uL  --   --  5 4   HEMOGLOBIN g/dL 10 0* 13 7  --    HEMOGLOBIN  g/dL  --   --  12 9   HEMATOCRIT % 28 6* 39 2  --    HEMATOCRIT  %  --   --  37 0   PLATELETS Thousands/uL 66* 85*  --    PLATELETS  G16E9/RC  --   --  121*   NEUTROS ABS Thousands/µL 2 80 4 06  --      Results from last 7 days   Lab Units 01/17/23  0444 01/16/23  1436 01/11/23  1247   SODIUM mmol/L 133* 130* 132*   POTASSIUM mmol/L 3 7 3 8 4 5   CHLORIDE mmol/L 103 99 97   CO2 mmol/L 23 22 27   ANION GAP mmol/L 7 9  --    BUN mg/dL 18 25 16   CREATININE mg/dL 0 86 1 12 0 86   EGFR ml/min/1 73sq m 72 52 76   CALCIUM mg/dL 7 8* 8 1*  --    MAGNESIUM mg/dL  --  1 7  --      Results from last 7 days   Lab Units 01/17/23  0444 01/16/23  2031 01/16/23  1436 01/11/23  1247   AST U/L 37  --  54* 48*   ALT U/L 21  --  32 25   ALK PHOS U/L 73  --  112  --    TOTAL PROTEIN g/dL 6 5  --  8 3 8 0   ALBUMIN g/dL 2 5*  --  2 0* 2 9*   TOTAL BILIRUBIN mg/dL 0 80  --  0 80 1 1   AMMONIA umol/L  --  16  --   --      Results from last 7 days   Lab Units 01/17/23  0444 01/16/23  1436 01/11/23  1247   GLUCOSE RANDOM mg/dL 83 105 76     Results from last 7 days   Lab Units 01/16/23  2031 01/16/23  1746 01/16/23  1522   HS TNI 0HR ng/L  --   --  18   HS TNI 2HR ng/L  --  21  --    HSTNI D2 ng/L  --  3  --    HS TNI 4HR ng/L 25  --   --    HSTNI D4 ng/L 7  --   --      Results from last 7 days   Lab Units 01/16/23  1522 01/11/23  1247   PROTIME seconds 18 7* 14 3*   INR  1 46* 1 4*   PTT seconds 34  --      Results from last 7 days   Lab Units 01/16/23  1436   PROCALCITONIN ng/ml 0 35*     Results from last 7 days   Lab Units 01/16/23  1522   LACTIC ACID mmol/L 1 7     Results from last 7 days   Lab Units 01/16/23  1436   NT-PRO BNP pg/mL 162*     Results from last 7 days   Lab Units 01/16/23  1436   LIPASE u/L 173     Results from last 7 days   Lab Units 01/16/23  1611   CLARITY UA  Slightly Cloudy   COLOR UA  Yellow   SPEC GRAV UA  1 020   PH UA  6 0   GLUCOSE UA mg/dl Negative   KETONES UA mg/dl Negative   BLOOD UA  Trace*   PROTEIN UA mg/dl Negative   NITRITE UA  Negative   BILIRUBIN UA  Negative   UROBILINOGEN UA (BE) mg/dl <2 0   LEUKOCYTES UA  Negative   WBC UA /hpf None Seen   RBC UA /hpf 2-4   BACTERIA UA /hpf Occasional   EPITHELIAL CELLS WET PREP /hpf Occasional     Results from last 7 days   Lab Units 01/16/23  1751   INFLUENZA A PCR  Negative   INFLUENZA B PCR  Negative   RSV PCR  Negative     Results from last 7 days   Lab Units 01/16/23  1522   BLOOD CULTURE  Received in Microbiology Lab  Culture in Progress    Received in Microbiology Lab  Culture in Progress  Results from last 7 days   Lab Units 01/16/23  1657   TOTAL COUNTED  100   WBC FLUID /ul 496       ED Treatment:   Medication Administration from 01/16/2023 1344 to 01/16/2023 1807       Date/Time Order Dose Route Action Comments     01/16/2023 1654 EST lidocaine (PF) (XYLOCAINE-MPF) 1 % injection 8 mL Infiltration Given --        Past Medical History:   Diagnosis Date   • Alcoholic fatty liver    • Anxiety    • Asthma    • COPD (chronic obstructive pulmonary disease) ([facility] [address] )    • Elevated liver function tests    • GERD (gastroesophageal reflux disease)    • GERD without esophagitis    • Hyperlipidemia    • Hypertension    • IBS (irritable bowel syndrome)    • Insomnia    • Insomnia    • Liver disease    • Nervousness    • Nicotine dependence    • Other specified urinary incontinence    • RLS (restless legs syndrome)    • Wears glasses      Present on Admission:  • Essential hypertension  • Hyponatremia  • Thrombocytopenia (HCC)  • Pleural effusion  • Alcoholic cirrhosis of liver with ascites (HCC)  • Weakness  • COPD (chronic obstructive pulmonary disease) (HCC)  • Ductal carcinoma in situ (DCIS) of right breast      Admitting Diagnosis: Cholelithiasis [K80 20]  Chest tightness [R07 89]  Abdominal pain [R10 9]  Pleural effusion on right [J90]  Age/Sex: 60 y o  female  Admission Orders:  Scheduled Medications:  anastrozole, 1 mg, Oral, Daily  enoxaparin, 40 mg, Subcutaneous, Daily  furosemide, 40 mg, Oral, Daily  midodrine, 7 5 mg, Oral, TID AC  nicotine, 1 patch, Transdermal, Daily  spironolactone, 100 mg, Oral, Daily      Continuous IV Infusions:     PRN Meds:  albuterol, 2 puff, Inhalation, Q6H PRN  ALPRAZolam, 1 mg, Oral, HS PRN  ondansetron, 4 mg, Intravenous, Q6H PRN    Incentive spirometry  Telemetry  Continuous pulse oximetry   Fluid restriction         Network Utilization Review Department  ATTENTION: Please call with any questions or concerns to [phone] and carefully listen to the prompts so that you are directed to the right person  All voicemails are confidential   Nga Ross all requests for admission clinical reviews, approved or denied determinations and any other requests to dedicated fax number below belonging to the campus where the patient is receiving treatment   List of dedicated fax numbers for the Facilities:  [address] DENIALS (Administrative/Medical Necessity) [phone]   [address] (Maternity/NICU/Pediatrics) [phone]   [address] [phone]   [facility] [phone]   [address] 28 [phone]   1557 [facility] jacob [facility] 134 815 [facility] [phone]

## 2023-01-17 NOTE — PLAN OF CARE
Problem: Potential for Falls  Goal: Patient will remain free of falls  Description: INTERVENTIONS:  - Educate patient/family on patient safety including physical limitations  - Instruct patient to call for assistance with activity   - Consult OT/PT to assist with strengthening/mobility   - Keep Call bell within reach  - Keep bed low and locked with side rails adjusted as appropriate  - Keep care items and personal belongings within reach  - Initiate and maintain comfort rounds  - Make Fall Risk Sign visible to staff  - Offer Toileting every x Hours, in advance of need  - Initiate/Maintain xalarm  - Obtain necessary fall risk management equipment: x  - Apply yellow socks and bracelet for high fall risk patients  - Consider moving patient to room near nurses station  1/17/2023 1322 by Lino Aldrich RN  Outcome: Adequate for Discharge  1/17/2023 1142 by Lino Aldrich RN  Outcome: Progressing     Problem: Nutrition/Hydration-ADULT  Goal: Nutrient/Hydration intake appropriate for improving, restoring or maintaining nutritional needs  Description: Monitor and assess patient's nutrition/hydration status for malnutrition  Collaborate with interdisciplinary team and initiate plan and interventions as ordered  Monitor patient's weight and dietary intake as ordered or per policy  Utilize nutrition screening tool and intervene as necessary  Determine patient's food preferences and provide high-protein, high-caloric foods as appropriate       INTERVENTIONS:  - Monitor oral intake, urinary output, labs, and treatment plans  - Assess nutrition and hydration status and recommend course of action  - Evaluate amount of meals eaten  - Assist patient with eating if necessary   - Allow adequate time for meals  - Recommend/ encourage appropriate diets, oral nutritional supplements, and vitamin/mineral supplements  - Order, calculate, and assess calorie counts as needed  - Recommend, monitor, and adjust tube feedings and TPN/PPN based on assessed needs  - Assess need for intravenous fluids  - Provide specific nutrition/hydration education as appropriate  - Include patient/family/caregiver in decisions related to nutrition  1/17/2023 1322 by Sreedhar Parr RN  Outcome: Adequate for Discharge  1/17/2023 1142 by Sreedhar Parr RN  Outcome: Progressing     Problem: PAIN - ADULT  Goal: Verbalizes/displays adequate comfort level or baseline comfort level  Description: Interventions:  - Encourage patient to monitor pain and request assistance  - Assess pain using appropriate pain scale  - Administer analgesics based on type and severity of pain and evaluate response  - Implement non-pharmacological measures as appropriate and evaluate response  - Consider cultural and social influences on pain and pain management  - Notify physician/advanced practitioner if interventions unsuccessful or patient reports new pain  1/17/2023 1322 by Sreedhar Parr RN  Outcome: Adequate for Discharge  1/17/2023 1142 by Sreedhar Parr RN  Outcome: Progressing     Problem: INFECTION - ADULT  Goal: Absence or prevention of progression during hospitalization  Description: INTERVENTIONS:  - Assess and monitor for signs and symptoms of infection  - Monitor lab/diagnostic results  - Monitor all insertion sites, i e  indwelling lines, tubes, and drains  - Monitor endotracheal if appropriate and nasal secretions for changes in amount and color  - [place] appropriate cooling/warming therapies per order  - Administer medications as ordered  - Instruct and encourage patient and family to use good hand hygiene technique  - Identify and instruct in appropriate isolation precautions for identified infection/condition  1/17/2023 1322 by Sreedhar Parr RN  Outcome: Adequate for Discharge  1/17/2023 1142 by Sreedhar Parr RN  Outcome: Progressing     Problem: SAFETY ADULT  Goal: Patient will remain free of falls  Description: INTERVENTIONS:  - Educate patient/family on patient safety including physical limitations  - Instruct patient to call for assistance with activity   - Consult OT/PT to assist with strengthening/mobility   - Keep Call bell within reach  - Keep bed low and locked with side rails adjusted as appropriate  - Keep care items and personal belongings within reach  - Initiate and maintain comfort rounds  - Make Fall Risk Sign visible to staff  - Offer Toileting every x Hours, in advance of need  - Initiate/Maintain xalarm  - Obtain necessary fall risk management equipment: x  - Apply yellow socks and bracelet for high fall risk patients  - Consider moving patient to room near nurses station  1/17/2023 1322 by Lino Aldrich RN  Outcome: Adequate for Discharge  1/17/2023 1142 by Lino Aldrich RN  Outcome: Progressing     Problem: DISCHARGE PLANNING  Goal: Discharge to home or other facility with appropriate resources  Description: INTERVENTIONS:  - Identify barriers to discharge w/patient and caregiver  - Arrange for needed discharge resources and transportation as appropriate  - Identify discharge learning needs (meds, wound care, etc )  - Arrange for interpretive services to assist at discharge as needed  - Refer to Case Management Department for coordinating discharge planning if the patient needs post-hospital services based on physician/advanced practitioner order or complex needs related to functional status, cognitive ability, or social support system  1/17/2023 1322 by Lino Aldrich RN  Outcome: Adequate for Discharge  1/17/2023 1142 by iLno Aldrich RN  Outcome: Progressing     Problem: Knowledge Deficit  Goal: Patient/family/caregiver demonstrates understanding of disease process, treatment plan, medications, and discharge instructions  Description: Complete learning assessment and assess knowledge base    Interventions:  - Provide teaching at level of understanding  - Provide teaching via preferred learning methods  1/17/2023 1322 by Katelyn Harman RN  Outcome: Adequate for Discharge  1/17/2023 1142 by Katelyn Harman RN  Outcome: Progressing     Problem: CARDIOVASCULAR - ADULT  Goal: Maintains optimal cardiac output and hemodynamic stability  Description: INTERVENTIONS:  - Monitor I/O, vital signs and rhythm  - Monitor for S/S and trends of decreased cardiac output  - Administer and titrate ordered vasoactive medications to optimize hemodynamic stability  - Assess quality of pulses, skin color and temperature  - Assess for signs of decreased coronary artery perfusion  - Instruct patient to report change in severity of symptoms  1/17/2023 1322 by Katelyn Harman RN  Outcome: Adequate for Discharge  1/17/2023 1142 by Katelyn Harman RN  Outcome: Progressing     Problem: RESPIRATORY - ADULT  Goal: Achieves optimal ventilation and oxygenation  Description: INTERVENTIONS:  - Assess for changes in respiratory status  - Assess for changes in mentation and behavior  - Position to facilitate oxygenation and minimize respiratory effort  - Oxygen administered by appropriate delivery if ordered  - Initiate smoking cessation education as indicated  - Encourage broncho-pulmonary hygiene including cough, deep breathe, Incentive Spirometry  - Assess the need for suctioning and aspirate as needed  - Assess and instruct to report SOB or any respiratory difficulty  - Respiratory Therapy support as indicated  1/17/2023 1322 by Katleyn Harman RN  Outcome: Adequate for Discharge  1/17/2023 1142 by Katelyn Harman RN  Outcome: Progressing     Problem: METABOLIC, FLUID AND ELECTROLYTES - ADULT  Goal: Electrolytes maintained within normal limits  Description: INTERVENTIONS:  - Monitor labs and assess patient for signs and symptoms of electrolyte imbalances  - Administer electrolyte replacement as ordered  - Monitor response to electrolyte replacements, including repeat lab results as appropriate  - Instruct patient on fluid and nutrition as appropriate  1/17/2023 1322 by Lina Agrawal RN  Outcome: Adequate for Discharge  1/17/2023 1142 by Lina Agrawal RN  Outcome: Progressing  Goal: Fluid balance maintained  Description: INTERVENTIONS:  - Monitor labs   - Monitor I/O and WT  - Instruct patient on fluid and nutrition as appropriate  - Assess for signs & symptoms of volume excess or deficit  1/17/2023 1322 by Lina Agrawal RN  Outcome: Adequate for Discharge  1/17/2023 1142 by Lina Agrawal RN  Outcome: Progressing     Problem: SKIN/TISSUE INTEGRITY - ADULT  Goal: Skin Integrity remains intact(Skin Breakdown Prevention)  Description: Assess:  -Perform Ramon assessment every x  -Clean and moisturize skin every x  -Inspect skin when repositioning, toileting, and assisting with ADLS  -Assess under medical devices such as x every x  -Assess extremities for adequate circulation and sensation     Bed Management:  -Have minimal linens on bed & keep smooth, unwrinkled  -Change linens as needed when moist or perspiring  -Avoid sitting or lying in one position for more than x hours while in bed  -[facility] at [facility]     Toileting:  -Offer bedside commode  -Assess for incontinence every x  -Use incontinent care products after each incontinent episode such as x    Activity:  -Mobilize patient x times a day  -Encourage activity and walks on unit  -Encourage or provide ROM exercises   -Turn and reposition patient every x Hours  -Use appropriate equipment to lift or move patient in bed  -Instruct/ Assist with weight shifting every x when out of bed in chair  -Consider limitation of chair time x hour intervals    Skin Care:  -Avoid use of baby powder, tape, friction and shearing, hot water or constrictive clothing  -Relieve pressure over bony prominences using x  -Do not massage red bony areas    Next Steps:  -Teach patient strategies to minimize risks such as x   -Consider consults to  interdisciplinary teams such as xxxxxxx  1/17/2023 1322 by Daniel Oglesby RN  Outcome: Adequate for Discharge  1/17/2023 1142 by Daniel Oglesby RN  Outcome: Progressing     Problem: HEMATOLOGIC - ADULT  Goal: Maintains hematologic stability  Description: INTERVENTIONS  - Assess for signs and symptoms of bleeding or hemorrhage  - Monitor labs  - Administer supportive blood products/factors as ordered and appropriate  1/17/2023 1322 by Daniel Oglesby RN  Outcome: Adequate for Discharge  1/17/2023 1142 by Daniel Oglesby RN  Outcome: Progressing     Problem: MUSCULOSKELETAL - ADULT  Goal: Maintain or return mobility to safest level of function  Description: INTERVENTIONS:  - Assess patient's ability to carry out ADLs; assess patient's baseline for ADL function and identify physical deficits which impact ability to perform ADLs (bathing, care of mouth/teeth, toileting, grooming, dressing, etc )  - Assess/evaluate cause of self-care deficits   - Assess range of motion  - Assess patient's mobility  - Assess patient's need for assistive devices and provide as appropriate  - Encourage maximum independence but intervene and supervise when necessary  - Involve family in performance of ADLs  - Assess for home care needs following discharge   - Consider OT consult to assist with ADL evaluation and planning for discharge  - Provide patient education as appropriate  1/17/2023 1322 by Daniel Oglesby RN  Outcome: Adequate for Discharge  1/17/2023 1142 by Daniel Oglesby RN  Outcome: Progressing     Problem: Prexisting or High Potential for Compromised Skin Integrity  Goal: Skin integrity is maintained or improved  Description: INTERVENTIONS:  - Identify patients at risk for skin breakdown  - Assess and monitor skin integrity  - Assess and monitor nutrition and hydration status  - Monitor labs   - Assess for incontinence   - Turn and reposition patient  - Assist with mobility/ambulation  - Relieve pressure over bony prominences  - Avoid friction and shearing  - Provide appropriate hygiene as needed including keeping skin clean and dry  - Evaluate need for skin moisturizer/barrier cream  - Collaborate with interdisciplinary team   - Patient/family teaching  - Consider wound care consult   1/17/2023 1322 by Michelle Chu RN  Outcome: Adequate for Discharge  1/17/2023 1142 by Michelle Chu RN  Outcome: Progressing

## 2023-01-17 NOTE — PLAN OF CARE
Problem: Potential for Falls  Goal: Patient will remain free of falls  Description: INTERVENTIONS:  - Educate patient/family on patient safety including physical limitations  - Instruct patient to call for assistance with activity   - Consult OT/PT to assist with strengthening/mobility   - Keep Call bell within reach  - Keep bed low and locked with side rails adjusted as appropriate  - Keep care items and personal belongings within reach  - Initiate and maintain comfort rounds  - Make Fall Risk Sign visible to staff  - Offer Toileting every x Hours, in advance of need  - Initiate/Maintain xalarm  - Obtain necessary fall risk management equipment: x  - Apply yellow socks and bracelet for high fall risk patients  - Consider moving patient to room near nurses station  Outcome: Progressing     Problem: Nutrition/Hydration-ADULT  Goal: Nutrient/Hydration intake appropriate for improving, restoring or maintaining nutritional needs  Description: Monitor and assess patient's nutrition/hydration status for malnutrition  Collaborate with interdisciplinary team and initiate plan and interventions as ordered  Monitor patient's weight and dietary intake as ordered or per policy  Utilize nutrition screening tool and intervene as necessary  Determine patient's food preferences and provide high-protein, high-caloric foods as appropriate       INTERVENTIONS:  - Monitor oral intake, urinary output, labs, and treatment plans  - Assess nutrition and hydration status and recommend course of action  - Evaluate amount of meals eaten  - Assist patient with eating if necessary   - Allow adequate time for meals  - Recommend/ encourage appropriate diets, oral nutritional supplements, and vitamin/mineral supplements  - Order, calculate, and assess calorie counts as needed  - Recommend, monitor, and adjust tube feedings and TPN/PPN based on assessed needs  - Assess need for intravenous fluids  - Provide specific nutrition/hydration education as appropriate  - Include patient/family/caregiver in decisions related to nutrition  Outcome: Progressing     Problem: PAIN - ADULT  Goal: Verbalizes/displays adequate comfort level or baseline comfort level  Description: Interventions:  - Encourage patient to monitor pain and request assistance  - Assess pain using appropriate pain scale  - Administer analgesics based on type and severity of pain and evaluate response  - Implement non-pharmacological measures as appropriate and evaluate response  - Consider cultural and social influences on pain and pain management  - Notify physician/advanced practitioner if interventions unsuccessful or patient reports new pain  Outcome: Progressing     Problem: INFECTION - ADULT  Goal: Absence or prevention of progression during hospitalization  Description: INTERVENTIONS:  - Assess and monitor for signs and symptoms of infection  - Monitor lab/diagnostic results  - Monitor all insertion sites, i e  indwelling lines, tubes, and drains  - Monitor endotracheal if appropriate and nasal secretions for changes in amount and color  - Fisher appropriate cooling/warming therapies per order  - Administer medications as ordered  - Instruct and encourage patient and family to use good hand hygiene technique  - Identify and instruct in appropriate isolation precautions for identified infection/condition  Outcome: Progressing     Problem: SAFETY ADULT  Goal: Patient will remain free of falls  Description: INTERVENTIONS:  - Educate patient/family on patient safety including physical limitations  - Instruct patient to call for assistance with activity   - Consult OT/PT to assist with strengthening/mobility   - Keep Call bell within reach  - Keep bed low and locked with side rails adjusted as appropriate  - Keep care items and personal belongings within reach  - Initiate and maintain comfort rounds  - Make Fall Risk Sign visible to staff  - Offer Toileting every x Hours, in advance of need  - Initiate/Maintain xalarm  - Obtain necessary fall risk management equipment: x  - Apply yellow socks and bracelet for high fall risk patients  - Consider moving patient to room near nurses station  Outcome: Progressing     Problem: DISCHARGE PLANNING  Goal: Discharge to home or other facility with appropriate resources  Description: INTERVENTIONS:  - Identify barriers to discharge w/patient and caregiver  - Arrange for needed discharge resources and transportation as appropriate  - Identify discharge learning needs (meds, wound care, etc )  - Arrange for interpretive services to assist at discharge as needed  - Refer to Case Management Department for coordinating discharge planning if the patient needs post-hospital services based on physician/advanced practitioner order or complex needs related to functional status, cognitive ability, or social support system  Outcome: Progressing     Problem: Knowledge Deficit  Goal: Patient/family/caregiver demonstrates understanding of disease process, treatment plan, medications, and discharge instructions  Description: Complete learning assessment and assess knowledge base    Interventions:  - Provide teaching at level of understanding  - Provide teaching via preferred learning methods  Outcome: Progressing     Problem: CARDIOVASCULAR - ADULT  Goal: Maintains optimal cardiac output and hemodynamic stability  Description: INTERVENTIONS:  - Monitor I/O, vital signs and rhythm  - Monitor for S/S and trends of decreased cardiac output  - Administer and titrate ordered vasoactive medications to optimize hemodynamic stability  - Assess quality of pulses, skin color and temperature  - Assess for signs of decreased coronary artery perfusion  - Instruct patient to report change in severity of symptoms  Outcome: Progressing     Problem: RESPIRATORY - ADULT  Goal: Achieves optimal ventilation and oxygenation  Description: INTERVENTIONS:  - Assess for changes in respiratory status  - Assess for changes in mentation and behavior  - Position to facilitate oxygenation and minimize respiratory effort  - Oxygen administered by appropriate delivery if ordered  - Initiate smoking cessation education as indicated  - Encourage broncho-pulmonary hygiene including cough, deep breathe, Incentive Spirometry  - Assess the need for suctioning and aspirate as needed  - Assess and instruct to report SOB or any respiratory difficulty  - Respiratory Therapy support as indicated  Outcome: Progressing     Problem: METABOLIC, FLUID AND ELECTROLYTES - ADULT  Goal: Electrolytes maintained within normal limits  Description: INTERVENTIONS:  - Monitor labs and assess patient for signs and symptoms of electrolyte imbalances  - Administer electrolyte replacement as ordered  - Monitor response to electrolyte replacements, including repeat lab results as appropriate  - Instruct patient on fluid and nutrition as appropriate  Outcome: Progressing  Goal: Fluid balance maintained  Description: INTERVENTIONS:  - Monitor labs   - Monitor I/O and WT  - Instruct patient on fluid and nutrition as appropriate  - Assess for signs & symptoms of volume excess or deficit  Outcome: Progressing     Problem: SKIN/TISSUE INTEGRITY - ADULT  Goal: Skin Integrity remains intact(Skin Breakdown Prevention)  Description: Assess:  -Perform Ramon assessment every x  -Clean and moisturize skin every x  -Inspect skin when repositioning, toileting, and assisting with ADLS  -Assess under medical devices such as x every x  -Assess extremities for adequate circulation and sensation     Bed Management:  -Have minimal linens on bed & keep smooth, unwrinkled  -Change linens as needed when moist or perspiring  -Avoid sitting or lying in one position for more than x hours while in bed  -Keep HOB at [facility]     Toileting:  -Offer bedside commode  -Assess for incontinence every x  -Use incontinent care products after each incontinent episode such as x    Activity:  -Mobilize patient x times a day  -Encourage activity and walks on unit  -Encourage or provide ROM exercises   -Turn and reposition patient every xxxx Hours  -Use appropriate equipment to lift or move patient in bed  -Instruct/ Assist with weight shifting every x when out of bed in chair  -Consider limitation of chair time x hour intervals    Skin Care:  -Avoid use of baby powder, tape, friction and shearing, hot water or constrictive clothing  -Relieve pressure over bony prominences using x  -Do not massage red bony areas    Next Steps:  -Teach patient strategies to minimize risks such as x   -Consider consults to  interdisciplinary teams such as x  Outcome: Progressing     Problem: HEMATOLOGIC - ADULT  Goal: Maintains hematologic stability  Description: INTERVENTIONS  - Assess for signs and symptoms of bleeding or hemorrhage  - Monitor labs  - Administer supportive blood products/factors as ordered and appropriate  Outcome: Progressing     Problem: MUSCULOSKELETAL - ADULT  Goal: Maintain or return mobility to safest level of function  Description: INTERVENTIONS:  - Assess patient's ability to carry out ADLs; assess patient's baseline for ADL function and identify physical deficits which impact ability to perform ADLs (bathing, care of mouth/teeth, toileting, grooming, dressing, etc )  - Assess/evaluate cause of self-care deficits   - Assess range of motion  - Assess patient's mobility  - Assess patient's need for assistive devices and provide as appropriate  - Encourage maximum independence but intervene and supervise when necessary  - Involve family in performance of ADLs  - Assess for home care needs following discharge   - Consider OT consult to assist with ADL evaluation and planning for discharge  - Provide patient education as appropriate  Outcome: Progressing     Problem: Prexisting or High Potential for Compromised Skin Integrity  Goal: Skin integrity is maintained or improved  Description: INTERVENTIONS:  - Identify patients at risk for skin breakdown  - Assess and monitor skin integrity  - Assess and monitor nutrition and hydration status  - Monitor labs   - Assess for incontinence   - Turn and reposition patient  - Assist with mobility/ambulation  - Relieve pressure over bony prominences  - Avoid friction and shearing  - Provide appropriate hygiene as needed including keeping skin clean and dry  - Evaluate need for skin moisturizer/barrier cream  - Collaborate with interdisciplinary team   - Patient/family teaching  - Consider wound care consult   Outcome: Progressing

## 2023-01-17 NOTE — CASE MANAGEMENT
Case Management Assessment & Discharge Planning Note    Patient name Mann Langston /-01 MRN [id number]  : 1960 Date 2023       Current Admission Date: 2023  Current Admission Diagnosis:Pleural effusion   Patient Active Problem List    Diagnosis Date Noted   • Pleural effusion 2023   • Shortness of breath 2023   • Weakness 2023   • Malignant neoplasm of lower-outer quadrant of right breast of female, estrogen receptor positive ([facility] ) 2022   • Continuous opioid dependence ([facility] ) 05/10/2022   • Breast neoplasm, Tis (DCIS), right 2021   • Atypical ductal hyperplasia of right breast 2021   • Ductal carcinoma in situ (DCIS) of right breast 2021   • Decompensated hepatic cirrhosis ([facility] ) 12/10/2020   • Thrombocytopenia ([facility] ) 2020   • Anemia    • Alcoholic cirrhosis of liver with ascites ([place] [address] ) 2020   • Hyponatremia 2020   • Non-seasonal allergic rhinitis due to pollen 10/08/2020   • Irritable bowel syndrome with diarrhea 2019   • Alcohol abuse 2018   • COPD (chronic obstructive pulmonary disease) ([facility] ) 2018   • Healthcare maintenance 2018   • Fatty liver 2018   • Other specified abnormal findings of blood chemistry 2018   • Elevated LFTs 2016   • Insomnia 2015   • Essential hypertension 2014   • GERD without esophagitis 2012   • Hyperlipidemia 2012   • Nicotine dependence 2012   • Anxiety 2012      LOS (days): 0  Geometric Mean LOS (GMLOS) (days):   Days to GMLOS:     OBJECTIVE:      Current admission status: Observation    Preferred Pharmacy:   [facility] [address] [address] [place]  [address]  [facility]  [address]   [place] [id number]  Phone: [phone] Fax: [phone]    Primary Care Provider: Blanche Norwood PA-C    Primary Insurance: [employer]  Secondary Insurance:     ASSESSMENT:  Fernanda Chu Proxies     Cindy [facility] Representative - Spouse   Primary Phone: [phone] (Home)               Advance Directives  Does patient have a [address]?: Yes  Does patient have Advance Directives?: Yes  Advance Directives: Living will, Power of  for health care    Readmission Root Cause  30 Day Readmission: No    Patient Information  Admitted from[de-identified] Home  Mental Status: Alert  During Assessment patient was accompanied by: Not accompanied during assessment  Assessment information provided by[de-identified] Patient  Primary Caregiver: Self  Support Systems: Self, Spouse/significant other, Son, Family members  [place] of Residence: [address] [facility] do you live in?: [address] access options   Select all that apply : No steps to enter home  [facility]  In the last 12 months, was there a time when you were not able to pay the mortgage or rent on time?: No  In the last 12 months, how many places have you lived?: 1  In the last 12 months, was there a time when you did not have a steady place to sleep or slept in a shelter (including now)?: No  Homeless/housing insecurity resource given?: N/A  Living Arrangements: Lives w/ Spouse/significant other  Is patient a ?: No    Activities of Daily Living Prior to Admission  Functional Status: Independent  Completes ADLs independently?: Yes  Ambulates independently?: Yes  Does patient use assisted devices?: No  Does patient currently own DME?: No  Does patient have a history of Outpatient Therapy (PT/OT)?: No  Does the patient have a history of Short-Term Rehab?: No  Does patient have a history of HHC?: No  Does patient currently have [address]?: No    Patient Information Continued  Does patient have prescription coverage?: Yes  Within the past 12 months, you worried that your food would run out before you got the money to buy more : Never true  Within the past 12 months, the food you bought just didn't last and you didn't have money to get more : Never true  Food insecurity resource given?: N/A  Does patient receive dialysis treatments?: No  Does patient have a history of substance abuse?: No  Does patient have a history of Mental Health Diagnosis?: No    Means of Transportation  Means of Transport to Appts[de-identified] Drives Self  In the past 12 months, has lack of transportation kept you from medical appointments or from getting medications?: No  In the past 12 months, has lack of transportation kept you from meetings, work, or from getting things needed for daily living?: No  Was application for public transport provided?: N/A    DISCHARGE DETAILS:    Discharge planning discussed with[de-identified] patient     CM contacted family/caregiver?: No- see comments (pt is alert and oriented)  Were Treatment Team discharge recommendations reviewed with patient/caregiver?: Yes  Did patient/caregiver verbalize understanding of patient care needs?: Yes  Were patient/caregiver advised of the risks associated with not following Treatment Team discharge recommendations?: Yes    [address]         Is the patient interested in [facility] at discharge?: No    DME Referral Provided  Referral made for DME?: No    Other Referral/Resources/Interventions Provided:  Interventions: None Indicated    Treatment Team Recommendation: Home  Discharge Destination Plan[de-identified] Home  Transport at Discharge : Family     Additional Comments: Cm met with pt and explained cm role  Pt lives with her spouse in a ranch home with no MIGUEL ÁNGEL  She is independent with ADLs  No therapy needs  SHe has no DME and no hx of VNA, rehab, outpt therapy, MH or D&A tx  She has prescription coverage and [facility] [place]  Her son will transport home

## 2023-01-17 NOTE — PLAN OF CARE
Problem: Potential for Falls  Goal: Patient will remain free of falls  Description: INTERVENTIONS:  - Educate patient/family on patient safety including physical limitations  - Instruct patient to call for assistance with activity   - Consult OT/PT to assist with strengthening/mobility   - Keep Call bell within reach  - Keep bed low and locked with side rails adjusted as appropriate  - Keep care items and personal belongings within reach  - Initiate and maintain comfort rounds  - Make Fall Risk Sign visible to staff  - Offer Toileting every  Hours, in advance of need  - Initiate/Maintain alarm  - Obtain necessary fall risk management equipment:  - Apply yellow socks and bracelet for high fall risk patients  - Consider moving patient to room near nurses station  Outcome: Progressing     Problem: Nutrition/Hydration-ADULT  Goal: Nutrient/Hydration intake appropriate for improving, restoring or maintaining nutritional needs  Description: Monitor and assess patient's nutrition/hydration status for malnutrition  Collaborate with interdisciplinary team and initiate plan and interventions as ordered  Monitor patient's weight and dietary intake as ordered or per policy  Utilize nutrition screening tool and intervene as necessary  Determine patient's food preferences and provide high-protein, high-caloric foods as appropriate       INTERVENTIONS:  - Monitor oral intake, urinary output, labs, and treatment plans  - Assess nutrition and hydration status and recommend course of action  - Evaluate amount of meals eaten  - Assist patient with eating if necessary   - Allow adequate time for meals  - Recommend/ encourage appropriate diets, oral nutritional supplements, and vitamin/mineral supplements  - Order, calculate, and assess calorie counts as needed  - Recommend, monitor, and adjust tube feedings and TPN/PPN based on assessed needs  - Assess need for intravenous fluids  - Provide specific nutrition/hydration education as appropriate  - Include patient/family/caregiver in decisions related to nutrition  Outcome: Progressing

## 2023-01-17 NOTE — TELEPHONE ENCOUNTER
Patients GI provider:  Dr Marianne Hahn    Number to return call: [phone]    Reason for call:  Mayra Mayo from [facility] is calling for clearance for Tremfya    Scheduled procedure/appointment date if applicable: Appt 8/99/13

## 2023-01-17 NOTE — PHYSICAL THERAPY NOTE
PHYSICAL THERAPY SCREEN    Patient Name: Yu Benitez  QHBBU'L Date: 1/17/2023 01/17/23 0935   Note Type   Note type Screen   Additional Comments PT orders noted and chart reviewed  Per RN, pt has been ambulating independently within room  Upon approach, pt reports no need for acute PT services at this time  Will discharge pt from PT caseload  Please reconsult if pt's functional status changes       Urvashi James DPT

## 2023-01-17 NOTE — ASSESSMENT & PLAN NOTE
Presented for several days of chills, generalized weakness, poor PO intake, abdominal bloating/discomfort  SOB/PAREKH and chest tightness  · COVID/FLU/RSV pending   · CT chest: Large right pleural effusion with overlying compressive atelectasis  Additional opacity in the right upper and middle lobes also likely represents atelectasis given volume loss  Previously seen groundglass opacity is not visualized and was likely inflammatory though this could be obscured by atelectasis  Follow-up CT following resolution of effusion and atelectasis is recommended  · S/P IR thoracentesis 1/16, 1L removed   · Follow up labs, cultures as outpt, pending     · Repeat Chest XR neg thus far  · SIRS: none, do not suspect underlying infection/pneumonia, monitor off abx   · Procal elevated, repeat in am   · BC x 2 pending   · Echo 0/4972: EF 67%, systolic/diastolic function normal   ·   · Patient satting well on RA, breathing feels improved since thoracentesis   · Encourage IS

## 2023-01-19 ENCOUNTER — OFFICE VISIT (OUTPATIENT)
Dept: GASTROENTEROLOGY | Facility: CLINIC | Age: 63
End: 2023-01-19

## 2023-01-19 VITALS
WEIGHT: 136 LBS | SYSTOLIC BLOOD PRESSURE: 118 MMHG | HEIGHT: 64 IN | BODY MASS INDEX: 23.22 KG/M2 | DIASTOLIC BLOOD PRESSURE: 70 MMHG | HEART RATE: 86 BPM

## 2023-01-19 DIAGNOSIS — K76.9 PLEURAL EFFUSION ASSOCIATED WITH HEPATIC DISORDER: ICD-10-CM

## 2023-01-19 DIAGNOSIS — R18.8 REFRACTORY ASCITES: ICD-10-CM

## 2023-01-19 DIAGNOSIS — I85.00 ESOPHAGEAL VARICES DETERMINED BY ENDOSCOPY (HCC): ICD-10-CM

## 2023-01-19 DIAGNOSIS — K70.31 ALCOHOLIC CIRRHOSIS OF LIVER WITH ASCITES (HCC): Primary | ICD-10-CM

## 2023-01-19 DIAGNOSIS — J91.8 PLEURAL EFFUSION ASSOCIATED WITH HEPATIC DISORDER: ICD-10-CM

## 2023-01-19 DIAGNOSIS — K21.9 GERD WITHOUT ESOPHAGITIS: ICD-10-CM

## 2023-01-19 LAB
BACTERIA SPEC BFLD CULT: NO GROWTH
GRAM STN SPEC: NORMAL
GRAM STN SPEC: NORMAL

## 2023-01-19 RX ORDER — LACTULOSE 20 G/30ML
30 SOLUTION ORAL 2 TIMES DAILY
Qty: 2700 ML | Refills: 3 | Status: SHIPPED | OUTPATIENT
Start: 2023-01-19 | End: 2023-02-18

## 2023-01-19 RX ORDER — PANTOPRAZOLE SODIUM 20 MG/1
20 TABLET, DELAYED RELEASE ORAL DAILY
Qty: 30 TABLET | Refills: 2 | Status: SHIPPED | OUTPATIENT
Start: 2023-01-19 | End: 2023-02-18

## 2023-01-19 NOTE — PROGRESS NOTES
[facility] - Outpatient Follow-up Note  Martine Kennedy 60 y o  female MRN: [id number]  Encounter: [id number]          ASSESSMENT AND PLAN:      Summary:    Ms Judge Goodwin is a 60y o  year old female with cirrhosis secondary to Chronic alcohol abuse which is decompensated with ascites, hepatic hydrothorax lower extremity edema, thrombocytopenia, esophageal varices, hypoalbuminemia and hypoprothrombinemia  Problem List and Plan:    End Stage Liver Disease: Current MELD-Na Score is 11  She has clinical indication for liver transplant, however with recent breast Ca diagnosis with resection and current hormonal therapy, she is not a transplant candidate  Volume: Ascites/Edema not well controlled:  Diuretics have been limited due to hyponatremia and hypotension  Continue lasix 40 mg daily and aldactone 100 mg daily for now and continuing to follow-up BMP  Advised to follow a low sodium diet (she has been eating a lot of salt)  Her last attempted paracentesis showed no fluid in the abdominal cavity however there was pleural effusion  I suspect she has developed some microperforations in her diaphragm which is sucking fluid into the pleural cavity  It is likely that she will require thoracentesis if we cannot increase her diuretic doses due to hyponatremia and hypotension  Her case was discussed discussed for possible TIPS placement  Meld score 11  Given the high likelihood that I will not be able to control her hepatic hydrothorax with diuretics alone she will likely need to TIPS placed  Per the discussion at our multidisciplinary pleural hypertensive conference, I have requested an multiphase MRI  2D echo did not show any concerning findings which in my opinion would limit TIPS placement  Hepatic Encephalopathy: No previous history of hepatic encephalopathy, however she is complaining of slight confusion    Will empirically try lactulose to see if this improves her fogginess  Ms Hans Gardner and her family/care giver are aware of the signs/symptoms of hepatic encephalopathy and understand to seek immediate medical attention should they arise     Esophageal Varices: Last EGD done on 10/13/22 showing non-bleeding grade 1 varices and mild PHG  Patient started on nadolol 40 mg daily  If ascites is indeed refractory (required paracentesis every 2 weeks or more frequently), I will d/c nadolol  Repeat EGD due in 1 year  Hepatoma Screening: MRI requested as above  No evidence of hepatoma noted on imaging done to date  Nutritional status: Mild to moderate sarcopenia noted  Encouraged high protein snacking, low salt diet  If weight loss evident, will refer to nutritional counseling  Health Maintenance:  Ms Hans Gardner was counseled to avoid alcohol and tobacco products  Ms Hans Gardner was also advised to avoid raw seafood/oysters and from swimming in unchlorinated neely, particularly from the [facility], due to increased risk of infection from Vibrio Vulnificus  Ms Hans Gardner was also instructed to seek immediate medical attention should she develop fevers over 80 F, evidence of GI bleeding (black or bloody stools, bloody or coffee ground emesis) or confusion  Ms Hans Gardner was advised to avoid NSAIDs, if possible, due to increase risk of renal dysfunction, and advised that if she should use acetaminophen, dose should not exceed 2 grams/day  Ms Hans Gardner was recommend to remain up to date with adult vaccination, including influenza, pneumovax and meningococcus  Inactivated HSV and zoster vaccine is safe and encouraged by PCP  Ms Hans Gardner was instructed to call either our office or that of her referring doctor should she develop worsening symptoms related to lower extremity edema, ascites, jaundice or excessive bruising/bleeding  FOLLOW-UP:  Return in about 4 weeks (around 2/16/2023)  VISIT DIAGNOSES AND ORDERS:      1   Alcoholic cirrhosis of liver with ascites ([facility] [address] )    2  Esophageal varices determined by endoscopy ([facility] [address] )    3  Refractory ascites    4  GERD without esophagitis    5  Pleural effusion associated with hepatic disorder      Orders Placed This Encounter   Procedures   • CBC and differential   • Protime-INR   • Comprehensive metabolic panel     ______________________________________________________________________    SUBJECTIVE:           Ms Mann Buchanan is a 60 y o  female with medical history as outlined below including cirrhosis secondary to Chronic alcohol abuse which is decompensated with ascites, lower extremity edema, thrombocytopenia, esophageal varices, hypoalbuminemia and hypoprothrombinemia, and more recently hepatic hydrothorax who comes in today for follow-up shortly after discharge from the hospital after admission for weakness and shortness of breath  She was found to have a pleural effusion and had a thoracentesis done with 1 3 L removed  She was last seen in our GI office by our physician assistant Hedwig Rinne on December 20, 2022, at which time it was discussed that we will continue with the current diuretic regimen for her refractory ascites, continue with as needed paracentesis every 2 to 3 weeks and present her case for discussion at our upcoming combined GI/interventional radiology multidisciplinary for upper tensive conference  Her case was indeed discussed earlier this week and it was determined that she would require contrast based imaging of her abdomen prior to considering TIPS placement  She already had a two-dimensional echocardiogram done on January 9, 2023  She was discharged on furosemide 100 mg daily and lasix 40 mg daily  It appears that she did not get diuretics in the hospital due to hypotension and possibly hyponatremia  An attempted paracentesis showed no fluid in the abdomen during her hospitalization  In the office today, she states she continues to feel very weak    He denies overt shortness of breath, but does have conversational dyspnea  Oxygen saturation was normal   She complains of abdominal pain especially with eating and palpation  She had a bowel movement yesterday but denies constipation  She denies jaundice, pruritus or evidence of GI bleeding  She does feel she is slightly more confused than normal   She denies fevers or chills  She had an EGD done on 10/13/2022 showing grade 1 varices with no bleeding and mild PHG  REVIEW OF SYSTEMS     Review of Systems   All other systems reviewed and are negative        Historical Information   Patient Active Problem List   Diagnosis   • Fatty liver   • Other specified abnormal findings of blood chemistry   • Anxiety   • Elevated LFTs   • GERD without esophagitis   • Hyperlipidemia   • Essential hypertension   • Insomnia   • Nicotine dependence   • Healthcare maintenance   • COPD (chronic obstructive pulmonary disease) (HCC)   • Alcohol abuse   • Irritable bowel syndrome with diarrhea   • Non-seasonal allergic rhinitis due to pollen   • Alcoholic cirrhosis of liver with ascites (HCC)   • Hyponatremia   • Thrombocytopenia (HCC)   • Anemia   • Decompensated hepatic cirrhosis (HCC)   • Ductal carcinoma in situ (DCIS) of right breast   • Atypical ductal hyperplasia of right breast   • Breast neoplasm, Tis (DCIS), right   • Continuous opioid dependence ([address] )   • Malignant neoplasm of lower-outer quadrant of right breast of female, estrogen receptor positive ([address] )   • Pleural effusion   • Shortness of breath   • Weakness     Social History     Substance and Sexual Activity   Alcohol Use Not Currently    Comment: not lately, non alcoholic beer     Social History     Substance and Sexual Activity   Drug Use No     Social History     Tobacco Use   Smoking Status Every Day   • Packs/day: 1 00   • Years: 45 00   • Pack years: 45 00   • Types: Cigarettes   • Start date: 1/1/1978   Smokeless Tobacco Never       Meds/Allergies       Current Outpatient Medications:   •  albuterol (2 5 mg/3 mL) 0 083 % nebulizer solution  •  albuterol (Proventil HFA) 90 mcg/act inhaler  •  ALPRAZolam (XANAX) 1 mg tablet  •  anastrozole (ARIMIDEX) 1 mg tablet  •  benzonatate (TESSALON PERLES) 100 mg capsule  •  furosemide (LASIX) 40 mg tablet  •  lactulose 20 g/30 mL  •  midodrine (PROAMATINE) 5 mg tablet  •  pantoprazole (PROTONIX) 20 mg tablet  •  Promethazine-DM (PHENERGAN-DM) 6 25-15 mg/5 mL oral syrup  •  spironolactone (ALDACTONE) 100 mg tablet    Allergies   Allergen Reactions   • Sulfa Antibiotics Shortness Of Breath   • Ace Inhibitors Cough and Other (See Comments)     coughing           Objective     Blood pressure 118/70, pulse 86, height 5' 4" (1 626 m), weight 61 7 kg (136 lb), not currently breastfeeding  Body mass index is 23 34 kg/m²  PHYSICAL EXAM:      Physical Exam  Vitals reviewed  Constitutional:       General: She is not in acute distress  Appearance: She is ill-appearing  HENT:      Head: Normocephalic and atraumatic  Nose: Nose normal       Mouth/Throat:      Mouth: Mucous membranes are moist       Pharynx: Oropharynx is clear  Eyes:      General: No scleral icterus  Extraocular Movements: Extraocular movements intact  Cardiovascular:      Rate and Rhythm: Normal rate and regular rhythm  Heart sounds: No murmur heard  Pulmonary:      Effort: Pulmonary effort is normal  No respiratory distress  Breath sounds: Examination of the right-lower field reveals decreased breath sounds  Decreased breath sounds present  Abdominal:      General: Abdomen is flat  There is distension (mild)  Palpations: Abdomen is soft  There is no shifting dullness, fluid wave, hepatomegaly or splenomegaly  Tenderness: There is no abdominal tenderness  Hernia: No hernia is present  Genitourinary:     Comments: deferred  Musculoskeletal:         General: Swelling (trace) present  Normal range of motion        Cervical back: Normal range of motion and neck supple  No tenderness  Skin:     General: Skin is warm  Coloration: Skin is not jaundiced  Findings: No bruising or rash  Neurological:      General: No focal deficit present  Mental Status: She is alert and oriented to person, place, and time  Psychiatric:         Mood and Affect: Mood normal          Lab Results:   Lab Results   Component Value Date     07/28/2017    K 3 7 01/17/2023    CO2 23 01/17/2023     01/17/2023    BUN 18 01/17/2023    CREATININE 0 86 01/17/2023    GLUCOSE 95 07/28/2017     Lab Results   Component Value Date    WBC 5 30 01/17/2023    HGB 10 0 (L) 01/17/2023    HCT 28 6 (L) 01/17/2023    MCV 95 01/17/2023    PLT 66 (L) 01/17/2023     Lab Results   Component Value Date    TP 6 5 01/17/2023    AST 37 01/17/2023    ALT 21 01/17/2023    BILITOT 0 6 07/28/2017    BILIDIR 0 95 (H) 09/17/2022    INR 1 46 (H) 01/16/2023      Lab Results   Component Value Date    IRON 77 12/07/2020    FERRITIN 1,132 (H) 12/07/2020     Lab Results   Component Value Date    CHOL 159 07/28/2017    HDL 39 (L) 09/15/2022    TRIG 61 09/15/2022         Radiology Results:   EGD    Result Date: 10/13/2022  Narrative: [address] [place] [id number] [phone] DATE OF SERVICE: 10/13/22 PHYSICIAN(S): Attending: Andrew Garcia MD Fellow: Ciara Richter MD INDICATION: Alcoholic cirrhosis of liver with ascites ([facility] [address] ) POST-OP DIAGNOSIS: See the impression below  PREPROCEDURE: Informed consent was obtained for the procedure, including sedation  Risks of perforation, hemorrhage, adverse drug reaction and aspiration were discussed  The patient was placed in the left lateral decubitus position  Patient was explained about the risks and benefits of the procedure  Risks including but not limited to bleeding, infection, and perforation were explained in detail   Also explained about less than 100% sensitivity with the exam and other alternatives  DETAILS OF PROCEDURE: Patient was taken to the procedure room where a time out was performed to confirm correct patient and correct procedure  The patient underwent monitored anesthesia care, which was administered by an anesthesia professional  The patient's blood pressure, heart rate, level of consciousness, respirations and oxygen were monitored throughout the procedure  The scope was advanced to the second part of the duodenum  Retroflexion was performed in the fundus  The patient experienced no blood loss  The procedure was not difficult  The patient tolerated the procedure well  There were no apparent complications  ANESTHESIA INFORMATION: ASA: IV Anesthesia Type: Anesthesia type not filed in the log  MEDICATIONS: No administrations occurring from 1127 to 1144 on 10/13/22 FINDINGS: Grade I varices with no bleeding (no red carol sign) in the lower third of the esophagus Abnormal mucosa  Consistent with mild portal hypertensive gastropathy  Normal duodenum SPECIMENS: * No specimens in log *     Impression: Small grade 1 varices disappeared on insufflation  Mild portal hypertensive gastropathy  Normal duodenum RECOMMENDATION: Repeat EGD in 1 year  Recommend starting nadolol 40 mg daily  Continue outpatient GI follow up  Resume diet  ATTENDING ATTESTATION:  I was present throughout the entire procedure from insertion to complete withdrawal of the scope  I performed all interventions myself or oversaw the fellow  Farzad Espino MD     IR paracentesis    Result Date: 10/28/2022  Narrative: INDICATION: Recurrent ascites PROCEDURE: 1  Ultrasound-guided paracentesis FINDINGS: 1   7750 mL clear yellow ascites removed  Impression: Paracentesis  _________________________________________________________________ COMPARISON: None PROCEDURE DETAILS: Operators: Dr Nandini Nguyễn Anesthesia: Local Medications: 1% lidocaine COMMENTS: A preprocedure timeout was performed per [facility] protocol   The right abdomen was prepped and draped in the usual sterile fashion  5-Maltese Yueh catheter was advanced using ultrasound guidance into ascites  Following drainage, the skin was cleansed and sterile dressings were applied  Workstation performed: [id number]     [address] bedside procedure    Result Date: 10/13/2022  Narrative: 1 [phone]  2 [phone] [id number]        Bradley Severs, MD

## 2023-01-19 NOTE — PATIENT INSTRUCTIONS
As discussed today:    Medications:  Start taking protonix 20 mg daily  Lactulose as directed (adjust dose for an effect of 3 soft,but formed bowel movement per day  Laboratory Testing (Listed below):  Please have blood work drawn in 2 weeks and every 2 weeks  Radiology/Diagnostic Testing:  Please schedule a multiphase MRI as soon as possible  Avoid alcohol and tobacco products  Also avoid raw seafood/oysters and avoid swimming/wading in unchlorinated neely, particularly from the [facility], due to increased risk of infection from a bacteria called Vibrio Vulnificus  Avoid medications called NSAID's (Motrin/Ibuprofen, Naproxen/Naprosyn/Aleve) if possible, due to increase risk of kidney dysfunction, and if you use medications containing acetaminophen (Tylenol, percocet, Endocet, and many cough/cold medications), the dose should not exceed 2 grams/day  Seek immediate medical attention if you develop fevers over 101 F, have evidence of GI bleeding (black or bloody stools, bloody or coffee ground emesis) or confusion  Call our office if you develop worsening symptoms related to lower extremity edema, ascites, jaundice or excessive bruising/bleeding

## 2023-01-19 NOTE — TELEPHONE ENCOUNTER
Called Dermatology office for confirmation for clearance of taking Tremfya , ordered by LAURA Gipson for pt  Sent a letter upon request of office for verification

## 2023-01-20 LAB
ATRIAL RATE: 98 BPM
P AXIS: 52 DEGREES
PR INTERVAL: 104 MS
QRS AXIS: 87 DEGREES
QRSD INTERVAL: 74 MS
QT INTERVAL: 384 MS
QTC INTERVAL: 490 MS
T WAVE AXIS: -12 DEGREES
VENTRICULAR RATE: 98 BPM

## 2023-01-22 LAB
BACTERIA BLD CULT: NORMAL
BACTERIA BLD CULT: NORMAL

## 2023-02-02 ENCOUNTER — HOSPITAL ENCOUNTER (EMERGENCY)
Facility: HOSPITAL | Age: 63
Discharge: HOME | End: 2023-02-02
Attending: EMERGENCY MEDICINE

## 2023-02-02 ENCOUNTER — APPOINTMENT (EMERGENCY)
Dept: RADIOLOGY | Facility: HOSPITAL | Age: 63
End: 2023-02-02

## 2023-02-02 ENCOUNTER — NURSE TRIAGE (OUTPATIENT)
Dept: OTHER | Facility: OTHER | Age: 63
End: 2023-02-02

## 2023-02-02 VITALS
BODY MASS INDEX: 22.71 KG/M2 | HEIGHT: 64 IN | SYSTOLIC BLOOD PRESSURE: 121 MMHG | TEMPERATURE: 97.8 F | DIASTOLIC BLOOD PRESSURE: 60 MMHG | RESPIRATION RATE: 18 BRPM | WEIGHT: 133 LBS | OXYGEN SATURATION: 99 % | HEART RATE: 79 BPM

## 2023-02-02 DIAGNOSIS — R53.1 GENERALIZED WEAKNESS: Primary | ICD-10-CM

## 2023-02-02 LAB
2HR DELTA HS TROPONIN: -2 NG/L
ALBUMIN SERPL BCP-MCNC: 2.7 G/DL (ref 3.5–5)
ALP SERPL-CCNC: 121 U/L (ref 46–116)
ALT SERPL W P-5'-P-CCNC: 40 U/L (ref 12–78)
ANION GAP SERPL CALCULATED.3IONS-SCNC: 6 MMOL/L (ref 4–13)
AST SERPL W P-5'-P-CCNC: 53 U/L (ref 5–45)
BASOPHILS # BLD AUTO: 0.05 THOUSANDS/ÂΜL (ref 0–0.1)
BASOPHILS NFR BLD AUTO: 1 % (ref 0–1)
BILIRUB SERPL-MCNC: 1.1 MG/DL (ref 0.2–1)
BILIRUB UR QL STRIP: NEGATIVE
BUN SERPL-MCNC: 19 MG/DL (ref 5–25)
CALCIUM ALBUM COR SERPL-MCNC: 10.3 MG/DL (ref 8.3–10.1)
CALCIUM SERPL-MCNC: 9.3 MG/DL (ref 8.3–10.1)
CARDIAC TROPONIN I PNL SERPL HS: 4 NG/L
CARDIAC TROPONIN I PNL SERPL HS: 6 NG/L
CHLORIDE SERPL-SCNC: 101 MMOL/L (ref 96–108)
CLARITY UR: CLEAR
CO2 SERPL-SCNC: 26 MMOL/L (ref 21–32)
COLOR UR: YELLOW
CREAT SERPL-MCNC: 1.1 MG/DL (ref 0.6–1.3)
EOSINOPHIL # BLD AUTO: 0.1 THOUSAND/ÂΜL (ref 0–0.61)
EOSINOPHIL NFR BLD AUTO: 2 % (ref 0–6)
ERYTHROCYTE [DISTWIDTH] IN BLOOD BY AUTOMATED COUNT: 15 % (ref 11.6–15.1)
GFR SERPL CREATININE-BSD FRML MDRD: 53 ML/MIN/1.73SQ M
GLUCOSE SERPL-MCNC: 134 MG/DL (ref 65–140)
GLUCOSE UR STRIP-MCNC: NEGATIVE MG/DL
HCT VFR BLD AUTO: 36.5 % (ref 34.8–46.1)
HGB BLD-MCNC: 12.3 G/DL (ref 11.5–15.4)
HGB UR QL STRIP.AUTO: NEGATIVE
IMM GRANULOCYTES # BLD AUTO: 0.01 THOUSAND/UL (ref 0–0.2)
IMM GRANULOCYTES NFR BLD AUTO: 0 % (ref 0–2)
KETONES UR STRIP-MCNC: NEGATIVE MG/DL
LEUKOCYTE ESTERASE UR QL STRIP: NEGATIVE
LYMPHOCYTES # BLD AUTO: 1.63 THOUSANDS/ÂΜL (ref 0.6–4.47)
LYMPHOCYTES NFR BLD AUTO: 32 % (ref 14–44)
MCH RBC QN AUTO: 32.4 PG (ref 26.8–34.3)
MCHC RBC AUTO-ENTMCNC: 33.7 G/DL (ref 31.4–37.4)
MCV RBC AUTO: 96 FL (ref 82–98)
MONOCYTES # BLD AUTO: 0.74 THOUSAND/ÂΜL (ref 0.17–1.22)
MONOCYTES NFR BLD AUTO: 15 % (ref 4–12)
NEUTROPHILS # BLD AUTO: 2.59 THOUSANDS/ÂΜL (ref 1.85–7.62)
NEUTS SEG NFR BLD AUTO: 50 % (ref 43–75)
NITRITE UR QL STRIP: NEGATIVE
NRBC BLD AUTO-RTO: 0 /100 WBCS
PH UR STRIP.AUTO: 5.5 [PH]
PLATELET # BLD AUTO: 108 THOUSANDS/UL (ref 149–390)
POTASSIUM SERPL-SCNC: 3.7 MMOL/L (ref 3.5–5.3)
PROT SERPL-MCNC: 9.1 G/DL (ref 6.4–8.4)
PROT UR STRIP-MCNC: NEGATIVE MG/DL
RBC # BLD AUTO: 3.8 MILLION/UL (ref 3.81–5.12)
SODIUM SERPL-SCNC: 133 MMOL/L (ref 135–147)
SP GR UR STRIP.AUTO: 1.02 (ref 1–1.03)
TSH SERPL DL<=0.05 MIU/L-ACNC: 1.93 UIU/ML (ref 0.45–4.5)
UROBILINOGEN UR STRIP-ACNC: <2 MG/DL
WBC # BLD AUTO: 5.12 THOUSAND/UL (ref 4.31–10.16)

## 2023-02-02 RX ADMIN — SODIUM CHLORIDE 500 ML: 0.9 INJECTION, SOLUTION INTRAVENOUS at 17:20

## 2023-02-02 NOTE — DISCHARGE INSTRUCTIONS
Rest, increase fluids  Continue your current medications  Follow up with family doctor in 2-3 days for recheck  Return to ER if symptoms worsen

## 2023-02-02 NOTE — TELEPHONE ENCOUNTER
Regarding: weak, trouble ambulating, SOB w/standing, feels like collapsing (calling into GI)  ----- Message from Jun Goodman sent at 2/2/2023  2:18 PM EST -----  "I am weak, trouble walking and feel like I am going to collapse; I can't stand to cook or vacuum and it's tough to breath when I am standing up "

## 2023-02-02 NOTE — TELEPHONE ENCOUNTER
Patient called in to report moderate to severe weakness and trouble breathing while standing to cook, vacuum, or fold laundry  Reports she can take in deep breath, but feels she is going to collapse  Patient has complicated history of cirrhosis and ascites  Denies ascites at this time  Reached out to [facility] on call provider  Advised to go to ED but check with Dulce/OSMAN on call first  Reached out to Dulce/[facility] on call  Patient is advised to go to ED asap  Patient notified to go now; wants to wait for her son to get home and take her  Placed on [address] ED board  Reason for Disposition  • Difficulty breathing    Answer Assessment - Initial Assessment Questions  1  DESCRIPTION: "Describe how you are feeling "      Weakness; can't walk or stand; afraid she is going to fall  2  SEVERITY: "How bad is it?"  "Can you stand and walk?"    - MILD - Feels weak or tired, but does not interfere with work, school or normal activities    - [facility] to stand and walk; weakness interferes with work, school, or normal activities    - SEVERE - Unable to stand or walk      Moderate to severe  3  ONSET:  "When did the weakness begin?"      The past week  4  CAUSE: "What do you think is causing the weakness?"      Unsure  5  MEDICINES: "Have you recently started a new medicine or had a change in the amount of a medicine?"     Denies  6  OTHER SYMPTOMS: "Do you have any other symptoms?" (e g , chest pain, fever, cough, SOB, vomiting, diarrhea, bleeding, other areas of pain)      Breathing is difficult; can take a deep breath; but trying to do ADL's standing with difficulty (vacumn, fold laundry, cook meals); no ascites reported in abdomen  7   PREGNANCY: "Is there any chance you are pregnant?" "When was your last menstrual period?"      Post menopausal    Protocols used: WEAKNESS (GENERALIZED) AND FATIGUE-ADULT-OH

## 2023-02-02 NOTE — ED PROVIDER NOTES
History  Chief Complaint   Patient presents with   • Weakness - Generalized     Patient complaint of weakness x 2 weeks     Patient is a 59 y/o F with h/o alcoholic cirrhosis, COPD, HTN, that presents to the ED with generalized weakness and fatigue for 2 weeks  Patient states she has had this in the past with dehydration  She states she just feels too weak to stand and do anything  She has been sleeping all the time  She denies fevers, chills, abdominal pain, nausea, vomiting, cough, rhinorrhea  She denies urinary symptoms  She states she has diarrhea occasionally  No sick contacts  She feels SOB when lying flat  She was admitted to the hospital on 1/16 - 1/17 for weakness, thrombocytopenia  She states she has not been drinking alcohol, but does drink N/A beer frequently  History provided by:  Patient  Fatigue  Severity:  Moderate  Onset quality:  Gradual  Duration:  2 weeks  Timing:  Constant  Progression:  Worsening  Chronicity:  Recurrent  Context: not recent infection, not stress and not urinary tract infection    Worsened by:  Nothing  Ineffective treatments:  None tried  Associated symptoms: anorexia, diarrhea and shortness of breath    Associated symptoms: no abdominal pain, no chest pain, no cough, no dizziness, no dysuria, no falls, no fever, no frequency, no headaches, no nausea and no vomiting    Risk factors: no anemia and no new medications        Prior to Admission Medications   Prescriptions Last Dose Informant Patient Reported? Taking?    ALPRAZolam (XANAX) 1 mg tablet   No No   Sig: Take 1 tablet (1 mg total) by mouth 2 (two) times a day as needed for anxiety   Promethazine-DM (PHENERGAN-DM) 6 25-15 mg/5 mL oral syrup   No No   Sig: Take 5 mL by mouth 4 (four) times a day as needed for cough   albuterol (2 5 mg/3 mL) 0 083 % nebulizer solution   No No   Sig: Take 3 mL (2 5 mg total) by nebulization every 6 (six) hours as needed for wheezing or shortness of breath   albuterol (Proventil HFA) 90 mcg/act inhaler   No No   Sig: Inhale 2 puffs every 6 (six) hours as needed for wheezing or shortness of breath   anastrozole (ARIMIDEX) 1 mg tablet   No No   Sig: Take 1 tablet (1 mg total) by mouth daily   benzonatate (TESSALON PERLES) 100 mg capsule   No No   Sig: Take 1 capsule (100 mg total) by mouth 3 (three) times a day as needed for cough   furosemide (LASIX) 40 mg tablet   No No   Sig: Take 1 tablet (40 mg total) by mouth daily   lactulose 20 g/30 mL   No No   Sig: Take 45 mL (30 g total) by mouth 2 (two) times a day Titrate dose for an effect of 3 soft, but formed bowel movements per day  You can start with 1 tablespoon per day and slowly increase to effect  midodrine (PROAMATINE) 5 mg tablet   No No   Sig: Take 1 5 tablets (7 5 mg total) by mouth 3 (three) times a day before meals   pantoprazole (PROTONIX) 20 mg tablet   No No   Sig: Take 1 tablet (20 mg total) by mouth daily   spironolactone (ALDACTONE) 100 mg tablet   No No   Sig: Take 1 tablet (100 mg total) by mouth daily      Facility-Administered Medications: None       Past Medical History:   Diagnosis Date   • Alcoholic fatty liver    • Anxiety    • Asthma    • COPD (chronic obstructive pulmonary disease) (HCC)    • Elevated liver function tests    • GERD (gastroesophageal reflux disease)    • GERD without esophagitis    • Hyperlipidemia    • Hypertension    • IBS (irritable bowel syndrome)    • Insomnia    • Insomnia    • Liver disease    • Nervousness    • Nicotine dependence    • Other specified urinary incontinence    • RLS (restless legs syndrome)    • Wears glasses        Past Surgical History:   Procedure Laterality Date   • BACK SURGERY     • 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   • BREAST EXCISIONAL BIOPSY Right 11/01/2004    benign   • CATARACT EXTRACTION, BILATERAL  08/01/2018   • COLONOSCOPY N/A 5/8/2019    Procedure: COLONOSCOPY;  Surgeon: Mk Chambers MD;  Location: [facility] GI LAB;   Service: Gastroenterology • EGD AND COLONOSCOPY N/A 3/19/2018    Procedure: EGD AND COLONOSCOPY;  Surgeon: Dorys Martinez MD;  Location: [facility] GI LAB; Service: Gastroenterology   • ESOPHAGOGASTRODUODENOSCOPY N/A 5/8/2019    Procedure: ESOPHAGOGASTRODUODENOSCOPY (EGD); Surgeon: Dorys Martinez MD;  Location: [facility] GI LAB; Service: Gastroenterology   • IR PARACENTESIS  11/27/2020   • IR PARACENTESIS  12/8/2020   • IR PARACENTESIS  10/28/2022   • IR PARACENTESIS  11/7/2022   • IR PARACENTESIS  11/30/2022   • IR PARACENTESIS  12/30/2022   • IR PARACENTESIS  1/16/2023   • IR THORACENTESIS  1/16/2023   • KNEE SURGERY     • KNEE SURGERY     • LUMBAR LAMINECTOMY  04/1999   • LYMPH NODE BIOPSY     • MAMMO STEREOTACTIC BREAST BIOPSY RIGHT (ALL INC) Right 5/21/2021   • MAMMO STEREOTACTIC BREAST BIOPSY RIGHT (ALL INC) EACH ADD Right 5/21/2021   • TOTAL ABDOMINAL HYSTERECTOMY  02/05/2008   • TUBAL LIGATION     • TUBAL LIGATION  1989   • UPPER GASTROINTESTINAL ENDOSCOPY         Family History   Problem Relation Age of Onset   • Breast cancer Mother    • No Known Problems Father    • No Known Problems Sister    • No Known Problems Brother    • No Known Problems Son    • No Known Problems Maternal Grandmother    • No Known Problems Maternal Grandfather    • No Known Problems Paternal Grandmother    • No Known Problems Paternal Grandfather    • No Known Problems Maternal Aunt    • No Known Problems Maternal Aunt    • No Known Problems Paternal Aunt    • Substance Abuse Neg Hx    • Mental illness Neg Hx      I have reviewed and agree with the history as documented      E-Cigarette/Vaping   • E-Cigarette Use Never User      E-Cigarette/Vaping Substances     Social History     Tobacco Use   • Smoking status: Every Day     Packs/day: 1 00     Years: 45 00     Pack years: 45 00     Types: Cigarettes     Start date: 1/1/1978   • Smokeless tobacco: Never   Vaping Use   • Vaping Use: Never used   Substance Use Topics   • Alcohol use: Not Currently     Comment: not lately, non alcoholic beer   • Drug use: No       Review of Systems   Constitutional: Positive for fatigue  Negative for chills and fever  Respiratory: Positive for shortness of breath  Negative for cough  Cardiovascular: Negative for chest pain, palpitations and leg swelling  Gastrointestinal: Positive for anorexia and diarrhea  Negative for abdominal pain, nausea and vomiting  Genitourinary: Negative for dysuria and frequency  Musculoskeletal: Negative for back pain, falls and neck pain  Skin: Negative for color change, pallor and rash  Neurological: Positive for weakness and light-headedness  Negative for dizziness, speech difficulty, numbness and headaches  Psychiatric/Behavioral: Negative for confusion  All other systems reviewed and are negative  Physical Exam  Physical Exam  Vitals and nursing note reviewed  Constitutional:       General: She is not in acute distress  Appearance: Normal appearance  She is well-developed, well-groomed and normal weight  She is not ill-appearing or diaphoretic  HENT:      Head: Normocephalic and atraumatic  Right Ear: External ear normal       Left Ear: External ear normal       Nose: Nose normal       Mouth/Throat:      Mouth: Mucous membranes are moist       Pharynx: Oropharynx is clear  Eyes:      Conjunctiva/sclera: Conjunctivae normal       Pupils: Pupils are equal    Cardiovascular:      Rate and Rhythm: Normal rate and regular rhythm  Heart sounds: Normal heart sounds  Pulmonary:      Effort: Pulmonary effort is normal       Breath sounds: Normal breath sounds  Abdominal:      General: Abdomen is flat  Bowel sounds are normal       Tenderness: There is no abdominal tenderness  Musculoskeletal:         General: Normal range of motion  Cervical back: Normal range of motion and neck supple  Right lower leg: No edema  Left lower leg: No edema  Skin:     General: Skin is warm and dry        Coloration: Skin is not jaundiced or pale  Findings: No rash  Neurological:      General: No focal deficit present  Mental Status: She is alert and oriented to person, place, and time  Motor: No weakness  Psychiatric:         Mood and Affect: Mood normal          Behavior: Behavior is cooperative           Vital Signs  ED Triage Vitals   Temperature Pulse Respirations Blood Pressure SpO2   02/02/23 1557 02/02/23 1557 02/02/23 1557 02/02/23 1557 02/02/23 1557   97 8 °F (36 6 °C) 93 18 136/68 99 %      Temp src Heart Rate Source Patient Position - Orthostatic VS BP Location FiO2 (%)   -- 02/02/23 1730 02/02/23 1730 02/02/23 1730 --    Monitor Lying Right arm       Pain Score       02/02/23 1600       No Pain           Vitals:    02/02/23 1557 02/02/23 1730   BP: 136/68 130/62   Pulse: 93 81   Patient Position - Orthostatic VS:  Lying         Visual Acuity  Visual Acuity    Flowsheet Row Most Recent Value   L Pupil Size (mm) 3   R Pupil Size (mm) 3          ED Medications  Medications   sodium chloride 0 9 % bolus 500 mL (0 mL Intravenous Stopped 2/2/23 1821)       Diagnostic Studies  Results Reviewed     Procedure Component Value Units Date/Time    UA w Reflex to Microscopic w Reflex to Culture [[id number]] Collected: 02/02/23 1719    Lab Status: Final result Specimen: Urine, Clean Catch Updated: 02/02/23 1752     Color, UA Yellow     Clarity, UA Clear     Specific Gravity, UA 1 025     pH, UA 5 5     Leukocytes, UA Negative     Nitrite, UA Negative     Protein, UA Negative mg/dl      Glucose, UA Negative mg/dl      Ketones, UA Negative mg/dl      Urobilinogen, UA <2 0 mg/dl      Bilirubin, UA Negative     Occult Blood, UA Negative    TSH [[id number]]  (Normal) Collected: 02/02/23 1603    Lab Status: Final result Specimen: Blood from Arm, Left Updated: 02/02/23 1737     TSH 3RD GENERATON 1 933 uIU/mL     Narrative:      Patients undergoing fluorescein dye angiography may retain small amounts of fluorescein in the body for 48-72 hours post procedure  Samples containing fluorescein can produce falsely depressed TSH values  If the patient had this procedure,a specimen should be resubmitted post fluorescein clearance        Comprehensive metabolic panel [[id number]]  (Abnormal) Collected: 02/02/23 1603    Lab Status: Final result Specimen: Blood from Arm, Left Updated: 02/02/23 1638     Sodium 133 mmol/L      Potassium 3 7 mmol/L      Chloride 101 mmol/L      CO2 26 mmol/L      ANION GAP 6 mmol/L      BUN 19 mg/dL      Creatinine 1 10 mg/dL      Glucose 134 mg/dL      Calcium 9 3 mg/dL      Corrected Calcium 10 3 mg/dL      AST 53 U/L      ALT 40 U/L      Alkaline Phosphatase 121 U/L      Total Protein 9 1 g/dL      Albumin 2 7 g/dL      Total Bilirubin 1 10 mg/dL      eGFR 53 ml/min/1 73sq m     Narrative:      [place] guidelines for Chronic Kidney Disease (CKD):   •  Stage 1 with normal or high GFR (GFR > 90 mL/min/1 73 square meters)  •  Stage 2 Mild CKD (GFR = 60-89 mL/min/1 73 square meters)  •  Stage 3A Moderate CKD (GFR = 45-59 mL/min/1 73 square meters)  •  Stage 3B Moderate CKD (GFR = 30-44 mL/min/1 73 square meters)  •  Stage 4 Severe CKD (GFR = 15-29 mL/min/1 73 square meters)  •  Stage 5 End Stage CKD (GFR <15 mL/min/1 73 square meters)  Note: GFR calculation is accurate only with a steady state creatinine    HS Troponin 0hr (reflex protocol) [[id number]]  (Normal) Collected: 02/02/23 1603    Lab Status: Final result Specimen: Blood from Arm, Left Updated: 02/02/23 1637     hs TnI 0hr 6 ng/L     HS Troponin I 2hr [[id number]]     Lab Status: No result Specimen: Blood     HS Troponin I 4hr [[id number]]     Lab Status: No result Specimen: Blood     CBC and differential [[id number]]  (Abnormal) Collected: 02/02/23 1603    Lab Status: Final result Specimen: Blood from Arm, Left Updated: 02/02/23 1635     WBC 5 12 Thousand/uL      RBC 3 80 Million/uL      Hemoglobin 12 3 g/dL      Hematocrit 36 5 % MCV 96 fL      MCH 32 4 pg      MCHC 33 7 g/dL      RDW 15 0 %      Platelets 674 Thousands/uL      nRBC 0 /100 WBCs      Neutrophils Relative 50 %      Immat GRANS % 0 %      Lymphocytes Relative 32 %      Monocytes Relative 15 %      Eosinophils Relative 2 %      Basophils Relative 1 %      Neutrophils Absolute 2 59 Thousands/µL      Immature Grans Absolute 0 01 Thousand/uL      Lymphocytes Absolute 1 63 Thousands/µL      Monocytes Absolute 0 74 Thousand/µL      Eosinophils Absolute 0 10 Thousand/µL      Basophils Absolute 0 05 Thousands/µL                  XR chest 2 views   ED Interpretation by Sarath Ruiz PA-C (02/02 1740)   No acute abnormalities, small pleural effusion on right, improved from prior  Procedures  ECG 12 Lead Documentation Only    Date/Time: 2/2/2023 6:18 PM  Performed by: Sarath Ruiz PA-C  Authorized by: Sarath Ruiz PA-C     Indications / Diagnosis:  Weakness  ECG reviewed by me, the ED Provider: yes    Patient location:  ED  Previous ECG:     Previous ECG:  Compared to current    Similarity:  No change  Rate:     ECG rate:  77  Rhythm:     Rhythm: sinus rhythm    Conduction:     Conduction: normal    ST segments:     ST segments:  Normal  T waves:     T waves: inverted      Inverted:  V2 and V3             ED Course  ED Course as of 02/02/23 1823   Thu Feb 02, 2023   1818 Care transferred to Dr Annabelle Aranda, awaiting repeat troponin  Patient feeling better after IV fluids                HEART Risk Score    Flowsheet Row Most Recent Value   Heart Score Risk Calculator    History 0 Filed at: 02/02/2023 1821   ECG 0 Filed at: 02/02/2023 1821   Age 1 Filed at: 02/02/2023 1821   Risk Factors 2 Filed at: 02/02/2023 1821   Troponin 0 Filed at: 02/02/2023 1821   HEART Score 3 Filed at: 02/02/2023 1821                        SBIRT 20yo+    Flowsheet Row Most Recent Value   SBIRT (23 yo +)    In order to provide better care to our patients, we are screening all of our patients for alcohol and drug use  Would it be okay to ask you these screening questions? Yes Filed at: 02/02/2023 1600   Initial Alcohol Screen: US AUDIT-C     1  How often do you have a drink containing alcohol? 0 Filed at: 02/02/2023 1600   2  How many drinks containing alcohol do you have on a typical day you are drinking? 0 Filed at: 02/02/2023 1600   3a  Male UNDER 65: How often do you have five or more drinks on one occasion? 0 Filed at: 02/02/2023 1600   3b  FEMALE Any Age, or MALE 65+: How often do you have 4 or more drinks on one occassion? 0 Filed at: 02/02/2023 1600   Audit-C Score 0 Filed at: 02/02/2023 1600   CELSO: How many times in the past year have you    Used an illegal drug or used a prescription medication for non-medical reasons? Never Filed at: 02/02/2023 1600                    Medical Decision Making  Patient with generalized weakness and fatigue, similar to prior episode of dehydration, will check labs, UA, CXR to r/o UTI, electrolyte abnormality, dehydration, pneumonia, anemia, thyroid abnormality  Patient improved with IV fluids  Labs at normal baseline  Return precautions given  Symptoms most likely due to dehydration, encouraged patient to drink fluids, and avoid N/A beer  Generalized weakness: acute illness or injury  Amount and/or Complexity of Data Reviewed  External Data Reviewed: ECG  Labs: ordered  Radiology: ordered and independent interpretation performed  ECG/medicine tests: ordered and independent interpretation performed            Disposition  Final diagnoses:   Generalized weakness     Time reflects when diagnosis was documented in both MDM as applicable and the Disposition within this note     Time User Action Codes Description Comment    2/2/2023  6:11 PM Campbell Fischer [R53 [id number]] Generalized weakness       ED Disposition     None      Follow-up Information     Follow up With Specialties Details Why Contact Info Additional Information Miguel Rice PA-C Family Medicine, Physician Assistant Schedule an appointment as soon as possible for a visit in 2 days For recheck [address] [place] [address] 2070 Emergency Department Emergency Medicine Go to  If symptoms worsen [address] [place], [id number]  [address] [facility] Emergency Department, [address] [place] [address] Rosi Marc Luige Rodger 10          Patient's Medications   Discharge Prescriptions    No medications on file       No discharge procedures on file      PDMP Review       Value Time User    PDMP Reviewed  Yes 10/21/2022 12:16 PM Miguel Rice PA-C          ED Provider  Electronically Signed by           Idalia Deluca PA-C  02/02/23 8159

## 2023-02-03 LAB
ATRIAL RATE: 77 BPM
P AXIS: 60 DEGREES
PR INTERVAL: 126 MS
QRS AXIS: 78 DEGREES
QRSD INTERVAL: 88 MS
QT INTERVAL: 408 MS
QTC INTERVAL: 461 MS
T WAVE AXIS: 58 DEGREES
VENTRICULAR RATE: 77 BPM

## 2023-02-06 DIAGNOSIS — K70.31 ALCOHOLIC CIRRHOSIS OF LIVER WITH ASCITES (HCC): Chronic | ICD-10-CM

## 2023-02-06 RX ORDER — MIDODRINE HYDROCHLORIDE 5 MG/1
TABLET ORAL
Qty: 135 TABLET | Refills: 0 | Status: SHIPPED | OUTPATIENT
Start: 2023-02-06

## 2023-02-09 ENCOUNTER — HOSPITAL ENCOUNTER (OUTPATIENT)
Dept: MRI IMAGING | Facility: HOSPITAL | Age: 63
Discharge: HOME | End: 2023-02-09
Attending: INTERNAL MEDICINE

## 2023-02-09 DIAGNOSIS — J91.8 PLEURAL EFFUSION ASSOCIATED WITH HEPATIC DISORDER: ICD-10-CM

## 2023-02-09 DIAGNOSIS — R18.8 REFRACTORY ASCITES: ICD-10-CM

## 2023-02-09 DIAGNOSIS — K76.9 PLEURAL EFFUSION ASSOCIATED WITH HEPATIC DISORDER: ICD-10-CM

## 2023-02-09 DIAGNOSIS — K70.31 ALCOHOLIC CIRRHOSIS OF LIVER WITH ASCITES (HCC): ICD-10-CM

## 2023-02-09 DIAGNOSIS — I85.00 ESOPHAGEAL VARICES DETERMINED BY ENDOSCOPY (HCC): ICD-10-CM

## 2023-02-09 RX ADMIN — GADOBUTROL 7 ML: 604.72 INJECTION INTRAVENOUS at 20:59

## 2023-02-13 ENCOUNTER — OFFICE VISIT (OUTPATIENT)
Dept: FAMILY MEDICINE CLINIC | Facility: CLINIC | Age: 63
End: 2023-02-13

## 2023-02-13 VITALS
BODY MASS INDEX: 23.7 KG/M2 | WEIGHT: 138.8 LBS | DIASTOLIC BLOOD PRESSURE: 76 MMHG | OXYGEN SATURATION: 98 % | HEIGHT: 64 IN | TEMPERATURE: 99.4 F | SYSTOLIC BLOOD PRESSURE: 118 MMHG | RESPIRATION RATE: 16 BRPM | HEART RATE: 92 BPM

## 2023-02-13 DIAGNOSIS — R05.1 ACUTE COUGH: ICD-10-CM

## 2023-02-13 DIAGNOSIS — J01.00 ACUTE NON-RECURRENT MAXILLARY SINUSITIS: Primary | ICD-10-CM

## 2023-02-13 LAB
SARS-COV-2 AG UPPER RESP QL IA: NEGATIVE
VALID CONTROL: NORMAL

## 2023-02-13 RX ORDER — DEXTROMETHORPHAN HYDROBROMIDE AND PROMETHAZINE HYDROCHLORIDE 15; 6.25 MG/5ML; MG/5ML
5 SOLUTION ORAL 4 TIMES DAILY PRN
Qty: 118 ML | Refills: 0 | Status: SHIPPED | OUTPATIENT
Start: 2023-02-13

## 2023-02-13 RX ORDER — CEFUROXIME AXETIL 500 MG/1
500 TABLET ORAL EVERY 12 HOURS SCHEDULED
Qty: 20 TABLET | Refills: 0 | Status: SHIPPED | OUTPATIENT
Start: 2023-02-13 | End: 2023-02-23

## 2023-02-13 NOTE — PROGRESS NOTES
Assessment/Plan:     Diagnoses and all orders for this visit:    Acute non-recurrent maxillary sinusitis  -     cefuroxime (CEFTIN) 500 mg tablet; Take 1 tablet (500 mg total) by mouth every 12 (twelve) hours for 10 days  -     Covid/Flu- Office Collect    Rapid covid test negative  Will do send out covid/flu swab  Pt to start Ceftin BID  Medication reviewed  Will also prescribe Phenergan PRN for her cough which she notes was helpful before  Pt to rest and keep well hydrated  We will contact her with results once available  Patient is encouraged to call our office for any questions/concerns, persistent or worsening symptoms  Patient states they understand and agree with treatment plan  Acute cough  -     Promethazine-DM (PHENERGAN-DM) 6 25-15 mg/5 mL oral syrup; Take 5 mL by mouth 4 (four) times a day as needed for cough  -     Covid/Flu- Office Collect  -     POCT Rapid Covid Ag      Same as above  Pt to f/u PRN  Subjective:      Patient ID: Ernestene Kehr is a 60 y o  female  Pt presents today for cough & sinus congestion that started yesterday  She notes thick green mucus with scott cheek pain  She did have fever yesterday as well as chills and body aches  She admits her grandson was sick  Pt had negative rapid covid test today  The following portions of the patient's history were reviewed and updated as appropriate: allergies, current medications, past family history, past medical history, past social history, past surgical history and problem list     Review of Systems    As noted per HPI  Objective:      /76   Pulse 92   Temp 99 4 °F (37 4 °C)   Resp 16   Ht 5' 4" (1 626 m)   Wt 63 kg (138 lb 12 8 oz)   SpO2 98%   BMI 23 82 kg/m²          Physical Exam  Vitals reviewed  Constitutional:       Appearance: Normal appearance  HENT:      Head: Normocephalic        Right Ear: Tympanic membrane, ear canal and external ear normal       Left Ear: Tympanic membrane, ear canal and external ear normal       Nose: Mucosal edema (thick yellow mucosal discharge to scott nostrils), congestion and rhinorrhea present  Rhinorrhea is purulent  Right Turbinates: Enlarged and swollen  Left Turbinates: Enlarged and swollen  Right Sinus: Maxillary sinus tenderness present  Left Sinus: Maxillary sinus tenderness present  Mouth/Throat:      Pharynx: No oropharyngeal exudate or posterior oropharyngeal erythema  Cardiovascular:      Rate and Rhythm: Normal rate and regular rhythm  Pulses: Normal pulses  Heart sounds: Normal heart sounds  Pulmonary:      Effort: Pulmonary effort is normal  No respiratory distress  Breath sounds: Normal breath sounds  No wheezing  Neurological:      Mental Status: She is alert and oriented to person, place, and time  Mental status is at baseline     Psychiatric:         Mood and Affect: Mood normal          Behavior: Behavior normal

## 2023-02-14 ENCOUNTER — TELEPHONE (OUTPATIENT)
Dept: FAMILY MEDICINE CLINIC | Facility: CLINIC | Age: 63
End: 2023-02-14

## 2023-02-14 DIAGNOSIS — F41.9 ANXIETY: ICD-10-CM

## 2023-02-14 LAB
FLUAV RNA RESP QL NAA+PROBE: NEGATIVE
FLUBV RNA RESP QL NAA+PROBE: NEGATIVE
SARS-COV-2 RNA RESP QL NAA+PROBE: NEGATIVE

## 2023-02-14 RX ORDER — ALPRAZOLAM 1 MG/1
1 TABLET ORAL 2 TIMES DAILY PRN
Qty: 60 TABLET | Refills: 0 | Status: SHIPPED | OUTPATIENT
Start: 2023-02-14

## 2023-02-14 NOTE — TELEPHONE ENCOUNTER
----- Message from [address] [place] [address] sent at 2/14/2023 11:17 AM EST -----  Please let patient know that her covid/flu testing was negative   Thanks!  ----- Message -----  From: Ronette Libman, MA  Sent: 2/13/2023   2:12 PM EST  To: JOSÉ Yadav

## 2023-02-16 ENCOUNTER — TRANSCRIBE ORDERS (OUTPATIENT)
Dept: GASTROENTEROLOGY | Facility: CLINIC | Age: 63
End: 2023-02-16

## 2023-02-27 ENCOUNTER — HOSPITAL ENCOUNTER (OUTPATIENT)
Dept: CT IMAGING | Facility: HOSPITAL | Age: 63
Discharge: HOME | End: 2023-02-27

## 2023-02-27 DIAGNOSIS — R93.89 ABNORMAL CHEST CT: ICD-10-CM

## 2023-02-27 DIAGNOSIS — R05.1 ACUTE COUGH: ICD-10-CM

## 2023-02-27 RX ORDER — DEXTROMETHORPHAN HYDROBROMIDE AND PROMETHAZINE HYDROCHLORIDE 15; 6.25 MG/5ML; MG/5ML
5 SOLUTION ORAL 4 TIMES DAILY PRN
Qty: 118 ML | Refills: 0 | Status: SHIPPED | OUTPATIENT
Start: 2023-02-27

## 2023-03-10 ENCOUNTER — TELEPHONE (OUTPATIENT)
Dept: HEMATOLOGY ONCOLOGY | Facility: CLINIC | Age: 63
End: 2023-03-10

## 2023-03-10 NOTE — TELEPHONE ENCOUNTER
AMADOU  Route to [facility]                        Patient appointment rescheduled due to Transfer of care      Speaking with   Patient   If you not speaking with the patient, is the person listed on patients Medical Communication Consent?  N/A   Physician patient is established with Dr King Xie appointment date and time 04/24/2023 @1:20PM    Appointment Location [facility]   Physician patient is transferring care to   Dr Efrain Ramachandran appointment date and time 04/26/2023 @1:20PM    Reason for [facility]  Provider is leaving network

## 2023-03-14 ENCOUNTER — TELEPHONE (OUTPATIENT)
Dept: OTHER | Facility: OTHER | Age: 63
End: 2023-03-14

## 2023-03-14 DIAGNOSIS — F41.9 ANXIETY: ICD-10-CM

## 2023-03-14 RX ORDER — ALPRAZOLAM 1 MG/1
1 TABLET ORAL 2 TIMES DAILY PRN
Qty: 60 TABLET | Refills: 0 | Status: SHIPPED | OUTPATIENT
Start: 2023-03-14

## 2023-03-14 NOTE — TELEPHONE ENCOUNTER
Pt did not have questions regarding medications  She was questioning why she was not seeing Dr Scarlett Whittington anymore  Explained that Dr Arnulfo Hudson was a liver specialist within our GI practice and that she would still see Dr Scarlett Whittington when recommended by Dr Arnulfo Hudson  All questions and concerns addressed to pt's satisfaction

## 2023-03-22 ENCOUNTER — TELEPHONE (OUTPATIENT)
Dept: GASTROENTEROLOGY | Facility: CLINIC | Age: 63
End: 2023-03-22

## 2023-03-22 DIAGNOSIS — K70.31 ALCOHOLIC CIRRHOSIS OF LIVER WITH ASCITES (HCC): Primary | ICD-10-CM

## 2023-03-22 DIAGNOSIS — K76.9 PLEURAL EFFUSION ASSOCIATED WITH HEPATIC DISORDER: ICD-10-CM

## 2023-03-22 DIAGNOSIS — J91.8 PLEURAL EFFUSION ASSOCIATED WITH HEPATIC DISORDER: ICD-10-CM

## 2023-03-22 NOTE — TELEPHONE ENCOUNTER
Patients GI provider:  Dr Sameer Mckenna    Number to return call: [phone]    Reason for call: Pt calling requesting  Dr Yomi Bains put an order in for pt to have x-ray for her lungs as she states she may have fluid build up again      Scheduled procedure/appointment date if applicable: Appt: 0/43/1833

## 2023-03-22 NOTE — TELEPHONE ENCOUNTER
Hx decompensated cirrhosis- ascites, hepatic hydrothorax, LE edema, thrombocytopenia, esophageal varices, hypoalbuminemia, hypoprothrombinemia    Patient requesting order for thoracentesis  Reports SOB with exertion/ laying flat x 2 days  Abdominal girth increased slightly  Denies LE edema  Afebrile  Patient not monitoring weight  Taking Lasix 40 mg and Spironolactone 100 mg  Repeat labs for diuretic management  Last paracentesis/ thoracentesis 1/16/23  Alarm symptoms discussed and patient will go to ED for evaluation if symptoms increase/ persistent  -SOB, pain, fever, n/v, change in mental status

## 2023-03-23 ENCOUNTER — HOSPITAL ENCOUNTER (OUTPATIENT)
Dept: RADIOLOGY | Facility: HOSPITAL | Age: 63
End: 2023-03-23
Attending: INTERNAL MEDICINE

## 2023-03-23 DIAGNOSIS — K76.9 PLEURAL EFFUSION ASSOCIATED WITH HEPATIC DISORDER: ICD-10-CM

## 2023-03-23 DIAGNOSIS — J91.8 PLEURAL EFFUSION ASSOCIATED WITH HEPATIC DISORDER: ICD-10-CM

## 2023-03-23 DIAGNOSIS — K70.31 ALCOHOLIC CIRRHOSIS OF LIVER WITH ASCITES (HCC): ICD-10-CM

## 2023-03-23 NOTE — TELEPHONE ENCOUNTER
Patient aware chest x ray ordered  Follow up [facility] office visit scheduled 3/28/23  Reinforced ED evaluation if symptoms increase/ persistent

## 2023-03-27 ENCOUNTER — TELEPHONE (OUTPATIENT)
Dept: FAMILY MEDICINE CLINIC | Facility: CLINIC | Age: 63
End: 2023-03-27

## 2023-03-27 ENCOUNTER — HOSPITAL ENCOUNTER (OUTPATIENT)
Facility: HOSPITAL | Age: 63
Setting detail: OBSERVATION
Discharge: LEFT BEFORE DISCHARGE | End: 2023-03-29
Attending: EMERGENCY MEDICINE | Admitting: INTERNAL MEDICINE

## 2023-03-27 ENCOUNTER — OFFICE VISIT (OUTPATIENT)
Dept: FAMILY MEDICINE CLINIC | Facility: CLINIC | Age: 63
End: 2023-03-27

## 2023-03-27 ENCOUNTER — APPOINTMENT (OUTPATIENT)
Dept: RADIOLOGY | Facility: HOSPITAL | Age: 63
End: 2023-03-27

## 2023-03-27 VITALS
OXYGEN SATURATION: 95 % | HEIGHT: 64 IN | WEIGHT: 150 LBS | RESPIRATION RATE: 16 BRPM | DIASTOLIC BLOOD PRESSURE: 58 MMHG | BODY MASS INDEX: 25.61 KG/M2 | SYSTOLIC BLOOD PRESSURE: 100 MMHG | HEART RATE: 94 BPM

## 2023-03-27 DIAGNOSIS — J91.8 PLEURAL EFFUSION ASSOCIATED WITH HEPATIC DISORDER: ICD-10-CM

## 2023-03-27 DIAGNOSIS — F11.20 CONTINUOUS OPIOID DEPENDENCE (HCC): ICD-10-CM

## 2023-03-27 DIAGNOSIS — R06.02 SOB (SHORTNESS OF BREATH) ON EXERTION: ICD-10-CM

## 2023-03-27 DIAGNOSIS — K76.9 PLEURAL EFFUSION ASSOCIATED WITH HEPATIC DISORDER: ICD-10-CM

## 2023-03-27 DIAGNOSIS — J90 RECURRENT RIGHT PLEURAL EFFUSION: Primary | ICD-10-CM

## 2023-03-27 DIAGNOSIS — I85.00 ESOPHAGEAL VARICES DETERMINED BY ENDOSCOPY (HCC): ICD-10-CM

## 2023-03-27 DIAGNOSIS — N93.0 PCB (POST COITAL BLEEDING): ICD-10-CM

## 2023-03-27 DIAGNOSIS — J90 MODERATE SIZED PLEURAL EFFUSION: Primary | ICD-10-CM

## 2023-03-27 DIAGNOSIS — K21.9 GERD WITHOUT ESOPHAGITIS: ICD-10-CM

## 2023-03-27 DIAGNOSIS — C50.511 MALIGNANT NEOPLASM OF LOWER-OUTER QUADRANT OF RIGHT BREAST OF FEMALE, ESTROGEN RECEPTOR POSITIVE (HCC): ICD-10-CM

## 2023-03-27 DIAGNOSIS — K70.31 ALCOHOLIC CIRRHOSIS OF LIVER WITH ASCITES (HCC): ICD-10-CM

## 2023-03-27 DIAGNOSIS — G47.00 INSOMNIA, UNSPECIFIED TYPE: ICD-10-CM

## 2023-03-27 DIAGNOSIS — R18.8 REFRACTORY ASCITES: ICD-10-CM

## 2023-03-27 DIAGNOSIS — R42 POSTURAL LIGHTHEADEDNESS: ICD-10-CM

## 2023-03-27 DIAGNOSIS — K72.90 DECOMPENSATED HEPATIC CIRRHOSIS (HCC): ICD-10-CM

## 2023-03-27 DIAGNOSIS — R06.02 SHORTNESS OF BREATH: ICD-10-CM

## 2023-03-27 DIAGNOSIS — E87.1 HYPONATREMIA: ICD-10-CM

## 2023-03-27 DIAGNOSIS — K74.60 DECOMPENSATED HEPATIC CIRRHOSIS (HCC): ICD-10-CM

## 2023-03-27 DIAGNOSIS — Z17.0 MALIGNANT NEOPLASM OF LOWER-OUTER QUADRANT OF RIGHT BREAST OF FEMALE, ESTROGEN RECEPTOR POSITIVE (HCC): ICD-10-CM

## 2023-03-27 LAB
2HR DELTA HS TROPONIN: 0 NG/L
4HR DELTA HS TROPONIN: 0 NG/L
ALBUMIN SERPL BCP-MCNC: 2.7 G/DL (ref 3.5–5)
ALP SERPL-CCNC: 133 U/L (ref 34–104)
ALT SERPL W P-5'-P-CCNC: 21 U/L (ref 7–52)
ANION GAP SERPL CALCULATED.3IONS-SCNC: 6 MMOL/L (ref 4–13)
APTT PPP: 36 SECONDS (ref 23–37)
AST SERPL W P-5'-P-CCNC: 38 U/L (ref 13–39)
BASOPHILS # BLD AUTO: 0.06 THOUSANDS/ÂΜL (ref 0–0.1)
BASOPHILS NFR BLD AUTO: 1 % (ref 0–1)
BILIRUB SERPL-MCNC: 1.33 MG/DL (ref 0.2–1)
BUN SERPL-MCNC: 16 MG/DL (ref 5–25)
CALCIUM ALBUM COR SERPL-MCNC: 9.3 MG/DL (ref 8.3–10.1)
CALCIUM SERPL-MCNC: 8.3 MG/DL (ref 8.4–10.2)
CARDIAC TROPONIN I PNL SERPL HS: 11 NG/L
CHLORIDE SERPL-SCNC: 96 MMOL/L (ref 96–108)
CO2 SERPL-SCNC: 26 MMOL/L (ref 21–32)
CREAT SERPL-MCNC: 0.91 MG/DL (ref 0.6–1.3)
EOSINOPHIL # BLD AUTO: 0.05 THOUSAND/ÂΜL (ref 0–0.61)
EOSINOPHIL NFR BLD AUTO: 1 % (ref 0–6)
ERYTHROCYTE [DISTWIDTH] IN BLOOD BY AUTOMATED COUNT: 14.5 % (ref 11.6–15.1)
GFR SERPL CREATININE-BSD FRML MDRD: 67 ML/MIN/1.73SQ M
GLUCOSE SERPL-MCNC: 150 MG/DL (ref 65–140)
HCT VFR BLD AUTO: 34.6 % (ref 34.8–46.1)
HGB BLD-MCNC: 12 G/DL (ref 11.5–15.4)
IMM GRANULOCYTES # BLD AUTO: 0.02 THOUSAND/UL (ref 0–0.2)
IMM GRANULOCYTES NFR BLD AUTO: 0 % (ref 0–2)
INR PPP: 1.4 (ref 0.84–1.19)
LYMPHOCYTES # BLD AUTO: 0.95 THOUSANDS/ÂΜL (ref 0.6–4.47)
LYMPHOCYTES NFR BLD AUTO: 18 % (ref 14–44)
MCH RBC QN AUTO: 32.3 PG (ref 26.8–34.3)
MCHC RBC AUTO-ENTMCNC: 34.7 G/DL (ref 31.4–37.4)
MCV RBC AUTO: 93 FL (ref 82–98)
MONOCYTES # BLD AUTO: 0.6 THOUSAND/ÂΜL (ref 0.17–1.22)
MONOCYTES NFR BLD AUTO: 11 % (ref 4–12)
NEUTROPHILS # BLD AUTO: 3.73 THOUSANDS/ÂΜL (ref 1.85–7.62)
NEUTS SEG NFR BLD AUTO: 69 % (ref 43–75)
NRBC BLD AUTO-RTO: 0 /100 WBCS
PLATELET # BLD AUTO: 104 THOUSANDS/UL (ref 149–390)
PLATELET # BLD AUTO: 108 THOUSANDS/UL (ref 149–390)
PMV BLD AUTO: 9.4 FL (ref 8.9–12.7)
PMV BLD AUTO: 9.6 FL (ref 8.9–12.7)
POTASSIUM SERPL-SCNC: 3.7 MMOL/L (ref 3.5–5.3)
PROT SERPL-MCNC: 7.6 G/DL (ref 6.4–8.4)
PROTHROMBIN TIME: 18.1 SECONDS (ref 11.6–14.5)
RBC # BLD AUTO: 3.71 MILLION/UL (ref 3.81–5.12)
SODIUM 24H UR-SCNC: 36 MOL/L
SODIUM SERPL-SCNC: 128 MMOL/L (ref 135–147)
URATE SERPL-MCNC: 8.7 MG/DL (ref 2–7.5)
WBC # BLD AUTO: 5.41 THOUSAND/UL (ref 4.31–10.16)

## 2023-03-27 RX ORDER — MIDODRINE HYDROCHLORIDE 5 MG/1
7.5 TABLET ORAL
Status: DISCONTINUED | OUTPATIENT
Start: 2023-03-28 | End: 2023-03-29 | Stop reason: HOSPADM

## 2023-03-27 RX ORDER — ONDANSETRON 2 MG/ML
4 INJECTION INTRAMUSCULAR; INTRAVENOUS EVERY 6 HOURS PRN
Status: DISCONTINUED | OUTPATIENT
Start: 2023-03-27 | End: 2023-03-29 | Stop reason: HOSPADM

## 2023-03-27 RX ORDER — ALBUTEROL SULFATE 2.5 MG/3ML
2.5 SOLUTION RESPIRATORY (INHALATION) EVERY 6 HOURS PRN
Status: DISCONTINUED | OUTPATIENT
Start: 2023-03-27 | End: 2023-03-29 | Stop reason: HOSPADM

## 2023-03-27 RX ORDER — FUROSEMIDE 40 MG/1
40 TABLET ORAL DAILY
Status: DISCONTINUED | OUTPATIENT
Start: 2023-03-28 | End: 2023-03-28

## 2023-03-27 RX ORDER — ACETAMINOPHEN 325 MG/1
650 TABLET ORAL EVERY 6 HOURS PRN
Status: DISCONTINUED | OUTPATIENT
Start: 2023-03-27 | End: 2023-03-29 | Stop reason: HOSPADM

## 2023-03-27 RX ORDER — ANASTROZOLE 1 MG/1
1 TABLET ORAL DAILY
Status: DISCONTINUED | OUTPATIENT
Start: 2023-03-28 | End: 2023-03-29 | Stop reason: HOSPADM

## 2023-03-27 RX ORDER — TRAZODONE HYDROCHLORIDE 50 MG/1
50 TABLET ORAL
Status: DISCONTINUED | OUTPATIENT
Start: 2023-03-27 | End: 2023-03-29 | Stop reason: HOSPADM

## 2023-03-27 RX ORDER — MIDODRINE HYDROCHLORIDE 5 MG/1
5 TABLET ORAL
Status: DISCONTINUED | OUTPATIENT
Start: 2023-03-27 | End: 2023-03-27

## 2023-03-27 RX ORDER — TRAZODONE HYDROCHLORIDE 50 MG/1
50 TABLET ORAL
Qty: 30 TABLET | Refills: 5 | Status: SHIPPED | OUTPATIENT
Start: 2023-03-27

## 2023-03-27 RX ORDER — ALPRAZOLAM 0.5 MG/1
1 TABLET ORAL 2 TIMES DAILY PRN
Status: DISCONTINUED | OUTPATIENT
Start: 2023-03-27 | End: 2023-03-29 | Stop reason: HOSPADM

## 2023-03-27 RX ORDER — HEPARIN SODIUM 5000 [USP'U]/ML
5000 INJECTION, SOLUTION INTRAVENOUS; SUBCUTANEOUS EVERY 8 HOURS SCHEDULED
Status: DISCONTINUED | OUTPATIENT
Start: 2023-03-27 | End: 2023-03-29 | Stop reason: HOSPADM

## 2023-03-27 RX ORDER — PANTOPRAZOLE SODIUM 20 MG/1
20 TABLET, DELAYED RELEASE ORAL DAILY
Status: DISCONTINUED | OUTPATIENT
Start: 2023-03-28 | End: 2023-03-29 | Stop reason: HOSPADM

## 2023-03-27 RX ORDER — NICOTINE 21 MG/24HR
1 PATCH, TRANSDERMAL 24 HOURS TRANSDERMAL DAILY
Status: DISCONTINUED | OUTPATIENT
Start: 2023-03-28 | End: 2023-03-29 | Stop reason: HOSPADM

## 2023-03-27 RX ORDER — ALBUTEROL SULFATE 90 UG/1
2 AEROSOL, METERED RESPIRATORY (INHALATION) EVERY 6 HOURS PRN
Status: DISCONTINUED | OUTPATIENT
Start: 2023-03-27 | End: 2023-03-29 | Stop reason: HOSPADM

## 2023-03-27 RX ORDER — SPIRONOLACTONE 25 MG/1
100 TABLET ORAL DAILY
Status: DISCONTINUED | OUTPATIENT
Start: 2023-03-28 | End: 2023-03-29 | Stop reason: HOSPADM

## 2023-03-27 RX ADMIN — TRAZODONE HYDROCHLORIDE 50 MG: 50 TABLET ORAL at 22:02

## 2023-03-27 RX ADMIN — ALPRAZOLAM 1 MG: 0.5 TABLET ORAL at 22:02

## 2023-03-27 RX ADMIN — HEPARIN SODIUM 5000 UNITS: 5000 INJECTION INTRAVENOUS; SUBCUTANEOUS at 22:03

## 2023-03-27 NOTE — PROGRESS NOTES
"Assessment/Plan:    1  Shortness of breath - at rest O2 99%, with walking 95% and has to stop after short distance, CXR 3/23 reveal moderate effusion, weight is up 12 lbs, recommend patient go to ER    2  Weight gain - 12 lbs in one month, recommended go to ER for re evaluation    3  Insomnia - discussed xanax at length with patient, advised it will not increase dose or change to another benzo  She can try trazodone for sleep     4  Post coital bleeding - refer to gyn for eval    5  Breast cancer - under care [facility]    F/u as needed    Subjective:   Chief Complaint   Patient presents with   • bleeding after intercorse      Patient ID: Meseret Marks is a 60 y o  female  Patient with vaginal bleeding post coital, has intercourse bleeding  Will take about 10 minutes to stop bleeding  Vaginal area feels tight, raw  Jony Dupree does not help  Patient notes she has been having increase in SOB over the past month  Walking in our [address] is too much for patient currently  Called GI and she went for a CXR last week, no results yet per patient  Has appt there tomorrow  States she has not drank in months \"except over the holidays\"  Still smoking       Also cannot sleep, xanax does nothing, would like to try ativan       The following portions of the patient's history were reviewed and updated as appropriate: current medications, past family history, past medical history, past social history, past surgical history and problem list     Past Medical History:   Diagnosis Date   • Alcoholic fatty liver    • Anxiety    • Asthma    • COPD (chronic obstructive pulmonary disease) ([facility] [address] )    • Elevated liver function tests    • GERD (gastroesophageal reflux disease)    • GERD without esophagitis    • Hyperlipidemia    • Hypertension    • IBS (irritable bowel syndrome)    • Insomnia    • Insomnia    • Liver disease    • Nervousness    • Nicotine dependence    • Other specified urinary incontinence    • RLS (restless legs syndrome)    • " Wears glasses      Past Surgical History:   Procedure Laterality Date   • BACK SURGERY     • 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   • BREAST EXCISIONAL BIOPSY Right 11/01/2004    benign   • CATARACT EXTRACTION, BILATERAL  08/01/2018   • COLONOSCOPY N/A 5/8/2019    Procedure: COLONOSCOPY;  Surgeon: Aleyda Jackson MD;  Location: [facility] GI LAB; Service: Gastroenterology   • EGD AND COLONOSCOPY N/A 3/19/2018    Procedure: EGD AND COLONOSCOPY;  Surgeon: Aleyda Jackson MD;  Location: [facility] GI LAB; Service: Gastroenterology   • ESOPHAGOGASTRODUODENOSCOPY N/A 5/8/2019    Procedure: ESOPHAGOGASTRODUODENOSCOPY (EGD); Surgeon: Aleyda Jackson MD;  Location: [facility] GI LAB;   Service: Gastroenterology   • IR PARACENTESIS  11/27/2020   • IR PARACENTESIS  12/8/2020   • IR PARACENTESIS  10/28/2022   • IR PARACENTESIS  11/7/2022   • IR PARACENTESIS  11/30/2022   • IR PARACENTESIS  12/30/2022   • IR PARACENTESIS  1/16/2023   • IR THORACENTESIS  1/16/2023   • KNEE SURGERY     • KNEE SURGERY     • LUMBAR LAMINECTOMY  04/1999   • LYMPH NODE BIOPSY     • MAMMO STEREOTACTIC BREAST BIOPSY RIGHT (ALL INC) Right 5/21/2021   • MAMMO STEREOTACTIC BREAST BIOPSY RIGHT (ALL INC) EACH ADD Right 5/21/2021   • TOTAL ABDOMINAL HYSTERECTOMY  02/05/2008   • TUBAL LIGATION     • TUBAL LIGATION  1989   • UPPER GASTROINTESTINAL ENDOSCOPY       Family History   Problem Relation Age of Onset   • Breast cancer Mother    • No Known Problems Father    • No Known Problems Sister    • No Known Problems Brother    • No Known Problems Son    • No Known Problems Maternal Grandmother    • No Known Problems Maternal Grandfather    • No Known Problems Paternal Grandmother    • No Known Problems Paternal Grandfather    • No Known Problems Maternal Aunt    • No Known Problems Maternal Aunt    • No Known Problems Paternal Aunt    • Substance Abuse Neg Hx    • Mental illness Neg Hx      Social History     Socioeconomic History   • Marital status: /Civil Union Spouse name: Not on file   • Number of children: Not on file   • Years of education: Not on file   • Highest education level: Not on file   Occupational History   • Not on file   Tobacco Use   • Smoking status: Every Day     Packs/day: 1 00     Years: 45 00     Pack years: 45 00     Types: Cigarettes     Start date: 1/1/1978   • Smokeless tobacco: Never   Vaping Use   • Vaping Use: Never used   Substance and Sexual Activity   • Alcohol use: Not Currently     Comment: not lately, non alcoholic beer   • Drug use: No   • Sexual activity: Yes     Partners: Male   Other Topics Concern   • Not on file   Social History Narrative    Has carbon monoxide detectors in home    Has smoke detectors    Uses safety equipment - seatbelts     Social Determinants of Health     Financial Resource Strain: Not on file   Food Insecurity: No Food Insecurity   • Worried About Running Out of Food in the Last Year: Never true   • Ran Out of Food in the Last Year: Never true   Transportation Needs: No Transportation Needs   • Lack of Transportation (Medical): No   • Lack of Transportation (Non-Medical):  No   Physical Activity: Not on file   Stress: Not on file   Social Connections: Not on file   Intimate Partner Violence: Not on file   Housing Stability: Low Risk    • Unable to Pay for Housing in the Last Year: No   • Number of Places Lived in the Last Year: 1   • Unstable Housing in the Last Year: No       Current Outpatient Medications:   •  albuterol (2 5 mg/3 mL) 0 083 % nebulizer solution, Take 3 mL (2 5 mg total) by nebulization every 6 (six) hours as needed for wheezing or shortness of breath, Disp: 180 mL, Rfl: 5  •  albuterol (Proventil HFA) 90 mcg/act inhaler, Inhale 2 puffs every 6 (six) hours as needed for wheezing or shortness of breath, Disp: 18 g, Rfl: 3  •  ALPRAZolam (XANAX) 1 mg tablet, Take 1 tablet (1 mg total) by mouth 2 (two) times a day as needed for anxiety, Disp: 60 tablet, Rfl: 0  •  anastrozole (ARIMIDEX) 1 mg "tablet, Take 1 tablet (1 mg total) by mouth daily, Disp: 90 tablet, Rfl: 1  •  furosemide (LASIX) 40 mg tablet, Take 1 tablet (40 mg total) by mouth daily, Disp: 30 tablet, Rfl: 5  •  guselkumab (TREMFYA) subcutaneous injection, Inject 100 mg under the skin every 6 weeks, Disp: , Rfl:   •  midodrine (PROAMATINE) 5 mg tablet, TAKE ONE AND ONE-HALF TABLETS BY MOUTH THREE TIMES A DAY BEFORE MEALS, Disp: 135 tablet, Rfl: 0  •  pantoprazole (PROTONIX) 20 mg tablet, Take 1 tablet (20 mg total) by mouth daily, Disp: 30 tablet, Rfl: 2  •  spironolactone (ALDACTONE) 100 mg tablet, Take 1 tablet (100 mg total) by mouth daily, Disp: 30 tablet, Rfl: 5  •  lactulose 20 g/30 mL, Take 45 mL (30 g total) by mouth 2 (two) times a day Titrate dose for an effect of 3 soft, but formed bowel movements per day  You can start with 1 tablespoon per day and slowly increase to effect , Disp: 2700 mL, Rfl: 3  •  Promethazine-DM (PHENERGAN-DM) 6 25-15 mg/5 mL oral syrup, Take 5 mL by mouth 4 (four) times a day as needed for cough (Patient not taking: Reported on 3/27/2023), Disp: 118 mL, Rfl: 0    Review of Systems          Objective:    Vitals:    03/27/23 1234   BP: 100/58   Pulse: 94   Resp: 16   SpO2: 95%   Weight: 68 kg (150 lb)   Height: 5' 4\" (1 626 m)        Physical Exam  Constitutional:       Appearance: Normal appearance  She is normal weight  HENT:      Head: Normocephalic and atraumatic  Cardiovascular:      Rate and Rhythm: Normal rate and regular rhythm  Pulses: Normal pulses  Heart sounds: Normal heart sounds  Pulmonary:      Effort: Tachypnea present  Breath sounds: Examination of the right-lower field reveals decreased breath sounds  Decreased breath sounds present  No wheezing, rhonchi or rales  Neurological:      General: No focal deficit present  Mental Status: She is alert and oriented to person, place, and time     Psychiatric:         Mood and Affect: Mood normal          Behavior: Behavior " normal          Thought Content:  Thought content normal          Judgment: Judgment normal

## 2023-03-27 NOTE — ASSESSMENT & PLAN NOTE
Patient has history of thrombocytopenia in setting of cirrhosis  Noted with platelets of 527P  Trend CBC

## 2023-03-27 NOTE — ASSESSMENT & PLAN NOTE
Admitted with sodium of 128  Will order hyponatremia work-up  Place on fluid restriction  Continue on Lasix and spironolactone  Nephrology consult  BMP every 8 hours

## 2023-03-27 NOTE — ASSESSMENT & PLAN NOTE
Patient with history of alcoholic cirrhosis of liver  Continue on Lasix, spironolactone  We will order ammonia levels in a m    Follows up with hepatology Dr Tamar Alvarez as outpatient

## 2023-03-27 NOTE — TELEPHONE ENCOUNTER
Patient said you are weaning her off Xanax and was going to order something else for her to sleep  She is asking for it  I told her she should be going to the ER, she want home to pack a bag    She asked again for the new med        [phone]

## 2023-03-27 NOTE — ASSESSMENT & PLAN NOTE
Patient with moderate size pleural effusion  Patient has recurrent pleural effusion and had thoracentesis done in January 2023 with 1 L of pleural fluid removed  Will consult IR for thoracentesis  Continue on diuretics  Oxygen supplementation as needed Yes

## 2023-03-27 NOTE — ED PROVIDER NOTES
History  Chief Complaint   Patient presents with   • Shortness of Breath     C/o of SOB, worse on exertion  Pt maintaining o2 sats above 94% on RA while ambulating  60year old female presents for evaluation of shortness of breath for the past week associated with cough and postural lightheadedness  No fever or chills  No syncopal events  Patient has history of cirrhosis  She states it has been several months since her last paracentesis and denies significant abdominal distention  However, she required thoracentesis for the first time this past January and is concerned that the fluid has re-accumulated  Prior to Admission Medications   Prescriptions Last Dose Informant Patient Reported? Taking? ALPRAZolam (XANAX) 1 mg tablet   No No   Sig: Take 1 tablet (1 mg total) by mouth 2 (two) times a day as needed for anxiety   Promethazine-DM (PHENERGAN-DM) 6 25-15 mg/5 mL oral syrup   No No   Sig: Take 5 mL by mouth 4 (four) times a day as needed for cough   Patient not taking: Reported on 3/27/2023   albuterol (2 5 mg/3 mL) 0 083 % nebulizer solution   No No   Sig: Take 3 mL (2 5 mg total) by nebulization every 6 (six) hours as needed for wheezing or shortness of breath   albuterol (Proventil HFA) 90 mcg/act inhaler   No No   Sig: Inhale 2 puffs every 6 (six) hours as needed for wheezing or shortness of breath   anastrozole (ARIMIDEX) 1 mg tablet   No No   Sig: Take 1 tablet (1 mg total) by mouth daily   furosemide (LASIX) 40 mg tablet   No No   Sig: Take 1 tablet (40 mg total) by mouth daily   guselkumab (TREMFYA) subcutaneous injection   Yes No   Sig: Inject 100 mg under the skin every 6 weeks   lactulose 20 g/30 mL   No No   Sig: Take 45 mL (30 g total) by mouth 2 (two) times a day Titrate dose for an effect of 3 soft, but formed bowel movements per day  You can start with 1 tablespoon per day and slowly increase to effect     midodrine (PROAMATINE) 5 mg tablet   No No   Sig: TAKE ONE AND ONE-HALF TABLETS BY MOUTH THREE TIMES A DAY BEFORE MEALS   pantoprazole (PROTONIX) 20 mg tablet   No No   Sig: Take 1 tablet (20 mg total) by mouth daily   spironolactone (ALDACTONE) 100 mg tablet   No No   Sig: Take 1 tablet (100 mg total) by mouth daily   traZODone (DESYREL) 50 mg tablet   No No   Sig: Take 1 tablet (50 mg total) by mouth daily at bedtime      Facility-Administered Medications: None       Past Medical History:   Diagnosis Date   • Alcoholic fatty liver    • Anxiety    • Asthma    • COPD (chronic obstructive pulmonary disease) (H[facility])    • Elevated liver function tests    • GERD (gastroesophageal reflux disease)    • GERD without esophagitis    • Hyperlipidemia    • Hypertension    • IBS (irritable bowel syndrome)    • Insomnia    • Insomnia    • Liver disease    • Nervousness    • Nicotine dependence    • Other specified urinary incontinence    • RLS (restless legs syndrome)    • Wears glasses        Past Surgical History:   Procedure Laterality Date   • BACK SURGERY     • 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   • BREAST EXCISIONAL BIOPSY Right 11/01/2004    benign   • CATARACT EXTRACTION, BILATERAL  08/01/2018   • COLONOSCOPY N/A 5/8/2019    Procedure: COLONOSCOPY;  Surgeon: James Lopes MD;  Location: [facility] GI LAB; Service: Gastroenterology   • EGD AND COLONOSCOPY N/A 3/19/2018    Procedure: EGD AND COLONOSCOPY;  Surgeon: James Lopes MD;  Location: [facility] GI LAB; Service: Gastroenterology   • ESOPHAGOGASTRODUODENOSCOPY N/A 5/8/2019    Procedure: ESOPHAGOGASTRODUODENOSCOPY (EGD); Surgeon: James Lopes MD;  Location: [facility] GI LAB;   Service: Gastroenterology   • IR PARACENTESIS  11/27/2020   • IR PARACENTESIS  12/8/2020   • IR PARACENTESIS  10/28/2022   • IR PARACENTESIS  11/7/2022   • IR PARACENTESIS  11/30/2022   • IR PARACENTESIS  12/30/2022   • IR PARACENTESIS  1/16/2023   • IR THORACENTESIS  1/16/2023   • KNEE SURGERY     • KNEE SURGERY     • LUMBAR LAMINECTOMY  04/1999   • LYMPH NODE BIOPSY • MAMMO STEREOTACTIC BREAST BIOPSY RIGHT (ALL INC) Right 5/21/2021   • MAMMO STEREOTACTIC BREAST BIOPSY RIGHT (ALL INC) EACH ADD Right 5/21/2021   • TOTAL ABDOMINAL HYSTERECTOMY  02/05/2008   • TUBAL LIGATION     • TUBAL LIGATION  1989   • UPPER GASTROINTESTINAL ENDOSCOPY         Family History   Problem Relation Age of Onset   • Breast cancer Mother    • No Known Problems Father    • No Known Problems Sister    • No Known Problems Brother    • No Known Problems Son    • No Known Problems Maternal Grandmother    • No Known Problems Maternal Grandfather    • No Known Problems Paternal Grandmother    • No Known Problems Paternal Grandfather    • No Known Problems Maternal Aunt    • No Known Problems Maternal Aunt    • No Known Problems Paternal Aunt    • Substance Abuse Neg Hx    • Mental illness Neg Hx      I have reviewed and agree with the history as documented  E-Cigarette/Vaping   • E-Cigarette Use Never User      E-Cigarette/Vaping Substances     Social History     Tobacco Use   • Smoking status: Every Day     Packs/day: 1 00     Years: 45 00     Pack years: 45 00     Types: Cigarettes     Start date: 1/1/1978   • Smokeless tobacco: Never   Vaping Use   • Vaping Use: Never used   Substance Use Topics   • Alcohol use: Not Currently     Comment: not lately, non alcoholic beer   • Drug use: No       Review of Systems    Physical Exam  Physical Exam  Vitals and nursing note reviewed  HENT:      Head: Normocephalic and atraumatic  Eyes:      Conjunctiva/sclera: Conjunctivae normal    Cardiovascular:      Rate and Rhythm: Normal rate and regular rhythm  Pulses: Normal pulses  Heart sounds: Normal heart sounds  Pulmonary:      Effort: Pulmonary effort is normal  No respiratory distress  Breath sounds: Normal breath sounds  Abdominal:      General: There is no distension  Palpations: Abdomen is soft  Tenderness: There is no abdominal tenderness     Skin:     General: Skin is warm and dry  Neurological:      Mental Status: She is alert           Vital Signs  ED Triage Vitals [03/27/23 1437]   Temperature Pulse Respirations Blood Pressure SpO2   (!) 97 1 °F (36 2 °C) 86 22 124/58 99 %      Temp Source Heart Rate Source Patient Position - Orthostatic VS BP Location FiO2 (%)   Temporal Monitor Sitting Right arm --      Pain Score       No Pain           Vitals:    03/27/23 1437   BP: 124/58   Pulse: 86   Patient Position - Orthostatic VS: Sitting         Visual Acuity      ED Medications  Medications - No data to display    Diagnostic Studies  Results Reviewed     Procedure Component Value Units Date/Time    Protime-INR [[id number]]     Lab Status: No result Specimen: Blood     APTT [[id number]]     Lab Status: No result Specimen: Blood     HS Troponin I 4hr [[id number]]     Lab Status: No result Specimen: Blood     HS Troponin 0hr (reflex protocol) [[id number]]  (Normal) Collected: 03/27/23 1444    Lab Status: Final result Specimen: Blood from Arm, Left Updated: 03/27/23 1514     hs TnI 0hr 11 ng/L     HS Troponin I 2hr [[id number]]     Lab Status: No result Specimen: Blood     Comprehensive metabolic panel [[id number]]  (Abnormal) Collected: 03/27/23 1444    Lab Status: Final result Specimen: Blood from Arm, Left Updated: 03/27/23 1509     Sodium 128 mmol/L      Potassium 3 7 mmol/L      Chloride 96 mmol/L      CO2 26 mmol/L      ANION GAP 6 mmol/L      BUN 16 mg/dL      Creatinine 0 91 mg/dL      Glucose 150 mg/dL      Calcium 8 3 mg/dL      Corrected Calcium 9 3 mg/dL      AST 38 U/L      ALT 21 U/L      Alkaline Phosphatase 133 U/L      Total Protein 7 6 g/dL      Albumin 2 7 g/dL      Total Bilirubin 1 33 mg/dL      eGFR 67 ml/min/1 73sq m     Narrative:      [facility] guidelines for Chronic Kidney Disease (CKD):   •  Stage 1 with normal or high GFR (GFR > 90 mL/min/1 73 square meters)  •  Stage 2 Mild CKD (GFR = 60-89 mL/min/1 73 square meters)  •  Stage 3A Moderate CKD (GFR = 45-59 mL/min/1 73 square meters)  •  Stage 3B Moderate CKD (GFR = 30-44 mL/min/1 73 square meters)  •  Stage 4 Severe CKD (GFR = 15-29 mL/min/1 73 square meters)  •  Stage 5 End Stage CKD (GFR <15 mL/min/1 73 square meters)  Note: GFR calculation is accurate only with a steady state creatinine    CBC and differential [[id number]]  (Abnormal) Collected: 03/27/23 1444    Lab Status: Final result Specimen: Blood from Arm, Left Updated: 03/27/23 1451     WBC 5 41 Thousand/uL      RBC 3 71 Million/uL      Hemoglobin 12 0 g/dL      Hematocrit 34 6 %      MCV 93 fL      MCH 32 3 pg      MCHC 34 7 g/dL      RDW 14 5 %      MPV 9 6 fL      Platelets 831 Thousands/uL      nRBC 0 /100 WBCs      Neutrophils Relative 69 %      Immat GRANS % 0 %      Lymphocytes Relative 18 %      Monocytes Relative 11 %      Eosinophils Relative 1 %      Basophils Relative 1 %      Neutrophils Absolute 3 73 Thousands/µL      Immature Grans Absolute 0 02 Thousand/uL      Lymphocytes Absolute 0 95 Thousands/µL      Monocytes Absolute 0 60 Thousand/µL      Eosinophils Absolute 0 05 Thousand/µL      Basophils Absolute 0 06 Thousands/µL                  XR chest pa & lateral   Final Result by Vee Torre MD (03/27 9019)      No significant change in moderate size right pleural effusion                    Workstation performed: [id number]                    Procedures  ECG 12 Lead Documentation Only    Date/Time: 3/27/2023 2:42 PM  Performed by: Dalia Johnson MD  Authorized by: Dalia Johnson MD     Indications / Diagnosis:  Shortness of breath  Patient location:  ED  Previous ECG:     Previous ECG:  Unavailable  Interpretation:     Interpretation: non-specific    Rate:     ECG rate:  84    ECG rate assessment: normal    Rhythm:     Rhythm: sinus rhythm    Ectopy:     Ectopy: none    QRS:     QRS axis:  Normal    QRS intervals:  Normal  Conduction:     Conduction: normal    ST segments:     ST segments: Normal  T waves:     T waves: inverted      Inverted:  V2 and V3             ED Course  ED Course as of 03/27/23 1737   Mon Mar 27, 2023   1715 Sodium(!): 128  136 one month ago             HEART Risk Score    Flowsheet Row Most Recent Value   Heart Score Risk Calculator    History 0  [no chest pain] Filed at: 03/27/2023 1734   ECG 1 Filed at: 03/27/2023 1734   Age 1 Filed at: 03/27/2023 1734   Risk Factors 2 Filed at: 03/27/2023 1734   Troponin 0 Filed at: 03/27/2023 1734   HEART Score 4 Filed at: 03/27/2023 1734                        SBIRT 22yo+    Flowsheet Row Most Recent Value   SBIRT (23 yo +)    In order to provide better care to our patients, we are screening all of our patients for alcohol and drug use  Would it be okay to ask you these screening questions? No Filed at: 03/27/2023 1445                    Medical Decision Making  60year old female presents for evaluation of shortness of breath and postural lightheadedness  Labs with hyponatremia  Recurrent right pleural effusion  Patient admitted 1/16/23-1/17/23 for similar presentation requiring drainage from IR  Stable on exam with normal oxygenation on room air  Patient admitted for further evaluation and management  Hyponatremia: acute illness or injury  Postural lightheadedness: acute illness or injury  Recurrent right pleural effusion: chronic illness or injury with exacerbation, progression, or side effects of treatment  Shortness of breath: acute illness or injury  Amount and/or Complexity of Data Reviewed  Labs: ordered  Decision-making details documented in ED Course  Risk  Decision regarding hospitalization            Disposition  Final diagnoses:   Recurrent right pleural effusion   Shortness of breath   Hyponatremia   Postural lightheadedness     Time reflects when diagnosis was documented in both MDM as applicable and the Disposition within this note     Time User Action Codes Description Comment    3/27/2023  5:27 PM Fadia Childress Anish Anand Add [J90] Recurrent right pleural effusion     3/27/2023  5:27 PM GeovanniAnish garduno Add [R06 02] Shortness of breath     3/27/2023  5:27 PM Anish Galarza Add [E87 1] Hyponatremia     3/27/2023  5:36 PM Anish Galarza Add [R42] Postural lightheadedness       ED Disposition     ED Disposition   Admit    Condition   Stable    Date/Time   Mon Mar 27, 2023  5:35 PM    Comment   Case was discussed with RANDY and the patient's admission status was agreed to be Admission Status: observation status to the service of Dr Alexa Ryan   Follow-up Information    None         Patient's Medications   Discharge Prescriptions    No medications on file       No discharge procedures on file      PDMP Review       Value Time User    PDMP Reviewed  Yes 2/27/2023  1:37 PM Rosalva Nichols, [facility]          ED Provider  Electronically Signed by           Rhonda Shankar MD  03/27/23 9815

## 2023-03-28 ENCOUNTER — TELEPHONE (OUTPATIENT)
Dept: GASTROENTEROLOGY | Facility: CLINIC | Age: 63
End: 2023-03-28

## 2023-03-28 ENCOUNTER — APPOINTMENT (OUTPATIENT)
Dept: INTERVENTIONAL RADIOLOGY/VASCULAR | Facility: HOSPITAL | Age: 63
End: 2023-03-28
Attending: INTERNAL MEDICINE

## 2023-03-28 LAB
ALBUMIN SERPL BCP-MCNC: 2.5 G/DL (ref 3.5–5)
ALP SERPL-CCNC: 117 U/L (ref 34–104)
ALT SERPL W P-5'-P-CCNC: 20 U/L (ref 7–52)
AMMONIA PLAS-SCNC: 98 UMOL/L (ref 18–72)
ANION GAP SERPL CALCULATED.3IONS-SCNC: 4 MMOL/L (ref 4–13)
ANION GAP SERPL CALCULATED.3IONS-SCNC: 4 MMOL/L (ref 4–13)
ANION GAP SERPL CALCULATED.3IONS-SCNC: 5 MMOL/L (ref 4–13)
APPEARANCE FLD: NORMAL
AST SERPL W P-5'-P-CCNC: 35 U/L (ref 13–39)
ATRIAL RATE: 84 BPM
BASOPHILS # BLD AUTO: 0.04 THOUSANDS/ÂΜL (ref 0–0.1)
BASOPHILS NFR BLD AUTO: 1 % (ref 0–1)
BILIRUB SERPL-MCNC: 1.52 MG/DL (ref 0.2–1)
BUN SERPL-MCNC: 15 MG/DL (ref 5–25)
BUN SERPL-MCNC: 16 MG/DL (ref 5–25)
BUN SERPL-MCNC: 16 MG/DL (ref 5–25)
CALCIUM ALBUM COR SERPL-MCNC: 9.8 MG/DL (ref 8.3–10.1)
CALCIUM SERPL-MCNC: 8 MG/DL (ref 8.4–10.2)
CALCIUM SERPL-MCNC: 8.5 MG/DL (ref 8.4–10.2)
CALCIUM SERPL-MCNC: 8.6 MG/DL (ref 8.4–10.2)
CHLORIDE SERPL-SCNC: 100 MMOL/L (ref 96–108)
CHLORIDE SERPL-SCNC: 101 MMOL/L (ref 96–108)
CHLORIDE SERPL-SCNC: 101 MMOL/L (ref 96–108)
CO2 SERPL-SCNC: 25 MMOL/L (ref 21–32)
CO2 SERPL-SCNC: 26 MMOL/L (ref 21–32)
CO2 SERPL-SCNC: 27 MMOL/L (ref 21–32)
COLOR FLD: YELLOW
CORTIS AM PEAK SERPL-MCNC: 1.5 UG/DL (ref 4.2–22.4)
CREAT SERPL-MCNC: 0.77 MG/DL (ref 0.6–1.3)
CREAT SERPL-MCNC: 0.8 MG/DL (ref 0.6–1.3)
CREAT SERPL-MCNC: 0.86 MG/DL (ref 0.6–1.3)
EOSINOPHIL # BLD AUTO: 0.1 THOUSAND/ÂΜL (ref 0–0.61)
EOSINOPHIL NFR BLD AUTO: 3 % (ref 0–6)
ERYTHROCYTE [DISTWIDTH] IN BLOOD BY AUTOMATED COUNT: 14.6 % (ref 11.6–15.1)
GFR SERPL CREATININE-BSD FRML MDRD: 72 ML/MIN/1.73SQ M
GFR SERPL CREATININE-BSD FRML MDRD: 78 ML/MIN/1.73SQ M
GFR SERPL CREATININE-BSD FRML MDRD: 82 ML/MIN/1.73SQ M
GLUCOSE FLD-MCNC: 107 MG/DL
GLUCOSE P FAST SERPL-MCNC: 94 MG/DL (ref 65–99)
GLUCOSE SERPL-MCNC: 156 MG/DL (ref 65–140)
GLUCOSE SERPL-MCNC: 84 MG/DL (ref 65–140)
GLUCOSE SERPL-MCNC: 94 MG/DL (ref 65–140)
HCT VFR BLD AUTO: 30.9 % (ref 34.8–46.1)
HGB BLD-MCNC: 10.7 G/DL (ref 11.5–15.4)
IMM GRANULOCYTES # BLD AUTO: 0.01 THOUSAND/UL (ref 0–0.2)
IMM GRANULOCYTES NFR BLD AUTO: 0 % (ref 0–2)
INR PPP: 1.47 (ref 0.84–1.19)
LDH FLD L TO P-CCNC: 200 U/L
LYMPHOCYTES # BLD AUTO: 1.2 THOUSANDS/ÂΜL (ref 0.6–4.47)
LYMPHOCYTES NFR BLD AUTO: 30 % (ref 14–44)
MAGNESIUM SERPL-MCNC: 2 MG/DL (ref 1.9–2.7)
MCH RBC QN AUTO: 32.9 PG (ref 26.8–34.3)
MCHC RBC AUTO-ENTMCNC: 34.6 G/DL (ref 31.4–37.4)
MCV RBC AUTO: 95 FL (ref 82–98)
MONOCYTES # BLD AUTO: 0.68 THOUSAND/ÂΜL (ref 0.17–1.22)
MONOCYTES NFR BLD AUTO: 17 % (ref 4–12)
NEUTROPHILS # BLD AUTO: 1.95 THOUSANDS/ÂΜL (ref 1.85–7.62)
NEUTS SEG NFR BLD AUTO: 49 % (ref 43–75)
NRBC BLD AUTO-RTO: 0 /100 WBCS
OSMOLALITY UR/SERPL-RTO: 283 MMOL/KG (ref 282–298)
OSMOLALITY UR: 380 MMOL/KG
P AXIS: 45 DEGREES
PH BODY FLUID: 7.6
PHOSPHATE SERPL-MCNC: 3.9 MG/DL (ref 2.3–4.1)
PLATELET # BLD AUTO: 95 THOUSANDS/UL (ref 149–390)
PMV BLD AUTO: 9.7 FL (ref 8.9–12.7)
POTASSIUM SERPL-SCNC: 4.1 MMOL/L (ref 3.5–5.3)
POTASSIUM SERPL-SCNC: 4.1 MMOL/L (ref 3.5–5.3)
POTASSIUM SERPL-SCNC: 4.2 MMOL/L (ref 3.5–5.3)
PR INTERVAL: 128 MS
PROT FLD-MCNC: <3 G/DL
PROT SERPL-MCNC: 7.3 G/DL (ref 6.4–8.4)
PROTHROMBIN TIME: 18.8 SECONDS (ref 11.6–14.5)
QRS AXIS: 78 DEGREES
QRSD INTERVAL: 82 MS
QT INTERVAL: 388 MS
QTC INTERVAL: 458 MS
RBC # BLD AUTO: 3.25 MILLION/UL (ref 3.81–5.12)
SITE: NORMAL
SODIUM SERPL-SCNC: 130 MMOL/L (ref 135–147)
SODIUM SERPL-SCNC: 131 MMOL/L (ref 135–147)
SODIUM SERPL-SCNC: 132 MMOL/L (ref 135–147)
T WAVE AXIS: 50 DEGREES
TSH SERPL DL<=0.05 MIU/L-ACNC: 2.87 UIU/ML (ref 0.45–4.5)
VENTRICULAR RATE: 84 BPM
WBC # BLD AUTO: 3.98 THOUSAND/UL (ref 4.31–10.16)
WBC # FLD MANUAL: 481 /UL

## 2023-03-28 RX ORDER — LIDOCAINE HYDROCHLORIDE 10 MG/ML
INJECTION, SOLUTION EPIDURAL; INFILTRATION; INTRACAUDAL; PERINEURAL AS NEEDED
Status: COMPLETED | OUTPATIENT
Start: 2023-03-28 | End: 2023-03-28

## 2023-03-28 RX ORDER — LACTULOSE 20 G/30ML
20 SOLUTION ORAL 2 TIMES DAILY
Status: DISCONTINUED | OUTPATIENT
Start: 2023-03-28 | End: 2023-03-29 | Stop reason: HOSPADM

## 2023-03-28 RX ORDER — LACTULOSE 20 G/30ML
30 SOLUTION ORAL 2 TIMES DAILY
Qty: 2700 ML | Refills: 0 | Status: SHIPPED | OUTPATIENT
Start: 2023-03-28 | End: 2023-04-27

## 2023-03-28 RX ORDER — FUROSEMIDE 10 MG/ML
40 INJECTION INTRAMUSCULAR; INTRAVENOUS ONCE
Status: COMPLETED | OUTPATIENT
Start: 2023-03-28 | End: 2023-03-28

## 2023-03-28 RX ORDER — NICOTINE 21 MG/24HR
1 PATCH, TRANSDERMAL 24 HOURS TRANSDERMAL DAILY
Qty: 28 PATCH | Refills: 0 | Status: SHIPPED | OUTPATIENT
Start: 2023-03-29

## 2023-03-28 RX ORDER — FUROSEMIDE 40 MG/1
40 TABLET ORAL DAILY
Status: DISCONTINUED | OUTPATIENT
Start: 2023-03-28 | End: 2023-03-29 | Stop reason: HOSPADM

## 2023-03-28 RX ADMIN — LIDOCAINE HYDROCHLORIDE 5 ML: 10 INJECTION, SOLUTION EPIDURAL; INFILTRATION; INTRACAUDAL; PERINEURAL at 09:06

## 2023-03-28 RX ADMIN — PANTOPRAZOLE SODIUM 20 MG: 20 TABLET, DELAYED RELEASE ORAL at 08:38

## 2023-03-28 RX ADMIN — TRAZODONE HYDROCHLORIDE 50 MG: 50 TABLET ORAL at 21:19

## 2023-03-28 RX ADMIN — MIDODRINE HYDROCHLORIDE 7.5 MG: 5 TABLET ORAL at 15:58

## 2023-03-28 RX ADMIN — LACTULOSE 20 G: 20 SOLUTION ORAL at 17:22

## 2023-03-28 RX ADMIN — ANASTROZOLE 1 MG: 1 TABLET, COATED ORAL at 08:49

## 2023-03-28 RX ADMIN — MIDODRINE HYDROCHLORIDE 7.5 MG: 5 TABLET ORAL at 13:00

## 2023-03-28 RX ADMIN — FUROSEMIDE 40 MG: 40 TABLET ORAL at 08:40

## 2023-03-28 RX ADMIN — MIDODRINE HYDROCHLORIDE 7.5 MG: 5 TABLET ORAL at 06:11

## 2023-03-28 RX ADMIN — ALPRAZOLAM 1 MG: 0.5 TABLET ORAL at 21:19

## 2023-03-28 RX ADMIN — FUROSEMIDE 40 MG: 10 INJECTION, SOLUTION INTRAVENOUS at 15:54

## 2023-03-28 NOTE — UTILIZATION REVIEW
Initial Clinical Review    Admission: Date/Time/Statement: 3/27/23 1735 observation   Admission Orders (From admission, onward)     Ordered        03/27/23 1735  Place in Observation  Once                      Orders Placed This Encounter   Procedures   • Place in Observation     Standing Status:   Standing     Number of Occurrences:   1     Order Specific Question:   Level of Care     Answer:   Med Surg [16]     ED Arrival Information     Expected   -    Arrival   3/27/2023 14:25    Acuity   Urgent            Means of arrival   Walk-In    Escorted by   Self    Service   Hospitalist    Admission type   Emergency            Arrival complaint   Trouble Breathing, Fluid in lung, Abdominal pain           Chief Complaint   Patient presents with   • Shortness of Breath     C/o of SOB, worse on exertion  Pt maintaining o2 sats above 94% on RA while ambulating  Initial Presentation: 60 y o  female from home to ED admitted to observation due to hyponatremia/Pleural effusion  Has Breast Ca on Arimidex, Cirrhosis, copd, hpt, GERD, smoker  Presented due to shortness of breath with cough and postural lightheadedness starting week prior to arrival    On exam respiratory distress  INR 1 40  na 128  Albumin 2 7  Total bilirubin 1 33  CxR showed right pleural effusion  Plan is fluid restriction  Continue home lasix and spironolactone, BMP every 8 hours  Consult nephrology  Consult IR for thoracentesis  Oxygen as needed  Consult GI, continue home medications  3/2823 Observation:  MELD today 18  Admits to 12 lb weight gain in last month  Short of breath  Arthralgias  On exam breath sounds decreased right base  Na 131  Total bilirubin 1 52  Albumin 2 5  INR 1 47  Ammonia 98     Wbc 3 98   H&H 10 7/30 9  Today thoracentesis done  Additional Lasix given  Resume diuretics  3/28/23 procedure IR - Diagnostic right thoracentesis with removal 1000ml clear yellow fluid      Per GI 3/28/23:  Patient has alcoholic cirrhosis decompensated by ascites, hepatic hydrothorax, esophageal varices, hyponatremia and thrombocytopenia  Need to consider if candidate for TIPS  Resume home diuretics of lasix and spironolactone  No alcohol  Wants to leave AMA, trying to convince to stay for diuresis  Per nephrology 3/28/23:  Patient has history of Cirrhosis of liver and chronic hyponatremia that fluctuates with fluid intake  Continue Lasix and fluid restriction  ED Triage Vitals [03/27/23 1437]   Temperature Pulse Respirations Blood Pressure SpO2   (!) 97 1 °F (36 2 °C) 86 22 124/58 99 %      Temp Source Heart Rate Source Patient Position - Orthostatic VS BP Location FiO2 (%)   Temporal Monitor Sitting Right arm --      Pain Score       No Pain          Wt Readings from Last 1 Encounters:   03/29/23 65 5 kg (144 lb 6 4 oz)     Additional Vital Signs:   03/29/23 07:42:35 98 4 °F (36 9 °C) 80 13 89/51 Abnormal  64 Abnormal  96 % None (Room air) Lying   03/28/23 2100 -- -- -- -- -- 95 % None (Room air) --   03/28/23 15:57:01 -- 92 -- 93/53 66 95 % -- --   03/28/23 1001 -- -- -- -- -- -- None (Room air) --   03/28/23 08:38:46 -- -- -- 102/59 73 -- -- --   03/28/23 07:17:23 98 6 °F (37 °C) 76 -- 100/57 71 94 %       03/28/23 07:17:23 98 6 °F (37 °C) 76 -- 100/57 71 94 % -- --   03/27/23 21:00:56 98 3 °F (36 8 °C) 76 -- 108/59 75 95 % -- --   03/27/23 2100 -- -- -- -- -- 95 % None (Room air) --   03/27/23 1830 -- 76 20 111/58 79 96 % None (Room air) Sitting   03/27/23 1800 -- 77 20 117/55 79 95 % None (Room air)      Pertinent Labs/Diagnostic Test Results:   IR IN-Patient Thoracentesis   Final Result by Faustino Steele MD (03/28 1603)   Impression:   1  Successful ultrasound-guided thoracentesis yielding 1000 ml of  clear yellow pleural fluid                 Workstation performed: [id number]         XR chest pa & lateral   Final Result by Josué Quan MD (03/27 9260)      No significant change in moderate size right pleural effusion                    Workstation performed: [id number]           3/27/23 ecg - Interpretation: non-specific     Rate:     ECG rate:  84     ECG rate assessment: normal     Rhythm:     Rhythm: sinus rhythm     Ectopy:     Ectopy: none     QRS:     QRS axis:  Normal     QRS intervals:  Normal   Conduction:     Conduction: normal     ST segments:     ST segments:  Normal   T waves:     T waves: inverted       Inverted:  V2 and V3    Results from last 7 days   Lab Units 03/29/23  0339 03/28/23 0451 03/27/23  2156 03/27/23  1444   WBC Thousand/uL 4 98 3 98*  --  5 41   HEMOGLOBIN g/dL 11 0* 10 7*  --  12 0   HEMATOCRIT % 32 0* 30 9*  --  34 6*   PLATELETS Thousands/uL 91* 95* 104* 108*   NEUTROS ABS Thousands/µL 2 49 1 95  --  3 73     Results from last 7 days   Lab Units 03/29/23 0339 03/28/23  1531 03/28/23  0523 03/28/23  0118 03/27/23  1444   SODIUM mmol/L 131* 130* 131* 132* 128*   POTASSIUM mmol/L 4 0 4 2 4 1 4 1 3 7   CHLORIDE mmol/L 99 100 101 101 96   CO2 mmol/L 25 25 26 27 26   ANION GAP mmol/L 7 5 4 4 6   BUN mg/dL 16 16 16 15 16   CREATININE mg/dL 0 84 0 86 0 80 0 77 0 91   EGFR ml/min/1 73sq m 74 72 78 82 67   CALCIUM mg/dL 8 4 8 5 8 6 8 0* 8 3*   MAGNESIUM mg/dL  --   --  2 0  --   --    PHOSPHORUS mg/dL  --   --  3 9  --   --      Results from last 7 days   Lab Units 03/29/23 0339 03/28/23  0523 03/28/23  0451 03/27/23  1444   AST U/L 32 35  --  38   ALT U/L 16 20  --  21   ALK PHOS U/L 113* 117*  --  133*   TOTAL PROTEIN g/dL 6 8 7 3  --  7 6   ALBUMIN g/dL 2 4* 2 5*  --  2 7*   TOTAL BILIRUBIN mg/dL 0 88 1 52*  --  1 33*   AMMONIA umol/L  --   --  98*  --      Results from last 7 days   Lab Units 03/29/23 0339 03/28/23  1531 03/28/23  0523 03/28/23  0118 03/27/23  1444   GLUCOSE RANDOM mg/dL 102 156* 94 84 150*     Results from last 7 days   Lab Units 03/27/23 2156   OSMOLALITY, SERUM mmol/     Results from last 7 days   Lab Units 03/27/23  2156 03/27/23  1750 03/27/23  1444   HS TNI 0HR ng/L  --   --  11   HS TNI 2HR ng/L  --  11  --    HSTNI D2 ng/L  --  0  --    HS TNI 4HR ng/L 11  --   --    HSTNI D4 ng/L 0  --   --      Results from last 7 days   Lab Units 03/28/23  0451 03/27/23  1444   PROTIME seconds 18 8* 18 1*   INR  1 47* 1 40*   PTT seconds  --  36     Results from last 7 days   Lab Units 03/28/23  0451   TSH 3RD GENERATON uIU/mL 2 871     Results from last 7 days   Lab Units 03/27/23  2156   OSMOLALITY, SERUM mmol/   OSMO UR mmol/     Results from last 7 days   Lab Units 03/27/23  2156   SODIUM UR  36     ED Treatment:   Medication Administration from 03/27/2023 1425 to 03/27/2023 2040     None        Past Medical History:   Diagnosis Date   • Alcoholic fatty liver    • Anxiety    • Asthma    • COPD (chronic obstructive pulmonary disease) (HCC)    • Elevated liver function tests    • GERD (gastroesophageal reflux disease)    • GERD without esophagitis    • Hyperlipidemia    • Hypertension    • IBS (irritable bowel syndrome)    • Insomnia    • Insomnia    • Liver disease    • Nervousness    • Nicotine dependence    • Other specified urinary incontinence    • RLS (restless legs syndrome)    • Wears glasses      Present on Admission:  • Alcoholic cirrhosis of liver with ascites (HCC)  • Hyponatremia  • Thrombocytopenia (HCC)  • COPD (chronic obstructive pulmonary disease) (HCC)  • Essential hypertension  • GERD without esophagitis  • Nicotine dependence  • Pleural effusion  • Ductal carcinoma in situ (DCIS) of right breast      Admitting Diagnosis: Shortness of breath [R06 02]  Hyponatremia [E87 1]  SOB (shortness of breath) [R06 02]  Postural lightheadedness [R42]  Recurrent right pleural effusion [J90]  Decompens[facility] ([facility] ) [K72 90, K74 60]  Age/Sex: 60 y o  female  Admission Orders:  Scheduled Medications:  anastrozole, 1 mg, Oral, Daily  furosemide, 40 mg, Oral, Daily  heparin (porcine), 5,000 Units, Subcutaneous, Q8H [address]  midodrine, 7 5 mg, Oral, TID AC  nicotine, 1 patch, Transdermal, Daily  pantoprazole, 20 mg, Oral, Daily  spironolactone, 100 mg, Oral, Daily  traZODone, 50 mg, Oral, HS    furosemide (LASIX) injection 40 mg  Dose: 40 mg  Freq: Once Route: IV  Start: 03/28/23 1600    lactulose oral solution 20 g  Dose: 20 g  Freq: 2 times daily Route: PO  Indications of Use: HEPATIC ENCEPHALOPATHY  Start: 03/28/23 1800    Continuous IV Infusions: none      PRN Meds:  acetaminophen, 650 mg, Oral, Q6H PRN  albuterol, 2 puff, Inhalation, Q6H PRN  albuterol, 2 5 mg, Nebulization, Q6H PRN  ALPRAZolam, 1 mg, Oral, BID PRN x 1 3/27  ondansetron, 4 mg, Intravenous, Q6H PRN    Fluid restriction     IP CONSULT TO NEPHROLOGY  IP CONSULT TO GASTROENTEROLOGY    Network Utilization Review Department  ATTENTION: Please call with any questions or concerns to [phone] and carefully listen to the prompts so that you are directed to the right person  All voicemails are confidential   Birda Manners all requests for admission clinical reviews, approved or denied determinations and any other requests to dedicated fax number below belonging to the campus where the patient is receiving treatment   List of dedicated fax numbers for the Facilities:  [address] DENIALS (Administrative/Medical Necessity) [phone]   [address] (Maternity/NICU/Pediatrics) [phone]   1 Idalia Dupree [phone]   [facility] [phone]   [facility] [phone]   [address] [place] [address] Medical [address] [address] [facility] [phone] [address] [address] Marco A Wong [phone]

## 2023-03-28 NOTE — PLAN OF CARE
Problem: PAIN - ADULT  Goal: Verbalizes/displays adequate comfort level or baseline comfort level  Description: Interventions:  - Encourage patient to monitor pain and request assistance  - Assess pain using appropriate pain scale  - Administer analgesics based on type and severity of pain and evaluate response  - Implement non-pharmacological measures as appropriate and evaluate response  - Consider cultural and social influences on pain and pain management  - Notify physician/advanced practitioner if interventions unsuccessful or patient reports new pain  Outcome: Progressing  Goal: Ability to express needs and understand communication  Outcome: Progressing     Problem: INFECTION - ADULT  Goal: Absence or prevention of progression during hospitalization  Description: INTERVENTIONS:  - Assess and monitor for signs and symptoms of infection  - Monitor lab/diagnostic results  - Monitor all insertion sites, i e  indwelling lines, tubes, and drains  - Monitor endotracheal if appropriate and nasal secretions for changes in amount and color  - [place] appropriate cooling/warming therapies per order  - Administer medications as ordered  - Instruct and encourage patient and family to use good hand hygiene technique  - Identify and instruct in appropriate isolation precautions for identified infection/condition  Outcome: Progressing  Goal: Absence of fever/infection during neutropenic period  Description: INTERVENTIONS:  - Monitor WBC    Outcome: Progressing     Problem: SAFETY ADULT  Goal: Patient will remain free of falls  Description: INTERVENTIONS:  - Educate patient/family on patient safety including physical limitations  - Instruct patient to call for assistance with activity   - Consult OT/PT to assist with strengthening/mobility   - Keep Call bell within reach  - Keep bed low and locked with side rails adjusted as appropriate  - Keep care items and personal belongings within reach  - Initiate and maintain comfort rounds  - Make Fall Risk Sign visible to staff  - Offer Toileting every 2 Hours, in advance of need    - Apply yellow socks and bracelet for high fall risk patients  - Consider moving patient to room near nurses station  Outcome: Progressing  Goal: Maintain or return to baseline ADL function  Description: INTERVENTIONS:  -  Assess patient's ability to carry out ADLs; assess patient's baseline for ADL function and identify physical deficits which impact ability to perform ADLs (bathing, care of mouth/teeth, toileting, grooming, dressing, etc )  - Assess/evaluate cause of self-care deficits   - Assess range of motion  - Assess patient's mobility; develop plan if impaired  - Assess patient's need for assistive devices and provide as appropriate  - Encourage maximum independence but intervene and supervise when necessary  - Involve family in performance of ADLs  - Assess for home care needs following discharge   - Consider OT consult to assist with ADL evaluation and planning for discharge  - Provide patient education as appropriate  Outcome: Progressing  Goal: Maintains/Returns to pre admission functional level  Description: INTERVENTIONS:  - Perform BMAT or MOVE assessment daily    - Set and communicate daily mobility goal to care team and patient/family/caregiver  - Collaborate with rehabilitation services on mobility goals if consulted  - Perform Range of Motion 2 times a day  - Reposition patient every 2 hours    - Dangle patient 2 times a day  - Stand patient 2 times a day  - Ambulate patient 2 times a day  - Out of bed to chair 2 times a day   - Out of bed for meals 2 times a day  - Out of bed for toileting  - Record patient progress and toleration of activity level   Outcome: Progressing     Problem: DISCHARGE PLANNING  Goal: Discharge to home or other facility with appropriate resources  Description: INTERVENTIONS:  - Identify barriers to discharge w/patient and caregiver  - Arrange for needed discharge resources and transportation as appropriate  - Identify discharge learning needs (meds, wound care, etc )  - Arrange for interpretive services to assist at discharge as needed  - Refer to Case Management Department for coordinating discharge planning if the patient needs post-hospital services based on physician/advanced practitioner order or complex needs related to functional status, cognitive ability, or social support system  Outcome: Progressing     Problem: Knowledge Deficit  Goal: Patient/family/caregiver demonstrates understanding of disease process, treatment plan, medications, and discharge instructions  Description: Complete learning assessment and assess knowledge base    Interventions:  - Provide teaching at level of understanding  - Provide teaching via preferred learning methods  Outcome: Progressing

## 2023-03-28 NOTE — ASSESSMENT & PLAN NOTE
Patient has history of thrombocytopenia in setting of cirrhosis  Admitted with platelets of 521S  Stable

## 2023-03-28 NOTE — TELEPHONE ENCOUNTER
Pt's son, Brian Maxwell, called and rescheduled 3/28 appointment to 4/4/23 with Dr Carlos Enrique Echols at [facility]

## 2023-03-28 NOTE — DISCHARGE INSTRUCTIONS
Thoracentesis   WHAT YOU NEED TO KNOW:   A thoracentesis is a procedure to remove extra fluid or air from between your lungs and your inner chest wall  Air or fluid buildup may make it hard for you to breathe  A thoracentesis allows your lungs to expand fully so you can breathe more easily  DISCHARGE INSTRUCTIONS:   Medicines:   Pain medicine: You may be given a prescription medicine to decrease pain  Do not wait until the pain is severe before you take your medicine  Antibiotics: This medicine helps fight or prevent an infection  Take your medicine as directed  Call your healthcare provider if you think your medicine is not helping or if you have side effects  Tell him if you are allergic to any medicine  Keep a list of the medicines, vitamins, and herbs you take  Include the amounts, and when and why you take them  Bring the list or the pill bottles to follow-up visits  Carry your medicine list with you in case of an emergency  Follow up with your healthcare provider as directed:  Write down your questions so you remember to ask them during your visits  Rest:  Rest when you feel it is needed  Slowly start to do more each day  Return to your daily activities as directed  Do not smoke: If you smoke, it is never too late to quit  Ask for information about how to stop smoking if you need help  Contact your healthcare provider if:   You have a fever  Your puncture site is red, warm, swollen, or draining pus  You have questions or concerns about your procedure, medicine, or care  If you have any questions regarding, call the IR department @ [phone]  Seek care immediately or call 651 if:   Blood soaks through your bandage  There is blood in your spit

## 2023-03-28 NOTE — TELEPHONE ENCOUNTER
----- Message from Ladarius Haynes RN sent at 3/28/2023 12:39 PM EDT -----  Regarding: FW: Pt leaving AMA    ----- Message -----  From: Divine Houston MD  Sent: 3/28/2023  12:35 PM EDT  To: [facility]  Subject: Pt leaving AMA                                   Patient had an appointment with Ginger Gunderson today, but this was apparently canceled because patient was admitted for hepatic hydrothorax  Patient is status postthoracentesis and is wishing to leave AMA today  Son is asking if we can get her rescheduled for an outpatient follow-up with the liver team   Please help facilitate and give the patient a call when possible  Thank you

## 2023-03-28 NOTE — CASE MANAGEMENT
Case Management Assessment & Discharge Planning Note    Patient name Suzan Henry  Location /-69 MRN [id number]  : 1960 Date 3/28/2023       Current Admission Date: 3/27/2023  Current Admission Diagnosis:Hyponatremia   Patient Active Problem List    Diagnosis Date Noted   • Pleural effusion 2023   • Shortness of breath 2023   • Weakness 2023   • Malignant neoplasm of lower-outer quadrant of right breast of female, estrogen receptor positive ([facility] ) 2022   • Continuous opioid dependence ([facility] ) 05/10/2022   • Breast neoplasm, Tis (DCIS), right 2021   • Atypical ductal hyperplasia of right breast 2021   • Ductal carcinoma in situ (DCIS) of right breast 2021   • Decompens[facility] ) 12/10/2020   • Thrombocytopenia ([facility] ) 2020   • Anemia    • Alcoholic cirrhosis of liver with ascites ([place] [address] ) 2020   • Hyponatremia 2020   • Non-seasonal allergic rhinitis due to pollen 10/08/2020   • Irritable bowel syndrome with diarrhea 2019   • Alcohol abuse 2018   • COPD (chronic obstructive pulmonary disease) ([facility] ) 2018   • Healthcare maintenance 2018   • Fatty liver 2018   • Other specified abnormal findings of blood chemistry 2018   • Elevated LFTs 2016   • Insomnia 2015   • Essential hypertension 2014   • GERD without esophagitis 2012   • Hyperlipidemia 2012   • Nicotine dependence 2012   • Anxiety 2012      LOS (days): 0  Geometric Mean LOS (GMLOS) (days):   Days to GMLOS:     OBJECTIVE:         Current admission status: Observation    Preferred Pharmacy:   [facility] [address] [facility] [place]  [address]  [facility]  [address]   [place] [address]  Phone: [phone] Fax: [phone]    Primary Care Provider: Brayan Salinas PA-C    Primary Insurance: Bernadette Mcnamara  Secondary Insurance:     ASSESSMENT:  Fernanda Chu [employer]xies     [facility] Representative - Spouse   Primary Phone: [phone] (Home)               Advance Directives  Does patient have a [address]?: Yes  Does patient have Advance Directives?: Yes  Advance Directives: Living will, Power of  for health care  Primary Contact: Stefan Jimenez: : [phone]    Readmission Root Cause  30 Day Readmission: No    Patient Information  Admitted from[de-identified] Home  Mental Status: Alert  During Assessment patient was accompanied by: Not accompanied during assessment  Assessment information provided by[de-identified] Patient  Primary Caregiver: Self  Support Systems: Self, Spouse/significant other, Children, Family members  [place] of Residence: [address] [place] [address] do you live in?: [address] [facility] entry access options   Select all that apply : No steps to enter home  Type of Current Residence: [address] [place] [address] [place]  In the last 12 months, was there a time when you were not able to pay the mortgage or rent on time?: No  In the last 12 months, how many places have you lived?: 1  In the last 12 months, was there a time when you did not have a steady place to sleep or slept in a shelter (including now)?: No  Homeless/housing insecurity resource given?: N/A  Living Arrangements: Lives w/ Spouse/significant other  Is patient a ?: No    Activities of Daily Living Prior to Admission  Functional Status: Independent  Completes ADLs independently?: Yes  Ambulates independently?: Yes  Does patient use assisted devices?: No  Does patient currently own DME?: Yes  What DME does the patient currently own?: Nebulizer, Other (Comment) (inhaler)  Does patient have a history of Outpatient Therapy (PT/OT)?: No  Does the patient have a history of Short-Term Rehab?: No  Does patient have a history of HHC?: No  Does patient currently have [facility]?: No    Patient Information Continued  Income Source: Pension/care home  Does patient have prescription coverage?: Yes  Within the past 12 months, you worried that your food would run out before you got the money to buy more : Never true  Within the past 12 months, the food you bought just didn't last and you didn't have money to get more : Never true  Food insecurity resource given?: N/A  Does patient receive dialysis treatments?: No  Does patient have a history of substance abuse?: Yes  Historical substance use preference: Alcohol/ETOH  Does patient have a history of Mental Health Diagnosis?: No    Means of Transportation  Means of Transport to Appts[de-identified] Drives Self  In the past 12 months, has lack of transportation kept you from medical appointments or from getting medications?: No  In the past 12 months, has lack of transportation kept you from meetings, work, or from getting things needed for daily living?: No  Was application for public transport provided?: N/A      DISCHARGE DETAILS:    Discharge planning discussed with[de-identified] patient  Freedom of Choice: Yes  Comments - Freedom of Choice: Plans to return home and declines discharge needs    Additional Comments: Met with Pt  Pt presents AA&Ox3  Discussed role of case management  Pt lives with  in [facility], no [place]  Has nebulizer, inhaler  Denies hx of VNA/SNF/MH/D/A  Pt reports she is eager to get home as she is going on a weekend getaway starting Thursday  Pt reports her  will transport home and declines discharge needs

## 2023-03-28 NOTE — DISCHARGE INSTR - AVS FIRST PAGE
Follow-up with PCP repeat BMP in 1 to 2 days  Follow-up with GI/hepatology within a week  Follow-up with nephrology in 1 week  Return to ER with any worsening shortness of breath, nausea, vomiting, confusion or any other alarming symptoms

## 2023-03-28 NOTE — PROGRESS NOTES
Patient after talking to GI agreed to stay  GI ordered another dose of Lasix this evening  Will monitor BMP every 8 hours  Will discontinue discharge    Refer to my discharge summary as progress note for today

## 2023-03-28 NOTE — PLAN OF CARE
Problem: PAIN - ADULT  Goal: Verbalizes/displays adequate comfort level or baseline comfort level  Description: Interventions:  - Encourage patient to monitor pain and request assistance  - Assess pain using appropriate pain scale  - Administer analgesics based on type and severity of pain and evaluate response  - Implement non-pharmacological measures as appropriate and evaluate response  - Consider cultural and social influences on pain and pain management  - Notify physician/advanced practitioner if interventions unsuccessful or patient reports new pain  Outcome: Progressing     Problem: INFECTION - ADULT  Goal: Absence or prevention of progression during hospitalization  Description: INTERVENTIONS:  - Assess and monitor for signs and symptoms of infection  - Monitor lab/diagnostic results  - Monitor all insertion sites, i e  indwelling lines, tubes, and drains  - Monitor endotracheal if appropriate and nasal secretions for changes in amount and color  - [place] appropriate cooling/warming therapies per order  - Administer medications as ordered  - Instruct and encourage patient and family to use good hand hygiene technique  - Identify and instruct in appropriate isolation precautions for identified infection/condition  Outcome: Progressing     Problem: Knowledge Deficit  Goal: Patient/family/caregiver demonstrates understanding of disease process, treatment plan, medications, and discharge instructions  Description: Complete learning assessment and assess knowledge base    Interventions:  - Provide teaching at level of understanding  - Provide teaching via preferred learning methods  Outcome: Progressing     Problem: DISCHARGE PLANNING  Goal: Discharge to home or other facility with appropriate resources  Description: INTERVENTIONS:  - Identify barriers to discharge w/patient and caregiver  - Arrange for needed discharge resources and transportation as appropriate  - Identify discharge learning needs (meds, wound care, etc )  - Arrange for interpretive services to assist at discharge as needed  - Refer to Case Management Department for coordinating discharge planning if the patient needs post-hospital services based on physician/advanced practitioner order or complex needs related to functional status, cognitive ability, or social support system  Outcome: Progressing

## 2023-03-28 NOTE — ASSESSMENT & PLAN NOTE
Patient with moderate size pleural effusion  Patient has recurrent pleural effusion and had thoracentesis done in January 2023 with 1 L of pleural fluid removed  IR did thoracentesis this morning with 1 L of pleural fluid removed  Continue on diuretics  Patient is saturating well on room air  Patient did not want to wait for fluid studies and signed out [address]

## 2023-03-28 NOTE — DISCHARGE SUMMARY
[place]  Discharge- Walker Spindle 1960, 60 y o  female MRN: [id number]  Unit/Bed#: -01 Encounter: [id number]  Primary Care Provider: Cas Magana PA-C   Date and time admitted to hospital: 3/27/2023  4:51 PM    * Hyponatremia  Assessment & Plan  Admitted with sodium of 128  Will order hyponatremia work-up  Place on fluid restriction  Continue on Lasix and spironolactone  Sodium this morning is 131  Patient wants to sign out [address]  Patient is alert awake oriented x3 and verbalized understanding of the risk of signing out [address] including worsening hyponatremia leading to seizures, confusion, neurologic deficits and possible death    Discussed with patient and son and patient will leave AGAINST MEDICAL ADVICE    Pleural effusion  Assessment & Plan  Patient with moderate size pleural effusion  Patient has recurrent pleural effusion and had thoracentesis done in January 2023 with 1 L of pleural fluid removed  IR did thoracentesis this morning with 1 L of pleural fluid removed  Continue on diuretics  Patient is saturating well on room air  Patient did not want to wait for fluid studies and signed out [address]    Ductal carcinoma in situ (DCIS) of right breast  Assessment & Plan  Continue on Arimidex    Thrombocytopenia ([facility] [address] )  Assessment & Plan  Patient has history of thrombocytopenia in setting of cirrhosis  Admitted with platelets of 718I  Stable    Alcoholic cirrhosis of liver with ascites ([facility] [address] )  Assessment & Plan  Patient with history of alcoholic cirrhosis of liver  Continue on Lasix, spironolactone  Will discharge on lactulose  Follows up with hepatology Dr Farzana Dill as outpatient    COPD (chronic obstructive pulmonary disease) ([facility] [address] )  Assessment & Plan  Continue on bronchodilators  Not in acute exacerbation    Nicotine dependence  Assessment & Plan  On nicotine patch    Essential hypertension  Assessment & Plan  On Lasix and spironolactone  Monitor vitals as per protocol    GERD without esophagitis  Assessment & Plan  On Protonix      Hospital Course:     Sachi Estrella is a 60 y o  female patient who originally presented to the hospital on   Admission Orders (From admission, onward)     Ordered        03/27/23 1735  Place in Observation  Once                     due to  complaint of worsening shortness of breath, weight gain  Patient had chest x-ray done as outpatient on March 23 which shows moderate pleural effusion  Patient states that she has been feeling shortness of breath on minimal exertion, orthopnea and has weight gain  Denies any nausea, vomiting, abdominal pain, chest pain, palpitation, fever, chills  In ER patient was noticed to have hyponatremia and chest x-ray revealed moderate pleural effusion  She was placed on fluid restriction and was seen by GI and IR  Patient underwent thoracentesis this morning with 1 L of clear yellow fluid fluid removed  Patient is saturating well on room air  Patient sodium is 131   Patient signed out [address]  See above for detailed note  Discussed with patient regarding risk of signing out [address]  On Exam-  Chest-bilateral air entry, clear to auscultation  Abdomen-soft, nontender  Heart-S1 S2 regular  Extremities-no pedal edema or calf tenderness  Neuro-alert awake oriented x3  No focal deficits    Please see above list of diagnoses and related plan for additional information  Follow-up with PCP repeat BMP in 1 to 2 days  Follow-up with GI/hepatology within a week  Return to ER with any worsening shortness of breath, nausea, vomiting, confusion or any other alarming symptoms    Condition at Discharge:  good      Discharge instructions/Information to patient and family:   See after visit summary for information provided to patient and family        Provisions for Follow-Up Care:  See after visit summary for information related to follow-up care and any pertinent home health orders  Disposition:     Other: AGAINST MEDICAL ADVICE       Discharge Statement:  I spent 40 minutes discharging the patient  This time was spent on the day of discharge  I had direct contact with the patient on the day of discharge  Greater than 50% of the total time was spent examining patient, answering all patient questions, arranging and discussing plan of care with patient as well as directly providing post-discharge instructions  Additional time then spent on discharge activities  Discharge Medications:  See after visit summary for reconciled discharge medications provided to patient and family        ** Please Note: This note has been constructed using a voice recognition system **

## 2023-03-28 NOTE — CONSULTS
Consultation - Nephrology   Leona Arellano 60 y o  female MRN: [id number]  Unit/Bed#: -01 Encounter: [id number]      Assessment/Plan     Assessment / Plan:  1  Hyponatremia    Patient has history of cirrhosis of the liver and chronic hyponatremia that fluctuates according to fluid intake but she does follow with a primary nephrologist and this has been present  Sodium concentrations 131 mEq/L slightly improved since admission  Cirrhosis resulted in osmotic ADH stimulation due to effective volume depletion  At this point is managed with loop diuretic which she was given and fluid restriction  Continue Lasix and fluid restriction  Once discharged will follow-up with nephrology    2  Pleural effusion    Status post thoracentesis per IR  3   Cirrhosis of the liver  GI consulted  On spironolactone and furosemide  History of Present Illness   Physician Requesting Consult: Brie Davalos MD  Reason for Consult / Principal Problem: Hyponatremia  Hx and PE limited by:   HPI: Leona Arellano is a 60y o  year old female who has history of cirrhotic liver disease who was seen by her family physician and was having shortness of breath and weight gain  Chest x-ray as an outpatient showed a moderate right-sided pleural effusion so she was referred to the emergency room  She was admitted to the hospital for shortness of breath and was noted to have hyponatremia we are asked to see her for recommendations  History obtained from chart review and the patient  Consults    Review of Systems   Constitutional: Negative for appetite change, chills, diaphoresis and fever  HENT: Negative  Eyes: Negative  Respiratory: Negative  Negative for cough, chest tightness, shortness of breath and wheezing  Cardiovascular: Positive for leg swelling  Negative for chest pain and palpitations  Gastrointestinal: Positive for abdominal distention   Negative for abdominal pain, diarrhea, nausea and vomiting  Endocrine: Negative  Genitourinary: Negative for difficulty urinating, dysuria, flank pain and hematuria  Skin: Negative  Neurological: Negative for dizziness, syncope, light-headedness and headaches  Psychiatric/Behavioral: Negative for agitation, behavioral problems and hallucinations  The patient is not nervous/anxious          Historical Information   Patient Active Problem List   Diagnosis   • Fatty liver   • Other specified abnormal findings of blood chemistry   • Anxiety   • Elevated LFTs   • GERD without esophagitis   • Hyperlipidemia   • Essential hypertension   • Insomnia   • Nicotine dependence   • Healthcare maintenance   • COPD (chronic obstructive pulmonary disease) ([facility] )   • Alcohol abuse   • Irritable bowel syndrome with diarrhea   • Non-seasonal allergic rhinitis due to pollen   • Alcoholic cirrhosis of liver with ascites (HCC)   • Hyponatremia   • Thrombocytopenia (HCC)   • Anemia   • Decompensated hepatic cirrhosis (HCC)   • Ductal carcinoma in situ (DCIS) of right breast   • Atypical ductal hyperplasia of right breast   • Breast neoplasm, Tis (DCIS), right   • Continuous opioid dependence ([facility] )   • Malignant neoplasm of lower-outer quadrant of right breast of female, estrogen receptor positive ([facility] )   • Pleural effusion   • Shortness of breath   • Weakness     Past Medical History:   Diagnosis Date   • Alcoholic fatty liver    • Anxiety    • Asthma    • COPD (chronic obstructive pulmonary disease) (HCC)    • Elevated liver function tests    • GERD (gastroesophageal reflux disease)    • GERD without esophagitis    • Hyperlipidemia    • Hypertension    • IBS (irritable bowel syndrome)    • Insomnia    • Insomnia    • Liver disease    • Nervousness    • Nicotine dependence    • Other specified urinary incontinence    • RLS (restless legs syndrome)    • Wears glasses      Past Surgical History:   Procedure Laterality Date   • BACK SURGERY     • BREAST BIOPSY Right 05/21/2021 DCIS   • BREAST EXCISIONAL BIOPSY Right 11/01/2004    benign   • CATARACT EXTRACTION, BILATERAL  08/01/2018   • COLONOSCOPY N/A 5/8/2019    Procedure: COLONOSCOPY;  Surgeon: Sheridan Mace MD;  Location: [facility] GI LAB; Service: Gastroenterology   • EGD AND COLONOSCOPY N/A 3/19/2018    Procedure: EGD AND COLONOSCOPY;  Surgeon: Sheridan Mace MD;  Location: [facility] GI LAB; Service: Gastroenterology   • ESOPHAGOGASTRODUODENOSCOPY N/A 5/8/2019    Procedure: ESOPHAGOGASTRODUODENOSCOPY (EGD); Surgeon: Sheridan Mace MD;  Location: [facility] GI LAB;   Service: Gastroenterology   • IR PARACENTESIS  11/27/2020   • IR PARACENTESIS  12/8/2020   • IR PARACENTESIS  10/28/2022   • IR PARACENTESIS  11/7/2022   • IR PARACENTESIS  11/30/2022   • IR PARACENTESIS  12/30/2022   • IR PARACENTESIS  1/16/2023   • IR THORACENTESIS  1/16/2023   • KNEE SURGERY     • KNEE SURGERY     • LUMBAR LAMINECTOMY  04/1999   • LYMPH NODE BIOPSY     • MAMMO STEREOTACTIC BREAST BIOPSY RIGHT (ALL INC) Right 5/21/2021   • MAMMO STEREOTACTIC BREAST BIOPSY RIGHT (ALL INC) EACH ADD Right 5/21/2021   • TOTAL ABDOMINAL HYSTERECTOMY  02/05/2008   • TUBAL LIGATION     • TUBAL LIGATION  1989   • UPPER GASTROINTESTINAL ENDOSCOPY       Social History   Social History     Substance and Sexual Activity   Alcohol Use Not Currently    Comment: not lately, non alcoholic beer     Social History     Substance and Sexual Activity   Drug Use No     Social History     Tobacco Use   Smoking Status Every Day   • Packs/day: 1 00   • Years: 45 00   • Pack years: 45 00   • Types: Cigarettes   • Start date: 1/1/1978   Smokeless Tobacco Never     Family History   Problem Relation Age of Onset   • Breast cancer Mother    • No Known Problems Father    • No Known Problems Sister    • No Known Problems Brother    • No Known Problems Son    • No Known Problems Maternal Grandmother    • No Known Problems Maternal Grandfather    • No Known Problems Paternal Grandmother    • No Known Problems "Paternal Grandfather    • No Known Problems Maternal Aunt    • No Known Problems Maternal Aunt    • No Known Problems Paternal Aunt    • Substance Abuse Neg Hx    • Mental illness Neg Hx        Meds/Allergies   current meds:   Current Facility-Administered Medications   Medication Dose Route Frequency   • acetaminophen (TYLENOL) tablet 650 mg  650 mg Oral Q6H PRN   • albuterol (PROVENTIL HFA,VENTOLIN HFA) inhaler 2 puff  2 puff Inhalation Q6H PRN   • albuterol inhalation solution 2 5 mg  2 5 mg Nebulization Q6H PRN   • ALPRAZolam (XANAX) tablet 1 mg  1 mg Oral BID PRN   • anastrozole (ARIMIDEX) tablet 1 mg  1 mg Oral Daily   • furosemide (LASIX) injection 40 mg  40 mg Intravenous Once   • furosemide (LASIX) tablet 40 mg  40 mg Oral Daily   • heparin (porcine) subcutaneous injection 5,000 Units  5,000 Units Subcutaneous Q8H [address]   • lactulose oral solution 20 g  20 g Oral BID   • midodrine (PROAMATINE) tablet 7 5 mg  7 5 mg Oral TID AC   • nicotine (NICODERM CQ) 21 mg/24 hr TD 24 hr patch 1 patch  1 patch Transdermal Daily   • ondansetron (ZOFRAN) injection 4 mg  4 mg Intravenous Q6H PRN   • pantoprazole (PROTONIX) EC tablet 20 mg  20 mg Oral Daily   • spironolactone (ALDACTONE) tablet 100 mg  100 mg Oral Daily   • traZODone (DESYREL) tablet 50 mg  50 mg Oral HS     Allergies   Allergen Reactions   • Sulfa Antibiotics Shortness Of Breath   • Ace Inhibitors Cough and Other (See Comments)     coughing       Objective     Intake/Output Summary (Last 24 hours) at 3/28/2023 1521  Last data filed at 3/28/2023 0909  Gross per 24 hour   Intake 480 ml   Output 1550 ml   Net -1070 ml     Body mass index is 25 52 kg/m²  Invasive Devices:        PHYSICAL EXAM:  /59   Pulse 76   Temp 98 6 °F (37 °C)   Resp 20   Ht 5' 4\" (1 626 m)   Wt 67 4 kg (148 lb 11 2 oz)   SpO2 94%   BMI 25 52 kg/m²     Physical Exam  HENT:      Head: Normocephalic and atraumatic        Nose: Nose normal       Mouth/Throat:      Mouth: Mucous " membranes are moist    Eyes:      General: No scleral icterus  Extraocular Movements: Extraocular movements intact  Cardiovascular:      Rate and Rhythm: Normal rate and regular rhythm  Heart sounds: No friction rub  No gallop  Comments: Mild edema  Pulmonary:      Effort: Pulmonary effort is normal  No respiratory distress  Breath sounds: No wheezing, rhonchi or rales  Comments: Decreased breath sounds right base  Abdominal:      General: Bowel sounds are normal  There is no distension  Palpations: Abdomen is soft  Tenderness: There is no abdominal tenderness  There is no rebound  Musculoskeletal:      Cervical back: Normal range of motion and neck supple  Neurological:      General: No focal deficit present  Mental Status: She is alert and oriented to person, place, and time  Mental status is at baseline  Psychiatric:         Mood and Affect: Mood normal          Behavior: Behavior normal          Thought Content:  Thought content normal          Judgment: Judgment normal            Current Weight: Weight - Scale: 67 4 kg (148 lb 11 2 oz)  First Weight: Weight - Scale: 68 kg (150 lb)    Lab Results:    Results from last 7 days   Lab Units 03/28/23  0451   WBC Thousand/uL 3 98*   HEMOGLOBIN g/dL 10 7*   HEMATOCRIT % 30 9*   PLATELETS Thousands/uL 95*     Results from last 7 days   Lab Units 03/28/23  0523   POTASSIUM mmol/L 4 1   CHLORIDE mmol/L 101   CO2 mmol/L 26   BUN mg/dL 16   CREATININE mg/dL 0 80   CALCIUM mg/dL 8 6     Results from last 7 days   Lab Units 03/28/23  0523   POTASSIUM mmol/L 4 1   CHLORIDE mmol/L 101   CO2 mmol/L 26   BUN mg/dL 16   CREATININE mg/dL 0 80   CALCIUM mg/dL 8 6   ALK PHOS U/L 117*   ALT U/L 20   AST U/L 35

## 2023-03-28 NOTE — PHYSICAL THERAPY NOTE
Physical Therapy Screen    Patient Name: Earline Roberts    FBMWT'A Date: 3/28/2023     Problem List  Principal Problem:    Hyponatremia  Active Problems:    GERD without esophagitis    Essential hypertension    Nicotine dependence    COPD (chronic obstructive pulmonary disease) (HCC)    Alcoholic cirrhosis of liver with ascites (HCC)    Thrombocytopenia (HCC)    Ductal carcinoma in situ (DCIS) of right breast    Pleural effusion       Past Medical History  Past Medical History:   Diagnosis Date   • Alcoholic fatty liver    • Anxiety    • Asthma    • COPD (chronic obstructive pulmonary disease) (HCC)    • Elevated liver function tests    • GERD (gastroesophageal reflux disease)    • GERD without esophagitis    • Hyperlipidemia    • Hypertension    • IBS (irritable bowel syndrome)    • Insomnia    • Insomnia    • Liver disease    • Nervousness    • Nicotine dependence    • Other specified urinary incontinence    • RLS (restless legs syndrome)    • Wears glasses         Past Surgical History  Past Surgical History:   Procedure Laterality Date   • BACK SURGERY     • 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   • BREAST EXCISIONAL BIOPSY Right 11/01/2004    benign   • CATARACT EXTRACTION, BILATERAL  08/01/2018   • COLONOSCOPY N/A 5/8/2019    Procedure: COLONOSCOPY;  Surgeon: Alex Díaz MD;  Location: [facility] GI LAB; Service: Gastroenterology   • EGD AND COLONOSCOPY N/A 3/19/2018    Procedure: EGD AND COLONOSCOPY;  Surgeon: Alex Díaz MD;  Location: [facility] GI LAB; Service: Gastroenterology   • ESOPHAGOGASTRODUODENOSCOPY N/A 5/8/2019    Procedure: ESOPHAGOGASTRODUODENOSCOPY (EGD); Surgeon: Alex Díaz MD;  Location: [facility] GI LAB;   Service: Gastroenterology   • IR PARACENTESIS  11/27/2020   • IR PARACENTESIS  12/8/2020   • IR PARACENTESIS  10/28/2022   • IR PARACENTESIS  11/7/2022   • IR PARACENTESIS  11/30/2022   • IR PARACENTESIS  12/30/2022   • IR PARACENTESIS  1/16/2023   • IR THORACENTESIS  1/16/2023   • KNEE SURGERY     • KNEE SURGERY     • LUMBAR LAMINECTOMY  04/1999   • LYMPH NODE BIOPSY     • MAMMO STEREOTACTIC BREAST BIOPSY RIGHT (ALL INC) Right 5/21/2021   • MAMMO STEREOTACTIC BREAST BIOPSY RIGHT (ALL INC) EACH ADD Right 5/21/2021   • TOTAL ABDOMINAL HYSTERECTOMY  02/05/2008   • TUBAL LIGATION     • TUBAL LIGATION  1989   • UPPER GASTROINTESTINAL ENDOSCOPY       Per OTR (Janene), pt currently is declining inpt PT services  Pt has been observed ambulating (I)ly in hospital room  No skilled inpt PT services needed at this time 2* pt decline  DC pt from skilled inpt PT services at this time

## 2023-03-28 NOTE — ASSESSMENT & PLAN NOTE
Patient with history of alcoholic cirrhosis of liver  Continue on Lasix, spironolactone  Will discharge on lactulose  Follows up with hepatology Dr Juju Melendez as outpatient

## 2023-03-28 NOTE — CASE MANAGEMENT
Case Management Progress Note    Patient name Mann Martin  Location /-71 MRN [id number]  : 1960 Date 3/28/2023       LOS (days): 0  Geometric Mean LOS (GMLOS) (days):   Days to GMLOS:        OBJECTIVE:        Current admission status: Observation  Preferred Pharmacy:   [address]  [facility]  [address]  [facility] [address]   Phone: [phone] Fax: [phone]    Primary Care Provider: Marcie Agarwal PA-C    Primary Insurance: Yaneli Abrams  Secondary Insurance:     PROGRESS NOTE:  Acknowledge Pt is leaving AMA and waiting for her  to transport her home  No anticipated CM needs

## 2023-03-28 NOTE — ASSESSMENT & PLAN NOTE
Admitted with sodium of 128  Will order hyponatremia work-up  Place on fluid restriction  Continue on Lasix and spironolactone  Sodium this morning is 131  Patient wants to sign out [address]  Patient is alert awake oriented x3 and verbalized understanding of the risk of signing out [address] including worsening hyponatremia leading to seizures, confusion, neurologic deficits and possible death    Discussed with patient and son and patient will leave [address]

## 2023-03-28 NOTE — TELEPHONE ENCOUNTER
----- Message from Dhruv Phillips MD sent at 3/28/2023 12:34 PM EDT -----  Regarding: Pt leaving [facility]  Patient had an appointment with Naresh Andersen today, but this was apparently canceled because patient was admitted for hepatic hydrothorax  Patient is status postthoracentesis and is wishing to leave [place] today  Son is asking if we can get her rescheduled for an outpatient follow-up with the liver team   Please help facilitate and give the patient a call when possible  Thank you

## 2023-03-28 NOTE — OCCUPATIONAL THERAPY NOTE
Occupational Therapy Screen Note     Patient Name: Hill Herzog  INDMV'C Date: 3/28/2023  Problem List  Principal Problem:    Hyponatremia  Active Problems:    GERD without esophagitis    Essential hypertension    Nicotine dependence    COPD (chronic obstructive pulmonary disease) (HCC)    Alcoholic cirrhosis of liver with ascites (HCC)    Thrombocytopenia (HCC)    Ductal carcinoma in situ (DCIS) of right breast    Pleural effusion            03/28/23 1406   OT Last Visit   OT Visit Date 03/28/23   Note Type   Note type Screen   Additional Comments OT orders received  Chart reviewed  RN reports pt has been mobilizing around room  Upon entrance, pt sitting on couch, dressing independently  Pt states she has no therapy needs  Will DC OT orders         Mimi Delacruz, OTR/L

## 2023-03-28 NOTE — QUICK NOTE
Discussed with SLIM  Pt agreeable to staying today for further diuresis  Also needs to be back on lactulose  Will give a dose today

## 2023-03-28 NOTE — SEDATION DOCUMENTATION
Diagnostic right thoracentesis with removal 1000ml clear yellow fluid  Specimen sent to lab  Band aid applied to site  Patient tolerated well and transferred back to room via w/c  Report and care assumed by primary RN

## 2023-03-29 ENCOUNTER — TRANSITIONAL CARE MANAGEMENT (OUTPATIENT)
Dept: FAMILY MEDICINE CLINIC | Facility: CLINIC | Age: 63
End: 2023-03-29

## 2023-03-29 VITALS
HEART RATE: 83 BPM | BODY MASS INDEX: 24.65 KG/M2 | SYSTOLIC BLOOD PRESSURE: 96 MMHG | WEIGHT: 144.4 LBS | DIASTOLIC BLOOD PRESSURE: 52 MMHG | OXYGEN SATURATION: 92 % | TEMPERATURE: 98.4 F | RESPIRATION RATE: 13 BRPM | HEIGHT: 64 IN

## 2023-03-29 DIAGNOSIS — I10 ESSENTIAL HYPERTENSION: ICD-10-CM

## 2023-03-29 DIAGNOSIS — G47.00 INSOMNIA, UNSPECIFIED TYPE: ICD-10-CM

## 2023-03-29 DIAGNOSIS — R06.02 SHORTNESS OF BREATH: ICD-10-CM

## 2023-03-29 DIAGNOSIS — E87.1 HYPONATREMIA: Primary | ICD-10-CM

## 2023-03-29 LAB
ALBUMIN SERPL BCP-MCNC: 2.4 G/DL (ref 3.5–5)
ALP SERPL-CCNC: 113 U/L (ref 34–104)
ALT SERPL W P-5'-P-CCNC: 16 U/L (ref 7–52)
ANION GAP SERPL CALCULATED.3IONS-SCNC: 7 MMOL/L (ref 4–13)
AST SERPL W P-5'-P-CCNC: 32 U/L (ref 13–39)
BASOPHILS # BLD AUTO: 0.05 THOUSANDS/ÂΜL (ref 0–0.1)
BASOPHILS NFR BLD AUTO: 1 % (ref 0–1)
BILIRUB SERPL-MCNC: 0.88 MG/DL (ref 0.2–1)
BUN SERPL-MCNC: 16 MG/DL (ref 5–25)
CALCIUM ALBUM COR SERPL-MCNC: 9.7 MG/DL (ref 8.3–10.1)
CALCIUM SERPL-MCNC: 8.4 MG/DL (ref 8.4–10.2)
CHLORIDE SERPL-SCNC: 99 MMOL/L (ref 96–108)
CO2 SERPL-SCNC: 25 MMOL/L (ref 21–32)
CREAT SERPL-MCNC: 0.84 MG/DL (ref 0.6–1.3)
EOSINOPHIL # BLD AUTO: 0.14 THOUSAND/ÂΜL (ref 0–0.61)
EOSINOPHIL NFR BLD AUTO: 3 % (ref 0–6)
ERYTHROCYTE [DISTWIDTH] IN BLOOD BY AUTOMATED COUNT: 14.6 % (ref 11.6–15.1)
GFR SERPL CREATININE-BSD FRML MDRD: 74 ML/MIN/1.73SQ M
GLUCOSE P FAST SERPL-MCNC: 102 MG/DL (ref 65–99)
GLUCOSE SERPL-MCNC: 102 MG/DL (ref 65–140)
HCT VFR BLD AUTO: 32 % (ref 34.8–46.1)
HGB BLD-MCNC: 11 G/DL (ref 11.5–15.4)
IMM GRANULOCYTES # BLD AUTO: 0.01 THOUSAND/UL (ref 0–0.2)
IMM GRANULOCYTES NFR BLD AUTO: 0 % (ref 0–2)
LYMPHOCYTES # BLD AUTO: 1.51 THOUSANDS/ÂΜL (ref 0.6–4.47)
LYMPHOCYTES NFR BLD AUTO: 30 % (ref 14–44)
MCH RBC QN AUTO: 32.8 PG (ref 26.8–34.3)
MCHC RBC AUTO-ENTMCNC: 34.4 G/DL (ref 31.4–37.4)
MCV RBC AUTO: 96 FL (ref 82–98)
MONOCYTES # BLD AUTO: 0.78 THOUSAND/ÂΜL (ref 0.17–1.22)
MONOCYTES NFR BLD AUTO: 16 % (ref 4–12)
NEUTROPHILS # BLD AUTO: 2.49 THOUSANDS/ÂΜL (ref 1.85–7.62)
NEUTS SEG NFR BLD AUTO: 50 % (ref 43–75)
NRBC BLD AUTO-RTO: 0 /100 WBCS
PLATELET # BLD AUTO: 91 THOUSANDS/UL (ref 149–390)
PMV BLD AUTO: 9.8 FL (ref 8.9–12.7)
POTASSIUM SERPL-SCNC: 4 MMOL/L (ref 3.5–5.3)
PROT SERPL-MCNC: 6.8 G/DL (ref 6.4–8.4)
RBC # BLD AUTO: 3.35 MILLION/UL (ref 3.81–5.12)
SODIUM SERPL-SCNC: 131 MMOL/L (ref 135–147)
WBC # BLD AUTO: 4.98 THOUSAND/UL (ref 4.31–10.16)

## 2023-03-29 RX ORDER — TRAZODONE HYDROCHLORIDE 50 MG/1
50 TABLET ORAL
Qty: 30 TABLET | Refills: 5 | Status: CANCELLED | OUTPATIENT
Start: 2023-03-29

## 2023-03-29 RX ADMIN — ANASTROZOLE 1 MG: 1 TABLET, COATED ORAL at 08:44

## 2023-03-29 RX ADMIN — PANTOPRAZOLE SODIUM 20 MG: 20 TABLET, DELAYED RELEASE ORAL at 08:44

## 2023-03-29 RX ADMIN — MIDODRINE HYDROCHLORIDE 7.5 MG: 5 TABLET ORAL at 08:49

## 2023-03-29 RX ADMIN — FUROSEMIDE 40 MG: 40 TABLET ORAL at 08:44

## 2023-03-29 RX ADMIN — LACTULOSE 20 G: 20 SOLUTION ORAL at 08:41

## 2023-03-29 NOTE — TELEPHONE ENCOUNTER
Pt discharged from [address] today for hyponatremia  Scheduled pt for TCM on Monday 4/3  Was given trazadone in hospital and requesting refill

## 2023-03-29 NOTE — UTILIZATION REVIEW
NOTIFICATION OF OBSERVATION ADMISSION   AUTHORIZATION REQUEST   SERVICING FACILITY:   [facility]  [address] [place] [address]  Tax ID: [id number]  NPI: [id number] ATTENDING PROVIDER:  Attending Name and NPI#: Keyla Leonard Md [[id number]]  Address: [address] [place] [address]  Phone: [phone]   ADMISSION INFORMATION:  Place of Service: On [address] [facility] Code: 22 CPT Code:   Admitting Diagnosis Code/Description:  Shortness of breath [R06 02]  Hyponatremia [E87 1]  SOB (shortness of breath) [R06 02]  Postural lightheadedness [R42]  Recurrent right pleural effusion [J90]  Decompensated hepatic cirrhosis ([facility] [address] ) [K72 90, K74 60]  Observation Admission Date/Time: 03/27/23 17:35pm  Discharge Date/Time: No discharge date for patient encounter  UTILIZATION REVIEW CONTACT:  Kendy Ramsey Utilization   Network Utilization Review Department  Phone: [phone]  Fax [phone]  Email: [email or web address]  Contact for approvals/pending authorizations, clinical reviews, and discharge  PHYSICIAN ADVISORY SERVICES:  Medical Necessity Denial & [employer] Review  Phone: [phone]  Fax: [phone]  Email: [email or web address]

## 2023-03-29 NOTE — ASSESSMENT & PLAN NOTE
Patient with history of alcoholic cirrhosis of liver  Continue on Lasix, spironolactone  Will discharge on lactulose  Cleared by GI for discharge  Follows up with hepatology Dr Faith Simpson as outpatient

## 2023-03-29 NOTE — RESPIRATORY THERAPY NOTE
RT Protocol Note  Littie May 60 y o  female MRN: [id number]  Unit/Bed#: -01 Encounter: [id number]    Assessment    Principal Problem:    Hyponatremia  Active Problems:    GERD without esophagitis    Essential hypertension    Nicotine dependence    COPD (chronic obstructive pulmonary disease) (HCC)    Alcoholic cirrhosis of liver with ascites (HCC)    Thrombocytopenia (HCC)    Ductal carcinoma in situ (DCIS) of right breast    Pleural effusion      Home Pulmonary Medications:  Albuterol  Home Devices/Therapy: (P)  (Albuterol)    Past Medical History:   Diagnosis Date    Alcoholic fatty liver     Anxiety     Asthma     COPD (chronic obstructive pulmonary disease) (HCC)     Elevated liver function tests     GERD (gastroesophageal reflux disease)     GERD without esophagitis     Hyperlipidemia     Hypertension     IBS (irritable bowel syndrome)     Insomnia     Insomnia     Liver disease     Nervousness     Nicotine dependence     Other specified urinary incontinence     RLS (restless legs syndrome)     Wears glasses      Social History     Socioeconomic History    Marital status: /Civil Union     Spouse name: None    Number of children: None    Years of education: None    Highest education level: None   Occupational History    None   Tobacco Use    Smoking status: Every Day     Packs/day: 1 00     Years: 45 00     Pack years: 45 00     Types: Cigarettes     Start date: 1/1/1978    Smokeless tobacco: Never   Vaping Use    Vaping Use: Never used   Substance and Sexual Activity    Alcohol use: Not Currently     Comment: not lately, non alcoholic beer    Drug use: No    Sexual activity: Yes     Partners: Male   Other Topics Concern    None   Social History Narrative    Has carbon monoxide detectors in home    Has smoke detectors    Uses safety equipment - seatbelts     Social Determinants of Health     Financial Resource Strain: Not on file   Food Insecurity: No Food Insecurity    Worried About Running "Out of Food in the Last Year: Never true    Ran Out of Food in the Last Year: Never true   Transportation Needs: No Transportation Needs    Lack of Transportation (Medical): No    Lack of Transportation (Non-Medical): No   Physical Activity: Not on file   Stress: Not on file   Social Connections: Not on file   Intimate Partner Violence: Not on file   Housing Stability: Low Risk     Unable to Pay for Housing in the Last Year: No    Number of Places Lived in the Last Year: 1    Unstable Housing in the Last Year: No       Subjective         Objective    Physical Exam:   Assessment Type: (P) Assess only  General Appearance: (P) Awake  Respiratory Pattern: (P) Normal  Chest Assessment: (P) Chest expansion symmetrical  Bilateral Breath Sounds: (P) Clear  Cough: (P) None    Vitals:  Blood pressure 96/52, pulse 83, temperature 98 4 °F (36 9 °C), temperature source Oral, resp  rate 13, height 5' 4\" (1 626 m), weight 65 5 kg (144 lb 6 4 oz), SpO2 92 %, not currently breastfeeding  Imaging and other studies: I have personally reviewed pertinent reports  Plan    Respiratory Plan: (P) Home Bronchodilator Patient pathway        Resp Comments: (P) Pt resting comfortably  No resp distress atthistime  Takes PRN Albuterol   No indication for tx at this time   "

## 2023-03-29 NOTE — ASSESSMENT & PLAN NOTE
Patient with moderate size pleural effusion  Patient has recurrent pleural effusion and had thoracentesis done in January 2023 with 1 L of pleural fluid removed  IR did thoracentesis this morning with 1 L of pleural fluid removed  Continue on diuretics  Patient is saturating well on room air  Patient yesterday initially signed out [address] but then changed her mind and stayed overnight  Patient was given extra dose of Lasix and GI cleared patient for discharge today

## 2023-03-29 NOTE — ASSESSMENT & PLAN NOTE
Patient has history of thrombocytopenia in setting of cirrhosis  Admitted with platelets of 371H  Stable

## 2023-03-29 NOTE — DISCHARGE SUMMARY
[facility]  Discharge- Risa Mcqueen 1960, 60 y o  female MRN: [id number]  Unit/Bed#: -01 Encounter: [id number]  Primary Care Provider: Lucía Worthington PA-C   Date and time admitted to hospital: 3/27/2023  4:51 PM    * Hyponatremia  Assessment & Plan  Admitted with sodium of 128  Will order hyponatremia work-up  Place on fluid restriction  Continue on Lasix and spironolactone  Sodium this morning is 131  Continue low-salt diet and fluid restriction and outpatient follow-up with nephrology      Pleural effusion  Assessment & Plan  Patient with moderate size pleural effusion  Patient has recurrent pleural effusion and had thoracentesis done in January 2023 with 1 L of pleural fluid removed  IR did thoracentesis this morning with 1 L of pleural fluid removed  Continue on diuretics  Patient is saturating well on room air  Patient yesterday initially signed out [address] but then changed her mind and stayed overnight  Patient was given extra dose of Lasix and GI cleared patient for discharge today      Ductal carcinoma in situ (DCIS) of right breast  Assessment & Plan  Continue on Arimidex    Thrombocytopenia [facility])  Assessment & Plan  Patient has history of thrombocytopenia in setting of cirrhosis  Admitted with platelets of 381L  Stable    Alcoholic cirrhosis of liver with ascites ([facility] [address] )  Assessment & Plan  Patient with history of alcoholic cirrhosis of liver  Continue on Lasix, spironolactone  Will discharge on lactulose  Cleared by GI for discharge  Follows up with hepatology Dr Faith Simpson as outpatient    COPD (chronic obstructive pulmonary disease) ([facility] [address] )  Assessment & Plan  Continue on bronchodilators  Not in acute exacerbation    Nicotine dependence  Assessment & Plan  On nicotine patch    Essential hypertension  Assessment & Plan  On Lasix and spironolactone  Monitor vitals as per protocol    GERD without esophagitis  Assessment & Plan  On Protonix      Hospital Course:     Jesse Ku is a 60 y o  female patient who originally presented to the hospital on   Admission Orders (From admission, onward)     Ordered        03/27/23 1735  Place in Observation  Once                     due to complaint of worsening shortness of breath, weight gain   Patient had chest x-ray done as outpatient on March 23 which shows moderate pleural effusion   Patient states that she has been feeling shortness of breath on minimal exertion, orthopnea and has weight gain   Denies any nausea, vomiting, abdominal pain, chest pain, palpitation, fever, chills   In ER patient was noticed to have hyponatremia and chest x-ray revealed moderate pleural effusion  She was placed on fluid restriction and was seen by GI and IR  Patient underwent thoracentesis this morning with 1 L of clear yellow fluid fluid removed  Patient is saturating well on room air  Patient sodium is 131   Patient initially signed out [address] yesterday but then decided to stay  GI saw the patient and gave extra dose of Lasix  Patient was seen by nephrology and recommended to continue fluid restriction  Patient sodium is 131 and patient is hemodynamically stable and cleared for discharge with outpatient follow-up with GI and nephrology    On Exam-  Chest-bilateral air entry, clear to auscultation  Abdomen-soft, nontender  Heart-S1 S2 regular  Extremities-no pedal edema or calf tenderness  Neuro-alert awake oriented x3  No focal deficits    Please see above list of diagnoses and related plan for additional information     Follow-up with PCP repeat BMP in 1 to 2 days  Follow-up with GI/hepatology within a week  Follow-up with nephrology in 1 week  Return to ER with any worsening shortness of breath, nausea, vomiting, confusion or any other alarming symptoms    Condition at Discharge:  good      Discharge instructions/Information to patient and family:   See after visit summary for information provided to patient and family  Provisions for Follow-Up Care:  See after visit summary for information related to follow-up care and any pertinent home health orders  Disposition:     Home       Discharge Statement:  I spent 40 minutes discharging the patient  This time was spent on the day of discharge  I had direct contact with the patient on the day of discharge  Greater than 50% of the total time was spent examining patient, answering all patient questions, arranging and discussing plan of care with patient as well as directly providing post-discharge instructions  Additional time then spent on discharge activities  Discharge Medications:  See after visit summary for reconciled discharge medications provided to patient and family        ** Please Note: This note has been constructed using a voice recognition system **

## 2023-03-29 NOTE — ASSESSMENT & PLAN NOTE
Admitted with sodium of 128  Will order hyponatremia work-up  Place on fluid restriction  Continue on Lasix and spironolactone  Sodium this morning is 131  Continue low-salt diet and fluid restriction and outpatient follow-up with nephrology

## 2023-03-29 NOTE — PROGRESS NOTES
"NEPHROLOGY PROGRESS NOTE   Darcy Adames 60 y o  female MRN: [id number]  Unit/Bed#: -01 Encounter: [id number]      HPI/24hr EVENTS:    -77-year-old female past medical history of alcoholic cirrhosis, anemia, thrombocytopenia, ductal carcinoma of right breast, GERD, hyponatremia presented with shortness of breath and weight gain  Nephrology consulted for management of hyponatremia    -Sodium stable    ASSESSMENT/PLAN:    Acute on chronic hyponatremia  -Baseline sodium in the low 130s, was 128 on admission improved to 131 most recently  -Etiology is due to cirrhosis, low solute intake/alcohol use  -As outpatient was instructed to be on 1 2 L fluid restrictions Lasix 40 mg daily  -Avoiding sodium chloride tablets due to history of cirrhosis with ascites  -Patient reports she will not follow fluid restrictions upon discharge she will drink what ever she wants  -Recommend continuing Lasix and spironolactone, unclear if patient is compliant with these  -Has outpatient appoint with Dr Panda Douglass 5/18/2023, should have repeat lab work in 7 to 10 days we will send message to office    Chronic hypotension  -On midodrine 7 5 mg 3 times daily as outpatient, continued inpatient  -Noted with some hypotension this morning prior to receiving her dose, her blood pressures are acceptable when she is receiving her midodrine    Cirrhosis  -Due to alcohol use  -Complicated by recurrent ascites and hepatic hydrothorax thorax  -Possible consideration for TIPS per GI  -Ascites management with Lasix and spironolactone  -Continues to drink alcohol    Dyspnea  -Status post thoracentesis on 3/28 for 1 L  -Care per primary team    Addition medical problems: Breast cancer, COPD, GERD, tobacco use    DISPOSITION:  Stable for discharge from nephrology standpoint, patient reports she will not follow fluid restrictions upon discharge    SUBJECTIVE:  \"I am leaving\"    ROS:  Review of Systems   Constitutional: Negative  HENT: Negative    " Respiratory: Negative  Cardiovascular: Negative  Gastrointestinal: Negative  Genitourinary: Negative  A complete 10 point review of systems was performed and found to be negative unless otherwise noted above or in the HPI  OBJECTIVE:  Current Weight: Weight - Scale: 65 5 kg (144 lb 6 4 oz)  Vitals:    03/28/23 2100 03/29/23 0500 03/29/23 0742 03/29/23 0843   BP:   (!) 89/51 96/52   BP Location:   Left arm    Pulse:   80 83   Resp:   13    Temp:   98 4 °F (36 9 °C)    TempSrc:   Oral    SpO2: 95%  96% 92%   Weight:  65 5 kg (144 lb 6 4 oz)     Height:           Intake/Output Summary (Last 24 hours) at 3/29/2023 1136  Last data filed at 3/28/2023 2201  Gross per 24 hour   Intake 480 ml   Output --   Net 480 ml     Physical Exam  Vitals and nursing note reviewed  Constitutional:       General: She is not in acute distress  Appearance: Normal appearance  She is not toxic-appearing or diaphoretic  HENT:      Head: Normocephalic and atraumatic  Nose: Nose normal       Mouth/Throat:      Mouth: Mucous membranes are moist    Eyes:      General: No scleral icterus  Cardiovascular:      Rate and Rhythm: Normal rate  Pulmonary:      Effort: Pulmonary effort is normal  No respiratory distress  Abdominal:      General: Abdomen is flat  Musculoskeletal:      Cervical back: Neck supple  Comments: No lower extremity edema   Skin:     General: Skin is warm and dry  Capillary Refill: Capillary refill takes less than 2 seconds  Neurological:      General: No focal deficit present  Mental Status: She is alert and oriented to person, place, and time  Psychiatric:         Mood and Affect: Affect is angry            Medications:    Current Facility-Administered Medications:   •  acetaminophen (TYLENOL) tablet 650 mg, 650 mg, Oral, Q6H PRN, Pamela Kulkarni MD  •  albuterol (PROVENTIL HFA,VENTOLIN HFA) inhaler 2 puff, 2 puff, Inhalation, Q6H PRN, Pamela Kulkarni MD  •  albuterol inhalation solution 2 5 mg, 2 5 mg, Nebulization, Q6H PRN, Scotty Downey MD  •  ALPRAZolam Britt Miranda) tablet 1 mg, 1 mg, Oral, BID PRN, Scotty Downey MD, 1 mg at 03/28/23 2119  •  anastrozole (ARIMIDEX) tablet 1 mg, 1 mg, Oral, Daily, Scotty Downey MD, 1 mg at 03/29/23 0844  •  furosemide (LASIX) tablet 40 mg, 40 mg, Oral, Daily, Richie Henriquez, CRNP, 40 mg at 03/29/23 0844  •  heparin (porcine) subcutaneous injection 5,000 Units, 5,000 Units, Subcutaneous, Q8H [address], 5,000 Units at 03/27/23 2203 **AND** [COMPLETED] Platelet count, , , Once, Scotty Downey MD  •  lactulose oral solution 20 g, 20 g, Oral, BID, Diogenes Kohler MD, 20 g at 03/29/23 0841  •  midodrine (PROAMATINE) tablet 7 5 mg, 7 5 mg, Oral, TID AC, Scotty Downey MD, 7 5 mg at 03/29/23 0849  •  nicotine (NICODERM CQ) 21 mg/24 hr TD 24 hr patch 1 patch, 1 patch, Transdermal, Daily, Scotty Downey MD  •  ondansetron (ZOFRAN) injection 4 mg, 4 mg, Intravenous, Q6H PRN, Scotty Downey MD  •  pantoprazole (PROTONIX) EC tablet 20 mg, 20 mg, Oral, Daily, Scotty Downey MD, 20 mg at 03/29/23 1313  •  spironolactone (ALDACTONE) tablet 100 mg, 100 mg, Oral, Daily, Scotty Downey MD  •  traZODone (DESYREL) tablet 50 mg, 50 mg, Oral, HS, Scotty Downey MD, 50 mg at 03/28/23 2119    Laboratory Results:  Results from last 7 days   Lab Units 03/29/23  0339 03/28/23  1531 03/28/23  0523 03/28/23  0451 03/28/23  0118 03/27/23  2156 03/27/23  1444   WBC Thousand/uL 4 98  --   --  3 98*  --   --  5 41   HEMOGLOBIN g/dL 11 0*  --   --  10 7*  --   --  12 0   HEMATOCRIT % 32 0*  --   --  30 9*  --   --  34 6*   PLATELETS Thousands/uL 91*  --   --  95*  --  104* 108*   POTASSIUM mmol/L 4 0 4 2 4 1  --  4 1  --  3 7   CHLORIDE mmol/L 99 100 101  --  101  --  96   CO2 mmol/L 25 25 26  --  27  --  26   BUN mg/dL 16 16 16  --  15  --  16   CREATININE mg/dL 0 84 0 86 0 80  --  0 77  --  0 91   CALCIUM mg/dL 8 4 8 5 8 6  --  8 0*  --  8 3* MAGNESIUM mg/dL  --   --  2 0  --   --   --   --    PHOSPHORUS mg/dL  --   --  3 9  --   --   --   --        I have personally reviewed the blood work as stated above and in my note  I have personally reviewed internal medicine and GI note

## 2023-03-29 NOTE — PROGRESS NOTES
Progress note - Gastroenterology   Littie May 60 y o  female MRN: [id number]  Unit/Bed#: -01 Encounter: [id number]    ASSESSMENT and PLAN  1  Alcoholic cirrhosis decompensated with ascites, hepatic hydrothorax, esophageal varices, hyponatremia, thrombocytopenia  Follows with Dr Katerin Walls, hepatology  Diuresis has been limited by hyponatremia and hyponatremia  Previously requiring frequent paracentesis however since January, has developed right pleural effusion, likely hepatic hydrothorax  Prior thoracentesis 1/16/2023  TIPS placement recommended at last office visit 1/19/2023  Presented to ED with 1 week history of worsening dyspnea on exertion and orthopnea  12 pound weight gain over the past month  Chest x-ray in ED showed moderate right pleural effusion  Underwent right thoracentesis 3/28-1 L of fluid removed  Weight decreased by 6 pounds after IV Lasix x1 yesterday  -Continue home regimen of Lasix 40 mg daily and spironolactone 100 mg daily  -Continue lactulose twice daily as previously taking  -Patient is anxious for discharge  Recommend outpatient follow-up with Dr Katerin Walls  Patient has an appointment set up for Tuesday April 4, 2023  -Not a transplant candidate due to breast CA diagnosed in 2021     2  Hyponatremia  Baseline ranges 128-133  Sodium 128 in the ED, remains stable at 131 this a m  Continue current regimen of diuretics     3   Esophageal varices  EGD 10/2022 with small grade 1 varices and mild portal hypertensive gastropathy  Nadolol DC'd due to worsening ascites in December 2022  Recall EGD October 2023      Chief Complaint   Patient presents with   • Shortness of Breath     C/o of SOB, worse on exertion  Pt maintaining o2 sats above 94% on RA while ambulating  SUBJECTIVE/HPI   Dyspnea much improved    Ambulating in room without difficulty, able to lie flat last night  Weight decreased to 144 this a m  from 150 on admission  No ascites noted    BP 96/52 "Pulse 83   Temp 98 4 °F (36 9 °C) (Oral)   Resp 13   Ht 5' 4\" (1 626 m)   Wt 65 5 kg (144 lb 6 4 oz)   SpO2 92%   BMI 24 79 kg/m²     PHYSICALEXAM  General appearance: alert, appears comfortable  Eyes:  no icterus   Head: Normocephalic, without obvious abnormality, atraumatic  Lungs: clear to auscultation bilaterally  Heart: regular rate and rhythm, S1, S2 normal, no murmur, click, rub or gallop  Abdomen: soft, nondistended without ascites, non-tender; bowel sounds normal, 1 bowel movement yesterday  Extremities: extremities normal, atraumatic, no cyanosis or edema  Neurologic: Grossly normal, no asterixis    Lab Results   Component Value Date    GLUCOSE 95 07/28/2017    CALCIUM 8 4 03/29/2023     07/28/2017    K 4 0 03/29/2023    CO2 25 03/29/2023    CL 99 03/29/2023    BUN 16 03/29/2023    CREATININE 0 84 03/29/2023     Lab Results   Component Value Date    WBC 4 98 03/29/2023    HGB 11 0 (L) 03/29/2023    HCT 32 0 (L) 03/29/2023    MCV 96 03/29/2023    PLT 91 (L) 03/29/2023     Lab Results   Component Value Date    ALT 16 03/29/2023    AST 32 03/29/2023    ALKPHOS 113 (H) 03/29/2023    BILITOT 0 6 07/28/2017       Lab Results   Component Value Date    LIPASE 173 01/16/2023     Lab Results   Component Value Date    IRON 77 12/07/2020    TIBC 121 (L) 12/07/2020    FERRITIN 1,132 (H) 12/07/2020     Lab Results   Component Value Date    INR 1 47 (H) 03/28/2023     "

## 2023-03-31 LAB
BACTERIA SPEC BFLD CULT: NO GROWTH
GRAM STN SPEC: NORMAL
GRAM STN SPEC: NORMAL

## 2023-04-03 ENCOUNTER — OFFICE VISIT (OUTPATIENT)
Dept: FAMILY MEDICINE CLINIC | Facility: CLINIC | Age: 63
End: 2023-04-03

## 2023-04-03 VITALS
OXYGEN SATURATION: 97 % | HEIGHT: 64 IN | WEIGHT: 150 LBS | HEART RATE: 89 BPM | DIASTOLIC BLOOD PRESSURE: 78 MMHG | SYSTOLIC BLOOD PRESSURE: 120 MMHG | RESPIRATION RATE: 16 BRPM | BODY MASS INDEX: 25.61 KG/M2

## 2023-04-03 DIAGNOSIS — D69.6 THROMBOCYTOPENIA (HCC): ICD-10-CM

## 2023-04-03 DIAGNOSIS — I10 ESSENTIAL HYPERTENSION: ICD-10-CM

## 2023-04-03 DIAGNOSIS — K74.60 DECOMPENSATED HEPATIC CIRRHOSIS (HCC): ICD-10-CM

## 2023-04-03 DIAGNOSIS — K70.31 ALCOHOLIC CIRRHOSIS OF LIVER WITH ASCITES (HCC): Primary | Chronic | ICD-10-CM

## 2023-04-03 DIAGNOSIS — K72.90 DECOMPENSATED HEPATIC CIRRHOSIS (HCC): ICD-10-CM

## 2023-04-03 DIAGNOSIS — J44.1 CHRONIC OBSTRUCTIVE PULMONARY DISEASE WITH ACUTE EXACERBATION (HCC): ICD-10-CM

## 2023-04-03 DIAGNOSIS — J90 PLEURAL EFFUSION: ICD-10-CM

## 2023-04-03 DIAGNOSIS — E87.1 HYPONATREMIA: ICD-10-CM

## 2023-04-03 PROBLEM — R06.02 SHORTNESS OF BREATH: Status: RESOLVED | Noted: 2023-01-16 | Resolved: 2023-04-03

## 2023-04-03 PROBLEM — R53.1 WEAKNESS: Status: RESOLVED | Noted: 2023-01-16 | Resolved: 2023-04-03

## 2023-04-03 LAB — PATHOLOGIST INTERPRETATION: NORMAL

## 2023-04-03 NOTE — PROGRESS NOTES
Assessment & Plan       * Hyponatremia - Admitted with sodium of 128, Place on fluid restriction, Continue on Lasix and spironolacton, Sodium at discharge 131 advised to check today before GI appt to arianna, Continue low-salt diet and fluid restriction and outpatient follow-up with nephrology    Pleural effusion - Patient with moderate size pleural effusion, with 1 L of pleural fluid removed, Continue on diuretics      Ductal carcinoma in situ (DCIS) of right breast - Continue on Arimidex     Thrombocytopenia (HCC) -  history of thrombocytopenia in setting of cirrhosis, platelets of 109E     Alcoholic cirrhosis of liver with ascites (HCC) -  history of alcoholic cirrhosis of liver, Continue on Lasix, spironolactone, Will discharge on lactulose, Follows up with hepatology Dr Tk Vázquez as outpatient     [facility]D [facility]chronic [facility] disease) ([facility] ) - Continue on bronchodilators, Not in acute exacerbation     Nicotine dependence - current smoker     Essential hypertension - On Lasix and spironolactone     GERD without esophagitis - On Protonix    Subjective     Transitional Care Management Review:   Ana Mesa is a 60 y o  female here for TCM follow up  During the TCM phone call patient stated:  TCM Call     Date and time call was made  3/29/2023  [facility] care reviewed  Records reviewed    Patient was hospitialized at  [facility]    Date of Admission  03/27/23    Date of discharge  03/29/23    Diagnosis  hyponatremia    Disposition  Home    Were the patients medications reviewed and updated  No    Current Symptoms  None      TCM Call     Post hospital issues  None    Should patient be enrolled in anticoag monitoring? No    Scheduled for follow up?   Yes    Patients specialists  Other (comment)    Other specialists names  Mindy Lockhart- 4/4    Did you obtain your prescribed medications  No    Do you need help managing your prescriptions or medications  Yes    Is transportation to your "appointment needed  No    I have advised the patient to call PCP with any new or worsening symptoms  Kacie Krause RMA    Living Arrangements  Family members    Are you recieving any outpatient services  No    Are you recieving home care services  No    Are you using any community resources  No    Have you fallen in the last 12 months  No    Interperter language line needed  No    Counseling  Patient        Patient was sent to [facility] ER on 3/27/2023 after being seen here for SOB, decrease O2, hypotension in the setting of a moderate pleural effusion   In ER patient was noticed to have hyponatremia and chest x-ray revealed moderate pleural effusion   She was placed on fluid restriction and was seen by GI and IR   Patient underwent thoracentesis this morning with 1 L of clear yellow fluid fluid removed   Patient is saturating well on room air   Patient sodium is 131 was discharged to follow up with us and GI  Today patients only complaint is fatigue  Breathing has improved  Resumed smoking as soon as she got home  Will see Dr Sarahi Donahue tomorrow  Review of Systems    Objective     /78   Pulse 89   Resp 16   Ht 5' 4\" (1 626 m)   Wt 68 kg (150 lb)   SpO2 97%   BMI 25 75 kg/m²      Physical Exam  Constitutional:       Appearance: Normal appearance  HENT:      Head: Normocephalic and atraumatic  Cardiovascular:      Rate and Rhythm: Normal rate and regular rhythm  Pulses: Normal pulses  Heart sounds: Normal heart sounds  Pulmonary:      Effort: Pulmonary effort is normal       Breath sounds: Normal breath sounds  Musculoskeletal:         General: Normal range of motion  Cervical back: Normal range of motion and neck supple  Skin:     General: Skin is warm  Neurological:      General: No focal deficit present  Mental Status: She is alert and oriented to person, place, and time     Psychiatric:         Mood and Affect: Mood normal          Behavior: Behavior normal          Thought " Content:  Thought content normal          Judgment: Judgment normal        Medications have been reviewed by provider in current encounter    Anny Magaloln PA-C

## 2023-04-04 ENCOUNTER — TELEPHONE (OUTPATIENT)
Dept: NEPHROLOGY | Facility: CLINIC | Age: 63
End: 2023-04-04

## 2023-04-04 ENCOUNTER — OFFICE VISIT (OUTPATIENT)
Dept: GASTROENTEROLOGY | Facility: CLINIC | Age: 63
End: 2023-04-04

## 2023-04-04 VITALS
DIASTOLIC BLOOD PRESSURE: 70 MMHG | SYSTOLIC BLOOD PRESSURE: 102 MMHG | TEMPERATURE: 97.4 F | HEIGHT: 64 IN | WEIGHT: 152 LBS | BODY MASS INDEX: 25.95 KG/M2

## 2023-04-04 DIAGNOSIS — K76.9 PLEURAL EFFUSION ASSOCIATED WITH HEPATIC DISORDER: ICD-10-CM

## 2023-04-04 DIAGNOSIS — K70.31 ALCOHOLIC CIRRHOSIS OF LIVER WITH ASCITES (HCC): Primary | ICD-10-CM

## 2023-04-04 DIAGNOSIS — J91.8 PLEURAL EFFUSION ASSOCIATED WITH HEPATIC DISORDER: ICD-10-CM

## 2023-04-04 DIAGNOSIS — K76.0 FATTY LIVER: ICD-10-CM

## 2023-04-04 DIAGNOSIS — E87.1 HYPONATREMIA: ICD-10-CM

## 2023-04-04 DIAGNOSIS — I85.00 ESOPHAGEAL VARICES DETERMINED BY ENDOSCOPY (HCC): ICD-10-CM

## 2023-04-04 LAB
BUN SERPL-MCNC: 15 MG/DL (ref 8–27)
BUN/CREAT SERPL: 17 (ref 12–28)
CALCIUM SERPL-MCNC: 8.8 MG/DL (ref 8.7–10.3)
CHLORIDE SERPL-SCNC: 103 MMOL/L (ref 96–106)
CO2 SERPL-SCNC: 24 MMOL/L (ref 20–29)
CREAT SERPL-MCNC: 0.9 MG/DL (ref 0.57–1)
EGFR: 72 ML/MIN/1.73
GLUCOSE SERPL-MCNC: 119 MG/DL (ref 70–99)
POTASSIUM SERPL-SCNC: 4 MMOL/L (ref 3.5–5.2)
SODIUM SERPL-SCNC: 139 MMOL/L (ref 134–144)

## 2023-04-04 NOTE — PATIENT INSTRUCTIONS
As discussed today:    Medications:  Continue taking your current medications without changes  Laboratory Testing (Listed below):  Please have blood work drawn in 2 weeks and every 2 weeks for the next month and a half  Radiology/Diagnostic Testing:  Please schedule a consultation with interventional radiology for TIPS evaluation  Avoid alcohol and tobacco products  Also avoid raw seafood/oysters and avoid swimming/wading in unchlorinated neely, particularly from the [facility], due to increased risk of infection from a bacteria called Vibrio Vulnificus  Avoid medications called NSAID's (Motrin/Ibuprofen, Naproxen/Naprosyn/Aleve) if possible, due to increase risk of kidney dysfunction, and if you use medications containing acetaminophen (Tylenol, percocet, Endocet, and many cough/cold medications), the dose should not exceed 2 grams/day  Seek immediate medical attention if you develop fevers over 101 F, have evidence of GI bleeding (black or bloody stools, bloody or coffee ground emesis) or confusion  Call our office if you develop worsening symptoms related to lower extremity edema, ascites, jaundice or excessive bruising/bleeding  Tips Consult scheduled on 4/18 with Dr Qureshi

## 2023-04-04 NOTE — PROGRESS NOTES
[facility] - Outpatient Follow-up Note  Radha Khalil 60 y o  female MRN: [id number]  Encounter: [id number]          ASSESSMENT AND PLAN:      Summary:    Ms Annmarie Prescott is a 60y o  year old female with cirrhosis secondary to Chronic alcohol abuse which is decompensated with ascites, hepatic hydrothorax lower extremity edema, thrombocytopenia, esophageal varices, hypoalbuminemia and hypoprothrombinemia  Problem List and Plan:    End Stage Liver Disease: Current MELD-Na Score is 11  She has clinical indication for liver transplant, however with breast Ca diagnosis with resection and current hormonal therapy, she is not a transplant candidate  Volume: Ascites and now pleural effusions/Edema not well controlled:  Diuretics have been limited due to hyponatremia and hypotension  Continue lasix 40 mg daily and aldactone 100 mg daily for now and continuing to follow-up BMP  Advised to follow a low sodium diet (she has been eating a lot of salt)  Her case was discussed discussed for possible TIPS placement  Meld score 11  Given the high likelihood that I will not be able to control her hepatic hydrothorax with diuretics alone she will likely need to TIPS placed  She had 2Decho and MRI done as requested  I have placed order for IR consultation for TIPS eval/placement  Risks and benefits discussed with Layman Boxer and she is in agreement to proceed  Hepatic Encephalopathy: No previous history of hepatic encephalopathy, however she was complaining of slight confusion in the past   Empirically tried lactulose to see if this improves her fogginess, but this made no clinical difference  Ms Annmarie Prescott and her family/care giver are aware of the signs/symptoms of hepatic encephalopathy and understand to seek immediate medical attention should they arise     Esophageal Varices: Last EGD done on 10/13/22 showing non-bleeding grade 1 varices and mild PHG    Patient started on nadolol 40 mg daily   If ascites is indeed refractory (required paracentesis every 2 weeks or more frequently), I will d/c nadolol  Repeat EGD due in 1 year  Hepatoma Screening: MRI done as below  No evidence of hepatoma noted on imaging done to date  Repeat Ultrsound in 4 months, sooner with dopplers if TIPS placed  Nutritional status: Mild to moderate sarcopenia noted  Encouraged high protein snacking, low salt diet  If weight loss evident, will refer to nutritional counseling  Health Maintenance:  Ms Jeremiah Rivers was counseled to avoid alcohol and tobacco products  Ms Jeremiah Rivers was also advised to avoid raw seafood/oysters and from swimming in unchlorinated neely, particularly from the [facility], due to increased risk of infection from Vibrio Vulnificus  Ms Jeremiah Rivers was also instructed to seek immediate medical attention should she develop fevers over 80 F, evidence of GI bleeding (black or bloody stools, bloody or coffee ground emesis) or confusion  Ms Jeremiah Rivers was advised to avoid NSAIDs, if possible, due to increase risk of renal dysfunction, and advised that if she should use acetaminophen, dose should not exceed 2 grams/day  Ms Jeremiah Rivers was recommend to remain up to date with adult vaccination, including influenza, pneumovax and meningococcus  Inactivated HSV and zoster vaccine is safe and encouraged by PCP  Ms Jeremiah Rivers was instructed to call either our office or that of her referring doctor should she develop worsening symptoms related to lower extremity edema, ascites, jaundice or excessive bruising/bleeding  FOLLOW-UP:  Return in about 4 weeks (around 5/2/2023)  VISIT DIAGNOSES AND ORDERS:      1  Alcoholic cirrhosis of liver with ascites ([facility] [address] )    2  Pleural effusion associated with hepatic disorder    3  [facility]    4  Fatty liver    5   Hyponatremia      Orders Placed This Encounter   Procedures   • IR OUT-Patient Thoracentesis   • CBC and differential   • Protime-INR   • Comprehensive metabolic panel   • Ambulatory Referral to Interventional Radiology     ______________________________________________________________________    SUBJECTIVE:           Ms Mann Martin is a 60 y o  female with medical history as outlined below including cirrhosis secondary to Chronic alcohol abuse which is decompensated with ascites, lower extremity edema, thrombocytopenia, esophageal varices, hypoalbuminemia and hypoprothrombinemia, and more recently hepatic hydrothorax who comes in today for follow-up shortly after discharge from the hospital after admission for symptomatic hepatic hydrothorax with weakness and shortness of breath  She was found to have hyponatremia and diruetics were held  She underwent a 1 L thoracentesis  After Na improved, she was discharged on home diuretics doses, 100 mg daily and lasix 40 mg daily  In the office today, she states she states her SOB is only slight at this point  She denies abdominal pain  She denies constipation  She denies jaundice, pruritus or evidence of GI bleeding  She denies fevers or chills  She does complain of moderate LE edema, easy bruising, but no excessive bleeding  She denies any evidence of GI bleeding  She had an EGD done on 10/13/2022 showing grade 1 varices with no bleeding and mild PHG  REVIEW OF SYSTEMS     Review of Systems   All other systems reviewed and are negative        Historical Information   Patient Active Problem List   Diagnosis   • Fatty liver   • Other specified abnormal findings of blood chemistry   • Anxiety   • Elevated LFTs   • GERD without esophagitis   • Hyperlipidemia   • Essential hypertension   • Insomnia   • Nicotine dependence   • Healthcare maintenance   • COPD (chronic obstructive pulmonary disease) ([facility] [address] )   • Alcohol abuse   • Irritable bowel syndrome with diarrhea   • Non-seasonal allergic rhinitis due to pollen   • Alcoholic cirrhosis of liver "with ascites ([facility] [address] )   • Hyponatremia   • Thrombocytopenia (HCC)   • Anemia   • Decompensated hepatic cirrhosis (HCC)   • Ductal carcinoma in situ (DCIS) of right breast   • Atypical ductal hyperplasia of right breast   • Breast neoplasm, Tis (DCIS), right   • Continuous opioid dependence (HCC)   • Malignant neoplasm of lower-outer quadrant of right breast of female, estrogen receptor positive (HCC)   • Pleural effusion     Social History     Substance and Sexual Activity   Alcohol Use Not Currently    Comment: not lately, non alcoholic beer     Social History     Substance and Sexual Activity   Drug Use No     Social History     Tobacco Use   Smoking Status Every Day   • Packs/day: 1 00   • Years: 45 00   • Pack years: 45 00   • Types: Cigarettes   • Start date: 1/1/1978   Smokeless Tobacco Never       Meds/Allergies       Current Outpatient Medications:   •  albuterol (2 5 mg/3 mL) 0 083 % nebulizer solution  •  albuterol (Proventil HFA) 90 mcg/act inhaler  •  ALPRAZolam (XANAX) 1 mg tablet  •  anastrozole (ARIMIDEX) 1 mg tablet  •  furosemide (LASIX) 40 mg tablet  •  guselkumab (TREMFYA) subcutaneous injection  •  lactulose 20 g/30 mL  •  midodrine (PROAMATINE) 5 mg tablet  •  nicotine (NICODERM CQ) 21 mg/24 hr TD 24 hr patch  •  spironolactone (ALDACTONE) 100 mg tablet  •  traZODone (DESYREL) 50 mg tablet  •  pantoprazole (PROTONIX) 20 mg tablet    Allergies   Allergen Reactions   • Sulfa Antibiotics Shortness Of Breath   • Ace Inhibitors Cough and Other (See Comments)     coughing           Objective     Blood pressure 102/70, temperature (!) 97 4 °F (36 3 °C), temperature source Tympanic, height 5' 4\" (1 626 m), weight 68 9 kg (152 lb), not currently breastfeeding  Body mass index is 26 09 kg/m²  PHYSICAL EXAM:      Physical Exam  Vitals reviewed  Constitutional:       General: She is not in acute distress  Appearance: She is ill-appearing  HENT:      Head: Normocephalic and atraumatic        Nose: " Nose normal       Mouth/Throat:      Mouth: Mucous membranes are moist       Pharynx: Oropharynx is clear  Eyes:      General: No scleral icterus  Extraocular Movements: Extraocular movements intact  Cardiovascular:      Rate and Rhythm: Normal rate and regular rhythm  Heart sounds: No murmur heard  Pulmonary:      Effort: Pulmonary effort is normal  No respiratory distress  Breath sounds: Examination of the right-lower field reveals decreased breath sounds  Decreased breath sounds present  Abdominal:      General: Abdomen is flat  There is distension (mild)  Palpations: Abdomen is soft  There is no shifting dullness, fluid wave, hepatomegaly or splenomegaly  Tenderness: There is no abdominal tenderness  Hernia: No hernia is present  Genitourinary:     Comments: deferred  Musculoskeletal:         General: Swelling (trace) present  Normal range of motion  Cervical back: Normal range of motion and neck supple  No tenderness  Skin:     General: Skin is warm  Coloration: Skin is not jaundiced  Findings: No bruising or rash  Neurological:      General: No focal deficit present  Mental Status: She is alert and oriented to person, place, and time     Psychiatric:         Mood and Affect: Mood normal          Lab Results:   Lab Results   Component Value Date     07/28/2017    K 4 0 04/03/2023    CO2 24 04/03/2023     04/03/2023    BUN 15 04/03/2023    CREATININE 0 90 04/03/2023    GLUCOSE 95 07/28/2017     Lab Results   Component Value Date    WBC 4 98 03/29/2023    HGB 11 0 (L) 03/29/2023    HCT 32 0 (L) 03/29/2023    MCV 96 03/29/2023    PLT 91 (L) 03/29/2023     Lab Results   Component Value Date    TP 6 8 03/29/2023    AST 32 03/29/2023    ALT 16 03/29/2023    BILITOT 0 6 07/28/2017    BILIDIR 0 95 (H) 09/17/2022    INR 1 47 (H) 03/28/2023      Lab Results   Component Value Date    IRON 77 12/07/2020    FERRITIN 1,132 (H) 12/07/2020     Lab Results Component Value Date    CHOL 159 07/28/2017    HDL 39 (L) 09/15/2022    TRIG 61 09/15/2022     MELD-Na score: 11 at 3/29/2023  3:39 AM  MELD score: 11 at 3/29/2023  3:39 AM  Calculated from:  Serum Creatinine: 0 84 mg/dL (Using min of 1 mg/dL) at 3/29/2023  3:39 AM  Serum Sodium: 131 mmol/L at 3/29/2023  3:39 AM  Total Bilirubin: 0 88 mg/dL (Using min of 1 mg/dL) at 3/29/2023  3:39 AM  INR(ratio): 1 47 at 3/28/2023  4:51 AM  Age: 60 years        Radiology Results:   EGD    Result Date: 10/13/2022  Narrative: [address] [place], [place] [id number] [address] Street: 10/13/22 PHYSICIAN(S): Attending: Crow Gusman MD Fellow: Yeison Boyd MD INDICATION: Alcoholic cirrhosis of liver with ascites ([facility] [address] ) POST-OP DIAGNOSIS: See the impression below  PREPROCEDURE: Informed consent was obtained for the procedure, including sedation  Risks of perforation, hemorrhage, adverse drug reaction and aspiration were discussed  The patient was placed in the left lateral decubitus position  Patient was explained about the risks and benefits of the procedure  Risks including but not limited to bleeding, infection, and perforation were explained in detail  Also explained about less than 100% sensitivity with the exam and other alternatives  DETAILS OF PROCEDURE: Patient was taken to the procedure room where a time out was performed to confirm correct patient and correct procedure  The patient underwent monitored anesthesia care, which was administered by an anesthesia professional  The patient's blood pressure, heart rate, level of consciousness, respirations and oxygen were monitored throughout the procedure  The scope was advanced to the second part of the duodenum  Retroflexion was performed in the fundus  The patient experienced no blood loss  The procedure was not difficult  The patient tolerated the procedure well  There were no apparent complications   ANESTHESIA INFORMATION: ASA: IV Anesthesia Type: Anesthesia type not filed in the log  MEDICATIONS: No administrations occurring from 1127 to 1144 on 10/13/22 FINDINGS: Grade I varices with no bleeding (no red carol sign) in the lower third of the esophagus Abnormal mucosa  Consistent with mild portal hypertensive gastropathy  Normal duodenum SPECIMENS: * No specimens in log *     Impression: Small grade 1 varices disappeared on insufflation  Mild portal hypertensive gastropathy  Normal duodenum RECOMMENDATION: Repeat EGD in 1 year  Recommend starting nadolol 40 mg daily  Continue outpatient GI follow up  Resume diet  ATTENDING ATTESTATION:  I was present throughout the entire procedure from insertion to complete withdrawal of the scope  I performed all interventions myself or oversaw the fellow  Eulalio Galo MD     IR paracentesis    Result Date: 10/28/2022  Narrative: INDICATION: Recurrent ascites PROCEDURE: 1  Ultrasound-guided paracentesis FINDINGS: 1   7750 mL clear yellow ascites removed  Impression: Paracentesis  _________________________________________________________________ COMPARISON: None PROCEDURE DETAILS: Operators: Dr Mason Molina Anesthesia: Local Medications: 1% lidocaine COMMENTS: A preprocedure timeout was performed per [facility] protocol  The right abdomen was prepped and draped in the usual sterile fashion  5-Yoruba Yueh catheter was advanced using ultrasound guidance into ascites  Following drainage, the skin was cleansed and sterile dressings were applied  Workstation performed: [id number]     [address] [facility] bedside procedure    Result Date: 10/13/2022  Narrative: 1 [phone] [id number]  2 [phone] [id number]        Johann Gonzalez MD

## 2023-04-04 NOTE — TELEPHONE ENCOUNTER
----- Message from Idalia Bolaños, [address] Patel sent at 4/4/2023 11:51 AM EDT -----  Please call patient let him know her sodium level is improved and stable at 139, will follow up with Dr Elías King in May

## 2023-04-04 NOTE — TELEPHONE ENCOUNTER
Called and spoke to patient   Patient aware   sodium level is improved and stable at 139, will follow up with Dr Aggie Kim in May

## 2023-04-04 NOTE — TELEPHONE ENCOUNTER
Patient called and left a voicemail about when her appointment is   I returned call and explained it is 5/18/23

## 2023-04-05 LAB
BASOPHILS NFR FLD MANUAL: 1 %
HISTIOCYTES NFR FLD: 51 %
LYMPHOCYTES NFR BLD AUTO: 17 %
MONO+MESO NFR FLD MANUAL: 17 %
NEUTS SEG NFR BLD AUTO: 14 %
PATHOLOGY REVIEW: YES
TOTAL CELLS COUNTED SPEC: 100

## 2023-04-12 PROBLEM — N93.0 POSTCOITAL BLEEDING: Status: ACTIVE | Noted: 2023-04-12

## 2023-04-12 PROBLEM — N95.2 VAGINAL ATROPHY: Status: ACTIVE | Noted: 2023-04-12

## 2023-04-12 PROBLEM — N94.10 DYSPAREUNIA, FEMALE: Status: ACTIVE | Noted: 2023-04-12

## 2023-04-18 ENCOUNTER — TELEMEDICINE (OUTPATIENT)
Dept: INTERVENTIONAL RADIOLOGY/VASCULAR | Facility: CLINIC | Age: 63
End: 2023-04-18

## 2023-04-18 DIAGNOSIS — K76.9 PLEURAL EFFUSION ASSOCIATED WITH HEPATIC DISORDER: ICD-10-CM

## 2023-04-18 DIAGNOSIS — K70.31 ALCOHOLIC CIRRHOSIS OF LIVER WITH ASCITES (HCC): Primary | ICD-10-CM

## 2023-04-18 DIAGNOSIS — E87.1 HYPONATREMIA: ICD-10-CM

## 2023-04-18 DIAGNOSIS — J91.8 PLEURAL EFFUSION ASSOCIATED WITH HEPATIC DISORDER: ICD-10-CM

## 2023-04-18 DIAGNOSIS — I85.00 ESOPHAGEAL VARICES DETERMINED BY ENDOSCOPY (HCC): ICD-10-CM

## 2023-04-18 DIAGNOSIS — K76.0 FATTY LIVER: ICD-10-CM

## 2023-04-24 ENCOUNTER — TELEPHONE (OUTPATIENT)
Dept: OTHER | Facility: OTHER | Age: 63
End: 2023-04-24

## 2023-04-24 NOTE — PROGRESS NOTES
HEMATOLOGY / [address] FOLLOW UP NOTE    Primary Care Provider: Ludivina Quan PA-C  Referring Provider:    MRN: [id number]  : 1960    Reason for Encounter: Follow-up for ER/IL positive DCIS right breast       Oncology History Overview Note   Ductal carcinoma in situ, right breast, 0 4 cm s/p excisional biopsy 2021  Atypical ductal hyperplasia, right breast s/p excisional biopsy 2021  Lobular neoplasia (LCIS), right breast s/p excisional biopsy 2021    Pathologic AJCC (8th Edition) Stage 0: pTis (DCIS), cN0, cM0, G1, ER Positive, IL Positive, HER2 Unknown  2021 started Arimidex    2021 completed radiation therapy at [facility]         Ductal carcinoma in situ (DCIS) of right breast   2021 Initial Diagnosis    Ductal carcinoma in situ (DCIS) of right breast     2021 -  Cancer Staged    Staging form: Breast, AJCC 8th Edition  - Clinical: Stage 0 (cTis (DCIS), cN0, cM0, ER+, IL+, HER2: Not Assessed) - Signed by Navin Haider MD on 2021  Stage prefix: Initial diagnosis  Method of lymph node assessment: Clinical  Nuclear grade: G2           Interval History: Patient returns for follow-up visit  She was last seen by Dr Briana Chavira 10/28/2022  She is following closely with hepatology for known alcoholic cirrhosis with ascites   She is being considered for TIPS procedure  She is not a transplant candidate due to her breast cancer  CT of chest abdomen pelvis done on 2023 shows no evidence of acute intra-abdominal or pelvic pathology  Cirrhosis is noted  Persistent small right pleural effusion  She did undergo IR thoracentesis on 3/28/23 for right pleural effusion and 1000 ml clear yellow pleural fluid was drained  Last mammogram completed at [facility] on 11/15/2022 was benign    She reports she is feeling fairly well  Denies any abdominal pain  No CP, SOB  Blood work is in acceptable range  Tumor marker normal   Tolerates Arimidex  Has hot flashes   Denies severe arthralgias  REVIEW OF SYSTEMS:  Please note that a 14-point review of systems was performed to include Constitutional, HEENT, Respiratory, CVS, GI, , Musculoskeletal, Integumentary, Neurologic, Rheumatologic, Endocrinologic, Psychiatric, Lymphatic, and Hematologic/Oncologic systems were reviewed and are negative unless otherwise stated in HPI  Positive and negative findings pertinent to this evaluation are incorporated into the history of present illness  ECOG PS: 0    PROBLEM LIST:  Patient Active Problem List   Diagnosis   • Fatty liver   • Other specified abnormal findings of blood chemistry   • Anxiety   • Elevated LFTs   • GERD without esophagitis   • Hyperlipidemia   • Essential hypertension   • Insomnia   • Nicotine dependence   • Healthcare maintenance   • COPD (chronic obstructive pulmonary disease) (HCC)   • Alcohol abuse   • Irritable bowel syndrome with diarrhea   • Non-seasonal allergic rhinitis due to pollen   • Alcoholic cirrhosis of liver with ascites (HCC)   • Hyponatremia   • Thrombocytopenia (HCC)   • Anemia   • Decompensated hepatic cirrhosis (HCC)   • Ductal carcinoma in situ (DCIS) of right breast   • Atypical ductal hyperplasia of right breast   • Breast neoplasm, Tis (DCIS), right   • Continuous opioid dependence ([address] )   • Malignant neoplasm of lower-outer quadrant of right breast of female, estrogen receptor positive ([address] )   • Pleural effusion   • Vaginal atrophy   • Dyspareunia, female   • Postcoital bleeding       Assessment / Plan:  1  Malignant neoplasm of lower-outer quadrant of right breast of female, estrogen receptor positive ([address] )    2  Ductal carcinoma in situ (DCIS) of right breast      Recent CT imaging negative for any evidence of malignancy  Mammogram in 11/2022 was benign  Patient continues to follow closely with her surgical oncology team at [facility] and is due for follow-up visit in May  Clinically, no concerns on exam today    Blood work in acceptable range   She will continue on Arimidex daily without change  Refill is E prescribed to pharmacy on file  She will continue to follow closely with her hepatologist and is set up to undergo IR thoracentesis/paracentesis as needed  She will return for a follow-up visit with medical oncology in 6 months with repeat blood work  She knows to call at anytime with questions or concerns    I spent 30 minutes on chart review, face to face counseling time, coordination of care and documentation  Past Medical History:   has a past medical history of Alcoholic fatty liver, Anxiety, Asthma, COPD (chronic obstructive pulmonary disease) ([facility] [address] ), Elevated liver function tests, GERD (gastroesophageal reflux disease), GERD without esophagitis, Hyperlipidemia, Hypertension, IBS (irritable bowel syndrome), Insomnia, Insomnia, Liver disease, Nervousness, Nicotine dependence, Other specified urinary incontinence, RLS (restless legs syndrome), and Wears glasses  PAST SURGICAL HISTORY:   has a past surgical history that includes Back surgery; Knee surgery; Tubal ligation; Knee surgery; Lumbar laminectomy (04/1999); Tubal ligation (1989); Lymph node biopsy; EGD AND COLONOSCOPY (N/A, 3/19/2018); Cataract extraction, bilateral (08/01/2018); Esophagogastroduodenoscopy (N/A, 5/8/2019); Colonoscopy (N/A, 5/8/2019); Total abdominal hysterectomy (02/05/2008); Upper gastrointestinal endoscopy; IR paracentesis (11/27/2020); IR paracentesis (12/8/2020); Mammo stereotactic breast biopsy right (all inc) (Right, 5/21/2021); Mammo stereotactic breast biopsy right (all inc) each add (Right, 5/21/2021); Breast excisional biopsy (Right, 11/01/2004); Breast biopsy (Right, 05/21/2021); IR paracentesis (10/28/2022); IR paracentesis (11/7/2022); IR paracentesis (11/30/2022); IR paracentesis (12/30/2022); IR thoracentesis (1/16/2023); IR paracentesis (1/16/2023); and IR thoracentesis (3/28/2023)      CURRENT MEDICATIONS  Current Outpatient Medications Medication Sig Dispense Refill   • albuterol (2 5 mg/3 mL) 0 083 % nebulizer solution Take 3 mL (2 5 mg total) by nebulization every 6 (six) hours as needed for wheezing or shortness of breath 180 mL 5   • albuterol (Proventil HFA) 90 mcg/act inhaler Inhale 2 puffs every 6 (six) hours as needed for wheezing or shortness of breath 18 g 3   • ALPRAZolam (XANAX) 1 mg tablet Take 1 tablet (1 mg total) by mouth 2 (two) times a day as needed for anxiety 60 tablet 0   • anastrozole (ARIMIDEX) 1 mg tablet Take 1 tablet (1 mg total) by mouth daily 90 tablet 1   • furosemide (LASIX) 40 mg tablet Take 1 tablet (40 mg total) by mouth daily 30 tablet 5   • guselkumab (TREMFYA) subcutaneous injection Inject 100 mg under the skin every 6 weeks     • midodrine (PROAMATINE) 5 mg tablet TAKE ONE AND ONE-HALF TABLETS BY MOUTH THREE TIMES A DAY BEFORE MEALS 135 tablet 0   • nicotine (NICODERM CQ) 21 mg/24 hr TD 24 hr patch Place 1 patch on the skin over 24 hours daily Do not start before March 29, 2023  28 patch 0   • pantoprazole (PROTONIX) 20 mg tablet Take 1 tablet (20 mg total) by mouth daily 30 tablet 0   • spironolactone (ALDACTONE) 100 mg tablet Take 1 tablet (100 mg total) by mouth daily 30 tablet 5   • traZODone (DESYREL) 50 mg tablet Take 1 tablet (50 mg total) by mouth daily at bedtime 30 tablet 5   • lactulose 20 g/30 mL Take 45 mL (30 g total) by mouth 2 (two) times a day Titrate dose for an effect of 3 soft, but formed bowel movements per day  You can start with 1 tablespoon per day and slowly increase to effect  2700 mL 0     No current facility-administered medications for this visit  [unfilled]    SOCIAL HISTORY:   reports that she has been smoking cigarettes  She started smoking about 45 years ago  She has a 45 00 pack-year smoking history  She has never used smokeless tobacco  She reports that she does not currently use alcohol  She reports that she does not use drugs       FAMILY HISTORY:  family history "includes Breast cancer in her mother; No Known Problems in her brother, father, maternal aunt, maternal aunt, maternal grandfather, maternal grandmother, paternal aunt, paternal grandfather, paternal grandmother, sister, and son  ALLERGIES:  is allergic to sulfa antibiotics and ace inhibitors  Physical Exam:  Vital Signs:   Visit Vitals  /70 (BP Location: Left arm, Patient Position: Sitting, Cuff Size: Adult)   Pulse 84   Temp 98 °F (36 7 °C)   Resp 18   Ht 5' 4\" (1 626 m)   Wt 68 5 kg (151 lb)   SpO2 93%   BMI 25 92 kg/m²   OB Status Hysterectomy   Smoking Status Every Day   BSA 1 74 m²     Body mass index is 25 92 kg/m²  Body surface area is 1 74 meters squared  Physical Exam  Constitutional:       General: She is not in acute distress  Appearance: Normal appearance  HENT:      Head: Normocephalic and atraumatic  Eyes:      General: No scleral icterus  Right eye: No discharge  Left eye: No discharge  Conjunctiva/sclera: Conjunctivae normal    Cardiovascular:      Rate and Rhythm: Normal rate and regular rhythm  Pulmonary:      Effort: Pulmonary effort is normal  No respiratory distress  Breath sounds: Normal breath sounds  Abdominal:      General: Bowel sounds are normal  There is no distension  Palpations: Abdomen is soft  There is no mass  Tenderness: There is no abdominal tenderness  Musculoskeletal:         General: Normal range of motion  Lymphadenopathy:      Cervical: No cervical adenopathy  Upper Body:      Right upper body: No supraclavicular, axillary or pectoral adenopathy  Left upper body: No supraclavicular, axillary or pectoral adenopathy  Skin:     General: Skin is warm and dry  Neurological:      General: No focal deficit present  Mental Status: She is alert and oriented to person, place, and time     Psychiatric:         Mood and Affect: Mood normal          Behavior: Behavior normal          Labs:  Lab Results " Component Value Date    WBC 5 9 04/18/2023    HGB 13 1 04/18/2023    HCT 38 2 04/18/2023    MCV 95 04/18/2023     (L) 04/18/2023     Lab Results   Component Value Date     07/28/2017    SODIUM 135 04/18/2023    K 4 1 04/18/2023     04/18/2023    CO2 25 04/18/2023    ANIONGAP 12 05/03/2015    AGAP 7 03/29/2023    BUN 14 04/18/2023    CREATININE 0 89 04/18/2023    GLUC 93 04/18/2023    GLUF 102 (H) 03/29/2023    CALCIUM 8 8 04/18/2023    AST 42 (H) 04/18/2023    ALT 17 04/18/2023    ALKPHOS 113 (H) 03/29/2023    PROT 7 4 07/28/2017    TP 8 0 04/18/2023    BILITOT 0 6 07/28/2017    TBILI 0 8 04/18/2023    EGFR 73 04/18/2023

## 2023-04-24 NOTE — TELEPHONE ENCOUNTER
Patient called today regarding she had Blood Word drawn last week at [facility] and her results are not showing in her Chart  Patient states that her Provider gave one Prescription for all 3 Blood Work Requests and is concerned that [employer] needs 3 Individual Scrips  Please call patient back to discuss

## 2023-04-24 NOTE — TELEPHONE ENCOUNTER
Spoke to patient and patient's   Confirmed CMP, CBC, PT/ inr completed 4/14 (results scanned into media 4/24/23)  Lab slip was faxed to [place] [phone] on 4/18/23  Reviewed 4/19/23 encounter  -  Spoke to Miguelina Bond  and Santana, [employer] regarding reocuring lab orders  Standing orders are valid for 6 months as per [employer] protocol  Patient will need updated lab slip in 6 months- October  Reminder set to send updated lab order  Electronically signed by Troy Prasad RN at 4/19/2023  9:01 AM            Lab slip with standing CMP, CBC, PT Carla Jones mailed to patient

## 2023-04-25 NOTE — PROGRESS NOTES
Virtual Regular Visit    Verification of patient location:    Patient is located at Home in the following state in which I hold an active license [place]      Assessment/Plan:    Problem List Items Addressed This Visit        Digestive    Alcoholic cirrhosis of liver with ascites ([facility] [address] ) - Primary (Chronic)    Relevant Orders    IR TIPS (transjugular intrahepatic portosystemic shunt)    Fatty liver       Other    Hyponatremia   Other Visit Diagnoses     Pleural effusion associated with hepatic disorder        Relevant Orders    IR TIPS (transjugular intrahepatic portosystemic shunt)    Esophageal varices determined by endoscopy ([facility] [address] )          In summary, 60 y F w/ decompensated EtOH cirrhosis with small volume and ascites and symptomatic hepatic hydrothorax requiring prior hospitalization / thoracenteses currently optimized medically  She is not a transplant candidate  I described the TIPS procedural details, risks and benefits  She wishes to proceed  Obtain INR / CBC day of  Type / cross  Need anesthesia and ICE  Plan for overnight observation  May need a thoracentesis +/- paracentesis day of procedure  Reason for visit is   Chief Complaint   Patient presents with   • Virtual Regular Visit        Encounter provider Sergey Roque DO    Provider located at [address] [place] [address],Third Floor  [address]  [phone]      Recent Visits  No visits were found meeting these conditions  Showing recent visits within past 7 days and meeting all other requirements  Future Appointments  No visits were found meeting these conditions  Showing future appointments within next 150 days and meeting all other requirements       The patient was identified by name and date of birth  Alex  was informed that this is a telemedicine visit and that the visit is being conducted through Telephone  My office door was closed   No one else was in the room  She acknowledged consent and understanding of privacy and security of the video platform  The patient has agreed to participate and understands they can discontinue the visit at any time  Patient is aware this is a billable service  CC: Decompensated cirrhosis  HPI: 60 y Fw/ decompensated EtOH cirrhosis c/b ascites and hepatic hydrothorax with maximal medical optimization followed by GI/hepatology  She is referred to IR to discuss TIPS  She is not a transplant candidate secondary to breast cancer diagnosis  No further drinking  MELD 11  No HE    EGD 10/13/22 - grade 1 EVs / mild PHG  MRI 2/9/23 - No liver mass  ECHO 8/3/65 - PN>30%, systolic function nL (R&L)  Prior hospitalization for SOB 2/2 hepatic hydrothorax  H/o thoracentesis, >1L    Has lower extremity edema and thrombocytopenia  Otherwise without significant complaints  Past Medical History:   Diagnosis Date   • Alcoholic fatty liver    • Anxiety    • Asthma    • COPD (chronic obstructive pulmonary disease) ([facility] [address] )    • Elevated liver function tests    • GERD (gastroesophageal reflux disease)    • GERD without esophagitis    • Hyperlipidemia    • Hypertension    • IBS (irritable bowel syndrome)    • Insomnia    • Insomnia    • Liver disease    • Nervousness    • Nicotine dependence    • Other specified urinary incontinence    • RLS (restless legs syndrome)    • Wears glasses        Past Surgical History:   Procedure Laterality Date   • BACK SURGERY     • 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   • BREAST EXCISIONAL BIOPSY Right 11/01/2004    benign   • CATARACT EXTRACTION, BILATERAL  08/01/2018   • COLONOSCOPY N/A 5/8/2019    Procedure: COLONOSCOPY;  Surgeon: Sheridan Mace MD;  Location: [facility]; Service: Gastroenterology   • EGD AND COLONOSCOPY N/A 3/19/2018    Procedure: EGD AND COLONOSCOPY;  Surgeon: Sheridan Mace MD;  Location: [facility];   Service: Gastroenterology   • ESOPHAGOGASTRODUODENOSCOPY N/A 5/8/2019    Procedure: ESOPHAGOGASTRODUODENOSCOPY (EGD); Surgeon: Tracy Dickens MD;  Location: [facility]; Service: Gastroenterology   • IR PARACENTESIS  11/27/2020   • IR PARACENTESIS  12/8/2020   • IR PARACENTESIS  10/28/2022   • IR PARACENTESIS  11/7/2022   • IR PARACENTESIS  11/30/2022   • IR PARACENTESIS  12/30/2022   • IR PARACENTESIS  1/16/2023   • IR THORACENTESIS  1/16/2023   • IR THORACENTESIS  3/28/2023   • KNEE SURGERY     • KNEE SURGERY     • LUMBAR LAMINECTOMY  04/1999   • LYMPH NODE BIOPSY     • MAMMO STEREOTACTIC BREAST BIOPSY RIGHT (ALL INC) Right 5/21/2021   • MAMMO STEREOTACTIC BREAST BIOPSY RIGHT (ALL INC) EACH ADD Right 5/21/2021   • TOTAL ABDOMINAL HYSTERECTOMY  02/05/2008   • TUBAL LIGATION     • TUBAL LIGATION  1989   • UPPER GASTROINTESTINAL ENDOSCOPY         Current Outpatient Medications   Medication Sig Dispense Refill   • albuterol (2 5 mg/3 mL) 0 083 % nebulizer solution Take 3 mL (2 5 mg total) by nebulization every 6 (six) hours as needed for wheezing or shortness of breath 180 mL 5   • albuterol (Proventil HFA) 90 mcg/act inhaler Inhale 2 puffs every 6 (six) hours as needed for wheezing or shortness of breath 18 g 3   • ALPRAZolam (XANAX) 1 mg tablet Take 1 tablet (1 mg total) by mouth 2 (two) times a day as needed for anxiety 60 tablet 0   • anastrozole (ARIMIDEX) 1 mg tablet Take 1 tablet (1 mg total) by mouth daily 90 tablet 1   • furosemide (LASIX) 40 mg tablet Take 1 tablet (40 mg total) by mouth daily 30 tablet 5   • guselkumab (TREMFYA) subcutaneous injection Inject 100 mg under the skin every 6 weeks     • lactulose 20 g/30 mL Take 45 mL (30 g total) by mouth 2 (two) times a day Titrate dose for an effect of 3 soft, but formed bowel movements per day  You can start with 1 tablespoon per day and slowly increase to effect   2700 mL 0   • midodrine (PROAMATINE) 5 mg tablet TAKE ONE AND ONE-HALF TABLETS BY MOUTH THREE TIMES A DAY BEFORE MEALS 135 tablet 0   • nicotine (NICODERM CQ) 21 mg/24 hr TD 24 hr patch Place 1 patch on the skin over 24 hours daily Do not start before March 29, 2023  28 patch 0   • pantoprazole (PROTONIX) 20 mg tablet Take 1 tablet (20 mg total) by mouth daily 30 tablet 0   • spironolactone (ALDACTONE) 100 mg tablet Take 1 tablet (100 mg total) by mouth daily 30 tablet 5   • traZODone (DESYREL) 50 mg tablet Take 1 tablet (50 mg total) by mouth daily at bedtime 30 tablet 5     No current facility-administered medications for this visit  Allergies   Allergen Reactions   • Sulfa Antibiotics Shortness Of Breath   • Ace Inhibitors Cough and Other (See Comments)     coughing       Review of Systems   Constitutional: Negative  HENT: Negative  Eyes: Negative  Respiratory: Positive for shortness of breath  Gastrointestinal: Negative  Genitourinary: Negative  Musculoskeletal: Negative  Skin: Negative  Neurological: Negative  Hematological: Negative  Psychiatric/Behavioral: Negative          Visit Time  Total Visit Duration: 30 minutes

## 2023-04-26 ENCOUNTER — OFFICE VISIT (OUTPATIENT)
Age: 63
End: 2023-04-26

## 2023-04-26 VITALS
BODY MASS INDEX: 25.78 KG/M2 | RESPIRATION RATE: 18 BRPM | SYSTOLIC BLOOD PRESSURE: 115 MMHG | OXYGEN SATURATION: 93 % | HEART RATE: 84 BPM | TEMPERATURE: 98 F | HEIGHT: 64 IN | WEIGHT: 151 LBS | DIASTOLIC BLOOD PRESSURE: 70 MMHG

## 2023-04-26 DIAGNOSIS — Z17.0 MALIGNANT NEOPLASM OF LOWER-OUTER QUADRANT OF RIGHT BREAST OF FEMALE, ESTROGEN RECEPTOR POSITIVE (HCC): Primary | ICD-10-CM

## 2023-04-26 DIAGNOSIS — D05.11 DUCTAL CARCINOMA IN SITU (DCIS) OF RIGHT BREAST: ICD-10-CM

## 2023-04-26 DIAGNOSIS — C50.511 MALIGNANT NEOPLASM OF LOWER-OUTER QUADRANT OF RIGHT BREAST OF FEMALE, ESTROGEN RECEPTOR POSITIVE (HCC): Primary | ICD-10-CM

## 2023-04-26 RX ORDER — ANASTROZOLE 1 MG/1
1 TABLET ORAL DAILY
Qty: 90 TABLET | Refills: 1 | Status: SHIPPED | OUTPATIENT
Start: 2023-04-26

## 2023-05-03 LAB
ALBUMIN SERPL-MCNC: 3.4 G/DL (ref 3.8–4.8)
ALBUMIN/GLOB SERPL: 0.7 {RATIO} (ref 1.2–2.2)
ALP SERPL-CCNC: 150 IU/L (ref 44–121)
ALT SERPL-CCNC: 23 IU/L (ref 0–32)
AST SERPL-CCNC: 41 IU/L (ref 0–40)
BASOPHILS # BLD AUTO: 0.1 X10E3/UL (ref 0–0.2)
BASOPHILS NFR BLD AUTO: 1 %
BILIRUB SERPL-MCNC: 1.3 MG/DL (ref 0–1.2)
BUN SERPL-MCNC: 15 MG/DL (ref 8–27)
BUN/CREAT SERPL: 16 (ref 12–28)
CALCIUM SERPL-MCNC: 9.4 MG/DL (ref 8.7–10.3)
CHLORIDE SERPL-SCNC: 103 MMOL/L (ref 96–106)
CO2 SERPL-SCNC: 25 MMOL/L (ref 20–29)
CREAT SERPL-MCNC: 0.93 MG/DL (ref 0.57–1)
EGFR: 69 ML/MIN/1.73
EOSINOPHIL # BLD AUTO: 0.1 X10E3/UL (ref 0–0.4)
EOSINOPHIL NFR BLD AUTO: 2 %
ERYTHROCYTE [DISTWIDTH] IN BLOOD BY AUTOMATED COUNT: 13.7 % (ref 11.7–15.4)
GLOBULIN SER-MCNC: 4.9 G/DL (ref 1.5–4.5)
GLUCOSE SERPL-MCNC: 107 MG/DL (ref 70–99)
HCT VFR BLD AUTO: 40.8 % (ref 34–46.6)
HGB BLD-MCNC: 14 G/DL (ref 11.1–15.9)
IMM GRANULOCYTES # BLD: 0 X10E3/UL (ref 0–0.1)
IMM GRANULOCYTES NFR BLD: 0 %
INR PPP: 1.3 (ref 0.9–1.2)
LYMPHOCYTES # BLD AUTO: 1.1 X10E3/UL (ref 0.7–3.1)
LYMPHOCYTES NFR BLD AUTO: 19 %
MCH RBC QN AUTO: 32.8 PG (ref 26.6–33)
MCHC RBC AUTO-ENTMCNC: 34.3 G/DL (ref 31.5–35.7)
MCV RBC AUTO: 96 FL (ref 79–97)
MONOCYTES # BLD AUTO: 0.7 X10E3/UL (ref 0.1–0.9)
MONOCYTES NFR BLD AUTO: 12 %
NEUTROPHILS # BLD AUTO: 3.9 X10E3/UL (ref 1.4–7)
NEUTROPHILS NFR BLD AUTO: 66 %
PLATELET # BLD AUTO: 102 X10E3/UL (ref 150–450)
POTASSIUM SERPL-SCNC: 4.1 MMOL/L (ref 3.5–5.2)
PROT SERPL-MCNC: 8.3 G/DL (ref 6–8.5)
PROTHROMBIN TIME: 13.5 SEC (ref 9.1–12)
RBC # BLD AUTO: 4.27 X10E6/UL (ref 3.77–5.28)
SODIUM SERPL-SCNC: 139 MMOL/L (ref 134–144)
WBC # BLD AUTO: 5.9 X10E3/UL (ref 3.4–10.8)

## 2023-05-04 DIAGNOSIS — K70.31 ALCOHOLIC CIRRHOSIS OF LIVER WITH ASCITES (HCC): Chronic | ICD-10-CM

## 2023-05-05 RX ORDER — MIDODRINE HYDROCHLORIDE 5 MG/1
TABLET ORAL
Qty: 135 TABLET | Refills: 0 | Status: SHIPPED | OUTPATIENT
Start: 2023-05-05

## 2023-05-09 ENCOUNTER — OFFICE VISIT (OUTPATIENT)
Dept: GASTROENTEROLOGY | Facility: CLINIC | Age: 63
End: 2023-05-09

## 2023-05-09 VITALS
SYSTOLIC BLOOD PRESSURE: 110 MMHG | OXYGEN SATURATION: 99 % | HEART RATE: 76 BPM | HEIGHT: 64 IN | BODY MASS INDEX: 25.95 KG/M2 | WEIGHT: 152 LBS | DIASTOLIC BLOOD PRESSURE: 60 MMHG | TEMPERATURE: 97.1 F

## 2023-05-09 DIAGNOSIS — J91.8 PLEURAL EFFUSION ASSOCIATED WITH HEPATIC DISORDER: ICD-10-CM

## 2023-05-09 DIAGNOSIS — I85.00 ESOPHAGEAL VARICES DETERMINED BY ENDOSCOPY (HCC): ICD-10-CM

## 2023-05-09 DIAGNOSIS — K76.9 PLEURAL EFFUSION ASSOCIATED WITH HEPATIC DISORDER: ICD-10-CM

## 2023-05-09 DIAGNOSIS — K70.31 ALCOHOLIC CIRRHOSIS OF LIVER WITH ASCITES (HCC): Primary | ICD-10-CM

## 2023-05-09 NOTE — PATIENT INSTRUCTIONS
As discussed today:    Medications:  Continue taking your current medications without changes  Laboratory Testing (Listed below):  Please have blood work drawn in mid June and early August   Radiology/Diagnostic Testing:  Please schedule an abdominal ultrasound in early August   Avoid alcohol and tobacco products  Also avoid raw seafood/oysters and avoid swimming/wading in unchlorinated neely, particularly from the [facility], due to increased risk of infection from a bacteria called Vibrio Vulnificus  Avoid medications called NSAID's (Motrin/Ibuprofen, Naproxen/Naprosyn/Aleve) if possible, due to increase risk of kidney dysfunction, and if you use medications containing acetaminophen (Tylenol, percocet, Endocet, and many cough/cold medications), the dose should not exceed 2 grams/day  Seek immediate medical attention if you develop fevers over 101 F, have evidence of GI bleeding (black or bloody stools, bloody or coffee ground emesis) or confusion  Call our office if you develop worsening symptoms related to lower extremity edema, ascites, jaundice or excessive bruising/bleeding

## 2023-05-10 DIAGNOSIS — F41.9 ANXIETY: ICD-10-CM

## 2023-05-10 RX ORDER — ALPRAZOLAM 1 MG/1
1 TABLET ORAL 2 TIMES DAILY PRN
Qty: 60 TABLET | Refills: 0 | Status: SHIPPED | OUTPATIENT
Start: 2023-05-10

## 2023-05-18 ENCOUNTER — OFFICE VISIT (OUTPATIENT)
Dept: NEPHROLOGY | Facility: HOSPITAL | Age: 63
End: 2023-05-18

## 2023-05-18 VITALS
HEART RATE: 78 BPM | SYSTOLIC BLOOD PRESSURE: 102 MMHG | HEIGHT: 64 IN | DIASTOLIC BLOOD PRESSURE: 60 MMHG | OXYGEN SATURATION: 98 % | BODY MASS INDEX: 24.96 KG/M2 | WEIGHT: 146.2 LBS

## 2023-05-18 DIAGNOSIS — D50.8 OTHER IRON DEFICIENCY ANEMIA: ICD-10-CM

## 2023-05-18 DIAGNOSIS — I10 ESSENTIAL HYPERTENSION: Primary | ICD-10-CM

## 2023-05-18 DIAGNOSIS — K70.31 ALCOHOLIC CIRRHOSIS OF LIVER WITH ASCITES (HCC): ICD-10-CM

## 2023-05-18 DIAGNOSIS — E87.1 HYPONATREMIA: ICD-10-CM

## 2023-05-18 DIAGNOSIS — K70.31 ALCOHOLIC CIRRHOSIS OF LIVER WITH ASCITES (HCC): Chronic | ICD-10-CM

## 2023-05-18 DIAGNOSIS — I85.00 ESOPHAGEAL VARICES DETERMINED BY ENDOSCOPY (HCC): ICD-10-CM

## 2023-05-18 DIAGNOSIS — R18.8 REFRACTORY ASCITES: ICD-10-CM

## 2023-05-18 DIAGNOSIS — N18.2 CKD (CHRONIC KIDNEY DISEASE) STAGE 2, GFR 60-89 ML/MIN: ICD-10-CM

## 2023-05-18 DIAGNOSIS — K21.9 GERD WITHOUT ESOPHAGITIS: ICD-10-CM

## 2023-05-18 RX ORDER — PANTOPRAZOLE SODIUM 20 MG/1
TABLET, DELAYED RELEASE ORAL
Qty: 30 TABLET | Refills: 0 | Status: SHIPPED | OUTPATIENT
Start: 2023-05-18

## 2023-05-18 NOTE — PATIENT INSTRUCTIONS
1  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 hypertensive nephrosclerosis +/- alcoholic cirrhosis  - sCr 1 01 as of 5/17/23, with eGFR 63ml/min   -would avoid ibuprofen as this can lead to worsened kidney function  -on furosemide 40mg daily, spironolactone 100mg daily     2  HTN - BP low normal in office on diuretics as above, on midodrine 7 5mg three times daily     3  Hyponatremia - sNa down to 132 on last check as of 5/1/23  Already on diuretics as above  Limit water intake  Repeat bloodwork early next week  Limit non alcoholic beer intake  4  Anemia - Hgb 11, monitor CBC, will repeat CBC and check iron panel     5  Hx cirrhosis with ascites - on diuretics as above, monitor daily weight, s/p 4L paracentesis in Dec  2020, s/p 1L thoracentesis 3/28/23, follows with GI     6  LE edema - in part due to hypoalbuminemia in setting of cirrhosis  Avoid high salt/sodium diet  Consider compression stockings  Continue diuretics as above  Monitor daily weight  Call GI office if weight increases 3-5lbs in a short period of time  7  Tobacco abuse - encourage cessation     RTC in 6 months  Obtain blood and urine testing before next office visit

## 2023-05-18 NOTE — PROGRESS NOTES
NEPHROLOGY OUTPATIENT PROGRESS NOTE   Evelia Hamlin 60 y o  female MRN: [id number]  DATE: 5/18/2023  Reason for visit:   Chief Complaint   Patient presents with   • Chronic Kidney Disease   • Follow-up        Patient Instructions   1  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 hypertensive nephrosclerosis +/- alcoholic cirrhosis  - sCr 1 01 as of 5/17/23, with eGFR 63ml/min   -would avoid ibuprofen as this can lead to worsened kidney function  -on furosemide 40mg daily, spironolactone 100mg daily     2  HTN - BP low normal in office on diuretics as above, on midodrine 7 5mg three times daily     3  Hyponatremia - sNa down to 132 on last check as of 5/1/23  Already on diuretics as above  Limit water intake  Repeat bloodwork early next week  Limit non alcoholic beer intake  4  Anemia - Hgb 11, monitor CBC, will repeat CBC and check iron panel     5  Hx cirrhosis with ascites - on diuretics as above, monitor daily weight, s/p 4L paracentesis in Dec  2020, s/p 1L thoracentesis 3/28/23, follows with GI     6  LE edema - in part due to hypoalbuminemia in setting of cirrhosis  Avoid high salt/sodium diet  Consider compression stockings  Continue diuretics as above  Monitor daily weight  Call GI office if weight increases 3-5lbs in a short period of time  7  Tobacco abuse - encourage cessation     RTC in 6 months  Obtain blood and urine testing before next office visit  Tiago Echevarria was seen today for chronic kidney disease and follow-up  Diagnoses and all orders for this visit:    Essential hypertension    Other iron deficiency anemia  -     CBC and differential; Future  -     Iron Panel (Includes Ferritin, Iron Sat%, Iron, and TIBC); Future  -     CBC and differential    Hyponatremia  -     Basic metabolic panel; Future  -     Basic metabolic panel  -     Basic metabolic panel;  Future  -     Basic metabolic panel    Alcoholic cirrhosis of liver with ascites (HCC)    CKD (chronic kidney disease) stage 2, GFR 60-89 ml/min  - Basic metabolic panel; Future  -     Urinalysis with microscopic; Future  -     Protein / creatinine ratio, urine; Future  -     Basic metabolic panel  -     Urinalysis with microscopic  -     Protein / creatinine ratio, urine        Assessment/Plan:  1  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 hypertensive nephrosclerosis +/- alcoholic cirrhosis  - sCr 1 01 as of 5/17/23, with eGFR 63ml/min   -would avoid ibuprofen as this can lead to worsened kidney function  -on furosemide 40mg daily, spironolactone 100mg daily     2  HTN - BP low normal in office on diuretics as above, on midodrine 7 5mg three times daily     3  Hyponatremia - sNa down to 132 on last check as of 5/1/23  Already on diuretics as above  Limit water intake  Repeat bloodwork early next week  Limit non alcoholic beer intake       4  Anemia - Hgb 11, monitor CBC, will repeat CBC and check iron panel     5  Hx cirrhosis with ascites - on diuretics as above, monitor daily weight, s/p 4L paracentesis in Dec  2020, s/p 1L thoracentesis 3/28/23, follows with GI     6  LE edema - in part due to hypoalbuminemia in setting of cirrhosis  Avoid high salt/sodium diet  Consider compression stockings  Continue diuretics as above  Monitor daily weight  Call GI office if weight increases 3-5lbs in a short period of time  7  Tobacco abuse - encourage cessation     RTC in 6 months  Obtain blood and urine testing before next office visit  SUBJECTIVE / INTERVAL HISTORY:  60 y o  female presents in follow up of CKD  Jose White had TIPS procedure cancelled as labs improved  Denies NSAID use  HTN - BP at home labile  Denies leg edema  Limiting water intake  Review of Systems   Constitutional: Negative for chills and fever  HENT: Negative for sore throat and trouble swallowing  Eyes: Negative for visual disturbance  Respiratory: Negative for cough and shortness of breath  Cardiovascular: Negative for chest pain and leg swelling     Gastrointestinal: "Negative for abdominal pain, constipation, diarrhea, nausea and vomiting  Endocrine: Negative for polyuria  Genitourinary: Negative for difficulty urinating, dysuria and hematuria  Musculoskeletal: Negative for back pain and neck pain  Skin: Negative for rash  Neurological: Positive for dizziness (all the time, denies falls)  Negative for light-headedness and numbness  Psychiatric/Behavioral: The patient is not nervous/anxious  OBJECTIVE:  /60 (BP Location: Left arm, Patient Position: Sitting, Cuff Size: Large)   Pulse 78   Ht 5' 4\" (1 626 m)   Wt 66 3 kg (146 lb 3 2 oz)   SpO2 98%   BMI 25 10 kg/m²  Body mass index is 25 1 kg/m²  Physical exam:  Physical Exam  Vitals and nursing note reviewed  Constitutional:       General: She is not in acute distress  Appearance: Normal appearance  She is well-developed  She is not diaphoretic  Comments: thin   HENT:      Head: Normocephalic and atraumatic  Nose: Nose normal       Mouth/Throat:      Mouth: Mucous membranes are moist       Pharynx: No oropharyngeal exudate  Eyes:      General: No scleral icterus  Right eye: No discharge  Left eye: No discharge  Neck:      Thyroid: No thyromegaly  Cardiovascular:      Rate and Rhythm: Normal rate and regular rhythm  Heart sounds: No murmur heard  Pulmonary:      Effort: Pulmonary effort is normal  No respiratory distress  Breath sounds: Normal breath sounds  No wheezing  Abdominal:      General: Bowel sounds are normal  There is no distension  Palpations: Abdomen is soft  Musculoskeletal:         General: No swelling  Normal range of motion  Cervical back: Normal range of motion and neck supple  Skin:     General: Skin is warm and dry  Findings: No rash  Neurological:      General: No focal deficit present  Mental Status: She is alert  Motor: No abnormal muscle tone        Comments: awake   Psychiatric:         Mood and " Affect: Mood normal          Behavior: Behavior normal          Medications:    Current Outpatient Medications:   •  albuterol (2 5 mg/3 mL) 0 083 % nebulizer solution, Take 3 mL (2 5 mg total) by nebulization every 6 (six) hours as needed for wheezing or shortness of breath, Disp: 180 mL, Rfl: 5  •  albuterol (Proventil HFA) 90 mcg/act inhaler, Inhale 2 puffs every 6 (six) hours as needed for wheezing or shortness of breath, Disp: 18 g, Rfl: 3  •  ALPRAZolam (XANAX) 1 mg tablet, Take 1 tablet (1 mg total) by mouth 2 (two) times a day as needed for anxiety, Disp: 60 tablet, Rfl: 0  •  anastrozole (ARIMIDEX) 1 mg tablet, Take 1 tablet (1 mg total) by mouth daily, Disp: 90 tablet, Rfl: 1  •  furosemide (LASIX) 40 mg tablet, Take 1 tablet (40 mg total) by mouth daily, Disp: 30 tablet, Rfl: 5  •  guselkumab (TREMFYA) subcutaneous injection, Inject 100 mg under the skin every 6 weeks, Disp: , Rfl:   •  lactulose 20 g/30 mL, Take 45 mL (30 g total) by mouth 2 (two) times a day Titrate dose for an effect of 3 soft, but formed bowel movements per day  You can start with 1 tablespoon per day and slowly increase to effect , Disp: 2700 mL, Rfl: 0  •  midodrine (PROAMATINE) 5 mg tablet, TAKE ONE AND ONE-HALF TABLETS BY MOUTH THREE TIMES A DAY BEFORE MEALS, Disp: 135 tablet, Rfl: 0  •  pantoprazole (PROTONIX) 20 mg tablet, Take 1 tablet (20 mg total) by mouth daily, Disp: 30 tablet, Rfl: 0  •  spironolactone (ALDACTONE) 100 mg tablet, Take 1 tablet (100 mg total) by mouth daily, Disp: 30 tablet, Rfl: 5  •  traZODone (DESYREL) 50 mg tablet, Take 1 tablet (50 mg total) by mouth daily at bedtime, Disp: 30 tablet, Rfl: 5  •  nicotine (NICODERM CQ) 21 mg/24 hr TD 24 hr patch, Place 1 patch on the skin over 24 hours daily Do not start before March 29, 2023  (Patient not taking: Reported on 5/18/2023), Disp: 28 patch, Rfl: 0    Allergies:   Allergies as of 05/18/2023 - Reviewed 05/18/2023   Allergen Reaction Noted   • Sulfa antibiotics "Shortness Of Breath 01/24/2022   • Ace inhibitors Cough and Other (See Comments) 08/29/2014       The following portions of the patient's history were reviewed and updated as appropriate: past family history, past surgical history and problem list     Laboratory Results:  Lab Results   Component Value Date    SODIUM 132 (L) 05/17/2023    K 4 4 05/17/2023    CL 96 05/17/2023    CO2 23 05/17/2023    BUN 12 05/17/2023    CREATININE 1 01 (H) 05/17/2023    GLUC 115 (H) 05/17/2023    CALCIUM 8 8 04/18/2023        Lab Results   Component Value Date    CALCIUM 8 8 04/18/2023    PHOS 3 9 03/28/2023       Portions of the record may have been created with voice recognition software   Occasional wrong word or \"sound a like\" substitutions may have occurred due to the inherent limitations of voice recognition software   Read the chart carefully and recognize, using context, where substitutions have occurred    "

## 2023-05-19 LAB
BUN SERPL-MCNC: 11 MG/DL (ref 8–27)
BUN/CREAT SERPL: 11 (ref 12–28)
CALCIUM SERPL-MCNC: 8.8 MG/DL (ref 8.7–10.3)
CHLORIDE SERPL-SCNC: 100 MMOL/L (ref 96–106)
CO2 SERPL-SCNC: 26 MMOL/L (ref 20–29)
CREAT SERPL-MCNC: 0.98 MG/DL (ref 0.57–1)
EGFR: 65 ML/MIN/1.73
GLUCOSE SERPL-MCNC: 101 MG/DL (ref 70–99)
POTASSIUM SERPL-SCNC: 4.3 MMOL/L (ref 3.5–5.2)
SODIUM SERPL-SCNC: 137 MMOL/L (ref 134–144)

## 2023-06-05 ENCOUNTER — TELEPHONE (OUTPATIENT)
Dept: GASTROENTEROLOGY | Facility: CLINIC | Age: 63
End: 2023-06-05

## 2023-06-05 NOTE — TELEPHONE ENCOUNTER
Patients GI provider:  Dr Adia Tilley    Number to return call: [phone]    Reason for call: Pt calling because she is concerned her labs from last Tuesday at [facility] are not showing on her chart   She would like a call back at    Scheduled procedure/appointment date if applicable: Appt 8/91/10

## 2023-06-06 NOTE — TELEPHONE ENCOUNTER
Spoke to [id number] [facility] regarding 5/30/23 lab results  Results will be faxed to [facility] [place] fax

## 2023-06-06 NOTE — TELEPHONE ENCOUNTER
Patient is aware I contacted lab aicha this AM regarding 5/30/23 lab results  Results now available in Epic

## 2023-06-07 DIAGNOSIS — F41.9 ANXIETY: ICD-10-CM

## 2023-06-07 RX ORDER — ALPRAZOLAM 1 MG/1
1 TABLET ORAL 2 TIMES DAILY PRN
Qty: 60 TABLET | Refills: 0 | Status: SHIPPED | OUTPATIENT
Start: 2023-06-07

## 2023-06-15 ENCOUNTER — OFFICE VISIT (OUTPATIENT)
Dept: GASTROENTEROLOGY | Facility: CLINIC | Age: 63
End: 2023-06-15
Payer: COMMERCIAL

## 2023-06-15 VITALS
TEMPERATURE: 97.6 F | BODY MASS INDEX: 25.78 KG/M2 | HEIGHT: 64 IN | WEIGHT: 151 LBS | DIASTOLIC BLOOD PRESSURE: 70 MMHG | SYSTOLIC BLOOD PRESSURE: 118 MMHG

## 2023-06-15 DIAGNOSIS — K58.0 IRRITABLE BOWEL SYNDROME WITH DIARRHEA: ICD-10-CM

## 2023-06-15 DIAGNOSIS — K70.31 ALCOHOLIC CIRRHOSIS OF LIVER WITH ASCITES (HCC): Primary | Chronic | ICD-10-CM

## 2023-06-15 DIAGNOSIS — K21.9 GERD WITHOUT ESOPHAGITIS: ICD-10-CM

## 2023-06-15 PROCEDURE — 99215 OFFICE O/P EST HI 40 MIN: CPT | Performed by: INTERNAL MEDICINE

## 2023-06-15 RX ORDER — FUROSEMIDE 40 MG/1
40 TABLET ORAL DAILY
Qty: 90 TABLET | Refills: 3 | Status: SHIPPED | OUTPATIENT
Start: 2023-06-15

## 2023-06-15 RX ORDER — SPIRONOLACTONE 100 MG/1
100 TABLET, FILM COATED ORAL DAILY
Qty: 90 TABLET | Refills: 3 | Status: SHIPPED | OUTPATIENT
Start: 2023-06-15

## 2023-06-15 NOTE — PROGRESS NOTES
[facility] - Outpatient Follow-up Note  Kimberlee Youngblood 58 y o  female MRN: [id number]  Encounter: [id number]          ASSESSMENT AND PLAN:      1  Alcoh[facility] with ascites ([facility] )  She has alcoholic cirrhosis complicated by ascites, esophageal varices, and hepatic encephalopathy  I refilled her Lasix and Aldactone and we will continue to monitor her abdominal exam and check her labs  I gave her a lab slip for comprehensive metabolic panel  I will also start her on rifaximin 550 mg twice a day  Hopefully this will be covered by her insurance and she will be able to take it in addition to her standing lactulose  I encouraged her to titrate her lactulose dose for goal of 2-3 soft bowel movements per day  - furosemide (LASIX) 40 mg tablet; Take 1 tablet (40 mg total) by mouth daily  Dispense: 90 tablet; Refill: 3  - spironolactone (ALDACTONE) 100 mg tablet; Take 1 tablet (100 mg total) by mouth daily  Dispense: 90 tablet; Refill: 3  - Comprehensive metabolic panel; Future  - Comprehensive metabolic panel  - rifaximin (XIFAXAN) 550 mg tablet; Take 1 tablet (550 mg total) by mouth every 12 (twelve) hours  Dispense: 60 tablet; Refill: 2    2  GERD without esophagitis  She has reflux symptoms that have been well controlled with diet and lifestyle modification so she is not currently taking any medication for the reflux  3  Irritable bowel syndrome with diarrhea  She has a history of diarrhea predominant irritable bowel syndrome but has not had any symptoms recently as she has been more constipated recently     ______________________________________________________________________    SUBJECTIVE: She presents for follow-up of her alcoholic cirrhosis complicated by ascites and small esophageal varices and hepatic encephalopathy  Since her last visit she has been doing well with the Lasix 40 mg daily and Aldactone 100 mg daily    She has not had any accumulation of ascites and not had to have a paracentesis recently  She does complain of some constipation and said she has had a few falls since her last visit  She plans to see a neurologist for further evaluation  She has been taking lactulose but does not have a daily bowel movement she said she usually has a bowel movement every 2 days  She said sometimes her bowels are hard  She denies vomiting, difficulty swallowing, and weight loss  REVIEW OF SYSTEMS IS OTHERWISE NEGATIVE  Historical Information   Past Medical History:   Diagnosis Date   • Alcoholic fatty liver    • Anxiety    • Asthma    • COPD (chronic obstructive pulmonary disease) ([facility] )    • Elevated liver function tests    • GERD (gastroesophageal reflux disease)    • GERD without esophagitis    • Hyperlipidemia    • Hypertension    • IBS (irritable bowel syndrome)    • Insomnia    • Insomnia    • Liver disease    • Nervousness    • Nicotine dependence    • Other specified urinary incontinence    • RLS (restless legs syndrome)    • Wears glasses      Past Surgical History:   Procedure Laterality Date   • BACK SURGERY     • 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   • BREAST EXCISIONAL BIOPSY Right 11/01/2004    benign   • CATARACT EXTRACTION, BILATERAL  08/01/2018   • COLONOSCOPY N/A 5/8/2019    Procedure: COLONOSCOPY;  Surgeon: Mark Armenta MD;  Location: [facility] GI LAB; Service: Gastroenterology   • EGD AND COLONOSCOPY N/A 3/19/2018    Procedure: EGD AND COLONOSCOPY;  Surgeon: Mark Armenta MD;  Location: [facility] GI LAB; Service: Gastroenterology   • ESOPHAGOGASTRODUODENOSCOPY N/A 5/8/2019    Procedure: ESOPHAGOGASTRODUODENOSCOPY (EGD); Surgeon: Mark Armenta MD;  Location: [facility] GI LAB;   Service: Gastroenterology   • IR PARACENTESIS  11/27/2020   • IR PARACENTESIS  12/8/2020   • IR PARACENTESIS  10/28/2022   • IR PARACENTESIS  11/7/2022   • IR PARACENTESIS  11/30/2022   • IR PARACENTESIS  12/30/2022   • IR PARACENTESIS  1/16/2023   • IR THORACENTESIS 1/16/2023   • IR THORACENTESIS  3/28/2023   • KNEE SURGERY     • KNEE SURGERY     • LUMBAR LAMINECTOMY  04/1999   • LYMPH NODE BIOPSY     • MAMMO STEREOTACTIC BREAST BIOPSY RIGHT (ALL INC) Right 5/21/2021   • MAMMO STEREOTACTIC BREAST BIOPSY RIGHT (ALL INC) EACH ADD Right 5/21/2021   • TOTAL ABDOMINAL HYSTERECTOMY  02/05/2008   • TUBAL LIGATION     • TUBAL LIGATION  1989   • UPPER GASTROINTESTINAL ENDOSCOPY       Social History   Social History     Substance and Sexual Activity   Alcohol Use Not Currently    Comment: not lately, non alcoholic beer     Social History     Substance and Sexual Activity   Drug Use No     Social History     Tobacco Use   Smoking Status Every Day   • Packs/day: 1 00   • Years: 45 00   • Total pack years: 45 00   • Types: Cigarettes   • Start date: 1/1/1978   Smokeless Tobacco Never     Family History   Problem Relation Age of Onset   • Breast cancer Mother    • No Known Problems Father    • No Known Problems Sister    • No Known Problems Brother    • No Known Problems Son    • No Known Problems Maternal Grandmother    • No Known Problems Maternal Grandfather    • No Known Problems Paternal Grandmother    • No Known Problems Paternal Grandfather    • No Known Problems Maternal Aunt    • No Known Problems Maternal Aunt    • No Known Problems Paternal Aunt    • Substance Abuse Neg Hx    • Mental illness Neg Hx        Meds/Allergies       Current Outpatient Medications:   •  albuterol (2 5 mg/3 mL) 0 083 % nebulizer solution  •  albuterol (Proventil HFA) 90 mcg/act inhaler  •  ALPRAZolam (XANAX) 1 mg tablet  •  anastrozole (ARIMIDEX) 1 mg tablet  •  furosemide (LASIX) 40 mg tablet  •  guselkumab (TREMFYA) subcutaneous injection  •  midodrine (PROAMATINE) 5 mg tablet  •  nicotine (NICODERM CQ) 21 mg/24 hr TD 24 hr patch  •  pantoprazole (PROTONIX) 20 mg tablet  •  rifaximin (XIFAXAN) 550 mg tablet  •  spironolactone (ALDACTONE) 100 mg tablet  •  traZODone (DESYREL) 50 mg tablet  •  lactulose "20 g/30 mL    Allergies   Allergen Reactions   • Sulfa Antibiotics Shortness Of Breath   • Ace Inhibitors Cough and Other (See Comments)     coughing           Objective     Blood pressure 118/70, temperature 97 6 °F (36 4 °C), temperature source Tympanic, height 5' 4\" (1 626 m), weight 68 5 kg (151 lb), not currently breastfeeding  Body mass index is 25 92 kg/m²  PHYSICAL EXAM:      General Appearance:   Alert, cooperative, no distress, no asterixis   HEENT:   Normocephalic, atraumatic, anicteric      Neck:  Supple, symmetrical, trachea midline   Lungs:   Clear to auscultation bilaterally; no rales, rhonchi or wheezing; respirations unlabored    Heart[de-identified]   Regular rate and rhythm; no murmur, rub, or gallop  Abdomen:   Soft, mild ascites, non-tender, non-distended; normal bowel sounds; no masses, no organomegaly    Genitalia:   Deferred    Rectal:   Deferred    Extremities:  No cyanosis, clubbing or edema    Pulses:  2+ and symmetric    Skin:  No rashes, or lesions, + jaundice   Lymph nodes:  No palpable cervical lymphadenopathy        Lab Results:   No visits with results within 1 Day(s) from this visit  Latest known visit with results is:   Office Visit on 05/18/2023   Component Date Value   • Glucose, Random 05/18/2023 101 (H)    • BUN 05/18/2023 11    • Creatinine 05/18/2023 0 98    • eGFR 05/18/2023 65    • SL AMB BUN/CREATININE RA* 05/18/2023 11 (L)    • Sodium 05/18/2023 137    • Potassium 05/18/2023 4 3    • Chloride 05/18/2023 100    • CO2 05/18/2023 26    • CALCIUM 05/18/2023 8 8          Radiology Results:   No results found    "

## 2023-06-22 ENCOUNTER — TELEPHONE (OUTPATIENT)
Dept: GASTROENTEROLOGY | Facility: CLINIC | Age: 63
End: 2023-06-22

## 2023-06-22 NOTE — TELEPHONE ENCOUNTER
PA requested from pt's Ins, [employer] done through covermymeds  Key: [id number]  Xifaxan 550mg -1 tablet every 12 hrs   [id number] Cirrhosis w/ ascites  Sent to plan- waiting decision

## 2023-07-06 DIAGNOSIS — F41.9 ANXIETY: ICD-10-CM

## 2023-07-06 RX ORDER — ALPRAZOLAM 1 MG/1
1 TABLET ORAL 2 TIMES DAILY PRN
Qty: 60 TABLET | Refills: 0 | Status: SHIPPED | OUTPATIENT
Start: 2023-07-06

## 2023-07-07 LAB
ALBUMIN SERPL-MCNC: 3.1 G/DL (ref 3.6–5.1)
ALBUMIN/GLOB SERPL: 0.7 (CALC) (ref 1–2.5)
ALP SERPL-CCNC: 146 U/L (ref 37–153)
ALT SERPL-CCNC: 20 U/L (ref 6–29)
AST SERPL-CCNC: 39 U/L (ref 10–35)
BILIRUB SERPL-MCNC: 1 MG/DL (ref 0.2–1.2)
BUN SERPL-MCNC: 10 MG/DL (ref 7–25)
BUN/CREAT SERPL: ABNORMAL (CALC) (ref 6–22)
CALCIUM SERPL-MCNC: 8.5 MG/DL (ref 8.6–10.4)
CHLORIDE SERPL-SCNC: 89 MMOL/L (ref 98–110)
CO2 SERPL-SCNC: 28 MMOL/L (ref 20–32)
CREAT SERPL-MCNC: 0.77 MG/DL (ref 0.5–1.05)
GFR/BSA.PRED SERPLBLD CYS-BASED-ARV: 86 ML/MIN/1.73M2
GLOBULIN SER CALC-MCNC: 4.3 G/DL (CALC) (ref 1.9–3.7)
GLUCOSE SERPL-MCNC: 87 MG/DL (ref 65–139)
POTASSIUM SERPL-SCNC: 4 MMOL/L (ref 3.5–5.3)
PROT SERPL-MCNC: 7.4 G/DL (ref 6.1–8.1)
SODIUM SERPL-SCNC: 121 MMOL/L (ref 135–146)

## 2023-07-09 DIAGNOSIS — E87.1 HYPONATREMIA: Primary | ICD-10-CM

## 2023-07-09 NOTE — RESULT ENCOUNTER NOTE
Her Na decreased to 121. I will ask her to hold her diuretics for a week and recheck in 1 week. This was communicated via [address] [place] [address].

## 2023-07-11 ENCOUNTER — NURSE TRIAGE (OUTPATIENT)
Age: 63
End: 2023-07-11

## 2023-07-11 NOTE — TELEPHONE ENCOUNTER
----- Message from Santy Ansari sent at 7/11/2023  4:11 PM EDT -----  Pt calling stating lab orders needs to be sent to [facility] Pt also has questions regarding previous lab results and would like to speak with someone.

## 2023-07-12 ENCOUNTER — OFFICE VISIT (OUTPATIENT)
Dept: GYNECOLOGY | Facility: CLINIC | Age: 63
End: 2023-07-12
Payer: COMMERCIAL

## 2023-07-12 VITALS
BODY MASS INDEX: 26.91 KG/M2 | SYSTOLIC BLOOD PRESSURE: 134 MMHG | DIASTOLIC BLOOD PRESSURE: 72 MMHG | HEIGHT: 64 IN | WEIGHT: 157.6 LBS

## 2023-07-12 DIAGNOSIS — R87.615 UNSATISFACTORY CERVICAL PAPANICOLAOU SMEAR: Primary | ICD-10-CM

## 2023-07-12 DIAGNOSIS — N89.8 VAGINAL DISCHARGE: ICD-10-CM

## 2023-07-12 DIAGNOSIS — N93.0 POSTCOITAL BLEEDING: ICD-10-CM

## 2023-07-12 DIAGNOSIS — N95.2 VAGINAL ATROPHY: ICD-10-CM

## 2023-07-12 DIAGNOSIS — N94.10 DYSPAREUNIA, FEMALE: ICD-10-CM

## 2023-07-12 LAB
BV WHIFF TEST VAG QL: NEGATIVE
CLUE CELLS SPEC QL WET PREP: NEGATIVE
PH SMN: 5.5 [PH]
SL AMB POCT WET MOUNT: YES
T VAGINALIS VAG QL WET PREP: NEGATIVE
YEAST VAG QL WET PREP: NEGATIVE

## 2023-07-12 PROCEDURE — 99214 OFFICE O/P EST MOD 30 MIN: CPT | Performed by: OBSTETRICS & GYNECOLOGY

## 2023-07-12 PROCEDURE — 87210 SMEAR WET MOUNT SALINE/INK: CPT | Performed by: OBSTETRICS & GYNECOLOGY

## 2023-07-12 NOTE — PROGRESS NOTES
Assessment/Plan   Diagnoses and all orders for this visit:    Dyspareunia, female    Postcoital bleeding    Vaginal atrophy    Unsatisfactory cervical Papanicolaou smear    Vaginal discharge  -     POCT wet mount    1. dyspareunia with postcoital bleeding- continues to note discomfort with intercourse secondary to vaginal atrophy with dryness. Seemingly this is related to anastrozole use on top of vaginal atrophy from postmenopausal status. She did use [place] with some improvement. She was given the vaginal lubrication/moisturizer sheet, highly encouraged to consider Replens or Luvena or some other moisturizer 2-3 times weekly. She will consider this. We also did discuss other options including vaginal estrogen which is controversial given her history of DCIS on anastrozole. She will review this with her oncology doctor, but she is not interested in proceeding in this way at this time. Also discussed vaginal dilators that she could consider that as well. She will call return with any issues. 2. vaginal atrophy-moderate changes noted on exam.  Recommendations as above. 3. history of DCIS right breast-follow-up as per treating doctors. Currently on anastrozole. 4. status post hysterectomy-patient states it was NINA with retention ovaries done in 1988 for heavy bleeding. Exam today appears to have a cervix. No band of scar tissue was appreciated as noted at last visit which may have been related to recent intercourse. Therefore, she may have had supracervical hysterectomy. We will continue to follow. 5. unsatisfactory Pap-noted from 4/12/2023 visit with negative HPV. Given the postcoital bleeding, Pap with reflex HPV done today. 6. history of cirrhosis/COPD/hypertension/hyperlipidemia/renal [id number] as per treating doctors. 7.  Vaginal discharge-wet mount was negative other than elevated pH with parabasal cells indicative of vaginal atrophy.   Follow-up April 2024 for yearly exam or as needed. Subjective   Patient ID: Ricardo Ramey is a 58 y.o. female. Vitals:    07/12/23 1426   BP: 134/72     Patient was seen today for follow-up visit. Please see assessment and plan for details. The following portions of the patient's history were reviewed and updated as appropriate: allergies, current medications, past family history, past medical history, past social history, past surgical history and problem list.  Past Medical History:   Diagnosis Date   • Alcoholic fatty liver    • Anxiety    • Asthma    • COPD (chronic obstructive pulmonary disease) ([address])    • Elevated liver function tests    • GERD (gastroesophageal reflux disease)    • GERD without esophagitis    • Hyperlipidemia    • Hypertension    • IBS (irritable bowel syndrome)    • Insomnia    • Insomnia    • Liver disease    • Nervousness    • Nicotine dependence    • Other specified urinary incontinence    • RLS (restless legs syndrome)    • Wears glasses      Past Surgical History:   Procedure Laterality Date   • BACK SURGERY     • 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   • BREAST EXCISIONAL BIOPSY Right 11/01/2004    benign   • CATARACT EXTRACTION, BILATERAL  08/01/2018   • COLONOSCOPY N/A 5/8/2019    Procedure: COLONOSCOPY;  Surgeon: Vasu Jung MD;  Location: [facility]; Service: Gastroenterology   • EGD AND COLONOSCOPY N/A 3/19/2018    Procedure: EGD AND COLONOSCOPY;  Surgeon: Vasu Jung MD;  Location: [facility]; Service: Gastroenterology   • ESOPHAGOGASTRODUODENOSCOPY N/A 5/8/2019    Procedure: ESOPHAGOGASTRODUODENOSCOPY (EGD); Surgeon: Vasu Jung MD;  Location: [facility];   Service: Gastroenterology   • IR PARACENTESIS  11/27/2020   • IR PARACENTESIS  12/8/2020   • IR PARACENTESIS  10/28/2022   • IR PARACENTESIS  11/7/2022   • IR PARACENTESIS  11/30/2022   • IR PARACENTESIS  12/30/2022   • IR PARACENTESIS  1/16/2023   • IR THORACENTESIS  1/16/2023   • IR THORACENTESIS  3/28/2023   • KNEE SURGERY • KNEE SURGERY     • LUMBAR LAMINECTOMY  1999   • LYMPH NODE BIOPSY     • MAMMO STEREOTACTIC BREAST BIOPSY RIGHT (ALL INC) Right 2021   • MAMMO STEREOTACTIC BREAST BIOPSY RIGHT (ALL INC) EACH ADD Right 2021   • TOTAL ABDOMINAL HYSTERECTOMY  2008   • TUBAL LIGATION     • TUBAL LIGATION     • UPPER GASTROINTESTINAL ENDOSCOPY       OB History    Para Term  AB Living   2 2 2         SAB IAB Ectopic Multiple Live Births                  # Outcome Date GA Lbr Damian/2nd Weight Sex Delivery Anes PTL Lv   2 Term            1 Term               Obstetric Comments   Menarche-13   Age at first pregnancy-23   bcp- about 5yrs       Current Outpatient Medications:   •  albuterol (2.5 mg/3 mL) 0.083 % nebulizer solution, Take 3 mL (2.5 mg total) by nebulization every 6 (six) hours as needed for wheezing or shortness of breath, Disp: 180 mL, Rfl: 5  •  albuterol (Proventil HFA) 90 mcg/act inhaler, Inhale 2 puffs every 6 (six) hours as needed for wheezing or shortness of breath, Disp: 18 g, Rfl: 3  •  ALPRAZolam (XANAX) 1 mg tablet, Take 1 tablet (1 mg total) by mouth 2 (two) times a day as needed for anxiety, Disp: 60 tablet, Rfl: 0  •  anastrozole (ARIMIDEX) 1 mg tablet, Take 1 tablet (1 mg total) by mouth daily, Disp: 90 tablet, Rfl: 1  •  guselkumab (TREMFYA) subcutaneous injection, Inject 100 mg under the skin every 6 weeks, Disp: , Rfl:   •  midodrine (PROAMATINE) 5 mg tablet, TAKE ONE AND ONE-HALF TABLETS BY MOUTH THREE TIMES A DAY BEFORE MEALS, Disp: 135 tablet, Rfl: 0  •  nicotine (NICODERM CQ) 21 mg/24 hr TD 24 hr patch, Place 1 patch on the skin over 24 hours daily Do not start before 2023., Disp: 28 patch, Rfl: 0  •  pantoprazole (PROTONIX) 20 mg tablet, TAKE ONE TABLET BY MOUTH EVERY DAY, Disp: 30 tablet, Rfl: 1  •  rifaximin (XIFAXAN) 550 mg tablet, Take 1 tablet (550 mg total) by mouth every 12 (twelve) hours, Disp: 60 tablet, Rfl: 2  •  spironolactone (ALDACTONE) 100 mg tablet, Take 1 tablet (100 mg total) by mouth daily, Disp: 90 tablet, Rfl: 3  •  traZODone (DESYREL) 50 mg tablet, Take 1 tablet (50 mg total) by mouth daily at bedtime, Disp: 30 tablet, Rfl: 5  •  furosemide (LASIX) 40 mg tablet, Take 1 tablet (40 mg total) by mouth daily (Patient not taking: Reported on 7/12/2023), Disp: 90 tablet, Rfl: 3  •  lactulose 20 g/30 mL, Take 45 mL (30 g total) by mouth 2 (two) times a day Titrate dose for an effect of 3 soft, but formed bowel movements per day. You can start with 1 tablespoon per day and slowly increase to effect., Disp: 2700 mL, Rfl: 0  Allergies   Allergen Reactions   • Sulfa Antibiotics Shortness Of Breath   • Ace Inhibitors Cough and Other (See Comments)     coughing     Social History     Socioeconomic History   • Marital status: /Civil Union     Spouse name: None   • Number of children: None   • Years of education: None   • Highest education level: None   Occupational History   • None   Tobacco Use   • Smoking status: Every Day     Packs/day: 1.00     Years: 45.00     Total pack years: 45.00     Types: Cigarettes     Start date: 1/1/1978   • Smokeless tobacco: Never   Vaping Use   • Vaping Use: Never used   Substance and Sexual Activity   • Alcohol use: Not Currently     Comment: not lately, non alcoholic beer   • Drug use: No   • Sexual activity: Yes     Partners: Male   Other Topics Concern   • None   Social History Narrative    Has carbon monoxide detectors in home    Has smoke detectors    Uses safety equipment - seatbelts     Social Determinants of Health     Financial Resource Strain: Not on file   Food Insecurity: No Food Insecurity (3/28/2023)    Hunger Vital Sign    • Worried About Running Out of Food in the Last Year: Never true    • Ran Out of Food in the Last Year: Never true   Transportation Needs: No Transportation Needs (3/28/2023)    PRAPARE - Transportation    • Lack of Transportation (Medical):  No    • Lack of Transportation (Non-Medical): No   Physical Activity: Not on file   Stress: Not on file   Social Connections: Not on file   Intimate Partner Violence: Not on file   Housing Stability: Low Risk  (3/28/2023)    Housing Stability Vital Sign    • Unable to Pay for Housing in the Last Year: No    • Number of Places Lived in the Last Year: 1    • Unstable Housing in the Last Year: No     Family History   Problem Relation Age of Onset   • Breast cancer Mother    • No Known Problems Father    • No Known Problems Sister    • No Known Problems Brother    • No Known Problems Son    • No Known Problems Maternal Grandmother    • No Known Problems Maternal Grandfather    • No Known Problems Paternal Grandmother    • No Known Problems Paternal Grandfather    • No Known Problems Maternal Aunt    • No Known Problems Maternal Aunt    • No Known Problems Paternal Aunt    • Substance Abuse Neg Hx    • Mental illness Neg Hx        Review of Systems   Constitutional: Negative for chills, diaphoresis, fatigue and fever. Respiratory: Negative for apnea, cough, chest tightness, shortness of breath and wheezing. Cardiovascular: Negative for chest pain, palpitations and leg swelling. Gastrointestinal: Negative for abdominal distention, abdominal pain, anal bleeding, constipation, diarrhea, nausea, rectal pain and vomiting. Genitourinary: Negative for difficulty urinating, dyspareunia, dysuria, frequency, hematuria, menstrual problem, pelvic pain, urgency, vaginal bleeding, vaginal discharge and vaginal pain. Musculoskeletal: Negative for arthralgias, back pain and myalgias. Skin: Negative for color change and rash. Neurological: Negative for dizziness, syncope, light-headedness, numbness and headaches. Hematological: Negative for adenopathy. Does not bruise/bleed easily. Psychiatric/Behavioral: Negative for dysphoric mood and sleep disturbance. The patient is not nervous/anxious.         Objective   Physical Exam  OBGyn Exam     Objective /72 (BP Location: Right arm, Patient Position: Sitting)   Ht 5' 4" (1.626 m)   Wt 71.5 kg (157 lb 9.6 oz)   BMI 27.05 kg/m²     General:   alert and oriented, in no acute distress   Neck:    Breast:    Heart:    Lungs:    Abdomen: soft, non-tender, without masses or organomegaly   Vulva: normal   Vagina: Moderately atrophic, without erythema or lesions noted. No band of tissue noted at the right posterior vagina. Cervix:  Portion of cervix appeared to be present, Pap done. No lesions or cervicitis noted.    Uterus: surgically absent   Adnexa: no mass, fullness, tenderness   Rectum: deferred    Psych:  Normal mood and affect   Skin:  Without obvious lesions   Eyes: symmetric, with normal movements and reactivity   Musculoskeletal:  Normal muscle tone and movements appreciated

## 2023-07-12 NOTE — TELEPHONE ENCOUNTER
I returned call to patient , spoke with pts  Kashif Burnham      I advised we need fax number to [phone] [facility] ( there are multiple locations     And to answer pts lab questions I advised for Judy Simpson to return our call

## 2023-07-13 DIAGNOSIS — K70.31 ALCOHOLIC CIRRHOSIS OF LIVER WITH ASCITES (HCC): Chronic | ICD-10-CM

## 2023-07-14 RX ORDER — MIDODRINE HYDROCHLORIDE 5 MG/1
TABLET ORAL
Qty: 135 TABLET | Refills: 0 | Status: SHIPPED | OUTPATIENT
Start: 2023-07-14

## 2023-07-17 LAB
CLINICAL INFO: NORMAL
CYTO CVX: NORMAL
CYTOLOGY CMNT CVX/VAG CYTO-IMP: NORMAL
DATE PREVIOUS BX: NORMAL
LMP START DATE: NORMAL
SL AMB PREV. PAP:: NORMAL
SPECIMEN SOURCE CVX/VAG CYTO: NORMAL

## 2023-07-18 DIAGNOSIS — E87.1 HYPONATREMIA: Primary | ICD-10-CM

## 2023-07-18 DIAGNOSIS — R18.8 REFRACTORY ASCITES: ICD-10-CM

## 2023-07-18 DIAGNOSIS — K70.31 ALCOHOLIC CIRRHOSIS OF LIVER WITH ASCITES (HCC): ICD-10-CM

## 2023-07-18 NOTE — TELEPHONE ENCOUNTER
Patients GI provider:  Dr. Johnson Mode    Number to return call: [phone]    Reason for call: Pt calling stating she is still waiting for an order for lab work sent to home address.      Scheduled procedure/appointment date if applicable: Apt/procedure

## 2023-07-18 NOTE — TELEPHONE ENCOUNTER
Printed and placed in mail     Previous request was for us to fax script, that why I left  requesting a fax number.

## 2023-07-19 ENCOUNTER — TELEPHONE (OUTPATIENT)
Dept: GASTROENTEROLOGY | Facility: CLINIC | Age: 63
End: 2023-07-19

## 2023-07-19 NOTE — TELEPHONE ENCOUNTER
----- Message from Sunny Durán MD sent at 7/18/2023  9:48 PM EDT -----  Regarding: Please mail lab slip  Please mail the lab slip for her most recent lab order to her home address. Thanks!

## 2023-07-24 ENCOUNTER — TELEPHONE (OUTPATIENT)
Age: 63
End: 2023-07-24

## 2023-07-24 DIAGNOSIS — K70.31 ALCOHOLIC CIRRHOSIS OF LIVER WITH ASCITES (HCC): Primary | ICD-10-CM

## 2023-07-24 DIAGNOSIS — R18.8 REFRACTORY ASCITES: ICD-10-CM

## 2023-07-24 DIAGNOSIS — E87.1 HYPONATREMIA: ICD-10-CM

## 2023-07-26 LAB
ALBUMIN SERPL-MCNC: 3.7 G/DL (ref 3.6–5.1)
ALBUMIN/GLOB SERPL: 0.8 (CALC) (ref 1–2.5)
ALP SERPL-CCNC: 164 U/L (ref 37–153)
ALT SERPL-CCNC: 22 U/L (ref 6–29)
AST SERPL-CCNC: 49 U/L (ref 10–35)
BASOPHILS # BLD AUTO: 61 CELLS/UL (ref 0–200)
BASOPHILS NFR BLD AUTO: 1.3 %
BILIRUB SERPL-MCNC: 1.5 MG/DL (ref 0.2–1.2)
BUN SERPL-MCNC: 11 MG/DL (ref 7–25)
BUN/CREAT SERPL: ABNORMAL (CALC) (ref 6–22)
CALCIUM SERPL-MCNC: 9.2 MG/DL (ref 8.6–10.4)
CHLORIDE SERPL-SCNC: 85 MMOL/L (ref 98–110)
CO2 SERPL-SCNC: 28 MMOL/L (ref 20–32)
CREAT SERPL-MCNC: 0.86 MG/DL (ref 0.5–1.05)
EOSINOPHIL # BLD AUTO: 71 CELLS/UL (ref 15–500)
EOSINOPHIL NFR BLD AUTO: 1.5 %
ERYTHROCYTE [DISTWIDTH] IN BLOOD BY AUTOMATED COUNT: 14.3 % (ref 11–15)
GFR/BSA.PRED SERPLBLD CYS-BASED-ARV: 75 ML/MIN/1.73M2
GLOBULIN SER CALC-MCNC: 4.8 G/DL (CALC) (ref 1.9–3.7)
GLUCOSE SERPL-MCNC: 87 MG/DL (ref 65–99)
HCT VFR BLD AUTO: 41.2 % (ref 35–45)
HGB BLD-MCNC: 14.6 G/DL (ref 11.7–15.5)
INR PPP: 1.4
LYMPHOCYTES # BLD AUTO: 1062 CELLS/UL (ref 850–3900)
LYMPHOCYTES NFR BLD AUTO: 22.6 %
MCH RBC QN AUTO: 34.8 PG (ref 27–33)
MCHC RBC AUTO-ENTMCNC: 35.4 G/DL (ref 32–36)
MCV RBC AUTO: 98.3 FL (ref 80–100)
MONOCYTES # BLD AUTO: 780 CELLS/UL (ref 200–950)
MONOCYTES NFR BLD AUTO: 16.6 %
NEUTROPHILS # BLD AUTO: 2726 CELLS/UL (ref 1500–7800)
NEUTROPHILS NFR BLD AUTO: 58 %
PLATELET # BLD AUTO: 89 THOUSAND/UL (ref 140–400)
PMV BLD REES-ECKER: 10.3 FL (ref 7.5–12.5)
POTASSIUM SERPL-SCNC: 4.4 MMOL/L (ref 3.5–5.3)
PROT SERPL-MCNC: 8.5 G/DL (ref 6.1–8.1)
PROTHROMBIN TIME: 14 SEC (ref 9–11.5)
RBC # BLD AUTO: 4.19 MILLION/UL (ref 3.8–5.1)
SODIUM SERPL-SCNC: 121 MMOL/L (ref 135–146)
WBC # BLD AUTO: 4.7 THOUSAND/UL (ref 3.8–10.8)

## 2023-08-01 ENCOUNTER — HOSPITAL ENCOUNTER (OUTPATIENT)
Dept: ULTRASOUND IMAGING | Facility: HOSPITAL | Age: 63
Discharge: HOME | End: 2023-08-01
Attending: INTERNAL MEDICINE
Payer: COMMERCIAL

## 2023-08-01 DIAGNOSIS — K76.9 PLEURAL EFFUSION ASSOCIATED WITH HEPATIC DISORDER: ICD-10-CM

## 2023-08-01 DIAGNOSIS — J91.8 PLEURAL EFFUSION ASSOCIATED WITH HEPATIC DISORDER: ICD-10-CM

## 2023-08-01 DIAGNOSIS — K70.31 ALCOHOLIC CIRRHOSIS OF LIVER WITH ASCITES (HCC): ICD-10-CM

## 2023-08-01 DIAGNOSIS — I85.00 ESOPHAGEAL VARICES DETERMINED BY ENDOSCOPY (HCC): ICD-10-CM

## 2023-08-01 PROCEDURE — 76700 US EXAM ABDOM COMPLETE: CPT

## 2023-08-02 ENCOUNTER — NURSE TRIAGE (OUTPATIENT)
Dept: OTHER | Facility: OTHER | Age: 63
End: 2023-08-02

## 2023-08-02 DIAGNOSIS — F41.9 ANXIETY: ICD-10-CM

## 2023-08-02 RX ORDER — ALPRAZOLAM 1 MG/1
TABLET ORAL
Qty: 60 TABLET | Refills: 0 | OUTPATIENT
Start: 2023-08-02

## 2023-08-02 RX ORDER — ALPRAZOLAM 1 MG/1
1 TABLET ORAL 2 TIMES DAILY PRN
Qty: 60 TABLET | Refills: 0 | Status: SHIPPED | OUTPATIENT
Start: 2023-08-02

## 2023-08-02 NOTE — TELEPHONE ENCOUNTER
Reason for Disposition  • Caller has medicine question only, adult not sick, AND triager answers question    Answer Assessment - Initial Assessment Questions  1. NAME of MEDICATION: "What medicine are you calling about?"      Alprazolam     2. QUESTION: "What is your question?" (e.g., medication refill, side effect)     "The pharmacy said the prescription was cancelled"    3. PRESCRIBING HCP: "Who prescribed it?" Reason: if prescribed by specialist, call should be referred to that group. Moshe Kanner    4.  SYMPTOMS: "Do you have any symptoms?"      Denies    Protocols used: MEDICATION QUESTION CALL-ADULT-

## 2023-08-02 NOTE — TELEPHONE ENCOUNTER
Regarding: Need Explanation on Xanax Refil  ----- Message from Benjamin Kimble sent at 8/2/2023  6:45 PM EDT -----  " I did not get my Xanax called in today, I need Clarification as to what happened.  It's saying it was called in and approved, the Pharmacy confirmed that they got it, but it wasn't called in."

## 2023-08-15 DIAGNOSIS — K70.31 ALCOHOLIC CIRRHOSIS OF LIVER WITH ASCITES (HCC): ICD-10-CM

## 2023-08-15 DIAGNOSIS — K21.9 GERD WITHOUT ESOPHAGITIS: ICD-10-CM

## 2023-08-15 DIAGNOSIS — I85.00 ESOPHAGEAL VARICES DETERMINED BY ENDOSCOPY (HCC): ICD-10-CM

## 2023-08-15 DIAGNOSIS — R18.8 REFRACTORY ASCITES: ICD-10-CM

## 2023-08-15 RX ORDER — PANTOPRAZOLE SODIUM 20 MG/1
20 TABLET, DELAYED RELEASE ORAL DAILY
Qty: 90 TABLET | Refills: 2 | Status: SHIPPED | OUTPATIENT
Start: 2023-08-15 | End: 2024-05-11

## 2023-08-15 NOTE — TELEPHONE ENCOUNTER
Patient requested for a refill on her medication and wants it to be a 90 day supply script. Confirmed pharmacy and provider.

## 2023-08-21 ENCOUNTER — TELEPHONE (OUTPATIENT)
Age: 63
End: 2023-08-21

## 2023-08-21 DIAGNOSIS — E87.1 HYPONATREMIA: ICD-10-CM

## 2023-08-21 DIAGNOSIS — R18.8 REFRACTORY ASCITES: ICD-10-CM

## 2023-08-21 DIAGNOSIS — I85.00 ESOPHAGEAL VARICES DETERMINED BY ENDOSCOPY (HCC): ICD-10-CM

## 2023-08-21 DIAGNOSIS — K70.31 ALCOHOLIC CIRRHOSIS OF LIVER WITH ASCITES (HCC): Primary | ICD-10-CM

## 2023-08-21 NOTE — TELEPHONE ENCOUNTER
Office visit 10/11/23. Patient will complete ordered labs prior to visit. Lab slips mailed to patient as requested.

## 2023-08-21 NOTE — TELEPHONE ENCOUNTER
Pt called in about upcoming OV and she was unsure if provider wanted pt to have lab work done prior. Pt has no new lab orders and asks to be contacted if provider would like her to have blood work.

## 2023-08-31 DIAGNOSIS — K70.31 ALCOHOLIC CIRRHOSIS OF LIVER WITH ASCITES (HCC): Chronic | ICD-10-CM

## 2023-08-31 DIAGNOSIS — F41.9 ANXIETY: ICD-10-CM

## 2023-08-31 RX ORDER — ALPRAZOLAM 1 MG/1
1 TABLET ORAL 2 TIMES DAILY PRN
Qty: 60 TABLET | Refills: 0 | Status: SHIPPED | OUTPATIENT
Start: 2023-08-31

## 2023-09-01 RX ORDER — MIDODRINE HYDROCHLORIDE 5 MG/1
TABLET ORAL
Qty: 135 TABLET | Refills: 0 | Status: SHIPPED | OUTPATIENT
Start: 2023-09-01

## 2023-09-19 DIAGNOSIS — G47.00 INSOMNIA, UNSPECIFIED TYPE: ICD-10-CM

## 2023-09-19 RX ORDER — TRAZODONE HYDROCHLORIDE 50 MG/1
50 TABLET ORAL
Qty: 30 TABLET | Refills: 5 | Status: SHIPPED | OUTPATIENT
Start: 2023-09-19

## 2023-09-25 DIAGNOSIS — F41.9 ANXIETY: ICD-10-CM

## 2023-09-25 RX ORDER — ALPRAZOLAM 1 MG/1
1 TABLET ORAL 2 TIMES DAILY PRN
Qty: 60 TABLET | Refills: 0 | Status: SHIPPED | OUTPATIENT
Start: 2023-09-25

## 2023-10-07 LAB
ALBUMIN SERPL-MCNC: 3.1 G/DL (ref 3.6–5.1)
ALBUMIN/GLOB SERPL: 0.7 (CALC) (ref 1–2.5)
ALP SERPL-CCNC: 121 U/L (ref 37–153)
ALT SERPL-CCNC: 17 U/L (ref 6–29)
AST SERPL-CCNC: 32 U/L (ref 10–35)
BASOPHILS # BLD AUTO: 60 CELLS/UL (ref 0–200)
BASOPHILS NFR BLD AUTO: 1.7 %
BILIRUB SERPL-MCNC: 1.7 MG/DL (ref 0.2–1.2)
BUN SERPL-MCNC: 10 MG/DL (ref 7–25)
BUN/CREAT SERPL: ABNORMAL (CALC) (ref 6–22)
CALCIUM SERPL-MCNC: 9 MG/DL (ref 8.6–10.4)
CHLORIDE SERPL-SCNC: 99 MMOL/L (ref 98–110)
CO2 SERPL-SCNC: 28 MMOL/L (ref 20–32)
CREAT SERPL-MCNC: 0.95 MG/DL (ref 0.5–1.05)
EOSINOPHIL # BLD AUTO: 60 CELLS/UL (ref 15–500)
EOSINOPHIL NFR BLD AUTO: 1.7 %
ERYTHROCYTE [DISTWIDTH] IN BLOOD BY AUTOMATED COUNT: 14 % (ref 11–15)
GFR/BSA.PRED SERPLBLD CYS-BASED-ARV: 67 ML/MIN/1.73M2
GLOBULIN SER CALC-MCNC: 4.5 G/DL (CALC) (ref 1.9–3.7)
GLUCOSE SERPL-MCNC: 105 MG/DL (ref 65–99)
HCT VFR BLD AUTO: 39 % (ref 35–45)
HGB BLD-MCNC: 13.5 G/DL (ref 11.7–15.5)
INR PPP: 1.3
LYMPHOCYTES # BLD AUTO: 938 CELLS/UL (ref 850–3900)
LYMPHOCYTES NFR BLD AUTO: 26.8 %
MCH RBC QN AUTO: 35.2 PG (ref 27–33)
MCHC RBC AUTO-ENTMCNC: 34.6 G/DL (ref 32–36)
MCV RBC AUTO: 101.8 FL (ref 80–100)
MONOCYTES # BLD AUTO: 459 CELLS/UL (ref 200–950)
MONOCYTES NFR BLD AUTO: 13.1 %
NEUTROPHILS # BLD AUTO: 1985 CELLS/UL (ref 1500–7800)
NEUTROPHILS NFR BLD AUTO: 56.7 %
PLATELET # BLD AUTO: 96 THOUSAND/UL (ref 140–400)
PMV BLD REES-ECKER: 9.8 FL (ref 7.5–12.5)
POTASSIUM SERPL-SCNC: 4.1 MMOL/L (ref 3.5–5.3)
PROT SERPL-MCNC: 7.6 G/DL (ref 6.1–8.1)
PROTHROMBIN TIME: 13.5 SEC (ref 9–11.5)
RBC # BLD AUTO: 3.83 MILLION/UL (ref 3.8–5.1)
SODIUM SERPL-SCNC: 133 MMOL/L (ref 135–146)
WBC # BLD AUTO: 3.5 THOUSAND/UL (ref 3.8–10.8)

## 2023-10-11 ENCOUNTER — TELEPHONE (OUTPATIENT)
Dept: FAMILY MEDICINE CLINIC | Facility: CLINIC | Age: 63
End: 2023-10-11

## 2023-10-11 ENCOUNTER — OFFICE VISIT (OUTPATIENT)
Dept: GASTROENTEROLOGY | Facility: CLINIC | Age: 63
End: 2023-10-11
Payer: COMMERCIAL

## 2023-10-11 VITALS
HEIGHT: 64 IN | SYSTOLIC BLOOD PRESSURE: 106 MMHG | TEMPERATURE: 97.2 F | BODY MASS INDEX: 26.32 KG/M2 | HEART RATE: 75 BPM | WEIGHT: 154.2 LBS | DIASTOLIC BLOOD PRESSURE: 68 MMHG

## 2023-10-11 DIAGNOSIS — K21.9 GERD WITHOUT ESOPHAGITIS: ICD-10-CM

## 2023-10-11 DIAGNOSIS — I85.00 ESOPHAGEAL VARICES DETERMINED BY ENDOSCOPY (HCC): ICD-10-CM

## 2023-10-11 DIAGNOSIS — K70.31 ALCOHOLIC CIRRHOSIS OF LIVER WITH ASCITES (HCC): Primary | ICD-10-CM

## 2023-10-11 DIAGNOSIS — E87.1 HYPONATREMIA: ICD-10-CM

## 2023-10-11 DIAGNOSIS — K76.0 FATTY LIVER: ICD-10-CM

## 2023-10-11 PROCEDURE — 99215 OFFICE O/P EST HI 40 MIN: CPT | Performed by: INTERNAL MEDICINE

## 2023-10-11 NOTE — PROGRESS NOTES
[facility] - Outpatient Follow-up Note  Kimberley Farr 58 y.o. female MRN: [id number]  Encounter: [id number]          ASSESSMENT AND PLAN:      Summary:    Ms. Kristi Triana is a 58y.o. year old female with cirrhosis secondary to Chronic alcohol abuse which is decompensated with ascites, hepatic hydrothorax lower extremity edema, thrombocytopenia, esophageal varices, hypoalbuminemia and hypoprothrombinemia. Problem List and Plan:    End Stage Liver Disease: Current MELD-Na Score is 16. Ascites/hepatic hydrothorax currently well controlled with diuretics. Still actively drinking. I advised her once again to completely cut out alcohol. Volume: Ascites and pleural effusions/Edema now well controlled on lasix 40 mg daily and aldactone 100 mg daily. Advised to continue follow a low sodium diet. Hepatic Encephalopathy: No previous history of hepatic encephalopathy, however she was complaining of slight confusion in the past.  Currently taking lactulose on occasional.  Advised to try taking it as prescribed to see if it has any impact on these symptoms. Ms. Kristi Triana and her family/care giver are aware of the signs/symptoms of hepatic encephalopathy and understand to seek immediate medical attention should they arise. Esophageal Varices: Last EGD done on 10/13/22 showing non-bleeding grade 1 varices and mild PHG. Repeat EGD due now. Hepatoma Screening: Ultrasound done 8/1/23 with no evidence of hepatoma. Repeat Ultrsound in February. Nutritional status: Mild to moderate sarcopenia noted. Encouraged high protein snacking, low salt diet. If weight loss evident, will refer to nutritional counseling. Health Maintenance:  Ms. Kristi Triana was counseled to avoid alcohol and tobacco products.   Ms. Kristi Triana was also advised to avoid raw seafood/oysters and from swimming in unchlorinated neely, particularly from the [facility], due to increased risk of infection from Vibrio Vulnificus. Ms. Светлана Marie was also instructed to seek immediate medical attention should she develop fevers over 80 F, evidence of GI bleeding (black or bloody stools, bloody or coffee ground emesis) or confusion. Ms. Светлана Marie was advised to avoid NSAIDs, if possible, due to increase risk of renal dysfunction, and advised that if she should use acetaminophen, dose should not exceed 2 grams/day. Ms. Светлана Marie was recommend to remain up to date with adult vaccination, including influenza, pneumovax and meningococcus. Inactivated HSV and zoster vaccine is safe and encouraged by PCP. Ms. Светлана Marie was instructed to call either our office or that of her referring doctor should she develop worsening symptoms related to lower extremity edema, ascites, jaundice or excessive bruising/bleeding. FOLLOW-UP:  No follow-ups on file. VISIT DIAGNOSES AND ORDERS:      1. Alcoholic cirrhosis of liver with ascites ([address])    2. Esophageal varices determined by endoscopy ([address])    3. Hyponatremia    4. GERD without esophagitis    5. Fatty liver      Orders Placed This Encounter   Procedures    US abdomen complete    CBC and differential    Protime-INR    Comprehensive metabolic panel    AFP tumor marker    Ambulatory Referral to Neurology    EGD     ______________________________________________________________________    SUBJECTIVE:    Interval update 10/11/2023:  Since her last office visit in May, she has had blood work monthly and the most recent being last week. Her transaminases have remained normal with her bilirubin slightly elevated at 1.7. Fortunately, she is still not required any recurrent para or thoracentesis. She had an abdominal ultrasound done on August 1, 2023 which showed a cirrhotic appearing liver with no lesions noted. She also had evidence of cholelithiasis without cholecystitis. Unfortunately, she states she continues to drink alcohol.   She is drinking at least 1 full bottle of wine per week and the occasional daily beer. She has no significant complaints and is elusive with some of my questions. She denies jaundice, lower extremity edema, abdominal distention with fluid, easy bruising, excessive bleeding, pruritus, any evidence of GI bleeding. History:    Interval Update 5/9/23  Since her last office visit, she has not required another thoracentesis. She denies any increase in water weight, specifically no lower extremity edema no abdominal distention with fluid and no worsening shortness of breath. She has been taking 40 mg of furosemide and 100 mg of spironolactone daily. She states she has not been specifically watching her sodium intake. Her main complaint is fatigue at this time. She states she is not able to walk any significant distance. Unfortunately, she continues to smoke. Ms. Oswaldo Jacobson denies recent or history of yellow eyes/skin, dark urine, GI bleeding, abdominal distention with fluid, lower extremity swelling, easy bruising, excessive bleeding, pruritus or confusion. She continues to drink a glass of wine 1-2 times per week. HPI from 4/4/23  Ms. Alexia Tesfaye is a 58 y.o. female with medical history as outlined below including cirrhosis secondary to Chronic alcohol abuse which is decompensated with ascites, lower extremity edema, thrombocytopenia, esophageal varices, hypoalbuminemia and hypoprothrombinemia, and more recently hepatic hydrothorax who comes in today for follow-up shortly after discharge from the hospital after admission for symptomatic hepatic hydrothorax with weakness and shortness of breath. She was found to have hyponatremia and diruetics were held. She underwent a 1 L thoracentesis. After Na improved, she was discharged on home diuretics doses, 100 mg daily and lasix 40 mg daily. She had an EGD done on 10/13/2022 showing grade 1 varices with no bleeding and mild PHG.            REVIEW OF SYSTEMS     Review of Systems   All other systems reviewed and are negative.       Historical Information   Patient Active Problem List   Diagnosis    Fatty liver    Other specified abnormal findings of blood chemistry    Anxiety    Elevated LFTs    GERD without esophagitis    Hyperlipidemia    Essential hypertension    Insomnia    Nicotine dependence    Healthcare maintenance    COPD (chronic obstructive pulmonary disease) (HCC)    Alcohol abuse    Irritable bowel syndrome with diarrhea    Non-seasonal allergic rhinitis due to pollen    Alcoholic cirrhosis of liver with ascites (HCC)    Hyponatremia    Thrombocytopenia (HCC)    Anemia    Decompensated hepatic cirrhosis (HCC)    Ductal carcinoma in situ (DCIS) of right breast    Atypical ductal hyperplasia of right breast    Breast neoplasm, Tis (DCIS), right    Continuous opioid dependence (HCC)    Malignant neoplasm of lower-outer quadrant of right breast of female, estrogen receptor positive     Pleural effusion    Vaginal atrophy    Dyspareunia, female    Postcoital bleeding     Social History     Substance and Sexual Activity   Alcohol Use Not Currently    Comment: not lately, non alcoholic beer     Social History     Substance and Sexual Activity   Drug Use No     Social History     Tobacco Use   Smoking Status Every Day    Packs/day: 1.00    Years: 45.00    Total pack years: 45.00    Types: Cigarettes    Start date: 1/1/1978   Smokeless Tobacco Never       Meds/Allergies       Current Outpatient Medications:     albuterol (2.5 mg/3 mL) 0.083 % nebulizer solution    albuterol (Proventil HFA) 90 mcg/act inhaler    ALPRAZolam (XANAX) 1 mg tablet    anastrozole (ARIMIDEX) 1 mg tablet    furosemide (LASIX) 40 mg tablet    guselkumab (TREMFYA) subcutaneous injection    lactulose 20 g/30 mL    midodrine (PROAMATINE) 5 mg tablet    nicotine (NICODERM CQ) 21 mg/24 hr TD 24 hr patch    pantoprazole (PROTONIX) 20 mg tablet    spironolactone (ALDACTONE) 100 mg tablet    traZODone (DESYREL) 50 mg tablet    Allergies   Allergen Reactions    Sulfa Antibiotics Shortness Of Breath    Ace Inhibitors Cough and Other (See Comments)     coughing           Objective     Blood pressure 106/68, pulse 75, temperature (!) 97.2 °F (36.2 °C), temperature source Tympanic, height 5' 4" (1.626 m), weight 69.9 kg (154 lb 3.2 oz), not currently breastfeeding. Body mass index is 26.47 kg/m². PHYSICAL EXAM:      Physical Exam  Vitals reviewed. Constitutional:       General: She is not in acute distress. Appearance: She is ill-appearing. HENT:      Head: Normocephalic and atraumatic. Nose: Nose normal.      Mouth/Throat:      Mouth: Mucous membranes are moist.      Pharynx: Oropharynx is clear. Eyes:      General: No scleral icterus. Extraocular Movements: Extraocular movements intact. Cardiovascular:      Rate and Rhythm: Normal rate and regular rhythm. Heart sounds: No murmur heard. Pulmonary:      Effort: Pulmonary effort is normal. No respiratory distress. Breath sounds: No decreased breath sounds. Abdominal:      General: Abdomen is flat. There is no distension (mild). Palpations: Abdomen is soft. There is no shifting dullness, fluid wave, hepatomegaly or splenomegaly. Tenderness: There is no abdominal tenderness. Hernia: No hernia is present. Genitourinary:     Comments: deferred  Musculoskeletal:         General: Swelling (Trace bilateral lower extremity pitting edema) present. Normal range of motion. Cervical back: Normal range of motion and neck supple. No tenderness. Skin:     General: Skin is warm. Coloration: Skin is not jaundiced. Findings: No bruising or rash. Neurological:      General: No focal deficit present. Mental Status: She is alert and oriented to person, place, and time.    Psychiatric:         Mood and Affect: Mood normal.         Lab Results:   Lab Results   Component Value Date     07/28/2017    K 4.1 10/06/2023    CO2 28 10/06/2023    CL 99 10/06/2023    BUN 10 10/06/2023    CREATININE 0.95 10/06/2023    GLUCOSE 95 07/28/2017     Lab Results   Component Value Date    WBC 3.5 (L) 10/06/2023    HGB 13.5 10/06/2023    HCT 39.0 10/06/2023    .8 (H) 10/06/2023    PLT 96 (L) 10/06/2023     Lab Results   Component Value Date    TP 7.6 10/06/2023    AST 32 10/06/2023    ALT 17 10/06/2023    BILITOT 0.6 07/28/2017    BILIDIR 0.95 (H) 09/17/2022    INR 1.3 (H) 10/06/2023      Lab Results   Component Value Date    IRON 77 12/07/2020    FERRITIN 1,132 (H) 12/07/2020     Lab Results   Component Value Date    CHOL 159 07/28/2017    HDL 39 (L) 09/15/2022    TRIG 61 09/15/2022     MELD 3.0: 16 at 10/6/2023 11:06 AM  MELD-Na: 11 at 10/6/2023 11:06 AM  Calculated from:  Serum Creatinine: 0.95 mg/dL (Using min of 1 mg/dL) at 10/6/2023 11:06 AM  Serum Sodium: 133 mmol/L at 10/6/2023 11:06 AM  Total Bilirubin: 1.7 mg/dL at 10/6/2023 11:06 AM  Serum Albumin: 3.1 g/dL at 10/6/2023 11:06 AM  INR(ratio): 1.3 at 10/6/2023 11:06 AM  Age at listing (hypothetical): 58 years  Sex: Female at 10/6/2023 11:06 AM        Radiology Results:   EGD    Result Date: 10/13/2022  Narrative: [address] [place] [address] Endoscopy [address] [id number] [phone] DATE OF SERVICE: 10/13/22 PHYSICIAN(S): Attending: Justina Tobias MD Fellow: Blanka Reddy MD INDICATION: Alcoholic cirrhosis of liver with ascites ([address]) POST-OP DIAGNOSIS: See the impression below. PREPROCEDURE: Informed consent was obtained for the procedure, including sedation. Risks of perforation, hemorrhage, adverse drug reaction and aspiration were discussed. The patient was placed in the left lateral decubitus position. Patient was explained about the risks and benefits of the procedure. Risks including but not limited to bleeding, infection, and perforation were explained in detail. Also explained about less than 100% sensitivity with the exam and other alternatives.  DETAILS OF PROCEDURE: Patient was taken to the procedure room where a time out was performed to confirm correct patient and correct procedure. The patient underwent monitored anesthesia care, which was administered by an anesthesia professional. The patient's blood pressure, heart rate, level of consciousness, respirations and oxygen were monitored throughout the procedure. The scope was advanced to the second part of the duodenum. Retroflexion was performed in the fundus. The patient experienced no blood loss. The procedure was not difficult. The patient tolerated the procedure well. There were no apparent complications. ANESTHESIA INFORMATION: ASA: IV Anesthesia Type: Anesthesia type not filed in the log. MEDICATIONS: No administrations occurring from 1127 to 1144 on 10/13/22 FINDINGS: Grade I varices with no bleeding (no red carol sign) in the lower third of the esophagus Abnormal mucosa. Consistent with mild portal hypertensive gastropathy. Normal duodenum SPECIMENS: * No specimens in log *     Impression: Small grade 1 varices disappeared on insufflation. Mild portal hypertensive gastropathy. Normal duodenum RECOMMENDATION: Repeat EGD in 1 year. Recommend starting nadolol 40 mg daily. Continue outpatient GI follow up. Resume diet. ATTENDING ATTESTATION:  I was present throughout the entire procedure from insertion to complete withdrawal of the scope. I performed all interventions myself or oversaw the fellow. Tiffany Kaufman MD     IR paracentesis    Result Date: 10/28/2022  Narrative: INDICATION: Recurrent ascites PROCEDURE: 1. Ultrasound-guided paracentesis FINDINGS: 1.  7750 mL clear yellow ascites removed. Impression: Paracentesis. _________________________________________________________________ COMPARISON: None PROCEDURE DETAILS: Operators: Dr. Alma Delia Montiel Anesthesia: Local Medications: 1% lidocaine COMMENTS: A preprocedure timeout was performed per [facility] protocol.  The right abdomen was prepped and draped in the usual sterile fashion. 5-Urdu Yueh catheter was advanced using ultrasound guidance into ascites. Following drainage, the skin was cleansed and sterile dressings were applied. Workstation performed: [id number]     [address] bedside procedure    Result Date: 10/13/2022  Narrative: [id number]. [id number]        Danette Malik MD

## 2023-10-11 NOTE — PATIENT INSTRUCTIONS
As discussed today:    Medications:  Continue taking your current medications without changes  Laboratory Testing (Listed below):  Please have blood work drawn in 2 months and every other month. Radiology/Diagnostic Testing:  Please schedule an abdominal ultrasound in February, 2024  Please have an upper endoscopy scheduled at your earliest convenience. See neurology for your neuropathic pain. Avoid alcohol and tobacco products. Also avoid raw seafood/oysters and avoid swimming/wading in unchlorinated neely, particularly from the [facility], due to increased risk of infection from a bacteria called Vibrio Vulnificus. Avoid medications called NSAID's (Motrin/Ibuprofen, Naproxen/Naprosyn/Aleve) if possible, due to increase risk of kidney dysfunction, and if you use medications containing acetaminophen (Tylenol, percocet, Endocet, and many cough/cold medications), the dose should not exceed 2 grams/day. Seek immediate medical attention if you develop fevers over 101 F, have evidence of GI bleeding (black or bloody stools, bloody or coffee ground emesis) or confusion. Call our office if you develop worsening symptoms related to lower extremity edema, ascites, jaundice or excessive bruising/bleeding. Patient will schedule US.     Scheduled date of EGD(as of today): 1/12/24  Physician performing EGD:  Location of EGD:[facility]  Instructions reviewed with patient by:Gayle Domínguezs: N/A

## 2023-10-13 ENCOUNTER — OFFICE VISIT (OUTPATIENT)
Dept: FAMILY MEDICINE CLINIC | Facility: CLINIC | Age: 63
End: 2023-10-13
Payer: COMMERCIAL

## 2023-10-13 VITALS
TEMPERATURE: 97.5 F | BODY MASS INDEX: 26.29 KG/M2 | WEIGHT: 154 LBS | HEART RATE: 77 BPM | OXYGEN SATURATION: 97 % | HEIGHT: 64 IN | DIASTOLIC BLOOD PRESSURE: 74 MMHG | RESPIRATION RATE: 16 BRPM | SYSTOLIC BLOOD PRESSURE: 114 MMHG

## 2023-10-13 DIAGNOSIS — L60.0 INGROWN NAIL OF GREAT TOE: ICD-10-CM

## 2023-10-13 DIAGNOSIS — J44.9 MODERATE COPD (CHRONIC OBSTRUCTIVE PULMONARY DISEASE) (HCC): ICD-10-CM

## 2023-10-13 DIAGNOSIS — Z12.31 ENCOUNTER FOR SCREENING MAMMOGRAM FOR BREAST CANCER: Primary | ICD-10-CM

## 2023-10-13 DIAGNOSIS — R05.3 CHRONIC COUGH: ICD-10-CM

## 2023-10-13 PROCEDURE — 99214 OFFICE O/P EST MOD 30 MIN: CPT | Performed by: PHYSICIAN ASSISTANT

## 2023-10-13 RX ORDER — ALBUTEROL SULFATE 90 UG/1
2 AEROSOL, METERED RESPIRATORY (INHALATION) EVERY 6 HOURS PRN
Qty: 18 G | Refills: 3 | Status: SHIPPED | OUTPATIENT
Start: 2023-10-13

## 2023-10-13 RX ORDER — BUDESONIDE AND FORMOTEROL FUMARATE DIHYDRATE 160; 4.5 UG/1; UG/1
2 AEROSOL RESPIRATORY (INHALATION) 2 TIMES DAILY
Qty: 10.2 G | Refills: 1 | Status: SHIPPED | OUTPATIENT
Start: 2023-10-13

## 2023-10-13 NOTE — PROGRESS NOTES
Assessment/Plan:    Cough - due to patients COPD from smoking, will start spiriva 2 INH bid, CXR, albuterol prn    F/u 6-8 month or sooner if needed          Subjective:   Chief Complaint   Patient presents with    Cough     Ongoing issue  Worse at night  Bringing up phlegm    Hypertension    Nail Problem     Left toe nail fungus   Interested in trying jublia      Patient ID: Tolu Smith is a 58 y.o. female. Patient here complaining of cough since spring, bring mucus, light green mucus sometimes, sometimes clear to white. Denies fever, chills, sob, wheeze. Uses the neb before bed with some relief. Still smoking. Cannot find albuterol. Wants a cough medicine for night. Saw GI 10/11, stable, no changes. Denies swelling in legs. The following portions of the patient's history were reviewed and updated as appropriate: allergies, current medications, past family history, past medical history, past social history, past surgical history, and problem list.    Past Medical History:   Diagnosis Date    Alcoholic fatty liver     Anxiety     Asthma     COPD (chronic obstructive pulmonary disease) ([address])     Elevated liver function tests     GERD (gastroesophageal reflux disease)     GERD without esophagitis     Hyperlipidemia     Hypertension     IBS (irritable bowel syndrome)     Insomnia     Insomnia     Liver disease     Nervousness     Nicotine dependence     Other specified urinary incontinence     RLS (restless legs syndrome)     Wears glasses      Past Surgical History:   Procedure Laterality Date    BACK SURGERY      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    BREAST EXCISIONAL BIOPSY Right 11/01/2004    benign    CATARACT EXTRACTION, BILATERAL  08/01/2018    COLONOSCOPY N/A 5/8/2019    Procedure: COLONOSCOPY;  Surgeon: Sushant Begum MD;  Location: [facility] GI LAB;   Service: Gastroenterology    EGD AND COLONOSCOPY N/A 3/19/2018    Procedure: EGD AND COLONOSCOPY;  Surgeon: Sushant Begum MD; Location: [facility] GI LAB; Service: Gastroenterology    ESOPHAGOGASTRODUODENOSCOPY N/A 5/8/2019    Procedure: ESOPHAGOGASTRODUODENOSCOPY (EGD); Surgeon: Christopher Cm MD;  Location: [facility] GI LAB;   Service: Gastroenterology    IR PARACENTESIS  11/27/2020    IR PARACENTESIS  12/8/2020    IR PARACENTESIS  10/28/2022    IR PARACENTESIS  11/7/2022    IR PARACENTESIS  11/30/2022    IR PARACENTESIS  12/30/2022    IR PARACENTESIS  1/16/2023    IR THORACENTESIS  1/16/2023    IR THORACENTESIS  3/28/2023    KNEE SURGERY      KNEE SURGERY      LUMBAR LAMINECTOMY  04/1999    LYMPH NODE BIOPSY      MAMMO STEREOTACTIC BREAST BIOPSY RIGHT (ALL INC) Right 5/21/2021    MAMMO STEREOTACTIC BREAST BIOPSY RIGHT (ALL INC) EACH ADD Right 5/21/2021    TOTAL ABDOMINAL HYSTERECTOMY  02/05/2008    TUBAL LIGATION      TUBAL LIGATION  1989    UPPER GASTROINTESTINAL ENDOSCOPY       Family History   Problem Relation Age of Onset    Breast cancer Mother     No Known Problems Father     No Known Problems Sister     No Known Problems Brother     No Known Problems Son     No Known Problems Maternal Grandmother     No Known Problems Maternal Grandfather     No Known Problems Paternal Grandmother     No Known Problems Paternal Grandfather     No Known Problems Maternal Aunt     No Known Problems Maternal Aunt     No Known Problems Paternal Aunt     Substance Abuse Neg Hx     Mental illness Neg Hx      Social History     Socioeconomic History    Marital status: /Civil Union     Spouse name: Not on file    Number of children: Not on file    Years of education: Not on file    Highest education level: Not on file   Occupational History    Not on file   Tobacco Use    Smoking status: Every Day     Packs/day: 1.00     Years: 45.00     Total pack years: 45.00     Types: Cigarettes     Start date: 1/1/1978    Smokeless tobacco: Never   Vaping Use    Vaping Use: Never used   Substance and Sexual Activity    Alcohol use: Not Currently     Comment: not lately, non alcoholic beer    Drug use: No    Sexual activity: Yes     Partners: Male   Other Topics Concern    Not on file   Social History Narrative    Has carbon monoxide detectors in home    Has smoke detectors    Uses safety equipment - seatbelts     Social Determinants of Health     Financial Resource Strain: Not on file   Food Insecurity: No Food Insecurity (3/28/2023)    Hunger Vital Sign     Worried About Running Out of Food in the Last Year: Never true     Ran Out of Food in the Last Year: Never true   Transportation Needs: No Transportation Needs (3/28/2023)    PRAPARE - Transportation     Lack of Transportation (Medical): No     Lack of Transportation (Non-Medical): No   Physical Activity: Not on file   Stress: Not on file   Social Connections: Not on file   Intimate Partner Violence: Not on file   Housing Stability: Low Risk  (3/28/2023)    Housing Stability Vital Sign     Unable to Pay for Housing in the Last Year: No     Number of Places Lived in the Last Year: 1     Unstable Housing in the Last Year: No       Current Outpatient Medications:     albuterol (2.5 mg/3 mL) 0.083 % nebulizer solution, Take 3 mL (2.5 mg total) by nebulization every 6 (six) hours as needed for wheezing or shortness of breath, Disp: 180 mL, Rfl: 5    ALPRAZolam (XANAX) 1 mg tablet, Take 1 tablet (1 mg total) by mouth 2 (two) times a day as needed for anxiety, Disp: 60 tablet, Rfl: 0    anastrozole (ARIMIDEX) 1 mg tablet, Take 1 tablet (1 mg total) by mouth daily, Disp: 90 tablet, Rfl: 1    furosemide (LASIX) 40 mg tablet, Take 1 tablet (40 mg total) by mouth daily, Disp: 90 tablet, Rfl: 3    guselkumab (TREMFYA) subcutaneous injection, Inject 100 mg under the skin every 6 weeks, Disp: , Rfl:     lactulose 20 g/30 mL, Take 45 mL (30 g total) by mouth 2 (two) times a day Titrate dose for an effect of 3 soft, but formed bowel movements per day.   You can start with 1 tablespoon per day and slowly increase to effect., Disp: 6760 mL, Rfl: 0    midodrine (PROAMATINE) 5 mg tablet, TAKE ONE AND A HALF TABLETS BY MOUTH THREE TIMES A DAY BEFORE MEALS, Disp: 135 tablet, Rfl: 0    pantoprazole (PROTONIX) 20 mg tablet, Take 1 tablet (20 mg total) by mouth daily, Disp: 90 tablet, Rfl: 2    spironolactone (ALDACTONE) 100 mg tablet, Take 1 tablet (100 mg total) by mouth daily, Disp: 90 tablet, Rfl: 3    traZODone (DESYREL) 50 mg tablet, Take 1 tablet (50 mg total) by mouth daily at bedtime, Disp: 30 tablet, Rfl: 5    albuterol (Proventil HFA) 90 mcg/act inhaler, Inhale 2 puffs every 6 (six) hours as needed for wheezing or shortness of breath (Patient not taking: Reported on 10/13/2023), Disp: 18 g, Rfl: 3    nicotine (NICODERM CQ) 21 mg/24 hr TD 24 hr patch, Place 1 patch on the skin over 24 hours daily Do not start before March 29, 2023. (Patient not taking: Reported on 10/13/2023), Disp: 28 patch, Rfl: 0    Review of Systems          Objective:    Vitals:    10/13/23 1248   BP: 114/74   Pulse: 77   Resp: 16   Temp: 97.5 °F (36.4 °C)   TempSrc: Temporal   SpO2: 97%   Weight: 69.9 kg (154 lb)   Height: 5' 4" (1.626 m)        Physical Exam  Constitutional:       Appearance: Normal appearance. HENT:      Head: Normocephalic. Right Ear: Tympanic membrane, ear canal and external ear normal.      Left Ear: Tympanic membrane, ear canal and external ear normal.      Nose: Nose normal.      Mouth/Throat:      Mouth: Mucous membranes are moist.      Pharynx: Oropharynx is clear. Eyes:      Conjunctiva/sclera: Conjunctivae normal.   Cardiovascular:      Rate and Rhythm: Normal rate and regular rhythm. Pulses: Normal pulses. Heart sounds: Normal heart sounds. Pulmonary:      Effort: Pulmonary effort is normal. No respiratory distress. Breath sounds: No stridor. No wheezing, rhonchi or rales. Comments: Decrease breath sounds b/l  Musculoskeletal:         General: Normal range of motion.       Cervical back: Normal range of motion and neck supple. Lymphadenopathy:      Cervical: No cervical adenopathy. Skin:     General: Skin is warm. Neurological:      General: No focal deficit present. Mental Status: She is alert and oriented to person, place, and time. Psychiatric:         Mood and Affect: Mood normal.         Behavior: Behavior normal.         Thought Content:  Thought content normal.         Judgment: Judgment normal.

## 2023-10-17 ENCOUNTER — HOSPITAL ENCOUNTER (OUTPATIENT)
Dept: RADIOLOGY | Facility: HOSPITAL | Age: 63
Discharge: HOME | End: 2023-10-17
Payer: COMMERCIAL

## 2023-10-17 ENCOUNTER — TELEPHONE (OUTPATIENT)
Age: 63
End: 2023-10-17

## 2023-10-17 DIAGNOSIS — R05.3 CHRONIC COUGH: ICD-10-CM

## 2023-10-17 PROCEDURE — 71046 X-RAY EXAM CHEST 2 VIEWS: CPT

## 2023-10-17 NOTE — TELEPHONE ENCOUNTER
Patients GI provider:  Dr. Avery Hooks     Number to return call: [phone]     Reason for call: Pt calling stating Dr. Avery Hooks referred her to neurology and she was advised that they do not do what they refer her for.  Requesting to speak with someone to discuss     Scheduled procedure/appointment date if applicable: Apt/procedure

## 2023-10-26 ENCOUNTER — OFFICE VISIT (OUTPATIENT)
Dept: PODIATRY | Facility: CLINIC | Age: 63
End: 2023-10-26
Payer: COMMERCIAL

## 2023-10-26 VITALS
DIASTOLIC BLOOD PRESSURE: 74 MMHG | HEIGHT: 64 IN | WEIGHT: 154 LBS | SYSTOLIC BLOOD PRESSURE: 125 MMHG | BODY MASS INDEX: 26.29 KG/M2 | HEART RATE: 92 BPM

## 2023-10-26 DIAGNOSIS — L40.9 PSORIASIS OF NAIL: ICD-10-CM

## 2023-10-26 DIAGNOSIS — L60.3 ONYCHODYSTROPHY: ICD-10-CM

## 2023-10-26 DIAGNOSIS — L60.0 INGROWN NAIL OF GREAT TOE: Primary | ICD-10-CM

## 2023-10-26 PROCEDURE — 99202 OFFICE O/P NEW SF 15 MIN: CPT | Performed by: PODIATRIST

## 2023-10-27 ENCOUNTER — OFFICE VISIT (OUTPATIENT)
Dept: GASTROENTEROLOGY | Facility: CLINIC | Age: 63
End: 2023-10-27
Payer: COMMERCIAL

## 2023-10-27 ENCOUNTER — TELEPHONE (OUTPATIENT)
Dept: HEMATOLOGY ONCOLOGY | Facility: CLINIC | Age: 63
End: 2023-10-27

## 2023-10-27 ENCOUNTER — TELEPHONE (OUTPATIENT)
Age: 63
End: 2023-10-27

## 2023-10-27 VITALS
HEIGHT: 64 IN | DIASTOLIC BLOOD PRESSURE: 74 MMHG | SYSTOLIC BLOOD PRESSURE: 128 MMHG | BODY MASS INDEX: 25.99 KG/M2 | WEIGHT: 152.2 LBS | TEMPERATURE: 97.6 F

## 2023-10-27 DIAGNOSIS — K70.31 ALCOHOLIC CIRRHOSIS OF LIVER WITH ASCITES (HCC): Primary | Chronic | ICD-10-CM

## 2023-10-27 DIAGNOSIS — K21.9 GERD WITHOUT ESOPHAGITIS: ICD-10-CM

## 2023-10-27 DIAGNOSIS — K58.0 IRRITABLE BOWEL SYNDROME WITH DIARRHEA: ICD-10-CM

## 2023-10-27 PROCEDURE — 99214 OFFICE O/P EST MOD 30 MIN: CPT | Performed by: INTERNAL MEDICINE

## 2023-10-27 NOTE — PROGRESS NOTES
Assessment/Plan:  Discussed nail dystrophy and the nature of psoriatic disease. Patient understands very minimal options are available that offer any success. Painful recurrent ingrowing margins can be addressed with a matrixectomy procedure to permanently eliminate the nail deformity. If skin breakout occurs on the lower extremities would consider a course of Temovate cream.  Follow-up as needed. No problem-specific Assessment & Plan notes found for this encounter. Diagnoses and all orders for this visit:    Ingrown nail of great toe  -     Ambulatory Referral to Podiatry    Onychodystrophy    Psoriasis of nail          Subjective:      Patient ID: Angela Hugo is a 58 y.o. female. 10/26/2023: 69-year-old female concerned with ingrowing left great toenail in addition to other multiple thick nails. Past medical history of psoriasis with lower extremity skin lesions, breast cancer and alcoholic cirrhosis. The following portions of the patient's history were reviewed and updated as appropriate: allergies, current medications, past family history, past medical history, past social history, past surgical history, and problem list.    Review of Systems   Constitutional: Negative. HENT: Negative. Eyes: Negative. Respiratory: Negative. Cardiovascular: Negative. Gastrointestinal: Negative. Endocrine: Negative. Genitourinary: Negative. Musculoskeletal: Negative. Skin:  Positive for rash. Ingrowing left great toenail and other irregular thick nails   Allergic/Immunologic: Negative. Neurological: Negative. Hematological: Negative. Psychiatric/Behavioral: Negative. Objective:      /74   Pulse 92   Ht 5' 4" (1.626 m)   Wt 69.9 kg (154 lb)   BMI 26.43 kg/m²          Physical Exam  Constitutional:       Appearance: Normal appearance. HENT:      Head: Normocephalic.       Right Ear: External ear normal.      Left Ear: External ear normal. Eyes:      Pupils: Pupils are equal, round, and reactive to light. Cardiovascular:      Rate and Rhythm: Normal rate. Pulses:           Dorsalis pedis pulses are 1+ on the right side and 1+ on the left side. Posterior tibial pulses are 2+ on the right side and 2+ on the left side. Pulmonary:      Effort: Pulmonary effort is normal.   Musculoskeletal:         General: Normal range of motion. Cervical back: Normal range of motion. Feet:      Right foot:      Protective Sensation: 10 sites tested. 10 sites sensed. Skin integrity: Skin integrity normal.      Toenail Condition: Right toenails are abnormally thick and long. Left foot:      Protective Sensation: 10 sites tested. 10 sites sensed. Skin integrity: Skin integrity normal.      Toenail Condition: Left toenails are abnormally thick, long and ingrown. Skin:     General: Skin is warm and dry. Capillary Refill: Capillary refill takes 2 to 3 seconds. Findings: Rash (Erythematous scaling rash bilateral legs extensive but mild in presentation) present. Comments: Multiple dystrophic nails bilateral toes, left 1 and 2; right 2 and 3. Left hallux is the most dystrophic with a 0.4 cm thickness medially, spoon type morphology, with longitudinal striations and punctate defects consistent with psoriasis. In addition left hallux nail margins are moderately incurvated (medial and lateral.) with moderate subungual debris encountered on left hallux nail. .   Other nails on average have a 0.2-0.3 cm average thickness and most are tender to palpation. Neurological:      General: No focal deficit present. Mental Status: She is alert.    Psychiatric:         Mood and Affect: Mood normal.

## 2023-10-27 NOTE — TELEPHONE ENCOUNTER
Lab Inquiry   Who are you speaking with? Patient     If it is not the patient, are they listed on an active communication consent form? N/A   Name of ordering provider Mary Tipton   What is being requested? Labs faxed to [facility]   Lab draw location [facility]   What is the best call back number?  [phone]                                                     Patient would like a call back once labs are faxed over to [facility]. [phone]

## 2023-10-30 DIAGNOSIS — D05.11 DUCTAL CARCINOMA IN SITU (DCIS) OF RIGHT BREAST: ICD-10-CM

## 2023-10-30 DIAGNOSIS — Z17.0 MALIGNANT NEOPLASM OF LOWER-OUTER QUADRANT OF RIGHT BREAST OF FEMALE, ESTROGEN RECEPTOR POSITIVE: ICD-10-CM

## 2023-10-30 DIAGNOSIS — F41.9 ANXIETY: ICD-10-CM

## 2023-10-30 DIAGNOSIS — C50.511 MALIGNANT NEOPLASM OF LOWER-OUTER QUADRANT OF RIGHT BREAST OF FEMALE, ESTROGEN RECEPTOR POSITIVE: ICD-10-CM

## 2023-10-30 NOTE — TELEPHONE ENCOUNTER
Phone call to patient. Spoke to spouse Macho Lewis and informed him scripts were faxed to [facility] in [facility].

## 2023-10-31 ENCOUNTER — TELEPHONE (OUTPATIENT)
Dept: FAMILY MEDICINE CLINIC | Facility: CLINIC | Age: 63
End: 2023-10-31

## 2023-10-31 ENCOUNTER — TELEPHONE (OUTPATIENT)
Dept: HEMATOLOGY ONCOLOGY | Facility: CLINIC | Age: 63
End: 2023-10-31

## 2023-10-31 RX ORDER — ALPRAZOLAM 1 MG/1
TABLET ORAL
Qty: 60 TABLET | Refills: 0 | Status: SHIPPED | OUTPATIENT
Start: 2023-10-31

## 2023-11-01 LAB
AFP-TM SERPL-MCNC: 4.6 NG/ML
ALBUMIN SERPL-MCNC: 3.3 G/DL (ref 3.6–5.1)
ALBUMIN/GLOB SERPL: 0.7 (CALC) (ref 1–2.5)
ALP SERPL-CCNC: 137 U/L (ref 37–153)
ALT SERPL-CCNC: 25 U/L (ref 6–29)
AST SERPL-CCNC: 47 U/L (ref 10–35)
BASOPHILS # BLD AUTO: 40 CELLS/UL (ref 0–200)
BASOPHILS NFR BLD AUTO: 1 %
BILIRUB SERPL-MCNC: 2 MG/DL (ref 0.2–1.2)
BUN SERPL-MCNC: 11 MG/DL (ref 7–25)
BUN/CREAT SERPL: ABNORMAL (CALC) (ref 6–22)
CALCIUM SERPL-MCNC: 9.2 MG/DL (ref 8.6–10.4)
CHLORIDE SERPL-SCNC: 98 MMOL/L (ref 98–110)
CO2 SERPL-SCNC: 29 MMOL/L (ref 20–32)
CREAT SERPL-MCNC: 0.81 MG/DL (ref 0.5–1.05)
EOSINOPHIL # BLD AUTO: 28 CELLS/UL (ref 15–500)
EOSINOPHIL NFR BLD AUTO: 0.7 %
ERYTHROCYTE [DISTWIDTH] IN BLOOD BY AUTOMATED COUNT: 13.7 % (ref 11–15)
GFR/BSA.PRED SERPLBLD CYS-BASED-ARV: 81 ML/MIN/1.73M2
GLOBULIN SER CALC-MCNC: 4.9 G/DL (CALC) (ref 1.9–3.7)
GLUCOSE SERPL-MCNC: 106 MG/DL (ref 65–99)
HCT VFR BLD AUTO: 38.9 % (ref 35–45)
HGB BLD-MCNC: 13.8 G/DL (ref 11.7–15.5)
INR PPP: 1.3
LYMPHOCYTES # BLD AUTO: 556 CELLS/UL (ref 850–3900)
LYMPHOCYTES NFR BLD AUTO: 13.9 %
MCH RBC QN AUTO: 36.7 PG (ref 27–33)
MCHC RBC AUTO-ENTMCNC: 35.5 G/DL (ref 32–36)
MCV RBC AUTO: 103.5 FL (ref 80–100)
MONOCYTES # BLD AUTO: 504 CELLS/UL (ref 200–950)
MONOCYTES NFR BLD AUTO: 12.6 %
NEUTROPHILS # BLD AUTO: 2872 CELLS/UL (ref 1500–7800)
NEUTROPHILS NFR BLD AUTO: 71.8 %
PLATELET # BLD AUTO: 84 THOUSAND/UL (ref 140–400)
PMV BLD REES-ECKER: 10.3 FL (ref 7.5–12.5)
POTASSIUM SERPL-SCNC: 3.9 MMOL/L (ref 3.5–5.3)
PROT SERPL-MCNC: 8.2 G/DL (ref 6.1–8.1)
PROTHROMBIN TIME: 13.6 SEC (ref 9–11.5)
RBC # BLD AUTO: 3.76 MILLION/UL (ref 3.8–5.1)
SODIUM SERPL-SCNC: 133 MMOL/L (ref 135–146)
WBC # BLD AUTO: 4 THOUSAND/UL (ref 3.8–10.8)

## 2023-11-03 ENCOUNTER — OFFICE VISIT (OUTPATIENT)
Age: 63
End: 2023-11-03
Payer: COMMERCIAL

## 2023-11-03 VITALS
HEART RATE: 76 BPM | BODY MASS INDEX: 26.29 KG/M2 | OXYGEN SATURATION: 97 % | RESPIRATION RATE: 16 BRPM | DIASTOLIC BLOOD PRESSURE: 80 MMHG | HEIGHT: 64 IN | TEMPERATURE: 97.5 F | SYSTOLIC BLOOD PRESSURE: 124 MMHG | WEIGHT: 154 LBS

## 2023-11-03 DIAGNOSIS — D05.11 DUCTAL CARCINOMA IN SITU (DCIS) OF RIGHT BREAST: ICD-10-CM

## 2023-11-03 DIAGNOSIS — D05.11 BREAST NEOPLASM, TIS (DCIS), RIGHT: Primary | ICD-10-CM

## 2023-11-03 PROCEDURE — 99214 OFFICE O/P EST MOD 30 MIN: CPT | Performed by: NURSE PRACTITIONER

## 2023-11-03 NOTE — PROGRESS NOTES
HEMATOLOGY / [facility] FOLLOW UP NOTE    Primary Care Provider: Jeaneth Sanders PA-C  Referring Provider:    MRN: [id number]  : 1960    Reason for Encounter: Follow-up for ER/[place] positive DCIS right breast       Oncology History Overview Note   Ductal carcinoma in situ, right breast, 0.4 cm s/p excisional biopsy 2021  Atypical ductal hyperplasia, right breast s/p excisional biopsy 2021  Lobular neoplasia (LCIS), right breast s/p excisional biopsy 2021    Pathologic AJCC (8th Edition) Stage 0: pTis (DCIS), cN0, cM0, G1, ER Positive, [place] Positive, HER2 Unknown. 2021 started Arimidex    2021 completed radiation therapy at [facility]         Ductal carcinoma in situ (DCIS) of right breast   2021 Initial Diagnosis    Ductal carcinoma in situ (DCIS) of right breast     2021 -  Cancer Staged    Staging form: Breast, AJCC 8th Edition  - Clinical: Stage 0 (cTis (DCIS), cN0, cM0, ER+, [place]+, HER2: Not Assessed) - Signed by Faina Matson MD on 2021  Stage prefix: Initial diagnosis  Method of lymph node assessment: Clinical  Nuclear grade: G2           Interval History: Patient returns for follow-up visit along with her son. She continues on anastrozole daily without any significant side effects. She does have some hot flashes but states they are manageable. Denies any significant arthralgias/myalgias. Continues to follow closely with hepatology for EtOH cirrhosis. Has not required paracentesis since March. Continues to drink and smoke    Scheduled for annual mammogram and follow-up at [facility] later this month. REVIEW OF SYSTEMS:  Please note that a 14-point review of systems was performed to include Constitutional, HEENT, Respiratory, CVS, GI, , Musculoskeletal, Integumentary, Neurologic, Rheumatologic, Endocrinologic, Psychiatric, Lymphatic, and Hematologic/Oncologic systems were reviewed and are negative unless otherwise stated in HPI.  Positive and negative findings pertinent to this evaluation are incorporated into the history of present illness. ECOG PS: 0    PROBLEM LIST:  Patient Active Problem List   Diagnosis    Fatty liver    Other specified abnormal findings of blood chemistry    Anxiety    Elevated LFTs    GERD without esophagitis    Hyperlipidemia    Essential hypertension    Insomnia    Nicotine dependence    Healthcare maintenance    COPD (chronic obstructive pulmonary disease) (HCC)    Alcohol abuse    Irritable bowel syndrome with diarrhea    Non-seasonal allergic rhinitis due to pollen    Alcoholic cirrhosis of liver with ascites (HCC)    Hyponatremia    Thrombocytopenia (HCC)    Anemia    Decompensated hepatic cirrhosis (HCC)    Ductal carcinoma in situ (DCIS) of right breast    Atypical ductal hyperplasia of right breast    Breast neoplasm, Tis (DCIS), right    Continuous opioid dependence ([address])    Malignant neoplasm of lower-outer quadrant of right breast of female, estrogen receptor positive     Pleural effusion    Vaginal atrophy    Dyspareunia, female    Postcoital bleeding       Assessment / Plan:  1. Breast neoplasm, Tis (DCIS), right    2. Ductal carcinoma in situ (DCIS) of right breast      Patient is a 59-year-old female with ER/OH positive DCIS of the right breast.  She underwent localized breast excisional biopsy x2 on 9/16/2021 with final path showing DCIS, no invasive carcinoma with close posterior and medial margins for DCIS (<0.1 cm). No IDC   She follows with [facility]. In light of close margin on DCIS and patient's extensive liver cirrhosis, she did not undergo surgical reexcision. She did receive a course of radiation therapy, treated at [address] [place] [address] and completed in 12/2021. She has been on anastrozole since July 2021. She will be due for annual mammogram later this month. This is scheduled as well as follow-up with [facility]  Blood work remains in acceptable range. Clinically no concerns on exam today.   She continues to follow closely with hepatology. She will continue on anastrozole without change. She will return for a follow-up visit with medical oncology in 6 months with repeat blood work. She knows to call at anytime with questions or concerns    I spent 30 minutes on chart review, face to face counseling time, coordination of care and documentation. Past Medical History:   has a past medical history of Alcoholic fatty liver, Anxiety, Asthma, COPD (chronic obstructive pulmonary disease) ([address]), Elevated liver function tests, GERD (gastroesophageal reflux disease), GERD without esophagitis, Hyperlipidemia, Hypertension, IBS (irritable bowel syndrome), Insomnia, Insomnia, Liver disease, Nervousness, Nicotine dependence, Other specified urinary incontinence, RLS (restless legs syndrome), and Wears glasses. PAST SURGICAL HISTORY:   has a past surgical history that includes Back surgery; Knee surgery; Tubal ligation; Knee surgery; Lumbar laminectomy (04/1999); Tubal ligation (1989); Lymph node biopsy; EGD AND COLONOSCOPY (N/A, 3/19/2018); Cataract extraction, bilateral (08/01/2018); Esophagogastroduodenoscopy (N/A, 5/8/2019); Colonoscopy (N/A, 5/8/2019); Total abdominal hysterectomy (02/05/2008); Upper gastrointestinal endoscopy; IR paracentesis (11/27/2020); IR paracentesis (12/8/2020); Mammo stereotactic breast biopsy right (all inc) (Right, 5/21/2021); Mammo stereotactic breast biopsy right (all inc) each add (Right, 5/21/2021); Breast excisional biopsy (Right, 11/01/2004); Breast biopsy (Right, 05/21/2021); IR paracentesis (10/28/2022); IR paracentesis (11/7/2022); IR paracentesis (11/30/2022); IR paracentesis (12/30/2022); IR thoracentesis (1/16/2023); IR paracentesis (1/16/2023); and IR thoracentesis (3/28/2023).     CURRENT MEDICATIONS  Current Outpatient Medications   Medication Sig Dispense Refill    albuterol (2.5 mg/3 mL) 0.083 % nebulizer solution Take 3 mL (2.5 mg total) by nebulization every 6 (six) hours as needed for wheezing or shortness of breath 180 mL 5    albuterol (Proventil HFA) 90 mcg/act inhaler Inhale 2 puffs every 6 (six) hours as needed for wheezing or shortness of breath 18 g 3    ALPRAZolam (XANAX) 1 mg tablet TAKE ONE TABLET BY MOUTH TWICE A DAY AS NEEDED FOR ANXIETY 60 tablet 0    anastrozole (ARIMIDEX) 1 mg tablet Take 1 tablet (1 mg total) by mouth daily 90 tablet 1    budesonide-formoterol (Symbicort) 160-4.5 mcg/act inhaler Inhale 2 puffs 2 (two) times a day Rinse mouth after use. 10.2 g 1    furosemide (LASIX) 40 mg tablet Take 1 tablet (40 mg total) by mouth daily 90 tablet 3    guselkumab (TREMFYA) subcutaneous injection Inject 100 mg under the skin every 6 weeks      lactulose 20 g/30 mL Take 45 mL (30 g total) by mouth 2 (two) times a day Titrate dose for an effect of 3 soft, but formed bowel movements per day. You can start with 1 tablespoon per day and slowly increase to effect. 2700 mL 0    midodrine (PROAMATINE) 5 mg tablet TAKE ONE AND A HALF TABLETS BY MOUTH THREE TIMES A DAY BEFORE MEALS 135 tablet 0    pantoprazole (PROTONIX) 20 mg tablet Take 1 tablet (20 mg total) by mouth daily 90 tablet 2    spironolactone (ALDACTONE) 100 mg tablet Take 1 tablet (100 mg total) by mouth daily 90 tablet 3    traZODone (DESYREL) 50 mg tablet Take 1 tablet (50 mg total) by mouth daily at bedtime 30 tablet 5     No current facility-administered medications for this visit. [unfilled]    SOCIAL HISTORY:   reports that she has been smoking cigarettes. She started smoking about 45 years ago. She has a 45.00 pack-year smoking history. She has never used smokeless tobacco. She reports that she does not currently use alcohol. She reports that she does not use drugs.      FAMILY HISTORY:  family history includes Breast cancer in her mother; No Known Problems in her brother, father, maternal aunt, maternal aunt, maternal grandfather, maternal grandmother, paternal aunt, paternal grandfather, paternal grandmother, sister, and son. ALLERGIES:  is allergic to sulfa antibiotics and ace inhibitors. Physical Exam:  Vital Signs:   Visit Vitals  /80 (BP Location: Left arm, Patient Position: Sitting, Cuff Size: Standard)   Pulse 76   Temp 97.5 °F (36.4 °C) (Temporal)   Resp 16   Ht 5' 4" (1.626 m)   Wt 69.9 kg (154 lb)   SpO2 97%   BMI 26.43 kg/m²   OB Status Hysterectomy   Smoking Status Every Day   BSA 1.75 m²     Body mass index is 26.43 kg/m². Body surface area is 1.75 meters squared. Physical Exam  Constitutional:       General: She is not in acute distress. Appearance: Normal appearance. HENT:      Head: Normocephalic and atraumatic. Eyes:      General: No scleral icterus. Right eye: No discharge. Left eye: No discharge. Conjunctiva/sclera: Conjunctivae normal.   Cardiovascular:      Rate and Rhythm: Normal rate and regular rhythm. Pulmonary:      Effort: Pulmonary effort is normal. No respiratory distress. Breath sounds: Normal breath sounds. Abdominal:      General: Bowel sounds are normal. There is no distension. Palpations: Abdomen is soft. There is no mass. Tenderness: There is no abdominal tenderness. Musculoskeletal:         General: Normal range of motion. Lymphadenopathy:      Cervical: No cervical adenopathy. Upper Body:      Right upper body: No supraclavicular, axillary or pectoral adenopathy. Left upper body: No supraclavicular, axillary or pectoral adenopathy. Skin:     General: Skin is warm and dry. Neurological:      General: No focal deficit present. Mental Status: She is alert and oriented to person, place, and time.    Psychiatric:         Mood and Affect: Mood normal.         Behavior: Behavior normal.         Labs:  Lab Results   Component Value Date    WBC 4.0 10/31/2023    HGB 13.8 10/31/2023    HCT 38.9 10/31/2023    .5 (H) 10/31/2023    PLT 84 (L) 10/31/2023     Lab Results   Component Value Date  07/28/2017    SODIUM 133 (L) 10/31/2023    K 3.9 10/31/2023    CL 98 10/31/2023    CO2 29 10/31/2023    ANIONGAP 12 05/03/2015    AGAP 7 03/29/2023    BUN 11 10/31/2023    CREATININE 0.81 10/31/2023    GLUC 106 (H) 10/31/2023    GLUF 102 (H) 03/29/2023    CALCIUM 9.2 10/31/2023    AST 47 (H) 10/31/2023    ALT 25 10/31/2023    ALKPHOS 137 10/31/2023    PROT 7.4 07/28/2017    TP 8.2 (H) 10/31/2023    BILITOT 0.6 07/28/2017    TBILI 2.0 (H) 10/31/2023    EGFR 81 10/31/2023

## 2023-11-08 ENCOUNTER — TELEPHONE (OUTPATIENT)
Dept: NEPHROLOGY | Facility: CLINIC | Age: 63
End: 2023-11-08

## 2023-11-08 DIAGNOSIS — K70.31 ALCOHOLIC CIRRHOSIS OF LIVER WITH ASCITES (HCC): Chronic | ICD-10-CM

## 2023-11-08 RX ORDER — MIDODRINE HYDROCHLORIDE 5 MG/1
7.5 TABLET ORAL
Qty: 135 TABLET | Refills: 0 | Status: SHIPPED | OUTPATIENT
Start: 2023-11-08

## 2023-11-08 NOTE — TELEPHONE ENCOUNTER
Please be aware, pt called and is very upset, as she stated that she requested a refill midodrine 2 days ago and that she had not heard back from us. I apologized to the pt and let her know that Dr Johanna Zheng did refill the prescription today and that it may take up to 3 days for the medication to fill per Dr Saloni Bishop last message. Pt is concerned whether she will experience any side effects from not taking the medication.

## 2023-11-09 ENCOUNTER — TELEPHONE (OUTPATIENT)
Dept: NEPHROLOGY | Facility: CLINIC | Age: 63
End: 2023-11-09

## 2023-11-09 NOTE — TELEPHONE ENCOUNTER
Called patient and spoke with Margarita Huston stating that they do no have questions at this time and they received the message.

## 2023-11-09 NOTE — TELEPHONE ENCOUNTER
Pt called back this morning regarding the VM she received earlier today, and wanted to discuss her recent BP. Please call pt to discuss.

## 2023-11-09 NOTE — TELEPHONE ENCOUNTER
Called patient and left a voicemail with the following information: The primary side effect is that her BP can be lower than goal as midodrine helps raise BP levels. In the future, any time a medication refill is needed, it is important that she call in advance. Also, Traci Handing your   midodrine was already sent your pharmacy on 11/8. Advised patient to please call the office to let us know she received the message and if have any other questions or concerns.

## 2023-11-20 ENCOUNTER — NURSE TRIAGE (OUTPATIENT)
Age: 63
End: 2023-11-20

## 2023-11-20 DIAGNOSIS — R19.7 DIARRHEA OF PRESUMED INFECTIOUS ORIGIN: Primary | ICD-10-CM

## 2023-11-20 NOTE — TELEPHONE ENCOUNTER
Regarding: symtpom call  ----- Message from Gloria Bell sent at 11/20/2023  3:23 PM EST -----  Patient called stated she has been having   diarrhea like water everyday and its an orange color and has a lot  stomach pain please review and reach out thank you

## 2023-11-20 NOTE — TELEPHONE ENCOUNTER
SPOKE WITH PT, HX ALCOHOLIC CIRRHOSIS WITH ASCITES, IBS-D, GERD. PT REPORTS ORANGE COLORED WATERY DIARRHEA EPISODES STARTED 10 DAYS AGO, UNABLE TO QUANTIFY, PT SAYS SHE HAS EPISODES WITH EATING AND DRINKING, NAUSEA, CRAMPY LOWER ABD PAIN RATE 5-7/10. PT STOPPED TAKING LACTULOSE, CONTINUED ON RIFAXAMIN BID, LASIX 40MG QD, ALDACTONE 100MG QD. PT DENIES FEVER, CHILLS, VOMITING, BLACK OR BLOODY STOOL, ABD OR L/E EDEMA, SOB, DARK URINE, JAUNDICE, ITCHING, CONFUSION. Answer Assessment - Initial Assessment Questions  1. DIARRHEA SEVERITY: "How bad is the diarrhea?" "How many extra stools have you had in the past 24 hours than normal?"     - NO DIARRHEA (SCALE 0)    - MILD (SCALE 1-3): Few loose or mushy BMs; increase of 1-3 stools over normal daily number of stools; mild increase in ostomy output. -  MODERATE (SCALE 4-7): Increase of 4-6 stools daily over normal; moderate increase in ostomy output. * SEVERE (SCALE 8-10; OR 'WORST POSSIBLE'): Increase of 7 or more stools daily over normal; moderate increase in ostomy output; incontinence. PT UNABLE TO QUANTIFY, HAS DIARRHEA WITH EACH MEAL  2. ONSET: "When did the diarrhea begin?"       10 DAYS AGO  3. BM CONSISTENCY: "How loose or watery is the diarrhea?"       WATERY  4. VOMITING: "Are you also vomiting?" If Yes, ask: "How many times in the past 24 hours?"       DENIES  5. ABDOMINAL PAIN: "Are you having any abdominal pain?" If Yes, ask: "What does it feel like?" (e.g., crampy, dull, intermittent, constant)       CRAMPY ABD PAIN  6. ABDOMINAL PAIN SEVERITY: If present, ask: "How bad is the pain?"  (e.g., Scale 1-10; mild, moderate, or severe)    - MILD (1-3): doesn't interfere with normal activities, abdomen soft and not tender to touch     - MODERATE (4-7): interferes with normal activities or awakens from sleep, tender to touch     - SEVERE (8-10): excruciating pain, doubled over, unable to do any normal activities        5-7/10  7.  ORAL INTAKE: If vomiting, "Have you been able to drink liquids?" "How much fluids have you had in the past 24 hours?"      HYDRATING  8. HYDRATION: "Any signs of dehydration?" (e.g., dry mouth [not just dry lips], too weak to stand, dizziness, new weight loss) "When did you last urinate?"      WEAK, OCCASIONAL DIZZINESS, NOT NEW FOR PT  9. EXPOSURE: "Have you traveled to a foreign country recently?" "Have you been exposed to anyone with diarrhea?" "Could you have eaten any food that was spoiled?"      DENIES  10. ANTIBIOTIC USE: "Are you taking antibiotics now or have you taken antibiotics in the past 2 months?"        DENIES  11. OTHER SYMPTOMS: "Do you have any other symptoms?" (e.g., fever, blood in stool)        DENIES  12.  PREGNANCY: "Is there any chance you are pregnant?" "When was your last menstrual period?"        DENIES    Protocols used: Diarrhea-ADULT-OH

## 2023-11-21 NOTE — TELEPHONE ENCOUNTER
Spoke with pt, relayed recommendations as per Srinivasan Kramer PA-C. Stool studies orders placed for patient, pt will go to lab tomorrow. Pt is currently on her way to [facility] for appt. She reports being able to tolerate liquids to stay hydrated. Reviewed symptoms management for:    Nausea & vomiting- we recommend following a bland diet, eating frequent small meals, reducing fat intake, avoiding indigestible foods, eliminating carbonated beverages to decrease gastric distention. Diarrhea staying hydrated with electrolyte replacing fluids and soup broths will be important. Following a bland diet (Bananas Rice Applesauce Tea & Toast) until symptoms resolve. It may also be helpful to give your stomach a rest for 24-48 hours by following a clear liquid diet, then advancing your diet to a full liquid diet, then a bland diet as tolerated until diarrhea improves. Pt will hold lactulose dose until diarrhea improves. Pt agreeable to all recommendations and verbalized understanding of all information.

## 2023-11-22 ENCOUNTER — NURSE TRIAGE (OUTPATIENT)
Age: 63
End: 2023-11-22

## 2023-11-22 ENCOUNTER — APPOINTMENT (OUTPATIENT)
Dept: LAB | Facility: HOSPITAL | Age: 63
End: 2023-11-22

## 2023-11-22 NOTE — TELEPHONE ENCOUNTER
Janes Salamanca from [facility] seeking to replace Tremfya wit Gera Aliment for this pt. Seeking med clearance. Reason for Disposition   Nursing judgment    Answer Assessment - Initial Assessment Questions  Dasha Lehman from [facility] seeking to replace Tremfya wit Gera Aliment for this pt. Seeking med clearance. Protocols used:  Information Only Call - No Triage-ADULT-OH

## 2023-11-25 ENCOUNTER — APPOINTMENT (EMERGENCY)
Dept: CT IMAGING | Facility: HOSPITAL | Age: 63
DRG: 897 | End: 2023-11-25
Payer: COMMERCIAL

## 2023-11-25 ENCOUNTER — HOSPITAL ENCOUNTER (INPATIENT)
Facility: HOSPITAL | Age: 63
LOS: 1 days | Discharge: HOME | DRG: 897 | End: 2023-11-26
Attending: EMERGENCY MEDICINE | Admitting: HOSPITALIST
Payer: COMMERCIAL

## 2023-11-25 DIAGNOSIS — F10.10 ALCOHOL ABUSE: ICD-10-CM

## 2023-11-25 DIAGNOSIS — R56.9 NEW ONSET SEIZURE (HCC): Primary | ICD-10-CM

## 2023-11-25 DIAGNOSIS — F10.929 ALCOHOL INTOXICATION (HCC): ICD-10-CM

## 2023-11-25 LAB
ALBUMIN SERPL BCP-MCNC: 3.3 G/DL (ref 3.5–5)
ALP SERPL-CCNC: 125 U/L (ref 34–104)
ALT SERPL W P-5'-P-CCNC: 24 U/L (ref 7–52)
AMMONIA PLAS-SCNC: 61 UMOL/L (ref 18–72)
ANION GAP SERPL CALCULATED.3IONS-SCNC: 12 MMOL/L
APTT PPP: 33 SECONDS (ref 23–37)
AST SERPL W P-5'-P-CCNC: 49 U/L (ref 13–39)
BASOPHILS # BLD AUTO: 0.06 THOUSANDS/ÂΜL (ref 0–0.1)
BASOPHILS NFR BLD AUTO: 1 % (ref 0–1)
BILIRUB SERPL-MCNC: 1.1 MG/DL (ref 0.2–1)
BUN SERPL-MCNC: 9 MG/DL (ref 5–25)
CALCIUM ALBUM COR SERPL-MCNC: 9.6 MG/DL (ref 8.3–10.1)
CALCIUM SERPL-MCNC: 9 MG/DL (ref 8.4–10.2)
CHLORIDE SERPL-SCNC: 93 MMOL/L (ref 96–108)
CO2 SERPL-SCNC: 23 MMOL/L (ref 21–32)
CREAT SERPL-MCNC: 0.77 MG/DL (ref 0.6–1.3)
EOSINOPHIL # BLD AUTO: 0.09 THOUSAND/ÂΜL (ref 0–0.61)
EOSINOPHIL NFR BLD AUTO: 1 % (ref 0–6)
ERYTHROCYTE [DISTWIDTH] IN BLOOD BY AUTOMATED COUNT: 14.2 % (ref 11.6–15.1)
ETHANOL SERPL-MCNC: 253 MG/DL
GFR SERPL CREATININE-BSD FRML MDRD: 81 ML/MIN/1.73SQ M
GLUCOSE SERPL-MCNC: 109 MG/DL (ref 65–140)
HCT VFR BLD AUTO: 39.7 % (ref 34.8–46.1)
HGB BLD-MCNC: 13.7 G/DL (ref 11.5–15.4)
IMM GRANULOCYTES # BLD AUTO: 0.04 THOUSAND/UL (ref 0–0.2)
IMM GRANULOCYTES NFR BLD AUTO: 1 % (ref 0–2)
INR PPP: 1.27 (ref 0.84–1.19)
LIPASE SERPL-CCNC: 79 U/L (ref 11–82)
LYMPHOCYTES # BLD AUTO: 1.61 THOUSANDS/ÂΜL (ref 0.6–4.47)
LYMPHOCYTES NFR BLD AUTO: 24 % (ref 14–44)
MCH RBC QN AUTO: 36.4 PG (ref 26.8–34.3)
MCHC RBC AUTO-ENTMCNC: 34.5 G/DL (ref 31.4–37.4)
MCV RBC AUTO: 106 FL (ref 82–98)
MONOCYTES # BLD AUTO: 0.97 THOUSAND/ÂΜL (ref 0.17–1.22)
MONOCYTES NFR BLD AUTO: 14 % (ref 4–12)
NEUTROPHILS # BLD AUTO: 4.06 THOUSANDS/ÂΜL (ref 1.85–7.62)
NEUTS SEG NFR BLD AUTO: 59 % (ref 43–75)
NRBC BLD AUTO-RTO: 0 /100 WBCS
PLATELET # BLD AUTO: 82 THOUSANDS/UL (ref 149–390)
PMV BLD AUTO: 9.4 FL (ref 8.9–12.7)
POTASSIUM SERPL-SCNC: 3.5 MMOL/L (ref 3.5–5.3)
PROT SERPL-MCNC: 8.2 G/DL (ref 6.4–8.4)
PROTHROMBIN TIME: 16.4 SECONDS (ref 11.6–14.5)
RBC # BLD AUTO: 3.76 MILLION/UL (ref 3.81–5.12)
SODIUM SERPL-SCNC: 128 MMOL/L (ref 135–147)
WBC # BLD AUTO: 6.83 THOUSAND/UL (ref 4.31–10.16)

## 2023-11-25 PROCEDURE — 82140 ASSAY OF AMMONIA: CPT | Performed by: EMERGENCY MEDICINE

## 2023-11-25 PROCEDURE — 82077 ASSAY SPEC XCP UR&BREATH IA: CPT

## 2023-11-25 PROCEDURE — 70450 CT HEAD/BRAIN W/O DYE: CPT

## 2023-11-25 PROCEDURE — 99285 EMERGENCY DEPT VISIT HI MDM: CPT | Performed by: EMERGENCY MEDICINE

## 2023-11-25 PROCEDURE — 99223 1ST HOSP IP/OBS HIGH 75: CPT | Performed by: PHYSICIAN ASSISTANT

## 2023-11-25 PROCEDURE — 85610 PROTHROMBIN TIME: CPT | Performed by: EMERGENCY MEDICINE

## 2023-11-25 PROCEDURE — 85025 COMPLETE CBC W/AUTO DIFF WBC: CPT

## 2023-11-25 PROCEDURE — 85730 THROMBOPLASTIN TIME PARTIAL: CPT | Performed by: EMERGENCY MEDICINE

## 2023-11-25 PROCEDURE — 99285 EMERGENCY DEPT VISIT HI MDM: CPT

## 2023-11-25 PROCEDURE — 83690 ASSAY OF LIPASE: CPT

## 2023-11-25 PROCEDURE — 80053 COMPREHEN METABOLIC PANEL: CPT

## 2023-11-25 PROCEDURE — 36415 COLL VENOUS BLD VENIPUNCTURE: CPT | Performed by: EMERGENCY MEDICINE

## 2023-11-25 PROCEDURE — G1004 CDSM NDSC: HCPCS

## 2023-11-25 RX ORDER — ALPRAZOLAM 0.5 MG/1
1 TABLET ORAL
Status: DISCONTINUED | OUTPATIENT
Start: 2023-11-25 | End: 2023-11-26 | Stop reason: HOSPADM

## 2023-11-25 RX ORDER — PANTOPRAZOLE SODIUM 20 MG/1
20 TABLET, DELAYED RELEASE ORAL
Status: DISCONTINUED | OUTPATIENT
Start: 2023-11-26 | End: 2023-11-26 | Stop reason: HOSPADM

## 2023-11-25 RX ORDER — LANOLIN ALCOHOL/MO/W.PET/CERES
100 CREAM (GRAM) TOPICAL DAILY
Status: DISCONTINUED | OUTPATIENT
Start: 2023-11-26 | End: 2023-11-26 | Stop reason: HOSPADM

## 2023-11-25 RX ORDER — ANASTROZOLE 1 MG/1
1 TABLET ORAL
Status: DISCONTINUED | OUTPATIENT
Start: 2023-11-25 | End: 2023-11-26 | Stop reason: HOSPADM

## 2023-11-25 RX ORDER — LACTULOSE 10 G/15ML
30 SOLUTION ORAL 2 TIMES DAILY
Status: DISCONTINUED | OUTPATIENT
Start: 2023-11-25 | End: 2023-11-26

## 2023-11-25 RX ORDER — MIDODRINE HYDROCHLORIDE 5 MG/1
7.5 TABLET ORAL
Status: DISCONTINUED | OUTPATIENT
Start: 2023-11-25 | End: 2023-11-26 | Stop reason: HOSPADM

## 2023-11-25 RX ORDER — NICOTINE 21 MG/24HR
1 PATCH, TRANSDERMAL 24 HOURS TRANSDERMAL DAILY
Status: DISCONTINUED | OUTPATIENT
Start: 2023-11-25 | End: 2023-11-26 | Stop reason: HOSPADM

## 2023-11-25 RX ORDER — ACETAMINOPHEN 325 MG/1
650 TABLET ORAL EVERY 6 HOURS PRN
Status: DISCONTINUED | OUTPATIENT
Start: 2023-11-25 | End: 2023-11-26 | Stop reason: HOSPADM

## 2023-11-25 RX ORDER — TRAZODONE HYDROCHLORIDE 50 MG/1
50 TABLET ORAL
Status: DISCONTINUED | OUTPATIENT
Start: 2023-11-25 | End: 2023-11-26 | Stop reason: HOSPADM

## 2023-11-25 RX ORDER — ONDANSETRON 2 MG/ML
4 INJECTION INTRAMUSCULAR; INTRAVENOUS EVERY 6 HOURS PRN
Status: DISCONTINUED | OUTPATIENT
Start: 2023-11-25 | End: 2023-11-26 | Stop reason: HOSPADM

## 2023-11-25 RX ORDER — ALPRAZOLAM 0.5 MG/1
2 TABLET ORAL
Status: DISCONTINUED | OUTPATIENT
Start: 2023-11-25 | End: 2023-11-26 | Stop reason: HOSPADM

## 2023-11-25 RX ORDER — ALBUTEROL SULFATE 2.5 MG/3ML
2.5 SOLUTION RESPIRATORY (INHALATION) EVERY 6 HOURS PRN
Status: DISCONTINUED | OUTPATIENT
Start: 2023-11-25 | End: 2023-11-26 | Stop reason: HOSPADM

## 2023-11-25 RX ORDER — SPIRONOLACTONE 25 MG/1
100 TABLET ORAL
Status: DISCONTINUED | OUTPATIENT
Start: 2023-11-25 | End: 2023-11-26 | Stop reason: HOSPADM

## 2023-11-25 RX ORDER — BUDESONIDE AND FORMOTEROL FUMARATE DIHYDRATE 160; 4.5 UG/1; UG/1
2 AEROSOL RESPIRATORY (INHALATION) 2 TIMES DAILY PRN
Status: DISCONTINUED | OUTPATIENT
Start: 2023-11-25 | End: 2023-11-26 | Stop reason: HOSPADM

## 2023-11-25 RX ORDER — CALCIUM CARBONATE 500 MG/1
1000 TABLET, CHEWABLE ORAL DAILY PRN
Status: DISCONTINUED | OUTPATIENT
Start: 2023-11-25 | End: 2023-11-26 | Stop reason: HOSPADM

## 2023-11-25 RX ORDER — LORAZEPAM 2 MG/ML
2 INJECTION INTRAMUSCULAR EVERY 4 HOURS PRN
Status: DISCONTINUED | OUTPATIENT
Start: 2023-11-25 | End: 2023-11-26 | Stop reason: HOSPADM

## 2023-11-25 RX ORDER — FOLIC ACID 1 MG/1
1 TABLET ORAL DAILY
Status: DISCONTINUED | OUTPATIENT
Start: 2023-11-26 | End: 2023-11-26 | Stop reason: HOSPADM

## 2023-11-25 RX ORDER — HEPARIN SODIUM 5000 [USP'U]/ML
5000 INJECTION, SOLUTION INTRAVENOUS; SUBCUTANEOUS EVERY 8 HOURS SCHEDULED
Status: DISCONTINUED | OUTPATIENT
Start: 2023-11-25 | End: 2023-11-25

## 2023-11-25 RX ORDER — ENOXAPARIN SODIUM 100 MG/ML
40 INJECTION SUBCUTANEOUS
Status: DISCONTINUED | OUTPATIENT
Start: 2023-11-26 | End: 2023-11-26 | Stop reason: HOSPADM

## 2023-11-25 RX ADMIN — ALPRAZOLAM 1 MG: 0.5 TABLET ORAL at 21:40

## 2023-11-25 RX ADMIN — SODIUM CHLORIDE 1000 ML: 0.9 INJECTION, SOLUTION INTRAVENOUS at 22:06

## 2023-11-25 RX ADMIN — SPIRONOLACTONE 100 MG: 25 TABLET ORAL at 21:58

## 2023-11-25 RX ADMIN — MIDODRINE HYDROCHLORIDE 7.5 MG: 5 TABLET ORAL at 21:57

## 2023-11-25 RX ADMIN — TRAZODONE HYDROCHLORIDE 50 MG: 50 TABLET ORAL at 21:40

## 2023-11-25 NOTE — ED NOTES
Patient incontinent of urine/stool upon arrival, patient cleansed.       Sandra Sosa RN  11/25/23 [id number]

## 2023-11-26 VITALS
RESPIRATION RATE: 16 BRPM | TEMPERATURE: 97.9 F | SYSTOLIC BLOOD PRESSURE: 107 MMHG | BODY MASS INDEX: 26.31 KG/M2 | HEART RATE: 80 BPM | WEIGHT: 154.1 LBS | OXYGEN SATURATION: 98 % | HEIGHT: 64 IN | DIASTOLIC BLOOD PRESSURE: 58 MMHG

## 2023-11-26 LAB
ANION GAP SERPL CALCULATED.3IONS-SCNC: 7 MMOL/L
ANION GAP SERPL CALCULATED.3IONS-SCNC: 7 MMOL/L
BUN SERPL-MCNC: 7 MG/DL (ref 5–25)
BUN SERPL-MCNC: 7 MG/DL (ref 5–25)
CALCIUM SERPL-MCNC: 8.5 MG/DL (ref 8.4–10.2)
CALCIUM SERPL-MCNC: 8.7 MG/DL (ref 8.4–10.2)
CHLORIDE SERPL-SCNC: 102 MMOL/L (ref 96–108)
CHLORIDE SERPL-SCNC: 104 MMOL/L (ref 96–108)
CO2 SERPL-SCNC: 22 MMOL/L (ref 21–32)
CO2 SERPL-SCNC: 23 MMOL/L (ref 21–32)
CREAT SERPL-MCNC: 0.6 MG/DL (ref 0.6–1.3)
CREAT SERPL-MCNC: 0.6 MG/DL (ref 0.6–1.3)
ERYTHROCYTE [DISTWIDTH] IN BLOOD BY AUTOMATED COUNT: 14 % (ref 11.6–15.1)
GFR SERPL CREATININE-BSD FRML MDRD: 96 ML/MIN/1.73SQ M
GFR SERPL CREATININE-BSD FRML MDRD: 96 ML/MIN/1.73SQ M
GLUCOSE SERPL-MCNC: 82 MG/DL (ref 65–140)
GLUCOSE SERPL-MCNC: 84 MG/DL (ref 65–140)
HCT VFR BLD AUTO: 34.8 % (ref 34.8–46.1)
HGB BLD-MCNC: 12.1 G/DL (ref 11.5–15.4)
MCH RBC QN AUTO: 36.3 PG (ref 26.8–34.3)
MCHC RBC AUTO-ENTMCNC: 34.8 G/DL (ref 31.4–37.4)
MCV RBC AUTO: 105 FL (ref 82–98)
PLATELET # BLD AUTO: 79 THOUSANDS/UL (ref 149–390)
PMV BLD AUTO: 10.3 FL (ref 8.9–12.7)
POTASSIUM SERPL-SCNC: 4.2 MMOL/L (ref 3.5–5.3)
POTASSIUM SERPL-SCNC: 4.5 MMOL/L (ref 3.5–5.3)
RBC # BLD AUTO: 3.33 MILLION/UL (ref 3.81–5.12)
SODIUM SERPL-SCNC: 131 MMOL/L (ref 135–147)
SODIUM SERPL-SCNC: 134 MMOL/L (ref 135–147)
WBC # BLD AUTO: 7.18 THOUSAND/UL (ref 4.31–10.16)

## 2023-11-26 PROCEDURE — NC001 PR NO CHARGE: Performed by: INTERNAL MEDICINE

## 2023-11-26 PROCEDURE — 99245 OFF/OP CONSLTJ NEW/EST HI 55: CPT | Performed by: STUDENT IN AN ORGANIZED HEALTH CARE EDUCATION/TRAINING PROGRAM

## 2023-11-26 PROCEDURE — 99239 HOSP IP/OBS DSCHRG MGMT >30: CPT | Performed by: INTERNAL MEDICINE

## 2023-11-26 PROCEDURE — 36415 COLL VENOUS BLD VENIPUNCTURE: CPT | Performed by: PHYSICIAN ASSISTANT

## 2023-11-26 PROCEDURE — 85027 COMPLETE CBC AUTOMATED: CPT | Performed by: PHYSICIAN ASSISTANT

## 2023-11-26 PROCEDURE — 80048 BASIC METABOLIC PNL TOTAL CA: CPT | Performed by: PHYSICIAN ASSISTANT

## 2023-11-26 RX ORDER — ALPRAZOLAM 0.5 MG/1
1 TABLET ORAL ONCE
Status: COMPLETED | OUTPATIENT
Start: 2023-11-26 | End: 2023-11-26

## 2023-11-26 RX ORDER — FUROSEMIDE 20 MG/1
20 TABLET ORAL DAILY
Status: DISCONTINUED | OUTPATIENT
Start: 2023-11-26 | End: 2023-11-26 | Stop reason: HOSPADM

## 2023-11-26 RX ORDER — LACTULOSE 10 G/15ML
20 SOLUTION ORAL 2 TIMES DAILY
Status: DISCONTINUED | OUTPATIENT
Start: 2023-11-26 | End: 2023-11-26 | Stop reason: HOSPADM

## 2023-11-26 RX ADMIN — FOLIC ACID 1 MG: 1 TABLET ORAL at 08:22

## 2023-11-26 RX ADMIN — ENOXAPARIN SODIUM 40 MG: 100 INJECTION SUBCUTANEOUS at 08:22

## 2023-11-26 RX ADMIN — ANASTROZOLE 1 MG: 1 TABLET, COATED ORAL at 02:12

## 2023-11-26 RX ADMIN — MULTIPLE VITAMINS W/ MINERALS TAB 1 TABLET: TAB ORAL at 08:22

## 2023-11-26 RX ADMIN — ALPRAZOLAM 1 MG: 0.5 TABLET ORAL at 08:22

## 2023-11-26 RX ADMIN — PANTOPRAZOLE SODIUM 20 MG: 40 TABLET, DELAYED RELEASE ORAL at 05:52

## 2023-11-26 RX ADMIN — MIDODRINE HYDROCHLORIDE 7.5 MG: 5 TABLET ORAL at 05:51

## 2023-11-26 RX ADMIN — LACTULOSE 30 G: 20 SOLUTION ORAL at 08:22

## 2023-11-26 RX ADMIN — Medication 100 MG: at 08:22

## 2023-11-26 NOTE — ASSESSMENT & PLAN NOTE
Compensated  Ammonia level wnl. Cont Lactulose from home. Cont home spironolactone.    Resume lasix   Fu with Hepatology and GI as OP

## 2023-11-26 NOTE — ASSESSMENT & PLAN NOTE
Likely from hyponatremia vs alcoholic seizure  H/o Breast CA  Can consider MRI brain as OP to r/o space occupying lesion  Seizure precautions

## 2023-11-26 NOTE — PROGRESS NOTES
[address] [facility]  Progress Note  Name: Reilly Duran  MRN: [id number]  Unit/Bed#: ED 12 I Date of Admission: 11/25/2023   Date of Service: 11/26/2023 I Hospital Day: 1    Assessment/Plan   * Seizure [facility])  Assessment & Plan  Per notes, had an episode of staring, unresponsiveness, perioral cyanosis. Bowel and urine incontinence after the episode. Reported she was out for dinner and she was drinking alcohol when she fell. Does not remember anything else. Unlikely seizure secondary to hyponatremia patient had lower number in the past and has no history of seizure. Her sodium was 128 on presentation. Also had alcohol level elevated  H/o Breast CA  CT of the head was normal  Seizure precautions  Neurology consult    Malignant neoplasm of lower-outer quadrant of right breast of female, estrogen receptor positive   Assessment & Plan  Cont Arimidex    Thrombocytopenia [facility])  Assessment & Plan  Platelet Count   Date Value Ref Range Status   10/31/2023 84 (L) 140 - 400 Thousand/uL Final     Platelets   Date Value Ref Range Status   11/26/2023 79 (L) 149 - 390 Thousands/uL Final   11/25/2023 82 (L) 149 - 390 Thousands/uL Final     In the setting of chronic liver disease   ok for DVT ppx for now    Hyponatremia  Assessment & Plan  Sodium   Date Value Ref Range Status   11/26/2023 134 (L) 135 - 147 mmol/L Final   11/26/2023 131 (L) 135 - 147 mmol/L Final   11/25/2023 128 (L) 135 - 147 mmol/L Final     Recent baseline has been 133. Received 1 L of normal saline yesterday  Cont home spironolactone, Lasix, fluid restriction  Monitor a.m. Alcoholic cirrhosis of liver with ascites (HCC)  Assessment & Plan  Compensated  Ammonia level wnl. Cont Lactulose from home. Cont home spironolactone.    Resume lasix today but at a lower dose ( 20 instead of 40 mg)  Fu with Hepatology and GI as OP    Alcohol abuse  Assessment & Plan  She is presently intoxicated Etoh 200s  Last drink 11/25 at dinner time  Sober and agitated this morning, stated she wanted to leave. Discussed with her and eventually patient agreed to stay until tomorrow. Continue CIWA    COPD (chronic obstructive pulmonary disease) (HCC)  Assessment & Plan  Uses Symbicort PRN, continue. Hypotension  Assessment & Plan  Cont home midodrine  Bp stable          VTE Pharmacologic Prophylaxis: VTE Score: 4 Moderate Risk (Score 3-4) - Pharmacological DVT Prophylaxis Ordered: enoxaparin (Lovenox). Mobility:   Basic Mobility Inpatient Raw Score: 20  JH-HLM Goal: 6: Walk 10 steps or more  JH-HLM Achieved: 5: Stand (1 or more minutes)  HLM Goal achieved. Continue to encourage appropriate mobility. Patient Centered Rounds: I performed bedside rounds with nursing staff today. Discussions with Specialists or Other Care Team Provider:     Education and Discussions with Family / Patient: Updated  () via phone. Total Time Spent on Date of Encounter in care of patient: 60 mins. This time was spent on one or more of the following: performing physical exam; counseling and coordination of care; obtaining or reviewing history; documenting in the medical record; reviewing/ordering tests, medications or procedures; communicating with other healthcare professionals and discussing with patient's family/caregivers. Current Length of Stay: 1 day(s)  Current Patient Status: Inpatient   Certification Statement: The patient will continue to require additional inpatient hospital stay due to seizure   Discharge Plan: Anticipate discharge in 24-48 hrs to home. Code Status: Level 1 - Full Code    Subjective:   Patient resting in bed, agitated stated that he wanted to leave. Further discussion with the patient patient denied nausea, vomiting, abdominal pain. Alert and oriented, reported she had no bowel movement for a week.     Objective:     Vitals:   Temp (24hrs), Av.9 °F (36.6 °C), Min:97.9 °F (36.6 °C), Max:97.9 °F (36.6 °C)    Temp:  [97.9 °F (36.6 °C)] 97.9 °F (36.6 °C)  HR:  [79-95] 80  Resp:  [13-41] 16  BP: ()/(52-65) 107/58  SpO2:  [90 %-99 %] 98 %  Body mass index is 26.45 kg/m². Input and Output Summary (last 24 hours): Intake/Output Summary (Last 24 hours) at 11/26/2023 1035  Last data filed at 11/26/2023 0006  Gross per 24 hour   Intake 1490 ml   Output 300 ml   Net 1190 ml       Physical Exam:   Physical Exam  Vitals and nursing note reviewed. Constitutional:       General: She is not in acute distress. Appearance: She is not diaphoretic. HENT:      Head: Normocephalic. Eyes:      General:         Right eye: No discharge. Left eye: No discharge. Cardiovascular:      Rate and Rhythm: Normal rate and regular rhythm. Heart sounds: No murmur heard. Pulmonary:      Effort: Pulmonary effort is normal. No respiratory distress. Breath sounds: Normal breath sounds. No wheezing, rhonchi or rales. Abdominal:      General: There is no distension. Palpations: Abdomen is soft. Tenderness: There is no abdominal tenderness. There is no guarding or rebound. Musculoskeletal:      Cervical back: Normal range of motion. Right lower leg: No edema. Left lower leg: No edema. Skin:     General: Skin is warm. Neurological:      Mental Status: She is alert and oriented to person, place, and time.       Comments: Asterixis/ tremor bilateral hands    Psychiatric:         Mood and Affect: Mood normal.         Behavior: Behavior normal.          Additional Data:     Labs:  Results from last 7 days   Lab Units 11/26/23  0602 11/25/23  1812   WBC Thousand/uL 7.18 6.83   HEMOGLOBIN g/dL 12.1 13.7   HEMATOCRIT % 34.8 39.7   PLATELETS Thousands/uL 79* 82*   NEUTROS PCT %  --  59   LYMPHS PCT %  --  24   MONOS PCT %  --  14*   EOS PCT %  --  1     Results from last 7 days   Lab Units 11/26/23  0602 11/26/23  0216 11/25/23  1812   SODIUM mmol/L 134*   < > 128*   POTASSIUM mmol/L 4.2   < > 3.5   CHLORIDE mmol/L 104   < > 93*   CO2 mmol/L 23   < > 23   BUN mg/dL 7   < > 9   CREATININE mg/dL 0.60   < > 0.77   ANION GAP mmol/L 7   < > 12   CALCIUM mg/dL 8.7   < > 9.0   ALBUMIN g/dL  --   --  3.3*   TOTAL BILIRUBIN mg/dL  --   --  1.10*   ALK PHOS U/L  --   --  125*   ALT U/L  --   --  24   AST U/L  --   --  49*   GLUCOSE RANDOM mg/dL 82   < > 109    < > = values in this interval not displayed.      Results from last 7 days   Lab Units 11/25/23  1812   INR  1.27*                   Lines/Drains:  Invasive Devices       Peripheral Intravenous Line  Duration             Peripheral IV 11/25/23 Left;Ventral (anterior) Forearm <1 day                          Imaging: Reviewed radiology reports from this admission including: CT head    Recent Cultures (last 7 days):         Last 24 Hours Medication List:   Current Facility-Administered Medications   Medication Dose Route Frequency Provider Last Rate    acetaminophen  650 mg Oral Q6H PRN Chaya Demo, PA-C      albuterol  2.5 mg Nebulization Q6H PRN Chaya Demo, PA-C      ALPRAZolam  1 mg Oral HS PRN Chaya Demo, PA-C      ALPRAZolam  2 mg Oral HS Chaya Demo, PA-C      anastrozole  1 mg Oral HS Chaya Demo, PA-C      budesonide-formoterol  2 puff Inhalation BID PRN Chaya Demo, PA-C      calcium carbonate  1,000 mg Oral Daily PRN Chaya Demo, PA-C      enoxaparin  40 mg Subcutaneous Q24H [facility] Chaya Handyo, PA-C      folic acid  1 mg Oral Daily Chaya Handyo, PA-C      furosemide  20 mg Oral Daily Merritt Álvarez MD      lactulose  20 g Oral BID Merritt Álvarez MD      LORazepam  2 mg Intravenous Q4H PRN Chaya Demo, PA-C      midodrine  7.5 mg Oral TID AC Chaya Handyo, PA-C      multivitamin-minerals  1 tablet Oral Daily Chaya Demo, PA-C      nicotine  1 patch Transdermal Daily Chaya Handyo, PA-C      ondansetron  4 mg Intravenous Q6H PRN Chaya Demo, PA-C      pantoprazole  20 mg Oral Early Morning Chaya Demo, PA-C      spironolactone  100 mg Oral HS Chaya Talbert PA-C      thiamine  100 mg Oral Daily Chaya Talbert PA-C      traZODone  50 mg Oral  Tarah Nixon PA-C          Today, Patient Was Seen By: Blanca Petit MD    **Please Note: This note may have been constructed using a voice recognition system. **

## 2023-11-26 NOTE — ED PROVIDER NOTES
History  Chief Complaint   Patient presents with    Seizure - New Onset     Patient presents to ED via EMS after having alleged seizure while at a restaurant tonight. Patient denies any seizure history. 27-year-old female presents for evaluation of witnessed seizure that occurred shortly prior to arrival.  Patient with a history of alcohol abuse. Denies known history of alcohol withdrawal seizures. Patient admits to drinking daily. Patient does not recall the events that occurred tonight. Denies any complaints at this time. Patient noted to be incontinent of urine and stool. Prior to Admission Medications   Prescriptions Last Dose Informant Patient Reported? Taking? ALPRAZolam (XANAX) 1 mg tablet   No No   Sig: TAKE ONE TABLET BY MOUTH TWICE A DAY AS NEEDED FOR ANXIETY   albuterol (2.5 mg/3 mL) 0.083 % nebulizer solution  Self No No   Sig: Take 3 mL (2.5 mg total) by nebulization every 6 (six) hours as needed for wheezing or shortness of breath   albuterol (Proventil HFA) 90 mcg/act inhaler  Self No No   Sig: Inhale 2 puffs every 6 (six) hours as needed for wheezing or shortness of breath   anastrozole (ARIMIDEX) 1 mg tablet  Self No No   Sig: Take 1 tablet (1 mg total) by mouth daily   budesonide-formoterol (Symbicort) 160-4.5 mcg/act inhaler  Self No No   Sig: Inhale 2 puffs 2 (two) times a day Rinse mouth after use. furosemide (LASIX) 40 mg tablet  Self No No   Sig: Take 1 tablet (40 mg total) by mouth daily   guselkumab (TREMFYA) subcutaneous injection  Self Yes No   Sig: Inject 100 mg under the skin every 6 weeks   lactulose 20 g/30 mL  Self No No   Sig: Take 45 mL (30 g total) by mouth 2 (two) times a day Titrate dose for an effect of 3 soft, but formed bowel movements per day. You can start with 1 tablespoon per day and slowly increase to effect.    midodrine (PROAMATINE) 5 mg tablet   No No   Sig: Take 1.5 tablets (7.5 mg total) by mouth 3 (three) times a day before meals   pantoprazole (PROTONIX) 20 mg tablet  Self No No   Sig: Take 1 tablet (20 mg total) by mouth daily   spironolactone (ALDACTONE) 100 mg tablet  Self No No   Sig: Take 1 tablet (100 mg total) by mouth daily   traZODone (DESYREL) 50 mg tablet  Self No No   Sig: Take 1 tablet (50 mg total) by mouth daily at bedtime      Facility-Administered Medications: None       Past Medical History:   Diagnosis Date    Alcoholic fatty liver     Anxiety     Asthma     COPD (chronic obstructive pulmonary disease) (HCC)     Elevated liver function tests     GERD (gastroesophageal reflux disease)     GERD without esophagitis     Hyperlipidemia     Hypertension     IBS (irritable bowel syndrome)     Insomnia     Insomnia     Liver disease     Nervousness     Nicotine dependence     Other specified urinary incontinence     RLS (restless legs syndrome)     Wears glasses        Past Surgical History:   Procedure Laterality Date    BACK SURGERY      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    BREAST EXCISIONAL BIOPSY Right 11/01/2004    benign    CATARACT EXTRACTION, BILATERAL  08/01/2018    COLONOSCOPY N/A 5/8/2019    Procedure: COLONOSCOPY;  Surgeon: Marylou Olvera MD;  Location: [facility] GI LAB; Service: Gastroenterology    EGD AND COLONOSCOPY N/A 3/19/2018    Procedure: EGD AND COLONOSCOPY;  Surgeon: Marylou Olvera MD;  Location: [facility] GI LAB; Service: Gastroenterology    ESOPHAGOGASTRODUODENOSCOPY N/A 5/8/2019    Procedure: ESOPHAGOGASTRODUODENOSCOPY (EGD); Surgeon: Marylou Olvera MD;  Location: [facility] GI LAB;   Service: Gastroenterology    IR PARACENTESIS  11/27/2020    IR PARACENTESIS  12/8/2020    IR PARACENTESIS  10/28/2022    IR PARACENTESIS  11/7/2022    IR PARACENTESIS  11/30/2022    IR PARACENTESIS  12/30/2022    IR PARACENTESIS  1/16/2023    IR THORACENTESIS  1/16/2023    IR THORACENTESIS  3/28/2023    KNEE SURGERY      KNEE SURGERY      LUMBAR LAMINECTOMY  04/1999    LYMPH NODE BIOPSY      MAMMO STEREOTACTIC BREAST BIOPSY RIGHT (ALL INC) Right 5/21/2021    MAMMO STEREOTACTIC BREAST BIOPSY RIGHT (ALL INC) EACH ADD Right 5/21/2021    TOTAL ABDOMINAL HYSTERECTOMY  02/05/2008    TUBAL LIGATION      TUBAL LIGATION  1989    UPPER GASTROINTESTINAL ENDOSCOPY         Family History   Problem Relation Age of Onset    Breast cancer Mother     No Known Problems Father     No Known Problems Sister     No Known Problems Brother     No Known Problems Son     No Known Problems Maternal Grandmother     No Known Problems Maternal Grandfather     No Known Problems Paternal Grandmother     No Known Problems Paternal Grandfather     No Known Problems Maternal Aunt     No Known Problems Maternal Aunt     No Known Problems Paternal Aunt     Substance Abuse Neg Hx     Mental illness Neg Hx      I have reviewed and agree with the history as documented. E-Cigarette/Vaping    E-Cigarette Use Never User      E-Cigarette/Vaping Substances    Nicotine No     THC No     CBD No     Flavoring No     Other No     Unknown No      Social History     Tobacco Use    Smoking status: Every Day     Packs/day: 1.00     Years: 45.00     Total pack years: 45.00     Types: Cigarettes     Start date: 1/1/1978    Smokeless tobacco: Never   Vaping Use    Vaping Use: Never used   Substance Use Topics    Alcohol use: Yes     Alcohol/week: 7.0 standard drinks of alcohol     Types: 1 Glasses of wine, 6 Cans of beer per week    Drug use: No       Review of Systems   Neurological:  Positive for seizures. Physical Exam  Physical Exam  Vitals and nursing note reviewed. Constitutional:       Appearance: She is well-developed. HENT:      Head: Normocephalic and atraumatic. Right Ear: External ear normal.      Left Ear: External ear normal.      Nose: Nose normal.   Eyes:      General: No scleral icterus. Cardiovascular:      Rate and Rhythm: Normal rate. Pulmonary:      Effort: Pulmonary effort is normal. No respiratory distress. Abdominal:      General: There is no distension. Musculoskeletal:         General: No deformity. Normal range of motion. Cervical back: Normal range of motion. Comments: Able to move all extremities. Follows commands without difficulty. Skin:     Findings: No rash. Neurological:      General: No focal deficit present. Mental Status: She is alert and oriented to person, place, and time.    Psychiatric:         Mood and Affect: Mood normal.         Vital Signs  ED Triage Vitals   Temperature Pulse Respirations Blood Pressure SpO2   11/25/23 1809 11/25/23 1804 11/25/23 1804 11/25/23 1804 11/25/23 1804   97.9 °F (36.6 °C) 95 18 128/65 96 %      Temp Source Heart Rate Source Patient Position - Orthostatic VS BP Location FiO2 (%)   11/25/23 1809 11/25/23 1804 11/25/23 1804 11/25/23 1804 --   Temporal Monitor Lying Left arm       Pain Score       --                  Vitals:    11/25/23 1904 11/25/23 1930 11/25/23 2000 11/25/23 2100   BP: 116/65 111/56 110/55 112/63   Pulse: 84 79 87 84   Patient Position - Orthostatic VS: Lying Lying Lying          Visual Acuity  Visual Acuity      Flowsheet Row Most Recent Value   L Pupil Size (mm) 3   R Pupil Size (mm) 3            ED Medications  Medications   albuterol inhalation solution 2.5 mg (has no administration in time range)   ALPRAZolam (XANAX) tablet 2 mg (2 mg Oral Not Given 11/25/23 2143)   LORazepam (ATIVAN) injection 2 mg (2 mg Intravenous Not Given 11/25/23 2146)   ALPRAZolam Loralie Drone) tablet 1 mg (1 mg Oral Given 11/25/23 2140)   anastrozole (ARIMIDEX) tablet 1 mg (has no administration in time range)   budesonide-formoterol (SYMBICORT) 160-4.5 mcg/act inhaler 2 puff (has no administration in time range)   lactulose (CHRONULAC) oral solution 30 g (0 g Oral Not Given 11/25/23 2159)   midodrine (PROAMATINE) tablet 7.5 mg (7.5 mg Oral Given 11/25/23 2157)   pantoprazole (PROTONIX) EC tablet 20 mg (has no administration in time range)   spironolactone (ALDACTONE) tablet 100 mg (100 mg Oral Given 11/25/23 2158)   traZODone (DESYREL) tablet 50 mg (50 mg Oral Given 11/25/23 2140)   acetaminophen (TYLENOL) tablet 650 mg (has no administration in time range)   ondansetron (ZOFRAN) injection 4 mg (has no administration in time range)   calcium carbonate (TUMS) chewable tablet 1,000 mg (has no administration in time range)   nicotine (NICODERM CQ) 21 mg/24 hr TD 24 hr patch 1 patch (1 patch Transdermal Not Given 11/25/23 2142)   thiamine tablet 100 mg (has no administration in time range)   folic acid (FOLVITE) tablet 1 mg (has no administration in time range)   multivitamin-minerals (CENTRUM) tablet 1 tablet (has no administration in time range)   enoxaparin (LOVENOX) subcutaneous injection 40 mg (has no administration in time range)   sodium chloride 0.9 % bolus 1,000 mL (1,000 mL Intravenous New Bag 11/25/23 2206)       Diagnostic Studies  Results Reviewed       Procedure Component Value Units Date/Time    Basic metabolic panel [[id number]]     Lab Status: No result Specimen: Blood     Ammonia [[id number]]  (Normal) Collected: 11/25/23 1812    Lab Status: Final result Specimen: Blood from Arm, Left Updated: 11/25/23 1840     Ammonia 61 umol/L     Ethanol [[id number]]  (Abnormal) Collected: 11/25/23 1812    Lab Status: Final result Specimen: Blood from Arm, Left Updated: 11/25/23 1840     Ethanol Lvl 253 mg/dL     Comprehensive metabolic panel [[id number]]  (Abnormal) Collected: 11/25/23 1812    Lab Status: Final result Specimen: Blood from Arm, Left Updated: 11/25/23 1839     Sodium 128 mmol/L      Potassium 3.5 mmol/L      Chloride 93 mmol/L      CO2 23 mmol/L      ANION GAP 12 mmol/L      BUN 9 mg/dL      Creatinine 0.77 mg/dL      Glucose 109 mg/dL      Calcium 9.0 mg/dL      Corrected Calcium 9.6 mg/dL      AST 49 U/L      ALT 24 U/L      Alkaline Phosphatase 125 U/L      Total Protein 8.2 g/dL      Albumin 3.3 g/dL      Total Bilirubin 1.10 mg/dL      eGFR 81 ml/min/1.73sq m     Narrative:      National Kid[employer] guidelines for Chronic Kidney Disease (CKD):     Stage 1 with normal or high GFR (GFR > 90 mL/min/1.73 square meters)    Stage 2 Mild CKD (GFR = 60-89 mL/min/1.73 square meters)    Stage 3A Moderate CKD (GFR = 45-59 mL/min/1.73 square meters)    Stage 3B Moderate CKD (GFR = 30-44 mL/min/1.73 square meters)    Stage 4 Severe CKD (GFR = 15-29 mL/min/1.73 square meters)    Stage 5 End Stage CKD (GFR <15 mL/min/1.73 square meters)  Note: GFR calculation is accurate only with a steady state creatinine    Lipase [[id number]]  (Normal) Collected: 11/25/23 1812    Lab Status: Final result Specimen: Blood from Arm, Left Updated: 11/25/23 1839     Lipase 79 u/L     Protime-INR [[id number]]  (Abnormal) Collected: 11/25/23 1812    Lab Status: Final result Specimen: Blood from Arm, Left Updated: 11/25/23 1837     Protime 16.4 seconds      INR 1.27    APTT [[id number]]  (Normal) Collected: 11/25/23 1812    Lab Status: Final result Specimen: Blood from Arm, Left Updated: 11/25/23 1837     PTT 33 seconds     CBC and differential [[id number]]  (Abnormal) Collected: 11/25/23 1812    Lab Status: Final result Specimen: Blood from Arm, Left Updated: 11/25/23 1828     WBC 6.83 Thousand/uL      RBC 3.76 Million/uL      Hemoglobin 13.7 g/dL      Hematocrit 39.7 %       fL      MCH 36.4 pg      MCHC 34.5 g/dL      RDW 14.2 %      MPV 9.4 fL      Platelets 82 Thousands/uL      nRBC 0 /100 WBCs      Neutrophils Relative 59 %      Immat GRANS % 1 %      Lymphocytes Relative 24 %      Monocytes Relative 14 %      Eosinophils Relative 1 %      Basophils Relative 1 %      Neutrophils Absolute 4.06 Thousands/µL      Immature Grans Absolute 0.04 Thousand/uL      Lymphocytes Absolute 1.61 Thousands/µL      Monocytes Absolute 0.97 Thousand/µL      Eosinophils Absolute 0.09 Thousand/µL      Basophils Absolute 0.06 Thousands/µL                    CT head wo contrast   Final Result by Jelena Perry MD (11/25 1859)      No acute intracranial abnormality. Workstation performed: [id number]                    Procedures  Procedures         ED Course                                             Medical Decision Making  69-year-old female presenting with new onset witnessed seizure. Suspect alcohol withdrawal however further evaluation with labs, EKG, CT head. Symptom control as needed. Will admit for further evaluation. Problems Addressed:  New onset seizure [facility]): acute illness or injury    Amount and/or Complexity of Data Reviewed  Labs: ordered. Radiology: ordered. Risk  Decision regarding hospitalization. Disposition  Final diagnoses:   New onset seizure ([address])   Alcohol intoxication ([address])     Time reflects when diagnosis was documented in both MDM as applicable and the Disposition within this note       Time User Action Codes Description Comment    11/25/2023  7:45 PM Gustavo Ariza Add [R56.9] New onset seizure ([address])     11/25/2023  7:46 PM Gustavo Ariza Add [F10.929] Alcohol intoxication [facility])           ED Disposition       ED Disposition   Admit    Condition   Stable    Date/Time   Sat Nov 25, 2023  7:45 PM    Comment   Case was discussed with RANDY and the patient's admission status was agreed to be Admission Status: inpatient status to the service of Dr. Mario Souza . Follow-up Information    None         Patient's Medications   Discharge Prescriptions    No medications on file       No discharge procedures on file.     PDMP Review         Value Time User    PDMP Reviewed  Yes 10/31/2023  9:49 AM Yanick Mitchell PA-C            ED Provider  Electronically Signed by             Gustavo Ariza DO  11/26/23 0016

## 2023-11-26 NOTE — DISCHARGE SUMMARY
[address] [facility]  Discharge- May Zev 1960, 58 y.o. female MRN: [id number]  Unit/Bed#: ED 12 Encounter: [id number]  Primary Care Provider: Eric Beach PA-C   Date and time admitted to hospital: 11/25/2023  6:01 PM    * Seizure [facility])  Assessment & Plan  Per notes, had an episode of staring, unresponsiveness, perioral cyanosis. Bowel and urine incontinence after the episode. Reported she was out for dinner and she was drinking alcohol when she fell. Does not remember anything else. Unlikely seizure secondary to hyponatremia patient had lower number in the past and has no history of seizure. Her sodium was 128 on presentation. Also had alcohol level elevated  H/o Breast CA  CT of the head was normal  Neurology consulted. Looks like alcohol withdrawal seizure, no need for medication or EEG. Patient not interested in alcohol cessation       Malignant neoplasm of lower-outer quadrant of right breast of female, estrogen receptor positive   Assessment & Plan  Cont Arimidex    Thrombocytopenia [facility])  Assessment & Plan  Platelet Count   Date Value Ref Range Status   10/31/2023 84 (L) 140 - 400 Thousand/uL Final     Platelets   Date Value Ref Range Status   11/26/2023 79 (L) 149 - 390 Thousands/uL Final   11/25/2023 82 (L) 149 - 390 Thousands/uL Final     In the setting of chronic liver disease     Hyponatremia  Assessment & Plan  Sodium   Date Value Ref Range Status   11/26/2023 134 (L) 135 - 147 mmol/L Final   11/26/2023 131 (L) 135 - 147 mmol/L Final   11/25/2023 128 (L) 135 - 147 mmol/L Final     Recent baseline has been 133. Received 1 L of normal saline yesterday  Cont home spironolactone, Lasix, fluid restriction  Monitor a.m. Alcoholic cirrhosis of liver with ascites (HCC)  Assessment & Plan  Compensated  Ammonia level wnl. Cont Lactulose from home. Cont home spironolactone.    Resume lasix   Fu with Hepatology and GI as OP    Alcohol abuse  Assessment & Plan  She is presently intoxicated Etoh 200s  Last drink 11/25 at dinner time  Sober and agitated this morning, stated she wanted to leave. Clear for discharge, not interested in alcohol cessation. COPD (chronic obstructive pulmonary disease) (HCC)  Assessment & Plan  Uses Symbicort PRN, continue. Hypotension  Assessment & Plan  Cont home midodrine  Bp stable       Medical Problems       Resolved Problems  Date Reviewed: 10/29/2023   None       Discharging Physician / Practitioner: Neville Duncan MD  PCP: Yvonne Siddiqi PA-C  Admission Date:   Admission Orders (From admission, onward)       Ordered        11/25/23 1946  INPATIENT ADMISSION  Once                          Discharge Date: 11/26/23    Consultations During Hospital Stay:  neurology    Procedures Performed:   none    Significant Findings / Test Results:   Ethanol: 253  Na: 128    Incidental Findings:   none     Test Results Pending at Discharge (will require follow up):   none     Outpatient Tests Requested:  none    Complications:  none     Reason for Admission: seizure     Hospital Course:   Mando Das is a 58 y.o. female patient who originally presented to the hospital on 11/25/2023 due to seizure. Per notes and , she was having dinner and drunk alcohol when she started to stare, being unresponsive, with perioral cyanosis, incontinent of stool /urine. Tired, but not confused in ED   Alcohol level >200. Neurology consulted. Likely alcohol withdrawal seizure in a pt with chronic alcohol abuse, no need for medication or EEG   Patient is not interested in alcohol cessation. Will be dc home. Follow up with PCP       Please see above list of diagnoses and related plan for additional information.      Condition at Discharge: good    Discharge Day Visit / Exam:   Subjective:  no complaints, wants to leave home   Vitals: Blood Pressure: 107/58 (11/26/23 0833)  Pulse: 80 (11/26/23 0833)  Temperature: 97.9 °F (36.6 °C) (11/25/23 1809)  Temp Source: Temporal (11/25/23 1809)  Respirations: 16 (11/26/23 0833)  Height: 5' 4" (162.6 cm) (11/25/23 1804)  Weight - Scale: 69.9 kg (154 lb 1.6 oz) (11/25/23 1804)  SpO2: 98 % (11/26/23 2689)  Exam:   Physical Exam  Vitals and nursing note reviewed. Constitutional:       General: She is not in acute distress. Appearance: She is not diaphoretic. HENT:      Head: Normocephalic. Eyes:      General: No scleral icterus. Right eye: No discharge. Left eye: No discharge. Cardiovascular:      Rate and Rhythm: Normal rate and regular rhythm. Heart sounds: No murmur heard. Pulmonary:      Effort: Pulmonary effort is normal. No respiratory distress. Breath sounds: Normal breath sounds. No wheezing, rhonchi or rales. Abdominal:      General: There is no distension. Palpations: Abdomen is soft. Tenderness: There is no abdominal tenderness. There is no guarding or rebound. Musculoskeletal:      Cervical back: Normal range of motion. Right lower leg: No edema. Left lower leg: No edema. Skin:     General: Skin is warm. Neurological:      Mental Status: She is alert and oriented to person, place, and time. Psychiatric:         Mood and Affect: Mood normal.         Behavior: Behavior normal.         Thought Content: Thought content normal.         Judgment: Judgment normal.          Discussion with Family: Updated  () via phone. Discharge instructions/Information to patient and family:   See after visit summary for information provided to patient and family. Provisions for Follow-Up Care:  See after visit summary for information related to follow-up care and any pertinent home health orders. Mobility at time of Discharge:   Basic Mobility Inpatient Raw Score: 20  JH-HLM Goal: 6: Walk 10 steps or more  JH-HLM Achieved: 5: Stand (1 or more minutes)  HLM Goal achieved. Continue to encourage appropriate mobility. Disposition:   Home    Planned Readmission: no     Discharge Statement:  I spent 60 minutes discharging the patient. This time was spent on the day of discharge. I had direct contact with the patient on the day of discharge. Greater than 50% of the total time was spent examining patient, answering all patient questions, arranging and discussing plan of care with patient as well as directly providing post-discharge instructions. Additional time then spent on discharge activities. Discharge Medications:  See after visit summary for reconciled discharge medications provided to patient and/or family.       **Please Note: This note may have been constructed using a voice recognition system**

## 2023-11-26 NOTE — ASSESSMENT & PLAN NOTE
Compensated  Ammonia level wnl. Cont Lactulose from home. Cont home spironolactone.  Hold lasix tonight  Fu with Hepatology and GI as OP

## 2023-11-26 NOTE — ASSESSMENT & PLAN NOTE
She is presently intoxicated Etoh 200s  Last drink 11/25 at dinner time  Sober and agitated this morning, stated she wanted to leave. Clear for discharge, not interested in alcohol cessation.

## 2023-11-26 NOTE — ASSESSMENT & PLAN NOTE
Sodium   Date Value Ref Range Status   11/26/2023 134 (L) 135 - 147 mmol/L Final   11/26/2023 131 (L) 135 - 147 mmol/L Final   11/25/2023 128 (L) 135 - 147 mmol/L Final     Recent baseline has been 133. Received 1 L of normal saline yesterday  Cont home spironolactone, Lasix, fluid restriction  Monitor a.m.

## 2023-11-26 NOTE — ASSESSMENT & PLAN NOTE
Per notes, had an episode of staring, unresponsiveness, perioral cyanosis. Bowel and urine incontinence after the episode. Reported she was out for dinner and she was drinking alcohol when she fell. Does not remember anything else. Unlikely seizure secondary to hyponatremia patient had lower number in the past and has no history of seizure. Her sodium was 128 on presentation. Also had alcohol level elevated  H/o Breast CA  CT of the head was normal  Neurology consulted. Looks like alcohol withdrawal seizure, no need for medication or EEG.  Patient not interested in alcohol cessation

## 2023-11-26 NOTE — ASSESSMENT & PLAN NOTE
Sodium   Date Value Ref Range Status   11/25/2023 128 (L) 135 - 147 mmol/L Final   10/31/2023 133 (L) 135 - 146 mmol/L Final   10/06/2023 133 (L) 135 - 146 mmol/L Final     Recent baseline has been 133  Hold home lasix tonight  Cont home spironolactone  Cont home FR  No salt restriction on diet right now  1 L NS 0.9% IVF tonight  Recheck BMP at midnight

## 2023-11-26 NOTE — ASSESSMENT & PLAN NOTE
Platelet Count   Date Value Ref Range Status   10/31/2023 84 (L) 140 - 400 Thousand/uL Final   10/06/2023 96 (L) 140 - 400 Thousand/uL Final     Platelets   Date Value Ref Range Status   11/25/2023 82 (L) 149 - 390 Thousands/uL Final     Ok for DVT ppx for now

## 2023-11-26 NOTE — ASSESSMENT & PLAN NOTE
Platelet Count   Date Value Ref Range Status   10/31/2023 84 (L) 140 - 400 Thousand/uL Final     Platelets   Date Value Ref Range Status   11/26/2023 79 (L) 149 - 390 Thousands/uL Final   11/25/2023 82 (L) 149 - 390 Thousands/uL Final     In the setting of chronic liver disease

## 2023-11-26 NOTE — H&P
[facility]  H&P  Name: Kenya Monreal 58 y.o. female I MRN: [id number]  Unit/Bed#: ED 15 I Date of Admission: 11/25/2023   Date of Service: 11/26/2023 I Hospital Day: 1      Assessment/Plan   * Seizure [facility])  Assessment & Plan  Likely from hyponatremia vs alcoholic seizure  H/o Breast CA  Can consider MRI brain as OP to r/o space occupying lesion  Seizure precautions    Malignant neoplasm of lower-outer quadrant of right breast of female, estrogen receptor positive   Assessment & Plan  Cont Arimidex    Thrombocytopenia (HCC)  Assessment & Plan  Platelet Count   Date Value Ref Range Status   10/31/2023 84 (L) 140 - 400 Thousand/uL Final   10/06/2023 96 (L) 140 - 400 Thousand/uL Final     Platelets   Date Value Ref Range Status   11/25/2023 82 (L) 149 - 390 Thousands/uL Final     Ok for DVT ppx for now    Hyponatremia  Assessment & Plan  Sodium   Date Value Ref Range Status   11/25/2023 128 (L) 135 - 147 mmol/L Final   10/31/2023 133 (L) 135 - 146 mmol/L Final   10/06/2023 133 (L) 135 - 146 mmol/L Final     Recent baseline has been 133  Hold home lasix tonight  Cont home spironolactone  Cont home FR  No salt restriction on diet right now  1 L NS 0.9% IVF tonight  Recheck BMP at midnight    Alcoholic cirrhosis of liver with ascites (HCC)  Assessment & Plan  Compensated  Ammonia level wnl. Cont Lactulose from home. Cont home spironolactone. Hold lasix tonight  Fu with Hepatology and GI as OP    Alcohol abuse  Assessment & Plan  She is presently intoxicated Etoh 200s  CIWA  Last drink was tonight at dinner time    COPD (chronic obstructive pulmonary disease) (HCC)  Assessment & Plan  Uses Symbicort PRN, continue. Hypotension  Assessment & Plan  Cont home midodrine         VTE Pharmacologic Prophylaxis: VTE Score: 4 Moderate Risk (Score 3-4) - Pharmacological DVT Prophylaxis Ordered: enoxaparin (Lovenox).   Code Status: Level 1 - full code  Discussion with family: Updated  () at bedside. Anticipated Length of Stay: Patient will be admitted on an inpatient basis with an anticipated length of stay of greater than 2 midnights secondary to trend labs, ivf, seizure precautions. Total Time Spent on Date of Encounter in care of patient: 55 mins. This time was spent on one or more of the following: performing physical exam; counseling and coordination of care; obtaining or reviewing history; documenting in the medical record; reviewing/ordering tests, medications or procedures; communicating with other healthcare professionals and discussing with patient's family/caregivers. Chief Complaint: seizure around 5 pm today    History of Present Illness:  Vasu Turner is a 58 y.o. female with a PMH of alcoholism, smoker, COPD, hyponatremia, breast cancer, alcoholic cirrhosis with history of ascites and small esophageal varices from portal hypertension, thrombocytopenia who presents with seizure around 5 PM today. Patient was feeling in her normal state of health today, she did have a low-grade fever around 99 this morning however denies neck pain or stiffness,  denies encephalopathy today/personality changes, myalgia, congestion, sick contacts, cough, nausea or vomiting, urinary urgency, frequency or dysuria. Patient did not take her diuretics today or any medications this morning. She usually takes them at night. She drink 3 alcoholic beverages today, 1 including dinner at a diner, during dinner  witnessed staring event, nonresponsive, turning blue around the mouth and drooling, likely there was a nurse also eating at the restaurant helped him lay her on her side and call EMS. No tonic-clonic. She did have urinary incontinence at this time.   Patient reports she was excessively tired in the ambulance but at the time of my encounter she is awake and alert and definitely not postictal.  Patient seems confused on her doctor's recommendation for salt intake, reports she has been told to eat as much salt as she wants. Patient has taken a break from lactulose given severe diarrhea about 1 week ago. She still drinks 1-3 drinks daily. She has never had a seizure before. There is no personal or family history of seizure disorder. She denies recent changes in her weight, headaches, lightheadedness, dizziness, presyncope, paresthesia or paralysis. Review of Systems:  Review of Systems   Constitutional:  Negative for chills and diaphoresis. Low grade temp 99 earlier in the day 11/25   HENT:  Positive for drooling. Negative for congestion, postnasal drip and sore throat. Eyes: Negative. Respiratory:  Negative for cough and shortness of breath. Cardiovascular:  Negative for chest pain, palpitations and leg swelling. Gastrointestinal:  Negative for abdominal pain and vomiting. Endocrine: Negative. Genitourinary:  Negative for dysuria. Limited urinary output today   Musculoskeletal:  Negative for myalgias. Skin:         Purple around the lips during the episode today   Allergic/Immunologic: Negative. Neurological:  Positive for seizures. Negative for dizziness and headaches. Hematological: Negative.     Psychiatric/Behavioral:          Staring into space today during the seizure episode       Past Medical and Surgical History:   Past Medical History:   Diagnosis Date    Alcoholic fatty liver     Anxiety     Asthma     COPD (chronic obstructive pulmonary disease) (HCC)     Elevated liver function tests     GERD (gastroesophageal reflux disease)     GERD without esophagitis     Hyperlipidemia     Hypertension     IBS (irritable bowel syndrome)     Insomnia     Insomnia     Liver disease     Nervousness     Nicotine dependence     Other specified urinary incontinence     RLS (restless legs syndrome)     Wears glasses        Past Surgical History:   Procedure Laterality Date    BACK SURGERY      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 BREAST EXCISIONAL BIOPSY Right 11/01/2004    benign    CATARACT EXTRACTION, BILATERAL  08/01/2018    COLONOSCOPY N/A 5/8/2019    Procedure: COLONOSCOPY;  Surgeon: Ashley Harris MD;  Location: [facility] GI LAB; Service: Gastroenterology    EGD AND COLONOSCOPY N/A 3/19/2018    Procedure: EGD AND COLONOSCOPY;  Surgeon: Ashley Harris MD;  Location: [facility] GI LAB; Service: Gastroenterology    ESOPHAGOGASTRODUODENOSCOPY N/A 5/8/2019    Procedure: ESOPHAGOGASTRODUODENOSCOPY (EGD); Surgeon: Ashley Harris MD;  Location: [facility] GI LAB; Service: Gastroenterology    IR PARACENTESIS  11/27/2020    IR PARACENTESIS  12/8/2020    IR PARACENTESIS  10/28/2022    IR PARACENTESIS  11/7/2022    IR PARACENTESIS  11/30/2022    IR PARACENTESIS  12/30/2022    IR PARACENTESIS  1/16/2023    IR THORACENTESIS  1/16/2023    IR THORACENTESIS  3/28/2023    KNEE SURGERY      KNEE SURGERY      LUMBAR LAMINECTOMY  04/1999    LYMPH NODE BIOPSY      MAMMO STEREOTACTIC BREAST BIOPSY RIGHT (ALL INC) Right 5/21/2021    MAMMO STEREOTACTIC BREAST BIOPSY RIGHT (ALL INC) EACH ADD Right 5/21/2021    TOTAL ABDOMINAL HYSTERECTOMY  02/05/2008    TUBAL LIGATION      TUBAL LIGATION  1989    UPPER GASTROINTESTINAL ENDOSCOPY         Meds/Allergies:  Prior to Admission medications    Medication Sig Start Date End Date Taking?  Authorizing Provider   albuterol (2.5 mg/3 mL) 0.083 % nebulizer solution Take 3 mL (2.5 mg total) by nebulization every 6 (six) hours as needed for wheezing or shortness of breath 8/3/22   Yanick Mitchell PA-C   albuterol (Proventil HFA) 90 mcg/act inhaler Inhale 2 puffs every 6 (six) hours as needed for wheezing or shortness of breath 10/13/23   Yanick Mitchell PA-C   ALPRAZolam Lg Preeti) 1 mg tablet TAKE ONE TABLET BY MOUTH TWICE A DAY AS NEEDED FOR ANXIETY 10/31/23   Yanick Mitchell PA-C   anastrozole (ARIMIDEX) 1 mg tablet Take 1 tablet (1 mg total) by mouth daily 4/26/23   JOSÉ Castro   budesonide-formoterol (Symbicort) 160-4.5 mcg/act inhaler Inhale 2 puffs 2 (two) times a day Rinse mouth after use. 10/13/23   Tre Carmona PA-C   furosemide (LASIX) 40 mg tablet Take 1 tablet (40 mg total) by mouth daily 6/15/23   MD dominick Gregoriokumab [facility]) subcutaneous injection Inject 100 mg under the skin every 6 weeks    Historical Provider, MD   lactulose 20 g/30 mL Take 45 mL (30 g total) by mouth 2 (two) times a day Titrate dose for an effect of 3 soft, but formed bowel movements per day. You can start with 1 tablespoon per day and slowly increase to effect. 3/28/23   Jaida Kelsey MD   midodrine (PROAMATINE) 5 mg tablet Take 1.5 tablets (7.5 mg total) by mouth 3 (three) times a day before meals 11/8/23   Alexandra Tovar DO   pantoprazole (PROTONIX) 20 mg tablet Take 1 tablet (20 mg total) by mouth daily 8/15/23 5/11/24  Patt Núñez MD   spironolactone (ALDACTONE) 100 mg tablet Take 1 tablet (100 mg total) by mouth daily 6/15/23   Luis A Castellano MD   traZODone (DESYREL) 50 mg tablet Take 1 tablet (50 mg total) by mouth daily at bedtime 9/19/23   Tre Carmona PA-C   mupirocin [facility]) 2 % ointment Apply topically 3 (three) times a day  Patient not taking: Reported on 8/26/2022 5/10/22 9/17/22  Tre Carmona PA-C     I have reviewed home medications with patient personally. Allergies:    Allergies   Allergen Reactions    Sulfa Antibiotics Shortness Of Breath    Ace Inhibitors Cough and Other (See Comments)     coughing       Social History:  Marital Status: /Civil Union   Occupation: none  Patient Pre-hospital Living Situation: Home w/   Patient Pre-hospital Level of Mobility: walks  Patient Pre-hospital Diet Restrictions: FR & salt restriction  Substance Use History:   Social History     Substance and Sexual Activity   Alcohol Use Yes    Alcohol/week: 7.0 standard drinks of alcohol    Types: 1 Glasses of wine, 6 Cans of beer per week     Social History     Tobacco Use Smoking Status Every Day    Packs/day: 1.00    Years: 45.00    Total pack years: 45.00    Types: Cigarettes    Start date: 1/1/1978   Smokeless Tobacco Never     Social History     Substance and Sexual Activity   Drug Use No       Family History:  Family History   Problem Relation Age of Onset    Breast cancer Mother     No Known Problems Father     No Known Problems Sister     No Known Problems Brother     No Known Problems Son     No Known Problems Maternal Grandmother     No Known Problems Maternal Grandfather     No Known Problems Paternal Grandmother     No Known Problems Paternal Grandfather     No Known Problems Maternal Aunt     No Known Problems Maternal Aunt     No Known Problems Paternal Aunt     Substance Abuse Neg Hx     Mental illness Neg Hx        Physical Exam:     Vitals:   Blood Pressure: 104/56 (11/26/23 0215)  Pulse: 82 (11/26/23 0215)  Temperature: 97.9 °F (36.6 °C) (11/25/23 1809)  Temp Source: Temporal (11/25/23 1809)  Respirations: 13 (11/26/23 0215)  Height: 5' 4" (162.6 cm) (11/25/23 1804)  Weight - Scale: 69.9 kg (154 lb 1.6 oz) (11/25/23 1804)  SpO2: 90 % (11/26/23 0215)    Physical Exam  Vitals reviewed. Constitutional:       General: She is not in acute distress. Appearance: She is not ill-appearing. Comments: Awake, talkative, chronically ill appearing   HENT:      Head: Normocephalic and atraumatic. Nose: Nose normal.      Mouth/Throat:      Mouth: Mucous membranes are dry. Eyes:      General: Scleral icterus present. No visual field deficit. Extraocular Movements: Extraocular movements intact. Pupils: Pupils are equal, round, and reactive to light. Cardiovascular:      Rate and Rhythm: Normal rate and regular rhythm. Pulmonary:      Effort: Pulmonary effort is normal.   Abdominal:      Tenderness: There is no abdominal tenderness. Musculoskeletal:         General: No swelling. Cervical back: Normal range of motion. No rigidity.    Skin: General: Skin is dry. Coloration: Skin is jaundiced. Findings: Bruising present. Comments: Poor turgor   Neurological:      General: No focal deficit present. Mental Status: She is alert. Cranial Nerves: No cranial nerve deficit, dysarthria or facial asymmetry. Sensory: No sensory deficit. Motor: No weakness, tremor or pronator drift. Psychiatric:      Comments: No encephalopathy          Additional Data:     Lab Results:  Results from last 7 days   Lab Units 11/25/23  1812   WBC Thousand/uL 6.83   HEMOGLOBIN g/dL 13.7   HEMATOCRIT % 39.7   PLATELETS Thousands/uL 82*   NEUTROS PCT % 59   LYMPHS PCT % 24   MONOS PCT % 14*   EOS PCT % 1     Results from last 7 days   Lab Units 11/25/23  1812   SODIUM mmol/L 128*   POTASSIUM mmol/L 3.5   CHLORIDE mmol/L 93*   CO2 mmol/L 23   BUN mg/dL 9   CREATININE mg/dL 0.77   ANION GAP mmol/L 12   CALCIUM mg/dL 9.0   ALBUMIN g/dL 3.3*   TOTAL BILIRUBIN mg/dL 1.10*   ALK PHOS U/L 125*   ALT U/L 24   AST U/L 49*   GLUCOSE RANDOM mg/dL 109     Results from last 7 days   Lab Units 11/25/23  1812   INR  1.27*                   Lines/Drains:  Invasive Devices       Peripheral Intravenous Line  Duration             Peripheral IV 11/25/23 Left;Ventral (anterior) Forearm <1 day                        Imaging: Reviewed radiology reports from this admission including: CT head  CT head wo contrast   Final Result by Wendi Esparza MD (11/25 1859)      No acute intracranial abnormality. Workstation performed: [id number]             EKG and Other Studies Reviewed on Admission:   EKG: No EKG obtained. ** Please Note: This note has been constructed using a voice recognition system.  **

## 2023-11-26 NOTE — ASSESSMENT & PLAN NOTE
67-year-old female with alcohol abuse (at least 1 large glass of wine and several beers per day), alcoholic cirrhosis, chronic hyponatremia, COPD, hypertension, hyperlipidemia, tobacco use, and history of breast cancer s/p radiation and currently on anastrozole, presented to the ED for evaluation of seizure-like activity. Patient was out to dinner yesterday and had 2 mixed drinks. While there, patient's  reported that she a staring spell, became unresponsible, and was drooling. No reported tonic-clonic activity. She did have urinary incontinence and acquire a left lateral tongue bite. She remembers ordering a 3rd drink and then the next thing she remembers is getting into the ambulance. Suspect alcohol related seizure. Plan:  - No additional neurodiagnostic testing necessary at this time  - No AEDs  - Will complete [employer] form. Suspect provoked event, so will ultimately be up to [employer] if patient is able to drive. - [facility] Protocol   - Medical management and supportive care per primary team. Correction of any metabolic or infectious disturbances.

## 2023-11-26 NOTE — PROGRESS NOTES
Patient remains angry and frustrated about staying in the hospital and states that she is "leaving by 4pm today. Make sure you tell them."  She did cooperate for her bloodwork through the night and this morning.

## 2023-11-26 NOTE — UTILIZATION REVIEW
NOTIFICATION OF INPATIENT ADMISSION   AUTHORIZATION REQUEST   SERVICING FACILITY:   [address] [place] [address]  [address] [facility],Third [address] [address]  Tax ID: [id number]  NPI: [id number] ATTENDING PROVIDER:  Attending Name and NPI#: Sapphire Manning Md [[id number]]  Address: [address] [facility],Third [address] [address]  Phone: [phone]   ADMISSION INFORMATION:  Place of Service: [address] [place] [address]  Place of Service Code: 21  Inpatient Admission Date/Time: 11/25/23  7:46 PM  Discharge Date/Time: 11/26/2023 12:23 PM  Admitting Diagnosis Code/Description:  Seizure ([address]) [R56.9]     UTILIZATION REVIEW CONTACT:  Nadeem Tripathi Utilization   Network Utilization Review Department  Phone: [phone]  Fax [phone]  Email: [email or web address]  Contact for approvals/pending authorizations, clinical reviews, and discharge. PHYSICIAN ADVISORY SERVICES:  Medical Necessity Denial & Kwsu-nl-Hqtg Review  Phone: [phone]  Fax: [phone]  Email: [email or web address]     DISCHARGE SUPPORT TEAM:  For Patients Discharge Needs & Updates  Phone: [phone] opt. 2 Fax: [phone]  Email: [email or web address]

## 2023-11-27 DIAGNOSIS — F41.9 ANXIETY: ICD-10-CM

## 2023-11-27 NOTE — TELEPHONE ENCOUNTER
Pt called for a refill for Xanax and mentioned that she was overnight in the hospital for a seizure - she said no one told her about following up with you - wants to know if she needs to?

## 2023-11-27 NOTE — UTILIZATION REVIEW
Initial Clinical Review    Admission: Date/Time/Statement:   Admission Orders (From admission, onward)       Ordered        11/25/23 1946  INPATIENT ADMISSION  Once                          Orders Placed This Encounter   Procedures    INPATIENT ADMISSION     Standing Status:   Standing     Number of Occurrences:   1     Order Specific Question:   Level of Care     Answer:   Med Surg [16]     Order Specific Question:   Estimated length of stay     Answer:   More than 2 Midnights     Order Specific Question:   Certification     Answer:   I certify that inpatient services are medically necessary for this patient for a duration of greater than two midnights. See H&P and MD Progress Notes for additional information about the patient's course of treatment. ED Arrival Information       Expected   -    Arrival   11/25/2023 17:59    Acuity   Emergent              Means of arrival   Ambulance    Escorted by   Ion Hough)    Service   Hospitalist    Admission type   Emergency              Arrival complaint   seizure, alcoholic intox             Chief Complaint   Patient presents with    Seizure - New Onset     Patient presents to ED via EMS after having alleged seizure while at a restaurant tonight. Patient denies any seizure history. Initial Presentation: 58 y.o. female from home to ED via ems admitted inpatient due to seizure and differential is hyponatremia vs alcoholic. PMH of alcoholism, smoker, COPD, hyponatremia, breast cancer, alcoholic cirrhosis with history of ascites and small esophageal varices from portal hypertension, thrombocytopenia . Presented due to witnessed seizure while at a restaurant just prior to arrival. Incontinent of urine and stool. Admits to drinking daily. On exam: alert. Follows commands. Ethanol 253. Na 128 with baseline of 133. .   In the ED given bolus of 0.9% NSS. Plan is seizure precautions. Consult neurology.   Trend BMP, hold home Lasix, continue home spironolactone. Fluid restriction. Continue IVF. CIWA. Telemetry     Date: 11/26/23    Day 2: no further seizure. Not interested in alcohol rehab . Na 131> 134 Exam non focal.  Continue telemetry, [facility]    11/26/23 per neurology - patient with chronic alcohol use and has provoked alcohol related seizure. Alcohol cessation needed. No anti seizure medication. ED Triage Vitals   Temperature Pulse Respirations Blood Pressure SpO2   11/25/23 1809 11/25/23 1804 11/25/23 1804 11/25/23 1804 11/25/23 1804   97.9 °F (36.6 °C) 95 18 128/65 96 %      Temp Source Heart Rate Source Patient Position - Orthostatic VS BP Location FiO2 (%)   11/25/23 1809 11/25/23 1804 11/25/23 1804 11/25/23 1804 --   Temporal Monitor Lying Left arm       Pain Score       11/26/23 0126       No Pain          Wt Readings from Last 1 Encounters:   11/25/23 69.9 kg (154 lb 1.6 oz)     Additional Vital Signs:   11/26/23 0820 -- 80 -- 107/58 -- -- -- --   11/26/23 0600 -- 90 -- 98/60 -- -- -- --   11/26/23 0400 -- 83 -- 100/58 -- -- -- --   11/26/23 0215 -- 82 13 104/56 77 90 % -- --   11/26/23 0200 -- 87 -- 96/52 69 91 % -- --   11/26/23 0000 -- 83 13 117/59 82 95 % -- --   11/25/23 2300 -- 80 20 122/63 84 -- -- --   11/25/23 2100 -- 84 41 Abnormal  112/63 82 95 % -- --   11/25/23 2000 -- 87 18 110/55 75 96 % None (Room air) Lying   11/25/23 1930 -- 79 20 111/56 76 99 % None (Room air) Lying   11/25/23 1904 -- 84 18 116/65 -- 97 % None (Room air)      [facility]  11/26/23:   6 at 0820.   4 at 0600.   0 at 0400.   0 at 0215.  0 at 0000  11/25/23:  5 at 2200.   6 at 2000    Pertinent Labs/Diagnostic Test Results:   CT head wo contrast   Final Result by Sheryl Jewell MD (11/25 1859)      No acute intracranial abnormality.                   Workstation performed: [id number]           Results from last 7 days   Lab Units 11/26/23  0602 11/25/23  1812   WBC Thousand/uL 7.18 6.83   HEMOGLOBIN g/dL 12.1 13.7   HEMATOCRIT % 34.8 39.7   PLATELETS Thousands/uL 79* 82*   NEUTROS ABS Thousands/µL  --  4.06     Results from last 7 days   Lab Units 11/26/23  0602 11/26/23  0216 11/25/23  1812   SODIUM mmol/L 134* 131* 128*   POTASSIUM mmol/L 4.2 4.5 3.5   CHLORIDE mmol/L 104 102 93*   CO2 mmol/L 23 22 23   ANION GAP mmol/L 7 7 12   BUN mg/dL 7 7 9   CREATININE mg/dL 0.60 0.60 0.77   EGFR ml/min/1.73sq m 96 96 81   CALCIUM mg/dL 8.7 8.5 9.0     Results from last 7 days   Lab Units 11/25/23  1812   AST U/L 49*   ALT U/L 24   ALK PHOS U/L 125*   TOTAL PROTEIN g/dL 8.2   ALBUMIN g/dL 3.3*   TOTAL BILIRUBIN mg/dL 1.10*   AMMONIA umol/L 61     Results from last 7 days   Lab Units 11/26/23  0602 11/26/23  0216 11/25/23  1812   GLUCOSE RANDOM mg/dL 82 84 109     Results from last 7 days   Lab Units 11/25/23  1812   PROTIME seconds 16.4*   INR  1.27*   PTT seconds 33     Results from last 7 days   Lab Units 11/25/23  1812   LIPASE u/L 79     Results from last 7 days   Lab Units 11/25/23  1812   ETHANOL LVL mg/dL 253*     ED Treatment:   Medication Administration from 11/25/2023 1759 to 11/27/2023 8423         Date/Time Order Dose Route Action Comments     11/25/2023 2140 EST ALPRAZolam (XANAX) tablet 1 mg 1 mg Oral Given --     11/26/2023 0212 EST anastrozole (ARIMIDEX) tablet 1 mg 1 mg Oral Given --     11/26/2023 0822 EST lactulose (CHRONULAC) oral solution 30 g 30 g Oral Given --     11/26/2023 0551 EST midodrine (PROAMATINE) tablet 7.5 mg 7.5 mg Oral Given --     11/25/2023 2157 EST midodrine (PROAMATINE) tablet 7.5 mg 7.5 mg Oral Given --     11/26/2023 0552 EST pantoprazole (PROTONIX) EC tablet 20 mg 20 mg Oral Given --     11/25/2023 2158 EST spironolactone (ALDACTONE) tablet 100 mg 100 mg Oral Given --     11/25/2023 2140 EST traZODone (DESYREL) tablet 50 mg 50 mg Oral Given --     11/25/2023 2206 EST sodium chloride 0.9 % bolus 1,000 mL 1,000 mL Intravenous New Bag --     11/26/2023 0822 EST thiamine tablet 100 mg 100 mg Oral Given --     77/85/0939 1440 EST folic acid (Shae Mcintyre) tablet 1 mg 1 mg Oral Given --     11/26/2023 1103 EST multivitamin-minerals (CENTRUM) tablet 1 tablet 1 tablet Oral Given --     11/26/2023 1718 EST enoxaparin (LOVENOX) subcutaneous injection 40 mg 40 mg Subcutaneous Given --     11/26/2023 0043 EST ALPRAZolam (XANAX) tablet 1 mg 1 mg Oral Given --          Past Medical History:   Diagnosis Date    Alcoholic fatty liver     Anxiety     Asthma     COPD (chronic obstructive pulmonary disease) (HCC)     Elevated liver function tests     GERD (gastroesophageal reflux disease)     GERD without esophagitis     Hyperlipidemia     Hypertension     IBS (irritable bowel syndrome)     Insomnia     Insomnia     Liver disease     Nervousness     Nicotine dependence     Other specified urinary incontinence     RLS (restless legs syndrome)     Wears glasses      Present on Admission:    Thrombocytopenia (HCC)   Hyponatremia   Hypotension   COPD (chronic obstructive pulmonary disease) (HCC)   Alcoholic cirrhosis of liver with ascites (HCC)   Alcohol abuse   Seizure [facility])      Admitting Diagnosis: Seizure ([address]) [R56.9]  Age/Sex: 58 y.o. female  Admission Orders: 11/25/23 1946 inpatient   Scheduled Medications:  Medications Discontinued During This Encounter   Medication    lactulose (CHRONULAC) oral solution 30 g  2 times daily Route: PO     furosemide (LASIX) tablet 20 mg   Daily Route: PO     enoxaparin (LOVENOX) subcutaneous injection 40 mg  Every 24 hours scheduled Route: SC     multivitamin-minerals (CENTRUM) tablet 1 tablet  Daily Route: PO     folic acid (FOLVITE) tablet 1 mg   Daily Route: PO     thiamine tablet 100 mg  Daily Route: PO     nicotine (NICODERM CQ) 21 mg/24 hr TD 24 hr patch 1 patch  Daily Route: TD     traZODone (DESYREL) tablet 50 mg  Daily at bedtime Route: PO     spironolactone (ALDACTONE) tablet 100 mg  Daily at bedtime Route: PO     pantoprazole (PROTONIX) EC tablet 20 mg  Daily (early morning) Route: PO     midodrine (PROAMATINE) tablet 7.5 mg   3 times daily before meals Route: PO     anastrozole (ARIMIDEX) tablet 1 mg  Daily at bedtime Route: PO     ALPRAZolam (XANAX) tablet 1 mg prn at bedtime - x 1 11/25    ALPRAZolam (XANAX) tablet 2 mg   Daily at bedtime Route: PO       Seizure precautions  Continuous pulse oximetry  CIWA  telemetry    IP CONSULT TO NEUROLOGY    Network Utilization Review Department  ATTENTION: Please call with any questions or concerns to [phone] and carefully listen to the prompts so that you are directed to the right person. All voicemails are confidential.   For Discharge needs, contact Care Management DC Support Team at [phone] opt. 2  Send all requests for admission clinical reviews, approved or denied determinations and any other requests to dedicated fax number below belonging to the campus where the patient is receiving treatment.  List of dedicated fax numbers for the Facilities:  [place] DENIALS (Administrative/Medical Necessity) [phone]   DISCHARGE SUPPORT TEAM (NETWORK) [address] [facility] (Maternity/NICU/Pediatrics) [phone]   [address] [place] [address] Drive [address] [facility] [address] [facility] [phone]   [address] [facility] [address] [facility] [address] [address] [facility] [address] [phone]

## 2023-11-28 ENCOUNTER — TELEPHONE (OUTPATIENT)
Dept: FAMILY MEDICINE CLINIC | Facility: CLINIC | Age: 63
End: 2023-11-28

## 2023-11-28 ENCOUNTER — TRANSITIONAL CARE MANAGEMENT (OUTPATIENT)
Dept: FAMILY MEDICINE CLINIC | Facility: CLINIC | Age: 63
End: 2023-11-28

## 2023-11-28 RX ORDER — ALPRAZOLAM 1 MG/1
1 TABLET ORAL 2 TIMES DAILY PRN
Qty: 60 TABLET | Refills: 0 | Status: SHIPPED | OUTPATIENT
Start: 2023-11-28

## 2023-11-30 LAB
ALBUMIN SERPL-MCNC: 3.4 G/DL (ref 3.6–5.1)
ALBUMIN/GLOB SERPL: 0.7 (CALC) (ref 1–2.5)
ALP SERPL-CCNC: 112 U/L (ref 37–153)
ALT SERPL-CCNC: 21 U/L (ref 6–29)
APPEARANCE UR: CLEAR
AST SERPL-CCNC: 40 U/L (ref 10–35)
BACTERIA UR QL AUTO: ABNORMAL /HPF
BASOPHILS # BLD AUTO: 73 CELLS/UL (ref 0–200)
BASOPHILS # BLD AUTO: 80 CELLS/UL (ref 0–200)
BASOPHILS NFR BLD AUTO: 1.4 %
BASOPHILS NFR BLD AUTO: 1.5 %
BILIRUB SERPL-MCNC: 1.6 MG/DL (ref 0.2–1.2)
BILIRUB UR QL STRIP: NEGATIVE
BUN SERPL-MCNC: 12 MG/DL (ref 7–25)
BUN SERPL-MCNC: 12 MG/DL (ref 7–25)
BUN/CREAT SERPL: ABNORMAL (CALC) (ref 6–22)
BUN/CREAT SERPL: ABNORMAL (CALC) (ref 6–22)
CALCIUM SERPL-MCNC: 8.9 MG/DL (ref 8.6–10.4)
CALCIUM SERPL-MCNC: 9 MG/DL (ref 8.6–10.4)
CHLORIDE SERPL-SCNC: 98 MMOL/L (ref 98–110)
CHLORIDE SERPL-SCNC: 98 MMOL/L (ref 98–110)
CO2 SERPL-SCNC: 24 MMOL/L (ref 20–32)
CO2 SERPL-SCNC: 25 MMOL/L (ref 20–32)
COLOR UR: ABNORMAL
CREAT SERPL-MCNC: 0.86 MG/DL (ref 0.5–1.05)
CREAT SERPL-MCNC: 0.87 MG/DL (ref 0.5–1.05)
CREAT UR-MCNC: 137 MG/DL (ref 20–275)
EOSINOPHIL # BLD AUTO: 80 CELLS/UL (ref 15–500)
EOSINOPHIL # BLD AUTO: 99 CELLS/UL (ref 15–500)
EOSINOPHIL NFR BLD AUTO: 1.5 %
EOSINOPHIL NFR BLD AUTO: 1.9 %
ERYTHROCYTE [DISTWIDTH] IN BLOOD BY AUTOMATED COUNT: 13 % (ref 11–15)
ERYTHROCYTE [DISTWIDTH] IN BLOOD BY AUTOMATED COUNT: 13.1 % (ref 11–15)
FERRITIN SERPL-MCNC: 145 NG/ML (ref 16–288)
GFR/BSA.PRED SERPLBLD CYS-BASED-ARV: 74 ML/MIN/1.73M2
GFR/BSA.PRED SERPLBLD CYS-BASED-ARV: 75 ML/MIN/1.73M2
GLOBULIN SER CALC-MCNC: 4.7 G/DL (CALC) (ref 1.9–3.7)
GLUCOSE SERPL-MCNC: 100 MG/DL (ref 65–99)
GLUCOSE SERPL-MCNC: 99 MG/DL (ref 65–99)
GLUCOSE UR QL STRIP: NEGATIVE
HCT VFR BLD AUTO: 39.2 % (ref 35–45)
HCT VFR BLD AUTO: 40 % (ref 35–45)
HGB BLD-MCNC: 14 G/DL (ref 11.7–15.5)
HGB BLD-MCNC: 14.3 G/DL (ref 11.7–15.5)
HGB UR QL STRIP: NEGATIVE
HYALINE CASTS #/AREA URNS LPF: ABNORMAL /LPF
INR PPP: 1.4
IRON SATN MFR SERPL: 24 % (CALC) (ref 16–45)
IRON SERPL-MCNC: 77 MCG/DL (ref 45–160)
KETONES UR QL STRIP: NEGATIVE
LEUKOCYTE ESTERASE UR QL STRIP: ABNORMAL
LYMPHOCYTES # BLD AUTO: 901 CELLS/UL (ref 850–3900)
LYMPHOCYTES # BLD AUTO: 905 CELLS/UL (ref 850–3900)
LYMPHOCYTES NFR BLD AUTO: 17 %
LYMPHOCYTES NFR BLD AUTO: 17.4 %
MCH RBC QN AUTO: 37.1 PG (ref 27–33)
MCH RBC QN AUTO: 37.5 PG (ref 27–33)
MCHC RBC AUTO-ENTMCNC: 35.7 G/DL (ref 32–36)
MCHC RBC AUTO-ENTMCNC: 35.8 G/DL (ref 32–36)
MCV RBC AUTO: 104 FL (ref 80–100)
MCV RBC AUTO: 105 FL (ref 80–100)
MONOCYTES # BLD AUTO: 634 CELLS/UL (ref 200–950)
MONOCYTES # BLD AUTO: 710 CELLS/UL (ref 200–950)
MONOCYTES NFR BLD AUTO: 12.2 %
MONOCYTES NFR BLD AUTO: 13.4 %
NEUTROPHILS # BLD AUTO: 3489 CELLS/UL (ref 1500–7800)
NEUTROPHILS # BLD AUTO: 3530 CELLS/UL (ref 1500–7800)
NEUTROPHILS NFR BLD AUTO: 66.6 %
NEUTROPHILS NFR BLD AUTO: 67.1 %
NITRITE UR QL STRIP: NEGATIVE
PH UR STRIP: 5.5 [PH] (ref 5–8)
PLATELET # BLD AUTO: 83 THOUSAND/UL (ref 140–400)
PLATELET # BLD AUTO: 90 THOUSAND/UL (ref 140–400)
PMV BLD REES-ECKER: 10.3 FL (ref 7.5–12.5)
PMV BLD REES-ECKER: 10.6 FL (ref 7.5–12.5)
POTASSIUM SERPL-SCNC: 3.6 MMOL/L (ref 3.5–5.3)
POTASSIUM SERPL-SCNC: 3.7 MMOL/L (ref 3.5–5.3)
PROT SERPL-MCNC: 8.1 G/DL (ref 6.1–8.1)
PROT UR QL STRIP: NEGATIVE
PROT UR-MCNC: 8 MG/DL (ref 5–24)
PROT/CREAT UR: 0.06 MG/MG CREAT (ref 0.02–0.18)
PROT/CREAT UR: 58 MG/G CREAT (ref 24–184)
PROTHROMBIN TIME: 14.1 SEC (ref 9–11.5)
RBC # BLD AUTO: 3.77 MILLION/UL (ref 3.8–5.1)
RBC # BLD AUTO: 3.81 MILLION/UL (ref 3.8–5.1)
RBC #/AREA URNS HPF: ABNORMAL /HPF
SODIUM SERPL-SCNC: 133 MMOL/L (ref 135–146)
SODIUM SERPL-SCNC: 134 MMOL/L (ref 135–146)
SP GR UR STRIP: 1.01 (ref 1–1.03)
SQUAMOUS #/AREA URNS HPF: ABNORMAL /HPF
TIBC SERPL-MCNC: 319 MCG/DL (CALC) (ref 250–450)
WBC # BLD AUTO: 5.2 THOUSAND/UL (ref 3.8–10.8)
WBC # BLD AUTO: 5.3 THOUSAND/UL (ref 3.8–10.8)
WBC #/AREA URNS HPF: ABNORMAL /HPF

## 2023-11-30 NOTE — TELEPHONE ENCOUNTER
Patients GI provider:  Dr. Davonte Izaguirre    Number to return call: [phone]    Reason for call: Pt calling in regards to this issue.  Patients Dermatologist states they never received a call back and is requesting a call back to proceed with med change     Scheduled procedure/appointment date if applicable: Apt 3/66/4814   procedure 1/12/24

## 2023-12-04 ENCOUNTER — OFFICE VISIT (OUTPATIENT)
Dept: NEPHROLOGY | Facility: HOSPITAL | Age: 63
End: 2023-12-04
Payer: COMMERCIAL

## 2023-12-04 VITALS
HEART RATE: 65 BPM | DIASTOLIC BLOOD PRESSURE: 70 MMHG | BODY MASS INDEX: 25.44 KG/M2 | HEIGHT: 64 IN | WEIGHT: 149 LBS | OXYGEN SATURATION: 97 % | SYSTOLIC BLOOD PRESSURE: 120 MMHG

## 2023-12-04 DIAGNOSIS — E87.1 HYPONATREMIA: ICD-10-CM

## 2023-12-04 DIAGNOSIS — K70.31 ALCOHOLIC CIRRHOSIS OF LIVER WITH ASCITES (HCC): Chronic | ICD-10-CM

## 2023-12-04 DIAGNOSIS — N18.2 STAGE 2 CHRONIC KIDNEY DISEASE: Primary | ICD-10-CM

## 2023-12-04 DIAGNOSIS — I95.89 OTHER SPECIFIED HYPOTENSION: ICD-10-CM

## 2023-12-04 DIAGNOSIS — R60.0 BILATERAL LEG EDEMA: ICD-10-CM

## 2023-12-04 PROCEDURE — 99213 OFFICE O/P EST LOW 20 MIN: CPT | Performed by: PHYSICIAN ASSISTANT

## 2023-12-04 RX ORDER — MIDODRINE HYDROCHLORIDE 2.5 MG/1
7.5 TABLET ORAL
Qty: 810 TABLET | Refills: 3 | Status: SHIPPED | OUTPATIENT
Start: 2023-12-04

## 2023-12-04 RX ORDER — MIDODRINE HYDROCHLORIDE 2.5 MG/1
7.5 TABLET ORAL
Qty: 270 TABLET | Refills: 3 | Status: SHIPPED | OUTPATIENT
Start: 2023-12-04 | End: 2023-12-04 | Stop reason: SDUPTHER

## 2023-12-04 NOTE — PATIENT INSTRUCTIONS
Chronic Kidney Disease stage 2- Baseline creatinine 1.0. Etiology secondary to hypertensive nephrosclerosis and cirrhosis. Creatinine is at baseline. Minimal proteinuria. Hypotension- Continue midodrine 7.5mg three times a day. Hyponatremia- Continue to follow a fluid restriction limiting all fluids. Sodium level is below normal but stable in the low 130s. Anemia- HGB at goal.  Continue to monitor. LE edema- continue diuretics. Recommend wearing compression stockings. ETOH Cirrhosis with ascites- continue to follow up with GI    Cramping at night- check magnesium level. Follow up with Dr. Tahmina Calderón or an AP in 9 months. Please call the office with any questions or concerns.

## 2023-12-06 ENCOUNTER — OFFICE VISIT (OUTPATIENT)
Dept: FAMILY MEDICINE CLINIC | Facility: CLINIC | Age: 63
End: 2023-12-06
Payer: COMMERCIAL

## 2023-12-06 VITALS
RESPIRATION RATE: 16 BRPM | DIASTOLIC BLOOD PRESSURE: 70 MMHG | BODY MASS INDEX: 25.83 KG/M2 | WEIGHT: 151.3 LBS | HEIGHT: 64 IN | SYSTOLIC BLOOD PRESSURE: 122 MMHG | HEART RATE: 88 BPM | OXYGEN SATURATION: 97 %

## 2023-12-06 DIAGNOSIS — D69.6 THROMBOCYTOPENIA (HCC): ICD-10-CM

## 2023-12-06 DIAGNOSIS — R56.9 SEIZURE (HCC): Primary | ICD-10-CM

## 2023-12-06 DIAGNOSIS — K72.90 DECOMPENSATED HEPATIC CIRRHOSIS (HCC): ICD-10-CM

## 2023-12-06 DIAGNOSIS — D05.11 DUCTAL CARCINOMA IN SITU (DCIS) OF RIGHT BREAST: ICD-10-CM

## 2023-12-06 DIAGNOSIS — J44.1 CHRONIC OBSTRUCTIVE PULMONARY DISEASE WITH ACUTE EXACERBATION (HCC): ICD-10-CM

## 2023-12-06 DIAGNOSIS — F10.10 ALCOHOL ABUSE: ICD-10-CM

## 2023-12-06 DIAGNOSIS — R05.3 CHRONIC COUGH: ICD-10-CM

## 2023-12-06 DIAGNOSIS — K70.31 ALCOHOLIC CIRRHOSIS OF LIVER WITH ASCITES (HCC): Chronic | ICD-10-CM

## 2023-12-06 DIAGNOSIS — K74.60 DECOMPENSATED HEPATIC CIRRHOSIS (HCC): ICD-10-CM

## 2023-12-06 DIAGNOSIS — I95.89 OTHER SPECIFIED HYPOTENSION: ICD-10-CM

## 2023-12-06 PROCEDURE — 99495 TRANSJ CARE MGMT MOD F2F 14D: CPT | Performed by: PHYSICIAN ASSISTANT

## 2023-12-06 RX ORDER — DEXTROMETHORPHAN HYDROBROMIDE AND PROMETHAZINE HYDROCHLORIDE 15; 6.25 MG/5ML; MG/5ML
5 SYRUP ORAL
Qty: 118 ML | Refills: 1 | Status: SHIPPED | OUTPATIENT
Start: 2023-12-06

## 2023-12-06 NOTE — PROGRESS NOTES
Assessment & Plan       Seizure [facility]) - Per Neuro, appeared to be alcohol withdrawal seizure, no need for medication or EEG. Patient not interested in alcohol cessation at time of discharge, again discussed today still not interested        Malignant neoplasm of lower-outer quadrant of right breast of female, estrogen receptor positive - under care Bhargav Cont Arimidex     Thrombocytopenia ([address]) - In the setting of chronic liver disease      Hyponatremia - Recent baseline has been 133. Received 1 L of normal saline yesterday, Cont home spironolactone, Lasix, fluid restriction     Alcoholic cirrhosis of liver with ascites (HCC) - Compensated, Ammonia level wnl. Cont Lactulose from home. Continue home spironolactone, Resume lasix, Fu with Hepatology and GI as OP     Alcohol abuse - was intoxicated Etoh 200s when arrived, Sober and agitated at discharge, not interested in alcohol cessation. COPD (chronic obstructive pulmonary disease) (HCC) - Uses Symbicort PRN, continue. Hypotension - Cont home midodrine, BP stable    F/u spring for physical or sooner if needed    Subjective     Transitional Care Management Review:   Sarah Murphy is a 58 y.o. female here for TCM follow up. During the TCM phone call patient stated:  TCM Call       Date and time call was made  11/28/2023  2:12 PM    Hospital care reviewed  Records reviewed    Patient was hospitialized at  [address] [place] [address]    Date of Admission  11/25/23    Date of discharge  11/26/23    Diagnosis  new onset seizure    Disposition  Home    Were the patients medications reviewed and updated  No    Current Symptoms  None          TCM Call       Post hospital issues  None    Should patient be enrolled in anticoag monitoring? No    Scheduled for follow up?   Yes    Patients specialists  Other (comment)    Other specialists names  Agustín Mathews- 4/4    Did you obtain your prescribed medications  No    Do you need help managing your prescriptions or medications Yes    Is transportation to your appointment needed  No    I have advised the patient to call PCP with any new or worsening symptoms  Arianne SHEFFIELD    Living Arrangements  Family members    Are you recieving any outpatient services  No    Are you recieving home care services  No    Are you using any community resources  No    Have you fallen in the last 12 months  No    Interperter language line needed  No    Counseling  Patient          Patient presented to ER on 11/25/2023 due to seizure. Per notes and , she was having dinner and drunk alcohol when she started to stare, being unresponsive, with perioral cyanosis, incontinent of stool /urine. Tired, but not confused in ED  Alcohol level >200. Neurology consulted. Likely alcohol withdrawal seizure in a pt with chronic alcohol abuse, no need for medication or EEG   Patient is not interested in alcohol cessation. Here in follow up, not interested in stopping drinking. No repeat seizure. No concerns at this time. Review of Systems   Constitutional:  Negative for chills and fever. HENT:  Negative for ear pain and sore throat. Eyes:  Negative for pain and visual disturbance. Respiratory:  Negative for cough and shortness of breath. Cardiovascular:  Negative for chest pain and palpitations. Gastrointestinal:  Negative for abdominal pain and vomiting. Genitourinary:  Negative for dysuria and hematuria. Musculoskeletal:  Negative for arthralgias and back pain. Skin:  Negative for color change and rash. Neurological:  Negative for seizures and syncope. All other systems reviewed and are negative. Objective     /70   Pulse 88   Resp 16   Ht 5' 4" (1.626 m)   Wt 68.6 kg (151 lb 4.8 oz)   SpO2 97%   BMI 25.97 kg/m²      Physical Exam  Constitutional:       Appearance: Normal appearance. She is normal weight. HENT:      Head: Normocephalic and atraumatic.    Cardiovascular:      Rate and Rhythm: Normal rate and regular rhythm. Pulses: Normal pulses. Heart sounds: Normal heart sounds. Pulmonary:      Effort: Pulmonary effort is normal.      Breath sounds: Normal breath sounds. Musculoskeletal:      Cervical back: Normal range of motion and neck supple. Neurological:      General: No focal deficit present. Mental Status: She is alert and oriented to person, place, and time. Psychiatric:         Mood and Affect: Mood normal.         Behavior: Behavior normal.         Thought Content:  Thought content normal.         Judgment: Judgment normal.       Medications have been reviewed by provider in current encounter    Jalen Macario PA-C

## 2023-12-11 ENCOUNTER — TELEPHONE (OUTPATIENT)
Dept: FAMILY MEDICINE CLINIC | Facility: CLINIC | Age: 63
End: 2023-12-11

## 2023-12-11 NOTE — TELEPHONE ENCOUNTER
Patient will drop off a form today from [facility] to billed be filled out regarding her continuation to drive. She said they want to take her license away this Thursday, the 14th.

## 2023-12-13 ENCOUNTER — NURSE TRIAGE (OUTPATIENT)
Age: 63
End: 2023-12-13

## 2023-12-13 NOTE — TELEPHONE ENCOUNTER
Patient calling in today because she wants Dr. Soni Cunha to know that she's not a day time drinker and that she doesn't drink and drive. Patient was recently out drinking at a restaurant and had a witnessed seizure. Patient was seen in the emergency room and paper work was sent to [facility] to have her license removed. Patient would like her license back. She was seen by pcp and they are unwilling to fill out paper work. Per notes neuro feels like it was alcoholic seizure. Patient would like to know who should fill out her paper work to get her license back. Reason for Disposition   Negative: Did you page the on call provider? Information only question and nurse able to answer    Answer Assessment - Initial Assessment Questions  Patient calling in today because she wants Dr. Soni Cunha to know that she's not a day time drinker and that she doesn't drink and drive. Patient was recently out drinking at a restaurant and had a witnessed seizure. Patient was seen in the emergency room and paper work was sent to [facility] to have her license removed. Patient would like her license back. She was seen by pcp and they are unwilling to fill out paper work. Per notes neuro feels like it was alcoholic seizure. Patient would like to know who should fill out her paper work to get her license back.     Protocols used: [facility] NO TRIAGE REQUIRED, Information Only Call - No Triage-ADULT-[place]

## 2023-12-14 ENCOUNTER — TELEPHONE (OUTPATIENT)
Dept: NEUROLOGY | Facility: CLINIC | Age: 63
End: 2023-12-14

## 2023-12-14 NOTE — TELEPHONE ENCOUNTER
ERLINDA 12/13 at 4:11pm:    My name is Sahra Dick. Birthday is [birth year]. I need to make an appointment for a neurologist. I just lost my license because I had a seizure on the 25th of November. I need to go see a neurologist in [facility], if there is one. And my phone number is [phone].  ___________________________________________________    Routed to clerical team to follow up with pt.

## 2023-12-15 ENCOUNTER — TELEPHONE (OUTPATIENT)
Dept: NEUROLOGY | Facility: CLINIC | Age: 63
End: 2023-12-15

## 2023-12-15 NOTE — TELEPHONE ENCOUNTER
SLUB/11/25-11/26/ New Onest Seizures      Jose CONNER Cindy will not need outpatient follow up with Neurology. She will not require outpatient neurological testing. Pt is stating that her license was revoked and she needs to see Neuro. Can you please provide HFU instructions to we can have her seen?      Thank you

## 2023-12-19 DIAGNOSIS — D05.11 DUCTAL CARCINOMA IN SITU (DCIS) OF RIGHT BREAST: ICD-10-CM

## 2023-12-19 RX ORDER — ANASTROZOLE 1 MG/1
1 TABLET ORAL DAILY
Qty: 90 TABLET | Refills: 1 | Status: SHIPPED | OUTPATIENT
Start: 2023-12-19

## 2023-12-26 DIAGNOSIS — F41.9 ANXIETY: ICD-10-CM

## 2023-12-26 RX ORDER — ALPRAZOLAM 1 MG/1
1 TABLET ORAL 2 TIMES DAILY PRN
Qty: 60 TABLET | Refills: 0 | Status: SHIPPED | OUTPATIENT
Start: 2023-12-26

## 2024-01-11 ENCOUNTER — OFFICE VISIT (OUTPATIENT)
Dept: NEUROLOGY | Facility: CLINIC | Age: 64
End: 2024-01-11
Payer: COMMERCIAL

## 2024-01-11 VITALS
OXYGEN SATURATION: 94 % | HEART RATE: 92 BPM | HEIGHT: 64 IN | BODY MASS INDEX: 26.12 KG/M2 | WEIGHT: 153 LBS | TEMPERATURE: 97.5 F | SYSTOLIC BLOOD PRESSURE: 129 MMHG | DIASTOLIC BLOOD PRESSURE: 60 MMHG | RESPIRATION RATE: 18 BRPM

## 2024-01-11 DIAGNOSIS — R56.9 SEIZURE (HCC): Primary | ICD-10-CM

## 2024-01-11 DIAGNOSIS — F10.10 ALCOHOL ABUSE: ICD-10-CM

## 2024-01-11 PROCEDURE — 99214 OFFICE O/P EST MOD 30 MIN: CPT | Performed by: PHYSICIAN ASSISTANT

## 2024-01-11 NOTE — PROGRESS NOTES
"Patient ID: Jose White is a 63 y.o. female with history of alcohol abuse, alcoholic cirrhosis, COPD, HLD, HTN, insomnia, tobacco use, restless leg syndrome, and history of breast cancer s/p radiation currently on anastrozole, who presents today for a hospital follow up.    Assessment:  Patient presented after witnessed event of staring, unresponsiveness, drooling, LOC with incontinence and tongue bite.  Concern for seizure.  She has chronic alcohol use and was out having drinks at dinner.  Initial labs notable for chronic hyponatremia (Na 128), hyperbilirubinemia, chronic thrombocytopenia, elevated INR, and Ethanol level of 253.   CTH with no acute abnormalities.  Per inpatient neurology, this was a provoked \"alcoholic seizure\", therefore no MRI or EEG completed, and no AED started.  [employer] form sent.    We discussed the event does sound like a seizure.  This was not an alcohol withdrawal seizure, as she had a high ETOH level at the time, and reported possibly drinking more than her normal amount.  We discussed that chronic alcoholism can be a risk factor for recurrent seizures (epilepsy).  Her hyponatremia is chronic, and level at the time was not significantly lower than her baseline, and not likely low enough to provoke the seizure on its own.  We discussed she requires further workup to evaluate her risk for having recurrent seizures.  She requires a brain MRI and a routine EEG to start with.  May consider 24hr aEEG if routine is normal.  We discussed if there is a structural abnormality in the brain or a high risk finding on her EEG, she would require anti-seizure medication to prevent recurrent seizures.  At this time, would not initiate AED for a single seizure without first assessing for risk of recurrence.  If she has another seizure, would have low threshold to initiate AED.    We discussed seizure safety and first aid and things that can lower the seizure threshold.  We discussed her chronic " "alcohol use.  I suggested she try cutting back on alcohol and eventually quitting completely, under the direction of her PCP or medically supervised detox program.  She was counseled not to stop drinking alcohol abruptly.    We discussed issues related to driving.  Patient and  were very focused on this issue.  Several times she mentioned that she does not \"drink and drive\", therefore she did not understand why her license was revoked.  We discussed that [place] is a mandatory reporting state, and any disqualifying event (including LOC or seizure) must be reported to [facility].  Patients cannot legally drive for 6 months after a seizure in the [facility].  She is not cleared to return to driving until she has been without a seizure for at least 6 months and cleared by the [facility].  Her follow up paperwork from [facility] included a seizure reporting form and a substance abuse form.  Discussed once she is seizure-free x 6 months I can fill out the seizure reporting form, but the substance abuse form would need to be filled out by her PCP, as we are not treating her/monitoring her for chronic alcohol use.  I will send her PCP a message regarding the substance abuse form (patient says her PCP said she was \"not responsible for these forms\" and could not fill them out).      Plan:  MRI brain with and without contrast  Routine EEG followed by a 24hr ambulatory EEG   Patient advised she cannot drive at this time.  She cannot drive until 6 months seizure-free and cleared by [facility].  We can fill out the seizure reporting form at that time (when 6 months seizure-free), but PCP will need to fill out the substance abuse form  Would highly recommend cutting back on alcohol and eventually stopping completely.  This should be done under the direction of your PCP or medically supervised detox/rehab program.  Do not stop alcohol abruptly.  Call the office/present to the ED if there is a recurrent seizure  Follow up in 3 months " with epilepsy attending physician          Diagnoses and all orders for this visit:    Seizure (HCC)    Alcohol abuse           Subjective:    ESTELLE White is a 63 y.o. female with history of alcohol abuse, alcoholic cirrhosis, COPD, HLD, HTN, insomnia, tobacco use, restless leg syndrome, and history of breast cancer s/p radiation currently on anastrozole, who presents today for a hospital follow up.    Patient presented to the ED on 23 for evaluation of seizure like activity. Patient reported that she was in her normal state of health on . She went out to dinner and had 2-3 gin and tonics. While at dinner, patient's  reported that she a staring spell, became unresponsive, and was drooling. No reported tonic-clonic activity. She did have urinary incontinence and acquired a left lateral tongue bite. She remembered ordering a drink and then the next thing she recalled was getting into the ambulance. In the ED, VSS.  Initial labs notable for chronic hyponatremia (128), hyperbilirubinemia, chronic thrombocytopenia, elevated INR, and Ethanol level of 253.   CTH with no acute abnormalities.  She was seen by neurology the next AM while in the ED.  She was aware that people told her she had a seizure.  She c/o soreness in her mouth from tongue bite.  She reported a mild HA and chronic dizziness.  She denied any prior history of seizures, including alcohol withdrawal seizures.  She reported her brother has a developmental disability and he has had seizures since infancy/childhood.  She herself did not believe she had any complications at birth, had normal development.  Her mother  from breast cancer at 32.  No other obvious seizure risk factors.  She reported that she drinks a large glass of wine nightly and a few 12 oz beers per day. She likely has at least 4 drinks total per day, which has been ongoing since the 1970s. Exam nonfocal, but notable for tremulousness, mild asterixis, and left  "frontal lateral tongue bite.  Per inpatient neurology, seizure likely provoked by chronic, severe alcohol use disorder and there was no need for further diagnostics such as MRI, EEG, and no indication for AED.  [employer] form was sent and patient advised not to drive.    Today, patient presents with her , Ulises.  When I asked the patient about her hospitalization in November, she says she went to the hospital because she \"passed out\".  She says \"I did not have a seizure\".   confirmed there was staring with unresponsiveness, drooling, LOC, along with incontinence and tongue bite.  She was tired afterwards.  Patient tells me she had some hard liquor drinks while out at dinner when the event occurred.  She tells me she only drinks non-alcoholic beer daily but does drink wine in the evening (this seems to be different than reported in the hospital).  She and  say the hospital told them this was an \"alcoholic seizure\".  She was never previously had a seizure that she is aware of.  She has not had any events concerning for seizure or episodes of LOC since her admission in late November.  She tells me they were never told about the event being reported to [facility] and were surprised when she was sent a letter revoking her license.  She was sent a seizure reporting form and a substance abuse form.  She took these forms to her PCP but was told neurology should fill out forms for her.  She is upset about her license being taken away because she \"does not drink and drive\".        AED/side effects/compliance:  None        Event/Seizure semiology:  23 staring episode, became unresponsive, drooling.  Had urinary incontinence and tongue bite.  No tonic-clonic activity      Special Features  Status epilepticus: No  Self Injury Seizures: No  Precipitating Factors: Alcohol use     Epilepsy Risk Factors:  Abnormal pregnancy: No  Abnormal birth/: No  Abnormal Development: No  Febrile seizures, simple: " "No  Febrile seizures, complex: No  CNS infection: No  Cerebral palsy: No  Head injury (moderate/severe): No  CNS neoplasm: No  CNS malformation: No  Neurosurgical procedure: No  Stroke: No  Alcohol abuse: yes, chronic daily alcohol use  Drug abuse: No  Family history Sz/epilepsy: brother with epilepsy since infancy/childhood (reported in the hospital that this was due to developmental delay, but today she says he was maybe \"dropped on his head\" and had delays and seizures as a result of that).  Brother lives in a care facility at present      Prior AEDs:  None      Prior workup:    No MRI brain or EEGs on file    Social History:  Lives with:    Occupation: retired, caregiver   Driving: not currently due to suspended license   Chronic daily alcohol use, no other drug use    Past Psychiatric History:  Anxiety.  No prior psychiatric hospitalizations     The following portions of the patient's history were reviewed and updated as appropriate: current medications, past family history, past medical history, past social history, past surgical history, and problem list.     Objective:    Blood pressure 129/60, pulse 92, temperature 97.5 °F (36.4 °C), temperature source Temporal, resp. rate 18, height 5' 4\" (1.626 m), weight 69.4 kg (153 lb), SpO2 94%    Physical Exam  Constitutional:       Appearance: Normal appearance.   Eyes:      Extraocular Movements: EOM normal.      Pupils: Pupils are equal, round, and reactive to light.   Neurological:      Mental Status: She is alert.      Motor: Motor strength is normal.     Deep Tendon Reflexes:      Reflex Scores:       Bicep reflexes are 3+ on the right side and 3+ on the left side.       Brachioradialis reflexes are 3+ on the right side and 3+ on the left side.       Patellar reflexes are 2+ on the right side and 2+ on the left side.       Achilles reflexes are 2+ on the right side and 2+ on the left side.  Psychiatric:         Mood and Affect: Mood normal.         " Speech: Speech normal.         Behavior: Behavior normal.         Neurological Exam  Mental Status  Alert. Oriented to person, place, time and situation. Speech is normal. Language is fluent with no aphasia. Attention and concentration are normal.    Cranial Nerves  CN II: Visual fields full to confrontation.  CN III, IV, VI: Extraocular movements intact bilaterally. Pupils equal round and reactive to light bilaterally.  CN V: Facial sensation is normal.  CN VII: Full and symmetric facial movement.  CN VIII: Hearing is normal.  CN IX, X: Palate elevates symmetrically  CN XI: Shoulder shrug strength is normal.  CN XII: Tongue midline without atrophy or fasciculations.    Motor   Mild asterixis and intention tremor bilaterally .   Strength is 5/5 throughout all four extremities.    Sensory  Light touch is normal in upper and lower extremities.     Reflexes                                            Right                      Left  Brachioradialis                    3+                         3+  Biceps                                 3+                         3+  Patellar                                2+                         2+  Achilles                                2+                         2+    Coordination    Mild endpoint dysmetria on FTN bilaterally..    Gait    Wide based gait, no assistive device .      Current Outpatient Medications   Medication Sig Dispense Refill    albuterol (2.5 mg/3 mL) 0.083 % nebulizer solution Take 3 mL (2.5 mg total) by nebulization every 6 (six) hours as needed for wheezing or shortness of breath 180 mL 5    albuterol (Proventil HFA) 90 mcg/act inhaler Inhale 2 puffs every 6 (six) hours as needed for wheezing or shortness of breath 18 g 3    ALPRAZolam (XANAX) 1 mg tablet Take 1 tablet (1 mg total) by mouth 2 (two) times a day as needed for anxiety 60 tablet 0    anastrozole (ARIMIDEX) 1 mg tablet TAKE ONE TABLET BY MOUTH EVERY DAY 90 tablet 1    budesonide-formoterol  (Symbicort) 160-4.5 mcg/act inhaler Inhale 2 puffs 2 (two) times a day Rinse mouth after use. 10.2 g 1    furosemide (LASIX) 40 mg tablet Take 1 tablet (40 mg total) by mouth daily 90 tablet 3    lactulose 20 g/30 mL Take 45 mL (30 g total) by mouth 2 (two) times a day Titrate dose for an effect of 3 soft, but formed bowel movements per day.  You can start with 1 tablespoon per day and slowly increase to effect. 2700 mL 0    midodrine (PROAMATINE) 2.5 mg tablet Take 3 tablets (7.5 mg total) by mouth 3 (three) times a day before meals 810 tablet 3    pantoprazole (PROTONIX) 20 mg tablet Take 1 tablet (20 mg total) by mouth daily 90 tablet 2    promethazine-dextromethorphan (PHENERGAN-DM) 6.25-15 mg/5 mL oral syrup Take 5 mL by mouth daily at bedtime as needed for cough 118 mL 1    spironolactone (ALDACTONE) 100 mg tablet Take 1 tablet (100 mg total) by mouth daily 90 tablet 3    traZODone (DESYREL) 50 mg tablet Take 1 tablet (50 mg total) by mouth daily at bedtime 30 tablet 5    guselkumab (TREMFYA) subcutaneous injection Inject 100 mg under the skin every 6 weeks (Patient not taking: Reported on 1/11/2024)       No current facility-administered medications for this visit.        ROS:    Review of Systems   Constitutional:  Negative for chills and fever.   HENT:  Negative for ear pain and sore throat.    Eyes:  Negative for pain and visual disturbance.   Respiratory:  Negative for cough and shortness of breath.    Cardiovascular:  Negative for chest pain and palpitations.   Gastrointestinal:  Negative for abdominal pain and vomiting.   Genitourinary:  Negative for dysuria and hematuria.   Musculoskeletal:  Negative for arthralgias and back pain.   Skin:  Negative for color change and rash.   Neurological:  Negative for seizures and syncope.   All other systems reviewed and are negative.    I personally reviewed and updated the ROS as appropriate                   [Arthralgias (joint pain)] : arthralgias [Negative] : Heme/Lymph [As Noted in HPI] : as noted in HPI

## 2024-01-11 NOTE — PATIENT INSTRUCTIONS
MRI brain with and without contrast  Routine EEG   Continue to not drive at this time.  If you remain seizure free after 6 months from the seizure on 11/25/23, then we can submit the forms to [facility] (our office can fill out the seizure reporting form, but your PCP will have to fill out the substance abuse form--I will send her a message).  It is up to [facility] to restore a license, not the provider.    Would highly recommend cutting back on alcohol and eventually stopping completely.  This should be done under the direction of your PCP or medically supervised detox/rehab program.  Do not stop alcohol abruptly.  Call the office/present to the ED if there is a recurrent seizure  Follow up in 3 months with epilepsy attending physician       FIRST AID FOR SEIZURES    What to Do If You Witness a Seizure:     Generalized convulsive (called a generalized tonic-clonic or grand mal seizure). During this seizure, the person may cry out, suddenly stiffen up, make jerking movements, and fall.  The person may turn pale or blue from difficulty breathing.    Actions:  Stay calm. Talk in a soothing voice and if possible keep onlookers away.  Prevent injury. Move objects away that the person might hit while jerking uncontrollably.   Time when the seizure starts and ends. Most seizures stop after only a few minutes.  Turn him or her gently onto one side. This will help keep the airway clear.   Never place anything in his/her mouth or give him/her anything by mouth during a seizure.   -- Do not give the person water, pills, or food until fully alert.   Loosen tight clothing or jewelry around his/her neck.  Make the person as comfortable as possible.   Place something soft under their head.   Do not hold the person down. If the person having a seizure thrashes around there is no need for you to restrain them. They are more likely to be combative if restrained. Remember to consider your safety as well.   Keep onlookers away.   Be  sensitive and supportive, and ask others to do the same.   Stay with the person until he/she is fully alert.    Complex partial seizure (confusional spells). During this kind of seizure, the person may have a glassy stare; give no response or inappropriate responses when questioned; sit, stand, or walk about aimlessly; make lip smacking or chewing motions; fidget with clothes; appear to be drunk, drugged, or confused.    Actions:  Make sure the person is safe and won’t harm themselves.  Try to remove harmful objects from around the person (tools, utensils, glasses).  Do NOT be aggressive or attempt to restrain the person. They are more likely to be combative if restrained. Remember to consider your safety as well.  Help prevent the person from wandering, and direct the person to chair or safe position.  Never place anything in his/her mouth or give him/her anything by mouth during a seizure.   -- Do not give the person water, pills, or food until fully alert.   Keep onlookers away.   Be sensitive and supportive, and ask others to do the same.   Stay with the person until he/she is fully alert.    CALL 911 if:  A convulsive seizure lasts more than 5 minutes.  The person turns blue during the seizure.  The person does not start breathing after the seizure. Begin mouth to mouth resuscitation if this would occur.  The person has one seizure right after the other without coming back to normal consciousness between the seizures.  The person has not regained consciousness or is still confused after 30 minutes.  You know the person does not have epilepsy.  You know the person has diabetes or low blood sugar.  The person is pregnant, ill, or injured.   The seizure occurred in water, because the person may have inhaled water.  The person requests an ambulance or medical help.     Rescue medication  Your doctor may prescribe a rescue medication such as lorazepam (Ativan), diazepam (Valium / Diastat), or clonazepam (Klonopin)  to terminate a seizure or if you have a history of cluster of seizures.  Follow the instructions given by your doctor for these medications    Recognizing Common Seizures (examples)   Simple partial seizures: Isolated twitching, numbness, sweating, dizziness, nausea/vomiting, disturbances to hearing, vision, smell or taste. No loss of consciousness occurs, and the person remains aware of his/her environment.   Complex partial seizures: Staring, motionless, picking at clothes, smacking lips, swallowing repeatedly or wandering around. The person is not aware of their surroundings and is not fully responsive.  Atonic seizures: “Drop attacks” or sudden, rapid fall to ground with rapid recovery.   Myoclonic seizures: Brief forceful jerks which can affect the whole body or just part of it.   Absence seizures: May appear to be daydreaming or “spacing out.” The person is momentarily unresponsive and unaware of what is happening around him/her.   Tonic seizures: Stiffening of part or of the entire body.  Generalized Tonic-Clonic Seizures. “Grand-mal seizure.” Sudden loss of consciousness with body stiffening followed by continuous jerking movements. A blue tinge around the mouth is likely but lack of oxygen is rare. Loss of bladder and/or bowel control may occur.     SEIZURE SAFETY     Don’t let fear of seizures keep you at home. Be smart about your activities to make sure you are safe. The guidelines below can help you be as safe as possible.     Make sure the people around you are aware of:  What happens when you have a seizure  Correct seizure first aid  First aid for choking (consider taking a basic life support class)  When they should know to call 911 or for medical help    Avoid common triggers of seizures:  Missing your medications  Not getting enough sleep  Drinking alcohol  Using illegal drugs    Safety measures for at home:  In the bathroom:   Do not take baths in the bathtub. Instead, take only showers. Try to  have a family member available while you are in the shower.  Make sure the drain in the bathtub/shower is working properly to avoid pooling of water.  You can consider a fitted shower seat. Recessed soap trays can minimize injury if you would happen to fall in the shower.   Bathroom doors can be hung to open outwards, so that the door can still be opened if you fall against the door.   Do not lock the bathroom door. Use an “Occupied” sign on the door or other signal to let others know you are in the bathroom.  Safety locks can be obtained from the [employer].  On your water heater, set your water temperature to a warm temperature that is not scalding to avoid burns from very hot water.   Put non-skid strips/ in the bathtub.  Use an electric shaver.  Avoid any electrical appliances (including electric shaver) in the bathroom or near water.  Use shatterproof glass for mirrors.     In the kitchen:             When possible, cook using a microwave.  Only cook or use electrical appliances when someone else is in the house and available.   As much as possible, grill food and avoid fryers or frying food.  Use the back burners of the stove and turn the pot handles toward the back of the stove.  Avoid carrying hot pans, pots of boiling water, or very hot food. Serve food or liquids directly from the stove. At the least, minimize the distance hot food is carried.   Use precut foods or food processers to limit the need to use knives.   Use plastic or durable cups, dishes, and containers instead of breakable glass items.     In the bedroom  Low level and wide beds (like a futon) can reduce risk of injury of falling out of bed. If there is a high risk of falling out of bed, you can consider simply putting the mattress on the floor.  Avoid sleeping on top bunks of bunk beds.   Place a soft carpet on the floor.     Around the house  Pad sharp corners of tables, chairs, etc. Round tables and furniture can be  considered to avoid sharp corners.   Avoid open flames. Place a screen in front of fireplaces and don’t build a fire alone.   Allow for open spaces with furniture.  Avoid loose throw rugs or slippery floors. Non-slip maria r or cushioned maria r can help reduce injury from a fall.   Fireproof fabrics and furniture are best, and especially important if you smoke.  Doors and windows with safety glass are safer if someone falls against them.  Avoid lights and heaters that could easily be knocked over.  Use curling irons or clothing irons that have automatic shut off switches.  Make sure motor driven equipment or lawn mowers have “dead man’s” handles or seats so they will turn off if you release pressure.     Safety measures when away from home  Driving  [place] law mandates that you cannot drive for 6 months after your most recent seizure.   [place] law mandates that you cannot drive for 6 months after your most recent seizure.     Work/Travel  Wear a medical alert bracelet or necklace at all times.  Wear a helmet and use protective clothing/equipment when appropriate.  Consider telling co-workers and travel companions that you have epilepsy and what to do if you have a seizure.  Avoid irregular shifts or disruptions of a regular sleep pattern.  Take your medications on time and keep an updated list of medications in your purse or wallet.  Do not climb to heights or operate heavy machinery.  Stand back from the edges of roads or bus/train platforms when traveling.  If you wander when you have a seizure, take a friend along for the trip.     Recreation  Swimming can be dangerous. Do not swim alone, in open water, or in murky water that you cannot see the bottom.   Caregivers should be with you in the pool at all times and must be physically able to get you out of the water.  Use a flotation device.   Scuba diving is not recommended since during a seizure people are unaware of their surroundings.  Having a  seizure underwater can be deadly and can endanger the lives of others.  Kayaking and canoeing can be especially dangerous  People with epilepsy are at a higher risk of becoming trapped underneath a canoe or kayak.  Wear a helmet when playing contact sports, biking, or if there is a risk of falling.  Patients with epilepsy are at a higher risk of head injury when playing contact sports.  Avoid riding a bike, running, or other activities around busy roads, steep hills, or secluded areas.   Exercise on soft surfaces.  Theme [place]: many people with epilepsy can go on rides depending on their type of seizures.  For some people, stress or excitement can trigger a seizure.   Rollercoasters (especially if you are upside-down) are more dangerous for people with epilepsy.        Medications  Take your medications on time. If you have trouble remembering, set alarms on your phone. You can visit [email or web address] to set up reminders through text message to help you remember to take your medications.  Use a pillbox to help you keep track of your medications.  When out of the house, take any needed medications with you. Consider keeping one or two extra doses with you in case you are unexpectedly away from home longer than planned.  If you realize you missed a dose of your medications and it is less than 2 hours until your next dose, skip the missed dose. Do not double up your medication dose. If it is more than 2 hours until your next dose, you can take the missed dose.   Avoid drinking alcohol, since this can enhance effects of your seizure medications.  Be aware of common and major side effects of your medications.  Keep your medications out of the reach of children.     Parenting:  Childproof your home as much as possible.  It is possible that you could drop your baby if you have a seizure while holding or feeding them.  If you are nursing a baby, sit on the floor or bed with your back supported so the baby will not  fall far if you should lose consciousness.  Feed the baby while he or she is seated in an infant seat.  Dress, change, and sponge bathe the baby on the floor.  Move the baby around in a stroller or small crib.  Keep a young baby in a playpen when you are alone, and a toddler in an indoor play yard, or childproof one room and use safety florez at the doors.  When out of the house, use a bungee-type cord or restraint harness so your child cannot wander away if you have a seizure that affects your awareness.

## 2024-01-12 ENCOUNTER — ANESTHESIA (OUTPATIENT)
Dept: GASTROENTEROLOGY | Facility: HOSPITAL | Age: 64
End: 2024-01-12

## 2024-01-12 ENCOUNTER — ANESTHESIA EVENT (OUTPATIENT)
Dept: GASTROENTEROLOGY | Facility: HOSPITAL | Age: 64
End: 2024-01-12

## 2024-01-12 ENCOUNTER — HOSPITAL ENCOUNTER (OUTPATIENT)
Dept: GASTROENTEROLOGY | Facility: HOSPITAL | Age: 64
Setting detail: OUTPATIENT SURGERY
Discharge: HOME | End: 2024-01-12
Attending: INTERNAL MEDICINE
Payer: COMMERCIAL

## 2024-01-12 VITALS
OXYGEN SATURATION: 98 % | SYSTOLIC BLOOD PRESSURE: 124 MMHG | TEMPERATURE: 97.1 F | RESPIRATION RATE: 16 BRPM | HEART RATE: 71 BPM | DIASTOLIC BLOOD PRESSURE: 66 MMHG

## 2024-01-12 DIAGNOSIS — I85.00 ESOPHAGEAL VARICES DETERMINED BY ENDOSCOPY (HCC): ICD-10-CM

## 2024-01-12 DIAGNOSIS — E87.1 HYPONATREMIA: ICD-10-CM

## 2024-01-12 DIAGNOSIS — K70.31 ALCOHOLIC CIRRHOSIS OF LIVER WITH ASCITES (HCC): ICD-10-CM

## 2024-01-12 DIAGNOSIS — K21.9 GERD WITHOUT ESOPHAGITIS: ICD-10-CM

## 2024-01-12 DIAGNOSIS — K76.0 FATTY LIVER: ICD-10-CM

## 2024-01-12 PROCEDURE — 43235 EGD DIAGNOSTIC BRUSH WASH: CPT | Performed by: INTERNAL MEDICINE

## 2024-01-12 RX ORDER — METOCLOPRAMIDE HYDROCHLORIDE 5 MG/ML
10 INJECTION INTRAMUSCULAR; INTRAVENOUS ONCE AS NEEDED
Status: CANCELLED | OUTPATIENT
Start: 2024-01-12

## 2024-01-12 RX ORDER — DIPHENHYDRAMINE HYDROCHLORIDE 50 MG/ML
12.5 INJECTION INTRAMUSCULAR; INTRAVENOUS ONCE AS NEEDED
Status: CANCELLED | OUTPATIENT
Start: 2024-01-12

## 2024-01-12 RX ORDER — LIDOCAINE HYDROCHLORIDE 10 MG/ML
INJECTION, SOLUTION EPIDURAL; INFILTRATION; INTRACAUDAL; PERINEURAL AS NEEDED
Status: DISCONTINUED | OUTPATIENT
Start: 2024-01-12 | End: 2024-01-12

## 2024-01-12 RX ORDER — LIDOCAINE HYDROCHLORIDE 10 MG/ML
0.5 INJECTION, SOLUTION EPIDURAL; INFILTRATION; INTRACAUDAL; PERINEURAL ONCE AS NEEDED
Status: DISCONTINUED | OUTPATIENT
Start: 2024-01-12 | End: 2024-01-16 | Stop reason: HOSPADM

## 2024-01-12 RX ORDER — ONDANSETRON 2 MG/ML
4 INJECTION INTRAMUSCULAR; INTRAVENOUS ONCE AS NEEDED
Status: CANCELLED | OUTPATIENT
Start: 2024-01-12

## 2024-01-12 RX ORDER — PROPOFOL 10 MG/ML
INJECTION, EMULSION INTRAVENOUS AS NEEDED
Status: DISCONTINUED | OUTPATIENT
Start: 2024-01-12 | End: 2024-01-12

## 2024-01-12 RX ORDER — SODIUM CHLORIDE 9 MG/ML
INJECTION, SOLUTION INTRAVENOUS CONTINUOUS PRN
Status: DISCONTINUED | OUTPATIENT
Start: 2024-01-12 | End: 2024-01-12

## 2024-01-12 RX ADMIN — SODIUM CHLORIDE: 9 INJECTION, SOLUTION INTRAVENOUS at 10:49

## 2024-01-12 RX ADMIN — PROPOFOL 20 MG: 10 INJECTION, EMULSION INTRAVENOUS at 10:55

## 2024-01-12 RX ADMIN — LIDOCAINE HYDROCHLORIDE 80 MG: 10 INJECTION, SOLUTION EPIDURAL; INFILTRATION; INTRACAUDAL; PERINEURAL at 10:52

## 2024-01-12 RX ADMIN — PROPOFOL 80 MG: 10 INJECTION, EMULSION INTRAVENOUS at 10:52

## 2024-01-12 NOTE — ANESTHESIA POSTPROCEDURE EVALUATION
Post-Op Assessment Note    CV Status:  Stable  Pain Score: 0    Pain management: adequate       Mental Status:  Alert and awake   Hydration Status:  Euvolemic   PONV Controlled:  Controlled   Airway Patency:  Patent  Two or more mitigation strategies used for obstructive sleep apnea   Post Op Vitals Reviewed: Yes      Staff: CRNA               BP   114/66   Temp      Pulse  76   Resp   16   SpO2   95% RA

## 2024-01-12 NOTE — ANESTHESIA PREPROCEDURE EVALUATION
Procedure:  EGD    Relevant Problems   ANESTHESIA (within normal limits)      CARDIO   (+) Hyperlipidemia      ENDO (within normal limits)      GI/HEPATIC   (+) Alcoholic cirrhosis of liver with ascites (HCC)   (+) Decompensated hepatic cirrhosis (HCC)   (+) Fatty liver   (+) GERD without esophagitis      /RENAL   (+) Stage 2 chronic kidney disease      GYN   (+) Malignant neoplasm of lower-outer quadrant of right breast of female, estrogen receptor positive       HEMATOLOGY   (+) Anemia   (+) Thrombocytopenia (HCC)      MUSCULOSKELETAL (within normal limits)      NEURO/PSYCH   (+) Anxiety   (+) Seizure (HCC)      PULMONARY   (+) COPD (chronic obstructive pulmonary disease) (HCC)   (+) Pleural effusion      Digestive   (+) Irritable bowel syndrome with diarrhea      Other   (+) Alcohol abuse   (+) Atypical ductal hyperplasia of right breast   (+) Elevated LFTs   (+) Nicotine dependence        Physical Exam    Airway    Mallampati score: II  TM Distance: >3 FB  Neck ROM: full     Dental   No notable dental hx     Cardiovascular  Rhythm: regular, Rate: normal, Cardiovascular exam normal    Pulmonary  Pulmonary exam normal Breath sounds clear to auscultation    Other Findings  post-pubertal.      Anesthesia Plan  ASA Score- 3     Anesthesia Type- IV sedation with anesthesia with ASA Monitors.         Additional Monitors:     Airway Plan:            Plan Factors-Exercise tolerance (METS): >4 METS.    Chart reviewed. EKG reviewed. Imaging results reviewed. Existing labs reviewed. Patient summary reviewed.                  Induction- intravenous.    Postoperative Plan-     Informed Consent- Anesthetic plan and risks discussed with patient.  I personally reviewed this patient with the CRNA. Discussed and agreed on the Anesthesia Plan with the CRNA..

## 2024-01-12 NOTE — H&P
History and Physical - [facility]  Jose White 63 y.o. female MRN: [id number]                  HPI: Jose White is a 63 y.o. year old female who presents for variceal screening.      REVIEW OF SYSTEMS: Per the HPI, and otherwise unremarkable.    Historical Information   Past Medical History:   Diagnosis Date    Alcoholic fatty liver     Anxiety     Asthma     COPD (chronic obstructive pulmonary disease) (HCC)     Elevated liver function tests     GERD (gastroesophageal reflux disease)     GERD without esophagitis     Hyperlipidemia     Hypertension     IBS (irritable bowel syndrome)     Insomnia     Insomnia     Liver disease     Nervousness     Nicotine dependence     Other specified urinary incontinence     RLS (restless legs syndrome)     Wears glasses      Past Surgical History:   Procedure Laterality Date    BACK SURGERY      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    BREAST EXCISIONAL BIOPSY Right 11/01/2004    benign    CATARACT EXTRACTION, BILATERAL  08/01/2018    COLONOSCOPY N/A 5/8/2019    Procedure: COLONOSCOPY;  Surgeon: Jacobo Leonardo MD;  Location: [facility];  Service: Gastroenterology    EGD AND COLONOSCOPY N/A 3/19/2018    Procedure: EGD AND COLONOSCOPY;  Surgeon: Jacobo Leonardo MD;  Location: [facility];  Service: Gastroenterology    ESOPHAGOGASTRODUODENOSCOPY N/A 5/8/2019    Procedure: ESOPHAGOGASTRODUODENOSCOPY (EGD);  Surgeon: Jacobo Leonardo MD;  Location: [facility];  Service: Gastroenterology    IR PARACENTESIS  11/27/2020    IR PARACENTESIS  12/8/2020    IR PARACENTESIS  10/28/2022    IR PARACENTESIS  11/7/2022    IR PARACENTESIS  11/30/2022    IR PARACENTESIS  12/30/2022    IR PARACENTESIS  1/16/2023    IR THORACENTESIS  1/16/2023    IR THORACENTESIS  3/28/2023    KNEE SURGERY      KNEE SURGERY      LUMBAR LAMINECTOMY  04/1999    LYMPH NODE BIOPSY      MAMMO STEREOTACTIC BREAST BIOPSY RIGHT (ALL INC) Right 5/21/2021    MAMMO STEREOTACTIC BREAST  BIOPSY RIGHT (ALL INC) EACH ADD Right 5/21/2021    TOTAL ABDOMINAL HYSTERECTOMY  02/05/2008    TUBAL LIGATION      TUBAL LIGATION  1989    UPPER GASTROINTESTINAL ENDOSCOPY       Social History   Social History     Substance and Sexual Activity   Alcohol Use Yes    Alcohol/week: 7.0 standard drinks of alcohol    Types: 1 Glasses of wine, 6 Cans of beer per week     Social History     Substance and Sexual Activity   Drug Use No     Social History     Tobacco Use   Smoking Status Every Day    Current packs/day: 1.00    Average packs/day: 1 pack/day for 46.0 years (46.0 ttl pk-yrs)    Types: Cigarettes    Start date: 1/1/1978   Smokeless Tobacco Never     Family History   Problem Relation Age of Onset    Breast cancer Mother 32    No Known Problems Father     No Known Problems Sister     No Known Problems Brother     No Known Problems Son     No Known Problems Maternal Grandmother     No Known Problems Maternal Grandfather     No Known Problems Paternal Grandmother     No Known Problems Paternal Grandfather     No Known Problems Maternal Aunt     No Known Problems Maternal Aunt     No Known Problems Paternal Aunt     Substance Abuse Neg Hx     Mental illness Neg Hx        Meds/Allergies       Current Outpatient Medications:     ALPRAZolam (XANAX) 1 mg tablet    anastrozole (ARIMIDEX) 1 mg tablet    furosemide (LASIX) 40 mg tablet    lactulose 20 g/30 mL    midodrine (PROAMATINE) 2.5 mg tablet    pantoprazole (PROTONIX) 20 mg tablet    spironolactone (ALDACTONE) 100 mg tablet    traZODone (DESYREL) 50 mg tablet    albuterol (2.5 mg/3 mL) 0.083 % nebulizer solution    albuterol (Proventil HFA) 90 mcg/act inhaler    budesonide-formoterol (Symbicort) 160-4.5 mcg/act inhaler    guselkumab (TREMFYA) subcutaneous injection    promethazine-dextromethorphan (PHENERGAN-DM) 6.25-15 mg/5 mL oral syrup    Current Facility-Administered Medications:     lidocaine (PF) (XYLOCAINE-MPF) 1 % injection 0.5 mL, 0.5 mL, Infiltration, Once  PRN    Allergies   Allergen Reactions    Sulfa Antibiotics Shortness Of Breath    Ace Inhibitors Cough and Other (See Comments)     coughing       Objective     /52   Pulse 81   Temp (!) 97.1 °F (36.2 °C) (Temporal)   Resp 18   SpO2 97%       PHYSICAL EXAM    Gen: NAD  Head: NCAT  CV: RRR  CHEST: Clear  ABD: soft, NT/ND  EXT: no edema      ASSESSMENT/PLAN:  This is a 63 y.o. year old female here for EGD, and she is stable and optimized for her procedure.

## 2024-01-18 LAB
BUN SERPL-MCNC: 10 MG/DL (ref 7–25)
CREAT SERPL-MCNC: 0.8 MG/DL (ref 0.5–1.05)
GFR/BSA.PRED SERPLBLD CYS-BASED-ARV: 82 ML/MIN/1.73M2

## 2024-01-22 DIAGNOSIS — F41.9 ANXIETY: ICD-10-CM

## 2024-01-22 RX ORDER — ALPRAZOLAM 1 MG/1
1 TABLET ORAL 2 TIMES DAILY PRN
Qty: 60 TABLET | Refills: 0 | Status: SHIPPED | OUTPATIENT
Start: 2024-01-22

## 2024-01-25 ENCOUNTER — OFFICE VISIT (OUTPATIENT)
Dept: GASTROENTEROLOGY | Facility: CLINIC | Age: 64
End: 2024-01-25
Payer: COMMERCIAL

## 2024-01-25 ENCOUNTER — TELEPHONE (OUTPATIENT)
Dept: NEUROLOGY | Facility: CLINIC | Age: 64
End: 2024-01-25

## 2024-01-25 VITALS
BODY MASS INDEX: 27.01 KG/M2 | WEIGHT: 158.2 LBS | HEART RATE: 75 BPM | TEMPERATURE: 97.8 F | DIASTOLIC BLOOD PRESSURE: 58 MMHG | OXYGEN SATURATION: 97 % | HEIGHT: 64 IN | SYSTOLIC BLOOD PRESSURE: 108 MMHG

## 2024-01-25 DIAGNOSIS — J91.8 PLEURAL EFFUSION ASSOCIATED WITH HEPATIC DISORDER: ICD-10-CM

## 2024-01-25 DIAGNOSIS — E87.1 HYPONATREMIA: ICD-10-CM

## 2024-01-25 DIAGNOSIS — K21.9 GERD WITHOUT ESOPHAGITIS: ICD-10-CM

## 2024-01-25 DIAGNOSIS — I85.00 ESOPHAGEAL VARICES DETERMINED BY ENDOSCOPY (HCC): ICD-10-CM

## 2024-01-25 DIAGNOSIS — K70.31 ALCOHOLIC CIRRHOSIS OF LIVER WITH ASCITES (HCC): Primary | ICD-10-CM

## 2024-01-25 DIAGNOSIS — K76.9 PLEURAL EFFUSION ASSOCIATED WITH HEPATIC DISORDER: ICD-10-CM

## 2024-01-25 PROCEDURE — 99214 OFFICE O/P EST MOD 30 MIN: CPT | Performed by: INTERNAL MEDICINE

## 2024-01-25 NOTE — PROGRESS NOTES
[facility] - Outpatient Follow-up Note  Jose White 63 y.o. female MRN: [id number]  Encounter: [id number]          ASSESSMENT AND PLAN:      Summary:    Ms. White is a 63 y.o. year old female with cirrhosis secondary to Chronic alcohol abuse which is decompensated with ascites, hepatic hydrothorax lower extremity edema, thrombocytopenia, esophageal varices, hypoalbuminemia and hypoprothrombinemia.    Problem List and Plan:    End Stage Liver Disease: Current MELD-Na Score is 13.  Ascites/hepatic hydrothorax currently well controlled with diuretics.  States she has stopped drinking in late November after admission for reported seizure.  I advised her to continue sobriety.  Volume: Ascites and pleural effusions/Edema still well controlled on lasix 40 mg daily and aldactone 100 mg daily.  Hepatic Encephalopathy: No previous history of overt hepatic encephalopathy, but possible stage 0 or subclinical hepatic encephalopathy.  Currently taking lactulose on occasion.  Ms. White and her family/care giver are aware of the signs/symptoms of hepatic encephalopathy and understand to seek immediate medical attention should they arise.  Esophageal Varices: Last EGD done on 1/17/24 showing mild PHG.  Repeat EGD in year.  Hepatoma Screening: Ultrasound done 8/1/23 with no evidence of hepatoma.  Repeat Ultrsound scheduled in early February.  Nutritional status: Mild to moderate sarcopenia noted.  Encouraged high protein snacking, low salt diet.  If weight loss evident, will refer to nutritional counseling.     Health Maintenance:  Ms. White was counseled to avoid alcohol and tobacco products.  Ms. White was also advised to avoid raw seafood/oysters and from swimming in unchlorinated neely, particularly from the [facility], due to increased risk of infection from Vibrio Vulnificus.  Ms. White was also instructed to seek immediate medical attention should she develop  fevers over 101 F, evidence of GI bleeding (black or bloody stools, bloody or coffee ground emesis) or confusion.  Ms. White was advised to avoid NSAIDs, if possible, due to increase risk of renal dysfunction, and advised that if she should use acetaminophen, dose should not exceed 2 grams/day.  Ms. White was recommend to remain up to date with adult vaccination, including influenza, pneumovax and meningococcus. Inactivated HSV and zoster vaccine is safe and encouraged by PCP.  Ms. White was instructed to call either our office or that of her referring doctor should she develop worsening symptoms related to lower extremity edema, ascites, jaundice or excessive bruising/bleeding.        FOLLOW-UP:  Return in about 3 months (around 4/25/2024).     VISIT DIAGNOSES AND ORDERS:      No diagnosis found.    No orders of the defined types were placed in this encounter.    ______________________________________________________________________    SUBJECTIVE:    Interval update 1/25/2024:  Since her last office visit with me in October, she followed up with Dr. Frankel few weeks later.  Unfortunately, she was admitted to the hospital in late November after falling at a restaurant, reportedly from a seizure.  I saw her 2 weeks ago for upper endoscopy during which her esophagus and duodenum appeared normal and she had some mild portal potential gastropathy in her stomach.    Her most recent blood work was done a week ago.  She is scheduled for hepatoma screening in early February.    In the office today, she is upset that she still has not got her 's license back since her seizure.  States she has not drank any alcohol since that admission.    Overall, she denies any changes to her health including her review of systems.           History:    Interval update 10/11/2023:  Since her last office visit in May, she has had blood work monthly and the most recent being last week.  Her transaminases have remained  normal with her bilirubin slightly elevated at 1.7.  Fortunately, she is still not required any recurrent para or thoracentesis.  She had an abdominal ultrasound done on August 1, 2023 which showed a cirrhotic appearing liver with no lesions noted.  She also had evidence of cholelithiasis without cholecystitis.    Unfortunately, she states she continues to drink alcohol.  She is drinking at least 1 full bottle of wine per week and the occasional daily beer.    She has no significant complaints and is elusive with some of my questions.  She denies jaundice, lower extremity edema, abdominal distention with fluid, easy bruising, excessive bleeding, pruritus, any evidence of GI bleeding.    Interval Update 5/9/23  Since her last office visit, she has not required another thoracentesis.  She denies any increase in water weight, specifically no lower extremity edema no abdominal distention with fluid and no worsening shortness of breath.  She has been taking 40 mg of furosemide and 100 mg of spironolactone daily.  She states she has not been specifically watching her sodium intake.    Her main complaint is fatigue at this time.  She states she is not able to walk any significant distance.  Unfortunately, she continues to smoke.  Ms. White denies recent or history of yellow eyes/skin, dark urine, GI bleeding, abdominal distention with fluid, lower extremity swelling, easy bruising, excessive bleeding, pruritus or confusion.    She continues to drink a glass of wine 1-2 times per week.    HPI from 4/4/23  Ms. Jose White is a 63 y.o. female with medical history as outlined below including cirrhosis secondary to Chronic alcohol abuse which is decompensated with ascites, lower extremity edema, thrombocytopenia, esophageal varices, hypoalbuminemia and hypoprothrombinemia, and more recently hepatic hydrothorax who comes in today for follow-up shortly after discharge from the hospital after admission for symptomatic  hepatic hydrothorax with weakness and shortness of breath.  She was found to have hyponatremia and diruetics were held.  She underwent a 1 L thoracentesis.  After Na improved, she was discharged on home diuretics doses, 100 mg daily and lasix 40 mg daily.    She had an EGD done on 10/13/2022 showing grade 1 varices with no bleeding and mild PHG.           REVIEW OF SYSTEMS     Review of Systems   All other systems reviewed and are negative.      Historical Information   Patient Active Problem List   Diagnosis    Fatty liver    Other specified abnormal findings of blood chemistry    Anxiety    Elevated LFTs    GERD without esophagitis    Hyperlipidemia    Hypotension    Seizure (HCC)    Insomnia    Nicotine dependence    Healthcare maintenance    COPD (chronic obstructive pulmonary disease) (HCC)    Alcohol abuse    Irritable bowel syndrome with diarrhea    Non-seasonal allergic rhinitis due to pollen    Bilateral leg edema    Alcoholic cirrhosis of liver with ascites (HCC)    Hyponatremia    Thrombocytopenia (HCC)    Anemia    Decompensated hepatic cirrhosis (HCC)    Ductal carcinoma in situ (DCIS) of right breast    Atypical ductal hyperplasia of right breast    Breast neoplasm, Tis (DCIS), right    Malignant neoplasm of lower-outer quadrant of right breast of female, estrogen receptor positive     Pleural effusion    Vaginal atrophy    Dyspareunia, female    Postcoital bleeding    Stage 2 chronic kidney disease     Social History     Substance and Sexual Activity   Alcohol Use Yes    Alcohol/week: 7.0 standard drinks of alcohol    Types: 1 Glasses of wine, 6 Cans of beer per week     Social History     Substance and Sexual Activity   Drug Use No     Social History     Tobacco Use   Smoking Status Every Day    Current packs/day: 1.00    Average packs/day: 1 pack/day for 46.1 years (46.1 ttl pk-yrs)    Types: Cigarettes    Start date: 1/1/1978   Smokeless Tobacco Never       Meds/Allergies       Current Outpatient  "Medications:     albuterol (2.5 mg/3 mL) 0.083 % nebulizer solution    albuterol (Proventil HFA) 90 mcg/act inhaler    ALPRAZolam (XANAX) 1 mg tablet    anastrozole (ARIMIDEX) 1 mg tablet    budesonide-formoterol (Symbicort) 160-4.5 mcg/act inhaler    furosemide (LASIX) 40 mg tablet    lactulose 20 g/30 mL    midodrine (PROAMATINE) 2.5 mg tablet    pantoprazole (PROTONIX) 20 mg tablet    promethazine-dextromethorphan (PHENERGAN-DM) 6.25-15 mg/5 mL oral syrup    Risankizumab-rzaa (SKYRIZI PEN SC)    spironolactone (ALDACTONE) 100 mg tablet    traZODone (DESYREL) 50 mg tablet    guselkumab (TREMFYA) subcutaneous injection    Allergies   Allergen Reactions    Sulfa Antibiotics Shortness Of Breath    Ace Inhibitors Cough and Other (See Comments)     coughing           Objective     Blood pressure 108/58, pulse 75, temperature 97.8 °F (36.6 °C), temperature source Oral, height 5' 4\" (1.626 m), weight 71.8 kg (158 lb 3.2 oz), SpO2 97%, not currently breastfeeding. Body mass index is 27.15 kg/m².      PHYSICAL EXAM:      Physical Exam  Vitals reviewed.   Constitutional:       General: She is not in acute distress.     Appearance: She is ill-appearing.   HENT:      Head: Normocephalic and atraumatic.      Nose: Nose normal.      Mouth/Throat:      Mouth: Mucous membranes are moist.      Pharynx: Oropharynx is clear.   Eyes:      General: No scleral icterus.     Extraocular Movements: Extraocular movements intact.   Cardiovascular:      Rate and Rhythm: Normal rate and regular rhythm.      Heart sounds: No murmur heard.  Pulmonary:      Effort: Pulmonary effort is normal. No respiratory distress.      Breath sounds: No decreased breath sounds.   Abdominal:      General: Abdomen is flat. There is no distension (mild).      Palpations: Abdomen is soft. There is no shifting dullness, fluid wave, hepatomegaly or splenomegaly.      Tenderness: There is no abdominal tenderness.      Hernia: No hernia is present.   Genitourinary:   "   Comments: deferred  Musculoskeletal:         General: No swelling. Normal range of motion.      Cervical back: Normal range of motion and neck supple. No tenderness.   Skin:     General: Skin is warm.      Coloration: Skin is not jaundiced.      Findings: No bruising or rash.   Neurological:      General: No focal deficit present.      Mental Status: She is alert and oriented to person, place, and time.   Psychiatric:         Mood and Affect: Mood normal.         Lab Results:   Lab Results   Component Value Date     07/28/2017    K 4.1 01/17/2024    CO2 26 01/17/2024     01/17/2024    BUN 10 01/17/2024    CREATININE 0.78 01/17/2024    GLUCOSE 95 07/28/2017     Lab Results   Component Value Date    WBC 4.0 01/17/2024    HGB 13.9 01/17/2024    HCT 40.3 01/17/2024    .5 (H) 01/17/2024    PLT 97 (L) 01/17/2024     Lab Results   Component Value Date    TP 7.6 01/17/2024    AST 46 (H) 01/17/2024    ALT 23 01/17/2024    BILITOT 0.6 07/28/2017    BILIDIR 0.95 (H) 09/17/2022    INR 1.3 (H) 01/17/2024      Lab Results   Component Value Date    IRON 77 11/29/2023    FERRITIN 145 11/29/2023     Lab Results   Component Value Date    CHOL 159 07/28/2017    HDL 39 (L) 09/15/2022    TRIG 61 09/15/2022     MELD 3.0: 13 at 1/17/2024 12:38 PM  MELD-Na: 11 at 1/17/2024 12:38 PM  Calculated from:  Serum Creatinine: 0.78 mg/dL (Using min of 1 mg/dL) at 1/17/2024 12:38 PM  Serum Sodium: 135 mmol/L at 1/17/2024 12:38 PM  Total Bilirubin: 1.4 mg/dL at 1/17/2024 12:38 PM  Serum Albumin: 3.3 g/dL at 1/17/2024 12:38 PM  INR(ratio): 1.3 at 1/17/2024 12:38 PM  Age at listing (hypothetical): 63 years  Sex: Female at 1/17/2024 12:38 PM        Radiology Results:   EGD    Result Date: 10/13/2022  Narrative: [facility] [address] [facility] [id number] [phone] DATE OF SERVICE: 10/13/22 PHYSICIAN(S): Attending: Haroon Ann MD Fellow: Betito Herrera MD INDICATION: Alcoholic cirrhosis of liver  with ascites (HCC) POST-OP DIAGNOSIS: See the impression below. PREPROCEDURE: Informed consent was obtained for the procedure, including sedation.  Risks of perforation, hemorrhage, adverse drug reaction and aspiration were discussed. The patient was placed in the left lateral decubitus position. Patient was explained about the risks and benefits of the procedure. Risks including but not limited to bleeding, infection, and perforation were explained in detail. Also explained about less than 100% sensitivity with the exam and other alternatives. DETAILS OF PROCEDURE: Patient was taken to the procedure room where a time out was performed to confirm correct patient and correct procedure. The patient underwent monitored anesthesia care, which was administered by an anesthesia professional. The patient's blood pressure, heart rate, level of consciousness, respirations and oxygen were monitored throughout the procedure. The scope was advanced to the second part of the duodenum. Retroflexion was performed in the fundus. The patient experienced no blood loss. The procedure was not difficult. The patient tolerated the procedure well. There were no apparent complications. ANESTHESIA INFORMATION: ASA: IV Anesthesia Type: Anesthesia type not filed in the log. MEDICATIONS: No administrations occurring from 1127 to 1144 on 10/13/22 FINDINGS: Grade I varices with no bleeding (no red carol sign) in the lower third of the esophagus Abnormal mucosa. Consistent with mild portal hypertensive gastropathy. Normal duodenum SPECIMENS: * No specimens in log *     Impression: Small grade 1 varices disappeared on insufflation. Mild portal hypertensive gastropathy. Normal duodenum RECOMMENDATION: Repeat EGD in 1 year. Recommend starting nadolol 40 mg daily. Continue outpatient GI follow up. Resume diet.  ATTENDING ATTESTATION:  I was present throughout the entire procedure from insertion to complete withdrawal of the scope. I performed all  interventions myself or oversaw the fellow.   Haroon Ann MD     IR paracentesis    Result Date: 10/28/2022  Narrative: INDICATION: Recurrent ascites PROCEDURE: 1. Ultrasound-guided paracentesis FINDINGS: 1.  7750 mL clear yellow ascites removed.     Impression: Paracentesis. _________________________________________________________________ COMPARISON: None PROCEDURE DETAILS: Operators: Dr. Qureshi Anesthesia: Local Medications: 1% lidocaine COMMENTS: A preprocedure timeout was performed per [facility] protocol. The right abdomen was prepped and draped in the usual sterile fashion. 5-Nigerian Yueh catheter was advanced using ultrasound guidance into ascites. Following drainage, the skin was cleansed and sterile dressings were applied. Workstation performed: [id number]     US bedside procedure    Result Date: 10/13/2022  Narrative: [id number]        Vamsi Herrera MD

## 2024-01-25 NOTE — PATIENT INSTRUCTIONS
As discussed today:    Medications:  Continue taking your current medications without changes  Laboratory Testing (Listed below):  Please have blood work drawn in one month.  Radiology/Diagnostic Testing:  Please schedule an abdominal ultrasound next month as scheduled.  Avoid alcohol and tobacco products.  Also avoid raw seafood/oysters and avoid swimming/wading in unchlorinated neely, particularly from the [facility], due to increased risk of infection from a bacteria called Vibrio Vulnificus.  Avoid medications called NSAID's (Motrin/Ibuprofen, Naproxen/Naprosyn/Aleve) if possible, due to increase risk of kidney dysfunction, and if you use medications containing acetaminophen (Tylenol, percocet, Endocet, and many cough/cold medications), the dose should not exceed 2 grams/day.  Seek immediate medical attention if you develop fevers over 101 F, have evidence of GI bleeding (black or bloody stools, bloody or coffee ground emesis) or confusion.  Call our office if you develop worsening symptoms related to lower extremity edema, ascites, jaundice or excessive bruising/bleeding.

## 2024-01-25 NOTE — TELEPHONE ENCOUNTER
VM 1/24 at 1:22 pm:    Hi, my name is Jose White. I was in to see Evelyn Pabon on January 11th. She was supposed to electronically do an email or electronic paper over to my primary physician who is Sarah Beth Eller about my 's license that she had to fill out. I am not getting no response back from my physician about this. I would like to know what's going on because it has to do with my license. I also need to make an appointment in about 3 months, around April 11th. If somebody could call me back, I'd really appreciate it. My birthday is [birth year]. My phone number is [phone]. Thank you.

## 2024-01-30 ENCOUNTER — HOSPITAL ENCOUNTER (OUTPATIENT)
Facility: MEDICAL CENTER | Age: 64
Discharge: HOME | End: 2024-01-30
Payer: COMMERCIAL

## 2024-01-30 ENCOUNTER — TELEPHONE (OUTPATIENT)
Dept: FAMILY MEDICINE CLINIC | Facility: CLINIC | Age: 64
End: 2024-01-30

## 2024-01-30 DIAGNOSIS — R56.9 SEIZURE (HCC): ICD-10-CM

## 2024-01-30 PROCEDURE — G1004 CDSM NDSC: HCPCS

## 2024-01-30 PROCEDURE — 70553 MRI BRAIN STEM W/O & W/DYE: CPT

## 2024-01-30 PROCEDURE — A9585 GADOBUTROL INJECTION: HCPCS | Performed by: PHYSICIAN ASSISTANT

## 2024-01-30 RX ORDER — GADOBUTROL 604.72 MG/ML
7 INJECTION INTRAVENOUS
Status: COMPLETED | OUTPATIENT
Start: 2024-01-30 | End: 2024-01-30

## 2024-01-30 RX ADMIN — GADOBUTROL 7 ML: 604.72 INJECTION INTRAVENOUS at 10:12

## 2024-01-30 NOTE — TELEPHONE ENCOUNTER
Per 1/11/24 LOV note:  Patient advised she cannot drive at this time.  She cannot drive until 6 months seizure-free and cleared by [employer].  We can fill out the seizure reporting form at that time (when 6 months seizure-free), but PCP will need to fill out the substance abuse form     Per 11/26/23 IP Neurology note, [employer] form submitted on 11/26/23.     Form cannot be submitted to [employer] until 5/26/24 (6 months) if patient remains seizure-free.

## 2024-01-30 NOTE — TELEPHONE ENCOUNTER
Patient called and asked if the form for the [employer] was completed and needs it back as soon as possible.  She said when it done she will pick it up, because they take it to there, instead of mailing it to [place].

## 2024-01-31 NOTE — PROGRESS NOTES
[facility] - Outpatient Follow-up Note  Tolu Smith 58 y.o. female MRN: [id number]  Encounter: [id number]          ASSESSMENT AND PLAN:      1. Alcoholic cirrhosis of liver with ascites ([address])  She has alcoholic cirrhosis complicated by ascites, esophageal varices, and hepatic encephalopathy. I refilled her Lasix and Aldactone and we will continue to monitor her abdominal exam and check her labs. I gave her a lab slip for comprehensive metabolic panel. In particular I want to monitor her hyponatremia. I will also continue her rifaximin 550 mg twice a day and lactulose. I encouraged her to titrate her lactulose dose for goal of 2-3 soft bowel movements per day. I spoke to her at length about the importance of completely quitting alcohol intake. 2. GERD without esophagitis  She has reflux symptoms that have been well controlled with diet and lifestyle modification so she is not currently taking any medication for the reflux. 3. Irritable bowel syndrome with diarrhea  She has a history of diarrhea predominant irritable bowel syndrome but has not had any symptoms recently as she has been more constipated recently so I encouraged her to take the lactulose regularly and increase the dose as needed. ______________________________________________________________________    SUBJECTIVE:  She presents for follow-up of her alcoholic cirrhosis complicated by ascites and small esophageal varices and hepatic encephalopathy. Since her last visit she has been doing well with the Lasix 40 mg daily and Aldactone 100 mg daily. She has not had any accumulation of ascites and not had to have a paracentesis recently. She has been taking lactulose but does not have a daily bowel movement she said she usually has a bowel movement every 2 days. She said sometimes her bowels are hard. Her recent EGD revealed small esophageal varices and portal hypertensive gastropathy.  She denies Way overdue OV is he still coming here ?   vomiting, difficulty swallowing, and weight loss. Unfortunately, she continues to drink alcohol daily. REVIEW OF SYSTEMS IS OTHERWISE NEGATIVE. Historical Information   Past Medical History:   Diagnosis Date    Alcoholic fatty liver     Anxiety     Asthma     COPD (chronic obstructive pulmonary disease) (HCC)     Elevated liver function tests     GERD (gastroesophageal reflux disease)     GERD without esophagitis     Hyperlipidemia     Hypertension     IBS (irritable bowel syndrome)     Insomnia     Insomnia     Liver disease     Nervousness     Nicotine dependence     Other specified urinary incontinence     RLS (restless legs syndrome)     Wears glasses      Past Surgical History:   Procedure Laterality Date    BACK SURGERY      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    BREAST EXCISIONAL BIOPSY Right 11/01/2004    benign    CATARACT EXTRACTION, BILATERAL  08/01/2018    COLONOSCOPY N/A 5/8/2019    Procedure: COLONOSCOPY;  Surgeon: Mayuri Sevilla MD;  Location: [facility]; Service: Gastroenterology    EGD AND COLONOSCOPY N/A 3/19/2018    Procedure: EGD AND COLONOSCOPY;  Surgeon: Mayuri Sevilla MD;  Location: [facility]; Service: Gastroenterology    ESOPHAGOGASTRODUODENOSCOPY N/A 5/8/2019    Procedure: ESOPHAGOGASTRODUODENOSCOPY (EGD); Surgeon: Mayuri Sevilla MD;  Location: [facility];   Service: Gastroenterology    IR PARACENTESIS  11/27/2020    IR PARACENTESIS  12/8/2020    IR PARACENTESIS  10/28/2022    IR PARACENTESIS  11/7/2022    IR PARACENTESIS  11/30/2022    IR PARACENTESIS  12/30/2022    IR PARACENTESIS  1/16/2023    IR THORACENTESIS  1/16/2023    IR THORACENTESIS  3/28/2023    KNEE SURGERY      KNEE SURGERY      LUMBAR LAMINECTOMY  04/1999    LYMPH NODE BIOPSY      MAMMO STEREOTACTIC BREAST BIOPSY RIGHT (ALL INC) Right 5/21/2021    MAMMO STEREOTACTIC BREAST BIOPSY RIGHT (ALL INC) EACH ADD Right 5/21/2021    TOTAL ABDOMINAL HYSTERECTOMY  02/05/2008    TUBAL LIGATION      TUBAL LIGATION 1989    UPPER GASTROINTESTINAL ENDOSCOPY       Social History   Social History     Substance and Sexual Activity   Alcohol Use Not Currently    Comment: not lately, non alcoholic beer     Social History     Substance and Sexual Activity   Drug Use No     Social History     Tobacco Use   Smoking Status Every Day    Packs/day: 1.00    Years: 45.00    Total pack years: 45.00    Types: Cigarettes    Start date: 1/1/1978   Smokeless Tobacco Never     Family History   Problem Relation Age of Onset    Breast cancer Mother     No Known Problems Father     No Known Problems Sister     No Known Problems Brother     No Known Problems Son     No Known Problems Maternal Grandmother     No Known Problems Maternal Grandfather     No Known Problems Paternal Grandmother     No Known Problems Paternal Grandfather     No Known Problems Maternal Aunt     No Known Problems Maternal Aunt     No Known Problems Paternal Aunt     Substance Abuse Neg Hx     Mental illness Neg Hx        Meds/Allergies       Current Outpatient Medications:     albuterol (2.5 mg/3 mL) 0.083 % nebulizer solution    albuterol (Proventil HFA) 90 mcg/act inhaler    ALPRAZolam (XANAX) 1 mg tablet    anastrozole (ARIMIDEX) 1 mg tablet    budesonide-formoterol (Symbicort) 160-4.5 mcg/act inhaler    furosemide (LASIX) 40 mg tablet    guselkumab (TREMFYA) subcutaneous injection    lactulose 20 g/30 mL    midodrine (PROAMATINE) 5 mg tablet    pantoprazole (PROTONIX) 20 mg tablet    spironolactone (ALDACTONE) 100 mg tablet    traZODone (DESYREL) 50 mg tablet    Allergies   Allergen Reactions    Sulfa Antibiotics Shortness Of Breath    Ace Inhibitors Cough and Other (See Comments)     coughing           Objective     Blood pressure 128/74, temperature 97.6 °F (36.4 °C), height 5' 4" (1.626 m), weight 69 kg (152 lb 3.2 oz), not currently breastfeeding. Body mass index is 26.13 kg/m².       PHYSICAL EXAM:      General Appearance:   Alert, cooperative, no distress   HEENT: Normocephalic, atraumatic, anicteric. Neck:  Supple, symmetrical, trachea midline   Lungs:   Clear to auscultation bilaterally; no rales, rhonchi or wheezing; respirations unlabored    Heart[de-identified]   Regular rate and rhythm; no murmur, rub, or gallop. Abdomen:   Soft, non-tender, non-distended; normal bowel sounds; no masses, no organomegaly    Genitalia:   Deferred    Rectal:   Deferred    Extremities:  No cyanosis, clubbing or edema    Pulses:  2+ and symmetric    Skin:  Has jaundice, but no rashes or lesions    Lymph nodes:  No palpable cervical lymphadenopathy        Lab Results:   No visits with results within 1 Day(s) from this visit.    Latest known visit with results is:   Orders Only on 08/21/2023   Component Date Value    White Blood Cell Count 10/06/2023 3.5 (L)     Red Blood Cell Count 10/06/2023 3.83     Hemoglobin 10/06/2023 13.5     HCT 10/06/2023 39.0     MCV 10/06/2023 101.8 (H)     MCH 10/06/2023 35.2 (H)     MCHC 10/06/2023 34.6     RDW 10/06/2023 14.0     Platelet Count 05/59/3238 96 (L)     SL AMB MPV 10/06/2023 9.8     Neutrophils (Absolute) 10/06/2023 1,985     Lymphocytes (Absolute) 10/06/2023 938     Monocytes (Absolute) 10/06/2023 459     Eosinophils (Absolute) 10/06/2023 60     Basophils ABS 10/06/2023 60     Neutrophils 10/06/2023 56.7     Lymphocytes 10/06/2023 26.8     Monocytes 10/06/2023 13.1     Eosinophils 10/06/2023 1.7     Basophils PCT 10/06/2023 1.7     INR 10/06/2023 1.3 (H)     Prothrombin Time 10/06/2023 13.5 (H)     Glucose, Random 10/06/2023 105 (H)     BUN 10/06/2023 10     Creatinine 10/06/2023 0.95     eGFR 10/06/2023 67     SL AMB BUN/CREATININE RA* 10/06/2023 SEE NOTE:     Sodium 10/06/2023 133 (L)     Potassium 10/06/2023 4.1     Chloride 10/06/2023 99     CO2 10/06/2023 28     Calcium 10/06/2023 9.0     Protein, Total 10/06/2023 7.6     Albumin 10/06/2023 3.1 (L)     Globulin 10/06/2023 4.5 (H)     Albumin/Globulin Ratio 10/06/2023 0.7 (L)     TOTAL BILIRUBIN 10/06/2023 1.7 (H)     Alkaline Phosphatase 10/06/2023 121     AST 10/06/2023 32     ALT 10/06/2023 17          Radiology Results:   XR chest pa & lateral    Result Date: 10/20/2023  Narrative: CHEST INDICATION:   R05.3: Chronic cough. COMPARISON: Chest CT 4/20/2023 and CXR 3/27/2023. EXAM PERFORMED/VIEWS:  XR CHEST PA & LATERAL. DUAL ENERGY SUBTRACTION. FINDINGS: Cardiomediastinal silhouette normal. Lungs clear. Trace right effusion most conspicuous on the lateral projection. No pneumothorax. Surgical changes in the right breast with clips. Upper abdomen normal.  Bones normal for age. Impression: Lungs clear. Trace right effusion on the lateral projection.  Workstation performed: [id number]

## 2024-01-31 NOTE — TELEPHONE ENCOUNTER
Called patient and explained that we usually fax it to [facility] and her  told her in the background saying that he will  the form and take it to the [employer] himself.

## 2024-02-01 ENCOUNTER — TELEPHONE (OUTPATIENT)
Dept: FAMILY MEDICINE CLINIC | Facility: CLINIC | Age: 64
End: 2024-02-01

## 2024-02-01 ENCOUNTER — HOSPITAL ENCOUNTER (OUTPATIENT)
Dept: NEUROLOGY | Facility: HOSPITAL | Age: 64
End: 2024-02-01
Payer: COMMERCIAL

## 2024-02-01 DIAGNOSIS — R56.9 SEIZURE (HCC): ICD-10-CM

## 2024-02-01 PROCEDURE — 95816 EEG AWAKE AND DROWSY: CPT | Performed by: PSYCHIATRY & NEUROLOGY

## 2024-02-01 PROCEDURE — 95816 EEG AWAKE AND DROWSY: CPT

## 2024-02-01 NOTE — TELEPHONE ENCOUNTER
"Substance:  Alcohol  She says she only drinks Non-alchoholic beer  You answered that it is \"Known to impair driving:  Yes\"    She states that she doesn't drink and drive.  She is assuming that [employer] will take her license away due to the way you answered the questions.  She is extremely upset.  She is crying.  She says you have ruined her life.    I tried to  her not to get ahead of herself and make assumptions that she would lose her license based on your answers.    She wants to talk to you directly.    "

## 2024-02-02 ENCOUNTER — TELEPHONE (OUTPATIENT)
Dept: NEUROLOGY | Facility: CLINIC | Age: 64
End: 2024-02-02

## 2024-02-02 ENCOUNTER — TELEPHONE (OUTPATIENT)
Age: 64
End: 2024-02-02

## 2024-02-02 DIAGNOSIS — R56.9 SEIZURE (HCC): Primary | ICD-10-CM

## 2024-02-02 NOTE — TELEPHONE ENCOUNTER
Patients GI provider:  Dr. LISA Herrera    Number to return call: [phone]    Reason for call: Pt calling asking the doctor to write a letter stating that she does not drink and drive. She states her PCP filled out a [employer] form incorrectly and they will not change it.  Please call her to discuss.    Scheduled procedure/appointment date if applicable: 1/25/24

## 2024-02-02 NOTE — TELEPHONE ENCOUNTER
Received VM transcription from 2/1/24, 3:08 PM:    Hi, yes, I'm a patient of Evelyn Cm. I just wanted to know if she got the results back of my MRI that I had done. My birthday is [birth year]. And my name is Jose White. I appreciate if somebody would call me back to see if she got the MRI results back. It's not on [employer]. So I'm not sure, you know, if she got it or not. Have a good day. Thank you.  ---------------------    MRI results still pending.    Spoke with pt and informed her MRI results still pending. Pt says EEG done yesterday and results are already available whereas MRI was done 1/30/24 and she is still waiting.    Pt says it is imperative that these results get to Evelyn as there forms that need to be completed and sent to [facility]. Pt trying to get her license back.    Evelyn - EEG results available for your review. Please advise.    Best cb [phone], ok to leave detailed message.

## 2024-02-05 ENCOUNTER — HOSPITAL ENCOUNTER (OUTPATIENT)
Dept: ULTRASOUND IMAGING | Facility: HOSPITAL | Age: 64
Discharge: HOME | End: 2024-02-05
Attending: INTERNAL MEDICINE
Payer: COMMERCIAL

## 2024-02-05 DIAGNOSIS — K70.31 ALCOHOLIC CIRRHOSIS OF LIVER WITH ASCITES (HCC): ICD-10-CM

## 2024-02-05 DIAGNOSIS — I85.00 ESOPHAGEAL VARICES DETERMINED BY ENDOSCOPY (HCC): ICD-10-CM

## 2024-02-05 DIAGNOSIS — K76.0 FATTY LIVER: ICD-10-CM

## 2024-02-05 DIAGNOSIS — K21.9 GERD WITHOUT ESOPHAGITIS: ICD-10-CM

## 2024-02-05 DIAGNOSIS — E87.1 HYPONATREMIA: ICD-10-CM

## 2024-02-05 PROCEDURE — 76700 US EXAM ABDOM COMPLETE: CPT

## 2024-02-05 NOTE — TELEPHONE ENCOUNTER
Routine EEG normal.  MRI brain with no apparent structural abnormalities.     Typically when routine EEG is normal, we like to order a 24hr ambulatory EEG to get a longer recording that includes sleep to further evaluate.  I entered the order, please assist with scheduling.    These tests will not help determine her eligibility for returning to driving.  We had a very extensive conversation at her office visit regarding this.  In [place], law is no driving for 6 months after a seizure.  As we discussed, I can send in the seizure reporting form when she is 6 months seizure-free, which would be on 5/25/24.  I did message her PCP and appears they took care of the substance abuse form, as per my request.  Restoring a license is up to [employer], and not the provider.    Again, recommend 24hr aEEG since routine was normal.    Cannot fill out seizure reporting form until 5/25/24 (if she remains seizure-free until that time).

## 2024-02-05 NOTE — TELEPHONE ENCOUNTER
Spoke with pt and advised her of Evelyn's review of results, response about getting license back, and recommendations. Pt verbalizes understanding. Pt to call central scheduling to schedule 24 hour ambulatory EEG.

## 2024-02-05 NOTE — TELEPHONE ENCOUNTER
2/2/24, 1:56    Hi, yes, my name is Jose White. I'm a patient of Evelyn Pabon and I didn't get my results back about my MRI. My birthday is [birth year]. I called yesterday, nobody got back to me. It's not on my portal. I just like to know if she got the test results results back. I got the EEG one back. But not that about the MRI. And I appreciate if somebody would call me and let me know about the MRI testing. I thank you have a good day.

## 2024-02-09 ENCOUNTER — PATIENT MESSAGE (OUTPATIENT)
Dept: GASTROENTEROLOGY | Facility: CLINIC | Age: 64
End: 2024-02-09

## 2024-02-09 NOTE — PATIENT COMMUNICATION
Pt called to check on status of results I advised they have not been read at this time may take up to 10 days she understood

## 2024-02-20 DIAGNOSIS — F41.9 ANXIETY: ICD-10-CM

## 2024-02-20 RX ORDER — ALPRAZOLAM 1 MG/1
1 TABLET ORAL 2 TIMES DAILY PRN
Qty: 60 TABLET | Refills: 0 | Status: SHIPPED | OUTPATIENT
Start: 2024-02-20

## 2024-02-21 PROBLEM — Z00.00 HEALTHCARE MAINTENANCE: Status: RESOLVED | Noted: 2018-03-05 | Resolved: 2024-02-21

## 2024-03-10 LAB — MAGNESIUM SERPL-MCNC: 1.8 MG/DL (ref 1.5–2.5)

## 2024-03-13 DIAGNOSIS — G47.00 INSOMNIA, UNSPECIFIED TYPE: ICD-10-CM

## 2024-03-13 RX ORDER — TRAZODONE HYDROCHLORIDE 50 MG/1
50 TABLET ORAL
Qty: 30 TABLET | Refills: 5 | Status: SHIPPED | OUTPATIENT
Start: 2024-03-13

## 2024-03-19 DIAGNOSIS — F41.9 ANXIETY: ICD-10-CM

## 2024-03-19 RX ORDER — ALPRAZOLAM 1 MG/1
1 TABLET ORAL 2 TIMES DAILY PRN
Qty: 60 TABLET | Refills: 0 | Status: SHIPPED | OUTPATIENT
Start: 2024-03-19

## 2024-03-26 ENCOUNTER — HOSPITAL ENCOUNTER (OUTPATIENT)
Dept: NEUROLOGY | Facility: CLINIC | Age: 64
Discharge: HOME | End: 2024-03-26

## 2024-03-26 DIAGNOSIS — R56.9 SEIZURE (HCC): ICD-10-CM

## 2024-03-27 ENCOUNTER — HOSPITAL ENCOUNTER (OUTPATIENT)
Dept: NEUROLOGY | Facility: CLINIC | Age: 64
Discharge: HOME | End: 2024-03-27
Payer: COMMERCIAL

## 2024-03-27 DIAGNOSIS — R56.9 SEIZURE (HCC): ICD-10-CM

## 2024-03-27 PROCEDURE — 95719 EEG PHYS/QHP EA INCR W/O VID: CPT | Performed by: STUDENT IN AN ORGANIZED HEALTH CARE EDUCATION/TRAINING PROGRAM

## 2024-03-27 PROCEDURE — 95708 EEG WO VID EA 12-26HR UNMNTR: CPT

## 2024-03-29 ENCOUNTER — TELEPHONE (OUTPATIENT)
Dept: NEUROLOGY | Facility: CLINIC | Age: 64
End: 2024-03-29

## 2024-03-29 NOTE — TELEPHONE ENCOUNTER
----- Message from Evelyn Pabon PA-C sent at 3/28/2024  8:45 AM EDT -----  24hr EEG is normal.  She should keep her scheduled follow up with Dr. Gagnon next week on 4/4/24 and all results (MRI, EEG x 2) will be discussed in detail.

## 2024-04-01 ENCOUNTER — OFFICE VISIT (OUTPATIENT)
Dept: FAMILY MEDICINE CLINIC | Facility: CLINIC | Age: 64
End: 2024-04-01
Payer: COMMERCIAL

## 2024-04-01 VITALS
OXYGEN SATURATION: 96 % | TEMPERATURE: 98 F | HEIGHT: 64 IN | BODY MASS INDEX: 26.29 KG/M2 | RESPIRATION RATE: 16 BRPM | WEIGHT: 154 LBS | HEART RATE: 95 BPM | SYSTOLIC BLOOD PRESSURE: 122 MMHG | DIASTOLIC BLOOD PRESSURE: 80 MMHG

## 2024-04-01 DIAGNOSIS — C50.511 MALIGNANT NEOPLASM OF LOWER-OUTER QUADRANT OF RIGHT BREAST OF FEMALE, ESTROGEN RECEPTOR POSITIVE (HCC): ICD-10-CM

## 2024-04-01 DIAGNOSIS — R31.0 GROSS HEMATURIA: Primary | ICD-10-CM

## 2024-04-01 DIAGNOSIS — J44.1 CHRONIC OBSTRUCTIVE PULMONARY DISEASE WITH ACUTE EXACERBATION (HCC): ICD-10-CM

## 2024-04-01 DIAGNOSIS — Z17.0 MALIGNANT NEOPLASM OF LOWER-OUTER QUADRANT OF RIGHT BREAST OF FEMALE, ESTROGEN RECEPTOR POSITIVE (HCC): ICD-10-CM

## 2024-04-01 LAB
SL AMB  POCT GLUCOSE, UA: ABNORMAL
SL AMB LEUKOCYTE ESTERASE,UA: ABNORMAL
SL AMB POCT BILIRUBIN,UA: ABNORMAL
SL AMB POCT BLOOD,UA: ABNORMAL
SL AMB POCT CLARITY,UA: ABNORMAL
SL AMB POCT COLOR,UA: ABNORMAL
SL AMB POCT KETONES,UA: ABNORMAL
SL AMB POCT NITRITE,UA: ABNORMAL
SL AMB POCT PH,UA: 6.5
SL AMB POCT SPECIFIC GRAVITY,UA: 1.01
SL AMB POCT URINE PROTEIN: ABNORMAL
SL AMB POCT UROBILINOGEN: 0.2

## 2024-04-01 PROCEDURE — 81002 URINALYSIS NONAUTO W/O SCOPE: CPT | Performed by: PHYSICIAN ASSISTANT

## 2024-04-01 PROCEDURE — 99213 OFFICE O/P EST LOW 20 MIN: CPT | Performed by: PHYSICIAN ASSISTANT

## 2024-04-01 RX ORDER — PHENAZOPYRIDINE HYDROCHLORIDE 200 MG/1
200 TABLET, FILM COATED ORAL
Qty: 10 TABLET | Refills: 0 | Status: SHIPPED | OUTPATIENT
Start: 2024-04-01

## 2024-04-01 RX ORDER — CEPHALEXIN 500 MG/1
500 CAPSULE ORAL 3 TIMES DAILY
Qty: 21 CAPSULE | Refills: 0 | Status: SHIPPED | OUTPATIENT
Start: 2024-04-01 | End: 2024-04-08

## 2024-04-01 NOTE — PROGRESS NOTES
Assessment/Plan:    UTI - send urine for culture, force fluids, start keflex 1 tab 3 x a day for 7 days, pyridium for 1 day for pain. Call if no improvement in 48 hrs  2. COPD - stable, albuterol prn  3. Breast cancer - in remission    F/u as scheduled  F/u as needed    Subjective:   Chief Complaint   Patient presents with    Possible UTI     Blood in urine, pain with urination  Started Friday       Patient ID: Jose White is a 63 y.o. female.    Patient with Friday starting with dysuria, hematuria, frequency, urgency and up at night. Took Tramadol for pain with relief.     Denies vaginal  bleeding, back pain, fever.        The following portions of the patient's history were reviewed and updated as appropriate: allergies, current medications, past family history, past medical history, past social history, past surgical history, and problem list.    Past Medical History:   Diagnosis Date    Alcoholic fatty liver     Anxiety     Asthma     COPD (chronic obstructive pulmonary disease) (HCC)     Elevated liver function tests     GERD (gastroesophageal reflux disease)     GERD without esophagitis     Hyperlipidemia     Hypertension     IBS (irritable bowel syndrome)     Insomnia     Insomnia     Liver disease     Nervousness     Nicotine dependence     Other specified urinary incontinence     RLS (restless legs syndrome)     Wears glasses      Past Surgical History:   Procedure Laterality Date    BACK SURGERY      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    BREAST EXCISIONAL BIOPSY Right 11/01/2004    benign    CATARACT EXTRACTION, BILATERAL  08/01/2018    COLONOSCOPY N/A 5/8/2019    Procedure: COLONOSCOPY;  Surgeon: Jacobo Leonardo MD;  Location: [facility];  Service: Gastroenterology    EGD AND COLONOSCOPY N/A 3/19/2018    Procedure: EGD AND COLONOSCOPY;  Surgeon: Jacobo Leonardo MD;  Location: [facility];  Service: Gastroenterology    ESOPHAGOGASTRODUODENOSCOPY N/A 5/8/2019    Procedure:  ESOPHAGOGASTRODUODENOSCOPY (EGD);  Surgeon: Jacobo Leonardo MD;  Location: [facility];  Service: Gastroenterology    IR PARACENTESIS  11/27/2020    IR PARACENTESIS  12/8/2020    IR PARACENTESIS  10/28/2022    IR PARACENTESIS  11/7/2022    IR PARACENTESIS  11/30/2022    IR PARACENTESIS  12/30/2022    IR PARACENTESIS  1/16/2023    IR THORACENTESIS  1/16/2023    IR THORACENTESIS  3/28/2023    KNEE SURGERY      KNEE SURGERY      LUMBAR LAMINECTOMY  04/1999    LYMPH NODE BIOPSY      MAMMO STEREOTACTIC BREAST BIOPSY RIGHT (ALL INC) Right 5/21/2021    MAMMO STEREOTACTIC BREAST BIOPSY RIGHT (ALL INC) EACH ADD Right 5/21/2021    TOTAL ABDOMINAL HYSTERECTOMY  02/05/2008    TUBAL LIGATION      TUBAL LIGATION  1989    UPPER GASTROINTESTINAL ENDOSCOPY       Family History   Problem Relation Age of Onset    Breast cancer Mother 32    No Known Problems Father     No Known Problems Sister     No Known Problems Brother     No Known Problems Maternal Grandmother     No Known Problems Maternal Grandfather     No Known Problems Paternal Grandmother     No Known Problems Paternal Grandfather     No Known Problems Maternal Aunt     No Known Problems Maternal Aunt     No Known Problems Paternal Aunt     No Known Problems Son     Substance Abuse Neg Hx     Mental illness Neg Hx     Colon cancer Neg Hx     Colon polyps Neg Hx      Social History     Socioeconomic History    Marital status: /Civil Union     Spouse name: Not on file    Number of children: Not on file    Years of education: Not on file    Highest education level: Not on file   Occupational History    Not on file   Tobacco Use    Smoking status: Every Day     Current packs/day: 1.00     Average packs/day: 1 pack/day for 46.2 years (46.2 ttl pk-yrs)     Types: Cigarettes     Start date: 1/1/1978    Smokeless tobacco: Never   Vaping Use    Vaping status: Never Used   Substance and Sexual Activity    Alcohol use: Yes     Alcohol/week: 7.0 standard drinks of alcohol      Types: 1 Glasses of wine, 6 Cans of beer per week    Drug use: No    Sexual activity: Yes     Partners: Male   Other Topics Concern    Not on file   Social History Narrative    Has carbon monoxide detectors in home    Has smoke detectors    Uses safety equipment - seatbelts     Social Determinants of Health     Financial Resource Strain: Not on file   Food Insecurity: No Food Insecurity (3/28/2023)    Hunger Vital Sign     Worried About Running Out of Food in the Last Year: Never true     Ran Out of Food in the Last Year: Never true   Transportation Needs: No Transportation Needs (3/28/2023)    PRAPARE - Transportation     Lack of Transportation (Medical): No     Lack of Transportation (Non-Medical): No   Physical Activity: Not on file   Stress: Not on file   Social Connections: Not on file   Intimate Partner Violence: Not on file   Housing Stability: Low Risk  (3/28/2023)    Housing Stability Vital Sign     Unable to Pay for Housing in the Last Year: No     Number of Places Lived in the Last Year: 1     Unstable Housing in the Last Year: No       Current Outpatient Medications:     albuterol (2.5 mg/3 mL) 0.083 % nebulizer solution, Take 3 mL (2.5 mg total) by nebulization every 6 (six) hours as needed for wheezing or shortness of breath, Disp: 180 mL, Rfl: 5    albuterol (Proventil HFA) 90 mcg/act inhaler, Inhale 2 puffs every 6 (six) hours as needed for wheezing or shortness of breath, Disp: 18 g, Rfl: 3    ALPRAZolam (XANAX) 1 mg tablet, Take 1 tablet (1 mg total) by mouth 2 (two) times a day as needed for anxiety, Disp: 60 tablet, Rfl: 0    anastrozole (ARIMIDEX) 1 mg tablet, TAKE ONE TABLET BY MOUTH EVERY DAY, Disp: 90 tablet, Rfl: 1    budesonide-formoterol (Symbicort) 160-4.5 mcg/act inhaler, Inhale 2 puffs 2 (two) times a day Rinse mouth after use., Disp: 10.2 g, Rfl: 1    furosemide (LASIX) 40 mg tablet, Take 1 tablet (40 mg total) by mouth daily, Disp: 90 tablet, Rfl: 3    lactulose 20 g/30 mL, Take 45 mL  "(30 g total) by mouth 2 (two) times a day Titrate dose for an effect of 3 soft, but formed bowel movements per day.  You can start with 1 tablespoon per day and slowly increase to effect., Disp: 2700 mL, Rfl: 0    midodrine (PROAMATINE) 2.5 mg tablet, Take 3 tablets (7.5 mg total) by mouth 3 (three) times a day before meals, Disp: 810 tablet, Rfl: 3    pantoprazole (PROTONIX) 20 mg tablet, Take 1 tablet (20 mg total) by mouth daily, Disp: 90 tablet, Rfl: 2    promethazine-dextromethorphan (PHENERGAN-DM) 6.25-15 mg/5 mL oral syrup, Take 5 mL by mouth daily at bedtime as needed for cough, Disp: 118 mL, Rfl: 1    Risankizumab-rzaa (SKYRIZI PEN SC), Inject under the skin, Disp: , Rfl:     spironolactone (ALDACTONE) 100 mg tablet, Take 1 tablet (100 mg total) by mouth daily, Disp: 90 tablet, Rfl: 3    traZODone (DESYREL) 50 mg tablet, Take 1 tablet (50 mg total) by mouth daily at bedtime, Disp: 30 tablet, Rfl: 5    guselkumab (TREMFYA) subcutaneous injection, Inject 100 mg under the skin every 6 weeks (Patient not taking: Reported on 1/11/2024), Disp: , Rfl:     Review of Systems          Objective:    Vitals:    04/01/24 1101   BP: 122/80   Pulse: 95   Resp: 16   Temp: 98 °F (36.7 °C)   TempSrc: Temporal   SpO2: 96%   Weight: 69.9 kg (154 lb)   Height: 5' 4\" (1.626 m)        Physical Exam      Physical Exam   Constitutional: She is oriented to person, place, and time. She appears well-developed and well-nourished.   Cardiovascular: Normal rate, regular rhythm and normal heart sounds.    Pulmonary/Chest: Effort normal and breath sounds normal.   Abdominal: Soft. Bowel sounds are normal. She exhibits no distension and no mass. There is suprapubic tenderenss. There is no rebound and no guarding.   No cva tenderness   Neurological: She is alert and oriented to person, place, and time.   Skin: Skin is warm and dry.   Psychiatric: She has a normal mood and affect. Judgment normal.       "

## 2024-04-03 NOTE — PROGRESS NOTES
[facility]  Patient's Name: Jose White   Patient's : 1960   Visit Type: follow-up  Referring MD / PCP:  Sarah Beth Eller PA-C    Assessment:  Ms. Jose White is a 63 y.o. woman who had an episode of altered awareness (staring and unresponsive behavior) that could have been a complex partial seizure (focal impaired awareness), but at the time of the event, she had a high blood alcohol level, which could have been a provoking factor for the event/seizure.  Her MRI brain study did not show a structural lesion to put her at risk for recurrent seizures and her ambulatory EEG study is also non-diagnostic for a seizure disorder.  If she was to have another seizure, then the probability of an underlying seizure disorder is higher.  At time time there is no diagnosis of epilepsy.  We reviewed the risk of alcohol causing cognitive impairments, seizures, and chronic toxicity to the nervous system.  She reports that since 2023, she only drinks non-alcoholic beverages (nonalcoholic beer).  She reports that she stopped daily consumption of alcohol when she was diagnosed with cirrhosis of the liver (in , she had reduced the consumption of alcohol).      Plan:   Currently there is no diagnosis of epilepsy or seizure disorder.  The event of her appearing unresponsive but without progression to loss of consciousness or convulsion, could be a focal impaired aware seizure.  At the time of the event, she had a high blood alcohol level, which could have been due to the gin-and-tonic she had consumed during dinner.  She reports that she no longer drinks alcohol given this event.    At this time, there is no indication to start an antiseizure medication.  There have been no other episode of loss of consciousness or altered awareness.    She was advised to stop consuming alcohol, as chronic alcohol use (event at non-dependency levels) may cause peripheral neuropathy, cerebellar  and cerebral atrophy, worsening balance, and cognition.  She reports that she no longer drinks alcohol beverages.    I completed the [employer] seizure reporting form and Loss of Consciousness form.  [employer] substance abuse form is the responsibility of her primary care provider.    Follow-up as needed    If she has a seizure, then she will need to be on an anti-seizure medication.  She should return to the neurology office for reassessment and another 24 hours ambulatory EEG.    If she has an episode of staring and unresponsiveness, call the neurology office for re-assessment.  If she has a convulsion or any new neurological deficit or staring spell lasting more than 10 minutes, call 911/EMS go to hospital for urgent evaluation.    Problem List Items Addressed This Visit          Neurology/Sleep    Single seizure (HCC) - Primary     Could have been due to alcohol consumption on 11/25/2023 or first time unprovoked.            Chief Complaint:   Chief Complaint   Patient presents with    Follow-up    Seizures      HPI:      Jose White is a 63 y.o. right handed female here for follow-up evaluation of seizure.      Intake History 4/4/2024  She reports that she never had seizures prior to the one on 11/25/2023.  She was having a drink then passed out, there was no warning and aura.  The next thing she realizes is that she is on a stretcher going into an ambulance.  Her  recalled (she had a gin-and-tonic), she went blank, started staring ahead, not responding, drooling from her mouth, (someone who was a nurse helped to get her supine on a bench), there was no progression to convulsion, then snapped out of it.  She seem to be back to herself.    There have been no recurrence of her losing awareness.  Her  has not seen her having another staring spell.    On the day of the event, she had two glasses of wine earlier during the day (hours apart), she had a gin-and-tonic.  There was no additional alcohol  "that day.  For the week leading up to the event, she only consumed non-alcoholic beer (a couple a day).      The patient denies any history of myoclonus, staring spells, automatisms, unexplained hyperkinetic behaviors, olfactory / gustatory hallucinations, epigastric rising events, vinod vu events, visual hallucinations, unexplained nocturnal enuresis, or nocturnal tongue biting.    Since her seizure, she has stopped consuming alcoholic beverages.  She may consume a non-alcoholic beer once in a while.  She has been using Xanax to help her get to sleep for the past 35 years.      AED/side effects/compliance:  None    Event/Seizure semiology:  Single episode of staring, unresponsive behavior      Prior History:  She was initially seen by Evelyn Pabon on 2024   She presented to ED on 2023 for seizure-like activity.  She was out at dinner, had 2-3 gin-and-tonics, then she started staring off, unresponsive behavior, drooling, urinary incontinence, and left lateral tongue bite.  No progression to GTC activity.  Then she recalled getting into an ambulance.  She denied a history of prior seizures (no alcohol related seizures).  She was noted to have hyponatremia and chronic thrombocytopenia.  She drinks a large glass of wine nightly and a few 12 oz beers per day (at least 4 alcoholic drinks per day).  When neurology consult saw her she was noted to be tremulous with mild asterixis.  When she saw Evelyn Pabon, she said \"I did not have a seizure\".  She then reported drinking only \"non-alcoholic beer daily\".  She was not aware that her 's license was revoked.  She was not started on an seizure medication from the hospital admission.    Special Features  Status epilepticus: No  Self Injury Seizures: No  Precipitating Factors: None  Post-ictal state: none    Seizure Risk Factors:  Abnormal pregnancy: No  Abnormal birth/: No  Abnormal Development: No  Febrile seizures, simple: No  Febrile seizures, " complex: No  CNS infection: No  Intellectual disability: No  Cerebral palsy: No  Head injury (moderate/severe): No  CNS neoplasm: No  CNS malformation: No  Neurosurgical procedure: No  Stroke: No  CNS autoimmune disorder: No  Alcohol abuse: Yes, chronic daily alcohol use; she used to consume a case of beer a week up to about 2 years ago.  After her diagnosis of cirrhosis of her liver, she changed to non-alcoholic beer.  She does not consume alcohol liquor on a daily basis.    Drug abuse: No  Family history Sz/epilepsy: brother with epilepsy after he was 3 years old (she recalled that he was born normal but had a brain injury from a fall down steps driving a toy truck.  Brother lives in a care facility at present.    Prior AEDs:  medication Max dose Time used Reason to stop                 Prior workup:  I have reviewed the MRI brain study myself  [id number] - MRI brain  Symmetric hippocampal formations  Scattered white matter T2 hyperintensities on T2 imaging in both cerebra hemispheres    EEGs:  2024  Normal awake EEG; excess beta    3/26-3/27/2024 24 hours ambulatory EEG  Normal awake, drowsy, and asleep EEG    Labs:  Component      Latest Ref Rng 2023 2024 3/9/2024   White Blood Cell Count      3.8 - 10.8 Thousand/uL   4.3    Red Blood Cell Count      3.80 - 5.10 Million/uL   3.93    Hemoglobin      11.7 - 15.5 g/dL   14.3    HCT      35.0 - 45.0 %   41.3    Platelet Count      140 - 400 Thousand/uL   101 (L)    GLUCOSE      65 - 99 mg/dL 109  108 (H)  106 (H)    BUN      7 - 25 mg/dL 9  10  12    Creatinine      0.50 - 1.05 mg/dL 0.77  0.78  0.78    GFR, Calculated      > OR = 60 mL/min/1.73m2 81  85  85    SL AMB BUN/CREATININE RATIO      6 - 22 (calc)  SEE NOTE:  SEE NOTE:    Sodium      135 - 146 mmol/L 128 (L)  135  137    Potassium      3.5 - 5.3 mmol/L 3.5  4.1  4.0    Chloride      98 - 110 mmol/L 93 (L)  100  101    Carbon Dioxide      20 - 32 mmol/L 23  26  29    Calcium      8.6  "- 10.4 mg/dL 9.0  8.8  8.8    Total Protein      6.1 - 8.1 g/dL 8.2  7.6  8.0    Albumin      3.6 - 5.1 g/dL 3.3 (L)  3.3 (L)  3.2 (L)    Globulin      1.9 - 3.7 g/dL (calc)  4.3 (H)  4.8 (H)    Albumin/Globulin Ratio      1.0 - 2.5 (calc)  0.8 (L)  0.7 (L)    Total Bilirubin      0.2 - 1.2 mg/dL 1.10 (H)  1.4 (H)  1.4 (H)    ALK PHOS      37 - 153 U/L 125 (H)  133  124    AST      10 - 35 U/L 49 (H)  46 (H)  41 (H)    ALT      6 - 29 U/L 24  23  21    ANION GAP      mmol/L 12      CORRECTED CALCIUM      8.3 - 10.1 mg/dL 9.6         Component      Latest Ref Rng 11/25/2023   ETHANOL      <10 mg/dL 253 (H)       General exam   /60   Pulse 85   Temp 98.1 °F (36.7 °C) (Temporal)   Resp 14   Ht 5' 4\" (1.626 m)   Wt 72.8 kg (160 lb 6.4 oz)   SpO2 95%   BMI 27.53 kg/m²    Appearance: normocephalic, normally developed  Carotids: no bruits present  Cardiovascular: regular rate and rhythm and normal heart sounds  Pulmonary: clear to auscultation  Abdominal: soft, nondistended  Extremities: no edema    HEENT: anicteric and moist mucus membranes / oral cavity   Fundoscopy: not assessed    Mental status  Orientation: intact and oriented to name, place, time  Fund of Knowledge: intact   Attention and Concentration:  HOUSE - [id number], then she has to reconsider the letters and restarts  Current and Remote Memory:recalled 1/3 words after five minutes  Language: spontaneous speech is normal, comprehension is intact, and naming is intact    Cranial Nerves  CN 1: not tested  CN 2: Visual fields intact to confrontation and pupils equal round reactive to direct and consenual light   CN 3, 4, 6: EOMI, no nystagmus  CN 5:sensation intact to all distribution V1, V2, V3  CN 7:muscles of facial expression are symmetric  CN 8:symmetric to finger rubs bilaterally  CN 9, 10:no dysarthria present  CN 11:symmetric strength of sternocleidomastoid and trapezius muscles  CN 12:tongue is midline    Motor:  Bulk, Tone: normal bulk, " normal tone  Pronation: no pronator drift  Strength: Patient has full strength symmetrically of shoulder abduction, biceps, triceps, wrist flexion, wrist extension, finger flexion, finger abduction, hip flexion, knee flexion, knee extension, dorsiflexion, plantar flexion.   Abnormal movements: she has a left worse than right postural hand tremor and action tremor with FNF testing; there is no asterixis    Sensory:  Lighttouch: intact in all limbs  Vibration: intact in all limbs  Pinprick: intact in all limbs  Romberg:normal    Coordination:  FNF:FNF bilaterally intact  JUDITH:slow finger taps on the left and slow finger taps on the right  FFM:intact  Gait/Station:normal gait and unable to tandem    Reflexes:  DTR 2+ (brisk) out of 4 of the biceps, brachioradialis, triceps, and patellar bilateral and DTR 0/4 of the Achilles bilateral    Past Medical/Surgical History:  Patient Active Problem List   Diagnosis    Fatty liver    Other specified abnormal findings of blood chemistry    Anxiety    Elevated LFTs    GERD without esophagitis    Hyperlipidemia    Hypotension    Single seizure (HCC)    Insomnia    Nicotine dependence    COPD (chronic obstructive pulmonary disease) (HCC)    Alcohol abuse    Irritable bowel syndrome with diarrhea    Non-seasonal allergic rhinitis due to pollen    Bilateral leg edema    Alcoholic cirrhosis of liver with ascites (HCC)    Hyponatremia    Thrombocytopenia (HCC)    Anemia    Decompensated hepatic cirrhosis (HCC)    Ductal carcinoma in situ (DCIS) of right breast    Atypical ductal hyperplasia of right breast    Breast neoplasm, Tis (DCIS), right    Malignant neoplasm of lower-outer quadrant of right breast of female, estrogen receptor positive (HCC)    Pleural effusion    Vaginal atrophy    Dyspareunia, female    Postcoital bleeding    Stage 2 chronic kidney disease     Past Medical History:   Diagnosis Date    Alcoholic fatty liver     Anxiety     Asthma     COPD (chronic obstructive  pulmonary disease) (HCC)     Elevated liver function tests     GERD (gastroesophageal reflux disease)     GERD without esophagitis     Hyperlipidemia     Hypertension     IBS (irritable bowel syndrome)     Insomnia     Insomnia     Liver disease     Nervousness     Nicotine dependence     Other specified urinary incontinence     RLS (restless legs syndrome)     Wears glasses      Past Surgical History:   Procedure Laterality Date    BACK SURGERY      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    BREAST EXCISIONAL BIOPSY Right 11/01/2004    benign    CATARACT EXTRACTION, BILATERAL  08/01/2018    COLONOSCOPY N/A 5/8/2019    Procedure: COLONOSCOPY;  Surgeon: Jacobo Leonardo MD;  Location: [facility];  Service: Gastroenterology    EGD AND COLONOSCOPY N/A 3/19/2018    Procedure: EGD AND COLONOSCOPY;  Surgeon: Jacobo Leonardo MD;  Location: [facility];  Service: Gastroenterology    ESOPHAGOGASTRODUODENOSCOPY N/A 5/8/2019    Procedure: ESOPHAGOGASTRODUODENOSCOPY (EGD);  Surgeon: Jacobo Leonardo MD;  Location: [facility];  Service: Gastroenterology    IR PARACENTESIS  11/27/2020    IR PARACENTESIS  12/8/2020    IR PARACENTESIS  10/28/2022    IR PARACENTESIS  11/7/2022    IR PARACENTESIS  11/30/2022    IR PARACENTESIS  12/30/2022    IR PARACENTESIS  1/16/2023    IR THORACENTESIS  1/16/2023    IR THORACENTESIS  3/28/2023    KNEE SURGERY      KNEE SURGERY      LUMBAR LAMINECTOMY  04/1999    LYMPH NODE BIOPSY      MAMMO STEREOTACTIC BREAST BIOPSY RIGHT (ALL INC) Right 5/21/2021    MAMMO STEREOTACTIC BREAST BIOPSY RIGHT (ALL INC) EACH ADD Right 5/21/2021    TOTAL ABDOMINAL HYSTERECTOMY  02/05/2008    TUBAL LIGATION      TUBAL LIGATION  1989    UPPER GASTROINTESTINAL ENDOSCOPY         Past Psychiatric History:  Depression: No  Anxiety: Generalized anxiety  Psychosis: No    Medications:    Current Outpatient Medications:     albuterol (2.5 mg/3 mL) 0.083 % nebulizer solution, Take 3 mL (2.5 mg total) by nebulization every 6  (six) hours as needed for wheezing or shortness of breath, Disp: 180 mL, Rfl: 5    albuterol (Proventil HFA) 90 mcg/act inhaler, Inhale 2 puffs every 6 (six) hours as needed for wheezing or shortness of breath, Disp: 18 g, Rfl: 3    ALPRAZolam (XANAX) 1 mg tablet, Take 1 tablet (1 mg total) by mouth 2 (two) times a day as needed for anxiety, Disp: 60 tablet, Rfl: 0    anastrozole (ARIMIDEX) 1 mg tablet, TAKE ONE TABLET BY MOUTH EVERY DAY, Disp: 90 tablet, Rfl: 1    budesonide-formoterol (Symbicort) 160-4.5 mcg/act inhaler, Inhale 2 puffs 2 (two) times a day Rinse mouth after use., Disp: 10.2 g, Rfl: 1    cephalexin (KEFLEX) 500 mg capsule, Take 1 capsule (500 mg total) by mouth 3 (three) times a day for 7 days, Disp: 21 capsule, Rfl: 0    furosemide (LASIX) 40 mg tablet, Take 1 tablet (40 mg total) by mouth daily, Disp: 90 tablet, Rfl: 3    lactulose 20 g/30 mL, Take 45 mL (30 g total) by mouth 2 (two) times a day Titrate dose for an effect of 3 soft, but formed bowel movements per day.  You can start with 1 tablespoon per day and slowly increase to effect., Disp: 2700 mL, Rfl: 0    midodrine (PROAMATINE) 2.5 mg tablet, Take 3 tablets (7.5 mg total) by mouth 3 (three) times a day before meals, Disp: 810 tablet, Rfl: 3    pantoprazole (PROTONIX) 20 mg tablet, Take 1 tablet (20 mg total) by mouth daily, Disp: 90 tablet, Rfl: 2    phenazopyridine (PYRIDIUM) 200 mg tablet, Take 1 tablet (200 mg total) by mouth 3 (three) times a day with meals, Disp: 10 tablet, Rfl: 0    promethazine-dextromethorphan (PHENERGAN-DM) 6.25-15 mg/5 mL oral syrup, Take 5 mL by mouth daily at bedtime as needed for cough, Disp: 118 mL, Rfl: 1    Risankizumab-rzaa (SKYRIZI PEN SC), Inject under the skin, Disp: , Rfl:     spironolactone (ALDACTONE) 100 mg tablet, Take 1 tablet (100 mg total) by mouth daily, Disp: 90 tablet, Rfl: 3    traZODone (DESYREL) 50 mg tablet, Take 1 tablet (50 mg total) by mouth daily at bedtime, Disp: 30 tablet, Rfl:  5    Allergies:  Allergies   Allergen Reactions    Sulfa Antibiotics Shortness Of Breath    Ace Inhibitors Cough and Other (See Comments)     coughing       Family history:  Family History   Problem Relation Age of Onset    Breast cancer Mother 32    No Known Problems Father     No Known Problems Sister     No Known Problems Brother     No Known Problems Maternal Grandmother     No Known Problems Maternal Grandfather     No Known Problems Paternal Grandmother     No Known Problems Paternal Grandfather     No Known Problems Maternal Aunt     No Known Problems Maternal Aunt     No Known Problems Paternal Aunt     No Known Problems Son     Substance Abuse Neg Hx     Mental illness Neg Hx     Colon cancer Neg Hx     Colon polyps Neg Hx      Social History  Living situation:  Lives with   Work:  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 She started smoking about 46 years ago. She has a 46.3 pack-year smoking history. She has never used smokeless tobacco. She reports that she does not currently use alcohol. She reports that she does not use drugs.    Review of Systems  A review of at least 12 organ/systems was obtained by the medical assistant and reviewed by me, including additional positives/negatives:  Genitourinary:  Positive for frequency (uti infection since monday) and urgency. Negative for dysuria.     The total amount of time spent with the patient along with pre-chart and post-chart preparation was 86 minutes on the calendar day of the date of service.  This included history taking, physical exam, review of ancillary testing, counseling provided to the patient regarding diagnosis, medications, treatment, and risk management, and other communication to the patient's providers and/or family.  Start time: 11:18AM  End time: 12:44PM

## 2024-04-04 ENCOUNTER — OFFICE VISIT (OUTPATIENT)
Dept: NEUROLOGY | Facility: CLINIC | Age: 64
End: 2024-04-04
Payer: COMMERCIAL

## 2024-04-04 ENCOUNTER — TELEPHONE (OUTPATIENT)
Dept: NEUROLOGY | Facility: CLINIC | Age: 64
End: 2024-04-04

## 2024-04-04 VITALS
WEIGHT: 160.4 LBS | BODY MASS INDEX: 27.39 KG/M2 | OXYGEN SATURATION: 95 % | RESPIRATION RATE: 14 BRPM | DIASTOLIC BLOOD PRESSURE: 60 MMHG | SYSTOLIC BLOOD PRESSURE: 104 MMHG | HEART RATE: 85 BPM | HEIGHT: 64 IN | TEMPERATURE: 98.1 F

## 2024-04-04 DIAGNOSIS — R56.9 SINGLE SEIZURE (HCC): Primary | ICD-10-CM

## 2024-04-04 PROCEDURE — 99417 PROLNG OP E/M EACH 15 MIN: CPT | Performed by: PSYCHIATRY & NEUROLOGY

## 2024-04-04 PROCEDURE — 99215 OFFICE O/P EST HI 40 MIN: CPT | Performed by: PSYCHIATRY & NEUROLOGY

## 2024-04-04 NOTE — PROGRESS NOTES
Review of Systems   Constitutional:  Negative for appetite change, fatigue and fever.   HENT: Negative.  Negative for hearing loss, tinnitus, trouble swallowing and voice change.    Eyes: Negative.  Negative for photophobia, pain and visual disturbance.   Respiratory: Negative.  Negative for shortness of breath.    Cardiovascular: Negative.  Negative for palpitations.   Gastrointestinal: Negative.  Negative for nausea and vomiting.   Genitourinary:  Positive for frequency (uti infection since monday) and urgency. Negative for dysuria.   Musculoskeletal:  Negative for back pain, gait problem, myalgias, neck pain and neck stiffness.   Skin: Negative.  Negative for rash.   Allergic/Immunologic: Negative.    Neurological: Negative.  Negative for dizziness, tremors, seizures, syncope, facial asymmetry, speech difficulty, weakness, light-headedness, numbness and headaches.   Hematological: Negative.  Does not bruise/bleed easily.   Psychiatric/Behavioral: Negative.  Negative for confusion, hallucinations and sleep disturbance.    All other systems reviewed and are negative.

## 2024-04-04 NOTE — PATIENT INSTRUCTIONS
Plan:   Currently there is no diagnosis of epilepsy or seizure disorder.  The event of her appearing unresponsive but without progression to loss of consciousness or convulsion, could be a focal impaired aware seizure.  At the time of the event, she had a high blood alcohol level, which could have been due to the gin-and-tonic she had consumed during dinner.  She reports that she no longer drinks alcohol given this event.    At this time, there is no indication to start an antiseizure medication.  There have been no other episode of loss of consciousness or altered awareness.    She was advised to stop consuming alcohol, as chronic alcohol use (event at non-dependency levels) may cause peripheral neuropathy, cerebellar and cerebral atrophy, worsening balance, and cognition.  She reports that she no longer drinks alcohol beverages.    [employer] substance abuse form is the responsibility of her primary care provider.    Follow-up as needed    If she has a seizure, then she will need to be on an anti-seizure medication.  She should return to the neurology office for reassessment and another 24 hours ambulatory EEG.    If she has an episode of staring and unresponsiveness, call the neurology office for re-assessment.  If she has a convulsion or any new neurological deficit or staring spell lasting more than 10 minutes, call 911/EMS go to hospital for urgent evaluation.

## 2024-04-04 NOTE — TELEPHONE ENCOUNTER
[employer] form are completed. Faxed It to [employer] as well. Waitng for fax confirmation. Once confirmation is received I will scan it under media. For reference. MOUNA.

## 2024-04-04 NOTE — TELEPHONE ENCOUNTER
Patient presented with 2 [employer] Forms to be completed Charges were dropped for [employer] Forms x2 for $10.00 and payment collected.  Receipt and cover sheet were scanned into [employer].

## 2024-04-10 ENCOUNTER — TELEPHONE (OUTPATIENT)
Dept: FAMILY MEDICINE CLINIC | Facility: CLINIC | Age: 64
End: 2024-04-10

## 2024-04-10 NOTE — TELEPHONE ENCOUNTER
Patient called again and said the [facility] medical team would like you, her PCP, to look at Dr. Gagnon, Neurologist report and he said that she hasn't drank since 02/04/24.  She would like you to answer yes or no to the questions.

## 2024-04-10 NOTE — TELEPHONE ENCOUNTER
Heads up: Pt will be dropping off another [employer] form for you to complete.  All questions need only Y or N answers, in particular : The one where you answered see specialist and where you wrote unknown.  Also consult her most recent appt notes from EEG.

## 2024-04-12 NOTE — TELEPHONE ENCOUNTER
Received vm from 4/10 at 2:24pm-  A hi. Yes, my name is Jose browningcarlos, I saw dr. Gagnon on April 4th. I my birthdate is [birth year]. And I just wanted to find out if I get a copy of [employer], what he filled out on the [employer] paper that he had to send in. And my phone number is [phone]. Thank you.  --------------------------------------  Called pt.  Advised I can send her a copy of [employer] forms via [employer],  she was agreeable to this.     Forms attached to [employer] message

## 2024-04-15 ENCOUNTER — TELEPHONE (OUTPATIENT)
Dept: FAMILY MEDICINE CLINIC | Facility: CLINIC | Age: 64
End: 2024-04-15

## 2024-04-15 NOTE — TELEPHONE ENCOUNTER
"Patient dropped off paper that needs to be filled out with yes or no answers. They will not accept \"see specialist or unknown\".    I put form on your desk in brown folder.  "

## 2024-04-17 ENCOUNTER — ANNUAL EXAM (OUTPATIENT)
Dept: GYNECOLOGY | Facility: CLINIC | Age: 64
End: 2024-04-17
Payer: COMMERCIAL

## 2024-04-17 VITALS
WEIGHT: 155.6 LBS | BODY MASS INDEX: 26.56 KG/M2 | SYSTOLIC BLOOD PRESSURE: 124 MMHG | HEIGHT: 64 IN | DIASTOLIC BLOOD PRESSURE: 72 MMHG

## 2024-04-17 DIAGNOSIS — N95.2 VAGINAL ATROPHY: ICD-10-CM

## 2024-04-17 DIAGNOSIS — N94.10 DYSPAREUNIA, FEMALE: ICD-10-CM

## 2024-04-17 DIAGNOSIS — Z01.419 WOMEN'S ANNUAL ROUTINE GYNECOLOGICAL EXAMINATION: Primary | ICD-10-CM

## 2024-04-17 DIAGNOSIS — N93.0 POSTCOITAL BLEEDING: ICD-10-CM

## 2024-04-17 DIAGNOSIS — Z12.31 VISIT FOR SCREENING MAMMOGRAM: ICD-10-CM

## 2024-04-17 DIAGNOSIS — D05.11 DUCTAL CARCINOMA IN SITU (DCIS) OF RIGHT BREAST: ICD-10-CM

## 2024-04-17 PROCEDURE — 99396 PREV VISIT EST AGE 40-64: CPT | Performed by: OBSTETRICS & GYNECOLOGY

## 2024-04-17 NOTE — PROGRESS NOTES
Assessment/Plan   Diagnoses and all orders for this visit:    Women's annual routine gynecological examination    Vaginal atrophy    Ductal carcinoma in situ (DCIS) of right breast    Dyspareunia, female    Postcoital bleeding    Visit for screening mammogram  -     Mammo screening bilateral w 3d & cad; Future    1. yearly exam-Pap smear deferred, self breast awareness reviewed, calcium/vitamin D recommendation discussed, mammogram request given, colonoscopy up-to-date follow-up as per specialist.  2. vaginal atrophy with dyspareunia-continues to note some discomfort with intercourse.  Denies any recent postcoital bleeding.  She does use K-Y jelly with okay results.  She was again counseled about options and vaginal lubrication/moisturizer she was given.  Suggest she continue with K-Y jelly and then consider Replens or Luvena as moisturizer up to 3 times per week.  Did briefly discuss vaginal estrogen, Osphena, and Intrarosa.  These would be more controversial given her history of right breast DCIS, on anastrozole.  She will try OTC products as noted above and proceed accordingly.  3. history of postcoital bleeding-denies any current concerns  4. history of DCIS right breast-continues to follow-up with treating doctors down at [place].  Now year 3 of anastrozole.  Had bilateral diagnostic mammogram 2023 with negative findings and 1 year screening follow-up was recommended.  Request given today.  5. status post hysterectomy-patient stated it was total abdominal hysterectomy with retention of the ovaries done in  for heavy bleeding.  Again, exam with band of scar tissue versus cervical remnant noted posteriorly to the right.  Pap from last visit was negative.  To continue to follow.  6.  History of alcohol-related seizure-apparently her license has been taken away for 6-month, which is due to  in May 2024.  She has some frustration with her primary provider regarding this.  She was given cards for  other local primary doctors in the area and she may consider following up with one of them.  Also encouraged to follow-up with her neurologist given the possible seizure related syndrome.  7.  Previous history of cirrhosis/COPD/hypertension/cnqblbiobaipyq-tngjwt-dk with treating doctor.  8.  Other- recently had open heart surgery.  Apparently, he is healing well, given okay to drive as of yesterday.  9. other-recent UTI-treated with Keflex for E. coli.  10. other-DEXA scan 5/11/2021 was within normal limits.    Subjective   Patient ID: Jose White is a 63 y.o. female.    Vitals:    04/17/24 1313   BP: 124/72     Patient was seen today for yearly exam.  Please see assessment plan for details.        The following portions of the patient's history were reviewed and updated as appropriate: allergies, current medications, past family history, past medical history, past social history, past surgical history, and problem list.  Past Medical History:   Diagnosis Date    Alcoholic fatty liver     Anxiety     Asthma     COPD (chronic obstructive pulmonary disease) (HCC)     Elevated liver function tests     GERD (gastroesophageal reflux disease)     GERD without esophagitis     Hyperlipidemia     Hypertension     IBS (irritable bowel syndrome)     Insomnia     Insomnia     Liver disease     Nervousness     Nicotine dependence     Other specified urinary incontinence     RLS (restless legs syndrome)     Wears glasses      Past Surgical History:   Procedure Laterality Date    BACK SURGERY      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    BREAST EXCISIONAL BIOPSY Right 11/01/2004    benign    CATARACT EXTRACTION, BILATERAL  08/01/2018    COLONOSCOPY N/A 5/8/2019    Procedure: COLONOSCOPY;  Surgeon: Jacobo Leonardo MD;  Location: [facility];  Service: Gastroenterology    EGD AND COLONOSCOPY N/A 3/19/2018    Procedure: EGD AND COLONOSCOPY;  Surgeon: Jacobo Leonardo MD;  Location: [facility];  Service:  Gastroenterology    ESOPHAGOGASTRODUODENOSCOPY N/A 2019    Procedure: ESOPHAGOGASTRODUODENOSCOPY (EGD);  Surgeon: Jacobo Leonardo MD;  Location: [facility];  Service: Gastroenterology    IR PARACENTESIS  2020    IR PARACENTESIS  2020    IR PARACENTESIS  10/28/2022    IR PARACENTESIS  2022    IR PARACENTESIS  2022    IR PARACENTESIS  2022    IR PARACENTESIS  2023    IR THORACENTESIS  2023    IR THORACENTESIS  3/28/2023    KNEE SURGERY      KNEE SURGERY      LUMBAR LAMINECTOMY  1999    LYMPH NODE BIOPSY      MAMMO STEREOTACTIC BREAST BIOPSY RIGHT (ALL INC) Right 2021    MAMMO STEREOTACTIC BREAST BIOPSY RIGHT (ALL INC) EACH ADD Right 2021    TOTAL ABDOMINAL HYSTERECTOMY  2008    TUBAL LIGATION      TUBAL LIGATION      UPPER GASTROINTESTINAL ENDOSCOPY       OB History    Para Term  AB Living   2 2 2     2   SAB IAB Ectopic Multiple Live Births           2      # Outcome Date GA Lbr Damian/2nd Weight Sex Delivery Anes PTL Lv   2 Term            1 Term               Obstetric Comments   Menarche-13   Age at first pregnancy-23   bcp- about 5yrs       Current Outpatient Medications:     albuterol (2.5 mg/3 mL) 0.083 % nebulizer solution, Take 3 mL (2.5 mg total) by nebulization every 6 (six) hours as needed for wheezing or shortness of breath, Disp: 180 mL, Rfl: 5    albuterol (Proventil HFA) 90 mcg/act inhaler, Inhale 2 puffs every 6 (six) hours as needed for wheezing or shortness of breath, Disp: 18 g, Rfl: 3    ALPRAZolam (XANAX) 1 mg tablet, Take 1 tablet (1 mg total) by mouth 2 (two) times a day as needed for anxiety, Disp: 60 tablet, Rfl: 0    anastrozole (ARIMIDEX) 1 mg tablet, TAKE ONE TABLET BY MOUTH EVERY DAY, Disp: 90 tablet, Rfl: 1    budesonide-formoterol (Symbicort) 160-4.5 mcg/act inhaler, Inhale 2 puffs 2 (two) times a day Rinse mouth after use., Disp: 10.2 g, Rfl: 1    furosemide (LASIX) 40 mg tablet, Take 1 tablet (40 mg total)  by mouth daily, Disp: 90 tablet, Rfl: 3    lactulose 20 g/30 mL, Take 45 mL (30 g total) by mouth 2 (two) times a day Titrate dose for an effect of 3 soft, but formed bowel movements per day.  You can start with 1 tablespoon per day and slowly increase to effect., Disp: 2700 mL, Rfl: 0    midodrine (PROAMATINE) 2.5 mg tablet, Take 3 tablets (7.5 mg total) by mouth 3 (three) times a day before meals, Disp: 810 tablet, Rfl: 3    pantoprazole (PROTONIX) 20 mg tablet, Take 1 tablet (20 mg total) by mouth daily, Disp: 90 tablet, Rfl: 2    Risankizumab-rzaa (SKYRIZI PEN SC), Inject under the skin, Disp: , Rfl:     spironolactone (ALDACTONE) 100 mg tablet, Take 1 tablet (100 mg total) by mouth daily, Disp: 90 tablet, Rfl: 3    traZODone (DESYREL) 50 mg tablet, Take 1 tablet (50 mg total) by mouth daily at bedtime, Disp: 30 tablet, Rfl: 5    phenazopyridine (PYRIDIUM) 200 mg tablet, Take 1 tablet (200 mg total) by mouth 3 (three) times a day with meals (Patient not taking: Reported on 4/17/2024), Disp: 10 tablet, Rfl: 0    promethazine-dextromethorphan (PHENERGAN-DM) 6.25-15 mg/5 mL oral syrup, Take 5 mL by mouth daily at bedtime as needed for cough (Patient not taking: Reported on 4/17/2024), Disp: 118 mL, Rfl: 1  Allergies   Allergen Reactions    Sulfa Antibiotics Shortness Of Breath    Ace Inhibitors Cough and Other (See Comments)     coughing     Social History     Socioeconomic History    Marital status: /Civil Union     Spouse name: None    Number of children: None    Years of education: None    Highest education level: None   Occupational History    None   Tobacco Use    Smoking status: Every Day     Current packs/day: 1.00     Average packs/day: 1 pack/day for 46.3 years (46.3 ttl pk-yrs)     Types: Cigarettes     Start date: 1/1/1978    Smokeless tobacco: Never   Vaping Use    Vaping status: Never Used   Substance and Sexual Activity    Alcohol use: Not Currently     Comment: last alcoholic drink was  11/25/2023    Drug use: No    Sexual activity: Yes     Partners: Male   Other Topics Concern    None   Social History Narrative    Has carbon monoxide detectors in home    Has smoke detectors    Uses safety equipment - seatbelts     Social Determinants of Health     Financial Resource Strain: Not on file   Food Insecurity: No Food Insecurity (3/28/2023)    Hunger Vital Sign     Worried About Running Out of Food in the Last Year: Never true     Ran Out of Food in the Last Year: Never true   Transportation Needs: No Transportation Needs (3/28/2023)    PRAPARE - Transportation     Lack of Transportation (Medical): No     Lack of Transportation (Non-Medical): No   Physical Activity: Not on file   Stress: Not on file   Social Connections: Not on file   Intimate Partner Violence: Not on file   Housing Stability: Low Risk  (3/28/2023)    Housing Stability Vital Sign     Unable to Pay for Housing in the Last Year: No     Number of Places Lived in the Last Year: 1     Unstable Housing in the Last Year: No     Family History   Problem Relation Age of Onset    Breast cancer Mother 32    No Known Problems Father     No Known Problems Sister     No Known Problems Brother     No Known Problems Maternal Grandmother     No Known Problems Maternal Grandfather     No Known Problems Paternal Grandmother     No Known Problems Paternal Grandfather     No Known Problems Maternal Aunt     No Known Problems Maternal Aunt     No Known Problems Paternal Aunt     No Known Problems Son     Substance Abuse Neg Hx     Mental illness Neg Hx     Colon cancer Neg Hx     Colon polyps Neg Hx        Review of Systems   Constitutional:  Negative for chills, diaphoresis, fatigue and fever.   Respiratory:  Negative for apnea, cough, chest tightness, shortness of breath and wheezing.    Cardiovascular:  Negative for chest pain, palpitations and leg swelling.   Gastrointestinal:  Negative for abdominal distention, abdominal pain, anal bleeding,  "constipation, diarrhea, nausea, rectal pain and vomiting.   Genitourinary:  Negative for difficulty urinating, dyspareunia, dysuria, frequency, hematuria, menstrual problem, pelvic pain, urgency, vaginal bleeding, vaginal discharge and vaginal pain.   Musculoskeletal:  Negative for arthralgias, back pain and myalgias.   Skin:  Negative for color change and rash.   Neurological:  Negative for dizziness, syncope, light-headedness, numbness and headaches.   Hematological:  Negative for adenopathy. Does not bruise/bleed easily.   Psychiatric/Behavioral:  Negative for dysphoric mood and sleep disturbance. The patient is not nervous/anxious.        Objective   Physical Exam  OBGyn Exam     Objective      /72 (BP Location: Left arm, Patient Position: Sitting)   Ht 5' 3.5\" (1.613 m)   Wt 70.6 kg (155 lb 9.6 oz)   BMI 27.13 kg/m²     General:   alert and oriented, in no acute distress   Neck: normal to inspection and palpation   Breast: Left breast without masses or lesions or discharge.  Right breast with significant indentation with scarring/healing noted in the superior aspect of the breast, without any masses or lesions or discharge appreciated.   Heart:    Lungs:    Abdomen: soft, non-tender, without masses or organomegaly   Vulva: normal   Vagina: Mildly atrophic, without erythema or lesions or discharge.  Vagina admits 2 fingers somewhat snugly   Cervix: Mildly atrophic, without lesions or discharge or cervicitis noted.  Portion of cervix appears to be present as per last visit   Uterus: Surgically absent   Adnexa: no mass, fullness, tenderness   Rectum: Deferred, patient declined    Psych:  Normal mood and affect   Skin:  Without obvious lesions   Eyes: symmetric, with normal movements and reactivity   Musculoskeletal:  Normal muscle tone and movements appreciated       "

## 2024-04-18 ENCOUNTER — TELEPHONE (OUTPATIENT)
Age: 64
End: 2024-04-18

## 2024-04-18 NOTE — TELEPHONE ENCOUNTER
Called patient to reschedule 5/10 appt to Thur 5/23 at 130 pm due to a provider change of schedule.  Spoke to patient's  Ulises who confirmed change in appt is fine.

## 2024-04-22 DIAGNOSIS — F41.9 ANXIETY: ICD-10-CM

## 2024-04-22 RX ORDER — ALPRAZOLAM 1 MG/1
1 TABLET ORAL 2 TIMES DAILY PRN
Qty: 60 TABLET | Refills: 0 | Status: SHIPPED | OUTPATIENT
Start: 2024-04-22

## 2024-04-25 ENCOUNTER — TELEPHONE (OUTPATIENT)
Age: 64
End: 2024-04-25

## 2024-04-25 NOTE — TELEPHONE ENCOUNTER
Pt called very upset and crying because her [employer] paperwork is not filled out yet. Pt staes she needs that paper work to get back and forth to her cancer dr montalvo next week.

## 2024-04-25 NOTE — TELEPHONE ENCOUNTER
PT called back very upset because her [employer] form was rejected. The two bottom questions that say's :     Does this patient require treatment or counseling substance abuse; this question needs to be answered YES or NO.     In your opinion does this patient substance use impair their ability to drive; This question needs to be answered YES or NO.     PT states they have not consumed alcohol since November of 2023.     Please resubmit his paperwork ASAP with the bottom two questions answered in a YES OR NO fashion in order to be approved by [employer].

## 2024-04-26 ENCOUNTER — PATIENT OUTREACH (OUTPATIENT)
Dept: FAMILY MEDICINE CLINIC | Facility: CLINIC | Age: 64
End: 2024-04-26

## 2024-04-26 NOTE — TELEPHONE ENCOUNTER
Patient called in concerned about the [employer] paperwork noted in previous addendum note. She received another notice today from [employer]. Please advise.

## 2024-04-26 NOTE — PROGRESS NOTES
Covering OPCM SW received IB message from PCP, as she was requested to complete a [employer] form for patient, as she is anxious about getting to her Oncology f/u appointment.  IB message was in regard to preference in having patient evaluated by a Substance Abuse professional prior to PCP.  OPCM LONA informed PA-C that this would not be something that can happen quickly, but can referred if patient is willing.  Note routed to OPCM Radhika as well for continuity.

## 2024-04-29 ENCOUNTER — PATIENT OUTREACH (OUTPATIENT)
Dept: FAMILY MEDICINE CLINIC | Facility: CLINIC | Age: 64
End: 2024-04-29

## 2024-04-29 NOTE — PROGRESS NOTES
OPCM SW reached out to patient and her spouse was on the line as well.  Per our discussion, the Provider has not answered their calls or explained her reasoning for not completing the form.  Family expressed their frustration and declined Substance Abuse Specialist resource information at this time.  Family would like to file a formal complaint and intent to reach out to [facility] at this time.  Family confirmed phone number for [facility] Grievance line.

## 2024-04-30 ENCOUNTER — TELEPHONE (OUTPATIENT)
Dept: FAMILY MEDICINE CLINIC | Facility: CLINIC | Age: 64
End: 2024-04-30

## 2024-04-30 NOTE — TELEPHONE ENCOUNTER
Received a phone call from the pods. Jose explained that Sarah Beth filled the [employer] form incorrectly. Patient started to get upset, and said she will be establishing somewhere else.   Informed that Sarah Beth would not be able to fill out the paperwork anymore since pt is establishing elsewhere.

## 2024-04-30 NOTE — TELEPHONE ENCOUNTER
Patient called and stated that the answer to the question on the penndot form is YES and they will then decide whether patient needs counseling.      Patient also stated that as of tomorrow, she will be getting a new PCP.

## 2024-05-01 ENCOUNTER — OFFICE VISIT (OUTPATIENT)
Dept: FAMILY MEDICINE CLINIC | Facility: CLINIC | Age: 64
End: 2024-05-01
Payer: COMMERCIAL

## 2024-05-01 VITALS
BODY MASS INDEX: 27.2 KG/M2 | SYSTOLIC BLOOD PRESSURE: 114 MMHG | OXYGEN SATURATION: 96 % | TEMPERATURE: 96.1 F | DIASTOLIC BLOOD PRESSURE: 70 MMHG | HEART RATE: 86 BPM | WEIGHT: 156 LBS

## 2024-05-01 DIAGNOSIS — Z76.89 ENCOUNTER TO ESTABLISH CARE: Primary | ICD-10-CM

## 2024-05-01 DIAGNOSIS — J44.1 CHRONIC OBSTRUCTIVE PULMONARY DISEASE WITH ACUTE EXACERBATION (HCC): ICD-10-CM

## 2024-05-01 DIAGNOSIS — K70.31 ALCOHOLIC CIRRHOSIS OF LIVER WITH ASCITES (HCC): Chronic | ICD-10-CM

## 2024-05-01 DIAGNOSIS — R07.81 RIB PAIN ON RIGHT SIDE: ICD-10-CM

## 2024-05-01 PROCEDURE — 99213 OFFICE O/P EST LOW 20 MIN: CPT | Performed by: NURSE PRACTITIONER

## 2024-05-01 NOTE — PROGRESS NOTES
[facility]        NAME: Jose White is a 63 y.o. female  : 1960    MRN: [id number]  DATE: May 1, 2024  TIME: 4:30 PM    Assessment and Plan   Encounter to establish care [Z76.89]  1. Encounter to establish care        2. Rib pain on right side  XR chest pa & lateral      3. Alcoholic cirrhosis of liver with ascites (HCC)        4. Chronic obstructive pulmonary disease with acute exacerbation (HCC)              Patient Instructions     Patient Instructions   X-ray as ordered  Ice and heat for pain/inflammation of right rib cage  Biofreeze to area  Follow up for wellness visit once medicare is active        Chief Complaint     Chief Complaint   Patient presents with    Establish Care         History of Present Illness       Here as new patient visit, wants to establish care here. Hx of copd, ETOH abuse, cirrhosis of the liver, breast ca-follows with neurology, GI, oncology. Patient had a seizure on 23 most likely due to alcohol intoxication. Patient states she is not drinking anymore. She has a substance abuse form from [employer] that needs completed to restore license. Neurology completed the [employer] seizure form since she has not had a seizure since the one occurring on 23. I do not feel comfortable completing this form due to having only met the patient today to establish care. Her previous PCP was not comfortable completing form and she was referred to drug/alcohol counseling.    C/o pain in left right chest wall/rib x 3 weeks-possible strain.          Review of Systems   Review of Systems   Constitutional:  Negative for activity change, diaphoresis, fatigue and fever.   HENT:  Negative for congestion, facial swelling, hearing loss, rhinorrhea, sinus pressure, sinus pain, sneezing, sore throat and voice change.    Eyes:  Negative for discharge and visual disturbance.   Respiratory:  Negative for cough, choking, chest tightness, shortness of breath, wheezing and  stridor.    Cardiovascular:  Negative for chest pain, palpitations and leg swelling.   Gastrointestinal:  Negative for abdominal distention, abdominal pain, constipation, diarrhea, nausea and vomiting.   Endocrine: Negative for polydipsia, polyphagia and polyuria.   Genitourinary:  Negative for difficulty urinating, dysuria, frequency and urgency.   Musculoskeletal:  Negative for arthralgias, back pain, gait problem, joint swelling, myalgias, neck pain and neck stiffness.   Skin:  Negative for color change, rash and wound.   Neurological:  Negative for dizziness, syncope, speech difficulty, weakness, light-headedness and headaches.   Hematological:  Negative for adenopathy. Does not bruise/bleed easily.   Psychiatric/Behavioral:  Negative for agitation, behavioral problems, confusion, hallucinations, sleep disturbance and suicidal ideas. The patient is not nervous/anxious.          Current Medications       Current Outpatient Medications:     albuterol (2.5 mg/3 mL) 0.083 % nebulizer solution, Take 3 mL (2.5 mg total) by nebulization every 6 (six) hours as needed for wheezing or shortness of breath, Disp: 180 mL, Rfl: 5    albuterol (Proventil HFA) 90 mcg/act inhaler, Inhale 2 puffs every 6 (six) hours as needed for wheezing or shortness of breath, Disp: 18 g, Rfl: 3    ALPRAZolam (XANAX) 1 mg tablet, Take 1 tablet (1 mg total) by mouth 2 (two) times a day as needed for anxiety, Disp: 60 tablet, Rfl: 0    anastrozole (ARIMIDEX) 1 mg tablet, TAKE ONE TABLET BY MOUTH EVERY DAY, Disp: 90 tablet, Rfl: 1    budesonide-formoterol (Symbicort) 160-4.5 mcg/act inhaler, Inhale 2 puffs 2 (two) times a day Rinse mouth after use., Disp: 10.2 g, Rfl: 1    furosemide (LASIX) 40 mg tablet, Take 1 tablet (40 mg total) by mouth daily, Disp: 90 tablet, Rfl: 3    lactulose 20 g/30 mL, Take 45 mL (30 g total) by mouth 2 (two) times a day Titrate dose for an effect of 3 soft, but formed bowel movements per day.  You can start with 1  tablespoon per day and slowly increase to effect., Disp: 2700 mL, Rfl: 0    midodrine (PROAMATINE) 2.5 mg tablet, Take 3 tablets (7.5 mg total) by mouth 3 (three) times a day before meals, Disp: 810 tablet, Rfl: 3    pantoprazole (PROTONIX) 20 mg tablet, Take 1 tablet (20 mg total) by mouth daily, Disp: 90 tablet, Rfl: 2    Risankizumab-rzaa (SKYRIZI PEN SC), Inject under the skin, Disp: , Rfl:     spironolactone (ALDACTONE) 100 mg tablet, Take 1 tablet (100 mg total) by mouth daily, Disp: 90 tablet, Rfl: 3    traZODone (DESYREL) 50 mg tablet, Take 1 tablet (50 mg total) by mouth daily at bedtime, Disp: 30 tablet, Rfl: 5    Current Allergies     Allergies as of 05/01/2024 - Reviewed 05/01/2024   Allergen Reaction Noted    Sulfa antibiotics Shortness Of Breath 01/24/2022    Ace inhibitors Cough and Other (See Comments) 08/29/2014            The following portions of the patient's history were reviewed and updated as appropriate: allergies, current medications, past family history, past medical history, past social history, past surgical history and problem list.     Past Medical History:   Diagnosis Date    Alcoholic fatty liver     Anxiety     Asthma     COPD (chronic obstructive pulmonary disease) (HCC)     Elevated liver function tests     GERD (gastroesophageal reflux disease)     GERD without esophagitis     Hyperlipidemia     Hypertension     IBS (irritable bowel syndrome)     Insomnia     Insomnia     Liver disease     Nervousness     Nicotine dependence     Other specified urinary incontinence     RLS (restless legs syndrome)     Wears glasses        Past Surgical History:   Procedure Laterality Date    BACK SURGERY      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    BREAST EXCISIONAL BIOPSY Right 11/01/2004    benign    CATARACT EXTRACTION, BILATERAL  08/01/2018    COLONOSCOPY N/A 5/8/2019    Procedure: COLONOSCOPY;  Surgeon: Jacobo Leonardo MD;  Location: [facility] GI LAB;  Service: Gastroenterology    EGD AND  COLONOSCOPY N/A 3/19/2018    Procedure: EGD AND COLONOSCOPY;  Surgeon: Jacobo Leonardo MD;  Location: [facility] GI LAB;  Service: Gastroenterology    ESOPHAGOGASTRODUODENOSCOPY N/A 5/8/2019    Procedure: ESOPHAGOGASTRODUODENOSCOPY (EGD);  Surgeon: Jacobo Leonardo MD;  Location: [facility] GI LAB;  Service: Gastroenterology    IR PARACENTESIS  11/27/2020    IR PARACENTESIS  12/8/2020    IR PARACENTESIS  10/28/2022    IR PARACENTESIS  11/7/2022    IR PARACENTESIS  11/30/2022    IR PARACENTESIS  12/30/2022    IR PARACENTESIS  1/16/2023    IR THORACENTESIS  1/16/2023    IR THORACENTESIS  3/28/2023    KNEE SURGERY      KNEE SURGERY      LUMBAR LAMINECTOMY  04/1999    LYMPH NODE BIOPSY      MAMMO STEREOTACTIC BREAST BIOPSY RIGHT (ALL INC) Right 5/21/2021    MAMMO STEREOTACTIC BREAST BIOPSY RIGHT (ALL INC) EACH ADD Right 5/21/2021    TOTAL ABDOMINAL HYSTERECTOMY  02/05/2008    TUBAL LIGATION      TUBAL LIGATION  1989    UPPER GASTROINTESTINAL ENDOSCOPY         Family History   Problem Relation Age of Onset    Breast cancer Mother 32    No Known Problems Father     No Known Problems Sister     No Known Problems Brother     No Known Problems Maternal Grandmother     No Known Problems Maternal Grandfather     No Known Problems Paternal Grandmother     No Known Problems Paternal Grandfather     No Known Problems Maternal Aunt     No Known Problems Maternal Aunt     No Known Problems Paternal Aunt     No Known Problems Son     Substance Abuse Neg Hx     Mental illness Neg Hx     Colon cancer Neg Hx     Colon polyps Neg Hx          Medications have been verified.        Objective   /70 (BP Location: Left arm, Patient Position: Sitting, Cuff Size: Standard)   Pulse 86   Temp (!) 96.1 °F (35.6 °C) (Tympanic)   Wt 70.8 kg (156 lb)   SpO2 96%   BMI 27.20 kg/m²        Physical Exam     Physical Exam  Vitals and nursing note reviewed.   Constitutional:       General: She is not in acute distress.     Appearance: Normal appearance.  She is well-developed and normal weight. She is not diaphoretic.   HENT:      Head: Normocephalic and atraumatic.      Right Ear: Tympanic membrane, ear canal and external ear normal.      Left Ear: Tympanic membrane, ear canal and external ear normal.      Nose: Nose normal.      Right Sinus: No maxillary sinus tenderness or frontal sinus tenderness.      Left Sinus: No maxillary sinus tenderness or frontal sinus tenderness.      Mouth/Throat:      Mouth: Mucous membranes are moist.      Pharynx: Uvula midline. No oropharyngeal exudate.   Eyes:      General:         Right eye: No discharge.         Left eye: No discharge.      Conjunctiva/sclera: Conjunctivae normal.      Pupils: Pupils are equal, round, and reactive to light.   Neck:      Thyroid: No thyromegaly.      Trachea: No tracheal deviation.   Cardiovascular:      Rate and Rhythm: Normal rate and regular rhythm.      Heart sounds: Normal heart sounds. No murmur heard.     No friction rub. No gallop.   Pulmonary:      Effort: Pulmonary effort is normal. No respiratory distress.      Breath sounds: Normal breath sounds. No stridor. No wheezing, rhonchi or rales.   Chest:      Chest wall: No tenderness.   Abdominal:      General: Bowel sounds are normal. There is no distension.      Palpations: Abdomen is soft. There is no mass.      Tenderness: There is no abdominal tenderness. There is no guarding or rebound.   Musculoskeletal:         General: Tenderness (right ribs) present. No deformity. Normal range of motion.      Cervical back: Normal range of motion and neck supple.      Right lower leg: No edema.      Left lower leg: No edema.   Lymphadenopathy:      Cervical: No cervical adenopathy.   Skin:     General: Skin is warm and dry.      Capillary Refill: Capillary refill takes less than 2 seconds.      Coloration: Skin is not jaundiced.      Findings: No bruising, erythema, lesion or rash.   Neurological:      Mental Status: She is alert and oriented to  person, place, and time.      Cranial Nerves: No cranial nerve deficit.      Motor: No weakness.      Coordination: Coordination normal.      Gait: Gait normal.   Psychiatric:         Mood and Affect: Mood normal.         Speech: Speech normal.         Behavior: Behavior normal.         Thought Content: Thought content normal.         Judgment: Judgment normal.

## 2024-05-01 NOTE — TELEPHONE ENCOUNTER
I would say yes due to the fact that the appointment notes for her new PCP say, establish care and forms. She has an appointment today(5/1) at 10:45am.

## 2024-05-01 NOTE — PATIENT INSTRUCTIONS
X-ray as ordered  Ice and heat for pain/inflammation of right rib cage  Biofreeze to area  Follow up for wellness visit once medicare is active

## 2024-05-02 ENCOUNTER — TELEPHONE (OUTPATIENT)
Age: 64
End: 2024-05-02

## 2024-05-02 NOTE — TELEPHONE ENCOUNTER
Patients GI provider:  Dr. Herrera    Number to return call: [phone]    Reason for call: Pt calling stating that she has substance abuse form that she needs for [employer] to get her drivers license back.  Pt stating that her previous pcp Sarah Beth told her that Dr. Herrera should fill these forms out. I informed pt that I don't know why that would be. Pt is going to bring the paperwork to her 5/9/24 appt to discuss.     Scheduled procedure/appointment date if applicable: Apt 5/9/24

## 2024-05-03 NOTE — TELEPHONE ENCOUNTER
Pt is calling to say she will be bringing in her forms to the office visit on 5/9/24. Pt states forms need to be filled out by a Dr who she has been under care with.  Advised pt to be early to drop off forms before her visit.

## 2024-05-03 NOTE — TELEPHONE ENCOUNTER
Per patient's chart notes form had been submitted to Sarah Beth (first PCP) and then on 5/1/2024 given to new PCP and they chose not to complete the form.    Routing to Dr. Javier FREIRE.

## 2024-05-03 NOTE — TELEPHONE ENCOUNTER
Called patient to let her know Dr. Herrera states form should be completed by provider who reported any issue to Jessy.  Patient then had her  take the call and he explained that on November 25, 2023 patient was seen by Dr. Robert in the Emergency Department.  States Dr. Robert had obligation to report and they understand that.  At this point [facility] needs form completed and returned so they can make a decision regarding patient's license.   advised would check into this matter further to help resolve.

## 2024-05-04 LAB
25(OH)D3 SERPL-MCNC: 18 NG/ML (ref 30–100)
ALBUMIN SERPL-MCNC: 3.5 G/DL (ref 3.6–5.1)
ALBUMIN/GLOB SERPL: 0.7 (CALC) (ref 1–2.5)
ALP SERPL-CCNC: 156 U/L (ref 37–153)
ALT SERPL-CCNC: 31 U/L (ref 6–29)
AST SERPL-CCNC: 62 U/L (ref 10–35)
BASOPHILS # BLD AUTO: 57 CELLS/UL (ref 0–200)
BASOPHILS NFR BLD AUTO: 1.1 %
BILIRUB SERPL-MCNC: 1.7 MG/DL (ref 0.2–1.2)
BUN SERPL-MCNC: 9 MG/DL (ref 7–25)
BUN SERPL-MCNC: 9 MG/DL (ref 7–25)
BUN/CREAT SERPL: ABNORMAL (CALC) (ref 6–22)
BUN/CREAT SERPL: ABNORMAL (CALC) (ref 6–22)
CALCIUM SERPL-MCNC: 9.2 MG/DL (ref 8.6–10.4)
CALCIUM SERPL-MCNC: 9.4 MG/DL (ref 8.6–10.4)
CHLORIDE SERPL-SCNC: 89 MMOL/L (ref 98–110)
CHLORIDE SERPL-SCNC: 90 MMOL/L (ref 98–110)
CO2 SERPL-SCNC: 25 MMOL/L (ref 20–32)
CO2 SERPL-SCNC: 28 MMOL/L (ref 20–32)
CREAT SERPL-MCNC: 0.88 MG/DL (ref 0.5–1.05)
CREAT SERPL-MCNC: 0.94 MG/DL (ref 0.5–1.05)
EOSINOPHIL # BLD AUTO: 52 CELLS/UL (ref 15–500)
EOSINOPHIL NFR BLD AUTO: 1 %
ERYTHROCYTE [DISTWIDTH] IN BLOOD BY AUTOMATED COUNT: 13.1 % (ref 11–15)
ERYTHROCYTE [DISTWIDTH] IN BLOOD BY AUTOMATED COUNT: 13.2 % (ref 11–15)
GFR/BSA.PRED SERPLBLD CYS-BASED-ARV: 68 ML/MIN/1.73M2
GFR/BSA.PRED SERPLBLD CYS-BASED-ARV: 73 ML/MIN/1.73M2
GLOBULIN SER CALC-MCNC: 5 G/DL (CALC) (ref 1.9–3.7)
GLUCOSE SERPL-MCNC: 102 MG/DL (ref 65–99)
GLUCOSE SERPL-MCNC: 104 MG/DL (ref 65–99)
HCT VFR BLD AUTO: 41.5 % (ref 35–45)
HCT VFR BLD AUTO: 42.6 % (ref 35–45)
HGB BLD-MCNC: 14.5 G/DL (ref 11.7–15.5)
HGB BLD-MCNC: 14.5 G/DL (ref 11.7–15.5)
INR PPP: 1.5
LYMPHOCYTES # BLD AUTO: 624 CELLS/UL (ref 850–3900)
LYMPHOCYTES NFR BLD AUTO: 12 %
MAGNESIUM SERPL-MCNC: 1.6 MG/DL (ref 1.5–2.5)
MCH RBC QN AUTO: 35.2 PG (ref 27–33)
MCH RBC QN AUTO: 35.9 PG (ref 27–33)
MCHC RBC AUTO-ENTMCNC: 34 G/DL (ref 32–36)
MCHC RBC AUTO-ENTMCNC: 34.9 G/DL (ref 32–36)
MCV RBC AUTO: 102.7 FL (ref 80–100)
MCV RBC AUTO: 103.4 FL (ref 80–100)
MONOCYTES # BLD AUTO: 723 CELLS/UL (ref 200–950)
MONOCYTES NFR BLD AUTO: 13.9 %
NEUTROPHILS # BLD AUTO: 3744 CELLS/UL (ref 1500–7800)
NEUTROPHILS NFR BLD AUTO: 72 %
PHOSPHATE SERPL-MCNC: 3.5 MG/DL (ref 2.5–4.5)
PLATELET # BLD AUTO: 102 THOUSAND/UL (ref 140–400)
PLATELET # BLD AUTO: 106 THOUSAND/UL (ref 140–400)
PMV BLD REES-ECKER: 9.8 FL (ref 7.5–12.5)
PMV BLD REES-ECKER: 9.9 FL (ref 7.5–12.5)
POTASSIUM SERPL-SCNC: 3.7 MMOL/L (ref 3.5–5.3)
POTASSIUM SERPL-SCNC: 3.7 MMOL/L (ref 3.5–5.3)
PROT SERPL-MCNC: 8.5 G/DL (ref 6.1–8.1)
PROTHROMBIN TIME: 14.9 SEC (ref 9–11.5)
RBC # BLD AUTO: 4.04 MILLION/UL (ref 3.8–5.1)
RBC # BLD AUTO: 4.12 MILLION/UL (ref 3.8–5.1)
SODIUM SERPL-SCNC: 126 MMOL/L (ref 135–146)
SODIUM SERPL-SCNC: 126 MMOL/L (ref 135–146)
WBC # BLD AUTO: 5.2 THOUSAND/UL (ref 3.8–10.8)
WBC # BLD AUTO: 5.2 THOUSAND/UL (ref 3.8–10.8)

## 2024-05-06 ENCOUNTER — PATIENT MESSAGE (OUTPATIENT)
Dept: GASTROENTEROLOGY | Facility: CLINIC | Age: 64
End: 2024-05-06

## 2024-05-06 ENCOUNTER — TELEPHONE (OUTPATIENT)
Age: 64
End: 2024-05-06

## 2024-05-06 DIAGNOSIS — E55.9 VITAMIN D DEFICIENCY: Primary | ICD-10-CM

## 2024-05-06 DIAGNOSIS — E87.6 HYPOKALEMIA: ICD-10-CM

## 2024-05-06 DIAGNOSIS — K70.31 ALCOHOLIC CIRRHOSIS OF LIVER WITH ASCITES (HCC): Primary | ICD-10-CM

## 2024-05-06 RX ORDER — ERGOCALCIFEROL 1.25 MG/1
CAPSULE ORAL
Qty: 12 CAPSULE | Refills: 0 | Status: SHIPPED | OUTPATIENT
Start: 2024-05-06

## 2024-05-06 RX ORDER — ERGOCALCIFEROL 1.25 MG/1
50000 CAPSULE ORAL WEEKLY
COMMUNITY
End: 2024-05-06 | Stop reason: SDUPTHER

## 2024-05-06 NOTE — TELEPHONE ENCOUNTER
Patient returning call. Made aware:     Dora Arita PA-C  5/6/2024 10:56 AM EDT Back to Top      GI note reviewed.  Sodium level low.  Recommend stricter fluid restriction <1500ml/day.  Repeat BMP this week.    Vitamin D level low.  Would recommend starting ergocalciferol 50,000 units weekly for 12 weeks.     Patient verbalized understanding.

## 2024-05-06 NOTE — TELEPHONE ENCOUNTER
Talked with pt.  Advised her to tighten up on her fluid restrictions per Dora.  She is scheduled to have repeat labs on 5/8/24 as ordered by GI.      ----- Message from Dora Arita PA-C sent at 5/6/2024 10:56 AM EDT -----  GI note reviewed.  Sodium level low.  Recommend stricter fluid restriction <1500ml/day.  Repeat BMP this week.    Vitamin D level low.  Would recommend starting ergocalciferol 50,000 units weekly for 12 weeks.

## 2024-05-06 NOTE — PROGRESS NOTES
Called MsYoandy Leicarlos to review recent blood work.  I spoke with your significant other as Jose was still in bed.  No symptoms reported.    Given low sodium, I would like Jose to hold her lasix and spironolactone and limit fluid intake to 2 liters until we discuss further and repeat BMP on Wednesday. She will be coming in to see me in the office on 5/9.    Vamsi Herrera MD  Hepatology/Gastroenterology

## 2024-05-07 ENCOUNTER — TELEPHONE (OUTPATIENT)
Dept: FAMILY MEDICINE CLINIC | Facility: CLINIC | Age: 64
End: 2024-05-07

## 2024-05-07 NOTE — TELEPHONE ENCOUNTER
Patient calls in asking how she should proceed with the [employer] forms. States that she has not drank alcohol and no seizure activity. States GI will not fill out form/.

## 2024-05-07 NOTE — TELEPHONE ENCOUNTER
Patient called in regarding a call she received from the office she is going to bring in forms for DR KING to look at to see what she can do about this as no one will fill out the forms for her to get her License back

## 2024-05-09 ENCOUNTER — NURSE TRIAGE (OUTPATIENT)
Age: 64
End: 2024-05-09

## 2024-05-09 ENCOUNTER — TELEPHONE (OUTPATIENT)
Age: 64
End: 2024-05-09

## 2024-05-09 ENCOUNTER — OFFICE VISIT (OUTPATIENT)
Dept: GASTROENTEROLOGY | Facility: CLINIC | Age: 64
End: 2024-05-09
Payer: COMMERCIAL

## 2024-05-09 VITALS
SYSTOLIC BLOOD PRESSURE: 125 MMHG | WEIGHT: 158 LBS | TEMPERATURE: 98.2 F | HEIGHT: 64 IN | HEART RATE: 86 BPM | BODY MASS INDEX: 26.98 KG/M2 | OXYGEN SATURATION: 97 % | DIASTOLIC BLOOD PRESSURE: 57 MMHG

## 2024-05-09 DIAGNOSIS — F10.10 ALCOHOL ABUSE: ICD-10-CM

## 2024-05-09 DIAGNOSIS — E87.1 HYPONATREMIA: ICD-10-CM

## 2024-05-09 DIAGNOSIS — K70.31 ALCOHOLIC CIRRHOSIS OF LIVER WITH ASCITES (HCC): Primary | ICD-10-CM

## 2024-05-09 LAB
BUN SERPL-MCNC: 7 MG/DL (ref 7–25)
BUN/CREAT SERPL: ABNORMAL (CALC) (ref 6–22)
CALCIUM SERPL-MCNC: 8.7 MG/DL (ref 8.6–10.4)
CHLORIDE SERPL-SCNC: 98 MMOL/L (ref 98–110)
CO2 SERPL-SCNC: 22 MMOL/L (ref 20–32)
CREAT SERPL-MCNC: 0.67 MG/DL (ref 0.5–1.05)
GFR/BSA.PRED SERPLBLD CYS-BASED-ARV: 98 ML/MIN/1.73M2
GLUCOSE SERPL-MCNC: 105 MG/DL (ref 65–99)
POTASSIUM SERPL-SCNC: 4.1 MMOL/L (ref 3.5–5.3)
SODIUM SERPL-SCNC: 129 MMOL/L (ref 135–146)

## 2024-05-09 PROCEDURE — 99215 OFFICE O/P EST HI 40 MIN: CPT | Performed by: INTERNAL MEDICINE

## 2024-05-09 NOTE — PROGRESS NOTES
[facility]  Outpatient Hepatology Follow-up  Encounter: [id number]    PATIENT INFO     Name: Jose White  YOB: 1960   Age: 63 y.o.   Sex: female   MRN: [id number]    ASSESSMENT & PLAN     Problems Addressed this Visit:  1. Alcoholic cirrhosis of liver with ascites (HCC)    2. Hyponatremia    3. Alcohol abuse      Orders Placed This Encounter   Procedures    MRI abdomen w wo contrast    Comprehensive metabolic panel    Protime-INR    CBC and differential    Comprehensive metabolic panel    PHOSPHATIDYLETHANOL    Drug Screen Routine w /Conf and Adulteration, urine.     1. 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  she has required paracentesis and thoracentesis in the past for the last year has been well-controlled on diuretics.  Back in 2022, patient did have low sodium levels but her sodium had improved which did not limit her diuretic usage.  However recently, blood work did reveal an acute drop in sodium to 126 which led to an increase in her overall MELD score.  Unclear etiology of hyponatremia.  Possibly a result of increased fluid intake versus overdiuresis.  At this time, we will continue to trend labs carefully and monitor off of diuretics.  Will plan to slowly restart diuretics once sodium levels improve versus hold if patient with no indication to restart as she does remain euvolemic at this time.  Additionally, patient states that she is not currently drinking alcohol.  Will plan to complete UDS and pETH testing.    MELD 3.0: 21 at 5/3/2024 12:42 PM  MELD-Na: 23 at 5/3/2024 12:42 PM  Calculated from:  Serum Creatinine: 0.88 mg/dL (Using min of 1 mg/dL) at 5/3/2024 12:42 PM  Serum Sodium: 126 mmol/L at 5/3/2024 12:42 PM  Total Bilirubin: 1.7 mg/dL at 5/3/2024 12:31 PM  Serum Albumin: 3.5 g/dL at 5/3/2024 12:31 PM  INR(ratio): 1.5 at 5/3/2024 12:31  PM  Age at listing (hypothetical): 63 years  Sex: Female at 5/3/2024 12:42 PM    # Varices:   Last EGD: 24 -  no esophageal or gastric varices  Current Regimen: NSBB not currently indicated  Next EGD due: Repeat 2025    # Ascites/Hepatic Hydrothorax; now with Hyponatremia:   Prior history of ascites requiring thoracentesis/paracentesis in   Previously well-controlled on diuretics until however diuretic stopped 2 days ago due to sodium level of 126  Labs completed 2024 with notable improvement in Na from 126 to 129  On examination today: Euvolemic with benign abdomen and clear breath sounds  Prior Regimen: Aldactone 100 mg and Lasix 40 mg daily   Continue to hold diuretics; repeat BMP next week then in 2 weeks    # Hepatic Encephalopathy:   Prior History: Yes   On examination today: AAOx3 with no asterixis  Current Regimen: Lactulose 30 g BID; titrate to 2-3 BM/day     # HCC Surveillance and Transplant Evaluation:  Most recent HCC Screenin2024 with no suspicious lesion; Due: 2024 - MRI ordered  Previously Referred: No referral in past  Barriers to Transplant: Alcohol use     # Screening and Health Maintenance:   Colon cancer screening: Last ; Due    Encouraged to continue high protein and 2g Na restricted diet; avoid eating raw shellfish  Limit use of Tylenol to no more than 2 grams in 24 hour period and avoid use of all NSAIDs and narcotics  Encouraged to participate in daily exercise to avoid muscle wasting  Avoid use of alcohol and strongly encourage tobacco cessation  Pending HAV and HBC immunity recommend vaccination along with yearly flu and pneumonia vaccine through PCP    I have spent a total time of 45 minutes on 24 in caring for this patient including Diagnostic results, Instructions for management, Patient and family education, Importance of tx compliance, Risk factor reductions, Impressions, Counseling / Coordination of care, Documenting in the medical record, and  Reviewing / ordering tests, medicine, procedures  .    FOLLOW-UP: Schedule follow up visit in 3 months     HISTORY OF PRESENT ILLNESS       Jose White is a 63 y.o. female who presents to GI office for cirrhosis follow-up.  Patient with a past medical history of alcohol cirrhosis decompensated by ascites/hepatic hydrothorax, history of breast cancer, GERD, hyperlipidemia, hypertension, and tobacco use.    Patient was last seen and evaluated on 1/25/2024 at which time she had been doing well.  Had been well-controlled on Lasix 40 mg daily and Aldactone 100 mg daily.  MELD score 13.  She admitted to stopping drinking after a hospitalization in November for reported seizure.  Prior to today's appointment, she did have blood work completed on 5/3/2024 which revealed a sodium level of 126.  Patient been called and informed of these results.  She has been instructed to hold her diuretics which she has for the last 2 days.    During today's appointment, patient states that she is doing well overall.  Asymptomatic and with no complaints.  States she has been monitoring her weight which has been stable.  Denies any difficulty breathing, lower extremity edema, or abdominal pain/distention.  Denies any signs of overt GI bleeding.  Denies any episodes of confusion or yellowing of the eyes/skin.  She continues to report complete alcohol cessation and states that she only is drinking nonalcoholic beers.     ENDOSCOPIC HISTORY     UPPER ENDOSCOPY: 1/12/24 - Normal esophagus. Mild PHG. Normal duodenum.     COLONOSCOPY: 2019 - Normal colonoscopy. Recall recommended in x10 years    REVIEW OF SYSTEMS     CONSTITUTIONAL: Denies any fever, chills, rigors, and weight loss  HEENT: No earache or tinnitus, denies hearing loss or visual disturbances  CARDIOVASCULAR: No chest pain or palpitations  RESPIRATORY: Denies any cough, hemoptysis, shortness of breath or dyspnea on exertion  GASTROINTESTINAL: As noted in the History of Present  Illness  GENITOURINARY: No problems with urination, denies any hematuria or dysuria  NEUROLOGIC: No dizziness or vertigo, denies headaches   MUSCULOSKELETAL: Denies any muscle or joint pain   SKIN: Denies skin rashes or itching  ENDOCRINE: Denies excessive thirst, denies intolerance to heat or cold  PSYCHOSOCIAL: Denies depression or anxiety, denies any recent memory loss     Historical Information   Past Medical History:   Diagnosis Date    Alcoholic fatty liver     Anxiety     Asthma     COPD (chronic obstructive pulmonary disease) (HCC)     Elevated liver function tests     GERD (gastroesophageal reflux disease)     GERD without esophagitis     Hyperlipidemia     Hypertension     IBS (irritable bowel syndrome)     Insomnia     Insomnia     Liver disease     Nervousness     Nicotine dependence     Other specified urinary incontinence     RLS (restless legs syndrome)     Wears glasses      Past Surgical History:   Procedure Laterality Date    BACK SURGERY      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    BREAST EXCISIONAL BIOPSY Right 11/01/2004    benign    CATARACT EXTRACTION, BILATERAL  08/01/2018    COLONOSCOPY N/A 5/8/2019    Procedure: COLONOSCOPY;  Surgeon: Jacobo Leonardo MD;  Location: [facility] GI [facility];  Service: Gastroenterology    EGD AND COLONOSCOPY N/A 3/19/2018    Procedure: EGD AND COLONOSCOPY;  Surgeon: Jacobo Leonardo MD;  Location: [facility] GI [facility];  Service: Gastroenterology    ESOPHAGOGASTRODUODENOSCOPY N/A 5/8/2019    Procedure: ESOPHAGOGASTRODUODENOSCOPY (EGD);  Surgeon: Jacobo Leonardo MD;  Location: [facility] GI [facility];  Service: Gastroenterology    IR PARACENTESIS  11/27/2020    IR PARACENTESIS  12/8/2020    IR PARACENTESIS  10/28/2022    IR PARACENTESIS  11/7/2022    IR PARACENTESIS  11/30/2022    IR PARACENTESIS  12/30/2022    IR PARACENTESIS  1/16/2023    IR THORACENTESIS  1/16/2023    IR THORACENTESIS  3/28/2023    KNEE SURGERY      KNEE SURGERY      LUMBAR LAMINECTOMY  04/1999    LYMPH NODE BIOPSY       MAMMO STEREOTACTIC BREAST BIOPSY RIGHT (ALL INC) Right 5/21/2021    MAMMO STEREOTACTIC BREAST BIOPSY RIGHT (ALL INC) EACH ADD Right 5/21/2021    TOTAL ABDOMINAL HYSTERECTOMY  02/05/2008    TUBAL LIGATION      TUBAL LIGATION  1989    UPPER GASTROINTESTINAL ENDOSCOPY       Social History   Social History     Substance and Sexual Activity   Alcohol Use Not Currently    Comment: last alcoholic drink was 11/25/2023     Social History     Substance and Sexual Activity   Drug Use No     Social History     Tobacco Use   Smoking Status Every Day    Current packs/day: 1.00    Average packs/day: 1 pack/day for 46.4 years (46.4 ttl pk-yrs)    Types: Cigarettes    Start date: 1/1/1978   Smokeless Tobacco Never     Family History   Problem Relation Age of Onset    Breast cancer Mother 32    No Known Problems Father     No Known Problems Sister     No Known Problems Brother     No Known Problems Maternal Grandmother     No Known Problems Maternal Grandfather     No Known Problems Paternal Grandmother     No Known Problems Paternal Grandfather     No Known Problems Maternal Aunt     No Known Problems Maternal Aunt     No Known Problems Paternal Aunt     No Known Problems Son     Substance Abuse Neg Hx     Mental illness Neg Hx     Colon cancer Neg Hx     Colon polyps Neg Hx          MEDICATIONS AND ALLERGIES     Current Outpatient Medications   Medication Instructions    albuterol (Proventil HFA) 90 mcg/act inhaler 2 puffs, Inhalation, Every 6 hours PRN    albuterol 2.5 mg, Nebulization, Every 6 hours PRN    ALPRAZolam (XANAX) 1 mg, Oral, 2 times daily PRN    anastrozole (ARIMIDEX) 1 mg, Oral, Daily    budesonide-formoterol (Symbicort) 160-4.5 mcg/act inhaler 2 puffs, Inhalation, 2 times daily, Rinse mouth after use.    ergocalciferol (VITAMIN D2) 50,000 units Take one capsule weekly x12 weeks    furosemide (LASIX) 40 mg, Oral, Daily    lactulose 30 g, Oral, 2 times daily, Titrate dose for an effect of 3 soft, but formed bowel  "movements per day.  You can start with 1 tablespoon per day and slowly increase to effect.    midodrine (PROAMATINE) 7.5 mg, Oral, 3 times daily before meals    pantoprazole (PROTONIX) 20 mg, Oral, Daily    Risankizumab-rzaa (SKYRIZI PEN SC) Subcutaneous    spironolactone (ALDACTONE) 100 mg, Oral, Daily    traZODone (DESYREL) 50 mg, Oral, Daily at bedtime     Allergies   Allergen Reactions    Sulfa Antibiotics Shortness Of Breath    Ace Inhibitors Cough and Other (See Comments)     coughing       PHYSICAL EXAM      Objective   Blood pressure 125/57, pulse 86, temperature 98.2 °F (36.8 °C), temperature source Oral, height 5' 3.5\" (1.613 m), weight 71.7 kg (158 lb), SpO2 97%, not currently breastfeeding. Body mass index is 27.55 kg/m².    General Appearance:   Alert, cooperative, no distress   HEENT:   Normocephalic, atraumatic, anicteric     Neck:   Supple, symmetrical, trachea midline   Lungs:   Equal chest rise, respirations unlabored    Heart:   Regular rate and rhythm   Abdomen:   Soft, non-tender, non-distended; normal bowel sounds; no masses, no organomegaly    Rectal:   Deferred    Extremities:   No cyanosis, clubbing or edema    Neuro:   Moves all 4 extremities    Skin:   No jaundice, rashes, or lesions      LABORATORY RESULTS     No visits with results within 1 Day(s) from this visit.   Latest known visit with results is:   Orders Only on 05/06/2024   Component Date Value    Glucose, Random 05/08/2024 105 (H)     BUN 05/08/2024 7     Creatinine 05/08/2024 0.67     eGFR 05/08/2024 98     SL AMB BUN/CREATININE RA* 05/08/2024 SEE NOTE:     Sodium 05/08/2024 129 (L)     Potassium 05/08/2024 4.1     Chloride 05/08/2024 98     CO2 05/08/2024 22     Calcium 05/08/2024 8.7      No results found.    RADIOLOGY RESULTS: I have personally reviewed pertinent imaging studies.      Cheyenne Avila DO  Gastroenterology Fellow  [facility]  Division of Gastroenterology & Hepatology  Available on " Tiger[employer]    ** Please Note: This note is constructed using a voice recognition dictation system. **

## 2024-05-09 NOTE — TELEPHONE ENCOUNTER
"PT SEEN IN OFFICE TODAY, BLOOD WORK ORDERED, INCLUDING DRUG SCREEN, PT TAKES PRESCRIPTION XANAX AND TRAZODONE, WANTS TO KNOW IF THIS WILL NEGATIVELY IMPACT HER RESULTS. PLEASE ADVISE.          Reason for Disposition   Information only question and nurse able to answer    Answer Assessment - Initial Assessment Questions  1. REASON FOR CALL or QUESTION: \"What is your reason for calling today?\" or \"How can I best help you?\" or \"What question do you have that I can help answer?\"      PT SEEN IN OFFICE TODAY, BLOOD WORK ORDERED, INCLUDING DRUG SCREEN, PT TAKES PRESCRIPTION XANAX AND TRAZODONE, WANTS TO KNOW IF THIS WILL NEGATIVELY IMPACT HER RESULTS. PLEASE ADVISE.    Protocols used: Information Only Call - No Triage-ADULT-[place]    "

## 2024-05-09 NOTE — TELEPHONE ENCOUNTER
Patients GI provider:  Dr. Herrera    Number to return call: [phone]    Reason for call: Pt calling to inquire if Xanax and trazodone would negatively impact drug screening. Spoke w/Aydee in triage, advised that as long as meds were prescribed should not be an issue. Will send message to Dr. Herrera to verify that it's okay. Someone will reach back out to pt to confirm.    Scheduled procedure/appointment date if applicable: Apt 09/26/2024

## 2024-05-12 DIAGNOSIS — K21.9 GERD WITHOUT ESOPHAGITIS: ICD-10-CM

## 2024-05-12 DIAGNOSIS — K70.31 ALCOHOLIC CIRRHOSIS OF LIVER WITH ASCITES (HCC): ICD-10-CM

## 2024-05-12 DIAGNOSIS — R18.8 REFRACTORY ASCITES: ICD-10-CM

## 2024-05-12 DIAGNOSIS — I85.00 ESOPHAGEAL VARICES DETERMINED BY ENDOSCOPY (HCC): ICD-10-CM

## 2024-05-14 RX ORDER — PANTOPRAZOLE SODIUM 20 MG/1
20 TABLET, DELAYED RELEASE ORAL DAILY
Qty: 90 TABLET | Refills: 1 | Status: SHIPPED | OUTPATIENT
Start: 2024-05-14

## 2024-05-16 ENCOUNTER — TELEPHONE (OUTPATIENT)
Age: 64
End: 2024-05-16

## 2024-05-16 LAB
1OH-MIDAZOLAM UR-MCNC: NEGATIVE NG/ML
6MAM UR QL: NEGATIVE NG/ML
A-OH ALPRAZ UR-MCNC: 601 NG/ML
A-OH-TRIAZOLAM UR-MCNC: NEGATIVE NG/ML
ALBUMIN SERPL-MCNC: 2.9 G/DL (ref 3.6–5.1)
ALBUMIN/GLOB SERPL: 0.7 (CALC) (ref 1–2.5)
ALP SERPL-CCNC: 126 U/L (ref 37–153)
ALT SERPL-CCNC: 28 U/L (ref 6–29)
AMITRIP UR-MCNC: NEGATIVE NG/ML
AMPHETAMINES UR QL: NEGATIVE NG/ML
AST SERPL-CCNC: 58 U/L (ref 10–35)
BARBITURATES UR QL: NEGATIVE NG/ML
BENZODIAZ UR QL: POSITIVE NG/ML
BILIRUB SERPL-MCNC: 3.1 MG/DL (ref 0.2–1.2)
BUN SERPL-MCNC: 9 MG/DL (ref 7–25)
BUN/CREAT SERPL: ABNORMAL (CALC) (ref 6–22)
BUPRENORPHINE UR QL: NEGATIVE NG/ML
BZE UR QL: NEGATIVE NG/ML
CALCIUM SERPL-MCNC: 8.8 MG/DL (ref 8.6–10.4)
CHLORIDE SERPL-SCNC: 106 MMOL/L (ref 98–110)
CO2 SERPL-SCNC: 24 MMOL/L (ref 20–32)
CREAT SERPL-MCNC: 0.65 MG/DL (ref 0.5–1.05)
CREAT UR-MCNC: 162.7 MG/DL
ETHANOL UR QL: POSITIVE NG/ML
ETHYL GLUCURONIDE UR-MCNC: 791 NG/ML
ETHYL SULFATE UR-MCNC: 944 NG/ML
FENTANYL UR QL SCN: NEGATIVE NG/ML
GFR/BSA.PRED SERPLBLD CYS-BASED-ARV: 98 ML/MIN/1.73M2
GLOBULIN SER CALC-MCNC: 4.2 G/DL (CALC) (ref 1.9–3.7)
GLUCOSE SERPL-MCNC: 101 MG/DL (ref 65–99)
LORAZEPAM UR-MCNC: NEGATIVE NG/ML
METHADONE UR QL: NEGATIVE NG/ML
NORDIAZEPAM UR-MCNC: NEGATIVE NG/ML
NORTRIP UR-MCNC: NEGATIVE NG/ML
OPIATES UR QL: NEGATIVE NG/ML
OXAZEPAM UR-MCNC: NEGATIVE NG/ML
OXIDANTS UR QL: NEGATIVE MCG/ML
OXYCODONE UR QL: NEGATIVE NG/ML
PCP UR QL: NEGATIVE NG/ML
PH UR: 6.9 [PH] (ref 4.5–9)
POTASSIUM SERPL-SCNC: 3.7 MMOL/L (ref 3.5–5.3)
PROPOXYPH UR QL: NEGATIVE NG/ML
PROT SERPL-MCNC: 7.1 G/DL (ref 6.1–8.1)
SL AMB AMINOCLONAZEPAM: NEGATIVE NG/ML
SL AMB HYDROXYETHYLFLURAZEPAM: NEGATIVE NG/ML
SODIUM SERPL-SCNC: 139 MMOL/L (ref 135–146)
TEMAZEPAM UR-MCNC: NEGATIVE NG/ML
THC UR QL: NEGATIVE NG/ML

## 2024-05-16 NOTE — TELEPHONE ENCOUNTER
Patient calling again about an having the standing order put in and also questions what the labs were ordered by Dr. Avila

## 2024-05-16 NOTE — TELEPHONE ENCOUNTER
Patients GI provider:  Dr. dawson    Number to return call: ( [phone]    Reason for call: Pt calling was taken off water pills pt reports having sob and gaining weight. Pt would like standing order to have abd drain. Pt also wants to reports having bw done at [facility] in [place]    Scheduled procedure/appointment date if applicable: Apt/procedure

## 2024-05-17 ENCOUNTER — TELEPHONE (OUTPATIENT)
Age: 64
End: 2024-05-17

## 2024-05-17 DIAGNOSIS — K70.31 ALCOHOLIC CIRRHOSIS OF LIVER WITH ASCITES (HCC): Chronic | ICD-10-CM

## 2024-05-17 RX ORDER — SPIRONOLACTONE 50 MG/1
50 TABLET, FILM COATED ORAL DAILY
Qty: 30 TABLET | Refills: 11 | Status: SHIPPED | OUTPATIENT
Start: 2024-05-17 | End: 2025-05-17

## 2024-05-17 RX ORDER — FUROSEMIDE 20 MG/1
20 TABLET ORAL DAILY
Qty: 30 TABLET | Refills: 11 | Status: SHIPPED | OUTPATIENT
Start: 2024-05-17 | End: 2025-05-17

## 2024-05-17 NOTE — PROGRESS NOTES
I called and spoke with Ulises White.  I reviewed Jose's labs and their concerns with her increasing weight.  Her Na is back in normal range.  Will restart diruetics, but at half dose: 20 mg furosemide and 50 mg spironolactone daily.  Have labs done that were requested (CMP, CBC, PT/INR and PeTH) in 7-10 days.    Recent drug screen reviewed:  + alcohol metabolites.    We will fill out DMV letter accordingly and fax it it.    Vamsi Herrera MD  Hepatology/Gastroenterology

## 2024-05-17 NOTE — TELEPHONE ENCOUNTER
Patients GI provider:  Dr. Herrera    Number to return call: ([phone]    Reason for call: Pt called stating that Dr Herrera wanted her to have as drug test done but she just had it done on 05/14/24. She wants him to hold off on the [employer] paperwork til the test comes back. She also wants to speak with Dr Herrera. Please have him reach out to pt    Scheduled procedure/appointment date if applicable: Apt/procedure 09/26/24

## 2024-05-20 DIAGNOSIS — F41.9 ANXIETY: ICD-10-CM

## 2024-05-20 RX ORDER — ALPRAZOLAM 1 MG/1
1 TABLET ORAL 2 TIMES DAILY PRN
Qty: 60 TABLET | Refills: 0 | Status: SHIPPED | OUTPATIENT
Start: 2024-05-20

## 2024-05-20 NOTE — TELEPHONE ENCOUNTER
Patients GI provider:  Dr. King     Number to return call: ( [phone]     Reason for call: Patient is calling regarding her forms for [employer] DR KING put unknown for driving it has to be a yes or no not unknown she would like to speak with someone regarding this     Scheduled procedure/appointment date if applicable: Apt/procedure  5/9  last OV

## 2024-05-20 NOTE — TELEPHONE ENCOUNTER
Reason for call:   [x] Refill   [] Prior Auth  [] Other:     Office:   [x] PCP/Provider - Boris LAWS   [] Specialty/Provider -     Medication: Alprazolam     Dose/Frequency: 1mg - 2 times daily PRN     Quantity: 60    Pharmacy: [id number]    Does the patient have enough for 3 days?   [] Yes   [x] No - Send as HP to POD

## 2024-05-21 ENCOUNTER — TELEPHONE (OUTPATIENT)
Age: 64
End: 2024-05-21

## 2024-05-21 ENCOUNTER — TRANSCRIBE ORDERS (OUTPATIENT)
Dept: GASTROENTEROLOGY | Facility: CLINIC | Age: 64
End: 2024-05-21

## 2024-05-21 RX ORDER — ALPRAZOLAM 1 MG/1
1 TABLET ORAL 2 TIMES DAILY PRN
Qty: 60 TABLET | Refills: 0 | OUTPATIENT
Start: 2024-05-21

## 2024-05-21 NOTE — TELEPHONE ENCOUNTER
Patients GI provider:  Dr. BARRETT KING    Number to return call: [phone]     Reason for call: Pt calling office requesting clarification regarding [place] department of Transportation form that was filled out for patient. Patient is requesting call back today to discuss.  Please return patients call

## 2024-05-21 NOTE — TELEPHONE ENCOUNTER
Returned pts call to discuss [employer] [employer] form with her. Pt was inquiring about what Dr. Herrera filled out on the form. I reviewed that with her along with informing her that I would also send her a copy of the form. Pt was extremely unhappy but I did try to explain to her that only Dr. Herrera could make the decision as to how to sugey the form. Copy of [employer] [employer] form printed and placed in outgoing mail.

## 2024-05-22 NOTE — TELEPHONE ENCOUNTER
Patients GI provider:       Number to return call: ( [phone]     Reason for call: Pt calling upset that her license has been pulled she has not other way of transportation. Pt wants to to cancel all care with st [facility]. Pt repots false info was given.     Pt is requesting a call back     Scheduled procedure/appointment date if applicable: Apt/procedure

## 2024-05-23 ENCOUNTER — OFFICE VISIT (OUTPATIENT)
Age: 64
End: 2024-05-23
Payer: COMMERCIAL

## 2024-05-23 VITALS
WEIGHT: 156 LBS | HEART RATE: 90 BPM | SYSTOLIC BLOOD PRESSURE: 129 MMHG | HEIGHT: 64 IN | TEMPERATURE: 98 F | BODY MASS INDEX: 26.63 KG/M2 | OXYGEN SATURATION: 93 % | DIASTOLIC BLOOD PRESSURE: 73 MMHG

## 2024-05-23 DIAGNOSIS — D05.11 DUCTAL CARCINOMA IN SITU (DCIS) OF RIGHT BREAST: Primary | ICD-10-CM

## 2024-05-23 DIAGNOSIS — D05.11 BREAST NEOPLASM, TIS (DCIS), RIGHT: ICD-10-CM

## 2024-05-23 PROCEDURE — 99214 OFFICE O/P EST MOD 30 MIN: CPT | Performed by: NURSE PRACTITIONER

## 2024-05-23 NOTE — PROGRESS NOTES
HEMATOLOGY / ONCOLOGY CLINIC FOLLOW UP NOTE    Primary Care Provider: JOSÉ Craig  Referring Provider:    MRN: [id number]  : 1960    Reason for Encounter: Follow-up for ER/AK positive DCIS right breast       Oncology History Overview Note   Ductal carcinoma in situ, right breast, 0.4 cm s/p excisional biopsy 2021  Atypical ductal hyperplasia, right breast s/p excisional biopsy 2021  Lobular neoplasia (LCIS), right breast s/p excisional biopsy 2021    Pathologic AJCC (8th Edition) Stage 0: pTis (DCIS), cN0, cM0, G1, ER Positive, AK Positive, HER2 Unknown.    2021 started Arimidex    2021 completed radiation therapy at [facility]         Ductal carcinoma in situ (DCIS) of right breast   2021 Initial Diagnosis    Ductal carcinoma in situ (DCIS) of right breast     2021 -  Cancer Staged    Staging form: Breast, AJCC 8th Edition  - Clinical: Stage 0 (cTis (DCIS), cN0, cM0, ER+, AK+, HER2: Not Assessed) - Signed by Candace Bustamante MD on 2021  Stage prefix: Initial diagnosis  Method of lymph node assessment: Clinical  Nuclear grade: G2           Interval History: Patient returns for follow-up for history of right breast DCIS.  She continues on daily anastrozole and reports she tolerates well.  She was last seen by breast surgical oncology at [place] in 2023  Mammogram completed on 2023 showed stable postlumpectomy and radiation changes to right breast, overall benign.    Patient is upset today that she lost her license and has had difficulty getting her PCP and Dr. Herrera to fill out forms to get this reinstated..  She was hospitalized at the end of 2023 with new onset seizure that appeared to be from alcohol withdrawal.  Neurologic workup was negative  Recent drug and urine testing did test positive for alcohol metabolites in her urine on 2024.  Patient states she continues to drink nonalcoholic beer daily, glass of wine at night.  She also  continues to smoke.    She denies any breast pain, breast skin changes.  Most recent blood work is consistent with her underlying alcoholic cirrhosis.  She follows with Dr. Herrera    REVIEW OF SYSTEMS:  Please note that a 14-point review of systems was performed to include Constitutional, HEENT, Respiratory, CVS, GI, , Musculoskeletal, Integumentary, Neurologic, Rheumatologic, Endocrinologic, Psychiatric, Lymphatic, and Hematologic/Oncologic systems were reviewed and are negative unless otherwise stated in HPI. Positive and negative findings pertinent to this evaluation are incorporated into the history of present illness.      ECOG PS: 0    PROBLEM LIST:  Patient Active Problem List   Diagnosis    Fatty liver    Other specified abnormal findings of blood chemistry    Anxiety    Elevated LFTs    GERD without esophagitis    Hyperlipidemia    Hypotension    Single seizure (HCC)    Insomnia    Nicotine dependence    COPD (chronic obstructive pulmonary disease) (HCC)    Alcohol abuse    Irritable bowel syndrome with diarrhea    Non-seasonal allergic rhinitis due to pollen    Bilateral leg edema    Alcoholic cirrhosis of liver with ascites (HCC)    Hyponatremia    Thrombocytopenia (HCC)    Anemia    Decompensated hepatic cirrhosis (HCC)    Ductal carcinoma in situ (DCIS) of right breast    Atypical ductal hyperplasia of right breast    Breast neoplasm, Tis (DCIS), right    Malignant neoplasm of lower-outer quadrant of right breast of female, estrogen receptor positive (HCC)    Pleural effusion    Vaginal atrophy    Dyspareunia, female    Postcoital bleeding    Stage 2 chronic kidney disease       Assessment / Plan:  1. Ductal carcinoma in situ (DCIS) of right breast    2. Breast neoplasm, Tis (DCIS), right      Patient is a 63-year-old female with ER/MN positive DCIS of the right breast.  She underwent localized breast excisional biopsy x2 on 9/16/2021 with final path showing DCIS, no invasive carcinoma with close  posterior and medial margins for DCIS (<0.1 cm). No IDC   She follows with [facility] breast surgical oncology.  In light of close margin on DCIS and patient's extensive liver cirrhosis, she did not undergo surgical reexcision.  She did receive a course of radiation therapy, treated at [facility] and completed in 12/2021.   She has been on anastrozole since July 2021.    From a medical oncology perspective, patient will continue on daily anastrozole.  I will have her follow-up with Dr. Shanks in 6 months with repeat labs.  She is scheduled to see her surgeon at [facility] for clinical breast exam in November.    I did spend time today providing supportive listening regarding her frustrations over her inability to have her license reinstated at this time.  I did strongly encourage alcohol cessation.     I spent 30 minutes on chart review, face to face counseling time, coordination of care and documentation.    Past Medical History:   has a past medical history of Alcoholic fatty liver, Anxiety, Asthma, COPD (chronic obstructive pulmonary disease) (HCC), Elevated liver function tests, GERD (gastroesophageal reflux disease), GERD without esophagitis, Hyperlipidemia, Hypertension, IBS (irritable bowel syndrome), Insomnia, Insomnia, Liver disease, Nervousness, Nicotine dependence, Other specified urinary incontinence, RLS (restless legs syndrome), and Wears glasses.    PAST SURGICAL HISTORY:   has a past surgical history that includes Back surgery; Knee surgery; Tubal ligation; Knee surgery; Lumbar laminectomy (04/1999); Tubal ligation (1989); Lymph node biopsy; EGD AND COLONOSCOPY (N/A, 3/19/2018); Cataract extraction, bilateral (08/01/2018); Esophagogastroduodenoscopy (N/A, 5/8/2019); Colonoscopy (N/A, 5/8/2019); Total abdominal hysterectomy (02/05/2008); Upper gastrointestinal endoscopy; IR paracentesis (11/27/2020); IR paracentesis (12/8/2020); Mammo stereotactic breast biopsy right (all inc) (Right,  5/21/2021); Mammo stereotactic breast biopsy right (all inc) each add (Right, 5/21/2021); Breast excisional biopsy (Right, 11/01/2004); Breast biopsy (Right, 05/21/2021); IR paracentesis (10/28/2022); IR paracentesis (11/7/2022); IR paracentesis (11/30/2022); IR paracentesis (12/30/2022); IR thoracentesis (1/16/2023); IR paracentesis (1/16/2023); and IR thoracentesis (3/28/2023).    CURRENT MEDICATIONS  Current Outpatient Medications   Medication Sig Dispense Refill    albuterol (2.5 mg/3 mL) 0.083 % nebulizer solution Take 3 mL (2.5 mg total) by nebulization every 6 (six) hours as needed for wheezing or shortness of breath 180 mL 5    albuterol (Proventil HFA) 90 mcg/act inhaler Inhale 2 puffs every 6 (six) hours as needed for wheezing or shortness of breath 18 g 3    ALPRAZolam (XANAX) 1 mg tablet Take 1 tablet (1 mg total) by mouth 2 (two) times a day as needed for anxiety 60 tablet 0    anastrozole (ARIMIDEX) 1 mg tablet TAKE ONE TABLET BY MOUTH EVERY DAY 90 tablet 1    budesonide-formoterol (Symbicort) 160-4.5 mcg/act inhaler Inhale 2 puffs 2 (two) times a day Rinse mouth after use. 10.2 g 1    ergocalciferol (VITAMIN D2) 50,000 units Take one capsule weekly x12 weeks 12 capsule 0    furosemide (LASIX) 20 mg tablet Take 1 tablet (20 mg total) by mouth daily 30 tablet 11    lactulose 20 g/30 mL Take 45 mL (30 g total) by mouth 2 (two) times a day Titrate dose for an effect of 3 soft, but formed bowel movements per day.  You can start with 1 tablespoon per day and slowly increase to effect. 2700 mL 0    midodrine (PROAMATINE) 2.5 mg tablet Take 3 tablets (7.5 mg total) by mouth 3 (three) times a day before meals 810 tablet 3    pantoprazole (PROTONIX) 20 mg tablet TAKE ONE TABLET BY MOUTH EVERY DAY 90 tablet 1    Risankizumab-rzaa (SKYRIZI PEN SC) Inject under the skin      spironolactone (ALDACTONE) 50 mg tablet Take 1 tablet (50 mg total) by mouth daily 30 tablet 11    traZODone (DESYREL) 50 mg tablet Take 1  "tablet (50 mg total) by mouth daily at bedtime 30 tablet 5     No current facility-administered medications for this visit.     [unfilled]    SOCIAL HISTORY:   reports that she has been smoking cigarettes. She started smoking about 46 years ago. She has a 46.4 pack-year smoking history. She has never used smokeless tobacco. She reports that she does not currently use alcohol. She reports that she does not use drugs.     FAMILY HISTORY:  family history includes Breast cancer (age of onset: 32) in her mother; No Known Problems in her brother, father, maternal aunt, maternal aunt, maternal grandfather, maternal grandmother, paternal aunt, paternal grandfather, paternal grandmother, sister, and son.     ALLERGIES:  is allergic to sulfa antibiotics and ace inhibitors.      Physical Exam:  Vital Signs:   Visit Vitals  /73 (BP Location: Left arm, Patient Position: Sitting, Cuff Size: Standard)   Pulse 90   Temp 98 °F (36.7 °C) (Temporal)   Ht 5' 3.5\" (1.613 m)   Wt 70.8 kg (156 lb)   SpO2 93%   BMI 27.20 kg/m²   OB Status Hysterectomy   Smoking Status Every Day   BSA 1.75 m²     Body mass index is 27.2 kg/m².  Body surface area is 1.75 meters squared.    Physical Exam  Constitutional:       General: She is not in acute distress.     Appearance: Normal appearance.   HENT:      Head: Normocephalic and atraumatic.   Eyes:      General: No scleral icterus.        Right eye: No discharge.         Left eye: No discharge.      Conjunctiva/sclera: Conjunctivae normal.   Cardiovascular:      Rate and Rhythm: Normal rate and regular rhythm.   Pulmonary:      Effort: Pulmonary effort is normal. No respiratory distress.      Breath sounds: No wheezing or rhonchi.      Comments: Coarse breath sounds at base  Abdominal:      General: Bowel sounds are normal. There is no distension.      Palpations: Abdomen is soft. There is no mass.      Tenderness: There is no abdominal tenderness.   Musculoskeletal:         General: Normal range " of motion.   Lymphadenopathy:      Cervical: No cervical adenopathy.      Upper Body:      Right upper body: No supraclavicular, axillary or pectoral adenopathy.      Left upper body: No supraclavicular, axillary or pectoral adenopathy.   Skin:     General: Skin is warm and dry.   Neurological:      General: No focal deficit present.      Mental Status: She is alert and oriented to person, place, and time.   Psychiatric:         Mood and Affect: Mood normal.         Behavior: Behavior normal.         Labs:  Lab Results   Component Value Date    WBC 5.2 05/03/2024    HGB 14.5 05/03/2024    HCT 42.6 05/03/2024    .4 (H) 05/03/2024     (L) 05/03/2024     Lab Results   Component Value Date     07/28/2017    SODIUM 139 05/14/2024    K 3.7 05/14/2024     05/14/2024    CO2 24 05/14/2024    ANIONGAP 12 05/03/2015    AGAP 7 11/26/2023    BUN 9 05/14/2024    CREATININE 0.65 05/14/2024    GLUC 101 (H) 05/14/2024    GLUF 102 (H) 03/29/2023    CALCIUM 8.8 05/14/2024    AST 58 (H) 05/14/2024    ALT 28 05/14/2024    ALKPHOS 126 05/14/2024    PROT 7.4 07/28/2017    TP 7.1 05/14/2024    BILITOT 0.6 07/28/2017    TBILI 3.1 (H) 05/14/2024    EGFR 98 05/14/2024

## 2024-06-03 ENCOUNTER — TELEPHONE (OUTPATIENT)
Age: 64
End: 2024-06-03

## 2024-06-03 DIAGNOSIS — K70.31 ALCOHOLIC CIRRHOSIS OF LIVER WITH ASCITES (HCC): Primary | ICD-10-CM

## 2024-06-03 DIAGNOSIS — F10.10 ALCOHOL ABUSE: ICD-10-CM

## 2024-06-03 NOTE — TELEPHONE ENCOUNTER
Patients GI provider:  Dr. Leonardo    Number to return call: [phone]    Reason for call: Pt calling to have Dr. Leonardo order a new script for alcohol test before her next appt. Pt states she needs test done before next appt so that her [employer] papers can be filled out.      Scheduled procedure/appointment date if applicable: Apt/ 6/11/24

## 2024-06-07 NOTE — TELEPHONE ENCOUNTER
[employer] calling in, reports the diagnosis code on the Ethanol lab that was ordered by Arin SANCHEZ is not covered by insurance and she is requesting another diagnosis code to put on so it would be covered. Pt is currently at the lab and I advised I could not give another code with out speak with provider- please advise.

## 2024-06-07 NOTE — TELEPHONE ENCOUNTER
"Could not reach [facility].  Called patient , she is very upset because she is being charged for the testing because of the incorrect diagnosis code.  Tried to calm her but she was unwilling to listen to me.  Wants Dr baca to \"fix it all\" on 06/11 appointment.  "

## 2024-06-10 LAB
ALBUMIN SERPL-MCNC: 3.1 G/DL (ref 3.6–5.1)
ALBUMIN/GLOB SERPL: 0.7 (CALC) (ref 1–2.5)
ALP SERPL-CCNC: 116 U/L (ref 37–153)
ALT SERPL-CCNC: 34 U/L (ref 6–29)
AST SERPL-CCNC: 64 U/L (ref 10–35)
BASOPHILS # BLD AUTO: 83 CELLS/UL (ref 0–200)
BASOPHILS NFR BLD AUTO: 1.5 %
BILIRUB SERPL-MCNC: 3.3 MG/DL (ref 0.2–1.2)
BUN SERPL-MCNC: 8 MG/DL (ref 7–25)
BUN/CREAT SERPL: ABNORMAL (CALC) (ref 6–22)
CALCIUM SERPL-MCNC: 8.8 MG/DL (ref 8.6–10.4)
CHLORIDE SERPL-SCNC: 99 MMOL/L (ref 98–110)
CO2 SERPL-SCNC: 28 MMOL/L (ref 20–32)
CREAT SERPL-MCNC: 0.79 MG/DL (ref 0.5–1.05)
EOSINOPHIL # BLD AUTO: 72 CELLS/UL (ref 15–500)
EOSINOPHIL NFR BLD AUTO: 1.3 %
ERYTHROCYTE [DISTWIDTH] IN BLOOD BY AUTOMATED COUNT: 12.5 % (ref 11–15)
GFR/BSA.PRED SERPLBLD CYS-BASED-ARV: 83 ML/MIN/1.73M2
GLOBULIN SER CALC-MCNC: 4.5 G/DL (CALC) (ref 1.9–3.7)
GLUCOSE SERPL-MCNC: 91 MG/DL (ref 65–99)
HCT VFR BLD AUTO: 40.1 % (ref 35–45)
HGB BLD-MCNC: 14.3 G/DL (ref 11.7–15.5)
INR PPP: 1.4
LYMPHOCYTES # BLD AUTO: 957 CELLS/UL (ref 850–3900)
LYMPHOCYTES NFR BLD AUTO: 17.4 %
MCH RBC QN AUTO: 36.3 PG (ref 27–33)
MCHC RBC AUTO-ENTMCNC: 35.7 G/DL (ref 32–36)
MCV RBC AUTO: 101.8 FL (ref 80–100)
MONOCYTES # BLD AUTO: 836 CELLS/UL (ref 200–950)
MONOCYTES NFR BLD AUTO: 15.2 %
NEUTROPHILS # BLD AUTO: 3553 CELLS/UL (ref 1500–7800)
NEUTROPHILS NFR BLD AUTO: 64.6 %
PLATELET # BLD AUTO: 89 THOUSAND/UL (ref 140–400)
PLPETH BLD-MCNC: 191 NG/ML
PMV BLD REES-ECKER: 10.6 FL (ref 7.5–12.5)
POPETH BLD-MCNC: 298 NG/ML
POTASSIUM SERPL-SCNC: 3.4 MMOL/L (ref 3.5–5.3)
PROT SERPL-MCNC: 7.6 G/DL (ref 6.1–8.1)
PROTHROMBIN TIME: 14.5 SEC (ref 9–11.5)
RBC # BLD AUTO: 3.94 MILLION/UL (ref 3.8–5.1)
SODIUM SERPL-SCNC: 135 MMOL/L (ref 135–146)
WBC # BLD AUTO: 5.5 THOUSAND/UL (ref 3.8–10.8)

## 2024-06-11 ENCOUNTER — OFFICE VISIT (OUTPATIENT)
Dept: GASTROENTEROLOGY | Facility: CLINIC | Age: 64
End: 2024-06-11
Payer: COMMERCIAL

## 2024-06-11 VITALS
SYSTOLIC BLOOD PRESSURE: 126 MMHG | BODY MASS INDEX: 26.09 KG/M2 | DIASTOLIC BLOOD PRESSURE: 74 MMHG | HEIGHT: 64 IN | WEIGHT: 152.8 LBS | TEMPERATURE: 98.8 F

## 2024-06-11 DIAGNOSIS — F10.10 ALCOHOL ABUSE: ICD-10-CM

## 2024-06-11 DIAGNOSIS — K70.31 ALCOHOLIC CIRRHOSIS OF LIVER WITH ASCITES (HCC): Primary | ICD-10-CM

## 2024-06-11 DIAGNOSIS — K21.9 GERD WITHOUT ESOPHAGITIS: ICD-10-CM

## 2024-06-11 DIAGNOSIS — K58.0 IRRITABLE BOWEL SYNDROME WITH DIARRHEA: ICD-10-CM

## 2024-06-11 PROCEDURE — 99214 OFFICE O/P EST MOD 30 MIN: CPT | Performed by: INTERNAL MEDICINE

## 2024-06-11 PROCEDURE — G2211 COMPLEX E/M VISIT ADD ON: HCPCS | Performed by: INTERNAL MEDICINE

## 2024-06-12 LAB
ETHANOL BLD-MCNC: NORMAL G/DL(%)
ETHANOL BLD-MCNC: NORMAL MG/DL
SPECIMEN SOURCE: NORMAL

## 2024-06-12 NOTE — PROGRESS NOTES
[facility] - Outpatient Follow-up Note  Jose White 64 y.o. female MRN: [id number]  Encounter: [id number]          ASSESSMENT AND PLAN:       1. Alcoholic cirrhosis of liver with ascites (HCC)  2. Alcohol abuse  She has alcoholic cirrhosis complicated by ascites, esophageal varices, and hepatic encephalopathy.  She will continue her Lasix and Aldactone and we will continue to monitor her abdominal exam and check her labs. In particular I want to monitor her hyponatremia.  I will also continue her lactulose.  I encouraged her to titrate her lactulose dose for goal of 2-3 soft bowel movements per day.  I spoke to her at length about the importance of completely quitting alcohol intake.  She promised to see an alcohol counselor prior to her next visit with me in 3 months and she promised to never drink and drive. I also started her on naltrexone.     3. GERD without esophagitis  She has reflux symptoms that have been well controlled with diet and lifestyle modification so she is not currently taking any medication for the reflux.     4. Irritable bowel syndrome with diarrhea  She has a history of diarrhea predominant irritable bowel syndrome but has not had any symptoms recently as she has been more constipated recently so I encouraged her to take the lactulose regularly and increase the dose as needed.     ______________________________________________________________________     SUBJECTIVE:  She presents for follow-up of her alcoholic cirrhosis complicated by ascites and small esophageal varices and hepatic encephalopathy.  Since her last visit she has been doing well with the Lasix 40 mg daily and Aldactone 100 mg daily.  She has not had any accumulation of ascites and not had to have a paracentesis recently.  She has been taking lactulose and is having regular bowel movements.  Her last upper endoscopy was in January 2024 and revealed no esophageal or gastric varices.  There was  evidence of mild portal hypertensive gastropathy.  She denies vomiting, difficulty swallowing, and weight loss.  Unfortunately, she continues to drink alcohol daily.  She said her neurologist cleared her to drive since she has not had any additional seizures.  She said she has never driven a motor vehicle after drinking alcohol and said she would never do this.  She said she only drinks alcohol at home before she goes to sleep.  She and her  repeatedly said she would never drink and drive and she has never done this in the past and they would like her to be cleared to have her license reinstated.      REVIEW OF SYSTEMS IS OTHERWISE NEGATIVE.      Historical Information   Past Medical History:   Diagnosis Date    Alcoholic fatty liver     Anxiety     Asthma     COPD (chronic obstructive pulmonary disease) (HCC)     Elevated liver function tests     GERD (gastroesophageal reflux disease)     GERD without esophagitis     Hyperlipidemia     Hypertension     IBS (irritable bowel syndrome)     Insomnia     Insomnia     Liver disease     Nervousness     Nicotine dependence     Other specified urinary incontinence     RLS (restless legs syndrome)     Wears glasses      Past Surgical History:   Procedure Laterality Date    BACK SURGERY      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    BREAST EXCISIONAL BIOPSY Right 11/01/2004    benign    CATARACT EXTRACTION, BILATERAL  08/01/2018    COLONOSCOPY N/A 5/8/2019    Procedure: COLONOSCOPY;  Surgeon: Jacobo Leonardo MD;  Location: [facility];  Service: Gastroenterology    EGD AND COLONOSCOPY N/A 3/19/2018    Procedure: EGD AND COLONOSCOPY;  Surgeon: Jacobo Leonardo MD;  Location: [facility];  Service: Gastroenterology    ESOPHAGOGASTRODUODENOSCOPY N/A 5/8/2019    Procedure: ESOPHAGOGASTRODUODENOSCOPY (EGD);  Surgeon: Jacobo Leonardo MD;  Location: [facility];  Service: Gastroenterology    IR PARACENTESIS  11/27/2020    IR PARACENTESIS  12/8/2020    IR PARACENTESIS   10/28/2022    IR PARACENTESIS  11/7/2022    IR PARACENTESIS  11/30/2022    IR PARACENTESIS  12/30/2022    IR PARACENTESIS  1/16/2023    IR THORACENTESIS  1/16/2023    IR THORACENTESIS  3/28/2023    KNEE SURGERY      KNEE SURGERY      LUMBAR LAMINECTOMY  04/1999    LYMPH NODE BIOPSY      MAMMO STEREOTACTIC BREAST BIOPSY RIGHT (ALL INC) Right 5/21/2021    MAMMO STEREOTACTIC BREAST BIOPSY RIGHT (ALL INC) EACH ADD Right 5/21/2021    TOTAL ABDOMINAL HYSTERECTOMY  02/05/2008    TUBAL LIGATION      TUBAL LIGATION  1989    UPPER GASTROINTESTINAL ENDOSCOPY       Social History   Social History     Substance and Sexual Activity   Alcohol Use Not Currently    Comment: last alcoholic drink was 11/25/2023     Social History     Substance and Sexual Activity   Drug Use No     Social History     Tobacco Use   Smoking Status Every Day    Current packs/day: 1.00    Average packs/day: 1 pack/day for 46.4 years (46.4 ttl pk-yrs)    Types: Cigarettes    Start date: 1/1/1978   Smokeless Tobacco Never     Family History   Problem Relation Age of Onset    Breast cancer Mother 32    No Known Problems Father     No Known Problems Sister     No Known Problems Brother     No Known Problems Maternal Grandmother     No Known Problems Maternal Grandfather     No Known Problems Paternal Grandmother     No Known Problems Paternal Grandfather     No Known Problems Maternal Aunt     No Known Problems Maternal Aunt     No Known Problems Paternal Aunt     No Known Problems Son     Substance Abuse Neg Hx     Mental illness Neg Hx     Colon cancer Neg Hx     Colon polyps Neg Hx        Meds/Allergies       Current Outpatient Medications:     albuterol (2.5 mg/3 mL) 0.083 % nebulizer solution    albuterol (Proventil HFA) 90 mcg/act inhaler    ALPRAZolam (XANAX) 1 mg tablet    anastrozole (ARIMIDEX) 1 mg tablet    budesonide-formoterol (Symbicort) 160-4.5 mcg/act inhaler    ergocalciferol (VITAMIN D2) 50,000 units    furosemide (LASIX) 20 mg tablet     "lactulose 20 g/30 mL    midodrine (PROAMATINE) 2.5 mg tablet    pantoprazole (PROTONIX) 20 mg tablet    Risankizumab-rzaa (SKYRIZI PEN SC)    spironolactone (ALDACTONE) 50 mg tablet    traZODone (DESYREL) 50 mg tablet    Allergies   Allergen Reactions    Sulfa Antibiotics Shortness Of Breath    Ace Inhibitors Cough and Other (See Comments)     coughing           Objective     Blood pressure 126/74, temperature 98.8 °F (37.1 °C), temperature source Tympanic, height 5' 3.5\" (1.613 m), weight 69.3 kg (152 lb 12.8 oz), not currently breastfeeding. Body mass index is 26.64 kg/m².      PHYSICAL EXAM:      General Appearance:   Alert, cooperative, no distress, no asterixis   HEENT:   Normocephalic, atraumatic, anicteric.     Neck:  Supple, symmetrical, trachea midline   Lungs:   Clear to auscultation bilaterally; no rales, rhonchi or wheezing; respirations unlabored    Heart::   Regular rate and rhythm; no murmur, rub, or gallop.   Abdomen:   Soft, non-tender, non-distended; normal bowel sounds; no masses, no organomegaly    Genitalia:   Deferred    Rectal:   Deferred    Extremities:  No cyanosis, clubbing or edema    Pulses:  2+ and symmetric    Skin:  No jaundice, rashes, or lesions    Lymph nodes:  No palpable cervical lymphadenopathy        Lab Results:   No visits with results within 1 Day(s) from this visit.   Latest known visit with results is:   Orders Only on 06/03/2024   Component Date Value    Alcohol, Ethyl (B) 06/07/2024 NONE DETECTED     Alcohol, Ethyl (B) 06/07/2024 NONE DETECTED     SPECIMEN SOURCE 06/07/2024 WHOLE BLOOD          Radiology Results:   No results found.  "

## 2024-06-13 ENCOUNTER — TELEPHONE (OUTPATIENT)
Age: 64
End: 2024-06-13

## 2024-06-13 DIAGNOSIS — K70.31 ALCOHOLIC CIRRHOSIS OF LIVER WITH ASCITES (HCC): ICD-10-CM

## 2024-06-13 DIAGNOSIS — F10.10 ALCOHOL ABUSE: Primary | ICD-10-CM

## 2024-06-13 RX ORDER — NALTREXONE HYDROCHLORIDE 50 MG/1
50 TABLET, FILM COATED ORAL DAILY
Qty: 30 TABLET | Refills: 5 | Status: SHIPPED | OUTPATIENT
Start: 2024-06-13

## 2024-06-13 NOTE — TELEPHONE ENCOUNTER
Patients GI provider:  Dr. Leonardo     Number to return call: ( [phone]     Reason for call: Pt calling to make the Provider aware that she got her License back she is very thankful to him and that counseling is not required for PENDOT, she called [facility] and is on a waiting list at this time      Scheduled procedure/appointment date if applicable: Apt/procedure

## 2024-06-20 DIAGNOSIS — F41.9 ANXIETY: ICD-10-CM

## 2024-06-20 NOTE — TELEPHONE ENCOUNTER
Jose called in regarding the xenax refill. She said that she can not be taken off this medication because of consequences. She said she can have seizer because she has been on them for years. She also mentioned, she will run out tomorrow and needs them refilled. She is not able to schedule an appointment because she is leaving on Saturday for a week and wont be in town. Please call patient with an update.

## 2024-06-20 NOTE — TELEPHONE ENCOUNTER
Medication: ALPRAZolam (XANAX) 1 mg tablet     Dose/Frequency: Take 1 tablet (1 mg total) by mouth 2 (two) times a day as needed for anxiety      Quantity: 60    Pharmacy: [facility] [id number] - [place] - [address] [place] [address] [phone]     Office:   [x] PCP/Provider - JOSÉ Lo   [] Speciality/Provider -     Does the patient have enough for 3 days?   [] Yes   [x] No - Send as HP to POD

## 2024-06-21 DIAGNOSIS — D05.11 DUCTAL CARCINOMA IN SITU (DCIS) OF RIGHT BREAST: ICD-10-CM

## 2024-06-21 RX ORDER — ALPRAZOLAM 1 MG/1
TABLET ORAL
Qty: 60 TABLET | Refills: 0 | Status: SHIPPED | OUTPATIENT
Start: 2024-06-21

## 2024-06-21 RX ORDER — ALPRAZOLAM 1 MG/1
1 TABLET ORAL 2 TIMES DAILY PRN
Qty: 60 TABLET | Refills: 0 | OUTPATIENT
Start: 2024-06-21

## 2024-06-21 RX ORDER — ANASTROZOLE 1 MG/1
1 TABLET ORAL DAILY
Qty: 90 TABLET | Refills: 1 | Status: SHIPPED | OUTPATIENT
Start: 2024-06-21

## 2024-07-08 ENCOUNTER — RA CDI HCC (OUTPATIENT)
Dept: OTHER | Facility: HOSPITAL | Age: 64
End: 2024-07-08

## 2024-07-12 ENCOUNTER — TELEPHONE (OUTPATIENT)
Dept: FAMILY MEDICINE CLINIC | Facility: CLINIC | Age: 64
End: 2024-07-12

## 2024-07-15 ENCOUNTER — OFFICE VISIT (OUTPATIENT)
Dept: FAMILY MEDICINE CLINIC | Facility: CLINIC | Age: 64
End: 2024-07-15
Payer: COMMERCIAL

## 2024-07-15 VITALS
HEART RATE: 89 BPM | WEIGHT: 142 LBS | OXYGEN SATURATION: 95 % | BODY MASS INDEX: 24.76 KG/M2 | SYSTOLIC BLOOD PRESSURE: 90 MMHG | DIASTOLIC BLOOD PRESSURE: 60 MMHG | TEMPERATURE: 96.9 F

## 2024-07-15 DIAGNOSIS — Z78.0 ASYMPTOMATIC POSTMENOPAUSAL STATE: ICD-10-CM

## 2024-07-15 DIAGNOSIS — C50.511 MALIGNANT NEOPLASM OF LOWER-OUTER QUADRANT OF RIGHT BREAST OF FEMALE, ESTROGEN RECEPTOR POSITIVE (HCC): ICD-10-CM

## 2024-07-15 DIAGNOSIS — F17.210 SMOKING GREATER THAN 20 PACK YEARS: ICD-10-CM

## 2024-07-15 DIAGNOSIS — K72.90 DECOMPENSATED HEPATIC CIRRHOSIS (HCC): ICD-10-CM

## 2024-07-15 DIAGNOSIS — D69.6 THROMBOCYTOPENIA (HCC): ICD-10-CM

## 2024-07-15 DIAGNOSIS — K74.60 DECOMPENSATED HEPATIC CIRRHOSIS (HCC): ICD-10-CM

## 2024-07-15 DIAGNOSIS — K70.31 ALCOHOLIC CIRRHOSIS OF LIVER WITH ASCITES (HCC): Chronic | ICD-10-CM

## 2024-07-15 DIAGNOSIS — F41.9 ANXIETY: Chronic | ICD-10-CM

## 2024-07-15 DIAGNOSIS — Z17.0 MALIGNANT NEOPLASM OF LOWER-OUTER QUADRANT OF RIGHT BREAST OF FEMALE, ESTROGEN RECEPTOR POSITIVE (HCC): ICD-10-CM

## 2024-07-15 DIAGNOSIS — Z00.00 MEDICARE ANNUAL WELLNESS VISIT, INITIAL: Primary | ICD-10-CM

## 2024-07-15 PROCEDURE — G0438 PPPS, INITIAL VISIT: HCPCS | Performed by: NURSE PRACTITIONER

## 2024-07-15 PROCEDURE — 99214 OFFICE O/P EST MOD 30 MIN: CPT | Performed by: NURSE PRACTITIONER

## 2024-07-15 RX ORDER — BUPROPION HYDROCHLORIDE 150 MG/1
1 TABLET ORAL EVERY MORNING
COMMUNITY
Start: 2024-06-28 | End: 2024-07-15

## 2024-07-15 NOTE — PATIENT INSTRUCTIONS
Continue current medications  Continue regular follow up appointments with GI and oncology.        Medicare Preventive Visit Patient Instructions  Thank you for completing your Welcome to Medicare Visit or Medicare Annual Wellness Visit today. Your next wellness visit will be due in one year (7/16/2025).  The screening/preventive services that you may require over the next 5-10 years are detailed below. Some tests may not apply to you based off risk factors and/or age. Screening tests ordered at today's visit but not completed yet may show as past due. Also, please note that scanned in results may not display below.  Preventive Screenings:  Service Recommendations Previous Testing/Comments   Colorectal Cancer Screening  * Colonoscopy    * Fecal Occult Blood Test (FOBT)/Fecal Immunochemical Test (FIT)  * Fecal DNA/Cologuard Test  * Flexible Sigmoidoscopy Age: 45-75 years old   Colonoscopy: every 10 years (may be performed more frequently if at higher risk)  OR  FOBT/FIT: every 1 year  OR  Cologuard: every 3 years  OR  Sigmoidoscopy: every 5 years  Screening may be recommended earlier than age 45 if at higher risk for colorectal cancer. Also, an individualized decision between you and your healthcare provider will decide whether screening between the ages of 76-85 would be appropriate. Colonoscopy: 05/08/2019  FOBT/FIT: Not on file  Cologuard: Not on file  Sigmoidoscopy: Not on file          Breast Cancer Screening Age: 40+ years old  Frequency: every 1-2 years  Not required if history of left and right mastectomy Mammogram: 11/21/2023        Cervical Cancer Screening Between the ages of 21-29, pap smear recommended once every 3 years.   Between the ages of 30-65, can perform pap smear with HPV co-testing every 5 years.   Recommendations may differ for women with a history of total hysterectomy, cervical cancer, or abnormal pap smears in past. Pap Smear: Not on file        Hepatitis C Screening Once for adults born  between 1945 and 1965  More frequently in patients at high risk for Hepatitis C Hep C Antibody: 12/07/2020        Diabetes Screening 1-2 times per year if you're at risk for diabetes or have pre-diabetes Fasting glucose: 102 mg/dL (3/29/2023)  A1C: No results in last 5 years (No results in last 5 years)      Cholesterol Screening Once every 5 years if you don't have a lipid disorder. May order more often based on risk factors. Lipid panel: 09/15/2022          Other Preventive Screenings Covered by Medicare:  Abdominal Aortic Aneurysm (AAA) Screening: covered once if your at risk. You're considered to be at risk if you have a family history of AAA.  Lung Cancer Screening: covers low dose CT scan once per year if you meet all of the following conditions: (1) Age 55-77; (2) No signs or symptoms of lung cancer; (3) Current smoker or have quit smoking within the last 15 years; (4) You have a tobacco smoking history of at least 20 pack years (packs per day multiplied by number of years you smoked); (5) You get a written order from a healthcare provider.  Glaucoma Screening: covered annually if you're considered high risk: (1) You have diabetes OR (2) Family history of glaucoma OR (3)  aged 50 and older OR (4)  American aged 65 and older  Osteoporosis Screening: covered every 2 years if you meet one of the following conditions: (1) You're estrogen deficient and at risk for osteoporosis based off medical history and other findings; (2) Have a vertebral abnormality; (3) On glucocorticoid therapy for more than 3 months; (4) Have primary hyperparathyroidism; (5) On osteoporosis medications and need to assess response to drug therapy.   Last bone density test (DXA Scan): 05/11/2021.  HIV Screening: covered annually if you're between the age of 15-65. Also covered annually if you are younger than 15 and older than 65 with risk factors for HIV infection. For pregnant patients, it is covered up to 3 times  per pregnancy.    Immunizations:  Immunization Recommendations   Influenza Vaccine Annual influenza vaccination during flu season is recommended for all persons aged >= 6 months who do not have contraindications   Pneumococcal Vaccine   * Pneumococcal conjugate vaccine = PCV13 (Prevnar 13), PCV15 (Vaxneuvance), PCV20 (Prevnar 20)  * Pneumococcal polysaccharide vaccine = PPSV23 (Pneumovax) Adults 19-63 yo with certain risk factors or if 65+ yo  If never received any pneumonia vaccine: recommend Prevnar 20 (PCV20)  Give PCV20 if previously received 1 dose of PCV13 or PPSV23   Hepatitis B Vaccine 3 dose series if at intermediate or high risk (ex: diabetes, end stage renal disease, liver disease)   Respiratory syncytial virus (RSV) Vaccine - COVERED BY MEDICARE PART D  * RSVPreF3 (Arexvy) CDC recommends that adults 60 years of age and older may receive a single dose of RSV vaccine using shared clinical decision-making (SCDM)   Tetanus (Td) Vaccine - COST NOT COVERED BY MEDICARE PART B Following completion of primary series, a booster dose should be given every 10 years to maintain immunity against tetanus. Td may also be given as tetanus wound prophylaxis.   Tdap Vaccine - COST NOT COVERED BY MEDICARE PART B Recommended at least once for all adults. For pregnant patients, recommended with each pregnancy.   Shingles Vaccine (Shingrix) - COST NOT COVERED BY MEDICARE PART B  2 shot series recommended in those 19 years and older who have or will have weakened immune systems or those 50 years and older     Health Maintenance Due:      Topic Date Due    Lung Cancer Screening  04/20/2024    DXA SCAN  05/11/2024    HIV Screening  04/01/2026 (Originally 6/4/1975)    Breast Cancer Screening: Mammogram  11/21/2024    Colorectal Cancer Screening  05/08/2029    Hepatitis C Screening  Completed     Immunizations Due:      Topic Date Due    Hepatitis A Vaccine (1 of 2 - Risk 2-dose series) Never done    Hepatitis B Vaccine (1 of 3 -  Risk 3-dose series) Never done    Influenza Vaccine (1) 09/01/2024     Advance Directives   What are advance directives?  Advance directives are legal documents that state your wishes and plans for medical care. These plans are made ahead of time in case you lose your ability to make decisions for yourself. Advance directives can apply to any medical decision, such as the treatments you want, and if you want to donate organs.   What are the types of advance directives?  There are many types of advance directives, and each state has rules about how to use them. You may choose a combination of any of the following:  Living will:  This is a written record of the treatment you want. You can also choose which treatments you do not want, which to limit, and which to stop at a certain time. This includes surgery, medicine, IV fluid, and tube feedings.   Durable power of  for healthcare (DPAHC):  This is a written record that states who you want to make healthcare choices for you when you are unable to make them for yourself. This person, called a proxy, is usually a family member or a friend. You may choose more than 1 proxy.  Do not resuscitate (DNR) order:  A DNR order is used in case your heart stops beating or you stop breathing. It is a request not to have certain forms of treatment, such as CPR. A DNR order may be included in other types of advance directives.  Medical directive:  This covers the care that you want if you are in a coma, near death, or unable to make decisions for yourself. You can list the treatments you want for each condition. Treatment may include pain medicine, surgery, blood transfusions, dialysis, IV or tube feedings, and a ventilator (breathing machine).  Values history:  This document has questions about your views, beliefs, and how you feel and think about life. This information can help others choose the care that you would choose.  Why are advance directives important?  An advance  directive helps you control your care. Although spoken wishes may be used, it is better to have your wishes written down. Spoken wishes can be misunderstood, or not followed. Treatments may be given even if you do not want them. An advance directive may make it easier for your family to make difficult choices about your care.   Urinary Incontinence   Urinary incontinence (UI)  is when you lose control of your bladder. UI develops because your bladder cannot store or empty urine properly. The 3 most common types of UI are stress incontinence, urge incontinence, or both.  Medicines:   May be given to help strengthen your bladder control. Report any side effects of medication to your healthcare provider.  Do pelvic muscle exercises often:  Your pelvic muscles help you stop urinating. Squeeze these muscles tight for 5 seconds, then relax for 5 seconds. Gradually work up to squeezing for 10 seconds. Do 3 sets of 15 repetitions a day, or as directed. This will help strengthen your pelvic muscles and improve bladder control.  Train your bladder:  Go to the bathroom at set times, such as every 2 hours, even if you do not feel the urge to go. You can also try to hold your urine when you feel the urge to go. For example, hold your urine for 5 minutes when you feel the urge to go. As that becomes easier, hold your urine for 10 minutes.   Self-care:   Keep a UI record.  Write down how often you leak urine and how much you leak. Make a note of what you were doing when you leaked urine.  Drink liquids as directed. You may need to limit the amount of liquid you drink to help control your urine leakage. Do not drink any liquid right before you go to bed. Limit or do not have drinks that contain caffeine or alcohol.   Prevent constipation.  Eat a variety of high-fiber foods. Good examples are high-fiber cereals, beans, vegetables, and whole-grain breads. Walking is the best way to trigger your intestines to have a bowel  movement.  Exercise regularly and maintain a healthy weight.  Weight loss and exercise will decrease pressure on your bladder and help you control your leakage.   Use a catheter as directed  to help empty your bladder. A catheter is a tiny, plastic tube that is put into your bladder to drain your urine.   Go to behavior therapy as directed.  Behavior therapy may be used to help you learn to control your urge to urinate.    Cigarette Smoking and Your Health   Risks to your health if you smoke:  Nicotine and other chemicals found in tobacco damage every cell in your body. Even if you are a light smoker, you have an increased risk for cancer, heart disease, and lung disease. If you are pregnant or have diabetes, smoking increases your risk for complications.   Benefits to your health if you stop smoking:   You decrease respiratory symptoms such as coughing, wheezing, and shortness of breath.   You reduce your risk for cancers of the lung, mouth, throat, kidney, bladder, pancreas, stomach, and cervix. If you already have cancer, you increase the benefits of chemotherapy. You also reduce your risk for cancer returning or a second cancer from developing.   You reduce your risk for heart disease, blood clots, heart attack, and stroke.   You reduce your risk for lung infections, and diseases such as pneumonia, asthma, chronic bronchitis, and emphysema.  Your circulation improves. More oxygen can be delivered to your body. If you have diabetes, you lower your risk for complications, such as kidney, artery, and eye diseases. You also lower your risk for nerve damage. Nerve damage can lead to amputations, poor vision, and blindness.  You improve your body's ability to heal and to fight infections.  For more information and support to stop smoking:   Smokefree.gov  Phone: 3- 745 - 881-5592  Web Address: www.smokefrGazoob.Blucarat     © Copyright [employer] 2018 Information is for End User's use only and may not be sold,  redistributed or otherwise used for commercial purposes. All illustrations and images included in CareNotes® are the copyrighted property of [employer] or [employer]

## 2024-07-15 NOTE — ASSESSMENT & PLAN NOTE
follows with GI-takes Lasix daily-she was prescribed naltrexone but she states it made her sick-she continues to drink daily.

## 2024-07-15 NOTE — ASSESSMENT & PLAN NOTE
Pt with malignant neoplasm of overlapping sites of right breast in female. Is on Arimidex 1 mg daily. Follow with hematology/Oncology.

## 2024-07-15 NOTE — PROGRESS NOTES
Ambulatory Visit  Name: Jose White      : 1960      MRN: [id number]  Encounter Provider: JOSÉ Craig  Encounter Date: 7/15/2024   Encounter department: [facility]    Assessment & Plan   1. Medicare annual wellness visit, initial  2. Thrombocytopenia (HCC)  Assessment & Plan:  Patient has a long hx of low platelets. Recent platelet lever 89. Follows with hematology/oncology.  3. Malignant neoplasm of lower-outer quadrant of right breast of female, estrogen receptor positive (HCC)  Assessment & Plan:  Pt with malignant neoplasm of overlapping sites of right breast in female. Is on Arimidex 1 mg daily. Follow with hematology/Oncology.   4. Alcoholic cirrhosis of liver with ascites (HCC)  Assessment & Plan:  follows with GI-takes Lasix daily-she was prescribed naltrexone but she states it made her sick-she continues to drink daily.  5. Decompensated hepatic cirrhosis (HCC)  6. Anxiety  Assessment & Plan:  Condition stable. Is on Xanax 1 mg BID as needed and trazodone 50 mg nightly for sleep.   7. Smoking greater than 20 pack years  -     CT lung screening program; Future; Expected date: 07/15/2024  8. Asymptomatic postmenopausal state  -     DXA bone density spine hip and pelvis; Future; Expected date: 07/15/2024    Depression Screening and Follow-up Plan: Patient was screened for depression during today's encounter. They screened negative with a PHQ-2 score of 0.    Urinary Incontinence Plan of Care: counseling topics discussed: practice Kegel (pelvic floor strengthening) exercises, use restroom every 2 hours, limit alcohol, caffeine, spicy foods, and acidic foods and limiting fluid intake 3-4 hours before bed.       Preventive health issues were discussed with patient, and age appropriate screening tests were ordered as noted in patient's After Visit Summary. Personalized health advice and appropriate referrals for health education or preventive services given  if needed, as noted in patient's After Visit Summary.    History of Present Illness     Here for medicare wellness. Patient previously followed with [facility], then came here in May to establish care but then seen last month at [facility]. Patient states she is going to stay with this practice as her PCP. Hx of breast cancer-follows with oncology. Hx of alcohol abuse, hx of cirrhosis of the liver-follows with GI-takes Lasix daily-she was prescribed naltrexone but she states it made her sick-she continues to drink daily. She is a current smoker-recently prescribed wellbutrin for cessation but she states she is not going to take the medication. Hx of anxiety-has been taking Xanax for years.       Patient Care Team:  JOSÉ Craig as PCP - General (Family Medicine)  MD Nikki Donohue, MELECIO Leonardo MD as Endoscopist  Candace Bustamante MD as Surgeon (General Surgery)  Alexandra Tovar DO (Nephrology)    Review of Systems   Constitutional:  Negative for activity change, appetite change, chills, diaphoresis, fatigue and fever.   HENT:  Negative for congestion, sinus pressure, sinus pain and sore throat.    Eyes:  Negative for photophobia, pain, discharge, redness, itching and visual disturbance.   Respiratory:  Negative for cough, chest tightness, shortness of breath and wheezing.    Cardiovascular:  Negative for chest pain, palpitations and leg swelling.   Gastrointestinal:  Negative for abdominal distention, abdominal pain, constipation, diarrhea and nausea.   Endocrine: Negative for polydipsia, polyphagia and polyuria.   Genitourinary:  Negative for decreased urine volume, difficulty urinating, dysuria, frequency and urgency.   Musculoskeletal:  Negative for back pain, gait problem, joint swelling, myalgias and neck stiffness.   Skin:  Negative for color change.   Neurological:  Negative for dizziness, syncope, speech difficulty, weakness and headaches.   Hematological:   Negative for adenopathy. Does not bruise/bleed easily.   Psychiatric/Behavioral:  Negative for agitation, behavioral problems, confusion, dysphoric mood and hallucinations. The patient is not nervous/anxious.      Medical History Reviewed by provider this encounter:  Tobacco  Allergies  Meds  Problems  Med Hx  Surg Hx  Fam Hx       Annual Wellness Visit Questionnaire       Health Risk Assessment:   Patient rates overall health as fair. Patient feels that their physical health rating is same. Patient is satisfied with their life. Eyesight was rated as same. Hearing was rated as same. Patient feels that their emotional and mental health rating is same. Patients states they are never, rarely angry. Patient states they are always unusually tired/fatigued. Pain experienced in the last 7 days has been some. Patient's pain rating has been 6/10. Patient states that she has experienced no weight loss or gain in last 6 months.     Depression Screening:   PHQ-2 Score: 0      Fall Risk Screening:   In the past year, patient has experienced: no history of falling in past year      Urinary Incontinence Screening:   Patient has leaked urine accidently in the last six months.     Home Safety:  Patient does not have trouble with stairs inside or outside of their home. Patient has working smoke alarms and has working carbon monoxide detector. Home safety hazards include: none.     Nutrition:   Current diet is Regular.     Medications:   Patient is currently taking over-the-counter supplements. OTC medications include: see medication list. Patient is able to manage medications.     Activities of Daily Living (ADLs)/Instrumental Activities of Daily Living (IADLs):   Walk and transfer into and out of bed and chair?: Yes  Dress and groom yourself?: Yes    Bathe or shower yourself?: Yes    Feed yourself? Yes  Do your laundry/housekeeping?: Yes  Manage your money, pay your bills and track your expenses?: Yes  Make your own meals?:  Yes    Do your own shopping?: Yes    Previous Hospitalizations:   Any hospitalizations or ED visits within the last 12 months?: No      Advance Care Planning:   Living will: Yes    Durable POA for healthcare: Yes    Advanced directive: Yes    End of Life Decisions reviewed with patient: Yes    Provider agrees with end of life decisions: Yes      PREVENTIVE SCREENINGS      Cardiovascular Screening:    General: Screening Not Indicated and History Lipid Disorder      Diabetes Screening:     General: Screening Current      Colorectal Cancer Screening:     General: Screening Current      Breast Cancer Screening:     General: History Breast Cancer      Cervical Cancer Screening:    General: Screening Not Indicated      Hepatitis C Screening:    General: Screening Current    Screening, Brief Intervention, and Referral to Treatment (SBIRT)    Screening  Typical number of drinks in a day: 1  Typical number of drinks in a week: 4  Interpretation: Low risk drinking behavior.    Single Item Drug Screening:  How often have you used an illegal drug (including marijuana) or a prescription medication for non-medical reasons in the past year? never    Single Item Drug Screen Score: 0  Interpretation: Negative screen for possible drug use disorder    Social Determinants of Health     Food Insecurity: No Food Insecurity (7/15/2024)    Hunger Vital Sign     Worried About Running Out of Food in the Last Year: Never true     Ran Out of Food in the Last Year: Never true   Transportation Needs: No Transportation Needs (7/15/2024)    PRAPARE - Transportation     Lack of Transportation (Medical): No     Lack of Transportation (Non-Medical): No   Housing Stability: Unknown (7/15/2024)    Housing Stability Vital Sign     Unable to Pay for Housing in the Last Year: No     Homeless in the Last Year: No   Utilities: Not At Risk (7/15/2024)    [facility] Utilities     Threatened with loss of utilities: No     No results found.    Objective     BP 90/60  (BP Location: Left arm, Patient Position: Sitting, Cuff Size: Standard)   Pulse 89   Temp (!) 96.9 °F (36.1 °C) (Tympanic)   Wt 64.4 kg (142 lb)   SpO2 95%   BMI 24.76 kg/m²     Physical Exam  Vitals and nursing note reviewed.   Constitutional:       General: She is not in acute distress.     Appearance: Normal appearance. She is normal weight. She is not ill-appearing, toxic-appearing or diaphoretic.   HENT:      Head: Normocephalic.      Right Ear: Tympanic membrane, ear canal and external ear normal. There is no impacted cerumen.      Left Ear: Tympanic membrane, ear canal and external ear normal. There is no impacted cerumen.      Nose: Nose normal. No congestion or rhinorrhea.      Mouth/Throat:      Mouth: Mucous membranes are moist.      Pharynx: Oropharynx is clear. No oropharyngeal exudate or posterior oropharyngeal erythema.   Eyes:      General: No scleral icterus.        Right eye: No discharge.         Left eye: No discharge.      Extraocular Movements: Extraocular movements intact.      Conjunctiva/sclera: Conjunctivae normal.      Pupils: Pupils are equal, round, and reactive to light.   Neck:      Vascular: No carotid bruit.   Cardiovascular:      Rate and Rhythm: Normal rate and regular rhythm.      Pulses: Normal pulses.      Heart sounds: Normal heart sounds. No murmur heard.     No friction rub. No gallop.   Pulmonary:      Effort: Pulmonary effort is normal. No respiratory distress.      Breath sounds: Normal breath sounds. No stridor. No wheezing, rhonchi or rales.   Chest:      Chest wall: No tenderness.   Abdominal:      General: Abdomen is flat. Bowel sounds are normal. There is no distension.      Palpations: Abdomen is soft. There is no mass.      Tenderness: There is no abdominal tenderness. There is no right CVA tenderness, left CVA tenderness, guarding or rebound.      Hernia: No hernia is present.   Musculoskeletal:         General: No swelling, tenderness, deformity or signs of  injury. Normal range of motion.      Cervical back: Normal range of motion and neck supple. No rigidity or tenderness. No muscular tenderness.      Right lower leg: No edema.      Left lower leg: No edema.   Lymphadenopathy:      Cervical: No cervical adenopathy.   Skin:     General: Skin is warm.      Capillary Refill: Capillary refill takes less than 2 seconds.      Coloration: Skin is not jaundiced or pale.      Findings: No bruising, erythema, lesion or rash.   Neurological:      General: No focal deficit present.      Mental Status: She is alert and oriented to person, place, and time.      Cranial Nerves: No cranial nerve deficit.      Sensory: No sensory deficit.      Motor: No weakness.      Coordination: Coordination normal.      Gait: Gait normal.      Deep Tendon Reflexes: Reflexes normal.   Psychiatric:         Mood and Affect: Mood normal.         Behavior: Behavior normal.         Thought Content: Thought content normal.         Judgment: Judgment normal.

## 2024-07-15 NOTE — ASSESSMENT & PLAN NOTE
Patient has a long hx of low platelets. Recent platelet lever 89. Follows with hematology/oncology.

## 2024-07-17 ENCOUNTER — TELEPHONE (OUTPATIENT)
Age: 64
End: 2024-07-17

## 2024-07-17 DIAGNOSIS — F41.9 ANXIETY: ICD-10-CM

## 2024-07-17 NOTE — TELEPHONE ENCOUNTER
Medication:   ALPRAZolam (XANAX) 1 mg tablet     Dose/Frequency: TAKE ONE TABLET BY MOUTH TWICE A DAY AS NEEDED FOR ANXIETY    Quantity:  60 tablet    Pharmacy:   [facility] [id number] [facility] [place] [id number]    Office:   [x] PCP/Provider -   [] Speciality/Provider -     Does the patient have enough for 3 days?   [] Yes   [x] No - Send as HP to POD    Pt stated as per her ins she can have rx 3 days sooner **

## 2024-07-18 RX ORDER — ALPRAZOLAM 1 MG/1
1 TABLET ORAL 2 TIMES DAILY PRN
Qty: 60 TABLET | Refills: 2 | Status: SHIPPED | OUTPATIENT
Start: 2024-07-18

## 2024-07-25 ENCOUNTER — HOSPITAL ENCOUNTER (EMERGENCY)
Facility: HOSPITAL | Age: 64
Discharge: HOME | End: 2024-07-25
Attending: EMERGENCY MEDICINE
Payer: MEDICARE

## 2024-07-25 ENCOUNTER — APPOINTMENT (OUTPATIENT)
Dept: RADIOLOGY | Facility: HOSPITAL | Age: 64
End: 2024-07-25
Payer: MEDICARE

## 2024-07-25 ENCOUNTER — APPOINTMENT (EMERGENCY)
Dept: INTERVENTIONAL RADIOLOGY/VASCULAR | Facility: HOSPITAL | Age: 64
End: 2024-07-25
Attending: EMERGENCY MEDICINE
Payer: MEDICARE

## 2024-07-25 VITALS
BODY MASS INDEX: 24.24 KG/M2 | HEIGHT: 64 IN | DIASTOLIC BLOOD PRESSURE: 61 MMHG | RESPIRATION RATE: 18 BRPM | TEMPERATURE: 98.5 F | WEIGHT: 142 LBS | OXYGEN SATURATION: 94 % | SYSTOLIC BLOOD PRESSURE: 130 MMHG | HEART RATE: 82 BPM

## 2024-07-25 DIAGNOSIS — E87.1 HYPONATREMIA: ICD-10-CM

## 2024-07-25 DIAGNOSIS — J90 RECURRENT RIGHT PLEURAL EFFUSION: Primary | ICD-10-CM

## 2024-07-25 DIAGNOSIS — E87.6 HYPOKALEMIA: ICD-10-CM

## 2024-07-25 LAB
ALBUMIN SERPL BCG-MCNC: 2.7 G/DL (ref 3.5–5)
ALP SERPL-CCNC: 110 U/L (ref 34–104)
ALT SERPL W P-5'-P-CCNC: 43 U/L (ref 7–52)
ANION GAP SERPL CALCULATED.3IONS-SCNC: 6 MMOL/L (ref 4–13)
ANION GAP SERPL CALCULATED.3IONS-SCNC: 6 MMOL/L (ref 4–13)
APPEARANCE FLD: NORMAL
AST SERPL W P-5'-P-CCNC: 74 U/L (ref 13–39)
BASOPHILS # BLD AUTO: 0.09 THOUSANDS/ÂΜL (ref 0–0.1)
BASOPHILS NFR BLD AUTO: 2 % (ref 0–1)
BILIRUB SERPL-MCNC: 3.63 MG/DL (ref 0.2–1)
BUN SERPL-MCNC: 5 MG/DL (ref 5–25)
BUN SERPL-MCNC: 5 MG/DL (ref 5–25)
CALCIUM ALBUM COR SERPL-MCNC: 9.8 MG/DL (ref 8.3–10.1)
CALCIUM SERPL-MCNC: 8.2 MG/DL (ref 8.4–10.2)
CALCIUM SERPL-MCNC: 8.8 MG/DL (ref 8.4–10.2)
CARDIAC TROPONIN I PNL SERPL HS: 5 NG/L
CHLORIDE SERPL-SCNC: 94 MMOL/L (ref 96–108)
CHLORIDE SERPL-SCNC: 98 MMOL/L (ref 96–108)
CO2 SERPL-SCNC: 27 MMOL/L (ref 21–32)
CO2 SERPL-SCNC: 29 MMOL/L (ref 21–32)
COLOR FLD: YELLOW
CREAT SERPL-MCNC: 0.62 MG/DL (ref 0.6–1.3)
CREAT SERPL-MCNC: 0.63 MG/DL (ref 0.6–1.3)
EOSINOPHIL # BLD AUTO: 0.09 THOUSAND/ÂΜL (ref 0–0.61)
EOSINOPHIL NFR BLD AUTO: 2 % (ref 0–6)
ERYTHROCYTE [DISTWIDTH] IN BLOOD BY AUTOMATED COUNT: 16.6 % (ref 11.6–15.1)
FLUAV RNA RESP QL NAA+PROBE: NEGATIVE
FLUBV RNA RESP QL NAA+PROBE: NEGATIVE
GFR SERPL CREATININE-BSD FRML MDRD: 94 ML/MIN/1.73SQ M
GFR SERPL CREATININE-BSD FRML MDRD: 94 ML/MIN/1.73SQ M
GLUCOSE SERPL-MCNC: 107 MG/DL (ref 65–140)
GLUCOSE SERPL-MCNC: 85 MG/DL (ref 65–140)
HCT VFR BLD AUTO: 38.1 % (ref 34.8–46.1)
HGB BLD-MCNC: 13.3 G/DL (ref 11.5–15.4)
HISTIOCYTES NFR FLD: 32 %
IMM GRANULOCYTES # BLD AUTO: 0.03 THOUSAND/UL (ref 0–0.2)
IMM GRANULOCYTES NFR BLD AUTO: 1 % (ref 0–2)
LYMPHOCYTES # BLD AUTO: 0.74 THOUSANDS/ÂΜL (ref 0.6–4.47)
LYMPHOCYTES NFR BLD AUTO: 13 %
LYMPHOCYTES NFR BLD AUTO: 15 % (ref 14–44)
MALIGNANT CELLS NFR FLD MANUAL: 2 %
MCH RBC QN AUTO: 36.8 PG (ref 26.8–34.3)
MCHC RBC AUTO-ENTMCNC: 34.9 G/DL (ref 31.4–37.4)
MCV RBC AUTO: 106 FL (ref 82–98)
MONO+MESO NFR FLD MANUAL: 10 %
MONOCYTES # BLD AUTO: 0.63 THOUSAND/ÂΜL (ref 0.17–1.22)
MONOCYTES NFR BLD AUTO: 12 %
MONOCYTES NFR BLD AUTO: 13 % (ref 4–12)
NEUTROPHILS # BLD AUTO: 3.44 THOUSANDS/ÂΜL (ref 1.85–7.62)
NEUTS SEG NFR BLD AUTO: 28 %
NEUTS SEG NFR BLD AUTO: 67 % (ref 43–75)
NRBC BLD AUTO-RTO: 0 /100 WBCS
OSMOLALITY UR: 432 MMOL/KG
PATH REV: YES
PLATELET # BLD AUTO: 87 THOUSANDS/UL (ref 149–390)
PMV BLD AUTO: 9.6 FL (ref 8.9–12.7)
POTASSIUM SERPL-SCNC: 3.3 MMOL/L (ref 3.5–5.3)
POTASSIUM SERPL-SCNC: 3.5 MMOL/L (ref 3.5–5.3)
PROT SERPL-MCNC: 7.9 G/DL (ref 6.4–8.4)
RBC # BLD AUTO: 3.61 MILLION/UL (ref 3.81–5.12)
RSV RNA RESP QL NAA+PROBE: NEGATIVE
SARS-COV-2 RNA RESP QL NAA+PROBE: NEGATIVE
SITE: NORMAL
SODIUM 24H UR-SCNC: 38 MOL/L
SODIUM SERPL-SCNC: 129 MMOL/L (ref 135–147)
SODIUM SERPL-SCNC: 131 MMOL/L (ref 135–147)
TOTAL CELLS COUNTED SPEC: 99
TOTAL NUCLEATED CELL COUNT: 199 /UL
WBC # BLD AUTO: 5.02 THOUSAND/UL (ref 4.31–10.16)
WBC OTHER NFR FLD MANUAL: 2 %

## 2024-07-25 PROCEDURE — 76937 US GUIDE VASCULAR ACCESS: CPT

## 2024-07-25 PROCEDURE — 0241U HB NFCT DS VIR RESP RNA 4 TRGT: CPT | Performed by: EMERGENCY MEDICINE

## 2024-07-25 PROCEDURE — 96360 HYDRATION IV INFUSION INIT: CPT

## 2024-07-25 PROCEDURE — 80048 BASIC METABOLIC PNL TOTAL CA: CPT | Performed by: EMERGENCY MEDICINE

## 2024-07-25 PROCEDURE — 89051 BODY FLUID CELL COUNT: CPT | Performed by: EMERGENCY MEDICINE

## 2024-07-25 PROCEDURE — 99285 EMERGENCY DEPT VISIT HI MDM: CPT | Performed by: EMERGENCY MEDICINE

## 2024-07-25 PROCEDURE — 36415 COLL VENOUS BLD VENIPUNCTURE: CPT

## 2024-07-25 PROCEDURE — 32555 ASPIRATE PLEURA W/ IMAGING: CPT

## 2024-07-25 PROCEDURE — 83935 ASSAY OF URINE OSMOLALITY: CPT | Performed by: EMERGENCY MEDICINE

## 2024-07-25 PROCEDURE — 84484 ASSAY OF TROPONIN QUANT: CPT | Performed by: EMERGENCY MEDICINE

## 2024-07-25 PROCEDURE — 87205 SMEAR GRAM STAIN: CPT | Performed by: EMERGENCY MEDICINE

## 2024-07-25 PROCEDURE — 93005 ELECTROCARDIOGRAM TRACING: CPT

## 2024-07-25 PROCEDURE — 87070 CULTURE OTHR SPECIMN AEROBIC: CPT | Performed by: EMERGENCY MEDICINE

## 2024-07-25 PROCEDURE — 99285 EMERGENCY DEPT VISIT HI MDM: CPT

## 2024-07-25 PROCEDURE — 84300 ASSAY OF URINE SODIUM: CPT | Performed by: EMERGENCY MEDICINE

## 2024-07-25 PROCEDURE — 80053 COMPREHEN METABOLIC PANEL: CPT | Performed by: EMERGENCY MEDICINE

## 2024-07-25 PROCEDURE — 71046 X-RAY EXAM CHEST 2 VIEWS: CPT

## 2024-07-25 PROCEDURE — 88108 CYTOPATH CONCENTRATE TECH: CPT | Performed by: PATHOLOGY

## 2024-07-25 PROCEDURE — 85025 COMPLETE CBC W/AUTO DIFF WBC: CPT | Performed by: EMERGENCY MEDICINE

## 2024-07-25 RX ORDER — LIDOCAINE HYDROCHLORIDE 10 MG/ML
INJECTION, SOLUTION EPIDURAL; INFILTRATION; INTRACAUDAL; PERINEURAL AS NEEDED
Status: COMPLETED | OUTPATIENT
Start: 2024-07-25 | End: 2024-07-25

## 2024-07-25 RX ORDER — METOPROLOL TARTRATE 1 MG/ML
5 INJECTION, SOLUTION INTRAVENOUS ONCE
Status: DISCONTINUED | OUTPATIENT
Start: 2024-07-25 | End: 2024-07-25

## 2024-07-25 RX ORDER — POTASSIUM CHLORIDE 20 MEQ/1
40 TABLET, EXTENDED RELEASE ORAL ONCE
Status: COMPLETED | OUTPATIENT
Start: 2024-07-25 | End: 2024-07-25

## 2024-07-25 RX ADMIN — LIDOCAINE HYDROCHLORIDE 5 ML: 10 INJECTION, SOLUTION EPIDURAL; INFILTRATION; INTRACAUDAL; PERINEURAL at 16:00

## 2024-07-25 RX ADMIN — SODIUM CHLORIDE 1000 ML: 0.9 INJECTION, SOLUTION INTRAVENOUS at 17:25

## 2024-07-25 RX ADMIN — POTASSIUM CHLORIDE 40 MEQ: 1500 TABLET, EXTENDED RELEASE ORAL at 17:17

## 2024-07-25 NOTE — ED NOTES
Educated pt on urine specimen. Pt ambulating to bathroom.pt unable to provided sample in specimen cup. Pt reports that she missed that hat and specimen cup. Provider made aware. Call bell w/in reach to call when pt has to go again.      Balbina Kumar RN  07/25/24 [id number]

## 2024-07-25 NOTE — ED PROVIDER NOTES
History  Chief Complaint   Patient presents with    Shortness of Breath     Started earlier this week, while sitting and moving     64 year old female presents for evaluation of shortness of breath for the past 4 days which worsens with ambulation.  She has had an associated cough.  No fevers or chills.  Similar symptoms 1 year ago from a pleural effusion for which she had a thoracentesis performed.        Shortness of Breath      Prior to Admission Medications   Prescriptions Last Dose Informant Patient Reported? Taking?   ALPRAZolam (XANAX) 1 mg tablet   No No   Sig: Take 1 tablet (1 mg total) by mouth 2 (two) times a day as needed for anxiety   Risankizumab-rzaa (SKYRIZI PEN SC)  Self Yes No   Sig: Inject under the skin   albuterol (2.5 mg/3 mL) 0.083 % nebulizer solution  Self No No   Sig: Take 3 mL (2.5 mg total) by nebulization every 6 (six) hours as needed for wheezing or shortness of breath   albuterol (Proventil HFA) 90 mcg/act inhaler  Self No No   Sig: Inhale 2 puffs every 6 (six) hours as needed for wheezing or shortness of breath   anastrozole (ARIMIDEX) 1 mg tablet   No No   Sig: Take 1 tablet (1 mg total) by mouth daily   budesonide-formoterol (Symbicort) 160-4.5 mcg/act inhaler  Self No No   Sig: Inhale 2 puffs 2 (two) times a day Rinse mouth after use.   ergocalciferol (VITAMIN D2) 50,000 units  Self No No   Sig: Take one capsule weekly x12 weeks   furosemide (LASIX) 20 mg tablet   No No   Sig: Take 1 tablet (20 mg total) by mouth daily   lactulose 20 g/30 mL  Self No No   Sig: Take 45 mL (30 g total) by mouth 2 (two) times a day Titrate dose for an effect of 3 soft, but formed bowel movements per day.  You can start with 1 tablespoon per day and slowly increase to effect.   midodrine (PROAMATINE) 2.5 mg tablet  Self No No   Sig: Take 3 tablets (7.5 mg total) by mouth 3 (three) times a day before meals   naltrexone (REVIA) 50 mg tablet   No No   Sig: Take 1 tablet (50 mg total) by mouth daily    pantoprazole (PROTONIX) 20 mg tablet   No No   Sig: TAKE ONE TABLET BY MOUTH EVERY DAY   spironolactone (ALDACTONE) 50 mg tablet   No No   Sig: Take 1 tablet (50 mg total) by mouth daily   traZODone (DESYREL) 50 mg tablet  Self No No   Sig: Take 1 tablet (50 mg total) by mouth daily at bedtime      Facility-Administered Medications: None       Past Medical History:   Diagnosis Date    Alcoholic fatty liver     Anxiety     Asthma     COPD (chronic obstructive pulmonary disease) (HCC)     Elevated liver function tests     GERD (gastroesophageal reflux disease)     GERD without esophagitis     Hyperlipidemia     Hypertension     IBS (irritable bowel syndrome)     Insomnia     Insomnia     Liver disease     Nervousness     Nicotine dependence     Other specified urinary incontinence     RLS (restless legs syndrome)     Wears glasses        Past Surgical History:   Procedure Laterality Date    BACK SURGERY      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    BREAST EXCISIONAL BIOPSY Right 11/01/2004    benign    CATARACT EXTRACTION, BILATERAL  08/01/2018    COLONOSCOPY N/A 5/8/2019    Procedure: COLONOSCOPY;  Surgeon: Jacobo Leonardo MD;  Location: [facility] GI LAB;  Service: Gastroenterology    EGD AND COLONOSCOPY N/A 3/19/2018    Procedure: EGD AND COLONOSCOPY;  Surgeon: Jacobo Leonardo MD;  Location: [facility] GI LAB;  Service: Gastroenterology    ESOPHAGOGASTRODUODENOSCOPY N/A 5/8/2019    Procedure: ESOPHAGOGASTRODUODENOSCOPY (EGD);  Surgeon: Jacobo Leonardo MD;  Location: [facility] GI LAB;  Service: Gastroenterology    IR PARACENTESIS  11/27/2020    IR PARACENTESIS  12/8/2020    IR PARACENTESIS  10/28/2022    IR PARACENTESIS  11/7/2022    IR PARACENTESIS  11/30/2022    IR PARACENTESIS  12/30/2022    IR PARACENTESIS  1/16/2023    IR THORACENTESIS  1/16/2023    IR THORACENTESIS  3/28/2023    IR THORACENTESIS  7/25/2024    KNEE SURGERY      KNEE SURGERY      LUMBAR LAMINECTOMY  04/1999    LYMPH NODE BIOPSY      MAMMO STEREOTACTIC  BREAST BIOPSY RIGHT (ALL INC) Right 5/21/2021    MAMMO STEREOTACTIC BREAST BIOPSY RIGHT (ALL INC) EACH ADD Right 5/21/2021    TOTAL ABDOMINAL HYSTERECTOMY  02/05/2008    TUBAL LIGATION      TUBAL LIGATION  1989    UPPER GASTROINTESTINAL ENDOSCOPY         Family History   Problem Relation Age of Onset    Breast cancer Mother 32    No Known Problems Father     No Known Problems Sister     No Known Problems Brother     No Known Problems Maternal Grandmother     No Known Problems Maternal Grandfather     No Known Problems Paternal Grandmother     No Known Problems Paternal Grandfather     No Known Problems Maternal Aunt     No Known Problems Maternal Aunt     No Known Problems Paternal Aunt     No Known Problems Son     Substance Abuse Neg Hx     Mental illness Neg Hx     Colon cancer Neg Hx     Colon polyps Neg Hx      I have reviewed and agree with the history as documented.    E-Cigarette/Vaping    E-Cigarette Use Never User      E-Cigarette/Vaping Substances    Nicotine No     THC No     CBD No     Flavoring No     Other No     Unknown No      Social History     Tobacco Use    Smoking status: Every Day     Current packs/day: 1.00     Average packs/day: 1 pack/day for 46.6 years (46.6 ttl pk-yrs)     Types: Cigarettes     Start date: 1/1/1978    Smokeless tobacco: Never   Vaping Use    Vaping status: Never Used   Substance Use Topics    Alcohol use: Not Currently     Comment: last alcoholic drink was 11/25/2023    Drug use: No       Review of Systems   Respiratory:  Positive for shortness of breath.        Physical Exam  Physical Exam  Vitals and nursing note reviewed.   HENT:      Head: Normocephalic and atraumatic.   Cardiovascular:      Rate and Rhythm: Normal rate and regular rhythm.      Pulses: Normal pulses.      Heart sounds: Normal heart sounds.   Pulmonary:      Effort: Tachypnea and accessory muscle usage present.      Breath sounds: Examination of the right-lower field reveals decreased breath sounds.  Decreased breath sounds and rales (left base) present.   Abdominal:      General: There is distension (mild).      Palpations: Abdomen is soft. There is no fluid wave.      Tenderness: There is no abdominal tenderness.   Skin:     General: Skin is warm and dry.   Neurological:      Mental Status: She is alert.         Vital Signs  ED Triage Vitals [07/25/24 1349]   Temperature Pulse Respirations Blood Pressure SpO2   98.5 °F (36.9 °C) 85 20 129/60 93 %      Temp Source Heart Rate Source Patient Position - Orthostatic VS BP Location FiO2 (%)   Oral Monitor Sitting Right arm --      Pain Score       No Pain           Vitals:    07/25/24 1605 07/25/24 1715 07/25/24 1800 07/25/24 1830   BP: 126/60 127/61 119/58 130/61   Pulse: 89 82 80 82   Patient Position - Orthostatic VS:             Visual Acuity      ED Medications  Medications   lidocaine (PF) (XYLOCAINE-MPF) 1 % injection (5 mL Infiltration Given 7/25/24 1600)   potassium chloride (Klor-Con M20) CR tablet 40 mEq (40 mEq Oral Given 7/25/24 1717)   sodium chloride 0.9 % bolus 1,000 mL (0 mL Intravenous Stopped 7/25/24 1825)       Diagnostic Studies  Results Reviewed       Procedure Component Value Units Date/Time    Body fluid culture (Pleural Fluid Culture) and Gram stain [[id number]] Collected: 07/25/24 1558    Lab Status: Preliminary result Specimen: Body Fluid from Pleural, Right Updated: 07/26/24 1221     Body Fluid Culture, Sterile No growth     Gram Stain Result No Polys or Bacteria seen    Osmolality, urine [[id number]]  (Normal) Collected: 07/25/24 1900    Lab Status: Final result Specimen: Urine, Clean Catch Updated: 07/25/24 2201     Osmolality, Ur 432 mmol/KG     Sodium, urine, random [[id number]] Collected: 07/25/24 1900    Lab Status: Final result Specimen: Urine, Clean Catch Updated: 07/25/24 1935     Sodium, Ur 38    Basic metabolic panel [[id number]]  (Abnormal) Collected: 07/25/24 1851    Lab Status: Final result Specimen: Blood from Arm, Left  Updated: 07/25/24 1908     Sodium 131 mmol/L      Potassium 3.5 mmol/L      Chloride 98 mmol/L      CO2 27 mmol/L      ANION GAP 6 mmol/L      BUN 5 mg/dL      Creatinine 0.63 mg/dL      Glucose 85 mg/dL      Calcium 8.2 mg/dL      eGFR 94 ml/min/1.73sq m     Narrative:      National Kidney Disease Foundation guidelines for Chronic Kidney Disease (CKD):     Stage 1 with normal or high GFR (GFR > 90 mL/min/1.73 square meters)    Stage 2 Mild CKD (GFR = 60-89 mL/min/1.73 square meters)    Stage 3A Moderate CKD (GFR = 45-59 mL/min/1.73 square meters)    Stage 3B Moderate CKD (GFR = 30-44 mL/min/1.73 square meters)    Stage 4 Severe CKD (GFR = 15-29 mL/min/1.73 square meters)    Stage 5 End Stage CKD (GFR <15 mL/min/1.73 square meters)  Note: GFR calculation is accurate only with a steady state creatinine    Body Fluid Diff [[id number]]  (Abnormal) Collected: 07/25/24 1558    Lab Status: Final result Specimen: Body Fluid from Pleural, Right Updated: 07/25/24 1902     Total Counted 99     Neutrophils % (Fluid) 28 %      Lymphs % (Fluid) 13 %      Mesothelial % (Fluid) 10 %      Histiocyte % (Fluid) 32 %      Unclassified % (Fluid) 2 %      Malignant % (FLuid) 2 %      Monocytes % (Fluid) 12 %      Pathology Review Yes    Body fluid white cell count with differential [[id number]] Collected: 07/25/24 1558    Lab Status: Final result Specimen: Body Fluid from Pleural, Right Updated: 07/25/24 1854     Site PLeural,right     Color, Fluid Yellow     Clarity, Fluid Slightly Cloudy     WBC, Fluid --     TOTAL NUCLEATED CELL COUNT 199 /ul     FLU/RSV/COVID - if FLU/RSV clinically relevant [[id number]]  (Normal) Collected: 07/25/24 1504    Lab Status: Final result Specimen: Nares from Nose Updated: 07/25/24 1549     SARS-CoV-2 Negative     INFLUENZA A PCR Negative     INFLUENZA B PCR Negative     RSV PCR Negative    Narrative:      FOR PEDIATRIC PATIENTS - copy/paste COVID Guidelines URL to browser:  [email or web address]    SARS-CoV-2 assay is a Nucleic Acid Amplification assay intended for the  qualitative detection of nucleic acid from SARS-CoV-2 in nasopharyngeal  swabs. Results are for the presumptive identification of SARS-CoV-2 RNA.    Positive results are indicative of infection with SARS-CoV-2, the virus  causing COVID-19, but do not rule out bacterial infection or co-infection  with other viruses. Laboratories within the [place] and its  territories are required to report all positive results to the appropriate  public health authorities. Negative results do not preclude SARS-CoV-2  infection and should not be used as the sole basis for treatment or other  patient management decisions. Negative results must be combined with  clinical observations, patient history, and epidemiological information.  This test has not been FDA cleared or approved.    This test has been authorized by FDA under an Emergency Use Authorization  (EUA). This test is only authorized for the duration of time the  declaration that circumstances exist justifying the authorization of the  emergency use of an in vitro diagnostic tests for detection of SARS-CoV-2  virus and/or diagnosis of COVID-19 infection under section 564(b)(1) of  the Act, 21 U.S.C. 360bbb-3(b)(1), unless the authorization is terminated  or revoked sooner. The test has been validated but independent review by FDA  and CLIA is pending.    Test performed using [employer] GeneXpert: This RT-PCR assay targets N2,  a region unique to SARS-CoV-2. A conserved region in the E-gene was chosen  for pan-Sarbecovirus detection which includes SARS-CoV-2.    According to CMS-2020-01-R, this platform meets the definition of high-throughput technology.    HS Troponin 0hr (reflex protocol) [[id number]]  (Normal) Collected: 07/25/24 1356    Lab Status: Final result Specimen: Blood from Arm, Left Updated: 07/25/24 1429     hs  TnI 0hr 5 ng/L     Comprehensive metabolic panel [[id number]]  (Abnormal) Collected: 07/25/24 1356    Lab Status: Final result Specimen: Blood from Arm, Left Updated: 07/25/24 1421     Sodium 129 mmol/L      Potassium 3.3 mmol/L      Chloride 94 mmol/L      CO2 29 mmol/L      ANION GAP 6 mmol/L      BUN 5 mg/dL      Creatinine 0.62 mg/dL      Glucose 107 mg/dL      Calcium 8.8 mg/dL      Corrected Calcium 9.8 mg/dL      AST 74 U/L      ALT 43 U/L      Alkaline Phosphatase 110 U/L      Total Protein 7.9 g/dL      Albumin 2.7 g/dL      Total Bilirubin 3.63 mg/dL      eGFR 94 ml/min/1.73sq m     Narrative:      National Kidney Disease Foundation guidelines for Chronic Kidney Disease (CKD):     Stage 1 with normal or high GFR (GFR > 90 mL/min/1.73 square meters)    Stage 2 Mild CKD (GFR = 60-89 mL/min/1.73 square meters)    Stage 3A Moderate CKD (GFR = 45-59 mL/min/1.73 square meters)    Stage 3B Moderate CKD (GFR = 30-44 mL/min/1.73 square meters)    Stage 4 Severe CKD (GFR = 15-29 mL/min/1.73 square meters)    Stage 5 End Stage CKD (GFR <15 mL/min/1.73 square meters)  Note: GFR calculation is accurate only with a steady state creatinine    CBC and differential [[id number]]  (Abnormal) Collected: 07/25/24 1356    Lab Status: Final result Specimen: Blood from Arm, Left Updated: 07/25/24 1417     WBC 5.02 Thousand/uL      RBC 3.61 Million/uL      Hemoglobin 13.3 g/dL      Hematocrit 38.1 %       fL      MCH 36.8 pg      MCHC 34.9 g/dL      RDW 16.6 %      MPV 9.6 fL      Platelets 87 Thousands/uL      nRBC 0 /100 WBCs      Segmented % 67 %      Immature Grans % 1 %      Lymphocytes % 15 %      Monocytes % 13 %      Eosinophils Relative 2 %      Basophils Relative 2 %      Absolute Neutrophils 3.44 Thousands/µL      Absolute Immature Grans 0.03 Thousand/uL      Absolute Lymphocytes 0.74 Thousands/µL      Absolute Monocytes 0.63 Thousand/µL      Eosinophils Absolute 0.09 Thousand/µL      Basophils Absolute 0.09  Thousands/µL     Narrative:      Platelets previously decreased No Clots                   IR IN-Patient Thoracentesis   Final Result by Manuel Post MD (07/25 1613)   Technically successful image-guided thoracentesis as described.            Workstation performed: [id number]         XR chest 2 views   ED Interpretation by Leanne Galarza MD (07/25 1459)   Abnormal   Right pleural effusion      Final Result by Baron Mae MD (07/25 1523)      Moderate right pleural effusion and right basilar atelectasis.            Workstation performed: [id number]                    Procedures  Procedures         ED Course  ED Course as of 07/26/24 1510   Thu Jul 25, 2024   1459 Sodium(!): 129  135 one month ago   1459 Total Bilirubin(!): 3.63  3.3 one month ago   1705 Patient would like to avoid admission if possible.  1 L NS and 40 mEq Kdur ordered.  Repeat BMP after fluids.  If hyponatremia improved, can likely go home                                               Medical Decision Making  64 year old female presents for evaluation of exertional shortness of breath.  Decreased breath sounds on the right lower lung fields.  Pleural effusion on CXR.  IR thoracentesis performed with improvement in symptoms.  Mild hyponatremia on labs with sodium 129.  Possible element of dehydration.  1 L NS given.  40 mEq Kdur for mild hypokalemia without EKG changes.  Repeat BMP improved.  PCP follow up.    Amount and/or Complexity of Data Reviewed  Labs: ordered. Decision-making details documented in ED Course.  Radiology: ordered and independent interpretation performed.    Risk  Prescription drug management.                 Disposition  Final diagnoses:   Recurrent right pleural effusion   Hyponatremia   Hypokalemia     Time reflects when diagnosis was documented in both MDM as applicable and the Disposition within this note       Time User Action Codes Description Comment    7/25/2024  3:12 PM Leanne Galarza Add  [J90] Recurrent right pleural effusion     7/25/2024  3:13 PM Leanne Galarza Add [E87.1] Hyponatremia     7/25/2024  5:05 PM Leanne Galarza Add [E87.6] Hypokalemia           ED Disposition       ED Disposition   Discharge    Condition   Stable    Date/Time   Thu Jul 25, 2024  7:16 PM    Comment   Jose CONNER Cindy discharge to home/self care.                   Follow-up Information       Follow up With Specialties Details Why Contact Info Additional Information    [facility]   [address] [place] [address] 2  [facility] [address]  [phone]        [facility] Emergency Department Emergency Medicine  As needed, If symptoms worsen [address] [facility] [address] [address] [phone] [facility] Emergency Department, [address] [place], [place] [address]            Discharge Medication List as of 7/25/2024  7:27 PM        CONTINUE these medications which have NOT CHANGED    Details   albuterol (2.5 mg/3 mL) 0.083 % nebulizer solution Take 3 mL (2.5 mg total) by nebulization every 6 (six) hours as needed for wheezing or shortness of breath, Starting Wed 8/3/2022, Normal      albuterol (Proventil HFA) 90 mcg/act inhaler Inhale 2 puffs every 6 (six) hours as needed for wheezing or shortness of breath, Starting Fri 10/13/2023, Normal      ALPRAZolam (XANAX) 1 mg tablet Take 1 tablet (1 mg total) by mouth 2 (two) times a day as needed for anxiety, Starting Thu 7/18/2024, Normal      anastrozole (ARIMIDEX) 1 mg tablet Take 1 tablet (1 mg total) by mouth daily, Starting Fri 6/21/2024, Normal      budesonide-formoterol (Symbicort) 160-4.5 mcg/act inhaler Inhale 2 puffs 2 (two) times a day Rinse mouth after use., Starting Fri 10/13/2023, Normal      ergocalciferol (VITAMIN D2) 50,000 units Take one capsule weekly x12 weeks, Normal      furosemide (LASIX) 20 mg tablet Take 1 tablet (20 mg total) by mouth daily, Starting  Fri 5/17/2024, Until Sat 5/17/2025, Normal      lactulose 20 g/30 mL Take 45 mL (30 g total) by mouth 2 (two) times a day Titrate dose for an effect of 3 soft, but formed bowel movements per day.  You can start with 1 tablespoon per day and slowly increase to effect., Starting Tue 3/28/2023, Normal      midodrine (PROAMATINE) 2.5 mg tablet Take 3 tablets (7.5 mg total) by mouth 3 (three) times a day before meals, Starting Mon 12/4/2023, Normal      naltrexone (REVIA) 50 mg tablet Take 1 tablet (50 mg total) by mouth daily, Starting Thu 6/13/2024, Normal      pantoprazole (PROTONIX) 20 mg tablet TAKE ONE TABLET BY MOUTH EVERY DAY, Starting Tue 5/14/2024, Normal      Risankizumab-rzaa (SKYRIZI PEN SC) Inject under the skin, Historical Med      spironolactone (ALDACTONE) 50 mg tablet Take 1 tablet (50 mg total) by mouth daily, Starting Fri 5/17/2024, Until Sat 5/17/2025, Normal      traZODone (DESYREL) 50 mg tablet Take 1 tablet (50 mg total) by mouth daily at bedtime, Starting Wed 3/13/2024, Normal             No discharge procedures on file.    PDMP Review         Value Time User    PDMP Reviewed  Yes 7/18/2024 12:03 PM JOSÉ Lo            ED Provider  Electronically Signed by             Leanne Galarza MD  07/26/24 2623

## 2024-07-25 NOTE — SEDATION DOCUMENTATION
Rec'd from ER for urgent right thoracentesis. 1300ml clear yellow fluid removed. Specimen sent to lab. Band aid to site. Transferred back to ER.

## 2024-07-26 ENCOUNTER — VBI (OUTPATIENT)
Dept: ADMINISTRATIVE | Facility: OTHER | Age: 64
End: 2024-07-26

## 2024-07-26 ENCOUNTER — TELEPHONE (OUTPATIENT)
Age: 64
End: 2024-07-26

## 2024-07-26 LAB
ATRIAL RATE: 82 BPM
P AXIS: 5 DEGREES
PR INTERVAL: 130 MS
QRS AXIS: 77 DEGREES
QRSD INTERVAL: 78 MS
QT INTERVAL: 390 MS
QTC INTERVAL: 455 MS
T WAVE AXIS: 30 DEGREES
VENTRICULAR RATE: 82 BPM

## 2024-07-26 PROCEDURE — 93010 ELECTROCARDIOGRAM REPORT: CPT | Performed by: INTERNAL MEDICINE

## 2024-07-26 NOTE — TELEPHONE ENCOUNTER
07/26/24 9:53 AM    Patient contacted post ED visit, first outreach attempt made. Message was left for patient to return a call to the VBI Department at [facility]: Phone [phone].    Thank you.  Bina Lutz  [facility]

## 2024-07-26 NOTE — TELEPHONE ENCOUNTER
Pt was in ED yesterday, pt states she had a chest XRAY. Pt also states she had very low potasium and 1.6 liters was drained from her lungs.

## 2024-07-26 NOTE — TELEPHONE ENCOUNTER
Pt wanted to know if Provider wants to order labs to track her potassium levels or if she needed to make a follow up appointment.

## 2024-07-27 DIAGNOSIS — K70.31 ALCOHOLIC CIRRHOSIS OF LIVER WITH ASCITES (HCC): Primary | ICD-10-CM

## 2024-07-27 DIAGNOSIS — E87.6 HYPOKALEMIA: ICD-10-CM

## 2024-07-28 LAB
BACTERIA SPEC BFLD CULT: NO GROWTH
GRAM STN SPEC: NORMAL

## 2024-07-29 PROCEDURE — 88108 CYTOPATH CONCENTRATE TECH: CPT | Performed by: PATHOLOGY

## 2024-07-30 NOTE — TELEPHONE ENCOUNTER
07/30/24 1:22 PM    Patient contacted post ED visit, VBI department spoke with patient/caregiver and outreach was successful.    Thank you.  Bina Lutz  [facility]

## 2024-07-30 NOTE — TELEPHONE ENCOUNTER
07/30/24 1:16 PM    Patient contacted post ED visit, second outreach attempt made. Message was left for patient to return a call to the VBI Department at [facility]: Phone [phone].    Thank you.  Bina Lutz  [facility]

## 2024-08-05 ENCOUNTER — PREP FOR PROCEDURE (OUTPATIENT)
Dept: GASTROENTEROLOGY | Facility: CLINIC | Age: 64
End: 2024-08-05

## 2024-08-05 ENCOUNTER — TELEPHONE (OUTPATIENT)
Age: 64
End: 2024-08-05

## 2024-08-05 DIAGNOSIS — K70.31 ALCOHOLIC CIRRHOSIS OF LIVER WITH ASCITES (HCC): Primary | ICD-10-CM

## 2024-08-05 DIAGNOSIS — J94.8 HYDROTHORAX: ICD-10-CM

## 2024-08-05 RX ORDER — SODIUM CHLORIDE 9 MG/ML
20 INJECTION, SOLUTION INTRAVENOUS ONCE
OUTPATIENT
Start: 2024-08-06

## 2024-08-05 RX ORDER — ALBUMIN (HUMAN) 12.5 G/50ML
75 SOLUTION INTRAVENOUS ONCE AS NEEDED
OUTPATIENT
Start: 2024-08-06

## 2024-08-05 RX ORDER — ALBUMIN (HUMAN) 12.5 G/50ML
50 SOLUTION INTRAVENOUS ONCE AS NEEDED
OUTPATIENT
Start: 2024-08-06

## 2024-08-05 RX ORDER — ALBUMIN (HUMAN) 12.5 G/50ML
100 SOLUTION INTRAVENOUS ONCE AS NEEDED
OUTPATIENT
Start: 2024-08-06

## 2024-08-05 NOTE — TELEPHONE ENCOUNTER
Spoke with pt. Advised of orders placed by dr Herrera. Provided number for IR radiology to schedule [phone]

## 2024-08-05 NOTE — TELEPHONE ENCOUNTER
Last OV 6/11/24 Dr Leonardo  FU 9/26/24 Dr Herrera ( pt cancelled appt for 8/6/24 w/ tara rosado)     Medications   Spironolactone 100 mg daily  Furosemide 20 mg daily  Midodrine 2.5 mg TID  Lactulose 30g BID- not taking;m   Recent labs 7/25/24  Imaging 2/5/24 US abd; 8/10/24 MRI abd scheduled    Thora/para  7/25/24 thoracentesis 1300 ml  1/16/23 paracentesis     IR TIPS ordered 4/26/24 by Dr Qureshi    Spoke with pt. Pt was in ED on 7/25/24 for pleural effusion. Pt currently denies severe symptoms requiring ED visit with this call. She is reporting increase in abdominal distention within 1 week w/ increasing SOB post 7/25 thoracentesis. She confirms b/l lower leg pitting edema L>R and is coughing clear phlegm. Pt denies fever, confusion, bleeding, pain. Pt reminded of OV w/ Dr Herrera on 9/26, stressed Importance of attending visits for safe care. Pt verbalized understanding.      Pt would like orders placed for IR paracentesis.

## 2024-08-05 NOTE — TELEPHONE ENCOUNTER
Patients GI provider:  Dr. Leonardo/Dr. LISA Herrera    Number to return call: [phone]    Reason for call: Bryce from [facility] called in stating that pt called them to schedule a paracentesis as she is bloated and full of liquid but no order is in. Requesting to please place an order.     Scheduled procedure/appointment date if applicable: Appt 9/26/24

## 2024-08-06 ENCOUNTER — HOSPITAL ENCOUNTER (OUTPATIENT)
Dept: INFUSION CENTER | Facility: HOSPITAL | Age: 64
Discharge: HOME | End: 2024-08-06
Attending: INTERNAL MEDICINE

## 2024-08-06 ENCOUNTER — HOSPITAL ENCOUNTER (OUTPATIENT)
Dept: INTERVENTIONAL RADIOLOGY/VASCULAR | Facility: HOSPITAL | Age: 64
Discharge: HOME | End: 2024-08-06
Attending: INTERNAL MEDICINE
Payer: MEDICARE

## 2024-08-06 VITALS
HEART RATE: 93 BPM | DIASTOLIC BLOOD PRESSURE: 88 MMHG | SYSTOLIC BLOOD PRESSURE: 126 MMHG | RESPIRATION RATE: 20 BRPM | OXYGEN SATURATION: 93 %

## 2024-08-06 DIAGNOSIS — J94.8 HYDROTHORAX: ICD-10-CM

## 2024-08-06 DIAGNOSIS — K70.31 ALCOHOLIC CIRRHOSIS OF LIVER WITH ASCITES (HCC): ICD-10-CM

## 2024-08-06 PROCEDURE — 32555 ASPIRATE PLEURA W/ IMAGING: CPT

## 2024-08-06 PROCEDURE — 49083 ABD PARACENTESIS W/IMAGING: CPT

## 2024-08-06 NOTE — DISCHARGE INSTRUCTIONS
Thoracentesis   WHAT YOU NEED TO KNOW:   A thoracentesis is a procedure to remove extra fluid or air from between your lungs and your inner chest wall. Air or fluid buildup may make it hard for you to breathe. A thoracentesis allows your lungs to expand fully so you can breathe more easily.  DISCHARGE INSTRUCTIONS:   Medicines:   Pain medicine:  You may be given a prescription medicine to decrease pain. Do not wait until the pain is severe before you take your medicine.    Antibiotics:  This medicine helps fight or prevent an infection.    Take your medicine as directed.  Call your healthcare provider if you think your medicine is not helping or if you have side effects. Tell him if you are allergic to any medicine. Keep a list of the medicines, vitamins, and herbs you take. Include the amounts, and when and why you take them. Bring the list or the pill bottles to follow-up visits. Carry your medicine list with you in case of an emergency.  Follow up with your healthcare provider as directed:  Write down your questions so you remember to ask them during your visits.   Rest:  Rest when you feel it is needed. Slowly start to do more each day. Return to your daily activities as directed.   Do not smoke:  If you smoke, it is never too late to quit. Ask for information about how to stop smoking if you need help.  Contact your healthcare provider if:   You have a fever.    Your puncture site is red, warm, swollen, or draining pus.    You have questions or concerns about your procedure, medicine, or care.    If you have any questions regarding, call the IR department @ [phone].     Seek care immediately or call 911 if:   Blood soaks through your bandage.    There is blood in your spit.

## 2024-08-10 ENCOUNTER — HOSPITAL ENCOUNTER (OUTPATIENT)
Dept: MRI IMAGING | Facility: HOSPITAL | Age: 64
Discharge: HOME | End: 2024-08-10
Payer: MEDICARE

## 2024-08-10 DIAGNOSIS — K70.31 ALCOHOLIC CIRRHOSIS OF LIVER WITH ASCITES (HCC): ICD-10-CM

## 2024-08-10 DIAGNOSIS — E87.1 HYPONATREMIA: ICD-10-CM

## 2024-08-10 PROCEDURE — A9585 GADOBUTROL INJECTION: HCPCS | Performed by: STUDENT IN AN ORGANIZED HEALTH CARE EDUCATION/TRAINING PROGRAM

## 2024-08-10 PROCEDURE — 74183 MRI ABD W/O CNTR FLWD CNTR: CPT

## 2024-08-10 RX ORDER — GADOBUTROL 604.72 MG/ML
6 INJECTION INTRAVENOUS
Status: COMPLETED | OUTPATIENT
Start: 2024-08-10 | End: 2024-08-10

## 2024-08-10 RX ADMIN — GADOBUTROL 6 ML: 604.72 INJECTION INTRAVENOUS at 13:29

## 2024-08-14 ENCOUNTER — HOSPITAL ENCOUNTER (OUTPATIENT)
Dept: INTERVENTIONAL RADIOLOGY/VASCULAR | Facility: HOSPITAL | Age: 64
Discharge: HOME | End: 2024-08-14
Attending: INTERNAL MEDICINE
Payer: MEDICARE

## 2024-08-14 VITALS
OXYGEN SATURATION: 93 % | DIASTOLIC BLOOD PRESSURE: 51 MMHG | RESPIRATION RATE: 20 BRPM | HEART RATE: 96 BPM | SYSTOLIC BLOOD PRESSURE: 102 MMHG

## 2024-08-14 DIAGNOSIS — J94.8 HYDROTHORAX: ICD-10-CM

## 2024-08-14 DIAGNOSIS — K70.31 ALCOHOLIC CIRRHOSIS OF LIVER WITH ASCITES (HCC): ICD-10-CM

## 2024-08-14 PROCEDURE — 32555 ASPIRATE PLEURA W/ IMAGING: CPT

## 2024-08-14 PROCEDURE — 49083 ABD PARACENTESIS W/IMAGING: CPT

## 2024-08-14 PROCEDURE — 32555 ASPIRATE PLEURA W/ IMAGING: CPT | Performed by: RADIOLOGY

## 2024-08-14 PROCEDURE — 76937 US GUIDE VASCULAR ACCESS: CPT

## 2024-08-14 RX ORDER — LIDOCAINE HYDROCHLORIDE 10 MG/ML
INJECTION, SOLUTION EPIDURAL; INFILTRATION; INTRACAUDAL; PERINEURAL AS NEEDED
Status: COMPLETED | OUTPATIENT
Start: 2024-08-14 | End: 2024-08-14

## 2024-08-14 RX ADMIN — LIDOCAINE HYDROCHLORIDE 10 ML: 10 INJECTION, SOLUTION EPIDURAL; INFILTRATION; INTRACAUDAL; PERINEURAL at 12:49

## 2024-08-14 NOTE — H&P
Interventional Radiology  History and Physical 8/14/2024     Jose White   1960   [id number]    Assessment/Plan:  64 year old female with history of cirrhosis and recurrent right pleural effusion returns for thoracentesis.    Problem List Items Addressed This Visit          Digestive    Alcoholic cirrhosis of liver with ascites (HCC) (Chronic)    Relevant Orders    IR thoracentesis     Other Visit Diagnoses       Hydrothorax        Relevant Orders    IR thoracentesis               Subjective:     Patient ID: Jose White is a 64 y.o. female.    History of Present Illness  Patient with history of cirrhosis and recurrent right pleural effusion returns for thoracentesis.    Review of Systems      Past Medical History:   Diagnosis Date    Alcoholic fatty liver     Anxiety     Asthma     COPD (chronic obstructive pulmonary disease) (HCC)     Elevated liver function tests     GERD (gastroesophageal reflux disease)     GERD without esophagitis     Hyperlipidemia     Hypertension     IBS (irritable bowel syndrome)     Insomnia     Insomnia     Liver disease     Nervousness     Nicotine dependence     Other specified urinary incontinence     RLS (restless legs syndrome)     Wears glasses         Past Surgical History:   Procedure Laterality Date    BACK SURGERY      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    BREAST EXCISIONAL BIOPSY Right 11/01/2004    benign    CATARACT EXTRACTION, BILATERAL  08/01/2018    COLONOSCOPY N/A 5/8/2019    Procedure: COLONOSCOPY;  Surgeon: Jacobo Leonardo MD;  Location: [facility];  Service: Gastroenterology    EGD AND COLONOSCOPY N/A 3/19/2018    Procedure: EGD AND COLONOSCOPY;  Surgeon: Jacobo Leonardo MD;  Location: [facility];  Service: Gastroenterology    ESOPHAGOGASTRODUODENOSCOPY N/A 5/8/2019    Procedure: ESOPHAGOGASTRODUODENOSCOPY (EGD);  Surgeon: Jacobo Leonardo MD;  Location: [facility];  Service: Gastroenterology    IR PARACENTESIS  11/27/2020    IR PARACENTESIS   12/8/2020    IR PARACENTESIS  10/28/2022    IR PARACENTESIS  11/7/2022    IR PARACENTESIS  11/30/2022    IR PARACENTESIS  12/30/2022    IR PARACENTESIS  1/16/2023    IR PARACENTESIS  8/6/2024    IR THORACENTESIS  1/16/2023    IR THORACENTESIS  3/28/2023    IR THORACENTESIS  7/25/2024    IR THORACENTESIS  8/6/2024    KNEE SURGERY      KNEE SURGERY      LUMBAR LAMINECTOMY  04/1999    LYMPH NODE BIOPSY      MAMMO STEREOTACTIC BREAST BIOPSY RIGHT (ALL INC) Right 5/21/2021    MAMMO STEREOTACTIC BREAST BIOPSY RIGHT (ALL INC) EACH ADD Right 5/21/2021    TOTAL ABDOMINAL HYSTERECTOMY  02/05/2008    TUBAL LIGATION      TUBAL LIGATION  1989    UPPER GASTROINTESTINAL ENDOSCOPY          Social History     Tobacco Use   Smoking Status Every Day    Current packs/day: 1.00    Average packs/day: 1 pack/day for 46.6 years (46.6 ttl pk-yrs)    Types: Cigarettes    Start date: 1/1/1978   Smokeless Tobacco Never        Social History     Substance and Sexual Activity   Alcohol Use Not Currently    Comment: last alcoholic drink was 11/25/2023        Social History     Substance and Sexual Activity   Drug Use No        Allergies   Allergen Reactions    Sulfa Antibiotics Shortness Of Breath    Ace Inhibitors Cough and Other (See Comments)     coughing       Current Outpatient Medications   Medication Sig Dispense Refill    albuterol (2.5 mg/3 mL) 0.083 % nebulizer solution Take 3 mL (2.5 mg total) by nebulization every 6 (six) hours as needed for wheezing or shortness of breath 180 mL 5    albuterol (Proventil HFA) 90 mcg/act inhaler Inhale 2 puffs every 6 (six) hours as needed for wheezing or shortness of breath 18 g 3    ALPRAZolam (XANAX) 1 mg tablet Take 1 tablet (1 mg total) by mouth 2 (two) times a day as needed for anxiety 60 tablet 2    anastrozole (ARIMIDEX) 1 mg tablet Take 1 tablet (1 mg total) by mouth daily 90 tablet 1    budesonide-formoterol (Symbicort) 160-4.5 mcg/act inhaler Inhale 2 puffs 2 (two) times a day Rinse mouth  "after use. 10.2 g 1    ergocalciferol (VITAMIN D2) 50,000 units Take one capsule weekly x12 weeks 12 capsule 0    furosemide (LASIX) 20 mg tablet Take 1 tablet (20 mg total) by mouth daily 30 tablet 11    lactulose 20 g/30 mL Take 45 mL (30 g total) by mouth 2 (two) times a day Titrate dose for an effect of 3 soft, but formed bowel movements per day.  You can start with 1 tablespoon per day and slowly increase to effect. 2700 mL 0    midodrine (PROAMATINE) 2.5 mg tablet Take 3 tablets (7.5 mg total) by mouth 3 (three) times a day before meals 810 tablet 3    naltrexone (REVIA) 50 mg tablet Take 1 tablet (50 mg total) by mouth daily 30 tablet 5    pantoprazole (PROTONIX) 20 mg tablet TAKE ONE TABLET BY MOUTH EVERY DAY 90 tablet 1    Risankizumab-rzaa (SKYRIZI PEN SC) Inject under the skin      spironolactone (ALDACTONE) 50 mg tablet Take 1 tablet (50 mg total) by mouth daily 30 tablet 11    traZODone (DESYREL) 50 mg tablet Take 1 tablet (50 mg total) by mouth daily at bedtime 30 tablet 5     Current Facility-Administered Medications   Medication Dose Route Frequency Provider Last Rate Last Admin    lidocaine (PF) (XYLOCAINE-MPF) 1 % injection    PRN Johnny Corral MD   10 mL at 08/14/24 1249          Objective:    Vitals:    08/14/24 1222   BP: 122/63   Pulse: 90   Resp: 20   SpO2: 92%        Physical Exam  Constitutional:       Appearance: Normal appearance.   Pulmonary:      Effort: Pulmonary effort is normal.           No results found for: \"BNP\"   Lab Results   Component Value Date    WBC 5.02 07/25/2024    HGB 13.3 07/25/2024    HCT 38.1 07/25/2024     (H) 07/25/2024    PLT 87 (L) 07/25/2024     Lab Results   Component Value Date    INR 1.4 (H) 06/07/2024    INR 1.5 (H) 05/03/2024    INR 1.3 (H) 03/09/2024    PROTIME 14.5 (H) 06/07/2024    PROTIME 14.9 (H) 05/03/2024    PROTIME 13.6 (H) 03/09/2024     Lab Results   Component Value Date    PTT 33 11/25/2023         I have personally reviewed pertinent imaging " and laboratory results.     Code Status: Prior  Advance Directive and Living Will:      Power of :    POLST:      This text is generated with voice recognition software. There may be translation, syntax,  or grammatical errors. If you have any questions, please contact the dictating provider.

## 2024-08-14 NOTE — BRIEF OP NOTE (RAD/CATH)
INTERVENTIONAL RADIOLOGY PROCEDURE NOTE    Date: 8/14/2024    Procedure:   Procedure Summary       Date: 08/14/24 Room / Location: [facility] Interventional Radiology    Anesthesia Start:  Anesthesia Stop:     Procedure: IR THORACENTESIS Diagnosis:       Alcoholic cirrhosis of liver with ascites (HCC)      Hydrothorax      (refractory ascites/hepatic hydrothorax)    Scheduled Providers:  Responsible Provider:     Anesthesia Type: Not recorded ASA Status: Not recorded            Preoperative diagnosis:   1. Alcoholic cirrhosis of liver with ascites (HCC)    2. Hydrothorax         Postoperative diagnosis: Same.    Surgeon: Johnny Corral MD     Assistant: None. No qualified resident was available.    Blood loss: None    Specimens: 1.4 L sheryl light tan pleural fluid removed.     Findings: Successful right thoracentesis.    Complications: None immediate.    Anesthesia: local

## 2024-08-14 NOTE — DISCHARGE INSTRUCTIONS
Thoracentesis   WHAT YOU NEED TO KNOW:   A thoracentesis is a procedure to remove extra fluid or air from between your lungs and your inner chest wall. Air or fluid buildup may make it hard for you to breathe. A thoracentesis allows your lungs to expand fully so you can breathe more easily.  DISCHARGE INSTRUCTIONS:   Medicines:   Pain medicine:  You may be given a prescription medicine to decrease pain. Do not wait until the pain is severe before you take your medicine.    Antibiotics:  This medicine helps fight or prevent an infection.    Take your medicine as directed.  Call your healthcare provider if you think your medicine is not helping or if you have side effects. Tell him if you are allergic to any medicine. Keep a list of the medicines, vitamins, and herbs you take. Include the amounts, and when and why you take them. Bring the list or the pill bottles to follow-up visits. Carry your medicine list with you in case of an emergency.  Follow up with your healthcare provider as directed:  Write down your questions so you remember to ask them during your visits.   Rest:  Rest when you feel it is needed. Slowly start to do more each day. Return to your daily activities as directed.   Do not smoke:  If you smoke, it is never too late to quit. Ask for information about how to stop smoking if you need help.  Contact your healthcare provider if:   You have a fever.    Your puncture site is red, warm, swollen, or draining pus.    You have questions or concerns about your procedure, medicine, or care.    If you have any questions regarding, call the IR department @ [phone].     Seek care immediately or call 911 if:   Blood soaks through your bandage.    There is blood in your spit.

## 2024-08-19 ENCOUNTER — TELEPHONE (OUTPATIENT)
Age: 64
End: 2024-08-19

## 2024-08-19 NOTE — TELEPHONE ENCOUNTER
Patient call in regards to having a UTI due to her peeing and it burn and patient would like to coming in for a visit early but only appointment Gricelda has is at 2:30pm and patient has another appointment at that time. Patient would like to see if she can get something for this or an appointment early today. Thank you

## 2024-08-19 NOTE — TELEPHONE ENCOUNTER
Patient called in for status update on prior request for UTI care. Patient called back to let us know that she went to urgent care .

## 2024-08-20 ENCOUNTER — HOSPITAL ENCOUNTER (OUTPATIENT)
Dept: BONE DENSITY | Facility: IMAGING CENTER | Age: 64
Discharge: HOME | End: 2024-08-20
Payer: MEDICARE

## 2024-08-20 DIAGNOSIS — Z78.0 ASYMPTOMATIC POSTMENOPAUSAL STATE: ICD-10-CM

## 2024-08-20 PROCEDURE — 77080 DXA BONE DENSITY AXIAL: CPT

## 2024-08-21 ENCOUNTER — HOSPITAL ENCOUNTER (OUTPATIENT)
Dept: INTERVENTIONAL RADIOLOGY/VASCULAR | Facility: HOSPITAL | Age: 64
Discharge: HOME | End: 2024-08-21
Attending: INTERNAL MEDICINE
Payer: MEDICARE

## 2024-08-21 DIAGNOSIS — K70.31 ALCOHOLIC CIRRHOSIS OF LIVER WITH ASCITES (HCC): ICD-10-CM

## 2024-08-21 DIAGNOSIS — J94.8 HYDROTHORAX: ICD-10-CM

## 2024-08-21 PROCEDURE — 32555 ASPIRATE PLEURA W/ IMAGING: CPT | Performed by: RADIOLOGY

## 2024-08-21 PROCEDURE — 32555 ASPIRATE PLEURA W/ IMAGING: CPT

## 2024-08-21 RX ORDER — LIDOCAINE HYDROCHLORIDE 10 MG/ML
INJECTION, SOLUTION EPIDURAL; INFILTRATION; INTRACAUDAL; PERINEURAL AS NEEDED
Status: COMPLETED | OUTPATIENT
Start: 2024-08-21 | End: 2024-08-21

## 2024-08-21 RX ADMIN — LIDOCAINE HYDROCHLORIDE 8 ML: 10 INJECTION, SOLUTION EPIDURAL; INFILTRATION; INTRACAUDAL; PERINEURAL at 11:44

## 2024-08-21 NOTE — SEDATION DOCUMENTATION
Weekly therapeutic right thoracentesis with removal 1225ml clear yellow fluid. Band aid to site. Patient tolerated well, AVS reviewed and ambulated home.

## 2024-08-21 NOTE — DISCHARGE INSTRUCTIONS
Thoracentesis   WHAT YOU NEED TO KNOW:   A thoracentesis is a procedure to remove extra fluid or air from between your lungs and your inner chest wall. Air or fluid buildup may make it hard for you to breathe. A thoracentesis allows your lungs to expand fully so you can breathe more easily.  DISCHARGE INSTRUCTIONS:   Medicines:   Pain medicine:  You may be given a prescription medicine to decrease pain. Do not wait until the pain is severe before you take your medicine.    Antibiotics:  This medicine helps fight or prevent an infection.    Take your medicine as directed.  Call your healthcare provider if you think your medicine is not helping or if you have side effects. Tell him if you are allergic to any medicine. Keep a list of the medicines, vitamins, and herbs you take. Include the amounts, and when and why you take them. Bring the list or the pill bottles to follow-up visits. Carry your medicine list with you in case of an emergency.  Follow up with your healthcare provider as directed:  Write down your questions so you remember to ask them during your visits.   Rest:  Rest when you feel it is needed. Slowly start to do more each day. Return to your daily activities as directed.   Do not smoke:  If you smoke, it is never too late to quit. Ask for information about how to stop smoking if you need help.  Contact your healthcare provider if:   You have a fever.    Your puncture site is red, warm, swollen, or draining pus.    You have questions or concerns about your procedure, medicine, or care.    If you have any questions regarding, call the IR department @ [phone].     Seek care immediately or call 911 if:   Blood soaks through your bandage.    There is blood in your spit.

## 2024-08-23 ENCOUNTER — TELEPHONE (OUTPATIENT)
Dept: GASTROENTEROLOGY | Facility: CLINIC | Age: 64
End: 2024-08-23

## 2024-08-23 NOTE — TELEPHONE ENCOUNTER
Called patient to schedule new patient appointment with one of our hepatology providers. Patient will call back to schedule. Patient sent [employer] message with phone number to call back.

## 2024-08-23 NOTE — TELEPHONE ENCOUNTER
----- Message from Adia ANTONIO sent at 8/23/2024 10:05 AM EDT -----    ----- Message -----  From: Melissa Rubio MD  Sent: 8/22/2024  12:25 PM EDT  To: Gastroenterology [facility], it looks like this patient has had recurrent thoracentesis for hepatohydrothorax. Can we please get her an urgent appointment to review her diuretics and consideration of TIPS? Can book in fellow's clinic.    Melissa  ----- Message -----  From: Interface, Radiology Results In  Sent: 8/21/2024   2:41 PM EDT  To: Vamsi Herrera MD

## 2024-08-26 DIAGNOSIS — M85.89 OSTEOPENIA OF MULTIPLE SITES: Primary | ICD-10-CM

## 2024-08-26 DIAGNOSIS — Z78.0 POST-MENOPAUSAL: ICD-10-CM

## 2024-08-26 RX ORDER — ALENDRONATE SODIUM 70 MG/1
70 TABLET ORAL
Qty: 12 TABLET | Refills: 3 | Status: SHIPPED | OUTPATIENT
Start: 2024-08-26

## 2024-08-27 ENCOUNTER — TRANSCRIBE ORDERS (OUTPATIENT)
Dept: GASTROENTEROLOGY | Facility: CLINIC | Age: 64
End: 2024-08-27

## 2024-08-28 ENCOUNTER — TELEPHONE (OUTPATIENT)
Age: 64
End: 2024-08-28

## 2024-08-28 ENCOUNTER — HOSPITAL ENCOUNTER (OUTPATIENT)
Dept: INTERVENTIONAL RADIOLOGY/VASCULAR | Facility: HOSPITAL | Age: 64
Discharge: HOME | End: 2024-08-28
Attending: INTERNAL MEDICINE | Admitting: RADIOLOGY
Payer: MEDICARE

## 2024-08-28 VITALS
SYSTOLIC BLOOD PRESSURE: 105 MMHG | RESPIRATION RATE: 18 BRPM | OXYGEN SATURATION: 93 % | DIASTOLIC BLOOD PRESSURE: 62 MMHG | HEART RATE: 94 BPM

## 2024-08-28 DIAGNOSIS — N76.0 ACUTE VAGINITIS: Primary | ICD-10-CM

## 2024-08-28 DIAGNOSIS — K70.31 ALCOHOLIC CIRRHOSIS OF LIVER WITH ASCITES (HCC): ICD-10-CM

## 2024-08-28 DIAGNOSIS — J94.8 HYDROTHORAX: ICD-10-CM

## 2024-08-28 PROCEDURE — 32555 ASPIRATE PLEURA W/ IMAGING: CPT | Performed by: RADIOLOGY

## 2024-08-28 PROCEDURE — 32555 ASPIRATE PLEURA W/ IMAGING: CPT

## 2024-08-28 RX ORDER — FLUCONAZOLE 150 MG/1
TABLET ORAL
Qty: 2 TABLET | Refills: 0 | Status: SHIPPED | OUTPATIENT
Start: 2024-08-28 | End: 2024-09-04

## 2024-08-28 NOTE — SEDATION DOCUMENTATION
Therapeutic thoracentesis, R. Removed 1500ml clear yellow fluid. Patient tolerated the procedure well. Post the procedure, Patients BP dropped to 76/43 after draining. There was still more fluid, but we decided to stop. BP was checked multiple times until it was close to starting pressure. Patient stated she felt slight dizziness. Patient was laid down and given some water. Finally BP was 105/62. Patient felt much better, she was escorted out the front of the hospital by our tech, Julio C. Patient was given AVS.

## 2024-08-28 NOTE — DISCHARGE INSTRUCTIONS
Thoracentesis   WHAT YOU NEED TO KNOW:   A thoracentesis is a procedure to remove extra fluid or air from between your lungs and your inner chest wall. Air or fluid buildup may make it hard for you to breathe. A thoracentesis allows your lungs to expand fully so you can breathe more easily.  DISCHARGE INSTRUCTIONS:   Medicines:   Pain medicine:  You may be given a prescription medicine to decrease pain. Do not wait until the pain is severe before you take your medicine.    Antibiotics:  This medicine helps fight or prevent an infection.    Take your medicine as directed.  Call your healthcare provider if you think your medicine is not helping or if you have side effects. Tell him if you are allergic to any medicine. Keep a list of the medicines, vitamins, and herbs you take. Include the amounts, and when and why you take them. Bring the list or the pill bottles to follow-up visits. Carry your medicine list with you in case of an emergency.  Follow up with your healthcare provider as directed:  Write down your questions so you remember to ask them during your visits.   Rest:  Rest when you feel it is needed. Slowly start to do more each day. Return to your daily activities as directed.   Do not smoke:  If you smoke, it is never too late to quit. Ask for information about how to stop smoking if you need help.  Contact your healthcare provider if:   You have a fever.    Your puncture site is red, warm, swollen, or draining pus.    You have questions or concerns about your procedure, medicine, or care.    If you have any questions regarding, call the IR department @ [phone].     Seek care immediately or call 911 if:   Blood soaks through your bandage.    There is blood in your spit.

## 2024-08-28 NOTE — TELEPHONE ENCOUNTER
Pt finished a round of antibiotics and stated she has a yeast infection. Would like to know if Gricelda can send something to the pharmacy. Pt requested I send a message as opposed to scheduling.    Please advise pt if she'll need an appt.

## 2024-08-29 ENCOUNTER — TRANSCRIBE ORDERS (OUTPATIENT)
Dept: GASTROENTEROLOGY | Facility: CLINIC | Age: 64
End: 2024-08-29

## 2024-08-29 DIAGNOSIS — K70.31 ALCOHOLIC CIRRHOSIS OF LIVER WITH ASCITES (HCC): Chronic | ICD-10-CM

## 2024-08-29 RX ORDER — SPIRONOLACTONE 50 MG/1
50 TABLET, FILM COATED ORAL DAILY
Qty: 30 TABLET | Refills: 5 | Status: SHIPPED | OUTPATIENT
Start: 2024-08-29 | End: 2025-02-25

## 2024-08-29 NOTE — TELEPHONE ENCOUNTER
Cc'd chart      Reason for call:   [x] Refill   [] Prior Auth  [] Other:     Office:   [] PCP/Provider -   [x] Specialty/Provider -     Medication: Aldactone    Dose/Frequency: 50 mg     Quantity: #30    Pharmacy: [facility]     Does the patient have enough for 3 days?   [] Yes   [] No - Send as HP to POD

## 2024-09-04 ENCOUNTER — HOSPITAL ENCOUNTER (OUTPATIENT)
Dept: INTERVENTIONAL RADIOLOGY/VASCULAR | Facility: HOSPITAL | Age: 64
Discharge: HOME | End: 2024-09-04
Attending: INTERNAL MEDICINE | Admitting: INTERNAL MEDICINE
Payer: MEDICARE

## 2024-09-04 VITALS
RESPIRATION RATE: 19 BRPM | OXYGEN SATURATION: 95 % | SYSTOLIC BLOOD PRESSURE: 108 MMHG | DIASTOLIC BLOOD PRESSURE: 55 MMHG | HEART RATE: 84 BPM

## 2024-09-04 DIAGNOSIS — K70.31 ALCOHOLIC CIRRHOSIS OF LIVER WITH ASCITES (HCC): ICD-10-CM

## 2024-09-04 DIAGNOSIS — J94.8 HYDROTHORAX: ICD-10-CM

## 2024-09-04 PROCEDURE — 32555 ASPIRATE PLEURA W/ IMAGING: CPT

## 2024-09-04 PROCEDURE — 32555 ASPIRATE PLEURA W/ IMAGING: CPT | Performed by: RADIOLOGY

## 2024-09-04 RX ORDER — LIDOCAINE HYDROCHLORIDE 10 MG/ML
INJECTION, SOLUTION EPIDURAL; INFILTRATION; INTRACAUDAL; PERINEURAL AS NEEDED
Status: COMPLETED | OUTPATIENT
Start: 2024-09-04 | End: 2024-09-04

## 2024-09-04 RX ADMIN — LIDOCAINE HYDROCHLORIDE 8 ML: 10 INJECTION, SOLUTION EPIDURAL; INFILTRATION; INTRACAUDAL; PERINEURAL at 11:54

## 2024-09-04 NOTE — SEDATION DOCUMENTATION
Weekly right thoracentesis with removal 1200ml clear yellow fluid. Band aid to site. BP dropped slightly from baseline and patient placed in supine position with BP returning to baseline. Apple juice offered.  present at bedside. AVS reviewed and patient ambulated home in stable condition.

## 2024-09-04 NOTE — DISCHARGE INSTRUCTIONS
Thoracentesis   WHAT YOU NEED TO KNOW:   A thoracentesis is a procedure to remove extra fluid or air from between your lungs and your inner chest wall. Air or fluid buildup may make it hard for you to breathe. A thoracentesis allows your lungs to expand fully so you can breathe more easily.  DISCHARGE INSTRUCTIONS:   Medicines:   Pain medicine:  You may be given a prescription medicine to decrease pain. Do not wait until the pain is severe before you take your medicine.    Antibiotics:  This medicine helps fight or prevent an infection.    Take your medicine as directed.  Call your healthcare provider if you think your medicine is not helping or if you have side effects. Tell him if you are allergic to any medicine. Keep a list of the medicines, vitamins, and herbs you take. Include the amounts, and when and why you take them. Bring the list or the pill bottles to follow-up visits. Carry your medicine list with you in case of an emergency.  Follow up with your healthcare provider as directed:  Write down your questions so you remember to ask them during your visits.   Rest:  Rest when you feel it is needed. Slowly start to do more each day. Return to your daily activities as directed.   Do not smoke:  If you smoke, it is never too late to quit. Ask for information about how to stop smoking if you need help.  Contact your healthcare provider if:   You have a fever.    Your puncture site is red, warm, swollen, or draining pus.    You have questions or concerns about your procedure, medicine, or care.    If you have any questions regarding, call the IR department @ [phone].     Seek care immediately or call 911 if:   Blood soaks through your bandage.    There is blood in your spit.

## 2024-09-05 ENCOUNTER — TELEPHONE (OUTPATIENT)
Age: 64
End: 2024-09-05

## 2024-09-05 NOTE — TELEPHONE ENCOUNTER
Attempted to call patient's home number and cell phone number unable to lVM to let patient know about new address for [place] office which is [address]. Also called patient to see if patient can come in for sooner appointment.

## 2024-09-06 ENCOUNTER — TELEPHONE (OUTPATIENT)
Age: 64
End: 2024-09-06

## 2024-09-06 ENCOUNTER — PATIENT MESSAGE (OUTPATIENT)
Dept: GASTROENTEROLOGY | Facility: CLINIC | Age: 64
End: 2024-09-06

## 2024-09-06 DIAGNOSIS — R39.9 UTI SYMPTOMS: Primary | ICD-10-CM

## 2024-09-06 DIAGNOSIS — K70.31 ALCOHOLIC CIRRHOSIS OF LIVER WITH ASCITES (HCC): Chronic | ICD-10-CM

## 2024-09-06 DIAGNOSIS — K76.0 FATTY LIVER: Primary | ICD-10-CM

## 2024-09-06 NOTE — TELEPHONE ENCOUNTER
Patient went to [facility] with symptoms of a UTI.  She said she couldn't get an appointment with the practice and stated it is still burning down there and is going to need a medication called into her pharmacy.  She said she was talking ASO, but is still having symptoms.  Please advise.

## 2024-09-06 NOTE — TELEPHONE ENCOUNTER
Called patient, she did take antibiotics for 7 days keflex. States she is still having symptoms, Burning w/ each pee.    [facility] [phone]

## 2024-09-10 LAB
ALBUMIN SERPL-MCNC: 2.4 G/DL (ref 3.6–5.1)
ALBUMIN/GLOB SERPL: 0.5 (CALC) (ref 1–2.5)
ALP SERPL-CCNC: 109 U/L (ref 37–153)
ALT SERPL-CCNC: 30 U/L (ref 6–29)
APPEARANCE UR: CLEAR
AST SERPL-CCNC: 61 U/L (ref 10–35)
BACTERIA UR QL AUTO: ABNORMAL /HPF
BASOPHILS # BLD AUTO: 122 CELLS/UL (ref 0–200)
BASOPHILS NFR BLD AUTO: 2.3 %
BILIRUB SERPL-MCNC: 2.5 MG/DL (ref 0.2–1.2)
BILIRUB UR QL STRIP: NEGATIVE
BUN SERPL-MCNC: 6 MG/DL (ref 7–25)
BUN/CREAT SERPL: 9 (CALC) (ref 6–22)
CALCIUM SERPL-MCNC: 8 MG/DL (ref 8.6–10.4)
CHLORIDE SERPL-SCNC: 92 MMOL/L (ref 98–110)
CO2 SERPL-SCNC: 26 MMOL/L (ref 20–32)
COLOR UR: YELLOW
CREAT SERPL-MCNC: 0.67 MG/DL (ref 0.5–1.05)
EOSINOPHIL # BLD AUTO: 101 CELLS/UL (ref 15–500)
EOSINOPHIL NFR BLD AUTO: 1.9 %
ERYTHROCYTE [DISTWIDTH] IN BLOOD BY AUTOMATED COUNT: 13.3 % (ref 11–15)
GFR/BSA.PRED SERPLBLD CYS-BASED-ARV: 97 ML/MIN/1.73M2
GLOBULIN SER CALC-MCNC: 4.8 G/DL (CALC) (ref 1.9–3.7)
GLUCOSE SERPL-MCNC: 109 MG/DL (ref 65–99)
GLUCOSE UR QL STRIP: NEGATIVE
HCT VFR BLD AUTO: 36.7 % (ref 35–45)
HGB BLD-MCNC: 13.1 G/DL (ref 11.7–15.5)
HGB UR QL STRIP: NEGATIVE
HYALINE CASTS #/AREA URNS LPF: ABNORMAL /LPF
INR PPP: 1.4
KETONES UR QL STRIP: NEGATIVE
LEUKOCYTE ESTERASE UR QL STRIP: ABNORMAL
LYMPHOCYTES # BLD AUTO: 493 CELLS/UL (ref 850–3900)
LYMPHOCYTES NFR BLD AUTO: 9.3 %
MCH RBC QN AUTO: 38.5 PG (ref 27–33)
MCHC RBC AUTO-ENTMCNC: 35.7 G/DL (ref 32–36)
MCV RBC AUTO: 107.9 FL (ref 80–100)
MONOCYTES # BLD AUTO: 853 CELLS/UL (ref 200–950)
MONOCYTES NFR BLD AUTO: 16.1 %
NEUTROPHILS # BLD AUTO: 3731 CELLS/UL (ref 1500–7800)
NEUTROPHILS NFR BLD AUTO: 70.4 %
NITRITE UR QL STRIP: NEGATIVE
PH UR STRIP: 6 [PH] (ref 5–8)
PLATELET # BLD AUTO: 95 THOUSAND/UL (ref 140–400)
PMV BLD REES-ECKER: 9.7 FL (ref 7.5–12.5)
POTASSIUM SERPL-SCNC: 3.3 MMOL/L (ref 3.5–5.3)
PROT SERPL-MCNC: 7.2 G/DL (ref 6.1–8.1)
PROT UR QL STRIP: NEGATIVE
PROTHROMBIN TIME: 14.1 SEC (ref 9–11.5)
RBC # BLD AUTO: 3.4 MILLION/UL (ref 3.8–5.1)
RBC #/AREA URNS HPF: ABNORMAL /HPF
SODIUM SERPL-SCNC: 126 MMOL/L (ref 135–146)
SP GR UR STRIP: 1.01 (ref 1–1.03)
SQUAMOUS #/AREA URNS HPF: ABNORMAL /HPF
WBC # BLD AUTO: 5.3 THOUSAND/UL (ref 3.8–10.8)
WBC #/AREA URNS HPF: ABNORMAL /HPF

## 2024-09-11 ENCOUNTER — HOSPITAL ENCOUNTER (OUTPATIENT)
Dept: INTERVENTIONAL RADIOLOGY/VASCULAR | Facility: HOSPITAL | Age: 64
Discharge: HOME | End: 2024-09-11
Attending: INTERNAL MEDICINE
Payer: MEDICARE

## 2024-09-11 VITALS
OXYGEN SATURATION: 92 % | RESPIRATION RATE: 18 BRPM | SYSTOLIC BLOOD PRESSURE: 87 MMHG | DIASTOLIC BLOOD PRESSURE: 53 MMHG | HEART RATE: 95 BPM

## 2024-09-11 DIAGNOSIS — J94.8 HYDROTHORAX: ICD-10-CM

## 2024-09-11 DIAGNOSIS — K70.31 ALCOHOLIC CIRRHOSIS OF LIVER WITH ASCITES (HCC): ICD-10-CM

## 2024-09-11 PROCEDURE — 32555 ASPIRATE PLEURA W/ IMAGING: CPT | Performed by: RADIOLOGY

## 2024-09-11 PROCEDURE — 32555 ASPIRATE PLEURA W/ IMAGING: CPT

## 2024-09-11 NOTE — DISCHARGE INSTRUCTIONS
Thoracentesis   WHAT YOU NEED TO KNOW:   A thoracentesis is a procedure to remove extra fluid or air from between your lungs and your inner chest wall. Air or fluid buildup may make it hard for you to breathe. A thoracentesis allows your lungs to expand fully so you can breathe more easily.  DISCHARGE INSTRUCTIONS:   Medicines:   Pain medicine:  You may be given a prescription medicine to decrease pain. Do not wait until the pain is severe before you take your medicine.    Antibiotics:  This medicine helps fight or prevent an infection.    Take your medicine as directed.  Call your healthcare provider if you think your medicine is not helping or if you have side effects. Tell him if you are allergic to any medicine. Keep a list of the medicines, vitamins, and herbs you take. Include the amounts, and when and why you take them. Bring the list or the pill bottles to follow-up visits. Carry your medicine list with you in case of an emergency.  Follow up with your healthcare provider as directed:  Write down your questions so you remember to ask them during your visits.   Rest:  Rest when you feel it is needed. Slowly start to do more each day. Return to your daily activities as directed.   Do not smoke:  If you smoke, it is never too late to quit. Ask for information about how to stop smoking if you need help.  Contact your healthcare provider if:   You have a fever.    Your puncture site is red, warm, swollen, or draining pus.    You have questions or concerns about your procedure, medicine, or care.    If you have any questions regarding, call the IR department @ [phone].     Seek care immediately or call 911 if:   Blood soaks through your bandage.    There is blood in your spit.

## 2024-09-17 ENCOUNTER — TELEPHONE (OUTPATIENT)
Dept: NEPHROLOGY | Facility: CLINIC | Age: 64
End: 2024-09-17

## 2024-09-18 ENCOUNTER — HOSPITAL ENCOUNTER (OUTPATIENT)
Dept: INTERVENTIONAL RADIOLOGY/VASCULAR | Facility: HOSPITAL | Age: 64
Discharge: HOME | End: 2024-09-18
Attending: INTERNAL MEDICINE
Payer: MEDICARE

## 2024-09-18 ENCOUNTER — HOSPITAL ENCOUNTER (OUTPATIENT)
Dept: INTERVENTIONAL RADIOLOGY/VASCULAR | Facility: HOSPITAL | Age: 64
Discharge: HOME | End: 2024-09-18
Payer: MEDICARE

## 2024-09-18 VITALS
HEART RATE: 82 BPM | OXYGEN SATURATION: 94 % | RESPIRATION RATE: 20 BRPM | DIASTOLIC BLOOD PRESSURE: 54 MMHG | SYSTOLIC BLOOD PRESSURE: 96 MMHG

## 2024-09-18 DIAGNOSIS — J94.8 HYDROTHORAX: ICD-10-CM

## 2024-09-18 DIAGNOSIS — K70.31 ASCITES DUE TO ALCOHOLIC CIRRHOSIS (HCC): ICD-10-CM

## 2024-09-18 DIAGNOSIS — K70.31 ALCOHOLIC CIRRHOSIS OF LIVER WITH ASCITES (HCC): ICD-10-CM

## 2024-09-18 LAB
ALBUMIN FLD-MCNC: <1.5 G/DL
APPEARANCE FLD: CLEAR
COLOR FLD: YELLOW
GLUCOSE FLD-MCNC: 129 MG/DL
HISTIOCYTES NFR FLD: 68 %
LDH FLD L TO P-CCNC: 31 U/L
LYMPHOCYTES NFR BLD AUTO: 10 %
MONO+MESO NFR FLD MANUAL: 2 %
NEUTS SEG NFR BLD AUTO: 20 %
PROT FLD-MCNC: <3 G/DL
SITE: NORMAL
TOTAL CELLS COUNTED SPEC: 100
TOTAL PROTEIN FLUID: 0.6 G/DL
WBC # FLD MANUAL: 220 /UL

## 2024-09-18 PROCEDURE — 32555 ASPIRATE PLEURA W/ IMAGING: CPT

## 2024-09-18 PROCEDURE — 49083 ABD PARACENTESIS W/IMAGING: CPT | Performed by: RADIOLOGY

## 2024-09-18 PROCEDURE — 76937 US GUIDE VASCULAR ACCESS: CPT

## 2024-09-18 PROCEDURE — 82945 GLUCOSE OTHER FLUID: CPT | Performed by: INTERNAL MEDICINE

## 2024-09-18 PROCEDURE — 87070 CULTURE OTHR SPECIMN AEROBIC: CPT | Performed by: INTERNAL MEDICINE

## 2024-09-18 PROCEDURE — 88112 CYTOPATH CELL ENHANCE TECH: CPT | Performed by: PATHOLOGY

## 2024-09-18 PROCEDURE — 88305 TISSUE EXAM BY PATHOLOGIST: CPT | Performed by: PATHOLOGY

## 2024-09-18 PROCEDURE — 49083 ABD PARACENTESIS W/IMAGING: CPT

## 2024-09-18 PROCEDURE — 87205 SMEAR GRAM STAIN: CPT | Performed by: INTERNAL MEDICINE

## 2024-09-18 PROCEDURE — 84157 ASSAY OF PROTEIN OTHER: CPT | Performed by: INTERNAL MEDICINE

## 2024-09-18 PROCEDURE — 83615 LACTATE (LD) (LDH) ENZYME: CPT | Performed by: INTERNAL MEDICINE

## 2024-09-18 PROCEDURE — 32555 ASPIRATE PLEURA W/ IMAGING: CPT | Performed by: RADIOLOGY

## 2024-09-18 PROCEDURE — 89051 BODY FLUID CELL COUNT: CPT | Performed by: INTERNAL MEDICINE

## 2024-09-18 PROCEDURE — 82042 OTHER SOURCE ALBUMIN QUAN EA: CPT | Performed by: INTERNAL MEDICINE

## 2024-09-18 NOTE — DISCHARGE INSTRUCTIONS
Thoracentesis   WHAT YOU NEED TO KNOW:   A thoracentesis is a procedure to remove extra fluid or air from between your lungs and your inner chest wall. Air or fluid buildup may make it hard for you to breathe. A thoracentesis allows your lungs to expand fully so you can breathe more easily.  DISCHARGE INSTRUCTIONS:   Medicines:   Pain medicine:  You may be given a prescription medicine to decrease pain. Do not wait until the pain is severe before you take your medicine.    Antibiotics:  This medicine helps fight or prevent an infection.    Take your medicine as directed.  Call your healthcare provider if you think your medicine is not helping or if you have side effects. Tell him if you are allergic to any medicine. Keep a list of the medicines, vitamins, and herbs you take. Include the amounts, and when and why you take them. Bring the list or the pill bottles to follow-up visits. Carry your medicine list with you in case of an emergency.  Follow up with your healthcare provider as directed:  Write down your questions so you remember to ask them during your visits.   Rest:  Rest when you feel it is needed. Slowly start to do more each day. Return to your daily activities as directed.   Do not smoke:  If you smoke, it is never too late to quit. Ask for information about how to stop smoking if you need help.  Contact your healthcare provider if:   You have a fever.    Your puncture site is red, warm, swollen, or draining pus.    You have questions or concerns about your procedure, medicine, or care.    If you have any questions regarding, call the IR department @ [phone].     Seek care immediately or call 911 if:   Blood soaks through your bandage.    There is blood in your spit.              Abdominal Paracentesis     WHAT YOU NEED TO KNOW:   Abdominal paracentesis is a procedure to remove abnormal fluid buildup in your abdomen. Fluid builds up because of liver problems, such as swelling and scarring. Heart  failure, kidney disease, a mass, or problems with your pancreas may also cause fluid buildup.     DISCHARGE INSTRUCTIONS:     Follow up with your healthcare provider as directed: Write down your questions so you remember to ask them during your visits.     Wound care: Remove dressing after 24 hours. Leave glue in place.    Return to your normal activities    Contact Interventional Radiology at [phone] ) if:  You have a fever and your wound is red and swollen.   You have yellow, green, or bad-smelling discharge coming from your wound.   You have pain or swelling in your abdomen.   You have an upset stomach or you vomit.   You have sudden, sharp pain in your abdomen.   You urinate very little or not at all.   You feel confused and more tired than usual.   Your arm or leg feels warm, tender, and painful. It may look swollen and red.   You suddenly feel lightheaded and have trouble breathing.

## 2024-09-18 NOTE — TELEPHONE ENCOUNTER
Pt scheduled follow up on 1/8 with Dr Tovar in the [facility] via My Chart and is on a wait list. I called the pt and LVM stating that Josemanuel is the soonest with Dr Tovar, but if she would like to schedule sooner with an AP she can contact the office.

## 2024-09-20 PROCEDURE — 88112 CYTOPATH CELL ENHANCE TECH: CPT | Performed by: PATHOLOGY

## 2024-09-20 PROCEDURE — 88305 TISSUE EXAM BY PATHOLOGIST: CPT | Performed by: PATHOLOGY

## 2024-09-21 LAB
BACTERIA SPEC BFLD CULT: NO GROWTH
GRAM STN SPEC: NORMAL
GRAM STN SPEC: NORMAL

## 2024-09-24 ENCOUNTER — TELEPHONE (OUTPATIENT)
Age: 64
End: 2024-09-24

## 2024-09-24 ENCOUNTER — TELEPHONE (OUTPATIENT)
Dept: FAMILY MEDICINE CLINIC | Facility: CLINIC | Age: 64
End: 2024-09-24

## 2024-09-24 NOTE — TELEPHONE ENCOUNTER
Dr Russell Miller Dentist [facility] called in, was asking for conformation on fosamax, confirmed she was taking and dosage.

## 2024-09-24 NOTE — TELEPHONE ENCOUNTER
Pts dentist, Dr Russell Miller, called to go over a few mediations with clinical staff. I warm transferred to [facility].

## 2024-09-25 ENCOUNTER — HOSPITAL ENCOUNTER (OUTPATIENT)
Dept: INTERVENTIONAL RADIOLOGY/VASCULAR | Facility: HOSPITAL | Age: 64
Discharge: HOME | End: 2024-09-25
Attending: INTERNAL MEDICINE | Admitting: RADIOLOGY
Payer: MEDICARE

## 2024-09-25 VITALS
DIASTOLIC BLOOD PRESSURE: 53 MMHG | SYSTOLIC BLOOD PRESSURE: 105 MMHG | HEART RATE: 97 BPM | OXYGEN SATURATION: 95 % | RESPIRATION RATE: 18 BRPM

## 2024-09-25 DIAGNOSIS — J94.8 HYDROTHORAX: ICD-10-CM

## 2024-09-25 DIAGNOSIS — K70.31 ALCOHOLIC CIRRHOSIS OF LIVER WITH ASCITES (HCC): ICD-10-CM

## 2024-09-25 PROCEDURE — 32555 ASPIRATE PLEURA W/ IMAGING: CPT

## 2024-09-25 PROCEDURE — 32555 ASPIRATE PLEURA W/ IMAGING: CPT | Performed by: RADIOLOGY

## 2024-09-25 NOTE — DISCHARGE INSTRUCTIONS
Thoracentesis   WHAT YOU NEED TO KNOW:   A thoracentesis is a procedure to remove extra fluid or air from between your lungs and your inner chest wall. Air or fluid buildup may make it hard for you to breathe. A thoracentesis allows your lungs to expand fully so you can breathe more easily.  DISCHARGE INSTRUCTIONS:   Medicines:   Pain medicine:  You may be given a prescription medicine to decrease pain. Do not wait until the pain is severe before you take your medicine.    Antibiotics:  This medicine helps fight or prevent an infection.    Take your medicine as directed.  Call your healthcare provider if you think your medicine is not helping or if you have side effects. Tell him if you are allergic to any medicine. Keep a list of the medicines, vitamins, and herbs you take. Include the amounts, and when and why you take them. Bring the list or the pill bottles to follow-up visits. Carry your medicine list with you in case of an emergency.  Follow up with your healthcare provider as directed:  Write down your questions so you remember to ask them during your visits.   Rest:  Rest when you feel it is needed. Slowly start to do more each day. Return to your daily activities as directed.   Do not smoke:  If you smoke, it is never too late to quit. Ask for information about how to stop smoking if you need help.  Contact your healthcare provider if:   You have a fever.    Your puncture site is red, warm, swollen, or draining pus.    You have questions or concerns about your procedure, medicine, or care.    If you have any questions regarding, call the IR department @ [phone].     Seek care immediately or call 911 if:   Blood soaks through your bandage.    There is blood in your spit.

## 2024-09-26 ENCOUNTER — OFFICE VISIT (OUTPATIENT)
Age: 64
End: 2024-09-26
Payer: MEDICARE

## 2024-09-26 VITALS
TEMPERATURE: 76.4 F | SYSTOLIC BLOOD PRESSURE: 104 MMHG | HEIGHT: 64 IN | DIASTOLIC BLOOD PRESSURE: 62 MMHG | WEIGHT: 147.6 LBS | BODY MASS INDEX: 25.2 KG/M2

## 2024-09-26 DIAGNOSIS — D69.6 THROMBOCYTOPENIA (HCC): ICD-10-CM

## 2024-09-26 DIAGNOSIS — K70.31 ALCOHOLIC CIRRHOSIS OF LIVER WITH ASCITES (HCC): Primary | Chronic | ICD-10-CM

## 2024-09-26 DIAGNOSIS — Z78.9 ALCOHOL USE: ICD-10-CM

## 2024-09-26 DIAGNOSIS — K76.82 HEPATIC ENCEPHALOPATHY (HCC): ICD-10-CM

## 2024-09-26 PROBLEM — F10.90 ALCOHOL USE: Status: ACTIVE | Noted: 2018-06-11

## 2024-09-26 PROCEDURE — 99215 OFFICE O/P EST HI 40 MIN: CPT | Performed by: INTERNAL MEDICINE

## 2024-09-26 NOTE — ASSESSMENT & PLAN NOTE
Last CBC with platelet count 95 on 9/9/24. Likely in setting of cirrhosis. No overt bleeding. Follows with hematology/oncology as outpatient, encouraged close follow up

## 2024-09-26 NOTE — PROGRESS NOTES
[facility] - Outpatient Follow-up Note  Jose White 64 y.o. female MRN: [id number]  Encounter: [id number]          ASSESSMENT AND PLAN:      Summary:    Ms. White is a 64 y.o. year old female with cirrhosis secondary to Chronic alcohol abuse which is decompensated with ascites, hepatic hydrothorax lower extremity edema, thrombocytopenia, esophageal varices, hypoalbuminemia and hypoprothrombinemia.    Problem List and Plan:    End Stage Liver Disease: Current MELD-Na Score is 23.  Ascites/hepatic hydrothorax poor controlled requiring weekly to twice weekly drainage.  Reordered thora and para as she has one remaining order.  Continues to drink daily (at least 3 drinks per day).  Strongly advised again to stop drinking.  I explained that she unfortunately is not a transplant candidate at this time due to ongoing alcohol use despite counseling.  Ascites/Hepatic Hydrothorax with recurrent severe hyponatremia:  Discontinue diuretics at this time.  Fluid restriction to 0026-1662 cc/day.  Repeat blood work weekly for now.  If no improvement of sodium with next blood draw, will likely like need admission for inpatient management.  Hepatic Encephalopathy: No previous history of overt hepatic encephalopathy, but has had stage 1 in past. Currently taking lactulose on occasion.  Ms. White and her family/care giver are aware of the signs/symptoms of hepatic encephalopathy and understand to seek immediate medical attention should they arise.  Esophageal Varices: Last EGD done on 1/17/24 showing mild PHG.  Repeat EGD in January, 2025.  Hepatoma Screening: MRI done 8/10/24 with no evidence of hepatoma.  Repeat hepatoma screening with [facility] in early February.  Nutritional status: Mild to moderate sarcopenia noted.  Encouraged high protein snacking, low salt diet.  If weight loss evident, will refer to nutritional counseling.     Health Maintenance:  Ms. White was counseled to  avoid alcohol and tobacco products.  Ms. White was also advised to avoid raw seafood/oysters and from swimming in unchlorinated neely, particularly from the [place], due to increased risk of infection from Vibrio Vulnificus.  Ms. White was also instructed to seek immediate medical attention should she develop fevers over 101 F, evidence of GI bleeding (black or bloody stools, bloody or coffee ground emesis) or confusion.  Ms. White was advised to avoid NSAIDs, if possible, due to increase risk of renal dysfunction, and advised that if she should use acetaminophen, dose should not exceed 2 grams/day.  Ms. White was recommend to remain up to date with adult vaccination, including influenza, pneumovax and meningococcus. Inactivated HSV and zoster vaccine is safe and encouraged by PCP.  Ms. White was instructed to call either our office or that of her referring doctor should she develop worsening symptoms related to lower extremity edema, ascites, jaundice or excessive bruising/bleeding.        FOLLOW-UP:  Return in about 4 months (around 1/26/2025).     VISIT DIAGNOSES AND ORDERS:      1. Alcoholic cirrhosis of liver with ascites (HCC)    2. Alcohol use    3. Thrombocytopenia (HCC)    4. Hepatic encephalopathy (HCC)        Orders Placed This Encounter   Procedures    IR OUT-Patient Thoracentesis    IR AMB Paracentesis    Comprehensive metabolic panel    CBC    Protime-INR    EGD     I have spent a total time of 47 minutes in caring for this patient on the day of the visit/encounter including Diagnostic results, Prognosis, Risks and benefits of tx options, Instructions for management, Patient and family education, Importance of tx compliance, Risk factor reductions, Impressions, Counseling / Coordination of care, Documenting in the medical record, and Reviewing / ordering tests, medicine, procedures   ".    ______________________________________________________________________    SUBJECTIVE:    Interval update 9/26/2024:  Since her last office visit with me in May, Jose has continued to require thoracentesis every week, most recently yesterday with removal of 1750 cc of fluid.    She is currently taking 20 mg of furosemide, 50 mg of spironolactone, 7.5 mg of midodrine 3 times daily.    She continues to take lactulose 30 mg 2 times daily titrated for an effective 3 soft bowel movements daily.    Since her last office visit, she saw my colleague, Dr. Jacobo Leonardo in June.    She had a DEXA scan done on August 20, 2024 showing osteopenia.  Her last abdominal imaging was an MRI in August showing a cirrhotic liver without suspicious mass.    Her last set of blood work was drawn on September 9, 2024:    MELD 3.0: 23 at 9/9/2024 10:50 AM  MELD-Na: 23 at 9/9/2024 10:50 AM  Calculated from:  Serum Creatinine: 0.67 mg/dL (Using min of 1 mg/dL) at 9/9/2024 10:50 AM  Serum Sodium: 126 mmol/L at 9/9/2024 10:50 AM  Total Bilirubin: 2.5 mg/dL at 9/9/2024 10:50 AM  Serum Albumin: 2.4 g/dL at 9/9/2024 10:50 AM  INR(ratio): 1.4 at 9/9/2024 10:50 AM  Age at listing (hypothetical): 64 years  Sex: Female at 9/9/2024 10:50 AM      Unfortunately she has continue to drink at least 3 drinks per day.  She vehemently states:  \"I do not drink and drive\".    She is irritable and agitated.  She states her fluid is building up at a faster rate and she is increasing short of breath in between thoracentesis.  She is asking about possibility of an indwelling catheter.  She denies confusion.  She denies LE edema.  She does complain of easy bruising, but denies excessive bleeding.  She denies evidence of GI bleeding.  She is not sleeping very well.           History:    Summary from office ovisit on 5/9/24:  1. Decompensated Alcohol Cirrhosis: (MELD-Na 23): Patient with a history of alcoholic cirrhosis previously decompensated by ascites/hepatic " hydrothorax.  she has required paracentesis and thoracentesis in the past for the last year has been well-controlled on diuretics.  Back in 2022, patient did have low sodium levels but her sodium had improved which did not limit her diuretic usage.  However recently, blood work did reveal an acute drop in sodium to 126 which led to an increase in her overall MELD score.  Unclear etiology of hyponatremia.  Possibly a result of increased fluid intake versus overdiuresis.  At this time, we will continue to trend labs carefully and monitor off of diuretics.  Will plan to slowly restart diuretics once sodium levels improve versus hold if patient with no indication to restart as she does remain euvolemic at this time.  Additionally, patient states that she is not currently drinking alcohol.  Will plan to complete UDS and pETH testing.     MELD 3.0: 21 at 5/3/2024 12:42 PM  MELD-Na: 23 at 5/3/2024 12:42 PM  Calculated from:  Serum Creatinine: 0.88 mg/dL (Using min of 1 mg/dL) at 5/3/2024 12:42 PM  Serum Sodium: 126 mmol/L at 5/3/2024 12:42 PM  Total Bilirubin: 1.7 mg/dL at 5/3/2024 12:31 PM  Serum Albumin: 3.5 g/dL at 5/3/2024 12:31 PM  INR(ratio): 1.5 at 5/3/2024 12:31 PM  Age at listing (hypothetical): 63 years  Sex: Female at 5/3/2024 12:42 PM     # Varices:   Last EGD: 1/12/24 -  no esophageal or gastric varices  Current Regimen: NSBB not currently indicated  Next EGD due: Repeat 1/2025     # Ascites/Hepatic Hydrothorax; now with Hyponatremia:   Prior history of ascites requiring thoracentesis/paracentesis in 2023  Previously well-controlled on diuretics until however diuretic stopped 2 days ago due to sodium level of 126  Labs completed 5/8/2024 with notable improvement in Na from 126 to 129  On examination today: Euvolemic with benign abdomen and clear breath sounds  Prior Regimen: Aldactone 100 mg and Lasix 40 mg daily   Continue to hold diuretics; repeat BMP next week then in 2 weeks     # Hepatic Encephalopathy:    Prior History: Yes   On examination today: AAOx3 with no asterixis  Current Regimen: Lactulose 30 g BID; titrate to 2-3 BM/day      # HCC Surveillance and Transplant Evaluation:  Most recent HCC Screenin2024 with no suspicious lesion; Due: 2024 - MRI ordered  Previously Referred: No referral in past  Barriers to Transplant: Alcohol use      # Screening and Health Maintenance:   Colon cancer screening: Last ; Due    Encouraged to continue high protein and 2g Na restricted diet; avoid eating raw shellfish  Limit use of Tylenol to no more than 2 grams in 24 hour period and avoid use of all NSAIDs and narcotics  Encouraged to participate in daily exercise to avoid muscle wasting  Avoid use of alcohol and strongly encourage tobacco cessation  Pending HAV and HBC immunity recommend vaccination along with yearly flu and pneumonia vaccine through PCP    Interval update 10/11/2023:  Since her last office visit in May, she has had blood work monthly and the most recent being last week.  Her transaminases have remained normal with her bilirubin slightly elevated at 1.7.  Fortunately, she is still not required any recurrent para or thoracentesis.  She had an abdominal ultrasound done on 2023 which showed a cirrhotic appearing liver with no lesions noted.  She also had evidence of cholelithiasis without cholecystitis.    Unfortunately, she states she continues to drink alcohol.  She is drinking at least 1 full bottle of wine per week and the occasional daily beer.    She has no significant complaints and is elusive with some of my questions.  She denies jaundice, lower extremity edema, abdominal distention with fluid, easy bruising, excessive bleeding, pruritus, any evidence of GI bleeding.    Interval Update 23  Since her last office visit, she has not required another thoracentesis.  She denies any increase in water weight, specifically no lower extremity edema no abdominal distention with fluid  and no worsening shortness of breath.  She has been taking 40 mg of furosemide and 100 mg of spironolactone daily.  She states she has not been specifically watching her sodium intake.    Her main complaint is fatigue at this time.  She states she is not able to walk any significant distance.  Unfortunately, she continues to smoke.  Ms. White denies recent or history of yellow eyes/skin, dark urine, GI bleeding, abdominal distention with fluid, lower extremity swelling, easy bruising, excessive bleeding, pruritus or confusion.    She continues to drink a glass of wine 1-2 times per week.    HPI from 4/4/23  Ms. Jose White is a 64 y.o. female with medical history as outlined below including cirrhosis secondary to Chronic alcohol abuse which is decompensated with ascites, lower extremity edema, thrombocytopenia, esophageal varices, hypoalbuminemia and hypoprothrombinemia, and more recently hepatic hydrothorax who comes in today for follow-up shortly after discharge from the hospital after admission for symptomatic hepatic hydrothorax with weakness and shortness of breath.  She was found to have hyponatremia and diruetics were held.  She underwent a 1 L thoracentesis.  After Na improved, she was discharged on home diuretics doses, 100 mg daily and lasix 40 mg daily.    She had an EGD done on 10/13/2022 showing grade 1 varices with no bleeding and mild PHG.           REVIEW OF SYSTEMS     Review of Systems   All other systems reviewed and are negative.      Historical Information   Patient Active Problem List   Diagnosis    Fatty liver    Other specified abnormal findings of blood chemistry    Anxiety    Elevated LFTs    GERD without esophagitis    Hyperlipidemia    Hypotension    Single seizure (HCC)    Insomnia    Nicotine dependence    COPD (chronic obstructive pulmonary disease) (HCC)    Alcohol use    Irritable bowel syndrome with diarrhea    Non-seasonal allergic rhinitis due to pollen    Bilateral  "leg edema    Alcoholic cirrhosis of liver with ascites (HCC)    Hyponatremia    Thrombocytopenia (HCC)    Anemia    Decompensated hepatic cirrhosis (HCC)    Ductal carcinoma in situ (DCIS) of right breast    Atypical ductal hyperplasia of right breast    Breast neoplasm, Tis (DCIS), right    Malignant neoplasm of lower-outer quadrant of right breast of female, estrogen receptor positive (HCC)    Pleural effusion    Vaginal atrophy    Dyspareunia, female    Postcoital bleeding    Stage 2 chronic kidney disease    Hepatic encephalopathy (HCC)     Social History     Substance and Sexual Activity   Alcohol Use Not Currently    Comment: last alcoholic drink was 11/25/2023     Social History     Substance and Sexual Activity   Drug Use No     Social History     Tobacco Use   Smoking Status Every Day    Current packs/day: 1.00    Average packs/day: 1 pack/day for 46.7 years (46.7 ttl pk-yrs)    Types: Cigarettes    Start date: 1/1/1978   Smokeless Tobacco Never       Meds/Allergies       Current Outpatient Medications:     albuterol (2.5 mg/3 mL) 0.083 % nebulizer solution    albuterol (Proventil HFA) 90 mcg/act inhaler    alendronate (Fosamax) 70 mg tablet    ALPRAZolam (XANAX) 1 mg tablet    anastrozole (ARIMIDEX) 1 mg tablet    budesonide-formoterol (Symbicort) 160-4.5 mcg/act inhaler    ergocalciferol (VITAMIN D2) 50,000 units    lactulose 20 g/30 mL    midodrine (PROAMATINE) 2.5 mg tablet    naltrexone (REVIA) 50 mg tablet    pantoprazole (PROTONIX) 20 mg tablet    Risankizumab-rzaa (SKYRIZI PEN SC)    traZODone (DESYREL) 50 mg tablet    Allergies   Allergen Reactions    Sulfa Antibiotics Shortness Of Breath    Ace Inhibitors Cough and Other (See Comments)     coughing           Objective     Blood pressure 104/62, temperature (!) 76.4 °F (24.7 °C), temperature source Tympanic, height 5' 3.5\" (1.613 m), weight 67 kg (147 lb 9.6 oz), not currently breastfeeding. Body mass index is 25.74 kg/m².      PHYSICAL EXAM:  "     Physical Exam  Vitals reviewed.   Constitutional:       General: She is not in acute distress.     Appearance: She is ill-appearing.   HENT:      Head: Normocephalic and atraumatic.      Nose: Nose normal.      Mouth/Throat:      Mouth: Mucous membranes are moist.      Pharynx: Oropharynx is clear.   Eyes:      General: No scleral icterus.     Extraocular Movements: Extraocular movements intact.   Cardiovascular:      Rate and Rhythm: Normal rate and regular rhythm.      Heart sounds: No murmur heard.  Pulmonary:      Effort: Pulmonary effort is normal. No respiratory distress.      Breath sounds: No decreased breath sounds.   Abdominal:      General: Abdomen is flat. There is no distension (mild).      Palpations: Abdomen is soft. There is no shifting dullness, fluid wave, hepatomegaly or splenomegaly.      Tenderness: There is no abdominal tenderness.      Hernia: No hernia is present.   Genitourinary:     Comments: deferred  Musculoskeletal:         General: No swelling. Normal range of motion.      Cervical back: Normal range of motion and neck supple. No tenderness.   Skin:     General: Skin is warm.      Coloration: Skin is not jaundiced.      Findings: No bruising or rash.   Neurological:      General: No focal deficit present.      Mental Status: She is alert and oriented to person, place, and time.   Psychiatric:         Mood and Affect: Mood normal.         Lab Results:   Lab Results   Component Value Date     07/28/2017    K 3.3 (L) 09/09/2024    CO2 26 09/09/2024    CL 92 (L) 09/09/2024    BUN 6 (L) 09/09/2024    CREATININE 0.67 09/09/2024    GLUCOSE 95 07/28/2017     Lab Results   Component Value Date    WBC 5.3 09/09/2024    HGB 13.1 09/09/2024    HCT 36.7 09/09/2024    .9 (H) 09/09/2024    PLT 95 (L) 09/09/2024     Lab Results   Component Value Date    TP 7.2 09/09/2024    AST 61 (H) 09/09/2024    ALT 30 (H) 09/09/2024    BILITOT 0.6 07/28/2017    BILIDIR 0.95 (H) 09/17/2022    INR  1.4 (H) 09/09/2024      Lab Results   Component Value Date    IRON 77 11/29/2023    FERRITIN 145 11/29/2023     Lab Results   Component Value Date    CHOL 159 07/28/2017    HDL 39 (L) 09/15/2022    TRIG 61 09/15/2022     MELD 3.0: 23 at 9/9/2024 10:50 AM  MELD-Na: 23 at 9/9/2024 10:50 AM  Calculated from:  Serum Creatinine: 0.67 mg/dL (Using min of 1 mg/dL) at 9/9/2024 10:50 AM  Serum Sodium: 126 mmol/L at 9/9/2024 10:50 AM  Total Bilirubin: 2.5 mg/dL at 9/9/2024 10:50 AM  Serum Albumin: 2.4 g/dL at 9/9/2024 10:50 AM  INR(ratio): 1.4 at 9/9/2024 10:50 AM  Age at listing (hypothetical): 64 years  Sex: Female at 9/9/2024 10:50 AM        Radiology Results:   EGD    Result Date: 10/13/2022  Narrative: [facility] [address] [facility] [address] [phone] DATE OF SERVICE: 10/13/22 PHYSICIAN(S): Attending: Haroon Ann MD Fellow: Betito Herrera MD INDICATION: Alcoholic cirrhosis of liver with ascites (HCC) POST-OP DIAGNOSIS: See the impression below. PREPROCEDURE: Informed consent was obtained for the procedure, including sedation.  Risks of perforation, hemorrhage, adverse drug reaction and aspiration were discussed. The patient was placed in the left lateral decubitus position. Patient was explained about the risks and benefits of the procedure. Risks including but not limited to bleeding, infection, and perforation were explained in detail. Also explained about less than 100% sensitivity with the exam and other alternatives. DETAILS OF PROCEDURE: Patient was taken to the procedure room where a time out was performed to confirm correct patient and correct procedure. The patient underwent monitored anesthesia care, which was administered by an anesthesia professional. The patient's blood pressure, heart rate, level of consciousness, respirations and oxygen were monitored throughout the procedure. The scope was advanced to the second part of the duodenum. Retroflexion was performed in  the fundus. The patient experienced no blood loss. The procedure was not difficult. The patient tolerated the procedure well. There were no apparent complications. ANESTHESIA INFORMATION: ASA: IV Anesthesia Type: Anesthesia type not filed in the log. MEDICATIONS: No administrations occurring from 1127 to 1144 on 10/13/22 FINDINGS: Grade I varices with no bleeding (no red carol sign) in the lower third of the esophagus Abnormal mucosa. Consistent with mild portal hypertensive gastropathy. Normal duodenum SPECIMENS: * No specimens in log *     Impression: Small grade 1 varices disappeared on insufflation. Mild portal hypertensive gastropathy. Normal duodenum RECOMMENDATION: Repeat EGD in 1 year. Recommend starting nadolol 40 mg daily. Continue outpatient GI follow up. Resume diet.  ATTENDING ATTESTATION:  I was present throughout the entire procedure from insertion to complete withdrawal of the scope. I performed all interventions myself or oversaw the fellow.   Haroon Ann MD     IR paracentesis    Result Date: 10/28/2022  Narrative: INDICATION: Recurrent ascites PROCEDURE: 1. Ultrasound-guided paracentesis FINDINGS: 1.  7750 mL clear yellow ascites removed.     Impression: Paracentesis. _________________________________________________________________ COMPARISON: None PROCEDURE DETAILS: Operators: Dr. Qureshi Anesthesia: Local Medications: 1% lidocaine COMMENTS: A preprocedure timeout was performed per [facility] protocol. The right abdomen was prepped and draped in the usual sterile fashion. 5-Japanese Yueh catheter was advanced using ultrasound guidance into ascites. Following drainage, the skin was cleansed and sterile dressings were applied. Workstation performed: [id number]     US bedside procedure    Result Date: 10/13/2022  Narrative: [id number]        Vamsi Herrera MD

## 2024-09-26 NOTE — PROGRESS NOTES
[facility]  Outpatient Follow-up  Encounter: [id number]    PATIENT INFO     Name: Jose White  YOB: 1960   Age: 64 y.o.   Sex: female   MRN: [id number]    ASSESSMENT & PLAN     SUMMARY: Jose White is a 64 y.o. female with a past medical history of Decompensated ETOH cirrhosis with alcoholic cirrhosis complicated by ascites, recurrent pleural effusions, esophageal varices, hepatic encephalopathy, hyponatremia, thrombocytopenia. She presents to the GI office for follow up of her cirrhosis.       1. Alcoholic cirrhosis of liver with ascites (HCC)  Assessment & Plan:  - Etiology: alcohol, still drinking (on naltrexone)  - MELD 3.0: 23. Child rodriguez: 10C  - HCC Screenin2024 mri abdomen with no mass; repeat HCC screening yearly  - Immunizations: has immunity to hepatitis b, no immunity to hepatitis a on hepatitis panel 2020.   - Varices intervention: has had egd with varices in past; 2024 egd with no esopahgeal or gastric varicies but did show mild PHG  - Ascites and pleural effusion intervention: has recurrent hepatic pleural effusion on right requiring many thoracentesis. Last thoracentesis 24 with 1750cc drained. last paracentesis 2024 with 800cc drained. Continue diuretics: Lasix 40 and Aldactone 100mg.  - Hepatic Encephalopathy intervention: has history of HE, will continue lactulose to target 3 soft bowel movements a day, avoid sedating medications and opioids   - Coagulopathy: has had thrombocytopenia, stable at this time with no bleeding events  - Hyponatremia: last Na 126 on 24. Will need close monitoring and continuation of diuretics   - Transplant candidacy: actively drinking  2. Alcohol use  3. Thrombocytopenia (HCC)  Assessment & Plan:  Last CBC with platelet count 95 on 24. Likely in setting of cirrhosis. No overt bleeding. Follows with hematology/oncology as outpatient, encouraged close follow up   4. Hepatic  encephalopathy (HCC)  Assessment & Plan:  Patient with history of HE in setting of known etoh cirrhosis. Has been maintained on lactulose as an outpatient and is having 3 soft bowel movements/day. Physical exam with no asterixis and patient ao x3. Will continue lactulose for target 3 soft bowel movements/day.       No orders of the defined types were placed in this encounter.      FOLLOW-UP: ***    HISTORY OF PRESENT ILLNESS       Jose White is a 64 y.o. female with a past medical history of Decompensated ETOH cirrhosis (with alcoholic cirrhosis complicated by ascites, recurrent pleural effusions, esophageal varices, hepatic encephalopathy, hyponatremia, thrombocytopenia), alcohol use disorder, chronic hypotension, COPD, GERD, IBS-D, CKD2, vaginal atrophy, tobacco use DCIS of right breast, HLD who presents to GI office for follow up of cirrhosis.     ***    MELD 3.0: 23 at 9/9/2024 10:50 AM  MELD-Na: 23 at 9/9/2024 10:50 AM  Calculated from:  Serum Creatinine: 0.67 mg/dL (Using min of 1 mg/dL) at 9/9/2024 10:50 AM  Serum Sodium: 126 mmol/L at 9/9/2024 10:50 AM  Total Bilirubin: 2.5 mg/dL at 9/9/2024 10:50 AM  Serum Albumin: 2.4 g/dL at 9/9/2024 10:50 AM  INR(ratio): 1.4 at 9/9/2024 10:50 AM  Age at listing (hypothetical): 64 years  Sex: Female at 9/9/2024 10:50 AM    Lab Results   Component Value Date    WBC 5.3 09/09/2024    HGB 13.1 09/09/2024    HCT 36.7 09/09/2024    .9 (H) 09/09/2024    PLT 95 (L) 09/09/2024            ENDOSCOPIC HISTORY     UPPER ENDOSCOPY:   EGD 1/12/24  Regular Z-line 38 cm from the incisors  The esophagus and duodenum appeared normal.  Mild cobblestone and erythematous mucosa in the perigastric region. Consistent with mild portal hypertensive gastropathy  COLONOSCOPY:   3/19/2018  The terminal ileum appeared normal. Random biopsies were taken of the terminal ileum to rule out Crohn's disease. There were patchy areas of erythema in the colon and random biopsies were taken from  these areas that revealed Benign colonic mucosa with focal hemorrhage and mild edema of lamina propria. Retroflexed view of the rectum revealed external hemorrhoids.     REVIEW OF SYSTEMS     ROS negative other than stated above    Historical Information   Past Medical History:   Diagnosis Date    Alcoholic fatty liver     Anxiety     Asthma     COPD (chronic obstructive pulmonary disease) (HCC)     Elevated liver function tests     GERD (gastroesophageal reflux disease)     GERD without esophagitis     Hyperlipidemia     Hypertension     IBS (irritable bowel syndrome)     Insomnia     Insomnia     Liver disease     Nervousness     Nicotine dependence     Other specified urinary incontinence     RLS (restless legs syndrome)     Wears glasses      Past Surgical History:   Procedure Laterality Date    BACK SURGERY      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    BREAST EXCISIONAL BIOPSY Right 11/01/2004    benign    CATARACT EXTRACTION, BILATERAL  08/01/2018    COLONOSCOPY N/A 5/8/2019    Procedure: COLONOSCOPY;  Surgeon: Jacobo Leonardo MD;  Location: [facility] GI LAB;  Service: Gastroenterology    EGD AND COLONOSCOPY N/A 3/19/2018    Procedure: EGD AND COLONOSCOPY;  Surgeon: Jacobo Leonardo MD;  Location: [facility] GI LAB;  Service: Gastroenterology    ESOPHAGOGASTRODUODENOSCOPY N/A 5/8/2019    Procedure: ESOPHAGOGASTRODUODENOSCOPY (EGD);  Surgeon: Jacobo Leonardo MD;  Location: [facility] GI LAB;  Service: Gastroenterology    IR PARACENTESIS  11/27/2020    IR PARACENTESIS  12/8/2020    IR PARACENTESIS  10/28/2022    IR PARACENTESIS  11/7/2022    IR PARACENTESIS  11/30/2022    IR PARACENTESIS  12/30/2022    IR PARACENTESIS  1/16/2023    IR PARACENTESIS  8/6/2024    IR PARACENTESIS  9/18/2024    IR THORACENTESIS  1/16/2023    IR THORACENTESIS  3/28/2023    IR THORACENTESIS  7/25/2024    IR THORACENTESIS  8/6/2024    IR THORACENTESIS  8/14/2024    IR THORACENTESIS  8/21/2024    IR THORACENTESIS  8/28/2024    IR THORACENTESIS   9/4/2024    IR THORACENTESIS  9/11/2024    IR THORACENTESIS  9/18/2024    IR THORACENTESIS  9/25/2024    KNEE SURGERY      KNEE SURGERY      LUMBAR LAMINECTOMY  04/1999    LYMPH NODE BIOPSY      MAMMO STEREOTACTIC BREAST BIOPSY RIGHT (ALL INC) Right 5/21/2021    MAMMO STEREOTACTIC BREAST BIOPSY RIGHT (ALL INC) EACH ADD Right 5/21/2021    TOTAL ABDOMINAL HYSTERECTOMY  02/05/2008    TUBAL LIGATION      TUBAL LIGATION  1989    UPPER GASTROINTESTINAL ENDOSCOPY       Social History   Social History     Substance and Sexual Activity   Alcohol Use Not Currently    Comment: last alcoholic drink was 11/25/2023     Social History     Substance and Sexual Activity   Drug Use No     Social History     Tobacco Use   Smoking Status Every Day    Current packs/day: 1.00    Average packs/day: 1 pack/day for 46.7 years (46.7 ttl pk-yrs)    Types: Cigarettes    Start date: 1/1/1978   Smokeless Tobacco Never     Family History   Problem Relation Age of Onset    Breast cancer Mother 32    No Known Problems Father     No Known Problems Sister     No Known Problems Brother     No Known Problems Maternal Grandmother     No Known Problems Maternal Grandfather     No Known Problems Paternal Grandmother     No Known Problems Paternal Grandfather     No Known Problems Maternal Aunt     No Known Problems Maternal Aunt     No Known Problems Paternal Aunt     No Known Problems Son     Substance Abuse Neg Hx     Mental illness Neg Hx     Colon cancer Neg Hx     Colon polyps Neg Hx          MEDICATIONS AND ALLERGIES     Current Outpatient Medications   Medication Instructions    albuterol (Proventil HFA) 90 mcg/act inhaler 2 puffs, Inhalation, Every 6 hours PRN    albuterol 2.5 mg, Nebulization, Every 6 hours PRN    alendronate (FOSAMAX) 70 mg, Oral, Every 7 days    ALPRAZolam (XANAX) 1 mg, Oral, 2 times daily PRN    anastrozole (ARIMIDEX) 1 mg, Oral, Daily    budesonide-formoterol (Symbicort) 160-4.5 mcg/act inhaler 2 puffs, Inhalation, 2 times  daily, Rinse mouth after use.    ergocalciferol (VITAMIN D2) 50,000 units Take one capsule weekly x12 weeks    furosemide (LASIX) 20 mg, Oral, Daily    lactulose 30 g, Oral, 2 times daily, Titrate dose for an effect of 3 soft, but formed bowel movements per day.  You can start with 1 tablespoon per day and slowly increase to effect.    midodrine (PROAMATINE) 7.5 mg, Oral, 3 times daily before meals    naltrexone (REVIA) 50 mg, Oral, Daily    pantoprazole (PROTONIX) 20 mg, Oral, Daily    Risankizumab-rzaa (SKYRIZI PEN SC) Subcutaneous    spironolactone (ALDACTONE) 50 mg, Oral, Daily    traZODone (DESYREL) 50 mg, Oral, Daily at bedtime     Allergies   Allergen Reactions    Sulfa Antibiotics Shortness Of Breath    Ace Inhibitors Cough and Other (See Comments)     coughing       PHYSICAL EXAM      Objective   not currently breastfeeding. There is no height or weight on file to calculate BMI.    General Appearance:   Alert, cooperative, no distress   HEENT:   Normocephalic, atraumatic, anicteric     Neck:   Supple, symmetrical, trachea midline   Lungs:   Equal chest rise, respirations unlabored    Heart:   Regular rate and rhythm   Abdomen:   Soft, non-tender, non-distended; normal bowel sounds; no masses, no organomegaly***    Rectal:   Deferred    Extremities:   No cyanosis, clubbing or edema    Neuro:   No obvious focal deficits, moves extremities spontaneously    Skin:   No visible jaundice, rashes, or lesions      LABORATORY RESULTS     No visits with results within 1 Day(s) from this visit.   Latest known visit with results is:   Hospital Outpatient Visit on 09/18/2024   Component Date Value    Site 09/18/2024 Paracentesis     Color, Fluid 09/18/2024 Yellow     Clarity, Fluid 09/18/2024 Clear     WBC, Fluid 09/18/2024 220     Body Fluid Culture, Ster* 09/18/2024 No growth     Gram Stain Result 09/18/2024 1+ Polys     Gram Stain Result 09/18/2024 No bacteria seen     Protein, Fluid 09/18/2024 <3.0     Case Report  09/18/2024                      Value:Non-gynecologic Cytology                          Case: [id number]                                  Authorizing Provider:  Vamsi Herrera MD           Collected:           09/18/2024 1128              Ordering Location:     [facility]     Received:            09/18/2024 1138                                     [facility]                                                                               Radiology                                                                    Pathologist:           Cydney Stuart MD                                                       Specimens:   A) - Paracentesis                                                                                   B) - Paracentesis, cell block                                                              Final Diagnosis 09/18/2024                      Value:A-B. Paracentesis (ThinPrep and cell block preparations):  Negative for malignancy.  Benign mesothelial cells.  Mixed inflammatory cells.    Satisfactory for evaluation.          Note 09/18/2024                      Value:Interpretation performed at [facility], [address] [facility] [address]       Gross Description 09/18/2024                      Value:A. 100mL, pale yellow, slightly hazy, received in CytoLyt     B. Scant cell button, red, Due to the (size and/or consistency) of the specimen, it is questionable whether cell block will survive histological processing.         Additional Information 09/18/2024                      Value:Xipin's FDA approved ,  and ThinPrep Imaging Duo System are utilized with strict adherence to the 's instruction manual to prepare gynecologic and non-gynecologic cytology specimens for the production of ThinPrep slides as well as for gynecologic ThinPrep imaging. These processes have been validated by our laboratory and/or by the .    These tests were developed  and their performance characteristics determined by [facility] or [facility]. They may not be cleared or approved by the U.S. Food and Drug Administration. The FDA has determined that such clearance or approval is not necessary. These tests are used for clinical purposes. They should not be regarded as investigational or for research. This laboratory has been approved by CLIA 88, designated as a high-complexity laboratory and is qualified to perform these tests.        Glucose, Fluid 09/18/2024 129     Albumin, Fluid 09/18/2024 <1.5     LD, Fluid 09/18/2024 31     Total Counted 09/18/2024 100     Neutrophils % (Fluid) 09/18/2024 20     Lymphs % (Fluid) 09/18/2024 10     Mesothelial % (Fluid) 09/18/2024 2     Histiocyte % (Fluid) 09/18/2024 68     TOTPROTEINFL 09/18/2024 0.6      IR thoracentesis    Result Date: 9/25/2024  Narrative: INDICATION: Recurrent right hepatic hydrothorax. PROCEDURE: 1. Ultrasound-guided thoracentesis FINDINGS: 1.  1750 mL clear yellow fluid removed from the right pleural space.     Impression: Right thoracentesis. Small volume of abdominal ascites. Paracentesis deferred today. _________________________________________________________________ COMPARISON: None PROCEDURE DETAILS: Operators: Dr. Qureshi Anesthesia:  Local Medications: 1% lidocaine COMMENTS: The patient was placed in a sitting position.  A preprocedure timeout was performed per [facility]'s protocol. The back was prepped and draped in the usual sterile fashion. 5-English Yueh catheter was advanced using ultrasound guidance into the pleural space. Following drainage, skin was cleansed and sterile dressings were applied. Workstation performed: [id number]     IR paracentesis    Result Date: 9/18/2024  Narrative: Ultrasound-guided paracentesis Clinical History: Recurrent ascites. Technique: Patient was brought to the interventional radiology area and placed supine on the stretcher. After a brief  ultrasound examination was performed to localize a pocket of fluid,an area on the skin of the right abdomen was prepped, and draped in the usual sterile fashion. Lidocaine was administered to the skin and a small skin incision was made. A 5 Macedonian Yueh multi-sidehole catheter  was advanced into the fluid and 800 cc of sheryl ascites was aspirated. After the procedure, the catheter was removed and a bandage applied to the site. The patient tolerated the procedure well and suffered no complications.     Impression: Impression: 1. Successful ultrasound-guided paracentesis yielding 800 cc of ascites. Workstation performed: [id number]     IR thoracentesis    Result Date: 9/18/2024  Narrative: INDICATION: Recurrent hepatic hydrothorax PROCEDURE: 1. Ultrasound-guided right thoracentesis FINDINGS: 1.  1800 mL clear yellow fluid removed from the right pleural space.     Impression: Thoracentesis. _________________________________________________________________ COMPARISON: None PROCEDURE DETAILS: Operators: Dr. Villar Anesthesia:  Local Medications: 1% lidocaine COMMENTS: The patient was placed in a sitting position.  A preprocedure timeout was performed per [facility] protocol. The back was prepped and draped in the usual sterile fashion. 5-Macedonian Yueh catheter was advanced using ultrasound guidance into the pleural space. Following drainage, skin was cleansed and sterile dressings were applied. Workstation performed: [id number]     IR thoracentesis    Result Date: 9/11/2024  Narrative: INDICATION: Recurrent hepatic hydrothorax PROCEDURE: 1. Ultrasound-guided thoracentesis FINDINGS: 1.  1600 mL blood-tinged sheryl fluid removed from the right pleural space.     Impression: Thoracentesis. _________________________________________________________________ COMPARISON: None PROCEDURE DETAILS: Operators: Dr. Qureshi Anesthesia:  Local Medications: 1% lidocaine COMMENTS: The patient was placed in a sitting position.  A  preprocedure timeout was performed per [facility] protocol. The back was prepped and draped in the usual sterile fashion. 5-Chilean Yueh catheter was advanced using ultrasound guidance into the pleural space. Following drainage, skin was cleansed and sterile dressings were applied. Workstation performed: [id number]     IR thoracentesis    Result Date: 9/6/2024  Narrative: INDICATION: Recurrent right hepatic hydrothorax PROCEDURE: 1. Ultrasound-guided thoracentesis FINDINGS: 1.  1200 mL of clear yellow fluid removed from the right pleural space.     Impression: Thoracentesis. _________________________________________________________________ COMPARISON: None PROCEDURE DETAILS: Operators: Dr. Qureshi Anesthesia:  Local Medications: 1% lidocaine COMMENTS: The patient was placed in a sitting position.  A preprocedure timeout was performed per [facility] protocol. The back was prepped and draped in the usual sterile fashion. 5-Chilean Yueh catheter was advanced using ultrasound guidance into the pleural space. Following drainage, skin was cleansed and sterile dressings were applied. Workstation performed: [id number]     IR thoracentesis    Result Date: 8/28/2024  Narrative: INDICATION: Recurrent right hepatic hydrothorax PROCEDURE: 1. Ultrasound-guided thoracentesis FINDINGS: 1.  1500 mL of clear yellow fluid removed from the right pleural space.     Impression: Thoracentesis. _________________________________________________________________ COMPARISON: None PROCEDURE DETAILS: Operators: Dr. Qureshi Anesthesia:  Local Medications: 1% lidocaine COMMENTS: The patient was placed in a sitting position.  A preprocedure timeout was performed per [facility] protocol. The back was prepped and draped in the usual sterile fashion. 5-Chilean Yueh catheter was advanced using ultrasound guidance into the pleural space. Following drainage, skin was cleansed and sterile dressings were applied. Workstation performed: [id number]        RADIOLOGY RESULTS: I have personally reviewed pertinent imaging studies.      Justina Pyle,   PGY-3, Internal Medicine  [facility]  Division of Gastroenterology & Hepatology  09/26/24    ** Please Note: This note is constructed using a voice recognition dictation system. **

## 2024-09-26 NOTE — ASSESSMENT & PLAN NOTE
Patient with history of HE in setting of known etoh cirrhosis. Has been maintained on lactulose as an outpatient and is having 3 soft bowel movements/day. Physical exam with no asterixis and patient ao x3. Will continue lactulose for target 3 soft bowel movements/day.

## 2024-09-26 NOTE — ASSESSMENT & PLAN NOTE
- Etiology: alcohol, still drinking (on naltrexone)  - MELD 3.0: 23. Child rodriguez: 10C  - HCC Screenin2024 mri abdomen with no mass; repeat HCC screening yearly  - Immunizations: has immunity to hepatitis b, no immunity to hepatitis a on hepatitis panel 2020.   - Varices intervention: has had egd with varices in past; 2024 egd with no esopahgeal or gastric varicies but did show mild PHG  - Ascites and pleural effusion intervention: has recurrent hepatic pleural effusion on right requiring many thoracentesis. Last thoracentesis 24 with 1750cc drained. last paracentesis 2024 with 800cc drained. Continue diuretics: Lasix 40 and Aldactone 100mg.  - Hepatic Encephalopathy intervention: has history of HE, will continue lactulose to target 3 soft bowel movements a day, avoid sedating medications and opioids   - Coagulopathy: has had thrombocytopenia, stable at this time with no bleeding events  - Hyponatremia: last Na 126 on 24. Will need close monitoring and continuation of diuretics   - Transplant candidacy: actively drinking

## 2024-10-02 ENCOUNTER — OFFICE VISIT (OUTPATIENT)
Dept: GASTROENTEROLOGY | Facility: CLINIC | Age: 64
End: 2024-10-02
Payer: MEDICARE

## 2024-10-02 VITALS
DIASTOLIC BLOOD PRESSURE: 60 MMHG | WEIGHT: 149.8 LBS | BODY MASS INDEX: 25.57 KG/M2 | SYSTOLIC BLOOD PRESSURE: 100 MMHG | HEIGHT: 64 IN | TEMPERATURE: 96.9 F

## 2024-10-02 DIAGNOSIS — K58.0 IRRITABLE BOWEL SYNDROME WITH DIARRHEA: ICD-10-CM

## 2024-10-02 DIAGNOSIS — K21.9 GERD WITHOUT ESOPHAGITIS: ICD-10-CM

## 2024-10-02 DIAGNOSIS — Z78.9 ALCOHOL USE: ICD-10-CM

## 2024-10-02 DIAGNOSIS — K70.31 ALCOHOLIC CIRRHOSIS OF LIVER WITH ASCITES (HCC): Primary | Chronic | ICD-10-CM

## 2024-10-02 PROCEDURE — G2211 COMPLEX E/M VISIT ADD ON: HCPCS | Performed by: INTERNAL MEDICINE

## 2024-10-02 PROCEDURE — 99214 OFFICE O/P EST MOD 30 MIN: CPT | Performed by: INTERNAL MEDICINE

## 2024-10-02 NOTE — PROGRESS NOTES
[facility] - Outpatient Follow-up Note  Jose White 64 y.o. female MRN: [id number]  Encounter: [id number]          ASSESSMENT AND PLAN:       1. Alcoholic cirrhosis of liver with ascites (HCC)  2. Alcohol abuse  She has alcoholic cirrhosis complicated by ascites, esophageal varices, and hepatic encephalopathy.  I spoke to her at length about the importance of completely quitting alcohol intake.  She promised to see an alcohol counselor prior to her next visit with me in 3 months and she promised to never drink and drive.  She is also going to continue the naltrexone.  She is currently having 3 bowel movements per day without any encephalopathy.  I encouraged her to restart the lactulose if her bowel movements decrease below 3 times per day.  She will continue with weekly paracentesis and may need biweekly in the future.  She is going to follow-up for her chest CT because of her cough and dyspnea and I encouraged her to discuss with her PCP as she may need a pulmonary evaluation as well.     3. GERD without esophagitis  She has reflux symptoms that have been well controlled with diet and lifestyle modification so she is not currently taking any medication for the reflux.     4. Irritable bowel syndrome with diarrhea  She has a history of diarrhea predominant irritable bowel syndrome but has not had any symptoms recently as she has been more constipated recently so I encouraged her to take the lactulose regularly and increase the dose as needed.     ______________________________________________________________________     SUBJECTIVE:  She presents for follow-up of her alcoholic cirrhosis complicated by ascites and small esophageal varices and hepatic encephalopathy.   Her last upper endoscopy was in January 2024 and revealed no esophageal or gastric varices.  There was evidence of mild portal hypertensive gastropathy.  She denies vomiting, difficulty swallowing, and weight loss.   Unfortunately, she continues to drink alcohol daily.  Dr. Herrera stopped her Lasix and Aldactone and she has been receiving weekly paracentesis.  Her last MRI in August 2024 did not show evidence of a liver mass.  She complains of cough and dyspnea and is scheduled for a chest CT.      REVIEW OF SYSTEMS IS OTHERWISE NEGATIVE.      Historical Information   Past Medical History:   Diagnosis Date    Alcoholic fatty liver     Anxiety     Asthma     COPD (chronic obstructive pulmonary disease) (HCC)     Elevated liver function tests     GERD (gastroesophageal reflux disease)     GERD without esophagitis     Hyperlipidemia     Hypertension     IBS (irritable bowel syndrome)     Insomnia     Insomnia     Liver disease     Nervousness     Nicotine dependence     Other specified urinary incontinence     RLS (restless legs syndrome)     Wears glasses      Past Surgical History:   Procedure Laterality Date    BACK SURGERY      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    BREAST EXCISIONAL BIOPSY Right 11/01/2004    benign    CATARACT EXTRACTION, BILATERAL  08/01/2018    COLONOSCOPY N/A 5/8/2019    Procedure: COLONOSCOPY;  Surgeon: Jacobo Leonardo MD;  Location: [facility];  Service: Gastroenterology    EGD AND COLONOSCOPY N/A 3/19/2018    Procedure: EGD AND COLONOSCOPY;  Surgeon: Jacobo Leonardo MD;  Location: [facility];  Service: Gastroenterology    ESOPHAGOGASTRODUODENOSCOPY N/A 5/8/2019    Procedure: ESOPHAGOGASTRODUODENOSCOPY (EGD);  Surgeon: Jacobo Leonardo MD;  Location: [facility];  Service: Gastroenterology    IR PARACENTESIS  11/27/2020    IR PARACENTESIS  12/8/2020    IR PARACENTESIS  10/28/2022    IR PARACENTESIS  11/7/2022    IR PARACENTESIS  11/30/2022    IR PARACENTESIS  12/30/2022    IR PARACENTESIS  1/16/2023    IR PARACENTESIS  8/6/2024    IR PARACENTESIS  9/18/2024    IR THORACENTESIS  1/16/2023    IR THORACENTESIS  3/28/2023    IR THORACENTESIS  7/25/2024    IR THORACENTESIS  8/6/2024    IR THORACENTESIS   8/14/2024    IR THORACENTESIS  8/21/2024    IR THORACENTESIS  8/28/2024    IR THORACENTESIS  9/4/2024    IR THORACENTESIS  9/11/2024    IR THORACENTESIS  9/18/2024    IR THORACENTESIS  9/25/2024    KNEE SURGERY      KNEE SURGERY      LUMBAR LAMINECTOMY  04/1999    LYMPH NODE BIOPSY      MAMMO STEREOTACTIC BREAST BIOPSY RIGHT (ALL INC) Right 5/21/2021    MAMMO STEREOTACTIC BREAST BIOPSY RIGHT (ALL INC) EACH ADD Right 5/21/2021    TOTAL ABDOMINAL HYSTERECTOMY  02/05/2008    TUBAL LIGATION      TUBAL LIGATION  1989    UPPER GASTROINTESTINAL ENDOSCOPY       Social History   Social History     Substance and Sexual Activity   Alcohol Use Not Currently    Comment: last alcoholic drink was 11/25/2023     Social History     Substance and Sexual Activity   Drug Use No     Social History     Tobacco Use   Smoking Status Every Day    Current packs/day: 1.00    Average packs/day: 1 pack/day for 46.8 years (46.8 ttl pk-yrs)    Types: Cigarettes    Start date: 1/1/1978   Smokeless Tobacco Never     Family History   Problem Relation Age of Onset    Breast cancer Mother 32    No Known Problems Father     No Known Problems Sister     No Known Problems Brother     No Known Problems Maternal Grandmother     No Known Problems Maternal Grandfather     No Known Problems Paternal Grandmother     No Known Problems Paternal Grandfather     No Known Problems Maternal Aunt     No Known Problems Maternal Aunt     No Known Problems Paternal Aunt     No Known Problems Son     Substance Abuse Neg Hx     Mental illness Neg Hx     Colon cancer Neg Hx     Colon polyps Neg Hx        Meds/Allergies       Current Outpatient Medications:     albuterol (2.5 mg/3 mL) 0.083 % nebulizer solution    albuterol (Proventil HFA) 90 mcg/act inhaler    alendronate (Fosamax) 70 mg tablet    ALPRAZolam (XANAX) 1 mg tablet    anastrozole (ARIMIDEX) 1 mg tablet    budesonide-formoterol (Symbicort) 160-4.5 mcg/act inhaler    lactulose 20 g/30 mL    midodrine  "(PROAMATINE) 2.5 mg tablet    naltrexone (REVIA) 50 mg tablet    pantoprazole (PROTONIX) 20 mg tablet    Risankizumab-rzaa (SKYRIZI PEN SC)    traZODone (DESYREL) 50 mg tablet    ergocalciferol (VITAMIN D2) 50,000 units    Allergies   Allergen Reactions    Sulfa Antibiotics Shortness Of Breath    Ace Inhibitors Cough and Other (See Comments)     coughing           Objective     Blood pressure 100/60, temperature (!) 96.9 °F (36.1 °C), temperature source Tympanic, height 5' 3.5\" (1.613 m), weight 67.9 kg (149 lb 12.8 oz), not currently breastfeeding. Body mass index is 26.12 kg/m².      PHYSICAL EXAM:      General Appearance:   Alert, cooperative, no distress   HEENT:   Normocephalic, atraumatic, anicteric.     Neck:  Supple, symmetrical, trachea midline   Lungs:   Clear to auscultation bilaterally; no rales, rhonchi or wheezing; respirations unlabored    Heart::   Regular rate and rhythm; no murmur, rub, or gallop.   Abdomen:   Soft, non-tender, non-distended; normal bowel sounds; no masses, no organomegaly    Genitalia:   Deferred    Rectal:   Deferred    Extremities:  No cyanosis, clubbing or edema    Pulses:  2+ and symmetric    Skin:  No jaundice, rashes, or lesions    Lymph nodes:  No palpable cervical lymphadenopathy        Lab Results:   No visits with results within 1 Day(s) from this visit.   Latest known visit with results is:   Hospital Outpatient Visit on 09/18/2024   Component Date Value    Site 09/18/2024 Paracentesis     Color, Fluid 09/18/2024 Yellow     Clarity, Fluid 09/18/2024 Clear     WBC, Fluid 09/18/2024 220     Body Fluid Culture, Ster* 09/18/2024 No growth     Gram Stain Result 09/18/2024 1+ Polys     Gram Stain Result 09/18/2024 No bacteria seen     Protein, Fluid 09/18/2024 <3.0     Case Report 09/18/2024                      Value:Non-gynecologic Cytology                          Case: [id number]                                  Authorizing Provider:  Vamsi Herrera MD           Collected: "           09/18/2024 1128              Ordering Location:     [facility]     Received:            09/18/2024 1138                                     [place] [facility]                                                                               Radiology                                                                    Pathologist:           Cydney Stuart MD                                                       Specimens:   A) - Paracentesis                                                                                   B) - Paracentesis, cell block                                                              Final Diagnosis 09/18/2024                      Value:A-B. Paracentesis (ThinPrep and cell block preparations):  Negative for malignancy.  Benign mesothelial cells.  Mixed inflammatory cells.    Satisfactory for evaluation.          Note 09/18/2024                      Value:Interpretation performed at [facility], [address] [facility] [address]       Gross Description 09/18/2024                      Value:A. 100mL, pale yellow, slightly hazy, received in CytoLyt     B. Scant cell button, red, Due to the (size and/or consistency) of the specimen, it is questionable whether cell block will survive histological processing.         Additional Information 09/18/2024                      Value:[employer]'s FDA approved ,  and ThinPrep Imaging Duo System are utilized with strict adherence to the 's instruction manual to prepare gynecologic and non-gynecologic cytology specimens for the production of ThinPrep slides as well as for gynecologic ThinPrep imaging. These processes have been validated by our laboratory and/or by the .    These tests were developed and their performance characteristics determined by [facility] or [facility]. They may not be cleared or approved by the U.S. Food and Drug Administration. The FDA  has determined that such clearance or approval is not necessary. These tests are used for clinical purposes. They should not be regarded as investigational or for research. This laboratory has been approved by CLIA 88, designated as a high-complexity laboratory and is qualified to perform these tests.        Glucose, Fluid 09/18/2024 129     Albumin, Fluid 09/18/2024 <1.5     LD, Fluid 09/18/2024 31     Total Counted 09/18/2024 100     Neutrophils % (Fluid) 09/18/2024 20     Lymphs % (Fluid) 09/18/2024 10     Mesothelial % (Fluid) 09/18/2024 2     Histiocyte % (Fluid) 09/18/2024 68     TOTPROTEINFL 09/18/2024 0.6          Radiology Results:   IR thoracentesis    Result Date: 9/25/2024  Narrative: INDICATION: Recurrent right hepatic hydrothorax. PROCEDURE: 1. Ultrasound-guided thoracentesis FINDINGS: 1.  1750 mL clear yellow fluid removed from the right pleural space.     Impression: Right thoracentesis. Small volume of abdominal ascites. Paracentesis deferred today. _________________________________________________________________ COMPARISON: None PROCEDURE DETAILS: Operators: Dr. Qureshi Anesthesia:  Local Medications: 1% lidocaine COMMENTS: The patient was placed in a sitting position.  A preprocedure timeout was performed per [facility] protocol. The back was prepped and draped in the usual sterile fashion. 5-Cuban Yueh catheter was advanced using ultrasound guidance into the pleural space. Following drainage, skin was cleansed and sterile dressings were applied. Workstation performed: [id number]     IR paracentesis    Result Date: 9/18/2024  Narrative: Ultrasound-guided paracentesis Clinical History: Recurrent ascites. Technique: Patient was brought to the interventional radiology area and placed supine on the stretcher. After a brief ultrasound examination was performed to localize a pocket of fluid,an area on the skin of the right abdomen was prepped, and draped in the usual sterile fashion. Lidocaine was  administered to the skin and a small skin incision was made. A 5 Azerbaijani Yueh multi-sidehole catheter  was advanced into the fluid and 800 cc of sheryl ascites was aspirated. After the procedure, the catheter was removed and a bandage applied to the site. The patient tolerated the procedure well and suffered no complications.     Impression: Impression: 1. Successful ultrasound-guided paracentesis yielding 800 cc of ascites. Workstation performed: [id number]     IR thoracentesis    Result Date: 9/18/2024  Narrative: INDICATION: Recurrent hepatic hydrothorax PROCEDURE: 1. Ultrasound-guided right thoracentesis FINDINGS: 1.  1800 mL clear yellow fluid removed from the right pleural space.     Impression: Thoracentesis. _________________________________________________________________ COMPARISON: None PROCEDURE DETAILS: Operators: Dr. Villar Anesthesia:  Local Medications: 1% lidocaine COMMENTS: The patient was placed in a sitting position.  A preprocedure timeout was performed per [facility] protocol. The back was prepped and draped in the usual sterile fashion. 5-Azerbaijani Yueh catheter was advanced using ultrasound guidance into the pleural space. Following drainage, skin was cleansed and sterile dressings were applied. Workstation performed: [id number]     IR thoracentesis    Result Date: 9/11/2024  Narrative: INDICATION: Recurrent hepatic hydrothorax PROCEDURE: 1. Ultrasound-guided thoracentesis FINDINGS: 1.  1600 mL blood-tinged sheryl fluid removed from the right pleural space.     Impression: Thoracentesis. _________________________________________________________________ COMPARISON: None PROCEDURE DETAILS: Operators: Dr. Qureshi Anesthesia:  Local Medications: 1% lidocaine COMMENTS: The patient was placed in a sitting position.  A preprocedure timeout was performed per [facility] protocol. The back was prepped and draped in the usual sterile fashion. 5-Azerbaijani Yueh catheter was advanced using ultrasound guidance  into the pleural space. Following drainage, skin was cleansed and sterile dressings were applied. Workstation performed: [id number]     IR thoracentesis    Result Date: 9/6/2024  Narrative: INDICATION: Recurrent right hepatic hydrothorax PROCEDURE: 1. Ultrasound-guided thoracentesis FINDINGS: 1.  1200 mL of clear yellow fluid removed from the right pleural space.     Impression: Thoracentesis. _________________________________________________________________ COMPARISON: None PROCEDURE DETAILS: Operators: Dr. Qureshi Anesthesia:  Local Medications: 1% lidocaine COMMENTS: The patient was placed in a sitting position.  A preprocedure timeout was performed per [facility] protocol. The back was prepped and draped in the usual sterile fashion. 5-Upper sorbian Yueh catheter was advanced using ultrasound guidance into the pleural space. Following drainage, skin was cleansed and sterile dressings were applied. Workstation performed: [id number]

## 2024-10-03 ENCOUNTER — HOSPITAL ENCOUNTER (OUTPATIENT)
Dept: INTERVENTIONAL RADIOLOGY/VASCULAR | Facility: HOSPITAL | Age: 64
Discharge: HOME | End: 2024-10-03
Attending: INTERNAL MEDICINE
Payer: MEDICARE

## 2024-10-03 VITALS
DIASTOLIC BLOOD PRESSURE: 59 MMHG | SYSTOLIC BLOOD PRESSURE: 103 MMHG | HEART RATE: 77 BPM | RESPIRATION RATE: 20 BRPM | OXYGEN SATURATION: 95 %

## 2024-10-03 DIAGNOSIS — Z78.9 ALCOHOL USE: ICD-10-CM

## 2024-10-03 DIAGNOSIS — D69.6 THROMBOCYTOPENIA (HCC): ICD-10-CM

## 2024-10-03 DIAGNOSIS — K76.82 HEPATIC ENCEPHALOPATHY (HCC): ICD-10-CM

## 2024-10-03 DIAGNOSIS — K70.31 ALCOHOLIC CIRRHOSIS OF LIVER WITH ASCITES (HCC): Chronic | ICD-10-CM

## 2024-10-03 PROCEDURE — 32555 ASPIRATE PLEURA W/ IMAGING: CPT

## 2024-10-03 PROCEDURE — 49083 ABD PARACENTESIS W/IMAGING: CPT

## 2024-10-03 RX ORDER — LIDOCAINE HYDROCHLORIDE 10 MG/ML
INJECTION, SOLUTION EPIDURAL; INFILTRATION; INTRACAUDAL; PERINEURAL AS NEEDED
Status: COMPLETED | OUTPATIENT
Start: 2024-10-03 | End: 2024-10-03

## 2024-10-03 RX ADMIN — LIDOCAINE HYDROCHLORIDE 10 ML: 10 INJECTION, SOLUTION EPIDURAL; INFILTRATION; INTRACAUDAL; PERINEURAL at 10:55

## 2024-10-03 NOTE — DISCHARGE INSTRUCTIONS
Abdominal Paracentesis     WHAT YOU NEED TO KNOW:   Abdominal paracentesis is a procedure to remove abnormal fluid buildup in your abdomen. Fluid builds up because of liver problems, such as swelling and scarring. Heart failure, kidney disease, a mass, or problems with your pancreas may also cause fluid buildup.     DISCHARGE INSTRUCTIONS:     Follow up with your healthcare provider as directed: Write down your questions so you remember to ask them during your visits.     Wound care: Remove dressing after 24 hours. Leave glue in place.    Return to your normal activities    Contact Interventional Radiology at [phone]  if:  You have a fever and your wound is red and swollen.   You have yellow, green, or bad-smelling discharge coming from your wound.   You have pain or swelling in your abdomen.   You have an upset stomach or you vomit.   You have sudden, sharp pain in your abdomen.   You urinate very little or not at all.   You feel confused and more tired than usual.   Your arm or leg feels warm, tender, and painful. It may look swollen and red.   You suddenly feel lightheaded and have trouble breathing.       Thoracentesis   WHAT YOU NEED TO KNOW:   A thoracentesis is a procedure to remove extra fluid or air from between your lungs and your inner chest wall. Air or fluid buildup may make it hard for you to breathe. A thoracentesis allows your lungs to expand fully so you can breathe more easily.  DISCHARGE INSTRUCTIONS:   Medicines:   Pain medicine:  You may be given a prescription medicine to decrease pain. Do not wait until the pain is severe before you take your medicine.    Antibiotics:  This medicine helps fight or prevent an infection.    Take your medicine as directed.  Call your healthcare provider if you think your medicine is not helping or if you have side effects. Tell him if you are allergic to any medicine. Keep a list of the medicines, vitamins, and herbs you take. Include the amounts, and when  and why you take them. Bring the list or the pill bottles to follow-up visits. Carry your medicine list with you in case of an emergency.  Follow up with your healthcare provider as directed:  Write down your questions so you remember to ask them during your visits.   Rest:  Rest when you feel it is needed. Slowly start to do more each day. Return to your daily activities as directed.   Do not smoke:  If you smoke, it is never too late to quit. Ask for information about how to stop smoking if you need help.  Contact your healthcare provider if:   You have a fever.    Your puncture site is red, warm, swollen, or draining pus.    You have questions or concerns about your procedure, medicine, or care.    If you have any questions regarding, call the IR department @ [phone].     Seek care immediately or call 911 if:   Blood soaks through your bandage.    There is blood in your spit.

## 2024-10-09 ENCOUNTER — HOSPITAL ENCOUNTER (OUTPATIENT)
Dept: CT IMAGING | Facility: HOSPITAL | Age: 64
Discharge: HOME | End: 2024-10-09
Payer: MEDICARE

## 2024-10-09 DIAGNOSIS — F17.210 SMOKING GREATER THAN 20 PACK YEARS: ICD-10-CM

## 2024-10-09 PROCEDURE — 71271 CT THORAX LUNG CANCER SCR C-: CPT

## 2024-10-10 ENCOUNTER — HOSPITAL ENCOUNTER (OUTPATIENT)
Dept: INFUSION CENTER | Facility: HOSPITAL | Age: 64
Discharge: HOME | End: 2024-10-10
Attending: INTERNAL MEDICINE

## 2024-10-10 ENCOUNTER — HOSPITAL ENCOUNTER (OUTPATIENT)
Dept: INTERVENTIONAL RADIOLOGY/VASCULAR | Facility: HOSPITAL | Age: 64
Discharge: HOME | End: 2024-10-10
Attending: INTERNAL MEDICINE
Payer: MEDICARE

## 2024-10-10 ENCOUNTER — TELEPHONE (OUTPATIENT)
Dept: NEPHROLOGY | Facility: CLINIC | Age: 64
End: 2024-10-10

## 2024-10-10 VITALS
OXYGEN SATURATION: 95 % | SYSTOLIC BLOOD PRESSURE: 105 MMHG | HEART RATE: 81 BPM | DIASTOLIC BLOOD PRESSURE: 57 MMHG | RESPIRATION RATE: 19 BRPM

## 2024-10-10 DIAGNOSIS — Z78.9 ALCOHOL USE: ICD-10-CM

## 2024-10-10 DIAGNOSIS — K70.31 ALCOHOLIC CIRRHOSIS OF LIVER WITH ASCITES (HCC): Chronic | ICD-10-CM

## 2024-10-10 DIAGNOSIS — D69.6 THROMBOCYTOPENIA (HCC): ICD-10-CM

## 2024-10-10 DIAGNOSIS — K76.82 HEPATIC ENCEPHALOPATHY (HCC): ICD-10-CM

## 2024-10-10 PROCEDURE — 32555 ASPIRATE PLEURA W/ IMAGING: CPT

## 2024-10-10 PROCEDURE — 49083 ABD PARACENTESIS W/IMAGING: CPT

## 2024-10-10 RX ORDER — LIDOCAINE HYDROCHLORIDE 10 MG/ML
INJECTION, SOLUTION EPIDURAL; INFILTRATION; INTRACAUDAL; PERINEURAL AS NEEDED
Status: COMPLETED | OUTPATIENT
Start: 2024-10-10 | End: 2024-10-10

## 2024-10-10 RX ADMIN — LIDOCAINE HYDROCHLORIDE 8 ML: 10 INJECTION, SOLUTION EPIDURAL; INFILTRATION; INTRACAUDAL; PERINEURAL at 11:51

## 2024-10-10 RX ADMIN — LIDOCAINE HYDROCHLORIDE 8 ML: 10 INJECTION, SOLUTION EPIDURAL; INFILTRATION; INTRACAUDAL; PERINEURAL at 11:33

## 2024-10-10 NOTE — TELEPHONE ENCOUNTER
Called to see if patient wanted to reschedule to a sooner appointment that opened with Dayanara Sanderson in [place] office Friday 10/11.   She has another apt tomorrow.

## 2024-10-10 NOTE — SEDATION DOCUMENTATION
Weekly right thoracentesis with removal 1950ml dark yellow fluid. Weekly paracentesis with removal 1650ml clear yellow fluid. Band aid to sites. AVS reviewed and ambulated home in her usual state of health.

## 2024-10-10 NOTE — DISCHARGE INSTRUCTIONS
Thoracentesis   WHAT YOU NEED TO KNOW:   A thoracentesis is a procedure to remove extra fluid or air from between your lungs and your inner chest wall. Air or fluid buildup may make it hard for you to breathe. A thoracentesis allows your lungs to expand fully so you can breathe more easily.  DISCHARGE INSTRUCTIONS:   Medicines:   Pain medicine:  You may be given a prescription medicine to decrease pain. Do not wait until the pain is severe before you take your medicine.    Antibiotics:  This medicine helps fight or prevent an infection.    Take your medicine as directed.  Call your healthcare provider if you think your medicine is not helping or if you have side effects. Tell him if you are allergic to any medicine. Keep a list of the medicines, vitamins, and herbs you take. Include the amounts, and when and why you take them. Bring the list or the pill bottles to follow-up visits. Carry your medicine list with you in case of an emergency.  Follow up with your healthcare provider as directed:  Write down your questions so you remember to ask them during your visits.   Rest:  Rest when you feel it is needed. Slowly start to do more each day. Return to your daily activities as directed.   Do not smoke:  If you smoke, it is never too late to quit. Ask for information about how to stop smoking if you need help.  Contact your healthcare provider if:   You have a fever.    Your puncture site is red, warm, swollen, or draining pus.    You have questions or concerns about your procedure, medicine, or care.    If you have any questions regarding, call the IR department @ [phone].     Seek care immediately or call 911 if:   Blood soaks through your bandage.    There is blood in your spit.

## 2024-10-12 LAB
AFP-TM SERPL-MCNC: 2.6 NG/ML
ALBUMIN SERPL-MCNC: 2.3 G/DL (ref 3.6–5.1)
ALBUMIN/GLOB SERPL: 0.5 (CALC) (ref 1–2.5)
ALP SERPL-CCNC: 101 U/L (ref 37–153)
ALT SERPL-CCNC: 29 U/L (ref 6–29)
AST SERPL-CCNC: 61 U/L (ref 10–35)
BILIRUB SERPL-MCNC: 4.2 MG/DL (ref 0.2–1.2)
BUN SERPL-MCNC: 10 MG/DL (ref 7–25)
BUN/CREAT SERPL: ABNORMAL (CALC) (ref 6–22)
CALCIUM SERPL-MCNC: 7.7 MG/DL (ref 8.6–10.4)
CHLORIDE SERPL-SCNC: 94 MMOL/L (ref 98–110)
CO2 SERPL-SCNC: 23 MMOL/L (ref 20–32)
CREAT SERPL-MCNC: 0.96 MG/DL (ref 0.5–1.05)
ERYTHROCYTE [DISTWIDTH] IN BLOOD BY AUTOMATED COUNT: 12.4 % (ref 11–15)
GFR/BSA.PRED SERPLBLD CYS-BASED-ARV: 66 ML/MIN/1.73M2
GLOBULIN SER CALC-MCNC: 4.6 G/DL (CALC) (ref 1.9–3.7)
GLUCOSE SERPL-MCNC: 99 MG/DL (ref 65–99)
HCT VFR BLD AUTO: 34 % (ref 35–45)
HGB BLD-MCNC: 12.3 G/DL (ref 11.7–15.5)
INR PPP: 1.4
MCH RBC QN AUTO: 38.3 PG (ref 27–33)
MCHC RBC AUTO-ENTMCNC: 36.2 G/DL (ref 32–36)
MCV RBC AUTO: 105.9 FL (ref 80–100)
PLATELET # BLD AUTO: 96 THOUSAND/UL (ref 140–400)
PMV BLD REES-ECKER: 9.1 FL (ref 7.5–12.5)
POTASSIUM SERPL-SCNC: 4.5 MMOL/L (ref 3.5–5.3)
PROT SERPL-MCNC: 6.9 G/DL (ref 6.1–8.1)
PROTHROMBIN TIME: 14.3 SEC (ref 9–11.5)
RBC # BLD AUTO: 3.21 MILLION/UL (ref 3.8–5.1)
SODIUM SERPL-SCNC: 125 MMOL/L (ref 135–146)
WBC # BLD AUTO: 7.3 THOUSAND/UL (ref 3.8–10.8)

## 2024-10-15 ENCOUNTER — TELEPHONE (OUTPATIENT)
Dept: FAMILY MEDICINE CLINIC | Facility: CLINIC | Age: 64
End: 2024-10-15

## 2024-10-15 DIAGNOSIS — J98.11 ATELECTASIS: ICD-10-CM

## 2024-10-15 DIAGNOSIS — F41.9 ANXIETY: ICD-10-CM

## 2024-10-15 DIAGNOSIS — J90 RECURRENT PLEURAL EFFUSION: Primary | ICD-10-CM

## 2024-10-15 NOTE — TELEPHONE ENCOUNTER
Spoke with pt. about result notes. She is aware and understands results. I gave pt. pulmonology number to schedule appt. She also understands to go to ED with any respiratory distress.      ----- Message from JOSÉ Huerta sent at 10/15/2024  2:41 PM EDT -----  Call with results-CT lung shows right pleural effusion, right atelectasis-patient has a recurrent history-continue inhalers as ordered. F/up with pulmonology-referral placed. Give patient number to schedule appointment. Go to ED with any respiratory   distress/breathing difficulty.

## 2024-10-16 RX ORDER — ALPRAZOLAM 1 MG/1
1 TABLET ORAL 2 TIMES DAILY PRN
Qty: 60 TABLET | Refills: 2 | Status: SHIPPED | OUTPATIENT
Start: 2024-10-16

## 2024-10-17 ENCOUNTER — HOSPITAL ENCOUNTER (OUTPATIENT)
Dept: INTERVENTIONAL RADIOLOGY/VASCULAR | Facility: HOSPITAL | Age: 64
Discharge: HOME | End: 2024-10-17
Attending: INTERNAL MEDICINE
Payer: MEDICARE

## 2024-10-17 ENCOUNTER — HOSPITAL ENCOUNTER (OUTPATIENT)
Dept: INTERVENTIONAL RADIOLOGY/VASCULAR | Facility: HOSPITAL | Age: 64
Discharge: HOME | End: 2024-10-17
Attending: INTERNAL MEDICINE | Admitting: RADIOLOGY
Payer: MEDICARE

## 2024-10-17 ENCOUNTER — HOSPITAL ENCOUNTER (OUTPATIENT)
Dept: INFUSION CENTER | Facility: HOSPITAL | Age: 64
Discharge: HOME | End: 2024-10-17
Attending: INTERNAL MEDICINE

## 2024-10-17 VITALS
OXYGEN SATURATION: 98 % | DIASTOLIC BLOOD PRESSURE: 55 MMHG | HEART RATE: 89 BPM | RESPIRATION RATE: 22 BRPM | SYSTOLIC BLOOD PRESSURE: 96 MMHG

## 2024-10-17 DIAGNOSIS — Z78.9 ALCOHOL USE: ICD-10-CM

## 2024-10-17 DIAGNOSIS — K70.31 ALCOHOLIC CIRRHOSIS OF LIVER WITH ASCITES (HCC): Chronic | ICD-10-CM

## 2024-10-17 DIAGNOSIS — D69.6 THROMBOCYTOPENIA (HCC): ICD-10-CM

## 2024-10-17 DIAGNOSIS — K76.82 HEPATIC ENCEPHALOPATHY (HCC): ICD-10-CM

## 2024-10-17 PROCEDURE — 49083 ABD PARACENTESIS W/IMAGING: CPT

## 2024-10-17 PROCEDURE — 32555 ASPIRATE PLEURA W/ IMAGING: CPT

## 2024-10-17 NOTE — DISCHARGE INSTRUCTIONS
Abdominal Paracentesis     WHAT YOU NEED TO KNOW:   Abdominal paracentesis is a procedure to remove abnormal fluid buildup in your abdomen. Fluid builds up because of liver problems, such as swelling and scarring. Heart failure, kidney disease, a mass, or problems with your pancreas may also cause fluid buildup.     DISCHARGE INSTRUCTIONS:     Follow up with your healthcare provider as directed: Write down your questions so you remember to ask them during your visits.     Wound care: Remove dressing after 24 hours. Leave glue in place.    Return to your normal activities    Contact Interventional Radiology at [phone]  if:  You have a fever and your wound is red and swollen.   You have yellow, green, or bad-smelling discharge coming from your wound.   You have pain or swelling in your abdomen.   You have an upset stomach or you vomit.   You have sudden, sharp pain in your abdomen.   You urinate very little or not at all.   You feel confused and more tired than usual.   Your arm or leg feels warm, tender, and painful. It may look swollen and red.   You suddenly feel lightheaded and have trouble breathing.           Thoracentesis   WHAT YOU NEED TO KNOW:   A thoracentesis is a procedure to remove extra fluid or air from between your lungs and your inner chest wall. Air or fluid buildup may make it hard for you to breathe. A thoracentesis allows your lungs to expand fully so you can breathe more easily.  DISCHARGE INSTRUCTIONS:   Medicines:   Pain medicine:  You may be given a prescription medicine to decrease pain. Do not wait until the pain is severe before you take your medicine.    Antibiotics:  This medicine helps fight or prevent an infection.    Take your medicine as directed.  Call your healthcare provider if you think your medicine is not helping or if you have side effects. Tell him if you are allergic to any medicine. Keep a list of the medicines, vitamins, and herbs you take. Include the amounts, and  when and why you take them. Bring the list or the pill bottles to follow-up visits. Carry your medicine list with you in case of an emergency.  Follow up with your healthcare provider as directed:  Write down your questions so you remember to ask them during your visits.   Rest:  Rest when you feel it is needed. Slowly start to do more each day. Return to your daily activities as directed.   Do not smoke:  If you smoke, it is never too late to quit. Ask for information about how to stop smoking if you need help.  Contact your healthcare provider if:   You have a fever.    Your puncture site is red, warm, swollen, or draining pus.    You have questions or concerns about your procedure, medicine, or care.    If you have any questions regarding, call the IR department @ [phone].     Seek care immediately or call 911 if:   Blood soaks through your bandage.    There is blood in your spit.

## 2024-10-24 ENCOUNTER — HOSPITAL ENCOUNTER (OUTPATIENT)
Dept: RADIOLOGY | Facility: HOSPITAL | Age: 64
Discharge: HOME | End: 2024-10-24
Attending: INTERNAL MEDICINE
Payer: MEDICARE

## 2024-10-24 VITALS
SYSTOLIC BLOOD PRESSURE: 103 MMHG | HEART RATE: 82 BPM | DIASTOLIC BLOOD PRESSURE: 55 MMHG | OXYGEN SATURATION: 97 % | RESPIRATION RATE: 16 BRPM

## 2024-10-24 DIAGNOSIS — D69.6 THROMBOCYTOPENIA (HCC): ICD-10-CM

## 2024-10-24 DIAGNOSIS — K70.31 ALCOHOLIC CIRRHOSIS OF LIVER WITH ASCITES (HCC): Chronic | ICD-10-CM

## 2024-10-24 DIAGNOSIS — Z78.9 ALCOHOL USE: ICD-10-CM

## 2024-10-24 DIAGNOSIS — K76.82 HEPATIC ENCEPHALOPATHY (HCC): ICD-10-CM

## 2024-10-24 PROCEDURE — 32555 ASPIRATE PLEURA W/ IMAGING: CPT | Performed by: PHYSICIAN ASSISTANT

## 2024-10-24 PROCEDURE — 49083 ABD PARACENTESIS W/IMAGING: CPT | Performed by: PHYSICIAN ASSISTANT

## 2024-10-24 PROCEDURE — 49083 ABD PARACENTESIS W/IMAGING: CPT

## 2024-10-24 PROCEDURE — 32555 ASPIRATE PLEURA W/ IMAGING: CPT

## 2024-10-24 RX ORDER — LIDOCAINE WITH 8.4% SOD BICARB 0.9%(10ML)
SYRINGE (ML) INJECTION AS NEEDED
Status: DISCONTINUED | OUTPATIENT
Start: 2024-10-24 | End: 2024-10-25 | Stop reason: HOSPADM

## 2024-10-24 RX ADMIN — Medication 10 ML: at 10:44

## 2024-10-24 RX ADMIN — Medication 10 ML: at 10:24

## 2024-10-24 NOTE — DISCHARGE INSTRUCTIONS
Abdominal Paracentesis     WHAT YOU NEED TO KNOW:   Abdominal paracentesis is a procedure to remove abnormal fluid buildup in your abdomen. Fluid builds up because of liver problems, such as swelling and scarring. Heart failure, kidney disease, a mass, or problems with your pancreas may also cause fluid buildup.     DISCHARGE INSTRUCTIONS:     Follow up with your healthcare provider as directed: Write down your questions so you remember to ask them during your visits.     Wound care: Remove dressing after 24 hours. Leave glue in place.    Return to your normal activities    Contact Interventional Radiology at [phone] (YOGESH PATIENTS: Contact Interventional Radiology at [phone]) (MARGARITA PATIENTS: Contact Interventional Radiology at [phone]) if:  You have a fever and your wound is red and swollen.   You have yellow, green, or bad-smelling discharge coming from your wound.   You have pain or swelling in your abdomen.   You have an upset stomach or you vomit.   You have sudden, sharp pain in your abdomen.   You urinate very little or not at all.   You feel confused and more tired than usual.   Your arm or leg feels warm, tender, and painful. It may look swollen and red.   You suddenly feel lightheaded and have trouble breathing.      Thoracentesis   WHAT YOU NEED TO KNOW:   A thoracentesis is a procedure to remove extra fluid or air from between your lungs and your inner chest wall. Air or fluid buildup may make it hard for you to breathe. A thoracentesis allows your lungs to expand fully so you can breathe more easily.    DISCHARGE INSTRUCTIONS:     Small amount of shoulder pain and bloody sputum is normal after a Thoracentesis.     Rest:  Rest when you feel it is needed. Slowly start to do more each day. Return to your daily activities as directed.     Resume your normal diet. Small sips of flat soda will help mild nausea.    Do not smoke:  If you smoke, it is never too late to quit. Ask for  information about how to stop smoking if you need help.    Contact Interventional Radiology at [phone] (YOGESH PATIENTS: Contact Interventional Radiology at [phone]) (MARGARITA PATIENTS: Contact Interventional Radiology at [phone]) if:   You have a fever.    Your puncture site is red, warm, swollen, or draining pus.    You have questions or concerns about your procedure, medicine, or care.    Seek care immediately or call 911 if:   Severe chest pain with inspiration and shortness of breath    Large amounts of blood in your sputum    Follow up with your healthcare provider as directed.

## 2024-10-24 NOTE — SEDATION DOCUMENTATION
Aprox 1700 ml clear yellow pleural fluid was aspirated from the right side, patient tolerated procedure well with no immediate complications. A dry sterile dressing was applied to the puncture site on the right.

## 2024-10-24 NOTE — BRIEF OP NOTE (RAD/CATH)
IR THORACENTESIS AND PARACENTESIS Procedure Note    PATIENT NAME: Jose White  : 1960  MRN: [id number]    Pre-op Diagnosis:   1. Alcoholic cirrhosis of liver with ascites (HCC)    2. Alcohol use    3. Thrombocytopenia (HCC)      Post-op Diagnosis:   1. Alcoholic cirrhosis of liver with ascites (HCC)    2. Alcohol use    3. Thrombocytopenia (HCC)        Provider:   Zonia Chicas PA-C  Assistants:     No qualified resident was available, Resident is only observing    Estimated Blood Loss: None    Findings: Right thoracentesis 1700mL clear yellow pleural fluid removed    Right paracentesis 2500mL clear yellow ascites removed    Specimens: None    Complications: None immediately    Anesthesia: local    Zonia Chicas PA-C     Date: 10/24/2024  Time: 10:52 AM

## 2024-10-24 NOTE — SEDATION DOCUMENTATION
Aprox 2500 ml clear yellow fluid was aspirated from the abdomen, patient tolerated procedure well with no immediate complications. A dry sterile dressing was applied to the puncture site on the right.

## 2024-10-28 ENCOUNTER — OFFICE VISIT (OUTPATIENT)
Age: 64
End: 2024-10-28
Payer: MEDICARE

## 2024-10-28 ENCOUNTER — TELEPHONE (OUTPATIENT)
Age: 64
End: 2024-10-28

## 2024-10-28 VITALS
OXYGEN SATURATION: 94 % | BODY MASS INDEX: 26.05 KG/M2 | WEIGHT: 147 LBS | SYSTOLIC BLOOD PRESSURE: 112 MMHG | HEART RATE: 91 BPM | HEIGHT: 63 IN | DIASTOLIC BLOOD PRESSURE: 62 MMHG | TEMPERATURE: 98.2 F

## 2024-10-28 DIAGNOSIS — J98.11 ATELECTASIS: ICD-10-CM

## 2024-10-28 DIAGNOSIS — J44.9 MODERATE COPD (CHRONIC OBSTRUCTIVE PULMONARY DISEASE) (HCC): ICD-10-CM

## 2024-10-28 DIAGNOSIS — R05.3 CHRONIC COUGH: Primary | ICD-10-CM

## 2024-10-28 DIAGNOSIS — K70.31 ALCOHOLIC CIRRHOSIS OF LIVER WITH ASCITES (HCC): Chronic | ICD-10-CM

## 2024-10-28 DIAGNOSIS — J90 RECURRENT PLEURAL EFFUSION: ICD-10-CM

## 2024-10-28 PROCEDURE — 99214 OFFICE O/P EST MOD 30 MIN: CPT | Performed by: PHYSICIAN ASSISTANT

## 2024-10-28 RX ORDER — BUDESONIDE AND FORMOTEROL FUMARATE DIHYDRATE 160; 4.5 UG/1; UG/1
2 AEROSOL RESPIRATORY (INHALATION) 2 TIMES DAILY
Qty: 10.2 G | Refills: 6 | Status: SHIPPED | OUTPATIENT
Start: 2024-10-28

## 2024-10-28 RX ORDER — BENZONATATE 200 MG/1
200 CAPSULE ORAL 3 TIMES DAILY PRN
Qty: 90 CAPSULE | Refills: 6 | Status: SHIPPED | OUTPATIENT
Start: 2024-10-28

## 2024-10-28 RX ORDER — ALBUTEROL SULFATE 90 UG/1
2 INHALANT RESPIRATORY (INHALATION) EVERY 6 HOURS PRN
Qty: 18 G | Refills: 6 | Status: SHIPPED | OUTPATIENT
Start: 2024-10-28

## 2024-10-28 RX ORDER — ALBUTEROL SULFATE 0.83 MG/ML
2.5 SOLUTION RESPIRATORY (INHALATION) EVERY 6 HOURS PRN
Qty: 1080 ML | Refills: 3 | Status: SHIPPED | OUTPATIENT
Start: 2024-10-28

## 2024-10-28 NOTE — TELEPHONE ENCOUNTER
Patient continues to hold lasix and aldactone since 9/26/24 appointment.  Ever since patient is having issues with urinating but increased amounts of fluid with paracentesis. Feels that she has to go but unable.  Please advise.

## 2024-10-28 NOTE — TELEPHONE ENCOUNTER
Pt called stating ever since she was taken off water pill she has had trouble urinating. Pt was transferred to  in triage.

## 2024-10-28 NOTE — PROGRESS NOTES
Assessment:    1. Chronic cough  benzonatate (TESSALON) 200 MG capsule      2. Recurrent pleural effusion  Ambulatory Referral to Pulmonology      3. Atelectasis  Ambulatory Referral to Pulmonology      4. Alcoholic cirrhosis of liver with ascites (HCC)  Ambulatory Referral to Palliative Care    CANCELED: Ambulatory Referral to Palliative Care      5. Moderate COPD (chronic obstructive pulmonary disease) (HCC)  budesonide-formoterol (Symbicort) 160-4.5 mcg/act inhaler    albuterol (Proventil HFA) 90 mcg/act inhaler    albuterol (2.5 mg/3 mL) 0.083 % nebulizer solution    Ambulatory Referral to Palliative Care          Plan:     Patient presenting for follow-up, last seen March 2022.  She was asked to be reevaluated by pulmonary due to her recurrent pleural effusion requiring weekly or biweekly thoracentesis.  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  She also gets paracentesis at the same time.  I discussed with her the concept of indwelling pleural catheter however agreed to defer for now.  She will follow-up with nephrology to see if she can somehow resume diuretics and better control of her sodium.  If her overall volume status improves, the recurrence of her pleural effusion and ascites will also improve  At this time, I feel that her shortness of breath is mainly related to her large right-sided pleural effusion.  Her COPD is moderate and she reports compliance with her Symbicort.  I advised her to start using her nebulizer more frequently as this does provide her some relief.  Instructed her to continue to follow closely with GI and follow recommendations of all her physicians  Unfortunately, patient is still drinking and smoking.  I discussed with her that continuing to drink alcohol in the setting of her liver disease will result in her eventual death.  She verbalizes understanding and does not think she will be able to quit  drinking.  I think given her gradual deterioration over time, she would benefit from establishing with palliative care.  Referral was placed.  I filled out handicap placard for the patient as she is unable to walk more than 200 feet without stopping to catch her breath    Patient is accompanied by her son    I sent a message to nephrology requesting that they call the patient for a sooner appointment however I also instructed patient to call their office as well.    Subjective:     Patient ID: Jose White is a 64 y.o. female.    Chief Complaint:  Jose is a 64-year-old female with past medical history including COPD, cirrhosis of the liver, DCIS, tobacco and alcohol abuse presenting for follow-up.     She has been struggling more with her breathing ever since she was taken off her diuretics due to hyponatremia.  She has a standing order for regular paracentesis and thoracentesis.  She says her breathing is worse at night when she is laying down.  This also causes her to cough quite a bit.    She is still smoking and drinking alcohol and does not want to quit.       The following portions of the patient's history were reviewed in this encounter and updated as appropriate: [unfilled]  Review of Systems   Constitutional:  Positive for fatigue.   Respiratory:  Positive for cough and shortness of breath.    Gastrointestinal:  Positive for abdominal distention.   Neurological:  Positive for weakness.   All other systems reviewed and are negative.        Objective:    Physical Exam  Vitals reviewed.   Constitutional:       General: She is not in acute distress.     Appearance: She is not toxic-appearing.   HENT:      Head: Normocephalic and atraumatic.   Eyes:      General: No scleral icterus.  Cardiovascular:      Rate and Rhythm: Normal rate and regular rhythm.   Pulmonary:      Effort: Pulmonary effort is normal.      Comments: Decreased on R  Abdominal:      General: There is distension.    Musculoskeletal:         General: No signs of injury.      Right lower leg: Edema present.      Left lower leg: Edema present.   Skin:     Coloration: Skin is pale.   Neurological:      General: No focal deficit present.      Mental Status: She is alert. Mental status is at baseline.   Psychiatric:         Mood and Affect: Mood normal.         Behavior: Behavior normal.         Lab Review:   Office Visit on 10/02/2024   Component Date Value    AFP-Tumor Marker 10/11/2024 2.6    Office Visit on 09/26/2024   Component Date Value    Glucose, Random 10/11/2024 99     BUN 10/11/2024 10     Creatinine 10/11/2024 0.96     eGFR 10/11/2024 66     SL AMB BUN/CREATININE RA* 10/11/2024 SEE NOTE:     Sodium 10/11/2024 125 (L)     Potassium 10/11/2024 4.5     Chloride 10/11/2024 94 (L)     CO2 10/11/2024 23     Calcium 10/11/2024 7.7 (L)     Protein, Total 10/11/2024 6.9     Albumin 10/11/2024 2.3 (L)     Globulin 10/11/2024 4.6 (H)     Albumin/Globulin Ratio 10/11/2024 0.5 (L)     TOTAL BILIRUBIN 10/11/2024 4.2 (H)     Alkaline Phosphatase 10/11/2024 101     AST 10/11/2024 61 (H)     ALT 10/11/2024 29     White Blood Cell Count 10/11/2024 7.3     Red Blood Cell Count 10/11/2024 3.21 (L)     Hemoglobin 10/11/2024 12.3     HCT 10/11/2024 34.0 (L)     MCV 10/11/2024 105.9 (H)     MCH 10/11/2024 38.3 (H)     MCHC 10/11/2024 36.2 (H)     RDW 10/11/2024 12.4     Platelet Count 10/11/2024 96 (L)     SL AMB MPV 10/11/2024 9.1     INR 10/11/2024 1.4 (H)     Prothrombin Time 10/11/2024 14.3 (H)    Hospital Outpatient Visit on 09/18/2024   Component Date Value    Site 09/18/2024 Paracentesis     Color, Fluid 09/18/2024 Yellow     Clarity, Fluid 09/18/2024 Clear     WBC, Fluid 09/18/2024 220     Body Fluid Culture, Ster* 09/18/2024 No growth     Gram Stain Result 09/18/2024 1+ Polys     Gram Stain Result 09/18/2024 No bacteria seen     Protein, Fluid 09/18/2024 <3.0     Case Report 09/18/2024                      Value:Non-gynecologic  Cytology                          Case: [id number]                                  Authorizing Provider:  Vamsi Herrera MD           Collected:           09/18/2024 1128              Ordering Location:     [facility]     Received:            09/18/2024 1138                                     [place] [facility]                                                                               Radiology                                                                    Pathologist:           Cydney Stuart MD                                                       Specimens:   A) - Paracentesis                                                                                   B) - Paracentesis, cell block                                                              Final Diagnosis 09/18/2024                      Value:A-B. Paracentesis (ThinPrep and cell block preparations):  Negative for malignancy.  Benign mesothelial cells.  Mixed inflammatory cells.    Satisfactory for evaluation.          Note 09/18/2024                      Value:Interpretation performed at [facility], [address] [facility] [address]       Gross Description 09/18/2024                      Value:A. 100mL, pale yellow, slightly hazy, received in [facility]toLyt     B. Scant cell button, red, Due to the (size and/or consistency) of the specimen, it is questionable whether cell block will survive histological processing.         Additional Information 09/18/2024                      Value:[facility] [employer]'s FDA approved ,  and ThinPrep Imaging Duo System are utilized with strict adherence to the 's instruction manual to prepare gynecologic and non-gynecologic cytology specimens for the production of ThinPrep slides as well as for gynecologic ThinPrep imaging. These processes have been validated by our laboratory and/or by the .    These tests were developed and their performance characteristics determined by   [facility] or [facility]. They may not be cleared or approved by the U.S. Food and Drug Administration. The FDA has determined that such clearance or approval is not necessary. These tests are used for clinical purposes. They should not be regarded as investigational or for research. This laboratory has been approved by CLIA 88, designated as a high-complexity laboratory and is qualified to perform these tests.        Glucose, Fluid 09/18/2024 129     Albumin, Fluid 09/18/2024 <1.5     LD, Fluid 09/18/2024 31     Total Counted 09/18/2024 100     Neutrophils % (Fluid) 09/18/2024 20     Lymphs % (Fluid) 09/18/2024 10     Mesothelial % (Fluid) 09/18/2024 2     Histiocyte % (Fluid) 09/18/2024 68     TOTPROTEINFL 09/18/2024 0.6    Patient Message on 09/06/2024   Component Date Value    Glucose, Random 09/09/2024 109 (H)     BUN 09/09/2024 6 (L)     Creatinine 09/09/2024 0.67     eGFR 09/09/2024 97     SL AMB BUN/CREATININE RA* 09/09/2024 9     Sodium 09/09/2024 126 (L)     Potassium 09/09/2024 3.3 (L)     Chloride 09/09/2024 92 (L)     CO2 09/09/2024 26     Calcium 09/09/2024 8.0 (L)     Protein, Total 09/09/2024 7.2     Albumin 09/09/2024 2.4 (L)     Globulin 09/09/2024 4.8 (H)     Albumin/Globulin Ratio 09/09/2024 0.5 (L)     TOTAL BILIRUBIN 09/09/2024 2.5 (H)     Alkaline Phosphatase 09/09/2024 109     AST 09/09/2024 61 (H)     ALT 09/09/2024 30 (H)     White Blood Cell Count 09/09/2024 5.3     Red Blood Cell Count 09/09/2024 3.40 (L)     Hemoglobin 09/09/2024 13.1     HCT 09/09/2024 36.7     MCV 09/09/2024 107.9 (H)     MCH 09/09/2024 38.5 (H)     MCHC 09/09/2024 35.7     RDW 09/09/2024 13.3     Platelet Count 09/09/2024 95 (L)     SL AMB MPV 09/09/2024 9.7     Neutrophils (Absolute) 09/09/2024 3,731     Lymphocytes (Absolute) 09/09/2024 493 (L)     Monocytes (Absolute) 09/09/2024 853     Eosinophils (Absolute) 09/09/2024 101     Basophils ABS 09/09/2024 122     Neutrophils 09/09/2024  70.4     Lymphocytes 09/09/2024 9.3     Monocytes 09/09/2024 16.1     Eosinophils 09/09/2024 1.9     Basophils PCT 09/09/2024 2.3     INR 09/09/2024 1.4 (H)     Prothrombin Time 09/09/2024 14.1 (H)    Telephone on 09/06/2024   Component Date Value    Urine Culture Result 09/09/2024      Color UA 09/09/2024 YELLOW     Urine Appearance 09/09/2024 CLEAR     Specific Gravity 09/09/2024 1.011     Ph 09/09/2024 6.0     Glucose, Urine 09/09/2024 NEGATIVE     Bilirubin, Urine 09/09/2024 NEGATIVE     Ketone, Urine 09/09/2024 NEGATIVE     Blood, Urine 09/09/2024 NEGATIVE     Protein, Urine 09/09/2024 NEGATIVE     Nitrites Urine 09/09/2024 NEGATIVE     Leukocyte Esterase 09/09/2024 2+ (A)     SL AMB WBC, URINE 09/09/2024 20-40 (A)     RBC, Urine 09/09/2024 NONE SEEN     Squamous Epithelial Cells 09/09/2024 6-10 (A)     Bacteria, UA 09/09/2024 NONE SEEN     Hyaline Casts 09/09/2024 NONE SEEN     Comment(s) 09/09/2024

## 2024-10-29 ENCOUNTER — TELEPHONE (OUTPATIENT)
Dept: NEPHROLOGY | Facility: CLINIC | Age: 64
End: 2024-10-29

## 2024-10-29 DIAGNOSIS — K70.31 ALCOHOLIC CIRRHOSIS OF LIVER WITH ASCITES (HCC): Primary | ICD-10-CM

## 2024-10-29 RX ORDER — SODIUM CHLORIDE 9 MG/ML
20 INJECTION, SOLUTION INTRAVENOUS ONCE
OUTPATIENT
Start: 2025-02-04

## 2024-11-01 ENCOUNTER — HOSPITAL ENCOUNTER (OUTPATIENT)
Dept: INTERVENTIONAL RADIOLOGY/VASCULAR | Facility: HOSPITAL | Age: 64
Discharge: HOME | End: 2024-11-01
Attending: INTERNAL MEDICINE
Payer: MEDICARE

## 2024-11-01 ENCOUNTER — HOSPITAL ENCOUNTER (OUTPATIENT)
Dept: INFUSION CENTER | Facility: HOSPITAL | Age: 64
Discharge: HOME | End: 2024-11-01
Attending: INTERNAL MEDICINE

## 2024-11-01 VITALS — SYSTOLIC BLOOD PRESSURE: 90 MMHG | DIASTOLIC BLOOD PRESSURE: 54 MMHG

## 2024-11-01 VITALS — DIASTOLIC BLOOD PRESSURE: 47 MMHG | SYSTOLIC BLOOD PRESSURE: 97 MMHG

## 2024-11-01 DIAGNOSIS — K76.82 HEPATIC ENCEPHALOPATHY (HCC): ICD-10-CM

## 2024-11-01 DIAGNOSIS — Z78.9 ALCOHOL USE: ICD-10-CM

## 2024-11-01 DIAGNOSIS — K70.31 ALCOHOLIC CIRRHOSIS OF LIVER WITH ASCITES (HCC): Chronic | ICD-10-CM

## 2024-11-01 DIAGNOSIS — D69.6 THROMBOCYTOPENIA (HCC): ICD-10-CM

## 2024-11-01 PROCEDURE — 49083 ABD PARACENTESIS W/IMAGING: CPT

## 2024-11-01 PROCEDURE — 49083 ABD PARACENTESIS W/IMAGING: CPT | Performed by: RADIOLOGY

## 2024-11-01 PROCEDURE — 32555 ASPIRATE PLEURA W/ IMAGING: CPT

## 2024-11-01 PROCEDURE — 32555 ASPIRATE PLEURA W/ IMAGING: CPT | Performed by: RADIOLOGY

## 2024-11-01 RX ORDER — LIDOCAINE HYDROCHLORIDE 10 MG/ML
INJECTION, SOLUTION EPIDURAL; INFILTRATION; INTRACAUDAL; PERINEURAL AS NEEDED
Status: COMPLETED | OUTPATIENT
Start: 2024-11-01 | End: 2024-11-01

## 2024-11-01 RX ADMIN — LIDOCAINE HYDROCHLORIDE 9 ML: 10 INJECTION, SOLUTION EPIDURAL; INFILTRATION; INTRACAUDAL; PERINEURAL at 08:51

## 2024-11-01 RX ADMIN — LIDOCAINE HYDROCHLORIDE 7 ML: 10 INJECTION, SOLUTION EPIDURAL; INFILTRATION; INTRACAUDAL; PERINEURAL at 08:38

## 2024-11-01 NOTE — DISCHARGE INSTRUCTIONS
1st attempt to reach patient for TCM. No answer. Left message for patient to return call to the clinic.    Thoracentesis   WHAT YOU NEED TO KNOW:   A thoracentesis is a procedure to remove extra fluid or air from between your lungs and your inner chest wall. Air or fluid buildup may make it hard for you to breathe. A thoracentesis allows your lungs to expand fully so you can breathe more easily.  DISCHARGE INSTRUCTIONS:   Medicines:   Pain medicine:  You may be given a prescription medicine to decrease pain. Do not wait until the pain is severe before you take your medicine.    Antibiotics:  This medicine helps fight or prevent an infection.    Take your medicine as directed.  Call your healthcare provider if you think your medicine is not helping or if you have side effects. Tell him if you are allergic to any medicine. Keep a list of the medicines, vitamins, and herbs you take. Include the amounts, and when and why you take them. Bring the list or the pill bottles to follow-up visits. Carry your medicine list with you in case of an emergency.  Follow up with your healthcare provider as directed:  Write down your questions so you remember to ask them during your visits.   Rest:  Rest when you feel it is needed. Slowly start to do more each day. Return to your daily activities as directed.   Do not smoke:  If you smoke, it is never too late to quit. Ask for information about how to stop smoking if you need help.  Contact your healthcare provider if:   You have a fever.    Your puncture site is red, warm, swollen, or draining pus.    You have questions or concerns about your procedure, medicine, or care.    If you have any questions regarding, call the IR department @ [phone].     Seek care immediately or call 911 if:   Blood soaks through your bandage.    There is blood in your spit.

## 2024-11-01 NOTE — SEDATION DOCUMENTATION
Weekly thoracentesis with removal 1700ml milky yellow fluid. Paracentesis 3100ml yellow fluid. Band aid to site. AVS reviewed and ambulated home in her usual state of health.

## 2024-11-04 ENCOUNTER — OFFICE VISIT (OUTPATIENT)
Dept: NEPHROLOGY | Facility: HOSPITAL | Age: 64
End: 2024-11-04
Payer: MEDICARE

## 2024-11-04 ENCOUNTER — TELEPHONE (OUTPATIENT)
Dept: NEPHROLOGY | Facility: CLINIC | Age: 64
End: 2024-11-04

## 2024-11-04 ENCOUNTER — LAB (OUTPATIENT)
Dept: LAB | Facility: HOSPITAL | Age: 64
DRG: 641 | End: 2024-11-04
Payer: MEDICARE

## 2024-11-04 VITALS
WEIGHT: 145.4 LBS | DIASTOLIC BLOOD PRESSURE: 70 MMHG | HEIGHT: 63 IN | BODY MASS INDEX: 25.76 KG/M2 | HEART RATE: 92 BPM | SYSTOLIC BLOOD PRESSURE: 100 MMHG

## 2024-11-04 DIAGNOSIS — E87.1 HYPONATREMIA: ICD-10-CM

## 2024-11-04 DIAGNOSIS — N18.2 STAGE 2 CHRONIC KIDNEY DISEASE: Primary | ICD-10-CM

## 2024-11-04 DIAGNOSIS — R60.0 BILATERAL LEG EDEMA: ICD-10-CM

## 2024-11-04 DIAGNOSIS — K70.31 ALCOHOLIC CIRRHOSIS OF LIVER WITH ASCITES (HCC): ICD-10-CM

## 2024-11-04 DIAGNOSIS — E87.1 HYPOSMOLALITY SYNDROME: Primary | ICD-10-CM

## 2024-11-04 DIAGNOSIS — I95.89 OTHER SPECIFIED HYPOTENSION: ICD-10-CM

## 2024-11-04 LAB
ANION GAP SERPL CALCULATED.3IONS-SCNC: 3 MMOL/L (ref 4–13)
BUN SERPL-MCNC: 8 MG/DL (ref 5–25)
CALCIUM SERPL-MCNC: 7.9 MG/DL (ref 8.4–10.2)
CHLORIDE SERPL-SCNC: 93 MMOL/L (ref 96–108)
CO2 SERPL-SCNC: 25 MMOL/L (ref 21–32)
CREAT SERPL-MCNC: 0.63 MG/DL (ref 0.6–1.3)
GFR SERPL CREATININE-BSD FRML MDRD: 94 ML/MIN/1.73SQ M
GLUCOSE SERPL-MCNC: 104 MG/DL (ref 65–140)
OSMOLALITY UR: 278 MMOL/KG
POTASSIUM SERPL-SCNC: 4.9 MMOL/L (ref 3.5–5.3)
SODIUM 24H UR-SCNC: <10 MOL/L
SODIUM SERPL-SCNC: 121 MMOL/L (ref 135–147)

## 2024-11-04 PROCEDURE — 83935 ASSAY OF URINE OSMOLALITY: CPT

## 2024-11-04 PROCEDURE — 99214 OFFICE O/P EST MOD 30 MIN: CPT | Performed by: PHYSICIAN ASSISTANT

## 2024-11-04 PROCEDURE — 80048 BASIC METABOLIC PNL TOTAL CA: CPT

## 2024-11-04 PROCEDURE — 36415 COLL VENOUS BLD VENIPUNCTURE: CPT

## 2024-11-04 PROCEDURE — 84300 ASSAY OF URINE SODIUM: CPT

## 2024-11-04 RX ORDER — FUROSEMIDE 20 MG/1
20 TABLET ORAL DAILY
Start: 2024-11-04

## 2024-11-04 RX ORDER — MIDODRINE HYDROCHLORIDE 10 MG/1
10 TABLET ORAL
Qty: 90 TABLET | Refills: 3 | Status: SHIPPED | OUTPATIENT
Start: 2024-11-04

## 2024-11-04 NOTE — PROGRESS NOTES
OFFICE FOLLOW UP - Nephrology   Jose White 64 y.o. female MRN: [id number]       Assessment & Plan  Hyponatremia  Due to cirrhosis  Sodium noted to be 122 on 10/29--recommend she go to the emergency room given weakness. Patient/ would like to avoid ER if possible and she did have a recent thoracentesis and paracentesis since these labs.  They agreed to go to the lab and have a repeat BMP immediately following her up.  Will also add urine sodium and urine osmolality  Continue oral fluid restriction  Resume home Lasix 20 mg daily which has been on hold since 9/26 as she does have signs of volume on exam and reports decreased urine output since the Lasix was stopped   Other specified hypotension  Increase midodrine to 10 mg 3 times daily  Alcoholic cirrhosis of liver with ascites (HCC)  Continue follow-up with GI  Weekly paracentesis and thoracentesis  Status post 3.1 L paracentesis and 1.7 mL right thoracentesis on 11/1  Bilateral leg edema  Resume oral Lasix 20 mg daily as above      Plan:   Recommend patient go to the emergency room for sodium 122.  She did not at this time but agrees to repeat outpatient labs now  Repeat BMP, urine sodium and urine osmolality now  Start Lasix 20 mg daily  Increase midodrine to 10 mg 3 times daily  Follow up in 3 months with Dr Tovar with repeat labs prior     HPI: Jose White is a 64 y.o. female who is here for hyponatremia follow up.    Patient notes feeling weak   Recently.  Her appetite has been poor.  She denies any nausea vomiting or diarrhea.  She continues to go for weekly paracentesis and thoracentesis.    ROS:   A complete 10 point review of systems was done. Pertinent positives and negatives as noted in the HPI, otherwise the review of systems is negative.    Allergies: Sulfa antibiotics and Ace inhibitors    Medications:   Current Outpatient Medications:     albuterol (2.5 mg/3 mL) 0.083 % nebulizer solution, Take 3 mL (2.5 mg total) by nebulization  every 6 (six) hours as needed for wheezing or shortness of breath, Disp: 1080 mL, Rfl: 3    albuterol (Proventil HFA) 90 mcg/act inhaler, Inhale 2 puffs every 6 (six) hours as needed for wheezing or shortness of breath, Disp: 18 g, Rfl: 6    alendronate (Fosamax) 70 mg tablet, Take 1 tablet (70 mg total) by mouth every 7 days, Disp: 12 tablet, Rfl: 3    ALPRAZolam (XANAX) 1 mg tablet, Take 1 tablet (1 mg total) by mouth 2 (two) times a day as needed for anxiety, Disp: 60 tablet, Rfl: 2    anastrozole (ARIMIDEX) 1 mg tablet, Take 1 tablet (1 mg total) by mouth daily, Disp: 90 tablet, Rfl: 1    benzonatate (TESSALON) 200 MG capsule, Take 1 capsule (200 mg total) by mouth 3 (three) times a day as needed for cough, Disp: 90 capsule, Rfl: 6    budesonide-formoterol (Symbicort) 160-4.5 mcg/act inhaler, Inhale 2 puffs 2 (two) times a day Rinse mouth after use., Disp: 10.2 g, Rfl: 6    furosemide (LASIX) 20 mg tablet, Take 1 tablet (20 mg total) by mouth daily, Disp: , Rfl:     lactulose 20 g/30 mL, Take 45 mL (30 g total) by mouth 2 (two) times a day Titrate dose for an effect of 3 soft, but formed bowel movements per day.  You can start with 1 tablespoon per day and slowly increase to effect., Disp: 2700 mL, Rfl: 0    midodrine (PROAMATINE) 10 MG tablet, Take 1 tablet (10 mg total) by mouth 3 (three) times a day before meals, Disp: 90 tablet, Rfl: 3    naltrexone (REVIA) 50 mg tablet, Take 1 tablet (50 mg total) by mouth daily, Disp: 30 tablet, Rfl: 5    pantoprazole (PROTONIX) 20 mg tablet, TAKE ONE TABLET BY MOUTH EVERY DAY, Disp: 90 tablet, Rfl: 1    Risankizumab-rzaa (SKYRIZI PEN SC), Inject under the skin, Disp: , Rfl:     traZODone (DESYREL) 50 mg tablet, Take 1 tablet (50 mg total) by mouth daily at bedtime, Disp: 30 tablet, Rfl: 5    ergocalciferol (VITAMIN D2) 50,000 units, Take one capsule weekly x12 weeks (Patient not taking: Reported on 10/2/2024), Disp: 12 capsule, Rfl: 0    Past Medical History:   Diagnosis  Date    Alcoholic fatty liver     Anxiety     Asthma     COPD (chronic obstructive pulmonary disease) (HCC)     Elevated liver function tests     GERD (gastroesophageal reflux disease)     GERD without esophagitis     Hyperlipidemia     Hypertension     IBS (irritable bowel syndrome)     Insomnia     Insomnia     Liver disease     Nervousness     Nicotine dependence     Other specified urinary incontinence     RLS (restless legs syndrome)     Wears glasses      Past Surgical History:   Procedure Laterality Date    BACK SURGERY      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    BREAST EXCISIONAL BIOPSY Right 11/01/2004    benign    CATARACT EXTRACTION, BILATERAL  08/01/2018    COLONOSCOPY N/A 5/8/2019    Procedure: COLONOSCOPY;  Surgeon: Jacobo Leonardo MD;  Location: [facility];  Service: Gastroenterology    EGD AND COLONOSCOPY N/A 3/19/2018    Procedure: EGD AND COLONOSCOPY;  Surgeon: Jacobo Leonardo MD;  Location: [facility];  Service: Gastroenterology    ESOPHAGOGASTRODUODENOSCOPY N/A 5/8/2019    Procedure: ESOPHAGOGASTRODUODENOSCOPY (EGD);  Surgeon: Jacobo Leonardo MD;  Location: [facility];  Service: Gastroenterology    IR PARACENTESIS  11/27/2020    IR PARACENTESIS  12/8/2020    IR PARACENTESIS  10/28/2022    IR PARACENTESIS  11/7/2022    IR PARACENTESIS  11/30/2022    IR PARACENTESIS  12/30/2022    IR PARACENTESIS  1/16/2023    IR PARACENTESIS  8/6/2024    IR PARACENTESIS  9/18/2024    IR PARACENTESIS  10/3/2024    IR PARACENTESIS  10/10/2024    IR PARACENTESIS  10/17/2024    IR PARACENTESIS  10/24/2024    IR PARACENTESIS  11/1/2024    IR THORACENTESIS  1/16/2023    IR THORACENTESIS  3/28/2023    IR THORACENTESIS  7/25/2024    IR THORACENTESIS  8/6/2024    IR THORACENTESIS  8/14/2024    IR THORACENTESIS  8/21/2024    IR THORACENTESIS  8/28/2024    IR THORACENTESIS  9/4/2024    IR THORACENTESIS  9/11/2024    IR THORACENTESIS  9/18/2024    IR THORACENTESIS  9/25/2024    IR THORACENTESIS  10/3/2024    IR  "THORACENTESIS  10/10/2024    IR THORACENTESIS  10/17/2024    IR THORACENTESIS  10/24/2024    IR THORACENTESIS  11/1/2024    KNEE SURGERY      KNEE SURGERY      LUMBAR LAMINECTOMY  04/1999    LYMPH NODE BIOPSY      MAMMO STEREOTACTIC BREAST BIOPSY RIGHT (ALL INC) Right 5/21/2021    MAMMO STEREOTACTIC BREAST BIOPSY RIGHT (ALL INC) EACH ADD Right 5/21/2021    TOTAL ABDOMINAL HYSTERECTOMY  02/05/2008    TUBAL LIGATION      TUBAL LIGATION  1989    UPPER GASTROINTESTINAL ENDOSCOPY       Family History   Problem Relation Age of Onset    Breast cancer Mother 32    No Known Problems Father     No Known Problems Sister     No Known Problems Brother     No Known Problems Maternal Grandmother     No Known Problems Maternal Grandfather     No Known Problems Paternal Grandmother     No Known Problems Paternal Grandfather     No Known Problems Maternal Aunt     No Known Problems Maternal Aunt     No Known Problems Paternal Aunt     No Known Problems Son     Substance Abuse Neg Hx     Mental illness Neg Hx     Colon cancer Neg Hx     Colon polyps Neg Hx       reports that she has been smoking cigarettes. She started smoking about 46 years ago. She has a 46.8 pack-year smoking history. She has never used smokeless tobacco. She reports current alcohol use. She reports that she does not use drugs.      Physical Exam:   Vitals:    11/04/24 1403   BP: 100/70   Pulse: 92   Weight: 66 kg (145 lb 6.4 oz)   Height: 5' 3\" (1.6 m)     Body mass index is 25.76 kg/m².    General: no acute distress   Eyes: conjunctivae pink, anicteric sclerae  ENT: mucous membranes moist  Neck: supple, no JVD  Chest: Decreased breath sounds on the right  CVS: regular rate and rhythm   Abdomen: Ascites  Extremities: Bilateral lower extremity edema      Lab Results:  Results for orders placed or performed in visit on 10/18/24   Protime-INR    Collection Time: 10/29/24 12:18 PM   Result Value Ref Range    INR 1.3 (H)     Prothrombin Time 13.5 (H) 9.0 - 11.5 sec " "    *Note: Due to a large number of results and/or encounters for the requested time period, some results have not been displayed. A complete set of results can be found in Results Review.             Invalid input(s): \"ALBUMIN\"      Portions of the record may have been created with voice recognition software. Occasional wrong word or \"sound a like\" substitutions may have occurred due to the inherent limitations of voice recognition software. Read the chart carefully and recognize, using context, where substitutions have occurred.If you have any questions, please contact the dictating provider.    "

## 2024-11-04 NOTE — TELEPHONE ENCOUNTER
----- Message from Dayanara Sanderson PA-C sent at 11/4/2024  3:36 PM EST -----  [place] office checkout: Please schedule f/u in 3 months with Dr. Tovar.

## 2024-11-04 NOTE — ASSESSMENT & PLAN NOTE
Continue follow-up with GI  Weekly paracentesis and thoracentesis  Status post 3.1 L paracentesis and 1.7 mL right thoracentesis on 11/1

## 2024-11-04 NOTE — TELEPHONE ENCOUNTER
Called and spoke with patient and was able to schedule her follow up in [place] with Dr. Tovar 2/21 12 noon. Mailing her an appointment card/ labs/ summary from today's appointment.

## 2024-11-04 NOTE — ASSESSMENT & PLAN NOTE
Due to cirrhosis  Sodium noted to be 122 on 10/29--recommend she go to the emergency room given weakness. Patient/ would like to avoid ER if possible and she did have a recent thoracentesis and paracentesis since these labs.  They agreed to go to the lab and have a repeat BMP immediately following her up.  Will also add urine sodium and urine osmolality  Continue oral fluid restriction  Resume home Lasix 20 mg daily which has been on hold since 9/26 as she does have signs of volume on exam and reports decreased urine output since the Lasix was stopped

## 2024-11-04 NOTE — PATIENT INSTRUCTIONS
Repeat blood work now -- if sodium worsening will need to go to ER  40-50oz fluid restriction per day   Restart lasix 20mg daily   Increase midodrine to 10mg three times a day

## 2024-11-05 ENCOUNTER — HOSPITAL ENCOUNTER (INPATIENT)
Facility: HOSPITAL | Age: 64
LOS: 3 days | Discharge: HOME | DRG: 641 | End: 2024-11-08
Attending: EMERGENCY MEDICINE | Admitting: HOSPITALIST
Payer: MEDICARE

## 2024-11-05 ENCOUNTER — APPOINTMENT (EMERGENCY)
Dept: RADIOLOGY | Facility: HOSPITAL | Age: 64
DRG: 641 | End: 2024-11-05
Payer: MEDICARE

## 2024-11-05 DIAGNOSIS — K72.90 DECOMPENSATED HEPATIC CIRRHOSIS (HCC): ICD-10-CM

## 2024-11-05 DIAGNOSIS — E87.1 HYPONATREMIA: ICD-10-CM

## 2024-11-05 DIAGNOSIS — K74.60 DECOMPENSATED HEPATIC CIRRHOSIS (HCC): ICD-10-CM

## 2024-11-05 DIAGNOSIS — E87.1 HYPONATREMIA: Primary | ICD-10-CM

## 2024-11-05 DIAGNOSIS — R53.1 GENERALIZED WEAKNESS: Primary | ICD-10-CM

## 2024-11-05 LAB
ALBUMIN SERPL BCG-MCNC: 2.1 G/DL (ref 3.5–5)
ALP SERPL-CCNC: 87 U/L (ref 34–104)
ALT SERPL W P-5'-P-CCNC: 25 U/L (ref 7–52)
AMMONIA PLAS-SCNC: 31 UMOL/L (ref 18–72)
ANION GAP SERPL CALCULATED.3IONS-SCNC: 4 MMOL/L (ref 4–13)
ANION GAP SERPL CALCULATED.3IONS-SCNC: 5 MMOL/L (ref 4–13)
APTT PPP: 34 SECONDS (ref 23–34)
AST SERPL W P-5'-P-CCNC: 46 U/L (ref 13–39)
BASOPHILS # BLD AUTO: 0.07 THOUSANDS/ÂΜL (ref 0–0.1)
BASOPHILS NFR BLD AUTO: 1 % (ref 0–1)
BILIRUB SERPL-MCNC: 1.9 MG/DL (ref 0.2–1)
BUN SERPL-MCNC: 8 MG/DL (ref 5–25)
BUN SERPL-MCNC: 9 MG/DL (ref 5–25)
CALCIUM ALBUM COR SERPL-MCNC: 9.1 MG/DL (ref 8.3–10.1)
CALCIUM SERPL-MCNC: 7.3 MG/DL (ref 8.4–10.2)
CALCIUM SERPL-MCNC: 7.6 MG/DL (ref 8.4–10.2)
CARDIAC TROPONIN I PNL SERPL HS: 4 NG/L
CHLORIDE SERPL-SCNC: 95 MMOL/L (ref 96–108)
CHLORIDE SERPL-SCNC: 97 MMOL/L (ref 96–108)
CO2 SERPL-SCNC: 22 MMOL/L (ref 21–32)
CO2 SERPL-SCNC: 23 MMOL/L (ref 21–32)
CREAT SERPL-MCNC: 0.62 MG/DL (ref 0.6–1.3)
CREAT SERPL-MCNC: 0.7 MG/DL (ref 0.6–1.3)
EOSINOPHIL # BLD AUTO: 0.14 THOUSAND/ÂΜL (ref 0–0.61)
EOSINOPHIL NFR BLD AUTO: 3 % (ref 0–6)
ERYTHROCYTE [DISTWIDTH] IN BLOOD BY AUTOMATED COUNT: 13.2 % (ref 11.6–15.1)
GFR SERPL CREATININE-BSD FRML MDRD: 91 ML/MIN/1.73SQ M
GFR SERPL CREATININE-BSD FRML MDRD: 94 ML/MIN/1.73SQ M
GLUCOSE SERPL-MCNC: 118 MG/DL (ref 65–140)
GLUCOSE SERPL-MCNC: 84 MG/DL (ref 65–140)
GLUCOSE SERPL-MCNC: 86 MG/DL (ref 65–140)
HCT VFR BLD AUTO: 36.5 % (ref 34.8–46.1)
HGB BLD-MCNC: 13 G/DL (ref 11.5–15.4)
IMM GRANULOCYTES # BLD AUTO: 0.03 THOUSAND/UL (ref 0–0.2)
IMM GRANULOCYTES NFR BLD AUTO: 1 % (ref 0–2)
INR PPP: 1.45 (ref 0.85–1.19)
LYMPHOCYTES # BLD AUTO: 0.63 THOUSANDS/ÂΜL (ref 0.6–4.47)
LYMPHOCYTES NFR BLD AUTO: 12 % (ref 14–44)
MAGNESIUM SERPL-MCNC: 1.8 MG/DL (ref 1.9–2.7)
MCH RBC QN AUTO: 37.8 PG (ref 26.8–34.3)
MCHC RBC AUTO-ENTMCNC: 35.6 G/DL (ref 31.4–37.4)
MCV RBC AUTO: 106 FL (ref 82–98)
MONOCYTES # BLD AUTO: 0.74 THOUSAND/ÂΜL (ref 0.17–1.22)
MONOCYTES NFR BLD AUTO: 14 % (ref 4–12)
NEUTROPHILS # BLD AUTO: 3.57 THOUSANDS/ÂΜL (ref 1.85–7.62)
NEUTS SEG NFR BLD AUTO: 69 % (ref 43–75)
NRBC BLD AUTO-RTO: 0 /100 WBCS
PLATELET # BLD AUTO: 122 THOUSANDS/UL (ref 149–390)
PMV BLD AUTO: 8.6 FL (ref 8.9–12.7)
POTASSIUM SERPL-SCNC: 4.1 MMOL/L (ref 3.5–5.3)
POTASSIUM SERPL-SCNC: 4.1 MMOL/L (ref 3.5–5.3)
PROT SERPL-MCNC: 7 G/DL (ref 6.4–8.4)
PROTHROMBIN TIME: 18.1 SECONDS (ref 12.3–15)
RBC # BLD AUTO: 3.44 MILLION/UL (ref 3.81–5.12)
SODIUM SERPL-SCNC: 122 MMOL/L (ref 135–147)
SODIUM SERPL-SCNC: 124 MMOL/L (ref 135–147)
TSH SERPL DL<=0.05 MIU/L-ACNC: 3.24 UIU/ML (ref 0.45–4.5)
WBC # BLD AUTO: 5.18 THOUSAND/UL (ref 4.31–10.16)

## 2024-11-05 PROCEDURE — 82140 ASSAY OF AMMONIA: CPT

## 2024-11-05 PROCEDURE — 82948 REAGENT STRIP/BLOOD GLUCOSE: CPT

## 2024-11-05 PROCEDURE — 71046 X-RAY EXAM CHEST 2 VIEWS: CPT

## 2024-11-05 PROCEDURE — 85730 THROMBOPLASTIN TIME PARTIAL: CPT

## 2024-11-05 PROCEDURE — 93005 ELECTROCARDIOGRAM TRACING: CPT

## 2024-11-05 PROCEDURE — 80048 BASIC METABOLIC PNL TOTAL CA: CPT | Performed by: INTERNAL MEDICINE

## 2024-11-05 PROCEDURE — 85025 COMPLETE CBC W/AUTO DIFF WBC: CPT

## 2024-11-05 PROCEDURE — 36415 COLL VENOUS BLD VENIPUNCTURE: CPT

## 2024-11-05 PROCEDURE — 85610 PROTHROMBIN TIME: CPT

## 2024-11-05 PROCEDURE — 83735 ASSAY OF MAGNESIUM: CPT

## 2024-11-05 PROCEDURE — 99222 1ST HOSP IP/OBS MODERATE 55: CPT | Performed by: INTERNAL MEDICINE

## 2024-11-05 PROCEDURE — 80053 COMPREHEN METABOLIC PANEL: CPT

## 2024-11-05 PROCEDURE — 99223 1ST HOSP IP/OBS HIGH 75: CPT | Performed by: INTERNAL MEDICINE

## 2024-11-05 PROCEDURE — 84484 ASSAY OF TROPONIN QUANT: CPT

## 2024-11-05 PROCEDURE — 84443 ASSAY THYROID STIM HORMONE: CPT

## 2024-11-05 PROCEDURE — 99285 EMERGENCY DEPT VISIT HI MDM: CPT

## 2024-11-05 PROCEDURE — 94664 DEMO&/EVAL PT USE INHALER: CPT

## 2024-11-05 RX ORDER — TRAZODONE HYDROCHLORIDE 50 MG/1
50 TABLET, FILM COATED ORAL
Status: DISCONTINUED | OUTPATIENT
Start: 2024-11-05 | End: 2024-11-08 | Stop reason: HOSPADM

## 2024-11-05 RX ORDER — FOLIC ACID 1 MG/1
1 TABLET ORAL DAILY
Status: DISCONTINUED | OUTPATIENT
Start: 2024-11-06 | End: 2024-11-08 | Stop reason: HOSPADM

## 2024-11-05 RX ORDER — ACETAMINOPHEN 325 MG/1
650 TABLET ORAL EVERY 8 HOURS PRN
Status: DISCONTINUED | OUTPATIENT
Start: 2024-11-05 | End: 2024-11-08 | Stop reason: HOSPADM

## 2024-11-05 RX ORDER — BENZONATATE 100 MG/1
200 CAPSULE ORAL 3 TIMES DAILY PRN
Status: DISCONTINUED | OUTPATIENT
Start: 2024-11-05 | End: 2024-11-06

## 2024-11-05 RX ORDER — NICOTINE 21 MG/24HR
1 PATCH, TRANSDERMAL 24 HOURS TRANSDERMAL DAILY
Status: DISCONTINUED | OUTPATIENT
Start: 2024-11-05 | End: 2024-11-08 | Stop reason: HOSPADM

## 2024-11-05 RX ORDER — PANTOPRAZOLE SODIUM 20 MG/1
20 TABLET, DELAYED RELEASE ORAL DAILY
Status: DISCONTINUED | OUTPATIENT
Start: 2024-11-05 | End: 2024-11-08 | Stop reason: HOSPADM

## 2024-11-05 RX ORDER — ALPRAZOLAM 0.5 MG
1 TABLET ORAL 2 TIMES DAILY PRN
Status: DISCONTINUED | OUTPATIENT
Start: 2024-11-05 | End: 2024-11-08 | Stop reason: HOSPADM

## 2024-11-05 RX ORDER — ALBUTEROL SULFATE 0.83 MG/ML
2.5 SOLUTION RESPIRATORY (INHALATION) EVERY 6 HOURS PRN
Status: DISCONTINUED | OUTPATIENT
Start: 2024-11-05 | End: 2024-11-08 | Stop reason: HOSPADM

## 2024-11-05 RX ORDER — MAGNESIUM SULFATE HEPTAHYDRATE 40 MG/ML
2 INJECTION, SOLUTION INTRAVENOUS ONCE
Status: COMPLETED | OUTPATIENT
Start: 2024-11-05 | End: 2024-11-05

## 2024-11-05 RX ORDER — ONDANSETRON 2 MG/ML
4 INJECTION INTRAMUSCULAR; INTRAVENOUS EVERY 6 HOURS PRN
Status: DISCONTINUED | OUTPATIENT
Start: 2024-11-05 | End: 2024-11-08 | Stop reason: HOSPADM

## 2024-11-05 RX ORDER — LANOLIN ALCOHOL/MO/W.PET/CERES
100 CREAM (GRAM) TOPICAL DAILY
Status: DISCONTINUED | OUTPATIENT
Start: 2024-11-06 | End: 2024-11-08 | Stop reason: HOSPADM

## 2024-11-05 RX ORDER — MIDODRINE HYDROCHLORIDE 5 MG/1
10 TABLET ORAL
Status: DISCONTINUED | OUTPATIENT
Start: 2024-11-05 | End: 2024-11-08 | Stop reason: HOSPADM

## 2024-11-05 RX ORDER — SODIUM CHLORIDE 9 MG/ML
75 INJECTION, SOLUTION INTRAVENOUS CONTINUOUS
Status: DISCONTINUED | OUTPATIENT
Start: 2024-11-05 | End: 2024-11-06

## 2024-11-05 RX ORDER — FUROSEMIDE 20 MG/1
20 TABLET ORAL DAILY
Status: DISCONTINUED | OUTPATIENT
Start: 2024-11-06 | End: 2024-11-05

## 2024-11-05 RX ORDER — BUDESONIDE AND FORMOTEROL FUMARATE DIHYDRATE 160; 4.5 UG/1; UG/1
2 AEROSOL RESPIRATORY (INHALATION) 2 TIMES DAILY
Status: DISCONTINUED | OUTPATIENT
Start: 2024-11-05 | End: 2024-11-08 | Stop reason: HOSPADM

## 2024-11-05 RX ORDER — FUROSEMIDE 20 MG/1
20 TABLET ORAL DAILY
Status: DISCONTINUED | OUTPATIENT
Start: 2024-11-05 | End: 2024-11-05

## 2024-11-05 RX ORDER — NALTREXONE HYDROCHLORIDE 50 MG/1
50 TABLET, FILM COATED ORAL DAILY
Status: DISCONTINUED | OUTPATIENT
Start: 2024-11-06 | End: 2024-11-08 | Stop reason: HOSPADM

## 2024-11-05 RX ORDER — HEPARIN SODIUM 5000 [USP'U]/ML
5000 INJECTION, SOLUTION INTRAVENOUS; SUBCUTANEOUS EVERY 8 HOURS SCHEDULED
Status: DISCONTINUED | OUTPATIENT
Start: 2024-11-05 | End: 2024-11-08 | Stop reason: HOSPADM

## 2024-11-05 RX ORDER — ANASTROZOLE 1 MG/1
1 TABLET ORAL DAILY
Status: DISCONTINUED | OUTPATIENT
Start: 2024-11-05 | End: 2024-11-08 | Stop reason: HOSPADM

## 2024-11-05 RX ORDER — ALBUTEROL SULFATE 90 UG/1
2 INHALANT RESPIRATORY (INHALATION) EVERY 6 HOURS PRN
Status: DISCONTINUED | OUTPATIENT
Start: 2024-11-05 | End: 2024-11-08 | Stop reason: HOSPADM

## 2024-11-05 RX ADMIN — TRAZODONE HYDROCHLORIDE 50 MG: 50 TABLET ORAL at 21:15

## 2024-11-05 RX ADMIN — BUDESONIDE AND FORMOTEROL FUMARATE DIHYDRATE 2 PUFF: 160; 4.5 AEROSOL RESPIRATORY (INHALATION) at 17:18

## 2024-11-05 RX ADMIN — MIDODRINE HYDROCHLORIDE 10 MG: 5 TABLET ORAL at 16:04

## 2024-11-05 RX ADMIN — MAGNESIUM SULFATE HEPTAHYDRATE 2 G: 2 INJECTION, SOLUTION INTRAVENOUS at 16:04

## 2024-11-05 RX ADMIN — PANTOPRAZOLE SODIUM 20 MG: 20 TABLET, DELAYED RELEASE ORAL at 16:18

## 2024-11-05 RX ADMIN — HEPARIN SODIUM 5000 UNITS: 5000 INJECTION, SOLUTION INTRAVENOUS; SUBCUTANEOUS at 21:18

## 2024-11-05 RX ADMIN — ANASTROZOLE 1 MG: 1 TABLET, COATED ORAL at 16:04

## 2024-11-05 RX ADMIN — HEPARIN SODIUM 5000 UNITS: 5000 INJECTION, SOLUTION INTRAVENOUS; SUBCUTANEOUS at 16:04

## 2024-11-05 RX ADMIN — BENZONATATE 200 MG: 100 CAPSULE ORAL at 21:17

## 2024-11-05 RX ADMIN — ALPRAZOLAM 1 MG: 0.5 TABLET ORAL at 21:16

## 2024-11-05 RX ADMIN — SODIUM CHLORIDE 75 ML/HR: 0.9 INJECTION, SOLUTION INTRAVENOUS at 16:11

## 2024-11-05 NOTE — CONSULTS
Consultation - Nephrology   Jose White 64 y.o. female MRN: [id number]  Unit/Bed#: -01 Encounter: [id number]      Assessment & Plan     Assessment / Plan:      1.  Hyponatremia    The patient has hyponatremia and appears chronic off and on.  She does have a history of cirrhotic liver disease.  She had routine blood work done and states she was in her normal state of health and her sodium concentration was low.  Yesterday urine studies showed a very low urine sodium and urine osmolality was slightly concentrated above calculated serum osmolality reflecting ADH presence.      She is on trazodone which can cause SIADH but these urine studies are not consistent with SIADH so I do not think that that is involved.    This situation could be reflective of her liver disease.  Urine sodium can be low due to secondary hyperaldosteronism related to her cirrhosis and resulting in ADH stimulation as well.  Other possibilities that could be related to a low solute intake.    I am going to start by giving a trial of isotonic fluids to see if sodium concentration improves.  If it does it would suggest that there is some volume depletion component.  If it really does not and it is more related to her cirrhotic liver disease then will need fluid restriction and diuresis to help improve her water excretion.    Normal saline 75 cc/h  Monitor BMP to make sure trending up  Continue oral fluid restriction  Hold diuretics for now  A.m. cortisol.    Of note the patient had urine studies yesterday so no need to repeat for now.    2.  History of ductal carcinoma in situ right breast.    3.  Cirrhosis of the liver.    Apparently frequent requirements for paracentesis and thoracentesis per patient.      History of Present Illness   Physician Requesting Consult: Juan Antonio Hardin MD  Reason for Consult / Principal Problem: Hyponatremia  Hx and PE limited by:   HPI: Jose White is a 64 y.o. year old female who has history of  cirrhotic liver disease was referred over to the emergency room after outpatient laboratory work from her primary physician revealed hyponatremia with a sodium concentration of 121 mill equivalents per liter.  She was referred over to the hospital and were asked to see her for recommendations and management.  History obtained from chart review and the patient    Inpatient consult to Nephrology  Consult performed by: Ck Leung MD  Consult ordered by: Juan Antonio Hardin MD          Review of Systems   Constitutional:  Negative for chills, diaphoresis and fever.   HENT: Negative.     Eyes: Negative.    Respiratory:  Negative for cough, chest tightness and shortness of breath.    Cardiovascular:  Negative for chest pain, palpitations and leg swelling.   Gastrointestinal:  Negative for abdominal pain, diarrhea, nausea and vomiting.   Genitourinary: Negative.    Neurological:  Negative for dizziness and headaches.   Psychiatric/Behavioral:  Negative for agitation, behavioral problems and confusion.        Historical Information   Patient Active Problem List   Diagnosis    Fatty liver    Other specified abnormal findings of blood chemistry    Anxiety    Elevated LFTs    GERD without esophagitis    Hyperlipidemia    Hypotension    Single seizure (HCC)    Insomnia    Nicotine dependence    COPD (chronic obstructive pulmonary disease) (HCC)    Alcohol use    Irritable bowel syndrome with diarrhea    Non-seasonal allergic rhinitis due to pollen    Bilateral leg edema    Alcoholic cirrhosis of liver with ascites (HCC)    Hyponatremia    Thrombocytopenia (HCC)    Anemia    Decompensated hepatic cirrhosis (HCC)    Ductal carcinoma in situ (DCIS) of right breast    Atypical ductal hyperplasia of right breast    Breast neoplasm, Tis (DCIS), right    Malignant neoplasm of lower-outer quadrant of right breast of female, estrogen receptor positive (HCC)    Pleural effusion    Vaginal atrophy    Dyspareunia, female    Postcoital  bleeding    Stage 2 chronic kidney disease    Hepatic encephalopathy (HCC)     Past Medical History:   Diagnosis Date    Alcoholic fatty liver     Anxiety     Asthma     COPD (chronic obstructive pulmonary disease) (HCC)     Elevated liver function tests     GERD (gastroesophageal reflux disease)     GERD without esophagitis     Hyperlipidemia     Hypertension     IBS (irritable bowel syndrome)     Insomnia     Insomnia     Liver disease     Nervousness     Nicotine dependence     Other specified urinary incontinence     RLS (restless legs syndrome)     Wears glasses      Past Surgical History:   Procedure Laterality Date    BACK SURGERY      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    BREAST EXCISIONAL BIOPSY Right 11/01/2004    benign    CATARACT EXTRACTION, BILATERAL  08/01/2018    COLONOSCOPY N/A 5/8/2019    Procedure: COLONOSCOPY;  Surgeon: Jacobo Leonardo MD;  Location: [facility];  Service: Gastroenterology    EGD AND COLONOSCOPY N/A 3/19/2018    Procedure: EGD AND COLONOSCOPY;  Surgeon: Jacobo Leonardo MD;  Location: [facility];  Service: Gastroenterology    ESOPHAGOGASTRODUODENOSCOPY N/A 5/8/2019    Procedure: ESOPHAGOGASTRODUODENOSCOPY (EGD);  Surgeon: Jacobo Leonardo MD;  Location: [facility];  Service: Gastroenterology    IR PARACENTESIS  11/27/2020    IR PARACENTESIS  12/8/2020    IR PARACENTESIS  10/28/2022    IR PARACENTESIS  11/7/2022    IR PARACENTESIS  11/30/2022    IR PARACENTESIS  12/30/2022    IR PARACENTESIS  1/16/2023    IR PARACENTESIS  8/6/2024    IR PARACENTESIS  9/18/2024    IR PARACENTESIS  10/3/2024    IR PARACENTESIS  10/10/2024    IR PARACENTESIS  10/17/2024    IR PARACENTESIS  10/24/2024    IR PARACENTESIS  11/1/2024    IR THORACENTESIS  1/16/2023    IR THORACENTESIS  3/28/2023    IR THORACENTESIS  7/25/2024    IR THORACENTESIS  8/6/2024    IR THORACENTESIS  8/14/2024    IR THORACENTESIS  8/21/2024    IR THORACENTESIS  8/28/2024    IR THORACENTESIS  9/4/2024    IR THORACENTESIS   9/11/2024    IR THORACENTESIS  9/18/2024    IR THORACENTESIS  9/25/2024    IR THORACENTESIS  10/3/2024    IR THORACENTESIS  10/10/2024    IR THORACENTESIS  10/17/2024    IR THORACENTESIS  10/24/2024    IR THORACENTESIS  11/1/2024    KNEE SURGERY      KNEE SURGERY      LUMBAR LAMINECTOMY  04/1999    LYMPH NODE BIOPSY      MAMMO STEREOTACTIC BREAST BIOPSY RIGHT (ALL INC) Right 5/21/2021    MAMMO STEREOTACTIC BREAST BIOPSY RIGHT (ALL INC) EACH ADD Right 5/21/2021    TOTAL ABDOMINAL HYSTERECTOMY  02/05/2008    TUBAL LIGATION      TUBAL LIGATION  1989    UPPER GASTROINTESTINAL ENDOSCOPY       Social History   Social History     Substance and Sexual Activity   Alcohol Use Not Currently    Comment: 7-10 drinks/wk     Social History     Substance and Sexual Activity   Drug Use No     Social History     Tobacco Use   Smoking Status Every Day    Current packs/day: 1.00    Average packs/day: 1 pack/day for 46.8 years (46.8 ttl pk-yrs)    Types: Cigarettes    Start date: 1/1/1978   Smokeless Tobacco Never     Family History   Problem Relation Age of Onset    Breast cancer Mother 32    No Known Problems Father     No Known Problems Sister     No Known Problems Brother     No Known Problems Maternal Grandmother     No Known Problems Maternal Grandfather     No Known Problems Paternal Grandmother     No Known Problems Paternal Grandfather     No Known Problems Maternal Aunt     No Known Problems Maternal Aunt     No Known Problems Paternal Aunt     No Known Problems Son     Substance Abuse Neg Hx     Mental illness Neg Hx     Colon cancer Neg Hx     Colon polyps Neg Hx        Meds/Allergies   current meds:   Current Facility-Administered Medications:     acetaminophen (TYLENOL) tablet 650 mg, Q8H PRN    albuterol (PROVENTIL HFA,VENTOLIN HFA) inhaler 2 puff, Q6H PRN    albuterol inhalation solution 2.5 mg, Q6H PRN    ALPRAZolam (XANAX) tablet 1 mg, BID PRN    anastrozole (ARIMIDEX) tablet 1 mg, Daily    benzonatate (TESSALON  "PERLES) capsule 200 mg, TID PRN    budesonide-formoterol (SYMBICORT) 160-4.5 mcg/act inhaler 2 puff, BID    furosemide (LASIX) tablet 20 mg, Daily    heparin (porcine) subcutaneous injection 5,000 Units, Q8H PATTIE **AND** Platelet count, Once    magnesium sulfate 2 g/50 mL IVPB (premix) 2 g, Once    midodrine (PROAMATINE) tablet 10 mg, TID AC    [START ON 11/6/2024] naltrexone (REVIA) tablet 50 mg, Daily    nicotine (NICODERM CQ) 21 mg/24 hr TD 24 hr patch 1 patch, Daily    ondansetron (ZOFRAN) injection 4 mg, Q6H PRN    pantoprazole (PROTONIX) EC tablet 20 mg, Daily    traZODone (DESYREL) tablet 50 mg, HS  Allergies   Allergen Reactions    Sulfa Antibiotics Shortness Of Breath    Ace Inhibitors Cough and Other (See Comments)     coughing       Objective   No intake or output data in the 24 hours ending 11/05/24 1539  Body mass index is 26.67 kg/m².    Invasive Devices:        PHYSICAL EXAM:  /70 (BP Location: Left arm)   Pulse 82   Temp 98.3 °F (36.8 °C) (Oral)   Resp 18   Ht 5' 3\" (1.6 m)   Wt 68.3 kg (150 lb 9.2 oz)   SpO2 96%   BMI 26.67 kg/m²     Physical Exam  Constitutional:       General: She is not in acute distress.     Appearance: She is not toxic-appearing.   HENT:      Head: Normocephalic and atraumatic.      Nose: Nose normal.      Mouth/Throat:      Mouth: Mucous membranes are dry.   Eyes:      Extraocular Movements: Extraocular movements intact.   Cardiovascular:      Rate and Rhythm: Normal rate and regular rhythm.      Heart sounds:      No gallop.   Pulmonary:      Effort: Pulmonary effort is normal. No respiratory distress.      Breath sounds: No wheezing or rales.      Comments: Decreased breath sound right base.  Abdominal:      General: Bowel sounds are normal. There is no distension.      Palpations: Abdomen is soft.      Tenderness: There is no abdominal tenderness.   Musculoskeletal:      Cervical back: Normal range of motion and neck supple.   Skin:     Comments: Appears " hyperpigmented.   Neurological:      Mental Status: She is alert and oriented to person, place, and time.   Psychiatric:         Mood and Affect: Mood normal.         Behavior: Behavior normal.           Current Weight: Weight - Scale: 68.3 kg (150 lb 9.2 oz)  First Weight: Weight - Scale: 68.6 kg (151 lb 3.8 oz)    Lab Results:    Results from last 7 days   Lab Units 11/05/24  1222   WBC Thousand/uL 5.18   HEMOGLOBIN g/dL 13.0   HEMATOCRIT % 36.5   PLATELETS Thousands/uL 122*     Results from last 7 days   Lab Units 11/05/24  1222   POTASSIUM mmol/L 4.1   CHLORIDE mmol/L 95*   CO2 mmol/L 23   BUN mg/dL 8   CREATININE mg/dL 0.70   CALCIUM mg/dL 7.6*     Results from last 7 days   Lab Units 11/05/24  1222   POTASSIUM mmol/L 4.1   CHLORIDE mmol/L 95*   CO2 mmol/L 23   BUN mg/dL 8   CREATININE mg/dL 0.70   CALCIUM mg/dL 7.6*   ALK PHOS U/L 87   ALT U/L 25   AST U/L 46*

## 2024-11-05 NOTE — RESULT ENCOUNTER NOTE
Called patient and spoke with  regarding sodium continuing to trend down to 121. Given her significant weakness recommend she go to the ER. We discussed the dangers of untreated hyponatremia.  agreed. ADT order placed.

## 2024-11-05 NOTE — CONSULTS
Consultation - GI   Jose DALILA Alfonsoavinash 64 y.o. female MRN: [id number]  Unit/Bed#: -01 Encounter: [id number]      Assessment & Plan     64 y.o. year old female with past medical history of alcoholic cirrhosis of the liver, anemia, thrombocytopenia, ductal carcinoma the right breast, GERD, right pleural effusion presented to the emergency room from PCP with abnormal lab work.  Patient's sodium of 121.  Recommended to come to the emergency room.  MELD 3.0 score on admission 24.  Negative asterixis on admission.  GI consulted for decompensated hepatic cirrhosis.    Currently followed by [facility] hepatology.  Patient unfortunately has been noncompliant with diuretics as well as lactulose and continues to drink alcohol on a daily basis.    Patient is currently getting paracentesis weekly.  Last paracentesis 11/1 for 3100 mL of ascites.    1.  Alcoholic cirrhosis of the liver with ascites  2.  Thrombocytopenia  On admission lab work; T. bili 1.9, AST 46, ALT 25, alkaline phosphatase 87, hemoglobin 13.0, platelets 122, ammonia 31, INR 1.45.  MRI from 8/10/2024; cirrhotic liver without suspicious mass.  Severe hepatic steatosis.  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  Lab work does reflect use associated with cirrhosis of the liver.    -Monitor MELD labs daily (CBC, CMP, INR)  -Lactulose 30 g twice daily  -Furosemide 20 mg p.o. daily.  -Midodrine 10 mg 3 times a day daily, blood pressure maintenance.  -Low-sodium diet as tolerated  -Patient will likely need paracentesis 11/7 if she is still in an inpatient status.    3.  GERD without esophagitis  Patient with history of GERD.  Per EMR is prescribed pantoprazole 20 mg daily at home.  Patient's last EGD 1/2024; negative esophageal or gastric varices.    -Pantoprazole 40 mg IVP daily    4.  Hyponatremia  Patient initially sent to the urgency room with sodium level of 122.  Patient currently followed by nephrology.    -Nephrology consulted, appreciate input    5.   Ductal carcinoma in situ of right breast  Patient currently taking Arimidex.  Patient currently followed by oncology.    Discuss patient with Dr. Gallagher on rounds.  At this time, monitor MELD scores and status of possible hepatic encephalopathy.  Patient is already scheduled for follow-up with GI as well as her next endoscopy be appointment.    Inpatient consult to gastroenterology  Consult performed by: JOSÉ Wolfe  Consult ordered by: Juan Antonio Hardin MD          Physician Requesting Consult: Juan Antonio Hardin MD  Reason for Consult / Principal Problem: Decompensated hepatic cirrhosis    HPI: Jose White is a 64 y.o. year old female with past medical history of alcoholic cirrhosis of the liver, anemia, thrombocytopenia, ductal carcinoma the right breast, GERD, right pleural effusion presented to the emergency room from PCP with abnormal lab work.  Patient's sodium of 121.  Recommended to come to the emergency room.  On exam, patient denies abdominal pain, nausea or vomiting.  Patient states she has been constipated with a bowel movement every other day.  Noncompliant with lactulose.  She denies hematochezia or melena.  She denies any acid reflux symptoms and is currently taking pantoprazole 20 mg daily per EMR.  Half pack a day smoker, patient continues to drink alcohol.  She states she has usually 1-2 beers every day but she has cut back.  States her weight has been up and down and has required paracentesis weekly for quite some time.  Discussed with patient importance of taking lactulose.  Negative asterixis.  Chest x-ray on admission does note persistent dense right lung base opacity which may represent a combination of effusion and parenchymal opacity.  Begin lab work on admission; sodium 122, creatinine 0.22, albumin 2.1, WBCs 5.18, hemoglobin 13.0, platelets 122, T. bili 1.90, AST 46, ALT 25, alkaline phosphatase 87, ammonia 31, INR 1.45.  Patient's most recent EGD was 1/2024 which noted  negative esophageal or gastric varices.  Patient's last paracentesis was 11/1 for 3100 mL.  Patient has been getting weekly paracentesis.        Review of Systems: Negative for 14 point exam except for HPI.    Historical Information   Past Medical History:   Diagnosis Date    Alcoholic fatty liver     Anxiety     Asthma     COPD (chronic obstructive pulmonary disease) (HCC)     Elevated liver function tests     GERD (gastroesophageal reflux disease)     GERD without esophagitis     Hyperlipidemia     Hypertension     IBS (irritable bowel syndrome)     Insomnia     Insomnia     Liver disease     Nervousness     Nicotine dependence     Other specified urinary incontinence     RLS (restless legs syndrome)     Wears glasses      Past Surgical History:   Procedure Laterality Date    BACK SURGERY      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    BREAST EXCISIONAL BIOPSY Right 11/01/2004    benign    CATARACT EXTRACTION, BILATERAL  08/01/2018    COLONOSCOPY N/A 5/8/2019    Procedure: COLONOSCOPY;  Surgeon: Jacobo Leonardo MD;  Location: [facility] GI LAB;  Service: Gastroenterology    EGD AND COLONOSCOPY N/A 3/19/2018    Procedure: EGD AND COLONOSCOPY;  Surgeon: Jacobo Leonardo MD;  Location: [facility] GI LAB;  Service: Gastroenterology    ESOPHAGOGASTRODUODENOSCOPY N/A 5/8/2019    Procedure: ESOPHAGOGASTRODUODENOSCOPY (EGD);  Surgeon: Jacobo Leonardo MD;  Location: [facility] GI LAB;  Service: Gastroenterology    IR PARACENTESIS  11/27/2020    IR PARACENTESIS  12/8/2020    IR PARACENTESIS  10/28/2022    IR PARACENTESIS  11/7/2022    IR PARACENTESIS  11/30/2022    IR PARACENTESIS  12/30/2022    IR PARACENTESIS  1/16/2023    IR PARACENTESIS  8/6/2024    IR PARACENTESIS  9/18/2024    IR PARACENTESIS  10/3/2024    IR PARACENTESIS  10/10/2024    IR PARACENTESIS  10/17/2024    IR PARACENTESIS  10/24/2024    IR PARACENTESIS  11/1/2024    IR THORACENTESIS  1/16/2023    IR THORACENTESIS  3/28/2023    IR THORACENTESIS  7/25/2024    IR THORACENTESIS   8/6/2024    IR THORACENTESIS  8/14/2024    IR THORACENTESIS  8/21/2024    IR THORACENTESIS  8/28/2024    IR THORACENTESIS  9/4/2024    IR THORACENTESIS  9/11/2024    IR THORACENTESIS  9/18/2024    IR THORACENTESIS  9/25/2024    IR THORACENTESIS  10/3/2024    IR THORACENTESIS  10/10/2024    IR THORACENTESIS  10/17/2024    IR THORACENTESIS  10/24/2024    IR THORACENTESIS  11/1/2024    KNEE SURGERY      KNEE SURGERY      LUMBAR LAMINECTOMY  04/1999    LYMPH NODE BIOPSY      MAMMO STEREOTACTIC BREAST BIOPSY RIGHT (ALL INC) Right 5/21/2021    MAMMO STEREOTACTIC BREAST BIOPSY RIGHT (ALL INC) EACH ADD Right 5/21/2021    TOTAL ABDOMINAL HYSTERECTOMY  02/05/2008    TUBAL LIGATION      TUBAL LIGATION  1989    UPPER GASTROINTESTINAL ENDOSCOPY       Social History   Social History     Substance and Sexual Activity   Alcohol Use Not Currently    Comment: 7-10 drinks/wk     Social History     Substance and Sexual Activity   Drug Use No     Social History     Tobacco Use   Smoking Status Every Day    Current packs/day: 1.00    Average packs/day: 1 pack/day for 46.8 years (46.8 ttl pk-yrs)    Types: Cigarettes    Start date: 1/1/1978   Smokeless Tobacco Never     Family History   Problem Relation Age of Onset    Breast cancer Mother 32    No Known Problems Father     No Known Problems Sister     No Known Problems Brother     No Known Problems Maternal Grandmother     No Known Problems Maternal Grandfather     No Known Problems Paternal Grandmother     No Known Problems Paternal Grandfather     No Known Problems Maternal Aunt     No Known Problems Maternal Aunt     No Known Problems Paternal Aunt     No Known Problems Son     Substance Abuse Neg Hx     Mental illness Neg Hx     Colon cancer Neg Hx     Colon polyps Neg Hx        Meds/Allergies     Current Facility-Administered Medications:     acetaminophen (TYLENOL) tablet 650 mg, Q8H PRN    albuterol (PROVENTIL HFA,VENTOLIN HFA) inhaler 2 puff, Q6H PRN    albuterol inhalation  solution 2.5 mg, Q6H PRN    ALPRAZolam (XANAX) tablet 1 mg, BID PRN    anastrozole (ARIMIDEX) tablet 1 mg, Daily    benzonatate (TESSALON PERLES) capsule 200 mg, TID PRN    budesonide-formoterol (SYMBICORT) 160-4.5 mcg/act inhaler 2 puff, BID    furosemide (LASIX) tablet 20 mg, Daily    heparin (porcine) subcutaneous injection 5,000 Units, Q8H PATTIE **AND** Platelet count, Once    magnesium sulfate 2 g/50 mL IVPB (premix) 2 g, Once    midodrine (PROAMATINE) tablet 10 mg, TID AC    [START ON 11/6/2024] naltrexone (REVIA) tablet 50 mg, Daily    nicotine (NICODERM CQ) 21 mg/24 hr TD 24 hr patch 1 patch, Daily    ondansetron (ZOFRAN) injection 4 mg, Q6H PRN    pantoprazole (PROTONIX) EC tablet 20 mg, Daily    traZODone (DESYREL) tablet 50 mg, HS    Medications Prior to Admission:     albuterol (Proventil HFA) 90 mcg/act inhaler    ALPRAZolam (XANAX) 1 mg tablet    anastrozole (ARIMIDEX) 1 mg tablet    furosemide (LASIX) 20 mg tablet    midodrine (PROAMATINE) 10 MG tablet    Risankizumab-rzaa (SKYRIZI PEN SC)    traZODone (DESYREL) 50 mg tablet    albuterol (2.5 mg/3 mL) 0.083 % nebulizer solution    alendronate (Fosamax) 70 mg tablet    benzonatate (TESSALON) 200 MG capsule    budesonide-formoterol (Symbicort) 160-4.5 mcg/act inhaler    ergocalciferol (VITAMIN D2) 50,000 units    lactulose 20 g/30 mL    naltrexone (REVIA) 50 mg tablet    pantoprazole (PROTONIX) 20 mg tablet    Allergies   Allergen Reactions    Sulfa Antibiotics Shortness Of Breath    Ace Inhibitors Cough and Other (See Comments)     coughing           Physical Exam   Constitutional: Is oriented to person, place, and time. Appears well-developed and well-nourished.  Jaundice.  Negative asterixis  HENT: WNL  Head: Normocephalic and atraumatic.   Eyes: Pupils are equal, round, and reactive to light.   Neck: Normal range of motion. Neck supple.   Cardiovascular: Normal rate and regular rhythm.    Pulmonary/Chest: Effort normal and breath sounds normal.    Abdominal: Soft.  Slightly distended nontender abdomen, bowel sounds are normal.   Musculoskeletal: Normal range of motion.   Extremities:  No edema  Neurological: Is alert and oriented to person, place, and time.   Skin: Skin is warm and dry.   Psychiatric: Has a normal mood and affect.       Lab Results:   Admission on 11/05/2024   Component Date Value    Sodium 11/05/2024 122 (L)     Potassium 11/05/2024 4.1     Chloride 11/05/2024 95 (L)     CO2 11/05/2024 23     ANION GAP 11/05/2024 4     BUN 11/05/2024 8     Creatinine 11/05/2024 0.70     Glucose 11/05/2024 86     Calcium 11/05/2024 7.6 (L)     Corrected Calcium 11/05/2024 9.1     AST 11/05/2024 46 (H)     ALT 11/05/2024 25     Alkaline Phosphatase 11/05/2024 87     Total Protein 11/05/2024 7.0     Albumin 11/05/2024 2.1 (L)     Total Bilirubin 11/05/2024 1.90 (H)     eGFR 11/05/2024 91     TSH 3RD GENERATON 11/05/2024 3.244     Ammonia 11/05/2024 31     Magnesium 11/05/2024 1.8 (L)     WBC 11/05/2024 5.18     RBC 11/05/2024 3.44 (L)     Hemoglobin 11/05/2024 13.0     Hematocrit 11/05/2024 36.5     MCV 11/05/2024 106 (H)     MCH 11/05/2024 37.8 (H)     MCHC 11/05/2024 35.6     RDW 11/05/2024 13.2     MPV 11/05/2024 8.6 (L)     Platelets 11/05/2024 122 (L)     nRBC 11/05/2024 0     Segmented % 11/05/2024 69     Immature Grans % 11/05/2024 1     Lymphocytes % 11/05/2024 12 (L)     Monocytes % 11/05/2024 14 (H)     Eosinophils Relative 11/05/2024 3     Basophils Relative 11/05/2024 1     Absolute Neutrophils 11/05/2024 3.57     Absolute Immature Grans 11/05/2024 0.03     Absolute Lymphocytes 11/05/2024 0.63     Absolute Monocytes 11/05/2024 0.74     Eosinophils Absolute 11/05/2024 0.14     Basophils Absolute 11/05/2024 0.07     hs TnI 0hr 11/05/2024 4     Protime 11/05/2024 18.1 (H)     INR 11/05/2024 1.45 (H)     PTT 11/05/2024 34     POC Glucose 11/05/2024 84      Imaging Studies: Results Review Statement: I reviewed radiology reports from this admission  including: MRI abdomen/MRCP, xray(s), and Ultrasound(s).    EKG, Pathology, and Other Studies: Results Review Statement: I reviewed radiology reports from this admission including: Lab work, EGD report, paracentesis.  Counseling / Coordination of Care  Total floor / unit time spent today 30 minutes.

## 2024-11-05 NOTE — ED PROVIDER NOTES
Time reflects when diagnosis was documented in both MDM as applicable and the Disposition within this note       Time User Action Codes Description Comment    11/5/2024  1:15 PM Melva Ramos [R53.1] Generalized weakness     11/5/2024  1:16 PM Melva Ramos [E87.1] Hyponatremia           ED Disposition       ED Disposition   Admit    Condition   Stable    Date/Time   Tue Nov 5, 2024  1:15 PM    Comment   Case was discussed with Dr. Patel and the patient's admission status was agreed to be Admission Status: inpatient status to the service of Dr. Patel .               Assessment & Plan       Medical Decision Making  DDx including but not limited to: metabolic abnormality, dehydration, anemia, ACS, thyroid disease, ascites, cirrhosis,; considered but less likely UTI, sodium wasting nephropathy, osmotic diuresis, SIADH, hypothyroidism    Patient with steadily declining sodium levels being followed by nephrology outpatient with most recent critical result of 121 yesterday.  Will obtain labs today and consult nephrology for admission.  Patient without other infectious symptoms or complaint of pain.        Problems Addressed:  Generalized weakness: acute illness or injury  Hyponatremia: acute illness or injury    Amount and/or Complexity of Data Reviewed  Independent Historian: spouse  Labs: ordered.  Radiology: ordered and independent interpretation performed.  ECG/medicine tests: ordered and independent interpretation performed.    Risk  Decision regarding hospitalization.        ED Course as of 11/05/24 1328   Tue Nov 05, 2024   1251 Nephrology message regarding patient's acute hyponatremia   1258 Dr. Leung reports that he will see the patient   1301 Patient and her  at bedside updated on results.  Will plan for admission given critically low sodium   1327 Patient accepted to medical service by Dr. Patel       Medications - No data to display    ED Risk Strat Scores                                                History of Present Illness       Chief Complaint   Patient presents with    Weakness - Generalized     Comes to ed c/o weakness since the summer. Sent for low sodium. Mentation is at baseline since summer per .       Past Medical History:   Diagnosis Date    Alcoholic fatty liver     Anxiety     Asthma     COPD (chronic obstructive pulmonary disease) (HCC)     Elevated liver function tests     GERD (gastroesophageal reflux disease)     GERD without esophagitis     Hyperlipidemia     Hypertension     IBS (irritable bowel syndrome)     Insomnia     Insomnia     Liver disease     Nervousness     Nicotine dependence     Other specified urinary incontinence     RLS (restless legs syndrome)     Wears glasses       Past Surgical History:   Procedure Laterality Date    BACK SURGERY      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    BREAST EXCISIONAL BIOPSY Right 11/01/2004    benign    CATARACT EXTRACTION, BILATERAL  08/01/2018    COLONOSCOPY N/A 5/8/2019    Procedure: COLONOSCOPY;  Surgeon: Jacobo Leonardo MD;  Location: [facility] GI LAB;  Service: Gastroenterology    EGD AND COLONOSCOPY N/A 3/19/2018    Procedure: EGD AND COLONOSCOPY;  Surgeon: Jacobo Leonardo MD;  Location: [facility] GI LAB;  Service: Gastroenterology    ESOPHAGOGASTRODUODENOSCOPY N/A 5/8/2019    Procedure: ESOPHAGOGASTRODUODENOSCOPY (EGD);  Surgeon: Jacobo Leonardo MD;  Location: [facility] GI LAB;  Service: Gastroenterology    IR PARACENTESIS  11/27/2020    IR PARACENTESIS  12/8/2020    IR PARACENTESIS  10/28/2022    IR PARACENTESIS  11/7/2022    IR PARACENTESIS  11/30/2022    IR PARACENTESIS  12/30/2022    IR PARACENTESIS  1/16/2023    IR PARACENTESIS  8/6/2024    IR PARACENTESIS  9/18/2024    IR PARACENTESIS  10/3/2024    IR PARACENTESIS  10/10/2024    IR PARACENTESIS  10/17/2024    IR PARACENTESIS  10/24/2024    IR PARACENTESIS  11/1/2024    IR THORACENTESIS  1/16/2023    IR THORACENTESIS  3/28/2023    IR THORACENTESIS  7/25/2024    IR THORACENTESIS   8/6/2024    IR THORACENTESIS  8/14/2024    IR THORACENTESIS  8/21/2024    IR THORACENTESIS  8/28/2024    IR THORACENTESIS  9/4/2024    IR THORACENTESIS  9/11/2024    IR THORACENTESIS  9/18/2024    IR THORACENTESIS  9/25/2024    IR THORACENTESIS  10/3/2024    IR THORACENTESIS  10/10/2024    IR THORACENTESIS  10/17/2024    IR THORACENTESIS  10/24/2024    IR THORACENTESIS  11/1/2024    KNEE SURGERY      KNEE SURGERY      LUMBAR LAMINECTOMY  04/1999    LYMPH NODE BIOPSY      MAMMO STEREOTACTIC BREAST BIOPSY RIGHT (ALL INC) Right 5/21/2021    MAMMO STEREOTACTIC BREAST BIOPSY RIGHT (ALL INC) EACH ADD Right 5/21/2021    TOTAL ABDOMINAL HYSTERECTOMY  02/05/2008    TUBAL LIGATION      TUBAL LIGATION  1989    UPPER GASTROINTESTINAL ENDOSCOPY        Family History   Problem Relation Age of Onset    Breast cancer Mother 32    No Known Problems Father     No Known Problems Sister     No Known Problems Brother     No Known Problems Maternal Grandmother     No Known Problems Maternal Grandfather     No Known Problems Paternal Grandmother     No Known Problems Paternal Grandfather     No Known Problems Maternal Aunt     No Known Problems Maternal Aunt     No Known Problems Paternal Aunt     No Known Problems Son     Substance Abuse Neg Hx     Mental illness Neg Hx     Colon cancer Neg Hx     Colon polyps Neg Hx       Social History     Tobacco Use    Smoking status: Every Day     Current packs/day: 1.00     Average packs/day: 1 pack/day for 46.8 years (46.8 ttl pk-yrs)     Types: Cigarettes     Start date: 1/1/1978    Smokeless tobacco: Never   Vaping Use    Vaping status: Never Used   Substance Use Topics    Alcohol use: Not Currently     Comment: 7-10 drinks/wk    Drug use: No      E-Cigarette/Vaping    E-Cigarette Use Never User       E-Cigarette/Vaping Substances    Nicotine No     THC No     CBD No     Flavoring No     Other No     Unknown No       I have reviewed and agree with the history as documented.     The patient is a  64-year-old female with PMH of alcoholic cirrhosis, recurrent ascites, currently receiving weekly thoracentesis and paracentesis sent in by nephrology for sodium of 121 yesterday.  The patient over the last 6 months has been with fatigue and generalized weakness.  She has been followed outpatient for hyponatremia and most recently saw Dayanara Sanderson, a nephrology PA, yesterday.  She had her labs checked yesterday including random urine sodium which was normal, urine osmolality which was normal, and a BMP with critical sodium of 121.  Given her worsening fatigue and generalized weakness, she was sent in for further evaluation.  She denies headache, vision changes including blurred vision, brain fog, chest pain, palpitations, abdominal pain, N/V, change in bowel, and skin changes.  She does note feeling some abdominal bloating which is typical leading up to the days before her paracentesis.  She denies fevers, chills, rhinorrhea, and nasal congestion.  She does report a chronic dry cough.  She denies new shortness of breath or change in cough.  She does still drink 2, 12 ounce beers daily, as well as half a glass of wine.  She has been compliant with all medications including her Lasix.      History provided by:  Patient and significant other   used: No        Review of Systems   Constitutional:  Positive for fatigue. Negative for chills and fever.   Respiratory:  Positive for cough. Negative for shortness of breath.    Cardiovascular:  Negative for chest pain, palpitations and leg swelling.   Gastrointestinal:  Positive for abdominal distention (Mild abdominal bloating). Negative for abdominal pain, diarrhea, nausea and vomiting.   Genitourinary: Negative.    Neurological:  Positive for weakness (Generalized weakness). Negative for dizziness, syncope, light-headedness, numbness and headaches.   All other systems reviewed and are negative.          Objective       ED Triage Vitals [11/05/24 1143]    Temperature Pulse Blood Pressure Respirations SpO2 Patient Position - Orthostatic VS   98.1 °F (36.7 °C) 92 123/74 16 98 % Lying      Temp Source Heart Rate Source BP Location FiO2 (%) Pain Score    Oral Monitor Right arm -- No Pain      Vitals      Date and Time Temp Pulse SpO2 Resp BP Pain Score FACES Pain Rating User   11/05/24 1230 -- 81 96 % 20 102/60 -- -- KP   11/05/24 1143 98.1 °F (36.7 °C) 92 98 % 16 123/74 No Pain -- BY            Physical Exam  Vitals and nursing note reviewed.   Constitutional:       General: She is not in acute distress.     Appearance: Normal appearance. She is well-developed. She is not ill-appearing, toxic-appearing or diaphoretic.   HENT:      Head: Normocephalic and atraumatic.      Nose: Nose normal.      Mouth/Throat:      Mouth: Mucous membranes are moist.      Pharynx: Oropharynx is clear.   Eyes:      Extraocular Movements: Extraocular movements intact.      Conjunctiva/sclera: Conjunctivae normal.   Cardiovascular:      Rate and Rhythm: Normal rate and regular rhythm.      Pulses:           Radial pulses are 2+ on the right side and 2+ on the left side.        Dorsalis pedis pulses are 2+ on the right side and 2+ on the left side.      Heart sounds: Normal heart sounds, S1 normal and S2 normal.   Pulmonary:      Effort: Pulmonary effort is normal. No respiratory distress.      Breath sounds: Normal breath sounds and air entry.   Abdominal:      General: There is distension (Mildly distended).      Palpations: Abdomen is soft.      Tenderness: There is no abdominal tenderness.   Musculoskeletal:         General: Normal range of motion.      Cervical back: Normal range of motion and neck supple.      Right lower leg: No edema.      Left lower leg: No edema.   Skin:     General: Skin is warm and dry.      Capillary Refill: Capillary refill takes less than 2 seconds.   Neurological:      General: No focal deficit present.      Mental Status: She is alert and oriented to person,  place, and time. Mental status is at baseline.         Results Reviewed       Procedure Component Value Units Date/Time    TSH, 3rd generation with Free T4 reflex [[id number]]  (Normal) Collected: 11/05/24 1222    Lab Status: Final result Specimen: Blood from Arm, Left Updated: 11/05/24 1302     TSH 3RD GENERATON 3.244 uIU/mL     Protime-INR [[id number]]  (Abnormal) Collected: 11/05/24 1222    Lab Status: Final result Specimen: Blood from Arm, Left Updated: 11/05/24 1258     Protime 18.1 seconds      INR 1.45    Narrative:      INR Therapeutic Range    Indication                                             INR Range      Atrial Fibrillation                                               2.0-3.0  Hypercoagulable State                                    2.0.2.3  Left Ventricular Asist Device                            2.0-3.0  Mechanical Heart Valve                                  -    Aortic(with afib, MI, embolism, HF, LA enlargement,    and/or coagulopathy)                                     2.0-3.0 (2.5-3.5)     Mitral                                                             2.5-3.5  Prosthetic/Bioprosthetic Heart Valve               2.0-3.0  Venous thromboembolism (VTE: VT, PE        2.0-3.0    APTT [[id number]]  (Normal) Collected: 11/05/24 1222    Lab Status: Final result Specimen: Blood from Arm, Left Updated: 11/05/24 1258     PTT 34 seconds     HS Troponin I 2hr [[id number]]     Lab Status: No result Specimen: Blood     HS Troponin 0hr (reflex protocol) [[id number]]  (Normal) Collected: 11/05/24 1222    Lab Status: Final result Specimen: Blood from Arm, Left Updated: 11/05/24 1253     hs TnI 0hr 4 ng/L     Comprehensive metabolic panel [[id number]]  (Abnormal) Collected: 11/05/24 1222    Lab Status: Final result Specimen: Blood from Arm, Left Updated: 11/05/24 1246     Sodium 122 mmol/L      Potassium 4.1 mmol/L      Chloride 95 mmol/L      CO2 23 mmol/L      ANION GAP 4 mmol/L      BUN 8 mg/dL      Creatinine  0.70 mg/dL      Glucose 86 mg/dL      Calcium 7.6 mg/dL      Corrected Calcium 9.1 mg/dL      AST 46 U/L      ALT 25 U/L      Alkaline Phosphatase 87 U/L      Total Protein 7.0 g/dL      Albumin 2.1 g/dL      Total Bilirubin 1.90 mg/dL      eGFR 91 ml/min/1.73sq m     Narrative:      National Kid[employer] guidelines for Chronic Kidney Disease (CKD):     Stage 1 with normal or high GFR (GFR > 90 mL/min/1.73 square meters)    Stage 2 Mild CKD (GFR = 60-89 mL/min/1.73 square meters)    Stage 3A Moderate CKD (GFR = 45-59 mL/min/1.73 square meters)    Stage 3B Moderate CKD (GFR = 30-44 mL/min/1.73 square meters)    Stage 4 Severe CKD (GFR = 15-29 mL/min/1.73 square meters)    Stage 5 End Stage CKD (GFR <15 mL/min/1.73 square meters)  Note: GFR calculation is accurate only with a steady state creatinine    Magnesium [[id number]]  (Abnormal) Collected: 11/05/24 1222    Lab Status: Final result Specimen: Blood from Arm, Left Updated: 11/05/24 1246     Magnesium 1.8 mg/dL     Ammonia [[id number]]  (Normal) Collected: 11/05/24 1222    Lab Status: Final result Specimen: Blood from Arm, Left Updated: 11/05/24 1245     Ammonia 31 umol/L     Fingerstick Glucose (POCT) [[id number]]  (Normal) Collected: 11/05/24 1237    Lab Status: Final result Specimen: Blood Updated: 11/05/24 1237     POC Glucose 84 mg/dl     CBC and differential [[id number]]  (Abnormal) Collected: 11/05/24 1222    Lab Status: Final result Specimen: Blood from Arm, Left Updated: 11/05/24 1229     WBC 5.18 Thousand/uL      RBC 3.44 Million/uL      Hemoglobin 13.0 g/dL      Hematocrit 36.5 %       fL      MCH 37.8 pg      MCHC 35.6 g/dL      RDW 13.2 %      MPV 8.6 fL      Platelets 122 Thousands/uL      nRBC 0 /100 WBCs      Segmented % 69 %      Immature Grans % 1 %      Lymphocytes % 12 %      Monocytes % 14 %      Eosinophils Relative 3 %      Basophils Relative 1 %      Absolute Neutrophils 3.57 Thousands/µL      Absolute Immature Grans 0.03  Thousand/uL      Absolute Lymphocytes 0.63 Thousands/µL      Absolute Monocytes 0.74 Thousand/µL      Eosinophils Absolute 0.14 Thousand/µL      Basophils Absolute 0.07 Thousands/µL     UA w Reflex to Microscopic w Reflex to Culture [[id number]]     Lab Status: No result Specimen: Urine             XR chest 2 views   ED Interpretation by JOSÉ Teran (11/05 1324)   Abnormal   Moderate right pleural effusion          ECG 12 Lead Documentation Only    Date/Time: 11/5/2024 11:59 AM    Performed by: JOSÉ Teran  Authorized by: JOSÉ Teran    Indications / Diagnosis:  Generalized weakness  ECG reviewed by me, the ED Provider: yes    Patient location:  ED  Previous ECG:     Previous ECG:  Compared to current    Comparison ECG info:  July 25, 2024    Similarity:  No change    Comparison to cardiac monitor: Yes    Interpretation:     Interpretation: non-specific    Rate:     ECG rate:  89    ECG rate assessment: normal    Rhythm:     Rhythm: sinus rhythm    Ectopy:     Ectopy: none    QRS:     QRS axis:  Normal    QRS intervals:  Normal  Conduction:     Conduction: normal    ST segments:     ST segments:  Normal  T waves:     T waves: non-specific    Comments:      Sinus rhythm, normal axis, normal intervals, nonspecific T wave abnormalities      ED Medication and Procedure Management   Prior to Admission Medications   Prescriptions Last Dose Informant Patient Reported? Taking?   ALPRAZolam (XANAX) 1 mg tablet  Self No No   Sig: Take 1 tablet (1 mg total) by mouth 2 (two) times a day as needed for anxiety   Risankizumab-rzaa (SKYRIZI PEN SC)  Self Yes No   Sig: Inject under the skin   albuterol (2.5 mg/3 mL) 0.083 % nebulizer solution  Self No No   Sig: Take 3 mL (2.5 mg total) by nebulization every 6 (six) hours as needed for wheezing or shortness of breath   albuterol (Proventil HFA) 90 mcg/act inhaler  Self No No   Sig: Inhale 2 puffs every 6 (six) hours as needed for wheezing or shortness of breath    alendronate (Fosamax) 70 mg tablet  Self No No   Sig: Take 1 tablet (70 mg total) by mouth every 7 days   anastrozole (ARIMIDEX) 1 mg tablet  Self No No   Sig: Take 1 tablet (1 mg total) by mouth daily   benzonatate (TESSALON) 200 MG capsule  Self No No   Sig: Take 1 capsule (200 mg total) by mouth 3 (three) times a day as needed for cough   budesonide-formoterol (Symbicort) 160-4.5 mcg/act inhaler  Self No No   Sig: Inhale 2 puffs 2 (two) times a day Rinse mouth after use.   ergocalciferol (VITAMIN D2) 50,000 units  Self No No   Sig: Take one capsule weekly x12 weeks   Patient not taking: Reported on 10/2/2024   furosemide (LASIX) 20 mg tablet   No No   Sig: Take 1 tablet (20 mg total) by mouth daily   lactulose 20 g/30 mL  Self No No   Sig: Take 45 mL (30 g total) by mouth 2 (two) times a day Titrate dose for an effect of 3 soft, but formed bowel movements per day.  You can start with 1 tablespoon per day and slowly increase to effect.   midodrine (PROAMATINE) 10 MG tablet   No No   Sig: Take 1 tablet (10 mg total) by mouth 3 (three) times a day before meals   naltrexone (REVIA) 50 mg tablet  Self No No   Sig: Take 1 tablet (50 mg total) by mouth daily   pantoprazole (PROTONIX) 20 mg tablet  Self No No   Sig: TAKE ONE TABLET BY MOUTH EVERY DAY   traZODone (DESYREL) 50 mg tablet  Self No No   Sig: Take 1 tablet (50 mg total) by mouth daily at bedtime      Facility-Administered Medications: None     Patient's Medications   Discharge Prescriptions    No medications on file     No discharge procedures on file.  ED SEPSIS DOCUMENTATION   Time reflects when diagnosis was documented in both MDM as applicable and the Disposition within this note       Time User Action Codes Description Comment    11/5/2024  1:15 PM Melva Ramos [R53.1] Generalized weakness     11/5/2024  1:16 PM Melva Ramos [E87.1] Hyponatremia                  JOSÉ Teran  11/05/24 1328       JOSÉ Teran  11/05/24 1328

## 2024-11-05 NOTE — ASSESSMENT & PLAN NOTE
Patient has history of alcoholic cirrhosis of liver with ascites  Undergoes paracentesis q. weekly.  Last paracentesis on past Thursday  Patient with history of alcoholic cirrhosis of liver  Continue on Lasix  GI consult  Follows up with hepatology Dr. Herrera as outpatient

## 2024-11-05 NOTE — H&P
H&P - Hospitalist   Name: Jose White 64 y.o. female I MRN: [id number]  Unit/Bed#: -01 I Date of Admission: 11/5/2024   Date of Service: 11/5/2024 I Hospital Day: 0     Assessment & Plan  Hyponatremia  Admitted with sodium of 122  Will order hyponatremia work-up  Place on fluid restriction  Continue on Lasix   Nephrology consult  Continue low-salt diet and fluid restriction   Follow-up official chest x-ray results  Trend BMP every 8 hours  Anxiety  Continue on Xanax and trazodone  GERD without esophagitis  On Protonix  COPD (chronic obstructive pulmonary disease) (HCC)  Continue on bronchodilators  Patient is not in acute exacerbation  Alcoholic cirrhosis of liver with ascites (HCC)  Patient has history of alcoholic cirrhosis of liver with ascites  Undergoes paracentesis q. weekly.  Last paracentesis on past Thursday  Patient with history of alcoholic cirrhosis of liver  Continue on Lasix  GI consult  Follows up with hepatology Dr. Herrera as outpatient  Thrombocytopenia (HCC)  Patient has history of thrombocytopenia in setting of cirrhosis  Admitted with platelets of 122K  Stable  Ductal carcinoma in situ (DCIS) of right breast  Continue Arimidex    Chief Complaint   Patient presents with    Weakness - Generalized     Comes to ed c/o weakness since the summer. Sent for low sodium. Mentation is at baseline since summer per .        HPI:  Jose White is a 64 y.o. female who presents with history of alcoholic cirrhosis of liver, anemia, thrombocytopenia, ductal carcinoma right breast, GERD, right pleural effusion presented to the emergency department after being sent from PCP office with with abnormal lab work.  Patient had blood work with nephrology done yesterday which showed sodium of 121.  Patient was recommended to come to ER.  In ER patient sodium is 122 and patient was admitted. Denies any shortness of breath, nausea, vomiting, abdominal pain, chest pain, palpitation, fever, chills.      Historical Information   Past Medical History:   Diagnosis Date    Alcoholic fatty liver     Anxiety     Asthma     COPD (chronic obstructive pulmonary disease) (HCC)     Elevated liver function tests     GERD (gastroesophageal reflux disease)     GERD without esophagitis     Hyperlipidemia     Hypertension     IBS (irritable bowel syndrome)     Insomnia     Insomnia     Liver disease     Nervousness     Nicotine dependence     Other specified urinary incontinence     RLS (restless legs syndrome)     Wears glasses      Past Surgical History:   Procedure Laterality Date    BACK SURGERY      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    BREAST EXCISIONAL BIOPSY Right 11/01/2004    benign    CATARACT EXTRACTION, BILATERAL  08/01/2018    COLONOSCOPY N/A 5/8/2019    Procedure: COLONOSCOPY;  Surgeon: Jacobo Leonardo MD;  Location: [facility] GI [facility];  Service: Gastroenterology    EGD AND COLONOSCOPY N/A 3/19/2018    Procedure: EGD AND COLONOSCOPY;  Surgeon: Jacobo Leonardo MD;  Location: [facility];  Service: Gastroenterology    ESOPHAGOGASTRODUODENOSCOPY N/A 5/8/2019    Procedure: ESOPHAGOGASTRODUODENOSCOPY (EGD);  Surgeon: Jacobo Leonardo MD;  Location: [facility];  Service: Gastroenterology    IR PARACENTESIS  11/27/2020    IR PARACENTESIS  12/8/2020    IR PARACENTESIS  10/28/2022    IR PARACENTESIS  11/7/2022    IR PARACENTESIS  11/30/2022    IR PARACENTESIS  12/30/2022    IR PARACENTESIS  1/16/2023    IR PARACENTESIS  8/6/2024    IR PARACENTESIS  9/18/2024    IR PARACENTESIS  10/3/2024    IR PARACENTESIS  10/10/2024    IR PARACENTESIS  10/17/2024    IR PARACENTESIS  10/24/2024    IR PARACENTESIS  11/1/2024    IR THORACENTESIS  1/16/2023    IR THORACENTESIS  3/28/2023    IR THORACENTESIS  7/25/2024    IR THORACENTESIS  8/6/2024    IR THORACENTESIS  8/14/2024    IR THORACENTESIS  8/21/2024    IR THORACENTESIS  8/28/2024    IR THORACENTESIS  9/4/2024    IR THORACENTESIS  9/11/2024    IR THORACENTESIS  9/18/2024    IR  THORACENTESIS  9/25/2024    IR THORACENTESIS  10/3/2024    IR THORACENTESIS  10/10/2024    IR THORACENTESIS  10/17/2024    IR THORACENTESIS  10/24/2024    IR THORACENTESIS  11/1/2024    KNEE SURGERY      KNEE SURGERY      LUMBAR LAMINECTOMY  04/1999    LYMPH NODE BIOPSY      MAMMO STEREOTACTIC BREAST BIOPSY RIGHT (ALL INC) Right 5/21/2021    MAMMO STEREOTACTIC BREAST BIOPSY RIGHT (ALL INC) EACH ADD Right 5/21/2021    TOTAL ABDOMINAL HYSTERECTOMY  02/05/2008    TUBAL LIGATION      TUBAL LIGATION  1989    UPPER GASTROINTESTINAL ENDOSCOPY       Social History   Social History     Substance and Sexual Activity   Alcohol Use Not Currently    Comment: 7-10 drinks/wk     Social History     Substance and Sexual Activity   Drug Use No     Social History     Tobacco Use   Smoking Status Every Day    Current packs/day: 1.00    Average packs/day: 1 pack/day for 46.8 years (46.8 ttl pk-yrs)    Types: Cigarettes    Start date: 1/1/1978   Smokeless Tobacco Never     Family History   Problem Relation Age of Onset    Breast cancer Mother 32    No Known Problems Father     No Known Problems Sister     No Known Problems Brother     No Known Problems Maternal Grandmother     No Known Problems Maternal Grandfather     No Known Problems Paternal Grandmother     No Known Problems Paternal Grandfather     No Known Problems Maternal Aunt     No Known Problems Maternal Aunt     No Known Problems Paternal Aunt     No Known Problems Son     Substance Abuse Neg Hx     Mental illness Neg Hx     Colon cancer Neg Hx     Colon polyps Neg Hx        Meds/Allergies   Allergies   Allergen Reactions    Sulfa Antibiotics Shortness Of Breath    Ace Inhibitors Cough and Other (See Comments)     coughing       Meds:    Current Facility-Administered Medications:     acetaminophen (TYLENOL) tablet 650 mg, 650 mg, Oral, Q8H PRN, Juan Antonio Haridn MD    albuterol (PROVENTIL HFA,VENTOLIN HFA) inhaler 2 puff, 2 puff, Inhalation, Q6H PRN, Juan Antonio Hardin MD     albuterol inhalation solution 2.5 mg, 2.5 mg, Nebulization, Q6H PRN, Juan Antonio Hardin MD    ALPRAZolam (XANAX) tablet 1 mg, 1 mg, Oral, BID PRN, Juan Antonio Hardin MD    anastrozole (ARIMIDEX) tablet 1 mg, 1 mg, Oral, Daily, Juan Antonio Hardin MD    benzonatate (TESSALON PERLES) capsule 200 mg, 200 mg, Oral, TID PRN, Juan Antonio Hardin MD    budesonide-formoterol (SYMBICORT) 160-4.5 mcg/act inhaler 2 puff, 2 puff, Inhalation, BID, Juan Antonio Hardin MD    [START ON 11/6/2024] furosemide (LASIX) tablet 20 mg, 20 mg, Oral, Daily, Juan Antonio Hardin MD    heparin (porcine) subcutaneous injection 5,000 Units, 5,000 Units, Subcutaneous, Q8H PATTIE **AND** Platelet count, , , Once, Juan Antonio Hardin MD    midodrine (PROAMATINE) tablet 10 mg, 10 mg, Oral, TID AC, Juan Antonio Hardin MD    [START ON 11/6/2024] naltrexone (REVIA) tablet 50 mg, 50 mg, Oral, Daily, Juan Antonio Hardin MD    nicotine (NICODERM CQ) 21 mg/24 hr TD 24 hr patch 1 patch, 1 patch, Transdermal, Daily, Juan Antonio Hardin MD    ondansetron (ZOFRAN) injection 4 mg, 4 mg, Intravenous, Q6H PRN, Juan Antonio Hardin MD    pantoprazole (PROTONIX) EC tablet 20 mg, 20 mg, Oral, Daily, Juan Antonio Hardin MD    traZODone (DESYREL) tablet 50 mg, 50 mg, Oral, HS, Juan Antonio Hardin MD      Medications Prior to Admission:     albuterol (Proventil HFA) 90 mcg/act inhaler    ALPRAZolam (XANAX) 1 mg tablet    anastrozole (ARIMIDEX) 1 mg tablet    furosemide (LASIX) 20 mg tablet    midodrine (PROAMATINE) 10 MG tablet    Risankizumab-rzaa (SKYRIZI PEN SC)    traZODone (DESYREL) 50 mg tablet    albuterol (2.5 mg/3 mL) 0.083 % nebulizer solution    alendronate (Fosamax) 70 mg tablet    benzonatate (TESSALON) 200 MG capsule    budesonide-formoterol (Symbicort) 160-4.5 mcg/act inhaler    ergocalciferol (VITAMIN D2) 50,000 units    lactulose 20 g/30 mL    naltrexone (REVIA) 50 mg tablet    pantoprazole (PROTONIX) 20 mg tablet      Review of Systems   Constitutional:  Positive for activity  "change and fatigue.   HENT: Negative.     Eyes: Negative.    Respiratory: Negative.     Cardiovascular: Negative.    Gastrointestinal:  Positive for abdominal distention.   Endocrine: Negative.    Genitourinary: Negative.    Musculoskeletal: Negative.    Skin: Negative.    Allergic/Immunologic: Negative.    Neurological: Negative.    Hematological: Negative.    Psychiatric/Behavioral: Negative.         Current Vitals:   Blood Pressure: 120/70 (11/05/24 1412)  Pulse: 82 (11/05/24 1412)  Temperature: 98.3 °F (36.8 °C) (11/05/24 1412)  Temp Source: Oral (11/05/24 1143)  Respirations: 18 (11/05/24 1412)  Height: 5' 3\" (160 cm) (11/05/24 1429)  Weight - Scale: 68.3 kg (150 lb 9.2 oz) (11/05/24 1429)  SpO2: 96 % (11/05/24 1412)  SPO2 RA Rest      Flowsheet Row ED to Hosp-Admission (Current) from 11/5/2024 in [facility] Med Surg Unit   SpO2 96 %   SpO2 Activity At Rest   O2 Device None (Room air)   O2 Flow Rate --          No intake or output data in the 24 hours ending 11/05/24 1435  Body mass index is 26.67 kg/m².     Physical Exam  Vitals and nursing note reviewed.   Constitutional:       General: She is not in acute distress.     Appearance: She is well-developed.   HENT:      Head: Normocephalic and atraumatic.      Nose: Nose normal.   Eyes:      General: No scleral icterus.     Conjunctiva/sclera: Conjunctivae normal.      Pupils: Pupils are equal, round, and reactive to light.   Neck:      Thyroid: No thyromegaly.      Vascular: No JVD.      Trachea: No tracheal deviation.   Cardiovascular:      Rate and Rhythm: Normal rate and regular rhythm.      Heart sounds: Normal heart sounds.   Pulmonary:      Effort: Pulmonary effort is normal. No respiratory distress.      Breath sounds: Normal breath sounds. No wheezing or rales.   Chest:      Chest wall: No tenderness.   Abdominal:      General: Bowel sounds are normal. There is distension.      Palpations: Abdomen is soft. There is no mass.      " Tenderness: There is no abdominal tenderness. There is no guarding or rebound.   Musculoskeletal:         General: No tenderness or deformity. Normal range of motion.      Cervical back: Normal range of motion and neck supple.   Lymphadenopathy:      Cervical: No cervical adenopathy.   Skin:     General: Skin is warm.      Coloration: Skin is not pale.      Findings: No erythema or rash.   Neurological:      General: No focal deficit present.      Mental Status: She is alert and oriented to person, place, and time. Mental status is at baseline.      Cranial Nerves: No cranial nerve deficit.      Coordination: Coordination normal.   Psychiatric:         Behavior: Behavior normal.         Thought Content: Thought content normal.         Judgment: Judgment normal.         Lab Results:   CBC:   Lab Results   Component Value Date    WBC 5.18 11/05/2024    HGB 13.0 11/05/2024    HCT 36.5 11/05/2024     (H) 11/05/2024     (L) 11/05/2024    RBC 3.44 (L) 11/05/2024    MCH 37.8 (H) 11/05/2024    MCHC 35.6 11/05/2024    RDW 13.2 11/05/2024    MPV 8.6 (L) 11/05/2024    NRBC 0 11/05/2024     CMP:  Lab Results   Component Value Date     07/28/2017    CL 95 (L) 11/05/2024    CL 94 (L) 10/11/2024    CL 93 (L) 09/09/2021    CO2 23 11/05/2024    CO2 23 10/11/2024    CO2 30 09/09/2021    ANIONGAP 12 05/03/2015    BUN 8 11/05/2024    BUN 10 10/11/2024    BUN 7 (L) 09/09/2021    CREATININE 0.70 11/05/2024    CREATININE 0.70 09/09/2021    GLUCOSE 95 07/28/2017    CALCIUM 7.6 (L) 11/05/2024    CALCIUM 7.7 (L) 10/11/2024    CALCIUM 8.9 09/09/2021    AST 46 (H) 11/05/2024    AST 61 (H) 10/11/2024     (H) 09/09/2021    ALT 25 11/05/2024    ALT 29 10/11/2024    ALT 82 (H) 09/09/2021    ALKPHOS 87 11/05/2024    ALKPHOS 101 10/11/2024    ALKPHOS 174 (H) 09/09/2021    PROT 7.4 07/28/2017    BILITOT 0.6 07/28/2017    EGFR 91 11/05/2024     Lab Results   Component Value Date    TROPONINI <0.02 02/19/2021     Coagulation:  "  Lab Results   Component Value Date    INR 1.45 (H) 11/05/2024    INR 1.0 02/16/2016    Urinalysis:  Lab Results   Component Value Date    COLORU YELLOW 09/09/2024    COLORU red 04/01/2024    COLORU Yellow 02/02/2023    COLORU Yellow 11/10/2017    CLARITYU cloudy 04/01/2024    CLARITYU Clear 02/02/2023    CLARITYU Cloudy 11/10/2017    SPECGRAV 1.011 09/09/2024    SPECGRAV 1.025 02/02/2023    SPECGRAV 1.005 11/10/2017    PHUR 5.5 02/02/2023    PHUR 5.0 11/10/2017    LEUKOCYTESUR 2+ (A) 09/09/2024    LEUKOCYTESUR moderate 04/01/2024    LEUKOCYTESUR Negative 02/02/2023    LEUKOCYTESUR neg 11/10/2017    NITRITE NEGATIVE 09/09/2024    NITRITE neg 04/01/2024    NITRITE Negative 02/02/2023    NITRITE neg 11/10/2017    PROTEINUA neg 11/10/2017    GLUCOSEU NEGATIVE 09/09/2024    GLUCOSEU neg 04/01/2024    GLUCOSEU Negative 02/02/2023    KETONESU NEGATIVE 09/09/2024    KETONESU neg 04/01/2024    KETONESU Negative 02/02/2023    KETONESU neg 11/10/2017    BILIRUBINUR NEGATIVE 09/09/2024    BILIRUBINUR trace 04/01/2024    BILIRUBINUR Negative 02/02/2023    BILIRUBINUR neg 11/10/2017    BLOODU NEGATIVE 09/09/2024    BLOODU Large 04/01/2024    BLOODU Negative 02/02/2023    BLOODU large 11/10/2017      Amylase: No results found for: \"AMYLASE\"  Lipase:   Lab Results   Component Value Date    LIPASE 79 11/25/2023        Imaging: IR AMB Paracentesis    Result Date: 11/1/2024  Narrative: INDICATION: Recurrent ascites PROCEDURE: 1. Ultrasound-guided paracentesis FINDINGS: 1.  3100 mL clear yellow ascites removed.     Impression: Paracentesis. _________________________________________________________________ COMPARISON: None PROCEDURE DETAILS: Operators: Dr. Qureshi Anesthesia: Local Medications: 1% lidocaine COMMENTS: A preprocedure timeout was performed per [facility] protocol. The right abdomen was prepped and draped in the usual sterile fashion. 5-Turkish Yueh catheter was advanced using ultrasound guidance into ascites. Following " drainage, the skin was cleansed and sterile dressings were applied. Workstation performed: [id number]     IR OUT-Patient Thoracentesis    Result Date: 11/1/2024  Narrative: INDICATION: Hepatic hydrothorax PROCEDURE: 1. Ultrasound-guided thoracentesis FINDINGS: 1.  1700 mL cloudy yellow fluid removed from the right pleural space.     Impression: Thoracentesis. _________________________________________________________________ COMPARISON: None PROCEDURE DETAILS: Operators: Dr. Qureshi Anesthesia:  Local Medications: 1% lidocaine COMMENTS: The patient was placed in a sitting position.  A preprocedure timeout was performed per [facility] protocol. The back was prepped and draped in the usual sterile fashion. 5-Czech Yueh catheter was advanced using ultrasound guidance into the pleural space. Following drainage, skin was cleansed and sterile dressings were applied. Workstation performed: [id number]     IR OUT-Patient Thoracentesis    Result Date: 10/24/2024  Narrative: PROCEDURE: Ultrasound guided right thoracentesis STAFF: Zonia Chicas PA-C. NUMBER OF IMAGES: 1. COMPLICATIONS: None immediately. INDICATION: Symptomatic right pleural effusion. Cirrhosis of the liver PROCEDURE: Using ultrasound guidance, the right pleural cavity was punctured and a catheter placed into the pleural cavity. A total of 1700 milliliters of clear yellow fluid was removed. The catheter was then removed. FINDINGS: 1700 mL of clear yellow pleural fluid was removed.     Impression: Ultrasound guided right thoracentesis. Signed, performed, and dictated by Zonia Chicas PA-C under the direct supervision of Dr. Jaime Villar. Workstation performed: [id number]     IR AMB Paracentesis    Result Date: 10/24/2024  Narrative: PROCEDURE: Ultrasound-guided paracentesis STAFF: Zonia Chicas PA-C. COMPLICATIONS: None immediately. MEDICATIONS: 1% lidocaine subcutaneous. INDICATION: Alcoholic cirrhosis of the liver with recurrent symptomatic ascites.  PROCEDURE: Using ultrasound guidance, the right paracolic gutter was punctured and a catheter was placed into the peritoneal cavity. A total of 2500 milliliters of clear yellow fluid was removed. The catheter was then removed. FINDINGS: 2500 mL of clear yellow ascites was removed.     Impression: Ultrasound-guided right paracentesis. Signed, performed, and dictated by Zonia Chicas PA-C under the direct supervision of Dr. Jaime Villar. Workstation performed: [id number]     IR OUT-Patient Thoracentesis    Result Date: 10/17/2024  Narrative: IR THORACENTESIS PROCEDURE: Thoracentesis CLINICAL INDICATION: Cirrhosis with recurrent right pleural effusion COMPARISON: 10/10/2024 PERFORMING PHYSICIAN: Manuel Post MD ASSISTANT PHYSICIAN: None MEDICATIONS: 1% lidocaine (local). SEDATION TIME: None CONTRAST: None FLUOROSCOPY TIME: None RADIATION DOSE: 0 mGy  None NUMBER OF IMAGES: 1 TECHNIQUE: The patient was brought to the procedure room and a time-out was performed utilizing universal protocol. The patient was identified verbally and via wrist band. Real-time ultrasound with image documentation was used to sugey an appropriate skin entry site in the RIGHT chest to access the pleural space. The skin was prepped and draped in the usual sterile manner. The skin and subcutaneous tissues were generously infiltrated with lidocaine.  Access was achieved using a 5-Serbian sheathed Yueh centesis needle.  Vacuum bottle collection technique was utilized to recover a total of 1900 mL of cloudy sheryl fluid. No specimens requested. The catheter was removed.  Manual pressure was maintained until hemostasis was achieved and the area was dressed and bandaged. The patient tolerated the procedure well without difficulty or complication encountered and left the section in satisfactory stable condition. FINDINGS: As above.     Impression: Technically successful image-guided RIGHT thoracentesis as described. Workstation performed:  [id number]     IR AMB Paracentesis    Result Date: 10/17/2024  Narrative: IR PARACENTESIS PROCEDURE: Paracentesis CLINICAL INDICATION: Cirrhosis with recurrent ascites COMPARISON: 10/3/2024 PERFORMING PHYSICIAN: Manuel Post MD ASSISTANT PHYSICIAN: None MEDICATIONS: 1% lidocaine (local). SEDATION TIME: None CONTRAST: None FLUOROSCOPY TIME: None RADIATION DOSE: 0 mGy  None NUMBER OF IMAGES: 1 TECHNIQUE: The patient was brought to the procedure room and a time-out was performed utilizing universal protocol. The patient was identified verbally and via wrist band. Real-time ultrasound with image documentation was used to sugey an appropriate skin entry site in the RIGHT UPPER QUADRANT to access the peritoneal space. The skin was prepped and draped in the usual sterile manner. The skin and subcutaneous tissues were generously infiltrated with lidocaine. Access was achieved using a 5-Nauruan sheathed Yueh centesis needle. Vacuum bottle collection technique was utilized to recover a total of 2300 mL of clear yellow fluid. No specimens requested. The catheter was removed. Manual pressure was maintained until hemostasis was achieved and the area was dressed and bandaged. The patient tolerated the procedure well without difficulty or complication encountered and left the section in satisfactory stable condition. FINDINGS: As above.     Impression: Technically successful image-guided paracentesis as described. Workstation performed: [id number]     CT lung screening program    Result Date: 10/14/2024  Narrative: CT CHEST LUNG CANCER SCREENING WITHOUT IV CONTRAST INDICATION: F17.210: Nicotine dependence, cigarettes, uncomplicated. COMPARISON: CT chest abdomen pelvis 4/20/2023 TECHNIQUE: Unenhanced CT examination of the chest was performed utilizing a low dose protocol. Multiplanar 2D reformatted images were created from the source data. Radiation dose length product (DLP) for this visit:  98.67 mGy-cm . This examination,  like all CT scans performed in the [facility], was performed utilizing techniques to minimize radiation dose exposure, including the use of iterative reconstruction and automated exposure control. FINDINGS: LUNGS: Right lower lobe atelectasis. Emphysema. There is no tracheal or endobronchial lesion. PLEURA: Large right pleural effusion. HEART/GREAT VESSELS: Atherosclerotic calcifications. No thoracic aortic aneurysm. MEDIASTINUM AND DIEGO: Unremarkable. CHEST WALL AND LOWER NECK: Postsurgical changes of the right breast. VISUALIZED STRUCTURES IN THE UPPER ABDOMEN: Cirrhotic liver. Mild upper abdominal ascites. Varices present in the upper abdomen. OSSEOUS STRUCTURES: Lateral right sixth and seventh rib fractures.     Impression: 1. Large right pleural effusion with complete atelectasis of right lower lobe limits evaluation for underlying pulmonary nodules. 2. Right sixth and seventh rib fractures. 3. Cirrhosis with changes of portal hypertension.   Lung-RADS 0, Findings suggestive of inflammatory or infectious process. Follow-up LDCT in 1 to 3 months. Workstation performed: [id number]     IR AMB Paracentesis    Result Date: 10/10/2024  Narrative: IR PARACENTESIS PROCEDURE: Paracentesis CLINICAL INDICATION: Cirrhosis with recurrent ascites COMPARISON: 10/3/2024 PERFORMING PHYSICIAN: Manuel Post MD ASSISTANT PHYSICIAN: None MEDICATIONS: 1% lidocaine (local). SEDATION TIME: None CONTRAST: None FLUOROSCOPY TIME: None RADIATION DOSE: 0 mGy  None NUMBER OF IMAGES: 2 TECHNIQUE: Informed written consent was obtained from the patient after a thorough discussion of risks, benefits, and alternatives to the procedure . All questions were answered. The patient was brought to the procedure room and a time-out was performed utilizing universal protocol. The patient was identified verbally and via wrist band. Real-time ultrasound with image documentation was used to sugey an appropriate skin entry site in  the RIGHT UPPER QUADRANT to access the peritoneal space. The skin was prepped and draped in the usual sterile manner. The skin and subcutaneous tissues were generously infiltrated with lidocaine. Access was achieved using a 5-[place] sheathed Yueh centesis needle. Vacuum bottle collection technique was utilized to recover a total of 1650 mL of clear yellow fluid. No specimens requested. The catheter was removed. Manual pressure was maintained until hemostasis was achieved and the area was dressed and bandaged. The patient tolerated the procedure well without difficulty or complication encountered and left the section in satisfactory stable condition. FINDINGS: As above.     Impression: Technically successful image-guided paracentesis as described. Workstation performed: [id number]     IR OUT-Patient Thoracentesis    Result Date: 10/10/2024  Narrative: IR THORACENTESIS PROCEDURE: Thoracentesis CLINICAL INDICATION: Cirrhosis with recurrent right pleural effusion COMPARISON: 10/3/2024 PERFORMING PHYSICIAN: Manuel Post MD ASSISTANT PHYSICIAN: None MEDICATIONS: 1% lidocaine (local). SEDATION TIME: None CONTRAST: None FLUOROSCOPY TIME: None RADIATION DOSE: 0 mGy  None NUMBER OF IMAGES: 1 TECHNIQUE: The patient was brought to the procedure room and a time-out was performed utilizing universal protocol. The patient was identified verbally and via wrist band. Real-time ultrasound with image documentation was used to sugey an appropriate skin entry site in the RIGHT chest to access the pleural space. The skin was prepped and draped in the usual sterile manner. The skin and subcutaneous tissues were generously infiltrated with lidocaine.  Access was achieved using a 5-[place] sheathed Yueh centesis needle.  Vacuum bottle collection technique was utilized to recover a total of 1950 mL of dark clear sheryl fluid. No specimens requested. The catheter was removed.  Manual pressure was maintained until hemostasis was achieved  "and the area was dressed and bandaged. The patient tolerated the procedure well without difficulty or complication encountered and left the section in satisfactory stable condition. FINDINGS: As above.     Impression: Technically successful image-guided RIGHT thoracentesis as described. Workstation performed: [id number]     EKG, Pathology, and Other Studies: I have personally reviewed the results.  VTE Pharmacologic Prophylaxis: Heparin  VTE Mechanical Prophylaxis: sequential compression device    Code Status: Level 1 - Full Code    Anticipated Length of Stay:  Patient will be admitted on an Inpatient basis with an anticipated length of stay of greater than 2 midnights.     Counseling / Coordination of Care  Total floor / unit time spent today 75 minutes.  Greater than 50% of total time was spent with the patient and / or family counseling and / or coordination of care.     \"This note has been constructed using a voice recognition system\"      Juan Antonio Hardin MD  11/5/2024, 2:35 PM        "

## 2024-11-05 NOTE — RESPIRATORY THERAPY NOTE
RT Protocol Note  Jose White 64 y.o. female MRN: [id number]  Unit/Bed#: -01 Encounter: [id number]    Assessment    Principal Problem:    Hyponatremia  Active Problems:    Anxiety    GERD without esophagitis    COPD (chronic obstructive pulmonary disease) (HCC)    Alcoholic cirrhosis of liver with ascites (HCC)    Thrombocytopenia (HCC)    Ductal carcinoma in situ (DCIS) of right breast      Home Pulmonary Medications:  Albuterol/symbicort   11/05/24 1532   Respiratory Protocol   Protocol Initiated? Yes   Protocol Selection Respiratory   Language Barrier? No   Medical & Social History Reviewed? Yes   Diagnostic Studies Reviewed? Yes   Physical Assessment Performed? Yes   Respiratory Plan No distress/Pulmonary history   Respiratory Assessment   Assessment Type Assess only   General Appearance Alert;Awake   Respiratory Pattern Normal   Chest Assessment Chest expansion symmetrical   Bilateral Breath Sounds Clear;Diminished   Cough Non-productive   Resp Comments physician ordered HFA/neb as per home use/plan to continue current treatment plan   O2 Device RA            Past Medical History:   Diagnosis Date    Alcoholic fatty liver     Anxiety     Asthma     COPD (chronic obstructive pulmonary disease) (HCC)     Elevated liver function tests     GERD (gastroesophageal reflux disease)     GERD without esophagitis     Hyperlipidemia     Hypertension     IBS (irritable bowel syndrome)     Insomnia     Insomnia     Liver disease     Nervousness     Nicotine dependence     Other specified urinary incontinence     RLS (restless legs syndrome)     Wears glasses      Social History     Socioeconomic History    Marital status: /Civil Union     Spouse name: None    Number of children: None    Years of education: None    Highest education level: None   Occupational History    None   Tobacco Use    Smoking status: Every Day     Current packs/day: 1.00     Average packs/day: 1 pack/day for 46.8 years (46.8 ttl  pk-yrs)     Types: Cigarettes     Start date: 1/1/1978    Smokeless tobacco: Never   Vaping Use    Vaping status: Never Used   Substance and Sexual Activity    Alcohol use: Not Currently     Comment: 7-10 drinks/wk    Drug use: No    Sexual activity: Yes     Partners: Male   Other Topics Concern    None   Social History Narrative    Has carbon monoxide detectors in home    Has smoke detectors    Uses safety equipment - seatbelts     Social Determinants of Health     Financial Resource Strain: Not on file   Food Insecurity: No Food Insecurity (7/15/2024)    Nursing - Inadequate Food Risk Classification     Worried About Running Out of Food in the Last Year: Never true     Ran Out of Food in the Last Year: Never true     Ran Out of Food in the Last Year: Not on file   Transportation Needs: No Transportation Needs (7/15/2024)    PRAPARE - Transportation     Lack of Transportation (Medical): No     Lack of Transportation (Non-Medical): No   Physical Activity: Not on file   Stress: No Stress Concern Present (6/28/2024)    Received from [facility]    [employer] - Occupational Stress Questionnaire     Feeling of Stress : Not at all   Social Connections: Feeling Socially Integrated (6/28/2024)    Received from [facility]    OASIS : Social Isolation     How often do you feel lonely or isolated from those around you?: Never   Intimate Partner Violence: Unknown (6/28/2024)    Received from [facility]    Humiliation, Afraid, Rape, and Kick questionnaire     Fear of Current or Ex-Partner: Not on file     Emotionally Abused: No     Physically Abused: No     Sexually Abused: No   Housing Stability: Unknown (7/15/2024)    Housing Stability Vital Sign     Unable to Pay for Housing in the Last Year: No     Number of Times Moved in the Last Year: Not on file     Homeless in the Last Year: No       Subjective         Objective    Physical Exam:  "  Assessment Type: (P) Assess only  General Appearance: (P) Alert, Awake  Respiratory Pattern: (P) Normal  Chest Assessment: (P) Chest expansion symmetrical  Bilateral Breath Sounds: (P) Clear, Diminished  Cough: (P) Non-productive  O2 Device: (P) RA    Vitals:  Blood pressure 120/70, pulse 82, temperature 98.3 °F (36.8 °C), temperature source Oral, resp. rate 18, height 5' 3\" (1.6 m), weight 68.3 kg (150 lb 9.2 oz), SpO2 96%, not currently breastfeeding.          Imaging and other studies:     O2 Device: (P) RA     Plan    Respiratory Plan: (P) No distress/Pulmonary history        Resp Comments: (P) physician ordered HFA/neb as per home use/plan to continue current treatment plan   "

## 2024-11-05 NOTE — PLAN OF CARE

## 2024-11-05 NOTE — ASSESSMENT & PLAN NOTE
Patient has history of thrombocytopenia in setting of cirrhosis  Admitted with platelets of 122K  Stable

## 2024-11-05 NOTE — ASSESSMENT & PLAN NOTE
Admitted with sodium of 122  Will order hyponatremia work-up  Place on fluid restriction  Continue on Lasix   Nephrology consult  Continue low-salt diet and fluid restriction   Follow-up official chest x-ray results  Trend BMP every 8 hours

## 2024-11-06 PROBLEM — E88.09 HYPOALBUMINEMIA: Status: ACTIVE | Noted: 2024-11-06

## 2024-11-06 LAB
ALBUMIN SERPL BCG-MCNC: 1.8 G/DL (ref 3.5–5)
ALP SERPL-CCNC: 65 U/L (ref 34–104)
ALT SERPL W P-5'-P-CCNC: 20 U/L (ref 7–52)
AMMONIA PLAS-SCNC: 53 UMOL/L (ref 18–72)
AMORPH URATE CRY URNS QL MICRO: ABNORMAL
ANION GAP SERPL CALCULATED.3IONS-SCNC: 3 MMOL/L (ref 4–13)
ANION GAP SERPL CALCULATED.3IONS-SCNC: 5 MMOL/L (ref 4–13)
AST SERPL W P-5'-P-CCNC: 36 U/L (ref 13–39)
ATRIAL RATE: 89 BPM
BACTERIA UR QL AUTO: ABNORMAL /HPF
BASOPHILS # BLD AUTO: 0.07 THOUSANDS/ÂΜL (ref 0–0.1)
BASOPHILS NFR BLD AUTO: 2 % (ref 0–1)
BILIRUB DIRECT SERPL-MCNC: 0.52 MG/DL (ref 0–0.2)
BILIRUB SERPL-MCNC: 1.78 MG/DL (ref 0.2–1)
BILIRUB UR QL STRIP: NEGATIVE
BNP SERPL-MCNC: 63 PG/ML (ref 0–100)
BUN SERPL-MCNC: 8 MG/DL (ref 5–25)
BUN SERPL-MCNC: 9 MG/DL (ref 5–25)
CALCIUM ALBUM COR SERPL-MCNC: 9 MG/DL (ref 8.3–10.1)
CALCIUM SERPL-MCNC: 7.2 MG/DL (ref 8.4–10.2)
CALCIUM SERPL-MCNC: 7.8 MG/DL (ref 8.4–10.2)
CHLORIDE SERPL-SCNC: 100 MMOL/L (ref 96–108)
CHLORIDE SERPL-SCNC: 99 MMOL/L (ref 96–108)
CLARITY UR: CLEAR
CO2 SERPL-SCNC: 22 MMOL/L (ref 21–32)
CO2 SERPL-SCNC: 23 MMOL/L (ref 21–32)
COLOR UR: ABNORMAL
CORTIS AM PEAK SERPL-MCNC: 11 UG/DL (ref 6.7–22.6)
CREAT SERPL-MCNC: 0.59 MG/DL (ref 0.6–1.3)
CREAT SERPL-MCNC: 0.69 MG/DL (ref 0.6–1.3)
EOSINOPHIL # BLD AUTO: 0.12 THOUSAND/ÂΜL (ref 0–0.61)
EOSINOPHIL NFR BLD AUTO: 3 % (ref 0–6)
ERYTHROCYTE [DISTWIDTH] IN BLOOD BY AUTOMATED COUNT: 13.2 % (ref 11.6–15.1)
GFR SERPL CREATININE-BSD FRML MDRD: 91 ML/MIN/1.73SQ M
GFR SERPL CREATININE-BSD FRML MDRD: 96 ML/MIN/1.73SQ M
GLUCOSE SERPL-MCNC: 101 MG/DL (ref 65–140)
GLUCOSE SERPL-MCNC: 105 MG/DL (ref 65–140)
GLUCOSE UR STRIP-MCNC: NEGATIVE MG/DL
HCT VFR BLD AUTO: 29.9 % (ref 34.8–46.1)
HGB BLD-MCNC: 10.9 G/DL (ref 11.5–15.4)
HGB UR QL STRIP.AUTO: NEGATIVE
IMM GRANULOCYTES # BLD AUTO: 0.05 THOUSAND/UL (ref 0–0.2)
IMM GRANULOCYTES NFR BLD AUTO: 1 % (ref 0–2)
INR PPP: 1.62 (ref 0.85–1.19)
KETONES UR STRIP-MCNC: NEGATIVE MG/DL
LEUKOCYTE ESTERASE UR QL STRIP: ABNORMAL
LYMPHOCYTES # BLD AUTO: 0.58 THOUSANDS/ÂΜL (ref 0.6–4.47)
LYMPHOCYTES NFR BLD AUTO: 12 % (ref 14–44)
MAGNESIUM SERPL-MCNC: 2 MG/DL (ref 1.9–2.7)
MCH RBC QN AUTO: 38.4 PG (ref 26.8–34.3)
MCHC RBC AUTO-ENTMCNC: 36.5 G/DL (ref 31.4–37.4)
MCV RBC AUTO: 105 FL (ref 82–98)
MONOCYTES # BLD AUTO: 0.68 THOUSAND/ÂΜL (ref 0.17–1.22)
MONOCYTES NFR BLD AUTO: 14 % (ref 4–12)
NEUTROPHILS # BLD AUTO: 3.25 THOUSANDS/ÂΜL (ref 1.85–7.62)
NEUTS SEG NFR BLD AUTO: 68 % (ref 43–75)
NITRITE UR QL STRIP: NEGATIVE
NON-SQ EPI CELLS URNS QL MICRO: ABNORMAL /HPF
NRBC BLD AUTO-RTO: 0 /100 WBCS
OTHER STN SPEC: ABNORMAL
P AXIS: 54 DEGREES
PH UR STRIP.AUTO: 6 [PH]
PHOSPHATE SERPL-MCNC: 3.8 MG/DL (ref 2.3–4.1)
PLATELET # BLD AUTO: 94 THOUSANDS/UL (ref 149–390)
PMV BLD AUTO: 9 FL (ref 8.9–12.7)
POTASSIUM SERPL-SCNC: 4 MMOL/L (ref 3.5–5.3)
POTASSIUM SERPL-SCNC: 4.4 MMOL/L (ref 3.5–5.3)
PR INTERVAL: 154 MS
PROT SERPL-MCNC: 5.9 G/DL (ref 6.4–8.4)
PROT UR STRIP-MCNC: ABNORMAL MG/DL
PROTHROMBIN TIME: 19.7 SECONDS (ref 12.3–15)
QRS AXIS: 82 DEGREES
QRSD INTERVAL: 72 MS
QT INTERVAL: 356 MS
QTC INTERVAL: 433 MS
RBC # BLD AUTO: 2.84 MILLION/UL (ref 3.81–5.12)
RBC #/AREA URNS AUTO: ABNORMAL /HPF
SODIUM SERPL-SCNC: 125 MMOL/L (ref 135–147)
SODIUM SERPL-SCNC: 127 MMOL/L (ref 135–147)
SP GR UR STRIP.AUTO: 1.02 (ref 1–1.03)
T WAVE AXIS: 44 DEGREES
UROBILINOGEN UR STRIP-ACNC: <2 MG/DL
VENTRICULAR RATE: 89 BPM
WBC # BLD AUTO: 4.75 THOUSAND/UL (ref 4.31–10.16)
WBC #/AREA URNS AUTO: ABNORMAL /HPF

## 2024-11-06 PROCEDURE — 84100 ASSAY OF PHOSPHORUS: CPT | Performed by: INTERNAL MEDICINE

## 2024-11-06 PROCEDURE — 82140 ASSAY OF AMMONIA: CPT | Performed by: INTERNAL MEDICINE

## 2024-11-06 PROCEDURE — 87086 URINE CULTURE/COLONY COUNT: CPT | Performed by: INTERNAL MEDICINE

## 2024-11-06 PROCEDURE — 82533 TOTAL CORTISOL: CPT | Performed by: INTERNAL MEDICINE

## 2024-11-06 PROCEDURE — 99232 SBSQ HOSP IP/OBS MODERATE 35: CPT | Performed by: INTERNAL MEDICINE

## 2024-11-06 PROCEDURE — 80053 COMPREHEN METABOLIC PANEL: CPT | Performed by: INTERNAL MEDICINE

## 2024-11-06 PROCEDURE — 82248 BILIRUBIN DIRECT: CPT | Performed by: INTERNAL MEDICINE

## 2024-11-06 PROCEDURE — 80048 BASIC METABOLIC PNL TOTAL CA: CPT | Performed by: INTERNAL MEDICINE

## 2024-11-06 PROCEDURE — 83735 ASSAY OF MAGNESIUM: CPT | Performed by: INTERNAL MEDICINE

## 2024-11-06 PROCEDURE — 85610 PROTHROMBIN TIME: CPT | Performed by: INTERNAL MEDICINE

## 2024-11-06 PROCEDURE — 93010 ELECTROCARDIOGRAM REPORT: CPT | Performed by: INTERNAL MEDICINE

## 2024-11-06 PROCEDURE — 94760 N-INVAS EAR/PLS OXIMETRY 1: CPT

## 2024-11-06 PROCEDURE — 81001 URINALYSIS AUTO W/SCOPE: CPT | Performed by: INTERNAL MEDICINE

## 2024-11-06 PROCEDURE — 85025 COMPLETE CBC W/AUTO DIFF WBC: CPT | Performed by: INTERNAL MEDICINE

## 2024-11-06 PROCEDURE — 83880 ASSAY OF NATRIURETIC PEPTIDE: CPT | Performed by: INTERNAL MEDICINE

## 2024-11-06 RX ORDER — ALBUMIN (HUMAN) 12.5 G/50ML
25 SOLUTION INTRAVENOUS 2 TIMES DAILY
Status: COMPLETED | OUTPATIENT
Start: 2024-11-06 | End: 2024-11-08

## 2024-11-06 RX ORDER — GUAIFENESIN 100 MG/5ML
200 SOLUTION ORAL EVERY 4 HOURS PRN
Status: DISCONTINUED | OUTPATIENT
Start: 2024-11-06 | End: 2024-11-08 | Stop reason: HOSPADM

## 2024-11-06 RX ADMIN — BUDESONIDE AND FORMOTEROL FUMARATE DIHYDRATE 2 PUFF: 160; 4.5 AEROSOL RESPIRATORY (INHALATION) at 09:08

## 2024-11-06 RX ADMIN — BUDESONIDE AND FORMOTEROL FUMARATE DIHYDRATE 2 PUFF: 160; 4.5 AEROSOL RESPIRATORY (INHALATION) at 17:26

## 2024-11-06 RX ADMIN — MIDODRINE HYDROCHLORIDE 10 MG: 5 TABLET ORAL at 06:29

## 2024-11-06 RX ADMIN — ALBUMIN (HUMAN) 25 G: 0.25 INJECTION, SOLUTION INTRAVENOUS at 13:53

## 2024-11-06 RX ADMIN — ALPRAZOLAM 1 MG: 0.5 TABLET ORAL at 21:07

## 2024-11-06 RX ADMIN — HEPARIN SODIUM 5000 UNITS: 5000 INJECTION, SOLUTION INTRAVENOUS; SUBCUTANEOUS at 21:00

## 2024-11-06 RX ADMIN — GUAIFENESIN 200 MG: 200 SOLUTION ORAL at 13:53

## 2024-11-06 RX ADMIN — HEPARIN SODIUM 5000 UNITS: 5000 INJECTION, SOLUTION INTRAVENOUS; SUBCUTANEOUS at 06:31

## 2024-11-06 RX ADMIN — Medication 100 MG: at 09:04

## 2024-11-06 RX ADMIN — MIDODRINE HYDROCHLORIDE 10 MG: 5 TABLET ORAL at 17:16

## 2024-11-06 RX ADMIN — SODIUM CHLORIDE 75 ML/HR: 0.9 INJECTION, SOLUTION INTRAVENOUS at 07:00

## 2024-11-06 RX ADMIN — NALTREXONE HYDROCHLORIDE 50 MG: 50 TABLET, FILM COATED ORAL at 09:04

## 2024-11-06 RX ADMIN — SODIUM CHLORIDE: 4 INJECTION, SOLUTION, CONCENTRATE INTRAVENOUS at 17:15

## 2024-11-06 RX ADMIN — ALBUMIN (HUMAN) 25 G: 0.25 INJECTION, SOLUTION INTRAVENOUS at 20:55

## 2024-11-06 RX ADMIN — MULTIPLE VITAMINS W/ MINERALS TAB 1 TABLET: TAB ORAL at 09:04

## 2024-11-06 RX ADMIN — FOLIC ACID 1 MG: 1 TABLET ORAL at 09:04

## 2024-11-06 RX ADMIN — PANTOPRAZOLE SODIUM 20 MG: 20 TABLET, DELAYED RELEASE ORAL at 09:04

## 2024-11-06 RX ADMIN — ANASTROZOLE 1 MG: 1 TABLET, COATED ORAL at 09:05

## 2024-11-06 RX ADMIN — MIDODRINE HYDROCHLORIDE 10 MG: 5 TABLET ORAL at 11:28

## 2024-11-06 RX ADMIN — HEPARIN SODIUM 5000 UNITS: 5000 INJECTION, SOLUTION INTRAVENOUS; SUBCUTANEOUS at 13:54

## 2024-11-06 RX ADMIN — TRAZODONE HYDROCHLORIDE 50 MG: 50 TABLET ORAL at 21:00

## 2024-11-06 NOTE — PLAN OF CARE
Problem: Potential for Falls  Goal: Patient will remain free of falls  Description: INTERVENTIONS:  - Educate patient/family on patient safety including physical limitations  - Instruct patient to call for assistance with activity   - Consult OT/PT to assist with strengthening/mobility   - Keep Call bell within reach  - Keep bed low and locked with side rails adjusted as appropriate  - Keep care items and personal belongings within reach  - Initiate and maintain comfort rounds  - Make Fall Risk Sign visible to staff  - Offer Toileting every x Hours, in advance of need  - Initiate/Maintain xalarm  - Obtain necessary fall risk management equipment: xx  - Apply yellow socks and bracelet for high fall risk patients  - Consider moving patient to room near nurses station  Outcome: Progressing     Problem: PAIN - ADULT  Goal: Verbalizes/displays adequate comfort level or baseline comfort level  Description: Interventions:  - Encourage patient to monitor pain and request assistance  - Assess pain using appropriate pain scale  - Administer analgesics based on type and severity of pain and evaluate response  - Implement non-pharmacological measures as appropriate and evaluate response  - Consider cultural and social influences on pain and pain management  - Notify physician/advanced practitioner if interventions unsuccessful or patient reports new pain  Outcome: Progressing     Problem: INFECTION - ADULT  Goal: Absence or prevention of progression during hospitalization  Description: INTERVENTIONS:  - Assess and monitor for signs and symptoms of infection  - Monitor lab/diagnostic results  - Monitor all insertion sites, i.e. indwelling lines, tubes, and drains  - Monitor endotracheal if appropriate and nasal secretions for changes in amount and color  - [place] appropriate cooling/warming therapies per order  - Administer medications as ordered  - Instruct and encourage patient and family to use good hand hygiene  technique  - Identify and instruct in appropriate isolation precautions for identified infection/condition  Outcome: Progressing  Goal: Absence of fever/infection during neutropenic period  Description: INTERVENTIONS:  - Monitor WBC    Outcome: Progressing     Problem: SAFETY ADULT  Goal: Patient will remain free of falls  Description: INTERVENTIONS:  - Educate patient/family on patient safety including physical limitations  - Instruct patient to call for assistance with activity   - Consult OT/PT to assist with strengthening/mobility   - Keep Call bell within reach  - Keep bed low and locked with side rails adjusted as appropriate  - Keep care items and personal belongings within reach  - Initiate and maintain comfort rounds  - Make Fall Risk Sign visible to staff  - Offer Toileting every x Hours, in advance of need  - Initiate/Maintain xalarm  - Obtain necessary fall risk management equipment: x  - Apply yellow socks and bracelet for high fall risk patients  - Consider moving patient to room near nurses station  Outcome: Progressing  Goal: Maintain or return to baseline ADL function  Description: INTERVENTIONS:  -  Assess patient's ability to carry out ADLs; assess patient's baseline for ADL function and identify physical deficits which impact ability to perform ADLs (bathing, care of mouth/teeth, toileting, grooming, dressing, etc.)  - Assess/evaluate cause of self-care deficits   - Assess range of motion  - Assess patient's mobility; develop plan if impaired  - Assess patient's need for assistive devices and provide as appropriate  - Encourage maximum independence but intervene and supervise when necessary  - Involve family in performance of ADLs  - Assess for home care needs following discharge   - Consider OT consult to assist with ADL evaluation and planning for discharge  - Provide patient education as appropriate  Outcome: Progressing  Goal: Maintains/Returns to pre admission functional level  Description:  INTERVENTIONS:  - Perform AM-PAC 6 Click Basic Mobility/ Daily Activity assessment daily.  - Set and communicate daily mobility goal to care team and patient/family/caregiver.   - Collaborate with rehabilitation services on mobility goals if consulted  - Perform Range of Motion x times a day.  - Reposition patient every x hours.  - Dangle patient x times a day  - Stand patient x times a day  - Ambulate patient x times a day  - Out of bed to chair x times a day   - Out of bed for meals x times a day  - Out of bed for toileting  - Record patient progress and toleration of activity level   Outcome: Progressing     Problem: DISCHARGE PLANNING  Goal: Discharge to home or other facility with appropriate resources  Description: INTERVENTIONS:  - Identify barriers to discharge w/patient and caregiver  - Arrange for needed discharge resources and transportation as appropriate  - Identify discharge learning needs (meds, wound care, etc.)  - Arrange for interpretive services to assist at discharge as needed  - Refer to Case Management Department for coordinating discharge planning if the patient needs post-hospital services based on physician/advanced practitioner order or complex needs related to functional status, cognitive ability, or social support system  Outcome: Progressing     Problem: Knowledge Deficit  Goal: Patient/family/caregiver demonstrates understanding of disease process, treatment plan, medications, and discharge instructions  Description: Complete learning assessment and assess knowledge base.  Interventions:  - Provide teaching at level of understanding  - Provide teaching via preferred learning methods  Outcome: Progressing     Problem: GENITOURINARY - ADULT  Goal: Maintains or returns to baseline urinary function  Description: INTERVENTIONS:  - Assess urinary function  - Encourage oral fluids to ensure adequate hydration if ordered  - Administer IV fluids as ordered to ensure adequate hydration  -  Administer ordered medications as needed  - Offer frequent toileting  - Follow urinary retention protocol if ordered  Outcome: Progressing  Goal: Absence of urinary retention  Description: INTERVENTIONS:  - Assess patient’s ability to void and empty bladder  - Monitor I/O  - Bladder scan as needed  - Discuss with physician/AP medications to alleviate retention as needed  - Discuss catheterization for long term situations as appropriate  Outcome: Progressing  Goal: Urinary catheter remains patent  Description: INTERVENTIONS:  - Assess patency of urinary catheter  - If patient has a chronic blanco, consider changing catheter if non-functioning  - Follow guidelines for intermittent irrigation of non-functioning urinary catheter  Outcome: Progressing     Problem: METABOLIC, FLUID AND ELECTROLYTES - ADULT  Goal: Electrolytes maintained within normal limits  Description: INTERVENTIONS:  - Monitor labs and assess patient for signs and symptoms of electrolyte imbalances  - Administer electrolyte replacement as ordered  - Monitor response to electrolyte replacements, including repeat lab results as appropriate  - Instruct patient on fluid and nutrition as appropriate  Outcome: Progressing  Goal: Fluid balance maintained  Description: INTERVENTIONS:  - Monitor labs   - Monitor I/O and WT  - Instruct patient on fluid and nutrition as appropriate  - Assess for signs & symptoms of volume excess or deficit  Outcome: Progressing  Goal: Glucose maintained within target range  Description: INTERVENTIONS:  - Monitor Blood Glucose as ordered  - Assess for signs and symptoms of hyperglycemia and hypoglycemia  - Administer ordered medications to maintain glucose within target range  - Assess nutritional intake and initiate nutrition service referral as needed  Outcome: Progressing

## 2024-11-06 NOTE — ASSESSMENT & PLAN NOTE
Sodium level improving with IVF  Continue IVF but recheck BMP today to ensure continued improvement  If sodium level stops improving, would need to discontinue IVF  Urine studies not consistent with need for diuretic however it would not harm patient to start and may help to mitigate large volume paracenteses and thoracenteses.  Okay to restart diuretic once off IVF.  Start fluid restriction 1200ml/day  No need for low sodium diet, attempting to avoid salt tabs so would just add a little salt to food  Start ensure with meals  Consider changing IVF to 1.8% if sodium level is not improving on repeat bmp today  Need to find a balance  Workup: Urine osm 278, urine sodium <10, AM cortisol 11   - consider cosyntropin stimulation test, will discuss with attending

## 2024-11-06 NOTE — PROGRESS NOTES
Progress note - [facility]   Jose White 64 y.o. female MRN: [id number]  Unit/Bed#: -Nitesh Encounter: [id number]    ASSESSMENT and PLAN  Assessment:  64-year-old female past medical history of decompensated alcoholic cirrhosis with ascites, esophageal varices, hepatic encephalopathy.  Continued with EtOH use.  Presenting with hyponatremia with a sodium of 122.  Admission labs  notable for T. bili of 1.9 AST of 46 ALT of 25 platelets of 122 INR of 1.45.  last paracentesis on 11/1 with 3.1 L removed.  No SBP on paracentesis on 9/18. MELD 3.0 24 on admission        Plan:  1.  Decompensated alcoholic cirrhosis of the liver with ascites  2.  Thrombocytopenia  3.  GERD     Monitor MELD labs  Continue home lactulose, lasix, midodrine  Continue home low Na diet  Plan for paracentesis on 11/7.  Send for cell count, cultures, total protein, albumin.     Continue PPI      Chief Complaint   Patient presents with    Weakness - Generalized     Comes to ed c/o weakness since the summer. Sent for low sodium. Mentation is at baseline since summer per .       SUBJECTIVE  Patient continues to have abdominal distention.  More tight today.  Denies any nausea vomiting or abdominal pain.  No fevers.  More constipation than diarrhea.    Temp:  [98.1 °F (36.7 °C)-98.3 °F (36.8 °C)] 98.1 °F (36.7 °C)  HR:  [] 80  BP: (100-120)/(54-70) 100/55  Resp:  [17-20] 17  SpO2:  [91 %-97 %] 91 %  O2 Device: None (Room air)     PHYSICAL EXAM  General Appearance: NAD, cooperative, alert  Eyes: Anicteric  GI:  non-tender, taut, distended; normal bowel sounds; no masses, no organomegaly   Rectal: Deferred  Musculoskeletal: No edema.  Skin:  No jaundice    LABS & STUDIES  Lab Results   Component Value Date    GLUCOSE 95 07/28/2017    CALCIUM 7.2 (L) 11/06/2024     07/28/2017    K 4.4 11/06/2024    CO2 23 11/06/2024    CL 99 11/06/2024    BUN 9 11/06/2024    CREATININE 0.59 (L) 11/06/2024    CREATININE 0.62  "11/05/2024    CREATININE 0.70 11/05/2024     Lab Results   Component Value Date    WBC 4.75 11/06/2024    WBC 5.18 11/05/2024    WBC 7.3 10/11/2024    HGB 10.9 (L) 11/06/2024    HGB 13.0 11/05/2024    HGB 12.3 10/11/2024     (H) 11/06/2024    PLT 94 (L) 11/06/2024     (L) 11/05/2024    PLT 96 (L) 10/11/2024     Lab Results   Component Value Date    ALT 20 11/06/2024    ALT 25 11/05/2024    ALT 29 10/11/2024    AST 36 11/06/2024    AST 46 (H) 11/05/2024    AST 61 (H) 10/11/2024    ALKPHOS 65 11/06/2024    ALKPHOS 87 11/05/2024    ALKPHOS 101 10/11/2024    TBILI 1.78 (H) 11/06/2024    TBILI 1.90 (H) 11/05/2024    TBILI 4.2 (H) 10/11/2024     No results found for: \"AMYLASE\"  Lab Results   Component Value Date    LIPASE 79 11/25/2023     Lab Results   Component Value Date    IRON 77 11/29/2023    TIBC 319 11/29/2023    FERRITIN 145 11/29/2023     Lab Results   Component Value Date    INR 1.62 (H) 11/06/2024    INR 1.45 (H) 11/05/2024    INR 1.3 (H) 10/29/2024       XR chest 2 views    Result Date: 11/5/2024  Narrative: XR CHEST PA AND LATERAL INDICATION: ACS work up. COMPARISON: 7/25/2024 FINDINGS: Persistent dense right lung base opacity which may represent a combination of moderate effusion and parenchymal opacity. No pneumothorax or pleural effusion. Normal cardiomediastinal silhouette. Bones are unremarkable for age. Normal upper abdomen.     Impression: Persistent dense right lung base opacity which may represent a combination of effusion and parenchymal opacity. Workstation performed: [id number]     IR AMB Paracentesis    Result Date: 11/1/2024  Narrative: INDICATION: Recurrent ascites PROCEDURE: 1. Ultrasound-guided paracentesis FINDINGS: 1.  3100 mL clear yellow ascites removed.     Impression: Paracentesis. _________________________________________________________________ COMPARISON: None PROCEDURE DETAILS: Operators: Dr. Qureshi Anesthesia: Local Medications: 1% lidocaine COMMENTS: A preprocedure " timeout was performed per [facility] protocol. The right abdomen was prepped and draped in the usual sterile fashion. 5-Greek Yueh catheter was advanced using ultrasound guidance into ascites. Following drainage, the skin was cleansed and sterile dressings were applied. Workstation performed: [id number]     IR OUT-Patient Thoracentesis    Result Date: 11/1/2024  Narrative: INDICATION: Hepatic hydrothorax PROCEDURE: 1. Ultrasound-guided thoracentesis FINDINGS: 1.  1700 mL cloudy yellow fluid removed from the right pleural space.     Impression: Thoracentesis. _________________________________________________________________ COMPARISON: None PROCEDURE DETAILS: Operators: Dr. Qureshi Anesthesia:  Local Medications: 1% lidocaine COMMENTS: The patient was placed in a sitting position.  A preprocedure timeout was performed per [facility] protocol. The back was prepped and draped in the usual sterile fashion. 5-Greek Yueh catheter was advanced using ultrasound guidance into the pleural space. Following drainage, skin was cleansed and sterile dressings were applied. Workstation performed: [id number]     IR OUT-Patient Thoracentesis    Result Date: 10/24/2024  Narrative: PROCEDURE: Ultrasound guided right thoracentesis STAFF: Zonia Chicas PA-C. NUMBER OF IMAGES: 1. COMPLICATIONS: None immediately. INDICATION: Symptomatic right pleural effusion. Cirrhosis of the liver PROCEDURE: Using ultrasound guidance, the right pleural cavity was punctured and a catheter placed into the pleural cavity. A total of 1700 milliliters of clear yellow fluid was removed. The catheter was then removed. FINDINGS: 1700 mL of clear yellow pleural fluid was removed.     Impression: Ultrasound guided right thoracentesis. Signed, performed, and dictated by Zonia Chicas PA-C under the direct supervision of Dr. Jaime Villar. Workstation performed: [id number]     IR AMB Paracentesis    Result Date: 10/24/2024  Narrative: PROCEDURE:  Ultrasound-guided paracentesis STAFF: Zonia Chicas PA-C. COMPLICATIONS: None immediately. MEDICATIONS: 1% lidocaine subcutaneous. INDICATION: Alcoholic cirrhosis of the liver with recurrent symptomatic ascites. PROCEDURE: Using ultrasound guidance, the right paracolic gutter was punctured and a catheter was placed into the peritoneal cavity. A total of 2500 milliliters of clear yellow fluid was removed. The catheter was then removed. FINDINGS: 2500 mL of clear yellow ascites was removed.     Impression: Ultrasound-guided right paracentesis. Signed, performed, and dictated by Zonia Chicas PA-C under the direct supervision of Dr. Jaime Villar. Workstation performed: [id number]     IR OUT-Patient Thoracentesis    Result Date: 10/17/2024  Narrative: IR THORACENTESIS PROCEDURE: Thoracentesis CLINICAL INDICATION: Cirrhosis with recurrent right pleural effusion COMPARISON: 10/10/2024 PERFORMING PHYSICIAN: Manuel Post MD ASSISTANT PHYSICIAN: None MEDICATIONS: 1% lidocaine (local). SEDATION TIME: None CONTRAST: None FLUOROSCOPY TIME: None RADIATION DOSE: 0 mGy  None NUMBER OF IMAGES: 1 TECHNIQUE: The patient was brought to the procedure room and a time-out was performed utilizing universal protocol. The patient was identified verbally and via wrist band. Real-time ultrasound with image documentation was used to sugey an appropriate skin entry site in the RIGHT chest to access the pleural space. The skin was prepped and draped in the usual sterile manner. The skin and subcutaneous tissues were generously infiltrated with lidocaine.  Access was achieved using a 5-Korean sheathed Yueh centesis needle.  Vacuum bottle collection technique was utilized to recover a total of 1900 mL of cloudy sheryl fluid. No specimens requested. The catheter was removed.  Manual pressure was maintained until hemostasis was achieved and the area was dressed and bandaged. The patient tolerated the procedure well without difficulty or  complication encountered and left the section in satisfactory stable condition. FINDINGS: As above.     Impression: Technically successful image-guided RIGHT thoracentesis as described. Workstation performed: [id number]     IR AMB Paracentesis    Result Date: 10/17/2024  Narrative: IR PARACENTESIS PROCEDURE: Paracentesis CLINICAL INDICATION: Cirrhosis with recurrent ascites COMPARISON: 10/3/2024 PERFORMING PHYSICIAN: Manuel Post MD ASSISTANT PHYSICIAN: None MEDICATIONS: 1% lidocaine (local). SEDATION TIME: None CONTRAST: None FLUOROSCOPY TIME: None RADIATION DOSE: 0 mGy  None NUMBER OF IMAGES: 1 TECHNIQUE: The patient was brought to the procedure room and a time-out was performed utilizing universal protocol. The patient was identified verbally and via wrist band. Real-time ultrasound with image documentation was used to sugey an appropriate skin entry site in the RIGHT UPPER QUADRANT to access the peritoneal space. The skin was prepped and draped in the usual sterile manner. The skin and subcutaneous tissues were generously infiltrated with lidocaine. Access was achieved using a 5-Polish sheathed Yueh centesis needle. Vacuum bottle collection technique was utilized to recover a total of 2300 mL of clear yellow fluid. No specimens requested. The catheter was removed. Manual pressure was maintained until hemostasis was achieved and the area was dressed and bandaged. The patient tolerated the procedure well without difficulty or complication encountered and left the section in satisfactory stable condition. FINDINGS: As above.     Impression: Technically successful image-guided paracentesis as described. Workstation performed: [id number]     CT lung screening program    Result Date: 10/14/2024  Narrative: CT CHEST LUNG CANCER SCREENING WITHOUT IV CONTRAST INDICATION: F17.210: Nicotine dependence, cigarettes, uncomplicated. COMPARISON: CT chest abdomen pelvis 4/20/2023 TECHNIQUE: Unenhanced CT examination of  the chest was performed utilizing a low dose protocol. Multiplanar 2D reformatted images were created from the source data. Radiation dose length product (DLP) for this visit:  98.67 mGy-cm . This examination, like all CT scans performed in the [facility], was performed utilizing techniques to minimize radiation dose exposure, including the use of iterative reconstruction and automated exposure control. FINDINGS: LUNGS: Right lower lobe atelectasis. Emphysema. There is no tracheal or endobronchial lesion. PLEURA: Large right pleural effusion. HEART/GREAT VESSELS: Atherosclerotic calcifications. No thoracic aortic aneurysm. MEDIASTINUM AND DIEGO: Unremarkable. CHEST WALL AND LOWER NECK: Postsurgical changes of the right breast. VISUALIZED STRUCTURES IN THE UPPER ABDOMEN: Cirrhotic liver. Mild upper abdominal ascites. Varices present in the upper abdomen. OSSEOUS STRUCTURES: Lateral right sixth and seventh rib fractures.     Impression: 1. Large right pleural effusion with complete atelectasis of right lower lobe limits evaluation for underlying pulmonary nodules. 2. Right sixth and seventh rib fractures. 3. Cirrhosis with changes of portal hypertension.   Lung-RADS 0, Findings suggestive of inflammatory or infectious process. Follow-up LDCT in 1 to 3 months. Workstation performed: [id number]     IR AMB Paracentesis    Result Date: 10/10/2024  Narrative: IR PARACENTESIS PROCEDURE: Paracentesis CLINICAL INDICATION: Cirrhosis with recurrent ascites COMPARISON: 10/3/2024 PERFORMING PHYSICIAN: Manuel Post MD ASSISTANT PHYSICIAN: None MEDICATIONS: 1% lidocaine (local). SEDATION TIME: None CONTRAST: None FLUOROSCOPY TIME: None RADIATION DOSE: 0 mGy  None NUMBER OF IMAGES: 2 TECHNIQUE: Informed written consent was obtained from the patient after a thorough discussion of risks, benefits, and alternatives to the procedure . All questions were answered. The patient was brought to the procedure room and  a time-out was performed utilizing universal protocol. The patient was identified verbally and via wrist band. Real-time ultrasound with image documentation was used to sugey an appropriate skin entry site in the RIGHT UPPER QUADRANT to access the peritoneal space. The skin was prepped and draped in the usual sterile manner. The skin and subcutaneous tissues were generously infiltrated with lidocaine. Access was achieved using a 5-Hebrew sheathed Yueh centesis needle. Vacuum bottle collection technique was utilized to recover a total of 1650 mL of clear yellow fluid. No specimens requested. The catheter was removed. Manual pressure was maintained until hemostasis was achieved and the area was dressed and bandaged. The patient tolerated the procedure well without difficulty or complication encountered and left the section in satisfactory stable condition. FINDINGS: As above.     Impression: Technically successful image-guided paracentesis as described. Workstation performed: [id number]     IR OUT-Patient Thoracentesis    Result Date: 10/10/2024  Narrative: IR THORACENTESIS PROCEDURE: Thoracentesis CLINICAL INDICATION: Cirrhosis with recurrent right pleural effusion COMPARISON: 10/3/2024 PERFORMING PHYSICIAN: Manuel Post MD ASSISTANT PHYSICIAN: None MEDICATIONS: 1% lidocaine (local). SEDATION TIME: None CONTRAST: None FLUOROSCOPY TIME: None RADIATION DOSE: 0 mGy  None NUMBER OF IMAGES: 1 TECHNIQUE: The patient was brought to the procedure room and a time-out was performed utilizing universal protocol. The patient was identified verbally and via wrist band. Real-time ultrasound with image documentation was used to sugey an appropriate skin entry site in the RIGHT chest to access the pleural space. The skin was prepped and draped in the usual sterile manner. The skin and subcutaneous tissues were generously infiltrated with lidocaine.  Access was achieved using a 5-Hebrew sheathed Yueh centesis needle.  Vacuum  bottle collection technique was utilized to recover a total of 1950 mL of dark clear sheryl fluid. No specimens requested. The catheter was removed.  Manual pressure was maintained until hemostasis was achieved and the area was dressed and bandaged. The patient tolerated the procedure well without difficulty or complication encountered and left the section in satisfactory stable condition. FINDINGS: As above.     Impression: Technically successful image-guided RIGHT thoracentesis as described. Workstation performed: [id number]

## 2024-11-06 NOTE — PROGRESS NOTES
Progress Note - Hospitalist   Name: Jose White 64 y.o. female I MRN: [id number]  Unit/Bed#: -01 I Date of Admission: 11/5/2024   Date of Service: 11/6/2024 I Hospital Day: 1    Assessment & Plan  Hyponatremia  Admitted with sodium of 122  Appreciate nephrology recommendations.   Sodium improved to 125  Added fluid restriction of 1.2  Consider changing fluids to 1.8%  IR consulted for paracentesis and thoracentesis   Continue to follow sodium closely   Anxiety  Continue on Xanax and trazodone  GERD without esophagitis  On Protonix  COPD (chronic obstructive pulmonary disease) (HCC)  Continue on bronchodilators  Patient is not in acute exacerbation  Alcoholic cirrhosis of liver with ascites (HCC)  Patient has history of alcoholic cirrhosis of liver with ascites  Undergoes paracentesis q. weekly.  Last paracentesis on past Thursday  Patient with history of alcoholic cirrhosis of liver  Continue on Lasix  Appreciate gi consult  Ir consulted for paracentesis   Follows up with hepatology Dr. Herrera as outpatient  Thrombocytopenia (HCC)  Patient has history of thrombocytopenia in setting of cirrhosis  Admitted with platelets of 122K  Slight decrease in platelets to 94  Will monitor closely   Ductal carcinoma in situ (DCIS) of right breast  Continue Arimidex  Hypotension    Hypoalbuminemia      VTE Pharmacologic Prophylaxis: VTE Score: 5 heparin     Mobility:   Basic Mobility Inpatient Raw Score: 17  -[facility] Goal: 5: Stand one or more mins  -HL Achieved: 5: Stand (1 or more minutes)      Patient Centered Rounds:  will discuss with her nurse       Education and Discussions with Family / Patient: Patient declined call to .     Current Length of Stay: 1 day(s)  Current Patient Status: Inpatient     Discharge Plan: Anticipate discharge in 24-48 hrs to home.    Code Status: Level 1 - Full Code    Subjective   Pt seen and examined. She admits that she has some weakness when ambulating and worried that  she might not be at her baseline strength. She usually gets thoracentesis on the right with her paracentesis and has both scheduled tomorrow for outpt. No other problems were noted     Objective :  Temp:  [98.1 °F (36.7 °C)-98.2 °F (36.8 °C)] 98.1 °F (36.7 °C)  HR:  [] 80  BP: (100-117)/(54-61) 100/55  Resp:  [17-20] 17  SpO2:  [91 %-94 %] 91 %  O2 Device: None (Room air)    Body mass index is 26.67 kg/m².     Input and Output Summary (last 24 hours):     Intake/Output Summary (Last 24 hours) at 11/6/2024 1510  Last data filed at 11/6/2024 0801  Gross per 24 hour   Intake 505 ml   Output 300 ml   Net 205 ml       Physical Exam  Constitutional:       Appearance: Normal appearance.   HENT:      Head: Normocephalic and atraumatic.   Eyes:      Extraocular Movements: Extraocular movements intact.      Pupils: Pupils are equal, round, and reactive to light.   Cardiovascular:      Rate and Rhythm: Normal rate and regular rhythm.      Heart sounds: No murmur heard.     No friction rub. No gallop.   Pulmonary:      Effort: Pulmonary effort is normal. No respiratory distress.      Breath sounds: No wheezing, rhonchi or rales.      Comments: Decrease breath sounds RLL   Abdominal:      General: Bowel sounds are normal. There is distension.      Palpations: Abdomen is soft.      Tenderness: There is no abdominal tenderness. There is no guarding or rebound.   Neurological:      Mental Status: She is alert and oriented to person, place, and time.       Lines/Drains:        Lab Results: I have reviewed the following results:   Results from last 7 days   Lab Units 11/06/24  0323   WBC Thousand/uL 4.75   HEMOGLOBIN g/dL 10.9*   HEMATOCRIT % 29.9*   PLATELETS Thousands/uL 94*   SEGS PCT % 68   LYMPHO PCT % 12*   MONO PCT % 14*   EOS PCT % 3     Results from last 7 days   Lab Units 11/06/24  0323   SODIUM mmol/L 125*   POTASSIUM mmol/L 4.4   CHLORIDE mmol/L 99   CO2 mmol/L 23   BUN mg/dL 9   CREATININE mg/dL 0.59*   ANION GAP  mmol/L 3*   CALCIUM mg/dL 7.2*   ALBUMIN g/dL 1.8*   TOTAL BILIRUBIN mg/dL 1.78*   ALK PHOS U/L 65   ALT U/L 20   AST U/L 36   GLUCOSE RANDOM mg/dL 105     Results from last 7 days   Lab Units 11/06/24  0323   INR  1.62*     Results from last 7 days   Lab Units 11/05/24  1237   POC GLUCOSE mg/dl 84               Recent Cultures (last 7 days):         Imaging Results Review: No pertinent imaging studies reviewed.  Other Study Results Review: No additional pertinent studies reviewed.    Last 24 Hours Medication List:     Current Facility-Administered Medications:     acetaminophen (TYLENOL) tablet 650 mg, Q8H PRN    albumin human (FLEXBUMIN) 25 % injection 25 g, BID    albuterol (PROVENTIL HFA,VENTOLIN HFA) inhaler 2 puff, Q6H PRN    albuterol inhalation solution 2.5 mg, Q6H PRN    ALPRAZolam (XANAX) tablet 1 mg, BID PRN    anastrozole (ARIMIDEX) tablet 1 mg, Daily    budesonide-formoterol (SYMBICORT) 160-4.5 mcg/act inhaler 2 puff, BID    folic acid (FOLVITE) tablet 1 mg, Daily    guaiFENesin (ROBITUSSIN) oral liquid 200 mg, Q4H PRN    heparin (porcine) subcutaneous injection 5,000 Units, Q8H PATTIE **AND** [CANCELED] Platelet count, Once    midodrine (PROAMATINE) tablet 10 mg, TID AC    multivitamin-minerals (CENTRUM) tablet 1 tablet, Daily    naltrexone (REVIA) tablet 50 mg, Daily    nicotine (NICODERM CQ) 21 mg/24 hr TD 24 hr patch 1 patch, Daily    ondansetron (ZOFRAN) injection 4 mg, Q6H PRN    pantoprazole (PROTONIX) EC tablet 20 mg, Daily    sodium chloride 0.9 % infusion, Continuous, Last Rate: 75 mL/hr (11/06/24 0700)    thiamine tablet 100 mg, Daily    traZODone (DESYREL) tablet 50 mg, HS    Administrative Statements   Today, Patient Was Seen By: Suzette Lopez DO

## 2024-11-06 NOTE — PROGRESS NOTES
Progress Note - Nephrology   Name: Jose White 64 y.o. female I MRN: [id number]  Unit/Bed#: MS 329[facility]ile I Date of Admission: 11/5/2024   Date of Service: 11/6/2024 I Hospital Day: 1     Assessment & Plan  Hyponatremia  Sodium level improving with IVF  Continue IVF but recheck BMP today to ensure continued improvement  If sodium level stops improving, would need to discontinue IVF  Urine studies not consistent with need for diuretic however it would not harm patient to start and may help to mitigate large volume paracenteses and thoracenteses.  Okay to restart diuretic once off IVF.  Start fluid restriction 1200ml/day  No need for low sodium diet, attempting to avoid salt tabs so would just add a little salt to food  Start ensure with meals  Consider changing IVF to 1.8% if sodium level is not improving on repeat bmp today  Need to find a balance  Workup: Urine osm 278, urine sodium <10, AM cortisol 11   - consider cosyntropin stimulation test, will discuss with attending  Alcoholic cirrhosis of liver with ascites (HCC)  Management per primary team  For paracentesis 11/7/24  Consider thoracentesis tomorrow also  Hypoalbuminemia  Will order albumin x4 doses  Ensure with meals (strawberry)  Hypotension  Continue midodrine 10mg TID    Nephrology service will follow.  Discussed plan above with RANDY and they are in agreement.  Discussed low sodium diet / diuretics with GI attending.     Subjective   Patient and son are frustrated with their care from a communication standpoint.  They feel as an outpatient, their specialists are not communicating well together.  They state they are unsure who to listen to as they feel the doctors are contradicting each other.      They stopped her diuretic at the instruction of GI for a few weeks and sodium level remains stable without change.  No LE edema.      She does not follow a fluid restriction and drinks approximately 90oz per day.  She also continues to drink alcohol and  therefore is not a candidate for liver transplant.     Objective :  Temp:  [98.1 °F (36.7 °C)-98.3 °F (36.8 °C)] 98.1 °F (36.7 °C)  HR:  [] 80  BP: (100-123)/(54-74) 100/55  Resp:  [16-20] 17  SpO2:  [91 %-98 %] 91 %  O2 Device: None (Room air)    Current Weight: Weight - Scale: 68.3 kg (150 lb 9.2 oz)  First Weight: Weight - Scale: 68.6 kg (151 lb 3.8 oz)  I/O         11/04 0701 11/05 0700 11/05 0701 11/06 0700 11/06 0701 11/07 0700    P.O.  480 25    Total Intake(mL/kg)  480 (7) 25 (0.4)    Urine (mL/kg/hr)  200 100 (0.3)    Total Output  200 100    Net  +280 -75           Unmeasured Urine Occurrence  1 x           Physical Exam  General: NAD  Neuro: alert awake  Psych: mood and affect appropriate  Skin: no rash  Eyes: anicteric  ENMT: mm moist  Neck: no masses  Respiratory: right sided course crackles and decreased air movement  Cardiovascular: RRR  Extremities: no edema  Gastrointestinal: soft nt nd    Medications:    Current Facility-Administered Medications:     acetaminophen (TYLENOL) tablet 650 mg, 650 mg, Oral, Q8H PRN, JuanA ntonio Hardin MD    albuterol (PROVENTIL HFA,VENTOLIN HFA) inhaler 2 puff, 2 puff, Inhalation, Q6H PRN, Juan Antonio Hardin MD    albuterol inhalation solution 2.5 mg, 2.5 mg, Nebulization, Q6H PRN, Juan Antonio Hardin MD    ALPRAZolam (XANAX) tablet 1 mg, 1 mg, Oral, BID PRN, Juan Antonio Hardin MD, 1 mg at 11/05/24 2116    anastrozole (ARIMIDEX) tablet 1 mg, 1 mg, Oral, Daily, Juan Antonio Hardin MD, 1 mg at 11/06/24 0905    budesonide-formoterol (SYMBICORT) 160-4.5 mcg/act inhaler 2 puff, 2 puff, Inhalation, BID, Juan Antonio Hardin MD, 2 puff at 11/06/24 0908    folic acid (FOLVITE) tablet 1 mg, 1 mg, Oral, Daily, Saritha Dumont PA-C, 1 mg at 11/06/24 0904    guaiFENesin (ROBITUSSIN) oral liquid 200 mg, 200 mg, Oral, Q4H PRN, Suzette Lopez DO    heparin (porcine) subcutaneous injection 5,000 Units, 5,000 Units, Subcutaneous, Q8H PATTIE, 5,000 Units at 11/06/24 0631 **AND**  [CANCELED] Platelet count, , , Once, Juan Antonio Hardin MD    midodrine (PROAMATINE) tablet 10 mg, 10 mg, Oral, TID AC, Juan Antonio Hardin MD, 10 mg at 11/06/24 1128    multivitamin-minerals (CENTRUM) tablet 1 tablet, 1 tablet, Oral, Daily, Saritha Dumont PA-C, 1 tablet at 11/06/24 0904    naltrexone (REVIA) tablet 50 mg, 50 mg, Oral, Daily, Juan Antonio Hardin MD, 50 mg at 11/06/24 0904    nicotine (NICODERM CQ) 21 mg/24 hr TD 24 hr patch 1 patch, 1 patch, Transdermal, Daily, Juan Antonio Hardin MD    ondansetron (ZOFRAN) injection 4 mg, 4 mg, Intravenous, Q6H PRN, Juan Antonio Hardin MD    pantoprazole (PROTONIX) EC tablet 20 mg, 20 mg, Oral, Daily, Juan Antonio Hardin MD, 20 mg at 11/06/24 0904    sodium chloride 0.9 % infusion, 75 mL/hr, Intravenous, Continuous, Ck Leung MD, Last Rate: 75 mL/hr at 11/06/24 0700, 75 mL/hr at 11/06/24 0700    thiamine tablet 100 mg, 100 mg, Oral, Daily, Saritha Dumont PA-C, 100 mg at 11/06/24 0904    traZODone (DESYREL) tablet 50 mg, 50 mg, Oral, HS, Juan Antonio Hardin MD, 50 mg at 11/05/24 2115      Lab Results: I have reviewed the following results:  Results from last 7 days   Lab Units 11/06/24  0323 11/05/24  2226 11/05/24  1222 11/04/24  1453   WBC Thousand/uL 4.75  --  5.18  --    HEMOGLOBIN g/dL 10.9*  --  13.0  --    HEMATOCRIT % 29.9*  --  36.5  --    PLATELETS Thousands/uL 94*  --  122*  --    POTASSIUM mmol/L 4.4 4.1 4.1 4.9   CHLORIDE mmol/L 99 97 95* 93*   CO2 mmol/L 23 22 23 25   BUN mg/dL 9 9 8 8   CREATININE mg/dL 0.59* 0.62 0.70 0.63   CALCIUM mg/dL 7.2* 7.3* 7.6* 7.9*   MAGNESIUM mg/dL 2.0  --  1.8*  --    PHOSPHORUS mg/dL 3.8  --   --   --    ALBUMIN g/dL 1.8*  --  2.1*  --

## 2024-11-06 NOTE — ASSESSMENT & PLAN NOTE
Patient has history of thrombocytopenia in setting of cirrhosis  Admitted with platelets of 122K  Slight decrease in platelets to 94  Will monitor closely

## 2024-11-06 NOTE — RESPIRATORY THERAPY NOTE
RT Protocol Note  Jose White 64 y.o. female MRN: [id number]  Unit/Bed#: -01 Encounter: [id number]    Assessment    Principal Problem:    Hyponatremia  Active Problems:    Anxiety    GERD without esophagitis    COPD (chronic obstructive pulmonary disease) (HCC)    Alcoholic cirrhosis of liver with ascites (HCC)    Thrombocytopenia (HCC)    Ductal carcinoma in situ (DCIS) of right breast      Home Pulmonary Medications:  Udn prn       Past Medical History:   Diagnosis Date    Alcoholic fatty liver     Anxiety     Asthma     COPD (chronic obstructive pulmonary disease) (HCC)     Elevated liver function tests     GERD (gastroesophageal reflux disease)     GERD without esophagitis     Hyperlipidemia     Hypertension     IBS (irritable bowel syndrome)     Insomnia     Insomnia     Liver disease     Nervousness     Nicotine dependence     Other specified urinary incontinence     RLS (restless legs syndrome)     Wears glasses      Social History     Socioeconomic History    Marital status: /Civil Union     Spouse name: None    Number of children: None    Years of education: None    Highest education level: None   Occupational History    None   Tobacco Use    Smoking status: Every Day     Current packs/day: 1.00     Average packs/day: 1 pack/day for 46.8 years (46.8 ttl pk-yrs)     Types: Cigarettes     Start date: 1/1/1978    Smokeless tobacco: Never   Vaping Use    Vaping status: Never Used   Substance and Sexual Activity    Alcohol use: Not Currently     Comment: 7-10 drinks/wk    Drug use: No    Sexual activity: Yes     Partners: Male   Other Topics Concern    None   Social History Narrative    Has carbon monoxide detectors in home    Has smoke detectors    Uses safety equipment - seatbelts     Social Determinants of Health     Financial Resource Strain: Not on file   Food Insecurity: No Food Insecurity (7/15/2024)    Nursing - Inadequate Food Risk Classification     Worried About Running Out of Food  "in the Last Year: Never true     Ran Out of Food in the Last Year: Never true     Ran Out of Food in the Last Year: Not on file   Transportation Needs: No Transportation Needs (7/15/2024)    PRAPARE - Transportation     Lack of Transportation (Medical): No     Lack of Transportation (Non-Medical): No   Physical Activity: Not on file   Stress: No Stress Concern Present (6/28/2024)    Received from [facility]    [place] [employer] - Occupational Stress Questionnaire     Feeling of Stress : Not at all   Social Connections: Feeling Socially Integrated (6/28/2024)    Received from [facility]    OASIS : Social Isolation     How often do you feel lonely or isolated from those around you?: Never   Intimate Partner Violence: Unknown (6/28/2024)    Received from [facility]    Humiliation, Afraid, Rape, and Kick questionnaire     Fear of Current or Ex-Partner: Not on file     Emotionally Abused: No     Physically Abused: No     Sexually Abused: No   Housing Stability: Unknown (7/15/2024)    Housing Stability Vital Sign     Unable to Pay for Housing in the Last Year: No     Number of Times Moved in the Last Year: Not on file     Homeless in the Last Year: No       Subjective         Objective    Physical Exam:   Assessment Type: (P) Assess only  General Appearance: (P) Alert, Awake  Respiratory Pattern: (P) Normal  Chest Assessment: (P) Chest expansion symmetrical  Bilateral Breath Sounds: (P) Clear, Diminished  Cough: (P) None  O2 Device: (P) ra    Vitals:  Blood pressure 100/55, pulse 80, temperature 98.1 °F (36.7 °C), resp. rate 17, height 5' 3\" (1.6 m), weight 68.3 kg (150 lb 9.2 oz), SpO2 94%, not currently breastfeeding.          Imaging and other studies:     O2 Device: (P) ra     Plan    Respiratory Plan: No distress/Pulmonary history        Resp Comments: (P) no  indication for prn udn tx, no distress noted, will continue to monitor   "

## 2024-11-06 NOTE — PLAN OF CARE
Problem: Potential for Falls  Goal: Patient will remain free of falls  Description: INTERVENTIONS:  - Educate patient/family on patient safety including physical limitations  - Instruct patient to call for assistance with activity   - Consult OT/PT to assist with strengthening/mobility   - Keep Call bell within reach  - Keep bed low and locked with side rails adjusted as appropriate  - Keep care items and personal belongings within reach  - Initiate and maintain comfort rounds  - Make Fall Risk Sign visible to staff  - Offer Toileting every 2 Hours, in advance of need  - Initiate/Maintain bed alarm  - Obtain necessary fall risk management equipment: socks  - Apply yellow socks and bracelet for high fall risk patients  - Consider moving patient to room near nurses station  Outcome: Progressing     Problem: PAIN - ADULT  Goal: Verbalizes/displays adequate comfort level or baseline comfort level  Description: Interventions:  - Encourage patient to monitor pain and request assistance  - Assess pain using appropriate pain scale  - Administer analgesics based on type and severity of pain and evaluate response  - Implement non-pharmacological measures as appropriate and evaluate response  - Consider cultural and social influences on pain and pain management  - Notify physician/advanced practitioner if interventions unsuccessful or patient reports new pain  Outcome: Progressing     Problem: INFECTION - ADULT  Goal: Absence or prevention of progression during hospitalization  Description: INTERVENTIONS:  - Assess and monitor for signs and symptoms of infection  - Monitor lab/diagnostic results  - Monitor all insertion sites, i.e. indwelling lines, tubes, and drains  - Monitor endotracheal if appropriate and nasal secretions for changes in amount and color  - Dent appropriate cooling/warming therapies per order  - Administer medications as ordered  - Instruct and encourage patient and family to use good hand hygiene  technique  - Identify and instruct in appropriate isolation precautions for identified infection/condition  Outcome: Progressing  Goal: Absence of fever/infection during neutropenic period  Description: INTERVENTIONS:  - Monitor WBC    Outcome: Progressing     Problem: SAFETY ADULT  Goal: Patient will remain free of falls  Description: INTERVENTIONS:  - Educate patient/family on patient safety including physical limitations  - Instruct patient to call for assistance with activity   - Consult OT/PT to assist with strengthening/mobility   - Keep Call bell within reach  - Keep bed low and locked with side rails adjusted as appropriate  - Keep care items and personal belongings within reach  - Initiate and maintain comfort rounds  - Make Fall Risk Sign visible to staff  - Offer Toileting every 2 Hours, in advance of need  - Initiate/Maintain bed alarm  - Obtain necessary fall risk management equipment: socks  - Apply yellow socks and bracelet for high fall risk patients  - Consider moving patient to room near nurses station  Outcome: Progressing  Goal: Maintain or return to baseline ADL function  Description: INTERVENTIONS:  -  Assess patient's ability to carry out ADLs; assess patient's baseline for ADL function and identify physical deficits which impact ability to perform ADLs (bathing, care of mouth/teeth, toileting, grooming, dressing, etc.)  - Assess/evaluate cause of self-care deficits   - Assess range of motion  - Assess patient's mobility; develop plan if impaired  - Assess patient's need for assistive devices and provide as appropriate  - Encourage maximum independence but intervene and supervise when necessary  - Involve family in performance of ADLs  - Assess for home care needs following discharge   - Consider OT consult to assist with ADL evaluation and planning for discharge  - Provide patient education as appropriate  Outcome: Progressing  Goal: Maintains/Returns to pre admission functional  level  Description: INTERVENTIONS:  - Perform AM-PAC 6 Click Basic Mobility/ Daily Activity assessment daily.  - Set and communicate daily mobility goal to care team and patient/family/caregiver.   - Collaborate with rehabilitation services on mobility goals if consulted  - Perform Range of Motion 3 times a day.  - Reposition patient every 2 hours.  - Dangle patient 3 times a day  - Stand patient 3 times a day  - Ambulate patient 3 times a day  - Out of bed to chair 3 times a day   - Out of bed for meals 3 times a day  - Out of bed for toileting  - Record patient progress and toleration of activity level   Outcome: Progressing     Problem: DISCHARGE PLANNING  Goal: Discharge to home or other facility with appropriate resources  Description: INTERVENTIONS:  - Identify barriers to discharge w/patient and caregiver  - Arrange for needed discharge resources and transportation as appropriate  - Identify discharge learning needs (meds, wound care, etc.)  - Arrange for interpretive services to assist at discharge as needed  - Refer to Case Management Department for coordinating discharge planning if the patient needs post-hospital services based on physician/advanced practitioner order or complex needs related to functional status, cognitive ability, or social support system  Outcome: Progressing     Problem: Knowledge Deficit  Goal: Patient/family/caregiver demonstrates understanding of disease process, treatment plan, medications, and discharge instructions  Description: Complete learning assessment and assess knowledge base.  Interventions:  - Provide teaching at level of understanding  - Provide teaching via preferred learning methods  Outcome: Progressing     Problem: GENITOURINARY - ADULT  Goal: Maintains or returns to baseline urinary function  Description: INTERVENTIONS:  - Assess urinary function  - Encourage oral fluids to ensure adequate hydration if ordered  - Administer IV fluids as ordered to ensure adequate  hydration  - Administer ordered medications as needed  - Offer frequent toileting  - Follow urinary retention protocol if ordered  Outcome: Progressing  Goal: Absence of urinary retention  Description: INTERVENTIONS:  - Assess patient’s ability to void and empty bladder  - Monitor I/O  - Bladder scan as needed  - Discuss with physician/AP medications to alleviate retention as needed  - Discuss catheterization for long term situations as appropriate  Outcome: Progressing  Goal: Urinary catheter remains patent  Description: INTERVENTIONS:  - Assess patency of urinary catheter  - If patient has a chronic blanco, consider changing catheter if non-functioning  - Follow guidelines for intermittent irrigation of non-functioning urinary catheter  Outcome: Progressing     Problem: METABOLIC, FLUID AND ELECTROLYTES - ADULT  Goal: Electrolytes maintained within normal limits  Description: INTERVENTIONS:  - Monitor labs and assess patient for signs and symptoms of electrolyte imbalances  - Administer electrolyte replacement as ordered  - Monitor response to electrolyte replacements, including repeat lab results as appropriate  - Instruct patient on fluid and nutrition as appropriate  Outcome: Progressing  Goal: Fluid balance maintained  Description: INTERVENTIONS:  - Monitor labs   - Monitor I/O and WT  - Instruct patient on fluid and nutrition as appropriate  - Assess for signs & symptoms of volume excess or deficit  Outcome: Progressing  Goal: Glucose maintained within target range  Description: INTERVENTIONS:  - Monitor Blood Glucose as ordered  - Assess for signs and symptoms of hyperglycemia and hypoglycemia  - Administer ordered medications to maintain glucose within target range  - Assess nutritional intake and initiate nutrition service referral as needed  Outcome: Progressing

## 2024-11-06 NOTE — QUICK NOTE
I have personally counseled the patient on medication indication, compliance, utilization and side effects. Patient may be discharged home with albuterol inhaler and symbicort.

## 2024-11-06 NOTE — CASE MANAGEMENT
Case Management Assessment & Discharge Planning Note    Patient name Jose White  Location /-01 MRN [id number]  : 1960 Date 2024       Current Admission Date: 2024  Current Admission Diagnosis:Hyponatremia   Patient Active Problem List    Diagnosis Date Noted Date Diagnosed    Hepatic encephalopathy (HCC) 2024     Stage 2 chronic kidney disease 2023     Vaginal atrophy 2023     Dyspareunia, female 2023     Postcoital bleeding 2023     Pleural effusion 2023     Malignant neoplasm of lower-outer quadrant of right breast of female, estrogen receptor positive (HCC) 2022     Breast neoplasm, Tis (DCIS), right 2021     Atypical ductal hyperplasia of right breast 2021     Ductal carcinoma in situ (DCIS) of right breast 2021     Decompensated hepatic cirrhosis (HCC) 12/10/2020     Thrombocytopenia (HCC) 2020     Anemia 2020     Alcoholic cirrhosis of liver with ascites (HCC) 2020     Hyponatremia 2020     Non-seasonal allergic rhinitis due to pollen 10/08/2020     Bilateral leg edema 10/08/2020     Irritable bowel syndrome with diarrhea 2019     Alcohol use 2018     COPD (chronic obstructive pulmonary disease) (HCC) 2018     Fatty liver 2018     Other specified abnormal findings of blood chemistry 2018     Elevated LFTs 2016     Single seizure (HCC) 2016     Insomnia 2015     Hypotension 2014     GERD without esophagitis 2012     Hyperlipidemia 2012     Nicotine dependence 2012     Anxiety 2012       LOS (days): 1  Geometric Mean LOS (GMLOS) (days): 3.3  Days to GMLOS:2.4     OBJECTIVE:    Risk of Unplanned Readmission Score: 27.81         Current admission status: Inpatient       Preferred Pharmacy:   [id number] - [place] - [address]  [address] [place] [address]  Phone:  [phone] Fax: [phone]    Primary Care Provider: JOSÉ Craig    Primary Insurance: MEDICARE  Secondary Insurance: ANAI    ASSESSMENT:  Active Health Care Proxies       Ulises White Health Care Representative - Spouse   Primary Phone: [phone] (Home)                 Advance Directives  Does patient have a Health Care POA?: Yes  Does patient have Advance Directives?: Yes  Advance Directives: Power of  for health care  Primary Contact: Ulises White, spouse.         Readmission Root Cause  30 Day Readmission: No    Patient Information  Admitted from:: Home  Mental Status: Alert  During Assessment patient was accompanied by: Not accompanied during assessment  Assessment information provided by:: Patient  Primary Caregiver: Self  Support Systems: Spouse/significant other, Self, Children  County of Residence: [place]  What city do you live in?: [place].  Home entry access options. Select all that apply.: No steps to enter home  Type of Current Residence: [facility]  Living Arrangements: Lives w/ Spouse/significant other  Is patient a ?: No    Activities of Daily Living Prior to Admission  Functional Status: Independent  Completes ADLs independently?: Yes  Ambulates independently?: Yes  Does patient use assisted devices?: Yes  Assisted Devices (DME) used: Nebulizer  Does patient currently own DME?: Yes  What DME does the patient currently own?: Nebulizer  Does patient have a history of Outpatient Therapy (PT/OT)?: No  Does the patient have a history of Short-Term Rehab?: No  Does patient have a history of HHC?: No  Does patient currently have HHC?: No         Patient Information Continued  Income Source: SSI/SSD  Does patient have prescription coverage?: Yes  Does patient receive dialysis treatments?: No  Does patient have a history of substance abuse?: Yes  Historical substance use preference: Alcohol/ETOH  Is patient currently in treatment for substance abuse?: Not  appropriate at this time to discuss.  Does patient have a history of Mental Health Diagnosis?: Yes  Has patient received inpatient treatment related to mental health in the last 2 years?: No         Means of Transportation  Means of Transport to Appts:: Drives Self          DISCHARGE DETAILS:    Discharge planning discussed with:: Pt  Freedom of Choice: Yes     CM contacted family/caregiver?: No- see comments (Pt reports being in contact with family.)  Were Treatment Team discharge recommendations reviewed with patient/caregiver?: Yes  Did patient/caregiver verbalize understanding of patient care needs?: Yes  Were patient/caregiver advised of the risks associated with not following Treatment Team discharge recommendations?: Yes         Requested Home Health Care         Is the patient interested in HHC at discharge?: No    DME Referral Provided  Referral made for DME?: No                                                           Additional Comments: CM met with patient at bedside to discern discharge needs. Pt lives with spouse in a 1sh, 0STE, 1st fl setup. Pt reports being independent PTA. Pt stated that she was having trouble walking due to 'weakness in legs,' but believes it is due to low sodium. Pt stated that she drives and expressed concern of having her DL taken away. Uses and owns a nebulizer, which works, per pt. Reports no hx of OPPT, STR, or HHC. Reports no inpatient psych stays or D&A treatment. Per chart review, hx of anxiety and ETOH use. Medical POA is spouse. Preferred pharmacy is [facility]. Spouse can provide transport at discharge.

## 2024-11-06 NOTE — ASSESSMENT & PLAN NOTE
Patient has history of alcoholic cirrhosis of liver with ascites  Undergoes paracentesis q. weekly.  Last paracentesis on past Thursday  Patient with history of alcoholic cirrhosis of liver  Continue on Lasix  Appreciate gi consult  Ir consulted for paracentesis   Follows up with hepatology Dr. Herrera as outpatient

## 2024-11-07 ENCOUNTER — APPOINTMENT (INPATIENT)
Dept: INTERVENTIONAL RADIOLOGY/VASCULAR | Facility: HOSPITAL | Age: 64
DRG: 641 | End: 2024-11-07
Attending: INTERNAL MEDICINE
Payer: MEDICARE

## 2024-11-07 ENCOUNTER — HOSPITAL ENCOUNTER (OUTPATIENT)
Dept: INFUSION CENTER | Facility: HOSPITAL | Age: 64
Discharge: HOME | End: 2024-11-07
Attending: INTERNAL MEDICINE

## 2024-11-07 LAB
ANION GAP SERPL CALCULATED.3IONS-SCNC: 3 MMOL/L (ref 4–13)
ANION GAP SERPL CALCULATED.3IONS-SCNC: 4 MMOL/L (ref 4–13)
APPEARANCE FLD: CLEAR
BACTERIA UR CULT: NORMAL
BASOPHILS # BLD AUTO: 0.05 THOUSANDS/ÂΜL (ref 0–0.1)
BASOPHILS NFR BLD AUTO: 1 % (ref 0–1)
BUN SERPL-MCNC: 7 MG/DL (ref 5–25)
BUN SERPL-MCNC: 8 MG/DL (ref 5–25)
CALCIUM SERPL-MCNC: 7.7 MG/DL (ref 8.4–10.2)
CALCIUM SERPL-MCNC: 8.1 MG/DL (ref 8.4–10.2)
CHLORIDE SERPL-SCNC: 104 MMOL/L (ref 96–108)
CHLORIDE SERPL-SCNC: 104 MMOL/L (ref 96–108)
CO2 SERPL-SCNC: 22 MMOL/L (ref 21–32)
CO2 SERPL-SCNC: 23 MMOL/L (ref 21–32)
COLOR FLD: NORMAL
CREAT SERPL-MCNC: 0.62 MG/DL (ref 0.6–1.3)
CREAT SERPL-MCNC: 0.65 MG/DL (ref 0.6–1.3)
EOSINOPHIL # BLD AUTO: 0.14 THOUSAND/ÂΜL (ref 0–0.61)
EOSINOPHIL NFR BLD AUTO: 3 % (ref 0–6)
ERYTHROCYTE [DISTWIDTH] IN BLOOD BY AUTOMATED COUNT: 13.6 % (ref 11.6–15.1)
GFR SERPL CREATININE-BSD FRML MDRD: 93 ML/MIN/1.73SQ M
GFR SERPL CREATININE-BSD FRML MDRD: 94 ML/MIN/1.73SQ M
GLUCOSE SERPL-MCNC: 110 MG/DL (ref 65–140)
GLUCOSE SERPL-MCNC: 138 MG/DL (ref 65–140)
HCT VFR BLD AUTO: 28.9 % (ref 34.8–46.1)
HGB BLD-MCNC: 10.1 G/DL (ref 11.5–15.4)
HISTIOCYTES NFR FLD: 15 %
IMM GRANULOCYTES # BLD AUTO: 0.05 THOUSAND/UL (ref 0–0.2)
IMM GRANULOCYTES NFR BLD AUTO: 1 % (ref 0–2)
INR PPP: 1.66 (ref 0.85–1.19)
LYMPHOCYTES # BLD AUTO: 0.55 THOUSANDS/ÂΜL (ref 0.6–4.47)
LYMPHOCYTES NFR BLD AUTO: 10 % (ref 14–44)
LYMPHOCYTES NFR BLD AUTO: 18 %
MCH RBC QN AUTO: 37.4 PG (ref 26.8–34.3)
MCHC RBC AUTO-ENTMCNC: 34.9 G/DL (ref 31.4–37.4)
MCV RBC AUTO: 107 FL (ref 82–98)
MONOCYTES # BLD AUTO: 0.77 THOUSAND/ÂΜL (ref 0.17–1.22)
MONOCYTES NFR BLD AUTO: 15 % (ref 4–12)
MONOCYTES NFR BLD AUTO: 27 %
NEUTROPHILS # BLD AUTO: 3.74 THOUSANDS/ÂΜL (ref 1.85–7.62)
NEUTS SEG NFR BLD AUTO: 40 %
NEUTS SEG NFR BLD AUTO: 70 % (ref 43–75)
NRBC BLD AUTO-RTO: 0 /100 WBCS
PLATELET # BLD AUTO: 90 THOUSANDS/UL (ref 149–390)
PMV BLD AUTO: 8.7 FL (ref 8.9–12.7)
POTASSIUM SERPL-SCNC: 3.9 MMOL/L (ref 3.5–5.3)
POTASSIUM SERPL-SCNC: 4 MMOL/L (ref 3.5–5.3)
PROTHROMBIN TIME: 20.1 SECONDS (ref 12.3–15)
RBC # BLD AUTO: 2.7 MILLION/UL (ref 3.81–5.12)
SITE: NORMAL
SODIUM SERPL-SCNC: 129 MMOL/L (ref 135–147)
SODIUM SERPL-SCNC: 131 MMOL/L (ref 135–147)
TOTAL CELLS COUNTED SPEC: 100
TOTAL NUCLEATED CELL COUNT: 128 /UL
WBC # BLD AUTO: 5.3 THOUSAND/UL (ref 4.31–10.16)

## 2024-11-07 PROCEDURE — 49083 ABD PARACENTESIS W/IMAGING: CPT

## 2024-11-07 PROCEDURE — 99232 SBSQ HOSP IP/OBS MODERATE 35: CPT | Performed by: INTERNAL MEDICINE

## 2024-11-07 PROCEDURE — 80048 BASIC METABOLIC PNL TOTAL CA: CPT | Performed by: PHYSICIAN ASSISTANT

## 2024-11-07 PROCEDURE — 89051 BODY FLUID CELL COUNT: CPT | Performed by: INTERNAL MEDICINE

## 2024-11-07 PROCEDURE — 85610 PROTHROMBIN TIME: CPT | Performed by: INTERNAL MEDICINE

## 2024-11-07 PROCEDURE — 0W993ZZ DRAINAGE OF RIGHT PLEURAL CAVITY, PERCUTANEOUS APPROACH: ICD-10-PCS | Performed by: RADIOLOGY

## 2024-11-07 PROCEDURE — 87070 CULTURE OTHR SPECIMN AEROBIC: CPT | Performed by: INTERNAL MEDICINE

## 2024-11-07 PROCEDURE — 0W9G3ZZ DRAINAGE OF PERITONEAL CAVITY, PERCUTANEOUS APPROACH: ICD-10-PCS | Performed by: RADIOLOGY

## 2024-11-07 PROCEDURE — 85025 COMPLETE CBC W/AUTO DIFF WBC: CPT | Performed by: INTERNAL MEDICINE

## 2024-11-07 PROCEDURE — 32555 ASPIRATE PLEURA W/ IMAGING: CPT

## 2024-11-07 PROCEDURE — 87205 SMEAR GRAM STAIN: CPT | Performed by: INTERNAL MEDICINE

## 2024-11-07 RX ORDER — LACTULOSE 10 G/15ML
10 SOLUTION ORAL 2 TIMES DAILY
Status: DISCONTINUED | OUTPATIENT
Start: 2024-11-07 | End: 2024-11-08 | Stop reason: HOSPADM

## 2024-11-07 RX ORDER — LIDOCAINE 50 MG/G
1 PATCH TOPICAL ONCE
Status: COMPLETED | OUTPATIENT
Start: 2024-11-07 | End: 2024-11-08

## 2024-11-07 RX ORDER — LIDOCAINE HYDROCHLORIDE 10 MG/ML
INJECTION, SOLUTION EPIDURAL; INFILTRATION; INTRACAUDAL; PERINEURAL AS NEEDED
Status: COMPLETED | OUTPATIENT
Start: 2024-11-07 | End: 2024-11-07

## 2024-11-07 RX ORDER — LIDOCAINE 50 MG/G
1 PATCH TOPICAL DAILY
Status: DISCONTINUED | OUTPATIENT
Start: 2024-11-07 | End: 2024-11-08 | Stop reason: HOSPADM

## 2024-11-07 RX ADMIN — ANASTROZOLE 1 MG: 1 TABLET, COATED ORAL at 10:43

## 2024-11-07 RX ADMIN — HEPARIN SODIUM 5000 UNITS: 5000 INJECTION, SOLUTION INTRAVENOUS; SUBCUTANEOUS at 21:38

## 2024-11-07 RX ADMIN — LIDOCAINE HYDROCHLORIDE 7 ML: 10 INJECTION, SOLUTION EPIDURAL; INFILTRATION; INTRACAUDAL; PERINEURAL at 08:44

## 2024-11-07 RX ADMIN — HEPARIN SODIUM 5000 UNITS: 5000 INJECTION, SOLUTION INTRAVENOUS; SUBCUTANEOUS at 15:12

## 2024-11-07 RX ADMIN — ALBUMIN (HUMAN) 25 G: 0.25 INJECTION, SOLUTION INTRAVENOUS at 21:38

## 2024-11-07 RX ADMIN — BUDESONIDE AND FORMOTEROL FUMARATE DIHYDRATE 2 PUFF: 160; 4.5 AEROSOL RESPIRATORY (INHALATION) at 17:30

## 2024-11-07 RX ADMIN — LIDOCAINE 1 PATCH: 50 PATCH TOPICAL at 10:55

## 2024-11-07 RX ADMIN — SODIUM CHLORIDE: 4 INJECTION, SOLUTION, CONCENTRATE INTRAVENOUS at 17:30

## 2024-11-07 RX ADMIN — LIDOCAINE 1 PATCH: 50 PATCH TOPICAL at 15:02

## 2024-11-07 RX ADMIN — FOLIC ACID 1 MG: 1 TABLET ORAL at 10:42

## 2024-11-07 RX ADMIN — MULTIPLE VITAMINS W/ MINERALS TAB 1 TABLET: TAB ORAL at 10:41

## 2024-11-07 RX ADMIN — HEPARIN SODIUM 5000 UNITS: 5000 INJECTION, SOLUTION INTRAVENOUS; SUBCUTANEOUS at 06:16

## 2024-11-07 RX ADMIN — LIDOCAINE HYDROCHLORIDE 8 ML: 10 INJECTION, SOLUTION EPIDURAL; INFILTRATION; INTRACAUDAL; PERINEURAL at 09:00

## 2024-11-07 RX ADMIN — GUAIFENESIN 200 MG: 200 SOLUTION ORAL at 17:36

## 2024-11-07 RX ADMIN — MIDODRINE HYDROCHLORIDE 10 MG: 5 TABLET ORAL at 08:10

## 2024-11-07 RX ADMIN — GUAIFENESIN 200 MG: 200 SOLUTION ORAL at 21:43

## 2024-11-07 RX ADMIN — MIDODRINE HYDROCHLORIDE 10 MG: 5 TABLET ORAL at 10:55

## 2024-11-07 RX ADMIN — Medication 100 MG: at 10:41

## 2024-11-07 RX ADMIN — NALTREXONE HYDROCHLORIDE 50 MG: 50 TABLET, FILM COATED ORAL at 10:41

## 2024-11-07 RX ADMIN — PANTOPRAZOLE SODIUM 20 MG: 20 TABLET, DELAYED RELEASE ORAL at 10:41

## 2024-11-07 RX ADMIN — BUDESONIDE AND FORMOTEROL FUMARATE DIHYDRATE 2 PUFF: 160; 4.5 AEROSOL RESPIRATORY (INHALATION) at 10:42

## 2024-11-07 RX ADMIN — ALPRAZOLAM 1 MG: 0.5 TABLET ORAL at 21:43

## 2024-11-07 RX ADMIN — MIDODRINE HYDROCHLORIDE 10 MG: 5 TABLET ORAL at 16:55

## 2024-11-07 RX ADMIN — LIDOCAINE HYDROCHLORIDE 7 ML: 10 INJECTION, SOLUTION EPIDURAL; INFILTRATION; INTRACAUDAL; PERINEURAL at 08:55

## 2024-11-07 RX ADMIN — TRAZODONE HYDROCHLORIDE 50 MG: 50 TABLET ORAL at 21:37

## 2024-11-07 RX ADMIN — GUAIFENESIN 200 MG: 200 SOLUTION ORAL at 11:42

## 2024-11-07 RX ADMIN — ALBUMIN (HUMAN) 25 G: 0.25 INJECTION, SOLUTION INTRAVENOUS at 10:48

## 2024-11-07 NOTE — ASSESSMENT & PLAN NOTE
Sodium 121 on outpatient labs 11/4  Likely related to intravascular volume depletion versus cirrhosis  Sodium slowly improved with normal saline but this was discontinued yesterday and started on 1.8% saline at 50cc/h  Currently on 1.2 L/day oral fluid restriction  Sodium now up to 131  Workup: Urine osm 278, urine sodium <10, AM cortisol 11

## 2024-11-07 NOTE — PROGRESS NOTES
Progress Note - Hospitalist   Name: Jose White 64 y.o. female I MRN: [id number]  Unit/Bed#: -[facility] I Date of Admission: 11/5/2024   Date of Service: 11/7/2024 I Hospital Day: 2    Assessment & Plan  Hyponatremia  Admitted with sodium of 122  Appreciate nephrology recommendations.   Sodium improved to 131  On fluid restriction of 1.2  Nephrology switched fluids to 1.8%  IR consulted for paracentesis and thoracentesis   Continue to follow sodium closely   Trend BMP  Anxiety  Continue on Xanax and trazodone  GERD without esophagitis  On Protonix  COPD (chronic obstructive pulmonary disease) (HCC)  Continue on bronchodilators  Patient is not in acute exacerbation  Alcoholic cirrhosis of liver with ascites (HCC)  Patient has history of alcoholic cirrhosis of liver with ascites  Undergoes paracentesis q. weekly.  Last paracentesis on past Thursday  Patient with history of alcoholic cirrhosis of liver  Hold Lasix  Appreciate gi consult  Ir consulted for paracentesis   Patient underwent paracentesis with 2470 mL of acetic fluid removed  Patient also went thoracentesis and 1700 cc of clear fluid removed  Follows up with hepatology Dr. Herrera as outpatient  Thrombocytopenia (HCC)  Patient has history of thrombocytopenia in setting of cirrhosis  Admitted with platelets of 122K  Slight decrease in platelets to 90K  Will monitor closely   Ductal carcinoma in situ (DCIS) of right breast  Continue Arimidex  Hypotension  Continue to monitor.  Improved  Hypoalbuminemia      Labs & Imaging: Results Review Statement: No pertinent imaging studies reviewed.    VTE Prophylaxis: in place.    Code Status:   Level 1 - Full Code    Patient Centered Rounds: I have performed bedside rounds with nursing staff today.    Mobility:   Basic Mobility Inpatient Raw Score: 19  JH-HLM Goal: 6: Walk 10 steps or more  JH-HLM Achieved: 6: Walk 10 steps or more  JH-HLM Goal NOT achieved. Continue with multidisciplinary rounding and encourage  "appropriate mobility to improve upon -[facility] goals.    Discussions with Specialists or Other Care Team Provider: Nephrology    Education and Discussions with Family / Patient: She will update her .    Total Time Spent on Date of Encounter in care of patient: 35 mins. This time was spent on one or more of the following: performing physical exam; counseling and coordination of care; obtaining or reviewing history; documenting in the medical record; reviewing/ordering tests, medications or procedures; communicating with other healthcare professionals and discussing with patient's family/caregivers.    Current Length of Stay: 2 day(s)    Current Patient Status: Inpatient   Certification Statement: The patient will continue to require additional inpatient hospital stay due to see my assessment and plan.     Subjective:   Patient is seen and examined at bedside.  Denies any new complaints.  Afebrile  All other ROS are negative.    Objective:    Vitals: Blood pressure 110/53, pulse 91, temperature 98.2 °F (36.8 °C), temperature source Oral, resp. rate 18, height 5' 3\" (1.6 m), weight 68.3 kg (150 lb 9.2 oz), SpO2 96%, not currently breastfeeding.,Body mass index is 26.67 kg/m².  SPO2 RA Rest      Flowsheet Row ED to Hosp-Admission (Current) from 11/5/2024 in [facility] Med Surg Unit   SpO2 96 %   SpO2 Activity At Rest   O2 Device None (Room air)   O2 Flow Rate --          I&O:   Intake/Output Summary (Last 24 hours) at 11/7/2024 1338  Last data filed at 11/7/2024 1331  Gross per 24 hour   Intake 600 ml   Output 4920 ml   Net -4320 ml       Physical Exam:    General- Alert, lying comfortably in bed. Not in any acute distress.  Neck- Supple, No JVD  CVS- regular, S1 and S2 normal  Chest- Bilateral Air entry, No rhochi, crackles or wheezing present.  Abdomen- soft, nontender, distended, no guarding or rigidity, BS+  Extremities-  No pedal edema, No calf tenderness                         Normal ROM in " all extremities.  CNS-   Alert, awake and orientedx3. No focal deficits present.    Invasive Devices       Peripheral Intravenous Line  Duration             Peripheral IV 11/07/24 Left;Ventral (anterior) Forearm <1 day                          Social History  reviewed  Family History   Problem Relation Age of Onset    Breast cancer Mother 32    No Known Problems Father     No Known Problems Sister     No Known Problems Brother     No Known Problems Maternal Grandmother     No Known Problems Maternal Grandfather     No Known Problems Paternal Grandmother     No Known Problems Paternal Grandfather     No Known Problems Maternal Aunt     No Known Problems Maternal Aunt     No Known Problems Paternal Aunt     No Known Problems Son     Substance Abuse Neg Hx     Mental illness Neg Hx     Colon cancer Neg Hx     Colon polyps Neg Hx     reviewed    Meds:  Current Facility-Administered Medications   Medication Dose Route Frequency Provider Last Rate Last Admin    acetaminophen (TYLENOL) tablet 650 mg  650 mg Oral Q8H PRN Juan Antonio Hardin MD        albumin human (FLEXBUMIN) 25 % injection 25 g  25 g Intravenous BID Dora Arita PA-C   25 g at 11/07/24 1048    albuterol (PROVENTIL HFA,VENTOLIN HFA) inhaler 2 puff  2 puff Inhalation Q6H PRN Juan Antonio Hardin MD        albuterol inhalation solution 2.5 mg  2.5 mg Nebulization Q6H PRN Juan Antonio Hardin MD        ALPRAZolam (XANAX) tablet 1 mg  1 mg Oral BID PRN Juan Antonio Hardin MD   1 mg at 11/06/24 2107    anastrozole (ARIMIDEX) tablet 1 mg  1 mg Oral Daily Juan Antonio Hardin MD   1 mg at 11/07/24 1043    budesonide-formoterol (SYMBICORT) 160-4.5 mcg/act inhaler 2 puff  2 puff Inhalation BID Juan Antonio Hardin MD   2 puff at 11/07/24 1042    folic acid (FOLVITE) tablet 1 mg  1 mg Oral Daily Saritha Dumont PA-C   1 mg at 11/07/24 1042    guaiFENesin (ROBITUSSIN) oral liquid 200 mg  200 mg Oral Q4H PRN Suzette Sarahron DO   200 mg at 11/07/24 1142    heparin  (porcine) subcutaneous injection 5,000 Units  5,000 Units Subcutaneous Q8H [facility] Juan Antonio Hardin MD   5,000 Units at 11/07/24 0616    lactulose (CHRONULAC) oral solution 10 g  10 g Oral BID Dwaine Gallagher MD        lidocaine (LIDODERM) 5 % patch 1 patch  1 patch Topical Daily Dwaine Gallagher MD   1 patch at 11/07/24 1055    lidocaine (LIDODERM) 5 % patch 1 patch  1 patch Topical Once Juan Antonio Hardin MD        midodrine (PROAMATINE) tablet 10 mg  10 mg Oral TID AC Juan Antonio Hardin MD   10 mg at 11/07/24 1055    multivitamin-minerals (CENTRUM) tablet 1 tablet  1 tablet Oral Daily Saritha Dumont PA-C   1 tablet at 11/07/24 1041    naltrexone (REVIA) tablet 50 mg  50 mg Oral Daily Juan Antonio Hardin MD   50 mg at 11/07/24 1041    nicotine (NICODERM CQ) 21 mg/24 hr TD 24 hr patch 1 patch  1 patch Transdermal Daily Juan Antonio Hardin MD        ondansetron (ZOFRAN) injection 4 mg  4 mg Intravenous Q6H PRN Juan Antonio Hardin MD        pantoprazole (PROTONIX) EC tablet 20 mg  20 mg Oral Daily Juan Antonio Hardin MD   20 mg at 11/07/24 1041    sodium chloride 23.4% 308 mEq in sterile water 1,000 mL infusion   Intravenous Continuous Ck Leung MD 50 mL/hr at 11/06/24 1715 New Bag at 11/06/24 1715    thiamine tablet 100 mg  100 mg Oral Daily Saritha Dumont PA-C   100 mg at 11/07/24 1041    traZODone (DESYREL) tablet 50 mg  50 mg Oral HS Juan Antonio Hardin MD   50 mg at 11/06/24 2100        Medications Prior to Admission:     albuterol (Proventil HFA) 90 mcg/act inhaler    ALPRAZolam (XANAX) 1 mg tablet    anastrozole (ARIMIDEX) 1 mg tablet    furosemide (LASIX) 20 mg tablet    midodrine (PROAMATINE) 10 MG tablet    Risankizumab-rzaa (SKYRIZI PEN SC)    traZODone (DESYREL) 50 mg tablet    albuterol (2.5 mg/3 mL) 0.083 % nebulizer solution    alendronate (Fosamax) 70 mg tablet    benzonatate (TESSALON) 200 MG capsule    budesonide-formoterol (Symbicort) 160-4.5 mcg/act inhaler    ergocalciferol (VITAMIN D2) 50,000  units    lactulose 20 g/30 mL    naltrexone (REVIA) 50 mg tablet    pantoprazole (PROTONIX) 20 mg tablet    Labs:  Results from last 7 days   Lab Units 11/07/24  0206 11/06/24  0323 11/05/24  1222   WBC Thousand/uL 5.30 4.75 5.18   HEMOGLOBIN g/dL 10.1* 10.9* 13.0   HEMATOCRIT % 28.9* 29.9* 36.5   PLATELETS Thousands/uL 90* 94* 122*   SEGS PCT % 70 68 69   LYMPHO PCT % 10* 12* 12*   MONO PCT % 15* 14* 14*   EOS PCT % 3 3 3     Results from last 7 days   Lab Units 11/07/24  0949 11/07/24  0206 11/06/24  1724 11/06/24  0323 11/05/24  2226 11/05/24  1222   POTASSIUM mmol/L 3.9 4.0 4.0 4.4   < > 4.1   CHLORIDE mmol/L 104 104 100 99   < > 95*   CO2 mmol/L 23 22 22 23   < > 23   BUN mg/dL 7 8 8 9   < > 8   CREATININE mg/dL 0.65 0.62 0.69 0.59*   < > 0.70   CALCIUM mg/dL 8.1* 7.7* 7.8* 7.2*   < > 7.6*   ALK PHOS U/L  --   --   --  65  --  87   ALT U/L  --   --   --  20  --  25   AST U/L  --   --   --  36  --  46*    < > = values in this interval not displayed.     Lab Results   Component Value Date    TROPONINI <0.02 02/19/2021     Results from last 7 days   Lab Units 11/06/24  0323 11/05/24  1222   INR  1.62* 1.45*     Lab Results   Component Value Date    BLOODCX No Growth After 5 Days. 01/16/2023    BLOODCX No Growth After 5 Days. 01/16/2023    BLOODCX No Growth After 5 Days. 08/10/2022    URINECX No Growth <1000 cfu/mL 11/06/2024    URINECX 50,000-59,000 cfu/ml Mixed Contaminants X4 05/31/2016    WOUNDCULT 3+ Growth of Escherichia coli 05/31/2016    WOUNDCULT 3+ Growth of Mixed Vaginal Angela 05/31/2016         Imaging:  Results for orders placed during the hospital encounter of 01/16/23    XR chest portable    Narrative  CHEST    INDICATION:   tight.    COMPARISON:  11/3/2022    EXAM PERFORMED/VIEWS:  XR CHEST PORTABLE  Images: 2    FINDINGS:  Development of diffuse opacity inferior half right hemithorax a large part of which represents pleural effusion.  Underlying infiltrate suspected.    Cardiomediastinal silhouette  appears unremarkable.    No pneumothorax    Osseous structures appear within normal limits for patient age.    Impression  Development of right hemithorax opacity likely representing pleural effusion and underlying basal infiltrate            Workstation performed: [id number]    Results for orders placed during the hospital encounter of 11/05/24    XR chest 2 views    Narrative  XR CHEST PA AND LATERAL    INDICATION: ACS work up.    COMPARISON: 7/25/2024    FINDINGS:    Persistent dense right lung base opacity which may represent a combination of moderate effusion and parenchymal opacity. No pneumothorax or pleural effusion.    Normal cardiomediastinal silhouette.    Bones are unremarkable for age.    Normal upper abdomen.    Impression  Persistent dense right lung base opacity which may represent a combination of effusion and parenchymal opacity.        Workstation performed: [id number]      Last 24 Hours Medication List:   Current Facility-Administered Medications   Medication Dose Route Frequency Provider Last Rate    acetaminophen  650 mg Oral Q8H PRN Juan Antonio Hardin MD      Albumin 25%  25 g Intravenous BID Dora Arita PA-C      albuterol  2 puff Inhalation Q6H PRN Juan Antonio Hardin MD      albuterol  2.5 mg Nebulization Q6H PRN Juan Antonio Hardin MD      ALPRAZolam  1 mg Oral BID PRN Juan Antonio Hardin MD      anastrozole  1 mg Oral Daily Juan Antonio Hardin MD      budesonide-formoterol  2 puff Inhalation BID Juan Antonio Hardin MD      folic acid  1 mg Oral Daily Saritha Dumont PA-C      guaiFENesin  200 mg Oral Q4H PRN Suzette Lopez DO      heparin (porcine)  5,000 Units Subcutaneous Q8H PATTIE Juan Antonio Hardin MD      lactulose  10 g Oral BID Dwaine Gallagher MD      lidocaine  1 patch Topical Daily Dwaine Gallagher MD      lidocaine  1 patch Topical Once Juan Antonio Hardin MD      midodrine  10 mg Oral TID AC Juan Antonio Hardin MD      multivitamin-minerals  1 tablet Oral Daily Saritha Dumont,  FAYE      naltrexone  50 mg Oral Daily Juan Antonio Hardin MD      nicotine  1 patch Transdermal Daily Juan Antonio Hardin MD      ondansetron  4 mg Intravenous Q6H PRN Juan Antonio Hardin MD      pantoprazole  20 mg Oral Daily Juan Antonio Hardin MD      sodium chloride 23.4% 308 mEq in sterile water 1,000 mL infusion   Intravenous Continuous Ck Leung MD 50 mL/hr at 11/06/24 1715    thiamine  100 mg Oral Daily Saritha Dumont PA-C      traZODone  50 mg Oral HS Juan Antonio Hardin MD          Today, Patient Was Seen By: Juan Antonio Hardin MD    ** Please Note: Dictation voice to text software may have been used in the creation of this document. **

## 2024-11-07 NOTE — PHYSICAL THERAPY NOTE
Physical Therapy Screen    Patient Name: Jose White    Today's Date: 11/7/2024     Problem List  Principal Problem:    Hyponatremia  Active Problems:    Anxiety    GERD without esophagitis    Hypotension    COPD (chronic obstructive pulmonary disease) (HCC)    Alcoholic cirrhosis of liver with ascites (HCC)    Thrombocytopenia (HCC)    Ductal carcinoma in situ (DCIS) of right breast    Hypoalbuminemia       Past Medical History  Past Medical History:   Diagnosis Date    Alcoholic fatty liver     Anxiety     Asthma     COPD (chronic obstructive pulmonary disease) (HCC)     Elevated liver function tests     GERD (gastroesophageal reflux disease)     GERD without esophagitis     Hyperlipidemia     Hypertension     IBS (irritable bowel syndrome)     Insomnia     Insomnia     Liver disease     Nervousness     Nicotine dependence     Other specified urinary incontinence     RLS (restless legs syndrome)     Wears glasses         Past Surgical History  Past Surgical History:   Procedure Laterality Date    BACK SURGERY      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    BREAST EXCISIONAL BIOPSY Right 11/01/2004    benign    CATARACT EXTRACTION, BILATERAL  08/01/2018    COLONOSCOPY N/A 5/8/2019    Procedure: COLONOSCOPY;  Surgeon: Jacobo Leonardo MD;  Location: [facility] GI LAB;  Service: Gastroenterology    EGD AND COLONOSCOPY N/A 3/19/2018    Procedure: EGD AND COLONOSCOPY;  Surgeon: Jacobo Leonardo MD;  Location: [facility] GI LAB;  Service: Gastroenterology    ESOPHAGOGASTRODUODENOSCOPY N/A 5/8/2019    Procedure: ESOPHAGOGASTRODUODENOSCOPY (EGD);  Surgeon: Jacobo Leonardo MD;  Location: [facility] GI LAB;  Service: Gastroenterology    IR PARACENTESIS  11/27/2020    IR PARACENTESIS  12/8/2020    IR PARACENTESIS  10/28/2022    IR PARACENTESIS  11/7/2022    IR PARACENTESIS  11/30/2022    IR PARACENTESIS  12/30/2022    IR PARACENTESIS  1/16/2023    IR PARACENTESIS  8/6/2024    IR  PARACENTESIS  9/18/2024    IR PARACENTESIS  10/3/2024    IR PARACENTESIS  10/10/2024    IR PARACENTESIS  10/17/2024    IR PARACENTESIS  10/24/2024    IR PARACENTESIS  11/1/2024    IR THORACENTESIS  1/16/2023    IR THORACENTESIS  3/28/2023    IR THORACENTESIS  7/25/2024    IR THORACENTESIS  8/6/2024    IR THORACENTESIS  8/14/2024    IR THORACENTESIS  8/21/2024    IR THORACENTESIS  8/28/2024    IR THORACENTESIS  9/4/2024    IR THORACENTESIS  9/11/2024    IR THORACENTESIS  9/18/2024    IR THORACENTESIS  9/25/2024    IR THORACENTESIS  10/3/2024    IR THORACENTESIS  10/10/2024    IR THORACENTESIS  10/17/2024    IR THORACENTESIS  10/24/2024    IR THORACENTESIS  11/1/2024    KNEE SURGERY      KNEE SURGERY      LUMBAR LAMINECTOMY  04/1999    LYMPH NODE BIOPSY      MAMMO STEREOTACTIC BREAST BIOPSY RIGHT (ALL INC) Right 5/21/2021    MAMMO STEREOTACTIC BREAST BIOPSY RIGHT (ALL INC) EACH ADD Right 5/21/2021    TOTAL ABDOMINAL HYSTERECTOMY  02/05/2008    TUBAL LIGATION      TUBAL LIGATION  1989    UPPER GASTROINTESTINAL ENDOSCOPY          11/07/24 1103   PT Last Visit   PT Visit Date 11/07/24   Note Type   Note type Screen   Additional Comments Chart review completed. Patient documented as a 19 on the [facility]. Spoke with RN, Stephanie, who reports that patient has been completely independent in the room. No inpatient P.T. needs. D/C P.T.     Maria M Villagomez

## 2024-11-07 NOTE — DISCHARGE INSTRUCTIONS
Abdominal Paracentesis       WHAT YOU NEED TO KNOW:   Abdominal paracentesis is a procedure to remove abnormal fluid buildup in your abdomen. Fluid builds up because of liver problems, such as swelling and scarring. Heart failure, kidney disease, a mass, or problems with your pancreas may also cause fluid buildup.    Before your procedure:   Vital signs:  Caregivers will check your blood pressure, heart rate, breathing rate, and temperature. They will also ask about your pain. These vital signs give caregivers information about your current health.     Caregivers may insert an intravenous tube (IV) into your vein. A vein in the arm is usually chosen. Through the IV tube, you may be given liquids and medicine.     Pre-op care:  You may be asked to urinate in order to empty your bladder. You are taken to the procedure room and moved to a table or bed. You will need to lie on your back or on your side.    Local anesthesia:  This medicine makes you more comfortable during your procedure. Local anesthesia is a shot of medicine put into your skin. Local anesthesia is used to numb the procedure area and dull your pain. You may still feel pressure or pushing during the procedure after you get this medicine.  During your procedure:   Your healthcare provider taps on and feels your abdomen to decide where to insert the needle. Your healthcare provider may also use an ultrasound to help decide where to insert the needle. An ultrasound uses sound waves to show pictures of the inside of your abdomen on a TV-like screen. Your healthcare provider cleans your skin and covers the area around the procedure site with a clean sheet. A needle will be inserted into your abdominal cavity. A syringe will be attached to the needle to remove a small amount of ascites fluid. The needle will be removed once your healthcare provider has removed enough fluid for testing.    To remove a larger amount of fluid, a needle is inserted into your  abdominal cavity. A catheter (small, thin tube) is attached to the needle and the needle is removed. The catheter tubing will be attached to a suction device (gentle vacuum) to help remove the fluid. The fluid will drain into a container attached to the tubing. . When your healthcare provider has pulled enough fluid from your abdomen, he will remove the catheter. Your wound (procedure site) will be covered with a bandage. The ascites fluid may be sent to a lab for tests.  After your procedure Do not get out of bed until your healthcare provider says it is okay. When healthcare providers see that you are not having any problems, you may be able to go home. If you are staying in the hospital, you may be taken back to your room.  Blood tests:  You may need blood taken to give caregivers information about how your body is working. The blood may be taken from your hand, arm, or IV.     Intravenous fluids and volume expanders:  You may need fluids or volume expanders given through your IV after your procedure. These fluids include albumin or saline. Albumin is a protein found in your blood. The IV fluids help prevent a drop in your blood pressure    If you do not have an abdominal paracentesis, your symptoms may get worse. You may feel short of breath, have abdominal and chest pain, and have trouble moving around. You may not learn why fluid is building up in your abdomen. You may not get proper treatment. You may have bleeding inside your stomach or bowels. Your kidneys may stop working, and your liver problems may get worse. The fluid inside your abdomen may get infected and cause abdominal pain and tiredness. An infection may become life-threatening and you may die. Talk with your healthcare provider if you have questions or concerns about your procedure, condition, or care.  Bandage may be removed the next day and you may shower the next day.  If you have any fever or abdominal pain please notify your MD.  You may  call the Radiology nurse at [[phone] until 4pm.After 4pm you may call your ordering MD.Thoracentesis   WHAT YOU NEED TO KNOW:   A thoracentesis is a procedure to remove extra fluid or air from between your lungs and your inner chest wall. Air or fluid buildup may make it hard for you to breathe. A thoracentesis allows your lungs to expand fully so you can breathe more easily.  DISCHARGE INSTRUCTIONS:   Medicines:   Pain medicine:  You may be given a prescription medicine to decrease pain. Do not wait until the pain is severe before you take your medicine.    Antibiotics:  This medicine helps fight or prevent an infection.    Take your medicine as directed.  Call your healthcare provider if you think your medicine is not helping or if you have side effects. Tell him if you are allergic to any medicine. Keep a list of the medicines, vitamins, and herbs you take. Include the amounts, and when and why you take them. Bring the list or the pill bottles to follow-up visits. Carry your medicine list with you in case of an emergency.  Follow up with your healthcare provider as directed:  Write down your questions so you remember to ask them during your visits.   Rest:  Rest when you feel it is needed. Slowly start to do more each day. Return to your daily activities as directed.   Do not smoke:  If you smoke, it is never too late to quit. Ask for information about how to stop smoking if you need help.  Contact your healthcare provider if:   You have a fever.    Your puncture site is red, warm, swollen, or draining pus.    You have questions or concerns about your procedure, medicine, or care.    If you have any questions regarding, call the IR department @ [phone].     Seek care immediately or call 911 if:   Blood soaks through your bandage.    There is blood in your spit.Thoracentesis   WHAT YOU NEED TO KNOW:   A thoracentesis is a procedure to remove extra fluid or air from between your lungs and your inner chest  wall. Air or fluid buildup may make it hard for you to breathe. A thoracentesis allows your lungs to expand fully so you can breathe more easily.  DISCHARGE INSTRUCTIONS:   Medicines:   Pain medicine:  You may be given a prescription medicine to decrease pain. Do not wait until the pain is severe before you take your medicine.    Antibiotics:  This medicine helps fight or prevent an infection.    Take your medicine as directed.  Call your healthcare provider if you think your medicine is not helping or if you have side effects. Tell him if you are allergic to any medicine. Keep a list of the medicines, vitamins, and herbs you take. Include the amounts, and when and why you take them. Bring the list or the pill bottles to follow-up visits. Carry your medicine list with you in case of an emergency.  Follow up with your healthcare provider as directed:  Write down your questions so you remember to ask them during your visits.   Rest:  Rest when you feel it is needed. Slowly start to do more each day. Return to your daily activities as directed.   Do not smoke:  If you smoke, it is never too late to quit. Ask for information about how to stop smoking if you need help.  Contact your healthcare provider if:   You have a fever.    Your puncture site is red, warm, swollen, or draining pus.    You have questions or concerns about your procedure, medicine, or care.    If you have any questions regarding, call the IR department @ [phone].     Seek care immediately or call 911 if:   Blood soaks through your bandage.    There is blood in your spit.

## 2024-11-07 NOTE — PROGRESS NOTES
Progress note - [facility]   Jose White 64 y.o. female MRN: [id number]  Unit/Bed#: -Nitesh Encounter: [id number]    ASSESSMENT and PLAN  Assessment:  64-year-old female past medical history of decompensated alcoholic cirrhosis with ascites, esophageal varices, hepatic encephalopathy.  Continued with EtOH use.  Presenting with hyponatremia with a sodium of 122.  Admission labs  notable for T. bili of 1.9 AST of 46 ALT of 25 platelets of 122 INR of 1.45.  last paracentesis/ thora with 2.4 L/1.7L removed.  No SBP.. MELD 3.0 24 on admission        Plan:  1.  Decompensated alcoholic cirrhosis of the liver with ascites  2.  Thrombocytopenia  3.  GERD     Monitor MELD labs.  MELD 3.0 score of 22.  Continue home lactulose, titrate to 3 bm daily,  Continue midodrine 10 mg 3 times daily  Diuretic management per nephro  Paracentesis and thoracentesis done on 11/7.  Thoracentesis with removal of 1.7 L.  Paracentesis with 2.4 liters removed.  Negative for SBP     Continue PPI          Chief Complaint   Patient presents with    Weakness - Generalized     Comes to ed c/o weakness since the summer. Sent for low sodium. Mentation is at baseline since summer per .       SUBJECTIVE  Patient states improvement of abdominal distention and dyspnea after paracentesis/thoracentesis today.  Denies any nausea or vomiting.  Some localized abdominal discomfort at paracentesis site.    Temp:  [98.1 °F (36.7 °C)-98.2 °F (36.8 °C)] 98.2 °F (36.8 °C)  HR:  [80-84] 84  BP: ()/(46-56) 93/46  Resp:  [18] 18  SpO2:  [92 %-98 %] 98 %  O2 Device: None (Room air)     PHYSICAL EXAM  General Appearance: NAD, cooperative, alert  Eyes: Anicteric  GI:  non-tender, taut, distended; normal bowel sounds; no masses, no organomegaly   Rectal: Deferred  Musculoskeletal: No edema.  Skin:  No jaundice    LABS & STUDIES  Lab Results   Component Value Date    GLUCOSE 95 07/28/2017    CALCIUM 8.1 (L) 11/07/2024      "07/28/2017    K 3.9 11/07/2024    CO2 23 11/07/2024     11/07/2024    BUN 7 11/07/2024    CREATININE 0.65 11/07/2024    CREATININE 0.62 11/07/2024    CREATININE 0.69 11/06/2024     Lab Results   Component Value Date    WBC 5.30 11/07/2024    WBC 4.75 11/06/2024    WBC 5.18 11/05/2024    HGB 10.1 (L) 11/07/2024    HGB 10.9 (L) 11/06/2024    HGB 13.0 11/05/2024     (H) 11/07/2024    PLT 90 (L) 11/07/2024    PLT 94 (L) 11/06/2024     (L) 11/05/2024     Lab Results   Component Value Date    ALT 20 11/06/2024    ALT 25 11/05/2024    ALT 29 10/11/2024    AST 36 11/06/2024    AST 46 (H) 11/05/2024    AST 61 (H) 10/11/2024    ALKPHOS 65 11/06/2024    ALKPHOS 87 11/05/2024    ALKPHOS 101 10/11/2024    TBILI 1.78 (H) 11/06/2024    TBILI 1.90 (H) 11/05/2024    TBILI 4.2 (H) 10/11/2024     No results found for: \"AMYLASE\"  Lab Results   Component Value Date    LIPASE 79 11/25/2023     Lab Results   Component Value Date    IRON 77 11/29/2023    TIBC 319 11/29/2023    FERRITIN 145 11/29/2023     Lab Results   Component Value Date    INR 1.62 (H) 11/06/2024    INR 1.45 (H) 11/05/2024    INR 1.3 (H) 10/29/2024       XR chest 2 views    Result Date: 11/5/2024  Narrative: XR CHEST PA AND LATERAL INDICATION: ACS work up. COMPARISON: 7/25/2024 FINDINGS: Persistent dense right lung base opacity which may represent a combination of moderate effusion and parenchymal opacity. No pneumothorax or pleural effusion. Normal cardiomediastinal silhouette. Bones are unremarkable for age. Normal upper abdomen.     Impression: Persistent dense right lung base opacity which may represent a combination of effusion and parenchymal opacity. Workstation performed: [id number]     IR AMB Paracentesis    Result Date: 11/1/2024  Narrative: INDICATION: Recurrent ascites PROCEDURE: 1. Ultrasound-guided paracentesis FINDINGS: 1.  3100 mL clear yellow ascites removed.     Impression: Paracentesis. " _________________________________________________________________ COMPARISON: None PROCEDURE DETAILS: Operators: Dr. Qureshi Anesthesia: Local Medications: 1% lidocaine COMMENTS: A preprocedure timeout was performed per [facility] protocol. The right abdomen was prepped and draped in the usual sterile fashion. 5-Mosotho Yueh catheter was advanced using ultrasound guidance into ascites. Following drainage, the skin was cleansed and sterile dressings were applied. Workstation performed: [id number]     IR OUT-Patient Thoracentesis    Result Date: 11/1/2024  Narrative: INDICATION: Hepatic hydrothorax PROCEDURE: 1. Ultrasound-guided thoracentesis FINDINGS: 1.  1700 mL cloudy yellow fluid removed from the right pleural space.     Impression: Thoracentesis. _________________________________________________________________ COMPARISON: None PROCEDURE DETAILS: Operators: Dr. Qureshi Anesthesia:  Local Medications: 1% lidocaine COMMENTS: The patient was placed in a sitting position.  A preprocedure timeout was performed per [facility] protocol. The back was prepped and draped in the usual sterile fashion. 5-Mosotho Yueh catheter was advanced using ultrasound guidance into the pleural space. Following drainage, skin was cleansed and sterile dressings were applied. Workstation performed: [id number]     IR OUT-Patient Thoracentesis    Result Date: 10/24/2024  Narrative: PROCEDURE: Ultrasound guided right thoracentesis STAFF: Zonia Chicas PA-C. NUMBER OF IMAGES: 1. COMPLICATIONS: None immediately. INDICATION: Symptomatic right pleural effusion. Cirrhosis of the liver PROCEDURE: Using ultrasound guidance, the right pleural cavity was punctured and a catheter placed into the pleural cavity. A total of 1700 milliliters of clear yellow fluid was removed. The catheter was then removed. FINDINGS: 1700 mL of clear yellow pleural fluid was removed.     Impression: Ultrasound guided right thoracentesis. Signed, performed, and dictated  by Zonia Chicas PA-C under the direct supervision of Dr. Jaime Villar. Workstation performed: [id number]     IR AMB Paracentesis    Result Date: 10/24/2024  Narrative: PROCEDURE: Ultrasound-guided paracentesis STAFF: Zonia Chicas PA-C. COMPLICATIONS: None immediately. MEDICATIONS: 1% lidocaine subcutaneous. INDICATION: Alcoholic cirrhosis of the liver with recurrent symptomatic ascites. PROCEDURE: Using ultrasound guidance, the right paracolic gutter was punctured and a catheter was placed into the peritoneal cavity. A total of 2500 milliliters of clear yellow fluid was removed. The catheter was then removed. FINDINGS: 2500 mL of clear yellow ascites was removed.     Impression: Ultrasound-guided right paracentesis. Signed, performed, and dictated by Zonia Chicas PA-C under the direct supervision of Dr. Jaime Villar. Workstation performed: [id number]     IR OUT-Patient Thoracentesis    Result Date: 10/17/2024  Narrative: IR THORACENTESIS PROCEDURE: Thoracentesis CLINICAL INDICATION: Cirrhosis with recurrent right pleural effusion COMPARISON: 10/10/2024 PERFORMING PHYSICIAN: Manuel Post MD ASSISTANT PHYSICIAN: None MEDICATIONS: 1% lidocaine (local). SEDATION TIME: None CONTRAST: None FLUOROSCOPY TIME: None RADIATION DOSE: 0 mGy  None NUMBER OF IMAGES: 1 TECHNIQUE: The patient was brought to the procedure room and a time-out was performed utilizing universal protocol. The patient was identified verbally and via wrist band. Real-time ultrasound with image documentation was used to sugey an appropriate skin entry site in the RIGHT chest to access the pleural space. The skin was prepped and draped in the usual sterile manner. The skin and subcutaneous tissues were generously infiltrated with lidocaine.  Access was achieved using a 5-Mauritanian sheathed Yueh centesis needle.  Vacuum bottle collection technique was utilized to recover a total of 1900 mL of cloudy sheryl fluid. No specimens requested. The  catheter was removed.  Manual pressure was maintained until hemostasis was achieved and the area was dressed and bandaged. The patient tolerated the procedure well without difficulty or complication encountered and left the section in satisfactory stable condition. FINDINGS: As above.     Impression: Technically successful image-guided RIGHT thoracentesis as described. Workstation performed: [id number]     IR AMB Paracentesis    Result Date: 10/17/2024  Narrative: IR PARACENTESIS PROCEDURE: Paracentesis CLINICAL INDICATION: Cirrhosis with recurrent ascites COMPARISON: 10/3/2024 PERFORMING PHYSICIAN: Manuel Post MD ASSISTANT PHYSICIAN: None MEDICATIONS: 1% lidocaine (local). SEDATION TIME: None CONTRAST: None FLUOROSCOPY TIME: None RADIATION DOSE: 0 mGy  None NUMBER OF IMAGES: 1 TECHNIQUE: The patient was brought to the procedure room and a time-out was performed utilizing universal protocol. The patient was identified verbally and via wrist band. Real-time ultrasound with image documentation was used to sugey an appropriate skin entry site in the RIGHT UPPER QUADRANT to access the peritoneal space. The skin was prepped and draped in the usual sterile manner. The skin and subcutaneous tissues were generously infiltrated with lidocaine. Access was achieved using a 5-Persian sheathed Yueh centesis needle. Vacuum bottle collection technique was utilized to recover a total of 2300 mL of clear yellow fluid. No specimens requested. The catheter was removed. Manual pressure was maintained until hemostasis was achieved and the area was dressed and bandaged. The patient tolerated the procedure well without difficulty or complication encountered and left the section in satisfactory stable condition. FINDINGS: As above.     Impression: Technically successful image-guided paracentesis as described. Workstation performed: [id number]     CT lung screening program    Result Date: 10/14/2024  Narrative: CT CHEST LUNG CANCER  SCREENING WITHOUT IV CONTRAST INDICATION: F17.210: Nicotine dependence, cigarettes, uncomplicated. COMPARISON: CT chest abdomen pelvis 4/20/2023 TECHNIQUE: Unenhanced CT examination of the chest was performed utilizing a low dose protocol. Multiplanar 2D reformatted images were created from the source data. Radiation dose length product (DLP) for this visit:  98.67 mGy-cm . This examination, like all CT scans performed in the [facility], was performed utilizing techniques to minimize radiation dose exposure, including the use of iterative reconstruction and automated exposure control. FINDINGS: LUNGS: Right lower lobe atelectasis. Emphysema. There is no tracheal or endobronchial lesion. PLEURA: Large right pleural effusion. HEART/GREAT VESSELS: Atherosclerotic calcifications. No thoracic aortic aneurysm. MEDIASTINUM AND DIEGO: Unremarkable. CHEST WALL AND LOWER NECK: Postsurgical changes of the right breast. VISUALIZED STRUCTURES IN THE UPPER ABDOMEN: Cirrhotic liver. Mild upper abdominal ascites. Varices present in the upper abdomen. OSSEOUS STRUCTURES: Lateral right sixth and seventh rib fractures.     Impression: 1. Large right pleural effusion with complete atelectasis of right lower lobe limits evaluation for underlying pulmonary nodules. 2. Right sixth and seventh rib fractures. 3. Cirrhosis with changes of portal hypertension.   Lung-RADS 0, Findings suggestive of inflammatory or infectious process. Follow-up LDCT in 1 to 3 months. Workstation performed: [id number]     IR AMB Paracentesis    Result Date: 10/10/2024  Narrative: IR PARACENTESIS PROCEDURE: Paracentesis CLINICAL INDICATION: Cirrhosis with recurrent ascites COMPARISON: 10/3/2024 PERFORMING PHYSICIAN: Manuel Post MD ASSISTANT PHYSICIAN: None MEDICATIONS: 1% lidocaine (local). SEDATION TIME: None CONTRAST: None FLUOROSCOPY TIME: None RADIATION DOSE: 0 mGy  None NUMBER OF IMAGES: 2 TECHNIQUE: Informed written consent was  obtained from the patient after a thorough discussion of risks, benefits, and alternatives to the procedure . All questions were answered. The patient was brought to the procedure room and a time-out was performed utilizing universal protocol. The patient was identified verbally and via wrist band. Real-time ultrasound with image documentation was used to sugey an appropriate skin entry site in the RIGHT UPPER QUADRANT to access the peritoneal space. The skin was prepped and draped in the usual sterile manner. The skin and subcutaneous tissues were generously infiltrated with lidocaine. Access was achieved using a 5-Andorran sheathed Yueh centesis needle. Vacuum bottle collection technique was utilized to recover a total of 1650 mL of clear yellow fluid. No specimens requested. The catheter was removed. Manual pressure was maintained until hemostasis was achieved and the area was dressed and bandaged. The patient tolerated the procedure well without difficulty or complication encountered and left the section in satisfactory stable condition. FINDINGS: As above.     Impression: Technically successful image-guided paracentesis as described. Workstation performed: [id number]     IR OUT-Patient Thoracentesis    Result Date: 10/10/2024  Narrative: IR THORACENTESIS PROCEDURE: Thoracentesis CLINICAL INDICATION: Cirrhosis with recurrent right pleural effusion COMPARISON: 10/3/2024 PERFORMING PHYSICIAN: Manuel Post MD ASSISTANT PHYSICIAN: None MEDICATIONS: 1% lidocaine (local). SEDATION TIME: None CONTRAST: None FLUOROSCOPY TIME: None RADIATION DOSE: 0 mGy  None NUMBER OF IMAGES: 1 TECHNIQUE: The patient was brought to the procedure room and a time-out was performed utilizing universal protocol. The patient was identified verbally and via wrist band. Real-time ultrasound with image documentation was used to sugey an appropriate skin entry site in the RIGHT chest to access the pleural space. The skin was prepped and  draped in the usual sterile manner. The skin and subcutaneous tissues were generously infiltrated with lidocaine.  Access was achieved using a 5-Welsh sheathed [place] centesis needle.  Vacuum bottle collection technique was utilized to recover a total of 1950 mL of dark clear sheryl fluid. No specimens requested. The catheter was removed.  Manual pressure was maintained until hemostasis was achieved and the area was dressed and bandaged. The patient tolerated the procedure well without difficulty or complication encountered and left the section in satisfactory stable condition. FINDINGS: As above.     Impression: Technically successful image-guided RIGHT thoracentesis as described. Workstation performed: [id number]

## 2024-11-07 NOTE — OCCUPATIONAL THERAPY NOTE
Occupational Therapy Cancellation    Patient Name: Jose White  Today's Date: 11/7/2024 11/07/24 0836   OT Last Visit   OT Visit Date 11/07/24   Note Type   Note type Cancelled Session   Cancel Reasons Patient off floor/test   Additional Comments OT orders received and chart reviewed. Pt currently JENNIFER at IR for paracentesis. Will continue to follow for evaluation as able.     Saritha Worley MS, OTR/L

## 2024-11-07 NOTE — PROGRESS NOTES
NEPHROLOGY PROGRESS NOTE   Jose White 64 y.o. female MRN: [id number]  Unit/Bed#: -01 Encounter: [id number]      Assessment & Plan  Hyponatremia  Sodium 121 on outpatient labs 11/4  Likely related to intravascular volume depletion versus cirrhosis  Sodium slowly improved with normal saline but this was discontinued yesterday and started on 1.8% saline at 50cc/h  Currently on 1.2 L/day oral fluid restriction  Sodium now up to 131  Workup: Urine osm 278, urine sodium <10, AM cortisol 11  Alcoholic cirrhosis of liver with ascites (HCC)  S/p paracentesis today with 2.4L removed and thoracentesis with 1.7L removed   Hypoalbuminemia  S/p albumin x4 doses  Ensure with meals   Hypotension  Continue midodrine 10mg TID    Plan Summary:   Currently on 1.8% saline will be seen by attending later today and to hopefully discontinue fluids   Check a.m. BMP    SUBJECTIVE:  Feeling much better after paracentesis and thoracentesis.    OBJECTIVE:  Current Weight: Weight - Scale: 68.3 kg (150 lb 9.2 oz)  Vitals:    11/07/24 1049   BP: (!) 93/46   Pulse: 84   Resp:    Temp:    SpO2: 98%       Intake/Output Summary (Last 24 hours) at 11/7/2024 1143  Last data filed at 11/7/2024 0958  Gross per 24 hour   Intake 600 ml   Output 4720 ml   Net -4120 ml       General:  appears comfortable and in no acute distress   Skin:  No rash  Eyes:  Sclerae anicteric, no periorbital edema   ENT:  Moist mucous membranes  Neck:  Trachea midline, symmetric   Chest:  Clear to auscultation bilaterally with no wheezes, rales or rhonchi  CVS:  Regular rate and rhythm  Abdomen:  Soft, nontender, nondistended  Neuro:  Awake and alert  Psych:  Appropriate affect  Extremities: no lower extremity edema       Medications:  Scheduled Meds:  Current Facility-Administered Medications   Medication Dose Route Frequency Provider Last Rate    acetaminophen  650 mg Oral Q8H PRN Juan Antonio Hardin MD      Albumin 25%  25 g Intravenous BID Dora Arita PA-C       albuterol  2 puff Inhalation Q6H PRN Juan Antonio Hardin MD      albuterol  2.5 mg Nebulization Q6H PRN Juan Antonio Hardin MD      ALPRAZolam  1 mg Oral BID PRN Juan Antonio Hardin MD      anastrozole  1 mg Oral Daily Juan Antonio Hardin MD      budesonide-formoterol  2 puff Inhalation BID Juan Antonio Hardin MD      folic acid  1 mg Oral Daily Saritha Dumont PA-C      guaiFENesin  200 mg Oral Q4H PRN Suzette Lopez DO      heparin (porcine)  5,000 Units Subcutaneous Q8H [facility] Juan Antonio Hardin MD      lactulose  10 g Oral BID Dwaine Gallagher MD      lidocaine  1 patch Topical Daily Dwaine Gallagher MD      midodrine  10 mg Oral TID AC Juan Antonio Hardin MD      multivitamin-minerals  1 tablet Oral Daily Saritha Dumont PA-C      naltrexone  50 mg Oral Daily Juan Antonio Hardin MD      nicotine  1 patch Transdermal Daily Juan Antonio Hardin MD      ondansetron  4 mg Intravenous Q6H PRN Juan Antonio Hardin MD      pantoprazole  20 mg Oral Daily Juan Antonio Hardin MD      sodium chloride 23.4% 308 mEq in sterile water 1,000 mL infusion   Intravenous Continuous Ck Leung MD 50 mL/hr at 11/06/24 1715    thiamine  100 mg Oral Daily Saritha Dumont PA-C      traZODone  50 mg Oral HS Juan Antonio Hardin MD         PRN Meds:.  acetaminophen    albuterol    albuterol    ALPRAZolam    guaiFENesin    ondansetron    Continuous Infusions:sodium chloride 23.4% 308 mEq in sterile water 1,000 mL infusion, , Last Rate: 50 mL/hr at 11/06/24 1715        Laboratory Results:  Results from last 7 days   Lab Units 11/07/24  0949 11/07/24  0206 11/06/24  1724 11/06/24  0323 11/05/24  2226 11/05/24  1222 11/04/24  1453   WBC Thousand/uL  --  5.30  --  4.75  --  5.18  --    HEMOGLOBIN g/dL  --  10.1*  --  10.9*  --  13.0  --    HEMATOCRIT %  --  28.9*  --  29.9*  --  36.5  --    PLATELETS Thousands/uL  --  90*  --  94*  --  122*  --    SODIUM mmol/L 131* 129* 127* 125* 124* 122* 121*   POTASSIUM mmol/L 3.9 4.0 4.0 4.4 4.1 4.1 4.9    CHLORIDE mmol/L 104 104 100 99 97 95* 93*   CO2 mmol/L 23 22 22 23 22 23 25   BUN mg/dL 7 8 8 9 9 8 8   CREATININE mg/dL 0.65 0.62 0.69 0.59* 0.62 0.70 0.63   CALCIUM mg/dL 8.1* 7.7* 7.8* 7.2* 7.3* 7.6* 7.9*   MAGNESIUM mg/dL  --   --   --  2.0  --  1.8*  --    PHOSPHORUS mg/dL  --   --   --  3.8  --   --   --

## 2024-11-07 NOTE — ASSESSMENT & PLAN NOTE
Admitted with sodium of 122  Appreciate nephrology recommendations.   Sodium improved to 131  On fluid restriction of 1.2  Nephrology switched fluids to 1.8%  IR consulted for paracentesis and thoracentesis   Continue to follow sodium closely   Trend BMP

## 2024-11-07 NOTE — ASSESSMENT & PLAN NOTE
Patient has history of thrombocytopenia in setting of cirrhosis  Admitted with platelets of 122K  Slight decrease in platelets to 90K  Will monitor closely

## 2024-11-07 NOTE — PLAN OF CARE
Problem: Potential for Falls  Goal: Patient will remain free of falls  Description: INTERVENTIONS:  - Educate patient/family on patient safety including physical limitations  - Instruct patient to call for assistance with activity   - Consult OT/PT to assist with strengthening/mobility   - Keep Call bell within reach  - Keep bed low and locked with side rails adjusted as appropriate  - Keep care items and personal belongings within reach  - Initiate and maintain comfort rounds  - Make Fall Risk Sign visible to staff  Problem: INFECTION - ADULT  Goal: Absence or prevention of progression during hospitalization  Description: INTERVENTIONS:  - Assess and monitor for signs and symptoms of infection  - Monitor lab/diagnostic results  - Monitor all insertion sites, i.e. indwelling lines, tubes, and drains  - Monitor endotracheal if appropriate and nasal secretions for changes in amount and color  - [place] appropriate cooling/warming therapies per order  - Administer medications as ordered  - Instruct and encourage patient and family to use good hand hygiene technique  - Identify and instruct in appropriate isolation precautions for identified infection/condition  Outcome: Progressing     - Apply yellow socks and bracelet for high fall risk patients  - Consider moving patient to room near nurses station  Outcome: Progressing     Problem: PAIN - ADULT  Goal: Verbalizes/displays adequate comfort level or baseline comfort level  Description: Interventions:  - Encourage patient to monitor pain and request assistance  - Assess pain using appropriate pain scale  - Administer analgesics based on type and severity of pain and evaluate response  - Implement non-pharmacological measures as appropriate and evaluate response  - Consider cultural and social influences on pain and pain management  - Notify physician/advanced practitioner if interventions unsuccessful or patient reports new pain  Outcome: Progressing

## 2024-11-07 NOTE — OCCUPATIONAL THERAPY NOTE
Occupational Therapy Screen    Patient Name: Jose White  Today's Date: 11/7/2024 11/07/24 1048   OT Last Visit   OT Visit Date 11/07/24   Note Type   Note type Screen   Additional Comments OT orders received and chart reviewed. Per chart, pt resides with spouse, 1SH, 0STE, and is independent. Spoke to RN Stephanie who states pt is currently independent.  Recommend pt continue to be OOB for meals, ambulation to/from BR, perform self care tasks, and mobility in hallway with nursing.  At this time, OT recommendations at time of discharge are return home at [facility]. No acute OT needs identified at this time; please re-consult if OT needs arise during remainder of hospital stay.     Saritha Worley MS, OTR/L

## 2024-11-07 NOTE — ASSESSMENT & PLAN NOTE
Patient has history of alcoholic cirrhosis of liver with ascites  Undergoes paracentesis q. weekly.  Last paracentesis on past Thursday  Patient with history of alcoholic cirrhosis of liver  Hold Lasix  Appreciate gi consult  Ir consulted for paracentesis   Patient underwent paracentesis with 2470 mL of acetic fluid removed  Patient also went thoracentesis and 1700 cc of clear fluid removed  Follows up with hepatology Dr. Herrera as outpatient

## 2024-11-07 NOTE — PLAN OF CARE
Problem: Potential for Falls  Goal: Patient will remain free of falls  Description: INTERVENTIONS:  - Educate patient/family on patient safety including physical limitations  - Instruct patient to call for assistance with activity   - Consult OT/PT to assist with strengthening/mobility   - Keep Call bell within reach  - Keep bed low and locked with side rails adjusted as appropriate  - Keep care items and personal belongings within reach  - Initiate and maintain comfort rounds  - Make Fall Risk Sign visible to staff  - Offer Toileting every 2 Hours, in advance of need  - Initiate/Maintain bed alarm  - Obtain necessary fall risk management equipment:   - Apply yellow socks and bracelet for high fall risk patients  - Consider moving patient to room near nurses station  Outcome: Progressing     Problem: PAIN - ADULT  Goal: Verbalizes/displays adequate comfort level or baseline comfort level  Description: Interventions:  - Encourage patient to monitor pain and request assistance  - Assess pain using appropriate pain scale  - Administer analgesics based on type and severity of pain and evaluate response  - Implement non-pharmacological measures as appropriate and evaluate response  - Consider cultural and social influences on pain and pain management  - Notify physician/advanced practitioner if interventions unsuccessful or patient reports new pain  Outcome: Progressing     Problem: INFECTION - ADULT  Goal: Absence or prevention of progression during hospitalization  Description: INTERVENTIONS:  - Assess and monitor for signs and symptoms of infection  - Monitor lab/diagnostic results  - Monitor all insertion sites, i.e. indwelling lines, tubes, and drains  - Monitor endotracheal if appropriate and nasal secretions for changes in amount and color  - [place] appropriate cooling/warming therapies per order  - Administer medications as ordered  - Instruct and encourage patient and family to use good hand hygiene  technique  - Identify and instruct in appropriate isolation precautions for identified infection/condition  Outcome: Progressing  Goal: Absence of fever/infection during neutropenic period  Description: INTERVENTIONS:  - Monitor WBC    Outcome: Progressing     Problem: SAFETY ADULT  Goal: Patient will remain free of falls  Description: INTERVENTIONS:  - Educate patient/family on patient safety including physical limitations  - Instruct patient to call for assistance with activity   - Consult OT/PT to assist with strengthening/mobility   - Keep Call bell within reach  - Keep bed low and locked with side rails adjusted as appropriate  - Keep care items and personal belongings within reach  - Initiate and maintain comfort rounds  - Make Fall Risk Sign visible to staff  - Offer Toileting every 2 Hours, in advance of need  - Initiate/Maintain bed alarm  - Obtain necessary fall risk management equipment:   - Apply yellow socks and bracelet for high fall risk patients  - Consider moving patient to room near nurses station  Outcome: Progressing

## 2024-11-07 NOTE — SEDATION DOCUMENTATION
Weekly right thoracentesis done with removal 1700ml cloudy yellow fluid. Paracentesis with 2470ml clear yellow. Specimen sent to lab. Band aid to site. Patient tolerated well and transferred back to room via w/c in her usual state of health.

## 2024-11-08 VITALS
WEIGHT: 150.57 LBS | TEMPERATURE: 98.4 F | DIASTOLIC BLOOD PRESSURE: 50 MMHG | HEART RATE: 84 BPM | BODY MASS INDEX: 26.68 KG/M2 | OXYGEN SATURATION: 96 % | HEIGHT: 63 IN | SYSTOLIC BLOOD PRESSURE: 91 MMHG | RESPIRATION RATE: 18 BRPM

## 2024-11-08 DIAGNOSIS — K70.31 ALCOHOLIC CIRRHOSIS OF LIVER WITH ASCITES (HCC): Primary | ICD-10-CM

## 2024-11-08 LAB
ALBUMIN SERPL BCG-MCNC: 2.7 G/DL (ref 3.5–5)
ALP SERPL-CCNC: 55 U/L (ref 34–104)
ALT SERPL W P-5'-P-CCNC: 13 U/L (ref 7–52)
ANION GAP SERPL CALCULATED.3IONS-SCNC: 4 MMOL/L (ref 4–13)
AST SERPL W P-5'-P-CCNC: 24 U/L (ref 13–39)
BASOPHILS # BLD AUTO: 0.04 THOUSANDS/ÂΜL (ref 0–0.1)
BASOPHILS NFR BLD AUTO: 1 % (ref 0–1)
BILIRUB SERPL-MCNC: 1.46 MG/DL (ref 0.2–1)
BUN SERPL-MCNC: 7 MG/DL (ref 5–25)
CALCIUM ALBUM COR SERPL-MCNC: 8.8 MG/DL (ref 8.3–10.1)
CALCIUM SERPL-MCNC: 7.8 MG/DL (ref 8.4–10.2)
CHLORIDE SERPL-SCNC: 110 MMOL/L (ref 96–108)
CO2 SERPL-SCNC: 20 MMOL/L (ref 21–32)
CREAT SERPL-MCNC: 0.52 MG/DL (ref 0.6–1.3)
EOSINOPHIL # BLD AUTO: 0.13 THOUSAND/ÂΜL (ref 0–0.61)
EOSINOPHIL NFR BLD AUTO: 3 % (ref 0–6)
ERYTHROCYTE [DISTWIDTH] IN BLOOD BY AUTOMATED COUNT: 13.9 % (ref 11.6–15.1)
GFR SERPL CREATININE-BSD FRML MDRD: 100 ML/MIN/1.73SQ M
GLUCOSE SERPL-MCNC: 91 MG/DL (ref 65–140)
HCT VFR BLD AUTO: 27.2 % (ref 34.8–46.1)
HGB BLD-MCNC: 9.3 G/DL (ref 11.5–15.4)
IMM GRANULOCYTES # BLD AUTO: 0.02 THOUSAND/UL (ref 0–0.2)
IMM GRANULOCYTES NFR BLD AUTO: 1 % (ref 0–2)
LYMPHOCYTES # BLD AUTO: 0.67 THOUSANDS/ÂΜL (ref 0.6–4.47)
LYMPHOCYTES NFR BLD AUTO: 15 % (ref 14–44)
MAGNESIUM SERPL-MCNC: 1.9 MG/DL (ref 1.9–2.7)
MCH RBC QN AUTO: 37.5 PG (ref 26.8–34.3)
MCHC RBC AUTO-ENTMCNC: 34.2 G/DL (ref 31.4–37.4)
MCV RBC AUTO: 110 FL (ref 82–98)
MONOCYTES # BLD AUTO: 0.73 THOUSAND/ÂΜL (ref 0.17–1.22)
MONOCYTES NFR BLD AUTO: 17 % (ref 4–12)
NEUTROPHILS # BLD AUTO: 2.77 THOUSANDS/ÂΜL (ref 1.85–7.62)
NEUTS SEG NFR BLD AUTO: 63 % (ref 43–75)
NRBC BLD AUTO-RTO: 0 /100 WBCS
PLATELET # BLD AUTO: 71 THOUSANDS/UL (ref 149–390)
PMV BLD AUTO: 8.7 FL (ref 8.9–12.7)
POTASSIUM SERPL-SCNC: 4 MMOL/L (ref 3.5–5.3)
PROT SERPL-MCNC: 5.5 G/DL (ref 6.4–8.4)
RBC # BLD AUTO: 2.48 MILLION/UL (ref 3.81–5.12)
SODIUM SERPL-SCNC: 134 MMOL/L (ref 135–147)
WBC # BLD AUTO: 4.36 THOUSAND/UL (ref 4.31–10.16)

## 2024-11-08 PROCEDURE — 99239 HOSP IP/OBS DSCHRG MGMT >30: CPT | Performed by: INTERNAL MEDICINE

## 2024-11-08 PROCEDURE — 83735 ASSAY OF MAGNESIUM: CPT | Performed by: INTERNAL MEDICINE

## 2024-11-08 PROCEDURE — 80053 COMPREHEN METABOLIC PANEL: CPT | Performed by: INTERNAL MEDICINE

## 2024-11-08 PROCEDURE — 99232 SBSQ HOSP IP/OBS MODERATE 35: CPT | Performed by: STUDENT IN AN ORGANIZED HEALTH CARE EDUCATION/TRAINING PROGRAM

## 2024-11-08 PROCEDURE — 99232 SBSQ HOSP IP/OBS MODERATE 35: CPT | Performed by: INTERNAL MEDICINE

## 2024-11-08 PROCEDURE — 85025 COMPLETE CBC W/AUTO DIFF WBC: CPT | Performed by: INTERNAL MEDICINE

## 2024-11-08 RX ORDER — NICOTINE 21 MG/24HR
1 PATCH, TRANSDERMAL 24 HOURS TRANSDERMAL DAILY
Qty: 28 PATCH | Refills: 0 | Status: SHIPPED | OUTPATIENT
Start: 2024-11-09 | End: 2024-11-13

## 2024-11-08 RX ORDER — SPIRONOLACTONE 50 MG/1
50 TABLET, FILM COATED ORAL DAILY
Qty: 90 TABLET | Refills: 0 | Status: SHIPPED | OUTPATIENT
Start: 2024-11-08 | End: 2024-11-13

## 2024-11-08 RX ORDER — SPIRONOLACTONE 50 MG/1
50 TABLET, FILM COATED ORAL DAILY
Qty: 30 TABLET | Refills: 0 | Status: SHIPPED | OUTPATIENT
Start: 2024-11-08 | End: 2024-11-13

## 2024-11-08 RX ORDER — FUROSEMIDE 20 MG/1
20 TABLET ORAL DAILY
Status: DISCONTINUED | OUTPATIENT
Start: 2024-11-09 | End: 2024-11-08 | Stop reason: HOSPADM

## 2024-11-08 RX ORDER — LACTULOSE 10 G/15ML
10 SOLUTION ORAL 2 TIMES DAILY
Qty: 240 ML | Refills: 0 | Status: SHIPPED | OUTPATIENT
Start: 2024-11-08

## 2024-11-08 RX ADMIN — PANTOPRAZOLE SODIUM 20 MG: 20 TABLET, DELAYED RELEASE ORAL at 09:21

## 2024-11-08 RX ADMIN — ANASTROZOLE 1 MG: 1 TABLET, COATED ORAL at 09:29

## 2024-11-08 RX ADMIN — MIDODRINE HYDROCHLORIDE 10 MG: 5 TABLET ORAL at 09:29

## 2024-11-08 RX ADMIN — GUAIFENESIN 200 MG: 200 SOLUTION ORAL at 09:39

## 2024-11-08 RX ADMIN — NALTREXONE HYDROCHLORIDE 50 MG: 50 TABLET, FILM COATED ORAL at 09:21

## 2024-11-08 RX ADMIN — Medication 100 MG: at 09:21

## 2024-11-08 RX ADMIN — MULTIPLE VITAMINS W/ MINERALS TAB 1 TABLET: TAB ORAL at 09:21

## 2024-11-08 RX ADMIN — HEPARIN SODIUM 5000 UNITS: 5000 INJECTION, SOLUTION INTRAVENOUS; SUBCUTANEOUS at 06:12

## 2024-11-08 RX ADMIN — LIDOCAINE 1 PATCH: 50 PATCH TOPICAL at 09:21

## 2024-11-08 RX ADMIN — FOLIC ACID 1 MG: 1 TABLET ORAL at 09:21

## 2024-11-08 RX ADMIN — BUDESONIDE AND FORMOTEROL FUMARATE DIHYDRATE 2 PUFF: 160; 4.5 AEROSOL RESPIRATORY (INHALATION) at 09:22

## 2024-11-08 NOTE — ASSESSMENT & PLAN NOTE
S/p paracentesis yesterday with 2.4L removed and thoracentesis with 1.7L removed   Resume Lasix at discharge to try and help with volume reaccumulating   Detail Level: Detailed Quality 226: Preventive Care And Screening: Tobacco Use: Screening And Cessation Intervention: Patient screened for tobacco use and is an ex/non-smoker Quality 130: Documentation Of Current Medications In The Medical Record: Current Medications Documented Quality 431: Preventive Care And Screening: Unhealthy Alcohol Use - Screening: Patient not identified as an unhealthy alcohol user when screened for unhealthy alcohol use using a systematic screening method

## 2024-11-08 NOTE — ASSESSMENT & PLAN NOTE
Patient has history of thrombocytopenia in setting of cirrhosis  Admitted with platelets of 122k  Will monitor closely

## 2024-11-08 NOTE — ASSESSMENT & PLAN NOTE
Admitted with sodium of 122  Appreciate nephrology recommendations.   Sodium improved to 131  On fluid restriction of 1.2  Nephrology switched fluids to 1.8% sodium this morning is 134  IR consulted for paracentesis and thoracentesis   Continue to follow sodium closely   DC IV fluids and nephrology recommended patient can be discharged on fluid restriction  Outpatient follow-up with nephrology

## 2024-11-08 NOTE — CASE MANAGEMENT
Case Management Discharge Planning Note    Patient name Jose White  Location /-01 MRN [id number]  : 1960 Date 2024       Current Admission Date: 2024  Current Admission Diagnosis:Hyponatremia   Patient Active Problem List    Diagnosis Date Noted Date Diagnosed    Hypoalbuminemia 2024     Hepatic encephalopathy (HCC) 2024     Stage 2 chronic kidney disease 2023     Vaginal atrophy 2023     Dyspareunia, female 2023     Postcoital bleeding 2023     Pleural effusion 2023     Malignant neoplasm of lower-outer quadrant of right breast of female, estrogen receptor positive (HCC) 2022     Breast neoplasm, Tis (DCIS), right 2021     Atypical ductal hyperplasia of right breast 2021     Ductal carcinoma in situ (DCIS) of right breast 2021     Decompensated hepatic cirrhosis (HCC) 12/10/2020     Thrombocytopenia (HCC) 2020     Anemia 2020     Alcoholic cirrhosis of liver with ascites (HCC) 2020     Hyponatremia 2020     Non-seasonal allergic rhinitis due to pollen 10/08/2020     Bilateral leg edema 10/08/2020     Irritable bowel syndrome with diarrhea 2019     Alcohol use 2018     COPD (chronic obstructive pulmonary disease) (HCC) 2018     Fatty liver 2018     Other specified abnormal findings of blood chemistry 2018     Elevated LFTs 2016     Single seizure (HCC) 2016     Insomnia 2015     Hypotension 2014     GERD without esophagitis 2012     Hyperlipidemia 2012     Nicotine dependence 2012     Anxiety 2012       LOS (days): 3  Geometric Mean LOS (GMLOS) (days): 3.3  Days to GMLOS:0.3     OBJECTIVE:  Risk of Unplanned Readmission Score: 29.36         Current admission status: Inpatient   Preferred Pharmacy:   [id number] - [place] - [address]  [address] [place]  [address]  Phone: [phone] Fax: [phone]    Primary Care Provider: JOSÉ Craig    Primary Insurance: MEDICARE  Secondary Insurance: EMERY EDEN [place]    DISCHARGE DETAILS:                                                                                        IMM Given (Date):: 11/08/24  IMM Given to:: Patient   IMM reviewed with patient, patient agrees with discharge determination.

## 2024-11-08 NOTE — PROGRESS NOTES
"Progress note - Gastroenterology   Jose White 64 y.o. female MRN: [id number]  Unit/Bed#: -N[facility] Encounter: [id number]    ASSESSMENT and PLAN    64-year-old female past medical history of decompensated alcoholic cirrhosis with ascites, esophageal varices, hepatic encephalopathy.  Continued with EtOH use.  Presenting with hyponatremia with a sodium of 122.  Admission labs  notable for T. bili of 1.9 AST of 46 ALT of 25 platelets of 122 INR of 1.45.  last paracentesis/ thora with 2.4 L/1.7L removed.  No SBP.. MELD 3.0;  24 on admission        Plan:  1.  Decompensated alcoholic cirrhosis of the liver with ascites  2.  Thrombocytopenia  3.  GERD     Monitor MELD lab, reordered for 11/8.  MELD 3.0 score of 22 (11/7).  Continue home lactulose, titrate to 3 bm daily, patient currently refusing.  Continue midodrine 10 mg 3 times daily  Diuretic management per nephro, sodium 134 with a.m. labs today.  Continue PPI    Paracentesis and thoracentesis done on 11/7.  Thoracentesis with removal of 1.7 L.  Paracentesis with 2.4 liters removed.  Negative for SBP    Discussed patient with Dr. Madera on rounds.  Patient is currently scheduled for office follow-up appointment 1/8/2025 and endoscopy with Dr. Herrera 1/15/2025.     Chief Complaint   Patient presents with    Weakness - Generalized     Comes to ed c/o weakness since the summer. Sent for low sodium. Mentation is at baseline since summer per .       SUBJECTIVE/HPI   Patient denies abdominal pain, nausea or vomiting.  States she is tolerating her food well.  Per nurse patient is refusing lactulose.  Patient also states she does not plan to take it after discharge at home.  Per patient's nurse patient just wants to be discharged so she can go home and have a beer.    BP 91/50 (BP Location: Left arm)   Pulse 84   Temp 98.4 °F (36.9 °C) (Oral)   Resp 18   Ht 5' 3\" (1.6 m)   Wt 68.3 kg (150 lb 9.2 oz)   SpO2 96%   BMI 26.67 kg/m²     PHYSICALEXAM  General " "appearance: alert, appears stated age and cooperative  Eyes: PERLLA, EOMI, no icterus   Head: Normocephalic, without obvious abnormality, atraumatic  Lungs: clear to auscultation bilaterally  Heart: regular rate and rhythm, S1, S2 normal, no murmur, click, rub or gallop  Abdomen: Large, soft, non-tender; bowel sounds normal; no masses,  no organomegaly  Extremities: extremities normal, atraumatic, no cyanosis or edema  Neurologic: Grossly normal    Lab Results   Component Value Date    GLUCOSE 95 07/28/2017    CALCIUM 7.8 (L) 11/08/2024     07/28/2017    K 4.0 11/08/2024    CO2 20 (L) 11/08/2024     (H) 11/08/2024    BUN 7 11/08/2024    CREATININE 0.52 (L) 11/08/2024     Lab Results   Component Value Date    WBC 4.36 11/08/2024    HGB 9.3 (L) 11/08/2024    HCT 27.2 (L) 11/08/2024     (H) 11/08/2024    PLT 71 (L) 11/08/2024     Lab Results   Component Value Date    ALT 13 11/08/2024    AST 24 11/08/2024    ALKPHOS 55 11/08/2024    BILITOT 0.6 07/28/2017     No results found for: \"AMYLASE\"  Lab Results   Component Value Date    LIPASE 79 11/25/2023     Lab Results   Component Value Date    IRON 77 11/29/2023    TIBC 319 11/29/2023    FERRITIN 145 11/29/2023     Lab Results   Component Value Date    INR 1.66 (H) 11/07/2024     "

## 2024-11-08 NOTE — PLAN OF CARE
Problem: Potential for Falls  Goal: Patient will remain free of falls  Description: INTERVENTIONS:  - Educate patient/family on patient safety including physical limitations  - Instruct patient to call for assistance with activity   - Consult OT/PT to assist with strengthening/mobility   - Keep Call bell within reach  - Keep bed low and locked with side rails adjusted as appropriate  - Keep care items and personal belongings within reach  - Initiate and maintain comfort rounds  - Make Fall Risk Sign visible to staff  Problem: INFECTION - ADULT  Goal: Absence or prevention of progression during hospitalization  Description: INTERVENTIONS:  - Assess and monitor for signs and symptoms of infection  - Monitor lab/diagnostic results  - Monitor all insertion sites, i.e. indwelling lines, tubes, and drains  - Monitor endotracheal if appropriate and nasal secretions for changes in amount and color  - [place] appropriate cooling/warming therapies per order  - Administer medications as ordered  - Instruct and encourage patient and family to use good hand hygiene technique  - Identify and instruct in appropriate isolation precautions for identified infection/condition  Outcome: Progressing  Problem: DISCHARGE PLANNING  Goal: Discharge to home or other facility with appropriate resources  Description: INTERVENTIONS:  - Identify barriers to discharge w/patient and caregiver  - Arrange for needed discharge resources and transportation as appropriate  - Identify discharge learning needs (meds, wound care, etc.)  - Arrange for interpretive services to assist at discharge as needed  - Refer to Case Management Department for coordinating discharge planning if the patient needs post-hospital services based on physician/advanced practitioner order or complex needs related to functional status, cognitive ability, or social support system  Outcome: Progressing        Problem: PAIN - ADULT  Goal: Verbalizes/displays adequate comfort  level or baseline comfort level  Description: Interventions:  - Encourage patient to monitor pain and request assistance  - Assess pain using appropriate pain scale  - Administer analgesics based on type and severity of pain and evaluate response  - Implement non-pharmacological measures as appropriate and evaluate response  - Consider cultural and social influences on pain and pain management  - Notify physician/advanced practitioner if interventions unsuccessful or patient reports new pain  Outcome: Progressing

## 2024-11-08 NOTE — DISCHARGE INSTR - AVS FIRST PAGE
Follow-up with PCP as outpatient  Follow-up with GI and nephrology as outpatient  Continue fluid restriction 1.2 L as recommended

## 2024-11-08 NOTE — ASSESSMENT & PLAN NOTE
Sodium 121 on outpatient labs 11/4  Likely related to intravascular volume depletion versus cirrhosis  Sodium now up to 134 status post 1.8% saline  Currently on 1.2 L/day oral fluid restriction  Workup: Urine osm 278, urine sodium <10, AM cortisol 11  Discussed with attending --okay for discharge.  Will start Lasix 20 mg daily at discharge

## 2024-11-08 NOTE — PROGRESS NOTES
NEPHROLOGY PROGRESS NOTE   Jose White 64 y.o. female MRN: [id number]  Unit/Bed#: -01 Encounter: [id number]      Assessment & Plan  Hyponatremia  Sodium 121 on outpatient labs 11/4  Likely related to intravascular volume depletion versus cirrhosis  Sodium now up to 134 status post 1.8% saline  Currently on 1.2 L/day oral fluid restriction  Workup: Urine osm 278, urine sodium <10, AM cortisol 11  Discussed with attending --okay for discharge.  Will start Lasix 20 mg daily at discharge  Alcoholic cirrhosis of liver with ascites (HCC)  S/p paracentesis yesterday with 2.4L removed and thoracentesis with 1.7L removed   Resume Lasix at discharge to try and help with volume reaccumulating  Hypoalbuminemia  S/p albumin x4 doses  Continue Ensure with meals  Hypotension  Continue midodrine 10mg TID      Plan Summary:   Okay to discharge from a nephrology standpoint  Would start Lasix 20 mg daily at discharge and continue 1.2 L/day oral fluid restriction  Will repeat a BMP next week to make sure sodium is stable  Discussed plan with internal medicine team    SUBJECTIVE:  Feeling much better today and wants to go home.  Denies chest pain or shortness of breath.  Feels that her leg weakness has improved after the sodium normalized.    OBJECTIVE:  Current Weight: Weight - Scale: 68.3 kg (150 lb 9.2 oz)  Vitals:    11/08/24 0906   BP: 91/50   Pulse: 84   Resp: 18   Temp: 98.4 °F (36.9 °C)   SpO2: 96%       Intake/Output Summary (Last 24 hours) at 11/8/2024 1152  Last data filed at 11/8/2024 0911  Gross per 24 hour   Intake 1770 ml   Output 950 ml   Net 820 ml       General:  appears comfortable and in no acute distress   Skin:  No rash  Eyes:  Sclerae anicteric, no periorbital edema   ENT:  Moist mucous membranes  Neck:  Trachea midline, symmetric   Chest:  Clear to auscultation bilaterally with no wheezes, rales or rhonchi  CVS:  Regular rate and rhythm  Abdomen:  Soft, nontender, nondistended  Neuro:  Awake and  alert  Psych:  Appropriate affect  Extremities: mild  lower extremity edema       Medications:  Scheduled Meds:  Current Facility-Administered Medications   Medication Dose Route Frequency Provider Last Rate    acetaminophen  650 mg Oral Q8H PRN Juan Antonio Hardin MD      albuterol  2 puff Inhalation Q6H PRN Juan Antonio Hardin MD      albuterol  2.5 mg Nebulization Q6H PRN Juan Antonio Hardin MD      ALPRAZolam  1 mg Oral BID PRN Juan Antonio Hardin MD      anastrozole  1 mg Oral Daily Juan Antonio Hardin MD      budesonide-formoterol  2 puff Inhalation BID Juan Antonio Hardin MD      folic acid  1 mg Oral Daily Saritha Dumont PA-C      guaiFENesin  200 mg Oral Q4H PRN Suzette Lopez DO      heparin (porcine)  5,000 Units Subcutaneous Q8H [facility] Juan Antonio Hardin MD      lactulose  10 g Oral BID Dwaine Gallagher MD      lidocaine  1 patch Topical Daily Dwaine Gallagher MD      midodrine  10 mg Oral TID AC Juan Antonio Hardin MD      multivitamin-minerals  1 tablet Oral Daily Saritha Dumont PA-C      naltrexone  50 mg Oral Daily Juan Antonio Hardin MD      nicotine  1 patch Transdermal Daily Juan Antonio Hardin MD      ondansetron  4 mg Intravenous Q6H PRN Juan Antonio Hardin MD      pantoprazole  20 mg Oral Daily Juan Antonio Hardin MD      sodium chloride 23.4% 308 mEq in sterile water 1,000 mL infusion   Intravenous Continuous Ck Leung MD 50 mL/hr at 11/07/24 1730    thiamine  100 mg Oral Daily Saritha Dumont PA-C      traZODone  50 mg Oral HS Juan Antonio Hardin MD         PRN Meds:.  acetaminophen    albuterol    albuterol    ALPRAZolam    guaiFENesin    ondansetron    Continuous Infusions:sodium chloride 23.4% 308 mEq in sterile water 1,000 mL infusion, , Last Rate: 50 mL/hr at 11/07/24 1730        Laboratory Results:  Results from last 7 days   Lab Units 11/08/24  0436 11/07/24  0949 11/07/24  0206 11/06/24  1724 11/06/24  0323 11/05/24  2226 11/05/24  1222   WBC Thousand/uL 4.36  --  5.30  --  4.75  --   5.18   HEMOGLOBIN g/dL 9.3*  --  10.1*  --  10.9*  --  13.0   HEMATOCRIT % 27.2*  --  28.9*  --  29.9*  --  36.5   PLATELETS Thousands/uL 71*  --  90*  --  94*  --  122*   SODIUM mmol/L 134* 131* 129* 127* 125* 124* 122*   POTASSIUM mmol/L 4.0 3.9 4.0 4.0 4.4 4.1 4.1   CHLORIDE mmol/L 110* 104 104 100 99 97 95*   CO2 mmol/L 20* 23 22 22 23 22 23   BUN mg/dL 7 7 8 8 9 9 8   CREATININE mg/dL 0.52* 0.65 0.62 0.69 0.59* 0.62 0.70   CALCIUM mg/dL 7.8* 8.1* 7.7* 7.8* 7.2* 7.3* 7.6*   MAGNESIUM mg/dL 1.9  --   --   --  2.0  --  1.8*   PHOSPHORUS mg/dL  --   --   --   --  3.8  --   --

## 2024-11-08 NOTE — PLAN OF CARE
Problem: Potential for Falls  Goal: Patient will remain free of falls  Description: INTERVENTIONS:  - Educate patient/family on patient safety including physical limitations  - Instruct patient to call for assistance with activity   - Consult OT/PT to assist with strengthening/mobility   - Keep Call bell within reach  - Keep bed low and locked with side rails adjusted as appropriate  - Keep care items and personal belongings within reach  - Initiate and maintain comfort rounds  - Make Fall Risk Sign visible to staff  - Offer Toileting every 2 Hours, in advance of need  - Initiate/Maintain bed alarm  - Obtain necessary fall risk management equipment:   - Apply yellow socks and bracelet for high fall risk patients  - Consider moving patient to room near nurses station  Outcome: Progressing     Problem: PAIN - ADULT  Goal: Verbalizes/displays adequate comfort level or baseline comfort level  Description: Interventions:  - Encourage patient to monitor pain and request assistance  - Assess pain using appropriate pain scale  - Administer analgesics based on type and severity of pain and evaluate response  - Implement non-pharmacological measures as appropriate and evaluate response  - Consider cultural and social influences on pain and pain management  - Notify physician/advanced practitioner if interventions unsuccessful or patient reports new pain  Outcome: Progressing     Problem: INFECTION - ADULT  Goal: Absence or prevention of progression during hospitalization  Description: INTERVENTIONS:  - Assess and monitor for signs and symptoms of infection  - Monitor lab/diagnostic results  - Monitor all insertion sites, i.e. indwelling lines, tubes, and drains  - Monitor endotracheal if appropriate and nasal secretions for changes in amount and color  - [place] appropriate cooling/warming therapies per order  - Administer medications as ordered  - Instruct and encourage patient and family to use good hand hygiene  technique  - Identify and instruct in appropriate isolation precautions for identified infection/condition  Outcome: Progressing  Goal: Absence of fever/infection during neutropenic period  Description: INTERVENTIONS:  - Monitor WBC    Outcome: Progressing     Problem: SAFETY ADULT  Goal: Patient will remain free of falls  Description: INTERVENTIONS:  - Educate patient/family on patient safety including physical limitations  - Instruct patient to call for assistance with activity   - Consult OT/PT to assist with strengthening/mobility   - Keep Call bell within reach  - Keep bed low and locked with side rails adjusted as appropriate  - Keep care items and personal belongings within reach  - Initiate and maintain comfort rounds  - Make Fall Risk Sign visible to staff  - Offer Toileting every 2 Hours, in advance of need  - Initiate/Maintain bed alarm  - Obtain necessary fall risk management equipment:   - Apply yellow socks and bracelet for high fall risk patients  - Consider moving patient to room near nurses station  Outcome: Progressing

## 2024-11-08 NOTE — DISCHARGE SUMMARY
Discharge Summary - Hospitalist   Name: Jose White 64 y.o. female I MRN: [id number]  Unit/Bed#: -01 I Date of Admission: 11/5/2024   Date of Service: 11/8/2024 I Hospital Day: 3     Assessment & Plan  Hyponatremia  Admitted with sodium of 122  Appreciate nephrology recommendations.   Sodium improved to 131  On fluid restriction of 1.2  Nephrology switched fluids to 1.8% sodium this morning is 134  IR consulted for paracentesis and thoracentesis   Continue to follow sodium closely   DC IV fluids and nephrology recommended patient can be discharged on fluid restriction  Outpatient follow-up with nephrology  Anxiety  Continue on Xanax and trazodone  GERD without esophagitis  On Protonix  COPD (chronic obstructive pulmonary disease) (HCC)  Continue on bronchodilators  Patient is not in acute exacerbation  Alcoholic cirrhosis of liver with ascites (HCC)  Patient has history of alcoholic cirrhosis of liver with ascites  Undergoes paracentesis q. weekly.  Last paracentesis on past Thursday  Patient with history of alcoholic cirrhosis of liver  Hold Lasix  Appreciate gi consult  Ir consulted for paracentesis   Patient underwent paracentesis with 2470 mL of acetic fluid removed  Patient also went thoracentesis and 1700 cc of clear fluid removed  Follows up with hepatology Dr. Herrera as outpatient  Thrombocytopenia (HCC)  Patient has history of thrombocytopenia in setting of cirrhosis  Admitted with platelets of 122k  Will monitor closely   Ductal carcinoma in situ (DCIS) of right breast  Continue Arimidex  Hypotension  Continue to monitor.  Improved  Hypoalbuminemia     Hospital Course:     Jose White is a 64 y.o. female patient who originally presented to the hospital on   Admission Orders (From admission, onward)       Ordered        11/05/24 1331  INPATIENT ADMISSION  Once                         due to  after being sent from PCP office with with abnormal lab work.  Patient had blood work with  nephrology done yesterday which showed sodium of 121.  Patient was recommended to come to ER.  In ER patient sodium is 122 and patient was admitted   Patient was admitted and was seen by nephrology and GI.  Patient was started fluid restriction and also received 1.8% saline  Patient was seen by IR and underwent thoracentesis and paracentesis.  Patient sodium at discharge is 134 and patient is cleared by GI and nephrology for discharge with outpatient follow-up.  Did not require any home care services  On Exam-  Chest-bilateral air entry, clear to auscultation  Abdomen-soft, nontender  Heart-S1 S2 regular  Extremities-no pedal edema or calf tenderness  Neuro-alert awake oriented x3.  No focal deficits    Please see above list of diagnoses and related plan for additional information.   Follow-up with PCP as outpatient  Follow-up with GI and nephrology as outpatient  Continue fluid restriction 1.2 L as recommended    Condition at Discharge:  good      Discharge instructions/Information to patient and family:   See after visit summary for information provided to patient and family.      Provisions for Follow-Up Care:  See after visit summary for information related to follow-up care and any pertinent home health orders.      Disposition:     Home       Discharge Statement:  I spent 40 minutes discharging the patient. This time was spent on the day of discharge. I had direct contact with the patient on the day of discharge. Greater than 50% of the total time was spent examining patient, answering all patient questions, arranging and discussing plan of care with patient as well as directly providing post-discharge instructions.  Additional time then spent on discharge activities.    Discharge Medications:  See after visit summary for reconciled discharge medications provided to patient and family.      ** Please Note: This note has been constructed using a voice recognition system **

## 2024-11-10 LAB
BACTERIA SPEC BFLD CULT: NO GROWTH
GRAM STN SPEC: NORMAL

## 2024-11-11 ENCOUNTER — TRANSITIONAL CARE MANAGEMENT (OUTPATIENT)
Dept: FAMILY MEDICINE CLINIC | Facility: CLINIC | Age: 64
End: 2024-11-11

## 2024-11-11 ENCOUNTER — TELEPHONE (OUTPATIENT)
Dept: NEPHROLOGY | Facility: CLINIC | Age: 64
End: 2024-11-11

## 2024-11-11 DIAGNOSIS — N18.2 STAGE 2 CHRONIC KIDNEY DISEASE: Primary | ICD-10-CM

## 2024-11-11 NOTE — TELEPHONE ENCOUNTER
----- Message from Dayanara Sanderson PA-C sent at 11/8/2024 11:56 AM EST -----  Patient being discharged from [facility] today.  She already has follow-up with Dr. Tovar in February but please have her repeat a BMP in 1 week.

## 2024-11-11 NOTE — TELEPHONE ENCOUNTER
Placed call to pt - spoke with pt spouse and advised pt to have repeat BMP done in 1 week - pt will have lab work drawn together next week along with other doctor labs that were ordered - faxing lab order over to [facility] for pt to have completed

## 2024-11-13 ENCOUNTER — OFFICE VISIT (OUTPATIENT)
Dept: FAMILY MEDICINE CLINIC | Facility: CLINIC | Age: 64
End: 2024-11-13
Payer: MEDICARE

## 2024-11-13 VITALS
TEMPERATURE: 97.4 F | OXYGEN SATURATION: 96 % | SYSTOLIC BLOOD PRESSURE: 98 MMHG | HEART RATE: 79 BPM | DIASTOLIC BLOOD PRESSURE: 62 MMHG

## 2024-11-13 DIAGNOSIS — E87.1 HYPONATREMIA: Primary | ICD-10-CM

## 2024-11-13 DIAGNOSIS — K70.31 ALCOHOLIC CIRRHOSIS OF LIVER WITH ASCITES (HCC): Chronic | ICD-10-CM

## 2024-11-13 PROCEDURE — 99496 TRANSJ CARE MGMT HIGH F2F 7D: CPT | Performed by: NURSE PRACTITIONER

## 2024-11-13 NOTE — ASSESSMENT & PLAN NOTE
Patient has history of alcoholic cirrhosis of liver with ascites  Undergoes paracentesis every week.  Patient underwent paracentesis in hospital with 2470 mL of acetic fluid also had thoracentesis and 1700 cc of clear fluid removed.  Follows up with GI and nephrology.

## 2024-11-13 NOTE — PATIENT INSTRUCTIONS
Continue current medications.  Continue fluid restriction.  Labs as ordered.  F/up with GI and nephrology as scheduled.

## 2024-11-13 NOTE — ASSESSMENT & PLAN NOTE
Patient presents for TCM. She states she is feeling well.   Recently hospitalized with sodium of 122  Sodium improved to 131 on discharge. She is on a fluid restriction.  has orders for repeat BMP. Follows with nephrology.

## 2024-11-13 NOTE — PROGRESS NOTES
Transition of Care Visit  Name: Jose White      : 1960      MRN: [id number]  Encounter Provider: JOSÉ Craig  Encounter Date: 2024   Encounter department: [facility]    Assessment & Plan  Hyponatremia  Patient presents for TCM. She states she is feeling well.   Recently hospitalized with sodium of 122  Sodium improved to 131 on discharge. She is on a fluid restriction.  has orders for repeat BMP. Follows with nephrology.  Alcoholic cirrhosis of liver with ascites (HCC)  Patient has history of alcoholic cirrhosis of liver with ascites  Undergoes paracentesis every week.  Patient underwent paracentesis in hospital with 2470 mL of acetic fluid also had thoracentesis and 1700 cc of clear fluid removed.  Follows up with GI and nephrology.            History of Present Illness     Transitional Care Management Review:   Jose White is a 64 y.o. female here for TCM follow up.     During the TCM phone call patient stated:  TCM Call       Date and time call was made  2024 12:16 PM    Hospital care reviewed  Records reviewed    Patient was hospitialized at  [facility]    Date of Admission  24    Date of discharge  24    Diagnosis  Hyponatremia    Disposition  Home    Were the patients medications reviewed and updated  Yes    Current Symptoms  Weakness    Weakness severity  Moderate          TCM Call       Post hospital issues  None    Should patient be enrolled in anticoag monitoring?  No    Scheduled for follow up?  Yes    Patients specialists  Nephrologist    Other specialists names  Gastro- /4    Did you obtain your prescribed medications  Yes    Do you need help managing your prescriptions or medications  No    Is transportation to your appointment needed  No    I have advised the patient to call PCP with any new or worsening symptoms  Madna CONNER MA.    Living Arrangements  Family members    Are you recieving any  outpatient services  No    Are you recieving home care services  No    Are you using any community resources  No    Have you fallen in the last 12 months  No    Interperter language line needed  No    Counseling  Patient          TCM      Review of Systems  Objective     BP 98/62 (BP Location: Left arm, Patient Position: Sitting, Cuff Size: Standard)   Pulse 79   Temp (!) 97.4 °F (36.3 °C) (Tympanic)   SpO2 96%     Physical Exam  Vitals and nursing note reviewed.   Constitutional:       General: She is not in acute distress.     Appearance: Normal appearance.   HENT:      Head: Normocephalic.   Eyes:      Extraocular Movements: Extraocular movements intact.   Cardiovascular:      Rate and Rhythm: Normal rate and regular rhythm.      Heart sounds: Normal heart sounds.   Pulmonary:      Effort: Pulmonary effort is normal. No respiratory distress.      Breath sounds: Normal breath sounds. No wheezing.   Chest:      Chest wall: No tenderness.   Musculoskeletal:         General: Normal range of motion.      Cervical back: Normal range of motion and neck supple.   Skin:     Findings: Bruising present.      Comments: Bruising b/l arms   Neurological:      Mental Status: She is alert and oriented to person, place, and time.   Psychiatric:         Mood and Affect: Mood normal.         Behavior: Behavior normal.         Thought Content: Thought content normal.         Judgment: Judgment normal.       Medications have been reviewed by provider in current encounter

## 2024-11-14 ENCOUNTER — HOSPITAL ENCOUNTER (OUTPATIENT)
Dept: INTERVENTIONAL RADIOLOGY/VASCULAR | Facility: HOSPITAL | Age: 64
Discharge: HOME | End: 2024-11-14
Attending: INTERNAL MEDICINE
Payer: MEDICARE

## 2024-11-14 ENCOUNTER — HOSPITAL ENCOUNTER (OUTPATIENT)
Dept: INFUSION CENTER | Facility: HOSPITAL | Age: 64
Discharge: HOME | End: 2024-11-14
Attending: INTERNAL MEDICINE

## 2024-11-14 VITALS
RESPIRATION RATE: 18 BRPM | SYSTOLIC BLOOD PRESSURE: 113 MMHG | HEART RATE: 76 BPM | DIASTOLIC BLOOD PRESSURE: 55 MMHG | OXYGEN SATURATION: 98 %

## 2024-11-14 DIAGNOSIS — K76.82 HEPATIC ENCEPHALOPATHY (HCC): ICD-10-CM

## 2024-11-14 DIAGNOSIS — Z78.9 ALCOHOL USE: ICD-10-CM

## 2024-11-14 DIAGNOSIS — K70.31 ALCOHOLIC CIRRHOSIS OF LIVER WITH ASCITES (HCC): Chronic | ICD-10-CM

## 2024-11-14 DIAGNOSIS — D69.6 THROMBOCYTOPENIA (HCC): ICD-10-CM

## 2024-11-14 PROCEDURE — 32555 ASPIRATE PLEURA W/ IMAGING: CPT

## 2024-11-14 PROCEDURE — 49083 ABD PARACENTESIS W/IMAGING: CPT

## 2024-11-14 NOTE — DISCHARGE INSTRUCTIONS
Abdominal Paracentesis     WHAT YOU NEED TO KNOW:   Abdominal paracentesis is a procedure to remove abnormal fluid buildup in your abdomen. Fluid builds up because of liver problems, such as swelling and scarring. Heart failure, kidney disease, a mass, or problems with your pancreas may also cause fluid buildup.     DISCHARGE INSTRUCTIONS:     Follow up with your healthcare provider as directed: Write down your questions so you remember to ask them during your visits.     Wound care: Remove dressing after 24 hours. Leave glue in place.    Return to your normal activities    Contact Interventional Radiology at [phone] (YOGESH PATIENTS: Contact Interventional Radiology at [phone]) (MARGARITA PATIENTS: Contact Interventional Radiology at [phone]) if:  You have a fever and your wound is red and swollen.   You have yellow, green, or bad-smelling discharge coming from your wound.   You have pain or swelling in your abdomen.   You have an upset stomach or you vomit.   You have sudden, sharp pain in your abdomen.   You urinate very little or not at all.   You feel confused and more tired than usual.   Your arm or leg feels warm, tender, and painful. It may look swollen and red.   You suddenly feel lightheaded and have trouble breathing.           Thoracentesis   WHAT YOU NEED TO KNOW:   A thoracentesis is a procedure to remove extra fluid or air from between your lungs and your inner chest wall. Air or fluid buildup may make it hard for you to breathe. A thoracentesis allows your lungs to expand fully so you can breathe more easily.  DISCHARGE INSTRUCTIONS:   Medicines:   Pain medicine:  You may be given a prescription medicine to decrease pain. Do not wait until the pain is severe before you take your medicine.    Antibiotics:  This medicine helps fight or prevent an infection.    Take your medicine as directed.  Call your healthcare provider if you think your medicine is not helping or if you have side  effects. Tell him if you are allergic to any medicine. Keep a list of the medicines, vitamins, and herbs you take. Include the amounts, and when and why you take them. Bring the list or the pill bottles to follow-up visits. Carry your medicine list with you in case of an emergency.  Follow up with your healthcare provider as directed:  Write down your questions so you remember to ask them during your visits.   Rest:  Rest when you feel it is needed. Slowly start to do more each day. Return to your daily activities as directed.   Do not smoke:  If you smoke, it is never too late to quit. Ask for information about how to stop smoking if you need help.  Contact your healthcare provider if:   You have a fever.    Your puncture site is red, warm, swollen, or draining pus.    You have questions or concerns about your procedure, medicine, or care.    If you have any questions regarding, call the IR department @ [phone].     Seek care immediately or call 911 if:   Blood soaks through your bandage.    There is blood in your spit.

## 2024-11-15 DIAGNOSIS — K21.9 GERD WITHOUT ESOPHAGITIS: ICD-10-CM

## 2024-11-15 DIAGNOSIS — I85.00 ESOPHAGEAL VARICES DETERMINED BY ENDOSCOPY (HCC): ICD-10-CM

## 2024-11-15 DIAGNOSIS — K70.31 ALCOHOLIC CIRRHOSIS OF LIVER WITH ASCITES (HCC): ICD-10-CM

## 2024-11-15 DIAGNOSIS — R18.8 REFRACTORY ASCITES: ICD-10-CM

## 2024-11-15 RX ORDER — PANTOPRAZOLE SODIUM 20 MG/1
20 TABLET, DELAYED RELEASE ORAL DAILY
Qty: 90 TABLET | Refills: 1 | Status: SHIPPED | OUTPATIENT
Start: 2024-11-15

## 2024-11-19 ENCOUNTER — RESULTS FOLLOW-UP (OUTPATIENT)
Dept: NEPHROLOGY | Facility: CLINIC | Age: 64
End: 2024-11-19

## 2024-11-19 DIAGNOSIS — E87.1 HYPONATREMIA: ICD-10-CM

## 2024-11-19 DIAGNOSIS — I10 ESSENTIAL HYPERTENSION: ICD-10-CM

## 2024-11-19 DIAGNOSIS — N18.2 CKD (CHRONIC KIDNEY DISEASE) STAGE 2, GFR 60-89 ML/MIN: ICD-10-CM

## 2024-11-19 DIAGNOSIS — N18.2 STAGE 2 CHRONIC KIDNEY DISEASE: Primary | ICD-10-CM

## 2024-11-19 LAB
BUN SERPL-MCNC: 10 MG/DL (ref 7–25)
BUN/CREAT SERPL: ABNORMAL (CALC) (ref 6–22)
CALCIUM SERPL-MCNC: 8.2 MG/DL (ref 8.6–10.4)
CHLORIDE SERPL-SCNC: 101 MMOL/L (ref 98–110)
CO2 SERPL-SCNC: 23 MMOL/L (ref 20–32)
CREAT SERPL-MCNC: 0.75 MG/DL (ref 0.5–1.05)
GFR/BSA.PRED SERPLBLD CYS-BASED-ARV: 88 ML/MIN/1.73M2
GLUCOSE SERPL-MCNC: 93 MG/DL (ref 65–139)
POTASSIUM SERPL-SCNC: 3.9 MMOL/L (ref 3.5–5.3)
SODIUM SERPL-SCNC: 130 MMOL/L (ref 135–146)

## 2024-11-19 NOTE — TELEPHONE ENCOUNTER
[facility] is [facility] and [email or web address] No further question at this time.  Faxed at [phone]

## 2024-11-19 NOTE — TELEPHONE ENCOUNTER
----- Message from Dayanara Sanderson PA-C sent at 11/19/2024  3:15 PM EST -----  Sodium trending down to 130.  Please make sure she is limiting her fluid intake and continuing the Lasix and would repeat a BMP in 1 week.

## 2024-11-21 ENCOUNTER — HOSPITAL ENCOUNTER (OUTPATIENT)
Dept: INFUSION CENTER | Facility: HOSPITAL | Age: 64
Discharge: HOME | End: 2024-11-21
Attending: INTERNAL MEDICINE

## 2024-11-21 ENCOUNTER — HOSPITAL ENCOUNTER (OUTPATIENT)
Dept: INTERVENTIONAL RADIOLOGY/VASCULAR | Facility: HOSPITAL | Age: 64
Discharge: HOME | End: 2024-11-21
Attending: INTERNAL MEDICINE
Payer: MEDICARE

## 2024-11-21 DIAGNOSIS — K70.31 ALCOHOLIC CIRRHOSIS OF LIVER WITH ASCITES (HCC): Chronic | ICD-10-CM

## 2024-11-21 DIAGNOSIS — K76.82 HEPATIC ENCEPHALOPATHY (HCC): ICD-10-CM

## 2024-11-21 DIAGNOSIS — Z78.9 ALCOHOL USE: ICD-10-CM

## 2024-11-21 DIAGNOSIS — D69.6 THROMBOCYTOPENIA (HCC): ICD-10-CM

## 2024-11-21 PROCEDURE — 32555 ASPIRATE PLEURA W/ IMAGING: CPT

## 2024-11-21 PROCEDURE — 49083 ABD PARACENTESIS W/IMAGING: CPT

## 2024-11-21 RX ORDER — LIDOCAINE HYDROCHLORIDE 10 MG/ML
INJECTION, SOLUTION EPIDURAL; INFILTRATION; INTRACAUDAL; PERINEURAL AS NEEDED
Status: COMPLETED | OUTPATIENT
Start: 2024-11-21 | End: 2024-11-21

## 2024-11-21 RX ADMIN — LIDOCAINE HYDROCHLORIDE 8 ML: 10 INJECTION, SOLUTION EPIDURAL; INFILTRATION; INTRACAUDAL; PERINEURAL at 09:56

## 2024-11-21 RX ADMIN — LIDOCAINE HYDROCHLORIDE 8 ML: 10 INJECTION, SOLUTION EPIDURAL; INFILTRATION; INTRACAUDAL; PERINEURAL at 09:43

## 2024-11-21 NOTE — SEDATION DOCUMENTATION
Weekly therapeutic paracentesis with removal 3130ml clear yellow fluid. Band aid to site. AVS reviewed and ambulated home in her usual state of health.

## 2024-11-21 NOTE — DISCHARGE INSTRUCTIONS
Abdominal Paracentesis     WHAT YOU NEED TO KNOW:   Abdominal paracentesis is a procedure to remove abnormal fluid buildup in your abdomen. Fluid builds up because of liver problems, such as swelling and scarring. Heart failure, kidney disease, a mass, or problems with your pancreas may also cause fluid buildup.     DISCHARGE INSTRUCTIONS:     Follow up with your healthcare provider as directed: Write down your questions so you remember to ask them during your visits.     Wound care: Remove dressing after 24 hours. Leave glue in place.    Return to your normal activities    Contact Interventional Radiology at [phone] (YOGESH PATIENTS: Contact Interventional Radiology at [phone]) (MARGARITA PATIENTS: Contact Interventional Radiology at [phone]) if:  You have a fever and your wound is red and swollen.   You have yellow, green, or bad-smelling discharge coming from your wound.   You have pain or swelling in your abdomen.   You have an upset stomach or you vomit.   You have sudden, sharp pain in your abdomen.   You urinate very little or not at all.   You feel confused and more tired than usual.   Your arm or leg feels warm, tender, and painful. It may look swollen and red.   You suddenly feel lightheaded and have trouble breathing.     Thoracentesis   WHAT YOU NEED TO KNOW:   A thoracentesis is a procedure to remove extra fluid or air from between your lungs and your inner chest wall. Air or fluid buildup may make it hard for you to breathe. A thoracentesis allows your lungs to expand fully so you can breathe more easily.  DISCHARGE INSTRUCTIONS:   Medicines:   Pain medicine:  You may be given a prescription medicine to decrease pain. Do not wait until the pain is severe before you take your medicine.    Antibiotics:  This medicine helps fight or prevent an infection.    Take your medicine as directed.  Call your healthcare provider if you think your medicine is not helping or if you have side effects. Tell  him if you are allergic to any medicine. Keep a list of the medicines, vitamins, and herbs you take. Include the amounts, and when and why you take them. Bring the list or the pill bottles to follow-up visits. Carry your medicine list with you in case of an emergency.  Follow up with your healthcare provider as directed:  Write down your questions so you remember to ask them during your visits.   Rest:  Rest when you feel it is needed. Slowly start to do more each day. Return to your daily activities as directed.   Do not smoke:  If you smoke, it is never too late to quit. Ask for information about how to stop smoking if you need help.  Contact your healthcare provider if:   You have a fever.    Your puncture site is red, warm, swollen, or draining pus.    You have questions or concerns about your procedure, medicine, or care.    If you have any questions regarding, call the IR department @ [phone].     Seek care immediately or call 911 if:   Blood soaks through your bandage.    There is blood in your spit.

## 2024-11-21 NOTE — SEDATION DOCUMENTATION
Weekly therapeutic right thoracentesis with removal 1600ml clear yellow fluid. Band aid to site. AVS reviewed.

## 2024-11-23 LAB — OSMOLALITY UR: 190 MOSM/KG (ref 50–1200)

## 2024-11-25 ENCOUNTER — OFFICE VISIT (OUTPATIENT)
Age: 64
End: 2024-11-25
Payer: MEDICARE

## 2024-11-25 VITALS
TEMPERATURE: 98.1 F | RESPIRATION RATE: 16 BRPM | BODY MASS INDEX: 24.98 KG/M2 | DIASTOLIC BLOOD PRESSURE: 74 MMHG | HEART RATE: 99 BPM | SYSTOLIC BLOOD PRESSURE: 110 MMHG | WEIGHT: 141 LBS | HEIGHT: 63 IN | OXYGEN SATURATION: 94 %

## 2024-11-25 DIAGNOSIS — D05.11 DUCTAL CARCINOMA IN SITU (DCIS) OF RIGHT BREAST: ICD-10-CM

## 2024-11-25 DIAGNOSIS — D53.9 NUTRITIONAL ANEMIA: Primary | ICD-10-CM

## 2024-11-25 PROCEDURE — G2211 COMPLEX E/M VISIT ADD ON: HCPCS | Performed by: INTERNAL MEDICINE

## 2024-11-25 PROCEDURE — 99213 OFFICE O/P EST LOW 20 MIN: CPT | Performed by: INTERNAL MEDICINE

## 2024-11-25 RX ORDER — ANASTROZOLE 1 MG/1
1 TABLET ORAL DAILY
Qty: 90 TABLET | Refills: 3 | Status: SHIPPED | OUTPATIENT
Start: 2024-11-25

## 2024-11-26 LAB
CREAT UR-MCNC: 65 MG/DL (ref 20–275)
OSMOLALITY UR: 190 MOSM/KG (ref 50–1200)
SODIUM UR-SCNC: <10 MMOL/L (ref 28–272)
SODIUM/CREAT UR-SRTO: ABNORMAL MMOL/G CREAT (ref 28–280)

## 2024-11-27 NOTE — PROGRESS NOTES
Name: Jose White      : 1960      MRN: [id number]  Encounter Provider: Lala Shanks DO  Encounter Date: 2024   Encounter department: [facility]AT[facility]     Cancer Staging   Ductal carcinoma in situ (DCIS) of right breast  Staging form: Breast, AJCC 8th Edition  - Clinical: Stage 0 (cTis (DCIS), cN0, cM0, ER+, NC+, HER2: Not Assessed) - Signed by Candace Bustamante MD on 2021  :  Assessment & Plan  Nutritional anemia    Orders:    Iron Panel (Includes Ferritin, Iron Sat%, Iron, and TIBC); Future    Folate; Future    Vitamin B12; Future    Ferritin; Future    Protein electrophoresis, serum; Future    Immunoglobulin free LT chains blood; Future    Ductal carcinoma in situ (DCIS) of right breast    Orders:    anastrozole (ARIMIDEX) 1 mg tablet; Take 1 tablet (1 mg total) by mouth daily    64-year-old female with a history of DCIS ER/NC positive.  She will continue with anastrozole and we will continue to see her on a yearly basis.  Had a workup for anemia which she will get now.  I will call her with that result.  Her cytopenias are likely due to her liver disease but if they would worsen we could consider doing a biopsy of the bone marrow to make sure nothing else is going on.  We can discuss that when I call her with those results.    History of Present Illness     Reason for Visit / CC: Follow-up DCIS and anemia   HPI  Jose White is a 64 y.o. female with history of ductal carcinoma in situ ER positive.  She is on anastrozole since 2021.  She tolerates it well without any excessive side effects.  She had a DEXA scan in August that showed a T-score of -2.0 in the left femoral neck as its worst score.  She is on Fosamax under the direction of her PCP.  She is going to [place] tomorrow to see her breast surgeon for a breast exam.  She denies any breast complaints.  She also has cirrhosis and is still actively drinking alcohol.  CBC  "reveals a hemoglobin of 9.3, platelets 71,000, , white count 4.3.  He denies any bright red blood per rectum or melena.       Oncology History   Oncology History Overview Note   Ductal carcinoma in situ, right breast, 0.4 cm s/p excisional biopsy 9/2021  Atypical ductal hyperplasia, right breast s/p excisional biopsy 9/2021  Lobular neoplasia (LCIS), right breast s/p excisional biopsy 9/2021    Pathologic AJCC (8th Edition) Stage 0: pTis (DCIS), cN0, cM0, G1, ER Positive, MO Positive, HER2 Unknown.    7/2021 started Arimidex    12/2021 completed radiation therapy at [facility]         Ductal carcinoma in situ (DCIS) of right breast   6/14/2021 Initial Diagnosis    Ductal carcinoma in situ (DCIS) of right breast     6/14/2021 -  Cancer Staged    Staging form: Breast, AJCC 8th Edition  - Clinical: Stage 0 (cTis (DCIS), cN0, cM0, ER+, MO+, HER2: Not Assessed) - Signed by Candace Bustamante MD on 6/14/2021  Stage prefix: Initial diagnosis  Method of lymph node assessment: Clinical  Nuclear grade: G2          Review of Systems A complete review of systems is negative other than that noted above in the HPI.        Objective   /74 (BP Location: Left arm, Patient Position: Sitting, Cuff Size: Adult)   Pulse 99   Temp 98.1 °F (36.7 °C) (Temporal)   Resp 16   Ht 5' 3\" (1.6 m)   Wt 64 kg (141 lb)   SpO2 94%   BMI 24.98 kg/m²     Blood pressure 110/74, pulse 99, temperature 98.1 °F (36.7 °C), temperature source Temporal, resp. rate 16, height 5' 3\" (1.6 m), weight 64 kg (141 lb), SpO2 94%, not currently breastfeeding.  ECOG    Physical Exam     GEN: Alert, awake oriented x3, in no acute distress  HEENT- No pallor, icterus, cyanosis, no oral mucosal lesions,   LAD - no palpable cervical, clavicle, axillary, inguinal LAD  Heart- normal S1 S2, regular rate and rhythm, No murmur, rubs.   Lungs- clear breathing sound bilateral.   Abdomen- soft, Non tender, bowel sounds present  Extremities- No cyanosis, " clubbing, edema  Neuro- No focal neurological deficit      Labs: All pertinent labs and imaging reviewed.

## 2024-11-29 ENCOUNTER — HOSPITAL ENCOUNTER (OUTPATIENT)
Dept: INFUSION CENTER | Facility: HOSPITAL | Age: 64
Discharge: HOME | End: 2024-11-29
Attending: INTERNAL MEDICINE

## 2024-11-29 ENCOUNTER — HOSPITAL ENCOUNTER (OUTPATIENT)
Dept: INTERVENTIONAL RADIOLOGY/VASCULAR | Facility: HOSPITAL | Age: 64
Discharge: HOME | End: 2024-11-29
Attending: INTERNAL MEDICINE
Payer: MEDICARE

## 2024-11-29 ENCOUNTER — PATIENT MESSAGE (OUTPATIENT)
Age: 64
End: 2024-11-29

## 2024-11-29 VITALS
RESPIRATION RATE: 18 BRPM | OXYGEN SATURATION: 95 % | HEART RATE: 84 BPM | DIASTOLIC BLOOD PRESSURE: 56 MMHG | SYSTOLIC BLOOD PRESSURE: 121 MMHG

## 2024-11-29 VITALS
HEART RATE: 89 BPM | DIASTOLIC BLOOD PRESSURE: 53 MMHG | OXYGEN SATURATION: 94 % | RESPIRATION RATE: 18 BRPM | SYSTOLIC BLOOD PRESSURE: 90 MMHG

## 2024-11-29 DIAGNOSIS — K70.31 ALCOHOLIC CIRRHOSIS OF LIVER WITH ASCITES (HCC): Chronic | ICD-10-CM

## 2024-11-29 DIAGNOSIS — D69.6 THROMBOCYTOPENIA (HCC): ICD-10-CM

## 2024-11-29 DIAGNOSIS — Z78.9 ALCOHOL USE: ICD-10-CM

## 2024-11-29 DIAGNOSIS — K76.82 HEPATIC ENCEPHALOPATHY (HCC): ICD-10-CM

## 2024-11-29 PROCEDURE — 32555 ASPIRATE PLEURA W/ IMAGING: CPT

## 2024-11-29 PROCEDURE — 49083 ABD PARACENTESIS W/IMAGING: CPT | Performed by: RADIOLOGY

## 2024-11-29 PROCEDURE — 32555 ASPIRATE PLEURA W/ IMAGING: CPT | Performed by: RADIOLOGY

## 2024-11-29 PROCEDURE — 49083 ABD PARACENTESIS W/IMAGING: CPT

## 2024-11-29 NOTE — H&P
Interventional Radiology  History and Physical 11/29/2024     Jose White   1960   [id number]    Assessment/Plan:  64 year old female with history of cirrhosis and recurrent ascites and pleural effusion returns for paracentesis and right thoracentesis.    Problem List Items Addressed This Visit          Digestive    Alcoholic cirrhosis of liver with ascites (HCC) (Chronic)    Relevant Orders    IR AMB Paracentesis       Hematopoietic and Hemostatic    Thrombocytopenia (HCC)    Relevant Orders    IR AMB Paracentesis       Behavioral Health    Alcohol use    Relevant Orders    IR AMB Paracentesis          Subjective:     Patient ID: Jose White is a 64 y.o. female.    History of Present Illness  Patient with history of cirrhosis and recurrent ascites and pleural effusion returns for paracentesis and right thoracentesis.    Review of Systems      Past Medical History:   Diagnosis Date    Alcoholic fatty liver     Anxiety     Asthma     COPD (chronic obstructive pulmonary disease) (HCC)     Elevated liver function tests     GERD (gastroesophageal reflux disease)     GERD without esophagitis     Hyperlipidemia     Hypertension     IBS (irritable bowel syndrome)     Insomnia     Insomnia     Liver disease     Nervousness     Nicotine dependence     Other specified urinary incontinence     RLS (restless legs syndrome)     Wears glasses         Past Surgical History:   Procedure Laterality Date    BACK SURGERY      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    BREAST EXCISIONAL BIOPSY Right 11/01/2004    benign    CATARACT EXTRACTION, BILATERAL  08/01/2018    COLONOSCOPY N/A 5/8/2019    Procedure: COLONOSCOPY;  Surgeon: Jacobo Leonardo MD;  Location: [facility] GI LAB;  Service: Gastroenterology    EGD AND COLONOSCOPY N/A 3/19/2018    Procedure: EGD AND COLONOSCOPY;  Surgeon: Jacobo Leonardo MD;  Location: [facility] GI LAB;  Service: Gastroenterology    ESOPHAGOGASTRODUODENOSCOPY N/A 5/8/2019    Procedure:  ESOPHAGOGASTRODUODENOSCOPY (EGD);  Surgeon: Jacobo Leonardo MD;  Location: [facility] GI [facility];  Service: Gastroenterology    IR PARACENTESIS  11/27/2020    IR PARACENTESIS  12/8/2020    IR PARACENTESIS  10/28/2022    IR PARACENTESIS  11/7/2022    IR PARACENTESIS  11/30/2022    IR PARACENTESIS  12/30/2022    IR PARACENTESIS  1/16/2023    IR PARACENTESIS  8/6/2024    IR PARACENTESIS  9/18/2024    IR PARACENTESIS  10/3/2024    IR PARACENTESIS  10/10/2024    IR PARACENTESIS  10/17/2024    IR PARACENTESIS  10/24/2024    IR PARACENTESIS  11/1/2024    IR PARACENTESIS  11/7/2024    IR PARACENTESIS  11/14/2024    IR PARACENTESIS  11/21/2024    IR THORACENTESIS  1/16/2023    IR THORACENTESIS  3/28/2023    IR THORACENTESIS  7/25/2024    IR THORACENTESIS  8/6/2024    IR THORACENTESIS  8/14/2024    IR THORACENTESIS  8/21/2024    IR THORACENTESIS  8/28/2024    IR THORACENTESIS  9/4/2024    IR THORACENTESIS  9/11/2024    IR THORACENTESIS  9/18/2024    IR THORACENTESIS  9/25/2024    IR THORACENTESIS  10/3/2024    IR THORACENTESIS  10/10/2024    IR THORACENTESIS  10/17/2024    IR THORACENTESIS  10/24/2024    IR THORACENTESIS  11/1/2024    IR THORACENTESIS  11/7/2024    IR THORACENTESIS  11/14/2024    IR THORACENTESIS  11/21/2024    KNEE SURGERY      KNEE SURGERY      LUMBAR LAMINECTOMY  04/1999    LYMPH NODE BIOPSY      MAMMO STEREOTACTIC BREAST BIOPSY RIGHT (ALL INC) Right 5/21/2021    MAMMO STEREOTACTIC BREAST BIOPSY RIGHT (ALL INC) EACH ADD Right 5/21/2021    TOTAL ABDOMINAL HYSTERECTOMY  02/05/2008    TUBAL LIGATION      TUBAL LIGATION  1989    UPPER GASTROINTESTINAL ENDOSCOPY          Social History     Tobacco Use   Smoking Status Every Day    Current packs/day: 1.00    Average packs/day: 1 pack/day for 46.9 years (46.9 ttl pk-yrs)    Types: Cigarettes    Start date: 1/1/1978   Smokeless Tobacco Never        Social History     Substance and Sexual Activity   Alcohol Use Not Currently    Comment: 7-10 drinks/wk        Social History      Substance and Sexual Activity   Drug Use No        Allergies   Allergen Reactions    Sulfa Antibiotics Shortness Of Breath    Ace Inhibitors Cough and Other (See Comments)     coughing       Current Outpatient Medications   Medication Sig Dispense Refill    albuterol (2.5 mg/3 mL) 0.083 % nebulizer solution Take 3 mL (2.5 mg total) by nebulization every 6 (six) hours as needed for wheezing or shortness of breath 1080 mL 3    albuterol (Proventil HFA) 90 mcg/act inhaler Inhale 2 puffs every 6 (six) hours as needed for wheezing or shortness of breath 18 g 6    alendronate (Fosamax) 70 mg tablet Take 1 tablet (70 mg total) by mouth every 7 days 12 tablet 3    ALPRAZolam (XANAX) 1 mg tablet Take 1 tablet (1 mg total) by mouth 2 (two) times a day as needed for anxiety 60 tablet 2    anastrozole (ARIMIDEX) 1 mg tablet Take 1 tablet (1 mg total) by mouth daily 90 tablet 3    benzonatate (TESSALON) 200 MG capsule Take 1 capsule (200 mg total) by mouth 3 (three) times a day as needed for cough 90 capsule 6    budesonide-formoterol (Symbicort) 160-4.5 mcg/act inhaler Inhale 2 puffs 2 (two) times a day Rinse mouth after use. 10.2 g 6    furosemide (LASIX) 20 mg tablet Take 1 tablet (20 mg total) by mouth daily      lactulose (CHRONULAC) 10 g/15 mL solution Take 15 mL (10 g total) by mouth 2 (two) times a day 240 mL 0    midodrine (PROAMATINE) 10 MG tablet Take 1 tablet (10 mg total) by mouth 3 (three) times a day before meals 90 tablet 3    naltrexone (REVIA) 50 mg tablet Take 1 tablet (50 mg total) by mouth daily 30 tablet 5    pantoprazole (PROTONIX) 20 mg tablet Take 1 tablet (20 mg total) by mouth daily 90 tablet 1    Risankizumab-rzaa (SKYRIZI PEN SC) Inject under the skin      traZODone (DESYREL) 50 mg tablet Take 1 tablet (50 mg total) by mouth daily at bedtime 30 tablet 5     No current facility-administered medications for this encounter.          Objective:    There were no vitals filed for this visit.      Physical Exam  Pulmonary:      Effort: Pulmonary effort is normal.   Abdominal:      General: There is distension.           Lab Results   Component Value Date    BNP 63 11/06/2024      Lab Results   Component Value Date    WBC 5.1 11/18/2024    HGB 11.3 (L) 11/18/2024    HCT 32.2 (L) 11/18/2024    .9 (H) 11/18/2024     (L) 11/18/2024     Lab Results   Component Value Date    INR 1.4 (H) 11/18/2024    INR 1.66 (H) 11/07/2024    INR 1.62 (H) 11/06/2024    PROTIME 14.2 (H) 11/18/2024    PROTIME 20.1 (H) 11/07/2024    PROTIME 19.7 (H) 11/06/2024     Lab Results   Component Value Date    PTT 34 11/05/2024         I have personally reviewed pertinent imaging and laboratory results.     Code Status: Prior  Advance Directive and Living Will:      Power of :    POLST:      This text is generated with voice recognition software. There may be translation, syntax,  or grammatical errors. If you have any questions, please contact the dictating provider.

## 2024-11-29 NOTE — BRIEF OP NOTE (RAD/CATH)
INTERVENTIONAL RADIOLOGY PROCEDURE NOTE    Date: 11/29/2024    Procedure:   Procedure Summary       Date: 11/29/24 Room / Location: [facility] Interventional Radiology    Anesthesia Start:  Anesthesia Stop:     Procedure: IR THORACENTESIS and PARACENTESIS Diagnosis:       Alcoholic cirrhosis of liver with ascites (HCC)      Alcohol use      Thrombocytopenia (HCC)      (refractory ascites)    Scheduled Providers:  Responsible Provider:     Anesthesia Type: Not recorded ASA Status: Not recorded            Preoperative diagnosis:   1. Alcoholic cirrhosis of liver with ascites (HCC)    2. Alcohol use    3. Thrombocytopenia (HCC)         Postoperative diagnosis: Same.    Surgeon: Johnny Corral MD     Assistant: None. No qualified resident was available.    Blood loss: None    Specimens:   2 L sheryl right pleural fluid removed.   1660 mL sheryl ascites fluid removed.    Findings: Successful right thoracentesis and paracentesis.    Complications: None immediate.    Anesthesia: local

## 2024-11-29 NOTE — DISCHARGE INSTRUCTIONS
Abdominal Paracentesis     WHAT YOU NEED TO KNOW:   Abdominal paracentesis is a procedure to remove abnormal fluid buildup in your abdomen. Fluid builds up because of liver problems, such as swelling and scarring. Heart failure, kidney disease, a mass, or problems with your pancreas may also cause fluid buildup.     DISCHARGE INSTRUCTIONS:     Follow up with your healthcare provider as directed: Write down your questions so you remember to ask them during your visits.     Wound care: Remove dressing after 24 hours. Leave glue in place.    Return to your normal activities    Contact Interventional Radiology at [phone] if:  You have a fever and your wound is red and swollen.   You have yellow, green, or bad-smelling discharge coming from your wound.   You have pain or swelling in your abdomen.   You have an upset stomach or you vomit.   You have sudden, sharp pain in your abdomen.   You urinate very little or not at all.   You feel confused and more tired than usual.   Your arm or leg feels warm, tender, and painful. It may look swollen and red.   You suddenly feel lightheaded and have trouble breathing.           Thoracentesis   WHAT YOU NEED TO KNOW:   A thoracentesis is a procedure to remove extra fluid or air from between your lungs and your inner chest wall. Air or fluid buildup may make it hard for you to breathe. A thoracentesis allows your lungs to expand fully so you can breathe more easily.  DISCHARGE INSTRUCTIONS:   Medicines:   Pain medicine:  You may be given a prescription medicine to decrease pain. Do not wait until the pain is severe before you take your medicine.    Antibiotics:  This medicine helps fight or prevent an infection.    Take your medicine as directed.  Call your healthcare provider if you think your medicine is not helping or if you have side effects. Tell him if you are allergic to any medicine. Keep a list of the medicines, vitamins, and herbs you take. Include the amounts, and  when and why you take them. Bring the list or the pill bottles to follow-up visits. Carry your medicine list with you in case of an emergency.  Follow up with your healthcare provider as directed:  Write down your questions so you remember to ask them during your visits.   Rest:  Rest when you feel it is needed. Slowly start to do more each day. Return to your daily activities as directed.   Do not smoke:  If you smoke, it is never too late to quit. Ask for information about how to stop smoking if you need help.  Contact your healthcare provider if:   You have a fever.    Your puncture site is red, warm, swollen, or draining pus.    You have questions or concerns about your procedure, medicine, or care.    If you have any questions regarding, call the IR department @ [phone].     Seek care immediately or call 911 if:   Blood soaks through your bandage.    There is blood in your spit.

## 2024-11-29 NOTE — DISCHARGE INSTRUCTIONS
Abdominal Paracentesis     WHAT YOU NEED TO KNOW:   Abdominal paracentesis is a procedure to remove abnormal fluid buildup in your abdomen. Fluid builds up because of liver problems, such as swelling and scarring. Heart failure, kidney disease, a mass, or problems with your pancreas may also cause fluid buildup.     DISCHARGE INSTRUCTIONS:     Follow up with your healthcare provider as directed: Write down your questions so you remember to ask them during your visits.     Wound care: Remove dressing after 24 hours. Leave glue in place.    Return to your normal activities    Contact Interventional Radiology at [phone] ([place] PATIENTS: Contact Interventional Radiology at [phone]) ([place] PATIENTS: Contact Interventional Radiology at [phone]) if:  You have a fever and your wound is red and swollen.   You have yellow, green, or bad-smelling discharge coming from your wound.   You have pain or swelling in your abdomen.   You have an upset stomach or you vomit.   You have sudden, sharp pain in your abdomen.   You urinate very little or not at all.   You feel confused and more tired than usual.   Your arm or leg feels warm, tender, and painful. It may look swollen and red.   You suddenly feel lightheaded and have trouble breathing.

## 2024-12-03 DIAGNOSIS — K70.31 ALCOHOLIC CIRRHOSIS OF LIVER WITH ASCITES (HCC): Primary | ICD-10-CM

## 2024-12-03 RX ORDER — ALBUMIN (HUMAN) 12.5 G/50ML
75 SOLUTION INTRAVENOUS ONCE AS NEEDED
OUTPATIENT
Start: 2024-12-05

## 2024-12-03 RX ORDER — ALBUMIN (HUMAN) 12.5 G/50ML
100 SOLUTION INTRAVENOUS ONCE AS NEEDED
OUTPATIENT
Start: 2024-12-05

## 2024-12-03 RX ORDER — ALBUMIN (HUMAN) 12.5 G/50ML
50 SOLUTION INTRAVENOUS ONCE AS NEEDED
OUTPATIENT
Start: 2024-12-05

## 2024-12-03 RX ORDER — SODIUM CHLORIDE 9 MG/ML
20 INJECTION, SOLUTION INTRAVENOUS ONCE
OUTPATIENT
Start: 2024-12-05

## 2024-12-05 ENCOUNTER — HOSPITAL ENCOUNTER (OUTPATIENT)
Dept: INTERVENTIONAL RADIOLOGY/VASCULAR | Facility: HOSPITAL | Age: 64
Discharge: HOME | End: 2024-12-05
Attending: INTERNAL MEDICINE | Admitting: RADIOLOGY
Payer: MEDICARE

## 2024-12-05 ENCOUNTER — HOSPITAL ENCOUNTER (OUTPATIENT)
Dept: INTERVENTIONAL RADIOLOGY/VASCULAR | Facility: HOSPITAL | Age: 64
Discharge: HOME | End: 2024-12-05
Attending: INTERNAL MEDICINE
Payer: MEDICARE

## 2024-12-05 ENCOUNTER — HOSPITAL ENCOUNTER (OUTPATIENT)
Dept: INFUSION CENTER | Facility: HOSPITAL | Age: 64
Discharge: HOME | End: 2024-12-05
Attending: INTERNAL MEDICINE

## 2024-12-05 VITALS
RESPIRATION RATE: 22 BRPM | DIASTOLIC BLOOD PRESSURE: 60 MMHG | HEART RATE: 94 BPM | SYSTOLIC BLOOD PRESSURE: 117 MMHG | OXYGEN SATURATION: 95 %

## 2024-12-05 DIAGNOSIS — K76.82 HEPATIC ENCEPHALOPATHY (HCC): ICD-10-CM

## 2024-12-05 DIAGNOSIS — K70.31 ALCOHOLIC CIRRHOSIS OF LIVER WITH ASCITES (HCC): Chronic | ICD-10-CM

## 2024-12-05 DIAGNOSIS — Z78.9 ALCOHOL USE: ICD-10-CM

## 2024-12-05 DIAGNOSIS — D69.6 THROMBOCYTOPENIA (HCC): ICD-10-CM

## 2024-12-05 PROCEDURE — 49083 ABD PARACENTESIS W/IMAGING: CPT

## 2024-12-05 PROCEDURE — 32555 ASPIRATE PLEURA W/ IMAGING: CPT

## 2024-12-05 RX ORDER — LIDOCAINE HYDROCHLORIDE 10 MG/ML
INJECTION, SOLUTION EPIDURAL; INFILTRATION; INTRACAUDAL; PERINEURAL AS NEEDED
Status: DISCONTINUED | OUTPATIENT
Start: 2024-12-05 | End: 2024-12-06 | Stop reason: HOSPADM

## 2024-12-05 RX ORDER — LIDOCAINE HYDROCHLORIDE 10 MG/ML
INJECTION, SOLUTION EPIDURAL; INFILTRATION; INTRACAUDAL; PERINEURAL AS NEEDED
Status: COMPLETED | OUTPATIENT
Start: 2024-12-05 | End: 2024-12-05

## 2024-12-05 RX ADMIN — LIDOCAINE HYDROCHLORIDE 7 ML: 10 INJECTION, SOLUTION EPIDURAL; INFILTRATION; INTRACAUDAL; PERINEURAL at 09:06

## 2024-12-05 RX ADMIN — LIDOCAINE HYDROCHLORIDE 5 ML: 10 INJECTION, SOLUTION EPIDURAL; INFILTRATION; INTRACAUDAL; PERINEURAL at 08:50

## 2024-12-05 NOTE — DISCHARGE INSTRUCTIONS
Abdominal Paracentesis     WHAT YOU NEED TO KNOW:   Abdominal paracentesis is a procedure to remove abnormal fluid buildup in your abdomen. Fluid builds up because of liver problems, such as swelling and scarring. Heart failure, kidney disease, a mass, or problems with your pancreas may also cause fluid buildup.     DISCHARGE INSTRUCTIONS:     Follow up with your healthcare provider as directed: Write down your questions so you remember to ask them during your visits.     Wound care: Remove dressing after 24 hours. Leave glue in place.    Return to your normal activities    Contact Interventional Radiology at [phone] ([place] PATIENTS: Contact Interventional Radiology at [phone]) ([place] PATIENTS: Contact Interventional Radiology at [phone]) if:  You have a fever and your wound is red and swollen.   You have yellow, green, or bad-smelling discharge coming from your wound.   You have pain or swelling in your abdomen.   You have an upset stomach or you vomit.   You have sudden, sharp pain in your abdomen.   You urinate very little or not at all.   You feel confused and more tired than usual.   Your arm or leg feels warm, tender, and painful. It may look swollen and red.   You suddenly feel lightheaded and have trouble breathing.     Abdominal Paracentesis     WHAT YOU NEED TO KNOW:   Abdominal paracentesis is a procedure to remove abnormal fluid buildup in your abdomen. Fluid builds up because of liver problems, such as swelling and scarring. Heart failure, kidney disease, a mass, or problems with your pancreas may also cause fluid buildup.     DISCHARGE INSTRUCTIONS:     Follow up with your healthcare provider as directed: Write down your questions so you remember to ask them during your visits.     Wound care: Remove dressing after 24 hours. Leave glue in place.    Return to your normal activities    Contact Interventional Radiology at [phone] ([place] PATIENTS: Contact Interventional Radiology at  [phone]) ([place] PATIENTS: Contact Interventional Radiology at [phone]) if:  You have a fever and your wound is red and swollen.   You have yellow, green, or bad-smelling discharge coming from your wound.   You have pain or swelling in your abdomen.   You have an upset stomach or you vomit.   You have sudden, sharp pain in your abdomen.   You urinate very little or not at all.   You feel confused and more tired than usual.   Your arm or leg feels warm, tender, and painful. It may look swollen and red.   You suddenly feel lightheaded and have trouble breathing.

## 2024-12-05 NOTE — DISCHARGE INSTRUCTIONS
Thoracentesis   WHAT YOU NEED TO KNOW:   A thoracentesis is a procedure to remove extra fluid or air from between your lungs and your inner chest wall. Air or fluid buildup may make it hard for you to breathe. A thoracentesis allows your lungs to expand fully so you can breathe more easily.  DISCHARGE INSTRUCTIONS:   Medicines:   Pain medicine:  You may be given a prescription medicine to decrease pain. Do not wait until the pain is severe before you take your medicine.    Antibiotics:  This medicine helps fight or prevent an infection.    Take your medicine as directed.  Call your healthcare provider if you think your medicine is not helping or if you have side effects. Tell him if you are allergic to any medicine. Keep a list of the medicines, vitamins, and herbs you take. Include the amounts, and when and why you take them. Bring the list or the pill bottles to follow-up visits. Carry your medicine list with you in case of an emergency.  Follow up with your healthcare provider as directed:  Write down your questions so you remember to ask them during your visits.   Rest:  Rest when you feel it is needed. Slowly start to do more each day. Return to your daily activities as directed.   Do not smoke:  If you smoke, it is never too late to quit. Ask for information about how to stop smoking if you need help.  Contact your healthcare provider if:   You have a fever.    Your puncture site is red, warm, swollen, or draining pus.    You have questions or concerns about your procedure, medicine, or care.    If you have any questions regarding, call the IR department @ [phone].     Seek care immediately or call 911 if:   Blood soaks through your bandage.    There is blood in your spit.

## 2024-12-05 NOTE — BRIEF OP NOTE (RAD/CATH)
INTERVENTIONAL RADIOLOGY PROCEDURE NOTE    Date: 12/5/2024    Procedure: Right thoracentesis  Procedure Summary       Date: 12/05/24 Room / Location: [facility] Interventional Radiology    Anesthesia Start:  Anesthesia Stop:     Procedure: IR THORACENTESIS Diagnosis:       Alcoholic cirrhosis of liver with ascites (HCC)      Alcohol use      Thrombocytopenia (HCC)      Hepatic encephalopathy (HCC)      (decompensated cirrhosis with refractory ascites/hepatic encephalopathy)    Scheduled Providers:  Responsible Provider:     Anesthesia Type: Not recorded ASA Status: Not recorded            Preoperative diagnosis:   1. Alcoholic cirrhosis of liver with ascites (HCC)    2. Alcohol use    3. Thrombocytopenia (HCC)    4. Hepatic encephalopathy (HCC)         Postoperative diagnosis: Same.    Surgeon: Lakhwinder Mahan MD     Assistant: None. No qualified resident was available.    Blood loss: None    Specimens: None     Findings: Right thoracentesis and 2000 ml of yellow fluid aspirated    Complications: None immediate.    Anesthesia: local

## 2024-12-05 NOTE — BRIEF OP NOTE (RAD/CATH)
INTERVENTIONAL RADIOLOGY PROCEDURE NOTE    Date: 12/5/2024    Procedure: Paracentesis  Procedure Summary       Date: 12/05/24 Room / Location: [facility] Interventional Radiology    Anesthesia Start:  Anesthesia Stop:     Procedure: IR PARACENTESIS Diagnosis:       Alcoholic cirrhosis of liver with ascites (HCC)      Alcohol use      Thrombocytopenia (HCC)      (refractory ascites)    Scheduled Providers:  Responsible Provider:     Anesthesia Type: Not recorded ASA Status: Not recorded            Preoperative diagnosis:   1. Alcoholic cirrhosis of liver with ascites (HCC)    2. Alcohol use    3. Thrombocytopenia (HCC)         Postoperative diagnosis: Same.    Surgeon: Lakhwinder Mahan MD     Assistant: None. No qualified resident was available.    Blood loss: None    Specimens: None     Findings: Right abdominal approach paracentesis and 1900 ml of clear yellow fluid aspirated.    Complications: None immediate.    Anesthesia: local

## 2024-12-06 ENCOUNTER — TELEPHONE (OUTPATIENT)
Age: 64
End: 2024-12-06

## 2024-12-06 ENCOUNTER — PREP FOR PROCEDURE (OUTPATIENT)
Age: 64
End: 2024-12-06

## 2024-12-06 DIAGNOSIS — K70.31 ALCOHOLIC CIRRHOSIS OF LIVER WITH ASCITES (HCC): Primary | ICD-10-CM

## 2024-12-06 NOTE — PATIENT COMMUNICATION
Pt transferred in. States she was advised by IR that they need new order for pt to have PARACENTESIS/THORACENTESIS before EGD. Also states standing order for 52 weeks expires 12/26/24. They will need new standing order as well.

## 2024-12-06 NOTE — TELEPHONE ENCOUNTER
Pt called stated she needs a new order for paracentesis. Warm transferred over to triage nurse for further assistance

## 2024-12-09 ENCOUNTER — PREP FOR PROCEDURE (OUTPATIENT)
Dept: GASTROENTEROLOGY | Facility: CLINIC | Age: 64
End: 2024-12-09

## 2024-12-09 DIAGNOSIS — J91.8 PLEURAL EFFUSION ASSOCIATED WITH HEPATIC DISORDER: ICD-10-CM

## 2024-12-09 DIAGNOSIS — K76.9 PLEURAL EFFUSION ASSOCIATED WITH HEPATIC DISORDER: ICD-10-CM

## 2024-12-09 DIAGNOSIS — K70.31 ALCOHOLIC CIRRHOSIS OF LIVER WITH ASCITES (HCC): Primary | ICD-10-CM

## 2024-12-10 ENCOUNTER — PREP FOR PROCEDURE (OUTPATIENT)
Age: 64
End: 2024-12-10

## 2024-12-10 ENCOUNTER — ANESTHESIA EVENT (OUTPATIENT)
Dept: GASTROENTEROLOGY | Facility: HOSPITAL | Age: 64
End: 2024-12-10
Payer: MEDICARE

## 2024-12-10 ENCOUNTER — ANESTHESIA (OUTPATIENT)
Dept: GASTROENTEROLOGY | Facility: HOSPITAL | Age: 64
End: 2024-12-10
Payer: MEDICARE

## 2024-12-10 ENCOUNTER — HOSPITAL ENCOUNTER (OUTPATIENT)
Dept: GASTROENTEROLOGY | Facility: HOSPITAL | Age: 64
Setting detail: OUTPATIENT SURGERY
Discharge: HOME | End: 2024-12-10
Attending: INTERNAL MEDICINE
Payer: MEDICARE

## 2024-12-10 VITALS
DIASTOLIC BLOOD PRESSURE: 58 MMHG | SYSTOLIC BLOOD PRESSURE: 118 MMHG | TEMPERATURE: 99 F | OXYGEN SATURATION: 95 % | RESPIRATION RATE: 22 BRPM | HEART RATE: 81 BPM

## 2024-12-10 DIAGNOSIS — K70.31 ALCOHOLIC CIRRHOSIS OF LIVER WITH ASCITES (HCC): ICD-10-CM

## 2024-12-10 DIAGNOSIS — K76.9 PLEURAL EFFUSION ASSOCIATED WITH HEPATIC DISORDER: ICD-10-CM

## 2024-12-10 DIAGNOSIS — K70.31 ALCOHOLIC CIRRHOSIS OF LIVER WITH ASCITES (HCC): Primary | ICD-10-CM

## 2024-12-10 DIAGNOSIS — J91.8 PLEURAL EFFUSION ASSOCIATED WITH HEPATIC DISORDER: ICD-10-CM

## 2024-12-10 DIAGNOSIS — K76.82 HEPATIC ENCEPHALOPATHY (HCC): ICD-10-CM

## 2024-12-10 DIAGNOSIS — D69.6 THROMBOCYTOPENIA (HCC): ICD-10-CM

## 2024-12-10 PROCEDURE — 88305 TISSUE EXAM BY PATHOLOGIST: CPT | Performed by: PATHOLOGY

## 2024-12-10 PROCEDURE — 88112 CYTOPATH CELL ENHANCE TECH: CPT | Performed by: STUDENT IN AN ORGANIZED HEALTH CARE EDUCATION/TRAINING PROGRAM

## 2024-12-10 PROCEDURE — 43239 EGD BIOPSY SINGLE/MULTIPLE: CPT | Performed by: INTERNAL MEDICINE

## 2024-12-10 RX ORDER — SODIUM CHLORIDE, SODIUM LACTATE, POTASSIUM CHLORIDE, CALCIUM CHLORIDE 600; 310; 30; 20 MG/100ML; MG/100ML; MG/100ML; MG/100ML
INJECTION, SOLUTION INTRAVENOUS CONTINUOUS PRN
Status: DISCONTINUED | OUTPATIENT
Start: 2024-12-10 | End: 2024-12-10

## 2024-12-10 RX ORDER — PROPOFOL 10 MG/ML
INJECTION, EMULSION INTRAVENOUS AS NEEDED
Status: DISCONTINUED | OUTPATIENT
Start: 2024-12-10 | End: 2024-12-10

## 2024-12-10 RX ORDER — LIDOCAINE HYDROCHLORIDE 20 MG/ML
INJECTION, SOLUTION EPIDURAL; INFILTRATION; INTRACAUDAL; PERINEURAL AS NEEDED
Status: DISCONTINUED | OUTPATIENT
Start: 2024-12-10 | End: 2024-12-10

## 2024-12-10 RX ADMIN — PROPOFOL 50 MG: 10 INJECTION, EMULSION INTRAVENOUS at 10:05

## 2024-12-10 RX ADMIN — PROPOFOL 50 MG: 10 INJECTION, EMULSION INTRAVENOUS at 09:57

## 2024-12-10 RX ADMIN — PROPOFOL 50 MG: 10 INJECTION, EMULSION INTRAVENOUS at 10:01

## 2024-12-10 RX ADMIN — PROPOFOL 150 MG: 10 INJECTION, EMULSION INTRAVENOUS at 09:54

## 2024-12-10 RX ADMIN — LIDOCAINE HYDROCHLORIDE 100 MG: 20 INJECTION, SOLUTION EPIDURAL; INFILTRATION; INTRACAUDAL; PERINEURAL at 09:54

## 2024-12-10 RX ADMIN — SODIUM CHLORIDE, SODIUM LACTATE, POTASSIUM CHLORIDE, AND CALCIUM CHLORIDE: .6; .31; .03; .02 INJECTION, SOLUTION INTRAVENOUS at 09:51

## 2024-12-10 NOTE — ANESTHESIA POSTPROCEDURE EVALUATION
Post-Op Assessment Note    CV Status:  Stable  Pain Score: 0    Pain management: adequate       Mental Status:  Arousable and sleepy   Hydration Status:  Stable   PONV Controlled:  Controlled   Airway Patency:  Patent     Post Op Vitals Reviewed: Yes    No anethesia notable event occurred.    Staff: CRNA           Last Filed PACU Vitals:  Vitals Value Taken Time   Temp     Pulse 84    /61    Resp 22    SpO2 96        Modified Juan:  Activity: 2 (12/10/2024  9:15 AM)  Respiration: 2 (12/10/2024  9:15 AM)  Circulation: 2 (12/10/2024  9:15 AM)  Consciousness: 2 (12/10/2024  9:15 AM)  Oxygen Saturation: 2 (12/10/2024  9:15 AM)  Modified Juan Score: 10 (12/10/2024  9:15 AM)        
English

## 2024-12-10 NOTE — H&P
History and Physical - [facility]  Jose White 64 y.o. female MRN: [id number]                  HPI: Jose White is a 64 y.o. year old female who presents for EGD for variceal screening. EGD 1/12/24 with mild PHG. EGD 2022 with small varices.       REVIEW OF SYSTEMS: Per the HPI, and otherwise unremarkable.    Historical Information   Past Medical History:   Diagnosis Date    Alcoholic fatty liver     Anxiety     Asthma     COPD (chronic obstructive pulmonary disease) (HCC)     Elevated liver function tests     GERD (gastroesophageal reflux disease)     GERD without esophagitis     Hyperlipidemia     Hypertension     IBS (irritable bowel syndrome)     Insomnia     Insomnia     Liver disease     Nervousness     Nicotine dependence     Other specified urinary incontinence     RLS (restless legs syndrome)     Wears glasses      Past Surgical History:   Procedure Laterality Date    BACK SURGERY      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    BREAST EXCISIONAL BIOPSY Right 11/01/2004    benign    CATARACT EXTRACTION, BILATERAL  08/01/2018    COLONOSCOPY N/A 5/8/2019    Procedure: COLONOSCOPY;  Surgeon: Jacobo Leonardo MD;  Location: [facility] GI [facility];  Service: Gastroenterology    EGD AND COLONOSCOPY N/A 3/19/2018    Procedure: EGD AND COLONOSCOPY;  Surgeon: Jacobo Leonardo MD;  Location: [facility];  Service: Gastroenterology    ESOPHAGOGASTRODUODENOSCOPY N/A 5/8/2019    Procedure: ESOPHAGOGASTRODUODENOSCOPY (EGD);  Surgeon: Jacobo Leonardo MD;  Location: [facility];  Service: Gastroenterology    IR PARACENTESIS  11/27/2020    IR PARACENTESIS  12/8/2020    IR PARACENTESIS  10/28/2022    IR PARACENTESIS  11/7/2022    IR PARACENTESIS  11/30/2022    IR PARACENTESIS  12/30/2022    IR PARACENTESIS  1/16/2023    IR PARACENTESIS  8/6/2024    IR PARACENTESIS  9/18/2024    IR PARACENTESIS  10/3/2024    IR PARACENTESIS  10/10/2024    IR PARACENTESIS  10/17/2024    IR PARACENTESIS  10/24/2024    IR  PARACENTESIS  11/1/2024    IR PARACENTESIS  11/7/2024    IR PARACENTESIS  11/14/2024    IR PARACENTESIS  11/21/2024    IR PARACENTESIS  11/29/2024    IR PARACENTESIS  12/5/2024    IR THORACENTESIS  1/16/2023    IR THORACENTESIS  3/28/2023    IR THORACENTESIS  7/25/2024    IR THORACENTESIS  8/6/2024    IR THORACENTESIS  8/14/2024    IR THORACENTESIS  8/21/2024    IR THORACENTESIS  8/28/2024    IR THORACENTESIS  9/4/2024    IR THORACENTESIS  9/11/2024    IR THORACENTESIS  9/18/2024    IR THORACENTESIS  9/25/2024    IR THORACENTESIS  10/3/2024    IR THORACENTESIS  10/10/2024    IR THORACENTESIS  10/17/2024    IR THORACENTESIS  10/24/2024    IR THORACENTESIS  11/1/2024    IR THORACENTESIS  11/7/2024    IR THORACENTESIS  11/14/2024    IR THORACENTESIS  11/21/2024    IR THORACENTESIS  11/29/2024    IR THORACENTESIS  12/5/2024    KNEE SURGERY      KNEE SURGERY      LUMBAR LAMINECTOMY  04/1999    LYMPH NODE BIOPSY      MAMMO STEREOTACTIC BREAST BIOPSY RIGHT (ALL INC) Right 5/21/2021    MAMMO STEREOTACTIC BREAST BIOPSY RIGHT (ALL INC) EACH ADD Right 5/21/2021    TOTAL ABDOMINAL HYSTERECTOMY  02/05/2008    TUBAL LIGATION      TUBAL LIGATION  1989    UPPER GASTROINTESTINAL ENDOSCOPY       Social History   Social History     Substance and Sexual Activity   Alcohol Use Not Currently    Comment: 7-10 drinks/wk     Social History     Substance and Sexual Activity   Drug Use No     Social History     Tobacco Use   Smoking Status Every Day    Current packs/day: 1.00    Average packs/day: 1 pack/day for 46.9 years (46.9 ttl pk-yrs)    Types: Cigarettes    Start date: 1/1/1978   Smokeless Tobacco Never     Family History   Problem Relation Age of Onset    Breast cancer Mother 32    No Known Problems Father     No Known Problems Sister     No Known Problems Brother     No Known Problems Maternal Grandmother     No Known Problems Maternal Grandfather     No Known Problems Paternal Grandmother     No Known Problems Paternal Grandfather      No Known Problems Maternal Aunt     No Known Problems Maternal Aunt     No Known Problems Paternal Aunt     No Known Problems Son     Substance Abuse Neg Hx     Mental illness Neg Hx     Colon cancer Neg Hx     Colon polyps Neg Hx        Meds/Allergies       Current Outpatient Medications:     albuterol (2.5 mg/3 mL) 0.083 % nebulizer solution    albuterol (Proventil HFA) 90 mcg/act inhaler    alendronate (Fosamax) 70 mg tablet    ALPRAZolam (XANAX) 1 mg tablet    anastrozole (ARIMIDEX) 1 mg tablet    benzonatate (TESSALON) 200 MG capsule    budesonide-formoterol (Symbicort) 160-4.5 mcg/act inhaler    furosemide (LASIX) 20 mg tablet    lactulose (CHRONULAC) 10 g/15 mL solution    midodrine (PROAMATINE) 10 MG tablet    naltrexone (REVIA) 50 mg tablet    pantoprazole (PROTONIX) 20 mg tablet    Risankizumab-rzaa (SKYRIZI PEN SC)    traZODone (DESYREL) 50 mg tablet    Allergies   Allergen Reactions    Sulfa Antibiotics Shortness Of Breath    Ace Inhibitors Cough and Other (See Comments)     coughing       Objective     There were no vitals taken for this visit.      PHYSICAL EXAM    Gen: NAD  Head: NCAT  CV: RRR  CHEST: Clear  ABD: soft, NT/ND  EXT: no edema      ASSESSMENT/PLAN:  This is a 64 y.o. year old female here for EGD, and she is stable and optimized for her procedure.

## 2024-12-12 ENCOUNTER — RESULTS FOLLOW-UP (OUTPATIENT)
Age: 64
End: 2024-12-12

## 2024-12-12 ENCOUNTER — HOSPITAL ENCOUNTER (OUTPATIENT)
Dept: INTERVENTIONAL RADIOLOGY/VASCULAR | Facility: HOSPITAL | Age: 64
Discharge: HOME | End: 2024-12-12
Attending: INTERNAL MEDICINE
Payer: MEDICARE

## 2024-12-12 ENCOUNTER — HOSPITAL ENCOUNTER (OUTPATIENT)
Dept: INFUSION CENTER | Facility: HOSPITAL | Age: 64
Discharge: HOME | End: 2024-12-12
Attending: INTERNAL MEDICINE

## 2024-12-12 VITALS
OXYGEN SATURATION: 95 % | RESPIRATION RATE: 18 BRPM | SYSTOLIC BLOOD PRESSURE: 114 MMHG | DIASTOLIC BLOOD PRESSURE: 57 MMHG | HEART RATE: 82 BPM

## 2024-12-12 DIAGNOSIS — K76.82 HEPATIC ENCEPHALOPATHY (HCC): ICD-10-CM

## 2024-12-12 DIAGNOSIS — D69.6 THROMBOCYTOPENIA (HCC): ICD-10-CM

## 2024-12-12 DIAGNOSIS — B37.81 CANDIDA ESOPHAGITIS (HCC): Primary | ICD-10-CM

## 2024-12-12 DIAGNOSIS — K70.31 ALCOHOLIC CIRRHOSIS OF LIVER WITH ASCITES (HCC): Chronic | ICD-10-CM

## 2024-12-12 DIAGNOSIS — Z78.9 ALCOHOL USE: ICD-10-CM

## 2024-12-12 PROCEDURE — 88305 TISSUE EXAM BY PATHOLOGIST: CPT | Performed by: PATHOLOGY

## 2024-12-12 PROCEDURE — 88112 CYTOPATH CELL ENHANCE TECH: CPT | Performed by: STUDENT IN AN ORGANIZED HEALTH CARE EDUCATION/TRAINING PROGRAM

## 2024-12-12 PROCEDURE — 49083 ABD PARACENTESIS W/IMAGING: CPT

## 2024-12-12 PROCEDURE — 32555 ASPIRATE PLEURA W/ IMAGING: CPT

## 2024-12-12 RX ORDER — FLUCONAZOLE 200 MG/1
TABLET ORAL
Qty: 15 TABLET | Refills: 0 | Status: SHIPPED | OUTPATIENT
Start: 2024-12-12 | End: 2024-12-26

## 2024-12-12 NOTE — SEDATION DOCUMENTATION
Therapeutic paracentesis. Removed 2300ml of clear yellow fluid. RLQ  Therapeutic thoracentesis. Removed 1900ml cloudy yellow fluid - right.  Patient tolerated both procedures well. Exofin was put on both the  thoracentesis site and the paracentesis site..

## 2024-12-12 NOTE — DISCHARGE INSTRUCTIONS
Thoracentesis   WHAT YOU NEED TO KNOW:   A thoracentesis is a procedure to remove extra fluid or air from between your lungs and your inner chest wall. Air or fluid buildup may make it hard for you to breathe. A thoracentesis allows your lungs to expand fully so you can breathe more easily.  DISCHARGE INSTRUCTIONS:   Medicines:   Pain medicine:  You may be given a prescription medicine to decrease pain. Do not wait until the pain is severe before you take your medicine.    Antibiotics:  This medicine helps fight or prevent an infection.    Take your medicine as directed.  Call your healthcare provider if you think your medicine is not helping or if you have side effects. Tell him if you are allergic to any medicine. Keep a list of the medicines, vitamins, and herbs you take. Include the amounts, and when and why you take them. Bring the list or the pill bottles to follow-up visits. Carry your medicine list with you in case of an emergency.  Follow up with your healthcare provider as directed:  Write down your questions so you remember to ask them during your visits.   Rest:  Rest when you feel it is needed. Slowly start to do more each day. Return to your daily activities as directed.   Do not smoke:  If you smoke, it is never too late to quit. Ask for information about how to stop smoking if you need help.  Contact your healthcare provider if:   You have a fever.    Your puncture site is red, warm, swollen, or draining pus.    You have questions or concerns about your procedure, medicine, or care.    If you have any questions regarding, call the IR department @ [phone].     Seek care immediately or call 911 if:   Blood soaks through your bandage.    There is blood in your spit.      Abdominal Paracentesis     WHAT YOU NEED TO KNOW:   Abdominal paracentesis is a procedure to remove abnormal fluid buildup in your abdomen. Fluid builds up because of liver problems, such as swelling and scarring. Heart failure,  kidney disease, a mass, or problems with your pancreas may also cause fluid buildup.     DISCHARGE INSTRUCTIONS:     Follow up with your healthcare provider as directed: Write down your questions so you remember to ask them during your visits.     Wound care: Remove dressing after 24 hours. Leave glue in place.    Return to your normal activities    Contact Interventional Radiology at [phone] ([facility]ENTS: Contact Interventional Radiology at [phone]) ([facility]: Contact Interventional Radiology at [phone]) if:  You have a fever and your wound is red and swollen.   You have yellow, green, or bad-smelling discharge coming from your wound.   You have pain or swelling in your abdomen.   You have an upset stomach or you vomit.   You have sudden, sharp pain in your abdomen.   You urinate very little or not at all.   You feel confused and more tired than usual.   Your arm or leg feels warm, tender, and painful. It may look swollen and red.   You suddenly feel lightheaded and have trouble breathing.

## 2024-12-13 DIAGNOSIS — Z13.6 ENCOUNTER FOR SCREENING FOR CARDIOVASCULAR DISORDERS: Primary | ICD-10-CM

## 2024-12-17 DIAGNOSIS — K70.31 ALCOHOLIC CIRRHOSIS OF LIVER WITH ASCITES (HCC): Primary | ICD-10-CM

## 2024-12-17 RX ORDER — ALBUMIN (HUMAN) 12.5 G/50ML
100 SOLUTION INTRAVENOUS ONCE AS NEEDED
Status: CANCELLED | OUTPATIENT
Start: 2024-12-19

## 2024-12-17 RX ORDER — SODIUM CHLORIDE 9 MG/ML
20 INJECTION, SOLUTION INTRAVENOUS ONCE
Status: CANCELLED | OUTPATIENT
Start: 2024-12-19

## 2024-12-17 RX ORDER — ALBUMIN (HUMAN) 12.5 G/50ML
75 SOLUTION INTRAVENOUS ONCE AS NEEDED
Status: CANCELLED | OUTPATIENT
Start: 2024-12-19

## 2024-12-17 RX ORDER — ALBUMIN (HUMAN) 12.5 G/50ML
50 SOLUTION INTRAVENOUS ONCE AS NEEDED
Status: CANCELLED | OUTPATIENT
Start: 2024-12-19

## 2024-12-19 ENCOUNTER — HOSPITAL ENCOUNTER (OUTPATIENT)
Dept: INTERVENTIONAL RADIOLOGY/VASCULAR | Facility: HOSPITAL | Age: 64
Discharge: HOME | End: 2024-12-19
Attending: INTERNAL MEDICINE
Payer: MEDICARE

## 2024-12-19 ENCOUNTER — HOSPITAL ENCOUNTER (OUTPATIENT)
Dept: INFUSION CENTER | Facility: HOSPITAL | Age: 64
Discharge: HOME | End: 2024-12-19
Attending: INTERNAL MEDICINE

## 2024-12-19 VITALS
HEART RATE: 89 BPM | DIASTOLIC BLOOD PRESSURE: 56 MMHG | SYSTOLIC BLOOD PRESSURE: 101 MMHG | OXYGEN SATURATION: 97 % | RESPIRATION RATE: 21 BRPM

## 2024-12-19 VITALS
DIASTOLIC BLOOD PRESSURE: 52 MMHG | OXYGEN SATURATION: 99 % | RESPIRATION RATE: 18 BRPM | SYSTOLIC BLOOD PRESSURE: 95 MMHG | HEART RATE: 78 BPM

## 2024-12-19 DIAGNOSIS — Z78.9 ALCOHOL USE: ICD-10-CM

## 2024-12-19 DIAGNOSIS — K76.82 HEPATIC ENCEPHALOPATHY (HCC): ICD-10-CM

## 2024-12-19 DIAGNOSIS — D69.6 THROMBOCYTOPENIA (HCC): ICD-10-CM

## 2024-12-19 DIAGNOSIS — K70.31 ALCOHOLIC CIRRHOSIS OF LIVER WITH ASCITES (HCC): Chronic | ICD-10-CM

## 2024-12-19 PROCEDURE — 49083 ABD PARACENTESIS W/IMAGING: CPT

## 2024-12-19 PROCEDURE — 32555 ASPIRATE PLEURA W/ IMAGING: CPT

## 2024-12-19 RX ORDER — LIDOCAINE HYDROCHLORIDE 10 MG/ML
INJECTION, SOLUTION EPIDURAL; INFILTRATION; INTRACAUDAL; PERINEURAL AS NEEDED
Status: COMPLETED | OUTPATIENT
Start: 2024-12-19 | End: 2024-12-19

## 2024-12-19 RX ADMIN — LIDOCAINE HYDROCHLORIDE 8 ML: 10 INJECTION, SOLUTION EPIDURAL; INFILTRATION; INTRACAUDAL; PERINEURAL at 09:16

## 2024-12-19 RX ADMIN — LIDOCAINE HYDROCHLORIDE 8 ML: 10 INJECTION, SOLUTION EPIDURAL; INFILTRATION; INTRACAUDAL; PERINEURAL at 08:53

## 2024-12-19 NOTE — SEDATION DOCUMENTATION
Weekly right thoracentesis with removal 2000ml clear yellow fluid. Band aid to site. Felt dizzy and light headed, placed in supine position with resolution of symptoms. Patient states she did not eat this am. Given some juice as well. Will proceed with weekly paracentesis.

## 2024-12-19 NOTE — SEDATION DOCUMENTATION
Weekly paracentesis with removal 1450ml clear yellow fluid. Band aid to site. Taking po fluids and VS at baseline. AVS reviewed and ambulated home in her usual state of health.

## 2024-12-26 ENCOUNTER — HOSPITAL ENCOUNTER (OUTPATIENT)
Dept: INTERVENTIONAL RADIOLOGY/VASCULAR | Facility: HOSPITAL | Age: 64
Discharge: HOME | End: 2024-12-26
Attending: INTERNAL MEDICINE
Payer: MEDICARE

## 2024-12-26 ENCOUNTER — TELEPHONE (OUTPATIENT)
Dept: GASTROENTEROLOGY | Facility: CLINIC | Age: 64
End: 2024-12-26

## 2024-12-26 ENCOUNTER — TELEPHONE (OUTPATIENT)
Age: 64
End: 2024-12-26

## 2024-12-26 ENCOUNTER — HOSPITAL ENCOUNTER (OUTPATIENT)
Dept: INFUSION CENTER | Facility: HOSPITAL | Age: 64
Discharge: HOME | End: 2024-12-26
Attending: INTERNAL MEDICINE

## 2024-12-26 VITALS
SYSTOLIC BLOOD PRESSURE: 94 MMHG | HEART RATE: 88 BPM | OXYGEN SATURATION: 95 % | DIASTOLIC BLOOD PRESSURE: 54 MMHG | RESPIRATION RATE: 19 BRPM

## 2024-12-26 DIAGNOSIS — K70.31 ALCOHOLIC CIRRHOSIS OF LIVER WITH ASCITES (HCC): Primary | Chronic | ICD-10-CM

## 2024-12-26 DIAGNOSIS — D69.6 THROMBOCYTOPENIA (HCC): ICD-10-CM

## 2024-12-26 DIAGNOSIS — Z78.9 ALCOHOL USE: ICD-10-CM

## 2024-12-26 DIAGNOSIS — K70.31 ALCOHOLIC CIRRHOSIS OF LIVER WITH ASCITES (HCC): Chronic | ICD-10-CM

## 2024-12-26 DIAGNOSIS — K76.82 HEPATIC ENCEPHALOPATHY (HCC): ICD-10-CM

## 2024-12-26 PROCEDURE — 32555 ASPIRATE PLEURA W/ IMAGING: CPT

## 2024-12-26 PROCEDURE — A7048 VACUUM DRAIN BOTTLE/TUBE KIT: HCPCS

## 2024-12-26 PROCEDURE — 49083 ABD PARACENTESIS W/IMAGING: CPT

## 2024-12-26 RX ORDER — SODIUM CHLORIDE 9 MG/ML
20 INJECTION, SOLUTION INTRAVENOUS ONCE
OUTPATIENT
Start: 2024-12-26

## 2024-12-26 RX ORDER — ALBUMIN (HUMAN) 12.5 G/50ML
50 SOLUTION INTRAVENOUS ONCE AS NEEDED
OUTPATIENT
Start: 2024-12-26

## 2024-12-26 RX ORDER — ALBUMIN (HUMAN) 12.5 G/50ML
75 SOLUTION INTRAVENOUS ONCE AS NEEDED
OUTPATIENT
Start: 2024-12-26

## 2024-12-26 RX ORDER — LIDOCAINE HYDROCHLORIDE 10 MG/ML
INJECTION, SOLUTION EPIDURAL; INFILTRATION; INTRACAUDAL; PERINEURAL AS NEEDED
Status: COMPLETED | OUTPATIENT
Start: 2024-12-26 | End: 2024-12-26

## 2024-12-26 RX ORDER — ALBUMIN (HUMAN) 12.5 G/50ML
100 SOLUTION INTRAVENOUS ONCE AS NEEDED
OUTPATIENT
Start: 2024-12-26

## 2024-12-26 RX ADMIN — LIDOCAINE HYDROCHLORIDE 7 ML: 10 INJECTION, SOLUTION EPIDURAL; INFILTRATION; INTRACAUDAL; PERINEURAL at 10:19

## 2024-12-26 RX ADMIN — LIDOCAINE HYDROCHLORIDE 7 ML: 10 INJECTION, SOLUTION EPIDURAL; INFILTRATION; INTRACAUDAL; PERINEURAL at 10:37

## 2024-12-26 NOTE — DISCHARGE INSTRUCTIONS
Abdominal Paracentesis     WHAT YOU NEED TO KNOW:   Abdominal paracentesis is a procedure to remove abnormal fluid buildup in your abdomen. Fluid builds up because of liver problems, such as swelling and scarring. Heart failure, kidney disease, a mass, or problems with your pancreas may also cause fluid buildup.     DISCHARGE INSTRUCTIONS:     Follow up with your healthcare provider as directed: Write down your questions so you remember to ask them during your visits.     Wound care: Remove dressing after 24 hours. Leave glue in place.    Return to your normal activities    Contact Interventional Radiology at [phone] ([facility]ENTS: Contact Interventional Radiology at [phone]) ([facility]: Contact Interventional Radiology at [phone]) if:  You have a fever and your wound is red and swollen.   You have yellow, green, or bad-smelling discharge coming from your wound.   You have pain or swelling in your abdomen.   You have an upset stomach or you vomit.   You have sudden, sharp pain in your abdomen.   You urinate very little or not at all.   You feel confused and more tired than usual.   Your arm or leg feels warm, tender, and painful. It may look swollen and red.   You suddenly feel lightheaded and have trouble breathing.

## 2024-12-26 NOTE — SEDATION DOCUMENTATION
"Weekly paracentesis with removal 410ml clear yellow fluid. Weekly right thoracentesis with removal 1900ml clear yellow fluid.  Band aid to sites. Patient arrived with her usual low BP and states she feels \"weak\". Admits to consuming alcohol yesterday. Given apple juice and states she feels a little better. States she takes medication for low BP. Encouraged to call her primary physician or go to ER if symptoms continue. Denies any falls at home. AVS reviewed and ambulated home with .   "

## 2024-12-26 NOTE — TELEPHONE ENCOUNTER
IR department calling as they need the order signed so they patient can have treatment. Transferred to office for assistance

## 2024-12-28 DIAGNOSIS — K70.31 ALCOHOLIC CIRRHOSIS OF LIVER WITH ASCITES (HCC): Primary | ICD-10-CM

## 2024-12-29 DIAGNOSIS — M25.551 BILATERAL HIP PAIN: Primary | ICD-10-CM

## 2024-12-29 DIAGNOSIS — M25.552 BILATERAL HIP PAIN: Primary | ICD-10-CM

## 2024-12-30 ENCOUNTER — HOSPITAL ENCOUNTER (EMERGENCY)
Facility: HOSPITAL | Age: 64
Discharge: HOME | End: 2024-12-31
Attending: EMERGENCY MEDICINE
Payer: MEDICARE

## 2024-12-30 ENCOUNTER — APPOINTMENT (OUTPATIENT)
Dept: RADIOLOGY | Facility: HOSPITAL | Age: 64
End: 2024-12-30
Payer: MEDICARE

## 2024-12-30 DIAGNOSIS — U07.1 COVID-19: Primary | ICD-10-CM

## 2024-12-30 DIAGNOSIS — E87.1 ACUTE HYPONATREMIA: ICD-10-CM

## 2024-12-30 DIAGNOSIS — J44.9 COPD (CHRONIC OBSTRUCTIVE PULMONARY DISEASE) (HCC): ICD-10-CM

## 2024-12-30 DIAGNOSIS — K70.30 ALCOHOLIC CIRRHOSIS OF LIVER WITHOUT ASCITES (HCC): ICD-10-CM

## 2024-12-30 LAB
ALBUMIN SERPL BCG-MCNC: 2.2 G/DL (ref 3.5–5)
ALP SERPL-CCNC: 71 U/L (ref 34–104)
ALT SERPL W P-5'-P-CCNC: 16 U/L (ref 7–52)
ANION GAP SERPL CALCULATED.3IONS-SCNC: 6 MMOL/L (ref 4–13)
AST SERPL W P-5'-P-CCNC: 49 U/L (ref 13–39)
BASOPHILS # BLD AUTO: 0.05 THOUSANDS/ΜL (ref 0–0.1)
BASOPHILS NFR BLD AUTO: 1 % (ref 0–1)
BILIRUB SERPL-MCNC: 0.76 MG/DL (ref 0.2–1)
BUN SERPL-MCNC: 16 MG/DL (ref 5–25)
CALCIUM ALBUM COR SERPL-MCNC: 9.8 MG/DL (ref 8.3–10.1)
CALCIUM SERPL-MCNC: 8.4 MG/DL (ref 8.4–10.2)
CARDIAC TROPONIN I PNL SERPL HS: 14 NG/L (ref ?–50)
CHLORIDE SERPL-SCNC: 99 MMOL/L (ref 96–108)
CO2 SERPL-SCNC: 23 MMOL/L (ref 21–32)
CREAT SERPL-MCNC: 1.08 MG/DL (ref 0.6–1.3)
EOSINOPHIL # BLD AUTO: 0.02 THOUSAND/ΜL (ref 0–0.61)
EOSINOPHIL NFR BLD AUTO: 0 % (ref 0–6)
ERYTHROCYTE [DISTWIDTH] IN BLOOD BY AUTOMATED COUNT: 14.4 % (ref 11.6–15.1)
FLUAV AG UPPER RESP QL IA.RAPID: NEGATIVE
FLUBV AG UPPER RESP QL IA.RAPID: NEGATIVE
GFR SERPL CREATININE-BSD FRML MDRD: 53 ML/MIN/1.73SQ M
GLUCOSE SERPL-MCNC: 100 MG/DL (ref 65–140)
HCT VFR BLD AUTO: 39.9 % (ref 34.8–46.1)
HGB BLD-MCNC: 13.3 G/DL (ref 11.5–15.4)
IMM GRANULOCYTES # BLD AUTO: 0.03 THOUSAND/UL (ref 0–0.2)
IMM GRANULOCYTES NFR BLD AUTO: 1 % (ref 0–2)
LYMPHOCYTES # BLD AUTO: 1.06 THOUSANDS/ΜL (ref 0.6–4.47)
LYMPHOCYTES NFR BLD AUTO: 22 % (ref 14–44)
MCH RBC QN AUTO: 33.9 PG (ref 26.8–34.3)
MCHC RBC AUTO-ENTMCNC: 33.3 G/DL (ref 31.4–37.4)
MCV RBC AUTO: 102 FL (ref 82–98)
MONOCYTES # BLD AUTO: 0.85 THOUSAND/ΜL (ref 0.17–1.22)
MONOCYTES NFR BLD AUTO: 18 % (ref 4–12)
NEUTROPHILS # BLD AUTO: 2.82 THOUSANDS/ΜL (ref 1.85–7.62)
NEUTS SEG NFR BLD AUTO: 58 % (ref 43–75)
NRBC BLD AUTO-RTO: 0 /100 WBCS
PLATELET # BLD AUTO: 99 THOUSANDS/UL (ref 149–390)
PMV BLD AUTO: 9.5 FL (ref 8.9–12.7)
POTASSIUM SERPL-SCNC: 4.3 MMOL/L (ref 3.5–5.3)
PROT SERPL-MCNC: 8 G/DL (ref 6.4–8.4)
RBC # BLD AUTO: 3.92 MILLION/UL (ref 3.81–5.12)
SARS-COV+SARS-COV-2 AG RESP QL IA.RAPID: POSITIVE
SODIUM SERPL-SCNC: 128 MMOL/L (ref 135–147)
WBC # BLD AUTO: 4.83 THOUSAND/UL (ref 4.31–10.16)

## 2024-12-30 PROCEDURE — 84484 ASSAY OF TROPONIN QUANT: CPT | Performed by: EMERGENCY MEDICINE

## 2024-12-30 PROCEDURE — 36415 COLL VENOUS BLD VENIPUNCTURE: CPT

## 2024-12-30 PROCEDURE — 71046 X-RAY EXAM CHEST 2 VIEWS: CPT

## 2024-12-30 PROCEDURE — 93005 ELECTROCARDIOGRAM TRACING: CPT

## 2024-12-30 PROCEDURE — 96374 THER/PROPH/DIAG INJ IV PUSH: CPT

## 2024-12-30 PROCEDURE — 87811 SARS-COV-2 COVID19 W/OPTIC: CPT | Performed by: EMERGENCY MEDICINE

## 2024-12-30 PROCEDURE — 99284 EMERGENCY DEPT VISIT MOD MDM: CPT | Performed by: EMERGENCY MEDICINE

## 2024-12-30 PROCEDURE — 85025 COMPLETE CBC W/AUTO DIFF WBC: CPT | Performed by: EMERGENCY MEDICINE

## 2024-12-30 PROCEDURE — 87804 INFLUENZA ASSAY W/OPTIC: CPT | Performed by: EMERGENCY MEDICINE

## 2024-12-30 PROCEDURE — 99285 EMERGENCY DEPT VISIT HI MDM: CPT

## 2024-12-30 PROCEDURE — 80053 COMPREHEN METABOLIC PANEL: CPT | Performed by: EMERGENCY MEDICINE

## 2024-12-30 RX ORDER — ACETAMINOPHEN 325 MG/1
975 TABLET ORAL ONCE
Status: COMPLETED | OUTPATIENT
Start: 2024-12-30 | End: 2024-12-30

## 2024-12-30 RX ORDER — MOLNUPIRAVIR 200 MG/1
800 CAPSULE ORAL EVERY 12 HOURS
Qty: 40 CAPSULE | Refills: 0 | Status: SHIPPED | OUTPATIENT
Start: 2024-12-30 | End: 2024-12-31

## 2024-12-30 RX ORDER — DEXAMETHASONE SODIUM PHOSPHATE 10 MG/ML
10 INJECTION, SOLUTION INTRAMUSCULAR; INTRAVENOUS ONCE
Status: COMPLETED | OUTPATIENT
Start: 2024-12-31 | End: 2024-12-30

## 2024-12-30 RX ADMIN — SODIUM CHLORIDE 250 ML: 0.9 INJECTION, SOLUTION INTRAVENOUS at 23:55

## 2024-12-30 RX ADMIN — DEXAMETHASONE SODIUM PHOSPHATE 10 MG: 10 INJECTION INTRAMUSCULAR; INTRAVENOUS at 23:54

## 2024-12-30 RX ADMIN — ACETAMINOPHEN 975 MG: 325 TABLET, FILM COATED ORAL at 23:54

## 2024-12-31 VITALS
BODY MASS INDEX: 23.15 KG/M2 | DIASTOLIC BLOOD PRESSURE: 62 MMHG | WEIGHT: 130.7 LBS | OXYGEN SATURATION: 94 % | RESPIRATION RATE: 20 BRPM | TEMPERATURE: 99.3 F | HEART RATE: 88 BPM | SYSTOLIC BLOOD PRESSURE: 117 MMHG

## 2024-12-31 LAB
ATRIAL RATE: 104 BPM
P AXIS: 68 DEGREES
PR INTERVAL: 132 MS
QRS AXIS: 87 DEGREES
QRSD INTERVAL: 68 MS
QT INTERVAL: 312 MS
QTC INTERVAL: 411 MS
T WAVE AXIS: -49 DEGREES
VENTRICULAR RATE: 104 BPM

## 2024-12-31 PROCEDURE — 93010 ELECTROCARDIOGRAM REPORT: CPT | Performed by: INTERNAL MEDICINE

## 2024-12-31 RX ORDER — MOLNUPIRAVIR 200 MG/1
800 CAPSULE ORAL EVERY 12 HOURS
Qty: 40 CAPSULE | Refills: 0 | Status: SHIPPED | OUTPATIENT
Start: 2024-12-31 | End: 2025-01-04

## 2025-01-01 NOTE — ED PROVIDER NOTES
Time reflects when diagnosis was documented in both MDM as applicable and the Disposition within this note       Time User Action Codes Description Comment    12/30/2024 11:38 PM Baron Mcleod [U07.1] COVID-19     12/30/2024 11:38 PM Baron Mcleod [E87.1] Acute hyponatremia     12/31/2024 12:06 AM Baron Mcleod [J44.9] COPD (chronic obstructive pulmonary disease) (HCC)     12/31/2024 12:06 AM Baron Mcleod [K70.30] Alcoholic cirrhosis of liver without ascites (HCC)           ED Disposition       ED Disposition   Discharge    Condition   Stable    Date/Time   Tue Dec 31, 2024 12:05 AM    Comment   Jose White discharge to home/self care.                   Assessment & Plan       Medical Decision Making    64 y.o. female presenting for COVID symptoms.  VSS, no s/s respiratory distress or hypoxia.  Will order CBC, CMP, troponin, EKG, CXR to evaluate for sepsis, anemia, CORY, electrolyte abnormality, arrhythmia, ACS, pneumonia or worsening pleural effusion.  Cirrhosis appears compensated at this time clinically.  No wheezing currently, do not feel COPD is in exacerbation.    Reassessment: VSS, normal WOB on RA.  Reviewed lab results.  Mild hyponatremia noted likely component of cirrhosis +/- dehydration, treated with gentle fluid bolus.  Given COVID infection and history of COPD will treat with single dose of decadron in ED.    Disposition: I have discussed with the patient our plan to discharge them from the ED and the patient is in agreement with this plan.     Discharge Plan: discussed EUA and risks/benefits, patient agreeable to Rx for molnupiravir and was provided with medication information sheet. Encouraged PRN tylenol for fevers and oral hydration. RTED precautions emphasized. The patient was provided a written after visit summary with strict RTED precautions.     Followup: I have discussed with the patient plan to follow up with their PCP. Contact information provided in  AVS.    Amount and/or Complexity of Data Reviewed  Labs: ordered.  Radiology: ordered.    Risk  OTC drugs.  Prescription drug management.             Medications   acetaminophen (TYLENOL) tablet 975 mg (975 mg Oral Given 12/30/24 2354)   sodium chloride 0.9 % bolus 250 mL (0 mL Intravenous Stopped 12/31/24 0003)   dexamethasone (PF) (DECADRON) injection 10 mg (10 mg Intravenous Given 12/30/24 2354)       ED Risk Strat Scores                          SBIRT 20yo+      Flowsheet Row Most Recent Value   Initial Alcohol Screen: US AUDIT-C     1. How often do you have a drink containing alcohol? 0 Filed at: 12/30/2024 2018   2. How many drinks containing alcohol do you have on a typical day you are drinking?  0 Filed at: 12/30/2024 2018   3b. FEMALE Any Age, or MALE 65+: How often do you have 4 or more drinks on one occassion? 0 Filed at: 12/30/2024 2018   Audit-C Score 0 Filed at: 12/30/2024 2018   CELSO: How many times in the past year have you...    Used an illegal drug or used a prescription medication for non-medical reasons? Never Filed at: 12/30/2024 2018                            History of Present Illness       Chief Complaint   Patient presents with    Shortness of Breath     Pt presents to ER from home for reports of cough and shortness of breath x3 days, at home positive covid test. No tylenol or motrin today.        Past Medical History:   Diagnosis Date    Alcoholic fatty liver     Anxiety     Asthma     COPD (chronic obstructive pulmonary disease) (HCC)     Elevated liver function tests     GERD (gastroesophageal reflux disease)     GERD without esophagitis     Hyperlipidemia     Hypertension     IBS (irritable bowel syndrome)     Insomnia     Insomnia     Liver disease     Nervousness     Nicotine dependence     Other specified urinary incontinence     RLS (restless legs syndrome)     Wears glasses       Past Surgical History:   Procedure Laterality Date    BACK SURGERY      BREAST BIOPSY Right 05/21/2021     DCIS    BREAST EXCISIONAL BIOPSY Right 11/01/2004    benign    CATARACT EXTRACTION, BILATERAL  08/01/2018    COLONOSCOPY N/A 5/8/2019    Procedure: COLONOSCOPY;  Surgeon: Jacobo Leonardo MD;  Location: [facility];  Service: Gastroenterology    EGD AND COLONOSCOPY N/A 3/19/2018    Procedure: EGD AND COLONOSCOPY;  Surgeon: Jacobo Leonardo MD;  Location: [facility];  Service: Gastroenterology    ESOPHAGOGASTRODUODENOSCOPY N/A 5/8/2019    Procedure: ESOPHAGOGASTRODUODENOSCOPY (EGD);  Surgeon: Jacobo Leonardo MD;  Location: [facility];  Service: Gastroenterology    IR PARACENTESIS  11/27/2020    IR PARACENTESIS  12/8/2020    IR PARACENTESIS  10/28/2022    IR PARACENTESIS  11/7/2022    IR PARACENTESIS  11/30/2022    IR PARACENTESIS  12/30/2022    IR PARACENTESIS  1/16/2023    IR PARACENTESIS  8/6/2024    IR PARACENTESIS  9/18/2024    IR PARACENTESIS  10/3/2024    IR PARACENTESIS  10/10/2024    IR PARACENTESIS  10/17/2024    IR PARACENTESIS  10/24/2024    IR PARACENTESIS  11/1/2024    IR PARACENTESIS  11/7/2024    IR PARACENTESIS  11/14/2024    IR PARACENTESIS  11/21/2024    IR PARACENTESIS  11/29/2024    IR PARACENTESIS  12/5/2024    IR PARACENTESIS  12/12/2024    IR PARACENTESIS  12/19/2024    IR PARACENTESIS  12/26/2024    IR THORACENTESIS  1/16/2023    IR THORACENTESIS  3/28/2023    IR THORACENTESIS  7/25/2024    IR THORACENTESIS  8/6/2024    IR THORACENTESIS  8/14/2024    IR THORACENTESIS  8/21/2024    IR THORACENTESIS  8/28/2024    IR THORACENTESIS  9/4/2024    IR THORACENTESIS  9/11/2024    IR THORACENTESIS  9/18/2024    IR THORACENTESIS  9/25/2024    IR THORACENTESIS  10/3/2024    IR THORACENTESIS  10/10/2024    IR THORACENTESIS  10/17/2024    IR THORACENTESIS  10/24/2024    IR THORACENTESIS  11/1/2024    IR THORACENTESIS  11/7/2024    IR THORACENTESIS  11/14/2024    IR THORACENTESIS  11/21/2024    IR THORACENTESIS  11/29/2024    IR THORACENTESIS  12/5/2024    IR THORACENTESIS  12/12/2024    IR  THORACENTESIS  12/19/2024    IR THORACENTESIS  12/26/2024    KNEE SURGERY      KNEE SURGERY      LUMBAR LAMINECTOMY  04/1999    LYMPH NODE BIOPSY      MAMMO STEREOTACTIC BREAST BIOPSY RIGHT (ALL INC) Right 5/21/2021    MAMMO STEREOTACTIC BREAST BIOPSY RIGHT (ALL INC) EACH ADD Right 5/21/2021    TOTAL ABDOMINAL HYSTERECTOMY  02/05/2008    TUBAL LIGATION      TUBAL LIGATION  1989    UPPER GASTROINTESTINAL ENDOSCOPY        Family History   Problem Relation Age of Onset    Breast cancer Mother 32    No Known Problems Father     No Known Problems Sister     No Known Problems Brother     No Known Problems Maternal Grandmother     No Known Problems Maternal Grandfather     No Known Problems Paternal Grandmother     No Known Problems Paternal Grandfather     No Known Problems Maternal Aunt     No Known Problems Maternal Aunt     No Known Problems Paternal Aunt     No Known Problems Son     Substance Abuse Neg Hx     Mental illness Neg Hx     Colon cancer Neg Hx     Colon polyps Neg Hx       Social History     Tobacco Use    Smoking status: Every Day     Current packs/day: 1.00     Average packs/day: 1 pack/day for 47.0 years (47.0 ttl pk-yrs)     Types: Cigarettes     Start date: 1/1/1978    Smokeless tobacco: Never   Vaping Use    Vaping status: Never Used   Substance Use Topics    Alcohol use: Not Currently     Comment: 7-10 drinks/wk    Drug use: No      E-Cigarette/Vaping    E-Cigarette Use Never User       E-Cigarette/Vaping Substances    Nicotine No     THC No     CBD No     Flavoring No     Other No     Unknown No       I have reviewed and agree with the history as documented.     Jose White is a 64 y.o. year old female with PMH of COPD, cirrhosis, HTN, HLD presenting to the [facility] ED for fevers, cough and dyspnea. Patient reporting several days of cough and occasional dyspnea. She has had fevers. She previously tested positive for COVID19. She has been eating and drinking without issue. No chest pain, N/V or  abdominal pain. She has history of cirrhosis though does not feel abdomen is distended at this time. Patient denies leg pain or swelling. Patient took tylenol for fever yesterday though did not take any medications for fever today.        History provided by:  Medical records and patient   used: No    Shortness of Breath  Associated symptoms: cough and fever    Associated symptoms: no abdominal pain, no chest pain and no vomiting        Review of Systems   Constitutional:  Positive for chills and fever.   Respiratory:  Positive for cough and shortness of breath.    Cardiovascular:  Negative for chest pain and leg swelling.   Gastrointestinal:  Positive for diarrhea. Negative for abdominal pain, nausea and vomiting.   Genitourinary:  Negative for dysuria.   All other systems reviewed and are negative.          Objective       ED Triage Vitals   Temperature Pulse Blood Pressure Respirations SpO2 Patient Position - Orthostatic VS   12/30/24 2011 12/30/24 2015 12/30/24 2015 12/30/24 2015 12/30/24 2015 12/30/24 2015   (!) 100.9 °F (38.3 °C) (!) 106 143/84 20 92 % Sitting      Temp Source Heart Rate Source BP Location FiO2 (%) Pain Score    12/30/24 2011 12/30/24 2015 12/30/24 2015 -- 12/30/24 2015    Oral Monitor Left arm  No Pain      Vitals      Date and Time Temp Pulse SpO2 Resp BP Pain Score FACES Pain Rating User   12/31/24 0003 -- 88 94 % 20 117/62 -- -- KK   12/30/24 2350 99.3 °F (37.4 °C) -- -- -- -- -- --    12/30/24 2015 -- 106 92 % 20 143/84 No Pain -- AM   12/30/24 2011 100.9 °F (38.3 °C) -- -- -- -- -- -- AM            Physical Exam  Vitals and nursing note reviewed.   Constitutional:       General: She is not in acute distress.     Appearance: Normal appearance. She is well-developed. She is not ill-appearing, toxic-appearing or diaphoretic.   HENT:      Head: Normocephalic and atraumatic.      Nose: No congestion or rhinorrhea.   Eyes:      General:         Right eye: No discharge.          Left eye: No discharge.   Cardiovascular:      Rate and Rhythm: Normal rate and regular rhythm.   Pulmonary:      Effort: Pulmonary effort is normal. No tachypnea, accessory muscle usage or respiratory distress.      Breath sounds: Decreased air movement (right base) present. No stridor. Rhonchi present. No decreased breath sounds, wheezing or rales.   Abdominal:      General: There is no distension.      Palpations: Abdomen is soft. There is no shifting dullness or fluid wave.      Tenderness: There is no abdominal tenderness. There is no guarding or rebound.   Musculoskeletal:      Cervical back: Normal range of motion and neck supple. No rigidity.      Right lower leg: No tenderness. No edema.      Left lower leg: No tenderness. No edema.   Skin:     Capillary Refill: Capillary refill takes less than 2 seconds.      Findings: No rash.   Neurological:      Mental Status: She is alert and oriented to person, place, and time.   Psychiatric:         Mood and Affect: Mood normal.         Behavior: Behavior normal.         Results Reviewed       Procedure Component Value Units Date/Time    HS Troponin 0hr (reflex protocol) [[id number]]  (Normal) Collected: 12/30/24 2022    Lab Status: Final result Specimen: Blood from Arm, Left Updated: 12/30/24 2051     hs TnI 0hr 14 ng/L     FLU/COVID Rapid Antigen (30 min. TAT) - Preferred screening test in ED [[id number]]  (Abnormal) Collected: 12/30/24 2023    Lab Status: Final result Specimen: Nares from Nose Updated: 12/30/24 2044     SARS COV Rapid Antigen Positive     Influenza A Rapid Antigen Negative     Influenza B Rapid Antigen Negative    Narrative:      This test has been performed using the Quidel Becca 2 FLU+SARS Antigen test under the Emergency Use Authorization (EUA). This test has been validated by the  and verified by the performing laboratory. The Becca uses lateral flow immunofluorescent sandwich assay to detect SARS-COV, Influenza A and Influenza  B Antigen.     The Quidel Becca 2 SARS Antigen test does not differentiate between SARS-CoV and SARS-CoV-2.     Negative results are presumptive and may be confirmed with a molecular assay, if necessary, for patient management. Negative results do not rule out SARS-CoV-2 or influenza infection and should not be used as the sole basis for treatment or patient management decisions. A negative test result may occur if the level of antigen in a sample is below the limit of detection of this test.     Positive results are indicative of the presence of viral antigens, but do not rule out bacterial infection or co-infection with other viruses.     All test results should be used as an adjunct to clinical observations and other information available to the provider.    FOR PEDIATRIC PATIENTS - copy/paste COVID Guidelines URL to browser: [email or web address]    Comprehensive metabolic panel [[id number]]  (Abnormal) Collected: 12/30/24 2022    Lab Status: Final result Specimen: Blood from Arm, Left Updated: 12/30/24 2043     Sodium 128 mmol/L      Potassium 4.3 mmol/L      Chloride 99 mmol/L      CO2 23 mmol/L      ANION GAP 6 mmol/L      BUN 16 mg/dL      Creatinine 1.08 mg/dL      Glucose 100 mg/dL      Calcium 8.4 mg/dL      Corrected Calcium 9.8 mg/dL      AST 49 U/L      ALT 16 U/L      Alkaline Phosphatase 71 U/L      Total Protein 8.0 g/dL      Albumin 2.2 g/dL      Total Bilirubin 0.76 mg/dL      eGFR 53 ml/min/1.73sq m     Narrative:      National Kidney Disease Foundation guidelines for Chronic Kidney Disease (CKD):     Stage 1 with normal or high GFR (GFR > 90 mL/min/1.73 square meters)    Stage 2 Mild CKD (GFR = 60-89 mL/min/1.73 square meters)    Stage 3A Moderate CKD (GFR = 45-59 mL/min/1.73 square meters)    Stage 3B Moderate CKD (GFR = 30-44 mL/min/1.73 square meters)    Stage 4 Severe CKD (GFR = 15-29 mL/min/1.73 square meters)    Stage 5 End Stage CKD (GFR  <15 mL/min/1.73 square meters)  Note: GFR calculation is accurate only with a steady state creatinine    CBC and differential [[id number]]  (Abnormal) Collected: 12/30/24 2022    Lab Status: Final result Specimen: Blood from Arm, Left Updated: 12/30/24 2038     WBC 4.83 Thousand/uL      RBC 3.92 Million/uL      Hemoglobin 13.3 g/dL      Hematocrit 39.9 %       fL      MCH 33.9 pg      MCHC 33.3 g/dL      RDW 14.4 %      MPV 9.5 fL      Platelets 99 Thousands/uL      nRBC 0 /100 WBCs      Segmented % 58 %      Immature Grans % 1 %      Lymphocytes % 22 %      Monocytes % 18 %      Eosinophils Relative 0 %      Basophils Relative 1 %      Absolute Neutrophils 2.82 Thousands/µL      Absolute Immature Grans 0.03 Thousand/uL      Absolute Lymphocytes 1.06 Thousands/µL      Absolute Monocytes 0.85 Thousand/µL      Eosinophils Absolute 0.02 Thousand/µL      Basophils Absolute 0.05 Thousands/µL             XR chest 2 views   Final Interpretation by Khanh Tena MD (12/31 0954)   Increase in moderate size right pleural effusion.         Workstation performed: [id number]             Procedures    ED Medication and Procedure Management   Prior to Admission Medications   Prescriptions Last Dose Informant Patient Reported? Taking?   ALPRAZolam (XANAX) 1 mg tablet  Self No No   Sig: Take 1 tablet (1 mg total) by mouth 2 (two) times a day as needed for anxiety   Risankizumab-rzaa (SKYRIZI PEN SC)  Self Yes No   Sig: Inject under the skin   albuterol (2.5 mg/3 mL) 0.083 % nebulizer solution  Self No No   Sig: Take 3 mL (2.5 mg total) by nebulization every 6 (six) hours as needed for wheezing or shortness of breath   albuterol (Proventil HFA) 90 mcg/act inhaler  Self No No   Sig: Inhale 2 puffs every 6 (six) hours as needed for wheezing or shortness of breath   alendronate (Fosamax) 70 mg tablet  Self No No   Sig: Take 1 tablet (70 mg total) by mouth every 7 days   anastrozole (ARIMIDEX) 1 mg tablet   No No   Sig: Take 1  tablet (1 mg total) by mouth daily   benzonatate (TESSALON) 200 MG capsule  Self No No   Sig: Take 1 capsule (200 mg total) by mouth 3 (three) times a day as needed for cough   budesonide-formoterol (Symbicort) 160-4.5 mcg/act inhaler  Self No No   Sig: Inhale 2 puffs 2 (two) times a day Rinse mouth after use.   furosemide (LASIX) 20 mg tablet   No No   Sig: Take 1 tablet (20 mg total) by mouth daily   lactulose (CHRONULAC) 10 g/15 mL solution   No No   Sig: Take 15 mL (10 g total) by mouth 2 (two) times a day   midodrine (PROAMATINE) 10 MG tablet   No No   Sig: Take 1 tablet (10 mg total) by mouth 3 (three) times a day before meals   naltrexone (REVIA) 50 mg tablet  Self No No   Sig: Take 1 tablet (50 mg total) by mouth daily   pantoprazole (PROTONIX) 20 mg tablet   No No   Sig: Take 1 tablet (20 mg total) by mouth daily   traZODone (DESYREL) 50 mg tablet  Self No No   Sig: Take 1 tablet (50 mg total) by mouth daily at bedtime      Facility-Administered Medications: None     Discharge Medication List as of 12/31/2024 12:06 AM        START taking these medications    Details   molnupiravir (Lagevrio) 200 mg capsule Take 4 capsules (800 mg total) by mouth every 12 (twelve) hours for 5 days, Starting Mon 12/30/2024, Until Sat 1/4/2025, Normal           CONTINUE these medications which have NOT CHANGED    Details   albuterol (2.5 mg/3 mL) 0.083 % nebulizer solution Take 3 mL (2.5 mg total) by nebulization every 6 (six) hours as needed for wheezing or shortness of breath, Starting Mon 10/28/2024, Normal      albuterol (Proventil HFA) 90 mcg/act inhaler Inhale 2 puffs every 6 (six) hours as needed for wheezing or shortness of breath, Starting Mon 10/28/2024, Normal      alendronate (Fosamax) 70 mg tablet Take 1 tablet (70 mg total) by mouth every 7 days, Starting Mon 8/26/2024, Normal      ALPRAZolam (XANAX) 1 mg tablet Take 1 tablet (1 mg total) by mouth 2 (two) times a day as needed for anxiety, Starting Wed  10/16/2024, Normal      anastrozole (ARIMIDEX) 1 mg tablet Take 1 tablet (1 mg total) by mouth daily, Starting Mon 11/25/2024, Normal      benzonatate (TESSALON) 200 MG capsule Take 1 capsule (200 mg total) by mouth 3 (three) times a day as needed for cough, Starting Mon 10/28/2024, Normal      budesonide-formoterol (Symbicort) 160-4.5 mcg/act inhaler Inhale 2 puffs 2 (two) times a day Rinse mouth after use., Starting Mon 10/28/2024, Normal      furosemide (LASIX) 20 mg tablet Take 1 tablet (20 mg total) by mouth daily, Starting Mon 11/4/2024, No Print      lactulose (CHRONULAC) 10 g/15 mL solution Take 15 mL (10 g total) by mouth 2 (two) times a day, Starting Fri 11/8/2024, Normal      midodrine (PROAMATINE) 10 MG tablet Take 1 tablet (10 mg total) by mouth 3 (three) times a day before meals, Starting Mon 11/4/2024, Normal      naltrexone (REVIA) 50 mg tablet Take 1 tablet (50 mg total) by mouth daily, Starting Thu 6/13/2024, Normal      pantoprazole (PROTONIX) 20 mg tablet Take 1 tablet (20 mg total) by mouth daily, Starting Fri 11/15/2024, Normal      Risankizumab-rzaa (SKYRIZI PEN SC) Inject under the skin, Historical Med      traZODone (DESYREL) 50 mg tablet Take 1 tablet (50 mg total) by mouth daily at bedtime, Starting Wed 3/13/2024, Normal           No discharge procedures on file.  ED SEPSIS DOCUMENTATION   Time reflects when diagnosis was documented in both MDM as applicable and the Disposition within this note       Time User Action Codes Description Comment    12/30/2024 11:38 PM Baron Mcleod [U07.1] COVID-19     12/30/2024 11:38 PM Baron Mcleod [E87.1] Acute hyponatremia     12/31/2024 12:06 AM Baron Mcleod [J44.9] COPD (chronic obstructive pulmonary disease) (HCC)     12/31/2024 12:06 AM Baron Mcleod [K70.30] Alcoholic cirrhosis of liver without ascites (HCC)                  Baron Mcleod,   01/01/25 0813

## 2025-01-02 ENCOUNTER — VBI (OUTPATIENT)
Dept: FAMILY MEDICINE CLINIC | Facility: CLINIC | Age: 65
End: 2025-01-02

## 2025-01-02 NOTE — TELEPHONE ENCOUNTER
01/02/25 8:55 AM    Patient contacted post ED visit, [facility] department spoke with patient/caregiver and outreach was successful.    Thank you.  VINCE BOBBY MA  [facility]

## 2025-01-03 ENCOUNTER — HOSPITAL ENCOUNTER (OUTPATIENT)
Dept: INTERVENTIONAL RADIOLOGY/VASCULAR | Facility: HOSPITAL | Age: 65
Discharge: HOME | End: 2025-01-03
Attending: INTERNAL MEDICINE
Payer: MEDICARE

## 2025-01-03 ENCOUNTER — RESULTS FOLLOW-UP (OUTPATIENT)
Dept: EMERGENCY DEPT | Facility: HOSPITAL | Age: 65
End: 2025-01-03

## 2025-01-03 ENCOUNTER — HOSPITAL ENCOUNTER (OUTPATIENT)
Facility: HOSPITAL | Age: 65
Setting detail: OBSERVATION
Discharge: HOME | End: 2025-01-04
Attending: EMERGENCY MEDICINE | Admitting: INTERNAL MEDICINE
Payer: MEDICARE

## 2025-01-03 ENCOUNTER — HOSPITAL ENCOUNTER (EMERGENCY)
Facility: HOSPITAL | Age: 65
Discharge: HOME | End: 2025-01-03
Attending: EMERGENCY MEDICINE
Payer: MEDICARE

## 2025-01-03 ENCOUNTER — APPOINTMENT (EMERGENCY)
Dept: RADIOLOGY | Facility: HOSPITAL | Age: 65
End: 2025-01-03
Payer: MEDICARE

## 2025-01-03 VITALS
TEMPERATURE: 97.3 F | RESPIRATION RATE: 21 BRPM | DIASTOLIC BLOOD PRESSURE: 61 MMHG | SYSTOLIC BLOOD PRESSURE: 113 MMHG | OXYGEN SATURATION: 98 % | HEART RATE: 58 BPM

## 2025-01-03 VITALS
DIASTOLIC BLOOD PRESSURE: 53 MMHG | HEART RATE: 77 BPM | OXYGEN SATURATION: 95 % | SYSTOLIC BLOOD PRESSURE: 87 MMHG | RESPIRATION RATE: 20 BRPM

## 2025-01-03 DIAGNOSIS — J91.8 PLEURAL EFFUSION ASSOCIATED WITH HEPATIC DISORDER: ICD-10-CM

## 2025-01-03 DIAGNOSIS — K70.31 ALCOHOLIC CIRRHOSIS OF LIVER WITH ASCITES (HCC): ICD-10-CM

## 2025-01-03 DIAGNOSIS — K76.9 PLEURAL EFFUSION ASSOCIATED WITH HEPATIC DISORDER: ICD-10-CM

## 2025-01-03 DIAGNOSIS — U07.1 COVID: Primary | ICD-10-CM

## 2025-01-03 DIAGNOSIS — J93.9 PNEUMOTHORAX: Primary | ICD-10-CM

## 2025-01-03 PROBLEM — J95.811 POSTPROCEDURAL PNEUMOTHORAX: Status: ACTIVE | Noted: 2021-02-19

## 2025-01-03 LAB
ANION GAP SERPL CALCULATED.3IONS-SCNC: 4 MMOL/L (ref 4–13)
ATRIAL RATE: 67 BPM
BASOPHILS # BLD AUTO: 0 THOUSANDS/ΜL (ref 0–0.1)
BASOPHILS NFR BLD AUTO: 0 % (ref 0–1)
BUN SERPL-MCNC: 22 MG/DL (ref 5–25)
CALCIUM SERPL-MCNC: 8 MG/DL (ref 8.4–10.2)
CARDIAC TROPONIN I PNL SERPL HS: 4 NG/L (ref ?–50)
CHLORIDE SERPL-SCNC: 103 MMOL/L (ref 96–108)
CO2 SERPL-SCNC: 27 MMOL/L (ref 21–32)
CREAT SERPL-MCNC: 0.93 MG/DL (ref 0.6–1.3)
EOSINOPHIL # BLD AUTO: 0.04 THOUSAND/ΜL (ref 0–0.61)
EOSINOPHIL NFR BLD AUTO: 1 % (ref 0–6)
ERYTHROCYTE [DISTWIDTH] IN BLOOD BY AUTOMATED COUNT: 14.3 % (ref 11.6–15.1)
GFR SERPL CREATININE-BSD FRML MDRD: 64 ML/MIN/1.73SQ M
GLUCOSE SERPL-MCNC: 117 MG/DL (ref 65–140)
HCT VFR BLD AUTO: 37.7 % (ref 34.8–46.1)
HGB BLD-MCNC: 12.8 G/DL (ref 11.5–15.4)
IMM GRANULOCYTES # BLD AUTO: 0.06 THOUSAND/UL (ref 0–0.2)
IMM GRANULOCYTES NFR BLD AUTO: 1 % (ref 0–2)
LYMPHOCYTES # BLD AUTO: 0.5 THOUSANDS/ΜL (ref 0.6–4.47)
LYMPHOCYTES NFR BLD AUTO: 7 % (ref 14–44)
MCH RBC QN AUTO: 34.9 PG (ref 26.8–34.3)
MCHC RBC AUTO-ENTMCNC: 34 G/DL (ref 31.4–37.4)
MCV RBC AUTO: 103 FL (ref 82–98)
MONOCYTES # BLD AUTO: 0.57 THOUSAND/ΜL (ref 0.17–1.22)
MONOCYTES NFR BLD AUTO: 8 % (ref 4–12)
NEUTROPHILS # BLD AUTO: 5.88 THOUSANDS/ΜL (ref 1.85–7.62)
NEUTS SEG NFR BLD AUTO: 83 % (ref 43–75)
NRBC BLD AUTO-RTO: 0 /100 WBCS
P AXIS: 62 DEGREES
PLATELET # BLD AUTO: 105 THOUSANDS/UL (ref 149–390)
PMV BLD AUTO: 10.1 FL (ref 8.9–12.7)
POTASSIUM SERPL-SCNC: 3.7 MMOL/L (ref 3.5–5.3)
PR INTERVAL: 136 MS
QRS AXIS: 69 DEGREES
QRSD INTERVAL: 84 MS
QT INTERVAL: 424 MS
QTC INTERVAL: 448 MS
RBC # BLD AUTO: 3.67 MILLION/UL (ref 3.81–5.12)
SODIUM SERPL-SCNC: 134 MMOL/L (ref 135–147)
T WAVE AXIS: 61 DEGREES
VENTRICULAR RATE: 67 BPM
WBC # BLD AUTO: 7.05 THOUSAND/UL (ref 4.31–10.16)

## 2025-01-03 PROCEDURE — 99223 1ST HOSP IP/OBS HIGH 75: CPT | Performed by: PHYSICIAN ASSISTANT

## 2025-01-03 PROCEDURE — 99284 EMERGENCY DEPT VISIT MOD MDM: CPT

## 2025-01-03 PROCEDURE — NC001 PR NO CHARGE: Performed by: EMERGENCY MEDICINE

## 2025-01-03 PROCEDURE — 99285 EMERGENCY DEPT VISIT HI MDM: CPT

## 2025-01-03 PROCEDURE — 85025 COMPLETE CBC W/AUTO DIFF WBC: CPT | Performed by: EMERGENCY MEDICINE

## 2025-01-03 PROCEDURE — 99285 EMERGENCY DEPT VISIT HI MDM: CPT | Performed by: EMERGENCY MEDICINE

## 2025-01-03 PROCEDURE — 36415 COLL VENOUS BLD VENIPUNCTURE: CPT | Performed by: EMERGENCY MEDICINE

## 2025-01-03 PROCEDURE — 32555 ASPIRATE PLEURA W/ IMAGING: CPT | Performed by: RADIOLOGY

## 2025-01-03 PROCEDURE — 32555 ASPIRATE PLEURA W/ IMAGING: CPT

## 2025-01-03 PROCEDURE — 80048 BASIC METABOLIC PNL TOTAL CA: CPT | Performed by: EMERGENCY MEDICINE

## 2025-01-03 PROCEDURE — 71045 X-RAY EXAM CHEST 1 VIEW: CPT

## 2025-01-03 PROCEDURE — 84484 ASSAY OF TROPONIN QUANT: CPT | Performed by: EMERGENCY MEDICINE

## 2025-01-03 PROCEDURE — 93005 ELECTROCARDIOGRAM TRACING: CPT

## 2025-01-03 RX ORDER — FUROSEMIDE 20 MG/1
20 TABLET ORAL DAILY
Status: DISCONTINUED | OUTPATIENT
Start: 2025-01-03 | End: 2025-01-04 | Stop reason: HOSPADM

## 2025-01-03 RX ORDER — MIDODRINE HYDROCHLORIDE 5 MG/1
10 TABLET ORAL
Status: DISCONTINUED | OUTPATIENT
Start: 2025-01-03 | End: 2025-01-04 | Stop reason: HOSPADM

## 2025-01-03 RX ORDER — BUDESONIDE AND FORMOTEROL FUMARATE DIHYDRATE 160; 4.5 UG/1; UG/1
2 AEROSOL RESPIRATORY (INHALATION) 2 TIMES DAILY
Status: DISCONTINUED | OUTPATIENT
Start: 2025-01-03 | End: 2025-01-04 | Stop reason: HOSPADM

## 2025-01-03 RX ORDER — TRAZODONE HYDROCHLORIDE 50 MG/1
50 TABLET, FILM COATED ORAL
Status: DISCONTINUED | OUTPATIENT
Start: 2025-01-03 | End: 2025-01-04 | Stop reason: HOSPADM

## 2025-01-03 RX ORDER — ANASTROZOLE 1 MG/1
1 TABLET ORAL DAILY
Status: DISCONTINUED | OUTPATIENT
Start: 2025-01-03 | End: 2025-01-04 | Stop reason: HOSPADM

## 2025-01-03 RX ORDER — ALPRAZOLAM 0.5 MG
2 TABLET ORAL
Status: DISCONTINUED | OUTPATIENT
Start: 2025-01-03 | End: 2025-01-04 | Stop reason: HOSPADM

## 2025-01-03 RX ORDER — LIDOCAINE 50 MG/G
1 PATCH TOPICAL DAILY
Status: DISCONTINUED | OUTPATIENT
Start: 2025-01-03 | End: 2025-01-04 | Stop reason: HOSPADM

## 2025-01-03 RX ORDER — BENZONATATE 100 MG/1
200 CAPSULE ORAL 3 TIMES DAILY PRN
Status: DISCONTINUED | OUTPATIENT
Start: 2025-01-03 | End: 2025-01-04

## 2025-01-03 RX ORDER — PANTOPRAZOLE SODIUM 20 MG/1
20 TABLET, DELAYED RELEASE ORAL DAILY
Status: DISCONTINUED | OUTPATIENT
Start: 2025-01-03 | End: 2025-01-04 | Stop reason: HOSPADM

## 2025-01-03 RX ADMIN — BUDESONIDE AND FORMOTEROL FUMARATE DIHYDRATE 2 PUFF: 160; 4.5 AEROSOL RESPIRATORY (INHALATION) at 19:35

## 2025-01-03 RX ADMIN — MIDODRINE HYDROCHLORIDE 10 MG: 5 TABLET ORAL at 17:33

## 2025-01-03 RX ADMIN — PANTOPRAZOLE SODIUM 20 MG: 20 TABLET, DELAYED RELEASE ORAL at 17:31

## 2025-01-03 RX ADMIN — TRAZODONE HYDROCHLORIDE 50 MG: 50 TABLET ORAL at 22:52

## 2025-01-03 RX ADMIN — LIDOCAINE 5% 1 PATCH: 700 PATCH TOPICAL at 16:46

## 2025-01-03 RX ADMIN — ANASTROZOLE 1 MG: 1 TABLET, COATED ORAL at 17:33

## 2025-01-03 RX ADMIN — ALPRAZOLAM 2 MG: 0.5 TABLET ORAL at 22:52

## 2025-01-03 NOTE — DISCHARGE INSTRUCTIONS
Thoracentesis   WHAT YOU NEED TO KNOW:   A thoracentesis is a procedure to remove extra fluid or air from between your lungs and your inner chest wall. Air or fluid buildup may make it hard for you to breathe. A thoracentesis allows your lungs to expand fully so you can breathe more easily.  DISCHARGE INSTRUCTIONS:   Medicines:   Pain medicine:  You may be given a prescription medicine to decrease pain. Do not wait until the pain is severe before you take your medicine.    Antibiotics:  This medicine helps fight or prevent an infection.    Take your medicine as directed.  Call your healthcare provider if you think your medicine is not helping or if you have side effects. Tell him if you are allergic to any medicine. Keep a list of the medicines, vitamins, and herbs you take. Include the amounts, and when and why you take them. Bring the list or the pill bottles to follow-up visits. Carry your medicine list with you in case of an emergency.  Follow up with your healthcare provider as directed:  Write down your questions so you remember to ask them during your visits.   Rest:  Rest when you feel it is needed. Slowly start to do more each day. Return to your daily activities as directed.   Do not smoke:  If you smoke, it is never too late to quit. Ask for information about how to stop smoking if you need help.  Contact your healthcare provider if:   You have a fever.    Your puncture site is red, warm, swollen, or draining pus.    You have questions or concerns about your procedure, medicine, or care.    If you have any questions regarding, call the IR department @ [phone].     Seek care immediately or call 911 if:   Blood soaks through your bandage.    There is blood in your spit.

## 2025-01-03 NOTE — SEDATION DOCUMENTATION
Pt had a syncopal episode following thoracentesis. Pt placed in revers trendelenberg. Was coherent but felt dizzy and lightheaded during episode. BP in the low 80's.  Before and after Thoracentesis. Pt refused to go to the ED. Para rescheduled for Monday 1/6/25

## 2025-01-03 NOTE — ASSESSMENT & PLAN NOTE
Tiny right apical pneumothorax after thoracentesis today.  Patient is asymptomatic without shortness of breath or hypoxia.  Admitted overnight for observation  Place supplemental oxygen on the patient overnight  Repeat chest x-ray tomorrow  If stable and/or resolved can be discharged home tomorrow

## 2025-01-03 NOTE — H&P
H&P - Hospitalist   Name: Jose White 64 y.o. female I MRN: [id number]  Unit/Bed#: ED 10 I Date of Admission: 1/3/2025   Date of Service: 1/3/2025 I Hospital Day: 0     Assessment & Plan  Postprocedural pneumothorax of right lung  Tiny right apical pneumothorax after thoracentesis today.  Patient is asymptomatic without shortness of breath or hypoxia.  Admitted overnight for observation  Place supplemental oxygen on the patient overnight  Repeat chest x-ray tomorrow  If stable and/or resolved can be discharged home tomorrow  Ductal carcinoma in situ (DCIS) of right breast  Continue anastrozole  Outpatient follow-up with oncology  COVID  Supportive care with antitussive  On room air presently  Alcoholic cirrhosis of liver with ascites (HCC)  Presently compensated  Receives weekly paracentesis  Continue outpatient follow-up  Hyponatremia  Mild and stable, in the setting of cirrhosis  Hepatic encephalopathy (HCC)  Mentation is stable  She has been off lactulose for many months  COPD (chronic obstructive pulmonary disease) (HCC)  No shortness of breath presently, not in acute exacerbation  Monitor respiratory status      VTE Pharmacologic Prophylaxis: VTE Score: 2 Low Risk (Score 0-2) - Encourage Ambulation.  Code Status: full code     Anticipated Length of Stay: Patient will be admitted on an observation basis with an anticipated length of stay of less than 2 midnights secondary to tiny apical pneumothorax requiring repeat imaging tomorrow prior to hospital discharge.    Total Time for Visit, including Counseling / Coordination of Care: 75 Minutes. Greater than 50% of this total time spent on direct patient counseling and coordination of care.    Chief Complaint: Abnormal chest x-ray    History of Present Illness:  Jose White is a 64 y.o. female presenting with tiny pneumothorax after routine thoracentesis today.  She was called back to the ER after x-ray showed tiny apical pneumothorax.  She is  asymptomatic without any shortness of breath or hypoxia.  She will be admitted overnight for observation with repeat chest x-ray to be done in the morning.    Review of Systems:  Review of Systems   Constitutional:  Negative for activity change, appetite change, chills, fatigue and fever.   HENT:  Negative for congestion, rhinorrhea, sinus pressure and sore throat.    Eyes:  Negative for photophobia, pain and visual disturbance.   Respiratory:  Negative for cough, shortness of breath and wheezing.    Cardiovascular:  Negative for chest pain, palpitations and leg swelling.   Gastrointestinal:  Negative for abdominal distention, abdominal pain, constipation, diarrhea, nausea and vomiting.   Endocrine: Negative for cold intolerance, heat intolerance, polydipsia and polyuria.   Genitourinary:  Negative for difficulty urinating, dysuria, flank pain, frequency and hematuria.   Musculoskeletal:  Negative for arthralgias, back pain and joint swelling.   Skin:  Negative for color change, pallor and rash.   Allergic/Immunologic: Negative.    Neurological:  Negative for dizziness, syncope, weakness, light-headedness and headaches.   Hematological: Negative.    Psychiatric/Behavioral: Negative.         Past Medical and Surgical History:   Past Medical History:   Diagnosis Date    Alcoholic fatty liver     Anxiety     Asthma     COPD (chronic obstructive pulmonary disease) (HCC)     Elevated liver function tests     GERD (gastroesophageal reflux disease)     GERD without esophagitis     Hyperlipidemia     Hypertension     IBS (irritable bowel syndrome)     Insomnia     Insomnia     Liver disease     Nervousness     Nicotine dependence     Other specified urinary incontinence     RLS (restless legs syndrome)     Wears glasses        Past Surgical History:   Procedure Laterality Date    BACK SURGERY      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    BREAST EXCISIONAL BIOPSY Right 11/01/2004    benign    CATARACT EXTRACTION, BILATERAL   08/01/2018    COLONOSCOPY N/A 5/8/2019    Procedure: COLONOSCOPY;  Surgeon: Jacobo Leonardo MD;  Location: [facility];  Service: Gastroenterology    EGD AND COLONOSCOPY N/A 3/19/2018    Procedure: EGD AND COLONOSCOPY;  Surgeon: Jacobo Leonardo MD;  Location: [facility];  Service: Gastroenterology    ESOPHAGOGASTRODUODENOSCOPY N/A 5/8/2019    Procedure: ESOPHAGOGASTRODUODENOSCOPY (EGD);  Surgeon: Jacobo Leonardo MD;  Location: [facility];  Service: Gastroenterology    IR PARACENTESIS  11/27/2020    IR PARACENTESIS  12/8/2020    IR PARACENTESIS  10/28/2022    IR PARACENTESIS  11/7/2022    IR PARACENTESIS  11/30/2022    IR PARACENTESIS  12/30/2022    IR PARACENTESIS  1/16/2023    IR PARACENTESIS  8/6/2024    IR PARACENTESIS  9/18/2024    IR PARACENTESIS  10/3/2024    IR PARACENTESIS  10/10/2024    IR PARACENTESIS  10/17/2024    IR PARACENTESIS  10/24/2024    IR PARACENTESIS  11/1/2024    IR PARACENTESIS  11/7/2024    IR PARACENTESIS  11/14/2024    IR PARACENTESIS  11/21/2024    IR PARACENTESIS  11/29/2024    IR PARACENTESIS  12/5/2024    IR PARACENTESIS  12/12/2024    IR PARACENTESIS  12/19/2024    IR PARACENTESIS  12/26/2024    IR THORACENTESIS  1/16/2023    IR THORACENTESIS  3/28/2023    IR THORACENTESIS  7/25/2024    IR THORACENTESIS  8/6/2024    IR THORACENTESIS  8/14/2024    IR THORACENTESIS  8/21/2024    IR THORACENTESIS  8/28/2024    IR THORACENTESIS  9/4/2024    IR THORACENTESIS  9/11/2024    IR THORACENTESIS  9/18/2024    IR THORACENTESIS  9/25/2024    IR THORACENTESIS  10/3/2024    IR THORACENTESIS  10/10/2024    IR THORACENTESIS  10/17/2024    IR THORACENTESIS  10/24/2024    IR THORACENTESIS  11/1/2024    IR THORACENTESIS  11/7/2024    IR THORACENTESIS  11/14/2024    IR THORACENTESIS  11/21/2024    IR THORACENTESIS  11/29/2024    IR THORACENTESIS  12/5/2024    IR THORACENTESIS  12/12/2024    IR THORACENTESIS  12/19/2024    IR THORACENTESIS  12/26/2024    IR THORACENTESIS  1/3/2025    KNEE SURGERY       KNEE SURGERY      LUMBAR LAMINECTOMY  04/1999    LYMPH NODE BIOPSY      MAMMO STEREOTACTIC BREAST BIOPSY RIGHT (ALL INC) Right 5/21/2021    MAMMO STEREOTACTIC BREAST BIOPSY RIGHT (ALL INC) EACH ADD Right 5/21/2021    TOTAL ABDOMINAL HYSTERECTOMY  02/05/2008    TUBAL LIGATION      TUBAL LIGATION  1989    UPPER GASTROINTESTINAL ENDOSCOPY         Meds/Allergies:  Prior to Admission medications    Medication Sig Start Date End Date Taking? Authorizing Provider   albuterol (2.5 mg/3 mL) 0.083 % nebulizer solution Take 3 mL (2.5 mg total) by nebulization every 6 (six) hours as needed for wheezing or shortness of breath 10/28/24   Caroline Moeller PA-C   albuterol (Proventil HFA) 90 mcg/act inhaler Inhale 2 puffs every 6 (six) hours as needed for wheezing or shortness of breath 10/28/24   Caroline Moeller PA-C   alendronate (Fosamax) 70 mg tablet Take 1 tablet (70 mg total) by mouth every 7 days 8/26/24   JOSÉ Craig   ALPRAZolam (XANAX) 1 mg tablet Take 1 tablet (1 mg total) by mouth 2 (two) times a day as needed for anxiety 10/16/24   JOSÉ Craig   anastrozole (ARIMIDEX) 1 mg tablet Take 1 tablet (1 mg total) by mouth daily 11/25/24   Lala Shanks DO   benzonatate (TESSALON) 200 MG capsule Take 1 capsule (200 mg total) by mouth 3 (three) times a day as needed for cough 10/28/24   Caroline Moeller PA-C   budesonide-formoterol (Symbicort) 160-4.5 mcg/act inhaler Inhale 2 puffs 2 (two) times a day Rinse mouth after use. 10/28/24   Caroline Moeller PA-C   furosemide (LASIX) 20 mg tablet Take 1 tablet (20 mg total) by mouth daily 11/4/24   Dayanara Sanderson PA-C   lactulose (CHRONULAC) 10 g/15 mL solution Take 15 mL (10 g total) by mouth 2 (two) times a day 11/8/24   Juan Antonio Hardin MD   midodrine (PROAMATINE) 10 MG tablet Take 1 tablet (10 mg total) by mouth 3 (three) times a day before meals 11/4/24   Dayanara Sanderson PA-C   molnupiravir (Lagevrio) 200 mg capsule Take 4 capsules  (800 mg total) by mouth every 12 (twelve) hours for 5 days 12/31/24 1/5/25  JOSÉ Teran   pantoprazole (PROTONIX) 20 mg tablet Take 1 tablet (20 mg total) by mouth daily 11/15/24   Vamsi Herrera MD   Risankizumab-rzaa (SKYRIZI PEN SC) Inject under the skin    Historical Provider, MD   traZODone (DESYREL) 50 mg tablet Take 1 tablet (50 mg total) by mouth daily at bedtime 3/13/24   Sarah Beth Eller PA-C   mupirocin (BACTROBAN) 2 % ointment Apply topically 3 (three) times a day  Patient not taking: Reported on 8/26/2022 5/10/22 9/17/22  Sarah Beth Eller PA-C   naltrexone (REVIA) 50 mg tablet Take 1 tablet (50 mg total) by mouth daily 6/13/24 1/3/25  Jacobo Leonardo MD       All medications reviewed.    Allergies:   Allergies   Allergen Reactions    Sulfa Antibiotics Shortness Of Breath    Ace Inhibitors Cough and Other (See Comments)     coughing       Social History:  Marital Status: /Civil Union     Substance Use History:   Social History     Substance and Sexual Activity   Alcohol Use Not Currently    Comment: 7-10 drinks/wk     Social History     Tobacco Use   Smoking Status Every Day    Current packs/day: 1.00    Average packs/day: 1 pack/day for 47.0 years (47.0 ttl pk-yrs)    Types: Cigarettes    Start date: 1/1/1978   Smokeless Tobacco Never     Social History     Substance and Sexual Activity   Drug Use No       Family History:  Non-contributory    Physical Exam:     Vitals:   Blood Pressure: 101/69 (01/03/25 1548)  Pulse: 84 (01/03/25 1548)  Temperature: 97.7 °F (36.5 °C) (01/03/25 1548)  Temp Source: Oral (01/03/25 1548)  Respirations: 20 (01/03/25 1548)  SpO2: 98 % (01/03/25 1548)    Physical Exam  Vitals and nursing note reviewed.   Constitutional:       General: She is not in acute distress.     Appearance: Normal appearance. She is not ill-appearing.   HENT:      Head: Normocephalic and atraumatic.      Mouth/Throat:      Mouth: Mucous membranes are moist.   Eyes:      General: No  scleral icterus.        Right eye: No discharge.         Left eye: No discharge.      Pupils: Pupils are equal, round, and reactive to light.   Cardiovascular:      Rate and Rhythm: Normal rate and regular rhythm.      Heart sounds: No murmur heard.     No friction rub. No gallop.   Pulmonary:      Effort: No respiratory distress.      Breath sounds: No wheezing, rhonchi or rales.   Abdominal:      General: Bowel sounds are normal. There is no distension.      Palpations: Abdomen is soft.      Tenderness: There is no abdominal tenderness. There is no guarding or rebound.   Musculoskeletal:         General: No swelling or deformity.      Cervical back: Neck supple.      Right lower leg: No edema.      Left lower leg: No edema.   Skin:     General: Skin is warm and dry.      Capillary Refill: Capillary refill takes less than 2 seconds.      Coloration: Skin is not jaundiced or pale.      Findings: No erythema or rash.   Neurological:      General: No focal deficit present.      Mental Status: She is alert and oriented to person, place, and time. Mental status is at baseline.      Cranial Nerves: No cranial nerve deficit.      Sensory: No sensory deficit.   Psychiatric:         Mood and Affect: Mood normal.         Behavior: Behavior normal.         Additional Data:     Lab Results:  Results from last 7 days   Lab Units 01/03/25  1201   WBC Thousand/uL 7.05   HEMOGLOBIN g/dL 12.8   HEMATOCRIT % 37.7   PLATELETS Thousands/uL 105*   SEGS PCT % 83*   LYMPHO PCT % 7*   MONO PCT % 8   EOS PCT % 1     Results from last 7 days   Lab Units 01/03/25  1201 01/02/25  1104   SODIUM mmol/L 134* 133*   POTASSIUM mmol/L 3.7 3.8   CHLORIDE mmol/L 103 99   CO2 mmol/L 27 24   BUN mg/dL 22 24   CREATININE mg/dL 0.93 1.07*   ANION GAP mmol/L 4  --    CALCIUM mg/dL 8.0* 8.0*   ALBUMIN g/dL  --  2.2*   TOTAL BILIRUBIN mg/dL  --  0.7   ALK PHOS U/L  --  126   ALT U/L  --  19   AST U/L  --  43*   GLUCOSE RANDOM mg/dL 117 129*                        Imaging: All pertinent imaging reviewed.  XR chest pa and lateral    (Results Pending)       EKG and Other Studies Reviewed on Admission:   Prior pertinent studies and records reviewed in [facility]Care Everywhere.    ** Please Note: This note has been constructed using a voice recognition system. **

## 2025-01-03 NOTE — ED PROVIDER NOTES
Multiple attempts to call patient back regarding findings of small ptx. Also  and son listed in contacts unsuccessful.      Waqas Robert,   01/03/25 2048

## 2025-01-04 ENCOUNTER — APPOINTMENT (OUTPATIENT)
Dept: RADIOLOGY | Facility: HOSPITAL | Age: 65
End: 2025-01-04
Payer: MEDICARE

## 2025-01-04 VITALS
HEIGHT: 64 IN | BODY MASS INDEX: 21.7 KG/M2 | OXYGEN SATURATION: 94 % | SYSTOLIC BLOOD PRESSURE: 104 MMHG | RESPIRATION RATE: 20 BRPM | DIASTOLIC BLOOD PRESSURE: 61 MMHG | WEIGHT: 127.1 LBS | HEART RATE: 66 BPM | TEMPERATURE: 97.6 F

## 2025-01-04 PROCEDURE — 71046 X-RAY EXAM CHEST 2 VIEWS: CPT

## 2025-01-04 PROCEDURE — 99239 HOSP IP/OBS DSCHRG MGMT >30: CPT

## 2025-01-04 RX ADMIN — BUDESONIDE AND FORMOTEROL FUMARATE DIHYDRATE 2 PUFF: 160; 4.5 AEROSOL RESPIRATORY (INHALATION) at 09:07

## 2025-01-04 RX ADMIN — MIDODRINE HYDROCHLORIDE 10 MG: 5 TABLET ORAL at 04:57

## 2025-01-04 RX ADMIN — MIDODRINE HYDROCHLORIDE 10 MG: 5 TABLET ORAL at 11:26

## 2025-01-04 NOTE — QUICK NOTE
I have personally counseled the patient on medication indication, compliance, utilization and side effects. Patient may be discharged home with Symbicort when appropriate for discharge.

## 2025-01-04 NOTE — ED PROVIDER NOTES
"Time reflects when diagnosis was documented in both MDM as applicable and the Disposition within this note       Time User Action Codes Description Comment    1/3/2025  4:17 PM Waqas Robert Add [J93.9] Pneumothorax           ED Disposition       ED Disposition   Admit    Condition   Stable    Date/Time   Fri Josemanuel 3, 2025  4:17 PM    Comment   Case was discussed with RANDY and the patient's admission status was agreed to be Admission Status: observation status to the service of Dr. Hardin .               Assessment & Plan       Medical Decision Making  64-year-old female presenting after episode of hypotension which now seems improved.  Obtain labs to assess for dehydration given recent COVID diagnosis and also for thoracentesis.    Called by radiologist regarding small apical pneumothorax discovered on chest x-ray.  Patient was initially discharged but then called back immediately for admission given findings.    Risk  Decision regarding hospitalization.             Medications   lidocaine (LIDODERM) 5 % patch 1 patch (1 patch Topical Medication Applied 1/3/25 1646)   traZODone (DESYREL) tablet 50 mg (has no administration in time range)   pantoprazole (PROTONIX) EC tablet 20 mg (20 mg Oral Given 1/3/25 1731)   ALPRAZolam (XANAX) tablet 2 mg (has no administration in time range)   anastrozole (ARIMIDEX) tablet 1 mg (1 mg Oral Given 1/3/25 1733)   benzonatate (TESSALON PERLES) capsule 200 mg (has no administration in time range)   midodrine (PROAMATINE) tablet 10 mg (10 mg Oral Given 1/3/25 1733)   furosemide (LASIX) tablet 20 mg (0 mg Oral Hold 1/3/25 1742)   budesonide-formoterol (SYMBICORT) 160-4.5 mcg/act inhaler 2 puff (2 puffs Inhalation Given 1/3/25 1935)       ED Risk Strat Scores                                              History of Present Illness       Chief Complaint   Patient presents with    Evaluation of Abnormal Diagnostic Test     Pt reports being called and \"they found something in my chest\". " Reports cp or sob.        Past Medical History:   Diagnosis Date    Alcoholic fatty liver     Anxiety     Asthma     COPD (chronic obstructive pulmonary disease) (HCC)     Elevated liver function tests     GERD (gastroesophageal reflux disease)     GERD without esophagitis     Hyperlipidemia     Hypertension     IBS (irritable bowel syndrome)     Insomnia     Insomnia     Liver disease     Nervousness     Nicotine dependence     Other specified urinary incontinence     RLS (restless legs syndrome)     Wears glasses       Past Surgical History:   Procedure Laterality Date    BACK SURGERY      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    BREAST EXCISIONAL BIOPSY Right 11/01/2004    benign    CATARACT EXTRACTION, BILATERAL  08/01/2018    COLONOSCOPY N/A 5/8/2019    Procedure: COLONOSCOPY;  Surgeon: Jacobo Leonardo MD;  Location: [facility];  Service: Gastroenterology    EGD AND COLONOSCOPY N/A 3/19/2018    Procedure: EGD AND COLONOSCOPY;  Surgeon: Jacobo Leonardo MD;  Location: [facility];  Service: Gastroenterology    ESOPHAGOGASTRODUODENOSCOPY N/A 5/8/2019    Procedure: ESOPHAGOGASTRODUODENOSCOPY (EGD);  Surgeon: Jacobo Leonardo MD;  Location: [facility];  Service: Gastroenterology    IR PARACENTESIS  11/27/2020    IR PARACENTESIS  12/8/2020    IR PARACENTESIS  10/28/2022    IR PARACENTESIS  11/7/2022    IR PARACENTESIS  11/30/2022    IR PARACENTESIS  12/30/2022    IR PARACENTESIS  1/16/2023    IR PARACENTESIS  8/6/2024    IR PARACENTESIS  9/18/2024    IR PARACENTESIS  10/3/2024    IR PARACENTESIS  10/10/2024    IR PARACENTESIS  10/17/2024    IR PARACENTESIS  10/24/2024    IR PARACENTESIS  11/1/2024    IR PARACENTESIS  11/7/2024    IR PARACENTESIS  11/14/2024    IR PARACENTESIS  11/21/2024    IR PARACENTESIS  11/29/2024    IR PARACENTESIS  12/5/2024    IR PARACENTESIS  12/12/2024    IR PARACENTESIS  12/19/2024    IR PARACENTESIS  12/26/2024    IR THORACENTESIS  1/16/2023    IR THORACENTESIS  3/28/2023    IR  THORACENTESIS  7/25/2024    IR THORACENTESIS  8/6/2024    IR THORACENTESIS  8/14/2024    IR THORACENTESIS  8/21/2024    IR THORACENTESIS  8/28/2024    IR THORACENTESIS  9/4/2024    IR THORACENTESIS  9/11/2024    IR THORACENTESIS  9/18/2024    IR THORACENTESIS  9/25/2024    IR THORACENTESIS  10/3/2024    IR THORACENTESIS  10/10/2024    IR THORACENTESIS  10/17/2024    IR THORACENTESIS  10/24/2024    IR THORACENTESIS  11/1/2024    IR THORACENTESIS  11/7/2024    IR THORACENTESIS  11/14/2024    IR THORACENTESIS  11/21/2024    IR THORACENTESIS  11/29/2024    IR THORACENTESIS  12/5/2024    IR THORACENTESIS  12/12/2024    IR THORACENTESIS  12/19/2024    IR THORACENTESIS  12/26/2024    IR THORACENTESIS  1/3/2025    KNEE SURGERY      KNEE SURGERY      LUMBAR LAMINECTOMY  04/1999    LYMPH NODE BIOPSY      MAMMO STEREOTACTIC BREAST BIOPSY RIGHT (ALL INC) Right 5/21/2021    MAMMO STEREOTACTIC BREAST BIOPSY RIGHT (ALL INC) EACH ADD Right 5/21/2021    TOTAL ABDOMINAL HYSTERECTOMY  02/05/2008    TUBAL LIGATION      TUBAL LIGATION  1989    UPPER GASTROINTESTINAL ENDOSCOPY        Family History   Problem Relation Age of Onset    Breast cancer Mother 32    No Known Problems Father     No Known Problems Sister     No Known Problems Brother     No Known Problems Maternal Grandmother     No Known Problems Maternal Grandfather     No Known Problems Paternal Grandmother     No Known Problems Paternal Grandfather     No Known Problems Maternal Aunt     No Known Problems Maternal Aunt     No Known Problems Paternal Aunt     No Known Problems Son     Substance Abuse Neg Hx     Mental illness Neg Hx     Colon cancer Neg Hx     Colon polyps Neg Hx       Social History     Tobacco Use    Smoking status: Every Day     Current packs/day: 1.00     Average packs/day: 1 pack/day for 47.0 years (47.0 ttl pk-yrs)     Types: Cigarettes     Start date: 1/1/1978    Smokeless tobacco: Never   Vaping Use    Vaping status: Never Used   Substance Use Topics     Alcohol use: Not Currently     Comment: 7-10 drinks/wk    Drug use: No      E-Cigarette/Vaping    E-Cigarette Use Never User       E-Cigarette/Vaping Substances    Nicotine No     THC No     CBD No     Flavoring No     Other No     Unknown No       I have reviewed and agree with the history as documented.     64-year-old female presents to the ED after episode of hypotension status post routine thoracentesis performed by interventional radiology.  Patient diagnosed with COVID 5 days ago and states she has been feeling rundown but no new symptoms.  Denies chest pain, shortness of breath.  Upon ED assessment, patient's blood pressure normotensive.        Review of Systems   Constitutional:  Negative for fever.           Objective       ED Triage Vitals [01/03/25 1548]   Temperature Pulse Blood Pressure Respirations SpO2 Patient Position - Orthostatic VS   97.7 °F (36.5 °C) 84 101/69 20 98 % Sitting      Temp Source Heart Rate Source BP Location FiO2 (%) Pain Score    Oral Monitor Left arm -- No Pain      Vitals      Date and Time Temp Pulse SpO2 Resp BP Pain Score FACES Pain Rating User   01/03/25 2015 -- 79 96 % 20 -- -- -- [place]   01/03/25 2000 -- 76 96 % 20 105/53 No Pain -- [place]   01/03/25 1930 -- 70 96 % 20 98/56 -- -- [place]   01/03/25 1900 -- 73 95 % 20 111/58 -- -- [place]   01/03/25 1800 -- 65 97 % 21 101/58 -- -- [place]   01/03/25 1741 -- 65 97 % 20 106/59 -- -- CM   01/03/25 1548 97.7 °F (36.5 °C) 84 98 % 20 101/69 No Pain --             Physical Exam  Vitals and nursing note reviewed.   Constitutional:       Appearance: She is well-developed.   HENT:      Head: Normocephalic and atraumatic.      Right Ear: External ear normal.      Left Ear: External ear normal.      Nose: Nose normal.   Eyes:      General: No scleral icterus.  Cardiovascular:      Rate and Rhythm: Normal rate.   Pulmonary:      Effort: Pulmonary effort is normal. No respiratory distress.   Abdominal:      General: There is no distension.    Musculoskeletal:         General: No deformity. Normal range of motion.      Cervical back: Normal range of motion.   Skin:     Findings: No rash.   Neurological:      General: No focal deficit present.      Mental Status: She is alert and oriented to person, place, and time.   Psychiatric:         Mood and Affect: Mood normal.         Results Reviewed       None            XR chest pa and lateral    (Results Pending)       Procedures    ED Medication and Procedure Management   Prior to Admission Medications   Prescriptions Last Dose Informant Patient Reported? Taking?   ALPRAZolam (XANAX) 1 mg tablet  Self No No   Sig: Take 1 tablet (1 mg total) by mouth 2 (two) times a day as needed for anxiety   Risankizumab-rzaa (SKYRIZI PEN SC)  Self Yes No   Sig: Inject under the skin   albuterol (2.5 mg/3 mL) 0.083 % nebulizer solution  Self No No   Sig: Take 3 mL (2.5 mg total) by nebulization every 6 (six) hours as needed for wheezing or shortness of breath   albuterol (Proventil HFA) 90 mcg/act inhaler  Self No No   Sig: Inhale 2 puffs every 6 (six) hours as needed for wheezing or shortness of breath   alendronate (Fosamax) 70 mg tablet  Self No No   Sig: Take 1 tablet (70 mg total) by mouth every 7 days   anastrozole (ARIMIDEX) 1 mg tablet   No No   Sig: Take 1 tablet (1 mg total) by mouth daily   benzonatate (TESSALON) 200 MG capsule  Self No No   Sig: Take 1 capsule (200 mg total) by mouth 3 (three) times a day as needed for cough   budesonide-formoterol (Symbicort) 160-4.5 mcg/act inhaler  Self No No   Sig: Inhale 2 puffs 2 (two) times a day Rinse mouth after use.   furosemide (LASIX) 20 mg tablet   No No   Sig: Take 1 tablet (20 mg total) by mouth daily   lactulose (CHRONULAC) 10 g/15 mL solution   No No   Sig: Take 15 mL (10 g total) by mouth 2 (two) times a day   midodrine (PROAMATINE) 10 MG tablet   No No   Sig: Take 1 tablet (10 mg total) by mouth 3 (three) times a day before meals   molnupiravir (Lagevrio) 200  mg capsule   No No   Sig: Take 4 capsules (800 mg total) by mouth every 12 (twelve) hours for 5 days   pantoprazole (PROTONIX) 20 mg tablet   No No   Sig: Take 1 tablet (20 mg total) by mouth daily   traZODone (DESYREL) 50 mg tablet  Self No No   Sig: Take 1 tablet (50 mg total) by mouth daily at bedtime      Facility-Administered Medications: None     Current Discharge Medication List        CONTINUE these medications which have NOT CHANGED    Details   albuterol (2.5 mg/3 mL) 0.083 % nebulizer solution Take 3 mL (2.5 mg total) by nebulization every 6 (six) hours as needed for wheezing or shortness of breath  Qty: 1080 mL, Refills: 3    Associated Diagnoses: Moderate COPD (chronic obstructive pulmonary disease) (HCC)      albuterol (Proventil HFA) 90 mcg/act inhaler Inhale 2 puffs every 6 (six) hours as needed for wheezing or shortness of breath  Qty: 18 g, Refills: 6    Comments: Substitution to a formulary equivalent within the same pharmaceutical class is authorized.  Associated Diagnoses: Moderate COPD (chronic obstructive pulmonary disease) (HCC)      alendronate (Fosamax) 70 mg tablet Take 1 tablet (70 mg total) by mouth every 7 days  Qty: 12 tablet, Refills: 3    Associated Diagnoses: Osteopenia of multiple sites; Post-menopausal      ALPRAZolam (XANAX) 1 mg tablet Take 1 tablet (1 mg total) by mouth 2 (two) times a day as needed for anxiety  Qty: 60 tablet, Refills: 2    Associated Diagnoses: Anxiety      anastrozole (ARIMIDEX) 1 mg tablet Take 1 tablet (1 mg total) by mouth daily  Qty: 90 tablet, Refills: 3    Associated Diagnoses: Ductal carcinoma in situ (DCIS) of right breast      benzonatate (TESSALON) 200 MG capsule Take 1 capsule (200 mg total) by mouth 3 (three) times a day as needed for cough  Qty: 90 capsule, Refills: 6    Associated Diagnoses: Chronic cough      budesonide-formoterol (Symbicort) 160-4.5 mcg/act inhaler Inhale 2 puffs 2 (two) times a day Rinse mouth after use.  Qty: 10.2 g,  Refills: 6    Associated Diagnoses: Moderate COPD (chronic obstructive pulmonary disease) (HCC)      furosemide (LASIX) 20 mg tablet Take 1 tablet (20 mg total) by mouth daily    Associated Diagnoses: Hyponatremia      lactulose (CHRONULAC) 10 g/15 mL solution Take 15 mL (10 g total) by mouth 2 (two) times a day  Qty: 240 mL, Refills: 0    Associated Diagnoses: Generalized weakness      midodrine (PROAMATINE) 10 MG tablet Take 1 tablet (10 mg total) by mouth 3 (three) times a day before meals  Qty: 90 tablet, Refills: 3    Associated Diagnoses: Alcoholic cirrhosis of liver with ascites (HCC)      molnupiravir (Lagevrio) 200 mg capsule Take 4 capsules (800 mg total) by mouth every 12 (twelve) hours for 5 days  Qty: 40 capsule, Refills: 0    Comments: Symptom onset date: 12/29/24 Positive test date: 12/30/24  Associated Diagnoses: COVID-19      pantoprazole (PROTONIX) 20 mg tablet Take 1 tablet (20 mg total) by mouth daily  Qty: 90 tablet, Refills: 1    Associated Diagnoses: Alcoholic cirrhosis of liver with ascites (HCC); Esophageal varices determined by endoscopy (HCC); Refractory ascites; GERD without esophagitis      Risankizumab-rzaa (SKYRIZI PEN SC) Inject under the skin      traZODone (DESYREL) 50 mg tablet Take 1 tablet (50 mg total) by mouth daily at bedtime  Qty: 30 tablet, Refills: 5    Associated Diagnoses: Insomnia, unspecified type           No discharge procedures on file.  ED SEPSIS DOCUMENTATION   Time reflects when diagnosis was documented in both MDM as applicable and the Disposition within this note       Time User Action Codes Description Comment    1/3/2025  4:17 PM Waqas Robert [J93.9] Pneumothorax                  Waqas Robert DO  01/03/25 2050

## 2025-01-04 NOTE — ASSESSMENT & PLAN NOTE
Tiny right apical pneumothorax after thoracentesis today.  Patient is asymptomatic without shortness of breath or hypoxia.  Admitted overnight for observation  Placed supplemental oxygen on the patient overnight  Repeat chest x-ray today looks stable  Given that patient is on room air, not desaturating, with no chest pain she can be discharged today with outpatient follow-up

## 2025-01-04 NOTE — PLAN OF CARE
Problem: Potential for Falls  Goal: Patient will remain free of falls  Description: INTERVENTIONS:  - Educate patient/family on patient safety including physical limitations  - Instruct patient to call for assistance with activity   - Consult OT/PT to assist with strengthening/mobility   - Keep Call bell within reach  - Keep bed low and locked with side rails adjusted as appropriate  - Keep care items and personal belongings within reach  - Initiate and maintain comfort rounds  - Make Fall Risk Sign visible to staff  - Offer Toileting every 3 Hours, in advance of need  - Initiate/Maintain bedalarm  - Obtain necessary fall risk management equipment:   - Apply yellow socks and bracelet for high fall risk patients  - Consider moving patient to room near nurses station  Outcome: Progressing     Problem: SAFETY ADULT  Goal: Patient will remain free of falls  Description: INTERVENTIONS:  - Educate patient/family on patient safety including physical limitations  - Instruct patient to call for assistance with activity   - Consult OT/PT to assist with strengthening/mobility   - Keep Call bell within reach  - Keep bed low and locked with side rails adjusted as appropriate  - Keep care items and personal belongings within reach  - Initiate and maintain comfort rounds  - Make Fall Risk Sign visible to staff  - Offer Toileting every 3 Hours, in advance of need  - Initiate/Maintain bedalarm  - Obtain necessary fall risk management equipment:   - Apply yellow socks and bracelet for high fall risk patients  - Consider moving patient to room near nurses station  Outcome: Progressing  Goal: Maintain or return to baseline ADL function  Description: INTERVENTIONS:  -  Assess patient's ability to carry out ADLs; assess patient's baseline for ADL function and identify physical deficits which impact ability to perform ADLs (bathing, care of mouth/teeth, toileting, grooming, dressing, etc.)  - Assess/evaluate cause of self-care deficits    - Assess range of motion  - Assess patient's mobility; develop plan if impaired  - Assess patient's need for assistive devices and provide as appropriate  - Encourage maximum independence but intervene and supervise when necessary  - Involve family in performance of ADLs  - Assess for home care needs following discharge   - Consider OT consult to assist with ADL evaluation and planning for discharge  - Provide patient education as appropriate  Outcome: Progressing  Goal: Maintains/Returns to pre admission functional level  Description: INTERVENTIONS:  - Perform AM-PAC 6 Click Basic Mobility/ Daily Activity assessment daily.  - Set and communicate daily mobility goal to care team and patient/family/caregiver.   - Collaborate with rehabilitation services on mobility goals if consulted  - Perform Range of Motion 3 times a day.  - Reposition patient every 3 hours.  - Dangle patient 3 times a day  - Stand patient 3 times a day  - Ambulate patient 3 times a day  - Out of bed to chair 3 times a day   - Out of bed for meals 3 times a day  - Out of bed for toileting  - Record patient progress and toleration of activity level   Outcome: Progressing     Problem: DISCHARGE PLANNING  Goal: Discharge to home or other facility with appropriate resources  Description: INTERVENTIONS:  - Identify barriers to discharge w/patient and caregiver  - Arrange for needed discharge resources and transportation as appropriate  - Identify discharge learning needs (meds, wound care, etc.)  - Arrange for interpretive services to assist at discharge as needed  - Refer to Case Management Department for coordinating discharge planning if the patient needs post-hospital services based on physician/advanced practitioner order or complex needs related to functional status, cognitive ability, or social support system  Outcome: Progressing     Problem: Knowledge Deficit  Goal: Patient/family/caregiver demonstrates understanding of disease process,  treatment plan, medications, and discharge instructions  Description: Complete learning assessment and assess knowledge base.  Interventions:  - Provide teaching at level of understanding  - Provide teaching via preferred learning methods  Outcome: Progressing

## 2025-01-04 NOTE — NURSING NOTE
Patient pulse ox 93-94% room air while ambulating; pt states she was very sob which resolved at rest.

## 2025-01-04 NOTE — DISCHARGE SUMMARY
Discharge Summary - Hospitalist   Name: Jose White 64 y.o. female I MRN: [id number]  Unit/Bed#: -01 I Date of Admission: 1/3/2025   Date of Service: 1/4/2025 I Hospital Day: 0     Assessment & Plan  Postprocedural pneumothorax of right lung  Tiny right apical pneumothorax after thoracentesis today.  Patient is asymptomatic without shortness of breath or hypoxia.  Admitted overnight for observation  Placed supplemental oxygen on the patient overnight  Repeat chest x-ray today looks stable  Given that patient is on room air, not desaturating, with no chest pain she can be discharged today with outpatient follow-up  Ductal carcinoma in situ (DCIS) of right breast  Continue anastrozole  Outpatient follow-up with oncology  COVID  Supportive care with antitussive  On room air presently  Alcoholic cirrhosis of liver with ascites (HCC)  Presently compensated  Receives weekly paracentesis  Continue outpatient follow-up  Hyponatremia  Mild and stable, in the setting of cirrhosis  Hepatic encephalopathy (HCC)  Mentation is stable  She has been off lactulose for many months  COPD (chronic obstructive pulmonary disease) (HCC)  No shortness of breath presently, not in acute exacerbation  Monitor respiratory status     Medical Problems       Resolved Problems  Date Reviewed: 12/12/2024   None       Discharging Physician / Practitioner: Khanh Marie PA-C  PCP: JOSÉ Craig  Admission Date:   Admission Orders (From admission, onward)       Ordered        01/03/25 1617  Place in Observation  Once                          Discharge Date: 01/04/25    Consultations During Hospital Stay:  None     Procedures Performed:   None     Significant Findings / Test Results:   Chest x-ray 1/3 with tiny apical pneumothorax  Repeat chest x-ray 1/4 wet read with stability/improvement of pneumothorax.    Incidental Findings:   none     Test Results Pending at Discharge (will require follow up):   none     Outpatient Tests  "Requested:  none    Complications:  none    Reason for Admission: pneumothorax    Hospital Course:   Jose White is a 64 y.o. female patient who originally presented to the hospital on 1/3/2025 due to abnormal chest x-ray.  Patient under went a routine thoracentesis and was discharged.  She was not short of breath or had any chest pain.  She was called back after postprocedural chest x-ray showed a tiny apical pneumothorax.  She was on room air.  She was admitted for observation overnight and remained medically stable on room air.  Supplemental oxygen was applied overnight to help her pneumothorax.  Repeat chest x-ray in the morning is stable.  She will be discharged back home today in stable condition.    Please see above list of diagnoses and related plan for additional information.     Condition at Discharge: stable    Discharge Day Visit / Exam:   Subjective: Patient ambulated without hypoxia.  No shortness of breath or pain.  She remains medically stable.  Vitals: Blood Pressure: 104/61 (01/04/25 0735)  Pulse: 66 (01/04/25 0735)  Temperature: 97.6 °F (36.4 °C) (01/04/25 0735)  Temp Source: Oral (01/04/25 0456)  Respirations: 20 (01/04/25 0456)  Height: 5' 4\" (162.6 cm) (01/03/25 2109)  Weight - Scale: 57.7 kg (127 lb 1.6 oz) (01/03/25 2109)  SpO2: 94 % (01/04/25 0735)  Exam:   Physical Exam  Vitals and nursing note reviewed.   Constitutional:       General: She is not in acute distress.     Appearance: Normal appearance. She is not ill-appearing.   HENT:      Head: Normocephalic and atraumatic.      Mouth/Throat:      Mouth: Mucous membranes are moist.   Eyes:      General: No scleral icterus.        Right eye: No discharge.         Left eye: No discharge.      Pupils: Pupils are equal, round, and reactive to light.   Cardiovascular:      Rate and Rhythm: Normal rate and regular rhythm.      Heart sounds: No murmur heard.     No friction rub. No gallop.   Pulmonary:      Effort: No respiratory distress. "      Breath sounds: No wheezing, rhonchi or rales.   Abdominal:      General: Bowel sounds are normal. There is no distension.      Palpations: Abdomen is soft.      Tenderness: There is no abdominal tenderness. There is no guarding or rebound.   Musculoskeletal:         General: No swelling or deformity.      Cervical back: Neck supple.      Right lower leg: No edema.      Left lower leg: No edema.   Skin:     General: Skin is warm and dry.      Capillary Refill: Capillary refill takes less than 2 seconds.      Coloration: Skin is not jaundiced or pale.      Findings: No erythema or rash.   Neurological:      General: No focal deficit present.      Mental Status: She is alert and oriented to person, place, and time. Mental status is at baseline.   Psychiatric:         Mood and Affect: Mood normal.         Behavior: Behavior normal.          Discussion with Family: Updated  () at bedside.    Discharge instructions/Information to patient and family:   See after visit summary for information provided to patient and family.      Provisions for Follow-Up Care:  See after visit summary for information related to follow-up care and any pertinent home health orders.       Disposition:   Home    Planned Readmission: no     Discharge Statement:  I spent 45 minutes discharging the patient. This time was spent on the day of discharge. I had direct contact with the patient on the day of discharge. Greater than 50% of the total time was spent examining patient, answering all patient questions, arranging and discussing plan of care with patient as well as directly providing post-discharge instructions.  Additional time then spent on discharge activities.    Discharge Medications:  See after visit summary for reconciled discharge medications provided to patient and/or family.      **Please Note: This note may have been constructed using a voice recognition system**

## 2025-01-06 ENCOUNTER — HOSPITAL ENCOUNTER (OUTPATIENT)
Dept: INTERVENTIONAL RADIOLOGY/VASCULAR | Facility: HOSPITAL | Age: 65
Discharge: HOME | End: 2025-01-06
Attending: INTERNAL MEDICINE | Admitting: STUDENT IN AN ORGANIZED HEALTH CARE EDUCATION/TRAINING PROGRAM
Payer: MEDICARE

## 2025-01-06 ENCOUNTER — VBI (OUTPATIENT)
Dept: FAMILY MEDICINE CLINIC | Facility: CLINIC | Age: 65
End: 2025-01-06

## 2025-01-06 ENCOUNTER — TRANSITIONAL CARE MANAGEMENT (OUTPATIENT)
Dept: FAMILY MEDICINE CLINIC | Facility: CLINIC | Age: 65
End: 2025-01-06

## 2025-01-06 VITALS — DIASTOLIC BLOOD PRESSURE: 62 MMHG | SYSTOLIC BLOOD PRESSURE: 118 MMHG

## 2025-01-06 DIAGNOSIS — K70.31 ALCOHOLIC CIRRHOSIS OF LIVER WITH ASCITES (HCC): ICD-10-CM

## 2025-01-06 PROCEDURE — 76705 ECHO EXAM OF ABDOMEN: CPT | Performed by: STUDENT IN AN ORGANIZED HEALTH CARE EDUCATION/TRAINING PROGRAM

## 2025-01-06 NOTE — TELEPHONE ENCOUNTER
01/06/25 12:22 PM    Patient contacted post ED visit, [facility] department spoke with patient/caregiver and outreach was successful.    Thank you.  Lis Gallegos MA  [facility]

## 2025-01-06 NOTE — SEDATION DOCUMENTATION
Arrived to IR holding area for weekly paracentesis. 4 quadrant U/S performed by Dr. Rosario and no paracentesis is indicated at this time. AVS reviewed and home with her  in her usual state of health.

## 2025-01-06 NOTE — DISCHARGE INSTRUCTIONS
Abdominal Paracentesis     WHAT YOU NEED TO KNOW:   Abdominal paracentesis is a procedure to remove abnormal fluid buildup in your abdomen. Fluid builds up because of liver problems, such as swelling and scarring. Heart failure, kidney disease, a mass, or problems with your pancreas may also cause fluid buildup.     DISCHARGE INSTRUCTIONS:     Follow up with your healthcare provider as directed: Write down your questions so you remember to ask them during your visits.     Wound care: Remove dressing after 24 hours. Leave glue in place.    Return to your normal activities    Contact Interventional Radiology at [phone] (YOGESH PATIENTS: Contact Interventional Radiology at [phone]) (MARGARITA PATIENTS: Contact Interventional Radiology at [phone]) if:  You have a fever and your wound is red and swollen.   You have yellow, green, or bad-smelling discharge coming from your wound.   You have pain or swelling in your abdomen.   You have an upset stomach or you vomit.   You have sudden, sharp pain in your abdomen.   You urinate very little or not at all.   You feel confused and more tired than usual.   Your arm or leg feels warm, tender, and painful. It may look swollen and red.   You suddenly feel lightheaded and have trouble breathing.

## 2025-01-07 NOTE — CONSULTS
Consultation - GI   Shaniqua Pruett 60 y o  female MRN: [id number]  Unit/Bed#: -01 Encounter: [id number]      Assessment/Plan   1  Alcoholic cirrhosis decompensated with ascites, hepatic hydrothorax, esophageal varices, hyponatremia, thrombocytopenia  Follows with Dr Milton Zamora, hepatology  Diuresis has been limited by hyponatremia and hyponatremia  Previously requiring frequent paracentesis however since January, has developed right pleural effusion, likely hepatic hydrothorax  Prior thoracentesis 1/16/2023  TIPS placement recommended at last office visit 1/19/2023  Presented to ED with 1 week history of worsening dyspnea on exertion and orthopnea  12 pound weight gain over the past month  Chest x-ray in ED showed moderate right pleural effusion  MELD score 18 using today's lab  -Plan for IR thoracentesis today  -Okay to resume diuretic-Lasix 40 mg daily and spironolactone 100 mg daily  -Patient is anxious for discharge  Recommend outpatient follow-up with Dr Tamar Alvarez for further management of ascites/pleural effusion  -Not a transplant candidate due to breast CA diagnosed in 2021    2  Hyponatremia  Baseline ranges 128-133  Sodium 128 in the ED, 131 this a m  Okay to resume diuretics today    3  Esophageal varices  EGD 10/2022 with small grade 1 varices and mild portal hypertensive gastropathy  Nadolol DC'd due to worsening ascites in December 2022  Recall EGD October 2023    4  Ductal carcinoma in situ of right breast      Inpatient consult to gastroenterology  Consult performed by: JOSÉ Kent  Consult ordered by: Nadeem Tesfaye MD          Physician Requesting Consult: Nadeem Tesfaye MD  Reason for Consult / Principal Problem: Right pleural effusion    HPI: Shaniqua Pruett is a 60y o  year old female with cirrhosis secondary to chronic alcohol use    Decompensated with ascites, esophageal varices, thrombocytopenia, recent hepatic hydrothorax with right thoracentesis What Is The Reason For Today's Visit?: Full Body Skin Examination 1/16/2023    1650 cc of fluid removed  No history of hepatic encephalopathy  She follows with Dr Vicky Mariano, hepatology with last office visit January 19, 2023  At that time ascites/edema controlled with Lasix 40 mg and Aldactone 100 mg  Diuretics somewhat limited by hyponatremia and hypotension  TIPS placement recommended at that office visit    Referred to ED by PCP with 1 week history of worsening dyspnea with exertion and orthopnea  Reports 12 pound weight gain over the past month  Denies abdominal pain, abdominal distention  No nausea or vomiting, no jaundice  Chest x-ray in ED revealed moderate right pleural effusion  Sodium in , 131 this a m  Total bilirubin 1 33, ALT 1 52 this a m , alkaline phosphatase 117 with other transaminases normal  Ammonia level-98  Platelet count 95 this a m , hemoglobin 10 7  INR 1 47      Review of Systems   Constitutional: Positive for unexpected weight change  Reports 12 pound weight gain over past month   HENT: Negative  Respiratory: Positive for shortness of breath  Negative for cough  Cardiovascular: Negative for leg swelling  Gastrointestinal: Negative for abdominal distention and abdominal pain  Genitourinary: Negative  Musculoskeletal: Positive for arthralgias  Skin: Negative  Neurological: Negative  Hematological: Negative  Psychiatric/Behavioral: Negative            Historical Information   Past Medical History:   Diagnosis Date   • Alcoholic fatty liver    • Anxiety    • Asthma    • COPD (chronic obstructive pulmonary disease) ([facility] [address] )    • Elevated liver function tests    • GERD (gastroesophageal reflux disease)    • GERD without esophagitis    • Hyperlipidemia    • Hypertension    • IBS (irritable bowel syndrome)    • Insomnia    • Insomnia    • Liver disease    • Nervousness    • Nicotine dependence    • Other specified urinary incontinence    • RLS (restless legs syndrome)    • Wears glasses      Past Surgical History: What Is The Reason For Today's Visit? (Being Monitored For X): concerning skin lesions on an annual basis How Severe Are Your Spot(S)?: mild Procedure Laterality Date   • BACK SURGERY     • 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   • BREAST EXCISIONAL BIOPSY Right 11/01/2004    benign   • CATARACT EXTRACTION, BILATERAL  08/01/2018   • COLONOSCOPY N/A 5/8/2019    Procedure: COLONOSCOPY;  Surgeon: Yarelis Stevenson MD;  Location: [facility] GI LAB; Service: Gastroenterology   • EGD AND COLONOSCOPY N/A 3/19/2018    Procedure: EGD AND COLONOSCOPY;  Surgeon: Yarelis Stevenson MD;  Location: [facility] GI LAB; Service: Gastroenterology   • ESOPHAGOGASTRODUODENOSCOPY N/A 5/8/2019    Procedure: ESOPHAGOGASTRODUODENOSCOPY (EGD); Surgeon: Yarelis Stevenson MD;  Location: [facility] GI LAB;   Service: Gastroenterology   • IR PARACENTESIS  11/27/2020   • IR PARACENTESIS  12/8/2020   • IR PARACENTESIS  10/28/2022   • IR PARACENTESIS  11/7/2022   • IR PARACENTESIS  11/30/2022   • IR PARACENTESIS  12/30/2022   • IR PARACENTESIS  1/16/2023   • IR THORACENTESIS  1/16/2023   • KNEE SURGERY     • KNEE SURGERY     • LUMBAR LAMINECTOMY  04/1999   • LYMPH NODE BIOPSY     • MAMMO STEREOTACTIC BREAST BIOPSY RIGHT (ALL INC) Right 5/21/2021   • MAMMO STEREOTACTIC BREAST BIOPSY RIGHT (ALL INC) EACH ADD Right 5/21/2021   • TOTAL ABDOMINAL HYSTERECTOMY  02/05/2008   • TUBAL LIGATION     • TUBAL LIGATION  1989   • UPPER GASTROINTESTINAL ENDOSCOPY       Social History   Social History     Substance and Sexual Activity   Alcohol Use Not Currently    Comment: not lately, non alcoholic beer     Social History     Substance and Sexual Activity   Drug Use No     Social History     Tobacco Use   Smoking Status Every Day   • Packs/day: 1 00   • Years: 45 00   • Pack years: 45 00   • Types: Cigarettes   • Start date: 1/1/1978   Smokeless Tobacco Never     Family History   Problem Relation Age of Onset   • Breast cancer Mother    • No Known Problems Father    • No Known Problems Sister    • No Known Problems Brother    • No Known Problems Son    • No Known Problems Maternal Grandmother    • No Known Problems Maternal Grandfather    • No Known Problems Paternal Grandmother    • No Known Problems Paternal Grandfather    • No Known Problems Maternal Aunt    • No Known Problems Maternal Aunt    • No Known Problems Paternal Aunt    • Substance Abuse Neg Hx    • Mental illness Neg Hx        Meds/Allergies   Current Facility-Administered Medications   Medication Dose Route Frequency   • acetaminophen (TYLENOL) tablet 650 mg  650 mg Oral Q6H PRN   • albuterol (PROVENTIL HFA,VENTOLIN HFA) inhaler 2 puff  2 puff Inhalation Q6H PRN   • albuterol inhalation solution 2 5 mg  2 5 mg Nebulization Q6H PRN   • ALPRAZolam (XANAX) tablet 1 mg  1 mg Oral BID PRN   • anastrozole (ARIMIDEX) tablet 1 mg  1 mg Oral Daily   • furosemide (LASIX) tablet 40 mg  40 mg Oral Daily   • heparin (porcine) subcutaneous injection 5,000 Units  5,000 Units Subcutaneous Q8H [facility]   • midodrine (PROAMATINE) tablet 7 5 mg  7 5 mg Oral TID AC   • nicotine (NICODERM CQ) 21 mg/24 hr TD 24 hr patch 1 patch  1 patch Transdermal Daily   • ondansetron (ZOFRAN) injection 4 mg  4 mg Intravenous Q6H PRN   • pantoprazole (PROTONIX) EC tablet 20 mg  20 mg Oral Daily   • spironolactone (ALDACTONE) tablet 100 mg  100 mg Oral Daily   • traZODone (DESYREL) tablet 50 mg  50 mg Oral HS     Medications Prior to Admission   Medication   • albuterol (2 5 mg/3 mL) 0 083 % nebulizer solution   • albuterol (Proventil HFA) 90 mcg/act inhaler   • ALPRAZolam (XANAX) 1 mg tablet   • anastrozole (ARIMIDEX) 1 mg tablet   • furosemide (LASIX) 40 mg tablet   • guselkumab (TREMFYA) subcutaneous injection   • lactulose 20 g/30 mL   • midodrine (PROAMATINE) 5 mg tablet   • pantoprazole (PROTONIX) 20 mg tablet   • Promethazine-DM (PHENERGAN-DM) 6 25-15 mg/5 mL oral syrup   • spironolactone (ALDACTONE) 100 mg tablet   • traZODone (DESYREL) 50 mg tablet       Allergies   Allergen Reactions   • Sulfa Antibiotics Shortness Of Breath   • Ace Inhibitors Cough and Other (See Comments)     coughing Physical Exam   Constitutional: Appears comfortable lying in bed  HENT: WNL  Head: Normocephalic and atraumatic  Eyes: Anicteric  Neck: Normal range of motion  Neck supple  Cardiovascular: Normal rate and regular rhythm  S1 and S2 normal, no murmur rub or gallop  Pulmonary/Chest: Respirations nonlabored at rest   Breath sounds decreased in right base  No rales  SPO2 94-99% on room air  Abdominal: Soft, nondistended, nontender with palpation  No ascites  Bowel sounds are normal   No hepatomegaly  Musculoskeletal: Normal range of motion  Extremities:  No lower extremity edema  Neurological: Ialert and oriented to person, place, and time  Skin: Skin is warm and dry  Psychiatric: normal mood and affect         Lab Results:   Admission on 03/27/2023   Component Date Value   • WBC 03/27/2023 5 41    • RBC 03/27/2023 3 71 (L)    • Hemoglobin 03/27/2023 12 0    • Hematocrit 03/27/2023 34 6 (L)    • MCV 03/27/2023 93    • MCH 03/27/2023 32 3    • MCHC 03/27/2023 34 7    • RDW 03/27/2023 14 5    • MPV 03/27/2023 9 6    • Platelets 47/31/0494 108 (L)    • nRBC 03/27/2023 0    • Neutrophils Relative 03/27/2023 69    • Immat GRANS % 03/27/2023 0    • Lymphocytes Relative 03/27/2023 18    • Monocytes Relative 03/27/2023 11    • Eosinophils Relative 03/27/2023 1    • Basophils Relative 03/27/2023 1    • Neutrophils Absolute 03/27/2023 3 73    • Immature Grans Absolute 03/27/2023 0 02    • Lymphocytes Absolute 03/27/2023 0 95    • Monocytes Absolute 03/27/2023 0 60    • Eosinophils Absolute 03/27/2023 0 05    • Basophils Absolute 03/27/2023 0 06    • Sodium 03/27/2023 128 (L)    • Potassium 03/27/2023 3 7    • Chloride 03/27/2023 96    • CO2 03/27/2023 26    • ANION GAP 03/27/2023 6    • BUN 03/27/2023 16    • Creatinine 03/27/2023 0 91    • Glucose 03/27/2023 150 (H)    • Calcium 03/27/2023 8 3 (L)    • Corrected Calcium 03/27/2023 9 3    • AST 03/27/2023 38    • ALT 03/27/2023 21    • Alkaline Phosphatase 03/27/2023 133 (H)    • Total Protein 03/27/2023 7 6    • Albumin 03/27/2023 2 7 (L)    • Total Bilirubin 03/27/2023 1 33 (H)    • eGFR 03/27/2023 67    • hs TnI 0hr 03/27/2023 11    • hs TnI 2hr 03/27/2023 11    • Delta 2hr hsTnI 03/27/2023 0    • hs TnI 4hr 03/27/2023 11    • Delta 4hr hsTnI 03/27/2023 0    • Protime 03/27/2023 18 1 (H)    • INR 03/27/2023 1 40 (H)    • PTT 03/27/2023 36    • Platelets 80/19/3357 104 (L)    • MPV 03/27/2023 9 4    • Osmolality Serum 03/27/2023 283    • Osmolality, Ur 03/27/2023 380    • Sodium, Ur 03/27/2023 36    • Uric Acid 03/27/2023 8 7 (H)    • Sodium 03/28/2023 132 (L)    • Potassium 03/28/2023 4 1    • Chloride 03/28/2023 101    • CO2 03/28/2023 27    • ANION GAP 03/28/2023 4    • BUN 03/28/2023 15    • Creatinine 03/28/2023 0 77    • Glucose 03/28/2023 84    • Calcium 03/28/2023 8 0 (L)    • eGFR 03/28/2023 82    • Sodium 03/28/2023 131 (L)    • Potassium 03/28/2023 4 1    • Chloride 03/28/2023 101    • CO2 03/28/2023 26    • ANION GAP 03/28/2023 4    • BUN 03/28/2023 16    • Creatinine 03/28/2023 0 80    • Glucose 03/28/2023 94    • Glucose, Fasting 03/28/2023 94    • Calcium 03/28/2023 8 6    • Corrected Calcium 03/28/2023 9 8    • AST 03/28/2023 35    • ALT 03/28/2023 20    • Alkaline Phosphatase 03/28/2023 117 (H)    • Total Protein 03/28/2023 7 3    • Albumin 03/28/2023 2 5 (L)    • Total Bilirubin 03/28/2023 1 52 (H)    • eGFR 03/28/2023 78    • Magnesium 03/28/2023 2 0    • Phosphorus 03/28/2023 3 9    • WBC 03/28/2023 3 98 (L)    • RBC 03/28/2023 3 25 (L)    • Hemoglobin 03/28/2023 10 7 (L)    • Hematocrit 03/28/2023 30 9 (L)    • MCV 03/28/2023 95    • MCH 03/28/2023 32 9    • MCHC 03/28/2023 34 6    • RDW 03/28/2023 14 6    • MPV 03/28/2023 9 7    • Platelets 64/50/2240 95 (L)    • nRBC 03/28/2023 0    • Neutrophils Relative 03/28/2023 49    • Immat GRANS % 03/28/2023 0    • Lymphocytes Relative 03/28/2023 30    • Monocytes Relative 03/28/2023 17 (H)    • Eosinophils Relative 03/28/2023 3    • Basophils Relative 03/28/2023 1    • Neutrophils Absolute 03/28/2023 1 95    • Immature Grans Absolute 03/28/2023 0 01    • Lymphocytes Absolute 03/28/2023 1 20    • Monocytes Absolute 03/28/2023 0 68    • Eosinophils Absolute 03/28/2023 0 10    • Basophils Absolute 03/28/2023 0 04    • Protime 03/28/2023 18 8 (H)    • INR 03/28/2023 1 47 (H)    • TSH 3RD GENERATON 03/28/2023 2 871    • Ammonia 03/28/2023 98 (H)      Imaging Studies: I have personally reviewed pertinent reports  EKG, Pathology, and Other Studies: I have personally reviewed pertinent reports  Counseling / Coordination of Care  Total floor / unit time spent today 45 minutes

## 2025-01-08 ENCOUNTER — OFFICE VISIT (OUTPATIENT)
Dept: GASTROENTEROLOGY | Facility: CLINIC | Age: 65
End: 2025-01-08
Payer: MEDICARE

## 2025-01-08 VITALS
BODY MASS INDEX: 23.22 KG/M2 | TEMPERATURE: 98.2 F | WEIGHT: 136 LBS | DIASTOLIC BLOOD PRESSURE: 70 MMHG | HEIGHT: 64 IN | SYSTOLIC BLOOD PRESSURE: 104 MMHG

## 2025-01-08 DIAGNOSIS — K21.9 GERD WITHOUT ESOPHAGITIS: ICD-10-CM

## 2025-01-08 DIAGNOSIS — K76.82 HEPATIC ENCEPHALOPATHY (HCC): ICD-10-CM

## 2025-01-08 DIAGNOSIS — I85.10 SECONDARY ESOPHAGEAL VARICES WITHOUT BLEEDING (HCC): ICD-10-CM

## 2025-01-08 DIAGNOSIS — K70.31 ALCOHOLIC CIRRHOSIS OF LIVER WITH ASCITES (HCC): Primary | Chronic | ICD-10-CM

## 2025-01-08 DIAGNOSIS — B37.81 CANDIDA ESOPHAGITIS (HCC): ICD-10-CM

## 2025-01-08 DIAGNOSIS — D69.6 THROMBOCYTOPENIA (HCC): ICD-10-CM

## 2025-01-08 PROCEDURE — 99214 OFFICE O/P EST MOD 30 MIN: CPT | Performed by: PHYSICIAN ASSISTANT

## 2025-01-08 RX ORDER — SPIRONOLACTONE 25 MG/1
25 TABLET ORAL DAILY
Start: 2025-01-08

## 2025-01-08 NOTE — ASSESSMENT & PLAN NOTE
Identified on esophageal brushings from recent EGD 12/10/2024. Treated with 2-week course of fluconazole. No complaints of dysphagia at present time.

## 2025-01-08 NOTE — ASSESSMENT & PLAN NOTE
MELD 3.0: 20 at 12/18/2024 10:34 AM  MELD-Na: 18 at 12/18/2024 10:34 AM  Calculated from:  Serum Creatinine: 0.87 mg/dL (Using min of 1 mg/dL) at 12/18/2024 10:34 AM  Serum Sodium: 130 mmol/L at 12/18/2024 10:34 AM  Total Bilirubin: 1.5 mg/dL at 12/18/2024 10:34 AM  Serum Albumin: 2.2 g/dL at 12/18/2024 10:34 AM  INR(ratio): 1.4 at 12/18/2024 10:34 AM  Age at listing (hypothetical): 64 years  Sex: Female at 12/18/2024 10:34 AM    Alcoholic cirrhosis complicated by refractory ascites and hepatic hydrothorax with regular paracentesis and thoracentesis and esophageal varices and hepatic encephalopathy  Continue weekly large-volume paracentesis   Continue Lasix 20 mg daily and spironolactone 25 mg daily  Continue fluid restriction  Continue midodrine 10 mg 3 times daily  She has Nephrology follow-up visit scheduled in February  Repeat ultrasound for HCC screening in February  Repeat MELD labs prior to hepatology visit in March  Again discussed importance of complete alcohol cessation - patient  does not drink and serves as a support person  She is not a transplant candidate due to active alcohol use despite counseling    Orders:    US right upper quadrant; Future    CBC and differential; Future    Comprehensive metabolic panel; Future    Protime-INR; Future    AFP tumor marker; Future    spironolactone (ALDACTONE) 25 mg tablet; Take 1 tablet (25 mg total) by mouth daily

## 2025-01-08 NOTE — PATIENT INSTRUCTIONS
Please stop drinking  Start taking 15 mL of lactulose once daily, the goal is to have about 3 bowel movements per day  If you are still only moving your bowels twice a day, please increase the lactulose to twice daily  Continue furosemide (Lasix) 20 mg daily and spironolactone 25 mg daily   Continue fluid restriction  Please get your regular blood work every 2 weeks  Schedule ultrasound of the liver in February  Follow-up with Dr. Herrera in March       The patient will call to schedule u/s

## 2025-01-08 NOTE — ASSESSMENT & PLAN NOTE
EGD 12/10/2024 showed 1 small grade 1 varix in the esophagus and mild PHG.  She should return for surveillance EGD in 1 year.

## 2025-01-08 NOTE — PROGRESS NOTES
Name: Jose White      : 1960      MRN: [id number]  Encounter Provider: Sahra Brown PA-C  Encounter Date: 2025   Encounter department: [facility] [place]  :  Assessment & Plan  Alcoholic cirrhosis of liver with ascites (HCC)  MELD 3.0: 20 at 2024 10:34 AM  MELD-Na: 18 at 2024 10:34 AM  Calculated from:  Serum Creatinine: 0.87 mg/dL (Using min of 1 mg/dL) at 2024 10:34 AM  Serum Sodium: 130 mmol/L at 2024 10:34 AM  Total Bilirubin: 1.5 mg/dL at 2024 10:34 AM  Serum Albumin: 2.2 g/dL at 2024 10:34 AM  INR(ratio): 1.4 at 2024 10:34 AM  Age at listing (hypothetical): 64 years  Sex: Female at 2024 10:34 AM    Alcoholic cirrhosis complicated by refractory ascites and hepatic hydrothorax with regular paracentesis and thoracentesis and esophageal varices and hepatic encephalopathy  Continue weekly large-volume paracentesis   Continue Lasix 20 mg daily and spironolactone 25 mg daily  Continue fluid restriction  Continue midodrine 10 mg 3 times daily  She has Nephrology follow-up visit scheduled in February  Repeat ultrasound for HCC screening in February  Repeat MELD labs prior to hepatology visit in March  Again discussed importance of complete alcohol cessation - patient  does not drink and serves as a support person  She is not a transplant candidate due to active alcohol use despite counseling    Orders:    US right upper quadrant; Future    CBC and differential; Future    Comprehensive metabolic panel; Future    Protime-INR; Future    AFP tumor marker; Future    spironolactone (ALDACTONE) 25 mg tablet; Take 1 tablet (25 mg total) by mouth daily    Hepatic encephalopathy (HCC)  Encouraged taking lactulose and titrate to goal 3 bowel movements daily       Secondary esophageal varices without bleeding (HCC)  EGD 12/10/2024 showed 1 small grade 1 varix in the esophagus and mild PHG.  She should return for  surveillance EGD in 1 year.       GERD without esophagitis  Stable, continue pantoprazole 20 mg daily       Candida esophagitis (HCC)  Identified on esophageal brushings from recent EGD 12/10/2024. Treated with 2-week course of fluconazole. No complaints of dysphagia at present time.       Follow-up with Dr. Herrera our hepatologist as scheduled 3/13/25    History of Present Illness   HPI  Jose White is a 64 y.o. female with alcoholic cirrhosis who presents for follow-up.    History obtained from: patient and     Patient states she is currently doing well, however she was diagnosed with COVID last week and still has residual productive cough.  She had paracentesis on Monday but this was not done because there was not enough fluid.  She has been taking furosemide 20 mg daily and spironolactone 25 mg daily.  She has no lower extremity edema.  Her abdomen is soft.  No complaints of vomiting.  Her  denies any confusion.    Patient continues to drink alcohol regularly, often a beer and a glass of wine daily.       Objective   There were no vitals taken for this visit.     Physical Exam  Vitals and nursing note reviewed.   Constitutional:       General: She is not in acute distress.     Appearance: She is well-developed.   HENT:      Head: Normocephalic and atraumatic.   Eyes:      Conjunctiva/sclera: Conjunctivae normal.   Cardiovascular:      Rate and Rhythm: Normal rate and regular rhythm.      Heart sounds: No murmur heard.  Pulmonary:      Effort: Pulmonary effort is normal. No respiratory distress.      Breath sounds: Normal breath sounds.   Abdominal:      Palpations: Abdomen is soft.      Tenderness: There is no abdominal tenderness.   Musculoskeletal:         General: No swelling.      Cervical back: Neck supple.   Skin:     General: Skin is warm and dry.      Capillary Refill: Capillary refill takes less than 2 seconds.   Neurological:      Mental Status: She is alert and oriented to person,  place, and time.      Comments: No asterixis   Psychiatric:         Mood and Affect: Mood normal.

## 2025-01-09 ENCOUNTER — HOSPITAL ENCOUNTER (OUTPATIENT)
Dept: INTERVENTIONAL RADIOLOGY/VASCULAR | Facility: HOSPITAL | Age: 65
Discharge: HOME | End: 2025-01-09
Attending: INTERNAL MEDICINE
Payer: MEDICARE

## 2025-01-09 VITALS — DIASTOLIC BLOOD PRESSURE: 59 MMHG | OXYGEN SATURATION: 97 % | SYSTOLIC BLOOD PRESSURE: 106 MMHG

## 2025-01-09 DIAGNOSIS — K70.31 ALCOHOLIC CIRRHOSIS OF LIVER WITH ASCITES (HCC): ICD-10-CM

## 2025-01-09 DIAGNOSIS — J91.8 PLEURAL EFFUSION ASSOCIATED WITH HEPATIC DISORDER: ICD-10-CM

## 2025-01-09 DIAGNOSIS — K76.9 PLEURAL EFFUSION ASSOCIATED WITH HEPATIC DISORDER: ICD-10-CM

## 2025-01-09 PROCEDURE — 32555 ASPIRATE PLEURA W/ IMAGING: CPT

## 2025-01-09 PROCEDURE — 49083 ABD PARACENTESIS W/IMAGING: CPT

## 2025-01-09 RX ORDER — LIDOCAINE HYDROCHLORIDE 10 MG/ML
INJECTION, SOLUTION EPIDURAL; INFILTRATION; INTRACAUDAL; PERINEURAL AS NEEDED
Status: COMPLETED | OUTPATIENT
Start: 2025-01-09 | End: 2025-01-09

## 2025-01-09 RX ADMIN — LIDOCAINE HYDROCHLORIDE 8 ML: 10 INJECTION, SOLUTION EPIDURAL; INFILTRATION; INTRACAUDAL; PERINEURAL at 11:09

## 2025-01-09 NOTE — DISCHARGE INSTRUCTIONS
Thoracentesis   WHAT YOU NEED TO KNOW:   A thoracentesis is a procedure to remove extra fluid or air from between your lungs and your inner chest wall. Air or fluid buildup may make it hard for you to breathe. A thoracentesis allows your lungs to expand fully so you can breathe more easily.  DISCHARGE INSTRUCTIONS:   Medicines:   Pain medicine:  You may be given a prescription medicine to decrease pain. Do not wait until the pain is severe before you take your medicine.    Antibiotics:  This medicine helps fight or prevent an infection.    Take your medicine as directed.  Call your healthcare provider if you think your medicine is not helping or if you have side effects. Tell him if you are allergic to any medicine. Keep a list of the medicines, vitamins, and herbs you take. Include the amounts, and when and why you take them. Bring the list or the pill bottles to follow-up visits. Carry your medicine list with you in case of an emergency.  Follow up with your healthcare provider as directed:  Write down your questions so you remember to ask them during your visits.   Rest:  Rest when you feel it is needed. Slowly start to do more each day. Return to your daily activities as directed.   Do not smoke:  If you smoke, it is never too late to quit. Ask for information about how to stop smoking if you need help.  Contact your healthcare provider if:   You have a fever.    Your puncture site is red, warm, swollen, or draining pus.    You have questions or concerns about your procedure, medicine, or care.    If you have any questions regarding, call the IR department @ [phone].     Seek care immediately or call 911 if:   Blood soaks through your bandage.    There is blood in your spit.

## 2025-01-09 NOTE — SEDATION DOCUMENTATION
Weekly right thoracentesis completed. Removal 2000ml clear yellow fluid. Band aid to site. Patient tolerated well. AVS reviewed.

## 2025-01-09 NOTE — SEDATION DOCUMENTATION
4 quadrant U/S performed by Dr. Post. No paracentesis is indicated at this time. AVS reviewed and ambulated home in her usual state of health.

## 2025-01-13 DIAGNOSIS — F41.9 ANXIETY: ICD-10-CM

## 2025-01-13 RX ORDER — ALPRAZOLAM 1 MG/1
1 TABLET ORAL 2 TIMES DAILY PRN
Qty: 60 TABLET | Refills: 2 | Status: CANCELLED | OUTPATIENT
Start: 2025-01-13

## 2025-01-13 RX ORDER — ALPRAZOLAM 1 MG/1
1 TABLET ORAL 2 TIMES DAILY PRN
Qty: 60 TABLET | Refills: 2 | Status: SHIPPED | OUTPATIENT
Start: 2025-01-13

## 2025-01-15 ENCOUNTER — APPOINTMENT (OUTPATIENT)
Dept: RADIOLOGY | Facility: CLINIC | Age: 65
End: 2025-01-15
Payer: MEDICARE

## 2025-01-15 ENCOUNTER — OFFICE VISIT (OUTPATIENT)
Dept: OBGYN CLINIC | Facility: CLINIC | Age: 65
End: 2025-01-15
Payer: MEDICARE

## 2025-01-15 VITALS — HEIGHT: 64 IN | BODY MASS INDEX: 23.9 KG/M2 | WEIGHT: 140 LBS

## 2025-01-15 DIAGNOSIS — M25.551 BILATERAL HIP PAIN: ICD-10-CM

## 2025-01-15 DIAGNOSIS — M25.552 BILATERAL HIP PAIN: ICD-10-CM

## 2025-01-15 DIAGNOSIS — M70.61 GREATER TROCHANTERIC BURSITIS OF BOTH HIPS: Primary | ICD-10-CM

## 2025-01-15 DIAGNOSIS — M47.816 LUMBAR SPONDYLOSIS: ICD-10-CM

## 2025-01-15 DIAGNOSIS — M70.62 GREATER TROCHANTERIC BURSITIS OF BOTH HIPS: Primary | ICD-10-CM

## 2025-01-15 DIAGNOSIS — M48.062 SPINAL STENOSIS OF LUMBAR REGION WITH NEUROGENIC CLAUDICATION: ICD-10-CM

## 2025-01-15 PROCEDURE — 72110 X-RAY EXAM L-2 SPINE 4/>VWS: CPT

## 2025-01-15 PROCEDURE — 73521 X-RAY EXAM HIPS BI 2 VIEWS: CPT

## 2025-01-15 PROCEDURE — 99204 OFFICE O/P NEW MOD 45 MIN: CPT | Performed by: ORTHOPAEDIC SURGERY

## 2025-01-15 PROCEDURE — 20610 DRAIN/INJ JOINT/BURSA W/O US: CPT | Performed by: ORTHOPAEDIC SURGERY

## 2025-01-15 RX ORDER — BETAMETHASONE SODIUM PHOSPHATE AND BETAMETHASONE ACETATE 3; 3 MG/ML; MG/ML
6 INJECTION, SUSPENSION INTRA-ARTICULAR; INTRALESIONAL; INTRAMUSCULAR; SOFT TISSUE
Status: COMPLETED | OUTPATIENT
Start: 2025-01-15 | End: 2025-01-15

## 2025-01-15 RX ORDER — LIDOCAINE HYDROCHLORIDE 10 MG/ML
7 INJECTION, SOLUTION INFILTRATION; PERINEURAL
Status: COMPLETED | OUTPATIENT
Start: 2025-01-15 | End: 2025-01-15

## 2025-01-15 RX ADMIN — BETAMETHASONE SODIUM PHOSPHATE AND BETAMETHASONE ACETATE 6 MG: 3; 3 INJECTION, SUSPENSION INTRA-ARTICULAR; INTRALESIONAL; INTRAMUSCULAR; SOFT TISSUE at 11:00

## 2025-01-15 RX ADMIN — LIDOCAINE HYDROCHLORIDE 7 ML: 10 INJECTION, SOLUTION INFILTRATION; PERINEURAL at 11:00

## 2025-01-15 NOTE — PROGRESS NOTES
Assessment:     1. Greater trochanteric bursitis of both hips    2. Lumbar spondylosis    3. Spinal stenosis of lumbar region with neurogenic claudication    4. Bilateral hip pain        Plan:     Problem List Items Addressed This Visit          Musculoskeletal and Integument    Greater trochanteric bursitis of both hips - Primary    Relevant Medications    betamethasone acetate-betamethasone sodium phosphate (CELESTONE) injection 6 mg (Completed)    betamethasone acetate-betamethasone sodium phosphate (CELESTONE) injection 6 mg (Completed)    lidocaine (XYLOCAINE) 1 % injection 7 mL (Completed)    lidocaine (XYLOCAINE) 1 % injection 7 mL (Completed)    Other Relevant Orders    Ambulatory Referral to Physical Therapy    Large joint arthrocentesis: bilateral greater trochanteric bursa (Completed)    Lumbar spondylosis    Relevant Orders    Ambulatory Referral to Physical Therapy    Ambulatory referral to Spine & Pain Management     Other Visit Diagnoses         Spinal stenosis of lumbar region with neurogenic claudication        Relevant Orders    Ambulatory referral to Spine & Pain Management      Bilateral hip pain        Relevant Orders    XR spine lumbar minimum 4 views non injury    XR hips bilateral 2 vw w pelvis if performed         Findings consistent with bilateral hip greater trochanteric bursitis, lumbar spondylosis pronounced at L5/S1, lumbar stenosis.  Bilateral hip and lumbar spine x-rays reviewed with patient. She was given bilateral greater trochanteric bursa cortisone injections today, tolerated procedure well, cold compress today. Repeat in 3-4 months if needed. Main treatment for this condition is physical therapy.  Patient cannot take oral NSAIDs.  Recommend topical voltaren gel or Aspercreme as needed for pain. She will also have referral to spine and pain for further treatment and evaluation of her lumbar spine. Physical therapy will rehab lumbar spine and bilateral hips. See patient back in 2-3  months.  All patient's questions were answered to her satisfaction.  This note is created using dictation transcription.  It may contain typographical errors, grammatical errors, improperly dictated words, background noise and other errors.    Subjective:     Patient ID: Jose White is a 64 y.o. female.  Chief Complaint:  64 yr old female in for evaluation of bilateral hip pain. Referred by her PCP JOSÉ Huerta. History alcoholic cirrhosis.  She states for almost an year, she has been having bilateral lower leg pain radiating into gluteal region, lateral aspect of hips and down her legs to her toes. She has been experiencing numbness and tingling as well. She notes weakness in her legs with ambulation. She does have acute sharp pain lateral aspect of her hips laying down at night and has to constantly flip sides. Burning sensation as well lateral aspect of hips. She is walking unassisted but with unsteady gait.  Denies any bowel/bladder issues. She does have history years ago of lower back surgery.  Not have any treatment.  She cannot take NSAIDs    Allergy:  Allergies   Allergen Reactions    Sulfa Antibiotics Shortness Of Breath    Ace Inhibitors Cough and Other (See Comments)     coughing     Medications:  all current active meds have been reviewed  Past Medical History:  Past Medical History:   Diagnosis Date    Alcoholic fatty liver     Anxiety     Asthma     COPD (chronic obstructive pulmonary disease) (HCC)     Elevated liver function tests     GERD (gastroesophageal reflux disease)     GERD without esophagitis     Hyperlipidemia     Hypertension     IBS (irritable bowel syndrome)     Insomnia     Insomnia     Liver disease     Nervousness     Nicotine dependence     Other specified urinary incontinence     RLS (restless legs syndrome)     Wears glasses      Past Surgical History:  Past Surgical History:   Procedure Laterality Date    BACK SURGERY      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     BREAST EXCISIONAL BIOPSY Right 11/01/2004    benign    CATARACT EXTRACTION, BILATERAL  08/01/2018    COLONOSCOPY N/A 5/8/2019    Procedure: COLONOSCOPY;  Surgeon: Jacobo Leonardo MD;  Location: [facility];  Service: Gastroenterology    EGD AND COLONOSCOPY N/A 3/19/2018    Procedure: EGD AND COLONOSCOPY;  Surgeon: Jacobo Leonardo MD;  Location: [facility];  Service: Gastroenterology    ESOPHAGOGASTRODUODENOSCOPY N/A 5/8/2019    Procedure: ESOPHAGOGASTRODUODENOSCOPY (EGD);  Surgeon: Jacobo Leonardo MD;  Location: [facility];  Service: Gastroenterology    IR PARACENTESIS  11/27/2020    IR PARACENTESIS  12/8/2020    IR PARACENTESIS  10/28/2022    IR PARACENTESIS  11/7/2022    IR PARACENTESIS  11/30/2022    IR PARACENTESIS  12/30/2022    IR PARACENTESIS  1/16/2023    IR PARACENTESIS  8/6/2024    IR PARACENTESIS  9/18/2024    IR PARACENTESIS  10/3/2024    IR PARACENTESIS  10/10/2024    IR PARACENTESIS  10/17/2024    IR PARACENTESIS  10/24/2024    IR PARACENTESIS  11/1/2024    IR PARACENTESIS  11/7/2024    IR PARACENTESIS  11/14/2024    IR PARACENTESIS  11/21/2024    IR PARACENTESIS  11/29/2024    IR PARACENTESIS  12/5/2024    IR PARACENTESIS  12/12/2024    IR PARACENTESIS  12/19/2024    IR PARACENTESIS  12/26/2024    IR PARACENTESIS  1/6/2025    IR PARACENTESIS  1/9/2025    IR THORACENTESIS  1/16/2023    IR THORACENTESIS  3/28/2023    IR THORACENTESIS  7/25/2024    IR THORACENTESIS  8/6/2024    IR THORACENTESIS  8/14/2024    IR THORACENTESIS  8/21/2024    IR THORACENTESIS  8/28/2024    IR THORACENTESIS  9/4/2024    IR THORACENTESIS  9/11/2024    IR THORACENTESIS  9/18/2024    IR THORACENTESIS  9/25/2024    IR THORACENTESIS  10/3/2024    IR THORACENTESIS  10/10/2024    IR THORACENTESIS  10/17/2024    IR THORACENTESIS  10/24/2024    IR THORACENTESIS  11/1/2024    IR THORACENTESIS  11/7/2024    IR THORACENTESIS  11/14/2024    IR THORACENTESIS  11/21/2024    IR THORACENTESIS  11/29/2024    IR THORACENTESIS  12/5/2024     IR THORACENTESIS  12/12/2024    IR THORACENTESIS  12/19/2024    IR THORACENTESIS  12/26/2024    IR THORACENTESIS  1/3/2025    IR THORACENTESIS  1/9/2025    KNEE SURGERY      KNEE SURGERY      LUMBAR LAMINECTOMY  04/1999    LYMPH NODE BIOPSY      MAMMO STEREOTACTIC BREAST BIOPSY RIGHT (ALL INC) Right 5/21/2021    MAMMO STEREOTACTIC BREAST BIOPSY RIGHT (ALL INC) EACH ADD Right 5/21/2021    TOTAL ABDOMINAL HYSTERECTOMY  02/05/2008    TUBAL LIGATION      TUBAL LIGATION  1989    UPPER GASTROINTESTINAL ENDOSCOPY       Family History:  Family History   Problem Relation Age of Onset    Breast cancer Mother 32    No Known Problems Father     No Known Problems Sister     No Known Problems Brother     No Known Problems Maternal Grandmother     No Known Problems Maternal Grandfather     No Known Problems Paternal Grandmother     No Known Problems Paternal Grandfather     No Known Problems Maternal Aunt     No Known Problems Maternal Aunt     No Known Problems Paternal Aunt     No Known Problems Son     Substance Abuse Neg Hx     Mental illness Neg Hx     Colon cancer Neg Hx     Colon polyps Neg Hx      Social History:  Social History     Substance and Sexual Activity   Alcohol Use Yes    Alcohol/week: 6.0 standard drinks of alcohol    Types: 6 Cans of beer per week    Comment: 7-10 drinks/wk     Social History     Substance and Sexual Activity   Drug Use No     Social History     Tobacco Use   Smoking Status Every Day    Current packs/day: 1.00    Average packs/day: 1 pack/day for 47.0 years (47.0 ttl pk-yrs)    Types: Cigarettes    Start date: 1/1/1978   Smokeless Tobacco Never     Review of Systems   Constitutional:  Negative for chills and fever.   HENT:  Negative for ear pain and sore throat.    Eyes:  Negative for pain and visual disturbance.   Respiratory:  Negative for cough and shortness of breath.    Cardiovascular:  Negative for chest pain and palpitations.   Gastrointestinal:  Negative for abdominal pain and  "vomiting.   Genitourinary:  Negative for dysuria and hematuria.   Musculoskeletal:  Positive for arthralgias (bilateral hip), back pain and myalgias.   Skin:  Negative for color change and rash.   Neurological:  Positive for weakness and numbness. Negative for seizures and syncope.   Psychiatric/Behavioral: Negative.     All other systems reviewed and are negative.        Objective:  BP Readings from Last 1 Encounters:   01/09/25 106/59      Wt Readings from Last 1 Encounters:   01/15/25 63.5 kg (140 lb)      BMI:   Estimated body mass index is 24.03 kg/m² as calculated from the following:    Height as of this encounter: 5' 4\" (1.626 m).    Weight as of this encounter: 63.5 kg (140 lb).  BSA:   Estimated body surface area is 1.68 meters squared as calculated from the following:    Height as of this encounter: 5' 4\" (1.626 m).    Weight as of this encounter: 63.5 kg (140 lb).   Physical Exam  Vitals and nursing note reviewed.   Constitutional:       Appearance: Normal appearance. She is well-developed.   HENT:      Head: Normocephalic and atraumatic.      Right Ear: External ear normal.      Left Ear: External ear normal.   Eyes:      Extraocular Movements: Extraocular movements intact.      Conjunctiva/sclera: Conjunctivae normal.   Pulmonary:      Effort: Pulmonary effort is normal.   Musculoskeletal:         General: Tenderness (bilateral hip arthralgia) present.      Cervical back: Neck supple.      Lumbar back: Negative right straight leg raise test and negative left straight leg raise test.   Skin:     General: Skin is warm and dry.   Neurological:      Mental Status: She is alert and oriented to person, place, and time.      Deep Tendon Reflexes: Reflexes are normal and symmetric.   Psychiatric:         Mood and Affect: Mood normal.         Behavior: Behavior normal.       Right Hip Exam     Tenderness   The patient is experiencing tenderness in the greater trochanter.    Range of Motion   Flexion:  normal "   External rotation:  normal   Internal rotation:  normal     Muscle Strength   Abduction: 4/5   Adduction: 4/5   Flexion: 4/5     Tests   HEYDI: negative  Sonia: negative    Other   Erythema: absent  Scars: absent  Sensation: normal  Pulse: present      Left Hip Exam     Tenderness   The patient is experiencing tenderness in the greater trochanter.    Range of Motion   Flexion:  normal   External rotation:  normal   Internal rotation: normal     Muscle Strength   Abduction: 4/5   Adduction: 4/5   Flexion: 4/5     Tests   HEYDI: negative  Sonia: negative    Other   Erythema: absent  Scars: absent  Sensation: normal  Pulse: present      Back Exam     Tenderness   Back tenderness location: belt line lumbar spine bilateral.    Tests   Straight leg raise right: negative  Straight leg raise left: negative    Other   Sensation: normal  Gait: antalgic   Erythema: no back redness  Scars: absent            I have personally reviewed pertinent films in PACS and my interpretation is xr bilateral hips demonstrates mild osteoarthritis with no fracture/dislocation evident, lumbar spine multilevel spondylosis more pronounced at L5/S1, anterolisthesis at L4/5, possible wedge deformity at T12 .    Large joint arthrocentesis: bilateral greater trochanteric bursa  Universal Protocol:  procedure performed by consultantConsent: Verbal consent obtained.  Risks and benefits: risks, benefits and alternatives were discussed  Consent given by: patient  Patient understanding: patient states understanding of the procedure being performed  Site marked: the operative site was marked  Patient identity confirmed: verbally with patient  Supporting Documentation  Indications: pain   Procedure Details  Location: hip - bilateral greater trochanteric bursa  Preparation: Patient was prepped and draped in the usual sterile fashion  Needle size: 22 G  Ultrasound guidance: no  Approach: lateral    Medications (Right): 6 mg betamethasone acetate-betamethasone  sodium phosphate 6 (3-3) mg/mL; 7 mL lidocaine 1 %Medications (Left): 6 mg betamethasone acetate-betamethasone sodium phosphate 6 (3-3) mg/mL; 7 mL lidocaine 1 %   Patient tolerance: patient tolerated the procedure well with no immediate complications  Dressing:  Sterile dressing applied        Scribe Attestation      I,:  Rl Marcos am acting as a scribe while in the presence of the attending physician.:       I,:  She Osullivan MD personally performed the services described in this documentation    as scribed in my presence.:               No

## 2025-01-16 ENCOUNTER — HOSPITAL ENCOUNTER (OUTPATIENT)
Dept: INTERVENTIONAL RADIOLOGY/VASCULAR | Facility: HOSPITAL | Age: 65
Discharge: HOME | End: 2025-01-16
Attending: INTERNAL MEDICINE

## 2025-01-16 ENCOUNTER — PATIENT MESSAGE (OUTPATIENT)
Age: 65
End: 2025-01-16

## 2025-01-16 VITALS — OXYGEN SATURATION: 95 % | DIASTOLIC BLOOD PRESSURE: 65 MMHG | HEART RATE: 78 BPM | SYSTOLIC BLOOD PRESSURE: 95 MMHG

## 2025-01-16 VITALS — SYSTOLIC BLOOD PRESSURE: 99 MMHG | DIASTOLIC BLOOD PRESSURE: 55 MMHG | OXYGEN SATURATION: 94 % | HEART RATE: 80 BPM

## 2025-01-16 DIAGNOSIS — K76.9 PLEURAL EFFUSION ASSOCIATED WITH HEPATIC DISORDER: ICD-10-CM

## 2025-01-16 DIAGNOSIS — K70.31 ALCOHOLIC CIRRHOSIS OF LIVER WITH ASCITES (HCC): Primary | ICD-10-CM

## 2025-01-16 DIAGNOSIS — J91.8 PLEURAL EFFUSION ASSOCIATED WITH HEPATIC DISORDER: ICD-10-CM

## 2025-01-16 DIAGNOSIS — K70.31 ALCOHOLIC CIRRHOSIS OF LIVER WITH ASCITES (HCC): ICD-10-CM

## 2025-01-16 NOTE — DISCHARGE INSTRUCTIONS
Thoracentesis   WHAT YOU NEED TO KNOW:   A thoracentesis is a procedure to remove extra fluid or air from between your lungs and your inner chest wall. Air or fluid buildup may make it hard for you to breathe. A thoracentesis allows your lungs to expand fully so you can breathe more easily.  DISCHARGE INSTRUCTIONS:   Medicines:   Pain medicine:  You may be given a prescription medicine to decrease pain. Do not wait until the pain is severe before you take your medicine.    Antibiotics:  This medicine helps fight or prevent an infection.    Take your medicine as directed.  Call your healthcare provider if you think your medicine is not helping or if you have side effects. Tell him if you are allergic to any medicine. Keep a list of the medicines, vitamins, and herbs you take. Include the amounts, and when and why you take them. Bring the list or the pill bottles to follow-up visits. Carry your medicine list with you in case of an emergency.  Follow up with your healthcare provider as directed:  Write down your questions so you remember to ask them during your visits.   Rest:  Rest when you feel it is needed. Slowly start to do more each day. Return to your daily activities as directed.   Do not smoke:  If you smoke, it is never too late to quit. Ask for information about how to stop smoking if you need help.  Contact your healthcare provider if:   You have a fever.    Your puncture site is red, warm, swollen, or draining pus.    You have questions or concerns about your procedure, medicine, or care.    If you have any questions regarding, call the IR department @ [phone].     Seek care immediately or call 911 if:   Blood soaks through your bandage.    There is blood in your spit.

## 2025-01-16 NOTE — DISCHARGE INSTRUCTIONS
Abdominal Paracentesis     WHAT YOU NEED TO KNOW:   Abdominal paracentesis is a procedure to remove abnormal fluid buildup in your abdomen. Fluid builds up because of liver problems, such as swelling and scarring. Heart failure, kidney disease, a mass, or problems with your pancreas may also cause fluid buildup.     DISCHARGE INSTRUCTIONS:     Follow up with your healthcare provider as directed: Write down your questions so you remember to ask them during your visits.     Wound care: Remove dressing after 24 hours. Leave glue in place.    Return to your normal activities    Contact Interventional Radiology at [phone] ([facility] PATIENTS: Contact Interventional Radiology at [phone]) ([facility] PATIENTS: Contact Interventional Radiology at [phone]) if:  You have a fever and your wound is red and swollen.   You have yellow, green, or bad-smelling discharge coming from your wound.   You have pain or swelling in your abdomen.   You have an upset stomach or you vomit.   You have sudden, sharp pain in your abdomen.   You urinate very little or not at all.   You feel confused and more tired than usual.   Your arm or leg feels warm, tender, and painful. It may look swollen and red.   You suddenly feel lightheaded and have trouble breathing.         Thoracentesis   WHAT YOU NEED TO KNOW:   A thoracentesis is a procedure to remove extra fluid or air from between your lungs and your inner chest wall. Air or fluid buildup may make it hard for you to breathe. A thoracentesis allows your lungs to expand fully so you can breathe more easily.  DISCHARGE INSTRUCTIONS:   Medicines:   Pain medicine:  You may be given a prescription medicine to decrease pain. Do not wait until the pain is severe before you take your medicine.    Antibiotics:  This medicine helps fight or prevent an infection.    Take your medicine as directed.  Call your healthcare provider if you think your medicine is not helping or if you have side effects.  Tell him if you are allergic to any medicine. Keep a list of the medicines, vitamins, and herbs you take. Include the amounts, and when and why you take them. Bring the list or the pill bottles to follow-up visits. Carry your medicine list with you in case of an emergency.  Follow up with your healthcare provider as directed:  Write down your questions so you remember to ask them during your visits.   Rest:  Rest when you feel it is needed. Slowly start to do more each day. Return to your daily activities as directed.   Do not smoke:  If you smoke, it is never too late to quit. Ask for information about how to stop smoking if you need help.  Contact your healthcare provider if:   You have a fever.    Your puncture site is red, warm, swollen, or draining pus.    You have questions or concerns about your procedure, medicine, or care.    If you have any questions regarding, call the IR department @ [phone].     Seek care immediately or call 911 if:   Blood soaks through your bandage.    There is blood in your spit.

## 2025-01-17 ENCOUNTER — PREP FOR PROCEDURE (OUTPATIENT)
Age: 65
End: 2025-01-17

## 2025-01-17 ENCOUNTER — OFFICE VISIT (OUTPATIENT)
Dept: FAMILY MEDICINE CLINIC | Facility: CLINIC | Age: 65
End: 2025-01-17
Payer: MEDICARE

## 2025-01-17 ENCOUNTER — EVALUATION (OUTPATIENT)
Dept: PHYSICAL THERAPY | Facility: CLINIC | Age: 65
End: 2025-01-17
Payer: MEDICARE

## 2025-01-17 VITALS
BODY MASS INDEX: 24.07 KG/M2 | OXYGEN SATURATION: 95 % | DIASTOLIC BLOOD PRESSURE: 62 MMHG | HEART RATE: 70 BPM | TEMPERATURE: 97.2 F | HEIGHT: 64 IN | SYSTOLIC BLOOD PRESSURE: 96 MMHG | WEIGHT: 141 LBS

## 2025-01-17 DIAGNOSIS — M70.61 GREATER TROCHANTERIC BURSITIS OF BOTH HIPS: Primary | ICD-10-CM

## 2025-01-17 DIAGNOSIS — M47.816 LUMBAR SPONDYLOSIS: ICD-10-CM

## 2025-01-17 DIAGNOSIS — K76.9 PLEURAL EFFUSION ASSOCIATED WITH HEPATIC DISORDER: ICD-10-CM

## 2025-01-17 DIAGNOSIS — F10.20 ALCOHOL DEPENDENCE, UNCOMPLICATED (HCC): ICD-10-CM

## 2025-01-17 DIAGNOSIS — K70.31 ALCOHOLIC CIRRHOSIS OF LIVER WITH ASCITES (HCC): Primary | ICD-10-CM

## 2025-01-17 DIAGNOSIS — K70.31 ALCOHOLIC CIRRHOSIS OF LIVER WITH ASCITES (HCC): Chronic | ICD-10-CM

## 2025-01-17 DIAGNOSIS — J91.8 PLEURAL EFFUSION ASSOCIATED WITH HEPATIC DISORDER: ICD-10-CM

## 2025-01-17 DIAGNOSIS — J95.811 POSTPROCEDURAL PNEUMOTHORAX: Primary | ICD-10-CM

## 2025-01-17 DIAGNOSIS — Z17.0 MALIGNANT NEOPLASM OF LOWER-OUTER QUADRANT OF RIGHT BREAST OF FEMALE, ESTROGEN RECEPTOR POSITIVE (HCC): ICD-10-CM

## 2025-01-17 DIAGNOSIS — M70.62 GREATER TROCHANTERIC BURSITIS OF BOTH HIPS: Primary | ICD-10-CM

## 2025-01-17 DIAGNOSIS — R56.9 SINGLE SEIZURE (HCC): ICD-10-CM

## 2025-01-17 DIAGNOSIS — C50.511 MALIGNANT NEOPLASM OF LOWER-OUTER QUADRANT OF RIGHT BREAST OF FEMALE, ESTROGEN RECEPTOR POSITIVE (HCC): ICD-10-CM

## 2025-01-17 PROCEDURE — 97161 PT EVAL LOW COMPLEX 20 MIN: CPT | Performed by: PHYSICAL THERAPIST

## 2025-01-17 PROCEDURE — 99496 TRANSJ CARE MGMT HIGH F2F 7D: CPT | Performed by: NURSE PRACTITIONER

## 2025-01-17 NOTE — PROGRESS NOTES
Transition of Care Visit  Name: Jose White      : 1960      MRN: [id number]  Encounter Provider: JOSÉ Craig  Encounter Date: 2025   Encounter department: [facility]    Assessment & Plan  Postprocedural pneumothorax of right lung  Patient presents for TCM. Patient was admitted 1/3- for overnight observation due to a small right apical pneumothorax post thoracentesis. During hospital stay she was placed on oxygen overnight. The repeat chest xray was stable And she was discharged home on room air.    Patient states she is doing well. Denies shortness of breath at present.   Malignant neoplasm of lower-outer quadrant of right breast of female, estrogen receptor positive (HCC)  Continue anastrozole. Follows with oncology.       Alcoholic cirrhosis of liver with ascites (HCC)  Receives weekly paracentesis. Follows with GI.       Alcohol dependence, uncomplicated (HCC)         Single seizure (HCC)  Stable. No seizures. Not on seizure medications.            History of Present Illness     Transitional Care Management Review:   Jose White is a 64 y.o. female here for TCM follow up.     During the TCM phone call patient stated:  TCM Call       Date and time call was made  2025  5:35 PM    Hospital care reviewed  Records reviewed    Patient was hospitialized at  [facility]    Date of Admission  24    Date of discharge  24    Diagnosis  Hyponatremia    Disposition  Home    Were the patients medications reviewed and updated  Yes    Current Symptoms  Weakness    Weakness severity  Moderate          TCM Call       Post hospital issues  None    Should patient be enrolled in anticoag monitoring?  No    Scheduled for follow up?  Yes    Patients specialists  Nephrologist    Other specialists names  Gastro-     Did you obtain your prescribed medications  Yes    Do you need help managing your prescriptions or medications  No    Is  "transportation to your appointment needed  No    I have advised the patient to call PCP with any new or worsening symptoms  Manda CONNER MA.    Living Arrangements  Family members    Are you recieving any outpatient services  No    Are you recieving home care services  No    Are you using any community resources  No    Have you fallen in the last 12 months  No    Interperter language line needed  No    Counseling  Patient          Patient present for TCM      Review of Systems   Constitutional:  Negative for activity change and fever.   Respiratory:  Negative for chest tightness, shortness of breath and wheezing.    Cardiovascular:  Negative for chest pain.   Gastrointestinal:  Negative for abdominal pain.   Genitourinary:  Negative for difficulty urinating.   Neurological:  Negative for dizziness, weakness and headaches.   Psychiatric/Behavioral:  Negative for dysphoric mood.      Objective   BP 96/62 (BP Location: Left arm, Patient Position: Sitting, Cuff Size: Standard)   Pulse 70   Temp (!) 97.2 °F (36.2 °C) (Tympanic)   Ht 5' 4\" (1.626 m)   Wt 64 kg (141 lb)   SpO2 95%   BMI 24.20 kg/m²     Physical Exam  Vitals and nursing note reviewed.   Constitutional:       General: She is not in acute distress.     Appearance: Normal appearance. She is not ill-appearing, toxic-appearing or diaphoretic.   HENT:      Head: Normocephalic.   Eyes:      Extraocular Movements: Extraocular movements intact.   Cardiovascular:      Rate and Rhythm: Normal rate and regular rhythm.      Heart sounds: Normal heart sounds.   Pulmonary:      Effort: Pulmonary effort is normal. No respiratory distress.      Breath sounds: Normal breath sounds. No stridor. No wheezing or rhonchi.   Chest:      Chest wall: No tenderness.   Skin:     General: Skin is warm and dry.      Coloration: Skin is not pale.      Findings: No erythema.   Neurological:      Mental Status: She is alert and oriented to person, place, and time.   Psychiatric:         " Mood and Affect: Mood normal.         Behavior: Behavior normal.       Medications have been reviewed by provider in current encounter

## 2025-01-17 NOTE — ASSESSMENT & PLAN NOTE
Patient presents for TCM. Patient was admitted 1/3-1/4 for overnight observation due to a small right apical pneumothorax post thoracentesis. During hospital stay she was placed on oxygen overnight. The repeat chest xray was stable And she was discharged home on room air.    Patient states she is doing well. Denies shortness of breath at present.

## 2025-01-17 NOTE — PROGRESS NOTES
PT Evaluation     Today's date: 2025  Patient name: Jose White  : 1960  MRN: [id number]  Referring provider: She Osullivan MD  Dx:   Encounter Diagnosis     ICD-10-CM    1. Greater trochanteric bursitis of both hips  M70.61 Ambulatory Referral to Physical Therapy    M70.62       2. Lumbar spondylosis  M47.816 Ambulatory Referral to Physical Therapy          Start Time: 1120  Stop Time: 1200  Total time in clinic (min): 40 minutes    Assessment  Impairments: abnormal muscle firing, abnormal or restricted ROM, activity intolerance, impaired balance, impaired physical strength, lacks appropriate home exercise program, pain with function and poor body mechanics    Assessment details: Jose White is a pleasant 64 y.o. female who presents with bilateral lateral hip pain of 6 months, gradual onset.  she has limited lumbar AROM all planes, bilateral hip weakness, balance deficits and high irritability with hip testing into HEYDI and FADIR resulting in pain with sleeping and difficulty with ADLs.  No further referral appears necessary at this time based upon examination results.  I expect she will improve in 6-8 weeks with physical therapy and home program.      Barriers to therapy: n/a  Understanding of Dx/Px/POC: good     Prognosis: fair    Goals  ST.  Independent with HEP in 2 weeks  2. Decrease pain by 50% in 3 wks      LT. Achieve FOTO score expected in 6 weeks   2.  Able to sleep through the night without waking in pain in 6 weeks  3.  Strength = 4+/5 bilat hip flex in 6 weeks      Plan  Patient would benefit from: skilled physical therapy  Planned modality interventions: cryotherapy, electrical stimulation/Russian stimulation and thermotherapy: hydrocollator packs    Planned therapy interventions: abdominal trunk stabilization, manual therapy, neuromuscular re-education, therapeutic activities, therapeutic exercise, body mechanics training and home exercise program    Frequency: 2x  "week  Duration in weeks: 6  Plan of Care beginning date: 1/17/2025  Plan of Care expiration date: 2/28/2025  Treatment plan discussed with: patient  Plan details: RE in 6 weeks        Subjective Evaluation    History of Present Illness  Mechanism of injury: Pt reports to PT with bilateral hip pain. Onset 6 months ago. She noticed she began having pain in the sides of her R hip when she'd sleep on her side at night, and then progressed to also having it on her L. Seen by Dr Osullivan in orthopedics on 1/15/25 and received bilateral trochanteric bursa CSI without any change in her symptoms. Overall her symptoms are no different since onset. She feels the most pain at night or if she is sitting on the recliner for a while and when she gets up, she will feel a quick pain.    Recent pelvic XR shows \"Mild to moderate degenerative arthritis both hips.\" Lumbar XR shows \"Slight thoracolumbar curvature. Slight retrolisthesis of L3 on L4. Multilevel degenerative facet hypertrophy in the lumbar spine with disc space narrowing at the lumbosacral interspace. Age-indeterminate slight anterior wedging at T12 and L1.    Pain is located bilateral lateral hips. Described as a deep burning. Rated 0/10 currently, 10/10 at night. L worse than R. Agg with pressure onto the lateral hip, sidelying (3-4 hrs), getting up from a chair after prolonged sitting. Eased with lying on her back.  Denies N/T, recent falls. Balance is unsteady and she will get her husbands supervision when getting out of the car or getting out of the shower.    She lives at home with her  in a single story home with a finished basement. She is able to slowly navigate stairs one step at a time with railing assist. She is independent with ADLs and driving. In her free time she enjoys going to the [employer], getting her nails done, shopping and watching tv.  Patient Goals  Patient goals for therapy: decreased pain, improved balance, increased motion, return to " sport/leisure activities, independence with ADLs/IADLs and increased strength          Objective     Palpation   Left   Tenderness of the gluteus medius, proximal biceps femoris, proximal semimembranosus and proximal semitendinosus.     Right   Tenderness of the gluteus medius, proximal biceps femoris, proximal semimembranosus and proximal semitendinosus.     Tenderness     Left Hip   Tenderness in the greater trochanter.     Right Hip   Tenderness in the greater trochanter.     Active Range of Motion     Lumbar   Flexion:  Restriction level: moderate  Extension:  Restriction level: moderate    Passive Range of Motion   Left Hip   Flexion: WFL and with pain  External rotation (90/90): WFL and with pain  Internal rotation (90/90): WFL and with pain    Right Hip   Flexion: WFL and with pain  External rotation (90/90): WFL and with pain  Internal rotation (90/90): WFL and with pain    Strength/Myotome Testing     Left Hip   Planes of Motion   Flexion: 4  Abduction: 4  Adduction: 4  External rotation: 4+  Internal rotation: 4+    Right Hip   Planes of Motion   Flexion: 4  Abduction: 4  Adduction: 4  External rotation: 4+  Internal rotation: 4+    Tests     Left Hip   Positive HEYDI and FADIR.   Negative scour.   SLR: Positive.     Right Hip   Positive HEYDI and FADIR.   Negative scour.   SLR: Positive.     Functional Assessment        Comments  Gait:  supervision, decreased gait speed and unsteadiness while walking, some tremor             Precautions: balance deficits - potential falls risk  Comorbidities: anxiety, COPD, HTN, h/o lumbar laminectomy 1999        HEP:  Access Code: [email or web address]  URL: [email or web address]  Date: 01/17/2025  Prepared by: Maximus Mendoza    Exercises  - Standing Lumbar Extension with Counter  - 5 x daily - 10 reps        POC Expires Reeval for Medicare to be completed Unit LImit Auth Expiration Date PT/OT/STVisit Limit   2/28 By visit 10 N/a      Completed on visit                   TREATMENT DIARY    Auth status 1/17         RE   approved               Visit # 1 2 3 4 5 6 7 8 9 10   Visits Remaining                          Manuals             Lumbar STM             Hip STM                                       Neuro Re-Ed             Tandem walk             Side step             Tandem balance             Foam walk             RB taps             Yovany step over                          Ther Ex             Bike - cardio             Lumbar ext over table             Hip add iso             Hip abd iso             SLR             bridge             LTR             LAQ             HSC             Leg press             Step up fwd                                       Ther Activity                                       Gait Training                                       Modalities

## 2025-01-21 ENCOUNTER — OFFICE VISIT (OUTPATIENT)
Dept: PHYSICAL THERAPY | Facility: CLINIC | Age: 65
End: 2025-01-21
Payer: MEDICARE

## 2025-01-21 ENCOUNTER — TELEPHONE (OUTPATIENT)
Age: 65
End: 2025-01-21

## 2025-01-21 DIAGNOSIS — M70.61 GREATER TROCHANTERIC BURSITIS OF BOTH HIPS: Primary | ICD-10-CM

## 2025-01-21 DIAGNOSIS — M70.62 GREATER TROCHANTERIC BURSITIS OF BOTH HIPS: Primary | ICD-10-CM

## 2025-01-21 DIAGNOSIS — M47.816 LUMBAR SPONDYLOSIS: ICD-10-CM

## 2025-01-21 PROCEDURE — 97140 MANUAL THERAPY 1/> REGIONS: CPT | Performed by: PHYSICAL THERAPIST

## 2025-01-21 PROCEDURE — 97110 THERAPEUTIC EXERCISES: CPT | Performed by: PHYSICAL THERAPIST

## 2025-01-21 NOTE — PROGRESS NOTES
Daily Note     Today's date: 2025  Patient name: Jose White  : 1960  MRN: [id number]  Referring provider: She Osullivan MD  Dx:   Encounter Diagnosis     ICD-10-CM    1. Greater trochanteric bursitis of both hips  M70.61     M70.62       2. Lumbar spondylosis  M47.816           Start Time: 1535          Subjective: Pt notes compliance with the lumbar extensions, but there has been no change in her L lateral hip burning.      Objective: See treatment diary below      Assessment: Onset of L lateral hip burning following bike warm up did not change with repeated lumbar extensions over edge of table. The pain did resolved once she laid hooklying. Very tender and hypertonic to the L adductors and hip flexor muscles. She did fair with soft tissue release and was favorable to MHP end of session. Suggested pt trial sleeping with a pillow between her legs and utilize heat for muscle relaxation. Will plan to continue STM and add gentle hip stretching nv to address muscle length deficits.      Plan: continue with hip STM and gentle stretching     Precautions: balance deficits - potential falls risk  Comorbidities: anxiety, COPD, HTN, h/o lumbar laminectomy         HEP:  Access Code: [email or web address]  URL: [email or web address]  Date: 2025  Prepared by: Maximus Mendoza    Exercises  - Standing Lumbar Extension with Counter  - 5 x daily - 10 reps        POC Expires Reeval for Medicare to be completed Unit LImit Auth Expiration Date PT/OT/STVisit Limit    By visit 10 N/a      Completed on visit                  TREATMENT DIARY    Auth status          RE   approved               Visit # 1 2 3 4 5 6 7 8 9 10   Visits Remaining                          Manuals             Lumbar STM             Hip STM L adductor, hip flexor                                      Neuro Re-Ed             Tandem walk             Side step             Tandem balance             Foam walk             RB taps              Yovany step over                          Ther Ex             Bike - cardio 6' resist off            Lumbar ext over table x10            Hip add iso             Hip abd iso             SLR             bridge             LTR             LAQ             HSC             Leg press             Step up fwd                                       Ther Activity                                       Gait Training                                       Modalities

## 2025-01-21 NOTE — TELEPHONE ENCOUNTER
Patients GI provider:  Dr. Herrera    Number to return call: [phone]    Reason for call: Pt calling to inquire about order for paracentesis, per IR no open order. Spoke w/Frannie who confirmed she is also seeing orders on her end. Transferred to [facility] in IR for further assistance.     Scheduled procedure/appointment date if applicable: Apt 03/13/2025

## 2025-01-23 ENCOUNTER — HOSPITAL ENCOUNTER (OUTPATIENT)
Dept: INTERVENTIONAL RADIOLOGY/VASCULAR | Facility: HOSPITAL | Age: 65
Discharge: HOME | End: 2025-01-23
Attending: INTERNAL MEDICINE
Payer: MEDICARE

## 2025-01-23 VITALS
OXYGEN SATURATION: 97 % | SYSTOLIC BLOOD PRESSURE: 104 MMHG | DIASTOLIC BLOOD PRESSURE: 61 MMHG | RESPIRATION RATE: 21 BRPM | HEART RATE: 89 BPM

## 2025-01-23 DIAGNOSIS — K76.9 PLEURAL EFFUSION ASSOCIATED WITH HEPATIC DISORDER: ICD-10-CM

## 2025-01-23 DIAGNOSIS — J91.8 PLEURAL EFFUSION ASSOCIATED WITH HEPATIC DISORDER: ICD-10-CM

## 2025-01-23 DIAGNOSIS — K70.31 ALCOHOLIC CIRRHOSIS OF LIVER WITH ASCITES (HCC): ICD-10-CM

## 2025-01-23 PROCEDURE — 49083 ABD PARACENTESIS W/IMAGING: CPT

## 2025-01-23 PROCEDURE — 32555 ASPIRATE PLEURA W/ IMAGING: CPT

## 2025-01-23 RX ORDER — LIDOCAINE HYDROCHLORIDE 10 MG/ML
INJECTION, SOLUTION EPIDURAL; INFILTRATION; INTRACAUDAL; PERINEURAL AS NEEDED
Status: COMPLETED | OUTPATIENT
Start: 2025-01-23 | End: 2025-01-23

## 2025-01-23 RX ADMIN — LIDOCAINE HYDROCHLORIDE 7 ML: 10 INJECTION, SOLUTION EPIDURAL; INFILTRATION; INTRACAUDAL; PERINEURAL at 09:47

## 2025-01-23 NOTE — SEDATION DOCUMENTATION
Weekly Right thoracentesis with 2450ml milky fluid removed. Band aid to site. 4 quadrant U/S of abd done and no paracentesis is indicated at this time. AVS reviewed and ambulated home in her usual state of health.

## 2025-01-23 NOTE — DISCHARGE INSTRUCTIONS
Thoracentesis   WHAT YOU NEED TO KNOW:   A thoracentesis is a procedure to remove extra fluid or air from between your lungs and your inner chest wall. Air or fluid buildup may make it hard for you to breathe. A thoracentesis allows your lungs to expand fully so you can breathe more easily.  DISCHARGE INSTRUCTIONS:   Medicines:   Pain medicine:  You may be given a prescription medicine to decrease pain. Do not wait until the pain is severe before you take your medicine.    Antibiotics:  This medicine helps fight or prevent an infection.    Take your medicine as directed.  Call your healthcare provider if you think your medicine is not helping or if you have side effects. Tell him if you are allergic to any medicine. Keep a list of the medicines, vitamins, and herbs you take. Include the amounts, and when and why you take them. Bring the list or the pill bottles to follow-up visits. Carry your medicine list with you in case of an emergency.  Follow up with your healthcare provider as directed:  Write down your questions so you remember to ask them during your visits.   Rest:  Rest when you feel it is needed. Slowly start to do more each day. Return to your daily activities as directed.   Do not smoke:  If you smoke, it is never too late to quit. Ask for information about how to stop smoking if you need help.  Contact your healthcare provider if:   You have a fever.    Your puncture site is red, warm, swollen, or draining pus.    You have questions or concerns about your procedure, medicine, or care.    If you have any questions regarding, call the IR department @ [phone].     Seek care immediately or call 911 if:   Blood soaks through your bandage.    There is blood in your spit.

## 2025-01-24 ENCOUNTER — OFFICE VISIT (OUTPATIENT)
Dept: PHYSICAL THERAPY | Facility: CLINIC | Age: 65
End: 2025-01-24
Payer: MEDICARE

## 2025-01-24 DIAGNOSIS — M47.816 LUMBAR SPONDYLOSIS: ICD-10-CM

## 2025-01-24 DIAGNOSIS — M70.61 GREATER TROCHANTERIC BURSITIS OF BOTH HIPS: Primary | ICD-10-CM

## 2025-01-24 DIAGNOSIS — M70.62 GREATER TROCHANTERIC BURSITIS OF BOTH HIPS: Primary | ICD-10-CM

## 2025-01-24 PROCEDURE — 97140 MANUAL THERAPY 1/> REGIONS: CPT | Performed by: PHYSICAL THERAPIST

## 2025-01-24 PROCEDURE — 97110 THERAPEUTIC EXERCISES: CPT | Performed by: PHYSICAL THERAPIST

## 2025-01-24 NOTE — PROGRESS NOTES
Daily Note     Today's date: 2025  Patient name: Jose White  : 1960  MRN: [id number]  Referring provider: She Osullivan MD  Dx:   Encounter Diagnosis     ICD-10-CM    1. Greater trochanteric bursitis of both hips  M70.61     M70.62       2. Lumbar spondylosis  M47.816           Start Time: 1520          Subjective: Pt notes compliance with the lumbar extensions, but there has been no change in her L lateral hip burning.      Objective: See treatment diary below      Assessment: She continues with L hip flexor, adductor and lateral hip tenderness of the same intensity as last session. Also tender throughout the R hip, but less compared to the left. Much more pain free mobility in both hips today with PROM, which was done after the hip STM. Pt noted feeling she could walk better by end of session. Will plan to continue STM and add gentle hip stretching nv to address muscle length deficits.      Plan: continue with hip STM and gentle stretching     Precautions: balance deficits - potential falls risk  Comorbidities: anxiety, COPD, HTN, h/o lumbar laminectomy         HEP:  Access Code: [email or web address]  URL: [email or web address]  Date: 2025  Prepared by: Maximus Mendoza    Exercises  - Standing Lumbar Extension with Counter  - 5 x daily - 10 reps        POC Expires Reeval for Medicare to be completed Unit LImit Auth Expiration Date PT/OT/STVisit Limit    By visit 10 N/a      Completed on visit                  TREATMENT DIARY    Auth status         RE   approved               Visit # 1 2 3 4 5 6 7 8 9 10   Visits Remaining                          Manuals             Lumbar STM             Hip STM L adductor, hip flexor bilat adductor, hip flexor           Hip PROM  bilat                        Neuro Re-Ed             Tandem walk             Side step             Tandem balance             Foam walk             RB taps             Yovany step over                           Ther Ex             Bike - cardio 6' resist off 5' resist on           Lumbar ext over table x10            Hip add iso             Hip abd iso             SLR             bridge             LTR             LAQ             HSC             Leg press             Step up fwd                                       Ther Activity                                       Gait Training                                       Modalities

## 2025-01-29 DIAGNOSIS — K70.31 ALCOHOLIC CIRRHOSIS OF LIVER WITH ASCITES (HCC): Primary | ICD-10-CM

## 2025-01-29 NOTE — TELEPHONE ENCOUNTER
Patients GI provider:  Dr. Herrera    Number to return call: [phone]     Reason for call: Pt's spouse named Ulises called requesting to speak with someone to schedule wif'e's paracentesis. Ulises can be reached at the above number provided     Scheduled procedure/appointment date if applicable: Apt/procedure

## 2025-01-29 NOTE — TELEPHONE ENCOUNTER
Spoke with Pt and Informed her that the Order was in the chart. All she needed to do was call IR and schedule.I provided the # for the Pt. She confirmed she understood

## 2025-01-30 ENCOUNTER — RESULTS FOLLOW-UP (OUTPATIENT)
Dept: GASTROENTEROLOGY | Facility: CLINIC | Age: 65
End: 2025-01-30

## 2025-01-30 ENCOUNTER — HOSPITAL ENCOUNTER (OUTPATIENT)
Dept: INTERVENTIONAL RADIOLOGY/VASCULAR | Facility: HOSPITAL | Age: 65
Discharge: HOME | End: 2025-01-30
Attending: INTERNAL MEDICINE
Payer: MEDICARE

## 2025-01-30 VITALS
OXYGEN SATURATION: 97 % | SYSTOLIC BLOOD PRESSURE: 97 MMHG | RESPIRATION RATE: 18 BRPM | HEART RATE: 83 BPM | DIASTOLIC BLOOD PRESSURE: 52 MMHG

## 2025-01-30 DIAGNOSIS — J91.8 PLEURAL EFFUSION ASSOCIATED WITH HEPATIC DISORDER: ICD-10-CM

## 2025-01-30 DIAGNOSIS — K70.31 ALCOHOLIC CIRRHOSIS OF LIVER WITH ASCITES (HCC): ICD-10-CM

## 2025-01-30 DIAGNOSIS — K76.9 PLEURAL EFFUSION ASSOCIATED WITH HEPATIC DISORDER: ICD-10-CM

## 2025-01-30 DIAGNOSIS — R18.8 ASCITES OF LIVER: ICD-10-CM

## 2025-01-30 LAB
AFP-TM SERPL-MCNC: 3.6 NG/ML
ALBUMIN SERPL-MCNC: 2.5 G/DL (ref 3.6–5.1)
ALBUMIN/GLOB SERPL: 0.5 (CALC) (ref 1–2.5)
ALP SERPL-CCNC: 83 U/L (ref 37–153)
ALT SERPL-CCNC: 17 U/L (ref 6–29)
AST SERPL-CCNC: 32 U/L (ref 10–35)
BASOPHILS # BLD AUTO: 77 CELLS/UL (ref 0–200)
BASOPHILS NFR BLD AUTO: 1 %
BILIRUB SERPL-MCNC: 2.4 MG/DL (ref 0.2–1.2)
BUN SERPL-MCNC: 6 MG/DL (ref 7–25)
BUN/CREAT SERPL: 10 (CALC) (ref 6–22)
CALCIUM SERPL-MCNC: 8.2 MG/DL (ref 8.6–10.4)
CHLORIDE SERPL-SCNC: 96 MMOL/L (ref 98–110)
CO2 SERPL-SCNC: 28 MMOL/L (ref 20–32)
CREAT SERPL-MCNC: 0.61 MG/DL (ref 0.5–1.05)
EOSINOPHIL # BLD AUTO: 62 CELLS/UL (ref 15–500)
EOSINOPHIL NFR BLD AUTO: 0.8 %
ERYTHROCYTE [DISTWIDTH] IN BLOOD BY AUTOMATED COUNT: 14.6 % (ref 11–15)
GFR/BSA.PRED SERPLBLD CYS-BASED-ARV: 99 ML/MIN/1.73M2
GLOBULIN SER CALC-MCNC: 5 G/DL (CALC) (ref 1.9–3.7)
GLUCOSE SERPL-MCNC: 85 MG/DL (ref 65–99)
HCT VFR BLD AUTO: 37.7 % (ref 35–45)
HGB BLD-MCNC: 13 G/DL (ref 11.7–15.5)
INR PPP: 1.3
LYMPHOCYTES # BLD AUTO: 755 CELLS/UL (ref 850–3900)
LYMPHOCYTES NFR BLD AUTO: 9.8 %
MCH RBC QN AUTO: 34.8 PG (ref 27–33)
MCHC RBC AUTO-ENTMCNC: 34.5 G/DL (ref 32–36)
MCV RBC AUTO: 100.8 FL (ref 80–100)
MONOCYTES # BLD AUTO: 1009 CELLS/UL (ref 200–950)
MONOCYTES NFR BLD AUTO: 13.1 %
NEUTROPHILS # BLD AUTO: 5798 CELLS/UL (ref 1500–7800)
NEUTROPHILS NFR BLD AUTO: 75.3 %
PLATELET # BLD AUTO: 135 THOUSAND/UL (ref 140–400)
PMV BLD REES-ECKER: 9.6 FL (ref 7.5–12.5)
POTASSIUM SERPL-SCNC: 3.7 MMOL/L (ref 3.5–5.3)
PROT SERPL-MCNC: 7.5 G/DL (ref 6.1–8.1)
PROTHROMBIN TIME: 13.4 SEC (ref 9–11.5)
RBC # BLD AUTO: 3.74 MILLION/UL (ref 3.8–5.1)
SODIUM SERPL-SCNC: 132 MMOL/L (ref 135–146)
WBC # BLD AUTO: 7.7 THOUSAND/UL (ref 3.8–10.8)

## 2025-01-30 PROCEDURE — 49083 ABD PARACENTESIS W/IMAGING: CPT

## 2025-01-30 PROCEDURE — 32555 ASPIRATE PLEURA W/ IMAGING: CPT

## 2025-01-30 NOTE — DISCHARGE INSTRUCTIONS
Abdominal Paracentesis     WHAT YOU NEED TO KNOW:   Abdominal paracentesis is a procedure to remove abnormal fluid buildup in your abdomen. Fluid builds up because of liver problems, such as swelling and scarring. Heart failure, kidney disease, a mass, or problems with your pancreas may also cause fluid buildup.     DISCHARGE INSTRUCTIONS:     Follow up with your healthcare provider as directed: Write down your questions so you remember to ask them during your visits.     Wound care: Remove dressing after 24 hours. Leave glue in place.    Return to your normal activities    Contact Interventional Radiology at [phone] if:  You have a fever and your wound is red and swollen.   You have yellow, green, or bad-smelling discharge coming from your wound.   You have pain or swelling in your abdomen.   You have an upset stomach or you vomit.   You have sudden, sharp pain in your abdomen.   You urinate very little or not at all.   You feel confused and more tired than usual.   Your arm or leg feels warm, tender, and painful. It may look swollen and red.   You suddenly feel lightheaded and have trouble breathing.           Thoracentesis   WHAT YOU NEED TO KNOW:   A thoracentesis is a procedure to remove extra fluid or air from between your lungs and your inner chest wall. Air or fluid buildup may make it hard for you to breathe. A thoracentesis allows your lungs to expand fully so you can breathe more easily.  DISCHARGE INSTRUCTIONS:   Medicines:   Pain medicine:  You may be given a prescription medicine to decrease pain. Do not wait until the pain is severe before you take your medicine.    Antibiotics:  This medicine helps fight or prevent an infection.    Take your medicine as directed.  Call your healthcare provider if you think your medicine is not helping or if you have side effects. Tell him if you are allergic to any medicine. Keep a list of the medicines, vitamins, and herbs you take. Include the amounts, and  when and why you take them. Bring the list or the pill bottles to follow-up visits. Carry your medicine list with you in case of an emergency.  Follow up with your healthcare provider as directed:  Write down your questions so you remember to ask them during your visits.   Rest:  Rest when you feel it is needed. Slowly start to do more each day. Return to your daily activities as directed.   Do not smoke:  If you smoke, it is never too late to quit. Ask for information about how to stop smoking if you need help.  Contact your healthcare provider if:   You have a fever.    Your puncture site is red, warm, swollen, or draining pus.    You have questions or concerns about your procedure, medicine, or care.    If you have any questions regarding, call the IR department @ [phone].     Seek care immediately or call 911 if:   Blood soaks through your bandage.    There is blood in your spit.

## 2025-01-31 ENCOUNTER — OFFICE VISIT (OUTPATIENT)
Dept: PHYSICAL THERAPY | Facility: CLINIC | Age: 65
End: 2025-01-31
Payer: MEDICARE

## 2025-01-31 DIAGNOSIS — M70.61 GREATER TROCHANTERIC BURSITIS OF BOTH HIPS: Primary | ICD-10-CM

## 2025-01-31 DIAGNOSIS — M70.62 GREATER TROCHANTERIC BURSITIS OF BOTH HIPS: Primary | ICD-10-CM

## 2025-01-31 DIAGNOSIS — M47.816 LUMBAR SPONDYLOSIS: ICD-10-CM

## 2025-01-31 PROCEDURE — 97110 THERAPEUTIC EXERCISES: CPT | Performed by: PHYSICAL THERAPIST

## 2025-01-31 PROCEDURE — 97140 MANUAL THERAPY 1/> REGIONS: CPT | Performed by: PHYSICAL THERAPIST

## 2025-01-31 NOTE — PROGRESS NOTES
Daily Note     Today's date: 2025  Patient name: Jose White  : 1960  MRN: [id number]  Referring provider: She Osullivan MD  Dx:   Encounter Diagnosis     ICD-10-CM    1. Greater trochanteric bursitis of both hips  M70.61     M70.62       2. Lumbar spondylosis  M47.816           Start Time: 1120          Subjective: Pt notes slight improvement in L hip symptoms after last session. She got her thoracentesis/paracentesis yesterday and her thoracic region is sore.      Objective: See treatment diary below      Assessment: She continues with L hip flexor, adductor and lateral hip tenderness of the same intensity as last session. Pt noted feeling she could walk better and stand taller by end of session. Will plan to continue STM and add gentle hip stretching nv to address muscle length deficits.      Plan: continue with hip STM and gentle stretching     Precautions: balance deficits - potential falls risk  Comorbidities: anxiety, COPD, HTN, h/o lumbar laminectomy         HEP:  Access Code: [email or web address]  URL: [email or web address]  Date: 2025  Prepared by: Maximus Mendoza    Exercises  - Standing Lumbar Extension with Counter  - 5 x daily - 10 reps        POC Expires Reeval for Medicare to be completed Unit LImit Auth Expiration Date PT/OT/STVisit Limit    By visit 10 N/a      Completed on visit                  TREATMENT DIARY    Auth status        RE   approved               Visit # 1 2 3 4 5 6 7 8 9 10   Visits Remaining                          Manuals             Lumbar STM             Hip STM L adductor, hip flexor bilat adductor, hip flexor L adductor, hip flexor, glute med/max          Hip PROM  bilat                        Neuro Re-Ed             Tandem walk             Side step             Tandem balance             Foam walk             RB taps             Yovany step over                          Ther Ex             Bike - cardio 6' resist off 5' resist  on 6' resist on          Lumbar ext over table x10            Hip add iso             Hip abd iso             SLR             bridge             LTR             LAQ             HSC             Leg press             Step up fwd                                       Ther Activity                                       Gait Training                                       Modalities

## 2025-02-04 ENCOUNTER — APPOINTMENT (OUTPATIENT)
Dept: PHYSICAL THERAPY | Facility: CLINIC | Age: 65
End: 2025-02-04
Payer: MEDICARE

## 2025-02-04 ENCOUNTER — CONSULT (OUTPATIENT)
Dept: PAIN MEDICINE | Facility: CLINIC | Age: 65
End: 2025-02-04
Payer: MEDICARE

## 2025-02-04 VITALS — TEMPERATURE: 98.8 F | WEIGHT: 142 LBS | BODY MASS INDEX: 24.24 KG/M2 | HEIGHT: 64 IN | HEART RATE: 87 BPM

## 2025-02-04 DIAGNOSIS — Z87.19 HISTORY OF CIRRHOSIS: Primary | ICD-10-CM

## 2025-02-04 DIAGNOSIS — D68.9 COAGULOPATHY (HCC): ICD-10-CM

## 2025-02-04 DIAGNOSIS — M47.816 LUMBAR SPONDYLOSIS: ICD-10-CM

## 2025-02-04 DIAGNOSIS — K70.31 ALCOHOLIC CIRRHOSIS OF LIVER WITH ASCITES (HCC): Primary | ICD-10-CM

## 2025-02-04 DIAGNOSIS — M25.552 LEFT HIP PAIN: ICD-10-CM

## 2025-02-04 PROCEDURE — 99204 OFFICE O/P NEW MOD 45 MIN: CPT | Performed by: ANESTHESIOLOGY

## 2025-02-04 PROCEDURE — G2211 COMPLEX E/M VISIT ADD ON: HCPCS | Performed by: ANESTHESIOLOGY

## 2025-02-04 RX ORDER — ALBUMIN (HUMAN) 12.5 G/50ML
75 SOLUTION INTRAVENOUS ONCE AS NEEDED
OUTPATIENT
Start: 2025-02-05

## 2025-02-04 RX ORDER — ALBUMIN (HUMAN) 12.5 G/50ML
100 SOLUTION INTRAVENOUS ONCE AS NEEDED
OUTPATIENT
Start: 2025-02-05

## 2025-02-04 RX ORDER — ALBUMIN (HUMAN) 12.5 G/50ML
50 SOLUTION INTRAVENOUS ONCE AS NEEDED
OUTPATIENT
Start: 2025-02-05

## 2025-02-04 RX ORDER — SODIUM CHLORIDE 9 MG/ML
20 INJECTION, SOLUTION INTRAVENOUS ONCE
OUTPATIENT
Start: 2025-02-05

## 2025-02-04 NOTE — PROGRESS NOTES
Assessment  1. History of cirrhosis    2. Lumbar spondylosis    3. Mid back pain    4. Left hip pain        Plan  Patient is a 64-year-old female sent as referral from orthopedics for evaluation of low back pain.  Patient has been complaining of bilateral hip pain, worse on left compared to right over the past several months without any inciting event. Patient was evaluated by orthopedics on January 15th 2025 and had bilateral greater trochanteric bursal injections done which she states that did not improve her symptoms at all. X-ray of lumbar spine done in January 2025 and interpreted by me showing multilevel displaced narrowing with age-indeterminate anterior wedging at T12 and L1.  Patient also had x-ray of bilateral hips done and interpreted by me showing mild arthritis of bilateral hips.  Patient started going to PT since mid January. Review of patient's lab work recently showed platelet count of 105 in January 2025 and INR of 1.4 in December 2024.  At this time, I suspect that patient's symptoms are secondary to left hip arthritis and would benefit from a left intraarticular hip injection.  Given patient's elevated INR with active alcohol use, patient not candidate for interventional spine procedures.  Patient also explained the risk of dropping infection for any type of procedure outside of the spine given concurrent alcohol use and advised alcohol cessation.    1.  Advised patient to continue going to physical therapy  2. Given that clinical presentation of left hip arthritis matches MRI findings, I would like to proceed forward with performing a left intraarticular hip I jection. Risks vs benefits discussed in detail with the patient. These risks include, but are not limited to, bleeding, infection, nerve damage, paralysis. Patient is not on anticoagulant therapy. Patient denies contrast allergy. All patient’s questions were answered. Patient understands risks and is willing to pursue the procedure. The  approach was demonstrated using models and literature was provided. Verbal consent obtained.      My impressions and treatment recommendations were discussed in detail with the patient who verbalized understanding and had no further questions.  Discharge instructions were provided. I personally saw and examined the patient and I agree with the above discussed plan of care.    Orders Placed This Encounter   Procedures    FL spine and pain procedure     Standing Status:   Future     Expected Date:   2/4/2025     Expiration Date:   2/4/2029     Reason for Exam::   left intraarticular hip joint injection     Anticoagulant hold needed?:   no     No orders of the defined types were placed in this encounter.    Referred By: She Osullivan MD  History of Present Illness    Jose White is a 64 y.o. female sent in as referral from [facility] for low back pain.  Has been complaining of bilateral hip pain over the past 4 months without any inciting event.  Patient states that symptoms are worse on left compared to right.  Patient states that symptoms are moderate to severe in intensity, rated a 7 out of 10, states that the pain occurs intermittently, described as a burning and shooting like sensation with occasional numbness down bilateral anterior thighs.  Patient states that she occasionally takes Tylenol once a day for symptomatic relief.  Patient also admits to drinking alcohol with a glass of beer and wine daily.    I have personally reviewed and/or updated the patient's past medical history, past surgical history, family history, social history, current medications, allergies, and vital signs today.     Review of Systems   Constitutional:  Negative for fever and unexpected weight change.   HENT:  Negative for trouble swallowing.    Eyes:  Negative for visual disturbance.   Respiratory:  Positive for cough and shortness of breath. Negative for wheezing.    Cardiovascular:  Negative for chest pain and palpitations.    Gastrointestinal:  Negative for constipation, diarrhea, nausea and vomiting.   Endocrine: Negative for cold intolerance, heat intolerance and polydipsia.   Genitourinary:  Negative for difficulty urinating and frequency.   Musculoskeletal:  Negative for arthralgias, gait problem, joint swelling and myalgias.   Skin:  Positive for rash.   Neurological:  Negative for dizziness, seizures, syncope, weakness and headaches.   Hematological:  Does not bruise/bleed easily.   Psychiatric/Behavioral:  Negative for dysphoric mood.    All other systems reviewed and are negative.  General: no fevers, chills, infections  Neuro: no saddle anesthesia, no dropping objects or balance issues  GI: no changes in bowel habits  : no changes in bladder habits  Hem: no bleeding      Patient Active Problem List   Diagnosis    Fatty liver    Other specified abnormal findings of blood chemistry    Anxiety    Elevated LFTs    GERD without esophagitis    Hyperlipidemia    Hypotension    Single seizure (HCC)    Insomnia    Nicotine dependence    COPD (chronic obstructive pulmonary disease) (HCC)    Alcohol use    Irritable bowel syndrome with diarrhea    Non-seasonal allergic rhinitis due to pollen    Bilateral leg edema    Alcoholic cirrhosis of liver with ascites (HCC)    Hyponatremia    Thrombocytopenia (HCC)    Anemia    Decompensated hepatic cirrhosis (HCC)    Postprocedural pneumothorax of right lung    Ductal carcinoma in situ (DCIS) of right breast    Atypical ductal hyperplasia of right breast    Breast neoplasm, Tis (DCIS), right    Malignant neoplasm of lower-outer quadrant of right breast of female, estrogen receptor positive (HCC)    Pleural effusion    Vaginal atrophy    Dyspareunia, female    Postcoital bleeding    Stage 2 chronic kidney disease    Hepatic encephalopathy (HCC)    Hypoalbuminemia    COVID    Secondary esophageal varices without bleeding (HCC)    Candida esophagitis (HCC)    Greater trochanteric bursitis of both  hips    Lumbar spondylosis       Past Medical History:   Diagnosis Date    Alcoholic fatty liver     Anxiety     Asthma     COPD (chronic obstructive pulmonary disease) (HCC)     Elevated liver function tests     GERD (gastroesophageal reflux disease)     GERD without esophagitis     Hyperlipidemia     Hypertension     IBS (irritable bowel syndrome)     Insomnia     Insomnia     Liver disease     Nervousness     Nicotine dependence     Other specified urinary incontinence     RLS (restless legs syndrome)     Wears glasses        Past Surgical History:   Procedure Laterality Date    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    BREAST EXCISIONAL BIOPSY Right 11/01/2004    benign    CATARACT EXTRACTION, BILATERAL  08/01/2018    COLONOSCOPY N/A 05/08/2019    Procedure: COLONOSCOPY;  Surgeon: Jacobo Leonardo MD;  Location: [facility];  Service: Gastroenterology    EGD AND COLONOSCOPY N/A 03/19/2018    Procedure: EGD AND COLONOSCOPY;  Surgeon: Jacobo Leonardo MD;  Location: [facility];  Service: Gastroenterology    ESOPHAGOGASTRODUODENOSCOPY N/A 05/08/2019    Procedure: ESOPHAGOGASTRODUODENOSCOPY (EGD);  Surgeon: Jacobo Leonardo MD;  Location: [facility];  Service: Gastroenterology    IR PARACENTESIS  11/27/2020    IR PARACENTESIS  12/08/2020    IR PARACENTESIS  10/28/2022    IR PARACENTESIS  11/07/2022    IR PARACENTESIS  11/30/2022    IR PARACENTESIS  12/30/2022    IR PARACENTESIS  01/16/2023    IR PARACENTESIS  08/06/2024    IR PARACENTESIS  09/18/2024    IR PARACENTESIS  10/03/2024    IR PARACENTESIS  10/10/2024    IR PARACENTESIS  10/17/2024    IR PARACENTESIS  10/24/2024    IR PARACENTESIS  11/01/2024    IR PARACENTESIS  11/07/2024    IR PARACENTESIS  11/14/2024    IR PARACENTESIS  11/21/2024    IR PARACENTESIS  11/29/2024    IR PARACENTESIS  12/05/2024    IR PARACENTESIS  12/12/2024    IR PARACENTESIS  12/19/2024    IR PARACENTESIS  12/26/2024    IR PARACENTESIS  01/06/2025    IR PARACENTESIS  01/09/2025    IR  PARACENTESIS  01/16/2025    IR PARACENTESIS  1/23/2025    IR PARACENTESIS  1/30/2025    IR THORACENTESIS  01/16/2023    IR THORACENTESIS  03/28/2023    IR THORACENTESIS  07/25/2024    IR THORACENTESIS  08/06/2024    IR THORACENTESIS  08/14/2024    IR THORACENTESIS  08/21/2024    IR THORACENTESIS  08/28/2024    IR THORACENTESIS  09/04/2024    IR THORACENTESIS  09/11/2024    IR THORACENTESIS  09/18/2024    IR THORACENTESIS  09/25/2024    IR THORACENTESIS  10/03/2024    IR THORACENTESIS  10/10/2024    IR THORACENTESIS  10/17/2024    IR THORACENTESIS  10/24/2024    IR THORACENTESIS  11/01/2024    IR THORACENTESIS  11/07/2024    IR THORACENTESIS  11/14/2024    IR THORACENTESIS  11/21/2024    IR THORACENTESIS  11/29/2024    IR THORACENTESIS  12/05/2024    IR THORACENTESIS  12/12/2024    IR THORACENTESIS  12/19/2024    IR THORACENTESIS  12/26/2024    IR THORACENTESIS  01/03/2025    IR THORACENTESIS  01/09/2025    IR THORACENTESIS  01/16/2025    IR THORACENTESIS  1/23/2025    IR THORACENTESIS  1/30/2025    KNEE SURGERY Left     ACL repair. Date: early 2000s    LUMBAR LAMINECTOMY  04/1999    LYMPH NODE BIOPSY      MAMMO STEREOTACTIC BREAST BIOPSY RIGHT (ALL INC) Right 05/21/2021    MAMMO STEREOTACTIC BREAST BIOPSY RIGHT (ALL INC) EACH ADD Right 05/21/2021    TOTAL ABDOMINAL HYSTERECTOMY  02/05/2008    TUBAL LIGATION  1989    UPPER GASTROINTESTINAL ENDOSCOPY         Family History   Problem Relation Age of Onset    Breast cancer Mother 32    No Known Problems Father     No Known Problems Sister     No Known Problems Brother     No Known Problems Maternal Grandmother     No Known Problems Maternal Grandfather     No Known Problems Paternal Grandmother     No Known Problems Paternal Grandfather     No Known Problems Maternal Aunt     No Known Problems Maternal Aunt     No Known Problems Paternal Aunt     No Known Problems Son     Substance Abuse Neg Hx     Mental illness Neg Hx     Colon cancer Neg Hx     Colon polyps Neg Hx         Social History     Occupational History    Not on file   Tobacco Use    Smoking status: Every Day     Current packs/day: 1.00     Average packs/day: 1 pack/day for 47.1 years (47.1 ttl pk-yrs)     Types: Cigarettes     Start date: 1/1/1978    Smokeless tobacco: Never   Vaping Use    Vaping status: Never Used   Substance and Sexual Activity    Alcohol use: Yes     Alcohol/week: 6.0 standard drinks of alcohol     Types: 6 Cans of beer per week     Comment: 7-10 drinks/wk    Drug use: No    Sexual activity: Yes     Partners: Male       Current Outpatient Medications on File Prior to Visit   Medication Sig    albuterol (2.5 mg/3 mL) 0.083 % nebulizer solution Take 3 mL (2.5 mg total) by nebulization every 6 (six) hours as needed for wheezing or shortness of breath    albuterol (Proventil HFA) 90 mcg/act inhaler Inhale 2 puffs every 6 (six) hours as needed for wheezing or shortness of breath    alendronate (Fosamax) 70 mg tablet Take 1 tablet (70 mg total) by mouth every 7 days    ALPRAZolam (XANAX) 1 mg tablet Take 1 tablet (1 mg total) by mouth 2 (two) times a day as needed for anxiety    anastrozole (ARIMIDEX) 1 mg tablet Take 1 tablet (1 mg total) by mouth daily    budesonide-formoterol (Symbicort) 160-4.5 mcg/act inhaler Inhale 2 puffs 2 (two) times a day Rinse mouth after use.    furosemide (LASIX) 20 mg tablet Take 1 tablet (20 mg total) by mouth daily    lactulose (CHRONULAC) 10 g/15 mL solution Take 15 mL (10 g total) by mouth 2 (two) times a day    midodrine (PROAMATINE) 10 MG tablet Take 1 tablet (10 mg total) by mouth 3 (three) times a day before meals    pantoprazole (PROTONIX) 20 mg tablet Take 1 tablet (20 mg total) by mouth daily    Risankizumab-rzaa (SKYRIZI PEN SC) Inject under the skin    spironolactone (ALDACTONE) 25 mg tablet Take 1 tablet (25 mg total) by mouth daily    traZODone (DESYREL) 50 mg tablet Take 1 tablet (50 mg total) by mouth daily at bedtime    benzonatate (TESSALON) 200 MG  "capsule Take 1 capsule (200 mg total) by mouth 3 (three) times a day as needed for cough (Patient not taking: Reported on 2/4/2025)    [DISCONTINUED] mupirocin (BACTROBAN) 2 % ointment Apply topically 3 (three) times a day (Patient not taking: Reported on 8/26/2022)     No current facility-administered medications on file prior to visit.       Allergies   Allergen Reactions    Sulfa Antibiotics Shortness Of Breath    Ace Inhibitors Cough and Other (See Comments)     coughing       Physical Exam    Pulse 87   Temp 98.8 °F (37.1 °C)   Ht 5' 4\" (1.626 m)   Wt 64.4 kg (142 lb)   BMI 24.37 kg/m²     Vitals:    02/04/25 1352   Pulse: 87   Temp: 98.8 °F (37.1 °C)     Constitutional: no apparent distress, does not appear sedated   HEENT: pupils equal and round, symmetric facial muscles   Neck: supple  Cardiovascular: well perfused, no peripheral cyanosis  Pulmonary: good chest wall excursion, breathing unlabored   Psych: appropriate affect and insight, No agitation. No evidence of aberrant behavior   Skin: No rashes or lesions  Neuro: cranial nerves II-XII grossly intact   MSK:  Inspection: no signs of infection to back  Palpation: no ttp to mid thoracic and lumbar spine  ROM: no significant rom abnormalities in lumbar spine   MMT 4/5 strength in B/L LE  Sensation to light touch intact B/L LE  DTR: 2+ in B/L patellar, achilles  Special test: + Stinchfield on L, + left hip scour    Gait: ambulates unassisted, gait is not antalgic        Imaging  BILATERAL HIPS AND PELVIS @  1-15-25     INDICATION:   Pain in right hip. Pain in left hip.      COMPARISON: None.     VIEWS:  XR HIPS BILATERAL 2 VW W PELVIS IF PERFORMED        FINDINGS:  The bony pelvis appears intact.  Degenerative changes in the visualized lower lumbar spine and both SI joints.     LEFT HIP:  There is no acute fracture or dislocation.  Mild to moderate degenerative arthritis.  No lytic or blastic osseous lesion.  Soft tissues are unremarkable.     RIGHT " HIP:  There is no acute fracture or dislocation.  Mild to moderate degenerative arthritis.  No lytic or blastic osseous lesion.  Soft tissues are unremarkable.     IMPRESSION:        Mild to moderate degenerative arthritis both hips.      LUMBAR SPINE @  1-15-25     INDICATION:   Pain in right hip. Pain in left hip.      COMPARISON:  None.     VIEWS:  XR SPINE LUMBAR MINIMUM 4 VIEWS NON INJURY     FINDINGS:     There are 5 non rib bearing lumbar vertebral bodies.      There is no destructive osseous lesion.     Slight thoracolumbar curvature. Slight retrolisthesis of L3 on L4.     Multilevel degenerative facet hypertrophy in the lumbar spine with disc space narrowing at the lumbosacral interspace.     Age-indeterminate slight anterior wedging at T12 and L1.     Aortic calcifications.     The pedicles appear intact.     Soft tissues are unremarkable.     IMPRESSION:        Slight thoracolumbar curvature. Slight retrolisthesis of L3 on L4.     Multilevel degenerative facet hypertrophy in the lumbar spine with disc space narrowing at the lumbosacral interspace.     Age-indeterminate slight anterior wedging at T12 and L1.    I have personally reviewed pertinent films in PACS.

## 2025-02-05 ENCOUNTER — HOSPITAL ENCOUNTER (OUTPATIENT)
Dept: INTERVENTIONAL RADIOLOGY/VASCULAR | Facility: HOSPITAL | Age: 65
Discharge: HOME | End: 2025-02-05
Attending: INTERNAL MEDICINE
Payer: MEDICARE

## 2025-02-05 ENCOUNTER — TELEPHONE (OUTPATIENT)
Age: 65
End: 2025-02-05

## 2025-02-05 ENCOUNTER — HOSPITAL ENCOUNTER (OUTPATIENT)
Dept: INFUSION CENTER | Facility: HOSPITAL | Age: 65
Discharge: HOME | End: 2025-02-05
Attending: INTERNAL MEDICINE

## 2025-02-05 ENCOUNTER — TELEPHONE (OUTPATIENT)
Dept: GASTROENTEROLOGY | Facility: CLINIC | Age: 65
End: 2025-02-05

## 2025-02-05 VITALS
HEART RATE: 77 BPM | RESPIRATION RATE: 21 BRPM | SYSTOLIC BLOOD PRESSURE: 104 MMHG | DIASTOLIC BLOOD PRESSURE: 56 MMHG | OXYGEN SATURATION: 97 %

## 2025-02-05 DIAGNOSIS — K70.31 ALCOHOLIC CIRRHOSIS OF LIVER WITH ASCITES (HCC): ICD-10-CM

## 2025-02-05 DIAGNOSIS — J91.8 PLEURAL EFFUSION ASSOCIATED WITH HEPATIC DISORDER: ICD-10-CM

## 2025-02-05 DIAGNOSIS — K76.9 PLEURAL EFFUSION ASSOCIATED WITH HEPATIC DISORDER: ICD-10-CM

## 2025-02-05 PROCEDURE — 32555 ASPIRATE PLEURA W/ IMAGING: CPT

## 2025-02-05 PROCEDURE — 32555 ASPIRATE PLEURA W/ IMAGING: CPT | Performed by: RADIOLOGY

## 2025-02-05 PROCEDURE — 49083 ABD PARACENTESIS W/IMAGING: CPT

## 2025-02-05 PROCEDURE — 49083 ABD PARACENTESIS W/IMAGING: CPT | Performed by: RADIOLOGY

## 2025-02-05 RX ORDER — LIDOCAINE HYDROCHLORIDE 10 MG/ML
INJECTION, SOLUTION EPIDURAL; INFILTRATION; INTRACAUDAL; PERINEURAL AS NEEDED
Status: COMPLETED | OUTPATIENT
Start: 2025-02-05 | End: 2025-02-05

## 2025-02-05 RX ADMIN — LIDOCAINE HYDROCHLORIDE 7 ML: 10 INJECTION, SOLUTION EPIDURAL; INFILTRATION; INTRACAUDAL; PERINEURAL at 11:35

## 2025-02-05 RX ADMIN — LIDOCAINE HYDROCHLORIDE 8 ML: 10 INJECTION, SOLUTION EPIDURAL; INFILTRATION; INTRACAUDAL; PERINEURAL at 11:52

## 2025-02-05 NOTE — DISCHARGE INSTRUCTIONS
Thoracentesis   WHAT YOU NEED TO KNOW:   A thoracentesis is a procedure to remove extra fluid or air from between your lungs and your inner chest wall. Air or fluid buildup may make it hard for you to breathe. A thoracentesis allows your lungs to expand fully so you can breathe more easily.  DISCHARGE INSTRUCTIONS:   Medicines:   Pain medicine:  You may be given a prescription medicine to decrease pain. Do not wait until the pain is severe before you take your medicine.    Antibiotics:  This medicine helps fight or prevent an infection.    Take your medicine as directed.  Call your healthcare provider if you think your medicine is not helping or if you have side effects. Tell him if you are allergic to any medicine. Keep a list of the medicines, vitamins, and herbs you take. Include the amounts, and when and why you take them. Bring the list or the pill bottles to follow-up visits. Carry your medicine list with you in case of an emergency.  Follow up with your healthcare provider as directed:  Write down your questions so you remember to ask them during your visits.   Rest:  Rest when you feel it is needed. Slowly start to do more each day. Return to your daily activities as directed.   Do not smoke:  If you smoke, it is never too late to quit. Ask for information about how to stop smoking if you need help.  Contact your healthcare provider if:   You have a fever.    Your puncture site is red, warm, swollen, or draining pus.    You have questions or concerns about your procedure, medicine, or care.    If you have any questions regarding, call the IR department @ [phone].     Seek care immediately or call 911 if:   Blood soaks through your bandage.    There is blood in your spit.

## 2025-02-05 NOTE — TELEPHONE ENCOUNTER
Caller: pt    Doctor: Dr. jorgensen    Reason for call: pt needs to r/s her procedure for tomorrow.    Call back#: [phone]

## 2025-02-05 NOTE — SEDATION DOCUMENTATION
Weekly right thoracentesis with removal 1500ml dark yellow fluid. Paracentesis with removal 2100ml dark yellow fluid. Band aid to sites. Tolerated well. AVS reviewed and ambulated home in her usual state of health.

## 2025-02-05 NOTE — H&P
Interventional Radiology  History and Physical 2/5/2025     Jose White   1960   [id number]    Assessment/Plan:  64 year old female with history of cirrhosis and recurrent ascites and right hepatic hydrothorax returns for paracentesis and right thoracentesis.    Problem List Items Addressed This Visit          Digestive    Alcoholic cirrhosis of liver with ascites (HCC) (Chronic)    Relevant Orders    IR OUT-Patient Thoracentesis     Other Visit Diagnoses         Pleural effusion associated with hepatic disorder        Relevant Orders    IR OUT-Patient Thoracentesis               Subjective:     Patient ID: Jose White is a 64 y.o. female.    History of Present Illness  Patient with history of cirrhosis and recurrent ascites and right hepatic hydrothorax returns for paracentesis and right thoracentesis.    Review of Systems      Past Medical History:   Diagnosis Date    Alcoholic fatty liver     Anxiety     Asthma     COPD (chronic obstructive pulmonary disease) (HCC)     Elevated liver function tests     GERD (gastroesophageal reflux disease)     GERD without esophagitis     Hyperlipidemia     Hypertension     IBS (irritable bowel syndrome)     Insomnia     Insomnia     Liver disease     Nervousness     Nicotine dependence     Other specified urinary incontinence     RLS (restless legs syndrome)     Wears glasses         Past Surgical History:   Procedure Laterality Date    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    BREAST EXCISIONAL BIOPSY Right 11/01/2004    benign    CATARACT EXTRACTION, BILATERAL  08/01/2018    COLONOSCOPY N/A 05/08/2019    Procedure: COLONOSCOPY;  Surgeon: Jacobo Leonardo MD;  Location: [facility] GI LAB;  Service: Gastroenterology    EGD AND COLONOSCOPY N/A 03/19/2018    Procedure: EGD AND COLONOSCOPY;  Surgeon: Jacobo Leonardo MD;  Location: [facility] LAB;  Service: Gastroenterology    ESOPHAGOGASTRODUODENOSCOPY N/A 05/08/2019    Procedure: ESOPHAGOGASTRODUODENOSCOPY (EGD);   Surgeon: Jacobo Leonardo MD;  Location: [facility] LAB;  Service: Gastroenterology    IR PARACENTESIS  11/27/2020    IR PARACENTESIS  12/08/2020    IR PARACENTESIS  10/28/2022    IR PARACENTESIS  11/07/2022    IR PARACENTESIS  11/30/2022    IR PARACENTESIS  12/30/2022    IR PARACENTESIS  01/16/2023    IR PARACENTESIS  08/06/2024    IR PARACENTESIS  09/18/2024    IR PARACENTESIS  10/03/2024    IR PARACENTESIS  10/10/2024    IR PARACENTESIS  10/17/2024    IR PARACENTESIS  10/24/2024    IR PARACENTESIS  11/01/2024    IR PARACENTESIS  11/07/2024    IR PARACENTESIS  11/14/2024    IR PARACENTESIS  11/21/2024    IR PARACENTESIS  11/29/2024    IR PARACENTESIS  12/05/2024    IR PARACENTESIS  12/12/2024    IR PARACENTESIS  12/19/2024    IR PARACENTESIS  12/26/2024    IR PARACENTESIS  01/06/2025    IR PARACENTESIS  01/09/2025    IR PARACENTESIS  01/16/2025    IR PARACENTESIS  1/23/2025    IR PARACENTESIS  1/30/2025    IR THORACENTESIS  01/16/2023    IR THORACENTESIS  03/28/2023    IR THORACENTESIS  07/25/2024    IR THORACENTESIS  08/06/2024    IR THORACENTESIS  08/14/2024    IR THORACENTESIS  08/21/2024    IR THORACENTESIS  08/28/2024    IR THORACENTESIS  09/04/2024    IR THORACENTESIS  09/11/2024    IR THORACENTESIS  09/18/2024    IR THORACENTESIS  09/25/2024    IR THORACENTESIS  10/03/2024    IR THORACENTESIS  10/10/2024    IR THORACENTESIS  10/17/2024    IR THORACENTESIS  10/24/2024    IR THORACENTESIS  11/01/2024    IR THORACENTESIS  11/07/2024    IR THORACENTESIS  11/14/2024    IR THORACENTESIS  11/21/2024    IR THORACENTESIS  11/29/2024    IR THORACENTESIS  12/05/2024    IR THORACENTESIS  12/12/2024    IR THORACENTESIS  12/19/2024    IR THORACENTESIS  12/26/2024    IR THORACENTESIS  01/03/2025    IR THORACENTESIS  01/09/2025    IR THORACENTESIS  01/16/2025    IR THORACENTESIS  1/23/2025    IR THORACENTESIS  1/30/2025    KNEE SURGERY Left     ACL repair. Date: early 2000s    LUMBAR LAMINECTOMY  04/1999    LYMPH NODE BIOPSY       MAMMO STEREOTACTIC BREAST BIOPSY RIGHT (ALL INC) Right 05/21/2021    MAMMO STEREOTACTIC BREAST BIOPSY RIGHT (ALL INC) EACH ADD Right 05/21/2021    TOTAL ABDOMINAL HYSTERECTOMY  02/05/2008    TUBAL LIGATION  1989    UPPER GASTROINTESTINAL ENDOSCOPY          Social History     Tobacco Use   Smoking Status Every Day    Current packs/day: 1.00    Average packs/day: 1 pack/day for 47.1 years (47.1 ttl pk-yrs)    Types: Cigarettes    Start date: 1/1/1978   Smokeless Tobacco Never        Social History     Substance and Sexual Activity   Alcohol Use Yes    Alcohol/week: 6.0 standard drinks of alcohol    Types: 6 Cans of beer per week    Comment: 7-10 drinks/wk        Social History     Substance and Sexual Activity   Drug Use No        Allergies   Allergen Reactions    Sulfa Antibiotics Shortness Of Breath    Ace Inhibitors Cough and Other (See Comments)     coughing       Current Outpatient Medications   Medication Sig Dispense Refill    albuterol (2.5 mg/3 mL) 0.083 % nebulizer solution Take 3 mL (2.5 mg total) by nebulization every 6 (six) hours as needed for wheezing or shortness of breath 1080 mL 3    albuterol (Proventil HFA) 90 mcg/act inhaler Inhale 2 puffs every 6 (six) hours as needed for wheezing or shortness of breath 18 g 6    alendronate (Fosamax) 70 mg tablet Take 1 tablet (70 mg total) by mouth every 7 days 12 tablet 3    ALPRAZolam (XANAX) 1 mg tablet Take 1 tablet (1 mg total) by mouth 2 (two) times a day as needed for anxiety 60 tablet 2    anastrozole (ARIMIDEX) 1 mg tablet Take 1 tablet (1 mg total) by mouth daily 90 tablet 3    benzonatate (TESSALON) 200 MG capsule Take 1 capsule (200 mg total) by mouth 3 (three) times a day as needed for cough (Patient not taking: Reported on 2/4/2025) 90 capsule 6    budesonide-formoterol (Symbicort) 160-4.5 mcg/act inhaler Inhale 2 puffs 2 (two) times a day Rinse mouth after use. 10.2 g 6    furosemide (LASIX) 20 mg tablet Take 1 tablet (20 mg total) by mouth  daily      lactulose (CHRONULAC) 10 g/15 mL solution Take 15 mL (10 g total) by mouth 2 (two) times a day 240 mL 0    midodrine (PROAMATINE) 10 MG tablet Take 1 tablet (10 mg total) by mouth 3 (three) times a day before meals 90 tablet 3    pantoprazole (PROTONIX) 20 mg tablet Take 1 tablet (20 mg total) by mouth daily 90 tablet 1    Risankizumab-rzaa (SKYRIZI PEN SC) Inject under the skin      spironolactone (ALDACTONE) 25 mg tablet Take 1 tablet (25 mg total) by mouth daily      traZODone (DESYREL) 50 mg tablet Take 1 tablet (50 mg total) by mouth daily at bedtime 30 tablet 5     No current facility-administered medications for this encounter.          Objective:    There were no vitals filed for this visit.     Physical Exam  Pulmonary:      Effort: Pulmonary effort is normal.   Abdominal:      General: There is distension.           Lab Results   Component Value Date    BNP 63 11/06/2024      Lab Results   Component Value Date    WBC 7.7 01/29/2025    HGB 13.0 01/29/2025    HCT 37.7 01/29/2025    .8 (H) 01/29/2025     (L) 01/29/2025     Lab Results   Component Value Date    INR 1.3 (H) 01/29/2025    INR 1.4 (H) 12/18/2024    INR 1.4 (H) 11/18/2024    PROTIME 13.4 (H) 01/29/2025    PROTIME 14.5 (H) 12/18/2024    PROTIME 14.2 (H) 11/18/2024     Lab Results   Component Value Date    PTT 34 11/05/2024         I have personally reviewed pertinent imaging and laboratory results.     Code Status: Prior  Advance Directive and Living Will:      Power of :    POLST:      This text is generated with voice recognition software. There may be translation, syntax,  or grammatical errors. If you have any questions, please contact the dictating provider.

## 2025-02-05 NOTE — BRIEF OP NOTE (RAD/CATH)
INTERVENTIONAL RADIOLOGY PROCEDURE NOTE    Date: 2/5/2025    Procedure:   Right thoracentesis  Paracentesis  Procedure Summary       Date: 02/05/25 Room / Location: [facility] Interventional Radiology    Anesthesia Start:  Anesthesia Stop:     Procedure: IR THORACENTESIS Diagnosis:       Alcoholic cirrhosis of liver with ascites (HCC)      Pleural effusion associated with hepatic disorder      (refractory asctites/hepatic hydrothorax)    Scheduled Providers:  Responsible Provider:     Anesthesia Type: Not recorded ASA Status: Not recorded            Preoperative diagnosis:   1. Alcoholic cirrhosis of liver with ascites (HCC)    2. Pleural effusion associated with hepatic disorder         Postoperative diagnosis: Same.    Surgeon: Johnny Corral MD     Assistant: None. No qualified resident was available.    Blood loss: None    Specimens:   1.5 L sheryl pleural fluid removed from the right.  2.1 L sheryl ascites fluid removed.     Findings: Successful right thoracentesis and paracentesis.    Complications: None immediate.    Anesthesia: local

## 2025-02-05 NOTE — TELEPHONE ENCOUNTER
Patient seen results in [facility], thank you      Jose, I reviewed your blood work and overall everything looks stable, good news. Your sodium level looks okay. Please follow-up in the office with Dr. Herrera as scheduled.

## 2025-02-06 ENCOUNTER — HOSPITAL ENCOUNTER (OUTPATIENT)
Dept: RADIOLOGY | Facility: CLINIC | Age: 65
Discharge: HOME | End: 2025-02-06

## 2025-02-07 ENCOUNTER — APPOINTMENT (OUTPATIENT)
Dept: PHYSICAL THERAPY | Facility: CLINIC | Age: 65
End: 2025-02-07
Payer: MEDICARE

## 2025-02-10 ENCOUNTER — HOSPITAL ENCOUNTER (OUTPATIENT)
Dept: RADIOLOGY | Facility: CLINIC | Age: 65
Discharge: HOME | End: 2025-02-10
Payer: MEDICARE

## 2025-02-10 ENCOUNTER — TELEPHONE (OUTPATIENT)
Dept: RADIOLOGY | Facility: CLINIC | Age: 65
End: 2025-02-10

## 2025-02-10 VITALS
HEART RATE: 92 BPM | RESPIRATION RATE: 18 BRPM | DIASTOLIC BLOOD PRESSURE: 68 MMHG | SYSTOLIC BLOOD PRESSURE: 113 MMHG | TEMPERATURE: 98 F | OXYGEN SATURATION: 91 %

## 2025-02-10 DIAGNOSIS — M25.552 LEFT HIP PAIN: ICD-10-CM

## 2025-02-10 PROCEDURE — 77002 NEEDLE LOCALIZATION BY XRAY: CPT | Performed by: ANESTHESIOLOGY

## 2025-02-10 PROCEDURE — 20610 DRAIN/INJ JOINT/BURSA W/O US: CPT | Performed by: ANESTHESIOLOGY

## 2025-02-10 RX ORDER — METHYLPREDNISOLONE ACETATE 80 MG/ML
40 INJECTION, SUSPENSION INTRA-ARTICULAR; INTRALESIONAL; INTRAMUSCULAR; PARENTERAL; SOFT TISSUE ONCE
Status: COMPLETED | OUTPATIENT
Start: 2025-02-10 | End: 2025-02-10

## 2025-02-10 RX ORDER — ROPIVACAINE HYDROCHLORIDE 2 MG/ML
2 INJECTION, SOLUTION EPIDURAL; INFILTRATION; PERINEURAL ONCE
Status: COMPLETED | OUTPATIENT
Start: 2025-02-10 | End: 2025-02-10

## 2025-02-10 RX ADMIN — METHYLPREDNISOLONE ACETATE 40 MG: 80 INJECTION, SUSPENSION INTRA-ARTICULAR; INTRALESIONAL; INTRAMUSCULAR; SOFT TISSUE at 14:49

## 2025-02-10 RX ADMIN — ROPIVACAINE HYDROCHLORIDE 2 ML: 2 INJECTION EPIDURAL; INFILTRATION; PERINEURAL at 14:49

## 2025-02-10 RX ADMIN — IOHEXOL 1 ML: 300 INJECTION, SOLUTION INTRAVENOUS at 14:49

## 2025-02-10 NOTE — DISCHARGE INSTR - LAB

## 2025-02-10 NOTE — TELEPHONE ENCOUNTER
Called and LMOM for patient to check on her ETA for her procedure appt    Disregard  patient at [facility]

## 2025-02-10 NOTE — H&P
Attending Attestation:     I reviewed the care furnished by the Resident/Fellow during the visit on 2/4/2025.  I was present in the office and personally saw and examined the patient.  Pleasant 64-year-old female with left hip and groin pain.  Suggestive of hip pathology.  Reviewed radiographs will schedule the patient for left hip joint injection would serve both diagnostic and therapeutic purposes.     We did discuss the increased risk of infection with continued alcohol drinking and she expressed understanding.     Would hold off on interventional spinal procedures in the future both secondary to coagulopathy with liver damage as well as increased risk of infection.     Reviewed the additional risks of the procedure and provided information for home review that be to be scheduled in the near future.            Expand All Collapse All  Assessment  1. History of cirrhosis    2. Lumbar spondylosis    3. Mid back pain    4. Left hip pain          Plan  Patient is a 64-year-old female sent as referral from orthopedics for evaluation of low back pain.  Patient has been complaining of bilateral hip pain, worse on left compared to right over the past several months without any inciting event. Patient was evaluated by orthopedics on January 15th 2025 and had bilateral greater trochanteric bursal injections done which she states that did not improve her symptoms at all. X-ray of lumbar spine done in January 2025 and interpreted by me showing multilevel displaced narrowing with age-indeterminate anterior wedging at T12 and L1.  Patient also had x-ray of bilateral hips done and interpreted by me showing mild arthritis of bilateral hips.  Patient started going to PT since mid January. Review of patient's lab work recently showed platelet count of 105 in January 2025 and INR of 1.4 in December 2024.  At this time, I suspect that patient's symptoms are secondary to left hip arthritis and would benefit from a left intraarticular  hip injection.  Given patient's elevated INR with active alcohol use, patient not candidate for interventional spine procedures.  Patient also explained the risk of dropping infection for any type of procedure outside of the spine given concurrent alcohol use and advised alcohol cessation.     1.  Advised patient to continue going to physical therapy  2. Given that clinical presentation of left hip arthritis matches MRI findings, I would like to proceed forward with performing a left intraarticular hip I jection. Risks vs benefits discussed in detail with the patient. These risks include, but are not limited to, bleeding, infection, nerve damage, paralysis. Patient is not on anticoagulant therapy. Patient denies contrast allergy. All patient’s questions were answered. Patient understands risks and is willing to pursue the procedure. The approach was demonstrated using models and literature was provided. Verbal consent obtained.        My impressions and treatment recommendations were discussed in detail with the patient who verbalized understanding and had no further questions.  Discharge instructions were provided. I personally saw and examined the patient and I agree with the above discussed plan of care.           Orders Placed This Encounter   Procedures    FL spine and pain procedure       Standing Status:   Future       Expected Date:   2/4/2025       Expiration Date:   2/4/2029       Reason for Exam::   left intraarticular hip joint injection       Anticoagulant hold needed?:   no      No orders of the defined types were placed in this encounter.     Referred By: She Osullivan MD  History of Present Illness     Jose White is a 64 y.o. female sent in as referral from Ortho for low back pain.  Has been complaining of bilateral hip pain over the past 4 months without any inciting event.  Patient states that symptoms are worse on left compared to right.  Patient states that symptoms are moderate to severe  in intensity, rated a 7 out of 10, states that the pain occurs intermittently, described as a burning and shooting like sensation with occasional numbness down bilateral anterior thighs.  Patient states that she occasionally takes Tylenol once a day for symptomatic relief.  Patient also admits to drinking alcohol with a glass of beer and wine daily.     I have personally reviewed and/or updated the patient's past medical history, past surgical history, family history, social history, current medications, allergies, and vital signs today.      Review of Systems   Constitutional:  Negative for fever and unexpected weight change.   HENT:  Negative for trouble swallowing.    Eyes:  Negative for visual disturbance.   Respiratory:  Positive for cough and shortness of breath. Negative for wheezing.    Cardiovascular:  Negative for chest pain and palpitations.   Gastrointestinal:  Negative for constipation, diarrhea, nausea and vomiting.   Endocrine: Negative for cold intolerance, heat intolerance and polydipsia.   Genitourinary:  Negative for difficulty urinating and frequency.   Musculoskeletal:  Negative for arthralgias, gait problem, joint swelling and myalgias.   Skin:  Positive for rash.   Neurological:  Negative for dizziness, seizures, syncope, weakness and headaches.   Hematological:  Does not bruise/bleed easily.   Psychiatric/Behavioral:  Negative for dysphoric mood.    All other systems reviewed and are negative.  General: no fevers, chills, infections  Neuro: no saddle anesthesia, no dropping objects or balance issues  GI: no changes in bowel habits  : no changes in bladder habits  Hem: no bleeding        Problem List       Patient Active Problem List   Diagnosis    Fatty liver    Other specified abnormal findings of blood chemistry    Anxiety    Elevated LFTs    GERD without esophagitis    Hyperlipidemia    Hypotension    Single seizure (HCC)    Insomnia    Nicotine dependence    COPD (chronic obstructive  pulmonary disease) (HCC)    Alcohol use    Irritable bowel syndrome with diarrhea    Non-seasonal allergic rhinitis due to pollen    Bilateral leg edema    Alcoholic cirrhosis of liver with ascites (HCC)    Hyponatremia    Thrombocytopenia (HCC)    Anemia    Decompensated hepatic cirrhosis (HCC)    Postprocedural pneumothorax of right lung    Ductal carcinoma in situ (DCIS) of right breast    Atypical ductal hyperplasia of right breast    Breast neoplasm, Tis (DCIS), right    Malignant neoplasm of lower-outer quadrant of right breast of female, estrogen receptor positive (HCC)    Pleural effusion    Vaginal atrophy    Dyspareunia, female    Postcoital bleeding    Stage 2 chronic kidney disease    Hepatic encephalopathy (HCC)    Hypoalbuminemia    COVID    Secondary esophageal varices without bleeding (HCC)    Candida esophagitis (HCC)    Greater trochanteric bursitis of both hips    Lumbar spondylosis            Medical History        Past Medical History:   Diagnosis Date    Alcoholic fatty liver      Anxiety      Asthma      COPD (chronic obstructive pulmonary disease) (HCC)      Elevated liver function tests      GERD (gastroesophageal reflux disease)      GERD without esophagitis      Hyperlipidemia      Hypertension      IBS (irritable bowel syndrome)      Insomnia      Insomnia      Liver disease      Nervousness      Nicotine dependence      Other specified urinary incontinence      RLS (restless legs syndrome)      Wears glasses              Surgical History         Past Surgical History:   Procedure Laterality Date    BREAST BIOPSY Right 05/21/2021     DCIS    BREAST EXCISIONAL BIOPSY Right 11/01/2004     benign    CATARACT EXTRACTION, BILATERAL   08/01/2018    COLONOSCOPY N/A 05/08/2019     Procedure: COLONOSCOPY;  Surgeon: Jacobo Leonardo MD;  Location: [facility];  Service: Gastroenterology    EGD AND COLONOSCOPY N/A 03/19/2018     Procedure: EGD AND COLONOSCOPY;  Surgeon: Jacobo Leonardo MD;  Location:  [facility] GI LAB;  Service: Gastroenterology    ESOPHAGOGASTRODUODENOSCOPY N/A 05/08/2019     Procedure: ESOPHAGOGASTRODUODENOSCOPY (EGD);  Surgeon: Jacobo Leonardo MD;  Location: [facility] GI LAB;  Service: Gastroenterology    IR PARACENTESIS   11/27/2020    IR PARACENTESIS   12/08/2020    IR PARACENTESIS   10/28/2022    IR PARACENTESIS   11/07/2022    IR PARACENTESIS   11/30/2022    IR PARACENTESIS   12/30/2022    IR PARACENTESIS   01/16/2023    IR PARACENTESIS   08/06/2024    IR PARACENTESIS   09/18/2024    IR PARACENTESIS   10/03/2024    IR PARACENTESIS   10/10/2024    IR PARACENTESIS   10/17/2024    IR PARACENTESIS   10/24/2024    IR PARACENTESIS   11/01/2024    IR PARACENTESIS   11/07/2024    IR PARACENTESIS   11/14/2024    IR PARACENTESIS   11/21/2024    IR PARACENTESIS   11/29/2024    IR PARACENTESIS   12/05/2024    IR PARACENTESIS   12/12/2024    IR PARACENTESIS   12/19/2024    IR PARACENTESIS   12/26/2024    IR PARACENTESIS   01/06/2025    IR PARACENTESIS   01/09/2025    IR PARACENTESIS   01/16/2025    IR PARACENTESIS   1/23/2025    IR PARACENTESIS   1/30/2025    IR THORACENTESIS   01/16/2023    IR THORACENTESIS   03/28/2023    IR THORACENTESIS   07/25/2024    IR THORACENTESIS   08/06/2024    IR THORACENTESIS   08/14/2024    IR THORACENTESIS   08/21/2024    IR THORACENTESIS   08/28/2024    IR THORACENTESIS   09/04/2024    IR THORACENTESIS   09/11/2024    IR THORACENTESIS   09/18/2024    IR THORACENTESIS   09/25/2024    IR THORACENTESIS   10/03/2024    IR THORACENTESIS   10/10/2024    IR THORACENTESIS   10/17/2024    IR THORACENTESIS   10/24/2024    IR THORACENTESIS   11/01/2024    IR THORACENTESIS   11/07/2024    IR THORACENTESIS   11/14/2024    IR THORACENTESIS   11/21/2024    IR THORACENTESIS   11/29/2024    IR THORACENTESIS   12/05/2024    IR THORACENTESIS   12/12/2024    IR THORACENTESIS   12/19/2024    IR THORACENTESIS   12/26/2024    IR THORACENTESIS   01/03/2025    IR THORACENTESIS   01/09/2025    IR  THORACENTESIS   01/16/2025    IR THORACENTESIS   1/23/2025    IR THORACENTESIS   1/30/2025    KNEE SURGERY Left       ACL repair. Date: early 2000s    LUMBAR LAMINECTOMY   04/1999    LYMPH NODE BIOPSY        MAMMO STEREOTACTIC BREAST BIOPSY RIGHT (ALL INC) Right 05/21/2021    MAMMO STEREOTACTIC BREAST BIOPSY RIGHT (ALL INC) EACH ADD Right 05/21/2021    TOTAL ABDOMINAL HYSTERECTOMY   02/05/2008    TUBAL LIGATION   1989    UPPER GASTROINTESTINAL ENDOSCOPY                Family History         Family History   Problem Relation Age of Onset    Breast cancer Mother 32    No Known Problems Father      No Known Problems Sister      No Known Problems Brother      No Known Problems Maternal Grandmother      No Known Problems Maternal Grandfather      No Known Problems Paternal Grandmother      No Known Problems Paternal Grandfather      No Known Problems Maternal Aunt      No Known Problems Maternal Aunt      No Known Problems Paternal Aunt      No Known Problems Son      Substance Abuse Neg Hx      Mental illness Neg Hx      Colon cancer Neg Hx      Colon polyps Neg Hx              Social History            Occupational History    Not on file   Tobacco Use    Smoking status: Every Day       Current packs/day: 1.00       Average packs/day: 1 pack/day for 47.1 years (47.1 ttl pk-yrs)       Types: Cigarettes       Start date: 1/1/1978    Smokeless tobacco: Never   Vaping Use    Vaping status: Never Used   Substance and Sexual Activity    Alcohol use: Yes       Alcohol/week: 6.0 standard drinks of alcohol       Types: 6 Cans of beer per week       Comment: 7-10 drinks/wk    Drug use: No    Sexual activity: Yes       Partners: Male              Current Outpatient Medications on File Prior to Visit   Medication Sig    albuterol (2.5 mg/3 mL) 0.083 % nebulizer solution Take 3 mL (2.5 mg total) by nebulization every 6 (six) hours as needed for wheezing or shortness of breath    albuterol (Proventil HFA) 90 mcg/act inhaler Inhale 2  "puffs every 6 (six) hours as needed for wheezing or shortness of breath    alendronate (Fosamax) 70 mg tablet Take 1 tablet (70 mg total) by mouth every 7 days    ALPRAZolam (XANAX) 1 mg tablet Take 1 tablet (1 mg total) by mouth 2 (two) times a day as needed for anxiety    anastrozole (ARIMIDEX) 1 mg tablet Take 1 tablet (1 mg total) by mouth daily    budesonide-formoterol (Symbicort) 160-4.5 mcg/act inhaler Inhale 2 puffs 2 (two) times a day Rinse mouth after use.    furosemide (LASIX) 20 mg tablet Take 1 tablet (20 mg total) by mouth daily    lactulose (CHRONULAC) 10 g/15 mL solution Take 15 mL (10 g total) by mouth 2 (two) times a day    midodrine (PROAMATINE) 10 MG tablet Take 1 tablet (10 mg total) by mouth 3 (three) times a day before meals    pantoprazole (PROTONIX) 20 mg tablet Take 1 tablet (20 mg total) by mouth daily    Risankizumab-rzaa (SKYRIZI PEN SC) Inject under the skin    spironolactone (ALDACTONE) 25 mg tablet Take 1 tablet (25 mg total) by mouth daily    traZODone (DESYREL) 50 mg tablet Take 1 tablet (50 mg total) by mouth daily at bedtime    benzonatate (TESSALON) 200 MG capsule Take 1 capsule (200 mg total) by mouth 3 (three) times a day as needed for cough (Patient not taking: Reported on 2/4/2025)    [DISCONTINUED] mupirocin (BACTROBAN) 2 % ointment Apply topically 3 (three) times a day (Patient not taking: Reported on 8/26/2022)      No current facility-administered medications on file prior to visit.         Allergies         Allergies   Allergen Reactions    Sulfa Antibiotics Shortness Of Breath    Ace Inhibitors Cough and Other (See Comments)       coughing            Physical Exam     Pulse 87   Temp 98.8 °F (37.1 °C)   Ht 5' 4\" (1.626 m)   Wt 64.4 kg (142 lb)   BMI 24.37 kg/m²          Vitals:     02/04/25 1352   Pulse: 87   Temp: 98.8 °F (37.1 °C)      Constitutional: no apparent distress, does not appear sedated   HEENT: pupils equal and round, symmetric facial muscles   Neck: " supple  Cardiovascular: well perfused, no peripheral cyanosis  Pulmonary: good chest wall excursion, breathing unlabored   Psych: appropriate affect and insight, No agitation. No evidence of aberrant behavior   Skin: No rashes or lesions  Neuro: cranial nerves II-XII grossly intact   MSK:  Inspection: no signs of infection to back  Palpation: no ttp to mid thoracic and lumbar spine  ROM: no significant rom abnormalities in lumbar spine   MMT 4/5 strength in B/L LE  Sensation to light touch intact B/L LE  DTR: 2+ in B/L patellar, achilles  Special test: + Stinchfield on L, + left hip scour    Gait: ambulates unassisted, gait is not antalgic           Imaging  BILATERAL HIPS AND PELVIS @ SL 1-15-25     INDICATION:   Pain in right hip. Pain in left hip.      COMPARISON: None.     VIEWS:  XR HIPS BILATERAL 2 VW W PELVIS IF PERFORMED        FINDINGS:  The bony pelvis appears intact.  Degenerative changes in the visualized lower lumbar spine and both SI joints.     LEFT HIP:  There is no acute fracture or dislocation.  Mild to moderate degenerative arthritis.  No lytic or blastic osseous lesion.  Soft tissues are unremarkable.     RIGHT HIP:  There is no acute fracture or dislocation.  Mild to moderate degenerative arthritis.  No lytic or blastic osseous lesion.  Soft tissues are unremarkable.     IMPRESSION:        Mild to moderate degenerative arthritis both hips.        LUMBAR SPINE @ SL 1-15-25     INDICATION:   Pain in right hip. Pain in left hip.      COMPARISON:  None.     VIEWS:  XR SPINE LUMBAR MINIMUM 4 VIEWS NON INJURY     FINDINGS:     There are 5 non rib bearing lumbar vertebral bodies.      There is no destructive osseous lesion.     Slight thoracolumbar curvature. Slight retrolisthesis of L3 on L4.     Multilevel degenerative facet hypertrophy in the lumbar spine with disc space narrowing at the lumbosacral interspace.     Age-indeterminate slight anterior wedging at T12 and L1.     Aortic calcifications.      The pedicles appear intact.     Soft tissues are unremarkable.     IMPRESSION:        Slight thoracolumbar curvature. Slight retrolisthesis of L3 on L4.     Multilevel degenerative facet hypertrophy in the lumbar spine with disc space narrowing at the lumbosacral interspace.     Age-indeterminate slight anterior wedging at T12 and L1.     I have personally reviewed pertinent films in PACS.                ASA: 2

## 2025-02-11 ENCOUNTER — APPOINTMENT (OUTPATIENT)
Dept: PHYSICAL THERAPY | Facility: CLINIC | Age: 65
End: 2025-02-11
Payer: MEDICARE

## 2025-02-11 ENCOUNTER — HOSPITAL ENCOUNTER (OUTPATIENT)
Dept: INTERVENTIONAL RADIOLOGY/VASCULAR | Facility: HOSPITAL | Age: 65
Discharge: HOME | End: 2025-02-11
Attending: INTERNAL MEDICINE
Payer: MEDICARE

## 2025-02-11 VITALS
HEART RATE: 78 BPM | OXYGEN SATURATION: 97 % | SYSTOLIC BLOOD PRESSURE: 102 MMHG | DIASTOLIC BLOOD PRESSURE: 57 MMHG | RESPIRATION RATE: 18 BRPM

## 2025-02-11 DIAGNOSIS — K70.31 ALCOHOLIC CIRRHOSIS OF LIVER WITH ASCITES (HCC): ICD-10-CM

## 2025-02-11 DIAGNOSIS — K76.9 PLEURAL EFFUSION ASSOCIATED WITH HEPATIC DISORDER: ICD-10-CM

## 2025-02-11 DIAGNOSIS — J91.8 PLEURAL EFFUSION ASSOCIATED WITH HEPATIC DISORDER: ICD-10-CM

## 2025-02-11 PROCEDURE — 49083 ABD PARACENTESIS W/IMAGING: CPT

## 2025-02-11 PROCEDURE — 32555 ASPIRATE PLEURA W/ IMAGING: CPT

## 2025-02-11 NOTE — BRIEF OP NOTE (RAD/CATH)
INTERVENTIONAL RADIOLOGY PROCEDURE NOTE    Date: 2/11/2025    Procedure: Paracentesis  Procedure Summary       Date: 02/11/25 Room / Location: [facility] Interventional Radiology    Anesthesia Start:  Anesthesia Stop:     Procedure: IR PARACENTESIS Diagnosis:       Alcoholic cirrhosis of liver with ascites (HCC)      Pleural effusion associated with hepatic disorder      (refractory ascites/hepatic hydrothorax)    Scheduled Providers:  Responsible Provider:     Anesthesia Type: Not recorded ASA Status: Not recorded            Preoperative diagnosis:   1. Alcoholic cirrhosis of liver with ascites (HCC)    2. Pleural effusion associated with hepatic disorder         Postoperative diagnosis: Same.    Surgeon: Lakhwinder Mahan MD     Assistant: None. No qualified resident was available.    Blood loss: None    Specimens: None     Findings: Right abdominal approach 2000 ml clear yellow fluid aspirated.    Complications: None immediate.    Anesthesia: local

## 2025-02-11 NOTE — BRIEF OP NOTE (RAD/CATH)
INTERVENTIONAL RADIOLOGY PROCEDURE NOTE    Date: 2/11/2025    Procedure: Thoracentesis  Procedure Summary       Date: 02/11/25 Room / Location: [facility] Interventional Radiology    Anesthesia Start:  Anesthesia Stop:     Procedure: IR THORACENTESIS Diagnosis:       Alcoholic cirrhosis of liver with ascites (HCC)      Pleural effusion associated with hepatic disorder      (refractory ascites/hepatic hydrothorax)    Scheduled Providers:  Responsible Provider:     Anesthesia Type: Not recorded ASA Status: Not recorded            Preoperative diagnosis:   1. Alcoholic cirrhosis of liver with ascites (HCC)    2. Pleural effusion associated with hepatic disorder         Postoperative diagnosis: Same.    Surgeon: Lakhwinder Mahan MD     Assistant: None. No qualified resident was available.    Blood loss: None    Specimens: None     Findings: Right thoracentesis and 1400 ml dark sheryl colored fluid obtained.    Complications: None immediate.    Anesthesia: local

## 2025-02-11 NOTE — DISCHARGE INSTRUCTIONS
Abdominal Paracentesis     WHAT YOU NEED TO KNOW:   Abdominal paracentesis is a procedure to remove abnormal fluid buildup in your abdomen. Fluid builds up because of liver problems, such as swelling and scarring. Heart failure, kidney disease, a mass, or problems with your pancreas may also cause fluid buildup.     DISCHARGE INSTRUCTIONS:     Follow up with your healthcare provider as directed: Write down your questions so you remember to ask them during your visits.     Wound care: Remove dressing after 24 hours. Leave glue in place.    Return to your normal activities    Contact Interventional Radiology at [phone] (YOGESH PATIENTS: Contact Interventional Radiology at [phone]) (MARGARITA PATIENTS: Contact Interventional Radiology at [phone]) if:  You have a fever and your wound is red and swollen.   You have yellow, green, or bad-smelling discharge coming from your wound.   You have pain or swelling in your abdomen.   You have an upset stomach or you vomit.   You have sudden, sharp pain in your abdomen.   You urinate very little or not at all.   You feel confused and more tired than usual.   Your arm or leg feels warm, tender, and painful. It may look swollen and red.   You suddenly feel lightheaded and have trouble breathing.           Thoracentesis   WHAT YOU NEED TO KNOW:   A thoracentesis is a procedure to remove extra fluid or air from between your lungs and your inner chest wall. Air or fluid buildup may make it hard for you to breathe. A thoracentesis allows your lungs to expand fully so you can breathe more easily.  DISCHARGE INSTRUCTIONS:   Medicines:   Pain medicine:  You may be given a prescription medicine to decrease pain. Do not wait until the pain is severe before you take your medicine.    Antibiotics:  This medicine helps fight or prevent an infection.    Take your medicine as directed.  Call your healthcare provider if you think your medicine is not helping or if you have side  effects. Tell him if you are allergic to any medicine. Keep a list of the medicines, vitamins, and herbs you take. Include the amounts, and when and why you take them. Bring the list or the pill bottles to follow-up visits. Carry your medicine list with you in case of an emergency.  Follow up with your healthcare provider as directed:  Write down your questions so you remember to ask them during your visits.   Rest:  Rest when you feel it is needed. Slowly start to do more each day. Return to your daily activities as directed.   Do not smoke:  If you smoke, it is never too late to quit. Ask for information about how to stop smoking if you need help.  Contact your healthcare provider if:   You have a fever.    Your puncture site is red, warm, swollen, or draining pus.    You have questions or concerns about your procedure, medicine, or care.    If you have any questions regarding, call the IR department @ [phone].     Seek care immediately or call 911 if:   Blood soaks through your bandage.    There is blood in your spit.

## 2025-02-14 ENCOUNTER — HOSPITAL ENCOUNTER (OUTPATIENT)
Dept: ULTRASOUND IMAGING | Facility: HOSPITAL | Age: 65
End: 2025-02-14
Payer: MEDICARE

## 2025-02-14 ENCOUNTER — APPOINTMENT (OUTPATIENT)
Dept: PHYSICAL THERAPY | Facility: CLINIC | Age: 65
End: 2025-02-14
Payer: MEDICARE

## 2025-02-14 DIAGNOSIS — K70.31 ALCOHOLIC CIRRHOSIS OF LIVER WITH ASCITES (HCC): Chronic | ICD-10-CM

## 2025-02-14 PROCEDURE — 76705 ECHO EXAM OF ABDOMEN: CPT

## 2025-02-17 DIAGNOSIS — K70.31 ALCOHOLIC CIRRHOSIS OF LIVER WITH ASCITES (HCC): Primary | ICD-10-CM

## 2025-02-17 RX ORDER — ALBUMIN (HUMAN) 12.5 G/50ML
50 SOLUTION INTRAVENOUS ONCE AS NEEDED
OUTPATIENT
Start: 2025-02-20

## 2025-02-17 RX ORDER — SODIUM CHLORIDE 9 MG/ML
20 INJECTION, SOLUTION INTRAVENOUS ONCE
OUTPATIENT
Start: 2025-02-20

## 2025-02-17 RX ORDER — ALBUMIN (HUMAN) 12.5 G/50ML
100 SOLUTION INTRAVENOUS ONCE AS NEEDED
OUTPATIENT
Start: 2025-02-20

## 2025-02-17 RX ORDER — ALBUMIN (HUMAN) 12.5 G/50ML
75 SOLUTION INTRAVENOUS ONCE AS NEEDED
OUTPATIENT
Start: 2025-02-20

## 2025-02-18 ENCOUNTER — OFFICE VISIT (OUTPATIENT)
Dept: PHYSICAL THERAPY | Facility: CLINIC | Age: 65
End: 2025-02-18
Payer: MEDICARE

## 2025-02-18 DIAGNOSIS — M47.816 LUMBAR SPONDYLOSIS: ICD-10-CM

## 2025-02-18 DIAGNOSIS — M70.61 GREATER TROCHANTERIC BURSITIS OF BOTH HIPS: Primary | ICD-10-CM

## 2025-02-18 DIAGNOSIS — M70.62 GREATER TROCHANTERIC BURSITIS OF BOTH HIPS: Primary | ICD-10-CM

## 2025-02-18 PROCEDURE — 97140 MANUAL THERAPY 1/> REGIONS: CPT | Performed by: PHYSICAL THERAPIST

## 2025-02-18 PROCEDURE — 97110 THERAPEUTIC EXERCISES: CPT | Performed by: PHYSICAL THERAPIST

## 2025-02-18 NOTE — PROGRESS NOTES
Daily Note     Today's date: 2025  Patient name: Jose White  : 1960  MRN: [id number]  Referring provider: She Osullivan MD  Dx:   Encounter Diagnosis     ICD-10-CM    1. Greater trochanteric bursitis of both hips  M70.61     M70.62       2. Lumbar spondylosis  M47.816           Start Time: 1450          Subjective: Pt received L intra-articular hip injection on  from pain mgmt. She notes the burning of the L hip is less since last session and she is getting better sleep. She does continue with lateral-anterior hip pain.      Objective: See treatment diary below      Assessment: She continues with L hip flexor, adductor and lateral hip tenderness. She demonstrates much improved active and passive hip flexion ROM of >100 deg bilaterally today without pain. Will plan to continue STM and add gentle hip stretching nv to address muscle length deficits.      Plan: continue with hip STM and gentle stretching     Precautions: balance deficits - potential falls risk  Comorbidities: anxiety, COPD, HTN, h/o lumbar laminectomy         HEP:  Access Code: [email or web address]  URL: [email or web address]  Date: 2025  Prepared by: Maximus Mendoza    Exercises  - Standing Lumbar Extension with Counter  - 5 x daily - 10 reps        POC Expires Reeval for Medicare to be completed Unit LImit Auth Expiration Date PT/OT/STVisit Limit    By visit 10 N/a      Completed on visit                  TREATMENT DIARY    Auth status       RE   approved               Visit # 1 2 3 4 5 6 7 8 9 10   Visits Remaining                          Manuals             Lumbar STM             Hip STM L adductor, hip flexor bilat adductor, hip flexor L adductor, hip flexor, glute med/max L adductor, hip flexor, glute med/max         Hip PROM  bilat  left                      Neuro Re-Ed             Tandem walk             Side step             Tandem balance             Foam walk             RB taps        "      Yovany step over                          Ther Ex             Bike - cardio 6' resist off 5' resist on 6' resist on 6' resist off         Lumbar ext over table x10            Glute stretch    Supine 10\"x10 ea         Hip add iso             Hip abd iso             SLR             bridge             LTR             LAQ             HSC             Leg press             Step up fwd                                       Ther Activity                                       Gait Training                                       Modalities                                                   "

## 2025-02-19 ENCOUNTER — OFFICE VISIT (OUTPATIENT)
Age: 65
End: 2025-02-19
Payer: MEDICARE

## 2025-02-19 VITALS
HEART RATE: 90 BPM | HEIGHT: 65 IN | SYSTOLIC BLOOD PRESSURE: 108 MMHG | BODY MASS INDEX: 22.96 KG/M2 | TEMPERATURE: 97.6 F | WEIGHT: 137.8 LBS | DIASTOLIC BLOOD PRESSURE: 64 MMHG | OXYGEN SATURATION: 96 %

## 2025-02-19 DIAGNOSIS — J90 RECURRENT PLEURAL EFFUSION: Primary | ICD-10-CM

## 2025-02-19 DIAGNOSIS — J44.9 MODERATE COPD (CHRONIC OBSTRUCTIVE PULMONARY DISEASE) (HCC): ICD-10-CM

## 2025-02-19 DIAGNOSIS — K76.9 LIVER DISEASE, UNSPECIFIED: ICD-10-CM

## 2025-02-19 DIAGNOSIS — Z72.0 TOBACCO ABUSE: ICD-10-CM

## 2025-02-19 PROCEDURE — 99214 OFFICE O/P EST MOD 30 MIN: CPT | Performed by: PHYSICIAN ASSISTANT

## 2025-02-19 NOTE — PROGRESS NOTES
Answers submitted by the patient for this visit:  Pulmonology Questionnaire (Submitted on 2025)  Chief Complaint: Primary symptoms  Chronicity: chronic  Follow-up  Visit - Pulmonary Medicine   Name: Jose White      : 1960      MRN: [id number]  Encounter Provider: Caroline Moeller PA-C  Encounter Date: 2025   Encounter department: [facility] PULMONARY [facility]  :  Assessment & Plan  Recurrent pleural effusion    Requiring weekly taps.  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 She also gets paracentesis at the same time.   She is going to IR tomorrow. I would like up to date pleural fluid studies done at that time. Orders placed and I messaged IR provider and RN.  Discussed with her the the concept of indwelling pleural catheter however she is not so sure.. in my opinion an abdominal catheter would be more appropriate to start with prior to IPC.   Continue close f/u w GI and nephrology    Orders:    Body fluid white cell count with differential; Future    Body fluid culture and Gram stain; Future    Glucose, body fluid; Future    LD (LDH), Body Fluid; Future    pH, body fluid; Future    Triglycerides, body fluid; Future    Non-gynecologic cytology; Future    Liver disease, unspecified  She continues to drink and is aware this will likely result in her eventual death. She verbalizes understanding and does not think she will be able to quit drinking. I think given her gradual deterioration over time, she would benefit from establishing with palliative care. Referral was placed at last visit. She has not seen them yet. She wants to continue to talk to her  and family about this.   Orders:    Triglycerides, body fluid; Future    Moderate COPD (chronic obstructive pulmonary disease) (HCC)  I suspect her dyspnea at this time is mainly related to her pleural effusion. Her COPD seems to be well-controlled on  Symbicort. She almost never uses albuterol.        Tobacco abuse  Patient does not want to quit smoking or drinking         Patient accompanied by her     Return in about 4 months (around 6/19/2025) for a physician appointment.    History of Present Illness   Jose White is a 64 y.o. female with past medical history including COPD, cirrhosis of the liver, DCIS, tobacco and alcohol abuse presenting for follow-up.     She's about the same since her last visit. Her breathing is better after taps and worse right beforehand. Not much coughing or wheezing.       Review of Systems   Constitutional:  Negative for appetite change.   Respiratory:  Positive for shortness of breath.    Gastrointestinal:  Positive for abdominal distention.   All other systems reviewed and are negative.      Aside from what is mentioned in the HPI, ROS is otherwise negative    Past Medical History   Past Medical History:   Diagnosis Date    Alcoholic fatty liver     Anxiety     Asthma     COPD (chronic obstructive pulmonary disease) (HCC)     Elevated liver function tests     GERD (gastroesophageal reflux disease)     GERD without esophagitis     Hyperlipidemia     Hypertension     IBS (irritable bowel syndrome)     Insomnia     Insomnia     Liver disease     Nervousness     Nicotine dependence     Other specified urinary incontinence     RLS (restless legs syndrome)     Wears glasses      Past Surgical History:   Procedure Laterality Date    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    BREAST EXCISIONAL BIOPSY Right 11/01/2004    benign    CATARACT EXTRACTION, BILATERAL  08/01/2018    COLONOSCOPY N/A 05/08/2019    Procedure: COLONOSCOPY;  Surgeon: Jacobo Leonardo MD;  Location: [facility] GI LAB;  Service: Gastroenterology    EGD AND COLONOSCOPY N/A 03/19/2018    Procedure: EGD AND COLONOSCOPY;  Surgeon: Jacobo Leonardo MD;  Location: [facility] GI LAB;  Service: Gastroenterology    ESOPHAGOGASTRODUODENOSCOPY N/A 05/08/2019    Procedure:  ESOPHAGOGASTRODUODENOSCOPY (EGD);  Surgeon: Jacobo Leonardo MD;  Location: [facility] GI LAB;  Service: Gastroenterology    IR PARACENTESIS  11/27/2020    IR PARACENTESIS  12/08/2020    IR PARACENTESIS  10/28/2022    IR PARACENTESIS  11/07/2022    IR PARACENTESIS  11/30/2022    IR PARACENTESIS  12/30/2022    IR PARACENTESIS  01/16/2023    IR PARACENTESIS  08/06/2024    IR PARACENTESIS  09/18/2024    IR PARACENTESIS  10/03/2024    IR PARACENTESIS  10/10/2024    IR PARACENTESIS  10/17/2024    IR PARACENTESIS  10/24/2024    IR PARACENTESIS  11/01/2024    IR PARACENTESIS  11/07/2024    IR PARACENTESIS  11/14/2024    IR PARACENTESIS  11/21/2024    IR PARACENTESIS  11/29/2024    IR PARACENTESIS  12/05/2024    IR PARACENTESIS  12/12/2024    IR PARACENTESIS  12/19/2024    IR PARACENTESIS  12/26/2024    IR PARACENTESIS  01/06/2025    IR PARACENTESIS  01/09/2025    IR PARACENTESIS  01/16/2025    IR PARACENTESIS  1/23/2025    IR PARACENTESIS  1/30/2025    IR PARACENTESIS  2/5/2025    IR PARACENTESIS  2/11/2025    IR THORACENTESIS  01/16/2023    IR THORACENTESIS  03/28/2023    IR THORACENTESIS  07/25/2024    IR THORACENTESIS  08/06/2024    IR THORACENTESIS  08/14/2024    IR THORACENTESIS  08/21/2024    IR THORACENTESIS  08/28/2024    IR THORACENTESIS  09/04/2024    IR THORACENTESIS  09/11/2024    IR THORACENTESIS  09/18/2024    IR THORACENTESIS  09/25/2024    IR THORACENTESIS  10/03/2024    IR THORACENTESIS  10/10/2024    IR THORACENTESIS  10/17/2024    IR THORACENTESIS  10/24/2024    IR THORACENTESIS  11/01/2024    IR THORACENTESIS  11/07/2024    IR THORACENTESIS  11/14/2024    IR THORACENTESIS  11/21/2024    IR THORACENTESIS  11/29/2024    IR THORACENTESIS  12/05/2024    IR THORACENTESIS  12/12/2024    IR THORACENTESIS  12/19/2024    IR THORACENTESIS  12/26/2024    IR THORACENTESIS  01/03/2025    IR THORACENTESIS  01/09/2025    IR THORACENTESIS  01/16/2025    IR THORACENTESIS  1/23/2025    IR THORACENTESIS  1/30/2025    IR  THORACENTESIS  2/5/2025    IR THORACENTESIS  2/11/2025    KNEE SURGERY Left     ACL repair. Date: early 2000s    LUMBAR LAMINECTOMY  04/1999    LYMPH NODE BIOPSY      MAMMO STEREOTACTIC BREAST BIOPSY RIGHT (ALL INC) Right 05/21/2021    MAMMO STEREOTACTIC BREAST BIOPSY RIGHT (ALL INC) EACH ADD Right 05/21/2021    TOTAL ABDOMINAL HYSTERECTOMY  02/05/2008    TUBAL LIGATION  1989    UPPER GASTROINTESTINAL ENDOSCOPY       Family History   Problem Relation Age of Onset    Breast cancer Mother 32    No Known Problems Father     No Known Problems Sister     No Known Problems Brother     No Known Problems Maternal Grandmother     No Known Problems Maternal Grandfather     No Known Problems Paternal Grandmother     No Known Problems Paternal Grandfather     No Known Problems Maternal Aunt     No Known Problems Maternal Aunt     No Known Problems Paternal Aunt     No Known Problems Son     Substance Abuse Neg Hx     Mental illness Neg Hx     Colon cancer Neg Hx     Colon polyps Neg Hx       reports that she has been smoking cigarettes. She started smoking about 47 years ago. She has a 47.1 pack-year smoking history. She has never used smokeless tobacco. She reports current alcohol use of about 6.0 standard drinks of alcohol per week. She reports that she does not use drugs.  Current Outpatient Medications   Medication Instructions    albuterol (Proventil HFA) 90 mcg/act inhaler 2 puffs, Inhalation, Every 6 hours PRN    albuterol 2.5 mg, Nebulization, Every 6 hours PRN    alendronate (FOSAMAX) 70 mg, Oral, Every 7 days    ALPRAZolam (XANAX) 1 mg, Oral, 2 times daily PRN    anastrozole (ARIMIDEX) 1 mg, Oral, Daily    benzonatate (TESSALON) 200 mg, Oral, 3 times daily PRN    budesonide-formoterol (Symbicort) 160-4.5 mcg/act inhaler 2 puffs, Inhalation, 2 times daily, Rinse mouth after use.    furosemide (LASIX) 20 mg, Oral, Daily    lactulose (CHRONULAC) 10 g, Oral, 2 times daily    midodrine (PROAMATINE) 10 mg, Oral, 3 times  "daily before meals    pantoprazole (PROTONIX) 20 mg, Oral, Daily    Risankizumab-rzaa (SKYRIZI PEN SC) Inject under the skin    spironolactone (ALDACTONE) 25 mg, Oral, Daily    traZODone (DESYREL) 50 mg, Oral, Daily at bedtime     Allergies   Allergen Reactions    Sulfa Antibiotics Shortness Of Breath    Ace Inhibitors Cough and Other (See Comments)     coughing         Medical History Reviewed by provider this encounter:     .    Objective   /64 (BP Location: Left arm, Patient Position: Sitting, Cuff Size: Standard)   Pulse 90   Temp 97.6 °F (36.4 °C) (Tympanic)   Ht 5' 4.5\" (1.638 m)   Wt 62.5 kg (137 lb 12.8 oz)   SpO2 96%   BMI 23.29 kg/m²     Physical Exam  Vitals reviewed.   Constitutional:       General: She is not in acute distress.     Appearance: She is not toxic-appearing.   HENT:      Head: Normocephalic and atraumatic.   Eyes:      General: No scleral icterus.  Cardiovascular:      Rate and Rhythm: Normal rate and regular rhythm.   Pulmonary:      Effort: Pulmonary effort is normal.      Comments: Decreased breath sounds, R  Musculoskeletal:         General: No signs of injury.   Skin:     General: Skin is warm and dry.   Neurological:      General: No focal deficit present.      Mental Status: She is alert. Mental status is at baseline.   Psychiatric:         Mood and Affect: Mood normal.         Behavior: Behavior normal.           Diagnostic Data:  Labs: I personally reviewed the most recent laboratory data pertinent to today's visit.  Previous pleural fluid studies reviewed    Radiology results:  Radiology Results Review: I have reviewed radiology reports from 10/9/24 including: CT chest.      PFT/spirometry results:  No results found for: \"FEV1\", \"FVC\", \"IJZ7ZVA\", \"TLC\", \"DLCO\"   5/9/22:  IMPRESSION:  1. Mild obstruction without bronchodilator responsiveness  2. Normal lung capacity with elevated residual volume consistent with mild air trapping  3. Mild diffusion impairment    Caroline " Nikki Moeller PA-C

## 2025-02-20 ENCOUNTER — HOSPITAL ENCOUNTER (OUTPATIENT)
Dept: INTERVENTIONAL RADIOLOGY/VASCULAR | Facility: HOSPITAL | Age: 65
Discharge: HOME | End: 2025-02-20
Attending: INTERNAL MEDICINE
Payer: MEDICARE

## 2025-02-20 ENCOUNTER — HOSPITAL ENCOUNTER (OUTPATIENT)
Dept: INFUSION CENTER | Facility: HOSPITAL | Age: 65
Discharge: HOME | End: 2025-02-20
Attending: INTERNAL MEDICINE

## 2025-02-20 VITALS
RESPIRATION RATE: 20 BRPM | HEART RATE: 80 BPM | OXYGEN SATURATION: 97 % | SYSTOLIC BLOOD PRESSURE: 101 MMHG | DIASTOLIC BLOOD PRESSURE: 59 MMHG

## 2025-02-20 VITALS
RESPIRATION RATE: 20 BRPM | OXYGEN SATURATION: 95 % | HEART RATE: 87 BPM | SYSTOLIC BLOOD PRESSURE: 88 MMHG | DIASTOLIC BLOOD PRESSURE: 56 MMHG

## 2025-02-20 DIAGNOSIS — J91.8 PLEURAL EFFUSION ASSOCIATED WITH HEPATIC DISORDER: ICD-10-CM

## 2025-02-20 DIAGNOSIS — K76.9 PLEURAL EFFUSION ASSOCIATED WITH HEPATIC DISORDER: ICD-10-CM

## 2025-02-20 DIAGNOSIS — K76.9 LIVER DISEASE, UNSPECIFIED: ICD-10-CM

## 2025-02-20 DIAGNOSIS — J90 RECURRENT PLEURAL EFFUSION: ICD-10-CM

## 2025-02-20 DIAGNOSIS — K70.31 ALCOHOLIC CIRRHOSIS OF LIVER WITH ASCITES (HCC): ICD-10-CM

## 2025-02-20 LAB
APPEARANCE FLD: CLEAR
COLOR FLD: NORMAL
GLUCOSE FLD-MCNC: 105 MG/DL
HISTIOCYTES NFR FLD: 42 %
LDH FLD L TO P-CCNC: 76 U/L
LYMPHOCYTES NFR BLD AUTO: 44 %
MONO+MESO NFR FLD MANUAL: 4 %
NEUTS SEG NFR BLD AUTO: 10 %
PH BODY FLUID: 7.7
SITE: NORMAL
TOTAL CELLS COUNTED SPEC: 100
TOTAL NUCLEATED CELL COUNT: 137 /UL
TRIGL FLD-MCNC: 43 MG/DL

## 2025-02-20 PROCEDURE — 87070 CULTURE OTHR SPECIMN AEROBIC: CPT

## 2025-02-20 PROCEDURE — 88305 TISSUE EXAM BY PATHOLOGIST: CPT | Performed by: SPECIALIST

## 2025-02-20 PROCEDURE — 82945 GLUCOSE OTHER FLUID: CPT

## 2025-02-20 PROCEDURE — 32555 ASPIRATE PLEURA W/ IMAGING: CPT

## 2025-02-20 PROCEDURE — 83986 ASSAY PH BODY FLUID NOS: CPT

## 2025-02-20 PROCEDURE — 89051 BODY FLUID CELL COUNT: CPT

## 2025-02-20 PROCEDURE — 88112 CYTOPATH CELL ENHANCE TECH: CPT | Performed by: SPECIALIST

## 2025-02-20 PROCEDURE — 84478 ASSAY OF TRIGLYCERIDES: CPT

## 2025-02-20 PROCEDURE — 49083 ABD PARACENTESIS W/IMAGING: CPT

## 2025-02-20 PROCEDURE — 87205 SMEAR GRAM STAIN: CPT

## 2025-02-20 PROCEDURE — 83615 LACTATE (LD) (LDH) ENZYME: CPT

## 2025-02-20 RX ORDER — LIDOCAINE HYDROCHLORIDE 10 MG/ML
INJECTION, SOLUTION EPIDURAL; INFILTRATION; INTRACAUDAL; PERINEURAL AS NEEDED
Status: COMPLETED | OUTPATIENT
Start: 2025-02-20 | End: 2025-02-20

## 2025-02-20 RX ADMIN — LIDOCAINE HYDROCHLORIDE 8 ML: 10 INJECTION, SOLUTION EPIDURAL; INFILTRATION; INTRACAUDAL; PERINEURAL at 09:27

## 2025-02-20 RX ADMIN — LIDOCAINE HYDROCHLORIDE 7 ML: 10 INJECTION, SOLUTION EPIDURAL; INFILTRATION; INTRACAUDAL; PERINEURAL at 09:41

## 2025-02-20 NOTE — DISCHARGE INSTRUCTIONS
Abdominal Paracentesis     WHAT YOU NEED TO KNOW:   Abdominal paracentesis is a procedure to remove abnormal fluid buildup in your abdomen. Fluid builds up because of liver problems, such as swelling and scarring. Heart failure, kidney disease, a mass, or problems with your pancreas may also cause fluid buildup.     DISCHARGE INSTRUCTIONS:     Follow up with your healthcare provider as directed: Write down your questions so you remember to ask them during your visits.     Wound care: Remove dressing after 24 hours. Leave glue in place.    Return to your normal activities    Contact Interventional Radiology at [phone] ([facility] PATIENTS: Contact Interventional Radiology at [phone]) ([facility] PATIENTS: Contact Interventional Radiology at [phone]) if:  You have a fever and your wound is red and swollen.   You have yellow, green, or bad-smelling discharge coming from your wound.   You have pain or swelling in your abdomen.   You have an upset stomach or you vomit.   You have sudden, sharp pain in your abdomen.   You urinate very little or not at all.   You feel confused and more tired than usual.   Your arm or leg feels warm, tender, and painful. It may look swollen and red.   You suddenly feel lightheaded and have trouble breathing.     Abdominal Paracentesis     WHAT YOU NEED TO KNOW:   Abdominal paracentesis is a procedure to remove abnormal fluid buildup in your abdomen. Fluid builds up because of liver problems, such as swelling and scarring. Heart failure, kidney disease, a mass, or problems with your pancreas may also cause fluid buildup.     DISCHARGE INSTRUCTIONS:     Follow up with your healthcare provider as directed: Write down your questions so you remember to ask them during your visits.     Wound care: Remove dressing after 24 hours. Leave glue in place.    Return to your normal activities    Contact Interventional Radiology at [phone] ([facility] PATIENTS: Contact Interventional Radiology at  [phone]) ([facility] PATIENTS: Contact Interventional Radiology at [phone]) if:  You have a fever and your wound is red and swollen.   You have yellow, green, or bad-smelling discharge coming from your wound.   You have pain or swelling in your abdomen.   You have an upset stomach or you vomit.   You have sudden, sharp pain in your abdomen.   You urinate very little or not at all.   You feel confused and more tired than usual.   Your arm or leg feels warm, tender, and painful. It may look swollen and red.   You suddenly feel lightheaded and have trouble breathing.

## 2025-02-20 NOTE — DISCHARGE INSTRUCTIONS
Thoracentesis   WHAT YOU NEED TO KNOW:   A thoracentesis is a procedure to remove extra fluid or air from between your lungs and your inner chest wall. Air or fluid buildup may make it hard for you to breathe. A thoracentesis allows your lungs to expand fully so you can breathe more easily.  DISCHARGE INSTRUCTIONS:   Medicines:   Pain medicine:  You may be given a prescription medicine to decrease pain. Do not wait until the pain is severe before you take your medicine.    Antibiotics:  This medicine helps fight or prevent an infection.    Take your medicine as directed.  Call your healthcare provider if you think your medicine is not helping or if you have side effects. Tell him if you are allergic to any medicine. Keep a list of the medicines, vitamins, and herbs you take. Include the amounts, and when and why you take them. Bring the list or the pill bottles to follow-up visits. Carry your medicine list with you in case of an emergency.  Follow up with your healthcare provider as directed:  Write down your questions so you remember to ask them during your visits.   Rest:  Rest when you feel it is needed. Slowly start to do more each day. Return to your daily activities as directed.   Do not smoke:  If you smoke, it is never too late to quit. Ask for information about how to stop smoking if you need help.  Contact your healthcare provider if:   You have a fever.    Your puncture site is red, warm, swollen, or draining pus.    You have questions or concerns about your procedure, medicine, or care.    If you have any questions regarding, call the IR department @ [phone].     Seek care immediately or call 911 if:   Blood soaks through your bandage.    There is blood in your spit.

## 2025-02-20 NOTE — SEDATION DOCUMENTATION
Weekly paracentesis yielded 1800ml clear yellow fluid. Band aid to site. AVS reviewed and ambulated home in her usual state of health.

## 2025-02-20 NOTE — SEDATION DOCUMENTATION
Weekly right thoracentesis yielding 1350ml clear yellow. Specimen sent to lab. Band aid to site. Tolerated well.

## 2025-02-21 ENCOUNTER — APPOINTMENT (OUTPATIENT)
Dept: PHYSICAL THERAPY | Facility: CLINIC | Age: 65
End: 2025-02-21
Payer: MEDICARE

## 2025-02-21 ENCOUNTER — OFFICE VISIT (OUTPATIENT)
Dept: NEPHROLOGY | Facility: HOSPITAL | Age: 65
End: 2025-02-21
Payer: MEDICARE

## 2025-02-21 VITALS
SYSTOLIC BLOOD PRESSURE: 86 MMHG | DIASTOLIC BLOOD PRESSURE: 58 MMHG | BODY MASS INDEX: 22.99 KG/M2 | OXYGEN SATURATION: 94 % | HEIGHT: 65 IN | HEART RATE: 88 BPM | WEIGHT: 138 LBS

## 2025-02-21 DIAGNOSIS — R60.0 BILATERAL LEG EDEMA: ICD-10-CM

## 2025-02-21 DIAGNOSIS — I95.9 HYPOTENSION, UNSPECIFIED HYPOTENSION TYPE: ICD-10-CM

## 2025-02-21 DIAGNOSIS — E87.1 HYPONATREMIA: Primary | ICD-10-CM

## 2025-02-21 PROBLEM — N18.2 STAGE 2 CHRONIC KIDNEY DISEASE: Status: RESOLVED | Noted: 2023-12-04 | Resolved: 2025-02-21

## 2025-02-21 PROCEDURE — 99213 OFFICE O/P EST LOW 20 MIN: CPT | Performed by: INTERNAL MEDICINE

## 2025-02-21 NOTE — PROGRESS NOTES
NEPHROLOGY OUTPATIENT PROGRESS NOTE   Jose White 64 y.o. female MRN: [id number]  DATE: 2/21/2025  Reason for visit:   Chief Complaint   Patient presents with    Follow-up    Hyponatremia        Patient Instructions   Hyponatremia  -likely d/t intravascular volume depletion vs cirrhosis  -sNa improved during hospitalization s/p 1.8% saline  -continue 1.2L/day fluid retriction  -continue lasix 20mg daily, spironolactone 25mg daily  -monitor BMP, last sNa 132 as of 1/29/25. Suspect baseline sNa in low 130s  -if sNa downtrending, will pursue further work up  -encourage alcohol cessation  Bilateral leg edema  -in setting of alcoholic cirrhosis with ascites as well as hypoalbuminemia leading to third spacing  -continue fluid restriction and lasix as below  -s/p 1.8L paracentesis 2/20/25, 1.35L thoracentesis  -history of cirrhotic hydrothorax and ascites  Hypotension, unspecified hypotension type  -on midodrine 10mg TID  -SBP 80s in office  -did not take midodrine this morning. Recommend taking midodrine once home and eating and repeating BP. If BP still low, would consider ER evaluation    RTC in 6 months.  Repeat BMP as per GI.  Consider IV albumin with para/thoracentesis.       Assessment & Plan  Hyponatremia  -likely d/t intravascular volume depletion vs cirrhosis  -sNa improved during hospitalization s/p 1.8% saline  -continue 1.2L/day fluid retriction  -continue lasix 20mg daily, spironolactone 25mg daily  -monitor BMP, last sNa 132 as of 1/29/25. Suspect baseline sNa in low 130s  -if sNa downtrending, will pursue further work up  -encourage alcohol cessation  Bilateral leg edema  -in setting of alcoholic cirrhosis with ascites as well as hypoalbuminemia leading to third spacing  -continue fluid restriction and lasix as below  -s/p 1.8L paracentesis 2/20/25, 1.35L thoracentesis  -history of cirrhotic hydrothorax and ascites  Hypotension, unspecified hypotension type  -on midodrine 10mg TID  -SBP 80s in  "office  -did not take midodrine this morning. Recommend taking midodrine once home and eating and repeating BP. If BP still low, would consider ER evaluation    RTC in 6 months.  Repeat BMP as per GI.  Consider IV albumin with para/thoracentesis.     SUBJECTIVE / INTERVAL HISTORY:  64 y.o. female presents in hospital follow up of hyponatremia.     Jose White admitted in Nov. 2025 for hyponatremia, sent in from outpatient nephrology office. Improved s/p 1.8% saline. Discharged on fluid restriction and lasix.    Denies NSAID use.  She feels lightheaded. Had a protein shake. Has leg swelling.   Had thoracentesis and paracentesis yesterday.   She is thinking about getting catheter for ascites drainage.    Review of Systems   Constitutional:  Negative for chills and fever.   HENT:  Negative for sore throat and trouble swallowing.    Eyes:  Negative for visual disturbance.   Respiratory:  Positive for shortness of breath (with build up of fluid in lungs). Negative for cough.    Cardiovascular:  Positive for leg swelling. Negative for chest pain.   Gastrointestinal:  Positive for diarrhea (from lactulose). Negative for abdominal pain, constipation, nausea and vomiting.   Endocrine: Negative for polyuria.   Genitourinary:  Negative for difficulty urinating, dysuria and hematuria.   Musculoskeletal:  Negative for back pain and neck pain.   Skin:  Negative for rash.   Neurological:  Positive for light-headedness. Negative for dizziness and numbness.   Psychiatric/Behavioral:  The patient is not nervous/anxious.        OBJECTIVE:  BP (!) 86/58 (BP Location: Left arm, Patient Position: Sitting, Cuff Size: Adult)   Pulse 88   Ht 5' 4.5\" (1.638 m)   Wt 62.6 kg (138 lb) Comment: pt reported  SpO2 94%   BMI 23.32 kg/m²  Body mass index is 23.32 kg/m².    Physical exam:  Physical Exam  Vitals and nursing note reviewed.   Constitutional:       General: She is not in acute distress.     Appearance: Normal appearance. She " is well-developed. She is not diaphoretic.      Comments: thin   HENT:      Head: Normocephalic and atraumatic.      Nose: Nose normal.      Mouth/Throat:      Mouth: Mucous membranes are moist.      Pharynx: No oropharyngeal exudate.   Eyes:      General: No scleral icterus.        Right eye: No discharge.         Left eye: No discharge.   Neck:      Thyroid: No thyromegaly.   Cardiovascular:      Rate and Rhythm: Normal rate and regular rhythm.      Heart sounds: No murmur heard.  Pulmonary:      Effort: No respiratory distress.      Breath sounds: No wheezing.      Comments: Decreased BS right lung base  Abdominal:      General: Bowel sounds are normal. There is no distension.      Palpations: Abdomen is soft.   Musculoskeletal:         General: Swelling (b/l LE) present. Normal range of motion.      Cervical back: Normal range of motion and neck supple.   Skin:     General: Skin is warm and dry.      Coloration: Skin is jaundiced.      Findings: No rash.   Neurological:      General: No focal deficit present.      Mental Status: She is alert.      Motor: No abnormal muscle tone.      Comments: awake   Psychiatric:         Mood and Affect: Mood normal.         Behavior: Behavior normal.         Medications:    Current Outpatient Medications:     albuterol (2.5 mg/3 mL) 0.083 % nebulizer solution, Take 3 mL (2.5 mg total) by nebulization every 6 (six) hours as needed for wheezing or shortness of breath, Disp: 1080 mL, Rfl: 3    albuterol (Proventil HFA) 90 mcg/act inhaler, Inhale 2 puffs every 6 (six) hours as needed for wheezing or shortness of breath, Disp: 18 g, Rfl: 6    alendronate (Fosamax) 70 mg tablet, Take 1 tablet (70 mg total) by mouth every 7 days, Disp: 12 tablet, Rfl: 3    ALPRAZolam (XANAX) 1 mg tablet, Take 1 tablet (1 mg total) by mouth 2 (two) times a day as needed for anxiety, Disp: 60 tablet, Rfl: 2    anastrozole (ARIMIDEX) 1 mg tablet, Take 1 tablet (1 mg total) by mouth daily, Disp: 90  "tablet, Rfl: 3    budesonide-formoterol (Symbicort) 160-4.5 mcg/act inhaler, Inhale 2 puffs 2 (two) times a day Rinse mouth after use., Disp: 10.2 g, Rfl: 6    furosemide (LASIX) 20 mg tablet, Take 1 tablet (20 mg total) by mouth daily, Disp: , Rfl:     lactulose (CHRONULAC) 10 g/15 mL solution, Take 15 mL (10 g total) by mouth 2 (two) times a day, Disp: 240 mL, Rfl: 0    midodrine (PROAMATINE) 10 MG tablet, Take 1 tablet (10 mg total) by mouth 3 (three) times a day before meals, Disp: 90 tablet, Rfl: 3    pantoprazole (PROTONIX) 20 mg tablet, Take 1 tablet (20 mg total) by mouth daily, Disp: 90 tablet, Rfl: 1    Risankizumab-rzaa (SKYRIZI PEN SC), Inject under the skin, Disp: , Rfl:     spironolactone (ALDACTONE) 25 mg tablet, Take 1 tablet (25 mg total) by mouth daily, Disp: , Rfl:     traZODone (DESYREL) 50 mg tablet, Take 1 tablet (50 mg total) by mouth daily at bedtime, Disp: 30 tablet, Rfl: 5    benzonatate (TESSALON) 200 MG capsule, Take 1 capsule (200 mg total) by mouth 3 (three) times a day as needed for cough (Patient not taking: Reported on 2/4/2025), Disp: 90 capsule, Rfl: 6    Allergies:  Allergies as of 02/21/2025 - Reviewed 02/21/2025   Allergen Reaction Noted    Sulfa antibiotics Shortness Of Breath 01/24/2022    Ace inhibitors Cough and Other (See Comments) 08/29/2014       The following portions of the patient's history were reviewed and updated as appropriate: past family history, past surgical history and problem list.    Laboratory Results:  Lab Results   Component Value Date    SODIUM 132 (L) 01/29/2025    K 3.7 01/29/2025    CL 96 (L) 01/29/2025    CO2 28 01/29/2025    BUN 6 (L) 01/29/2025    CREATININE 0.61 01/29/2025    GLUC 85 01/29/2025    CALCIUM 8.2 (L) 01/29/2025        Lab Results   Component Value Date    CALCIUM 8.2 (L) 01/29/2025    PHOS 3.8 11/06/2024       Portions of the record may have been created with voice recognition software.  Occasional wrong word or \"sound a like\" " substitutions may have occurred due to the inherent limitations of voice recognition software.  Read the chart carefully and recognize, using context, where substitutions have occurred.

## 2025-02-21 NOTE — ASSESSMENT & PLAN NOTE
-on midodrine 10mg TID  -SBP 80s in office  -did not take midodrine this morning. Recommend taking midodrine once home and eating and repeating BP. If BP still low, would consider ER evaluation

## 2025-02-21 NOTE — LETTER
February 21, 2025     Sahra Brown PA-C  [address]  [address]  [facility] [address]    Patient: Jose White   YOB: 1960   Date of Visit: 2/21/2025       Dear Dr. Brown:    Thank you for referring Jose White to me for evaluation. Below are my notes for this consultation.    SBP low in office. She is symptomatic. Did not take her midodrine yet today. S/p para and thoracentesis yesterday. Would consider IV albumin with thoracentesis/paracentesis.     If you have questions, please do not hesitate to call me. I look forward to following your patient along with you.         Sincerely,        Alexandra Tovar DO        CC: No Recipients    Alexandra Tovar DO  2/21/2025 12:29 PM  Incomplete  NEPHROLOGY OUTPATIENT PROGRESS NOTE   Jose White 64 y.o. female MRN: [id number]  DATE: 2/21/2025  Reason for visit:   Chief Complaint   Patient presents with   • Follow-up   • Hyponatremia        Patient Instructions   Hyponatremia  -likely d/t intravascular volume depletion vs cirrhosis  -sNa improved during hospitalization s/p 1.8% saline  -continue 1.2L/day fluid retriction  -continue lasix 20mg daily, spironolactone 25mg daily  -monitor BMP, last sNa 132 as of 1/29/25. Suspect baseline sNa in low 130s  -if sNa downtrending, will pursue further work up  -encourage alcohol cessation  Bilateral leg edema  -in setting of alcoholic cirrhosis with ascites as well as hypoalbuminemia leading to third spacing  -continue fluid restriction and lasix as below  -s/p 1.8L paracentesis 2/20/25, 1.35L thoracentesis  -history of cirrhotic hydrothorax and ascites  Hypotension, unspecified hypotension type  -on midodrine 10mg TID  -SBP 80s in office  -did not take midodrine this morning. Recommend taking midodrine once home and eating and repeating BP. If BP still low, would consider ER evaluation    RTC in 6 months.  Repeat BMP as per GI.  Consider IV albumin with para/thoracentesis.       Assessment &  Plan  Hyponatremia  -likely d/t intravascular volume depletion vs cirrhosis  -sNa improved during hospitalization s/p 1.8% saline  -continue 1.2L/day fluid retriction  -continue lasix 20mg daily, spironolactone 25mg daily  -monitor BMP, last sNa 132 as of 1/29/25. Suspect baseline sNa in low 130s  -if sNa downtrending, will pursue further work up  -encourage alcohol cessation  Bilateral leg edema  -in setting of alcoholic cirrhosis with ascites as well as hypoalbuminemia leading to third spacing  -continue fluid restriction and lasix as below  -s/p 1.8L paracentesis 2/20/25, 1.35L thoracentesis  -history of cirrhotic hydrothorax and ascites  Hypotension, unspecified hypotension type  -on midodrine 10mg TID  -SBP 80s in office  -did not take midodrine this morning. Recommend taking midodrine once home and eating and repeating BP. If BP still low, would consider ER evaluation    RTC in 6 months.  Repeat BMP as per GI.  Consider IV albumin with para/thoracentesis.     SUBJECTIVE / INTERVAL HISTORY:  64 y.o. female presents in hospital follow up of hyponatremia.     Jose White admitted in Nov. 2025 for hyponatremia, sent in from outpatient nephrology office. Improved s/p 1.8% saline. Discharged on fluid restriction and lasix.    Denies NSAID use.  She feels lightheaded. Had a protein shake. Has leg swelling.   Had thoracentesis and paracentesis yesterday.   She is thinking about getting catheter for ascites drainage.    Review of Systems   Constitutional:  Negative for chills and fever.   HENT:  Negative for sore throat and trouble swallowing.    Eyes:  Negative for visual disturbance.   Respiratory:  Positive for shortness of breath (with build up of fluid in lungs). Negative for cough.    Cardiovascular:  Positive for leg swelling. Negative for chest pain.   Gastrointestinal:  Positive for diarrhea (from lactulose). Negative for abdominal pain, constipation, nausea and vomiting.   Endocrine: Negative for  "polyuria.   Genitourinary:  Negative for difficulty urinating, dysuria and hematuria.   Musculoskeletal:  Negative for back pain and neck pain.   Skin:  Negative for rash.   Neurological:  Positive for light-headedness. Negative for dizziness and numbness.   Psychiatric/Behavioral:  The patient is not nervous/anxious.        OBJECTIVE:  BP (!) 86/58 (BP Location: Left arm, Patient Position: Sitting, Cuff Size: Adult)   Pulse 88   Ht 5' 4.5\" (1.638 m)   Wt 62.6 kg (138 lb) Comment: pt reported  SpO2 94%   BMI 23.32 kg/m²  Body mass index is 23.32 kg/m².    Physical exam:  Physical Exam  Vitals and nursing note reviewed.   Constitutional:       General: She is not in acute distress.     Appearance: Normal appearance. She is well-developed. She is not diaphoretic.      Comments: thin   HENT:      Head: Normocephalic and atraumatic.      Nose: Nose normal.      Mouth/Throat:      Mouth: Mucous membranes are moist.      Pharynx: No oropharyngeal exudate.   Eyes:      General: No scleral icterus.        Right eye: No discharge.         Left eye: No discharge.   Neck:      Thyroid: No thyromegaly.   Cardiovascular:      Rate and Rhythm: Normal rate and regular rhythm.      Heart sounds: No murmur heard.  Pulmonary:      Effort: No respiratory distress.      Breath sounds: No wheezing.      Comments: Decreased BS right lung base  Abdominal:      General: Bowel sounds are normal. There is no distension.      Palpations: Abdomen is soft.   Musculoskeletal:         General: Swelling (b/l LE) present. Normal range of motion.      Cervical back: Normal range of motion and neck supple.   Skin:     General: Skin is warm and dry.      Coloration: Skin is jaundiced.      Findings: No rash.   Neurological:      General: No focal deficit present.      Mental Status: She is alert.      Motor: No abnormal muscle tone.      Comments: awake   Psychiatric:         Mood and Affect: Mood normal.         Behavior: Behavior normal. "         Medications:    Current Outpatient Medications:   •  albuterol (2.5 mg/3 mL) 0.083 % nebulizer solution, Take 3 mL (2.5 mg total) by nebulization every 6 (six) hours as needed for wheezing or shortness of breath, Disp: 1080 mL, Rfl: 3  •  albuterol (Proventil HFA) 90 mcg/act inhaler, Inhale 2 puffs every 6 (six) hours as needed for wheezing or shortness of breath, Disp: 18 g, Rfl: 6  •  alendronate (Fosamax) 70 mg tablet, Take 1 tablet (70 mg total) by mouth every 7 days, Disp: 12 tablet, Rfl: 3  •  ALPRAZolam (XANAX) 1 mg tablet, Take 1 tablet (1 mg total) by mouth 2 (two) times a day as needed for anxiety, Disp: 60 tablet, Rfl: 2  •  anastrozole (ARIMIDEX) 1 mg tablet, Take 1 tablet (1 mg total) by mouth daily, Disp: 90 tablet, Rfl: 3  •  budesonide-formoterol (Symbicort) 160-4.5 mcg/act inhaler, Inhale 2 puffs 2 (two) times a day Rinse mouth after use., Disp: 10.2 g, Rfl: 6  •  furosemide (LASIX) 20 mg tablet, Take 1 tablet (20 mg total) by mouth daily, Disp: , Rfl:   •  lactulose (CHRONULAC) 10 g/15 mL solution, Take 15 mL (10 g total) by mouth 2 (two) times a day, Disp: 240 mL, Rfl: 0  •  midodrine (PROAMATINE) 10 MG tablet, Take 1 tablet (10 mg total) by mouth 3 (three) times a day before meals, Disp: 90 tablet, Rfl: 3  •  pantoprazole (PROTONIX) 20 mg tablet, Take 1 tablet (20 mg total) by mouth daily, Disp: 90 tablet, Rfl: 1  •  Risankizumab-rzaa (SKYRIZI PEN SC), Inject under the skin, Disp: , Rfl:   •  spironolactone (ALDACTONE) 25 mg tablet, Take 1 tablet (25 mg total) by mouth daily, Disp: , Rfl:   •  traZODone (DESYREL) 50 mg tablet, Take 1 tablet (50 mg total) by mouth daily at bedtime, Disp: 30 tablet, Rfl: 5  •  benzonatate (TESSALON) 200 MG capsule, Take 1 capsule (200 mg total) by mouth 3 (three) times a day as needed for cough (Patient not taking: Reported on 2/4/2025), Disp: 90 capsule, Rfl: 6    Allergies:  Allergies as of 02/21/2025 - Reviewed 02/21/2025   Allergen Reaction Noted   •  "Sulfa antibiotics Shortness Of Breath 01/24/2022   • Ace inhibitors Cough and Other (See Comments) 08/29/2014       The following portions of the patient's history were reviewed and updated as appropriate: past family history, past surgical history and problem list.    Laboratory Results:  Lab Results   Component Value Date    SODIUM 132 (L) 01/29/2025    K 3.7 01/29/2025    CL 96 (L) 01/29/2025    CO2 28 01/29/2025    BUN 6 (L) 01/29/2025    CREATININE 0.61 01/29/2025    GLUC 85 01/29/2025    CALCIUM 8.2 (L) 01/29/2025        Lab Results   Component Value Date    CALCIUM 8.2 (L) 01/29/2025    PHOS 3.8 11/06/2024       Portions of the record may have been created with voice recognition software.  Occasional wrong word or \"sound a like\" substitutions may have occurred due to the inherent limitations of voice recognition software.  Read the chart carefully and recognize, using context, where substitutions have occurred.    Alexandra Tovar DO  2/21/2025 12:17 PM  Sign when Signing Visit  NEPHROLOGY OUTPATIENT PROGRESS NOTE   Jose White 64 y.o. female MRN: [id number]  DATE: 2/21/2025  Reason for visit:   Chief Complaint   Patient presents with   • Follow-up   • Hyponatremia        There are no Patient Instructions on file for this visit.      Assessment & Plan  Hyponatremia  -likely d/t intravascular volume depletion vs cirrhosis  -sNa improved during hospitalization s/p 1.8% saline  -continue 1.2L/day fluid retriction  -continue lasix 20mg daily, spironolactone 25mg daily  -monitor BMP, last sNa 132 as of 1/29/25. Suspect baseline sNa in low 130s  -if sNa downtrending, will pursue further work up  Bilateral leg edema  -in setting of alcoholic cirrhosis with ascites as well as hypoalbuminemia leading to third spacing  -continue fluid restriction and lasix as below  -s/p 1.8L paracentesis 2/20/25, 1.35L thoracentesis  -history of cirrhotic hydrothorax and ascites  Hypotension, unspecified hypotension type  -on " "midodrine 10mg TID  -SBP 80s in office    RTC in 6 months.  Repeat BMP now.      SUBJECTIVE / INTERVAL HISTORY:  64 y.o. female presents in hospital follow up of hyponatremia.     Jose White admitted in Nov. 2025 for hyponatremia, sent in from outpatient nephrology office. Improved s/p 1.8% saline. Discharged on fluid restriction and lasix.    ?NSAID use    Review of Systems   Constitutional:  Negative for chills and fever.   HENT:  Negative for sore throat and trouble swallowing.    Eyes:  Negative for visual disturbance.   Respiratory:  Negative for cough and shortness of breath.    Cardiovascular:  Negative for chest pain and leg swelling.   Gastrointestinal:  Negative for diarrhea, nausea and vomiting.   Endocrine: Negative for polyuria.   Genitourinary:  Negative for difficulty urinating, dysuria and hematuria.   Musculoskeletal:  Negative for back pain and neck pain.   Skin:  Negative for rash.   Neurological:  Negative for dizziness, light-headedness and numbness.   Hematological:  Negative for adenopathy. Does not bruise/bleed easily.   Psychiatric/Behavioral:  The patient is not nervous/anxious.        OBJECTIVE:  BP (!) 86/58 (BP Location: Left arm, Patient Position: Sitting, Cuff Size: Adult)   Pulse 88   Ht 5' 4.5\" (1.638 m)   Wt 62.6 kg (138 lb) Comment: pt reported  SpO2 94%   BMI 23.32 kg/m²  Body mass index is 23.32 kg/m².    Physical exam:  Physical Exam  Vitals and nursing note reviewed.   Constitutional:       General: She is not in acute distress.     Appearance: She is well-developed. She is not diaphoretic.   HENT:      Head: Normocephalic and atraumatic.      Mouth/Throat:      Pharynx: No oropharyngeal exudate.   Eyes:      General: No scleral icterus.        Right eye: No discharge.         Left eye: No discharge.   Neck:      Thyroid: No thyromegaly.   Cardiovascular:      Rate and Rhythm: Normal rate and regular rhythm.      Heart sounds: No murmur heard.  Pulmonary:      " Effort: Pulmonary effort is normal. No respiratory distress.      Breath sounds: Normal breath sounds. No wheezing.   Abdominal:      General: Bowel sounds are normal. There is no distension.      Palpations: Abdomen is soft.   Musculoskeletal:      Cervical back: Normal range of motion and neck supple.   Skin:     General: Skin is warm and dry.      Findings: No rash.   Neurological:      Mental Status: She is alert.      Motor: No abnormal muscle tone.      Comments: awake   Psychiatric:         Behavior: Behavior normal.         Medications:    Current Outpatient Medications:   •  albuterol (2.5 mg/3 mL) 0.083 % nebulizer solution, Take 3 mL (2.5 mg total) by nebulization every 6 (six) hours as needed for wheezing or shortness of breath, Disp: 1080 mL, Rfl: 3  •  albuterol (Proventil HFA) 90 mcg/act inhaler, Inhale 2 puffs every 6 (six) hours as needed for wheezing or shortness of breath, Disp: 18 g, Rfl: 6  •  alendronate (Fosamax) 70 mg tablet, Take 1 tablet (70 mg total) by mouth every 7 days, Disp: 12 tablet, Rfl: 3  •  ALPRAZolam (XANAX) 1 mg tablet, Take 1 tablet (1 mg total) by mouth 2 (two) times a day as needed for anxiety, Disp: 60 tablet, Rfl: 2  •  anastrozole (ARIMIDEX) 1 mg tablet, Take 1 tablet (1 mg total) by mouth daily, Disp: 90 tablet, Rfl: 3  •  budesonide-formoterol (Symbicort) 160-4.5 mcg/act inhaler, Inhale 2 puffs 2 (two) times a day Rinse mouth after use., Disp: 10.2 g, Rfl: 6  •  furosemide (LASIX) 20 mg tablet, Take 1 tablet (20 mg total) by mouth daily, Disp: , Rfl:   •  lactulose (CHRONULAC) 10 g/15 mL solution, Take 15 mL (10 g total) by mouth 2 (two) times a day, Disp: 240 mL, Rfl: 0  •  midodrine (PROAMATINE) 10 MG tablet, Take 1 tablet (10 mg total) by mouth 3 (three) times a day before meals, Disp: 90 tablet, Rfl: 3  •  pantoprazole (PROTONIX) 20 mg tablet, Take 1 tablet (20 mg total) by mouth daily, Disp: 90 tablet, Rfl: 1  •  Risankizumab-rzaa (SKYRIZI PEN SC), Inject under the  "skin, Disp: , Rfl:   •  spironolactone (ALDACTONE) 25 mg tablet, Take 1 tablet (25 mg total) by mouth daily, Disp: , Rfl:   •  traZODone (DESYREL) 50 mg tablet, Take 1 tablet (50 mg total) by mouth daily at bedtime, Disp: 30 tablet, Rfl: 5  •  benzonatate (TESSALON) 200 MG capsule, Take 1 capsule (200 mg total) by mouth 3 (three) times a day as needed for cough (Patient not taking: Reported on 2/4/2025), Disp: 90 capsule, Rfl: 6    Allergies:  Allergies as of 02/21/2025 - Reviewed 02/21/2025   Allergen Reaction Noted   • Sulfa antibiotics Shortness Of Breath 01/24/2022   • Ace inhibitors Cough and Other (See Comments) 08/29/2014       The following portions of the patient's history were reviewed and updated as appropriate: past family history, past surgical history and problem list.    Laboratory Results:  Lab Results   Component Value Date    SODIUM 132 (L) 01/29/2025    K 3.7 01/29/2025    CL 96 (L) 01/29/2025    CO2 28 01/29/2025    BUN 6 (L) 01/29/2025    CREATININE 0.61 01/29/2025    GLUC 85 01/29/2025    CALCIUM 8.2 (L) 01/29/2025        Lab Results   Component Value Date    CALCIUM 8.2 (L) 01/29/2025    PHOS 3.8 11/06/2024       Portions of the record may have been created with voice recognition software.  Occasional wrong word or \"sound a like\" substitutions may have occurred due to the inherent limitations of voice recognition software.  Read the chart carefully and recognize, using context, where substitutions have occurred.  "

## 2025-02-21 NOTE — ASSESSMENT & PLAN NOTE
-in setting of alcoholic cirrhosis with ascites as well as hypoalbuminemia leading to third spacing  -continue fluid restriction and lasix as below  -s/p 1.8L paracentesis 2/20/25, 1.35L thoracentesis  -history of cirrhotic hydrothorax and ascites

## 2025-02-21 NOTE — PATIENT INSTRUCTIONS
Hyponatremia  -likely d/t intravascular volume depletion vs cirrhosis  -sNa improved during hospitalization s/p 1.8% saline  -continue 1.2L/day fluid retriction  -continue lasix 20mg daily, spironolactone 25mg daily  -monitor BMP, last sNa 132 as of 1/29/25. Suspect baseline sNa in low 130s  -if sNa downtrending, will pursue further work up  -encourage alcohol cessation  Bilateral leg edema  -in setting of alcoholic cirrhosis with ascites as well as hypoalbuminemia leading to third spacing  -continue fluid restriction and lasix as below  -s/p 1.8L paracentesis 2/20/25, 1.35L thoracentesis  -history of cirrhotic hydrothorax and ascites  Hypotension, unspecified hypotension type  -on midodrine 10mg TID  -SBP 80s in office  -did not take midodrine this morning. Recommend taking midodrine once home and eating and repeating BP. If BP still low, would consider ER evaluation    RTC in 6 months.  Repeat BMP as per GI.  Consider IV albumin with para/thoracentesis.

## 2025-02-21 NOTE — ASSESSMENT & PLAN NOTE
-likely d/t intravascular volume depletion vs cirrhosis  -sNa improved during hospitalization s/p 1.8% saline  -continue 1.2L/day fluid retriction  -continue lasix 20mg daily, spironolactone 25mg daily  -monitor BMP, last sNa 132 as of 1/29/25. Suspect baseline sNa in low 130s  -if sNa downtrending, will pursue further work up  -encourage alcohol cessation

## 2025-02-23 LAB
BACTERIA SPEC BFLD CULT: NO GROWTH
GRAM STN SPEC: NORMAL

## 2025-02-24 ENCOUNTER — TELEPHONE (OUTPATIENT)
Dept: PAIN MEDICINE | Facility: CLINIC | Age: 65
End: 2025-02-24

## 2025-02-24 PROCEDURE — 88305 TISSUE EXAM BY PATHOLOGIST: CPT | Performed by: SPECIALIST

## 2025-02-24 PROCEDURE — 88112 CYTOPATH CELL ENHANCE TECH: CPT | Performed by: SPECIALIST

## 2025-02-25 ENCOUNTER — OFFICE VISIT (OUTPATIENT)
Dept: PHYSICAL THERAPY | Facility: CLINIC | Age: 65
End: 2025-02-25
Payer: MEDICARE

## 2025-02-25 ENCOUNTER — RESULTS FOLLOW-UP (OUTPATIENT)
Dept: NEPHROLOGY | Facility: CLINIC | Age: 65
End: 2025-02-25

## 2025-02-25 DIAGNOSIS — M70.61 GREATER TROCHANTERIC BURSITIS OF BOTH HIPS: Primary | ICD-10-CM

## 2025-02-25 DIAGNOSIS — M70.62 GREATER TROCHANTERIC BURSITIS OF BOTH HIPS: Primary | ICD-10-CM

## 2025-02-25 DIAGNOSIS — M47.816 LUMBAR SPONDYLOSIS: ICD-10-CM

## 2025-02-25 LAB
BUN SERPL-MCNC: 6 MG/DL (ref 7–25)
BUN/CREAT SERPL: 10 (CALC) (ref 6–22)
CALCIUM SERPL-MCNC: 8.1 MG/DL (ref 8.6–10.4)
CHLORIDE SERPL-SCNC: 89 MMOL/L (ref 98–110)
CO2 SERPL-SCNC: 29 MMOL/L (ref 20–32)
CREAT SERPL-MCNC: 0.6 MG/DL (ref 0.5–1.05)
GFR/BSA.PRED SERPLBLD CYS-BASED-ARV: 100 ML/MIN/1.73M2
GLUCOSE SERPL-MCNC: 85 MG/DL (ref 65–99)
POTASSIUM SERPL-SCNC: 3.6 MMOL/L (ref 3.5–5.3)
SODIUM SERPL-SCNC: 124 MMOL/L (ref 135–146)

## 2025-02-25 PROCEDURE — 97140 MANUAL THERAPY 1/> REGIONS: CPT | Performed by: PHYSICAL THERAPIST

## 2025-02-25 PROCEDURE — 97110 THERAPEUTIC EXERCISES: CPT | Performed by: PHYSICAL THERAPIST

## 2025-02-25 NOTE — TELEPHONE ENCOUNTER
LM for pt with message below - asked pt to contact office with any questions or concerns         A rapid drop is noted in patient's sNa. She was recently given IVF in the hospital for the same. I recommend ER evaluation for hyponatremia.

## 2025-02-25 NOTE — TELEPHONE ENCOUNTER
----- Message from Alexandra Tovar DO sent at 2/25/2025 11:28 AM EST -----  A rapid drop is noted in patient's sNa. She was recently given IVF in the hospital for the same. I recommend ER evaluation for hyponatremia.

## 2025-02-25 NOTE — PROGRESS NOTES
Daily Note     Today's date: 2025  Patient name: Jose White  : 1960  MRN: [id number]  Referring provider: She Osullivan MD  Dx:   Encounter Diagnosis     ICD-10-CM    1. Greater trochanteric bursitis of both hips  M70.61     M70.62       2. Lumbar spondylosis  M47.816           Start Time: 1445          Subjective: Overall no significant change in her hip symptoms since lv. She continues to have pain in the hip, especially at night.      Objective: See treatment diary below      Assessment: She continues with L hip flexor, adductor and lateral hip tenderness, but the muscle hypertonicity is much less compared to previous sessions. No difference with lumbar ext over EOT. Performed gait training with SPC today. Pt was able to navigate appropriately and demonstrated improved gait speed and stability. Discussed utilizing a cane at home for these reasons as well as reduced fall risk. She verbalizes understanding and agreement. Will plan to re-address this next session and work on gait mechanics.      Plan: work on gait mechanics with SPC     Precautions: balance deficits - potential falls risk  Comorbidities: anxiety, COPD, HTN, h/o lumbar laminectomy         HEP:  Access Code: [email or web address]  URL: [email or web address]  Date: 2025  Prepared by: Maximus Mendoza    Exercises  - Standing Lumbar Extension with Counter  - 5 x daily - 10 reps        POC Expires Reeval for Medicare to be completed Unit LImit Auth Expiration Date PT/OT/STVisit Limit    By visit 10 N/a      Completed on visit                  TREATMENT DIARY    Auth status      RE   approved               Visit # 1 2 3 4 5 6 7 8 9 10   Visits Remaining                          Manuals             Lumbar STM             Hip STM L adductor, hip flexor bilat adductor, hip flexor L adductor, hip flexor, glute med/max L adductor, hip flexor, glute med/max L adductor, hip flexor, glute med/max       "  Hip PROM  bilat  left                      Neuro Re-Ed             Tandem walk             Side step             Tandem balance             Foam walk             RB taps             Yovany step over                          Ther Ex             Bike - cardio 6' resist off 5' resist on 6' resist on 6' resist off 7' resist on        Lumbar ext over table x10    x20        Glute stretch    Supine 10\"x10 ea         Hip add iso             Hip abd iso             SLR             bridge             LTR             LAQ             HSC             Leg press             Step up fwd                                       Ther Activity                                       Gait Training             Clinic laps     No AD x1 lap  SPC x1 lap                     Modalities                                                   "

## 2025-02-27 ENCOUNTER — HOSPITAL ENCOUNTER (OUTPATIENT)
Dept: INTERVENTIONAL RADIOLOGY/VASCULAR | Facility: HOSPITAL | Age: 65
Discharge: HOME | End: 2025-02-27
Attending: INTERNAL MEDICINE
Payer: MEDICARE

## 2025-02-27 ENCOUNTER — HOSPITAL ENCOUNTER (OUTPATIENT)
Dept: INFUSION CENTER | Facility: HOSPITAL | Age: 65
Discharge: HOME | End: 2025-02-27
Attending: INTERNAL MEDICINE

## 2025-02-27 ENCOUNTER — TELEPHONE (OUTPATIENT)
Age: 65
End: 2025-02-27

## 2025-02-27 VITALS
HEART RATE: 82 BPM | SYSTOLIC BLOOD PRESSURE: 114 MMHG | OXYGEN SATURATION: 96 % | RESPIRATION RATE: 18 BRPM | DIASTOLIC BLOOD PRESSURE: 57 MMHG

## 2025-02-27 DIAGNOSIS — K70.31 ALCOHOLIC CIRRHOSIS OF LIVER WITH ASCITES (HCC): ICD-10-CM

## 2025-02-27 DIAGNOSIS — J91.8 PLEURAL EFFUSION ASSOCIATED WITH HEPATIC DISORDER: ICD-10-CM

## 2025-02-27 DIAGNOSIS — K76.9 PLEURAL EFFUSION ASSOCIATED WITH HEPATIC DISORDER: ICD-10-CM

## 2025-02-27 DIAGNOSIS — K70.31 ALCOHOLIC CIRRHOSIS OF LIVER WITH ASCITES (HCC): Primary | ICD-10-CM

## 2025-02-27 PROCEDURE — 32555 ASPIRATE PLEURA W/ IMAGING: CPT

## 2025-02-27 PROCEDURE — 49083 ABD PARACENTESIS W/IMAGING: CPT

## 2025-02-27 NOTE — DISCHARGE INSTRUCTIONS
Thoracentesis   WHAT YOU NEED TO KNOW:   A thoracentesis is a procedure to remove extra fluid or air from between your lungs and your inner chest wall. Air or fluid buildup may make it hard for you to breathe. A thoracentesis allows your lungs to expand fully so you can breathe more easily.  DISCHARGE INSTRUCTIONS:   Medicines:   Pain medicine:  You may be given a prescription medicine to decrease pain. Do not wait until the pain is severe before you take your medicine.    Antibiotics:  This medicine helps fight or prevent an infection.    Take your medicine as directed.  Call your healthcare provider if you think your medicine is not helping or if you have side effects. Tell him if you are allergic to any medicine. Keep a list of the medicines, vitamins, and herbs you take. Include the amounts, and when and why you take them. Bring the list or the pill bottles to follow-up visits. Carry your medicine list with you in case of an emergency.  Follow up with your healthcare provider as directed:  Write down your questions so you remember to ask them during your visits.   Rest:  Rest when you feel it is needed. Slowly start to do more each day. Return to your daily activities as directed.   Do not smoke:  If you smoke, it is never too late to quit. Ask for information about how to stop smoking if you need help.  Contact your healthcare provider if:   You have a fever.    Your puncture site is red, warm, swollen, or draining pus.    You have questions or concerns about your procedure, medicine, or care.    If you have any questions regarding, call the IR department @ [phone].     Seek care immediately or call 911 if:   Blood soaks through your bandage.    There is blood in your spit.    Abdominal Paracentesis     WHAT YOU NEED TO KNOW:   Abdominal paracentesis is a procedure to remove abnormal fluid buildup in your abdomen. Fluid builds up because of liver problems, such as swelling and scarring. Heart failure,  kidney disease, a mass, or problems with your pancreas may also cause fluid buildup.     DISCHARGE INSTRUCTIONS:     Follow up with your healthcare provider as directed: Write down your questions so you remember to ask them during your visits.     Wound care: Remove dressing after 24 hours. Leave glue in place.    Return to your normal activities    Contact Interventional Radiology at [phone] ([facility] PATIENTS: Contact Interventional Radiology at [phone]) ([facility]ARGARITA PATIENTS: Contact Interventional Radiology at [phone]) if:  You have a fever and your wound is red and swollen.   You have yellow, green, or bad-smelling discharge coming from your wound.   You have pain or swelling in your abdomen.   You have an upset stomach or you vomit.   You have sudden, sharp pain in your abdomen.   You urinate very little or not at all.   You feel confused and more tired than usual.   Your arm or leg feels warm, tender, and painful. It may look swollen and red.   You suddenly feel lightheaded and have trouble breathing.

## 2025-02-27 NOTE — TELEPHONE ENCOUNTER
Spoke with pt's  flori. All questions and concerns addressed. Pt will have labs drawn few days prior to 3/13/25 appt. Lab orders placed. Lab orders to be mailed to patient to take to [facility] to help ensure all requested labs are drawn.

## 2025-02-27 NOTE — TELEPHONE ENCOUNTER
Patients GI provider:  Dr. Vamsi Herrera    Number to return call: ([phone]    Reason for call: Pts  contacted office, on commmunication consent.  questioning if labwork is needed prior to appt 3/13. Patient uses [facility] lab facility. Please contact patient to further discuss.

## 2025-02-27 NOTE — SEDATION DOCUMENTATION
Therapeutic paracentesis. Removed 2800ml clear yellow fluid, RLQ. Patient tolerated the procedure well. Band aid was put on the site. Patient does not want AVS.

## 2025-02-28 ENCOUNTER — APPOINTMENT (OUTPATIENT)
Dept: PHYSICAL THERAPY | Facility: CLINIC | Age: 65
End: 2025-02-28
Payer: MEDICARE

## 2025-03-04 ENCOUNTER — OFFICE VISIT (OUTPATIENT)
Dept: PHYSICAL THERAPY | Facility: CLINIC | Age: 65
End: 2025-03-04
Payer: MEDICARE

## 2025-03-04 DIAGNOSIS — K70.31 ALCOHOLIC CIRRHOSIS OF LIVER WITH ASCITES (HCC): Primary | ICD-10-CM

## 2025-03-04 DIAGNOSIS — M70.61 GREATER TROCHANTERIC BURSITIS OF BOTH HIPS: Primary | ICD-10-CM

## 2025-03-04 DIAGNOSIS — M47.816 LUMBAR SPONDYLOSIS: ICD-10-CM

## 2025-03-04 DIAGNOSIS — M70.62 GREATER TROCHANTERIC BURSITIS OF BOTH HIPS: Primary | ICD-10-CM

## 2025-03-04 PROCEDURE — 97110 THERAPEUTIC EXERCISES: CPT | Performed by: PHYSICAL THERAPIST

## 2025-03-04 PROCEDURE — 97140 MANUAL THERAPY 1/> REGIONS: CPT | Performed by: PHYSICAL THERAPIST

## 2025-03-04 RX ORDER — ALBUMIN (HUMAN) 12.5 G/50ML
75 SOLUTION INTRAVENOUS ONCE AS NEEDED
OUTPATIENT
Start: 2025-03-06

## 2025-03-04 RX ORDER — ALBUMIN (HUMAN) 12.5 G/50ML
100 SOLUTION INTRAVENOUS ONCE AS NEEDED
OUTPATIENT
Start: 2025-03-06

## 2025-03-04 RX ORDER — SODIUM CHLORIDE 9 MG/ML
20 INJECTION, SOLUTION INTRAVENOUS ONCE
OUTPATIENT
Start: 2025-03-06

## 2025-03-04 RX ORDER — ALBUMIN (HUMAN) 12.5 G/50ML
50 SOLUTION INTRAVENOUS ONCE AS NEEDED
OUTPATIENT
Start: 2025-03-06

## 2025-03-04 NOTE — PROGRESS NOTES
PT Re-Evaluation     Today's date: 3/4/2025  Patient name: Jose White  : 1960  MRN: [id number]  Referring provider: She Osullivan MD  Dx:   Encounter Diagnosis     ICD-10-CM    1. Greater trochanteric bursitis of both hips  M70.61     M70.62       2. Lumbar spondylosis  M47.816                      Assessment  Impairments: abnormal muscle firing, abnormal or restricted ROM, activity intolerance, impaired balance, impaired physical strength, lacks appropriate home exercise program, pain with function and poor body mechanics    Assessment details: Jose White is a pleasant 64 y.o. female who presents with bilateral lateral hip pain of 6 months, gradual onset.  she has limited lumbar AROM all planes, bilateral hip weakness, balance deficits and high irritability with hip testing into HEYDI and FADIR resulting in pain with sleeping and difficulty with ADLs.  No further referral appears necessary at this time based upon examination results.  I expect she will improve in 6-8 weeks with physical therapy and home program.        RE: 3/4/25  Jose White has attended physical therapy for 7 visits. In this time, they have made MILD improvements in symptoms and function, as noted by subjective report, improved balance, ROM, strength, flexibility and activity tolerance. They have made MILD positive goal progress. They do continue to have impairments in pain, ROM, strength, balance, flexibility and activity tolerance. Recommend continued skilled PT in order to maximize function.      Barriers to therapy: n/a  Understanding of Dx/Px/POC: good     Prognosis: fair    Goals  ST.  Independent with HEP in 2 weeks - met  2. Decrease pain by 50% in 3 wks - progressing      LT. Achieve FOTO score expected in 6 weeks - NT  2.  Able to sleep through the night without waking in pain in 6 weeks - progressing  3.  Strength = 4+/5 bilat hip flex in 6 weeks - progressing      Plan  Patient would benefit  "from: skilled physical therapy  Planned modality interventions: cryotherapy, electrical stimulation/Russian stimulation and thermotherapy: hydrocollator packs    Planned therapy interventions: abdominal trunk stabilization, manual therapy, neuromuscular re-education, therapeutic activities, therapeutic exercise, body mechanics training and home exercise program    Frequency: 2x week  Duration in weeks: 6  Plan of Care beginning date: 3/4/2025  Plan of Care expiration date: 4/18/2025  Treatment plan discussed with: patient  Plan details: RE in 6 weeks        Subjective Evaluation    History of Present Illness  Mechanism of injury: Pt reports to PT with bilateral hip pain. Onset 6 months ago. She noticed she began having pain in the sides of her R hip when she'd sleep on her side at night, and then progressed to also having it on her L. Seen by Dr Osullivan in orthopedics on 1/15/25 and received bilateral trochanteric bursa CSI without any change in her symptoms. Overall her symptoms are no different since onset. She feels the most pain at night or if she is sitting on the recliner for a while and when she gets up, she will feel a quick pain.    Recent pelvic XR shows \"Mild to moderate degenerative arthritis both hips.\" Lumbar XR shows \"Slight thoracolumbar curvature. Slight retrolisthesis of L3 on L4. Multilevel degenerative facet hypertrophy in the lumbar spine with disc space narrowing at the lumbosacral interspace. Age-indeterminate slight anterior wedging at T12 and L1.    Pain is located bilateral lateral hips. Described as a deep burning. Rated 0/10 currently, 10/10 at night. L worse than R. Agg with pressure onto the lateral hip, sidelying (3-4 hrs), getting up from a chair after prolonged sitting. Eased with lying on her back.  Denies N/T, recent falls. Balance is unsteady and she will get her husbands supervision when getting out of the car or getting out of the shower.    She lives at home with her  in a " single story home with a finished basement. She is able to slowly navigate stairs one step at a time with railing assist. She is independent with ADLs and driving. In her free time she enjoys going to the [employer], getting her nails done, shopping and watching tv.        RE: 3/4/25  Pt reports improvement in the burning pain of the L hip since starting therapy and getting her hip injection through pain mgmt. However, she continues to have moderate to severe L hip pain that is worse at night and gets aggravated with increased activity or increased bending. Pt also notes periods of increased instability with walking and she notes a fear of falling yet she still does not want to walk with a cane for fear of looking old.  Patient Goals  Patient goals for therapy: decreased pain, improved balance, increased motion, return to sport/leisure activities, independence with ADLs/IADLs and increased strength          Objective     Palpation   Left   Tenderness of the gluteus medius, proximal biceps femoris, proximal semimembranosus and proximal semitendinosus.     Right   Tenderness of the gluteus medius, proximal biceps femoris, proximal semimembranosus and proximal semitendinosus.     Tenderness     Left Hip   Tenderness in the greater trochanter.     Right Hip   Tenderness in the greater trochanter.     Active Range of Motion     Lumbar   Flexion:  Restriction level: moderate  Extension:  Restriction level: moderate    Passive Range of Motion   Left Hip   Flexion: WFL and with pain  External rotation (90/90): WFL and with pain  Internal rotation (90/90): WFL and with pain    Right Hip   Flexion: WFL and with pain  External rotation (90/90): WFL and with pain  Internal rotation (90/90): WFL and with pain  Mechanical Assessment    Cervical      Thoracic      Lumbar    Standing extension: repeated movements  Pain location: no change    Strength/Myotome Testing     Left Hip   Planes of Motion   Flexion: 4  Abduction:  4  Adduction: 4  External rotation: 4+  Internal rotation: 4+    Right Hip   Planes of Motion   Flexion: 4  Abduction: 4  Adduction: 4  External rotation: 4+  Internal rotation: 4+    Tests     Left Hip   Positive HEYDI and FADIR.   Negative scour.   SLR: Positive.     Right Hip   Positive HEYDI and FADIR.   Negative scour.   SLR: Positive.     Functional Assessment        Comments  Gait:  supervision, decreased gait speed and unsteadiness while walking, some tremor; SPC with PT SBA with pt slightly increased gait speed and decreased unsteadiness             Precautions: balance deficits - potential falls risk  Comorbidities: anxiety, COPD, HTN, h/o lumbar laminectomy 1999        HEP:  Access Code: [email or web address]  URL: [email or web address]  Date: 03/04/2025  Prepared by: Maximus Mendoza    Exercises  - Supine Single Knee to Chest Stretch  - 10 reps - 5 sec hold  - Seated 3 Way Exercise Ball Roll Out Stretch  - 10 reps - 5 sec hold      POC Expires Reeval for Medicare to be completed Unit LImit Auth Expiration Date PT/OT/STVisit Limit   4/18 By visit 10 N/a      Completed on visit                  TREATMENT DIARY    Auth status 1/21 1/24 1/31 2/18 2/25 3/4    RE   approved               Visit # 1 2 3 4 5 6 7 8 9 10   Visits Remaining                          Manuals             RE      YANICK       FOTO             Lumbar STM      YANICK seated over EOT       Hip STM L adductor, hip flexor bilat adductor, hip flexor L adductor, hip flexor, glute med/max L adductor, hip flexor, glute med/max L adductor, hip flexor, glute med/max        Hip PROM  bilat  left                      Neuro Re-Ed             Tandem walk             Side step             Tandem balance             Foam walk             RB taps             Yovany step over                          Ther Ex             Bike - cardio 6' resist off 5' resist on 6' resist on 6' resist off 7' resist on 6' resist off       Lumbar ext over table x10    x20 X20  "no change       Glute stretch    Supine 10\"x10 ea  Supine 10\"x10 ea       Hip add iso             Hip abd iso             SLR             bridge             LTR             LAQ             HSC             Leg press             Step up fwd                                       Ther Activity                                       Gait Training             Clinic laps     No AD x1 lap  SPC x1 lap                     Modalities                                              "

## 2025-03-06 ENCOUNTER — HOSPITAL ENCOUNTER (OUTPATIENT)
Dept: INFUSION CENTER | Facility: HOSPITAL | Age: 65
Discharge: HOME | End: 2025-03-06
Attending: INTERNAL MEDICINE

## 2025-03-06 ENCOUNTER — HOSPITAL ENCOUNTER (OUTPATIENT)
Dept: INTERVENTIONAL RADIOLOGY/VASCULAR | Facility: HOSPITAL | Age: 65
Discharge: HOME | End: 2025-03-06
Attending: INTERNAL MEDICINE
Payer: MEDICARE

## 2025-03-06 VITALS
OXYGEN SATURATION: 97 % | RESPIRATION RATE: 19 BRPM | SYSTOLIC BLOOD PRESSURE: 97 MMHG | DIASTOLIC BLOOD PRESSURE: 56 MMHG | HEART RATE: 74 BPM

## 2025-03-06 DIAGNOSIS — K76.9 PLEURAL EFFUSION ASSOCIATED WITH HEPATIC DISORDER: ICD-10-CM

## 2025-03-06 DIAGNOSIS — K70.31 ALCOHOLIC CIRRHOSIS OF LIVER WITH ASCITES (HCC): ICD-10-CM

## 2025-03-06 DIAGNOSIS — J91.8 PLEURAL EFFUSION ASSOCIATED WITH HEPATIC DISORDER: ICD-10-CM

## 2025-03-06 PROCEDURE — 49083 ABD PARACENTESIS W/IMAGING: CPT

## 2025-03-06 PROCEDURE — 32555 ASPIRATE PLEURA W/ IMAGING: CPT

## 2025-03-06 RX ORDER — LIDOCAINE HYDROCHLORIDE 10 MG/ML
INJECTION, SOLUTION EPIDURAL; INFILTRATION; INTRACAUDAL; PERINEURAL AS NEEDED
Status: COMPLETED | OUTPATIENT
Start: 2025-03-06 | End: 2025-03-06

## 2025-03-06 RX ADMIN — LIDOCAINE HYDROCHLORIDE 7 ML: 10 INJECTION, SOLUTION EPIDURAL; INFILTRATION; INTRACAUDAL; PERINEURAL at 10:45

## 2025-03-06 RX ADMIN — LIDOCAINE HYDROCHLORIDE 8 ML: 10 INJECTION, SOLUTION EPIDURAL; INFILTRATION; INTRACAUDAL; PERINEURAL at 10:35

## 2025-03-06 NOTE — DISCHARGE INSTRUCTIONS
Thoracentesis   WHAT YOU NEED TO KNOW:   A thoracentesis is a procedure to remove extra fluid or air from between your lungs and your inner chest wall. Air or fluid buildup may make it hard for you to breathe. A thoracentesis allows your lungs to expand fully so you can breathe more easily.  DISCHARGE INSTRUCTIONS:   Medicines:   Pain medicine:  You may be given a prescription medicine to decrease pain. Do not wait until the pain is severe before you take your medicine.    Antibiotics:  This medicine helps fight or prevent an infection.    Take your medicine as directed.  Call your healthcare provider if you think your medicine is not helping or if you have side effects. Tell him if you are allergic to any medicine. Keep a list of the medicines, vitamins, and herbs you take. Include the amounts, and when and why you take them. Bring the list or the pill bottles to follow-up visits. Carry your medicine list with you in case of an emergency.  Follow up with your healthcare provider as directed:  Write down your questions so you remember to ask them during your visits.   Rest:  Rest when you feel it is needed. Slowly start to do more each day. Return to your daily activities as directed.   Do not smoke:  If you smoke, it is never too late to quit. Ask for information about how to stop smoking if you need help.  Contact your healthcare provider if:   You have a fever.    Your puncture site is red, warm, swollen, or draining pus.    You have questions or concerns about your procedure, medicine, or care.    If you have any questions regarding, call the IR department @ [phone].     Seek care immediately or call 911 if:   Blood soaks through your bandage.    There is blood in your spit.

## 2025-03-06 NOTE — SEDATION DOCUMENTATION
Weekly right thoracentesis with removal 600ml clear yellow fluid. Paracentesis 2150ml clear yellow removed. Band aid to site. Patient tolerated well. AVS reviewed and ambulated home in her usual state of health.

## 2025-03-07 ENCOUNTER — OFFICE VISIT (OUTPATIENT)
Dept: PAIN MEDICINE | Facility: CLINIC | Age: 65
End: 2025-03-07
Payer: MEDICARE

## 2025-03-07 VITALS
WEIGHT: 138 LBS | HEART RATE: 88 BPM | OXYGEN SATURATION: 92 % | HEIGHT: 65 IN | BODY MASS INDEX: 22.99 KG/M2 | TEMPERATURE: 97.8 F

## 2025-03-07 DIAGNOSIS — M47.816 LUMBAR SPONDYLOSIS: ICD-10-CM

## 2025-03-07 DIAGNOSIS — M16.12 PRIMARY OSTEOARTHRITIS OF LEFT HIP: ICD-10-CM

## 2025-03-07 DIAGNOSIS — M54.16 LUMBAR RADICULOPATHY: Primary | ICD-10-CM

## 2025-03-07 DIAGNOSIS — M46.1 SACROILIITIS (HCC): ICD-10-CM

## 2025-03-07 PROCEDURE — G2211 COMPLEX E/M VISIT ADD ON: HCPCS | Performed by: PHYSICIAN ASSISTANT

## 2025-03-07 PROCEDURE — 99214 OFFICE O/P EST MOD 30 MIN: CPT | Performed by: PHYSICIAN ASSISTANT

## 2025-03-07 NOTE — PROGRESS NOTES
Assessment:  1. Lumbar radiculopathy    2. Lumbar spondylosis    3. Sacroiliitis (HCC)    4. Primary osteoarthritis of left hip        Plan:  While the patient was in the office today, I did have a thorough conversation regarding their chronic pain syndrome, medication management, and treatment plan options.    After discussing options, I have ordered the MRI of the lumbar spine for further evaluation of the left lower extremity pain.  Unfortunately, epidural steroid injections would be contraindicated due to abnormal labs secondary to cirrhosis of the liver.    Patient was made aware that we can repeat the left hip joint injection at the 3-month sugey if indicated.    The patient will follow-up after the MRI.  The patient was advised to contact the office should their symptoms worsen in the interim. The patient was agreeable and verbalized an understanding.        History of Present Illness:    The patient is a 64 y.o. female last seen on 2/10/2025 who presents for a follow up office visit in regards to left hip pain secondary to hip osteoarthritis and left radicular low back pain secondary to lumbar spondylosis/degenerative disc disease.  The patient currently reports persistent pain in the left low back, left hip and left lateral thigh that she rates an 8 out of 10 and describes as an occasional burning, shooting pain.  Pain is made worse with standing and walking but is also made worse with laying on her left side.  On 2/10/2025 she underwent a left intra-articular hip injection with 80% relief for 2 weeks followed by return of pain.  X-ray lumbar spine reveals multilevel degenerative changes.  X-ray of the left hip reveals moderate osteoarthritis.  She has been participating physical therapy with no improvement.    I have personally reviewed and/or updated the patient's past medical history, past surgical history, family history, social history, current medications, allergies, and vital signs today.     Review  of Systems:    Review of Systems   Respiratory:  Negative for shortness of breath.    Cardiovascular:  Negative for chest pain.   Gastrointestinal:  Negative for constipation, diarrhea, nausea and vomiting.   Musculoskeletal:  Negative for arthralgias, gait problem, joint swelling and myalgias.   Skin:  Negative for rash.   Neurological:  Negative for dizziness, seizures and weakness.   All other systems reviewed and are negative.        Past Medical History:   Diagnosis Date   • Alcoholic fatty liver    • Anxiety    • Asthma    • COPD (chronic obstructive pulmonary disease) (HCC)    • Elevated liver function tests    • GERD (gastroesophageal reflux disease)    • GERD without esophagitis    • Hyperlipidemia    • Hypertension    • IBS (irritable bowel syndrome)    • Insomnia    • Insomnia    • Liver disease    • Nervousness    • Nicotine dependence    • Other specified urinary incontinence    • RLS (restless legs syndrome)    • Wears glasses        Past Surgical History:   Procedure Laterality Date   • 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   • BREAST EXCISIONAL BIOPSY Right 11/01/2004    benign   • CATARACT EXTRACTION, BILATERAL  08/01/2018   • COLONOSCOPY N/A 05/08/2019    Procedure: COLONOSCOPY;  Surgeon: Jacobo Leonardo MD;  Location: [facility];  Service: Gastroenterology   • EGD AND COLONOSCOPY N/A 03/19/2018    Procedure: EGD AND COLONOSCOPY;  Surgeon: Jacobo Leonardo MD;  Location: [facility];  Service: Gastroenterology   • ESOPHAGOGASTRODUODENOSCOPY N/A 05/08/2019    Procedure: ESOPHAGOGASTRODUODENOSCOPY (EGD);  Surgeon: Jacobo Leonardo MD;  Location: [facility];  Service: Gastroenterology   • IR PARACENTESIS  11/27/2020   • IR PARACENTESIS  12/08/2020   • IR PARACENTESIS  10/28/2022   • IR PARACENTESIS  11/07/2022   • IR PARACENTESIS  11/30/2022   • IR PARACENTESIS  12/30/2022   • IR PARACENTESIS  01/16/2023   • IR PARACENTESIS  08/06/2024   • IR PARACENTESIS  09/18/2024   • IR PARACENTESIS   10/03/2024   • IR PARACENTESIS  10/10/2024   • IR PARACENTESIS  10/17/2024   • IR PARACENTESIS  10/24/2024   • IR PARACENTESIS  11/01/2024   • IR PARACENTESIS  11/07/2024   • IR PARACENTESIS  11/14/2024   • IR PARACENTESIS  11/21/2024   • IR PARACENTESIS  11/29/2024   • IR PARACENTESIS  12/05/2024   • IR PARACENTESIS  12/12/2024   • IR PARACENTESIS  12/19/2024   • IR PARACENTESIS  12/26/2024   • IR PARACENTESIS  01/06/2025   • IR PARACENTESIS  01/09/2025   • IR PARACENTESIS  01/16/2025   • IR PARACENTESIS  1/23/2025   • IR PARACENTESIS  1/30/2025   • IR PARACENTESIS  2/5/2025   • IR PARACENTESIS  2/11/2025   • IR PARACENTESIS  2/20/2025   • IR PARACENTESIS  2/27/2025   • IR PARACENTESIS  3/6/2025   • IR THORACENTESIS  01/16/2023   • IR THORACENTESIS  03/28/2023   • IR THORACENTESIS  07/25/2024   • IR THORACENTESIS  08/06/2024   • IR THORACENTESIS  08/14/2024   • IR THORACENTESIS  08/21/2024   • IR THORACENTESIS  08/28/2024   • IR THORACENTESIS  09/04/2024   • IR THORACENTESIS  09/11/2024   • IR THORACENTESIS  09/18/2024   • IR THORACENTESIS  09/25/2024   • IR THORACENTESIS  10/03/2024   • IR THORACENTESIS  10/10/2024   • IR THORACENTESIS  10/17/2024   • IR THORACENTESIS  10/24/2024   • IR THORACENTESIS  11/01/2024   • IR THORACENTESIS  11/07/2024   • IR THORACENTESIS  11/14/2024   • IR THORACENTESIS  11/21/2024   • IR THORACENTESIS  11/29/2024   • IR THORACENTESIS  12/05/2024   • IR THORACENTESIS  12/12/2024   • IR THORACENTESIS  12/19/2024   • IR THORACENTESIS  12/26/2024   • IR THORACENTESIS  01/03/2025   • IR THORACENTESIS  01/09/2025   • IR THORACENTESIS  01/16/2025   • IR THORACENTESIS  1/23/2025   • IR THORACENTESIS  1/30/2025   • IR THORACENTESIS  2/5/2025   • IR THORACENTESIS  2/11/2025   • IR THORACENTESIS  2/20/2025   • IR THORACENTESIS  2/27/2025   • IR THORACENTESIS  3/6/2025   • KNEE SURGERY Left     ACL repair. Date: early 2000s   • LUMBAR LAMINECTOMY  04/1999   • LYMPH NODE BIOPSY     • MAMMO  STEREOTACTIC BREAST BIOPSY RIGHT (ALL INC) Right 05/21/2021   • MAMMO STEREOTACTIC BREAST BIOPSY RIGHT (ALL INC) EACH ADD Right 05/21/2021   • TOTAL ABDOMINAL HYSTERECTOMY  02/05/2008   • TUBAL LIGATION  1989   • UPPER GASTROINTESTINAL ENDOSCOPY         Family History   Problem Relation Age of Onset   • Breast cancer Mother 32   • No Known Problems Father    • No Known Problems Sister    • No Known Problems Brother    • No Known Problems Maternal Grandmother    • No Known Problems Maternal Grandfather    • No Known Problems Paternal Grandmother    • No Known Problems Paternal Grandfather    • No Known Problems Maternal Aunt    • No Known Problems Maternal Aunt    • No Known Problems Paternal Aunt    • No Known Problems Son    • Substance Abuse Neg Hx    • Mental illness Neg Hx    • Colon cancer Neg Hx    • Colon polyps Neg Hx        Social History     Occupational History   • Not on file   Tobacco Use   • Smoking status: Every Day     Current packs/day: 1.00     Average packs/day: 1 pack/day for 47.2 years (47.2 ttl pk-yrs)     Types: Cigarettes     Start date: 1/1/1978   • Smokeless tobacco: Never   Vaping Use   • Vaping status: Never Used   Substance and Sexual Activity   • Alcohol use: Yes     Alcohol/week: 6.0 standard drinks of alcohol     Types: 6 Cans of beer per week     Comment: 7-10 drinks/wk   • Drug use: No   • Sexual activity: Yes     Partners: Male         Current Outpatient Medications:   •  albuterol (2.5 mg/3 mL) 0.083 % nebulizer solution, Take 3 mL (2.5 mg total) by nebulization every 6 (six) hours as needed for wheezing or shortness of breath, Disp: 1080 mL, Rfl: 3  •  albuterol (Proventil HFA) 90 mcg/act inhaler, Inhale 2 puffs every 6 (six) hours as needed for wheezing or shortness of breath, Disp: 18 g, Rfl: 6  •  alendronate (Fosamax) 70 mg tablet, Take 1 tablet (70 mg total) by mouth every 7 days, Disp: 12 tablet, Rfl: 3  •  ALPRAZolam (XANAX) 1 mg tablet, Take 1 tablet (1 mg total) by mouth  "2 (two) times a day as needed for anxiety, Disp: 60 tablet, Rfl: 2  •  anastrozole (ARIMIDEX) 1 mg tablet, Take 1 tablet (1 mg total) by mouth daily, Disp: 90 tablet, Rfl: 3  •  budesonide-formoterol (Symbicort) 160-4.5 mcg/act inhaler, Inhale 2 puffs 2 (two) times a day Rinse mouth after use., Disp: 10.2 g, Rfl: 6  •  furosemide (LASIX) 20 mg tablet, Take 1 tablet (20 mg total) by mouth daily, Disp: , Rfl:   •  lactulose (CHRONULAC) 10 g/15 mL solution, Take 15 mL (10 g total) by mouth 2 (two) times a day, Disp: 240 mL, Rfl: 0  •  midodrine (PROAMATINE) 10 MG tablet, Take 1 tablet (10 mg total) by mouth 3 (three) times a day before meals, Disp: 90 tablet, Rfl: 3  •  pantoprazole (PROTONIX) 20 mg tablet, Take 1 tablet (20 mg total) by mouth daily, Disp: 90 tablet, Rfl: 1  •  Risankizumab-rzaa (SKYRIZI PEN SC), Inject under the skin, Disp: , Rfl:   •  spironolactone (ALDACTONE) 25 mg tablet, Take 1 tablet (25 mg total) by mouth daily, Disp: , Rfl:   •  traZODone (DESYREL) 50 mg tablet, Take 1 tablet (50 mg total) by mouth daily at bedtime, Disp: 30 tablet, Rfl: 5    Allergies   Allergen Reactions   • Sulfa Antibiotics Shortness Of Breath   • Ace Inhibitors Cough and Other (See Comments)     coughing       Physical Exam:    Pulse 88   Temp 97.8 °F (36.6 °C)   Ht 5' 4.5\" (1.638 m)   Wt 62.6 kg (138 lb) Comment: per pt  SpO2 92%   BMI 23.32 kg/m²     Constitutional:normal, well developed, well nourished, alert, in no distress and non-toxic and no overt pain behavior.  HEENT:grossly intact  Neck:supple, symmetric, trachea midline and no masses   Pulmonary:even and unlabored  Cardiovascular:No edema or pitting edema present  Skin:Normal without rashes or lesions and well hydrated  Psychiatric:Mood and affect appropriate  Neurologic:Cranial Nerves II-XII grossly intact  Musculoskeletal: Gait is significantly antalgic, tender left SI joint, tender left lateral hip      Imaging  MRI lumbar spine wo contrast    (Results " Pending)         Orders Placed This Encounter   Procedures   • MRI lumbar spine wo contrast

## 2025-03-11 ENCOUNTER — RESULTS FOLLOW-UP (OUTPATIENT)
Age: 65
End: 2025-03-11

## 2025-03-11 ENCOUNTER — OFFICE VISIT (OUTPATIENT)
Dept: PHYSICAL THERAPY | Facility: CLINIC | Age: 65
End: 2025-03-11
Payer: MEDICARE

## 2025-03-11 DIAGNOSIS — M70.61 GREATER TROCHANTERIC BURSITIS OF BOTH HIPS: Primary | ICD-10-CM

## 2025-03-11 DIAGNOSIS — M70.62 GREATER TROCHANTERIC BURSITIS OF BOTH HIPS: Primary | ICD-10-CM

## 2025-03-11 DIAGNOSIS — M47.816 LUMBAR SPONDYLOSIS: ICD-10-CM

## 2025-03-11 LAB
ALBUMIN SERPL-MCNC: 2.3 G/DL (ref 3.6–5.1)
ALBUMIN/GLOB SERPL: 0.5 (CALC) (ref 1–2.5)
ALP SERPL-CCNC: 80 U/L (ref 37–153)
ALT SERPL-CCNC: 15 U/L (ref 6–29)
AST SERPL-CCNC: 34 U/L (ref 10–35)
BASOPHILS # BLD AUTO: 59 CELLS/UL (ref 0–200)
BASOPHILS NFR BLD AUTO: 1.4 %
BILIRUB SERPL-MCNC: 0.9 MG/DL (ref 0.2–1.2)
BUN SERPL-MCNC: 6 MG/DL (ref 7–25)
BUN/CREAT SERPL: 10 (CALC) (ref 6–22)
CALCIUM SERPL-MCNC: 8.2 MG/DL (ref 8.6–10.4)
CHLORIDE SERPL-SCNC: 91 MMOL/L (ref 98–110)
CO2 SERPL-SCNC: 27 MMOL/L (ref 20–32)
CREAT SERPL-MCNC: 0.6 MG/DL (ref 0.5–1.05)
EOSINOPHIL # BLD AUTO: 130 CELLS/UL (ref 15–500)
EOSINOPHIL NFR BLD AUTO: 3.1 %
ERYTHROCYTE [DISTWIDTH] IN BLOOD BY AUTOMATED COUNT: 12.6 % (ref 11–15)
GFR/BSA.PRED SERPLBLD CYS-BASED-ARV: 100 ML/MIN/1.73M2
GLOBULIN SER CALC-MCNC: 4.8 G/DL (CALC) (ref 1.9–3.7)
GLUCOSE SERPL-MCNC: 95 MG/DL (ref 65–99)
HCT VFR BLD AUTO: 36.7 % (ref 35–45)
HGB BLD-MCNC: 12.8 G/DL (ref 11.7–15.5)
INR PPP: 1.2
LYMPHOCYTES # BLD AUTO: 827 CELLS/UL (ref 850–3900)
LYMPHOCYTES NFR BLD AUTO: 19.7 %
MCH RBC QN AUTO: 35.3 PG (ref 27–33)
MCHC RBC AUTO-ENTMCNC: 34.9 G/DL (ref 32–36)
MCV RBC AUTO: 101.1 FL (ref 80–100)
MONOCYTES # BLD AUTO: 848 CELLS/UL (ref 200–950)
MONOCYTES NFR BLD AUTO: 20.2 %
NEUTROPHILS # BLD AUTO: 2335 CELLS/UL (ref 1500–7800)
NEUTROPHILS NFR BLD AUTO: 55.6 %
PLATELET # BLD AUTO: 133 THOUSAND/UL (ref 140–400)
PMV BLD REES-ECKER: 9.3 FL (ref 7.5–12.5)
POTASSIUM SERPL-SCNC: 3.4 MMOL/L (ref 3.5–5.3)
PROT SERPL-MCNC: 7.1 G/DL (ref 6.1–8.1)
PROTHROMBIN TIME: 13 SEC (ref 9–11.5)
RBC # BLD AUTO: 3.63 MILLION/UL (ref 3.8–5.1)
SODIUM SERPL-SCNC: 124 MMOL/L (ref 135–146)
WBC # BLD AUTO: 4.2 THOUSAND/UL (ref 3.8–10.8)

## 2025-03-11 PROCEDURE — 97110 THERAPEUTIC EXERCISES: CPT | Performed by: PHYSICAL THERAPIST

## 2025-03-11 PROCEDURE — 97140 MANUAL THERAPY 1/> REGIONS: CPT | Performed by: PHYSICAL THERAPIST

## 2025-03-11 NOTE — PROGRESS NOTES
Daily Note     Today's date: 3/11/2025  Patient name: Jose White  : 1960  MRN: [id number]  Referring provider: She Osullivan MD  Dx:   Encounter Diagnosis     ICD-10-CM    1. Greater trochanteric bursitis of both hips  M70.61     M70.62       2. Lumbar spondylosis  M47.816           Start Time: 1400          Subjective: Felt okay after working on the back last visit, but overall no significant change since. Lumbar MRI scheduled for 3/30.      Objective: See treatment diary below      Assessment: Continued with lumbar STM as pt noted this was helpful. Added more hip strengthening today in supine/hooklying, which she tolerated well. Needed to monitor pt to ensure she was not doing more repetitions than instructed as to avoid excessive soreness. Will update HEP nv based on tolerance. She will benefit from further PT to maximize function      Plan: continue with lumbar STM and hip strengthening     Precautions: balance deficits - potential falls risk  Comorbidities: anxiety, COPD, HTN, h/o lumbar laminectomy         HEP:  Access Code: [email or web address]  URL: [email or web address]  Date: 2025  Prepared by: Maximus Mendoza    Exercises  - Standing Lumbar Extension with Counter  - 20 reps  - Seated Hip Adduction Isometrics with Ball  - 20 reps  - Seated Hip Abduction with Resistance  - 20 reps      POC Expires Reeval for Medicare to be completed Unit LImit Auth Expiration Date PT/OT/STVisit Limit    By visit 10 N/a      Completed on visit                  TREATMENT DIARY    Auth status 1/21 1/24 1/31 2/18 2/25 3/4 3/11   RE   approved               Visit # 1 2 3 4 5 6 7 8 9 10   Visits Remaining                          Manuals             RE      YANICK       FOTO             Lumbar STM      YANICK seated over EOT YANICK seated over EOT      Hip STM L adductor, hip flexor bilat adductor, hip flexor L adductor, hip flexor, glute med/max L adductor, hip flexor, glute med/max L adductor, hip flexor,  "glute med/max        Hip PROM  bilat  left                      Neuro Re-Ed             Tandem walk             Side step             Tandem balance             Foam walk             RB taps             Yovany step over                          Ther Ex             Bike - cardio 6' resist off 5' resist on 6' resist on 6' resist off 7' resist on 6' resist off 8' resist on      Lumbar ext over table x10    x20 X20 no change x20      Glute stretch    Supine 10\"x10 ea  Supine 10\"x10 ea       Hip add iso       Ball x20      Hip abd iso       Fit circ x20      SLR       X20 ea      bridge       x20      LTR             LAQ             HSC             Leg press             Step up fwd                                       Ther Activity                                       Gait Training             Clinic laps     No AD x1 lap  SPC x1 lap                     Modalities                                                   "

## 2025-03-13 ENCOUNTER — HOSPITAL ENCOUNTER (OUTPATIENT)
Dept: INTERVENTIONAL RADIOLOGY/VASCULAR | Facility: HOSPITAL | Age: 65
Discharge: HOME | End: 2025-03-13
Attending: INTERNAL MEDICINE
Payer: MEDICARE

## 2025-03-13 ENCOUNTER — OFFICE VISIT (OUTPATIENT)
Dept: GASTROENTEROLOGY | Facility: CLINIC | Age: 65
End: 2025-03-13
Payer: MEDICARE

## 2025-03-13 VITALS
OXYGEN SATURATION: 97 % | DIASTOLIC BLOOD PRESSURE: 55 MMHG | SYSTOLIC BLOOD PRESSURE: 112 MMHG | RESPIRATION RATE: 20 BRPM | HEART RATE: 93 BPM

## 2025-03-13 VITALS
OXYGEN SATURATION: 95 % | HEIGHT: 65 IN | BODY MASS INDEX: 21.43 KG/M2 | DIASTOLIC BLOOD PRESSURE: 63 MMHG | WEIGHT: 128.6 LBS | TEMPERATURE: 98 F | HEART RATE: 100 BPM | SYSTOLIC BLOOD PRESSURE: 108 MMHG

## 2025-03-13 VITALS
RESPIRATION RATE: 18 BRPM | HEART RATE: 81 BPM | OXYGEN SATURATION: 99 % | SYSTOLIC BLOOD PRESSURE: 105 MMHG | DIASTOLIC BLOOD PRESSURE: 55 MMHG

## 2025-03-13 DIAGNOSIS — K70.31 ALCOHOLIC CIRRHOSIS OF LIVER WITH ASCITES (HCC): ICD-10-CM

## 2025-03-13 DIAGNOSIS — K76.9 PLEURAL EFFUSION ASSOCIATED WITH HEPATIC DISORDER: ICD-10-CM

## 2025-03-13 DIAGNOSIS — Z01.818 PREOPERATIVE EVALUATION TO RULE OUT SURGICAL CONTRAINDICATION: ICD-10-CM

## 2025-03-13 DIAGNOSIS — K70.31 ALCOHOLIC CIRRHOSIS OF LIVER WITH ASCITES (HCC): Primary | ICD-10-CM

## 2025-03-13 DIAGNOSIS — I85.10 SECONDARY ESOPHAGEAL VARICES WITHOUT BLEEDING (HCC): ICD-10-CM

## 2025-03-13 DIAGNOSIS — D69.6 THROMBOCYTOPENIA (HCC): ICD-10-CM

## 2025-03-13 DIAGNOSIS — R18.8 REFRACTORY ASCITES: ICD-10-CM

## 2025-03-13 DIAGNOSIS — K76.82 HEPATIC ENCEPHALOPATHY (HCC): ICD-10-CM

## 2025-03-13 DIAGNOSIS — J91.8 PLEURAL EFFUSION ASSOCIATED WITH HEPATIC DISORDER: ICD-10-CM

## 2025-03-13 DIAGNOSIS — Z78.9 ALCOHOL USE: ICD-10-CM

## 2025-03-13 PROCEDURE — 32555 ASPIRATE PLEURA W/ IMAGING: CPT | Performed by: INTERNAL MEDICINE

## 2025-03-13 PROCEDURE — 99215 OFFICE O/P EST HI 40 MIN: CPT | Performed by: INTERNAL MEDICINE

## 2025-03-13 PROCEDURE — G2211 COMPLEX E/M VISIT ADD ON: HCPCS | Performed by: INTERNAL MEDICINE

## 2025-03-13 PROCEDURE — 0W9G3ZZ DRAINAGE OF PERITONEAL CAVITY, PERCUTANEOUS APPROACH: ICD-10-PCS | Performed by: INTERNAL MEDICINE

## 2025-03-13 PROCEDURE — 49083 ABD PARACENTESIS W/IMAGING: CPT | Performed by: INTERNAL MEDICINE

## 2025-03-13 PROCEDURE — 49083 ABD PARACENTESIS W/IMAGING: CPT

## 2025-03-13 PROCEDURE — 32555 ASPIRATE PLEURA W/ IMAGING: CPT

## 2025-03-13 PROCEDURE — 0W993ZZ DRAINAGE OF RIGHT PLEURAL CAVITY, PERCUTANEOUS APPROACH: ICD-10-PCS | Performed by: INTERNAL MEDICINE

## 2025-03-13 NOTE — PROGRESS NOTES
Name: Jose White      : 1960      MRN: [id number]  Encounter Provider: Vamsi Herrera MD  Encounter Date: 3/13/2025   Encounter department: [facility]  :  Assessment & Plan  Alcoholic cirrhosis of liver with ascites (HCC)    Ms. White is a 64 y.o. year old female with cirrhosis secondary to Chronic alcohol abuse which is decompensated with ascites, hepatic hydrothorax lower extremity edema, thrombocytopenia, esophageal varices, hypoalbuminemia and hypoprothrombinemia.     Problem List and Plan:     End Stage Liver Disease: Current MELD-Na Score is 21.  Ascites/hepatic hydrothorax poor controlled requiring weekly to twice weekly drainage.   Continues to drink daily (at least 3 drinks per day).  Strongly advised again to stop drinking.  I explained that she unfortunately is not a transplant candidate at this time due to ongoing alcohol use despite counseling.  At this point in time, she is really struggling with the frequency of her paracentesis and thoracentesis.  She is willing to meet with our palliative care team for end-of-life discussion including indwelling catheters for home drainage.  Urgent consult request placed.  Ascites/Hepatic Hydrothorax with recurrent severe hyponatremia:  I discontinue diuretics at her last office visit due to severe and persistent hyponatremia, however it seems her nephrology team restarted low dose lasix and aldactone.  I will defer to them to manage diuretics and hyponatremia.  I still suggest fluid restriction to 8886-3435 cc/day.  Repeat blood work every 2 weeks for now.  Hepatic Encephalopathy: No previous history of overt hepatic encephalopathy, but has had stage 1 in past. Currently taking lactulose on occasion.  Ms. White and her family/care giver are aware of the signs/symptoms of hepatic encephalopathy and understand to seek immediate medical attention should they arise.  Esophageal Varices: Last EGD  done on 1/17/24 showing mild PHG.  Repeat EGD in January, 2025.  Will hold off on scheduling EGD pending discussion with palliative care.  Hepatoma Screening: Abdominal ultrasound done on 2/14/2025 revealing no evidence of liver mass.  Will plan for repeat ultrasound in August.  Nutritional status: Moderate oderate sarcopenia noted.  Encouraged high protein snacking, low salt diet.  If weight loss evident, will refer to nutritional counseling.      Health Maintenance:  Ms. White was counseled to avoid alcohol and tobacco products.  Ms. White was also advised to avoid raw seafood/oysters and from swimming in unchlorinated neely, particularly from the [facility], due to increased risk of infection from Vibrio Vulnificus.  Ms. White was also instructed to seek immediate medical attention should she develop fevers over 101 F, evidence of GI bleeding (black or bloody stools, bloody or coffee ground emesis) or confusion.  Ms. White was advised to avoid NSAIDs, if possible, due to increase risk of renal dysfunction, and advised that if she should use acetaminophen, dose should not exceed 2 grams/day.  Ms. White was recommend to remain up to date with adult vaccination, including influenza, pneumovax and meningococcus. Inactivated HSV and zoster vaccine is safe and encouraged by PCP.  Ms. White was instructed to call either our office or that of her referring doctor should she develop worsening symptoms related to lower extremity edema, ascites, jaundice or excessive bruising/bleeding.    Orders:    Ambulatory Referral to Palliative Care; Future    CBC; Standing    Comprehensive metabolic panel; Standing    Protime-INR; Standing    AFP tumor marker; Future    US abdomen complete; Future    Hepatic encephalopathy (HCC)  See above  Orders:    CBC; Standing    Comprehensive metabolic panel; Standing    Protime-INR; Standing    AFP tumor marker; Future    US abdomen complete;  Future    Secondary esophageal varices without bleeding (HCC)  See above  Orders:    CBC; Standing    Comprehensive metabolic panel; Standing    Protime-INR; Standing    AFP tumor marker; Future    US abdomen complete; Future    Thrombocytopenia (HCC)  See above  Orders:    Ambulatory Referral to Palliative Care; Future    CBC; Standing    Comprehensive metabolic panel; Standing    Protime-INR; Standing    AFP tumor marker; Future    US abdomen complete; Future    Refractory ascites  See above  Orders:    Ambulatory Referral to Palliative Care; Future    CBC; Standing    Comprehensive metabolic panel; Standing    Protime-INR; Standing    AFP tumor marker; Future    US abdomen complete; Future    Alcohol use  See above  Orders:    CBC; Standing    Comprehensive metabolic panel; Standing    Protime-INR; Standing    AFP tumor marker; Future    US abdomen complete; Future    Pleural effusion associated with hepatic disorder  See above  Orders:    Ambulatory Referral to Palliative Care; Future    CBC; Standing    Comprehensive metabolic panel; Standing    Protime-INR; Standing    AFP tumor marker; Future    US abdomen complete; Future    Preoperative evaluation to rule out surgical contraindication  Patient considering reconstructive breast surgery and has an appointment with the surgeon later this month.  I did discuss that there is inherent risk associated with any surgery due to her decompensated liver disease.    Her cirrhosis related predicted Postoperative Outcomes by the VOCAL-[place] Score is:      30-day mortality: 3.3%      90-day mortality: 5.4%      180-day mortality: 10.0%      90-day decompensation: 17.2%    She will discuss these numbers with her surgeon.             History of Present Illness   Jose CONNER Leieunicewiavinash is a 64 y.o. female with medical history as outlined below including decompensated alcohol-related cirrhosis with refractory ascites and hepatic hydrothorax, hepatic encephalopathy, hyponatremia,  sarcopenia, and thrombocytopenia who comes in today for follow-up after last being seen in late September.    She saw her primary gastroenterologist, Dr. Jacobo Leonardo on October 2.    She is up-to-date with blood work, hepatoma screening and is due for variceal screening this year.    She continues to have weekly to twice weekly paracentesis and thoracentesis for refractory ascites/hepatic hydrothorax.  The total volume between the 2 has remained roughly 3 L to 3.5 L fluctuating between the total volume of ascites versus pleural fluid removed.    I stopped her diuretics at her last office visit due to severe persistent hyponatremia, however since that time it appears that her sodium levels did improve and diuretics were restarted by her nephrology team.  She is attempting to keep her fluid intake less than 48 ounces.    Unfortunately, she continues to drink alcohol at least 3 drinks daily.    She continues to complain of fatigue, weakness, decreased breath as ascites and pleural effusion accumulates.  She denies lower extremity edema or overt encephalopathy.  She takes lactulose occasionally.  She denies abdominal pain.    HPI    History obtained from: patient and patient's Significant Other    Review of Systems A complete review of systems is negative other than that noted above in the HPI.        Patient Active Problem List   Diagnosis    Fatty liver    Other specified abnormal findings of blood chemistry    Anxiety    Elevated LFTs    GERD without esophagitis    Hyperlipidemia    Hypotension    Single seizure (HCC)    Insomnia    Nicotine dependence    COPD (chronic obstructive pulmonary disease) (HCC)    Alcohol use    Irritable bowel syndrome with diarrhea    Non-seasonal allergic rhinitis due to pollen    Bilateral leg edema    Alcoholic cirrhosis of liver with ascites (HCC)    Hyponatremia    Thrombocytopenia (HCC)    Anemia    Decompensated hepatic cirrhosis (HCC)    Postprocedural pneumothorax of right lung     Ductal carcinoma in situ (DCIS) of right breast    Atypical ductal hyperplasia of right breast    Breast neoplasm, Tis (DCIS), right    Malignant neoplasm of lower-outer quadrant of right breast of female, estrogen receptor positive (HCC)    Pleural effusion    Vaginal atrophy    Dyspareunia, female    Postcoital bleeding    Hepatic encephalopathy (HCC)    Hypoalbuminemia    COVID    Secondary esophageal varices without bleeding (HCC)    Candida esophagitis (HCC)    Greater trochanteric bursitis of both hips    Lumbar spondylosis    Left hip pain       Current Outpatient Medications   Medication Sig Dispense Refill    albuterol (2.5 mg/3 mL) 0.083 % nebulizer solution Take 3 mL (2.5 mg total) by nebulization every 6 (six) hours as needed for wheezing or shortness of breath 1080 mL 3    albuterol (Proventil HFA) 90 mcg/act inhaler Inhale 2 puffs every 6 (six) hours as needed for wheezing or shortness of breath 18 g 6    alendronate (Fosamax) 70 mg tablet Take 1 tablet (70 mg total) by mouth every 7 days 12 tablet 3    ALPRAZolam (XANAX) 1 mg tablet Take 1 tablet (1 mg total) by mouth 2 (two) times a day as needed for anxiety 60 tablet 2    anastrozole (ARIMIDEX) 1 mg tablet Take 1 tablet (1 mg total) by mouth daily 90 tablet 3    budesonide-formoterol (Symbicort) 160-4.5 mcg/act inhaler Inhale 2 puffs 2 (two) times a day Rinse mouth after use. 10.2 g 6    furosemide (LASIX) 20 mg tablet Take 1 tablet (20 mg total) by mouth daily      lactulose (CHRONULAC) 10 g/15 mL solution Take 15 mL (10 g total) by mouth 2 (two) times a day 240 mL 0    midodrine (PROAMATINE) 10 MG tablet Take 1 tablet (10 mg total) by mouth 3 (three) times a day before meals 90 tablet 3    pantoprazole (PROTONIX) 20 mg tablet Take 1 tablet (20 mg total) by mouth daily 90 tablet 1    Risankizumab-rzaa (SKYRIZI PEN SC) Inject under the skin      spironolactone (ALDACTONE) 25 mg tablet Take 1 tablet (25 mg total) by mouth daily      traZODone  "(DESYREL) 50 mg tablet Take 1 tablet (50 mg total) by mouth daily at bedtime 30 tablet 5     No current facility-administered medications for this visit.       Objective   /63   Pulse 100   Temp 98 °F (36.7 °C) (Oral)   Ht 5' 4.5\" (1.638 m)   Wt 58.3 kg (128 lb 9.6 oz)   SpO2 95%   BMI 21.73 kg/m²       Physical Exam         Lab Results: I personally reviewed relevant lab results.    MELD 3.0: 21 at 3/10/2025 10:14 AM  MELD-Na: 8 at 3/10/2025 10:14 AM  Calculated from:  Serum Creatinine: 0.6 mg/dL (Using min of 1 mg/dL) at 3/10/2025 10:14 AM  Serum Sodium: 124 mmol/L (Using min of 125 mmol/L) at 3/10/2025 10:14 AM  Total Bilirubin: 0.9 mg/dL (Using min of 1 mg/dL) at 3/10/2025 10:14 AM  Serum Albumin: 2.3 g/dL at 3/10/2025 10:14 AM  INR(ratio): 1.2 at 3/10/2025 10:14 AM  Age at listing (hypothetical): 64 years  Sex: Female at 3/10/2025 10:14 AM              Results for orders placed during the hospital encounter of 12/10/24    EGD    Impression  One small, tubular grade I varix in the lower third of the esophagus  Mild portal hypertensive gastropathy in the fundus and body of the stomach  Two small angioectasias in the greater curve of the stomach and duodenal bulb, not APC'd given no bleeding  Nodular mucosa in the duodenal bulb involving 2/3's circumference of post-pyloric mucosa; performed cold forceps biopsy for dysplasia screening  The 2nd part of the duodenum appeared normal.      RECOMMENDATION:  Await pathology results  Repeat EGD in 1 yr for variceal surveillance  Avoid alcohol intake            Vamsi Herrera MD      Administrative Statements   I have spent a total time of 41 minutes in caring for this patient on the day of the visit/encounter including Diagnostic results, Prognosis, Risks and benefits of tx options, Instructions for management, Patient and family education, Importance of tx compliance, Risk factor reductions, Impressions, Counseling / Coordination of care, Documenting in the " medical record, Reviewing/placing orders in the medical record (including tests, medications, and/or procedures), and Obtaining or reviewing history  .

## 2025-03-13 NOTE — ASSESSMENT & PLAN NOTE
Ms. White is a 64 y.o. year old female with cirrhosis secondary to Chronic alcohol abuse which is decompensated with ascites, hepatic hydrothorax lower extremity edema, thrombocytopenia, esophageal varices, hypoalbuminemia and hypoprothrombinemia.     Problem List and Plan:     End Stage Liver Disease: Current MELD-Na Score is 21.  Ascites/hepatic hydrothorax poor controlled requiring weekly to twice weekly drainage.   Continues to drink daily (at least 3 drinks per day).  Strongly advised again to stop drinking.  I explained that she unfortunately is not a transplant candidate at this time due to ongoing alcohol use despite counseling.  At this point in time, she is really struggling with the frequency of her paracentesis and thoracentesis.  She is willing to meet with our palliative care team for end-of-life discussion including indwelling catheters for home drainage.  Urgent consult request placed.  Ascites/Hepatic Hydrothorax with recurrent severe hyponatremia:  I discontinue diuretics at her last office visit due to severe and persistent hyponatremia, however it seems her nephrology team restarted low dose lasix and aldactone.  I will defer to them to manage diuretics and hyponatremia.  I still suggest fluid restriction to 5466-2298 cc/day.  Repeat blood work every 2 weeks for now.  Hepatic Encephalopathy: No previous history of overt hepatic encephalopathy, but has had stage 1 in past. Currently taking lactulose on occasion.  Ms. White and her family/care giver are aware of the signs/symptoms of hepatic encephalopathy and understand to seek immediate medical attention should they arise.  Esophageal Varices: Last EGD done on 1/17/24 showing mild PHG.  Repeat EGD in January, 2025.  Will hold off on scheduling EGD pending discussion with palliative care.  Hepatoma Screening: Abdominal ultrasound done on 2/14/2025 revealing no evidence of liver mass.  Will plan for repeat ultrasound in  August.  Nutritional status: Moderate oderate sarcopenia noted.  Encouraged high protein snacking, low salt diet.  If weight loss evident, will refer to nutritional counseling.      Health Maintenance:  Ms. White was counseled to avoid alcohol and tobacco products.  Ms. White was also advised to avoid raw seafood/oysters and from swimming in unchlorinated neely, particularly from the [facility], due to increased risk of infection from Vibrio Vulnificus.  Ms. White was also instructed to seek immediate medical attention should she develop fevers over 101 F, evidence of GI bleeding (black or bloody stools, bloody or coffee ground emesis) or confusion.  Ms. White was advised to avoid NSAIDs, if possible, due to increase risk of renal dysfunction, and advised that if she should use acetaminophen, dose should not exceed 2 grams/day.  Ms. White was recommend to remain up to date with adult vaccination, including influenza, pneumovax and meningococcus. Inactivated HSV and zoster vaccine is safe and encouraged by PCP.  Ms. White was instructed to call either our office or that of her referring doctor should she develop worsening symptoms related to lower extremity edema, ascites, jaundice or excessive bruising/bleeding.    Orders:    Ambulatory Referral to Palliative Care; Future    CBC; Standing    Comprehensive metabolic panel; Standing    Protime-INR; Standing    AFP tumor marker; Future    US abdomen complete; Future

## 2025-03-13 NOTE — SEDATION DOCUMENTATION
Therapeutic paracentesis. Removed 2630ml clear yellow fluid, RLQ. Patient tolerated the procedure well. Band aid was put on the site. Patient was given the AVS.

## 2025-03-13 NOTE — DISCHARGE INSTRUCTIONS

## 2025-03-13 NOTE — BRIEF OP NOTE (RAD/CATH)
INTERVENTIONAL RADIOLOGY PROCEDURE NOTE    Date: 3/13/2025    Procedure: Thoracentesis, paracentesis    Preoperative diagnosis:   1. Alcoholic cirrhosis of liver with ascites (HCC)    2. Pleural effusion associated with hepatic disorder         Postoperative diagnosis: Same.    Surgeon: Pedrito Thompson MD     Assistant: None. No qualified resident was available.    Blood loss: None    Specimens: None     Findings: Ultrasound-guided thoracentesis performed. Ultrasound-guided paracentesis performed. Please see the radiology reports for details.    Complications: None immediate.    Anesthesia: local

## 2025-03-13 NOTE — ASSESSMENT & PLAN NOTE
See above  Orders:    Ambulatory Referral to Palliative Care; Future    CBC; Standing    Comprehensive metabolic panel; Standing    Protime-INR; Standing    AFP tumor marker; Future    US abdomen complete; Future

## 2025-03-13 NOTE — DISCHARGE INSTRUCTIONS
Thoracentesis   WHAT YOU NEED TO KNOW:   A thoracentesis is a procedure to remove extra fluid or air from between your lungs and your inner chest wall. Air or fluid buildup may make it hard for you to breathe. A thoracentesis allows your lungs to expand fully so you can breathe more easily.  DISCHARGE INSTRUCTIONS:   Medicines:   Pain medicine:  You may be given a prescription medicine to decrease pain. Do not wait until the pain is severe before you take your medicine.    Antibiotics:  This medicine helps fight or prevent an infection.    Take your medicine as directed.  Call your healthcare provider if you think your medicine is not helping or if you have side effects. Tell him if you are allergic to any medicine. Keep a list of the medicines, vitamins, and herbs you take. Include the amounts, and when and why you take them. Bring the list or the pill bottles to follow-up visits. Carry your medicine list with you in case of an emergency.  Follow up with your healthcare provider as directed:  Write down your questions so you remember to ask them during your visits.   Rest:  Rest when you feel it is needed. Slowly start to do more each day. Return to your daily activities as directed.   Do not smoke:  If you smoke, it is never too late to quit. Ask for information about how to stop smoking if you need help.  Contact your healthcare provider if:   You have a fever.    Your puncture site is red, warm, swollen, or draining pus.    You have questions or concerns about your procedure, medicine, or care.    If you have any questions regarding, call the IR department @ [phone].     Seek care immediately or call 911 if:   Blood soaks through your bandage.    There is blood in your spit.

## 2025-03-13 NOTE — PATIENT INSTRUCTIONS
As discussed today:    Consult with our Palliative Care team.  Medications:  Continue taking your current medications without changes.  Laboratory Testing (Listed below):  Please have blood work drawn every 2 weeks..  Radiology/Diagnostic Testing:  Please schedule an abdominal ultrasound in August.  Avoid alcohol and tobacco products.  Also avoid raw seafood/oysters and avoid swimming/wading in unchlorinated neely, particularly from the [facility], due to increased risk of infection from a bacteria called Vibrio Vulnificus.  Avoid medications called NSAID's (Motrin/Ibuprofen, Naproxen/Naprosyn/Aleve) if possible, due to increase risk of kidney dysfunction, and if you use medications containing acetaminophen (Tylenol, percocet, Endocet, and many cough/cold medications), the dose should not exceed 2 grams/day.  Seek immediate medical attention if you develop fevers over 101 F, have evidence of GI bleeding (black or bloody stools, bloody or coffee ground emesis) or confusion.  Call our office if you develop worsening symptoms related to lower extremity edema, ascites, jaundice or excessive bruising/bleeding.

## 2025-03-13 NOTE — ASSESSMENT & PLAN NOTE
See above  Orders:    CBC; Standing    Comprehensive metabolic panel; Standing    Protime-INR; Standing    AFP tumor marker; Future    US abdomen complete; Future

## 2025-03-16 ENCOUNTER — HOSPITAL ENCOUNTER (INPATIENT)
Facility: HOSPITAL | Age: 65
LOS: 5 days | Discharge: HOME | DRG: 200 | End: 2025-03-21
Attending: EMERGENCY MEDICINE | Admitting: INTERNAL MEDICINE
Payer: MEDICARE

## 2025-03-16 ENCOUNTER — APPOINTMENT (OUTPATIENT)
Dept: RADIOLOGY | Facility: HOSPITAL | Age: 65
DRG: 200 | End: 2025-03-16
Payer: MEDICARE

## 2025-03-16 DIAGNOSIS — E83.42 HYPOMAGNESEMIA: ICD-10-CM

## 2025-03-16 DIAGNOSIS — J94.8 HYDROPNEUMOTHORAX: ICD-10-CM

## 2025-03-16 DIAGNOSIS — K70.31 ALCOHOLIC CIRRHOSIS OF LIVER WITH ASCITES (HCC): Primary | ICD-10-CM

## 2025-03-16 DIAGNOSIS — J93.9 PNEUMOTHORAX ON RIGHT: ICD-10-CM

## 2025-03-16 DIAGNOSIS — E87.1 HYPONATREMIA: ICD-10-CM

## 2025-03-16 DIAGNOSIS — E87.6 HYPOKALEMIA: ICD-10-CM

## 2025-03-16 PROBLEM — E87.8 ELECTROLYTE ABNORMALITY: Status: ACTIVE | Noted: 2025-03-16

## 2025-03-16 PROBLEM — F17.210 CIGARETTE NICOTINE DEPENDENCE WITHOUT COMPLICATION: Status: ACTIVE | Noted: 2025-03-16

## 2025-03-16 LAB
2HR DELTA HS TROPONIN: 0 NG/L
ALBUMIN SERPL BCG-MCNC: 2.2 G/DL (ref 3.5–5)
ALBUMIN SERPL BCG-MCNC: 2.3 G/DL (ref 3.5–5)
ALP SERPL-CCNC: 64 U/L (ref 34–104)
ALP SERPL-CCNC: 66 U/L (ref 34–104)
ALT SERPL W P-5'-P-CCNC: 11 U/L (ref 7–52)
ALT SERPL W P-5'-P-CCNC: 13 U/L (ref 7–52)
AMMONIA PLAS-SCNC: 26 UMOL/L (ref 18–72)
ANION GAP SERPL CALCULATED.3IONS-SCNC: 6 MMOL/L (ref 4–13)
ANION GAP SERPL CALCULATED.3IONS-SCNC: 7 MMOL/L (ref 4–13)
AST SERPL W P-5'-P-CCNC: 29 U/L (ref 13–39)
AST SERPL W P-5'-P-CCNC: 30 U/L (ref 13–39)
BASOPHILS # BLD AUTO: 0.05 THOUSANDS/ÂΜL (ref 0–0.1)
BASOPHILS NFR BLD AUTO: 1 % (ref 0–1)
BILIRUB SERPL-MCNC: 1.5 MG/DL (ref 0.2–1)
BILIRUB SERPL-MCNC: 1.92 MG/DL (ref 0.2–1)
BUN SERPL-MCNC: 7 MG/DL (ref 5–25)
BUN SERPL-MCNC: 8 MG/DL (ref 5–25)
CALCIUM ALBUM COR SERPL-MCNC: 9.5 MG/DL (ref 8.3–10.1)
CALCIUM ALBUM COR SERPL-MCNC: 9.7 MG/DL (ref 8.3–10.1)
CALCIUM SERPL-MCNC: 8.1 MG/DL (ref 8.4–10.2)
CALCIUM SERPL-MCNC: 8.3 MG/DL (ref 8.4–10.2)
CARDIAC TROPONIN I PNL SERPL HS: 4 NG/L (ref ?–50)
CARDIAC TROPONIN I PNL SERPL HS: 4 NG/L (ref ?–50)
CHLORIDE SERPL-SCNC: 91 MMOL/L (ref 96–108)
CHLORIDE SERPL-SCNC: 94 MMOL/L (ref 96–108)
CO2 SERPL-SCNC: 24 MMOL/L (ref 21–32)
CO2 SERPL-SCNC: 28 MMOL/L (ref 21–32)
CREAT SERPL-MCNC: 0.57 MG/DL (ref 0.6–1.3)
CREAT SERPL-MCNC: 0.7 MG/DL (ref 0.6–1.3)
EOSINOPHIL # BLD AUTO: 0.04 THOUSAND/ÂΜL (ref 0–0.61)
EOSINOPHIL NFR BLD AUTO: 1 % (ref 0–6)
ERYTHROCYTE [DISTWIDTH] IN BLOOD BY AUTOMATED COUNT: 13 % (ref 11.6–15.1)
ETHANOL SERPL-MCNC: <10 MG/DL
GFR SERPL CREATININE-BSD FRML MDRD: 91 ML/MIN/1.73SQ M
GFR SERPL CREATININE-BSD FRML MDRD: 97 ML/MIN/1.73SQ M
GLUCOSE SERPL-MCNC: 119 MG/DL (ref 65–140)
GLUCOSE SERPL-MCNC: 98 MG/DL (ref 65–140)
HCT VFR BLD AUTO: 40.6 % (ref 34.8–46.1)
HGB BLD-MCNC: 13.8 G/DL (ref 11.5–15.4)
IMM GRANULOCYTES # BLD AUTO: 0.03 THOUSAND/UL (ref 0–0.2)
IMM GRANULOCYTES NFR BLD AUTO: 1 % (ref 0–2)
LYMPHOCYTES # BLD AUTO: 0.54 THOUSANDS/ÂΜL (ref 0.6–4.47)
LYMPHOCYTES NFR BLD AUTO: 13 % (ref 14–44)
MAGNESIUM SERPL-MCNC: 1.6 MG/DL (ref 1.9–2.7)
MCH RBC QN AUTO: 34.8 PG (ref 26.8–34.3)
MCHC RBC AUTO-ENTMCNC: 34 G/DL (ref 31.4–37.4)
MCV RBC AUTO: 103 FL (ref 82–98)
MONOCYTES # BLD AUTO: 0.67 THOUSAND/ÂΜL (ref 0.17–1.22)
MONOCYTES NFR BLD AUTO: 16 % (ref 4–12)
NEUTROPHILS # BLD AUTO: 2.98 THOUSANDS/ÂΜL (ref 1.85–7.62)
NEUTS SEG NFR BLD AUTO: 68 % (ref 43–75)
NRBC BLD AUTO-RTO: 0 /100 WBCS
PHOSPHATE SERPL-MCNC: 2.6 MG/DL (ref 2.3–4.1)
PLATELET # BLD AUTO: 115 THOUSANDS/UL (ref 149–390)
PMV BLD AUTO: 8.9 FL (ref 8.9–12.7)
POTASSIUM SERPL-SCNC: 3 MMOL/L (ref 3.5–5.3)
POTASSIUM SERPL-SCNC: 4 MMOL/L (ref 3.5–5.3)
PROT SERPL-MCNC: 6.7 G/DL (ref 6.4–8.4)
PROT SERPL-MCNC: 7 G/DL (ref 6.4–8.4)
RBC # BLD AUTO: 3.96 MILLION/UL (ref 3.81–5.12)
SODIUM SERPL-SCNC: 125 MMOL/L (ref 135–147)
SODIUM SERPL-SCNC: 125 MMOL/L (ref 135–147)
WBC # BLD AUTO: 4.31 THOUSAND/UL (ref 4.31–10.16)

## 2025-03-16 PROCEDURE — 99223 1ST HOSP IP/OBS HIGH 75: CPT | Performed by: INTERNAL MEDICINE

## 2025-03-16 PROCEDURE — 36415 COLL VENOUS BLD VENIPUNCTURE: CPT

## 2025-03-16 PROCEDURE — NC001 PR NO CHARGE: Performed by: INTERNAL MEDICINE

## 2025-03-16 PROCEDURE — 80053 COMPREHEN METABOLIC PANEL: CPT

## 2025-03-16 PROCEDURE — 99285 EMERGENCY DEPT VISIT HI MDM: CPT

## 2025-03-16 PROCEDURE — 71045 X-RAY EXAM CHEST 1 VIEW: CPT

## 2025-03-16 PROCEDURE — 85025 COMPLETE CBC W/AUTO DIFF WBC: CPT

## 2025-03-16 PROCEDURE — 82077 ASSAY SPEC XCP UR&BREATH IA: CPT | Performed by: PHYSICIAN ASSISTANT

## 2025-03-16 PROCEDURE — 84484 ASSAY OF TROPONIN QUANT: CPT | Performed by: PHYSICIAN ASSISTANT

## 2025-03-16 PROCEDURE — 99223 1ST HOSP IP/OBS HIGH 75: CPT | Performed by: PHYSICIAN ASSISTANT

## 2025-03-16 PROCEDURE — 82140 ASSAY OF AMMONIA: CPT

## 2025-03-16 PROCEDURE — 96365 THER/PROPH/DIAG IV INF INIT: CPT

## 2025-03-16 PROCEDURE — 80053 COMPREHEN METABOLIC PANEL: CPT | Performed by: PHYSICIAN ASSISTANT

## 2025-03-16 PROCEDURE — 84100 ASSAY OF PHOSPHORUS: CPT

## 2025-03-16 PROCEDURE — 93005 ELECTROCARDIOGRAM TRACING: CPT

## 2025-03-16 PROCEDURE — 83735 ASSAY OF MAGNESIUM: CPT

## 2025-03-16 PROCEDURE — 84484 ASSAY OF TROPONIN QUANT: CPT

## 2025-03-16 RX ORDER — FOLIC ACID 1 MG/1
1 TABLET ORAL DAILY
Status: DISCONTINUED | OUTPATIENT
Start: 2025-03-16 | End: 2025-03-21 | Stop reason: HOSPADM

## 2025-03-16 RX ORDER — POTASSIUM CHLORIDE 20MEQ/15ML
40 LIQUID (ML) ORAL ONCE
Status: COMPLETED | OUTPATIENT
Start: 2025-03-16 | End: 2025-03-16

## 2025-03-16 RX ORDER — FUROSEMIDE 20 MG/1
20 TABLET ORAL DAILY
Status: DISCONTINUED | OUTPATIENT
Start: 2025-03-17 | End: 2025-03-21

## 2025-03-16 RX ORDER — ALPRAZOLAM 0.5 MG
1 TABLET ORAL 2 TIMES DAILY PRN
Status: DISCONTINUED | OUTPATIENT
Start: 2025-03-16 | End: 2025-03-16

## 2025-03-16 RX ORDER — MIDODRINE HYDROCHLORIDE 5 MG/1
10 TABLET ORAL
Status: DISCONTINUED | OUTPATIENT
Start: 2025-03-16 | End: 2025-03-21 | Stop reason: HOSPADM

## 2025-03-16 RX ORDER — ALPRAZOLAM 0.5 MG
1 TABLET ORAL
Status: DISCONTINUED | OUTPATIENT
Start: 2025-03-16 | End: 2025-03-21 | Stop reason: HOSPADM

## 2025-03-16 RX ORDER — LACTULOSE 10 G/15ML
10 SOLUTION ORAL 2 TIMES DAILY
Status: DISCONTINUED | OUTPATIENT
Start: 2025-03-16 | End: 2025-03-21 | Stop reason: HOSPADM

## 2025-03-16 RX ORDER — ANASTROZOLE 1 MG/1
1 TABLET ORAL DAILY
Status: DISCONTINUED | OUTPATIENT
Start: 2025-03-17 | End: 2025-03-21 | Stop reason: HOSPADM

## 2025-03-16 RX ORDER — TRAZODONE HYDROCHLORIDE 50 MG/1
50 TABLET ORAL
Status: DISCONTINUED | OUTPATIENT
Start: 2025-03-16 | End: 2025-03-21 | Stop reason: HOSPADM

## 2025-03-16 RX ORDER — BUDESONIDE AND FORMOTEROL FUMARATE DIHYDRATE 160; 4.5 UG/1; UG/1
2 AEROSOL RESPIRATORY (INHALATION) 2 TIMES DAILY
Status: DISCONTINUED | OUTPATIENT
Start: 2025-03-16 | End: 2025-03-21 | Stop reason: HOSPADM

## 2025-03-16 RX ORDER — PANTOPRAZOLE SODIUM 20 MG/1
20 TABLET, DELAYED RELEASE ORAL DAILY
Status: DISCONTINUED | OUTPATIENT
Start: 2025-03-17 | End: 2025-03-21 | Stop reason: HOSPADM

## 2025-03-16 RX ORDER — MAGNESIUM SULFATE HEPTAHYDRATE 40 MG/ML
2 INJECTION, SOLUTION INTRAVENOUS ONCE
Status: COMPLETED | OUTPATIENT
Start: 2025-03-16 | End: 2025-03-16

## 2025-03-16 RX ORDER — POTASSIUM CHLORIDE 1500 MG/1
40 TABLET, EXTENDED RELEASE ORAL ONCE
Status: DISCONTINUED | OUTPATIENT
Start: 2025-03-16 | End: 2025-03-16

## 2025-03-16 RX ORDER — ALBUTEROL SULFATE 0.83 MG/ML
2.5 SOLUTION RESPIRATORY (INHALATION) EVERY 6 HOURS PRN
Status: DISCONTINUED | OUTPATIENT
Start: 2025-03-16 | End: 2025-03-21 | Stop reason: HOSPADM

## 2025-03-16 RX ORDER — SPIRONOLACTONE 25 MG/1
25 TABLET ORAL DAILY
Status: DISCONTINUED | OUTPATIENT
Start: 2025-03-17 | End: 2025-03-21 | Stop reason: HOSPADM

## 2025-03-16 RX ORDER — LANOLIN ALCOHOL/MO/W.PET/CERES
100 CREAM (GRAM) TOPICAL DAILY
Status: DISCONTINUED | OUTPATIENT
Start: 2025-03-16 | End: 2025-03-21 | Stop reason: HOSPADM

## 2025-03-16 RX ADMIN — TRAZODONE HYDROCHLORIDE 50 MG: 50 TABLET ORAL at 20:50

## 2025-03-16 RX ADMIN — POTASSIUM CHLORIDE 40 MEQ: 20 SOLUTION ORAL at 14:17

## 2025-03-16 RX ADMIN — Medication 100 MG: at 17:21

## 2025-03-16 RX ADMIN — LACTULOSE 10 G: 20 SOLUTION ORAL at 17:21

## 2025-03-16 RX ADMIN — MAGNESIUM SULFATE HEPTAHYDRATE 2 G: 2 INJECTION, SOLUTION INTRAVENOUS at 14:39

## 2025-03-16 RX ADMIN — FOLIC ACID 1 MG: 1 TABLET ORAL at 17:21

## 2025-03-16 RX ADMIN — MULTIPLE VITAMINS W/ MINERALS TAB 1 TABLET: TAB ORAL at 17:21

## 2025-03-16 RX ADMIN — MIDODRINE HYDROCHLORIDE 10 MG: 5 TABLET ORAL at 17:21

## 2025-03-16 RX ADMIN — ALPRAZOLAM 1 MG: 0.5 TABLET ORAL at 20:50

## 2025-03-16 NOTE — H&P
"H&P - Hospitalist   Name: Jose White 64 y.o. female I MRN: [id number]  Unit/Bed#: Z1 H1 I Date of Admission: 3/16/2025   Date of Service: 3/16/2025 I Hospital Day: 0     Assessment & Plan  Hyponatremia  Presenting with lightheadedness and weakness.  Noted history of hyponatremia, possibly due to cirrhosis as well as intravascular depletion  Saw Dr. Tovar 1 month ago, belive that her baseline is likely in the low 130s  She is noncompliant with her fluid restriction.  Resume fluid restriction at 1.2 L/day, resume diuretics  Recheck CMP tomorrow  Nephrology consult  Alcoholic cirrhosis of liver with ascites (HCC)  Patient's MELD Score is: 8 patient is still drinking daily with last drink being today  Undergoing weekly Thora/paracentesis (last on 3/13/25)  GI consult to determine if long-term catheter placement is feasible for her  CIWA protocol  Resume home Aldactone, lactulose, Lasix  Pneumothorax on right  Patient receives weekly right thoracentesis last on 3/13  Chest x-ray final read: \"Right hydropneumothorax with interval increase in size of pneumothorax compared with prior study. Consider chest tube placement. \"  Consult pulmonary and IR, determining chest tube placement tonight versus tomorrow as she is clinically stable with no hypoxia, respiratory distress  Will apply supplemental oxygen tonight  Hypotension  Chronic history  Resume midodrine and monitor blood pressure  Malignant neoplasm of lower-outer quadrant of right breast of female, estrogen receptor positive (HCC)  Resume home Arimidex  COPD (chronic obstructive pulmonary disease) (HCC)  No evidence of COPD flare  Resume home bronchodilators and monitor  Electrolyte abnormality  Potassium 3.0 and magnesium 1.6 upon admission  Repleted, recheck tomorrow  Thrombocytopenia (HCC)  Platelet count 115 upon admission, no active bleeding  Monitor closely      VTE Pharmacologic Prophylaxis: VTE Score: 2 Low Risk (Score 0-2) - Encourage Ambulation.  No " pharmacologic DVT prophylaxis due to thrombocytopenia.  Code Status: full code    Anticipated Length of Stay: Patient will be admitted on an observation basis with an anticipated length of stay of less than 2 midnights secondary to hyponatremia of 125 requiring nephrology consult and GI consult.    Total Time for Visit, including Counseling / Coordination of Care: 75 Minutes. Greater than 50% of this total time spent on direct patient counseling and coordination of care.    Chief Complaint: Lightheadedness, dizziness, chest pain    History of Present Illness:  Jose White is a 64 y.o. female with a PMH of alcoholic cirrhosis, thrombocytopenia, weekly Thora and paracentesis, chronic hyponatremia who presents with chief complaint of lightheadedness and weakness.  Symptoms progressing over the past few days.  Reports she has not been following fluid restriction at home but has been taking her medications.  She endorses some shortness of breath and abdominal distention.  No fevers or chills.  No dysuria or hematuria.    Review of Systems:  Review of Systems   Constitutional:  Negative for activity change, appetite change, chills, fatigue and fever.   HENT:  Negative for congestion, rhinorrhea, sinus pressure and sore throat.    Eyes:  Negative for photophobia, pain and visual disturbance.   Respiratory:  Positive for shortness of breath. Negative for cough and wheezing.    Cardiovascular:  Positive for chest pain. Negative for palpitations and leg swelling.   Gastrointestinal:  Positive for abdominal distention. Negative for abdominal pain, constipation, diarrhea, nausea and vomiting.   Endocrine: Negative for cold intolerance, heat intolerance, polydipsia and polyuria.   Genitourinary:  Negative for difficulty urinating, dysuria, flank pain, frequency and hematuria.   Musculoskeletal:  Negative for arthralgias, back pain and joint swelling.   Skin:  Negative for color change, pallor and rash.    Allergic/Immunologic: Negative.    Neurological:  Positive for weakness and light-headedness. Negative for dizziness, syncope and headaches.   Hematological: Negative.    Psychiatric/Behavioral: Negative.          Past Medical and Surgical History:   Past Medical History:   Diagnosis Date    Alcoholic fatty liver     Anxiety     Asthma     COPD (chronic obstructive pulmonary disease) (HCC)     Elevated liver function tests     GERD (gastroesophageal reflux disease)     GERD without esophagitis     Hyperlipidemia     Hypertension     IBS (irritable bowel syndrome)     Insomnia     Insomnia     Liver disease     Nervousness     Nicotine dependence     Other specified urinary incontinence     RLS (restless legs syndrome)     Wears glasses        Past Surgical History:   Procedure Laterality Date    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    BREAST EXCISIONAL BIOPSY Right 11/01/2004    benign    CATARACT EXTRACTION, BILATERAL  08/01/2018    COLONOSCOPY N/A 05/08/2019    Procedure: COLONOSCOPY;  Surgeon: Jacobo Leonardo MD;  Location: [facility] GI LAB;  Service: Gastroenterology    EGD AND COLONOSCOPY N/A 03/19/2018    Procedure: EGD AND COLONOSCOPY;  Surgeon: Jacobo Leonardo MD;  Location: [facility];  Service: Gastroenterology    ESOPHAGOGASTRODUODENOSCOPY N/A 05/08/2019    Procedure: ESOPHAGOGASTRODUODENOSCOPY (EGD);  Surgeon: Jacobo Leonardo MD;  Location: [facility];  Service: Gastroenterology    IR PARACENTESIS  11/27/2020    IR PARACENTESIS  12/08/2020    IR PARACENTESIS  10/28/2022    IR PARACENTESIS  11/07/2022    IR PARACENTESIS  11/30/2022    IR PARACENTESIS  12/30/2022    IR PARACENTESIS  01/16/2023    IR PARACENTESIS  08/06/2024    IR PARACENTESIS  09/18/2024    IR PARACENTESIS  10/03/2024    IR PARACENTESIS  10/10/2024    IR PARACENTESIS  10/17/2024    IR PARACENTESIS  10/24/2024    IR PARACENTESIS  11/01/2024    IR PARACENTESIS  11/07/2024    IR PARACENTESIS  11/14/2024    IR PARACENTESIS  11/21/2024     IR PARACENTESIS  11/29/2024    IR PARACENTESIS  12/05/2024    IR PARACENTESIS  12/12/2024    IR PARACENTESIS  12/19/2024    IR PARACENTESIS  12/26/2024    IR PARACENTESIS  01/06/2025    IR PARACENTESIS  01/09/2025    IR PARACENTESIS  01/16/2025    IR PARACENTESIS  1/23/2025    IR PARACENTESIS  1/30/2025    IR PARACENTESIS  2/5/2025    IR PARACENTESIS  2/11/2025    IR PARACENTESIS  2/20/2025    IR PARACENTESIS  2/27/2025    IR PARACENTESIS  3/6/2025    IR PARACENTESIS  3/13/2025    IR THORACENTESIS  01/16/2023    IR THORACENTESIS  03/28/2023    IR THORACENTESIS  07/25/2024    IR THORACENTESIS  08/06/2024    IR THORACENTESIS  08/14/2024    IR THORACENTESIS  08/21/2024    IR THORACENTESIS  08/28/2024    IR THORACENTESIS  09/04/2024    IR THORACENTESIS  09/11/2024    IR THORACENTESIS  09/18/2024    IR THORACENTESIS  09/25/2024    IR THORACENTESIS  10/03/2024    IR THORACENTESIS  10/10/2024    IR THORACENTESIS  10/17/2024    IR THORACENTESIS  10/24/2024    IR THORACENTESIS  11/01/2024    IR THORACENTESIS  11/07/2024    IR THORACENTESIS  11/14/2024    IR THORACENTESIS  11/21/2024    IR THORACENTESIS  11/29/2024    IR THORACENTESIS  12/05/2024    IR THORACENTESIS  12/12/2024    IR THORACENTESIS  12/19/2024    IR THORACENTESIS  12/26/2024    IR THORACENTESIS  01/03/2025    IR THORACENTESIS  01/09/2025    IR THORACENTESIS  01/16/2025    IR THORACENTESIS  1/23/2025    IR THORACENTESIS  1/30/2025    IR THORACENTESIS  2/5/2025    IR THORACENTESIS  2/11/2025    IR THORACENTESIS  2/20/2025    IR THORACENTESIS  2/27/2025    IR THORACENTESIS  3/6/2025    IR THORACENTESIS  3/13/2025    KNEE SURGERY Left     ACL repair. Date: early 2000s    LUMBAR LAMINECTOMY  04/1999    LYMPH NODE BIOPSY      MAMMO STEREOTACTIC BREAST BIOPSY RIGHT (ALL INC) Right 05/21/2021    MAMMO STEREOTACTIC BREAST BIOPSY RIGHT (ALL INC) EACH ADD Right 05/21/2021    TOTAL ABDOMINAL HYSTERECTOMY  02/05/2008    TUBAL LIGATION  1989    UPPER GASTROINTESTINAL  ENDOSCOPY         Meds/Allergies:  Prior to Admission medications    Medication Sig Start Date End Date Taking? Authorizing Provider   albuterol (2.5 mg/3 mL) 0.083 % nebulizer solution Take 3 mL (2.5 mg total) by nebulization every 6 (six) hours as needed for wheezing or shortness of breath 10/28/24   Caroline Moeller PA-C   albuterol (Proventil HFA) 90 mcg/act inhaler Inhale 2 puffs every 6 (six) hours as needed for wheezing or shortness of breath 10/28/24   Caroline Moeller PA-C   alendronate (Fosamax) 70 mg tablet Take 1 tablet (70 mg total) by mouth every 7 days 8/26/24   JOSÉ Craig   ALPRAZolam (XANAX) 1 mg tablet Take 1 tablet (1 mg total) by mouth 2 (two) times a day as needed for anxiety 1/13/25   JOSÉ Craig   anastrozole (ARIMIDEX) 1 mg tablet Take 1 tablet (1 mg total) by mouth daily 11/25/24   Lala Shanks DO   budesonide-formoterol (Symbicort) 160-4.5 mcg/act inhaler Inhale 2 puffs 2 (two) times a day Rinse mouth after use. 10/28/24   Caroline Moeller PA-C   furosemide (LASIX) 20 mg tablet Take 1 tablet (20 mg total) by mouth daily 11/4/24   Dayanara Sanderson PA-C   lactulose (CHRONULAC) 10 g/15 mL solution Take 15 mL (10 g total) by mouth 2 (two) times a day 11/8/24   Juan Antonio Hardin MD   midodrine (PROAMATINE) 10 MG tablet Take 1 tablet (10 mg total) by mouth 3 (three) times a day before meals 11/4/24   Dayanara Sanderson PA-C   pantoprazole (PROTONIX) 20 mg tablet Take 1 tablet (20 mg total) by mouth daily 11/15/24   Vamsi Herrera MD   Risankizumab-rzaa (SKYRIZI PEN SC) Inject under the skin    Historical Provider, MD   spironolactone (ALDACTONE) 25 mg tablet Take 1 tablet (25 mg total) by mouth daily 1/8/25   Sahra Brown PA-C   traZODone (DESYREL) 50 mg tablet Take 1 tablet (50 mg total) by mouth daily at bedtime 3/13/24   Sarah Beth Eller PA-C   mupirocin (BACTROBAN) 2 % ointment Apply topically 3 (three) times a day  Patient not taking: Reported on  8/26/2022 5/10/22 9/17/22  Sarah Beth Eller PA-C       All medications reviewed.    Allergies:   Allergies   Allergen Reactions    Sulfa Antibiotics Shortness Of Breath    Ace Inhibitors Cough and Other (See Comments)     coughing       Social History:  Marital Status: /Civil Union     Substance Use History:   Social History     Substance and Sexual Activity   Alcohol Use Yes    Alcohol/week: 6.0 standard drinks of alcohol    Types: 6 Cans of beer per week    Comment: 7-10 drinks/wk     Social History     Tobacco Use   Smoking Status Every Day    Current packs/day: 1.00    Average packs/day: 1 pack/day for 47.2 years (47.2 ttl pk-yrs)    Types: Cigarettes    Start date: 1/1/1978   Smokeless Tobacco Never     Social History     Substance and Sexual Activity   Drug Use No       Family History:  Non-contributory    Physical Exam:     Vitals:   Blood Pressure: 112/60 (03/16/25 1230)  Pulse: 92 (03/16/25 1230)  Temperature: 97.6 °F (36.4 °C) (03/16/25 1230)  Temp Source: Temporal (03/16/25 1230)  Respirations: 18 (03/16/25 1230)  SpO2: 97 % (03/16/25 1230)    Physical Exam  Vitals and nursing note reviewed.   Constitutional:       General: She is not in acute distress.     Appearance: Normal appearance. She is not ill-appearing.   HENT:      Head: Normocephalic and atraumatic.      Mouth/Throat:      Mouth: Mucous membranes are moist.   Eyes:      General: No scleral icterus.        Right eye: No discharge.         Left eye: No discharge.      Pupils: Pupils are equal, round, and reactive to light.   Cardiovascular:      Rate and Rhythm: Normal rate and regular rhythm.      Heart sounds: No murmur heard.     No friction rub. No gallop.   Pulmonary:      Effort: No respiratory distress.      Breath sounds: No wheezing, rhonchi or rales.   Abdominal:      General: Bowel sounds are normal. There is distension.      Palpations: Abdomen is soft.      Tenderness: There is no abdominal tenderness. There is no  guarding or rebound.   Musculoskeletal:         General: No swelling or deformity.      Cervical back: Neck supple.      Right lower leg: No edema.      Left lower leg: No edema.   Skin:     General: Skin is warm and dry.      Capillary Refill: Capillary refill takes less than 2 seconds.      Coloration: Skin is not jaundiced or pale.      Findings: No erythema or rash.   Neurological:      General: No focal deficit present.      Mental Status: She is alert and oriented to person, place, and time. Mental status is at baseline.      Cranial Nerves: No cranial nerve deficit.      Sensory: No sensory deficit.   Psychiatric:         Mood and Affect: Mood normal.         Behavior: Behavior normal.         Additional Data:     Lab Results:  Results from last 7 days   Lab Units 03/16/25  1236   WBC Thousand/uL 4.31   HEMOGLOBIN g/dL 13.8   HEMATOCRIT % 40.6   PLATELETS Thousands/uL 115*   SEGS PCT % 68   LYMPHO PCT % 13*   MONO PCT % 16*   EOS PCT % 1     Results from last 7 days   Lab Units 03/16/25  1236   SODIUM mmol/L 125*   POTASSIUM mmol/L 3.0*   CHLORIDE mmol/L 91*   CO2 mmol/L 28   BUN mg/dL 8   CREATININE mg/dL 0.70   ANION GAP mmol/L 6   CALCIUM mg/dL 8.1*   ALBUMIN g/dL 2.3*   TOTAL BILIRUBIN mg/dL 1.92*   ALK PHOS U/L 66   ALT U/L 13   AST U/L 30   GLUCOSE RANDOM mg/dL 119     Results from last 7 days   Lab Units 03/10/25  1014   INR  1.2*                   Imaging: All pertinent imaging reviewed.  XR chest portable    (Results Pending)       EKG and Other Studies Reviewed on Admission:   Prior pertinent studies and records reviewed in [facility] Everywhere.    ** Please Note: This note has been constructed using a voice recognition system. **   never used

## 2025-03-16 NOTE — CONSULTS
Consultation - Pulmonology   Name: Jose White 64 y.o. female I MRN: [id number]  Unit/Bed#: -01 I Date of Admission: 3/16/2025   Date of Service: 3/16/2025 I Hospital Day: 0   Inpatient consult to Pulmonology  Consult performed by: Shoshana Goss DO  Consult ordered by: Khanh Marie PA-C        Physician Requesting Evaluation: Kaila Boone MD   Reason for Evaluation / Principal Problem: Pneumothorax    Assessment & Plan  Pneumothorax on right  Chest x-ray shows right sided pneumothorax.  Difficult to determine whether this is ex vacuo pneumothorax related to chronic pleural effusion and need for weekly thoracentesis.  Patient is stable, saturating well on room air without respiratory distress.  She will need nonurgent chest tube placed tomorrow.  Discussed at length with primary team and interventional radiology.    Recurrent pleural effusion on right  We will need to determine is whether the tube that is placed to address the pneumothorax is in the form of a pigtail or indwelling pleural catheter.  If we can better manage the ascites with an indwelling catheter, we may be able to avoid a pleural catheter.    Alcoholic cirrhosis of liver with ascites (HCC)  According to Dr. Hrerera, patient is not a liver transplant candidate.  Patient is fully aware of this and is willing to speak with palliative care.  She understands that indwelling catheters would be purely palliative and would not correct the underlying problem.  GI evaluation is pending and their input will be greatly appreciated in terms of how to best move forward.    COPD (chronic obstructive pulmonary disease) (HCC)  No evidence of exacerbation.  Continue Symbicort    Cigarette nicotine dependence without complication  Encourage complete smoking cessation    I have discussed the above management plan in detail with the primary service.     History of Present Illness   Jose White is a 64 y.o. female who presents with  hyponatremia and right hydropneumothorax.  Patient has chronic alcohol abuse disorder with resultant cirrhosis, recurrent ascites and hepatic hydrothorax.  Patient has required weekly thoracentesis and paracentesis for quite some time.  Most recent taps were performed on 3/13/2025 yielding 700 mL of pleural fluid and 2.6 L of ascitic fluid.  Patient states that she does become short of breath in the 24 hours leading up to the thoracentesis.  She typically tolerates the procedures very well, however states that after this most recent procedure, she developed severe right sided pain radiating into the left chest when she was leaving the hospital.  The pain has since resolved.  She does have mild shortness of breath which has been overall stable.  She has no significant cough, wheeze or sputum production.  She has a history of mild COPD based on PFTs from 2022 and uses Symbicort twice daily.  Patient smokes 1 pack/day.    She continues to drink on a daily basis and follows with Dr. Herrera from hepatology.  She has been deemed not a transplant candidate.  She states that her most recent conversation with him was to discuss indwelling catheter placement for management of recurrent effusion/ascites.  Patient states that she was awaiting a call with further instructions.    Further complicating matters is chronic hyponatremia felt to be related to diuretic therapy.  This current admission is largely related to generalized weakness due to hyponatremia     Review of Systems  Otherwise negative  Historical Information   Historical Information   Past Medical History:   Diagnosis Date    Alcoholic fatty liver     Anxiety     Asthma     COPD (chronic obstructive pulmonary disease) (HCC)     Elevated liver function tests     GERD (gastroesophageal reflux disease)     GERD without esophagitis     Hyperlipidemia     Hypertension     IBS (irritable bowel syndrome)     Insomnia     Insomnia     Liver disease     Nervousness      Nicotine dependence     Other specified urinary incontinence     RLS (restless legs syndrome)     Wears glasses      Past Surgical History:   Procedure Laterality Date    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    BREAST EXCISIONAL BIOPSY Right 11/01/2004    benign    CATARACT EXTRACTION, BILATERAL  08/01/2018    COLONOSCOPY N/A 05/08/2019    Procedure: COLONOSCOPY;  Surgeon: Jacobo Leonardo MD;  Location: [facility];  Service: Gastroenterology    EGD AND COLONOSCOPY N/A 03/19/2018    Procedure: EGD AND COLONOSCOPY;  Surgeon: Jacobo Leonardo MD;  Location: [facility];  Service: Gastroenterology    ESOPHAGOGASTRODUODENOSCOPY N/A 05/08/2019    Procedure: ESOPHAGOGASTRODUODENOSCOPY (EGD);  Surgeon: Jacobo Leonardo MD;  Location: [facility];  Service: Gastroenterology    IR PARACENTESIS  11/27/2020    IR PARACENTESIS  12/08/2020    IR PARACENTESIS  10/28/2022    IR PARACENTESIS  11/07/2022    IR PARACENTESIS  11/30/2022    IR PARACENTESIS  12/30/2022    IR PARACENTESIS  01/16/2023    IR PARACENTESIS  08/06/2024    IR PARACENTESIS  09/18/2024    IR PARACENTESIS  10/03/2024    IR PARACENTESIS  10/10/2024    IR PARACENTESIS  10/17/2024    IR PARACENTESIS  10/24/2024    IR PARACENTESIS  11/01/2024    IR PARACENTESIS  11/07/2024    IR PARACENTESIS  11/14/2024    IR PARACENTESIS  11/21/2024    IR PARACENTESIS  11/29/2024    IR PARACENTESIS  12/05/2024    IR PARACENTESIS  12/12/2024    IR PARACENTESIS  12/19/2024    IR PARACENTESIS  12/26/2024    IR PARACENTESIS  01/06/2025    IR PARACENTESIS  01/09/2025    IR PARACENTESIS  01/16/2025    IR PARACENTESIS  1/23/2025    IR PARACENTESIS  1/30/2025    IR PARACENTESIS  2/5/2025    IR PARACENTESIS  2/11/2025    IR PARACENTESIS  2/20/2025    IR PARACENTESIS  2/27/2025    IR PARACENTESIS  3/6/2025    IR PARACENTESIS  3/13/2025    IR THORACENTESIS  01/16/2023    IR THORACENTESIS  03/28/2023    IR THORACENTESIS  07/25/2024    IR THORACENTESIS  08/06/2024    IR THORACENTESIS   08/14/2024    IR THORACENTESIS  08/21/2024    IR THORACENTESIS  08/28/2024    IR THORACENTESIS  09/04/2024    IR THORACENTESIS  09/11/2024    IR THORACENTESIS  09/18/2024    IR THORACENTESIS  09/25/2024    IR THORACENTESIS  10/03/2024    IR THORACENTESIS  10/10/2024    IR THORACENTESIS  10/17/2024    IR THORACENTESIS  10/24/2024    IR THORACENTESIS  11/01/2024    IR THORACENTESIS  11/07/2024    IR THORACENTESIS  11/14/2024    IR THORACENTESIS  11/21/2024    IR THORACENTESIS  11/29/2024    IR THORACENTESIS  12/05/2024    IR THORACENTESIS  12/12/2024    IR THORACENTESIS  12/19/2024    IR THORACENTESIS  12/26/2024    IR THORACENTESIS  01/03/2025    IR THORACENTESIS  01/09/2025    IR THORACENTESIS  01/16/2025    IR THORACENTESIS  1/23/2025    IR THORACENTESIS  1/30/2025    IR THORACENTESIS  2/5/2025    IR THORACENTESIS  2/11/2025    IR THORACENTESIS  2/20/2025    IR THORACENTESIS  2/27/2025    IR THORACENTESIS  3/6/2025    IR THORACENTESIS  3/13/2025    KNEE SURGERY Left     ACL repair. Date: early 2000s    LUMBAR LAMINECTOMY  04/1999    LYMPH NODE BIOPSY      MAMMO STEREOTACTIC BREAST BIOPSY RIGHT (ALL INC) Right 05/21/2021    MAMMO STEREOTACTIC BREAST BIOPSY RIGHT (ALL INC) EACH ADD Right 05/21/2021    TOTAL ABDOMINAL HYSTERECTOMY  02/05/2008    TUBAL LIGATION  1989    UPPER GASTROINTESTINAL ENDOSCOPY       Social History     Tobacco Use    Smoking status: Every Day     Current packs/day: 1.00     Average packs/day: 1 pack/day for 47.2 years (47.2 ttl pk-yrs)     Types: Cigarettes     Start date: 1/1/1978    Smokeless tobacco: Never   Vaping Use    Vaping status: Never Used   Substance and Sexual Activity    Alcohol use: Yes     Alcohol/week: 6.0 standard drinks of alcohol     Types: 6 Cans of beer per week     Comment: 7-10 drinks/wk    Drug use: No    Sexual activity: Yes     Partners: Male     E-Cigarette/Vaping    E-Cigarette Use Never User      E-Cigarette/Vaping Substances    Nicotine No     THC No     CBD No      Flavoring No     Other No     Unknown No      Family history non-contributory      Objective :  Temp:  [97 °F (36.1 °C)-97.6 °F (36.4 °C)] 97 °F (36.1 °C)  HR:  [80-92] 80  BP: (104-112)/(60-66) 104/66  Resp:  [18] 18  SpO2:  [95 %-97 %] 96 %  O2 Device: None (Room air)    Physical Exam  Vitals reviewed.   Constitutional:       General: She is not in acute distress.     Appearance: She is well-developed.   Eyes:      General: No scleral icterus.  Neck:      Vascular: No JVD.   Cardiovascular:      Rate and Rhythm: Normal rate and regular rhythm.   Pulmonary:      Breath sounds: No wheezing, rhonchi or rales.      Comments: Decreased on the right  Abdominal:      General: There is distension.      Tenderness: There is no abdominal tenderness.   Musculoskeletal:         General: No swelling.   Skin:     General: Skin is warm and dry.   Neurological:      Mental Status: She is alert and oriented to person, place, and time.         Lab Results: I have reviewed the following results:  .     03/16/25  1236 03/16/25  1545   WBC 4.31  --    HGB 13.8  --    HCT 40.6  --    *  --    SODIUM 125*  --    K 3.0*  --    CL 91*  --    CO2 28  --    BUN 8  --    CREATININE 0.70  --    GLUC 119  --    MG 1.6*  --    PHOS 2.6  --    AST 30  --    ALT 13  --    ALB 2.3*  --    TBILI 1.92*  --    ALKPHOS 66  --    HSTNI0 4  --    HSTNI2  --  4     ABG: No new results in last 24 hours.    Imaging Results Review: I personally reviewed the following image studies in PACS and associated radiology reports: chest xray. My interpretation of the radiology images/reports is: Chest x-ray done today shows right hydropneumothorax.    PFT Results Reviewed: 5/9/2022  Ratio 69% with an FEV1 of 79% of predicted.  No change with bronchodilators.  Mild obstruction

## 2025-03-16 NOTE — PLAN OF CARE
Problem: PAIN - ADULT  Goal: Verbalizes/displays adequate comfort level or baseline comfort level  Description: Interventions:  - Encourage patient to monitor pain and request assistance  - Assess pain using appropriate pain scale  - Administer analgesics based on type and severity of pain and evaluate response  - Implement non-pharmacological measures as appropriate and evaluate response  - Consider cultural and social influences on pain and pain management  - Notify physician/advanced practitioner if interventions unsuccessful or patient reports new pain  Outcome: Progressing     Problem: INFECTION - ADULT  Goal: Absence or prevention of progression during hospitalization  Description: INTERVENTIONS:  - Assess and monitor for signs and symptoms of infection  - Monitor lab/diagnostic results  - Monitor all insertion sites, i.e. indwelling lines, tubes, and drains  - Monitor endotracheal if appropriate and nasal secretions for changes in amount and color  - [place] appropriate cooling/warming therapies per order  - Administer medications as ordered  - Instruct and encourage patient and family to use good hand hygiene technique  - Identify and instruct in appropriate isolation precautions for identified infection/condition  Outcome: Progressing  Goal: Absence of fever/infection during neutropenic period  Description: INTERVENTIONS:  - Monitor WBC    Outcome: Progressing     Problem: SAFETY ADULT  Goal: Patient will remain free of falls  Description: INTERVENTIONS:  - Educate patient/family on patient safety including physical limitations  - Instruct patient to call for assistance with activity   - Consult OT/PT to assist with strengthening/mobility   - Keep Call bell within reach  - Keep bed low and locked with side rails adjusted as appropriate  - Keep care items and personal belongings within reach  - Initiate and maintain comfort rounds  - Make Fall Risk Sign visible to staff  - Offer Toileting every x Hours,  in advance of need  - Initiate/Maintain xalarm  - Obtain necessary fall risk management equipment: x  - Apply yellow socks and bracelet for high fall risk patients  - Consider moving patient to room near nurses station  Outcome: Progressing  Goal: Maintain or return to baseline ADL function  Description: INTERVENTIONS:  -  Assess patient's ability to carry out ADLs; assess patient's baseline for ADL function and identify physical deficits which impact ability to perform ADLs (bathing, care of mouth/teeth, toileting, grooming, dressing, etc.)  - Assess/evaluate cause of self-care deficits   - Assess range of motion  - Assess patient's mobility; develop plan if impaired  - Assess patient's need for assistive devices and provide as appropriate  - Encourage maximum independence but intervene and supervise when necessary  - Involve family in performance of ADLs  - Assess for home care needs following discharge   - Consider OT consult to assist with ADL evaluation and planning for discharge  - Provide patient education as appropriate  Outcome: Progressing  Goal: Maintains/Returns to pre admission functional level  Description: INTERVENTIONS:  - Perform AM-PAC 6 Click Basic Mobility/ Daily Activity assessment daily.  - Set and communicate daily mobility goal to care team and patient/family/caregiver.   - Collaborate with rehabilitation services on mobility goals if consulted  - Perform Range of Motion x times a day.  - Reposition patient every xxxx hours.  - Dangle patient x times a day  - Stand patient x times a day  - Ambulate patient x times a day  - Out of bed to chair x times a day   - Out of bed for meals x times a day  - Out of bed for toileting  - Record patient progress and toleration of activity level   Outcome: Progressing     Problem: DISCHARGE PLANNING  Goal: Discharge to home or other facility with appropriate resources  Description: INTERVENTIONS:  - Identify barriers to discharge w/patient and caregiver  -  Arrange for needed discharge resources and transportation as appropriate  - Identify discharge learning needs (meds, wound care, etc.)  - Arrange for interpretive services to assist at discharge as needed  - Refer to Case Management Department for coordinating discharge planning if the patient needs post-hospital services based on physician/advanced practitioner order or complex needs related to functional status, cognitive ability, or social support system  Outcome: Progressing     Problem: MOBILITY - ADULT  Goal: Maintain or return to baseline ADL function  Description: INTERVENTIONS:  -  Assess patient's ability to carry out ADLs; assess patient's baseline for ADL function and identify physical deficits which impact ability to perform ADLs (bathing, care of mouth/teeth, toileting, grooming, dressing, etc.)  - Assess/evaluate cause of self-care deficits   - Assess range of motion  - Assess patient's mobility; develop plan if impaired  - Assess patient's need for assistive devices and provide as appropriate  - Encourage maximum independence but intervene and supervise when necessary  - Involve family in performance of ADLs  - Assess for home care needs following discharge   - Consider OT consult to assist with ADL evaluation and planning for discharge  - Provide patient education as appropriate  Outcome: Progressing  Goal: Maintains/Returns to pre admission functional level  Description: INTERVENTIONS:  - Perform AM-PAC 6 Click Basic Mobility/ Daily Activity assessment daily.  - Set and communicate daily mobility goal to care team and patient/family/caregiver.   - Collaborate with rehabilitation services on mobility goals if consulted  - Perform Range of Motion x times a day.  - Reposition patient every x hours.  - Dangle patient x times a day  - Stand patient x times a day  - Ambulate patient x times a day  - Out of bed to chair xx times a day   - Out of bed for meals x times a day  - Out of bed for toileting  -  Record patient progress and toleration of activity level   Outcome: Progressing

## 2025-03-16 NOTE — ASSESSMENT & PLAN NOTE
Patient's MELD Score is: 8 patient is still drinking daily with last drink being today  Undergoing weekly Thora/paracentesis (last on 3/13/25)  GI consult to determine if long-term catheter placement is feasible for her  CIWA protocol  Resume home Aldactone, lactulose, Lasix

## 2025-03-16 NOTE — ASSESSMENT & PLAN NOTE
"Patient receives weekly right thoracentesis last on 3/13  Chest x-ray final read: \"Right hydropneumothorax with interval increase in size of pneumothorax compared with prior study. Consider chest tube placement. \"  Consult pulmonary and IR, determining chest tube placement tonight versus tomorrow as she is clinically stable with no hypoxia, respiratory distress  Will apply supplemental oxygen tonight  "

## 2025-03-16 NOTE — ASSESSMENT & PLAN NOTE
Chest x-ray shows right sided pneumothorax.  Difficult to determine whether this is ex vacuo pneumothorax related to chronic pleural effusion and need for weekly thoracentesis.  Patient is stable, saturating well on room air without respiratory distress.  She will need nonurgent chest tube placed tomorrow.  Discussed at length with primary team and interventional radiology.

## 2025-03-16 NOTE — ASSESSMENT & PLAN NOTE
Presenting with lightheadedness and weakness.  Noted history of hyponatremia, possibly due to cirrhosis as well as intravascular depletion  Saw Dr. Tovar 1 month ago, belive that her baseline is likely in the low 130s  She is noncompliant with her fluid restriction.  Resume fluid restriction at 1.2 L/day, resume diuretics  Recheck CMP tomorrow  Nephrology consult

## 2025-03-16 NOTE — ED PROVIDER NOTES
"Time reflects when diagnosis was documented in both MDM as applicable and the Disposition within this note       Time User Action Codes Description Comment    3/16/2025  3:01 PM Khanh Marie Add [K70.31] Alcoholic cirrhosis of liver with ascites (HCC)     3/16/2025  3:01 PM Marie Khanh Add [E87.1] Hyponatremia     3/16/2025  3:02 PM Grygpam, Radha Modify [E87.1] Hyponatremia     3/16/2025  3:02 PM Grygiel, Radha Add [E87.6] Hypokalemia     3/16/2025  3:02 PM Grygiel, Radha Add [E83.42] Hypomagnesemia     3/16/2025  3:39 PM Khanh Marie Add [J94.8] Hydropneumothorax     3/16/2025  4:04 PM Khanh Marie Add [J93.9] Pneumothorax on right           ED Disposition       ED Disposition   Admit    Condition   Stable    Date/Time   Sun Mar 16, 2025  3:02 PM    Comment   Case was discussed with Dr. Boone and the patient's admission status was agreed to be Admission Status: observation status to the service of Dr. Boone .               Assessment & Plan       Medical Decision Making  The patient presents with recurrent severe hyponatremia in the setting of decompensated cirrhosis.  Differential diagnosis includes, but is not limited to, diuretic induced hyponatremia, potomania, other electrolyte imbalances, will obtain labs and imaging.    Initial trop 4, delta 0. ECG reviewed. Sodium 125, potassium 3.0, magnesium 1.6. Other labs as noted below. Chest x-ray final read: \"Right hydropneumothorax with interval increase in size of pneumothorax compared with prior study. Consider chest tube placement.\" SLIM consulted pulmonary and IR determining chest tube placement tonight versus tomorrow as she is clinically stable with no hypoxia, respiratory distress. Will admit to SLIM, patient agreeable.    Amount and/or Complexity of Data Reviewed  Labs: ordered. Decision-making details documented in ED Course.    Risk  Prescription drug management.  Decision regarding hospitalization.        ED Course as of " 03/18/25 1222   Sun Mar 16, 2025   1303 Sodium(!): 125   1303 Potassium(!): 3.0   1311 Albumin(!): 2.3   1311 Total Bilirubin(!): 1.92       Medications   potassium chloride oral solution 40 mEq (40 mEq Oral Given 3/16/25 1417)   magnesium sulfate 2 g/50 mL IVPB (premix) 2 g (0 g Intravenous Stopped 3/16/25 1515)       ED Risk Strat Scores   HEART Risk Score      Flowsheet Row Most Recent Value   Heart Score Risk Calculator    History 0 Filed at: 03/18/2025 1221   ECG 1 Filed at: 03/18/2025 1221   Age 2 Filed at: 03/18/2025 1221   Risk Factors 2 Filed at: 03/18/2025 1221   Troponin 0 Filed at: 03/18/2025 1221   HEART Score 5 Filed at: 03/18/2025 1221          HEART Risk Score      Flowsheet Row Most Recent Value   Heart Score Risk Calculator    History 0 Filed at: 03/18/2025 1221   ECG 1 Filed at: 03/18/2025 1221   Age 2 Filed at: 03/18/2025 1221   Risk Factors 2 Filed at: 03/18/2025 1221   Troponin 0 Filed at: 03/18/2025 1221   HEART Score 5 Filed at: 03/18/2025 1221                   CIWA-Ar Score       Row Name 03/18/25 0215 03/17/25 2300 03/17/25 1941       CIWA-Ar    Nausea and Vomiting 0 0 0    Tactile Disturbances 0 0 0    Tremor 0 0 1    Auditory Disturbances 0 0 0    Paroxysmal Sweats 0 0 0    Visual Disturbances 0 0 0    Anxiety 1 2 1    Headache, Fullness in Head 0 0 1    Agitation 0 0 0    Orientation and Clouding of Sensorium 0 0 0    CIWA-Ar Total 1 2 3      Row Name 03/17/25 14:18:59 03/17/25 11:14:03 03/17/25 07:04:34       CIWA-Ar    Blood Pressure -- -- 92/52    Nausea and Vomiting 0 0 0    Tactile Disturbances 0 0 0    Tremor 1 2 2    Auditory Disturbances 0 0 0    Paroxysmal Sweats 0 0 0    Visual Disturbances 0 0 0    Anxiety 0 0 0    Headache, Fullness in Head 2 3 0    Agitation 0 0 0    Orientation and Clouding of Sensorium 0 0 0    CIWA-Ar Total 3 5 2      Row Name 03/17/25 0431 03/17/25 0000 03/16/25 2000       CIWA-Ar    Nausea and Vomiting 0 0  pt sleeping 0    Tactile Disturbances 0 0 0     Tremor 2 0 3    Auditory Disturbances 0 0 0    Paroxysmal Sweats 0 0 0    Visual Disturbances 0 0 0    Anxiety 0 0 0    Headache, Fullness in Head 0 0 0    Agitation 0 0 1    Orientation and Clouding of Sensorium 0 0 0    CIWA-Ar Total 2 0 4      Row Name 03/16/25 1600             CIWA-Ar    Nausea and Vomiting 0      Tactile Disturbances 0      Tremor 1      Auditory Disturbances 0      Paroxysmal Sweats 0      Visual Disturbances 0      Anxiety 1      Headache, Fullness in Head 0      Agitation 2      Orientation and Clouding of Sensorium 0      CIWA-Ar Total 4                              SBIRT 22yo+      Flowsheet Row Most Recent Value   Initial Alcohol Screen: US AUDIT-C     1. How often do you have a drink containing alcohol? 0 Filed at: 03/16/2025 1400   2. How many drinks containing alcohol do you have on a typical day you are drinking?  0 Filed at: 03/16/2025 1400   3a. Male UNDER 65: How often do you have five or more drinks on one occasion? 0 Filed at: 03/16/2025 1400   3b. FEMALE Any Age, or MALE 65+: How often do you have 4 or more drinks on one occassion? 0 Filed at: 03/16/2025 1400   Audit-C Score 0 Filed at: 03/16/2025 1400   CELSO: How many times in the past year have you...    Used an illegal drug or used a prescription medication for non-medical reasons? Never Filed at: 03/16/2025 1400                            History of Present Illness       Chief Complaint   Patient presents with    Abnormal Lab     Patient here for low sodium. Patient got labs last week and told to come here if symptomatic. Patient is lightheaded, weak. + SOB.        Past Medical History:   Diagnosis Date    Alcoholic fatty liver     Anxiety     Asthma     COPD (chronic obstructive pulmonary disease) (HCC)     Elevated liver function tests     GERD (gastroesophageal reflux disease)     GERD without esophagitis     Hyperlipidemia     Hypertension     IBS (irritable bowel syndrome)     Insomnia     Insomnia     Liver disease      Nervousness     Nicotine dependence     Other specified urinary incontinence     RLS (restless legs syndrome)     Wears glasses       Past Surgical History:   Procedure Laterality Date    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    BREAST EXCISIONAL BIOPSY Right 11/01/2004    benign    CATARACT EXTRACTION, BILATERAL  08/01/2018    COLONOSCOPY N/A 05/08/2019    Procedure: COLONOSCOPY;  Surgeon: Jacobo Leoanrdo MD;  Location: [facility];  Service: Gastroenterology    EGD AND COLONOSCOPY N/A 03/19/2018    Procedure: EGD AND COLONOSCOPY;  Surgeon: Jacobo Leonardo MD;  Location: [facility];  Service: Gastroenterology    ESOPHAGOGASTRODUODENOSCOPY N/A 05/08/2019    Procedure: ESOPHAGOGASTRODUODENOSCOPY (EGD);  Surgeon: Jacobo Leonardo MD;  Location: [facility];  Service: Gastroenterology    IR CHEST TUBE PLACEMENT  3/17/2025    IR PARACENTESIS  11/27/2020    IR PARACENTESIS  12/08/2020    IR PARACENTESIS  10/28/2022    IR PARACENTESIS  11/07/2022    IR PARACENTESIS  11/30/2022    IR PARACENTESIS  12/30/2022    IR PARACENTESIS  01/16/2023    IR PARACENTESIS  08/06/2024    IR PARACENTESIS  09/18/2024    IR PARACENTESIS  10/03/2024    IR PARACENTESIS  10/10/2024    IR PARACENTESIS  10/17/2024    IR PARACENTESIS  10/24/2024    IR PARACENTESIS  11/01/2024    IR PARACENTESIS  11/07/2024    IR PARACENTESIS  11/14/2024    IR PARACENTESIS  11/21/2024    IR PARACENTESIS  11/29/2024    IR PARACENTESIS  12/05/2024    IR PARACENTESIS  12/12/2024    IR PARACENTESIS  12/19/2024    IR PARACENTESIS  12/26/2024    IR PARACENTESIS  01/06/2025    IR PARACENTESIS  01/09/2025    IR PARACENTESIS  01/16/2025    IR PARACENTESIS  1/23/2025    IR PARACENTESIS  1/30/2025    IR PARACENTESIS  2/5/2025    IR PARACENTESIS  2/11/2025    IR PARACENTESIS  2/20/2025    IR PARACENTESIS  2/27/2025    IR PARACENTESIS  3/6/2025    IR PARACENTESIS  3/13/2025    IR THORACENTESIS  01/16/2023    IR THORACENTESIS  03/28/2023    IR THORACENTESIS  07/25/2024     IR THORACENTESIS  08/06/2024    IR THORACENTESIS  08/14/2024    IR THORACENTESIS  08/21/2024    IR THORACENTESIS  08/28/2024    IR THORACENTESIS  09/04/2024    IR THORACENTESIS  09/11/2024    IR THORACENTESIS  09/18/2024    IR THORACENTESIS  09/25/2024    IR THORACENTESIS  10/03/2024    IR THORACENTESIS  10/10/2024    IR THORACENTESIS  10/17/2024    IR THORACENTESIS  10/24/2024    IR THORACENTESIS  11/01/2024    IR THORACENTESIS  11/07/2024    IR THORACENTESIS  11/14/2024    IR THORACENTESIS  11/21/2024    IR THORACENTESIS  11/29/2024    IR THORACENTESIS  12/05/2024    IR THORACENTESIS  12/12/2024    IR THORACENTESIS  12/19/2024    IR THORACENTESIS  12/26/2024    IR THORACENTESIS  01/03/2025    IR THORACENTESIS  01/09/2025    IR THORACENTESIS  01/16/2025    IR THORACENTESIS  1/23/2025    IR THORACENTESIS  1/30/2025    IR THORACENTESIS  2/5/2025    IR THORACENTESIS  2/11/2025    IR THORACENTESIS  2/20/2025    IR THORACENTESIS  2/27/2025    IR THORACENTESIS  3/6/2025    IR THORACENTESIS  3/13/2025    KNEE SURGERY Left     ACL repair. Date: early 2000s    LUMBAR LAMINECTOMY  04/1999    LYMPH NODE BIOPSY      MAMMO STEREOTACTIC BREAST BIOPSY RIGHT (ALL INC) Right 05/21/2021    MAMMO STEREOTACTIC BREAST BIOPSY RIGHT (ALL INC) EACH ADD Right 05/21/2021    TOTAL ABDOMINAL HYSTERECTOMY  02/05/2008    TUBAL LIGATION  1989    UPPER GASTROINTESTINAL ENDOSCOPY        Family History   Problem Relation Age of Onset    Breast cancer Mother 32    No Known Problems Father     No Known Problems Sister     No Known Problems Brother     No Known Problems Maternal Grandmother     No Known Problems Maternal Grandfather     No Known Problems Paternal Grandmother     No Known Problems Paternal Grandfather     No Known Problems Maternal Aunt     No Known Problems Maternal Aunt     No Known Problems Paternal Aunt     No Known Problems Son     Substance Abuse Neg Hx     Mental illness Neg Hx     Colon cancer Neg Hx     Colon polyps Neg Hx        Social History     Tobacco Use    Smoking status: Every Day     Current packs/day: 1.00     Average packs/day: 1 pack/day for 47.2 years (47.2 ttl pk-yrs)     Types: Cigarettes     Start date: 1/1/1978    Smokeless tobacco: Never   Vaping Use    Vaping status: Never Used   Substance Use Topics    Alcohol use: Yes     Alcohol/week: 6.0 standard drinks of alcohol     Types: 6 Cans of beer per week     Comment: 7-10 drinks/wk    Drug use: No      E-Cigarette/Vaping    E-Cigarette Use Never User       E-Cigarette/Vaping Substances    Nicotine No     THC No     CBD No     Flavoring No     Other No     Unknown No       I have reviewed and agree with the history as documented.     The patient is a 64-year-old female with a past medical history of cirrhosis secondary to chronic alcohol use which is decompensated with ascites, hepatic hydrothorax, lower extremity edema, thrombocytopenia, esophageal varices, hypoalbuminemia and hypothermic anemia, who is presenting to the emergency department for evaluation of hyponatremia.  On chart review, it seems that patient has recurrent severe hyponatremia. Patient had a thoracentesis and paracentesis on 03/13/2025 due to alcoholic cirrhosis of liver with ascites and pleural effusion associated with hepatic disorder. Patient reports she continues to drink daily, consuming 1 beer and 1 glass of wine daily.           Review of Systems   Constitutional:  Negative for chills and fever.   HENT:  Negative for ear pain and sore throat.    Eyes:  Negative for pain and visual disturbance.   Respiratory:  Negative for cough and shortness of breath.    Cardiovascular:  Positive for chest pain (reports mild). Negative for palpitations and leg swelling.   Gastrointestinal:  Negative for abdominal pain and vomiting.   Genitourinary:  Negative for dysuria and hematuria.   Musculoskeletal:  Negative for arthralgias and back pain.   Skin:  Negative for color change and rash.   Neurological:  Positive  for weakness (Generalized) and light-headedness. Negative for dizziness, seizures and syncope.   All other systems reviewed and are negative.          Objective       ED Triage Vitals   Temperature Pulse Blood Pressure Respirations SpO2 Patient Position - Orthostatic VS   03/16/25 1230 03/16/25 1230 03/16/25 1230 03/16/25 1230 03/16/25 1230 03/16/25 1230   97.6 °F (36.4 °C) 92 112/60 18 97 % Sitting      Temp Source Heart Rate Source BP Location FiO2 (%) Pain Score    03/16/25 1230 03/16/25 1230 03/16/25 1230 -- 03/16/25 1551    Temporal Monitor Left arm  No Pain      Vitals      Date and Time Temp Pulse SpO2 Resp BP Pain Score FACES Pain Rating User   03/18/25 1110 -- 82 92 % -- 90/49 -- -- DII   03/18/25 0715 97.9 °F (36.6 °C) -- -- -- -- -- -- ED   03/18/25 0715 -- 78 91 % 20 101/55 -- -- DII   03/18/25 0542 -- 86 90 % -- 103/49 -- -- DII   03/18/25 0319 -- 78 93 % -- 88/49 -- -- CD   03/18/25 0215 -- 80 93 % -- 87/49 -- -- DII   03/18/25 0000 -- 78 94 % -- 91/48 -- -- DII   03/17/25 2253 97.2 °F (36.2 °C) 83 94 % 18 87/45 -- -- DII   03/17/25 1941 -- -- -- -- -- 5 -- CD   03/17/25 1941 -- 78 94 % -- 92/47 -- -- DII   03/17/25 1653 -- 78 94 % -- 92/52 -- -- DII   03/17/25 1542 96.8 °F (36 °C) 77 94 % 18 82/44 -- -- DII   03/17/25 1418 97.7 °F (36.5 °C) -- -- -- -- -- -- ED   03/17/25 1418 -- 87 95 % -- 105/52 -- -- DII   03/17/25 1358 -- 86 94 % 19 95/53 -- -- RW   03/17/25 1348 -- 86 97 % 17 106/56 -- -- RW   03/17/25 1114 98.7 °F (37.1 °C) -- -- 19 -- -- -- ED   03/17/25 1114 -- 79 94 % -- 107/52 -- -- DII   03/17/25 0940 -- 80 93 % -- 92/52 -- -- DII   03/17/25 0907 -- -- 94 % -- -- No Pain -- ED   03/17/25 0800 -- 80 91 % -- 89/41 -- -- DII   03/17/25 0704 97.8 °F (36.6 °C) -- -- -- 92/52 -- -- ED   03/17/25 0704 -- -- -- 20 -- -- -- YB   03/17/25 0704 -- 76 93 % -- -- -- -- DII   03/17/25 0609 -- -- -- 16 -- -- -- DC   03/17/25 0458 -- 82 93 % -- 95/46 -- -- DII   03/17/25 0431 -- 80 93 % -- 99/49 -- -- DC    03/16/25 2013 97.3 °F (36.3 °C) 89 93 % -- 104/59 -- -- DII   03/16/25 2013 -- 88 93 % -- 104/59 -- -- DII   03/16/25 1958 -- -- -- -- -- No Pain -- DC   03/16/25 1746 -- -- -- -- -- No Pain -- JS   03/16/25 1558 97 °F (36.1 °C) 80 96 % -- 104/66 -- -- DII   03/16/25 1557 97 °F (36.1 °C) 89 95 % -- 104/66 -- -- DII   03/16/25 1551 -- -- -- -- -- No Pain -- JS   03/16/25 1230 97.6 °F (36.4 °C) 92 97 % 18 112/60 -- -- AM            Physical Exam  Vitals and nursing note reviewed.   Constitutional:       General: She is not in acute distress.     Appearance: Normal appearance. She is well-developed. She is not ill-appearing.   HENT:      Head: Normocephalic and atraumatic.      Nose: Nose normal.      Mouth/Throat:      Mouth: Mucous membranes are moist.   Eyes:      General: No scleral icterus.     Conjunctiva/sclera: Conjunctivae normal.   Cardiovascular:      Rate and Rhythm: Normal rate and regular rhythm.   Pulmonary:      Effort: Pulmonary effort is normal. No respiratory distress.      Breath sounds: Normal breath sounds. No wheezing or rhonchi.   Abdominal:      General: Abdomen is flat. There is distension (Reports baseline, with improvement after recent paracentesis).      Palpations: Abdomen is soft.      Tenderness: There is no abdominal tenderness. There is no guarding or rebound.   Musculoskeletal:         General: No swelling.      Cervical back: Neck supple.      Right lower leg: No edema.      Left lower leg: No edema.   Skin:     General: Skin is warm and dry.      Capillary Refill: Capillary refill takes less than 2 seconds.   Neurological:      Mental Status: She is alert.   Psychiatric:         Mood and Affect: Mood normal.         Results Reviewed       Procedure Component Value Units Date/Time    CBC and differential [[id number]]  (Abnormal) Collected: 03/17/25 0436    Lab Status: Final result Specimen: Blood from Arm, Left Updated: 03/17/25 0521     WBC 4.37 Thousand/uL      RBC 3.30 Million/uL       Hemoglobin 11.7 g/dL      Hematocrit 33.4 %       fL      MCH 35.5 pg      MCHC 35.0 g/dL      RDW 13.0 %      MPV 9.0 fL      Platelets 93 Thousands/uL      nRBC 0 /100 WBCs      Segmented % 57 %      Immature Grans % 0 %      Lymphocytes % 18 %      Monocytes % 22 %      Eosinophils Relative 2 %      Basophils Relative 1 %      Absolute Neutrophils 2.48 Thousands/µL      Absolute Immature Grans 0.01 Thousand/uL      Absolute Lymphocytes 0.77 Thousands/µL      Absolute Monocytes 0.97 Thousand/µL      Eosinophils Absolute 0.09 Thousand/µL      Basophils Absolute 0.05 Thousands/µL     Magnesium [[id number]]  (Abnormal) Collected: 03/17/25 0436    Lab Status: Final result Specimen: Blood from Arm, Left Updated: 03/17/25 0510     Magnesium 1.8 mg/dL     HS Troponin I 2hr [[id number]]  (Normal) Collected: 03/16/25 1545    Lab Status: Final result Specimen: Blood from Arm, Right Updated: 03/16/25 1611     hs TnI 2hr 4 ng/L      Delta 2hr hsTnI 0 ng/L     Ammonia [[id number]]  (Normal) Collected: 03/16/25 1317    Lab Status: Final result Specimen: Blood from Arm, Right Updated: 03/16/25 1344     Ammonia 26 umol/L     Magnesium [[id number]]  (Abnormal) Collected: 03/16/25 1236    Lab Status: Final result Specimen: Blood from Arm, Right Updated: 03/16/25 1343     Magnesium 1.6 mg/dL     Phosphorus [[id number]]  (Normal) Collected: 03/16/25 1236    Lab Status: Final result Specimen: Blood from Arm, Right Updated: 03/16/25 1343     Phosphorus 2.6 mg/dL     HS Troponin 0hr (reflex protocol) [[id number]]  (Normal) Collected: 03/16/25 1236    Lab Status: Final result Specimen: Blood from Arm, Right Updated: 03/16/25 1306     hs TnI 0hr 4 ng/L     Comprehensive metabolic panel [[id number]]  (Abnormal) Collected: 03/16/25 1236    Lab Status: Final result Specimen: Blood from Arm, Right Updated: 03/16/25 1259     Sodium 125 mmol/L      Potassium 3.0 mmol/L      Chloride 91 mmol/L      CO2 28 mmol/L      ANION GAP 6 mmol/L       BUN 8 mg/dL      Creatinine 0.70 mg/dL      Glucose 119 mg/dL      Calcium 8.1 mg/dL      Corrected Calcium 9.5 mg/dL      AST 30 U/L      ALT 13 U/L      Alkaline Phosphatase 66 U/L      Total Protein 7.0 g/dL      Albumin 2.3 g/dL      Total Bilirubin 1.92 mg/dL      eGFR 91 ml/min/1.73sq m     Narrative:      National Kid[employer] Disease Foundation guidelines for Chronic Kidney Disease (CKD):     Stage 1 with normal or high GFR (GFR > 90 mL/min/1.73 square meters)    Stage 2 Mild CKD (GFR = 60-89 mL/min/1.73 square meters)    Stage 3A Moderate CKD (GFR = 45-59 mL/min/1.73 square meters)    Stage 3B Moderate CKD (GFR = 30-44 mL/min/1.73 square meters)    Stage 4 Severe CKD (GFR = 15-29 mL/min/1.73 square meters)    Stage 5 End Stage CKD (GFR <15 mL/min/1.73 square meters)  Note: GFR calculation is accurate only with a steady state creatinine    CBC and differential [[id number]]  (Abnormal) Collected: 03/16/25 1236    Lab Status: Final result Specimen: Blood from Arm, Right Updated: 03/16/25 1242     WBC 4.31 Thousand/uL      RBC 3.96 Million/uL      Hemoglobin 13.8 g/dL      Hematocrit 40.6 %       fL      MCH 34.8 pg      MCHC 34.0 g/dL      RDW 13.0 %      MPV 8.9 fL      Platelets 115 Thousands/uL      nRBC 0 /100 WBCs      Segmented % 68 %      Immature Grans % 1 %      Lymphocytes % 13 %      Monocytes % 16 %      Eosinophils Relative 1 %      Basophils Relative 1 %      Absolute Neutrophils 2.98 Thousands/µL      Absolute Immature Grans 0.03 Thousand/uL      Absolute Lymphocytes 0.54 Thousands/µL      Absolute Monocytes 0.67 Thousand/µL      Eosinophils Absolute 0.04 Thousand/µL      Basophils Absolute 0.05 Thousands/µL             IR chest tube placement   Final Interpretation by Jose Qureshi DO (03/17 1520)   Right hydropneumothorax with a small volume of air in the anterior pleural space. 8.5 Thai pigtail chest tube placed within the right anterior hydropneumothorax. Connected to  Pleur-evac.      _______________________________________________________________   COMPARISON: Chest x-ray 3/16/2025      PROCEDURE DETAILS:   Operators: Dr. Qureshi   Anesthesia: Local anesthesia.   Medications: 1% lidocaine, fentanyl      This examination, like all CT scans performed in the [facility], was performed utilizing techniques to minimize radiation dose exposure, including the use of iterative reconstruction and automated exposure control.      COMMENTS:   The patient was placed supine on the CT scanner.  After axial images were obtained through the region of interest, an area of skin was then marked, prepped and draped in the usual sterile fashion.  A preprocedure timeout was then performed per [facility]'s    protocol.      A 19-gauge needle was advanced using intermittent CT guidance into the anterior pleural space. Following Amplatz wire advancement, the access was dilated and an 8.5 Nigerian pigtail chest tube was placed. Position was confirmed by CT. The tube was sutured    skin with 2-0 Prolene and connected to a Pleur-evac. Sterile dressings were applied            Workstation performed: [id number]         XR chest portable   Final Interpretation by Andrea Alejandra MD (03/16 1533)   Right hydropneumothorax with interval increase in size of pneumothorax compared with prior study. Consider chest tube placement.      The study was marked in EPIC for immediate notification.            Workstation performed: [id number]         XR chest portable    (Results Pending)   XR chest portable    (Results Pending)       ECG 12 Lead Documentation Only    Date/Time: 3/16/2025 11:33 AM    Performed by: Radha Ferguson PA-C  Authorized by: Radha Ferguson PA-C    Indications / Diagnosis:  Cp  ECG reviewed by me, the ED Provider: yes (ed attending)    Rate:     ECG rate:  92      ED Medication and Procedure Management   Prior to Admission Medications   Prescriptions Last Dose Informant Patient Reported?  Taking?   ALPRAZolam (XANAX) 1 mg tablet  Self No No   Sig: Take 1 tablet (1 mg total) by mouth 2 (two) times a day as needed for anxiety   Risankizumab-rzaa (SKYRIZI PEN SC)  Self Yes No   Sig: Inject under the skin   albuterol (2.5 mg/3 mL) 0.083 % nebulizer solution  Self No No   Sig: Take 3 mL (2.5 mg total) by nebulization every 6 (six) hours as needed for wheezing or shortness of breath   albuterol (Proventil HFA) 90 mcg/act inhaler  Self No No   Sig: Inhale 2 puffs every 6 (six) hours as needed for wheezing or shortness of breath   alendronate (Fosamax) 70 mg tablet  Self No No   Sig: Take 1 tablet (70 mg total) by mouth every 7 days   anastrozole (ARIMIDEX) 1 mg tablet  Self No No   Sig: Take 1 tablet (1 mg total) by mouth daily   budesonide-formoterol (Symbicort) 160-4.5 mcg/act inhaler  Self No No   Sig: Inhale 2 puffs 2 (two) times a day Rinse mouth after use.   furosemide (LASIX) 20 mg tablet  Self No No   Sig: Take 1 tablet (20 mg total) by mouth daily   lactulose (CHRONULAC) 10 g/15 mL solution  Self No No   Sig: Take 15 mL (10 g total) by mouth 2 (two) times a day   midodrine (PROAMATINE) 10 MG tablet  Self No No   Sig: Take 1 tablet (10 mg total) by mouth 3 (three) times a day before meals   pantoprazole (PROTONIX) 20 mg tablet  Self No No   Sig: Take 1 tablet (20 mg total) by mouth daily   spironolactone (ALDACTONE) 25 mg tablet  Self No No   Sig: Take 1 tablet (25 mg total) by mouth daily   traZODone (DESYREL) 50 mg tablet  Self No No   Sig: Take 1 tablet (50 mg total) by mouth daily at bedtime      Facility-Administered Medications: None     Current Discharge Medication List        CONTINUE these medications which have NOT CHANGED    Details   albuterol (2.5 mg/3 mL) 0.083 % nebulizer solution Take 3 mL (2.5 mg total) by nebulization every 6 (six) hours as needed for wheezing or shortness of breath  Qty: 1080 mL, Refills: 3    Associated Diagnoses: Moderate COPD (chronic obstructive pulmonary  disease) ([facility])      albuterol (Proventil HFA) 90 mcg/act inhaler Inhale 2 puffs every 6 (six) hours as needed for wheezing or shortness of breath  Qty: 18 g, Refills: 6    Comments: Substitution to a formulary equivalent within the same pharmaceutical class is authorized.  Associated Diagnoses: Moderate COPD (chronic obstructive pulmonary disease) ([facility])      alendronate (Fosamax) 70 mg tablet Take 1 tablet (70 mg total) by mouth every 7 days  Qty: 12 tablet, Refills: 3    Associated Diagnoses: Osteopenia of multiple sites; Post-menopausal      ALPRAZolam (XANAX) 1 mg tablet Take 1 tablet (1 mg total) by mouth 2 (two) times a day as needed for anxiety  Qty: 60 tablet, Refills: 2    Associated Diagnoses: Anxiety      anastrozole (ARIMIDEX) 1 mg tablet Take 1 tablet (1 mg total) by mouth daily  Qty: 90 tablet, Refills: 3    Associated Diagnoses: Ductal carcinoma in situ (DCIS) of right breast      budesonide-formoterol (Symbicort) 160-4.5 mcg/act inhaler Inhale 2 puffs 2 (two) times a day Rinse mouth after use.  Qty: 10.2 g, Refills: 6    Associated Diagnoses: Moderate COPD (chronic obstructive pulmonary disease) ([facility])      furosemide (LASIX) 20 mg tablet Take 1 tablet (20 mg total) by mouth daily    Associated Diagnoses: Hyponatremia      lactulose (CHRONULAC) 10 g/15 mL solution Take 15 mL (10 g total) by mouth 2 (two) times a day  Qty: 240 mL, Refills: 0    Associated Diagnoses: Generalized weakness      midodrine (PROAMATINE) 10 MG tablet Take 1 tablet (10 mg total) by mouth 3 (three) times a day before meals  Qty: 90 tablet, Refills: 3    Associated Diagnoses: Alcoholic cirrhosis of liver with ascites (HCC)      pantoprazole (PROTONIX) 20 mg tablet Take 1 tablet (20 mg total) by mouth daily  Qty: 90 tablet, Refills: 1    Associated Diagnoses: Alcoholic cirrhosis of liver with ascites (HCC); Esophageal varices determined by endoscopy (HCC); Refractory ascites; GERD without esophagitis      Risankizumab-rzaa  (SKYRIZI PEN SC) Inject under the skin      spironolactone (ALDACTONE) 25 mg tablet Take 1 tablet (25 mg total) by mouth daily    Associated Diagnoses: Alcoholic cirrhosis of liver with ascites (HCC)      traZODone (DESYREL) 50 mg tablet Take 1 tablet (50 mg total) by mouth daily at bedtime  Qty: 30 tablet, Refills: 5    Associated Diagnoses: Insomnia, unspecified type           No discharge procedures on file.  ED SEPSIS DOCUMENTATION   Time reflects when diagnosis was documented in both MDM as applicable and the Disposition within this note       Time User Action Codes Description Comment    3/16/2025  3:01 PM Khanh Marie [K70.31] Alcoholic cirrhosis of liver with ascites (HCC)     3/16/2025  3:01 PM Khanh Marie [E87.1] Hyponatremia     3/16/2025  3:02 PM Radha Ferguson Modify [E87.1] Hyponatremia     3/16/2025  3:02 PM Radha Ferguson [E87.6] Hypokalemia     3/16/2025  3:02 PM Radha Ferguson Add [E83.42] Hypomagnesemia     3/16/2025  3:39 PM Khanh Marie [J94.8] Hydropneumothorax     3/16/2025  4:04 PM Khanh Marie [J93.9] Pneumothorax on right                  Radha Ferguson PA-C  03/18/25 1222

## 2025-03-16 NOTE — ASSESSMENT & PLAN NOTE
We will need to determine is whether the tube that is placed to address the pneumothorax is in the form of a pigtail or indwelling pleural catheter.  If we can better manage the ascites with an indwelling catheter, we may be able to avoid a pleural catheter.

## 2025-03-16 NOTE — TELEMEDICINE
e-Consult ([facility])  - Interventional Radiology  Jose White 64 y.o. female MRN: [id number]  Unit/Bed#: -01 Encounter: [id number]          Interventional Radiology has been consulted to evaluate Jose White    We were consulted by pulmonary service concerning this patient with pneumothorax.    Inpatient Consult to IR  Consult performed by: Pedrito Thompson MD  Consult ordered by: Khanh Marie PA-C        03/16/25    Assessment/Recommendation:   64 year-old female with history of cirrhosis secondary to alcohol abuse with decompensated ascites, and hepatic hydrothorax, currently admitted due to shortness of breath, lightheadedness, and weakness. Patient has been receiving weekly thoracentesis and paracentesis    Patient was found to have a hydropneumothorax on recent chest radiograph. Last thoracentesis was performed on 3/13.     A component of the pneumothorax may potentially be ex vacuo pneumothorax, however, is larger when compared to chest radiograph from January.    We will plan for chest tube placement tomorrow.       >5 min, >50% time spent reviewing/analyzing record, written report will be generated in the EMR.     Thank you for allowing Interventional Radiology to participate in the care of Jose White. Please don't hesitate to call or TigerText us with any questions.     Pedrito Thompson MD

## 2025-03-16 NOTE — ASSESSMENT & PLAN NOTE
According to Dr. Herrera, patient is not a liver transplant candidate.  Patient is fully aware of this and is willing to speak with palliative care.  She understands that indwelling catheters would be purely palliative and would not correct the underlying problem.  GI evaluation is pending and their input will be greatly appreciated in terms of how to best move forward.

## 2025-03-17 ENCOUNTER — APPOINTMENT (INPATIENT)
Dept: CT IMAGING | Facility: HOSPITAL | Age: 65
DRG: 200 | End: 2025-03-17
Attending: INTERNAL MEDICINE
Payer: MEDICARE

## 2025-03-17 PROBLEM — R13.19 OTHER DYSPHAGIA: Status: ACTIVE | Noted: 2025-03-17

## 2025-03-17 PROBLEM — Z51.5 PALLIATIVE CARE PATIENT: Status: ACTIVE | Noted: 2025-03-17

## 2025-03-17 LAB
ALBUMIN SERPL BCG-MCNC: 1.9 G/DL (ref 3.5–5)
ALP SERPL-CCNC: 51 U/L (ref 34–104)
ALT SERPL W P-5'-P-CCNC: 9 U/L (ref 7–52)
ANION GAP SERPL CALCULATED.3IONS-SCNC: 3 MMOL/L (ref 4–13)
AST SERPL W P-5'-P-CCNC: 23 U/L (ref 13–39)
BASOPHILS # BLD AUTO: 0.05 THOUSANDS/ÂΜL (ref 0–0.1)
BASOPHILS NFR BLD AUTO: 1 % (ref 0–1)
BILIRUB SERPL-MCNC: 1.49 MG/DL (ref 0.2–1)
BUN SERPL-MCNC: 8 MG/DL (ref 5–25)
CALCIUM ALBUM COR SERPL-MCNC: 9.2 MG/DL (ref 8.3–10.1)
CALCIUM SERPL-MCNC: 7.5 MG/DL (ref 8.4–10.2)
CHLORIDE SERPL-SCNC: 95 MMOL/L (ref 96–108)
CO2 SERPL-SCNC: 29 MMOL/L (ref 21–32)
CREAT SERPL-MCNC: 0.54 MG/DL (ref 0.6–1.3)
EOSINOPHIL # BLD AUTO: 0.09 THOUSAND/ÂΜL (ref 0–0.61)
EOSINOPHIL NFR BLD AUTO: 2 % (ref 0–6)
ERYTHROCYTE [DISTWIDTH] IN BLOOD BY AUTOMATED COUNT: 13 % (ref 11.6–15.1)
GFR SERPL CREATININE-BSD FRML MDRD: 99 ML/MIN/1.73SQ M
GLUCOSE SERPL-MCNC: 90 MG/DL (ref 65–140)
HCT VFR BLD AUTO: 33.4 % (ref 34.8–46.1)
HGB BLD-MCNC: 11.7 G/DL (ref 11.5–15.4)
IMM GRANULOCYTES # BLD AUTO: 0.01 THOUSAND/UL (ref 0–0.2)
IMM GRANULOCYTES NFR BLD AUTO: 0 % (ref 0–2)
LYMPHOCYTES # BLD AUTO: 0.77 THOUSANDS/ÂΜL (ref 0.6–4.47)
LYMPHOCYTES NFR BLD AUTO: 18 % (ref 14–44)
MAGNESIUM SERPL-MCNC: 1.8 MG/DL (ref 1.9–2.7)
MCH RBC QN AUTO: 35.5 PG (ref 26.8–34.3)
MCHC RBC AUTO-ENTMCNC: 35 G/DL (ref 31.4–37.4)
MCV RBC AUTO: 101 FL (ref 82–98)
MONOCYTES # BLD AUTO: 0.97 THOUSAND/ÂΜL (ref 0.17–1.22)
MONOCYTES NFR BLD AUTO: 22 % (ref 4–12)
NEUTROPHILS # BLD AUTO: 2.48 THOUSANDS/ÂΜL (ref 1.85–7.62)
NEUTS SEG NFR BLD AUTO: 57 % (ref 43–75)
NRBC BLD AUTO-RTO: 0 /100 WBCS
PLATELET # BLD AUTO: 93 THOUSANDS/UL (ref 149–390)
PMV BLD AUTO: 9 FL (ref 8.9–12.7)
POTASSIUM SERPL-SCNC: 4.1 MMOL/L (ref 3.5–5.3)
PROT SERPL-MCNC: 5.8 G/DL (ref 6.4–8.4)
RBC # BLD AUTO: 3.3 MILLION/UL (ref 3.81–5.12)
SODIUM SERPL-SCNC: 127 MMOL/L (ref 135–147)
WBC # BLD AUTO: 4.37 THOUSAND/UL (ref 4.31–10.16)

## 2025-03-17 PROCEDURE — 86334 IMMUNOFIX E-PHORESIS SERUM: CPT | Performed by: INTERNAL MEDICINE

## 2025-03-17 PROCEDURE — NC001 PR NO CHARGE: Performed by: RADIOLOGY

## 2025-03-17 PROCEDURE — 32551 INSERTION OF CHEST TUBE: CPT

## 2025-03-17 PROCEDURE — 99232 SBSQ HOSP IP/OBS MODERATE 35: CPT | Performed by: INTERNAL MEDICINE

## 2025-03-17 PROCEDURE — 99223 1ST HOSP IP/OBS HIGH 75: CPT | Performed by: INTERNAL MEDICINE

## 2025-03-17 PROCEDURE — 84165 PROTEIN E-PHORESIS SERUM: CPT | Performed by: INTERNAL MEDICINE

## 2025-03-17 PROCEDURE — 84166 PROTEIN E-PHORESIS/URINE/CSF: CPT | Performed by: INTERNAL MEDICINE

## 2025-03-17 PROCEDURE — 99222 1ST HOSP IP/OBS MODERATE 55: CPT | Performed by: STUDENT IN AN ORGANIZED HEALTH CARE EDUCATION/TRAINING PROGRAM

## 2025-03-17 PROCEDURE — 83735 ASSAY OF MAGNESIUM: CPT | Performed by: PHYSICIAN ASSISTANT

## 2025-03-17 PROCEDURE — 85025 COMPLETE CBC W/AUTO DIFF WBC: CPT | Performed by: PHYSICIAN ASSISTANT

## 2025-03-17 PROCEDURE — 99223 1ST HOSP IP/OBS HIGH 75: CPT

## 2025-03-17 PROCEDURE — 83521 IG LIGHT CHAINS FREE EACH: CPT | Performed by: INTERNAL MEDICINE

## 2025-03-17 PROCEDURE — 32557 INSERT CATH PLEURA W/ IMAGE: CPT | Performed by: RADIOLOGY

## 2025-03-17 PROCEDURE — 0B9N30Z DRAINAGE OF RIGHT PLEURA WITH DRAINAGE DEVICE, PERCUTANEOUS APPROACH: ICD-10-PCS | Performed by: RADIOLOGY

## 2025-03-17 PROCEDURE — 80053 COMPREHEN METABOLIC PANEL: CPT | Performed by: PHYSICIAN ASSISTANT

## 2025-03-17 RX ORDER — MAGNESIUM SULFATE HEPTAHYDRATE 40 MG/ML
2 INJECTION, SOLUTION INTRAVENOUS ONCE
Status: COMPLETED | OUTPATIENT
Start: 2025-03-17 | End: 2025-03-17

## 2025-03-17 RX ORDER — LIDOCAINE HYDROCHLORIDE 10 MG/ML
INJECTION, SOLUTION EPIDURAL; INFILTRATION; INTRACAUDAL; PERINEURAL AS NEEDED
Status: COMPLETED | OUTPATIENT
Start: 2025-03-17 | End: 2025-03-17

## 2025-03-17 RX ORDER — ALPRAZOLAM 0.5 MG
1 TABLET ORAL ONCE
Status: COMPLETED | OUTPATIENT
Start: 2025-03-17 | End: 2025-03-17

## 2025-03-17 RX ORDER — FENTANYL CITRATE 50 UG/ML
INJECTION, SOLUTION INTRAMUSCULAR; INTRAVENOUS AS NEEDED
Status: COMPLETED | OUTPATIENT
Start: 2025-03-17 | End: 2025-03-17

## 2025-03-17 RX ORDER — ALBUMIN (HUMAN) 12.5 G/50ML
25 SOLUTION INTRAVENOUS EVERY 6 HOURS
Status: COMPLETED | OUTPATIENT
Start: 2025-03-17 | End: 2025-03-18

## 2025-03-17 RX ADMIN — BUDESONIDE AND FORMOTEROL FUMARATE DIHYDRATE 2 PUFF: 160; 4.5 AEROSOL RESPIRATORY (INHALATION) at 17:27

## 2025-03-17 RX ADMIN — ALPRAZOLAM 1 MG: 0.5 TABLET ORAL at 12:07

## 2025-03-17 RX ADMIN — ALPRAZOLAM 1 MG: 0.5 TABLET ORAL at 20:47

## 2025-03-17 RX ADMIN — FOLIC ACID 1 MG: 1 TABLET ORAL at 08:05

## 2025-03-17 RX ADMIN — ALBUMIN (HUMAN) 25 G: 0.25 INJECTION, SOLUTION INTRAVENOUS at 17:27

## 2025-03-17 RX ADMIN — BUDESONIDE AND FORMOTEROL FUMARATE DIHYDRATE 2 PUFF: 160; 4.5 AEROSOL RESPIRATORY (INHALATION) at 08:08

## 2025-03-17 RX ADMIN — MULTIPLE VITAMINS W/ MINERALS TAB 1 TABLET: TAB ORAL at 08:05

## 2025-03-17 RX ADMIN — PANTOPRAZOLE SODIUM 20 MG: 20 TABLET, DELAYED RELEASE ORAL at 08:07

## 2025-03-17 RX ADMIN — MAGNESIUM SULFATE HEPTAHYDRATE 2 G: 40 INJECTION, SOLUTION INTRAVENOUS at 09:39

## 2025-03-17 RX ADMIN — MIDODRINE HYDROCHLORIDE 10 MG: 5 TABLET ORAL at 04:41

## 2025-03-17 RX ADMIN — LACTULOSE 10 G: 20 SOLUTION ORAL at 08:05

## 2025-03-17 RX ADMIN — ALBUMIN (HUMAN) 25 G: 0.25 INJECTION, SOLUTION INTRAVENOUS at 23:07

## 2025-03-17 RX ADMIN — LACTULOSE 10 G: 20 SOLUTION ORAL at 17:27

## 2025-03-17 RX ADMIN — Medication 100 MG: at 08:05

## 2025-03-17 RX ADMIN — LIDOCAINE HYDROCHLORIDE 9 ML: 10 INJECTION, SOLUTION EPIDURAL; INFILTRATION; INTRACAUDAL; PERINEURAL at 13:49

## 2025-03-17 RX ADMIN — ALBUMIN (HUMAN) 25 G: 0.25 INJECTION, SOLUTION INTRAVENOUS at 12:07

## 2025-03-17 RX ADMIN — ANASTROZOLE 1 MG: 1 TABLET, COATED ORAL at 08:05

## 2025-03-17 RX ADMIN — MIDODRINE HYDROCHLORIDE 10 MG: 5 TABLET ORAL at 17:27

## 2025-03-17 RX ADMIN — MIDODRINE HYDROCHLORIDE 10 MG: 5 TABLET ORAL at 11:14

## 2025-03-17 RX ADMIN — TRAZODONE HYDROCHLORIDE 50 MG: 50 TABLET ORAL at 20:47

## 2025-03-17 RX ADMIN — FENTANYL CITRATE 100 MCG: 50 INJECTION, SOLUTION INTRAMUSCULAR; INTRAVENOUS at 13:49

## 2025-03-17 NOTE — SEDATION DOCUMENTATION
Right anterior chest tube inserted for PTX.  Patient tolerated well. To atrium on wall suction. Stable for transfer to room via bed. All connections reviewed with primary RN.

## 2025-03-17 NOTE — CONSULTS
Consultation - Gastroenterology   Name: Jose White 64 y.o. female I MRN: [id number]  Unit/Bed#: -01 I Date of Admission: 3/16/2025   Date of Service: 3/17/2025 I Hospital Day: 1   Inpatient consult to gastroenterology  Consult performed by: She Ghotra PA-C  Consult ordered by: Khanh Marie PA-C        Physician Requesting Evaluation: Viola Andrea MD   Reason for Evaluation / Principal Problem: ESLD with ascites    Assessment & Plan  Alcoholic cirrhosis of liver with ascites (HCC)  64-year-old female with alcoholic cirrhosis decompensated by ascites, hepatic hydrothorax, varices and encephalopathy, not transplant candidate, presents with weakness and hyponatremia. GI consulted for possible Tenckhoff catheter placement as requiring paracentesis and thoracentesis 1-2 times a week.  Patient continues to drink alcohol daily and is noncompliant with fluid restriction.      -Counseled on alcohol cessation  -Agree with CIWA protocol and folic acid thiamine and vitamin replacement  -MELD 3.0=21    Ascites/hepatic hydrothorax-requiring paracentesis and thoracentesis 1-2 times weekly struggling with frequency palliative care consult placed as outpatient for to discuss Tenckhoff catheter, inpatient consult placed, await input, discussed with patient, seems undecided at this time  Hepatic encephalopathy-stage 1 in past, none on exam, monitor mental status, cont Lactulose 10g BID  Esophageal varices- EGD 12/2024 1 small G1 EV, mild PHG, rpt 1 year  HCC surveillance- US 2/2025 without liver lesions, rpt 6m  Thrombocytopenia (HCC)  Currently 93,000, no evidence of bleeding, monitor    Hyponatremia  Diuretics were discontinued by nephrology as outpt due to severe and persistent hyponatremia but restarted by nephrology   Nephrology consult pending defer regarding monitoring and correcting electrolyte abnormalities  Should be on fluid restriction of at least 1500-1800cc/day    Hypotension  On midodrine 10 mg 3 times  daily, management per primary team    Pneumothorax on right  Seen by pulmonary and IR for chest tube placement today    Other dysphagia  Complaints of dysphagia to solids with regurgitation on daily basis improved with dilation in past  EGD 12/2024 without anything to account for dysphagia  Consider barium esophagram      History of Present Illness   HPI:  Jose White is a 64 y.o. female with PMH alcoholic cirrhosis decompensated by ascites, hepatic hydrothorax, lower extremity edema, esophageal varices, encephalopathy followed by Dr. Herrera, not transplant candidate, discussing possible Tenckhoff catheter for frequent ascites prompting GI consult presents with weakness lightheadedness and hyponatremia as advised per nephrology.  Patient endorses some shortness of breath and abdominal distention.  Does note chronic dysphagia to solids with daily regurgitation without odynophagia.  Last BM a week ago without bleeding.  No pain but does note mild abdominal distention.  No confusion or asterixis.  Is noncompliant with fluid restriction but has been taking her diuretics at home per nephrology.  Drinks 2 cans of beer and 1 glass of wine daily.  Denies NSAID use.    Patient hypotensive on midodrine.  Labs show sodium 127 magnesium low total bili 1.5.  CXR right hydropneumothorax increased in size for chest tube placement by IR today.      Review of Systems  Constitutional: denies fatigue, fever.+ weakness  HEENT: denies visual disturbance, postnasal drip, sore throat.  Respiratory: denies cough, shortness of breath.  Cardiovascular: denies chest pain, leg swelling.  Gastrointestinal: as noted above in HPI.  : denies difficulty urinating, dysuria.  Musculoskeletal: denies arthralgias, back pain.  Neurological: denies dizziness, syncope.  Psychiatric: denies confusion, anxiety.      Objective :  Temp:  [97 °F (36.1 °C)-97.6 °F (36.4 °C)] 97 °F (36.1 °C)  HR:  [76-92] 80  BP: ()/(38-66) 92/52  Resp:  [16-20]  20  SpO2:  [91 %-97 %] 93 %  O2 Device: None (Room air)    Physical Exam  Constitutional: Well-developed, no acute distress, chronically ill appearing  HEENT: normocephalic, mucous membranes moist.  Neck: Supple  Skin: warm and dry  Respiratory: Lungs are clear to auscultation B/L.  Cardiovascular: Heart is regular rate and rhythm.  Gastrointestinal: Soft, nontender, nondistended with normal active bowel sounds.  No masses, guarding, rebound. + mild distention  Rectal Exam: Deferred.  Extremities: No edema.  Neurologic: Nonfocal. A & O ×3. No asterixis  Psychiatric: Normal affect.        Lab Results: I have reviewed the following results:Creatinine Clearance: Estimated Creatinine Clearance: 89.7 mL/min (A) (by C-G formula based on SCr of 0.54 mg/dL (L)).

## 2025-03-17 NOTE — PLAN OF CARE
Problem: PAIN - ADULT  Goal: Verbalizes/displays adequate comfort level or baseline comfort level  Description: Interventions:  - Encourage patient to monitor pain and request assistance  - Assess pain using appropriate pain scale  - Administer analgesics based on type and severity of pain and evaluate response  - Implement non-pharmacological measures as appropriate and evaluate response  - Consider cultural and social influences on pain and pain management  - Notify physician/advanced practitioner if interventions unsuccessful or patient reports new pain  Outcome: Progressing     Problem: INFECTION - ADULT  Goal: Absence or prevention of progression during hospitalization  Description: INTERVENTIONS:  - Assess and monitor for signs and symptoms of infection  - Monitor lab/diagnostic results  - Monitor all insertion sites, i.e. indwelling lines, tubes, and drains  - Monitor endotracheal if appropriate and nasal secretions for changes in amount and color  - Potter appropriate cooling/warming therapies per order  - Administer medications as ordered  - Instruct and encourage patient and family to use good hand hygiene technique  - Identify and instruct in appropriate isolation precautions for identified infection/condition  Outcome: Progressing  Goal: Absence of fever/infection during neutropenic period  Description: INTERVENTIONS:  - Monitor WBC    Outcome: Progressing     Problem: SAFETY ADULT  Goal: Patient will remain free of falls  Description: INTERVENTIONS:  - Educate patient/family on patient safety including physical limitations  - Instruct patient to call for assistance with activity   - Consult OT/PT to assist with strengthening/mobility   - Keep Call bell within reach  - Keep bed low and locked with side rails adjusted as appropriate  - Keep care items and personal belongings within reach  - Initiate and maintain comfort rounds  - Make Fall Risk Sign visible to staff  - Offer Toileting every 2 Hours,  in advance of need  - Initiate/Maintain bed alarm  - Obtain necessary fall risk management equipment: yellow socks/yellow bracelet   - Apply yellow socks and bracelet for high fall risk patients  - Consider moving patient to room near nurses station  Outcome: Progressing  Goal: Maintain or return to baseline ADL function  Description: INTERVENTIONS:  -  Assess patient's ability to carry out ADLs; assess patient's baseline for ADL function and identify physical deficits which impact ability to perform ADLs (bathing, care of mouth/teeth, toileting, grooming, dressing, etc.)  - Assess/evaluate cause of self-care deficits   - Assess range of motion  - Assess patient's mobility; develop plan if impaired  - Assess patient's need for assistive devices and provide as appropriate  - Encourage maximum independence but intervene and supervise when necessary  - Involve family in performance of ADLs  - Assess for home care needs following discharge   - Consider OT consult to assist with ADL evaluation and planning for discharge  - Provide patient education as appropriate  Outcome: Progressing  Goal: Maintains/Returns to pre admission functional level  Description: INTERVENTIONS:  - Perform AM-PAC 6 Click Basic Mobility/ Daily Activity assessment daily.  - Set and communicate daily mobility goal to care team and patient/family/caregiver.   - Collaborate with rehabilitation services on mobility goals if consulted  - Perform Range of Motion 3 times a day.  - Reposition patient every 3 hours.  - Dangle patient 3 times a day  - Stand patient 3 times a day  - Ambulate patient 3 times a day  - Out of bed to chair 3 times a day   - Out of bed for meals 3 times a day  - Out of bed for toileting  - Record patient progress and toleration of activity level   Outcome: Progressing     Problem: DISCHARGE PLANNING  Goal: Discharge to home or other facility with appropriate resources  Description: INTERVENTIONS:  - Identify barriers to discharge  w/patient and caregiver  - Arrange for needed discharge resources and transportation as appropriate  - Identify discharge learning needs (meds, wound care, etc.)  - Arrange for interpretive services to assist at discharge as needed  - Refer to Case Management Department for coordinating discharge planning if the patient needs post-hospital services based on physician/advanced practitioner order or complex needs related to functional status, cognitive ability, or social support system  Outcome: Progressing     Problem: MOBILITY - ADULT  Goal: Maintain or return to baseline ADL function  Description: INTERVENTIONS:  -  Assess patient's ability to carry out ADLs; assess patient's baseline for ADL function and identify physical deficits which impact ability to perform ADLs (bathing, care of mouth/teeth, toileting, grooming, dressing, etc.)  - Assess/evaluate cause of self-care deficits   - Assess range of motion  - Assess patient's mobility; develop plan if impaired  - Assess patient's need for assistive devices and provide as appropriate  - Encourage maximum independence but intervene and supervise when necessary  - Involve family in performance of ADLs  - Assess for home care needs following discharge   - Consider OT consult to assist with ADL evaluation and planning for discharge  - Provide patient education as appropriate  Outcome: Progressing  Goal: Maintains/Returns to pre admission functional level  Description: INTERVENTIONS:  - Perform AM-PAC 6 Click Basic Mobility/ Daily Activity assessment daily.  - Set and communicate daily mobility goal to care team and patient/family/caregiver.   - Collaborate with rehabilitation services on mobility goals if consulted  - Perform Range of Motion 3 times a day.  - Reposition patient every 3 hours.  - Dangle patient 3 times a day  - Stand patient 3 times a day  - Ambulate patient 3 times a day  - Out of bed to chair 3 times a day   - Out of bed for meals 3 times a day  - Out  of bed for toileting  - Record patient progress and toleration of activity level   Outcome: Progressing

## 2025-03-17 NOTE — ASSESSMENT & PLAN NOTE
Chest tube today  Follow-up CXR tomorrow  May need eventual indwelling abdominal catheter which would potentially eliminate the need for indwelling pleural catheter however patient can have both placed in the future if determined to be necessary

## 2025-03-17 NOTE — CONSULTS
NEPHROLOGY HOSPITAL CONSULTATION   Jose White 64 y.o. female MRN: [id number]  Unit/Bed#: -01 Encounter: [id number]      Assessment & Plan  Hyponatremia  Chronic in nature, baseline sodium level in the high 120s to low 130s in the setting of alcoholic cirrhosis with ascites  Follows with Dr. Tovar  Presented with sodium level of 125  Sodium levels currently 127  On aldactone 25 mg daily and Lasix 20 mg daily, will hold for now  Recommend placing on 1.2 L / 24-hour fluid restriction  Will give IV albumin   Creatinine is stable  Consider checking secondary workup if sodium level is trending downward  Hypotension  BP improved, previously low  Will give IV albumin  Currently on midodrine 10 mg 3 times daily  Alcoholic cirrhosis of liver with ascites (HCC)  Continue folic acid, lactulose, multivitamin, and thiamine  Continue midodrine, holding diuretics  IV albumin x 4 doses  On outpatient Lasix and spironolactone, currently holding  Recommend low-salt diet and fluid restriction  Paracentesis as needed, last 03/13 for 2.6 L.  Requiring weekly Thora and paracentesis  Recurrent pleural effusion on right  Planning on chest tube placement  Thrombocytopenia (HCC)  In the setting of cirrhosis  Malignant neoplasm of lower-outer quadrant of right breast of female, estrogen receptor positive (HCC)  Currently on anastrozole 1 mg daily  Electrolyte abnormality  Hypomagnesemia, continue to monitor and replace as needed  Prior hypokalemia, currently resolved  Other dysphagia    Palliative care patient    Other: COPD      HISTORY OF PRESENT ILLNESS:  Requesting Physician: Viola Andrea MD  Reason for Consult: Acute on chronic hyponatremia    Joes White is a 64 y.o. female with history of acute on chronic hyponatremia, breast cancer, chronic hypotension, COPD, alcoholic liver cirrhosis, who was admitted to [facility] after presenting with reports of feeling lightheaded and weak. She reports having low sodium level. She  reports compliance wit her medication. She is not following a fluid restriction. She denies fevers or chills. She denies nausea, vomiting, or diarrhea. She is urinating well. She is not salt restricting her diet. A renal consultation is requested today for assistance in the management of acute on chronic hyponatremia.    PAST MEDICAL HISTORY:  Past Medical History:   Diagnosis Date    Alcoholic fatty liver     Anxiety     Asthma     COPD (chronic obstructive pulmonary disease) (HCC)     Elevated liver function tests     GERD (gastroesophageal reflux disease)     GERD without esophagitis     Hyperlipidemia     Hypertension     IBS (irritable bowel syndrome)     Insomnia     Insomnia     Liver disease     Nervousness     Nicotine dependence     Other specified urinary incontinence     RLS (restless legs syndrome)     Wears glasses        PAST SURGICAL HISTORY:  Past Surgical History:   Procedure Laterality Date    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    BREAST EXCISIONAL BIOPSY Right 11/01/2004    benign    CATARACT EXTRACTION, BILATERAL  08/01/2018    COLONOSCOPY N/A 05/08/2019    Procedure: COLONOSCOPY;  Surgeon: Jacobo Leonardo MD;  Location: [facility];  Service: Gastroenterology    EGD AND COLONOSCOPY N/A 03/19/2018    Procedure: EGD AND COLONOSCOPY;  Surgeon: Jacobo Leonardo MD;  Location: [facility];  Service: Gastroenterology    ESOPHAGOGASTRODUODENOSCOPY N/A 05/08/2019    Procedure: ESOPHAGOGASTRODUODENOSCOPY (EGD);  Surgeon: Jacobo Leonardo MD;  Location: [facility];  Service: Gastroenterology    IR PARACENTESIS  11/27/2020    IR PARACENTESIS  12/08/2020    IR PARACENTESIS  10/28/2022    IR PARACENTESIS  11/07/2022    IR PARACENTESIS  11/30/2022    IR PARACENTESIS  12/30/2022    IR PARACENTESIS  01/16/2023    IR PARACENTESIS  08/06/2024    IR PARACENTESIS  09/18/2024    IR PARACENTESIS  10/03/2024    IR PARACENTESIS  10/10/2024    IR PARACENTESIS  10/17/2024    IR PARACENTESIS  10/24/2024    IR  PARACENTESIS  11/01/2024    IR PARACENTESIS  11/07/2024    IR PARACENTESIS  11/14/2024    IR PARACENTESIS  11/21/2024    IR PARACENTESIS  11/29/2024    IR PARACENTESIS  12/05/2024    IR PARACENTESIS  12/12/2024    IR PARACENTESIS  12/19/2024    IR PARACENTESIS  12/26/2024    IR PARACENTESIS  01/06/2025    IR PARACENTESIS  01/09/2025    IR PARACENTESIS  01/16/2025    IR PARACENTESIS  1/23/2025    IR PARACENTESIS  1/30/2025    IR PARACENTESIS  2/5/2025    IR PARACENTESIS  2/11/2025    IR PARACENTESIS  2/20/2025    IR PARACENTESIS  2/27/2025    IR PARACENTESIS  3/6/2025    IR PARACENTESIS  3/13/2025    IR THORACENTESIS  01/16/2023    IR THORACENTESIS  03/28/2023    IR THORACENTESIS  07/25/2024    IR THORACENTESIS  08/06/2024    IR THORACENTESIS  08/14/2024    IR THORACENTESIS  08/21/2024    IR THORACENTESIS  08/28/2024    IR THORACENTESIS  09/04/2024    IR THORACENTESIS  09/11/2024    IR THORACENTESIS  09/18/2024    IR THORACENTESIS  09/25/2024    IR THORACENTESIS  10/03/2024    IR THORACENTESIS  10/10/2024    IR THORACENTESIS  10/17/2024    IR THORACENTESIS  10/24/2024    IR THORACENTESIS  11/01/2024    IR THORACENTESIS  11/07/2024    IR THORACENTESIS  11/14/2024    IR THORACENTESIS  11/21/2024    IR THORACENTESIS  11/29/2024    IR THORACENTESIS  12/05/2024    IR THORACENTESIS  12/12/2024    IR THORACENTESIS  12/19/2024    IR THORACENTESIS  12/26/2024    IR THORACENTESIS  01/03/2025    IR THORACENTESIS  01/09/2025    IR THORACENTESIS  01/16/2025    IR THORACENTESIS  1/23/2025    IR THORACENTESIS  1/30/2025    IR THORACENTESIS  2/5/2025    IR THORACENTESIS  2/11/2025    IR THORACENTESIS  2/20/2025    IR THORACENTESIS  2/27/2025    IR THORACENTESIS  3/6/2025    IR THORACENTESIS  3/13/2025    KNEE SURGERY Left     ACL repair. Date: early 2000s    LUMBAR LAMINECTOMY  04/1999    LYMPH NODE BIOPSY      MAMMO STEREOTACTIC BREAST BIOPSY RIGHT (ALL INC) Right 05/21/2021    MAMMO STEREOTACTIC BREAST BIOPSY RIGHT (ALL INC) EACH  ADD Right 05/21/2021    TOTAL ABDOMINAL HYSTERECTOMY  02/05/2008    TUBAL LIGATION  1989    UPPER GASTROINTESTINAL ENDOSCOPY         ALLERGIES:  Allergies   Allergen Reactions    Sulfa Antibiotics Shortness Of Breath    Ace Inhibitors Cough and Other (See Comments)     coughing       SOCIAL HISTORY:  Social History     Substance and Sexual Activity   Alcohol Use Yes    Alcohol/week: 6.0 standard drinks of alcohol    Types: 6 Cans of beer per week    Comment: 7-10 drinks/wk     Social History     Substance and Sexual Activity   Drug Use No     Social History     Tobacco Use   Smoking Status Every Day    Current packs/day: 1.00    Average packs/day: 1 pack/day for 47.2 years (47.2 ttl pk-yrs)    Types: Cigarettes    Start date: 1/1/1978   Smokeless Tobacco Never       FAMILY HISTORY:  Family History   Problem Relation Age of Onset    Breast cancer Mother 32    No Known Problems Father     No Known Problems Sister     No Known Problems Brother     No Known Problems Maternal Grandmother     No Known Problems Maternal Grandfather     No Known Problems Paternal Grandmother     No Known Problems Paternal Grandfather     No Known Problems Maternal Aunt     No Known Problems Maternal Aunt     No Known Problems Paternal Aunt     No Known Problems Son     Substance Abuse Neg Hx     Mental illness Neg Hx     Colon cancer Neg Hx     Colon polyps Neg Hx        MEDICATIONS:    Current Facility-Administered Medications:     albuterol inhalation solution 2.5 mg, 2.5 mg, Nebulization, Q6H PRN, Khanh Marie PA-C    ALPRAZolam (XANAX) tablet 1 mg, 1 mg, Oral, HS, Khanh Marie PA-C, 1 mg at 03/16/25 2050    ALPRAZolam (XANAX) tablet 1 mg, 1 mg, Oral, Once, Viola Andrea MD    anastrozole (ARIMIDEX) tablet 1 mg, 1 mg, Oral, Daily, Khanh Marie PA-C, 1 mg at 03/17/25 0805    budesonide-formoterol (SYMBICORT) 160-4.5 mcg/act inhaler 2 puff, 2 puff, Inhalation, BID, Khanh Marie PA-C, 2 puff at 03/17/25 0808    folic acid  (FOLVITE) tablet 1 mg, 1 mg, Oral, Daily, Khanh Marie PA-C, 1 mg at 03/17/25 0805    furosemide (LASIX) tablet 20 mg, 20 mg, Oral, Daily, Khanh Marie PA-C    lactulose (CHRONULAC) oral solution 10 g, 10 g, Oral, BID, Khanh Marie PA-C, 10 g at 03/17/25 0805    midodrine (PROAMATINE) tablet 10 mg, 10 mg, Oral, TID AC, Khanh Marie PA-C, 10 mg at 03/17/25 1114    multivitamin-minerals (CENTRUM) tablet 1 tablet, 1 tablet, Oral, Daily, Khanh Marie PA-C, 1 tablet at 03/17/25 0805    pantoprazole (PROTONIX) EC tablet 20 mg, 20 mg, Oral, Daily, Khanh Marie PA-C, 20 mg at 03/17/25 0807    spironolactone (ALDACTONE) tablet 25 mg, 25 mg, Oral, Daily, Khanh Marie PA-C    thiamine tablet 100 mg, 100 mg, Oral, Daily, Khanh Marie PA-C, 100 mg at 03/17/25 0805    traZODone (DESYREL) tablet 50 mg, 50 mg, Oral, HS, Khanh Marie PA-C, 50 mg at 03/16/25 2050    REVIEW OF SYSTEMS:  Constitutional: Negative for fatigue, anorexia, fever, chills, diaphoresis  HENT: Negative for postnasal drip  Eyes: Negative for visual disturbance.   Respiratory: Negative for cough, shortness of breath and wheezing.   Cardiovascular: Negative for chest pain, palpitations and leg swelling.   Gastrointestinal: Negative for abdominal pain, constipation, diarrhea, nausea and vomiting.   Genitourinary: No dysuria, hematuria  Endocrine: Negative for polyuria.   Musculoskeletal: Negative for arthralgias, back pain and joint swelling.   Skin: Negative for rash.   Neurological: Negative for focal weakness, headaches, dizziness.  Hematological: Negative for easy bruising or bleeding.  Psychiatric/Behavioral: Negative for confusion and sleep disturbance.   All the systems were reviewed and were negative except as documented on the HPI.    PHYSICAL EXAM:  Current Weight: Weight - Scale: 58.2 kg (128 lb 4.8 oz)  First Weight: Weight - Scale: 58.2 kg (128 lb 4.8 oz)  Vitals:    03/17/25 0800 03/17/25 0907 03/17/25 0940 03/17/25  "1114   BP: (!) 89/41  92/52 107/52   BP Location:       Pulse: 80  80 79   Resp:       Temp:       TempSrc:       SpO2: 91% 94% 93% 94%   Weight:       Height:           Intake/Output Summary (Last 24 hours) at 3/17/2025 1147  Last data filed at 3/17/2025 0827  Gross per 24 hour   Intake 470 ml   Output --   Net 470 ml     Physical Exam  Vitals reviewed.   Constitutional:       Appearance: Normal appearance.   HENT:      Head: Normocephalic.      Nose: Nose normal.      Mouth/Throat:      Mouth: Mucous membranes are moist.   Cardiovascular:      Heart sounds: Normal heart sounds.   Pulmonary:      Breath sounds: Normal breath sounds.   Abdominal:      Palpations: Abdomen is soft.   Musculoskeletal:      Right lower leg: No edema.      Left lower leg: No edema.   Skin:     General: Skin is warm and dry.   Neurological:      General: No focal deficit present.      Mental Status: She is alert. Mental status is at baseline.   Psychiatric:         Mood and Affect: Mood normal.           Invasive Devices:      Lab Results:   Results from last 7 days   Lab Units 03/17/25  0436 03/16/25  1714 03/16/25  1236   WBC Thousand/uL 4.37  --  4.31   HEMOGLOBIN g/dL 11.7  --  13.8   HEMATOCRIT % 33.4*  --  40.6   PLATELETS Thousands/uL 93*  --  115*   POTASSIUM mmol/L 4.1 4.0 3.0*   CHLORIDE mmol/L 95* 94* 91*   CO2 mmol/L 29 24 28   BUN mg/dL 8 7 8   CREATININE mg/dL 0.54* 0.57* 0.70   CALCIUM mg/dL 7.5* 8.3* 8.1*   MAGNESIUM mg/dL 1.8*  --  1.6*   PHOSPHORUS mg/dL  --   --  2.6   ALK PHOS U/L 51 64 66   ALT U/L 9 11 13   AST U/L 23 29 30     Other Studies:     Portions of the record may have been created with voice recognition software. Occasional wrong word or \"sound a like\" substitutions may have occurred due to the inherent limitations of voice recognition software. Read the chart carefully and recognize, using context, where substitutions have occurred.If you have any questions, please contact the dictating provider.    "

## 2025-03-17 NOTE — PLAN OF CARE
Problem: PAIN - ADULT  Goal: Verbalizes/displays adequate comfort level or baseline comfort level  Description: Interventions:  - Encourage patient to monitor pain and request assistance  - Assess pain using appropriate pain scale  - Administer analgesics based on type and severity of pain and evaluate response  - Implement non-pharmacological measures as appropriate and evaluate response  - Consider cultural and social influences on pain and pain management  - Notify physician/advanced practitioner if interventions unsuccessful or patient reports new pain  Outcome: Progressing     Problem: INFECTION - ADULT  Goal: Absence or prevention of progression during hospitalization  Description: INTERVENTIONS:  - Assess and monitor for signs and symptoms of infection  - Monitor lab/diagnostic results  - Monitor all insertion sites, i.e. indwelling lines, tubes, and drains  - Monitor endotracheal if appropriate and nasal secretions for changes in amount and color  - Blunt appropriate cooling/warming therapies per order  - Administer medications as ordered  - Instruct and encourage patient and family to use good hand hygiene technique  - Identify and instruct in appropriate isolation precautions for identified infection/condition  Outcome: Progressing  Goal: Absence of fever/infection during neutropenic period  Description: INTERVENTIONS:  - Monitor WBC    Outcome: Progressing     Problem: SAFETY ADULT  Goal: Maintain or return to baseline ADL function  Description: INTERVENTIONS:  -  Assess patient's ability to carry out ADLs; assess patient's baseline for ADL function and identify physical deficits which impact ability to perform ADLs (bathing, care of mouth/teeth, toileting, grooming, dressing, etc.)  - Assess/evaluate cause of self-care deficits   - Assess range of motion  - Assess patient's mobility; develop plan if impaired  - Assess patient's need for assistive devices and provide as appropriate  - Encourage  maximum independence but intervene and supervise when necessary  - Involve family in performance of ADLs  - Assess for home care needs following discharge   - Consider OT consult to assist with ADL evaluation and planning for discharge  - Provide patient education as appropriate  Outcome: Progressing     Problem: DISCHARGE PLANNING  Goal: Discharge to home or other facility with appropriate resources  Description: INTERVENTIONS:  - Identify barriers to discharge w/patient and caregiver  - Arrange for needed discharge resources and transportation as appropriate  - Identify discharge learning needs (meds, wound care, etc.)  - Arrange for interpretive services to assist at discharge as needed  - Refer to Case Management Department for coordinating discharge planning if the patient needs post-hospital services based on physician/advanced practitioner order or complex needs related to functional status, cognitive ability, or social support system  Outcome: Progressing     Problem: MOBILITY - ADULT  Goal: Maintain or return to baseline ADL function  Description: INTERVENTIONS:  -  Assess patient's ability to carry out ADLs; assess patient's baseline for ADL function and identify physical deficits which impact ability to perform ADLs (bathing, care of mouth/teeth, toileting, grooming, dressing, etc.)  - Assess/evaluate cause of self-care deficits   - Assess range of motion  - Assess patient's mobility; develop plan if impaired  - Assess patient's need for assistive devices and provide as appropriate  - Encourage maximum independence but intervene and supervise when necessary  - Involve family in performance of ADLs  - Assess for home care needs following discharge   - Consider OT consult to assist with ADL evaluation and planning for discharge  - Provide patient education as appropriate  Outcome: Progressing

## 2025-03-17 NOTE — PROGRESS NOTES
Progress Note - Pulmonology   Name: Jose White 64 y.o. female I MRN: [id number]  Unit/Bed#: -01 I Date of Admission: 3/16/2025   Date of Service: 3/17/2025 I Hospital Day: 1    Assessment & Plan  Pneumothorax on right  Chest x-ray shows right sided pneumothorax.  Difficult to determine whether this is ex vacuo pneumothorax related to chronic pleural effusion and need for weekly thoracentesis.  Patient is stable, saturating well on room air without respiratory distress.  She will need nonurgent chest tube placed today.     Recurrent pleural effusion on right  Chest tube today  Follow-up CXR tomorrow  May need eventual indwelling abdominal catheter which would potentially eliminate the need for indwelling pleural catheter however patient can have both placed in the future if determined to be necessary     Alcoholic cirrhosis of liver with ascites (HCC)  According to Dr. Herrera, patient is not a liver transplant candidate.  Patient is fully aware of this and is willing to speak with palliative care.  She understands that indwelling catheters would be purely palliative and would not correct the underlying problem.  GI evaluation is pending and their input will be greatly appreciated in terms of how to best move forward.    COPD (chronic obstructive pulmonary disease) (HCC)  No evidence of exacerbation.  Continue Symbicort    Cigarette nicotine dependence without complication  Encourage complete smoking cessation    Other dysphagia      24 Hour Events : chest tube will be placed this afternoon  Subjective : Patient feels her breathing is actually better today    Objective :  Temp:  [97 °F (36.1 °C)-97.6 °F (36.4 °C)] 97 °F (36.1 °C)  HR:  [76-92] 80  BP: ()/(38-66) 92/52  Resp:  [16-20] 20  SpO2:  [91 %-97 %] 93 %  O2 Device: None (Room air)    Physical Exam    General appearance: Alert and awake, in no acute distress  Head: Normocephalic, without obvious abnormality, atraumatic  Eyes: No scleral  icterus   Lungs: Decreased breath sounds  Heart: Regular rate  Abdomen:  No appreciable distension or tenderness  Extremities: No deformity  Skin: Warm and dry  Neurologic: No acute focal deficits are noted        Lab Results: I have reviewed the following results:   .     03/16/25  1236 03/16/25  1545 03/16/25  1714 03/17/25  0436   WBC 4.31  --   --  4.37   HGB 13.8  --   --  11.7   HCT 40.6  --   --  33.4*   *  --   --  93*   SODIUM 125*  --    < > 127*   K 3.0*  --    < > 4.1   CL 91*  --    < > 95*   CO2 28  --    < > 29   BUN 8  --    < > 8   CREATININE 0.70  --    < > 0.54*   GLUC 119  --    < > 90   MG 1.6*  --   --  1.8*   PHOS 2.6  --   --   --    AST 30  --    < > 23   ALT 13  --    < > 9   ALB 2.3*  --    < > 1.9*   TBILI 1.92*  --    < > 1.49*   ALKPHOS 66  --    < > 51   HSTNI0 4  --   --   --    HSTNI2  --  4  --   --     < > = values in this interval not displayed.     ABG: No new results in last 24 hours.    Imaging Results Review: I reviewed radiology reports from this admission including: chest xray.  Other Study Results Review: No additional pertinent studies reviewed.  PFT Results Reviewed: reviewed

## 2025-03-17 NOTE — ASSESSMENT & PLAN NOTE
"Chronic history  Resume midodrine and monitor blood pressure  IV albumin as per nephrology  /52   Pulse 87   Temp 98.7 °F (37.1 °C) (Temporal)   Resp 19   Ht 5' 4\" (1.626 m)   Wt 58.2 kg (128 lb 4.8 oz)   SpO2 95%   BMI 22.02 kg/m²     "

## 2025-03-17 NOTE — ASSESSMENT & PLAN NOTE
64-year-old female with alcoholic cirrhosis decompensated by ascites, hepatic hydrothorax, varices and encephalopathy, not transplant candidate, presents with weakness and hyponatremia. GI consulted for possible Tenckhoff catheter placement as requiring paracentesis and thoracentesis 1-2 times a week.  Patient continues to drink alcohol daily and is noncompliant with fluid restriction.      -Counseled on alcohol cessation  -Agree with CIWA protocol and folic acid thiamine and vitamin replacement  -MELD 3.0=21    Ascites/hepatic hydrothorax-requiring paracentesis and thoracentesis 1-2 times weekly struggling with frequency palliative care consult placed as outpatient for to discuss Tenckhoff catheter, inpatient consult placed, await input, discussed with patient, seems undecided at this time  Hepatic encephalopathy-stage 1 in past, none on exam, monitor mental status, cont Lactulose 10g BID  Esophageal varices- EGD 12/2024 1 small G1 EV, mild PHG, rpt 1 year  HCC surveillance- US 2/2025 without liver lesions, rpt 6m

## 2025-03-17 NOTE — ASSESSMENT & PLAN NOTE
Complaints of dysphagia to solids with regurgitation on daily basis improved with dilation in past  EGD 12/2024 without anything to account for dysphagia  Consider barium esophagram

## 2025-03-17 NOTE — ASSESSMENT & PLAN NOTE
Daily drinker  Undergoing weekly thora/paracentesis ( 3/13 was last)  GI consulted - determine if Tenckhoff catheter placement is appropriate for patient.   Not a transplant candidate

## 2025-03-17 NOTE — ASSESSMENT & PLAN NOTE
Chest x-ray shows right sided pneumothorax.  Difficult to determine whether this is ex vacuo pneumothorax related to chronic pleural effusion and need for weekly thoracentesis.  Patient is stable, saturating well on room air without respiratory distress.  She will need nonurgent chest tube placed today.

## 2025-03-17 NOTE — CONSULTS
Consultation - Palliative Care   Name: Jose White 64 y.o. female I MRN: [id number]  Unit/Bed#: -01 I Date of Admission: 3/16/2025   Date of Service: 3/17/2025 I Hospital Day: 1   Inpatient consult to Palliative Care  Consult performed by: JOSÉ Johnson  Consult ordered by: Khanh Marie PA-C        Physician Requesting Evaluation: Viola Andrea MD   Reason for Evaluation / Principal Problem: GoC    Assessment & Plan  Hyponatremia  Presenting with lightheadedness and weakness  Follows with Nephrology- was seen about 1 month ago.   Baseline labs likely in the low 130s  Nephrology consulted.   COPD (chronic obstructive pulmonary disease) (HCC)  Continue home meds  Alcoholic cirrhosis of liver with ascites (HCC)  Daily drinker  Undergoing weekly thora/paracentesis ( 3/13 was last)  GI consulted - determine if Tenckhoff catheter placement is appropriate for patient.   Not a transplant candidate  Malignant neoplasm of lower-outer quadrant of right breast of female, estrogen receptor positive (HCC)  Continue Arimidex  Pneumothorax on right  Plan for chest tube placement today by IR  Pulmonology following  Palliative care patient  Goals of care  Level 1 code status  Disease focused care without limits.   Concerns introduced today include:  Introduced palliative care  Patient agreeable to chest tube placement  Agreeable for Tenckhoff catheter if offered, otherwise she would like to continue with thora/paracentesis.   Wants to be able to continue coming to hospital as needed.   Will continue discussions regarding GOC as patient's clinical presentation evolves.  Encouraged follow up with Palliative Medicine on an outpatient basis after discharge for continued symptom management.  Our office will contact patient to schedule a hospital follow up.    Social support:  Supportive listening provided  Normalized experience of patient/family  Provided anxiety containment  Provided anticipatory  guidance  Encouraged self care  Discussed spiritual needs  Living situation   Lives with spouse Ulises (goes by Pat)  Has two adult sons  Checo Alston - does not have great communication with    Follow up  Palliative Medicine will sign off today as goals of care are clear and symptoms are well managed at this time.  Please reach out via [employer] Secure Chat if questions or concerns arise.    Care Coordination  Reviewed case with SLIM    I have reviewed the patient's controlled substance dispensing history in the Prescription Drug Monitoring Program in compliance with the [facility] regulations before prescribing any controlled substances.  Last refills  03/11/2025 01/13/2025 1 Alprazolam 1 Mg Tablet 60.00 30 Ta Pat [id number] Jessa ([id number]) 0 4.00 LME Medicare [place]   02/11/2025 01/13/2025 1 Alprazolam 1 Mg Tablet 60.00 30 Ta Pat [id number] Jessa ([id number]) 0 4.00 LME Medicare PA   01/13/2025 01/13/2025 1 Alprazolam 1 Mg Tablet 60.00 30 Ta Pat [id number] Jessa ([id number]) 0 4.00 LME Medicare PA       Decisional apparatus:  Patient has capacity on exam today.  If capacity is lost, patient's substitute decision maker would default to spouse by [place] Act 169.  Advance Directive/Living Will, POLST and POA Forms: nothing on file.   ER contacts:  Name Relation Home Work Mobile   Ulises White Spouse [phone]       Name Relation Home Work Mobile   Checo White Son   [phone]       We appreciate the invitation to be involved in this patient's care.  We will continue to follow throughout this hospitalization.  Please do not hesitate to reach our on call provider through our clinic answering service at [phone] should you have acute symptom control concerns.    Siena Stern MSN, CRNP  Palliative and Supportive Care  Clinic/Answering Service: [phone]          PDMP Review: I have reviewed the patient's controlled substance dispensing history in the Prescription Drug Monitoring Program in compliance with the [facility] regulations before prescribing any  controlled substances.    History of Present Illness   HPI: Jose White is a 64 y.o. year old female with history of alcoholic cirrhosis, thrombocytopenia, weekly thoracentesis and paracentesis, chronic hyponatremia who presented to the ED with lightheadedness and weakness.  Patient states symptoms have been progressing over the last few days.  She has not been following a fluid restriction at home and she has been taking her medications.  She endorses shortness of breath and abdominal distention.      Patient resting in bed. No family present.   NAD. Denies pain. Introduced palliative care.discussed Tenckhoff catheter- agreeable if offered. She reports that GI was in and dicussed with her. She is scheduled for chest tube today which she is agreeable too. Discussed oral intake- eating small meals, nutritional supplements.  Discussed goals- discussed hospice- patient reports wanting to come back to the hospital if needed.      with two children. Lives with spouse Ulises. Checo and Gamaliel are her children. She has better communication with Checo. She reports that her spouse is her TerriA.     Review of Systems  Medical History Review: I have reviewed the patient's PMH, PSH, Social History, Family History, Meds, and Allergies     Objective :  Temp:  [97 °F (36.1 °C)-98.7 °F (37.1 °C)] 98.7 °F (37.1 °C)  HR:  [76-89] 87  BP: ()/(41-66) 105/52  Resp:  [16-20] 19  SpO2:  [91 %-97 %] 95 %  O2 Device: None (Room air)    Physical Exam  Vitals and nursing note reviewed.   Constitutional:       General: She is not in acute distress.  HENT:      Head: Normocephalic and atraumatic.   Eyes:      General:         Right eye: No discharge.         Left eye: No discharge.   Cardiovascular:      Rate and Rhythm: Normal rate.   Pulmonary:      Effort: Pulmonary effort is normal. No respiratory distress.   Musculoskeletal:         General: Normal range of motion.      Cervical back: Normal range of motion.   Skin:      General: Skin is warm and dry.   Neurological:      Mental Status: She is alert and oriented to person, place, and time.   Psychiatric:         Mood and Affect: Mood normal.         Behavior: Behavior normal.         Thought Content: Thought content normal.         Judgment: Judgment normal.            Lab Results: I have reviewed the following results:  Lab Results   Component Value Date/Time    SODIUM 127 (L) 03/17/2025 04:36 AM    SODIUM 124 (L) 03/10/2025 10:14 AM    SODIUM 133 (L) 09/09/2021 01:48 PM    K 4.1 03/17/2025 04:36 AM    K 3.4 (L) 03/10/2025 10:14 AM    K 3.8 09/09/2021 01:48 PM    BUN 8 03/17/2025 04:36 AM    BUN 6 (L) 03/10/2025 10:14 AM    BUN 7 (L) 09/09/2021 01:48 PM    CREATININE 0.54 (L) 03/17/2025 04:36 AM    CREATININE 0.70 09/09/2021 01:48 PM    GLUC 90 03/17/2025 04:36 AM    GLUC 95 03/10/2025 10:14 AM    GLUC 96 09/09/2021 01:48 PM    CALCIUM 7.5 (L) 03/17/2025 04:36 AM    CALCIUM 8.2 (L) 03/10/2025 10:14 AM    CALCIUM 8.9 09/09/2021 01:48 PM    AST 23 03/17/2025 04:36 AM    AST 34 03/10/2025 10:14 AM     (H) 09/09/2021 01:48 PM    ALT 9 03/17/2025 04:36 AM    ALT 15 03/10/2025 10:14 AM    ALT 82 (H) 09/09/2021 01:48 PM    ALB 1.9 (L) 03/17/2025 04:36 AM    ALB 3.8 07/28/2017 09:27 AM    TP 5.8 (L) 03/17/2025 04:36 AM    TP 7.1 03/10/2025 10:14 AM    TP 8.7 (H) 09/09/2021 01:48 PM    EGFR 99 03/17/2025 04:36 AM     Lab Results   Component Value Date/Time    HGB 11.7 03/17/2025 04:36 AM    HGB 14.5 07/28/2017 09:27 AM    WBC 4.37 03/17/2025 04:36 AM    WBC 6.0 07/28/2017 09:27 AM    PLT 93 (L) 03/17/2025 04:36 AM     (L) 07/28/2017 09:27 AM    INR 1.2 (H) 03/10/2025 10:14 AM    INR 1.66 (H) 11/07/2024 05:33 PM    INR 1.0 02/16/2016 12:00 AM    PTT 34 11/05/2024 12:22 PM    PTT 30 05/03/2015 08:45 PM     Lab Results   Component Value Date/Time    WOI1XKOWVRZJ 3.244 11/05/2024 12:22 PM    TYX8FFTBSNCU 2.770 01/23/2017 09:50 AM       Imaging Results Review: I reviewed radiology  reports from this admission including: chest xray.  Other Study Results Review: No additional pertinent studies reviewed.    Code Status: Level 1 - Full Code  Advance Directive and Living Will:      Power of :    POLST:      Administrative Statements   I have spent a total time of 60 minutes in caring for this patient on the day of the visit/encounter including Diagnostic results, Prognosis, Risks and benefits of tx options, Instructions for management, Patient and family education, Importance of tx compliance, Risk factor reductions, Impressions, Counseling / Coordination of care, Documenting in the medical record, Reviewing/placing orders in the medical record (including tests, medications, and/or procedures), Obtaining or reviewing history  , and Communicating with other healthcare professionals .

## 2025-03-17 NOTE — ASSESSMENT & PLAN NOTE
Patient's MELD Score is: 8 patient is still drinking daily with last drink being today  Undergoing weekly Thora/paracentesis (last on 3/13/25)  GI consult to determine if long-term catheter placement is feasible for her  CIWA protocol  Diuretics on hold

## 2025-03-17 NOTE — ASSESSMENT & PLAN NOTE
Goals of care  Level 1 code status  Disease focused care without limits.   Concerns introduced today include:  Introduced palliative care  Patient agreeable to chest tube placement  Agreeable for Tenckhoff catheter if offered, otherwise she would like to continue with thora/paracentesis.   Wants to be able to continue coming to hospital as needed.   Will continue discussions regarding GOC as patient's clinical presentation evolves.  Encouraged follow up with Palliative Medicine on an outpatient basis after discharge for continued symptom management.  Our office will contact patient to schedule a hospital follow up.    Social support:  Supportive listening provided  Normalized experience of patient/family  Provided anxiety containment  Provided anticipatory guidance  Encouraged self care  Discussed spiritual needs  Living situation   Lives with spouse Ulises (goes by Pat)  Has two adult sons  Checo Alston - does not have great communication with    Follow up  Palliative Medicine will sign off today as goals of care are clear and symptoms are well managed at this time.  Please reach out via [employer] Secure Chat if questions or concerns arise.    Care Coordination  Reviewed case with SLIM    I have reviewed the patient's controlled substance dispensing history in the Prescription Drug Monitoring Program in compliance with the [facility] regulations before prescribing any controlled substances.  Last refills  03/11/2025 01/13/2025 1 Alprazolam 1 Mg Tablet 60.00 30 Ta Pat [id number] Jessa ([id number]) 0 4.00 LME Medicare PA   02/11/2025 01/13/2025 1 Alprazolam 1 Mg Tablet 60.00 30 Ta Pat [id number] Jessa ([id number]) 0 4.00 E Medicare PA   01/13/2025 01/13/2025 1 Alprazolam 1 Mg Tablet 60.00 30 Ta Pat [id number] Jessa ([id number]) 0 4.00 LME Medicare PA       Decisional apparatus:  Patient has capacity on exam today.  If capacity is lost, patient's substitute decision maker would default to spouse [place] 169.  Advance Directive/Living Will, POLST and POA  Forms: nothing on file.   ER contacts:  Name Relation Home Work Mobile   lUises White Spouse [phone]       Name Relation Home Work Mobile   Checo White Son   [phone]       We appreciate the invitation to be involved in this patient's care.  We will continue to follow throughout this hospitalization.  Please do not hesitate to reach our on call provider through our clinic answering service at [phone] should you have acute symptom control concerns.    Siena BELTRE, CRNP  Palliative and Supportive Care  Clinic/Answering Service: [phone]

## 2025-03-17 NOTE — ASSESSMENT & PLAN NOTE
Potassium 3.0 and magnesium 1.6 upon admission  Recent Labs     03/16/25  1236 03/16/25  1714 03/17/25  0436   K 3.0* 4.0 4.1   MG 1.6*  --  1.8*    Replete as needed

## 2025-03-17 NOTE — BRIEF OP NOTE (RAD/CATH)
Chest Tube Placement  Procedure Note    PATIENT NAME: Jose White  : 1960  MRN: [id number]     Pre-op Diagnosis:   1. Alcoholic cirrhosis of liver with ascites (HCC)    2. Hyponatremia    3. Hypokalemia    4. Hypomagnesemia    5. Hydropneumothorax    6. Pneumothorax on right      Post-op Diagnosis:   1. Alcoholic cirrhosis of liver with ascites (HCC)    2. Hyponatremia    3. Hypokalemia    4. Hypomagnesemia    5. Hydropneumothorax    6. Pneumothorax on right        Surgeon:   Jose Qureshi DO  Assistants:     No qualified resident was available.    Estimated Blood Loss: none  Findings: 8.5F pigtail chest tube placed into anterior upper R hydropneuomthorax. Connected to pleuravac.     Specimens: none    Complications:  none    Anesthesia: local and IV Fentanyl    Jose Qureshi DO     Date: 3/17/2025  Time: 2:04 PM

## 2025-03-17 NOTE — ASSESSMENT & PLAN NOTE
Presenting with lightheadedness and weakness.  Noted history of hyponatremia, possibly due to cirrhosis as well as intravascular depletion  Recent Labs     03/16/25  1236 03/16/25  1714 03/17/25  0436   SODIUM 125* 125* 127*      Saw Dr. Tovar 1 month ago, belive that her baseline is likely in the low 130s  She is noncompliant with her fluid restriction.  Resume fluid restriction at 1.2 L/day, diuretics on hold  IV albumin ordered 3/17  Recheck CMP tomorrow  Nephrology consult

## 2025-03-17 NOTE — ASSESSMENT & PLAN NOTE
"Patient receives weekly right thoracentesis last on 3/13  Chest x-ray final read: \"Right hydropneumothorax with interval increase in size of pneumothorax compared with prior study. Consider chest tube placement. \"  Status pots pigtail chest tube insertion on 3/17   "

## 2025-03-17 NOTE — ASSESSMENT & PLAN NOTE
Chronic in nature, baseline sodium level in the high 120s to low 130s in the setting of alcoholic cirrhosis with ascites  Follows with Dr. Tovar  Presented with sodium level of 125  Sodium levels currently 127  On aldactone 25 mg daily and Lasix 20 mg daily, will hold for now  Recommend placing on 1.2 L / 24-hour fluid restriction  Will give IV albumin   Creatinine is stable  Consider checking secondary workup if sodium level is trending downward

## 2025-03-17 NOTE — ASSESSMENT & PLAN NOTE
Continue folic acid, lactulose, multivitamin, and thiamine  Continue midodrine, holding diuretics  IV albumin x 4 doses  On outpatient Lasix and spironolactone, currently holding  Recommend low-salt diet and fluid restriction  Paracentesis as needed, last 03/13 for 2.6 L.  Requiring weekly Thora and paracentesis

## 2025-03-17 NOTE — ASSESSMENT & PLAN NOTE
Diuretics were discontinued by nephrology as outpt due to severe and persistent hyponatremia but restarted by nephrology   Nephrology consult pending defer regarding monitoring and correcting electrolyte abnormalities  Should be on fluid restriction of at least 1500-1800cc/day

## 2025-03-17 NOTE — PROGRESS NOTES
"Patient arrived to IR for possible R chest tube placement.    /52 (BP Location: Left arm)   Pulse 79   Temp 98.7 °F (37.1 °C) (Temporal)   Resp 19   Ht 5' 4\" (1.626 m)   Wt 58.2 kg (128 lb 4.8 oz)   SpO2 94%   BMI 22.02 kg/m²       The procedure and risks were discussed with the patient.  All questions were answered. Informed consent was obtained.    "

## 2025-03-17 NOTE — ASSESSMENT & PLAN NOTE
Presenting with lightheadedness and weakness  Follows with Nephrology- was seen about 1 month ago.   Baseline labs likely in the low 130s  Nephrology consulted.

## 2025-03-17 NOTE — PROGRESS NOTES
"Progress Note - Hospitalist   Name: Jose White 64 y.o. female I MRN: [id number]  Unit/Bed#: [facility] I Date of Admission: 3/16/2025   Date of Service: 3/17/2025 I Hospital Day: 1    Assessment & Plan  Hyponatremia  Presenting with lightheadedness and weakness.  Noted history of hyponatremia, possibly due to cirrhosis as well as intravascular depletion  Recent Labs     03/16/25  1236 03/16/25  1714 03/17/25  0436   SODIUM 125* 125* 127*      Saw Dr. Tovar 1 month ago, belive that her baseline is likely in the low 130s  She is noncompliant with her fluid restriction.  Resume fluid restriction at 1.2 L/day, diuretics on hold  IV albumin ordered 3/17  Recheck CMP tomorrow  Nephrology consult  Alcoholic cirrhosis of liver with ascites (HCC)  Patient's MELD Score is: 8 patient is still drinking daily with last drink being today  Undergoing weekly Thora/paracentesis (last on 3/13/25)  GI consult to determine if long-term catheter placement is feasible for her  CIWA protocol  Diuretics on hold  Pneumothorax on right  Patient receives weekly right thoracentesis last on 3/13  Chest x-ray final read: \"Right hydropneumothorax with interval increase in size of pneumothorax compared with prior study. Consider chest tube placement. \"  Status pots pigtail chest tube insertion on 3/17   Hypotension  Chronic history  Resume midodrine and monitor blood pressure  IV albumin as per nephrology  /52   Pulse 87   Temp 98.7 °F (37.1 °C) (Temporal)   Resp 19   Ht 5' 4\" (1.626 m)   Wt 58.2 kg (128 lb 4.8 oz)   SpO2 95%   BMI 22.02 kg/m²     Malignant neoplasm of lower-outer quadrant of right breast of female, estrogen receptor positive (HCC)  Resume home Arimidex  COPD (chronic obstructive pulmonary disease) (HCC)  No evidence of COPD flare  Resume home bronchodilators and monitor  Electrolyte abnormality  Potassium 3.0 and magnesium 1.6 upon admission  Recent Labs     03/16/25  1236 03/16/25  1714 03/17/25  0436   K " 3.0* 4.0 4.1   MG 1.6*  --  1.8*    Replete as needed   Thrombocytopenia (HCC)  Platelet count 115 upon admission, no active bleeding  Monitor closely  Recurrent pleural effusion on right  Status post pigtail chest tube insertion by IR 3/17   Cigarette nicotine dependence without complication    Other dysphagia    Palliative care patient      VTE Pharmacologic Prophylaxis: VTE Score: 2 Low Risk (Score 0-2) - Encourage Ambulation.    Mobility:   Basic Mobility Inpatient Raw Score: 24  JH-HLM Goal: 8: Walk 250 feet or more  JH-HLM Achieved: 8: Walk 250 feet ot more  JH-HLM Goal achieved. Continue to encourage appropriate mobility.    Patient Centered Rounds: I performed bedside rounds with nursing staff today.   Discussions with Specialists or Other Care Team Provider: yes    Education and Discussions with Family / Patient: Patient declined call to .     Current Length of Stay: 1 day(s)  Current Patient Status: Inpatient   Certification Statement: The patient will continue to require additional inpatient hospital stay due to pending stabilization  Discharge Plan:  until stabilized     Code Status: Level 1 - Full Code    Subjective   Seen and examined at bedside  Awake and alert  At baseline  Apprehensive about chest tube insertion    Objective :  Temp:  [97 °F (36.1 °C)-98.7 °F (37.1 °C)] 98.7 °F (37.1 °C)  HR:  [76-89] 87  BP: ()/(41-66) 105/52  Resp:  [16-20] 19  SpO2:  [91 %-97 %] 95 %  O2 Device: None (Room air)    Body mass index is 22.02 kg/m².     Input and Output Summary (last 24 hours):     Intake/Output Summary (Last 24 hours) at 3/17/2025 1433  Last data filed at 3/17/2025 0827  Gross per 24 hour   Intake 470 ml   Output --   Net 470 ml       Physical Exam  Vitals reviewed.   HENT:      Head: Atraumatic.      Mouth/Throat:      Mouth: Mucous membranes are dry.   Eyes:      General: No scleral icterus.  Cardiovascular:      Rate and Rhythm: Normal rate.      Heart sounds: Normal heart  sounds.   Pulmonary:      Effort: No respiratory distress.   Abdominal:      General: Bowel sounds are normal. There is distension.      Tenderness: There is no abdominal tenderness.   Musculoskeletal:      Right lower leg: No edema.      Left lower leg: No edema.   Skin:     General: Skin is dry.      Capillary Refill: Capillary refill takes less than 2 seconds.   Neurological:      Mental Status: She is alert. Mental status is at baseline.           Lines/Drains:  Lines/Drains/Airways       Active Status       Name Placement date Placement time Site Days    Chest Tube 1 Right Second intercostal space 8.5 Fr. 03/17/25  1354  Second intercostal space  less than 1                            Lab Results: I have reviewed the following results:   Results from last 7 days   Lab Units 03/17/25  0436   WBC Thousand/uL 4.37   HEMOGLOBIN g/dL 11.7   HEMATOCRIT % 33.4*   PLATELETS Thousands/uL 93*   SEGS PCT % 57   LYMPHO PCT % 18   MONO PCT % 22*   EOS PCT % 2     Results from last 7 days   Lab Units 03/17/25  0436   SODIUM mmol/L 127*   POTASSIUM mmol/L 4.1   CHLORIDE mmol/L 95*   CO2 mmol/L 29   BUN mg/dL 8   CREATININE mg/dL 0.54*   ANION GAP mmol/L 3*   CALCIUM mg/dL 7.5*   ALBUMIN g/dL 1.9*   TOTAL BILIRUBIN mg/dL 1.49*   ALK PHOS U/L 51   ALT U/L 9   AST U/L 23   GLUCOSE RANDOM mg/dL 90                       Recent Cultures (last 7 days):         Imaging Results Review: I reviewed radiology reports from this admission including: procedure reports.  Other Study Results Review: EKG was reviewed.     Last 24 Hours Medication List:     Current Facility-Administered Medications:     albumin human (FLEXBUMIN) 25 % injection 25 g, Q6H    albuterol inhalation solution 2.5 mg, Q6H PRN    ALPRAZolam (XANAX) tablet 1 mg, HS    anastrozole (ARIMIDEX) tablet 1 mg, Daily    budesonide-formoterol (SYMBICORT) 160-4.5 mcg/act inhaler 2 puff, BID    folic acid (FOLVITE) tablet 1 mg, Daily    [Held by provider] furosemide (LASIX) tablet  20 mg, Daily    lactulose (CHRONULAC) oral solution 10 g, BID    midodrine (PROAMATINE) tablet 10 mg, TID AC    multivitamin-minerals (CENTRUM) tablet 1 tablet, Daily    pantoprazole (PROTONIX) EC tablet 20 mg, Daily    [Held by provider] spironolactone (ALDACTONE) tablet 25 mg, Daily    thiamine tablet 100 mg, Daily    traZODone (DESYREL) tablet 50 mg, HS    Administrative Statements   Today, Patient Was Seen By: Viola Andrea MD      **Please Note: This note may have been constructed using a voice recognition system.**

## 2025-03-17 NOTE — CASE MANAGEMENT
Case Management Assessment & Discharge Planning Note    Patient name Jose White  Location /-01 MRN [id number]  : 1960 Date 3/17/2025       Current Admission Date: 3/16/2025  Current Admission Diagnosis:Hyponatremia   Patient Active Problem List    Diagnosis Date Noted Date Diagnosed    Other dysphagia 2025     Palliative care patient 2025     Electrolyte abnormality 2025     Pneumothorax on right 2025     Cigarette nicotine dependence without complication 2025     Left hip pain 02/10/2025     Greater trochanteric bursitis of both hips 01/15/2025     Lumbar spondylosis 01/15/2025     Secondary esophageal varices without bleeding (HCC) 2025     Candida esophagitis (HCC) 2025     COVID 2025     Hypoalbuminemia 2024     Hepatic encephalopathy (HCC) 2024     Vaginal atrophy 2023     Dyspareunia, female 2023     Postcoital bleeding 2023     Recurrent pleural effusion on right 2023     Malignant neoplasm of lower-outer quadrant of right breast of female, estrogen receptor positive (HCC) 2022     Breast neoplasm, Tis (DCIS), right 2021     Atypical ductal hyperplasia of right breast 2021     Ductal carcinoma in situ (DCIS) of right breast 2021     Postprocedural pneumothorax of right lung 2021     Decompensated hepatic cirrhosis (HCC) 12/10/2020     Thrombocytopenia (HCC) 2020     Anemia 2020     Alcoholic cirrhosis of liver with ascites (HCC) 2020     Hyponatremia 2020     Non-seasonal allergic rhinitis due to pollen 10/08/2020     Bilateral leg edema 10/08/2020     Irritable bowel syndrome with diarrhea 2019     Alcohol use 2018     COPD (chronic obstructive pulmonary disease) (HCC) 2018     Fatty liver 2018     Other specified abnormal findings of blood chemistry 2018     Elevated LFTs 2016     Single seizure (HCC)  02/16/2016     Insomnia 06/19/2015     Hypotension 07/24/2014     GERD without esophagitis 11/21/2012     Hyperlipidemia 11/21/2012     Nicotine dependence 11/21/2012     Anxiety 03/01/2012       LOS (days): 1  Geometric Mean LOS (GMLOS) (days):   Days to GMLOS:     OBJECTIVE:    Risk of Unplanned Readmission Score: 26.98         Current admission status: Inpatient       Preferred Pharmacy:   [id number] - [place] - [address]  [address]  [place] [address]  Phone: [phone] Fax: [phone]    [id number] - [place] - [address]  [address]  [place] [address]  Phone: [phone] Fax: [phone]    Primary Care Provider: JOSÉ Craig    Primary Insurance: MEDICARE  Secondary Insurance: [facility]    ASSESSMENT:  Active Health Care Proxies       Ulises White Health Care Representative - Spouse   Primary Phone: [phone] (Home)                 Advance Directives  Does patient have a Health Care POA?: Yes  Does patient have Advance Directives?: Yes  Advance Directives: Living will, Power of  for health care  Primary Contact: Ulises White, lacy              Patient Information  Admitted from:: Home  Mental Status: Alert  During Assessment patient was accompanied by: Not accompanied during assessment  Assessment information provided by:: Patient  Primary Caregiver: Self  Support Systems: Self, Spouse/significant other  County of Residence: [place]  What [facility] do you live in?: [place]  Home entry access options. Select all that apply.: No steps to enter home  Type of Current Residence: [facility]  Living Arrangements: Lives w/ Spouse/significant other    Activities of Daily Living Prior to Admission  Functional Status: Independent  Completes ADLs independently?: Yes  Ambulates independently?: Yes  Does patient use assisted devices?: Yes  Assisted Devices (DME) used: Nebulizer  Does patient  currently own DME?: Yes  What DME does the patient currently own?: Nebulizer  Does patient have a history of Outpatient Therapy (PT/OT)?: No  Does the patient have a history of Short-Term Rehab?: No  Does patient have a history of HHC?: No  Does patient currently have HHC?: No         Patient Information Continued  Income Source: SSI/SSD  Does patient have prescription coverage?: Yes  Can the patient afford their medications and any related supplies (such as glucometers or test strips)?: Yes  Does patient receive dialysis treatments?: No  Does patient have a history of substance abuse?: Yes  Historical substance use preference: Alcohol/ETOH  Is patient currently in treatment for substance abuse?: No. Patient declined treatment information.  Does patient have a history of Mental Health Diagnosis?: Yes (anxiety)  Is patient receiving treatment for mental health?: No. Patient declined treatment information.  Has patient received inpatient treatment related to mental health in the last 2 years?: No         Means of Transportation  Means of Transport to Appts:: Drives Self          DISCHARGE DETAILS:    Discharge planning discussed with:: Patient  Freedom of Choice: Yes     CM contacted family/caregiver?: No- see comments (Pt is alert and oriented)  Were Treatment Team discharge recommendations reviewed with patient/caregiver?: Yes  Did patient/caregiver verbalize understanding of patient care needs?: Yes  Were patient/caregiver advised of the risks associated with not following Treatment Team discharge recommendations?: Yes         Requested Home Health Care         Is the patient interested in HHC at discharge?: No    DME Referral Provided  Referral made for DME?: No              Treatment Team Recommendation: Home  Discharge Destination Plan:: Home  Transport at Discharge : Family                             IMM Given (Date):: 03/17/25 (947am)  IMM Given to:: Patient     Additional Comments: CM met with pt to discuss  role of CM and any needs prior to discharge. Pt resides w/ spouse in [facility] w/ [facility]. Indp PTA. Owns/uses nebulizer. Hx alcohol abuse and anxiety. No IP psych stay. No hx STR/OP PT/HH. Pt is on disability and drives. Pt prefers [facility] in [place]. Spouse will transport at discharge.

## 2025-03-18 ENCOUNTER — APPOINTMENT (OUTPATIENT)
Dept: PHYSICAL THERAPY | Facility: CLINIC | Age: 65
End: 2025-03-18
Payer: MEDICARE

## 2025-03-18 ENCOUNTER — APPOINTMENT (INPATIENT)
Dept: RADIOLOGY | Facility: HOSPITAL | Age: 65
DRG: 200 | End: 2025-03-18
Attending: INTERNAL MEDICINE
Payer: MEDICARE

## 2025-03-18 ENCOUNTER — APPOINTMENT (INPATIENT)
Dept: RADIOLOGY | Facility: HOSPITAL | Age: 65
DRG: 200 | End: 2025-03-18
Payer: MEDICARE

## 2025-03-18 LAB
ALBUMIN SERPL BCG-MCNC: 3.3 G/DL (ref 3.5–5)
ALP SERPL-CCNC: 51 U/L (ref 34–104)
ALT SERPL W P-5'-P-CCNC: 9 U/L (ref 7–52)
ANION GAP SERPL CALCULATED.3IONS-SCNC: 4 MMOL/L (ref 4–13)
ANION GAP SERPL CALCULATED.3IONS-SCNC: 5 MMOL/L (ref 4–13)
AST SERPL W P-5'-P-CCNC: 21 U/L (ref 13–39)
BASOPHILS # BLD AUTO: 0.05 THOUSANDS/ÂΜL (ref 0–0.1)
BASOPHILS NFR BLD AUTO: 1 % (ref 0–1)
BILIRUB SERPL-MCNC: 1.22 MG/DL (ref 0.2–1)
BUN SERPL-MCNC: 7 MG/DL (ref 5–25)
BUN SERPL-MCNC: 7 MG/DL (ref 5–25)
CALCIUM ALBUM COR SERPL-MCNC: 9 MG/DL (ref 8.3–10.1)
CALCIUM SERPL-MCNC: 8.4 MG/DL (ref 8.4–10.2)
CALCIUM SERPL-MCNC: 9.1 MG/DL (ref 8.4–10.2)
CHLORIDE SERPL-SCNC: 97 MMOL/L (ref 96–108)
CHLORIDE SERPL-SCNC: 98 MMOL/L (ref 96–108)
CO2 SERPL-SCNC: 28 MMOL/L (ref 21–32)
CO2 SERPL-SCNC: 29 MMOL/L (ref 21–32)
CREAT SERPL-MCNC: 0.5 MG/DL (ref 0.6–1.3)
CREAT SERPL-MCNC: 0.54 MG/DL (ref 0.6–1.3)
EOSINOPHIL # BLD AUTO: 0.06 THOUSAND/ÂΜL (ref 0–0.61)
EOSINOPHIL NFR BLD AUTO: 2 % (ref 0–6)
ERYTHROCYTE [DISTWIDTH] IN BLOOD BY AUTOMATED COUNT: 13.1 % (ref 11.6–15.1)
GFR SERPL CREATININE-BSD FRML MDRD: 101 ML/MIN/1.73SQ M
GFR SERPL CREATININE-BSD FRML MDRD: 99 ML/MIN/1.73SQ M
GLUCOSE SERPL-MCNC: 105 MG/DL (ref 65–140)
GLUCOSE SERPL-MCNC: 94 MG/DL (ref 65–140)
HCT VFR BLD AUTO: 31.5 % (ref 34.8–46.1)
HGB BLD-MCNC: 10.5 G/DL (ref 11.5–15.4)
IMM GRANULOCYTES # BLD AUTO: 0.03 THOUSAND/UL (ref 0–0.2)
IMM GRANULOCYTES NFR BLD AUTO: 1 % (ref 0–2)
KAPPA LC FREE SER-MCNC: 136.9 MG/L (ref 3.3–19.4)
KAPPA LC FREE/LAMBDA FREE SER: 1.33 {RATIO} (ref 0.26–1.65)
LAMBDA LC FREE SERPL-MCNC: 102.6 MG/L (ref 5.7–26.3)
LYMPHOCYTES # BLD AUTO: 0.53 THOUSANDS/ÂΜL (ref 0.6–4.47)
LYMPHOCYTES NFR BLD AUTO: 15 % (ref 14–44)
MCH RBC QN AUTO: 34.8 PG (ref 26.8–34.3)
MCHC RBC AUTO-ENTMCNC: 33.3 G/DL (ref 31.4–37.4)
MCV RBC AUTO: 104 FL (ref 82–98)
MONOCYTES # BLD AUTO: 0.62 THOUSAND/ÂΜL (ref 0.17–1.22)
MONOCYTES NFR BLD AUTO: 17 % (ref 4–12)
NEUTROPHILS # BLD AUTO: 2.35 THOUSANDS/ÂΜL (ref 1.85–7.62)
NEUTS SEG NFR BLD AUTO: 64 % (ref 43–75)
NRBC BLD AUTO-RTO: 0 /100 WBCS
PLATELET # BLD AUTO: 80 THOUSANDS/UL (ref 149–390)
PMV BLD AUTO: 9.1 FL (ref 8.9–12.7)
POTASSIUM SERPL-SCNC: 3.6 MMOL/L (ref 3.5–5.3)
POTASSIUM SERPL-SCNC: 3.6 MMOL/L (ref 3.5–5.3)
PROT SERPL-MCNC: 6.7 G/DL (ref 6.4–8.4)
RBC # BLD AUTO: 3.02 MILLION/UL (ref 3.81–5.12)
SODIUM SERPL-SCNC: 130 MMOL/L (ref 135–147)
SODIUM SERPL-SCNC: 131 MMOL/L (ref 135–147)
WBC # BLD AUTO: 3.64 THOUSAND/UL (ref 4.31–10.16)

## 2025-03-18 PROCEDURE — 99232 SBSQ HOSP IP/OBS MODERATE 35: CPT | Performed by: INTERNAL MEDICINE

## 2025-03-18 PROCEDURE — 85025 COMPLETE CBC W/AUTO DIFF WBC: CPT | Performed by: INTERNAL MEDICINE

## 2025-03-18 PROCEDURE — 71045 X-RAY EXAM CHEST 1 VIEW: CPT

## 2025-03-18 PROCEDURE — 80048 BASIC METABOLIC PNL TOTAL CA: CPT | Performed by: NURSE PRACTITIONER

## 2025-03-18 PROCEDURE — 80053 COMPREHEN METABOLIC PANEL: CPT | Performed by: INTERNAL MEDICINE

## 2025-03-18 RX ORDER — CALCIUM GLUCONATE 20 MG/ML
2 INJECTION, SOLUTION INTRAVENOUS ONCE
Status: COMPLETED | OUTPATIENT
Start: 2025-03-18 | End: 2025-03-18

## 2025-03-18 RX ORDER — ALBUMIN (HUMAN) 12.5 G/50ML
25 SOLUTION INTRAVENOUS EVERY 6 HOURS
Status: COMPLETED | OUTPATIENT
Start: 2025-03-18 | End: 2025-03-19

## 2025-03-18 RX ORDER — ALBUMIN HUMAN 50 G/1000ML
12.5 SOLUTION INTRAVENOUS ONCE
Status: COMPLETED | OUTPATIENT
Start: 2025-03-18 | End: 2025-03-18

## 2025-03-18 RX ADMIN — LACTULOSE 10 G: 20 SOLUTION ORAL at 09:42

## 2025-03-18 RX ADMIN — SODIUM CHLORIDE 250 ML: 0.9 INJECTION, SOLUTION INTRAVENOUS at 00:19

## 2025-03-18 RX ADMIN — ALBUMIN (HUMAN) 25 G: 0.25 INJECTION, SOLUTION INTRAVENOUS at 11:42

## 2025-03-18 RX ADMIN — CALCIUM GLUCONATE 2 G: 20 INJECTION, SOLUTION INTRAVENOUS at 03:19

## 2025-03-18 RX ADMIN — MIDODRINE HYDROCHLORIDE 10 MG: 5 TABLET ORAL at 11:12

## 2025-03-18 RX ADMIN — FOLIC ACID 1 MG: 1 TABLET ORAL at 09:42

## 2025-03-18 RX ADMIN — Medication 100 MG: at 09:42

## 2025-03-18 RX ADMIN — ALBUMIN (HUMAN) 25 G: 0.25 INJECTION, SOLUTION INTRAVENOUS at 23:23

## 2025-03-18 RX ADMIN — BUDESONIDE AND FORMOTEROL FUMARATE DIHYDRATE 2 PUFF: 160; 4.5 AEROSOL RESPIRATORY (INHALATION) at 09:42

## 2025-03-18 RX ADMIN — ALBUMIN (HUMAN) 25 G: 0.25 INJECTION, SOLUTION INTRAVENOUS at 17:19

## 2025-03-18 RX ADMIN — PANTOPRAZOLE SODIUM 20 MG: 20 TABLET, DELAYED RELEASE ORAL at 09:42

## 2025-03-18 RX ADMIN — BUDESONIDE AND FORMOTEROL FUMARATE DIHYDRATE 2 PUFF: 160; 4.5 AEROSOL RESPIRATORY (INHALATION) at 17:20

## 2025-03-18 RX ADMIN — MIDODRINE HYDROCHLORIDE 10 MG: 5 TABLET ORAL at 17:19

## 2025-03-18 RX ADMIN — ANASTROZOLE 1 MG: 1 TABLET, COATED ORAL at 09:42

## 2025-03-18 RX ADMIN — MULTIPLE VITAMINS W/ MINERALS TAB 1 TABLET: TAB ORAL at 09:42

## 2025-03-18 RX ADMIN — TRAZODONE HYDROCHLORIDE 50 MG: 50 TABLET ORAL at 21:12

## 2025-03-18 RX ADMIN — MIDODRINE HYDROCHLORIDE 10 MG: 5 TABLET ORAL at 05:48

## 2025-03-18 RX ADMIN — ALBUMIN (HUMAN) 12.5 G: 12.5 INJECTION, SOLUTION INTRAVENOUS at 02:47

## 2025-03-18 RX ADMIN — ALPRAZOLAM 1 MG: 0.5 TABLET ORAL at 21:12

## 2025-03-18 RX ADMIN — LACTULOSE 10 G: 20 SOLUTION ORAL at 17:19

## 2025-03-18 RX ADMIN — ALBUMIN (HUMAN) 25 G: 0.25 INJECTION, SOLUTION INTRAVENOUS at 05:49

## 2025-03-18 NOTE — ASSESSMENT & PLAN NOTE
Chest x-ray shows right sided pneumothorax.  Difficult to determine whether this is ex vacuo pneumothorax related to chronic pleural effusion and need for weekly thoracentesis.  Patient is stable, saturating well on room air without respiratory distress.

## 2025-03-18 NOTE — PROGRESS NOTES
Progress Note - Pulmonology   Name: Jose White 64 y.o. female I MRN: [id number]  Unit/Bed#: [facility] I Date of Admission: 3/16/2025   Date of Service: 3/18/2025 I Hospital Day: 2    Assessment & Plan  Pneumothorax on right  Chest x-ray shows right sided pneumothorax.  Difficult to determine whether this is ex vacuo pneumothorax related to chronic pleural effusion and need for weekly thoracentesis.  Patient is stable, saturating well on room air without respiratory distress.      Recurrent pleural effusion on right  Chest tube placed by IR yesterday  CXR stable to improved. No further air leak  Chest tube placed to water seal, follow-up repeat CXR  Possible clamp trial tomorrow  May need eventual indwelling abdominal catheter which would potentially eliminate the need for indwelling pleural catheter however patient can have both placed in the future if determined to be necessary - GI feels likely too high of a risk for infection     Alcoholic cirrhosis of liver with ascites (HCC)  According to Dr. Herrera, patient is not a liver transplant candidate.  Patient is fully aware of this and is willing to speak with palliative care.  She understands that indwelling catheters would be purely palliative and would not correct the underlying problem.  GI following     COPD (chronic obstructive pulmonary disease) (HCC)  No evidence of exacerbation.  Continue Symbicort    Cigarette nicotine dependence without complication  Encourage complete smoking cessation    Other dysphagia    Palliative care patient      24 Hour Events : chest tube placed   Subjective : Patient denies dyspnea or chest pain    Objective :  Temp:  [96.8 °F (36 °C)-98.7 °F (37.1 °C)] 97.9 °F (36.6 °C)  HR:  [77-87] 78  BP: ()/(44-56) 101/55  Resp:  [17-20] 20  SpO2:  [90 %-97 %] 91 %  O2 Device: None (Room air)    Physical Exam    General appearance: Alert and awake, in no acute distress  Head: Normocephalic, without obvious abnormality,  atraumatic  Eyes: No scleral icterus   Lungs: Decreased breath sounds. Chest tube without air leak.  Heart: Regular rate  Abdomen:  No appreciable distension or tenderness  Extremities: No deformity  Skin: Warm and dry  Neurologic: No acute focal deficits are noted        Lab Results: I have reviewed the following results:   .     03/18/25  0340   WBC 3.64*   HGB 10.5*   HCT 31.5*   PLT 80*   SODIUM 131*   K 3.6   CL 97   CO2 29   BUN 7   CREATININE 0.54*   GLUC 94   AST 21   ALT 9   ALB 3.3*   TBILI 1.22*   ALKPHOS 51     ABG: No new results in last 24 hours.    Imaging Results Review: I reviewed radiology reports from this admission including: chest xray.  Other Study Results Review: No additional pertinent studies reviewed.  PFT Results Reviewed: reviewed

## 2025-03-18 NOTE — ASSESSMENT & PLAN NOTE
Potassium 3.0 and magnesium 1.6 upon admission  Recent Labs     03/16/25  1236 03/16/25  1714 03/17/25  0436 03/18/25  0340   K 3.0* 4.0 4.1 3.6   MG 1.6*  --  1.8*  --     Replete as needed

## 2025-03-18 NOTE — ASSESSMENT & PLAN NOTE
Chest tube placed by IR yesterday  CXR stable to improved. No further air leak  Chest tube placed to water seal, follow-up repeat CXR  Possible clamp trial tomorrow  May need eventual indwelling abdominal catheter which would potentially eliminate the need for indwelling pleural catheter however patient can have both placed in the future if determined to be necessary - GI feels likely too high of a risk for infection

## 2025-03-18 NOTE — ASSESSMENT & PLAN NOTE
"Chronic history  Resume midodrine and monitor blood pressure  IV albumin as per nephrology  BP 96/53   Pulse 78   Temp 97.9 °F (36.6 °C) (Temporal)   Resp 20   Ht 5' 4\" (1.626 m)   Wt 57.9 kg (127 lb 9.6 oz)   SpO2 94%   BMI 21.90 kg/m²     "

## 2025-03-18 NOTE — PLAN OF CARE
Problem: PAIN - ADULT  Goal: Verbalizes/displays adequate comfort level or baseline comfort level  Description: Interventions:  - Encourage patient to monitor pain and request assistance  - Assess pain using appropriate pain scale  - Administer analgesics based on type and severity of pain and evaluate response  - Implement non-pharmacological measures as appropriate and evaluate response  - Consider cultural and social influences on pain and pain management  - Notify physician/advanced practitioner if interventions unsuccessful or patient reports new pain  Outcome: Progressing     Problem: INFECTION - ADULT  Goal: Absence or prevention of progression during hospitalization  Description: INTERVENTIONS:  - Assess and monitor for signs and symptoms of infection  - Monitor lab/diagnostic results  - Monitor all insertion sites, i.e. indwelling lines, tubes, and drains  - Monitor endotracheal if appropriate and nasal secretions for changes in amount and color  - [place] appropriate cooling/warming therapies per order  - Administer medications as ordered  - Instruct and encourage patient and family to use good hand hygiene technique  - Identify and instruct in appropriate isolation precautions for identified infection/condition  Outcome: Progressing  Goal: Absence of fever/infection during neutropenic period  Description: INTERVENTIONS:  - Monitor WBC    Outcome: Progressing     Problem: SAFETY ADULT  Goal: Patient will remain free of falls  Description: INTERVENTIONS:  - Educate patient/family on patient safety including physical limitations  - Instruct patient to call for assistance with activity   - Consult OT/PT to assist with strengthening/mobility   - Keep Call bell within reach  - Keep bed low and locked with side rails adjusted as appropriate  - Keep care items and personal belongings within reach  - Initiate and maintain comfort rounds  - Make Fall Risk Sign visible to staff  - Offer Toileting every 2 Hours,  in advance of need  - Initiate/Maintain  bed alarm  - Obtain necessary fall risk management equipment: yellow socks/yellow bracelet  - Apply yellow socks and bracelet for high fall risk patients  - Consider moving patient to room near nurses station  Outcome: Progressing  Goal: Maintain or return to baseline ADL function  Description: INTERVENTIONS:  -  Assess patient's ability to carry out ADLs; assess patient's baseline for ADL function and identify physical deficits which impact ability to perform ADLs (bathing, care of mouth/teeth, toileting, grooming, dressing, etc.)  - Assess/evaluate cause of self-care deficits   - Assess range of motion  - Assess patient's mobility; develop plan if impaired  - Assess patient's need for assistive devices and provide as appropriate  - Encourage maximum independence but intervene and supervise when necessary  - Involve family in performance of ADLs  - Assess for home care needs following discharge   - Consider OT consult to assist with ADL evaluation and planning for discharge  - Provide patient education as appropriate  Outcome: Progressing  Goal: Maintains/Returns to pre admission functional level  Description: INTERVENTIONS:  - Perform AM-PAC 6 Click Basic Mobility/ Daily Activity assessment daily.  - Set and communicate daily mobility goal to care team and patient/family/caregiver.   - Collaborate with rehabilitation services on mobility goals if consulted  - Perform Range of Motion 3 times a day.  - Reposition patient every 3 hours.  - Dangle patient 3 times a day  - Stand patient 3 times a day  - Ambulate patient 3 times a day  - Out of bed to chair 3 times a day   - Out of bed for meals 3 times a day  - Out of bed for toileting  - Record patient progress and toleration of activity level   Outcome: Progressing     Problem: DISCHARGE PLANNING  Goal: Discharge to home or other facility with appropriate resources  Description: INTERVENTIONS:  - Identify barriers to discharge  w/patient and caregiver  - Arrange for needed discharge resources and transportation as appropriate  - Identify discharge learning needs (meds, wound care, etc.)  - Arrange for interpretive services to assist at discharge as needed  - Refer to Case Management Department for coordinating discharge planning if the patient needs post-hospital services based on physician/advanced practitioner order or complex needs related to functional status, cognitive ability, or social support system  Outcome: Progressing     Problem: MOBILITY - ADULT  Goal: Maintain or return to baseline ADL function  Description: INTERVENTIONS:  -  Assess patient's ability to carry out ADLs; assess patient's baseline for ADL function and identify physical deficits which impact ability to perform ADLs (bathing, care of mouth/teeth, toileting, grooming, dressing, etc.)  - Assess/evaluate cause of self-care deficits   - Assess range of motion  - Assess patient's mobility; develop plan if impaired  - Assess patient's need for assistive devices and provide as appropriate  - Encourage maximum independence but intervene and supervise when necessary  - Involve family in performance of ADLs  - Assess for home care needs following discharge   - Consider OT consult to assist with ADL evaluation and planning for discharge  - Provide patient education as appropriate  Outcome: Progressing    Problem: Potential for Falls  Goal: Patient will remain free of falls  Description: INTERVENTIONS:  - Educate patient/family on patient safety including physical limitations  - Instruct patient to call for assistance with activity   - Consult OT/PT to assist with strengthening/mobility   - Keep Call bell within reach  - Keep bed low and locked with side rails adjusted as appropriate  - Keep care items and personal belongings within reach  - Initiate and maintain comfort rounds  - Make Fall Risk Sign visible to staff  - Offer Toileting every  Hours, in advance of need  -  Initiate/Maintain alarm  - Obtain necessary fall risk management equipment:  - Apply yellow socks and bracelet for high fall risk patients  - Consider moving patient to room near nurses station  Outcome: Progressing     Problem: Nutrition/Hydration-ADULT  Goal: Nutrient/Hydration intake appropriate for improving, restoring or maintaining nutritional needs  Description: Monitor and assess patient's nutrition/hydration status for malnutrition. Collaborate with interdisciplinary team and initiate plan and interventions as ordered.  Monitor patient's weight and dietary intake as ordered or per policy. Utilize nutrition screening tool and intervene as necessary. Determine patient's food preferences and provide high-protein, high-caloric foods as appropriate.     INTERVENTIONS:  - Monitor oral intake, urinary output, labs, and treatment plans  - Assess nutrition and hydration status and recommend course of action  - Evaluate amount of meals eaten  - Assist patient with eating if necessary   - Allow adequate time for meals  - Recommend/ encourage appropriate diets, oral nutritional supplements, and vitamin/mineral supplements  - Order, calculate, and assess calorie counts as needed  - Recommend, monitor, and adjust tube feedings and TPN/PPN based on assessed needs  - Assess need for intravenous fluids  - Provide specific nutrition/hydration education as appropriate  - Include patient/family/caregiver in decisions related to nutrition  Outcome: Progressing

## 2025-03-18 NOTE — ASSESSMENT & PLAN NOTE
64-year-old female with alcoholic cirrhosis decompensated by ascites, hepatic hydrothorax, varices and encephalopathy, not transplant candidate, presents with weakness and hyponatremia. GI consulted for possible Tenckhoff catheter placement as requiring paracentesis and thoracentesis 1-2 times a week.  Patient continues to drink alcohol daily and is noncompliant with fluid restriction.      3/18: R chest tube placed 3/17, mild abd distention, hypotension on midodrine/IV albumin    -Counseled on alcohol cessation  -Agree with CIWA protocol and folic acid thiamine and vitamin replacement  -MELD 3.0=21    Ascites/hepatic hydrothorax-requiring paracentesis and thoracentesis 1-2 times weekly struggling with frequency, hepatology offered Tenckhoff catheter, seen by jose luis gardner, still Level 1 code status, Tenckhoff not appropriate given infection risk, continue paracentesis prn (1-2x/week)  Hepatic encephalopathy-stage 1 in past, none on exam, monitor mental status, cont Lactulose 10g BID  Esophageal varices- EGD 12/2024 1 small G1 EV, mild PHG, rpt 1 year  HCC surveillance- US 2/2025 without liver lesions, rpt 6m

## 2025-03-18 NOTE — PROGRESS NOTES
Progress Note - Nephrology   Name: Jose White 64 y.o. female I MRN: [id number]  Unit/Bed#: -01 I Date of Admission: 3/16/2025   Date of Service: 3/18/2025 I Hospital Day: 2     Assessment & Plan  Hyponatremia  Chronic in nature, baseline sodium level in the high 120s to low 130s in the setting of alcoholic cirrhosis with ascites  Follows with Dr. Tovar  Presented with sodium level of 125  Sodium levels currently improved to 131  On aldactone 25 mg daily and Lasix 20 mg daily, will continue to hold for now  Monitoring on 1.2 L / 24-hour fluid restriction  Will give IV albumin   Creatinine is stable  Consider checking secondary workup if sodium level is trending downward  Hypotension  BP low  Will give IV albumin  Currently on midodrine 10 mg 3 times daily  Alcoholic cirrhosis of liver with ascites (HCC)  Continue folic acid, lactulose, multivitamin, and thiamine  Continue midodrine, holding diuretics  IV albumin x 4 doses  On outpatient Lasix and spironolactone, currently holding  Recommend low-salt diet and fluid restriction  Paracentesis as needed, last 03/13 for 2.6 L.  Requiring weekly Thora and paracentesis  Recurrent pleural effusion on right  S/P chest tube placement  Thrombocytopenia (HCC)  In the setting of cirrhosis  Malignant neoplasm of lower-outer quadrant of right breast of female, estrogen receptor positive (HCC)  Currently on anastrozole 1 mg daily  Electrolyte abnormality  Hypomagnesemia, continue to monitor and replace as needed  Prior hypokalemia, currently resolved    PLAN:   -Hyponatremia, sodium level improved to 131, holding diuretics.  Monitoring on fluid restriction and with IV albumin.  Will hold off on starting diuretics today as blood pressure remains very low. Repeat BMP this afternoon.   -Hypotension, continue IV albumin today and midodrine 10 mg 3 times daily.  -Colic cirrhosis of liver, complicated by ascites, receiving IV albumin and chronically on midodrine.   Paracentesis weekly.  Holding diuretics.  -Pleural effusion, status post chest tube placement  -Hypomagnesemia, continue to monitor and replace as needed    Subjective     Denies any current chest discomfort or shortness of breath.  Denies nausea or vomiting.  Denies abdominal discomfort or issues with urination.    Objective :  Temp:  [96.8 °F (36 °C)-97.9 °F (36.6 °C)] 97.9 °F (36.6 °C)  HR:  [77-87] 82  BP: ()/(44-56) 90/49  Resp:  [17-20] 20  SpO2:  [90 %-97 %] 92 %  O2 Device: None (Room air)    Current Weight: Weight - Scale: 57.9 kg (127 lb 9.6 oz)  First Weight: Weight - Scale: 58.2 kg (128 lb 4.8 oz)  I/O         03/16 0701  03/17 0700 03/17 0701  03/18 0700 03/18 0701  03/19 0700    P.O. 180 480 120    IV Piggyback 50      Total Intake(mL/kg) 230 (4) 480 (8.3) 120 (2.1)    Urine (mL/kg/hr)  250 (0.2)     Stool  0     Chest Tube  1090     Total Output  1340     Net +230 -860 +120           Unmeasured Urine Occurrence 2 x 2 x     Unmeasured Stool Occurrence  1 x           Physical Exam  Vitals reviewed.   HENT:      Head: Normocephalic.      Nose: Nose normal.      Mouth/Throat:      Mouth: Mucous membranes are moist.   Cardiovascular:      Heart sounds: Normal heart sounds.   Pulmonary:      Breath sounds: Normal breath sounds.      Comments: Chest tube present  Musculoskeletal:      Right lower leg: No edema.      Left lower leg: No edema.   Skin:     General: Skin is warm and dry.   Neurological:      General: No focal deficit present.      Mental Status: She is alert. Mental status is at baseline.   Psychiatric:         Mood and Affect: Mood normal.         Medications:    Current Facility-Administered Medications:     albuterol inhalation solution 2.5 mg, 2.5 mg, Nebulization, Q6H PRN, Khanh Marie PA-C    ALPRAZolam (XANAX) tablet 1 mg, 1 mg, Oral, HS, Khanh Marie PA-C, 1 mg at 03/17/25 2047    anastrozole (ARIMIDEX) tablet 1 mg, 1 mg, Oral, Daily, Khanh Marie PA-C, 1 mg at 03/18/25  "0942    budesonide-formoterol (SYMBICORT) 160-4.5 mcg/act inhaler 2 puff, 2 puff, Inhalation, BID, Khanh Maire PA-C, 2 puff at 03/18/25 0942    folic acid (FOLVITE) tablet 1 mg, 1 mg, Oral, Daily, Khanh Marie PA-C, 1 mg at 03/18/25 0942    [Held by provider] furosemide (LASIX) tablet 20 mg, 20 mg, Oral, Daily, Khanh Marie PA-C    lactulose (CHRONULAC) oral solution 10 g, 10 g, Oral, BID, Khanh Marie PA-C, 10 g at 03/18/25 0942    midodrine (PROAMATINE) tablet 10 mg, 10 mg, Oral, TID AC, Khanh Marie PA-C, 10 mg at 03/18/25 1112    multivitamin-minerals (CENTRUM) tablet 1 tablet, 1 tablet, Oral, Daily, Khanh Marie PA-C, 1 tablet at 03/18/25 0942    pantoprazole (PROTONIX) EC tablet 20 mg, 20 mg, Oral, Daily, Khanh Marie PA-C, 20 mg at 03/18/25 0942    [Held by provider] spironolactone (ALDACTONE) tablet 25 mg, 25 mg, Oral, Daily, Khanh Marie PA-C    thiamine tablet 100 mg, 100 mg, Oral, Daily, Khanh Marie PA-C, 100 mg at 03/18/25 0942    traZODone (DESYREL) tablet 50 mg, 50 mg, Oral, HS, Khanh Marie PA-C, 50 mg at 03/17/25 2047      Lab Results: I have reviewed the following results:  Results from last 7 days   Lab Units 03/18/25  0340 03/17/25  0436 03/16/25  1714 03/16/25  1236   WBC Thousand/uL 3.64* 4.37  --  4.31   HEMOGLOBIN g/dL 10.5* 11.7  --  13.8   HEMATOCRIT % 31.5* 33.4*  --  40.6   PLATELETS Thousands/uL 80* 93*  --  115*   POTASSIUM mmol/L 3.6 4.1 4.0 3.0*   CHLORIDE mmol/L 97 95* 94* 91*   CO2 mmol/L 29 29 24 28   BUN mg/dL 7 8 7 8   CREATININE mg/dL 0.54* 0.54* 0.57* 0.70   CALCIUM mg/dL 8.4 7.5* 8.3* 8.1*   MAGNESIUM mg/dL  --  1.8*  --  1.6*   PHOSPHORUS mg/dL  --   --   --  2.6   ALBUMIN g/dL 3.3* 1.9* 2.2* 2.3*       Administrative Statements     Portions of the record may have been created with voice recognition software. Occasional wrong word or \"sound a like\" substitutions may have occurred due to the inherent limitations of voice recognition " software. Read the chart carefully and recognize, using context, where substitutions have occurred.If you have any questions, please contact the dictating provider.

## 2025-03-18 NOTE — QUICK NOTE
Patient with hypotension intermittently since 3/17. Hx of cirrhosis and hypotension. On midodrine. Has been receiving albumin q 6 hours to aid in blood pressure. Asked to come see patient by nursing. Patient laying in bed sleeping. Awakes by voice. Alert and oriented. Denies lightheadedness or dizziness.  States her blood pressure is typically low 100s outpatient.  Will order additional dose of albumin.  CC made aware and agreeable with plan.  Recommended giving patient IV calcium 2 g help as well.

## 2025-03-18 NOTE — ASSESSMENT & PLAN NOTE
Chronic in nature, baseline sodium level in the high 120s to low 130s in the setting of alcoholic cirrhosis with ascites  Follows with Dr. Tovar  Presented with sodium level of 125  Sodium levels currently improved to 131  On aldactone 25 mg daily and Lasix 20 mg daily, will continue to hold for now  Monitoring on 1.2 L / 24-hour fluid restriction  Will give IV albumin   Creatinine is stable  Consider checking secondary workup if sodium level is trending downward

## 2025-03-18 NOTE — PROGRESS NOTES
Progress Note - Gastroenterology   Name: Jose White 64 y.o. female I MRN: [id number]  Unit/Bed#: -01 I Date of Admission: 3/16/2025   Date of Service: 3/18/2025 I Hospital Day: 2    Assessment & Plan  Alcoholic cirrhosis of liver with ascites (HCC)  64-year-old female with alcoholic cirrhosis decompensated by ascites, hepatic hydrothorax, varices and encephalopathy, not transplant candidate, presents with weakness and hyponatremia. GI consulted for possible Tenckhoff catheter placement as requiring paracentesis and thoracentesis 1-2 times a week.  Patient continues to drink alcohol daily and is noncompliant with fluid restriction.      3/18: R chest tube placed 3/17, mild abd distention, hypotension on midodrine/IV albumin    -Counseled on alcohol cessation  -Agree with CIWA protocol and folic acid thiamine and vitamin replacement  -MELD 3.0=21    Ascites/hepatic hydrothorax-requiring paracentesis and thoracentesis 1-2 times weekly struggling with frequency, hepatology offered Tenckhoff catheter, seen by jose luis gardner, still Level 1 code status, Tenckhoff not appropriate given infection risk, continue paracentesis prn (1-2x/week)  Hepatic encephalopathy-stage 1 in past, none on exam, monitor mental status, cont Lactulose 10g BID  Esophageal varices- EGD 12/2024 1 small G1 EV, mild PHG, rpt 1 year  HCC surveillance- US 2/2025 without liver lesions, rpt 6m  Thrombocytopenia (HCC)  Currently 80,000, no evidence of bleeding, monitor    Hyponatremia  Diuretics were discontinued by hepatology as outpt due to severe and persistent hyponatremia but restarted by nephrology   Nephrology input appreciated, diuretics on hold  monitoring and correcting electrolyte abnormalities  Should be on fluid restriction of at least 1500-1800cc/day  Na improved 127>131    Hypotension  On midodrine 10 mg TID  S/P IV albumin X4 3/17    Pneumothorax on right  R chest tube placed 3/17 by IR  pulmonary following    Other  dysphagia  Complaints of dysphagia to solids with regurgitation on daily basis improved with dilation in past  EGD 12/2024 without anything to account for dysphagia  Consider barium esophagram        Subjective   Patient seen and examined.  Tolerating diet without vomiting or abdominal pain.  Mild abdominal distention not yet ready for tap.  Had normal BM.  Chest tube placed yesterday.  Patient wants to hold off on Tenckhoff catheter.  Was hypotensive, given IV albumin, already on midodrine, diuretics held per nephrology    Objective :  Temp:  [96.8 °F (36 °C)-98.7 °F (37.1 °C)] 97.9 °F (36.6 °C)  HR:  [77-87] 78  BP: ()/(44-56) 101/55  Resp:  [17-20] 20  SpO2:  [90 %-97 %] 91 %  O2 Device: None (Room air)    Physical Exam  Constitutional: Well-developed, no acute distress, chronically ill appearing  HEENT: normocephalic, mucous membranes moist.  Neck: Supple  Skin: warm and dry  Respiratory: Lungs are clear to auscultation B/L.  Cardiovascular: Heart is regular rate and rhythm.  Gastrointestinal: Soft, nontender, nondistended with normal active bowel sounds.  No masses, guarding, rebound. + mild distention  Rectal Exam: Deferred.  Extremities: No edema.  Neurologic: Nonfocal. A & O ×3. No asterixis  Psychiatric: Normal affect.    Lab Results: I have reviewed the following results:CBC/BMP:   .     03/18/25  0340   WBC 3.64*   HGB 10.5*   HCT 31.5*   PLT 80*   SODIUM 131*   K 3.6   CL 97   CO2 29   BUN 7   CREATININE 0.54*   GLUC 94    , Creatinine Clearance: Estimated Creatinine Clearance: 89.7 mL/min (A) (by C-G formula based on SCr of 0.54 mg/dL (L))., LFTs:   .     03/18/25  0340   AST 21   ALT 9   ALB 3.3*   TBILI 1.22*   ALKPHOS 51

## 2025-03-18 NOTE — ASSESSMENT & PLAN NOTE
Diuretics were discontinued by hepatology as outpt due to severe and persistent hyponatremia but restarted by nephrology   Nephrology input appreciated, diuretics on hold  monitoring and correcting electrolyte abnormalities  Should be on fluid restriction of at least 1500-1800cc/day  Na improved 127>131

## 2025-03-18 NOTE — PLAN OF CARE
Problem: PAIN - ADULT  Goal: Verbalizes/displays adequate comfort level or baseline comfort level  Description: Interventions:  - Encourage patient to monitor pain and request assistance  - Assess pain using appropriate pain scale  - Administer analgesics based on type and severity of pain and evaluate response  - Implement non-pharmacological measures as appropriate and evaluate response  - Consider cultural and social influences on pain and pain management  - Notify physician/advanced practitioner if interventions unsuccessful or patient reports new pain  Outcome: Progressing     Problem: INFECTION - ADULT  Goal: Absence or prevention of progression during hospitalization  Description: INTERVENTIONS:  - Assess and monitor for signs and symptoms of infection  - Monitor lab/diagnostic results  - Monitor all insertion sites, i.e. indwelling lines, tubes, and drains  - Monitor endotracheal if appropriate and nasal secretions for changes in amount and color  - Paulina appropriate cooling/warming therapies per order  - Administer medications as ordered  - Instruct and encourage patient and family to use good hand hygiene technique  - Identify and instruct in appropriate isolation precautions for identified infection/condition  Outcome: Progressing  Goal: Absence of fever/infection during neutropenic period  Description: INTERVENTIONS:  - Monitor WBC    Outcome: Progressing     Problem: SAFETY ADULT  Goal: Patient will remain free of falls  Description: INTERVENTIONS:  - Educate patient/family on patient safety including physical limitations  - Instruct patient to call for assistance with activity   - Consult OT/PT to assist with strengthening/mobility   - Keep Call bell within reach  - Keep bed low and locked with side rails adjusted as appropriate  - Keep care items and personal belongings within reach  - Initiate and maintain comfort rounds  - Make Fall Risk Sign visible to staff  - Offer Toileting every 2 Hours,  in advance of need  - Initiate/Maintain bed alarm  - Obtain necessary fall risk management equipment  - Apply yellow socks and bracelet for high fall risk patients  - Consider moving patient to room near nurses station  Outcome: Progressing  Goal: Maintain or return to baseline ADL function  Description: INTERVENTIONS:  -  Assess patient's ability to carry out ADLs; assess patient's baseline for ADL function and identify physical deficits which impact ability to perform ADLs (bathing, care of mouth/teeth, toileting, grooming, dressing, etc.)  - Assess/evaluate cause of self-care deficits   - Assess range of motion  - Assess patient's mobility; develop plan if impaired  - Assess patient's need for assistive devices and provide as appropriate  - Encourage maximum independence but intervene and supervise when necessary  - Involve family in performance of ADLs  - Assess for home care needs following discharge   - Consider OT consult to assist with ADL evaluation and planning for discharge  - Provide patient education as appropriate  Outcome: Progressing  Goal: Maintains/Returns to pre admission functional level  Description: INTERVENTIONS:  - Perform AM-PAC 6 Click Basic Mobility/ Daily Activity assessment daily.  - Set and communicate daily mobility goal to care team and patient/family/caregiver.   - Collaborate with rehabilitation services on mobility goals if consulted  - Perform Range of Motion 4 times a day.  - Reposition patient every 2 hours.  - Dangle patient 3 times a day  - Stand patient 3 times a day  - Ambulate patient 3 times a day  - Out of bed to chair 3 times a day   - Out of bed for meals 3 times a day  - Out of bed for toileting  - Record patient progress and toleration of activity level   Outcome: Progressing     Problem: DISCHARGE PLANNING  Goal: Discharge to home or other facility with appropriate resources  Description: INTERVENTIONS:  - Identify barriers to discharge w/patient and caregiver  -  Arrange for needed discharge resources and transportation as appropriate  - Identify discharge learning needs (meds, wound care, etc.)  - Arrange for interpretive services to assist at discharge as needed  - Refer to Case Management Department for coordinating discharge planning if the patient needs post-hospital services based on physician/advanced practitioner order or complex needs related to functional status, cognitive ability, or social support system  Outcome: Progressing     Problem: MOBILITY - ADULT  Goal: Maintain or return to baseline ADL function  Description: INTERVENTIONS:  -  Assess patient's ability to carry out ADLs; assess patient's baseline for ADL function and identify physical deficits which impact ability to perform ADLs (bathing, care of mouth/teeth, toileting, grooming, dressing, etc.)  - Assess/evaluate cause of self-care deficits   - Assess range of motion  - Assess patient's mobility; develop plan if impaired  - Assess patient's need for assistive devices and provide as appropriate  - Encourage maximum independence but intervene and supervise when necessary  - Involve family in performance of ADLs  - Assess for home care needs following discharge   - Consider OT consult to assist with ADL evaluation and planning for discharge  - Provide patient education as appropriate  Outcome: Progressing  Goal: Maintains/Returns to pre admission functional level  Description: INTERVENTIONS:  - Perform AM-PAC 6 Click Basic Mobility/ Daily Activity assessment daily.  - Set and communicate daily mobility goal to care team and patient/family/caregiver.   - Collaborate with rehabilitation services on mobility goals if consulted  - Perform Range of Motion 4 times a day.  - Reposition patient every 2 hours.  - Dangle patient 3 times a day  - Stand patient 3 times a day  - Ambulate patient 3 times a day  - Out of bed to chair 3 times a day   - Out of bed for meals 3 times a day  - Out of bed for toileting  -  Record patient progress and toleration of activity level   Outcome: Progressing

## 2025-03-18 NOTE — ASSESSMENT & PLAN NOTE
According to Dr. Herrera, patient is not a liver transplant candidate.  Patient is fully aware of this and is willing to speak with palliative care.  She understands that indwelling catheters would be purely palliative and would not correct the underlying problem.  GI following

## 2025-03-18 NOTE — PROGRESS NOTES
"Progress Note - Hospitalist   Name: Jose White 64 y.o. female I MRN: [id number]  Unit/Bed#: [facility] I Date of Admission: 3/16/2025   Date of Service: 3/18/2025 I Hospital Day: 2    Assessment & Plan  Hyponatremia  Presenting with lightheadedness and weakness.  Noted history of hyponatremia, possibly due to cirrhosis as well as intravascular depletion  Recent Labs     03/16/25  1714 03/17/25  0436 03/18/25  0340   SODIUM 125* 127* 131*      Saw Dr. Tovar 1 month ago, belive that her baseline is likely in the low 130s  She is noncompliant with her fluid restriction.  Resume fluid restriction at 1.2 L/day, diuretics on hold  IV albumin ordered 3/17  Recheck CMP tomorrow  Nephrology consult  Alcoholic cirrhosis of liver with ascites (HCC)  Patient's MELD Score is: 8 patient is still drinking daily with last drink being today  Undergoing weekly Thora/paracentesis (last on 3/13/25)  GI consult to determine if long-term catheter placement is feasible for her  CIWA protocol  Diuretics on hold  Pneumothorax on right  Patient receives weekly right thoracentesis last on 3/13  Chest x-ray final read: \"Right hydropneumothorax with interval increase in size of pneumothorax compared with prior study. Consider chest tube placement. \"  Status pots pigtail chest tube insertion on 3/17   Hypotension  Chronic history  Resume midodrine and monitor blood pressure  IV albumin as per nephrology  BP 96/53   Pulse 78   Temp 97.9 °F (36.6 °C) (Temporal)   Resp 20   Ht 5' 4\" (1.626 m)   Wt 57.9 kg (127 lb 9.6 oz)   SpO2 94%   BMI 21.90 kg/m²     Malignant neoplasm of lower-outer quadrant of right breast of female, estrogen receptor positive (HCC)  Resume home Arimidex  COPD (chronic obstructive pulmonary disease) (HCC)  No evidence of COPD flare  Resume home bronchodilators and monitor  Electrolyte abnormality  Potassium 3.0 and magnesium 1.6 upon admission  Recent Labs     03/16/25  1236 03/16/25  1714 03/17/25  0436 " 03/18/25  0340   K 3.0* 4.0 4.1 3.6   MG 1.6*  --  1.8*  --     Replete as needed   Thrombocytopenia (HCC)  Platelet count 115 upon admission, no active bleeding  Monitor closely  Recurrent pleural effusion on right  Status post pigtail chest tube insertion by IR 3/17   Cigarette nicotine dependence without complication    Other dysphagia  Aspiration precaution  GI following  Plan for inpatient vs outpatient EGD /esophogram   Palliative care patient  Remains full code  Not interested in hospice currently  Palliative following     VTE Pharmacologic Prophylaxis: VTE Score: 2 Low Risk (Score 0-2) - Encourage Ambulation.    Mobility:   Basic Mobility Inpatient Raw Score: 24  JH-HLM Goal: 8: Walk 250 feet or more  JH-HLM Achieved: 2: Bed activities/Dependent transfer  JH-HLM Goal achieved. Continue to encourage appropriate mobility.    Patient Centered Rounds: I performed bedside rounds with nursing staff today.   Discussions with Specialists or Other Care Team Provider: yes    Education and Discussions with Family / Patient: Patient declined call to .     Current Length of Stay: 2 day(s)  Current Patient Status: Inpatient   Certification Statement: The patient will continue to require additional inpatient hospital stay due to pending stabilization  Discharge Plan:  until stabilized     Code Status: Level 1 - Full Code    Subjective   Seen and examined at bedside  Awake and alert  At baseline  Eager to be discharged     Objective :  Temp:  [96.8 °F (36 °C)-97.9 °F (36.6 °C)] 97.9 °F (36.6 °C)  HR:  [77-86] 78  BP: ()/(44-55) 96/53  Resp:  [18-20] 20  SpO2:  [90 %-94 %] 94 %  O2 Device: None (Room air)    Body mass index is 21.9 kg/m².     Input and Output Summary (last 24 hours):     Intake/Output Summary (Last 24 hours) at 3/18/2025 1443  Last data filed at 3/18/2025 0839  Gross per 24 hour   Intake 360 ml   Output 1340 ml   Net -980 ml       Physical Exam  Vitals reviewed.   HENT:      Head:  Atraumatic.      Mouth/Throat:      Mouth: Mucous membranes are dry.   Eyes:      General: No scleral icterus.  Cardiovascular:      Rate and Rhythm: Normal rate.      Heart sounds: Normal heart sounds.   Pulmonary:      Effort: No respiratory distress.   Abdominal:      General: Bowel sounds are normal. There is distension.      Tenderness: There is no abdominal tenderness.   Musculoskeletal:      Right lower leg: No edema.      Left lower leg: No edema.   Skin:     General: Skin is dry.      Capillary Refill: Capillary refill takes less than 2 seconds.   Neurological:      Mental Status: She is alert. Mental status is at baseline.           Lines/Drains:  Lines/Drains/Airways       Active Status       Name Placement date Placement time Site Days    Chest Tube 1 Right Second intercostal space 8.5 Fr. 03/17/25  1354  Second intercostal space  1                            Lab Results: I have reviewed the following results:   Results from last 7 days   Lab Units 03/18/25  0340   WBC Thousand/uL 3.64*   HEMOGLOBIN g/dL 10.5*   HEMATOCRIT % 31.5*   PLATELETS Thousands/uL 80*   SEGS PCT % 64   LYMPHO PCT % 15   MONO PCT % 17*   EOS PCT % 2     Results from last 7 days   Lab Units 03/18/25  0340   SODIUM mmol/L 131*   POTASSIUM mmol/L 3.6   CHLORIDE mmol/L 97   CO2 mmol/L 29   BUN mg/dL 7   CREATININE mg/dL 0.54*   ANION GAP mmol/L 5   CALCIUM mg/dL 8.4   ALBUMIN g/dL 3.3*   TOTAL BILIRUBIN mg/dL 1.22*   ALK PHOS U/L 51   ALT U/L 9   AST U/L 21   GLUCOSE RANDOM mg/dL 94                       Recent Cultures (last 7 days):         Imaging Results Review: I reviewed radiology reports from this admission including: procedure reports.  Other Study Results Review: EKG was reviewed.     Last 24 Hours Medication List:     Current Facility-Administered Medications:     albumin human (FLEXBUMIN) 25 % injection 25 g, Q6H    albuterol inhalation solution 2.5 mg, Q6H PRN    ALPRAZolam (XANAX) tablet 1 mg, HS    anastrozole (ARIMIDEX)  tablet 1 mg, Daily    budesonide-formoterol (SYMBICORT) 160-4.5 mcg/act inhaler 2 puff, BID    folic acid (FOLVITE) tablet 1 mg, Daily    [Held by provider] furosemide (LASIX) tablet 20 mg, Daily    lactulose (CHRONULAC) oral solution 10 g, BID    midodrine (PROAMATINE) tablet 10 mg, TID AC    multivitamin-minerals (CENTRUM) tablet 1 tablet, Daily    pantoprazole (PROTONIX) EC tablet 20 mg, Daily    [Held by provider] spironolactone (ALDACTONE) tablet 25 mg, Daily    thiamine tablet 100 mg, Daily    traZODone (DESYREL) tablet 50 mg, HS    Administrative Statements   Today, Patient Was Seen By: Viola Andrea MD      **Please Note: This note may have been constructed using a voice recognition system.**

## 2025-03-18 NOTE — ASSESSMENT & PLAN NOTE
Presenting with lightheadedness and weakness.  Noted history of hyponatremia, possibly due to cirrhosis as well as intravascular depletion  Recent Labs     03/16/25  1714 03/17/25  0436 03/18/25  0340   SODIUM 125* 127* 131*      Saw Dr. Tovar 1 month ago, belive that her baseline is likely in the low 130s  She is noncompliant with her fluid restriction.  Resume fluid restriction at 1.2 L/day, diuretics on hold  IV albumin ordered 3/17  Recheck CMP tomorrow  Nephrology consult

## 2025-03-19 ENCOUNTER — APPOINTMENT (INPATIENT)
Dept: RADIOLOGY | Facility: HOSPITAL | Age: 65
DRG: 200 | End: 2025-03-19
Payer: MEDICARE

## 2025-03-19 LAB
ALBUMIN SERPL BCG-MCNC: 3.6 G/DL (ref 3.5–5)
ALBUMIN SERPL ELPH-MCNC: 2.17 G/DL (ref 3.2–5.1)
ALBUMIN SERPL ELPH-MCNC: 38.1 % (ref 48–70)
ALBUMIN UR ELPH-MCNC: 100 %
ALP SERPL-CCNC: 33 U/L (ref 34–104)
ALPHA1 GLOB MFR UR ELPH: 0 %
ALPHA1 GLOB SERPL ELPH-MCNC: 0.23 G/DL (ref 0.15–0.47)
ALPHA1 GLOB SERPL ELPH-MCNC: 4 % (ref 1.8–7)
ALPHA2 GLOB MFR UR ELPH: 0 %
ALPHA2 GLOB SERPL ELPH-MCNC: 0.48 G/DL (ref 0.42–1.04)
ALPHA2 GLOB SERPL ELPH-MCNC: 8.5 % (ref 5.9–14.9)
ALT SERPL W P-5'-P-CCNC: 7 U/L (ref 7–52)
ANION GAP SERPL CALCULATED.3IONS-SCNC: 6 MMOL/L (ref 4–13)
AST SERPL W P-5'-P-CCNC: 17 U/L (ref 13–39)
B-GLOBULIN MFR UR ELPH: 0 %
BASOPHILS # BLD AUTO: 0.04 THOUSANDS/ÂΜL (ref 0–0.1)
BASOPHILS NFR BLD AUTO: 1 % (ref 0–1)
BETA GLOB ABNORMAL SERPL ELPH-MCNC: 0.27 G/DL (ref 0.31–0.57)
BETA1 GLOB SERPL ELPH-MCNC: 4.7 % (ref 4.7–7.7)
BETA2 GLOB SERPL ELPH-MCNC: 9 % (ref 3.1–7.9)
BETA2+GAMMA GLOB SERPL ELPH-MCNC: 0.51 G/DL (ref 0.2–0.58)
BILIRUB SERPL-MCNC: 1.76 MG/DL (ref 0.2–1)
BUN SERPL-MCNC: 6 MG/DL (ref 5–25)
CALCIUM SERPL-MCNC: 8.6 MG/DL (ref 8.4–10.2)
CHLORIDE SERPL-SCNC: 99 MMOL/L (ref 96–108)
CO2 SERPL-SCNC: 27 MMOL/L (ref 21–32)
CREAT SERPL-MCNC: 0.49 MG/DL (ref 0.6–1.3)
EOSINOPHIL # BLD AUTO: 0.08 THOUSAND/ÂΜL (ref 0–0.61)
EOSINOPHIL NFR BLD AUTO: 2 % (ref 0–6)
ERYTHROCYTE [DISTWIDTH] IN BLOOD BY AUTOMATED COUNT: 13 % (ref 11.6–15.1)
GAMMA GLOB ABNORMAL SERPL ELPH-MCNC: 2.03 G/DL (ref 0.4–1.66)
GAMMA GLOB MFR UR ELPH: 0 %
GAMMA GLOB SERPL ELPH-MCNC: 35.7 % (ref 6.9–22.3)
GFR SERPL CREATININE-BSD FRML MDRD: 102 ML/MIN/1.73SQ M
GLUCOSE SERPL-MCNC: 87 MG/DL (ref 65–140)
HCT VFR BLD AUTO: 30.5 % (ref 34.8–46.1)
HGB BLD-MCNC: 10.4 G/DL (ref 11.5–15.4)
IGG/ALB SER: 0.62 {RATIO} (ref 1.1–1.8)
IMM GRANULOCYTES # BLD AUTO: 0.02 THOUSAND/UL (ref 0–0.2)
IMM GRANULOCYTES NFR BLD AUTO: 1 % (ref 0–2)
INR PPP: 1.9 (ref 0.85–1.19)
INTERPRETATION UR IFE-IMP: NORMAL
LYMPHOCYTES # BLD AUTO: 0.66 THOUSANDS/ÂΜL (ref 0.6–4.47)
LYMPHOCYTES NFR BLD AUTO: 16 % (ref 14–44)
MAGNESIUM SERPL-MCNC: 1.6 MG/DL (ref 1.9–2.7)
MCH RBC QN AUTO: 35.1 PG (ref 26.8–34.3)
MCHC RBC AUTO-ENTMCNC: 34.1 G/DL (ref 31.4–37.4)
MCV RBC AUTO: 103 FL (ref 82–98)
MONOCYTES # BLD AUTO: 0.52 THOUSAND/ÂΜL (ref 0.17–1.22)
MONOCYTES NFR BLD AUTO: 13 % (ref 4–12)
NEUTROPHILS # BLD AUTO: 2.72 THOUSANDS/ÂΜL (ref 1.85–7.62)
NEUTS SEG NFR BLD AUTO: 67 % (ref 43–75)
NRBC BLD AUTO-RTO: 0 /100 WBCS
PLATELET # BLD AUTO: 77 THOUSANDS/UL (ref 149–390)
PMV BLD AUTO: 9.2 FL (ref 8.9–12.7)
POTASSIUM SERPL-SCNC: 3.6 MMOL/L (ref 3.5–5.3)
PROT PATTERN SERPL ELPH-IMP: ABNORMAL
PROT PATTERN UR ELPH-IMP: NORMAL
PROT SERPL-MCNC: 5.7 G/DL (ref 6.4–8.2)
PROT SERPL-MCNC: 6.2 G/DL (ref 6.4–8.4)
PROT UR-MCNC: 12.8 MG/DL
PROTHROMBIN TIME: 22.2 SECONDS (ref 12.3–15)
RBC # BLD AUTO: 2.96 MILLION/UL (ref 3.81–5.12)
SODIUM SERPL-SCNC: 132 MMOL/L (ref 135–147)
WBC # BLD AUTO: 4.04 THOUSAND/UL (ref 4.31–10.16)

## 2025-03-19 PROCEDURE — 85025 COMPLETE CBC W/AUTO DIFF WBC: CPT | Performed by: INTERNAL MEDICINE

## 2025-03-19 PROCEDURE — 99232 SBSQ HOSP IP/OBS MODERATE 35: CPT | Performed by: INTERNAL MEDICINE

## 2025-03-19 PROCEDURE — 84166 PROTEIN E-PHORESIS/URINE/CSF: CPT | Performed by: PATHOLOGY

## 2025-03-19 PROCEDURE — 71045 X-RAY EXAM CHEST 1 VIEW: CPT

## 2025-03-19 PROCEDURE — 85610 PROTHROMBIN TIME: CPT | Performed by: PHYSICIAN ASSISTANT

## 2025-03-19 PROCEDURE — 83735 ASSAY OF MAGNESIUM: CPT | Performed by: INTERNAL MEDICINE

## 2025-03-19 PROCEDURE — 80053 COMPREHEN METABOLIC PANEL: CPT | Performed by: INTERNAL MEDICINE

## 2025-03-19 PROCEDURE — 86334 IMMUNOFIX E-PHORESIS SERUM: CPT | Performed by: PATHOLOGY

## 2025-03-19 PROCEDURE — 84165 PROTEIN E-PHORESIS SERUM: CPT | Performed by: PATHOLOGY

## 2025-03-19 RX ORDER — ALBUMIN (HUMAN) 12.5 G/50ML
12.5 SOLUTION INTRAVENOUS ONCE
Status: CANCELLED | OUTPATIENT
Start: 2025-03-19 | End: 2025-03-19

## 2025-03-19 RX ORDER — MAGNESIUM SULFATE HEPTAHYDRATE 40 MG/ML
2 INJECTION, SOLUTION INTRAVENOUS ONCE
Status: COMPLETED | OUTPATIENT
Start: 2025-03-19 | End: 2025-03-19

## 2025-03-19 RX ORDER — POTASSIUM CHLORIDE 1500 MG/1
40 TABLET, EXTENDED RELEASE ORAL ONCE
Status: COMPLETED | OUTPATIENT
Start: 2025-03-19 | End: 2025-03-19

## 2025-03-19 RX ADMIN — MULTIPLE VITAMINS W/ MINERALS TAB 1 TABLET: TAB ORAL at 10:18

## 2025-03-19 RX ADMIN — MAGNESIUM SULFATE HEPTAHYDRATE 2 G: 40 INJECTION, SOLUTION INTRAVENOUS at 10:21

## 2025-03-19 RX ADMIN — PANTOPRAZOLE SODIUM 20 MG: 20 TABLET, DELAYED RELEASE ORAL at 10:18

## 2025-03-19 RX ADMIN — MIDODRINE HYDROCHLORIDE 10 MG: 5 TABLET ORAL at 17:01

## 2025-03-19 RX ADMIN — TRAZODONE HYDROCHLORIDE 50 MG: 50 TABLET ORAL at 20:51

## 2025-03-19 RX ADMIN — ALPRAZOLAM 1 MG: 0.5 TABLET ORAL at 20:50

## 2025-03-19 RX ADMIN — MIDODRINE HYDROCHLORIDE 10 MG: 5 TABLET ORAL at 06:18

## 2025-03-19 RX ADMIN — LACTULOSE 10 G: 20 SOLUTION ORAL at 10:19

## 2025-03-19 RX ADMIN — ANASTROZOLE 1 MG: 1 TABLET, COATED ORAL at 10:18

## 2025-03-19 RX ADMIN — POTASSIUM CHLORIDE 40 MEQ: 1500 TABLET, EXTENDED RELEASE ORAL at 10:32

## 2025-03-19 RX ADMIN — BUDESONIDE AND FORMOTEROL FUMARATE DIHYDRATE 2 PUFF: 160; 4.5 AEROSOL RESPIRATORY (INHALATION) at 17:02

## 2025-03-19 RX ADMIN — GLYCERIN 2 DROP: .002; .002; .01 SOLUTION/ DROPS OPHTHALMIC at 22:56

## 2025-03-19 RX ADMIN — LACTULOSE 10 G: 20 SOLUTION ORAL at 17:01

## 2025-03-19 RX ADMIN — FOLIC ACID 1 MG: 1 TABLET ORAL at 10:18

## 2025-03-19 RX ADMIN — Medication 100 MG: at 10:18

## 2025-03-19 RX ADMIN — ALBUMIN (HUMAN) 25 G: 0.25 INJECTION, SOLUTION INTRAVENOUS at 06:18

## 2025-03-19 RX ADMIN — MIDODRINE HYDROCHLORIDE 10 MG: 5 TABLET ORAL at 10:30

## 2025-03-19 RX ADMIN — BUDESONIDE AND FORMOTEROL FUMARATE DIHYDRATE 2 PUFF: 160; 4.5 AEROSOL RESPIRATORY (INHALATION) at 10:22

## 2025-03-19 NOTE — ASSESSMENT & PLAN NOTE
Hypomagnesemia, continue to monitor and replace as needed, 2g IV 3/19  Hypokalemia, borderline low, received 40meq 3/19

## 2025-03-19 NOTE — ASSESSMENT & PLAN NOTE
Continue folic acid, lactulose, multivitamin, and thiamine  Continue midodrine  On outpatient Lasix and spironolactone, currently on hold  Paracentesis as needed, last 03/13 for 2.6 L.  Requiring weekly Thora and paracentesis

## 2025-03-19 NOTE — ASSESSMENT & PLAN NOTE
Chronic in nature, baseline sodium level in the high 120s to low 130s in the setting of alcoholic cirrhosis with ascites  Follows with Dr. Tovar  Presented with sodium level of 125  Sodium levels currently improved to 132  Patient drinks 2 beers, a few ounces of wine, and 2 water bottles per day  Discussed cutting back petra on alcohol but she refuses  On aldactone 25 mg daily and Lasix 20 mg daily, will continue to hold for now  Treatment: 1200ml/day fluid restriction

## 2025-03-19 NOTE — PROGRESS NOTES
Progress Note - Gastroenterology   Name: Jose White 64 y.o. female I MRN: [id number]  Unit/Bed#: -01 I Date of Admission: 3/16/2025   Date of Service: 3/19/2025 I Hospital Day: 3    Assessment & Plan  Alcoholic cirrhosis of liver with ascites (HCC)  64-year-old female with alcoholic cirrhosis decompensated by ascites, hepatic hydrothorax, varices and encephalopathy, not transplant candidate, presents with weakness and hyponatremia. GI consulted for possible Tenckhoff catheter placement as requiring paracentesis and thoracentesis 1-2 times a week.  Patient continues to drink alcohol daily and is noncompliant with fluid restriction.      3/19: Mild abd distention, scheduled for outpt para tomorrow 3/20, consider performing inpt, intermittent hypotension  3/18: R chest tube placed 3/17, mild abd distention, hypotension on midodrine/IV albumin    -Counseled on alcohol cessation  -Agree with CIWA protocol and folic acid thiamine and vitamin replacement  -MELD 3.0=19    Ascites/hepatic hydrothorax-requiring paracentesis and thoracentesis 1-2 times weekly struggling with frequency, hepatology offered Tenckhoff catheter, seen by palliative carte, still Level 1 code status, Tenckhoff not appropriate given infection risk, continue paracentesis prn (1-2x/week)  Hepatic encephalopathy-stage 1 in past, none on exam, monitor mental status, cont Lactulose 10g BID  Esophageal varices- EGD 12/2024 1 small G1 EV, mild PHG, rpt 1 year  HCC surveillance- US 2/2025 without liver lesions, rpt 6m  Thrombocytopenia (HCC)  Currently 77,000, no evidence of bleeding, monitor    Hyponatremia  Diuretics were discontinued by hepatology as outpt due to severe and persistent hyponatremia but restarted by nephrology   Nephrology input appreciated, diuretics on hold  monitoring and correcting electrolyte abnormalities  Should be on fluid restriction of at least 1500-1800cc/day  Na improved 127>131>132    Hypotension  On midodrine 10 mg  TID  S/P IV albumin X4 3/17    Pneumothorax on right  R chest tube placed 3/17 by IR  pulmonary following    Other dysphagia  Complaints of dysphagia to solids with regurgitation on daily basis improved with dilation in past  EGD 12/2024 without anything to account for dysphagia  Consider barium esophagram        Subjective   Patient seen and examined.  Tolerating diet without vomiting or abdominal pain.  Had brown nonbloody BM.  Mild abdominal distention.  No confusion.  Has scheduled outpatient paracentesis tomorrow 3/20.    Objective :  Temp:  [97 °F (36.1 °C)-97.7 °F (36.5 °C)] 97.7 °F (36.5 °C)  HR:  [] 77  BP: ()/(53-58) 102/58  Resp:  [18-20] 18  SpO2:  [90 %-94 %] 94 %  O2 Device: None (Room air)    Physical Exam  Constitutional: Well-developed, no acute distress, chronically ill appearing  HEENT: normocephalic, mucous membranes moist.  Neck: Supple  Skin: warm and dry  Respiratory: Lungs are clear to auscultation B/L. R chest tube in place  Cardiovascular: Heart is regular rate and rhythm.  Gastrointestinal: Soft, nontender, nondistended with normal active bowel sounds.  No masses, guarding, rebound. + mild distention  Rectal Exam: Deferred.  Extremities: No edema.  Neurologic: Nonfocal. A & O ×3. No asterixis  Psychiatric: Normal affect.        Lab Results: I have reviewed the following results:CBC/BMP:   .     03/19/25  0409   WBC 4.04*   HGB 10.4*   HCT 30.5*   PLT 77*   SODIUM 132*   K 3.6   CL 99   CO2 27   BUN 6   CREATININE 0.49*   GLUC 87   MG 1.6*    , Creatinine Clearance: Estimated Creatinine Clearance: 98.8 mL/min (A) (by C-G formula based on SCr of 0.49 mg/dL (L))., LFTs:   .     03/19/25  0409   AST 17   ALT 7   ALB 3.6   TBILI 1.76*   ALKPHOS 33*    , PTT/INR:  .     03/19/25  1040   INR 1.90*

## 2025-03-19 NOTE — ASSESSMENT & PLAN NOTE
"Chronic history  Resume midodrine and monitor blood pressure    /58   Pulse 77   Temp (!) 97 °F (36.1 °C) (Temporal)   Resp 20   Ht 5' 4\" (1.626 m)   Wt 58.2 kg (128 lb 6.4 oz)   SpO2 94%   BMI 22.04 kg/m²     "

## 2025-03-19 NOTE — PROGRESS NOTES
Progress Note - Nephrology   Name: Jose White 64 y.o. female I MRN: [id number]  Unit/Bed#: -01 I Date of Admission: 3/16/2025   Date of Service: 3/19/2025 I Hospital Day: 3     Assessment & Plan  Hyponatremia  Chronic in nature, baseline sodium level in the high 120s to low 130s in the setting of alcoholic cirrhosis with ascites  Follows with Dr. Tovar  Presented with sodium level of 125  Sodium levels currently improved to 132  Patient drinks 2 beers, a few ounces of wine, and 2 water bottles per day  Discussed cutting back petra on alcohol but she refuses  On aldactone 25 mg daily and Lasix 20 mg daily, will continue to hold for now  Treatment: 1200ml/day fluid restriction  Hypotension  BP low, asymptomatic   Status post IV albumin  Currently on midodrine 10 mg 3 times daily  Alcoholic cirrhosis of liver with ascites (HCC)  Continue folic acid, lactulose, multivitamin, and thiamine  Continue midodrine  On outpatient Lasix and spironolactone, currently on hold  Paracentesis as needed, last 03/13 for 2.6 L.  Requiring weekly Thora and paracentesis  Recurrent pleural effusion on right  S/P chest tube placement  Thrombocytopenia (HCC)  In the setting of cirrhosis  Malignant neoplasm of lower-outer quadrant of right breast of female, estrogen receptor positive (HCC)  Currently on anastrozole 1 mg daily  Electrolyte abnormality  Hypomagnesemia, continue to monitor and replace as needed, 2g IV 3/19  Hypokalemia, borderline low, received 40meq 3/19    I have reviewed the nephrology recommendations including discharge planning, with slim attending, and we are in agreement with renal plan including the information outlined above. Nephrology service will follow.    Subjective   Patient wants to go home.  Denies dizziness/lightheadedness.  Frustrated with being in the hospital.      Objective :  Temp:  [97 °F (36.1 °C)] 97 °F (36.1 °C)  HR:  [] 77  BP: ()/(53-58) 102/58  Resp:  [20] 20  SpO2:  [90  %-94 %] 94 %  O2 Device: None (Room air)    Current Weight: Weight - Scale: 58.2 kg (128 lb 6.4 oz)  First Weight: Weight - Scale: 58.2 kg (128 lb 4.8 oz)  I/O         03/17 0701  03/18 0700 03/18 0701  03/19 0700 03/19 0701  03/20 0700    P.O. 480 420     IV Piggyback       Total Intake(mL/kg) 480 (8.3) 420 (7.2)     Urine (mL/kg/hr) 250 (0.2) 700 (0.5) 200 (0.7)    Stool 0      Chest Tube 1090 340     Total Output 1340 1040 200    Net -860 -620 -200           Unmeasured Urine Occurrence 2 x      Unmeasured Stool Occurrence 1 x            Physical Exam  General: NAD  Neuro: alert awake  Psych: mood and affect appropriate  Skin: no rash  Eyes: anicteric  ENMT: mm moist  Neck: no masses  Respiratory: ctab  Cardiovascular: rrr  Extremities: no edema  Gastrointestinal: soft nt nd    Medications:    Current Facility-Administered Medications:     albuterol inhalation solution 2.5 mg, 2.5 mg, Nebulization, Q6H PRN, Khanh Marie PA-C    ALPRAZolam (XANAX) tablet 1 mg, 1 mg, Oral, HS, Khanh Marie PA-C, 1 mg at 03/18/25 2112    anastrozole (ARIMIDEX) tablet 1 mg, 1 mg, Oral, Daily, Khanh Marie PA-C, 1 mg at 03/19/25 1018    budesonide-formoterol (SYMBICORT) 160-4.5 mcg/act inhaler 2 puff, 2 puff, Inhalation, BID, Khanh Marie PA-C, 2 puff at 03/19/25 1022    folic acid (FOLVITE) tablet 1 mg, 1 mg, Oral, Daily, Khanh Marie PA-C, 1 mg at 03/19/25 1018    [Held by provider] furosemide (LASIX) tablet 20 mg, 20 mg, Oral, Daily, Khanh Marie PA-C    lactulose (CHRONULAC) oral solution 10 g, 10 g, Oral, BID, Khanh Marie PA-C, 10 g at 03/19/25 1019    magnesium sulfate 2 g/50 mL IVPB (premix) 2 g, 2 g, Intravenous, Once, Viola Andrea MD, Last Rate: 25 mL/hr at 03/19/25 1021, 2 g at 03/19/25 1021    midodrine (PROAMATINE) tablet 10 mg, 10 mg, Oral, TID AC, Khanh Marie PA-C, 10 mg at 03/19/25 1030    multivitamin-minerals (CENTRUM) tablet 1 tablet, 1 tablet, Oral, Daily, Khanh Marie PA-C, 1  tablet at 03/19/25 1018    pantoprazole (PROTONIX) EC tablet 20 mg, 20 mg, Oral, Daily, Khanh Marie PA-C, 20 mg at 03/19/25 1018    [Held by provider] spironolactone (ALDACTONE) tablet 25 mg, 25 mg, Oral, Daily, Khanh Marie PA-C    thiamine tablet 100 mg, 100 mg, Oral, Daily, Khanh Marie PA-C, 100 mg at 03/19/25 1018    traZODone (DESYREL) tablet 50 mg, 50 mg, Oral, HS, Khanh Marie PA-C, 50 mg at 03/18/25 2112      Lab Results: I have reviewed the following results:  Results from last 7 days   Lab Units 03/19/25  0409 03/18/25  1450 03/18/25  0340 03/17/25  0436 03/16/25  1714 03/16/25  1236   WBC Thousand/uL 4.04*  --  3.64* 4.37  --  4.31   HEMOGLOBIN g/dL 10.4*  --  10.5* 11.7  --  13.8   HEMATOCRIT % 30.5*  --  31.5* 33.4*  --  40.6   PLATELETS Thousands/uL 77*  --  80* 93*  --  115*   POTASSIUM mmol/L 3.6 3.6 3.6 4.1 4.0 3.0*   CHLORIDE mmol/L 99 98 97 95* 94* 91*   CO2 mmol/L 27 28 29 29 24 28   BUN mg/dL 6 7 7 8 7 8   CREATININE mg/dL 0.49* 0.50* 0.54* 0.54* 0.57* 0.70   CALCIUM mg/dL 8.6 9.1 8.4 7.5* 8.3* 8.1*   MAGNESIUM mg/dL 1.6*  --   --  1.8*  --  1.6*   PHOSPHORUS mg/dL  --   --   --   --   --  2.6   ALBUMIN g/dL 3.6  --  3.3* 1.9* 2.2* 2.3*

## 2025-03-19 NOTE — PROGRESS NOTES
"Progress Note - Hospitalist   Name: Jose White 64 y.o. female I MRN: [id number]  Unit/Bed#: [facility] I Date of Admission: 3/16/2025   Date of Service: 3/19/2025 I Hospital Day: 3    Assessment & Plan  Hyponatremia  Presenting with lightheadedness and weakness.  Noted history of hyponatremia, possibly due to cirrhosis as well as intravascular depletion  Recent Labs     03/18/25  0340 03/18/25  1450 03/19/25  0409   SODIUM 131* 130* 132*      Saw Dr. Tovar 1 month ago, belive that her baseline is likely in the low 130s  She is noncompliant with her fluid restriction.  Resume fluid restriction at 1.2 L/day, diuretics on hold  IV albumin ordered 3/17  Recheck CMP tomorrow  Nephrology consult  Alcoholic cirrhosis of liver with ascites (HCC)  Patient's MELD Score is: 8 patient is still drinking daily with last drink being today  Undergoing weekly Thora/paracentesis (last on 3/13/25)  GI consult to determine if long-term catheter placement is feasible for her  CIWA protocol  Diuretics on hold  Pneumothorax on right  Patient receives weekly right thoracentesis last on 3/13  Chest x-ray final read: \"Right hydropneumothorax with interval increase in size of pneumothorax compared with prior study. Consider chest tube placement. \"  Status pots pigtail chest tube insertion on 3/17   Hypotension  Chronic history  Resume midodrine and monitor blood pressure    /58   Pulse 77   Temp (!) 97 °F (36.1 °C) (Temporal)   Resp 20   Ht 5' 4\" (1.626 m)   Wt 58.2 kg (128 lb 6.4 oz)   SpO2 94%   BMI 22.04 kg/m²     Malignant neoplasm of lower-outer quadrant of right breast of female, estrogen receptor positive (HCC)  Resume home Arimidex  COPD (chronic obstructive pulmonary disease) (HCC)  No evidence of COPD flare  Resume home bronchodilators and monitor  Electrolyte abnormality  Potassium 3.0 and magnesium 1.6 upon admission  Recent Labs     03/16/25  1236 03/16/25  1714 03/17/25  0436 03/18/25  0340 03/18/25  1450 " 03/19/25  0409   K 3.0*   < > 4.1 3.6 3.6 3.6   MG 1.6*  --  1.8*  --   --  1.6*    < > = values in this interval not displayed.    Replete as needed   Thrombocytopenia (HCC)  Platelet count 115 upon admission, no active bleeding  Monitor closely  Recurrent pleural effusion on right  Status post pigtail chest tube insertion by IR 3/17   Cigarette nicotine dependence without complication    Other dysphagia  Aspiration precaution  GI following  Plan for inpatient vs outpatient EGD /esophogram   Palliative care patient  Remains full code  Not interested in hospice currently  Palliative following     VTE Pharmacologic Prophylaxis: VTE Score: 2 Moderate Risk (Score 3-4) - Pharmacological DVT Prophylaxis Contraindicated. Sequential Compression Devices Ordered.    Mobility:   Basic Mobility Inpatient Raw Score: 24  JH-HLM Goal: 8: Walk 250 feet or more  JH-HLM Achieved: 2: Bed activities/Dependent transfer  JH-HLM Goal achieved. Continue to encourage appropriate mobility.    Patient Centered Rounds: I performed bedside rounds with nursing staff today.   Discussions with Specialists or Other Care Team Provider: yes    Education and Discussions with Family / Patient: Patient declined call to .     Current Length of Stay: 3 day(s)  Current Patient Status: Inpatient   Certification Statement: The patient will continue to require additional inpatient hospital stay due to pending improvement   Discharge Plan: Anticipate discharge in 48 hrs to home.    Code Status: Level 1 - Full Code    Subjective   Seen and examined at bedside  Awake and alert  Pigtail chest tube dressing soaked in blood, vitals are stable  Denies any chest pain  Will reach out to IR  Declined contacting family    Objective :  Temp:  [97 °F (36.1 °C)] 97 °F (36.1 °C)  HR:  [] 77  BP: ()/(53-58) 102/58  Resp:  [20] 20  SpO2:  [90 %-94 %] 94 %  O2 Device: None (Room air)    Body mass index is 22.04 kg/m².     Input and Output Summary  (last 24 hours):     Intake/Output Summary (Last 24 hours) at 3/19/2025 1215  Last data filed at 3/19/2025 0950  Gross per 24 hour   Intake 300 ml   Output 1040 ml   Net -740 ml       Physical Exam  Vitals reviewed.   Constitutional:       Comments: Frail/ malnourished    HENT:      Head: Atraumatic.      Mouth/Throat:      Mouth: Mucous membranes are dry.   Eyes:      General: No scleral icterus.  Cardiovascular:      Rate and Rhythm: Normal rate.      Heart sounds: Normal heart sounds.   Pulmonary:      Effort: No respiratory distress.      Comments: Right anterior chest, chesty tube in place  Abdominal:      General: There is no distension.   Musculoskeletal:      Right lower leg: No edema.      Left lower leg: No edema.   Skin:     Findings: Bruising and erythema present.   Neurological:      Mental Status: She is alert. Mental status is at baseline.         Lines/Drains:  Lines/Drains/Airways       Active Status       Name Placement date Placement time Site Days    Chest Tube 1 Right Second intercostal space 8.5 Fr. 03/17/25  1354  Second intercostal space  1                            Lab Results: I have reviewed the following results:   Results from last 7 days   Lab Units 03/19/25  0409   WBC Thousand/uL 4.04*   HEMOGLOBIN g/dL 10.4*   HEMATOCRIT % 30.5*   PLATELETS Thousands/uL 77*   SEGS PCT % 67   LYMPHO PCT % 16   MONO PCT % 13*   EOS PCT % 2     Results from last 7 days   Lab Units 03/19/25  0409   SODIUM mmol/L 132*   POTASSIUM mmol/L 3.6   CHLORIDE mmol/L 99   CO2 mmol/L 27   BUN mg/dL 6   CREATININE mg/dL 0.49*   ANION GAP mmol/L 6   CALCIUM mg/dL 8.6   ALBUMIN g/dL 3.6   TOTAL BILIRUBIN mg/dL 1.76*   ALK PHOS U/L 33*   ALT U/L 7   AST U/L 17   GLUCOSE RANDOM mg/dL 87                       Recent Cultures (last 7 days):         Imaging Results Review: I reviewed radiology reports from this admission including: chest xray.  Other Study Results Review: No additional pertinent studies reviewed.    Last  24 Hours Medication List:     Current Facility-Administered Medications:     albuterol inhalation solution 2.5 mg, Q6H PRN    ALPRAZolam (XANAX) tablet 1 mg, HS    anastrozole (ARIMIDEX) tablet 1 mg, Daily    budesonide-formoterol (SYMBICORT) 160-4.5 mcg/act inhaler 2 puff, BID    folic acid (FOLVITE) tablet 1 mg, Daily    [Held by provider] furosemide (LASIX) tablet 20 mg, Daily    lactulose (CHRONULAC) oral solution 10 g, BID    magnesium sulfate 2 g/50 mL IVPB (premix) 2 g, Once, Last Rate: 2 g (03/19/25 1021)    midodrine (PROAMATINE) tablet 10 mg, TID AC    multivitamin-minerals (CENTRUM) tablet 1 tablet, Daily    pantoprazole (PROTONIX) EC tablet 20 mg, Daily    [Held by provider] spironolactone (ALDACTONE) tablet 25 mg, Daily    thiamine tablet 100 mg, Daily    traZODone (DESYREL) tablet 50 mg, HS    Administrative Statements   Today, Patient Was Seen By: Viola Andrea MD  I have spent a total time of 39 minutes in caring for this patient on the day of the visit/encounter including Counseling / Coordination of care, Documenting in the medical record, Reviewing/placing orders in the medical record (including tests, medications, and/or procedures), Obtaining or reviewing history  , and Communicating with other healthcare professionals .    **Please Note: This note may have been constructed using a voice recognition system.**

## 2025-03-19 NOTE — ASSESSMENT & PLAN NOTE
64-year-old female with alcoholic cirrhosis decompensated by ascites, hepatic hydrothorax, varices and encephalopathy, not transplant candidate, presents with weakness and hyponatremia. GI consulted for possible Tenckhoff catheter placement as requiring paracentesis and thoracentesis 1-2 times a week.  Patient continues to drink alcohol daily and is noncompliant with fluid restriction.      3/19: Mild abd distention, scheduled for outpt para tomorrow 3/20, consider performing inpt, intermittent hypotension  3/18: R chest tube placed 3/17, mild abd distention, hypotension on midodrine/IV albumin    -Counseled on alcohol cessation  -Agree with CIWA protocol and folic acid thiamine and vitamin replacement  -MELD 3.0=19    Ascites/hepatic hydrothorax-requiring paracentesis and thoracentesis 1-2 times weekly struggling with frequency, hepatology offered Tenckhoff catheter, seen by palliative kendra, still Level 1 code status, Tenckhoff not appropriate given infection risk, continue paracentesis prn (1-2x/week)  Hepatic encephalopathy-stage 1 in past, none on exam, monitor mental status, cont Lactulose 10g BID  Esophageal varices- EGD 12/2024 1 small G1 EV, mild PHG, rpt 1 year  HCC surveillance- US 2/2025 without liver lesions, rpt 6m

## 2025-03-19 NOTE — PLAN OF CARE
Problem: PAIN - ADULT  Goal: Verbalizes/displays adequate comfort level or baseline comfort level  Description: Interventions:  - Encourage patient to monitor pain and request assistance  - Assess pain using appropriate pain scale  - Administer analgesics based on type and severity of pain and evaluate response  - Implement non-pharmacological measures as appropriate and evaluate response  - Consider cultural and social influences on pain and pain management  - Notify physician/advanced practitioner if interventions unsuccessful or patient reports new pain  Outcome: Progressing     Problem: INFECTION - ADULT  Goal: Absence or prevention of progression during hospitalization  Description: INTERVENTIONS:  - Assess and monitor for signs and symptoms of infection  - Monitor lab/diagnostic results  - Monitor all insertion sites, i.e. indwelling lines, tubes, and drains  - Monitor endotracheal if appropriate and nasal secretions for changes in amount and color  - [place] appropriate cooling/warming therapies per order  - Administer medications as ordered  - Instruct and encourage patient and family to use good hand hygiene technique  - Identify and instruct in appropriate isolation precautions for identified infection/condition  Outcome: Progressing  Goal: Absence of fever/infection during neutropenic period  Description: INTERVENTIONS:  - Monitor WBC    Outcome: Progressing     Problem: SAFETY ADULT  Goal: Patient will remain free of falls  Description: INTERVENTIONS:  - Educate patient/family on patient safety including physical limitations  - Instruct patient to call for assistance with activity   - Consult OT/PT to assist with strengthening/mobility   - Keep Call bell within reach  - Keep bed low and locked with side rails adjusted as appropriate  - Keep care items and personal belongings within reach  - Initiate and maintain comfort rounds  - Make Fall Risk Sign visible to staff  - Offer Toileting every 2 Hours,  in advance of need  - Initiate/Maintain bed alarm  - Obtain necessary fall risk management equipment:   - Apply yellow socks and bracelet for high fall risk patients  - Consider moving patient to room near nurses station  Outcome: Progressing  Goal: Maintain or return to baseline ADL function  Description: INTERVENTIONS:  -  Assess patient's ability to carry out ADLs; assess patient's baseline for ADL function and identify physical deficits which impact ability to perform ADLs (bathing, care of mouth/teeth, toileting, grooming, dressing, etc.)  - Assess/evaluate cause of self-care deficits   - Assess range of motion  - Assess patient's mobility; develop plan if impaired  - Assess patient's need for assistive devices and provide as appropriate  - Encourage maximum independence but intervene and supervise when necessary  - Involve family in performance of ADLs  - Assess for home care needs following discharge   - Consider OT consult to assist with ADL evaluation and planning for discharge  - Provide patient education as appropriate  Outcome: Progressing  Goal: Maintains/Returns to pre admission functional level  Description: INTERVENTIONS:  - Perform AM-PAC 6 Click Basic Mobility/ Daily Activity assessment daily.  - Set and communicate daily mobility goal to care team and patient/family/caregiver.   - Collaborate with rehabilitation services on mobility goals if consulted  - Perform Range of Motion 3 times a day.  - Reposition patient every 2 hours.  - Dangle patient 3 times a day  - Stand patient 3 times a day  - Ambulate patient 3 times a day  - Out of bed to chair 3 times a day   - Out of bed for meals 3 times a day  - Out of bed for toileting  - Record patient progress and toleration of activity level   Outcome: Progressing     Problem: DISCHARGE PLANNING  Goal: Discharge to home or other facility with appropriate resources  Description: INTERVENTIONS:  - Identify barriers to discharge w/patient and caregiver  -  Arrange for needed discharge resources and transportation as appropriate  - Identify discharge learning needs (meds, wound care, etc.)  - Arrange for interpretive services to assist at discharge as needed  - Refer to Case Management Department for coordinating discharge planning if the patient needs post-hospital services based on physician/advanced practitioner order or complex needs related to functional status, cognitive ability, or social support system  Outcome: Progressing     Problem: MOBILITY - ADULT  Goal: Maintain or return to baseline ADL function  Description: INTERVENTIONS:  -  Assess patient's ability to carry out ADLs; assess patient's baseline for ADL function and identify physical deficits which impact ability to perform ADLs (bathing, care of mouth/teeth, toileting, grooming, dressing, etc.)  - Assess/evaluate cause of self-care deficits   - Assess range of motion  - Assess patient's mobility; develop plan if impaired  - Assess patient's need for assistive devices and provide as appropriate  - Encourage maximum independence but intervene and supervise when necessary  - Involve family in performance of ADLs  - Assess for home care needs following discharge   - Consider OT consult to assist with ADL evaluation and planning for discharge  - Provide patient education as appropriate  Outcome: Progressing  Goal: Maintains/Returns to pre admission functional level  Description: INTERVENTIONS:  - Perform AM-PAC 6 Click Basic Mobility/ Daily Activity assessment daily.  - Set and communicate daily mobility goal to care team and patient/family/caregiver.   - Collaborate with rehabilitation services on mobility goals if consulted  - Perform Range of Motion 3 times a day.  - Reposition patient every 2 hours.  - Dangle patient 3 times a day  - Stand patient 3 times a day  - Ambulate patient 3 times a day  - Out of bed to chair 3 times a day   - Out of bed for meals 3 times a day  - Out of bed for toileting  -  Record patient progress and toleration of activity level   Outcome: Progressing     Problem: Nutrition/Hydration-ADULT  Goal: Nutrient/Hydration intake appropriate for improving, restoring or maintaining nutritional needs  Description: Monitor and assess patient's nutrition/hydration status for malnutrition. Collaborate with interdisciplinary team and initiate plan and interventions as ordered.  Monitor patient's weight and dietary intake as ordered or per policy. Utilize nutrition screening tool and intervene as necessary. Determine patient's food preferences and provide high-protein, high-caloric foods as appropriate.     INTERVENTIONS:  - Monitor oral intake, urinary output, labs, and treatment plans  - Assess nutrition and hydration status and recommend course of action  - Evaluate amount of meals eaten  - Assist patient with eating if necessary   - Allow adequate time for meals  - Recommend/ encourage appropriate diets, oral nutritional supplements, and vitamin/mineral supplements  - Order, calculate, and assess calorie counts as needed  - Recommend, monitor, and adjust tube feedings and TPN/PPN based on assessed needs  - Assess need for intravenous fluids  - Provide specific nutrition/hydration education as appropriate  - Include patient/family/caregiver in decisions related to nutrition  Outcome: Progressing

## 2025-03-19 NOTE — ASSESSMENT & PLAN NOTE
Presenting with lightheadedness and weakness.  Noted history of hyponatremia, possibly due to cirrhosis as well as intravascular depletion  Recent Labs     03/18/25  0340 03/18/25  1450 03/19/25  0409   SODIUM 131* 130* 132*      Saw Dr. Tovar 1 month ago, belive that her baseline is likely in the low 130s  She is noncompliant with her fluid restriction.  Resume fluid restriction at 1.2 L/day, diuretics on hold  IV albumin ordered 3/17  Recheck CMP tomorrow  Nephrology consult

## 2025-03-19 NOTE — ASSESSMENT & PLAN NOTE
Chest tube placed by IR 3/17  CXR stable to improved. No further air leak since day of procedure  Chest tube placed to water seal yesterday morning, so far stable.   Chest tube clamped this AM - follow-up CXR tomorrow.  May need eventual indwelling abdominal catheter which would potentially eliminate the need for indwelling pleural catheter however patient can have both placed in the future if determined to be necessary - GI feels likely too high of a risk for infection

## 2025-03-19 NOTE — ASSESSMENT & PLAN NOTE
Diuretics were discontinued by hepatology as outpt due to severe and persistent hyponatremia but restarted by nephrology   Nephrology input appreciated, diuretics on hold  monitoring and correcting electrolyte abnormalities  Should be on fluid restriction of at least 1500-1800cc/day  Na improved 127>131>132

## 2025-03-19 NOTE — PROGRESS NOTES
Progress Note - Pulmonology   Name: Jose White 64 y.o. female I MRN: [id number]  Unit/Bed#: -[facility] I Date of Admission: 3/16/2025   Date of Service: 3/19/2025 I Hospital Day: 3    Assessment & Plan  Pneumothorax on right  Chest x-ray shows right sided pneumothorax.  Difficult to determine whether this is ex vacuo pneumothorax related to chronic pleural effusion and need for weekly thoracentesis.  Patient is stable, saturating well on room air without respiratory distress.      Recurrent pleural effusion on right  Chest tube placed by IR 3/17  CXR stable to improved. No further air leak since day of procedure  Chest tube placed to water seal yesterday morning, so far stable.   Chest tube clamped this AM - follow-up CXR tomorrow.  May need eventual indwelling abdominal catheter which would potentially eliminate the need for indwelling pleural catheter however patient can have both placed in the future if determined to be necessary - GI feels likely too high of a risk for infection     Alcoholic cirrhosis of liver with ascites (HCC)  According to Dr. Herrera, patient is not a liver transplant candidate.  Patient is fully aware of this and is willing to speak with palliative care.  She understands that indwelling catheters would be purely palliative and would not correct the underlying problem.  GI following     COPD (chronic obstructive pulmonary disease) (HCC)  No evidence of exacerbation.  Continue Symbicort    Cigarette nicotine dependence without complication  Encourage complete smoking cessation    Other dysphagia    Palliative care patient      Discussed with SLIM    24 Hour Events : chest tube to water seal --> clamped  Subjective : Patient continues to deny shortness of breath & chest pain, eager to have chest tube removed and go home    Objective :  Temp:  [97 °F (36.1 °C)] 97 °F (36.1 °C)  HR:  [] 117  BP: ()/(49-54) 99/54  Resp:  [20] 20  SpO2:  [90 %-94 %] 93 %  O2 Device: None (Room  air)    Physical Exam    General appearance: Alert and awake, in no acute distress  Head: Normocephalic, without obvious abnormality, atraumatic  Eyes: No scleral icterus   Lungs: R chest tube in place to water seal without air leak  Heart: Regular rate  Abdomen:  No appreciable distension or tenderness  Extremities: No deformity  Skin: Warm and dry  Neurologic: No acute focal deficits are noted        Lab Results: I have reviewed the following results:   .     03/19/25  0409   WBC 4.04*   HGB 10.4*   HCT 30.5*   PLT 77*   SODIUM 132*   K 3.6   CL 99   CO2 27   BUN 6   CREATININE 0.49*   GLUC 87   MG 1.6*   AST 17   ALT 7   ALB 3.6   TBILI 1.76*   ALKPHOS 33*     ABG: No new results in last 24 hours.    Imaging Results Review: I reviewed radiology reports from this admission including: chest xray.  Other Study Results Review: No additional pertinent studies reviewed.  PFT Results Reviewed: reviewed

## 2025-03-19 NOTE — PLAN OF CARE
Problem: PAIN - ADULT  Goal: Verbalizes/displays adequate comfort level or baseline comfort level  Description: Interventions:  - Encourage patient to monitor pain and request assistance  - Assess pain using appropriate pain scale  - Administer analgesics based on type and severity of pain and evaluate response  - Implement non-pharmacological measures as appropriate and evaluate response  - Consider cultural and social influences on pain and pain management  - Notify physician/advanced practitioner if interventions unsuccessful or patient reports new pain  Outcome: Progressing     Problem: INFECTION - ADULT  Goal: Absence or prevention of progression during hospitalization  Description: INTERVENTIONS:  - Assess and monitor for signs and symptoms of infection  - Monitor lab/diagnostic results  - Monitor all insertion sites, i.e. indwelling lines, tubes, and drains  - Monitor endotracheal if appropriate and nasal secretions for changes in amount and color  - [place] appropriate cooling/warming therapies per order  - Administer medications as ordered  - Instruct and encourage patient and family to use good hand hygiene technique  - Identify and instruct in appropriate isolation precautions for identified infection/condition  Outcome: Progressing     Problem: SAFETY ADULT  Goal: Patient will remain free of falls  Description: INTERVENTIONS:  - Educate patient/family on patient safety including physical limitations  - Instruct patient to call for assistance with activity   - Consult OT/PT to assist with strengthening/mobility   - Keep Call bell within reach  - Keep bed low and locked with side rails adjusted as appropriate  - Keep care items and personal belongings within reach  - Initiate and maintain comfort rounds  - Make Fall Risk Sign visible to staff  - Offer Toileting every 2 Hours, in advance of need  - Initiate/Maintain bed/chair alarm  - Obtain necessary fall risk management equipment: yellow socks, yellow  bracelet  - Apply yellow socks and bracelet for high fall risk patients  - Consider moving patient to room near nurses station  Outcome: Progressing  Goal: Maintain or return to baseline ADL function  Description: INTERVENTIONS:  -  Assess patient's ability to carry out ADLs; assess patient's baseline for ADL function and identify physical deficits which impact ability to perform ADLs (bathing, care of mouth/teeth, toileting, grooming, dressing, etc.)  - Assess/evaluate cause of self-care deficits   - Assess range of motion  - Assess patient's mobility; develop plan if impaired  - Assess patient's need for assistive devices and provide as appropriate  - Encourage maximum independence but intervene and supervise when necessary  - Involve family in performance of ADLs  - Assess for home care needs following discharge   - Consider OT consult to assist with ADL evaluation and planning for discharge  - Provide patient education as appropriate  Outcome: Progressing  Goal: Maintains/Returns to pre admission functional level  Description: INTERVENTIONS:  - Perform AM-PAC 6 Click Basic Mobility/ Daily Activity assessment daily.  - Set and communicate daily mobility goal to care team and patient/family/caregiver.   - Collaborate with rehabilitation services on mobility goals if consulted  - Perform Range of Motion 3 times a day.  - Ambulate patient 4 times a day  - Out of bed to chair 3 times a day   - Out of bed for meals 3 times a day  - Out of bed for toileting  - Record patient progress and toleration of activity level   Outcome: Progressing     Problem: MOBILITY - ADULT  Goal: Maintain or return to baseline ADL function  Description: INTERVENTIONS:  -  Assess patient's ability to carry out ADLs; assess patient's baseline for ADL function and identify physical deficits which impact ability to perform ADLs (bathing, care of mouth/teeth, toileting, grooming, dressing, etc.)  - Assess/evaluate cause of self-care deficits   -  Assess range of motion  - Assess patient's mobility; develop plan if impaired  - Assess patient's need for assistive devices and provide as appropriate  - Encourage maximum independence but intervene and supervise when necessary  - Involve family in performance of ADLs  - Assess for home care needs following discharge   - Consider OT consult to assist with ADL evaluation and planning for discharge  - Provide patient education as appropriate  Outcome: Progressing  Goal: Maintains/Returns to pre admission functional level  Description: INTERVENTIONS:  - Perform AM-PAC 6 Click Basic Mobility/ Daily Activity assessment daily.  - Set and communicate daily mobility goal to care team and patient/family/caregiver.   - Collaborate with rehabilitation services on mobility goals if consulted  - Perform Range of Motion 3 times a day.  - Ambulate patient 4 times a day  - Out of bed to chair 3 times a day   - Out of bed for meals 3 times a day  - Out of bed for toileting  - Record patient progress and toleration of activity level   Outcome: Progressing     Problem: Potential for Falls  Goal: Patient will remain free of falls  Description: INTERVENTIONS:  - Educate patient/family on patient safety including physical limitations  - Instruct patient to call for assistance with activity   - Consult OT/PT to assist with strengthening/mobility   - Keep Call bell within reach  - Keep bed low and locked with side rails adjusted as appropriate  - Keep care items and personal belongings within reach  - Initiate and maintain comfort rounds  - Make Fall Risk Sign visible to staff  - Offer Toileting every 2 Hours, in advance of need  - Initiate/Maintain bed/chair alarm  - Obtain necessary fall risk management equipment: yellow socks, yellow bracelet  - Apply yellow socks and bracelet for high fall risk patients  - Consider moving patient to room near nurses station  Outcome: Progressing

## 2025-03-19 NOTE — ASSESSMENT & PLAN NOTE
Potassium 3.0 and magnesium 1.6 upon admission  Recent Labs     03/16/25  1236 03/16/25  1714 03/17/25  0436 03/18/25  0340 03/18/25  1450 03/19/25  0409   K 3.0*   < > 4.1 3.6 3.6 3.6   MG 1.6*  --  1.8*  --   --  1.6*    < > = values in this interval not displayed.    Replete as needed

## 2025-03-20 ENCOUNTER — HOSPITAL ENCOUNTER (OUTPATIENT)
Dept: INFUSION CENTER | Facility: HOSPITAL | Age: 65
Discharge: HOME | End: 2025-03-20
Attending: INTERNAL MEDICINE

## 2025-03-20 ENCOUNTER — APPOINTMENT (INPATIENT)
Dept: INTERVENTIONAL RADIOLOGY/VASCULAR | Facility: HOSPITAL | Age: 65
DRG: 200 | End: 2025-03-20
Payer: MEDICARE

## 2025-03-20 ENCOUNTER — APPOINTMENT (INPATIENT)
Dept: RADIOLOGY | Facility: HOSPITAL | Age: 65
DRG: 200 | End: 2025-03-20
Payer: MEDICARE

## 2025-03-20 DIAGNOSIS — J93.9 PNEUMOTHORAX ON RIGHT: ICD-10-CM

## 2025-03-20 DIAGNOSIS — J90 PLEURAL EFFUSION ON RIGHT: Primary | ICD-10-CM

## 2025-03-20 LAB
ALBUMIN SERPL BCG-MCNC: 3.1 G/DL (ref 3.5–5)
ALP SERPL-CCNC: 38 U/L (ref 34–104)
ALT SERPL W P-5'-P-CCNC: 7 U/L (ref 7–52)
ANION GAP SERPL CALCULATED.3IONS-SCNC: 7 MMOL/L (ref 4–13)
AST SERPL W P-5'-P-CCNC: 18 U/L (ref 13–39)
BASOPHILS # BLD AUTO: 0.05 THOUSANDS/ÂΜL (ref 0–0.1)
BASOPHILS NFR BLD AUTO: 1 % (ref 0–1)
BILIRUB SERPL-MCNC: 1.62 MG/DL (ref 0.2–1)
BUN SERPL-MCNC: 6 MG/DL (ref 5–25)
CALCIUM ALBUM COR SERPL-MCNC: 8.9 MG/DL (ref 8.3–10.1)
CALCIUM SERPL-MCNC: 8.2 MG/DL (ref 8.4–10.2)
CHLORIDE SERPL-SCNC: 101 MMOL/L (ref 96–108)
CO2 SERPL-SCNC: 25 MMOL/L (ref 21–32)
CREAT SERPL-MCNC: 0.48 MG/DL (ref 0.6–1.3)
EOSINOPHIL # BLD AUTO: 0.12 THOUSAND/ÂΜL (ref 0–0.61)
EOSINOPHIL NFR BLD AUTO: 3 % (ref 0–6)
ERYTHROCYTE [DISTWIDTH] IN BLOOD BY AUTOMATED COUNT: 13.2 % (ref 11.6–15.1)
GFR SERPL CREATININE-BSD FRML MDRD: 103 ML/MIN/1.73SQ M
GLUCOSE SERPL-MCNC: 86 MG/DL (ref 65–140)
HCT VFR BLD AUTO: 31.7 % (ref 34.8–46.1)
HGB BLD-MCNC: 10.9 G/DL (ref 11.5–15.4)
IMM GRANULOCYTES # BLD AUTO: 0.01 THOUSAND/UL (ref 0–0.2)
IMM GRANULOCYTES NFR BLD AUTO: 0 % (ref 0–2)
LYMPHOCYTES # BLD AUTO: 0.63 THOUSANDS/ÂΜL (ref 0.6–4.47)
LYMPHOCYTES NFR BLD AUTO: 14 % (ref 14–44)
MAGNESIUM SERPL-MCNC: 1.7 MG/DL (ref 1.9–2.7)
MCH RBC QN AUTO: 35.4 PG (ref 26.8–34.3)
MCHC RBC AUTO-ENTMCNC: 34.4 G/DL (ref 31.4–37.4)
MCV RBC AUTO: 103 FL (ref 82–98)
MONOCYTES # BLD AUTO: 0.67 THOUSAND/ÂΜL (ref 0.17–1.22)
MONOCYTES NFR BLD AUTO: 15 % (ref 4–12)
NEUTROPHILS # BLD AUTO: 2.9 THOUSANDS/ÂΜL (ref 1.85–7.62)
NEUTS SEG NFR BLD AUTO: 67 % (ref 43–75)
NRBC BLD AUTO-RTO: 0 /100 WBCS
PLATELET # BLD AUTO: 88 THOUSANDS/UL (ref 149–390)
PMV BLD AUTO: 9.3 FL (ref 8.9–12.7)
POTASSIUM SERPL-SCNC: 3.9 MMOL/L (ref 3.5–5.3)
PROT SERPL-MCNC: 5.9 G/DL (ref 6.4–8.4)
RBC # BLD AUTO: 3.08 MILLION/UL (ref 3.81–5.12)
SODIUM SERPL-SCNC: 133 MMOL/L (ref 135–147)
WBC # BLD AUTO: 4.38 THOUSAND/UL (ref 4.31–10.16)

## 2025-03-20 PROCEDURE — 99232 SBSQ HOSP IP/OBS MODERATE 35: CPT | Performed by: INTERNAL MEDICINE

## 2025-03-20 PROCEDURE — 80053 COMPREHEN METABOLIC PANEL: CPT | Performed by: INTERNAL MEDICINE

## 2025-03-20 PROCEDURE — 83735 ASSAY OF MAGNESIUM: CPT | Performed by: INTERNAL MEDICINE

## 2025-03-20 PROCEDURE — 71045 X-RAY EXAM CHEST 1 VIEW: CPT

## 2025-03-20 PROCEDURE — 85025 COMPLETE CBC W/AUTO DIFF WBC: CPT | Performed by: INTERNAL MEDICINE

## 2025-03-20 PROCEDURE — 49083 ABD PARACENTESIS W/IMAGING: CPT | Performed by: RADIOLOGY

## 2025-03-20 PROCEDURE — 49083 ABD PARACENTESIS W/IMAGING: CPT

## 2025-03-20 RX ORDER — MAGNESIUM SULFATE HEPTAHYDRATE 40 MG/ML
2 INJECTION, SOLUTION INTRAVENOUS ONCE
Status: COMPLETED | OUTPATIENT
Start: 2025-03-20 | End: 2025-03-20

## 2025-03-20 RX ORDER — POTASSIUM CHLORIDE 1500 MG/1
40 TABLET, EXTENDED RELEASE ORAL ONCE
Status: COMPLETED | OUTPATIENT
Start: 2025-03-20 | End: 2025-03-20

## 2025-03-20 RX ADMIN — TRAZODONE HYDROCHLORIDE 50 MG: 50 TABLET ORAL at 21:20

## 2025-03-20 RX ADMIN — PANTOPRAZOLE SODIUM 20 MG: 20 TABLET, DELAYED RELEASE ORAL at 09:32

## 2025-03-20 RX ADMIN — GLYCERIN 2 DROP: .002; .002; .01 SOLUTION/ DROPS OPHTHALMIC at 12:09

## 2025-03-20 RX ADMIN — Medication 100 MG: at 09:32

## 2025-03-20 RX ADMIN — MAGNESIUM SULFATE HEPTAHYDRATE 2 G: 40 INJECTION, SOLUTION INTRAVENOUS at 09:38

## 2025-03-20 RX ADMIN — ANASTROZOLE 1 MG: 1 TABLET, COATED ORAL at 09:32

## 2025-03-20 RX ADMIN — POTASSIUM CHLORIDE 40 MEQ: 1500 TABLET, EXTENDED RELEASE ORAL at 09:32

## 2025-03-20 RX ADMIN — BUDESONIDE AND FORMOTEROL FUMARATE DIHYDRATE 2 PUFF: 160; 4.5 AEROSOL RESPIRATORY (INHALATION) at 19:28

## 2025-03-20 RX ADMIN — MULTIPLE VITAMINS W/ MINERALS TAB 1 TABLET: TAB ORAL at 09:32

## 2025-03-20 RX ADMIN — BUDESONIDE AND FORMOTEROL FUMARATE DIHYDRATE 2 PUFF: 160; 4.5 AEROSOL RESPIRATORY (INHALATION) at 09:32

## 2025-03-20 RX ADMIN — ALPRAZOLAM 1 MG: 0.5 TABLET ORAL at 21:20

## 2025-03-20 RX ADMIN — GLYCERIN 2 DROP: .002; .002; .01 SOLUTION/ DROPS OPHTHALMIC at 19:29

## 2025-03-20 RX ADMIN — MIDODRINE HYDROCHLORIDE 10 MG: 5 TABLET ORAL at 19:26

## 2025-03-20 RX ADMIN — LACTULOSE 10 G: 20 SOLUTION ORAL at 09:32

## 2025-03-20 RX ADMIN — MIDODRINE HYDROCHLORIDE 10 MG: 5 TABLET ORAL at 12:08

## 2025-03-20 RX ADMIN — MIDODRINE HYDROCHLORIDE 10 MG: 5 TABLET ORAL at 06:00

## 2025-03-20 RX ADMIN — LACTULOSE 10 G: 20 SOLUTION ORAL at 19:27

## 2025-03-20 RX ADMIN — FOLIC ACID 1 MG: 1 TABLET ORAL at 09:32

## 2025-03-20 NOTE — PROGRESS NOTES
Progress Note - Pulmonology   Name: Jose White 64 y.o. female I MRN: [id number]  Unit/Bed#: -01 I Date of Admission: 3/16/2025   Date of Service: 3/20/2025 I Hospital Day: 4    Assessment & Plan  Pneumothorax on right  Chest x-ray shows right sided pneumothorax.  Difficult to determine whether this is ex vacuo pneumothorax related to chronic pleural effusion and need for weekly thoracentesis.  Patient is stable, saturating well on room air without respiratory distress.      Recurrent pleural effusion on right  Chest tube placed by IR 3/17  CXR stable to improved. No further air leak since day of procedure  Chest tube placed to water seal 3/18  Chest tube clamped yesterday AM - follow-up CXR this AM appears stable, consistent with ex vacuo pneumothorax. D/w IR, tube will be removed today. Repeat CXR tomorrow AM, if stable can be d/c.  Recommend CXR in 1 week and will need OP pulm f/u  May need eventual indwelling abdominal catheter which would potentially eliminate the need for indwelling pleural catheter however patient can have both placed in the future if determined to be necessary - GI feels likely too high of a risk for infection     Alcoholic cirrhosis of liver with ascites (HCC)  According to Dr. Herrera, patient is not a liver transplant candidate.  Patient is fully aware of this and is willing to speak with palliative care.  She understands that indwelling catheters would be purely palliative and would not correct the underlying problem.  GI following     COPD (chronic obstructive pulmonary disease) (HCC)  No evidence of exacerbation.  Continue Symbicort    Cigarette nicotine dependence without complication  Encourage complete smoking cessation    Other dysphagia    Palliative care patient      24 Hour Events : None  Subjective : Jose is awake resting in bed. She has no respiratory complaints at this time.     Objective :  Temp:  [97 °F (36.1 °C)-97.7 °F (36.5 °C)] 97 °F (36.1 °C)  HR:  [71-79]  71  BP: ()/(48-58) 96/51  Resp:  [18] 18  SpO2:  [92 %-94 %] 94 %  O2 Device: None (Room air)    Physical Exam  General appearance:   Alert and awake, in no acute distress  Head:   Normocephalic, without obvious abnormality, atraumatic  Eyes:   No scleral icterus   Lungs:  Pulmonary effort non labored with rest. R chest tube in place without tubing kinds or air leaks.  Breath sounds: Clear bilaterally. No wheezes.  Cardiovascular:   Regular rate and rhythm. No murmurs. Capillary refill < 3 seconds.  Abdomen:    No appreciable distension or tenderness  Extremities:   No deformity. No clubbing present. No edema to extremities.   Skin:   Warm and dry. Intact. Color appropriate for ethnicity.  Neurologic:   No acute focal deficits are noted.  Psychiatric:   Normal mood. Answers questions appropriately.       Lab Results: I have reviewed the following results:   .     03/19/25  1040 03/20/25  0604   WBC  --  4.38   HGB  --  10.9*   HCT  --  31.7*   PLT  --  88*   SODIUM  --  133*   K  --  3.9   CL  --  101   CO2  --  25   BUN  --  6   CREATININE  --  0.48*   GLUC  --  86   MG  --  1.7*   AST  --  18   ALT  --  7   ALB  --  3.1*   TBILI  --  1.62*   ALKPHOS  --  38   INR 1.90*  --      ABG: No new results in last 24 hours.    Imaging Results Review: I reviewed radiology reports from this admission including: chest xray.  Other Study Results Review: No additional pertinent studies reviewed.  PFT Results Reviewed: reviewed      Chelsea Cheng, MSN RN FNP-BC  Nurse Practitioner  [facility]

## 2025-03-20 NOTE — ASSESSMENT & PLAN NOTE
Chest tube placed by IR 3/17  CXR stable to improved. No further air leak since day of procedure  Chest tube placed to water seal 3/18  Chest tube clamped yesterday AM - follow-up CXR this AM appears stable, consistent with ex vacuo pneumothorax. D/w IR, tube will be removed today. Repeat CXR tomorrow AM, if stable can be d/c.  Recommend CXR in 1 week and will need OP pulm f/u  May need eventual indwelling abdominal catheter which would potentially eliminate the need for indwelling pleural catheter however patient can have both placed in the future if determined to be necessary - GI feels likely too high of a risk for infection

## 2025-03-20 NOTE — PLAN OF CARE
Problem: PAIN - ADULT  Goal: Verbalizes/displays adequate comfort level or baseline comfort level  Description: Interventions:  - Encourage patient to monitor pain and request assistance  - Assess pain using appropriate pain scale  - Administer analgesics based on type and severity of pain and evaluate response  - Implement non-pharmacological measures as appropriate and evaluate response  - Consider cultural and social influences on pain and pain management  - Notify physician/advanced practitioner if interventions unsuccessful or patient reports new pain  Outcome: Progressing     Problem: INFECTION - ADULT  Goal: Absence or prevention of progression during hospitalization  Description: INTERVENTIONS:  - Assess and monitor for signs and symptoms of infection  - Monitor lab/diagnostic results  - Monitor all insertion sites, i.e. indwelling lines, tubes, and drains  - Monitor endotracheal if appropriate and nasal secretions for changes in amount and color  - [place] appropriate cooling/warming therapies per order  - Administer medications as ordered  - Instruct and encourage patient and family to use good hand hygiene technique  - Identify and instruct in appropriate isolation precautions for identified infection/condition  Outcome: Progressing     Problem: SAFETY ADULT  Goal: Patient will remain free of falls  Description: INTERVENTIONS:  - Educate patient/family on patient safety including physical limitations  - Instruct patient to call for assistance with activity   - Consult OT/PT to assist with strengthening/mobility   - Keep Call bell within reach  - Keep bed low and locked with side rails adjusted as appropriate  - Keep care items and personal belongings within reach  - Initiate and maintain comfort rounds  - Make Fall Risk Sign visible to staff  - Offer Toileting every 2 Hours, in advance of need  - Initiate/Maintain bed/chair alarm  - Obtain necessary fall risk management equipment: yellow socks, yellow  bracelet  - Apply yellow socks and bracelet for high fall risk patients  - Consider moving patient to room near nurses station  Outcome: Progressing     Problem: DISCHARGE PLANNING  Goal: Discharge to home or other facility with appropriate resources  Description: INTERVENTIONS:  - Identify barriers to discharge w/patient and caregiver  - Arrange for needed discharge resources and transportation as appropriate  - Identify discharge learning needs (meds, wound care, etc.)  - Arrange for interpretive services to assist at discharge as needed  - Refer to Case Management Department for coordinating discharge planning if the patient needs post-hospital services based on physician/advanced practitioner order or complex needs related to functional status, cognitive ability, or social support system  Outcome: Progressing     Problem: Potential for Falls  Goal: Patient will remain free of falls  Description: INTERVENTIONS:  - Educate patient/family on patient safety including physical limitations  - Instruct patient to call for assistance with activity   - Consult OT/PT to assist with strengthening/mobility   - Keep Call bell within reach  - Keep bed low and locked with side rails adjusted as appropriate  - Keep care items and personal belongings within reach  - Initiate and maintain comfort rounds  - Make Fall Risk Sign visible to staff  - Offer Toileting every 2 Hours, in advance of need  - Initiate/Maintain bed/chair alarm  - Obtain necessary fall risk management equipment: yellow socks, yellow bracelet  - Apply yellow socks and bracelet for high fall risk patients  - Consider moving patient to room near nurses station  Outcome: Progressing     Problem: Nutrition/Hydration-ADULT  Goal: Nutrient/Hydration intake appropriate for improving, restoring or maintaining nutritional needs  Description: Monitor and assess patient's nutrition/hydration status for malnutrition. Collaborate with interdisciplinary team and initiate  plan and interventions as ordered.  Monitor patient's weight and dietary intake as ordered or per policy. Utilize nutrition screening tool and intervene as necessary. Determine patient's food preferences and provide high-protein, high-caloric foods as appropriate.     INTERVENTIONS:  - Monitor oral intake, urinary output, labs, and treatment plans  - Assess nutrition and hydration status and recommend course of action  - Evaluate amount of meals eaten  - Assist patient with eating if necessary   - Allow adequate time for meals  - Recommend/ encourage appropriate diets, oral nutritional supplements, and vitamin/mineral supplements  - Order, calculate, and assess calorie counts as needed  - Recommend, monitor, and adjust tube feedings and TPN/PPN based on assessed needs  - Assess need for intravenous fluids  - Provide specific nutrition/hydration education as appropriate  - Include patient/family/caregiver in decisions related to nutrition  Outcome: Progressing

## 2025-03-20 NOTE — ASSESSMENT & PLAN NOTE
Potassium 3.0 and magnesium 1.6 upon admission  Recent Labs     03/18/25  1450 03/19/25  0409 03/20/25  0604   K 3.6 3.6 3.9   MG  --  1.6* 1.7*    Replete as needed

## 2025-03-20 NOTE — ASSESSMENT & PLAN NOTE
Chronic in nature, baseline sodium level in the high 120s to low 130s in the setting of alcoholic cirrhosis with ascites  Follows with Dr. Tovar  Presented with sodium level of 125  Sodium levels currently improved to 133  Outpatient aldactone 25 mg daily and Lasix 20 mg daily currently on hold   Current Treatment: 1200ml/day fluid restriction

## 2025-03-20 NOTE — PROGRESS NOTES
Progress Note - Gastroenterology   Name: Jose White 64 y.o. female I MRN: [id number]  Unit/Bed#: -01 I Date of Admission: 3/16/2025   Date of Service: 3/20/2025 I Hospital Day: 4    Assessment & Plan  Alcoholic cirrhosis of liver with ascites (HCC)  64-year-old female with alcoholic cirrhosis decompensated by ascites, hepatic hydrothorax, varices and encephalopathy, not transplant candidate, presents with weakness and hyponatremia. GI consulted for possible Tenckhoff catheter placement as requiring paracentesis and thoracentesis 1-2 times a week.  Patient continues to drink alcohol daily and is noncompliant with fluid restriction.      3/20: mild abd distention, due out outpt para today, for IR chest tube removal today  3/19: Mild abd distention, scheduled for outpt para tomorrow 3/20, consider performing inpt, intermittent hypotension  3/18: R chest tube placed 3/17, mild abd distention, hypotension on midodrine/IV albumin    -Counseled on alcohol cessation  -Agree with CIWA protocol and folic acid thiamine and vitamin replacement  -MELD 3.0=19    Ascites/hepatic hydrothorax-requiring paracentesis and thoracentesis 1-2 times weekly struggling with frequency, hepatology offered Tenckhoff catheter, seen by palliative care, still Level 1 code status, Tenckhoff not appropriate given infection risk, continue paracentesis prn (1-2x/week)  Hepatic encephalopathy-stage 1 in past, none on exam, monitor mental status, cont Lactulose 10g BID  Esophageal varices- EGD 12/2024 1 small G1 EV, mild PHG, rpt 1 year  HCC surveillance- US 2/2025 without liver lesions, rpt 6m  Thrombocytopenia (HCC)  Currently 88,000, no evidence of bleeding, monitor    Hyponatremia  Diuretics were discontinued by hepatology as outpt due to severe and persistent hyponatremia but restarted by nephrology   Nephrology input appreciated, diuretics on hold  monitoring and correcting electrolyte abnormalities  Should be on fluid restriction of  at least 1500-1800cc/day  Na improved 127>131>132>133    Hypotension  On midodrine 10 mg TID  S/P IV albumin X4 3/17    Pneumothorax on right  R chest tube placed 3/17 by IR, for removal today  pulmonary following    Other dysphagia  Complaints of dysphagia to solids with regurgitation on daily basis improved with dilation in past  EGD 12/2024 without anything to account for dysphagia  Consider barium esophagram        Subjective   Pt seen and examined. Tolerating diet without vomiting or abdominal pain.  Had normal BM.  Mild abdominal distention, no confusion, for chest tube removal today. Was due for outpt paracentesis today.     Objective :  Temp:  [97 °F (36.1 °C)] 97 °F (36.1 °C)  HR:  [71-79] 74  BP: ()/(48-56) 102/54  Resp:  [18] 18  SpO2:  [92 %-94 %] 94 %  O2 Device: None (Room air)    Physical Exam  Constitutional: Well-developed, no acute distress, chronically ill appearing  HEENT: normocephalic, mucous membranes moist.  Neck: Supple  Skin: warm and dry  Respiratory: Lungs are clear to auscultation B/L. R chest tube in place  Cardiovascular: Heart is regular rate and rhythm.  Gastrointestinal: Soft, nontender, nondistended with normal active bowel sounds.  No masses, guarding, rebound. + mild distention  Rectal Exam: Deferred.  Extremities: No edema.  Neurologic: Nonfocal. A & O ×3. No asterixis  Psychiatric: Normal affect.    Lab Results: I have reviewed the following results:

## 2025-03-20 NOTE — DISCHARGE INSTRUCTIONS
Abdominal Paracentesis     WHAT YOU NEED TO KNOW:   Abdominal paracentesis is a procedure to remove abnormal fluid buildup in your abdomen. Fluid builds up because of liver problems, such as swelling and scarring. Heart failure, kidney disease, a mass, or problems with your pancreas may also cause fluid buildup.     DISCHARGE INSTRUCTIONS:     Follow up with your healthcare provider as directed: Write down your questions so you remember to ask them during your visits.     Wound care: Remove dressing after 24 hours. Leave glue in place.    Return to your normal activities    Contact Interventional Radiology at [phone] (YOGESH PATIENTS: Contact Interventional Radiology at [phone]) (MARGARITA PATIENTS: Contact Interventional Radiology at [phone]) if:  You have a fever and your wound is red and swollen.   You have yellow, green, or bad-smelling discharge coming from your wound.   You have pain or swelling in your abdomen.   You have an upset stomach or you vomit.   You have sudden, sharp pain in your abdomen.   You urinate very little or not at all.   You feel confused and more tired than usual.   Your arm or leg feels warm, tender, and painful. It may look swollen and red.   You suddenly feel lightheaded and have trouble breathing.

## 2025-03-20 NOTE — PROGRESS NOTES
Patient brought down for paracentesis.  There is only a small amount of ascites.  She is asymptomatic.  She has outpatient appointment already on 3/26 at [facility].  Paracentesis deferred today.      Right chest tube removed by staff.

## 2025-03-20 NOTE — PROGRESS NOTES
NEPHROLOGY PROGRESS NOTE   Jose White 64 y.o. female MRN: [id number]  Unit/Bed#: -01 Encounter: [id number]      Assessment & Plan  Hyponatremia  Chronic in nature, baseline sodium level in the high 120s to low 130s in the setting of alcoholic cirrhosis with ascites  Follows with Dr. Tovar  Presented with sodium level of 125  Sodium levels currently improved to 133  Outpatient aldactone 25 mg daily and Lasix 20 mg daily currently on hold   Current Treatment: 1200ml/day fluid restriction  Hypotension  BP low, asymptomatic   Status post IV albumin  Currently on midodrine 10 mg 3 times daily  Alcoholic cirrhosis of liver with ascites (HCC)  Continue folic acid, lactulose, multivitamin, and thiamine  Continue midodrine  On outpatient Lasix and spironolactone, currently on hold  Getting repeat paracentesis today  Recurrent pleural effusion on right  S/P chest tube placement  Thrombocytopenia (HCC)  In the setting of cirrhosis  Malignant neoplasm of lower-outer quadrant of right breast of female, estrogen receptor positive (HCC)  Currently on anastrozole 1 mg daily  Electrolyte abnormality  Mag 1.7   S/p mag sulfate 2g IV x 1 today       Plan Summary:   Continue midodrine  Continue to hold Lasix and spironolactone given that sodium is improving    SUBJECTIVE:  Feeling well and wants to go home today.  Right chest tube still in place.  Getting paracentesis today.    OBJECTIVE:  Current Weight: Weight - Scale: 58.1 kg (128 lb 1.4 oz)  Vitals:    03/20/25 0952   BP: 102/54   Pulse: 74   Resp:    Temp:    SpO2: 94%       Intake/Output Summary (Last 24 hours) at 3/20/2025 1003  Last data filed at 3/20/2025 0937  Gross per 24 hour   Intake 620 ml   Output 500 ml   Net 120 ml       General:  appears comfortable and in no acute distress   Skin:  No rash  Eyes:  Sclerae anicteric, no periorbital edema   ENT:  Moist mucous membranes  Neck:  Trachea midline, symmetric   Chest:  R chest tube in place   CVS:  Regular  rate and rhythm  Abdomen:  Soft, nontender, nondistended  Neuro:  Awake and alert  Psych:  Appropriate affect  Extremities: no lower extremity edema       Medications:  Scheduled Meds:  Current Facility-Administered Medications   Medication Dose Route Frequency Provider Last Rate    albuterol  2.5 mg Nebulization Q6H PRN Khanh Marie PA-C      ALPRAZolam  1 mg Oral HS Khanh Marie PA-C      anastrozole  1 mg Oral Daily Khanh Marie PA-C      Artificial Tears  2 drop Both Eyes Q4H PRN JOSÉ Flannery      budesonide-formoterol  2 puff Inhalation BID Khanh Marie PA-C      folic acid  1 mg Oral Daily Khanh Marie PA-C      [Held by provider] furosemide  20 mg Oral Daily Khanh Marie PA-C      lactulose  10 g Oral BID Khanh Marie PA-C      magnesium sulfate  2 g Intravenous Once Viola Andrea MD 2 g (03/20/25 0938)    midodrine  10 mg Oral TID AC Khanh Marie PA-C      multivitamin-minerals  1 tablet Oral Daily Khanh Marie PA-C      pantoprazole  20 mg Oral Daily Khanh Marie PA-C      [Held by provider] spironolactone  25 mg Oral Daily Khanh Marie PA-C      thiamine  100 mg Oral Daily Khanh Marie PA-C      traZODone  50 mg Oral HS Khanh Marie PA-C         PRN Meds:.  albuterol    Artificial Tears    Continuous Infusions:     Laboratory Results:  Results from last 7 days   Lab Units 03/20/25  0604 03/19/25  0409 03/18/25  1450 03/18/25  0340 03/17/25  0436 03/16/25  1714 03/16/25  1236   WBC Thousand/uL 4.38 4.04*  --  3.64* 4.37  --  4.31   HEMOGLOBIN g/dL 10.9* 10.4*  --  10.5* 11.7  --  13.8   HEMATOCRIT % 31.7* 30.5*  --  31.5* 33.4*  --  40.6   PLATELETS Thousands/uL 88* 77*  --  80* 93*  --  115*   SODIUM mmol/L 133* 132* 130* 131* 127* 125* 125*   POTASSIUM mmol/L 3.9 3.6 3.6 3.6 4.1 4.0 3.0*   CHLORIDE mmol/L 101 99 98 97 95* 94* 91*   CO2 mmol/L 25 27 28 29 29 24 28   BUN mg/dL 6 6 7 7 8 7 8   CREATININE mg/dL 0.48* 0.49* 0.50* 0.54* 0.54*  0.57* 0.70   CALCIUM mg/dL 8.2* 8.6 9.1 8.4 7.5* 8.3* 8.1*   MAGNESIUM mg/dL 1.7* 1.6*  --   --  1.8*  --  1.6*   PHOSPHORUS mg/dL  --   --   --   --   --   --  2.6

## 2025-03-20 NOTE — SEDATION DOCUMENTATION
Arrived to IR holding area for Chest tube removal. DSD applied to site. Tolerated well. 4 quadrant U/S performed by Dr. Herrera and no paracentesis is indicated at this time. Transferred back to room via w/c.

## 2025-03-20 NOTE — ASSESSMENT & PLAN NOTE
Diuretics were discontinued by hepatology as outpt due to severe and persistent hyponatremia but restarted by nephrology   Nephrology input appreciated, diuretics on hold  monitoring and correcting electrolyte abnormalities  Should be on fluid restriction of at least 1500-1800cc/day  Na improved 127>131>132>133

## 2025-03-20 NOTE — ASSESSMENT & PLAN NOTE
"Chronic history  Resume midodrine and monitor blood pressure    /56   Pulse 79   Temp 97.7 °F (36.5 °C) (Temporal)   Resp 18   Ht 5' 4\" (1.626 m)   Wt 58.1 kg (128 lb 1.4 oz)   SpO2 95%   BMI 21.99 kg/m²     "

## 2025-03-20 NOTE — PROGRESS NOTES
"Progress Note - Hospitalist   Name: Jose White 64 y.o. female I MRN: [id number]  Unit/Bed#: [facility] I Date of Admission: 3/16/2025   Date of Service: 3/20/2025 I Hospital Day: 4    Assessment & Plan  Hyponatremia  Presenting with lightheadedness and weakness.  Noted history of hyponatremia, possibly due to cirrhosis as well as intravascular depletion  Recent Labs     03/18/25  1450 03/19/25  0409 03/20/25  0604   SODIUM 130* 132* 133*      Saw Dr. Tovar 1 month ago, belive that her baseline is likely in the low 130s  She is noncompliant with her fluid restriction.  Resume fluid restriction at 1.2 L/day, diuretics on hold  IV albumin ordered 3/17  Recheck CMP tomorrow  Nephrology consult  Alcoholic cirrhosis of liver with ascites (HCC)  Patient's MELD Score is: 18 patient is still drinking daily with last drink being today  Undergoing weekly Thora/paracentesis (last on 3/13/25)  Diuretics on hold  Pneumothorax on right  Patient receives weekly right thoracentesis last on 3/13  Chest x-ray final read: \"Right hydropneumothorax with interval increase in size of pneumothorax compared with prior study. Consider chest tube placement. \"  Status pots pigtail chest tube insertion n 3/17   Repeated chest x-ray-with stable finding , no air leak noticed, therefore chest tube removed 3/20  Hypotension  Chronic history  Resume midodrine and monitor blood pressure    /56   Pulse 79   Temp 97.7 °F (36.5 °C) (Temporal)   Resp 18   Ht 5' 4\" (1.626 m)   Wt 58.1 kg (128 lb 1.4 oz)   SpO2 95%   BMI 21.99 kg/m²     Malignant neoplasm of lower-outer quadrant of right breast of female, estrogen receptor positive (HCC)  Resume home Arimidex  COPD (chronic obstructive pulmonary disease) (HCC)  No evidence of COPD flare  Resume home bronchodilators and monitor  Electrolyte abnormality  Potassium 3.0 and magnesium 1.6 upon admission  Recent Labs     03/18/25  1450 03/19/25  0409 03/20/25  0604   K 3.6 3.6 3.9   MG  --  " 1.6* 1.7*    Replete as needed   Thrombocytopenia (HCC)  Platelet count 115 upon admission, no active bleeding  Monitor closely  Recurrent pleural effusion on right  Status post pigtail chest tube insertion by IR 3/17   Cigarette nicotine dependence without complication    Other dysphagia  Aspiration precaution  GI following  Plan for inpatient vs outpatient EGD /esophogram   Palliative care patient  Remains full code  Not interested in hospice currently  Palliative following     VTE Pharmacologic Prophylaxis: VTE Score: 2 Moderate Risk (Score 3-4) - Pharmacological DVT Prophylaxis Contraindicated. Sequential Compression Devices Ordered.    Mobility:   Basic Mobility Inpatient Raw Score: 23  JH-HLM Goal: 7: Walk 25 feet or more  JH-HLM Achieved: 7: Walk 25 feet or more  JH-HLM Goal achieved. Continue to encourage appropriate mobility.    Patient Centered Rounds: I performed bedside rounds with nursing staff today.   Discussions with Specialists or Other Care Team Provider: yes    Education and Discussions with Family / Patient: Patient declined call to .     Current Length of Stay: 4 day(s)  Current Patient Status: Inpatient   Certification Statement: The patient will continue to require additional inpatient hospital stay due to pending improvement   Discharge Plan: Anticipate discharge in 48 hrs to home.    Code Status: Level 1 - Full Code    Subjective   Seen and examined at bedside  Awake and alert  Pigtail chest tube dressing soaked in blood, vitals are stable  Denies any chest pain  Will reach out to IR  Declined contacting family    Objective :  Temp:  [97 °F (36.1 °C)-97.7 °F (36.5 °C)] 97.7 °F (36.5 °C)  HR:  [71-79] 79  BP: ()/(48-56) 105/56  Resp:  [18] 18  SpO2:  [92 %-95 %] 95 %  O2 Device: None (Room air)    Body mass index is 21.99 kg/m².     Input and Output Summary (last 24 hours):     Intake/Output Summary (Last 24 hours) at 3/20/2025 1436  Last data filed at 3/20/2025  1201  Gross per 24 hour   Intake 480 ml   Output 500 ml   Net -20 ml       Physical Exam  Vitals reviewed.   Constitutional:       Comments: Frail/ malnourished    HENT:      Head: Atraumatic.      Mouth/Throat:      Mouth: Mucous membranes are dry.   Eyes:      General: No scleral icterus.  Cardiovascular:      Rate and Rhythm: Normal rate.      Heart sounds: Normal heart sounds.   Pulmonary:      Effort: No respiratory distress.      Comments: Right anterior chest, chesty tube in place  Abdominal:      General: There is no distension.   Musculoskeletal:      Right lower leg: No edema.      Left lower leg: No edema.   Skin:     Findings: Bruising and erythema present.   Neurological:      Mental Status: She is alert. Mental status is at baseline.         Lines/Drains:                Lab Results: I have reviewed the following results:   Results from last 7 days   Lab Units 03/20/25  0604   WBC Thousand/uL 4.38   HEMOGLOBIN g/dL 10.9*   HEMATOCRIT % 31.7*   PLATELETS Thousands/uL 88*   SEGS PCT % 67   LYMPHO PCT % 14   MONO PCT % 15*   EOS PCT % 3     Results from last 7 days   Lab Units 03/20/25  0604   SODIUM mmol/L 133*   POTASSIUM mmol/L 3.9   CHLORIDE mmol/L 101   CO2 mmol/L 25   BUN mg/dL 6   CREATININE mg/dL 0.48*   ANION GAP mmol/L 7   CALCIUM mg/dL 8.2*   ALBUMIN g/dL 3.1*   TOTAL BILIRUBIN mg/dL 1.62*   ALK PHOS U/L 38   ALT U/L 7   AST U/L 18   GLUCOSE RANDOM mg/dL 86     Results from last 7 days   Lab Units 03/19/25  1040   INR  1.90*                   Recent Cultures (last 7 days):         Imaging Results Review: I reviewed radiology reports from this admission including: chest xray.  Other Study Results Review: No additional pertinent studies reviewed.    Last 24 Hours Medication List:     Current Facility-Administered Medications:     albuterol inhalation solution 2.5 mg, Q6H PRN    ALPRAZolam (XANAX) tablet 1 mg, HS    anastrozole (ARIMIDEX) tablet 1 mg, Daily    Artificial Tears Op Soln 2 drop, Q4H  PRN    budesonide-formoterol (SYMBICORT) 160-4.5 mcg/act inhaler 2 puff, BID    folic acid (FOLVITE) tablet 1 mg, Daily    [Held by provider] furosemide (LASIX) tablet 20 mg, Daily    lactulose (CHRONULAC) oral solution 10 g, BID    midodrine (PROAMATINE) tablet 10 mg, TID AC    multivitamin-minerals (CENTRUM) tablet 1 tablet, Daily    pantoprazole (PROTONIX) EC tablet 20 mg, Daily    [Held by provider] spironolactone (ALDACTONE) tablet 25 mg, Daily    thiamine tablet 100 mg, Daily    traZODone (DESYREL) tablet 50 mg, HS    Administrative Statements   Today, Patient Was Seen By: Viola Andrea MD  I have spent a total time of 39 minutes in caring for this patient on the day of the visit/encounter including Counseling / Coordination of care, Documenting in the medical record, Reviewing/placing orders in the medical record (including tests, medications, and/or procedures), Obtaining or reviewing history  , and Communicating with other healthcare professionals .    **Please Note: This note may have been constructed using a voice recognition system.**

## 2025-03-20 NOTE — ASSESSMENT & PLAN NOTE
"Patient receives weekly right thoracentesis last on 3/13  Chest x-ray final read: \"Right hydropneumothorax with interval increase in size of pneumothorax compared with prior study. Consider chest tube placement. \"  Status pots pigtail chest tube insertion n 3/17   Repeated chest x-ray-with stable finding , no air leak noticed, therefore chest tube removed 3/20  "

## 2025-03-20 NOTE — ASSESSMENT & PLAN NOTE
Presenting with lightheadedness and weakness.  Noted history of hyponatremia, possibly due to cirrhosis as well as intravascular depletion  Recent Labs     03/18/25  1450 03/19/25  0409 03/20/25  0604   SODIUM 130* 132* 133*      Saw Dr. Tovar 1 month ago, belive that her baseline is likely in the low 130s  She is noncompliant with her fluid restriction.  Resume fluid restriction at 1.2 L/day, diuretics on hold  IV albumin ordered 3/17  Recheck CMP tomorrow  Nephrology consult

## 2025-03-20 NOTE — ASSESSMENT & PLAN NOTE
64-year-old female with alcoholic cirrhosis decompensated by ascites, hepatic hydrothorax, varices and encephalopathy, not transplant candidate, presents with weakness and hyponatremia. GI consulted for possible Tenckhoff catheter placement as requiring paracentesis and thoracentesis 1-2 times a week.  Patient continues to drink alcohol daily and is noncompliant with fluid restriction.      3/20: mild abd distention, due out outpt para today, for IR chest tube removal today  3/19: Mild abd distention, scheduled for outpt para tomorrow 3/20, consider performing inpt, intermittent hypotension  3/18: R chest tube placed 3/17, mild abd distention, hypotension on midodrine/IV albumin    -Counseled on alcohol cessation  -Agree with CIWA protocol and folic acid thiamine and vitamin replacement  -MELD 3.0=19    Ascites/hepatic hydrothorax-requiring paracentesis and thoracentesis 1-2 times weekly struggling with frequency, hepatology offered Tenckhoff catheter, seen by palliative care, still Level 1 code status, Tenckhoff not appropriate given infection risk, continue paracentesis prn (1-2x/week)  Hepatic encephalopathy-stage 1 in past, none on exam, monitor mental status, cont Lactulose 10g BID  Esophageal varices- EGD 12/2024 1 small G1 EV, mild PHG, rpt 1 year  HCC surveillance- US 2/2025 without liver lesions, rpt 6m

## 2025-03-20 NOTE — ASSESSMENT & PLAN NOTE
Patient's MELD Score is: 18 patient is still drinking daily with last drink being today  Undergoing weekly Thora/paracentesis (last on 3/13/25)  Diuretics on hold

## 2025-03-20 NOTE — ASSESSMENT & PLAN NOTE
Continue folic acid, lactulose, multivitamin, and thiamine  Continue midodrine  On outpatient Lasix and spironolactone, currently on hold  Getting repeat paracentesis today

## 2025-03-20 NOTE — TELEMEDICINE
e-Consult ([facility])  - Interventional Radiology  Jose White 64 y.o. female MRN: [id number]  Unit/Bed#: -01 Encounter: [id number]          Interventional Radiology has been consulted to evaluate Jose White      Inpatient Consult to IR  Consult performed by: Gene Herrera MD  Consult ordered by: Baron Modi MD        03/20/25    Assessment/Recommendation:   Consult for chest tube removal.  Imaging reviewed and discussed with pulmonology.  Right pneumothorax remained stable after waterseal trial and clamping trial.  Reportedly no active air leak on atrium.  Agree with removal.  No further benefit of continued drainage in the absence of any lung reexpansion and air leak.    5-10 minutes, >50% of the total time devoted to medical consultative verbal/EMR discussion between providers. Written report will be generated in the EMR.     Thank you for allowing Interventional Radiology to participate in the care of Jose White. Please don't hesitate to call or TigerText us with any questions.     Gene Herrera MD

## 2025-03-20 NOTE — PLAN OF CARE
Problem: PAIN - ADULT  Goal: Verbalizes/displays adequate comfort level or baseline comfort level  Description: Interventions:  - Encourage patient to monitor pain and request assistance  - Assess pain using appropriate pain scale  - Administer analgesics based on type and severity of pain and evaluate response  - Implement non-pharmacological measures as appropriate and evaluate response  - Consider cultural and social influences on pain and pain management  - Notify physician/advanced practitioner if interventions unsuccessful or patient reports new pain  Outcome: Progressing     Problem: INFECTION - ADULT  Goal: Absence or prevention of progression during hospitalization  Description: INTERVENTIONS:  - Assess and monitor for signs and symptoms of infection  - Monitor lab/diagnostic results  - Monitor all insertion sites, i.e. indwelling lines, tubes, and drains  - Monitor endotracheal if appropriate and nasal secretions for changes in amount and color  - [place] appropriate cooling/warming therapies per order  - Administer medications as ordered  - Instruct and encourage patient and family to use good hand hygiene technique  - Identify and instruct in appropriate isolation precautions for identified infection/condition  Outcome: Progressing  Goal: Absence of fever/infection during neutropenic period  Description: INTERVENTIONS:  - Monitor WBC    Outcome: Progressing     Problem: SAFETY ADULT  Goal: Patient will remain free of falls  Description: INTERVENTIONS:  - Educate patient/family on patient safety including physical limitations  - Instruct patient to call for assistance with activity   - Consult OT/PT to assist with strengthening/mobility   - Keep Call bell within reach  - Keep bed low and locked with side rails adjusted as appropriate  - Keep care items and personal belongings within reach  - Initiate and maintain comfort rounds  - Make Fall Risk Sign visible to staff  - Offer Toileting every 2 Hours,  in advance of need  - Initiate/Maintain bed alarm  - Obtain necessary fall risk management equipment:   - Apply yellow socks and bracelet for high fall risk patients  - Consider moving patient to room near nurses station  Outcome: Progressing  Goal: Maintain or return to baseline ADL function  Description: INTERVENTIONS:  -  Assess patient's ability to carry out ADLs; assess patient's baseline for ADL function and identify physical deficits which impact ability to perform ADLs (bathing, care of mouth/teeth, toileting, grooming, dressing, etc.)  - Assess/evaluate cause of self-care deficits   - Assess range of motion  - Assess patient's mobility; develop plan if impaired  - Assess patient's need for assistive devices and provide as appropriate  - Encourage maximum independence but intervene and supervise when necessary  - Involve family in performance of ADLs  - Assess for home care needs following discharge   - Consider OT consult to assist with ADL evaluation and planning for discharge  - Provide patient education as appropriate  Outcome: Progressing  Goal: Maintains/Returns to pre admission functional level  Description: INTERVENTIONS:  - Perform AM-PAC 6 Click Basic Mobility/ Daily Activity assessment daily.  - Set and communicate daily mobility goal to care team and patient/family/caregiver.   - Collaborate with rehabilitation services on mobility goals if consulted  - Perform Range of Motion 3 times a day.  - Reposition patient every 2 hours.  - Dangle patient 3 times a day  - Stand patient 3 times a day  - Ambulate patient 3 times a day  - Out of bed to chair 3 times a day   - Out of bed for meals 3 times a day  - Out of bed for toileting  - Record patient progress and toleration of activity level   Outcome: Progressing     Problem: DISCHARGE PLANNING  Goal: Discharge to home or other facility with appropriate resources  Description: INTERVENTIONS:  - Identify barriers to discharge w/patient and caregiver  -  Arrange for needed discharge resources and transportation as appropriate  - Identify discharge learning needs (meds, wound care, etc.)  - Arrange for interpretive services to assist at discharge as needed  - Refer to Case Management Department for coordinating discharge planning if the patient needs post-hospital services based on physician/advanced practitioner order or complex needs related to functional status, cognitive ability, or social support system  Outcome: Progressing     Problem: MOBILITY - ADULT  Goal: Maintain or return to baseline ADL function  Description: INTERVENTIONS:  -  Assess patient's ability to carry out ADLs; assess patient's baseline for ADL function and identify physical deficits which impact ability to perform ADLs (bathing, care of mouth/teeth, toileting, grooming, dressing, etc.)  - Assess/evaluate cause of self-care deficits   - Assess range of motion  - Assess patient's mobility; develop plan if impaired  - Assess patient's need for assistive devices and provide as appropriate  - Encourage maximum independence but intervene and supervise when necessary  - Involve family in performance of ADLs  - Assess for home care needs following discharge   - Consider OT consult to assist with ADL evaluation and planning for discharge  - Provide patient education as appropriate  Outcome: Progressing  Goal: Maintains/Returns to pre admission functional level  Description: INTERVENTIONS:  - Perform AM-PAC 6 Click Basic Mobility/ Daily Activity assessment daily.  - Set and communicate daily mobility goal to care team and patient/family/caregiver.   - Collaborate with rehabilitation services on mobility goals if consulted  - Perform Range of Motion 3 times a day.  - Reposition patient every 2 hours.  - Dangle patient 3 times a day  - Stand patient 3 times a day  - Ambulate patient 3 times a day  - Out of bed to chair 3 times a day   - Out of bed for meals 3 times a day  - Out of bed for toileting  -  Record patient progress and toleration of activity level   Outcome: Progressing     Problem: Potential for Falls  Goal: Patient will remain free of falls  Description: INTERVENTIONS:  - Educate patient/family on patient safety including physical limitations  - Instruct patient to call for assistance with activity   - Consult OT/PT to assist with strengthening/mobility   - Keep Call bell within reach  - Keep bed low and locked with side rails adjusted as appropriate  - Keep care items and personal belongings within reach  - Initiate and maintain comfort rounds  - Make Fall Risk Sign visible to staff  - Offer Toileting every 2 Hours, in advance of need  - Initiate/Maintain bed alarm  - Obtain necessary fall risk management equipment:   - Apply yellow socks and bracelet for high fall risk patients  - Consider moving patient to room near nurses station  Outcome: Progressing     Problem: Nutrition/Hydration-ADULT  Goal: Nutrient/Hydration intake appropriate for improving, restoring or maintaining nutritional needs  Description: Monitor and assess patient's nutrition/hydration status for malnutrition. Collaborate with interdisciplinary team and initiate plan and interventions as ordered.  Monitor patient's weight and dietary intake as ordered or per policy. Utilize nutrition screening tool and intervene as necessary. Determine patient's food preferences and provide high-protein, high-caloric foods as appropriate.     INTERVENTIONS:  - Monitor oral intake, urinary output, labs, and treatment plans  - Assess nutrition and hydration status and recommend course of action  - Evaluate amount of meals eaten  - Assist patient with eating if necessary   - Allow adequate time for meals  - Recommend/ encourage appropriate diets, oral nutritional supplements, and vitamin/mineral supplements  - Order, calculate, and assess calorie counts as needed  - Recommend, monitor, and adjust tube feedings and TPN/PPN based on assessed needs  -  Assess need for intravenous fluids  - Provide specific nutrition/hydration education as appropriate  - Include patient/family/caregiver in decisions related to nutrition  Outcome: Progressing

## 2025-03-21 ENCOUNTER — APPOINTMENT (INPATIENT)
Dept: RADIOLOGY | Facility: HOSPITAL | Age: 65
DRG: 200 | End: 2025-03-21
Payer: MEDICARE

## 2025-03-21 ENCOUNTER — TELEPHONE (OUTPATIENT)
Dept: GASTROENTEROLOGY | Facility: CLINIC | Age: 65
End: 2025-03-21

## 2025-03-21 VITALS
DIASTOLIC BLOOD PRESSURE: 53 MMHG | TEMPERATURE: 97 F | HEIGHT: 64 IN | RESPIRATION RATE: 20 BRPM | HEART RATE: 74 BPM | OXYGEN SATURATION: 95 % | BODY MASS INDEX: 22.28 KG/M2 | SYSTOLIC BLOOD PRESSURE: 99 MMHG | WEIGHT: 130.51 LBS

## 2025-03-21 PROBLEM — E28.39: Status: ACTIVE | Noted: 2025-03-21

## 2025-03-21 LAB
ALBUMIN SERPL BCG-MCNC: 3 G/DL (ref 3.5–5)
ALP SERPL-CCNC: 45 U/L (ref 34–104)
ALT SERPL W P-5'-P-CCNC: 9 U/L (ref 7–52)
ANION GAP SERPL CALCULATED.3IONS-SCNC: 3 MMOL/L (ref 4–13)
AST SERPL W P-5'-P-CCNC: 21 U/L (ref 13–39)
BASOPHILS # BLD AUTO: 0.05 THOUSANDS/ÂΜL (ref 0–0.1)
BASOPHILS NFR BLD AUTO: 1 % (ref 0–1)
BILIRUB SERPL-MCNC: 1.17 MG/DL (ref 0.2–1)
BUN SERPL-MCNC: 7 MG/DL (ref 5–25)
CALCIUM ALBUM COR SERPL-MCNC: 8.9 MG/DL (ref 8.3–10.1)
CALCIUM SERPL-MCNC: 8.1 MG/DL (ref 8.4–10.2)
CHLORIDE SERPL-SCNC: 103 MMOL/L (ref 96–108)
CO2 SERPL-SCNC: 26 MMOL/L (ref 21–32)
CREAT SERPL-MCNC: 0.5 MG/DL (ref 0.6–1.3)
EOSINOPHIL # BLD AUTO: 0.12 THOUSAND/ÂΜL (ref 0–0.61)
EOSINOPHIL NFR BLD AUTO: 3 % (ref 0–6)
ERYTHROCYTE [DISTWIDTH] IN BLOOD BY AUTOMATED COUNT: 13.2 % (ref 11.6–15.1)
GFR SERPL CREATININE-BSD FRML MDRD: 101 ML/MIN/1.73SQ M
GLUCOSE SERPL-MCNC: 90 MG/DL (ref 65–140)
HCT VFR BLD AUTO: 31.9 % (ref 34.8–46.1)
HGB BLD-MCNC: 10.7 G/DL (ref 11.5–15.4)
IMM GRANULOCYTES # BLD AUTO: 0.02 THOUSAND/UL (ref 0–0.2)
IMM GRANULOCYTES NFR BLD AUTO: 0 % (ref 0–2)
LYMPHOCYTES # BLD AUTO: 0.7 THOUSANDS/ÂΜL (ref 0.6–4.47)
LYMPHOCYTES NFR BLD AUTO: 15 % (ref 14–44)
MAGNESIUM SERPL-MCNC: 1.9 MG/DL (ref 1.9–2.7)
MCH RBC QN AUTO: 34.6 PG (ref 26.8–34.3)
MCHC RBC AUTO-ENTMCNC: 33.5 G/DL (ref 31.4–37.4)
MCV RBC AUTO: 103 FL (ref 82–98)
MONOCYTES # BLD AUTO: 0.87 THOUSAND/ÂΜL (ref 0.17–1.22)
MONOCYTES NFR BLD AUTO: 18 % (ref 4–12)
NEUTROPHILS # BLD AUTO: 3.05 THOUSANDS/ÂΜL (ref 1.85–7.62)
NEUTS SEG NFR BLD AUTO: 63 % (ref 43–75)
NRBC BLD AUTO-RTO: 0 /100 WBCS
PLATELET # BLD AUTO: 105 THOUSANDS/UL (ref 149–390)
PMV BLD AUTO: 9.3 FL (ref 8.9–12.7)
POTASSIUM SERPL-SCNC: 4.2 MMOL/L (ref 3.5–5.3)
PROT SERPL-MCNC: 5.9 G/DL (ref 6.4–8.4)
RBC # BLD AUTO: 3.09 MILLION/UL (ref 3.81–5.12)
SODIUM SERPL-SCNC: 132 MMOL/L (ref 135–147)
WBC # BLD AUTO: 4.81 THOUSAND/UL (ref 4.31–10.16)

## 2025-03-21 PROCEDURE — 99232 SBSQ HOSP IP/OBS MODERATE 35: CPT | Performed by: INTERNAL MEDICINE

## 2025-03-21 PROCEDURE — 85025 COMPLETE CBC W/AUTO DIFF WBC: CPT | Performed by: INTERNAL MEDICINE

## 2025-03-21 PROCEDURE — 80053 COMPREHEN METABOLIC PANEL: CPT | Performed by: INTERNAL MEDICINE

## 2025-03-21 PROCEDURE — 83735 ASSAY OF MAGNESIUM: CPT | Performed by: NURSE PRACTITIONER

## 2025-03-21 PROCEDURE — 71045 X-RAY EXAM CHEST 1 VIEW: CPT

## 2025-03-21 RX ORDER — SPIRONOLACTONE 25 MG/1
25 TABLET ORAL DAILY
Status: DISCONTINUED | OUTPATIENT
Start: 2025-03-21 | End: 2025-03-21 | Stop reason: HOSPADM

## 2025-03-21 RX ORDER — FUROSEMIDE 20 MG/1
20 TABLET ORAL DAILY
Status: DISCONTINUED | OUTPATIENT
Start: 2025-03-21 | End: 2025-03-21 | Stop reason: HOSPADM

## 2025-03-21 RX ADMIN — MULTIPLE VITAMINS W/ MINERALS TAB 1 TABLET: TAB ORAL at 09:06

## 2025-03-21 RX ADMIN — ANASTROZOLE 1 MG: 1 TABLET, COATED ORAL at 09:07

## 2025-03-21 RX ADMIN — MIDODRINE HYDROCHLORIDE 10 MG: 5 TABLET ORAL at 12:32

## 2025-03-21 RX ADMIN — SPIRONOLACTONE 25 MG: 25 TABLET ORAL at 09:49

## 2025-03-21 RX ADMIN — BUDESONIDE AND FORMOTEROL FUMARATE DIHYDRATE 2 PUFF: 160; 4.5 AEROSOL RESPIRATORY (INHALATION) at 09:13

## 2025-03-21 RX ADMIN — LACTULOSE 10 G: 20 SOLUTION ORAL at 09:05

## 2025-03-21 RX ADMIN — FOLIC ACID 1 MG: 1 TABLET ORAL at 09:07

## 2025-03-21 RX ADMIN — FUROSEMIDE 20 MG: 20 TABLET ORAL at 09:49

## 2025-03-21 RX ADMIN — MIDODRINE HYDROCHLORIDE 10 MG: 5 TABLET ORAL at 06:03

## 2025-03-21 RX ADMIN — Medication 100 MG: at 09:07

## 2025-03-21 RX ADMIN — PANTOPRAZOLE SODIUM 20 MG: 20 TABLET, DELAYED RELEASE ORAL at 09:06

## 2025-03-21 NOTE — ASSESSMENT & PLAN NOTE
Diuretics were discontinued by hepatology as outpt due to severe and persistent hyponatremia but restarted by nephrology   Nephrology input appreciated, diuretics on hold, restarted today 3/21  monitor and correct electrolyte abnormalities  Should be on fluid restriction of at least 1500-1800cc/day  Na improved 127>131>132>133>132  No GI barriers to discharge

## 2025-03-21 NOTE — ASSESSMENT & PLAN NOTE
BP previously low, asymptomatic, now improved and at goal. Monitor with re-initiation of diuretics.  Status post IV albumin.  Currently on midodrine 10 mg 3 times daily.

## 2025-03-21 NOTE — ASSESSMENT & PLAN NOTE
64-year-old female with alcoholic cirrhosis decompensated by ascites, hepatic hydrothorax, varices and encephalopathy, not transplant candidate, presents with weakness and hyponatremia. GI consulted for possible Tenckhoff catheter placement as requiring paracentesis and thoracentesis 1-2 times a week.  Patient continues to drink alcohol daily and is noncompliant with fluid restriction.      3/21: feels well, chest tube removed yesterday, US without need for para per IR, diuretics resumed today per nephrology, for possible D/c today, outpt para 3/26, hepatology appt 7/14  3/20: mild abd distention, due out outpt para today, for IR chest tube removal today  3/19: Mild abd distention, scheduled for outpt para tomorrow 3/20, consider performing inpt, intermittent hypotension  3/18: R chest tube placed 3/17, mild abd distention, hypotension on midodrine/IV albumin    -Counseled on alcohol cessation  -Agree with CIWA protocol and folic acid thiamine and vitamin replacement  -MELD 3.0=19    Ascites/hepatic hydrothorax-requiring paracentesis and thoracentesis 1-2 times weekly struggling with frequency, hepatology offered Tenckhoff catheter, seen by palliative care, still Level 1 code status, Tenckhoff not appropriate given infection risk, continue paracentesis prn (1-2x/week)  Hepatic encephalopathy-stage 1 in past, none on exam, monitor mental status, cont Lactulose 10g BID  Esophageal varices- EGD 12/2024 1 small G1 EV, mild PHG, rpt 1 year  HCC surveillance- US 2/2025 without liver lesions, rpt 6m

## 2025-03-21 NOTE — ASSESSMENT & PLAN NOTE
R chest tube placed 3/17 by IR, removed 3/20, Most likely a pneumo ex-vacuo without active airleak   pulmonary following

## 2025-03-21 NOTE — DISCHARGE INSTR - AVS FIRST PAGE
Please see your PCP within 7-10 days of discharge to discuss recent hospitalization  Please follow-up with pulmonology, gastroenterology and nephrology  No changes in your home medications have been made  Repeat BMP within 1 week  Repeat chest x-ray within 1 week  Fluid restriction 1200 mL/day

## 2025-03-21 NOTE — ASSESSMENT & PLAN NOTE
Chronic in nature, baseline sodium level in the high 120s to low 130s in the setting of alcoholic cirrhosis with ascites.  Follows with Dr. Tovar.  Presented with sodium level of 125.  Sodium levels currently improved to 132.  Outpatient aldactone 25 mg daily and Lasix 20 mg daily currently on hold.  Will resume today.  Current Treatment: 1200ml/day fluid restriction.

## 2025-03-21 NOTE — PROGRESS NOTES
Progress Note - Gastroenterology   Name: Jose White 64 y.o. female I MRN: [id number]  Unit/Bed#: -01 I Date of Admission: 3/16/2025   Date of Service: 3/21/2025 I Hospital Day: 5    Assessment & Plan  Alcoholic cirrhosis of liver with ascites (HCC)  64-year-old female with alcoholic cirrhosis decompensated by ascites, hepatic hydrothorax, varices and encephalopathy, not transplant candidate, presents with weakness and hyponatremia. GI consulted for possible Tenckhoff catheter placement as requiring paracentesis and thoracentesis 1-2 times a week.  Patient continues to drink alcohol daily and is noncompliant with fluid restriction.      3/21: feels well, chest tube removed yesterday, US without need for para per IR, diuretics resumed today per nephrology, for possible D/c today, outpt para 3/26, hepatology appt 7/14  3/20: mild abd distention, due out outpt para today, for IR chest tube removal today  3/19: Mild abd distention, scheduled for outpt para tomorrow 3/20, consider performing inpt, intermittent hypotension  3/18: R chest tube placed 3/17, mild abd distention, hypotension on midodrine/IV albumin    -Counseled on alcohol cessation  -Agree with CIWA protocol and folic acid thiamine and vitamin replacement  -MELD 3.0=19    Ascites/hepatic hydrothorax-requiring paracentesis and thoracentesis 1-2 times weekly struggling with frequency, hepatology offered Tenckhoff catheter, seen by palliative care, still Level 1 code status, Tenckhoff not appropriate given infection risk, continue paracentesis prn (1-2x/week)  Hepatic encephalopathy-stage 1 in past, none on exam, monitor mental status, cont Lactulose 10g BID  Esophageal varices- EGD 12/2024 1 small G1 EV, mild PHG, rpt 1 year  HCC surveillance- US 2/2025 without liver lesions, rpt 6m  Thrombocytopenia (HCC)  Currently 105,000, no evidence of bleeding, monitor    Hyponatremia  Diuretics were discontinued by hepatology as outpt due to severe and  persistent hyponatremia but restarted by nephrology   Nephrology input appreciated, diuretics on hold, restarted today 3/21  monitor and correct electrolyte abnormalities  Should be on fluid restriction of at least 1500-1800cc/day  Na improved 127>131>132>133>132  No GI barriers to discharge    Hypotension  On midodrine 10 mg TID  S/P IV albumin X4 3/17    Pneumothorax on right  R chest tube placed 3/17 by IR, removed 3/20, Most likely a pneumo ex-vacuo without active airleak   pulmonary following    Other dysphagia  Complaints of dysphagia to solids with regurgitation on daily basis improved with dilation in past  EGD 12/2024 without anything to account for dysphagia  Consider barium esophagram        Subjective   Pt seen and examined.  Feeling well.  Chest tube removed yesterday.  Diuretics resumed today per nephrology.  Tolerating diet without vomiting or abdominal pain.  Having regular bowel movements.  Mild abdominal distention.  Four-quadrant ultrasound performed by IR yesterday and no paracentesis indicated.  Denies confusion.  Patient believes she is being discharged today.    Objective :  Temp:  [96.6 °F (35.9 °C)-97 °F (36.1 °C)] 97 °F (36.1 °C)  HR:  [69-82] 74  BP: ()/(50-64) 99/53  Resp:  [20] 20  SpO2:  [92 %-95 %] 95 %  O2 Device: None (Room air)    Physical Exam  Constitutional: Well-developed, no acute distress, chronically ill appearing  HEENT: normocephalic, mucous membranes moist.  Neck: Supple  Skin: warm and dry  Respiratory: Lungs are clear to auscultation B/L.   Cardiovascular: Heart is regular rate and rhythm.  Gastrointestinal: Soft, nontender, nondistended with normal active bowel sounds.  No masses, guarding, rebound. + mild distention  Rectal Exam: Deferred.  Extremities: No edema.  Neurologic: Nonfocal. A & O ×3. No asterixis  Psychiatric: Normal affect.      Lab Results: I have reviewed the following results:CBC/BMP:   .     03/21/25  0605   WBC 4.81   HGB 10.7*   HCT 31.9*   PLT  105*   SODIUM 132*   K 4.2      CO2 26   BUN 7   CREATININE 0.50*   GLUC 90    , Creatinine Clearance: Estimated Creatinine Clearance: 96.9 mL/min (A) (by C-G formula based on SCr of 0.5 mg/dL (L))., LFTs:   .     03/21/25  0605   AST 21   ALT 9   ALB 3.0*   TBILI 1.17*   ALKPHOS 45

## 2025-03-21 NOTE — PROGRESS NOTES
Progress Note - Nephrology   Name: Jose White 64 y.o. female I MRN: [id number]  Unit/Bed#: -01 I Date of Admission: 3/16/2025   Date of Service: 3/21/2025 I Hospital Day: 5     Assessment & Plan  Hyponatremia  Chronic in nature, baseline sodium level in the high 120s to low 130s in the setting of alcoholic cirrhosis with ascites.  Follows with Dr. Tovar.  Presented with sodium level of 125.  Sodium levels currently improved to 132.  Outpatient aldactone 25 mg daily and Lasix 20 mg daily currently on hold.  Will resume today.  Current Treatment: 1200ml/day fluid restriction.  Hypotension  BP previously low, asymptomatic, now improved and at goal. Monitor with re-initiation of diuretics.  Status post IV albumin.  Currently on midodrine 10 mg 3 times daily.  Alcoholic cirrhosis of liver with ascites (HCC)  Continue folic acid, lactulose, multivitamin, and thiamine.  Continue midodrine.  Received IV albumin.  On outpatient Lasix and spironolactone, currently on hold.  Plan for paracentesis however only small amount of ascites present, has appointment scheduled for 3/26 for next paracentesis.  Recurrent pleural effusion on right  S/P chest tube placement, removal on 3/20.  Repeat chest x-ray is pending.  Thrombocytopenia (HCC)  In the setting of cirrhosis.  Malignant neoplasm of lower-outer quadrant of right breast of female, estrogen receptor positive (HCC)  Currently on anastrozole 1 mg daily.  Electrolyte abnormality  Repeat Mg level pending.  S/p mag sulfate 2g IV x 1 03/20.    PLAN:   -Acute on chronic hyponatremia, sodium level within baseline, monitoring slowly on 1.2 L / 24-hour fluid restriction.  Will resume Lasix and Aldactone today as BP has improved.  -Hypotension, blood pressure improved, continues on midodrine 10 mg 3 times daily.  Previously on IV albumin.  Continue to monitor with reinitiation on diuretics.  -Alcoholic cirrhosis with complication of ascites, paracentesis deferred at this  time as patient only had small amount of ascites.  Has outpatient appointment scheduled for next paracentesis 3/26.  -Recurrent pleural effusions, status post chest tube removal 3/20, repeat chest x-ray is pending.  -Hypomagnesemia, repeat mag level is pending.  Replace as needed.  -Stable for discharge, of she is discharged today, plan for BMP next week and weekly x 4. Message sent to office for discharge.     Subjective     Feels well. No reported complaints.    Objective :  Temp:  [96.6 °F (35.9 °C)-97.7 °F (36.5 °C)] 97 °F (36.1 °C)  HR:  [69-82] 69  BP: (100-122)/(50-64) 110/50  Resp:  [18-20] 20  SpO2:  [92 %-95 %] 94 %  O2 Device: None (Room air)    Current Weight: Weight - Scale: 59.2 kg (130 lb 8.2 oz)  First Weight: Weight - Scale: 58.2 kg (128 lb 4.8 oz)  I/O         03/19 0701  03/20 0700 03/20 0701  03/21 0700 03/21 0701  03/22 0700    P.O. 500 360     Total Intake(mL/kg) 500 (8.6) 360 (6.1)     Urine (mL/kg/hr) 700 (0.5) 200 (0.1)     Chest Tube  0     Total Output 700 200     Net -200 +160            Unmeasured Urine Occurrence  1 x           Physical Exam  Vitals reviewed.   Constitutional:       Appearance: Normal appearance.   HENT:      Head: Normocephalic.      Nose: Nose normal.      Mouth/Throat:      Mouth: Mucous membranes are moist.   Cardiovascular:      Heart sounds: Normal heart sounds.   Pulmonary:      Breath sounds: Normal breath sounds.   Abdominal:      Palpations: Abdomen is soft.   Musculoskeletal:      Right lower leg: No edema.      Left lower leg: No edema.   Skin:     General: Skin is warm and dry.   Neurological:      General: No focal deficit present.      Mental Status: She is alert. Mental status is at baseline.   Psychiatric:         Mood and Affect: Mood normal.         Medications:    Current Facility-Administered Medications:     albuterol inhalation solution 2.5 mg, 2.5 mg, Nebulization, Q6H PRN, Khanh Marie PA-C    ALPRAZolam (XANAX) tablet 1 mg, 1 mg, Oral, HS,  Khanh Marie PA-C, 1 mg at 03/20/25 2120    anastrozole (ARIMIDEX) tablet 1 mg, 1 mg, Oral, Daily, Khanh Marie PA-C, 1 mg at 03/20/25 0932    Artificial Tears Op Soln 2 drop, 2 drop, Both Eyes, Q4H PRN, Jeremy Elder, CRNP, 2 drop at 03/20/25 1929    budesonide-formoterol (SYMBICORT) 160-4.5 mcg/act inhaler 2 puff, 2 puff, Inhalation, BID, Khanh Marie PA-C, 2 puff at 03/20/25 1928    folic acid (FOLVITE) tablet 1 mg, 1 mg, Oral, Daily, Khanh Marie PA-C, 1 mg at 03/20/25 0932    [Held by provider] furosemide (LASIX) tablet 20 mg, 20 mg, Oral, Daily, Khanh Marie PA-C    lactulose (CHRONULAC) oral solution 10 g, 10 g, Oral, BID, Khanh Marie PA-C, 10 g at 03/20/25 1927    midodrine (PROAMATINE) tablet 10 mg, 10 mg, Oral, TID AC, Khanh Marie PA-C, 10 mg at 03/21/25 0603    multivitamin-minerals (CENTRUM) tablet 1 tablet, 1 tablet, Oral, Daily, Khanh Marie PA-C, 1 tablet at 03/20/25 0932    pantoprazole (PROTONIX) EC tablet 20 mg, 20 mg, Oral, Daily, Khanh Marie PA-C, 20 mg at 03/20/25 0932    [Held by provider] spironolactone (ALDACTONE) tablet 25 mg, 25 mg, Oral, Daily, Khanh Marie PA-C    thiamine tablet 100 mg, 100 mg, Oral, Daily, Khanh Marie PA-C, 100 mg at 03/20/25 0932    traZODone (DESYREL) tablet 50 mg, 50 mg, Oral, HS, Khanh Marie PA-C, 50 mg at 03/20/25 2120      Lab Results: I have reviewed the following results:  Results from last 7 days   Lab Units 03/21/25  0605 03/20/25  0604 03/19/25  0409 03/18/25  1450 03/18/25  0340 03/17/25  0436 03/16/25  1714 03/16/25  1236   WBC Thousand/uL 4.81 4.38 4.04*  --  3.64* 4.37  --  4.31   HEMOGLOBIN g/dL 10.7* 10.9* 10.4*  --  10.5* 11.7  --  13.8   HEMATOCRIT % 31.9* 31.7* 30.5*  --  31.5* 33.4*  --  40.6   PLATELETS Thousands/uL 105* 88* 77*  --  80* 93*  --  115*   POTASSIUM mmol/L 4.2 3.9 3.6 3.6 3.6 4.1 4.0 3.0*   CHLORIDE mmol/L 103 101 99 98 97 95* 94* 91*   CO2 mmol/L 26 25 27 28 29 29 24 28  "  BUN mg/dL 7 6 6 7 7 8 7 8   CREATININE mg/dL 0.50* 0.48* 0.49* 0.50* 0.54* 0.54* 0.57* 0.70   CALCIUM mg/dL 8.1* 8.2* 8.6 9.1 8.4 7.5* 8.3* 8.1*   MAGNESIUM mg/dL  --  1.7* 1.6*  --   --  1.8*  --  1.6*   PHOSPHORUS mg/dL  --   --   --   --   --   --   --  2.6   ALBUMIN g/dL 3.0* 3.1* 3.6  --  3.3* 1.9* 2.2* 2.3*       Administrative Statements     Portions of the record may have been created with voice recognition software. Occasional wrong word or \"sound a like\" substitutions may have occurred due to the inherent limitations of voice recognition software. Read the chart carefully and recognize, using context, where substitutions have occurred.If you have any questions, please contact the dictating provider.  "

## 2025-03-21 NOTE — QUICK NOTE
I reviewed the chest X-ray from this morning after chest tube removal yesterday  Pneumothorax on the R side is stable.  Most likely a pneumo ex-vacuo without active airleak    Follow up CXR in 1 week and follow up with pulmonary for consideration of ASEPT catheter  Communicated plan with primary team - we are arranging follow up

## 2025-03-21 NOTE — ASSESSMENT & PLAN NOTE
Continue folic acid, lactulose, multivitamin, and thiamine.  Continue midodrine.  Received IV albumin.  On outpatient Lasix and spironolactone, currently on hold.  Plan for paracentesis however only small amount of ascites present, has appointment scheduled for 3/26 for next paracentesis.

## 2025-03-21 NOTE — PLAN OF CARE
Problem: PAIN - ADULT  Goal: Verbalizes/displays adequate comfort level or baseline comfort level  Description: Interventions:  - Encourage patient to monitor pain and request assistance  - Assess pain using appropriate pain scale  - Administer analgesics based on type and severity of pain and evaluate response  - Implement non-pharmacological measures as appropriate and evaluate response  - Consider cultural and social influences on pain and pain management  - Notify physician/advanced practitioner if interventions unsuccessful or patient reports new pain  Outcome: Progressing     Problem: INFECTION - ADULT  Goal: Absence or prevention of progression during hospitalization  Description: INTERVENTIONS:  - Assess and monitor for signs and symptoms of infection  - Monitor lab/diagnostic results  - Monitor all insertion sites, i.e. indwelling lines, tubes, and drains  - Monitor endotracheal if appropriate and nasal secretions for changes in amount and color  - [place] appropriate cooling/warming therapies per order  - Administer medications as ordered  - Instruct and encourage patient and family to use good hand hygiene technique  - Identify and instruct in appropriate isolation precautions for identified infection/condition  Outcome: Progressing

## 2025-03-21 NOTE — TELEPHONE ENCOUNTER
Admitted to [facility] with hyponatremia.  Had chest tube placed since removed.  Did not require inpatient paracentesis.  Tenckhoff catheter not placed as patient not ready for palliative care.  Patient saw Dr. Herrera 3/13.  Has appointment with Noem[facility] 7/14. Per Dr. Mccoy please move up hepatology follow up within 8 weeks.  Thank you

## 2025-03-23 LAB
ATRIAL RATE: 92 BPM
P AXIS: 52 DEGREES
PR INTERVAL: 142 MS
QRS AXIS: 78 DEGREES
QRSD INTERVAL: 80 MS
QT INTERVAL: 370 MS
QTC INTERVAL: 458 MS
T WAVE AXIS: -1 DEGREES
VENTRICULAR RATE: 92 BPM

## 2025-03-23 PROCEDURE — 93010 ELECTROCARDIOGRAM REPORT: CPT | Performed by: INTERNAL MEDICINE

## 2025-03-24 ENCOUNTER — TELEPHONE (OUTPATIENT)
Age: 65
End: 2025-03-24

## 2025-03-24 ENCOUNTER — TRANSITIONAL CARE MANAGEMENT (OUTPATIENT)
Dept: FAMILY MEDICINE CLINIC | Facility: CLINIC | Age: 65
End: 2025-03-24

## 2025-03-24 DIAGNOSIS — K70.31 ALCOHOLIC CIRRHOSIS OF LIVER WITH ASCITES (HCC): ICD-10-CM

## 2025-03-24 NOTE — TELEPHONE ENCOUNTER
Spoke directly with Pt's spouse, Mr. Ulises White, today via phone call @ [phone].  Mr. White was informed that Pt has an appointment with Dr. Vamis Herrera on 5/28/25 at the [address] [place] [address] office location.  Appointment letter mailed to address in Pt's chart per Mr. White's request.  Office phone number given to Mr. White should Pt need to reschedule appointment.

## 2025-03-25 ENCOUNTER — OFFICE VISIT (OUTPATIENT)
Dept: PHYSICAL THERAPY | Facility: CLINIC | Age: 65
End: 2025-03-25
Payer: MEDICARE

## 2025-03-25 ENCOUNTER — TELEPHONE (OUTPATIENT)
Dept: PALLIATIVE MEDICINE | Facility: CLINIC | Age: 65
End: 2025-03-25

## 2025-03-25 DIAGNOSIS — M70.61 GREATER TROCHANTERIC BURSITIS OF BOTH HIPS: Primary | ICD-10-CM

## 2025-03-25 DIAGNOSIS — M70.62 GREATER TROCHANTERIC BURSITIS OF BOTH HIPS: Primary | ICD-10-CM

## 2025-03-25 DIAGNOSIS — K70.31 ALCOHOLIC CIRRHOSIS OF LIVER WITH ASCITES (HCC): Chronic | ICD-10-CM

## 2025-03-25 DIAGNOSIS — M47.816 LUMBAR SPONDYLOSIS: ICD-10-CM

## 2025-03-25 PROCEDURE — 97110 THERAPEUTIC EXERCISES: CPT | Performed by: PHYSICAL THERAPIST

## 2025-03-25 RX ORDER — SPIRONOLACTONE 25 MG/1
25 TABLET ORAL DAILY
Qty: 90 TABLET | Refills: 3 | Status: SHIPPED | OUTPATIENT
Start: 2025-03-25

## 2025-03-25 RX ORDER — SPIRONOLACTONE 50 MG/1
50 TABLET, FILM COATED ORAL DAILY
Qty: 90 TABLET | Refills: 0 | OUTPATIENT
Start: 2025-03-25

## 2025-03-25 NOTE — PROGRESS NOTES
Daily Note     Today's date: 3/25/2025  Patient name: Jose White  : 1960  MRN: [id number]  Referring provider: She Ousllivan MD  Dx:   Encounter Diagnosis     ICD-10-CM    1. Greater trochanteric bursitis of both hips  M70.61     M70.62       2. Lumbar spondylosis  M47.816           Start Time: 1405          Subjective: Very sore all over and weak from spending the last week in the hospital due to low sodium levels.      Objective: See treatment diary below      Assessment: Session was focused on gentle mobility and exercise for strength and conditioning. She fatigued quickly and breaks were taken throughout. She tolerated introduced machine exercises well. Ended with MHP on the hip for comfort. She will benefit from further PT to maximize function      Plan: continue with lumbar STM and hip strengthening     Precautions: balance deficits - potential falls risk  Comorbidities: anxiety, COPD, HTN, h/o lumbar laminectomy         HEP:  Access Code: [email or web address]  URL: [email or web address]  Date: 2025  Prepared by: Maximus Mendoza    Exercises  - Standing Lumbar Extension with Counter  - 20 reps  - Seated Hip Adduction Isometrics with Ball  - 20 reps  - Seated Hip Abduction with Resistance  - 20 reps      POC Expires Reeval for Medicare to be completed Unit LImit Auth Expiration Date PT/OT/STVisit Limit    By visit 10 N/a      Completed on visit                  TREATMENT DIARY    Auth status 1/21 1/24 1/31 2/18 2/25 3/4 3/11 3/25  RE   approved               Visit # 1 2 3 4 5 6 7 8 9 10   Visits Remaining                          Manuals             RE      YANICK       FOTO             Lumbar STM      YANICK seated over EOT YANICK seated over EOT      Hip STM L adductor, hip flexor bilat adductor, hip flexor L adductor, hip flexor, glute med/max L adductor, hip flexor, glute med/max L adductor, hip flexor, glute med/max        Hip PROM  bilat  left                      Neuro Re-Ed            "  Tandem walk             Side step             Tandem balance             Foam walk             RB taps             Yovany step over                          Ther Ex             Bike - cardio 6' resist off 5' resist on 6' resist on 6' resist off 7' resist on 6' resist off 8' resist on 5' resist off     Lumbar ext over table x10    x20 X20 no change x20 x20     Glute stretch    Supine 10\"x10 ea  Supine 10\"x10 ea  Supine 10\"x10 ea     Hip add iso       Ball x20      Hip abd iso       Fit circ x20      SLR       X20 ea X10 ea     bridge       x20      Knee ext machine        10# x20     HSC machine        15# x20     Leg press        45# x20                                                         Ther Activity                                       Gait Training             Clinic laps     No AD x1 lap  SPC x1 lap                     Modalities                                                   "

## 2025-03-26 ENCOUNTER — HOSPITAL ENCOUNTER (OUTPATIENT)
Dept: RADIOLOGY | Facility: HOSPITAL | Age: 65
Discharge: HOME | End: 2025-03-26
Attending: INTERNAL MEDICINE
Payer: MEDICARE

## 2025-03-26 VITALS
RESPIRATION RATE: 16 BRPM | SYSTOLIC BLOOD PRESSURE: 126 MMHG | OXYGEN SATURATION: 95 % | HEART RATE: 88 BPM | DIASTOLIC BLOOD PRESSURE: 79 MMHG

## 2025-03-26 DIAGNOSIS — K70.31 ALCOHOLIC CIRRHOSIS OF LIVER WITH ASCITES (HCC): ICD-10-CM

## 2025-03-26 DIAGNOSIS — J93.9 PNEUMOTHORAX ON RIGHT: Primary | ICD-10-CM

## 2025-03-26 DIAGNOSIS — J91.8 PLEURAL EFFUSION ASSOCIATED WITH HEPATIC DISORDER: ICD-10-CM

## 2025-03-26 DIAGNOSIS — K76.9 PLEURAL EFFUSION ASSOCIATED WITH HEPATIC DISORDER: ICD-10-CM

## 2025-03-26 DIAGNOSIS — J90 RECURRENT PLEURAL EFFUSION ON RIGHT: ICD-10-CM

## 2025-03-26 DIAGNOSIS — J93.9 PNEUMOTHORAX ON RIGHT: ICD-10-CM

## 2025-03-26 PROCEDURE — NC001 PR NO CHARGE: Performed by: PHYSICIAN ASSISTANT

## 2025-03-26 PROCEDURE — 49083 ABD PARACENTESIS W/IMAGING: CPT

## 2025-03-26 PROCEDURE — 32555 ASPIRATE PLEURA W/ IMAGING: CPT

## 2025-03-26 RX ORDER — LIDOCAINE WITH 8.4% SOD BICARB 0.9%(10ML)
SYRINGE (ML) INJECTION AS NEEDED
Status: DISCONTINUED | OUTPATIENT
Start: 2025-03-26 | End: 2025-03-27 | Stop reason: HOSPADM

## 2025-03-26 RX ADMIN — Medication 10 ML: at 14:05

## 2025-03-26 RX ADMIN — Medication 10 ML: at 13:25

## 2025-03-26 NOTE — BRIEF OP NOTE (RAD/CATH)
IR PARACENTESIS and Right Thoracentesis Procedure Note    PATIENT NAME: Jose White  : 1960  MRN: [id number]    Pre-op Diagnosis:   1. Pneumothorax on right    2. Alcoholic cirrhosis of liver with ascites (HCC)    3. Pleural effusion associated with hepatic disorder    4. Recurrent pleural effusion on right      Post-op Diagnosis:   1. Pneumothorax on right    2. Alcoholic cirrhosis of liver with ascites (HCC)    3. Pleural effusion associated with hepatic disorder    4. Recurrent pleural effusion on right        Provider:  Charis Miller PA-C    No qualified resident was available, Resident is only observing    Estimated Blood Loss: minimal    Findings: right sided paracentesis. 2150cc clear yellow fluid removed.    Right sided thoracentesis. Patient with loculated right pleural fluid. 390cc clear yellow fluid removed. Patient began feeling faint and thoracentesis was ended early. Patient's blood pressure was stable (126/79) immediately following procedure.     Specimens: none    Complications:  none immediate    Anesthesia: local    Charis Miller PA-C     Date: 3/26/2025  Time: 2:32 PM

## 2025-03-26 NOTE — DISCHARGE INSTRUCTIONS
Abdominal Paracentesis     WHAT YOU NEED TO KNOW:   Abdominal paracentesis is a procedure to remove abnormal fluid buildup in your abdomen. Fluid builds up because of liver problems, such as swelling and scarring. Heart failure, kidney disease, a mass, or problems with your pancreas may also cause fluid buildup.     DISCHARGE INSTRUCTIONS:     Follow up with your healthcare provider as directed: Write down your questions so you remember to ask them during your visits.     Wound care: Remove dressing after 24 hours. Leave glue in place.    Return to your normal activities    Contact Interventional Radiology at [phone] (YOGESH PATIENTS: Contact Interventional Radiology at [phone]) (MARGARITA PATIENTS: Contact Interventional Radiology at [phone]) if:  You have a fever and your wound is red and swollen.   You have yellow, green, or bad-smelling discharge coming from your wound.   You have pain or swelling in your abdomen.   You have an upset stomach or you vomit.   You have sudden, sharp pain in your abdomen.   You urinate very little or not at all.   You feel confused and more tired than usual.   Your arm or leg feels warm, tender, and painful. It may look swollen and red.   You suddenly feel lightheaded and have trouble breathing.

## 2025-03-26 NOTE — SEDATION DOCUMENTATION
Aprox 2150 ml clear yellow fluid was aspirated from the abdomen, patient tolerated procedure well with no immediate complications. A  dry sterile dressing was applied to the puncture site. No labs were requested for this visit.

## 2025-03-26 NOTE — SEDATION DOCUMENTATION
Aprox 390 ml of pleural fluid was aspirated from the right side, patient had a episode of feeling faint. Blood pressure immediately after 126/79. A dry sterile dressing was applied to the puncture site on the right/ no labs were ordered for this procedure.

## 2025-03-27 ENCOUNTER — TELEPHONE (OUTPATIENT)
Dept: ADMINISTRATIVE | Facility: OTHER | Age: 65
End: 2025-03-27

## 2025-03-27 NOTE — TELEPHONE ENCOUNTER
03/27/25 9:38 AM    Patient contacted to bring Advance Directive, POLST, or Living Will document to next scheduled pcp visit.[facility] Department spoke with patient/ caregiver.    Thank you.  Regina Bello MA  [facility]

## 2025-03-28 ENCOUNTER — OFFICE VISIT (OUTPATIENT)
Dept: FAMILY MEDICINE CLINIC | Facility: CLINIC | Age: 65
End: 2025-03-28

## 2025-03-28 VITALS
BODY MASS INDEX: 21.97 KG/M2 | DIASTOLIC BLOOD PRESSURE: 80 MMHG | SYSTOLIC BLOOD PRESSURE: 130 MMHG | WEIGHT: 128 LBS | OXYGEN SATURATION: 94 % | HEART RATE: 90 BPM

## 2025-03-28 DIAGNOSIS — J42 CHRONIC BRONCHITIS, UNSPECIFIED CHRONIC BRONCHITIS TYPE (HCC): ICD-10-CM

## 2025-03-28 DIAGNOSIS — I95.89 OTHER SPECIFIED HYPOTENSION: ICD-10-CM

## 2025-03-28 DIAGNOSIS — E87.1 HYPONATREMIA: Primary | ICD-10-CM

## 2025-03-28 DIAGNOSIS — K70.31 ALCOHOLIC CIRRHOSIS OF LIVER WITH ASCITES (HCC): Chronic | ICD-10-CM

## 2025-03-28 NOTE — PATIENT INSTRUCTIONS
Continue current medications. Schedule a follow up with nephrology.  Return 7/18/25 for medicare wellness.

## 2025-03-28 NOTE — ASSESSMENT & PLAN NOTE
hx alcoholic cirrhosis decompensated by ascites, hepatic hydrothorax, varices and encephalopathy, not transplant candidate. Patient continues to drink alcohol daily and is noncompliant with fluid restriction. She requires paracentesis and thoracentesis 1-2 times a week which was lat done on 3/26/25. She follow with GI and nephrology. She will continue current medications and follow up with specialists as scheduled.

## 2025-03-30 ENCOUNTER — HOSPITAL ENCOUNTER (OUTPATIENT)
Dept: MRI IMAGING | Facility: HOSPITAL | Age: 65
Discharge: HOME | End: 2025-03-30
Payer: MEDICARE

## 2025-03-30 DIAGNOSIS — M54.16 LUMBAR RADICULOPATHY: ICD-10-CM

## 2025-03-30 PROCEDURE — 72148 MRI LUMBAR SPINE W/O DYE: CPT

## 2025-03-31 ENCOUNTER — OFFICE VISIT (OUTPATIENT)
Age: 65
End: 2025-03-31
Payer: MEDICARE

## 2025-03-31 VITALS
SYSTOLIC BLOOD PRESSURE: 122 MMHG | HEIGHT: 64 IN | OXYGEN SATURATION: 95 % | DIASTOLIC BLOOD PRESSURE: 72 MMHG | HEART RATE: 81 BPM | WEIGHT: 131 LBS | TEMPERATURE: 98.9 F | BODY MASS INDEX: 22.36 KG/M2

## 2025-03-31 DIAGNOSIS — J90 RECURRENT PLEURAL EFFUSION ON RIGHT: Primary | ICD-10-CM

## 2025-03-31 DIAGNOSIS — K70.31 ALCOHOLIC CIRRHOSIS OF LIVER WITH ASCITES (HCC): Primary | ICD-10-CM

## 2025-03-31 DIAGNOSIS — K70.31 ALCOHOLIC CIRRHOSIS OF LIVER WITH ASCITES (HCC): ICD-10-CM

## 2025-03-31 DIAGNOSIS — J44.9 MODERATE COPD (CHRONIC OBSTRUCTIVE PULMONARY DISEASE) (HCC): ICD-10-CM

## 2025-03-31 DIAGNOSIS — K76.9 LIVER DISEASE, UNSPECIFIED: ICD-10-CM

## 2025-03-31 DIAGNOSIS — Z72.0 TOBACCO ABUSE: ICD-10-CM

## 2025-03-31 PROCEDURE — G2211 COMPLEX E/M VISIT ADD ON: HCPCS

## 2025-03-31 PROCEDURE — 99214 OFFICE O/P EST MOD 30 MIN: CPT

## 2025-03-31 RX ORDER — SODIUM CHLORIDE 9 MG/ML
20 INJECTION, SOLUTION INTRAVENOUS ONCE
OUTPATIENT
Start: 2025-04-01

## 2025-03-31 RX ORDER — SODIUM CHLORIDE 9 MG/ML
20 INJECTION, SOLUTION INTRAVENOUS ONCE
Status: CANCELLED | OUTPATIENT
Start: 2025-03-31

## 2025-03-31 RX ORDER — ALBUMIN (HUMAN) 12.5 G/50ML
100 SOLUTION INTRAVENOUS ONCE AS NEEDED
OUTPATIENT
Start: 2025-04-01

## 2025-03-31 RX ORDER — ALBUMIN (HUMAN) 12.5 G/50ML
50 SOLUTION INTRAVENOUS ONCE AS NEEDED
OUTPATIENT
Start: 2025-04-01

## 2025-03-31 RX ORDER — ALBUMIN (HUMAN) 12.5 G/50ML
75 SOLUTION INTRAVENOUS ONCE AS NEEDED
OUTPATIENT
Start: 2025-04-01

## 2025-03-31 NOTE — PROGRESS NOTES
"Follow-up  Visit - Pulmonary Medicine   Name: Jose White      : 1960      MRN: [id number]  Encounter Provider: JOSÉ Fleming  Encounter Date: 3/31/2025   Encounter department: [facility]  :  Assessment & Plan  Recurrent pleural effusion on right  Consistent with hepatic hydrothorax as a consequence of her decompensating alcoholic cirrhosis of the liver   She is getting thoracentesis concurrent with paracentesis. She states paracentesis is weekly, however she is not requiring thoracentesis every week. Last tapped 3/26 for 390ml clear yellow fluid.   Ongoing consideration for indwelling pleural catheter. We discussed if her frequency of thoracentesis increases, or she becomes more symptomatic, may want to consider IPC. However she is asymptomatic of effusion today, and does not feel strongly that she wants one. I feel an abdominal catheter would be more appropriate to start with prior to IPC.   Given her gradual deterioration over time, again recommended she follow up with palliative care. She has appt scheduled next month        Moderate COPD (chronic obstructive pulmonary disease) (HCC)  Feels her symptoms are well managed with current regimen  Continue symbicort 2 puffs twice daily. Educated patient on importance of using this daily as a maintenance therapy.  She has been using \"as needed \".  Also discussed with patient importance of rinsing mouth after use to decrease risk of thrush  Continue albuterol inhaler every 6 hours as needed       Tobacco abuse  She does not want to quit smoking   We discussed importance of smoking cessation, as well as available assistance (nicotine patches/gum/lozenge, chantix, wellbutrin) but she states has tried most of these in the past and felt too jittery        Liver disease, unspecified  Alcoholic cirrhosis of the liver with ascites. She receives weekly paracentesis  She does not want to quit drinking   Per GI is " unfortunately due to ongoing alcohol use is not a liver transplant candidate  May need eventual indwelling abdominal catheter, which would potentially eliminate the need for indwelling pleural catheter   Continue to follow with GI       Alcoholic cirrhosis of liver with ascites (HCC)  Plan as above  Orders:    Ambulatory referral to Pulmonology      Return in about 4 months (around 7/31/2025) for Next scheduled follow up.    History of Present Illness   Jose White is a 64 y.o. female who presents for hospital follow up. She was admitted with right sided pneumothorax, thought to be ex vacuo secondary to chronic pleural effusions. She received a chest tube with stable pneumothorax on imaging and chest tube was removed.    Today she states her breathing feels good.  She denies any shortness of breath, chest discomfort or cough.  She recently had thoracentesis last Wednesday with her paracentesis.  States next paracentesis is scheduled tomorrow.  She continues to smoke and drink, has no interest in quitting either.  She has Symbicort inhaler at home, using on an as-needed basis.  Infrequently using albuterol.    She continues to smoke.  Has 47-pack-year smoking history.    Her  accompanies her to the visit today and offers portions of history.    Review of Systems   Constitutional:  Negative for chills and fever.   HENT:  Negative for congestion, ear pain and sore throat.    Respiratory:  Negative for cough, shortness of breath and wheezing.    Cardiovascular:  Negative for chest pain and palpitations.   Gastrointestinal:  Negative for abdominal pain.   Musculoskeletal:  Negative for arthralgias and back pain.   Skin:  Negative for color change and rash.   Neurological:  Negative for syncope and headaches.   All other systems reviewed and are negative.      Aside from what is mentioned in the HPI, ROS is otherwise negative    Medical History Reviewed by provider this encounter:  Tobacco  Allergies  Meds   Problems  Med Hx  Surg Hx  Fam Hx     .  Current Outpatient Medications on File Prior to Visit   Medication Sig Dispense Refill    albuterol (2.5 mg/3 mL) 0.083 % nebulizer solution Take 3 mL (2.5 mg total) by nebulization every 6 (six) hours as needed for wheezing or shortness of breath 1080 mL 3    albuterol (Proventil HFA) 90 mcg/act inhaler Inhale 2 puffs every 6 (six) hours as needed for wheezing or shortness of breath 18 g 6    alendronate (Fosamax) 70 mg tablet Take 1 tablet (70 mg total) by mouth every 7 days 12 tablet 3    ALPRAZolam (XANAX) 1 mg tablet Take 1 tablet (1 mg total) by mouth 2 (two) times a day as needed for anxiety 60 tablet 2    anastrozole (ARIMIDEX) 1 mg tablet Take 1 tablet (1 mg total) by mouth daily 90 tablet 3    budesonide-formoterol (Symbicort) 160-4.5 mcg/act inhaler Inhale 2 puffs 2 (two) times a day Rinse mouth after use. 10.2 g 6    furosemide (LASIX) 20 mg tablet Take 1 tablet (20 mg total) by mouth daily      lactulose (CHRONULAC) 10 g/15 mL solution Take 15 mL (10 g total) by mouth 2 (two) times a day 240 mL 0    midodrine (PROAMATINE) 10 MG tablet Take 1 tablet (10 mg total) by mouth 3 (three) times a day before meals 90 tablet 3    pantoprazole (PROTONIX) 20 mg tablet Take 1 tablet (20 mg total) by mouth daily 90 tablet 1    Risankizumab-rzaa (SKYRIZI PEN SC) Inject under the skin      spironolactone (ALDACTONE) 25 mg tablet Take 1 tablet (25 mg total) by mouth daily 90 tablet 3    traZODone (DESYREL) 50 mg tablet Take 1 tablet (50 mg total) by mouth daily at bedtime 30 tablet 5    [DISCONTINUED] mupirocin (BACTROBAN) 2 % ointment Apply topically 3 (three) times a day (Patient not taking: Reported on 8/26/2022) 22 g 0     No current facility-administered medications on file prior to visit.      Social History     Tobacco Use    Smoking status: Every Day     Current packs/day: 1.00     Average packs/day: 1 pack/day for 47.2 years (47.2 ttl pk-yrs)     Types: Cigarettes  "    Start date: 1/1/1978    Smokeless tobacco: Never   Vaping Use    Vaping status: Never Used   Substance and Sexual Activity    Alcohol use: Yes     Alcohol/week: 6.0 standard drinks of alcohol     Types: 6 Cans of beer per week     Comment: 7-10 drinks/wk    Drug use: No    Sexual activity: Yes     Partners: Male        Medical History Reviewed by provider this encounter:  Tobacco  Allergies  Meds  Problems  Med Hx  Surg Hx  Fam Hx     .    Objective   /72 (BP Location: Left arm, Patient Position: Sitting, Cuff Size: Standard)   Pulse 81   Temp 98.9 °F (37.2 °C) (Tympanic)   Ht 5' 4\" (1.626 m)   Wt 59.4 kg (131 lb)   SpO2 95%   BMI 22.49 kg/m²     Physical Exam  Vitals and nursing note reviewed.   Constitutional:       General: She is not in acute distress.     Appearance: Normal appearance. She is well-developed.   HENT:      Head: Normocephalic and atraumatic.      Nose: Nose normal.   Eyes:      Conjunctiva/sclera: Conjunctivae normal.   Cardiovascular:      Rate and Rhythm: Normal rate.   Pulmonary:      Effort: Pulmonary effort is normal. No respiratory distress.      Breath sounds: Examination of the right-lower field reveals decreased breath sounds. Decreased breath sounds present. No wheezing or rhonchi.   Musculoskeletal:         General: No swelling. Normal range of motion.      Cervical back: Normal range of motion and neck supple.   Skin:     General: Skin is warm and dry.   Neurological:      General: No focal deficit present.      Mental Status: She is alert and oriented to person, place, and time.   Psychiatric:         Mood and Affect: Mood normal.           Diagnostic Data:  Labs: I personally reviewed the most recent laboratory data pertinent to today's visit.      Radiology results:  Radiology Results Review: I have reviewed radiology reports from 3/21/2025 including: chest xray.  No change persistent moderate right hydropneumothorax and right base atelectasis. "     PFT/spirometry results, 5/9/2022:  Patient gave good effort and cooperation. The testing met ATS Standards for Acceptability and Repeatability.  Baseline oxygen saturation was 96% on room air with a heart rate of 90.     Spirometry:  FEV1/FVC Ratio is 69.  FEV1 is 1.93L which is 79% predicted.  FVC is 2.81L which is 90% predicted.  There is no significant postbronchodilator improvement in FEV1 or FVC.     Flow volume loop:  Mild obstructive pattern     Lung volumes:  Total lung capacity is 5.55L which is 09% predicted.  Residual volume is 135% predicted.     Diffusing capacity:  69% predicted     IMPRESSION:  1. Mild obstruction without bronchodilator responsiveness  2. Normal lung capacity with elevated residual volume consistent with mild air trapping  3. Mild diffusion impairment        Chelsea Cheng, ALEXSANDER RN FNP-BC  Nurse Practitioner  [facility]

## 2025-03-31 NOTE — ASSESSMENT & PLAN NOTE
Consistent with hepatic hydrothorax as a consequence of her decompensating alcoholic cirrhosis of the liver   She is getting thoracentesis concurrent with paracentesis. She states paracentesis is weekly, however she is not requiring thoracentesis every week. Last tapped 3/26 for 390ml clear yellow fluid.   Ongoing consideration for indwelling pleural catheter. We discussed if her frequency of thoracentesis increases, or she becomes more symptomatic, may want to consider IPC. However she is asymptomatic of effusion today, and does not feel strongly that she wants one. I feel an abdominal catheter would be more appropriate to start with prior to IPC.   Given her gradual deterioration over time, again recommended she follow up with palliative care. She has appt scheduled next month

## 2025-04-01 ENCOUNTER — HOSPITAL ENCOUNTER (OUTPATIENT)
Dept: INFUSION CENTER | Facility: HOSPITAL | Age: 65
Discharge: HOME | End: 2025-04-01
Attending: INTERNAL MEDICINE

## 2025-04-01 ENCOUNTER — HOSPITAL ENCOUNTER (OUTPATIENT)
Dept: INTERVENTIONAL RADIOLOGY/VASCULAR | Facility: HOSPITAL | Age: 65
Discharge: HOME | End: 2025-04-01
Attending: INTERNAL MEDICINE
Payer: MEDICARE

## 2025-04-01 VITALS
HEART RATE: 76 BPM | SYSTOLIC BLOOD PRESSURE: 111 MMHG | OXYGEN SATURATION: 99 % | RESPIRATION RATE: 18 BRPM | DIASTOLIC BLOOD PRESSURE: 56 MMHG

## 2025-04-01 VITALS
RESPIRATION RATE: 21 BRPM | OXYGEN SATURATION: 96 % | HEART RATE: 75 BPM | DIASTOLIC BLOOD PRESSURE: 60 MMHG | SYSTOLIC BLOOD PRESSURE: 112 MMHG

## 2025-04-01 DIAGNOSIS — K70.31 ALCOHOLIC CIRRHOSIS OF LIVER WITH ASCITES (HCC): ICD-10-CM

## 2025-04-01 DIAGNOSIS — K76.9 PLEURAL EFFUSION ASSOCIATED WITH HEPATIC DISORDER: ICD-10-CM

## 2025-04-01 DIAGNOSIS — J91.8 PLEURAL EFFUSION ASSOCIATED WITH HEPATIC DISORDER: ICD-10-CM

## 2025-04-01 DIAGNOSIS — K70.31 ALCOHOLIC CIRRHOSIS OF LIVER WITH ASCITES (HCC): Primary | ICD-10-CM

## 2025-04-01 PROCEDURE — 32555 ASPIRATE PLEURA W/ IMAGING: CPT

## 2025-04-01 PROCEDURE — 49083 ABD PARACENTESIS W/IMAGING: CPT | Performed by: RADIOLOGY

## 2025-04-01 PROCEDURE — 32555 ASPIRATE PLEURA W/ IMAGING: CPT | Performed by: RADIOLOGY

## 2025-04-01 PROCEDURE — 49083 ABD PARACENTESIS W/IMAGING: CPT

## 2025-04-01 RX ORDER — LIDOCAINE HYDROCHLORIDE 10 MG/ML
INJECTION, SOLUTION EPIDURAL; INFILTRATION; INTRACAUDAL; PERINEURAL AS NEEDED
Status: COMPLETED | OUTPATIENT
Start: 2025-04-01 | End: 2025-04-01

## 2025-04-01 RX ADMIN — LIDOCAINE HYDROCHLORIDE 8 ML: 10 INJECTION, SOLUTION EPIDURAL; INFILTRATION; INTRACAUDAL; PERINEURAL at 10:06

## 2025-04-01 RX ADMIN — LIDOCAINE HYDROCHLORIDE 8 ML: 10 INJECTION, SOLUTION EPIDURAL; INFILTRATION; INTRACAUDAL; PERINEURAL at 09:47

## 2025-04-01 NOTE — DISCHARGE INSTRUCTIONS
Thoracentesis   WHAT YOU NEED TO KNOW:   A thoracentesis is a procedure to remove extra fluid or air from between your lungs and your inner chest wall. Air or fluid buildup may make it hard for you to breathe. A thoracentesis allows your lungs to expand fully so you can breathe more easily.  DISCHARGE INSTRUCTIONS:   Medicines:   Pain medicine:  You may be given a prescription medicine to decrease pain. Do not wait until the pain is severe before you take your medicine.    Antibiotics:  This medicine helps fight or prevent an infection.    Take your medicine as directed.  Call your healthcare provider if you think your medicine is not helping or if you have side effects. Tell him if you are allergic to any medicine. Keep a list of the medicines, vitamins, and herbs you take. Include the amounts, and when and why you take them. Bring the list or the pill bottles to follow-up visits. Carry your medicine list with you in case of an emergency.  Follow up with your healthcare provider as directed:  Write down your questions so you remember to ask them during your visits.   Rest:  Rest when you feel it is needed. Slowly start to do more each day. Return to your daily activities as directed.   Do not smoke:  If you smoke, it is never too late to quit. Ask for information about how to stop smoking if you need help.  Contact your healthcare provider if:   You have a fever.    Your puncture site is red, warm, swollen, or draining pus.    You have questions or concerns about your procedure, medicine, or care.    If you have any questions regarding, call the IR department @ [phone].     Seek care immediately or call 911 if:   Blood soaks through your bandage.    There is blood in your spit.

## 2025-04-01 NOTE — SEDATION DOCUMENTATION
Therapeutic paracentesis. Removed 1660ml of cloudy sheryl fluid, RLQ. Patient tolerated the procedure well. Band aid and exofin put on the site. Patient was given AVS.

## 2025-04-01 NOTE — DISCHARGE INSTRUCTIONS
Abdominal Paracentesis     WHAT YOU NEED TO KNOW:   Abdominal paracentesis is a procedure to remove abnormal fluid buildup in your abdomen. Fluid builds up because of liver problems, such as swelling and scarring. Heart failure, kidney disease, a mass, or problems with your pancreas may also cause fluid buildup.     DISCHARGE INSTRUCTIONS:     Follow up with your healthcare provider as directed: Write down your questions so you remember to ask them during your visits.     Wound care: Remove dressing after 24 hours. Leave glue in place.    Return to your normal activities    Contact Interventional Radiology at [phone] (YOGESH PATIENTS: Contact Interventional Radiology at [phone]) (MARGARITA PATIENTS: Contact Interventional Radiology at [phone]) if:  You have a fever and your wound is red and swollen.   You have yellow, green, or bad-smelling discharge coming from your wound.   You have pain or swelling in your abdomen.   You have an upset stomach or you vomit.   You have sudden, sharp pain in your abdomen.   You urinate very little or not at all.   You feel confused and more tired than usual.   Your arm or leg feels warm, tender, and painful. It may look swollen and red.   You suddenly feel lightheaded and have trouble breathing.

## 2025-04-02 ENCOUNTER — TELEPHONE (OUTPATIENT)
Age: 65
End: 2025-04-02

## 2025-04-02 ENCOUNTER — RESULTS FOLLOW-UP (OUTPATIENT)
Dept: PAIN MEDICINE | Facility: CLINIC | Age: 65
End: 2025-04-02

## 2025-04-02 ENCOUNTER — TRANSCRIBE ORDERS (OUTPATIENT)
Dept: RADIOLOGY | Facility: HOSPITAL | Age: 65
End: 2025-04-02

## 2025-04-02 DIAGNOSIS — J91.8 PLEURAL EFFUSION ASSOCIATED WITH HEPATIC DISORDER: Primary | ICD-10-CM

## 2025-04-02 DIAGNOSIS — K70.31 ALCOHOLIC CIRRHOSIS OF LIVER WITH ASCITES (HCC): Primary | ICD-10-CM

## 2025-04-02 DIAGNOSIS — G89.29 CHRONIC LEFT-SIDED LOW BACK PAIN WITHOUT SCIATICA: Primary | ICD-10-CM

## 2025-04-02 DIAGNOSIS — M54.50 CHRONIC LEFT-SIDED LOW BACK PAIN WITHOUT SCIATICA: Primary | ICD-10-CM

## 2025-04-02 DIAGNOSIS — M51.26 LUMBAR DISC HERNIATION: ICD-10-CM

## 2025-04-02 DIAGNOSIS — K70.31 ALCOHOLIC CIRRHOSIS OF LIVER WITH ASCITES (HCC): ICD-10-CM

## 2025-04-02 DIAGNOSIS — K76.9 PLEURAL EFFUSION ASSOCIATED WITH HEPATIC DISORDER: Primary | ICD-10-CM

## 2025-04-02 NOTE — TELEPHONE ENCOUNTER
----- Message from Larisa William PA-C sent at 4/2/2025  8:29 AM EDT -----  Please let patient know that her lumbar spine MRI shows a right-sided disc herniation.  Unable to perform epidural steroid injection due to her platelet level.  I would recommend more conservative efforts such as PT/chiro.

## 2025-04-02 NOTE — TELEPHONE ENCOUNTER
----- Message from Larisa William PA-C sent at 4/2/2025 10:52 AM EDT -----  Chiro referral entered.    I would not recommend any pain medications due to her medical issues;  her platelets are chronically abnormal due to her liver disease.

## 2025-04-02 NOTE — TELEPHONE ENCOUNTER
Patients GI provider:  Dr. Herrera    Number to return call: [phone]    Reason for call: Maria Esther pabon [facility]'s IR dept calling requesting that the provider place new order for Albumin paced beacon please. Thank you    Scheduled procedure/appointment date if applicable: Apt 5/28/25

## 2025-04-02 NOTE — TELEPHONE ENCOUNTER
S/w pt, advised of above. Pt expressed frustration, stating that she cannot continue with PT as it is making her pain worse. Pt questioned if there are any medications she can take that would help with this. Also questioned if there is anything that can be done to help with her platelets. Advised pt, the writer will discuss with MG as well as a chiro recommendation. Pt verbalized understanding and appreciation.

## 2025-04-02 NOTE — TELEPHONE ENCOUNTER
S/w pt's  per medical communication consent on file. Advised of above.  Explained the risk of bleeding associated with low platelets and risks associated with CARLOS. Ulises stated that he and the pt are not interested in continuing PT or starting chiro tx. Advised ulises that he may want to look for a pain practice outside of [facility] that may be able to offer something else that would address the pt's pain. Ulises verbalized understanding and appreciation.

## 2025-04-08 ENCOUNTER — RESULTS FOLLOW-UP (OUTPATIENT)
Age: 65
End: 2025-04-08

## 2025-04-09 DIAGNOSIS — F41.9 ANXIETY: ICD-10-CM

## 2025-04-09 PROBLEM — E87.1 HYPONATREMIA: Status: RESOLVED | Noted: 2020-12-05 | Resolved: 2025-04-09

## 2025-04-09 RX ORDER — ALPRAZOLAM 1 MG/1
1 TABLET ORAL 2 TIMES DAILY PRN
Qty: 60 TABLET | Refills: 2 | Status: SHIPPED | OUTPATIENT
Start: 2025-04-09

## 2025-04-09 NOTE — DISCHARGE SUMMARY
"Discharge Summary - Hospitalist   Name: Jose White 64 y.o. female I MRN: [id number]  Unit/Bed#: MS Low-01 I Date of Admission: 3/16/2025   Date of Service: 03/21/2025 I Hospital Day: 5     Assessment & Plan  Hyponatremia (Resolved: 4/9/2025)  Presenting with lightheadedness and weakness.  Noted history of hyponatremia, possibly due to cirrhosis as well as intravascular depletion  Recent Labs     04/07/25  1107   SODIUM 134*      Saw Dr. Tovar 1 month ago, belive that her baseline is likely in the low 130s  She is noncompliant with her fluid restriction.  Resume fluid restriction at 1.2 L/day, diuretics on hold  IV albumin ordered 3/17  Outpatient repeat BMP  Lasix 20 mg daily  Aldactone 25 mg daily  Outpatient nephrology follow-up  Alcoholic cirrhosis of liver with ascites (HCC)  Patient's MELD Score is: 17 patient is still drinking daily with last drink being today  Undergoing weekly Thora/paracentesis (last on 3/13/25)  Diuretics restarted by nephrology  Pneumothorax on right  Patient receives weekly right thoracentesis last on 3/13  Chest x-ray final read: \"Right hydropneumothorax with interval increase in size of pneumothorax compared with prior study. Consider chest tube placement. \"  Status pots pigtail chest tube insertion n 3/17   Repeated chest x-ray-with stable finding , no air leak noticed, therefore chest tube removed 3/20  Stable  Hypotension  Chronic history  Resume midodrine and monitor blood pressure    BP 99/53   Pulse 74   Temp (!) 97 °F (36.1 °C) (Temporal)   Resp 20   Ht 5' 4\" (1.626 m)   Wt 59.2 kg (130 lb 8.2 oz)   SpO2 95%   BMI 22.40 kg/m²     Malignant neoplasm of lower-outer quadrant of right breast of female, estrogen receptor positive (HCC)  Resume home Arimidex  COPD (chronic obstructive pulmonary disease) (HCC)  No evidence of COPD flare  Resume home bronchodilators and monitor  Electrolyte abnormality  Potassium 3.0 and magnesium 1.6 upon admission  Recent Labs     " 04/07/25  1107   K 3.4*    Replete as needed   Thrombocytopenia (HCC)  Platelet count 115 upon admission, no active bleeding  Monitor closely  Recurrent pleural effusion on right  Status post pigtail chest tube insertion by IR 3/17   Cigarette nicotine dependence without complication    Other dysphagia  Aspiration precaution  GI following  Plan for inpatient vs outpatient EGD /esophogram   Palliative care patient  Remains full code  Not interested in hospice currently  Palliative following   [id number] premature ovarian failure       Admission Date: 3/16/2025 1239  Discharge Date: 04/09/25  Admitting Diagnosis: Hypokalemia [E87.6]  Hypomagnesemia [E83.42]  Hyponatremia [E87.1]  Abnormal laboratory test result [R89.9]  Alcoholic cirrhosis of liver with ascites (HCC) [K70.31]  Discharge Diagnosis:   Medical Problems       Resolved Problems  Date Reviewed: 3/31/2025          Resolved    * (Principal) Hyponatremia 4/9/2025     Resolved by  Viola Andrea MD          HPI: as per, Khanh Marie PA-C  , on 3/16 Jose White is a 64 y.o. female with a PMH of alcoholic cirrhosis, thrombocytopenia, weekly Thora and paracentesis, chronic hyponatremia who presents with chief complaint of lightheadedness and weakness.  Symptoms progressing over the past few days.  Reports she has not been following fluid restriction at home but has been taking her medications.  She endorses some shortness of breath and abdominal distention.  No fevers or chills.  No dysuria or hematuria     Procedures Performed:   Orders Placed This Encounter   Procedures    ED ECG Documentation Only       Summary of Hospital Course: Presented with alcoholic cirrhosis, ascites, hyponatremia, hepatic encephalopathy and pleural effusion.  Remained hemodynamically stable throughout her stay.  Was seen and evaluated by pulmonology, gastroenterology and nephrology.  Underwent paracentesis as well as thoracocentesis with chest tube placement for pneumothorax.   Later it was removed, as it was thought to be ex vacuo.  Remained stable on room air.  Hyponatremia improved with p.o./thoracocentesis , fluid restriction.  Diuretics resumed.  Patient counseled on alcohol abstinence  Outpatient GI and nephrology follow-up    Significant Findings, Care, Treatment and Services Provided: see above     Complications: none    Condition at Discharge: stable       Discharge instructions/Information to patient and family:   See After Visit Summary (AVS) for information provided to patient and family.      Provisions for Follow-Up Care:  See after visit summary for information related to follow-up care and any pertinent home health orders.      PCP: JOSÉ Craig    Disposition: Home    Planned Readmission: No     Discharge Medications:  See after visit summary for reconciled discharge medications provided to patient and family.      Discharge Statement:  I have spent a total time of 36 minutes in caring for this patient on the day of the visit/encounter. >30 minutes of time was spent on: Instructions for management, Patient and family education, Importance of tx compliance, Counseling / Coordination of care, Documenting in the medical record, Reviewing / ordering tests, medicine, procedures  , and Communicating with other healthcare professionals .

## 2025-04-09 NOTE — ASSESSMENT & PLAN NOTE
"Chronic history  Resume midodrine and monitor blood pressure    BP 99/53   Pulse 74   Temp (!) 97 °F (36.1 °C) (Temporal)   Resp 20   Ht 5' 4\" (1.626 m)   Wt 59.2 kg (130 lb 8.2 oz)   SpO2 95%   BMI 22.40 kg/m²     "

## 2025-04-09 NOTE — ASSESSMENT & PLAN NOTE
Patient's MELD Score is: 17 patient is still drinking daily with last drink being today  Undergoing weekly Thora/paracentesis (last on 3/13/25)  Diuretics restarted by nephrology

## 2025-04-09 NOTE — ASSESSMENT & PLAN NOTE
Presenting with lightheadedness and weakness.  Noted history of hyponatremia, possibly due to cirrhosis as well as intravascular depletion  Recent Labs     04/07/25  1107   SODIUM 134*      Saw Dr. Tovar 1 month ago, belive that her baseline is likely in the low 130s  She is noncompliant with her fluid restriction.  Resume fluid restriction at 1.2 L/day, diuretics on hold  IV albumin ordered 3/17  Outpatient repeat BMP  Lasix 20 mg daily  Aldactone 25 mg daily  Outpatient nephrology follow-up

## 2025-04-09 NOTE — ASSESSMENT & PLAN NOTE
"Patient receives weekly right thoracentesis last on 3/13  Chest x-ray final read: \"Right hydropneumothorax with interval increase in size of pneumothorax compared with prior study. Consider chest tube placement. \"  Status pots pigtail chest tube insertion n 3/17   Repeated chest x-ray-with stable finding , no air leak noticed, therefore chest tube removed 3/20  Stable  "

## 2025-04-09 NOTE — TELEPHONE ENCOUNTER
Medication: ALPRAZolam (XANAX) 1 mg tablet     Dose/Frequency: Take 1 tablet (1 mg total) by mouth 2 (two) times a day as needed for anxiety     Quantity: 60    Pharmacy: [id number] - [place] - [address]     Office:   [x] PCP/Provider - JOSÉ Craig   [] Speciality/Provider -     Does the patient have enough for 3 days?   [x] Yes   [] No - Send as HP to POD

## 2025-04-09 NOTE — ASSESSMENT & PLAN NOTE
Potassium 3.0 and magnesium 1.6 upon admission  Recent Labs     04/07/25  1107   K 3.4*    Replete as needed

## 2025-04-10 ENCOUNTER — HOSPITAL ENCOUNTER (OUTPATIENT)
Dept: INTERVENTIONAL RADIOLOGY/VASCULAR | Facility: HOSPITAL | Age: 65
Discharge: HOME | End: 2025-04-10
Attending: INTERNAL MEDICINE
Payer: MEDICARE

## 2025-04-10 ENCOUNTER — TELEPHONE (OUTPATIENT)
Age: 65
End: 2025-04-10

## 2025-04-10 ENCOUNTER — HOSPITAL ENCOUNTER (OUTPATIENT)
Dept: INFUSION CENTER | Facility: HOSPITAL | Age: 65
Discharge: HOME | End: 2025-04-10
Attending: INTERNAL MEDICINE

## 2025-04-10 VITALS
SYSTOLIC BLOOD PRESSURE: 120 MMHG | DIASTOLIC BLOOD PRESSURE: 59 MMHG | RESPIRATION RATE: 18 BRPM | HEART RATE: 74 BPM | OXYGEN SATURATION: 98 %

## 2025-04-10 DIAGNOSIS — K70.31 ALCOHOLIC CIRRHOSIS OF LIVER WITH ASCITES (HCC): ICD-10-CM

## 2025-04-10 DIAGNOSIS — K76.9 PLEURAL EFFUSION ASSOCIATED WITH HEPATIC DISORDER: ICD-10-CM

## 2025-04-10 DIAGNOSIS — J91.8 PLEURAL EFFUSION ASSOCIATED WITH HEPATIC DISORDER: ICD-10-CM

## 2025-04-10 PROCEDURE — 49083 ABD PARACENTESIS W/IMAGING: CPT

## 2025-04-10 PROCEDURE — 32555 ASPIRATE PLEURA W/ IMAGING: CPT

## 2025-04-10 NOTE — TELEPHONE ENCOUNTER
Caller: Ulises Lynn    Doctor/Office: Dr Larisa Silverman     Call regarding :  Treatment plan      Call was transferred to: Nurse

## 2025-04-10 NOTE — DISCHARGE INSTRUCTIONS
Thoracentesis   WHAT YOU NEED TO KNOW:   A thoracentesis is a procedure to remove extra fluid or air from between your lungs and your inner chest wall. Air or fluid buildup may make it hard for you to breathe. A thoracentesis allows your lungs to expand fully so you can breathe more easily.  DISCHARGE INSTRUCTIONS:   Medicines:   Pain medicine:  You may be given a prescription medicine to decrease pain. Do not wait until the pain is severe before you take your medicine.    Antibiotics:  This medicine helps fight or prevent an infection.    Take your medicine as directed.  Call your healthcare provider if you think your medicine is not helping or if you have side effects. Tell him if you are allergic to any medicine. Keep a list of the medicines, vitamins, and herbs you take. Include the amounts, and when and why you take them. Bring the list or the pill bottles to follow-up visits. Carry your medicine list with you in case of an emergency.  Follow up with your healthcare provider as directed:  Write down your questions so you remember to ask them during your visits.   Rest:  Rest when you feel it is needed. Slowly start to do more each day. Return to your daily activities as directed.   Do not smoke:  If you smoke, it is never too late to quit. Ask for information about how to stop smoking if you need help.  Contact your healthcare provider if:   You have a fever.    Your puncture site is red, warm, swollen, or draining pus.    You have questions or concerns about your procedure, medicine, or care.    If you have any questions regarding, call the IR department @ [phone].     Seek care immediately or call 911 if:   Blood soaks through your bandage.    There is blood in your spit.          Abdominal Paracentesis     WHAT YOU NEED TO KNOW:   Abdominal paracentesis is a procedure to remove abnormal fluid buildup in your abdomen. Fluid builds up because of liver problems, such as swelling and scarring. Heart  failure, kidney disease, a mass, or problems with your pancreas may also cause fluid buildup.     DISCHARGE INSTRUCTIONS:     Follow up with your healthcare provider as directed: Write down your questions so you remember to ask them during your visits.     Wound care: Remove dressing after 24 hours. Leave glue in place.    Return to your normal activities    Contact Interventional Radiology at [phone]  if:  You have a fever and your wound is red and swollen.   You have yellow, green, or bad-smelling discharge coming from your wound.   You have pain or swelling in your abdomen.   You have an upset stomach or you vomit.   You have sudden, sharp pain in your abdomen.   You urinate very little or not at all.   You feel confused and more tired than usual.   Your arm or leg feels warm, tender, and painful. It may look swollen and red.   You suddenly feel lightheaded and have trouble breathing.

## 2025-04-10 NOTE — TELEPHONE ENCOUNTER
S/w pt's , ulises. Requesting a referral to Neurosurgery. Advised ulises, the writer does not see a referral to neurosurgery from spine and pain - only to chiro. Per Ulises, if spine and pain cannot do a procedure for his wife's pain s/t risk, a surgeon seems to be the next logical step. Advised Ulises, the writer will d/w MG and cb to advise. Pt verbalized understanding and appreciation.

## 2025-04-10 NOTE — SEDATION DOCUMENTATION
Therapeutic paracentesis, RLQ. Removed 1030ml clear sheryl fluid. Patient tolerated the procedure well. Band aid was put on the site. Patient was given the   AVS.

## 2025-04-13 ENCOUNTER — PATIENT MESSAGE (OUTPATIENT)
Dept: FAMILY MEDICINE CLINIC | Facility: CLINIC | Age: 65
End: 2025-04-13

## 2025-04-13 DIAGNOSIS — G47.00 INSOMNIA, UNSPECIFIED TYPE: ICD-10-CM

## 2025-04-14 DIAGNOSIS — E87.1 HYPONATREMIA: ICD-10-CM

## 2025-04-14 RX ORDER — TRAZODONE HYDROCHLORIDE 50 MG/1
50 TABLET ORAL
Qty: 90 TABLET | Refills: 0 | Status: SHIPPED | OUTPATIENT
Start: 2025-04-14

## 2025-04-15 RX ORDER — FUROSEMIDE 20 MG/1
20 TABLET ORAL DAILY
Qty: 90 TABLET | Refills: 1 | Status: SHIPPED | OUTPATIENT
Start: 2025-04-15

## 2025-04-16 ENCOUNTER — HOSPITAL ENCOUNTER (OUTPATIENT)
Dept: INFUSION CENTER | Facility: HOSPITAL | Age: 65
Discharge: HOME | End: 2025-04-16
Attending: INTERNAL MEDICINE

## 2025-04-16 ENCOUNTER — ANNUAL EXAM (OUTPATIENT)
Dept: GYNECOLOGY | Facility: CLINIC | Age: 65
End: 2025-04-16
Payer: MEDICARE

## 2025-04-16 ENCOUNTER — HOSPITAL ENCOUNTER (OUTPATIENT)
Dept: INTERVENTIONAL RADIOLOGY/VASCULAR | Facility: HOSPITAL | Age: 65
Discharge: HOME | End: 2025-04-16
Attending: INTERNAL MEDICINE
Payer: MEDICARE

## 2025-04-16 VITALS
HEIGHT: 64 IN | BODY MASS INDEX: 21.48 KG/M2 | DIASTOLIC BLOOD PRESSURE: 68 MMHG | SYSTOLIC BLOOD PRESSURE: 118 MMHG | WEIGHT: 125.8 LBS

## 2025-04-16 VITALS
RESPIRATION RATE: 21 BRPM | DIASTOLIC BLOOD PRESSURE: 51 MMHG | SYSTOLIC BLOOD PRESSURE: 104 MMHG | HEART RATE: 86 BPM | OXYGEN SATURATION: 98 %

## 2025-04-16 DIAGNOSIS — J91.8 PLEURAL EFFUSION ASSOCIATED WITH HEPATIC DISORDER: ICD-10-CM

## 2025-04-16 DIAGNOSIS — K76.9 PLEURAL EFFUSION ASSOCIATED WITH HEPATIC DISORDER: ICD-10-CM

## 2025-04-16 DIAGNOSIS — Z01.419 WOMEN'S ANNUAL ROUTINE GYNECOLOGICAL EXAMINATION: Primary | ICD-10-CM

## 2025-04-16 DIAGNOSIS — N94.10 DYSPAREUNIA, FEMALE: ICD-10-CM

## 2025-04-16 DIAGNOSIS — Z12.31 ENCOUNTER FOR SCREENING MAMMOGRAM FOR BREAST CANCER: ICD-10-CM

## 2025-04-16 DIAGNOSIS — N93.0 POSTCOITAL BLEEDING: ICD-10-CM

## 2025-04-16 DIAGNOSIS — D05.11 DUCTAL CARCINOMA IN SITU (DCIS) OF RIGHT BREAST: ICD-10-CM

## 2025-04-16 DIAGNOSIS — K70.31 ALCOHOLIC CIRRHOSIS OF LIVER WITH ASCITES (HCC): ICD-10-CM

## 2025-04-16 DIAGNOSIS — N95.2 VAGINAL ATROPHY: ICD-10-CM

## 2025-04-16 PROCEDURE — 49083 ABD PARACENTESIS W/IMAGING: CPT

## 2025-04-16 PROCEDURE — G0101 CA SCREEN;PELVIC/BREAST EXAM: HCPCS | Performed by: OBSTETRICS & GYNECOLOGY

## 2025-04-16 PROCEDURE — 32555 ASPIRATE PLEURA W/ IMAGING: CPT | Performed by: RADIOLOGY

## 2025-04-16 PROCEDURE — 32555 ASPIRATE PLEURA W/ IMAGING: CPT

## 2025-04-16 PROCEDURE — 49083 ABD PARACENTESIS W/IMAGING: CPT | Performed by: RADIOLOGY

## 2025-04-16 RX ORDER — LIDOCAINE HYDROCHLORIDE 10 MG/ML
INJECTION, SOLUTION EPIDURAL; INFILTRATION; INTRACAUDAL; PERINEURAL AS NEEDED
Status: COMPLETED | OUTPATIENT
Start: 2025-04-16 | End: 2025-04-16

## 2025-04-16 RX ADMIN — LIDOCAINE HYDROCHLORIDE 9 ML: 10 INJECTION, SOLUTION EPIDURAL; INFILTRATION; INTRACAUDAL; PERINEURAL at 09:38

## 2025-04-16 RX ADMIN — LIDOCAINE HYDROCHLORIDE 8 ML: 10 INJECTION, SOLUTION EPIDURAL; INFILTRATION; INTRACAUDAL; PERINEURAL at 09:49

## 2025-04-16 NOTE — SEDATION DOCUMENTATION
Right thoracentesis with removal 200ml dark yellow fluid. Paracentesis with removal 1700ml clear yellow fluid. Band aid to both sites. Patient tolerated well and ambulated home in her usual state of health. AVS reviewed.

## 2025-04-16 NOTE — PROGRESS NOTES
Assessment & Plan   Diagnoses and all orders for this visit:    Women's annual routine gynecological examination    Encounter for screening mammogram for breast cancer  -     Mammo screening bilateral w 3d and cad; Future    Vaginal atrophy    Ductal carcinoma in situ (DCIS) of right breast    Dyspareunia, female    Postcoital bleeding    1.  Yearly exam-Pap smear deferred, self breast awareness reviewed, calcium/vitamin D recommendations discussed, mammogram request given, colonoscopy up-to-date follow-up as per specialist.  2.  Vaginal atrophy with dyspareunia-continues.  She does use lubrication with sex.  She has tried multiple ones.  Suggested Luvena or Replens moisture up to 2-3 times per week and then lubricant with sex.  Also, discussed vaginal estrogen.  Information on this was given to her.  Latest data does not suggest that vaginal estrogen is associated with increased risk of recurrence of breast cancer.  She does plan to review with her breast cancer specialist at [facility] and then she will let me know which way she wishes to go.  3.  History of postcoital bleeding-denies any concerns.  She does have dyspareunia as noted above.  4. history of DCIS-continues with treating doctors at [facility].  Now year 4 of anastrozole.  Had mammogram 11/26/2024 which was negative.  She has follow-up with them November 2025.  5. status post hysterectomy-states it was total hysterectomy with retention of the ovaries done in 1988 for heavy bleeding.  Continues with band of scar tissue versus cervical remnant posteriorly to the right.  Pap at last visit was negative from 7/12/2023.  She denies history of abnormal Pap previously.  Pap was deferred as per current guidance with patient agreement.  6. history of EtOH disease-continues with paracentesis of the abdomen.  Follow-up as per treating doctors.  7.  Other medical-hypertension, hyperlipidemia, cirrhosis.  Follow-up as per treating doctor.  8.  Osteopenia-noted on most  recent DEXA scan from 8/20/2024.  Risk for fracture was below that for which medical treatment is recommended.  Calcium, vitamin D, weightbearing exercise recommended and sheet given.  Consider repeat DEXA scan August 2026.  9.  Other- status post open heart surgery, doing well.  Follow-up 1 year for 1 year follow-up exam or as needed.    Subjective   Patient ID: Jose White is a 64 y.o. female.    Vitals:    04/16/25 1339   BP: 118/68     Patient was seen today for follow-up visit.  Please see assessment plan for details.        The following portions of the patient's history were reviewed and updated as appropriate: allergies, current medications, past family history, past medical history, past social history, past surgical history, and problem list.  Past Medical History:   Diagnosis Date    Alcoholic fatty liver     Anxiety     Asthma     COPD (chronic obstructive pulmonary disease) (HCC)     Elevated liver function tests     GERD (gastroesophageal reflux disease)     GERD without esophagitis     Hyperlipidemia     Hypertension     IBS (irritable bowel syndrome)     Insomnia     Insomnia     Liver disease     Nervousness     Nicotine dependence     Other specified urinary incontinence     RLS (restless legs syndrome)     Wears glasses      Past Surgical History:   Procedure Laterality Date    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    BREAST EXCISIONAL BIOPSY Right 11/01/2004    benign    CATARACT EXTRACTION, BILATERAL  08/01/2018    COLONOSCOPY N/A 05/08/2019    Procedure: COLONOSCOPY;  Surgeon: Jacobo Leonardo MD;  Location: [facility];  Service: Gastroenterology    EGD AND COLONOSCOPY N/A 03/19/2018    Procedure: EGD AND COLONOSCOPY;  Surgeon: Jacobo Leonardo MD;  Location: [facility];  Service: Gastroenterology    ESOPHAGOGASTRODUODENOSCOPY N/A 05/08/2019    Procedure: ESOPHAGOGASTRODUODENOSCOPY (EGD);  Surgeon: Jacobo Leonardo MD;  Location: [facility];  Service: Gastroenterology    IR  CHEST TUBE PLACEMENT  3/17/2025    IR PARACENTESIS  11/27/2020    IR PARACENTESIS  12/08/2020    IR PARACENTESIS  10/28/2022    IR PARACENTESIS  11/07/2022    IR PARACENTESIS  11/30/2022    IR PARACENTESIS  12/30/2022    IR PARACENTESIS  01/16/2023    IR PARACENTESIS  08/06/2024    IR PARACENTESIS  09/18/2024    IR PARACENTESIS  10/03/2024    IR PARACENTESIS  10/10/2024    IR PARACENTESIS  10/17/2024    IR PARACENTESIS  10/24/2024    IR PARACENTESIS  11/01/2024    IR PARACENTESIS  11/07/2024    IR PARACENTESIS  11/14/2024    IR PARACENTESIS  11/21/2024    IR PARACENTESIS  11/29/2024    IR PARACENTESIS  12/05/2024    IR PARACENTESIS  12/12/2024    IR PARACENTESIS  12/19/2024    IR PARACENTESIS  12/26/2024    IR PARACENTESIS  01/06/2025    IR PARACENTESIS  01/09/2025    IR PARACENTESIS  01/16/2025    IR PARACENTESIS  1/23/2025    IR PARACENTESIS  1/30/2025    IR PARACENTESIS  2/5/2025    IR PARACENTESIS  2/11/2025    IR PARACENTESIS  2/20/2025    IR PARACENTESIS  2/27/2025    IR PARACENTESIS  3/6/2025    IR PARACENTESIS  3/13/2025    IR PARACENTESIS  3/20/2025    IR PARACENTESIS  3/26/2025    IR PARACENTESIS  4/1/2025    IR PARACENTESIS  4/10/2025    IR PARACENTESIS  1/30/2025    IR PARACENTESIS  4/16/2025    IR THORACENTESIS  01/16/2023    IR THORACENTESIS  03/28/2023    IR THORACENTESIS  07/25/2024    IR THORACENTESIS  08/06/2024    IR THORACENTESIS  08/14/2024    IR THORACENTESIS  08/21/2024    IR THORACENTESIS  08/28/2024    IR THORACENTESIS  09/04/2024    IR THORACENTESIS  09/11/2024    IR THORACENTESIS  09/18/2024    IR THORACENTESIS  09/25/2024    IR THORACENTESIS  10/03/2024    IR THORACENTESIS  10/10/2024    IR THORACENTESIS  10/17/2024    IR THORACENTESIS  10/24/2024    IR THORACENTESIS  11/01/2024    IR THORACENTESIS  11/07/2024    IR THORACENTESIS  11/14/2024    IR THORACENTESIS  11/21/2024    IR THORACENTESIS  11/29/2024    IR THORACENTESIS  12/05/2024    IR THORACENTESIS  12/12/2024    IR THORACENTESIS   2024    IR THORACENTESIS  2024    IR THORACENTESIS  2025    IR THORACENTESIS  2025    IR THORACENTESIS  2025    IR THORACENTESIS  2025    IR THORACENTESIS  2025    IR THORACENTESIS  2025    IR THORACENTESIS  2025    IR THORACENTESIS  2025    IR THORACENTESIS  2025    IR THORACENTESIS  3/6/2025    IR THORACENTESIS  3/13/2025    IR THORACENTESIS  3/26/2025    IR THORACENTESIS  2025    IR THORACENTESIS  4/10/2025    IR THORACENTESIS  2025    KNEE SURGERY Left     ACL repair. Date: early     LUMBAR LAMINECTOMY  1999    LYMPH NODE BIOPSY      MAMMO STEREOTACTIC BREAST BIOPSY RIGHT (ALL INC) Right 2021    MAMMO STEREOTACTIC BREAST BIOPSY RIGHT (ALL INC) EACH ADD Right 2021    TOTAL ABDOMINAL HYSTERECTOMY  2008    TUBAL LIGATION      UPPER GASTROINTESTINAL ENDOSCOPY       OB History    Para Term  AB Living   2 2 2   2   SAB IAB Ectopic Multiple Live Births       2      # Outcome Date GA Lbr Damian/2nd Weight Sex Type Anes PTL Lv   2 Term            1 Term               Obstetric Comments   Menarche-13   Age at first pregnancy-23   bcp- about 5yrs       Current Outpatient Medications:     albuterol (2.5 mg/3 mL) 0.083 % nebulizer solution, Take 3 mL (2.5 mg total) by nebulization every 6 (six) hours as needed for wheezing or shortness of breath, Disp: 1080 mL, Rfl: 3    albuterol (Proventil HFA) 90 mcg/act inhaler, Inhale 2 puffs every 6 (six) hours as needed for wheezing or shortness of breath, Disp: 18 g, Rfl: 6    alendronate (Fosamax) 70 mg tablet, Take 1 tablet (70 mg total) by mouth every 7 days, Disp: 12 tablet, Rfl: 3    ALPRAZolam (XANAX) 1 mg tablet, Take 1 tablet (1 mg total) by mouth 2 (two) times a day as needed for anxiety, Disp: 60 tablet, Rfl: 2    anastrozole (ARIMIDEX) 1 mg tablet, Take 1 tablet (1 mg total) by mouth daily, Disp: 90 tablet, Rfl: 3    budesonide-formoterol (Symbicort) 160-4.5 mcg/act  inhaler, Inhale 2 puffs 2 (two) times a day Rinse mouth after use., Disp: 10.2 g, Rfl: 6    furosemide (LASIX) 20 mg tablet, Take 1 tablet (20 mg total) by mouth daily, Disp: 90 tablet, Rfl: 1    lactulose (CHRONULAC) 10 g/15 mL solution, Take 15 mL (10 g total) by mouth 2 (two) times a day, Disp: 240 mL, Rfl: 0    midodrine (PROAMATINE) 10 MG tablet, Take 1 tablet (10 mg total) by mouth 3 (three) times a day before meals, Disp: 90 tablet, Rfl: 3    pantoprazole (PROTONIX) 20 mg tablet, Take 1 tablet (20 mg total) by mouth daily, Disp: 90 tablet, Rfl: 1    Risankizumab-rzaa (SKYRIZI PEN SC), Inject under the skin, Disp: , Rfl:     spironolactone (ALDACTONE) 25 mg tablet, Take 1 tablet (25 mg total) by mouth daily, Disp: 90 tablet, Rfl: 3    traZODone (DESYREL) 50 mg tablet, Take 1 tablet (50 mg total) by mouth daily at bedtime, Disp: 90 tablet, Rfl: 0  No current facility-administered medications for this visit.  Allergies   Allergen Reactions    Sulfa Antibiotics Shortness Of Breath    Ace Inhibitors Cough and Other (See Comments)     coughing     Social History     Socioeconomic History    Marital status: /Civil Union     Spouse name: None    Number of children: None    Years of education: None    Highest education level: None   Occupational History    None   Tobacco Use    Smoking status: Every Day     Current packs/day: 1.00     Average packs/day: 1 pack/day for 47.3 years (47.3 ttl pk-yrs)     Types: Cigarettes     Start date: 1/1/1978    Smokeless tobacco: Never   Vaping Use    Vaping status: Never Used   Substance and Sexual Activity    Alcohol use: Yes     Alcohol/week: 6.0 standard drinks of alcohol     Types: 6 Cans of beer per week     Comment: 7-10 drinks/wk    Drug use: No    Sexual activity: Yes     Partners: Male   Other Topics Concern    None   Social History Narrative    Has carbon monoxide detectors in home    Has smoke detectors    Uses safety equipment - seatbelts     Social Drivers of  Health     Financial Resource Strain: Not on file   Food Insecurity: No Food Insecurity (3/16/2025)    Nursing - Inadequate Food Risk Classification     Worried About Running Out of Food in the Last Year: Never true     Ran Out of Food in the Last Year: Never true     Ran Out of Food in the Last Year: Never true   Transportation Needs: No Transportation Needs (3/16/2025)    Nursing - Transportation Risk Classification     Lack of Transportation: Not on file     Lack of Transportation: No   Physical Activity: Not on file   Stress: No Stress Concern Present (2024)    Received from [facility] - Occupational Stress Questionnaire     Feeling of Stress : Not at all   Social Connections: Feeling Socially Integrated (2024)    Received from [facility]    OASIS : Social Isolation     How often do you feel lonely or isolated from those around you?: Never   Intimate Partner Violence: Unknown (3/16/2025)    Nursing IPS     Feels Physically and Emotionally Safe: Not on file     Physically Hurt by Someone: Not on file     Humiliated or Emotionally Abused by Someone: Not on file     Physically Hurt by Someone: No     Hurt or Threatened by Someone: No   Housing Stability: Unknown (3/16/2025)    Nursing: Inadequate Housing Risk Classification     Has Housing: Not on file     Worried About Losing Housing: Not on file     Unable to Get Utilities: Not on file     Unable to Pay for Housing in the Last Year: No     Has Housin     Family History   Problem Relation Age of Onset    Breast cancer Mother 32    No Known Problems Father     No Known Problems Sister     No Known Problems Brother     No Known Problems Maternal Grandmother     No Known Problems Maternal Grandfather     No Known Problems Paternal Grandmother     No Known Problems Paternal Grandfather     No Known Problems Maternal Aunt     No Known Problems Maternal Aunt     No Known  "Problems Paternal Aunt     No Known Problems Son     Substance Abuse Neg Hx     Mental illness Neg Hx     Colon cancer Neg Hx     Colon polyps Neg Hx        Review of Systems   Constitutional:  Negative for chills, diaphoresis, fatigue and fever.   Respiratory:  Negative for apnea, cough, chest tightness, shortness of breath and wheezing.    Cardiovascular:  Negative for chest pain, palpitations and leg swelling.   Gastrointestinal:  Negative for abdominal distention, abdominal pain, anal bleeding, constipation, diarrhea, nausea, rectal pain and vomiting.   Genitourinary:  Negative for difficulty urinating, dyspareunia, dysuria, frequency, hematuria, menstrual problem, pelvic pain, urgency, vaginal bleeding, vaginal discharge and vaginal pain.   Musculoskeletal:  Negative for arthralgias, back pain and myalgias.   Skin:  Negative for color change and rash.   Neurological:  Negative for dizziness, syncope, light-headedness, numbness and headaches.   Hematological:  Negative for adenopathy. Does not bruise/bleed easily.   Psychiatric/Behavioral:  Negative for dysphoric mood and sleep disturbance. The patient is not nervous/anxious.        Objective   Physical Exam  OBGyn Exam     Objective      /68 (BP Location: Left arm, Patient Position: Sitting)   Ht 5' 4\" (1.626 m)   Wt 57.1 kg (125 lb 12.8 oz)   BMI 21.59 kg/m²     General:   alert and oriented, in no acute distress   Neck: normal to inspection and palpation   Breast: normal appearance, no masses or tenderness   Heart:    Lungs:    Abdomen: soft, non-tender, without masses or organomegaly   Vulva: normal   Vagina: Mild to moderate atrophy, admits 2 fingers tightly, without erythema or lesions or discharge.   Cervix: Mild to moderate atrophy, without lesions or cervicitis.  No CMT   Uterus: top normal size, anteverted, non-tender   Adnexa: no mass, fullness, tenderness   Rectum: negative    Psych:  Normal mood and affect   Skin:  Without obvious " lesions   Eyes: symmetric, with normal movements and reactivity   Musculoskeletal:  Normal muscle tone and movements appreciated

## 2025-04-16 NOTE — BRIEF OP NOTE (RAD/CATH)
INTERVENTIONAL RADIOLOGY PROCEDURE NOTE    Date: 4/16/2025    Procedure:   Procedure Summary       Date: 04/16/25 Room / Location: [facility] Interventional Radiology    Anesthesia Start:  Anesthesia Stop:     Procedure: IR THORACENTESIS  IR Paracentesis Diagnosis:       Alcoholic cirrhosis of liver with ascites (HCC)      Pleural effusion associated with hepatic disorder      (refractory asctites/hepatic hydrothorax)    Scheduled Providers:  Responsible Provider:     Anesthesia Type: Not recorded ASA Status: Not recorded            Preoperative diagnosis:   1. Alcoholic cirrhosis of liver with ascites (HCC)    2. Pleural effusion associated with hepatic disorder         Postoperative diagnosis: Same.    Surgeon: Johnny Corral MD     Assistant: None. No qualified resident was available.    Blood loss: None    Specimens:    200 mL serosanguinous pleural fluid removed from the right.  1.7 L clear yellow ascites fluid removed.    Findings: Successful right thoracentesis and paracentesis.    Complications: None immediate.    Anesthesia: local

## 2025-04-16 NOTE — H&P
Interventional Radiology  History and Physical 4/16/2025     Jose White   1960   [id number]    Assessment/Plan:  64 year old female with cirrhosis and recurrent right pleural effusion and ascites returns for right thoracentesis and paracentesis.    Problem List Items Addressed This Visit          Digestive    Alcoholic cirrhosis of liver with ascites (HCC) (Chronic)    Relevant Orders    IR OUT-Patient Thoracentesis     Other Visit Diagnoses         Pleural effusion associated with hepatic disorder        Relevant Orders    IR OUT-Patient Thoracentesis               Subjective:     Patient ID: Jose White is a 64 y.o. female.    History of Present Illness  Patient with cirrhosis and recurrent right pleural effusion and ascites returns for right thoracentesis and paracentesis.    Review of Systems      Past Medical History:   Diagnosis Date    Alcoholic fatty liver     Anxiety     Asthma     COPD (chronic obstructive pulmonary disease) (HCC)     Elevated liver function tests     GERD (gastroesophageal reflux disease)     GERD without esophagitis     Hyperlipidemia     Hypertension     IBS (irritable bowel syndrome)     Insomnia     Insomnia     Liver disease     Nervousness     Nicotine dependence     Other specified urinary incontinence     RLS (restless legs syndrome)     Wears glasses         Past Surgical History:   Procedure Laterality Date    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    BREAST EXCISIONAL BIOPSY Right 11/01/2004    benign    CATARACT EXTRACTION, BILATERAL  08/01/2018    COLONOSCOPY N/A 05/08/2019    Procedure: COLONOSCOPY;  Surgeon: Jacobo Leonardo MD;  Location: [facility];  Service: Gastroenterology    EGD AND COLONOSCOPY N/A 03/19/2018    Procedure: EGD AND COLONOSCOPY;  Surgeon: Jacobo Leonardo MD;  Location: [facility];  Service: Gastroenterology    ESOPHAGOGASTRODUODENOSCOPY N/A 05/08/2019    Procedure: ESOPHAGOGASTRODUODENOSCOPY (EGD);  Surgeon: Jacobo Leonardo MD;   Location: [facility];  Service: Gastroenterology    IR CHEST TUBE PLACEMENT  3/17/2025    IR PARACENTESIS  11/27/2020    IR PARACENTESIS  12/08/2020    IR PARACENTESIS  10/28/2022    IR PARACENTESIS  11/07/2022    IR PARACENTESIS  11/30/2022    IR PARACENTESIS  12/30/2022    IR PARACENTESIS  01/16/2023    IR PARACENTESIS  08/06/2024    IR PARACENTESIS  09/18/2024    IR PARACENTESIS  10/03/2024    IR PARACENTESIS  10/10/2024    IR PARACENTESIS  10/17/2024    IR PARACENTESIS  10/24/2024    IR PARACENTESIS  11/01/2024    IR PARACENTESIS  11/07/2024    IR PARACENTESIS  11/14/2024    IR PARACENTESIS  11/21/2024    IR PARACENTESIS  11/29/2024    IR PARACENTESIS  12/05/2024    IR PARACENTESIS  12/12/2024    IR PARACENTESIS  12/19/2024    IR PARACENTESIS  12/26/2024    IR PARACENTESIS  01/06/2025    IR PARACENTESIS  01/09/2025    IR PARACENTESIS  01/16/2025    IR PARACENTESIS  1/23/2025    IR PARACENTESIS  1/30/2025    IR PARACENTESIS  2/5/2025    IR PARACENTESIS  2/11/2025    IR PARACENTESIS  2/20/2025    IR PARACENTESIS  2/27/2025    IR PARACENTESIS  3/6/2025    IR PARACENTESIS  3/13/2025    IR PARACENTESIS  3/20/2025    IR PARACENTESIS  3/26/2025    IR PARACENTESIS  4/1/2025    IR PARACENTESIS  4/10/2025    IR PARACENTESIS  1/30/2025    IR THORACENTESIS  01/16/2023    IR THORACENTESIS  03/28/2023    IR THORACENTESIS  07/25/2024    IR THORACENTESIS  08/06/2024    IR THORACENTESIS  08/14/2024    IR THORACENTESIS  08/21/2024    IR THORACENTESIS  08/28/2024    IR THORACENTESIS  09/04/2024    IR THORACENTESIS  09/11/2024    IR THORACENTESIS  09/18/2024    IR THORACENTESIS  09/25/2024    IR THORACENTESIS  10/03/2024    IR THORACENTESIS  10/10/2024    IR THORACENTESIS  10/17/2024    IR THORACENTESIS  10/24/2024    IR THORACENTESIS  11/01/2024    IR THORACENTESIS  11/07/2024    IR THORACENTESIS  11/14/2024    IR THORACENTESIS  11/21/2024    IR THORACENTESIS  11/29/2024    IR THORACENTESIS  12/05/2024    IR THORACENTESIS   12/12/2024    IR THORACENTESIS  12/19/2024    IR THORACENTESIS  12/26/2024    IR THORACENTESIS  01/03/2025    IR THORACENTESIS  01/09/2025    IR THORACENTESIS  01/16/2025    IR THORACENTESIS  1/23/2025    IR THORACENTESIS  1/30/2025    IR THORACENTESIS  2/5/2025    IR THORACENTESIS  2/11/2025    IR THORACENTESIS  2/20/2025    IR THORACENTESIS  2/27/2025    IR THORACENTESIS  3/6/2025    IR THORACENTESIS  3/13/2025    IR THORACENTESIS  3/26/2025    IR THORACENTESIS  4/1/2025    IR THORACENTESIS  4/10/2025    KNEE SURGERY Left     ACL repair. Date: early 2000s    LUMBAR LAMINECTOMY  04/1999    LYMPH NODE BIOPSY      MAMMO STEREOTACTIC BREAST BIOPSY RIGHT (ALL INC) Right 05/21/2021    MAMMO STEREOTACTIC BREAST BIOPSY RIGHT (ALL INC) EACH ADD Right 05/21/2021    TOTAL ABDOMINAL HYSTERECTOMY  02/05/2008    TUBAL LIGATION  1989    UPPER GASTROINTESTINAL ENDOSCOPY          Social History     Tobacco Use   Smoking Status Every Day    Current packs/day: 1.00    Average packs/day: 1 pack/day for 47.3 years (47.3 ttl pk-yrs)    Types: Cigarettes    Start date: 1/1/1978   Smokeless Tobacco Never        Social History     Substance and Sexual Activity   Alcohol Use Yes    Alcohol/week: 6.0 standard drinks of alcohol    Types: 6 Cans of beer per week    Comment: 7-10 drinks/wk        Social History     Substance and Sexual Activity   Drug Use No        Allergies   Allergen Reactions    Sulfa Antibiotics Shortness Of Breath    Ace Inhibitors Cough and Other (See Comments)     coughing       Current Outpatient Medications   Medication Sig Dispense Refill    albuterol (2.5 mg/3 mL) 0.083 % nebulizer solution Take 3 mL (2.5 mg total) by nebulization every 6 (six) hours as needed for wheezing or shortness of breath 1080 mL 3    albuterol (Proventil HFA) 90 mcg/act inhaler Inhale 2 puffs every 6 (six) hours as needed for wheezing or shortness of breath 18 g 6    alendronate (Fosamax) 70 mg tablet Take 1 tablet (70 mg total) by mouth  every 7 days 12 tablet 3    ALPRAZolam (XANAX) 1 mg tablet Take 1 tablet (1 mg total) by mouth 2 (two) times a day as needed for anxiety 60 tablet 2    anastrozole (ARIMIDEX) 1 mg tablet Take 1 tablet (1 mg total) by mouth daily 90 tablet 3    budesonide-formoterol (Symbicort) 160-4.5 mcg/act inhaler Inhale 2 puffs 2 (two) times a day Rinse mouth after use. 10.2 g 6    furosemide (LASIX) 20 mg tablet Take 1 tablet (20 mg total) by mouth daily 90 tablet 1    lactulose (CHRONULAC) 10 g/15 mL solution Take 15 mL (10 g total) by mouth 2 (two) times a day 240 mL 0    midodrine (PROAMATINE) 10 MG tablet Take 1 tablet (10 mg total) by mouth 3 (three) times a day before meals 90 tablet 3    pantoprazole (PROTONIX) 20 mg tablet Take 1 tablet (20 mg total) by mouth daily 90 tablet 1    Risankizumab-rzaa (SKYRIZI PEN SC) Inject under the skin      spironolactone (ALDACTONE) 25 mg tablet Take 1 tablet (25 mg total) by mouth daily 90 tablet 3    traZODone (DESYREL) 50 mg tablet Take 1 tablet (50 mg total) by mouth daily at bedtime 90 tablet 0     Current Facility-Administered Medications   Medication Dose Route Frequency Provider Last Rate Last Admin    lidocaine (PF) (XYLOCAINE-MPF) 1 % injection    PRN Johnny Corral MD   8 mL at 04/16/25 0949          Objective:    Vitals:    04/16/25 0939   BP: 104/51   Pulse: 86   Resp: 21   SpO2: 98%        Physical Exam  Constitutional:       Appearance: Normal appearance.   Cardiovascular:      Rate and Rhythm: Normal rate.   Pulmonary:      Effort: Pulmonary effort is normal.   Abdominal:      General: There is distension.           Lab Results   Component Value Date    BNP 63 11/06/2024      Lab Results   Component Value Date    WBC 3.8 04/07/2025    HGB 12.2 04/07/2025    HCT 35.2 04/07/2025    MCV 98.6 04/07/2025     04/07/2025     Lab Results   Component Value Date    INR 1.5 (H) 04/07/2025    INR 1.90 (H) 03/19/2025    INR 1.2 (H) 03/10/2025    PROTIME 15.3 (H) 04/07/2025     PROTIME 22.2 (H) 03/19/2025    PROTIME 13.0 (H) 03/10/2025     Lab Results   Component Value Date    PTT 34 11/05/2024         I have personally reviewed pertinent imaging and laboratory results.     Code Status: Prior  Advance Directive and Living Will:      Power of :    POLST:      This text is generated with voice recognition software. There may be translation, syntax,  or grammatical errors. If you have any questions, please contact the dictating provider.

## 2025-04-16 NOTE — DISCHARGE INSTRUCTIONS
Thoracentesis   WHAT YOU NEED TO KNOW:   A thoracentesis is a procedure to remove extra fluid or air from between your lungs and your inner chest wall. Air or fluid buildup may make it hard for you to breathe. A thoracentesis allows your lungs to expand fully so you can breathe more easily.  DISCHARGE INSTRUCTIONS:   Medicines:   Pain medicine:  You may be given a prescription medicine to decrease pain. Do not wait until the pain is severe before you take your medicine.    Antibiotics:  This medicine helps fight or prevent an infection.    Take your medicine as directed.  Call your healthcare provider if you think your medicine is not helping or if you have side effects. Tell him if you are allergic to any medicine. Keep a list of the medicines, vitamins, and herbs you take. Include the amounts, and when and why you take them. Bring the list or the pill bottles to follow-up visits. Carry your medicine list with you in case of an emergency.  Follow up with your healthcare provider as directed:  Write down your questions so you remember to ask them during your visits.   Rest:  Rest when you feel it is needed. Slowly start to do more each day. Return to your daily activities as directed.   Do not smoke:  If you smoke, it is never too late to quit. Ask for information about how to stop smoking if you need help.  Contact your healthcare provider if:   You have a fever.    Your puncture site is red, warm, swollen, or draining pus.    You have questions or concerns about your procedure, medicine, or care.    If you have any questions regarding, call the IR department @ [phone].     Seek care immediately or call 911 if:   Blood soaks through your bandage.    There is blood in your spit.

## 2025-04-22 LAB
INR PPP: 1.4
PROTHROMBIN TIME: 14.4 SEC (ref 9–11.5)

## 2025-04-23 ENCOUNTER — HOSPITAL ENCOUNTER (OUTPATIENT)
Dept: INTERVENTIONAL RADIOLOGY/VASCULAR | Facility: HOSPITAL | Age: 65
Discharge: HOME | End: 2025-04-23
Attending: INTERNAL MEDICINE
Payer: MEDICARE

## 2025-04-23 ENCOUNTER — HOSPITAL ENCOUNTER (OUTPATIENT)
Dept: INFUSION CENTER | Facility: HOSPITAL | Age: 65
Discharge: HOME | End: 2025-04-23
Attending: INTERNAL MEDICINE

## 2025-04-23 VITALS
SYSTOLIC BLOOD PRESSURE: 114 MMHG | DIASTOLIC BLOOD PRESSURE: 55 MMHG | HEART RATE: 75 BPM | OXYGEN SATURATION: 98 % | RESPIRATION RATE: 18 BRPM

## 2025-04-23 DIAGNOSIS — K70.31 ALCOHOLIC CIRRHOSIS OF LIVER WITH ASCITES (HCC): ICD-10-CM

## 2025-04-23 DIAGNOSIS — J91.8 PLEURAL EFFUSION ASSOCIATED WITH HEPATIC DISORDER: ICD-10-CM

## 2025-04-23 DIAGNOSIS — K76.9 PLEURAL EFFUSION ASSOCIATED WITH HEPATIC DISORDER: ICD-10-CM

## 2025-04-23 PROCEDURE — 49083 ABD PARACENTESIS W/IMAGING: CPT

## 2025-04-23 PROCEDURE — 49083 ABD PARACENTESIS W/IMAGING: CPT | Performed by: RADIOLOGY

## 2025-04-23 PROCEDURE — 32555 ASPIRATE PLEURA W/ IMAGING: CPT | Performed by: RADIOLOGY

## 2025-04-23 PROCEDURE — 32555 ASPIRATE PLEURA W/ IMAGING: CPT

## 2025-04-23 NOTE — DISCHARGE INSTRUCTIONS
Thoracentesis   WHAT YOU NEED TO KNOW:   A thoracentesis is a procedure to remove extra fluid or air from between your lungs and your inner chest wall. Air or fluid buildup may make it hard for you to breathe. A thoracentesis allows your lungs to expand fully so you can breathe more easily.  DISCHARGE INSTRUCTIONS:   Medicines:   Pain medicine:  You may be given a prescription medicine to decrease pain. Do not wait until the pain is severe before you take your medicine.    Antibiotics:  This medicine helps fight or prevent an infection.    Take your medicine as directed.  Call your healthcare provider if you think your medicine is not helping or if you have side effects. Tell him if you are allergic to any medicine. Keep a list of the medicines, vitamins, and herbs you take. Include the amounts, and when and why you take them. Bring the list or the pill bottles to follow-up visits. Carry your medicine list with you in case of an emergency.  Follow up with your healthcare provider as directed:  Write down your questions so you remember to ask them during your visits.   Rest:  Rest when you feel it is needed. Slowly start to do more each day. Return to your daily activities as directed.   Do not smoke:  If you smoke, it is never too late to quit. Ask for information about how to stop smoking if you need help.  Contact your healthcare provider if:   You have a fever.    Your puncture site is red, warm, swollen, or draining pus.    You have questions or concerns about your procedure, medicine, or care.    If you have any questions regarding, call the IR department @ [phone].     Seek care immediately or call 911 if:   Blood soaks through your bandage.    There is blood in your spit.

## 2025-04-23 NOTE — SEDATION DOCUMENTATION
Therapeutic paracentesis, RLQ. Removed 2400ml cloudy yellow fluid. Patient tolerated the procedure well. Patient was given AVS. Exofin and bandaid was put on the site.

## 2025-04-26 ENCOUNTER — RESULTS FOLLOW-UP (OUTPATIENT)
Age: 65
End: 2025-04-26

## 2025-04-29 ENCOUNTER — TELEPHONE (OUTPATIENT)
Dept: NEPHROLOGY | Facility: CLINIC | Age: 65
End: 2025-04-29

## 2025-04-29 DIAGNOSIS — E87.1 HYPONATREMIA: Primary | ICD-10-CM

## 2025-04-29 NOTE — TELEPHONE ENCOUNTER
Received incoming call transferred in by Yohana, patient wanting to know if she should continue to take furosemide(lasix) 1 tablet 20mg daily - pt stated upset and would like to know should she continue per to Dr. Vamsi Herrera recent lab result note since Nephrology is managing patient diuretics per last hospital visit. I expressed understanding for patient emotions of frustration and concern with sodium levels. I advised pt I will reach out to Dr. Tovar to advise if patient should continue. I also made patient aware we received her messages and forwarded to her provider, Lala is out on maternity leave and Dr. Tovar was out for a week, she is now back in office and once she advises I will contact patient to advise

## 2025-04-29 NOTE — TELEPHONE ENCOUNTER
Placed call to pt after discussing with Dr. Tovar as follows ;    She can restart Lasix 20 mg daily and spironolactone 25 mg daily for ascites prevention. Needs a repeat bmp in three days.    Please ensure she is not drinking alcohol as the past sodium has dropped from this. Please also find out when she will get a repeat paracentesis as she has been getting them somewhat frequently per G.I.     Pt had last paracentesis last Thurs and goes weekly, pt will have BMP done Friday - I will fax to [employer] [facility] , pt was advised to limit fluid intake and no alcohol consumption prior to lab work - pt thanked for call back to advise concern

## 2025-05-01 ENCOUNTER — HOSPITAL ENCOUNTER (OUTPATIENT)
Dept: INTERVENTIONAL RADIOLOGY/VASCULAR | Facility: HOSPITAL | Age: 65
Discharge: HOME | End: 2025-05-01
Attending: INTERNAL MEDICINE
Payer: MEDICARE

## 2025-05-01 ENCOUNTER — HOSPITAL ENCOUNTER (OUTPATIENT)
Dept: INFUSION CENTER | Facility: HOSPITAL | Age: 65
Discharge: HOME | End: 2025-05-01
Attending: INTERNAL MEDICINE

## 2025-05-01 VITALS
OXYGEN SATURATION: 97 % | RESPIRATION RATE: 19 BRPM | DIASTOLIC BLOOD PRESSURE: 59 MMHG | SYSTOLIC BLOOD PRESSURE: 110 MMHG | HEART RATE: 78 BPM

## 2025-05-01 VITALS
HEART RATE: 86 BPM | RESPIRATION RATE: 21 BRPM | OXYGEN SATURATION: 97 % | SYSTOLIC BLOOD PRESSURE: 108 MMHG | DIASTOLIC BLOOD PRESSURE: 56 MMHG

## 2025-05-01 DIAGNOSIS — K70.31 ALCOHOLIC CIRRHOSIS OF LIVER WITH ASCITES (HCC): ICD-10-CM

## 2025-05-01 DIAGNOSIS — K76.9 PLEURAL EFFUSION ASSOCIATED WITH HEPATIC DISORDER: ICD-10-CM

## 2025-05-01 DIAGNOSIS — J91.8 PLEURAL EFFUSION ASSOCIATED WITH HEPATIC DISORDER: ICD-10-CM

## 2025-05-01 PROCEDURE — 49083 ABD PARACENTESIS W/IMAGING: CPT

## 2025-05-01 PROCEDURE — 32555 ASPIRATE PLEURA W/ IMAGING: CPT

## 2025-05-01 PROCEDURE — 49083 ABD PARACENTESIS W/IMAGING: CPT | Performed by: RADIOLOGY

## 2025-05-01 PROCEDURE — 76604 US EXAM CHEST: CPT | Performed by: RADIOLOGY

## 2025-05-01 RX ORDER — LIDOCAINE HYDROCHLORIDE 10 MG/ML
INJECTION, SOLUTION EPIDURAL; INFILTRATION; INTRACAUDAL; PERINEURAL AS NEEDED
Status: COMPLETED | OUTPATIENT
Start: 2025-05-01 | End: 2025-05-01

## 2025-05-01 RX ADMIN — LIDOCAINE HYDROCHLORIDE 8 ML: 10 INJECTION, SOLUTION EPIDURAL; INFILTRATION; INTRACAUDAL; PERINEURAL at 08:53

## 2025-05-01 NOTE — H&P
Interventional Radiology  History and Physical 5/1/2025     Jose White   1960   [id number]    Assessment/Plan:  64 year old female with history of cirrhosis and recurrent right pleural effusion and ascites returns for thoracentesis and paracentesis.    Problem List Items Addressed This Visit          Digestive    Alcoholic cirrhosis of liver with ascites (HCC) (Chronic)    Relevant Orders    IR AMB Paracentesis     Other Visit Diagnoses         Pleural effusion associated with hepatic disorder        Relevant Orders    IR AMB Paracentesis               Subjective:     Patient ID: Jose White is a 64 y.o. female.    History of Present Illness  Patient with history of cirrhosis and recurrent right pleural effusion and ascites returns for thoracentesis and paracentesis.    Review of Systems      Past Medical History:   Diagnosis Date    Alcoholic fatty liver     Anxiety     Asthma     COPD (chronic obstructive pulmonary disease) (HCC)     Elevated liver function tests     GERD (gastroesophageal reflux disease)     GERD without esophagitis     Hyperlipidemia     Hypertension     IBS (irritable bowel syndrome)     Insomnia     Insomnia     Liver disease     Nervousness     Nicotine dependence     Other specified urinary incontinence     RLS (restless legs syndrome)     Wears glasses         Past Surgical History:   Procedure Laterality Date    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    BREAST EXCISIONAL BIOPSY Right 11/01/2004    benign    CATARACT EXTRACTION, BILATERAL  08/01/2018    COLONOSCOPY N/A 05/08/2019    Procedure: COLONOSCOPY;  Surgeon: Jacobo Leonardo MD;  Location: [facility];  Service: Gastroenterology    EGD AND COLONOSCOPY N/A 03/19/2018    Procedure: EGD AND COLONOSCOPY;  Surgeon: Jacobo Leonardo MD;  Location: [facility];  Service: Gastroenterology    ESOPHAGOGASTRODUODENOSCOPY N/A 05/08/2019    Procedure: ESOPHAGOGASTRODUODENOSCOPY (EGD);  Surgeon: Jacobo Leonardo MD;  Location: [facility];  Service: Gastroenterology    IR CHEST TUBE PLACEMENT  3/17/2025    IR PARACENTESIS  11/27/2020    IR PARACENTESIS  12/08/2020    IR PARACENTESIS  10/28/2022    IR PARACENTESIS  11/07/2022    IR PARACENTESIS  11/30/2022    IR PARACENTESIS  12/30/2022    IR PARACENTESIS  01/16/2023    IR PARACENTESIS  08/06/2024    IR PARACENTESIS  09/18/2024    IR PARACENTESIS  10/03/2024    IR PARACENTESIS  10/10/2024    IR PARACENTESIS  10/17/2024    IR PARACENTESIS  10/24/2024    IR PARACENTESIS  11/01/2024    IR PARACENTESIS  11/07/2024    IR PARACENTESIS  11/14/2024    IR PARACENTESIS  11/21/2024    IR PARACENTESIS  11/29/2024    IR PARACENTESIS  12/05/2024    IR PARACENTESIS  12/12/2024    IR PARACENTESIS  12/19/2024    IR PARACENTESIS  12/26/2024    IR PARACENTESIS  01/06/2025    IR PARACENTESIS  01/09/2025    IR PARACENTESIS  01/16/2025    IR PARACENTESIS  1/23/2025    IR PARACENTESIS  1/30/2025    IR PARACENTESIS  2/5/2025    IR PARACENTESIS  2/11/2025    IR PARACENTESIS  2/20/2025    IR PARACENTESIS  2/27/2025    IR PARACENTESIS  3/6/2025    IR PARACENTESIS  3/13/2025    IR PARACENTESIS  3/20/2025    IR PARACENTESIS  3/26/2025    IR PARACENTESIS  4/1/2025    IR PARACENTESIS  4/10/2025    IR PARACENTESIS  1/30/2025    IR PARACENTESIS  4/16/2025    IR PARACENTESIS  4/23/2025    IR THORACENTESIS  01/16/2023    IR THORACENTESIS  03/28/2023    IR THORACENTESIS  07/25/2024    IR THORACENTESIS  08/06/2024    IR THORACENTESIS  08/14/2024    IR THORACENTESIS  08/21/2024    IR THORACENTESIS  08/28/2024    IR THORACENTESIS  09/04/2024    IR THORACENTESIS  09/11/2024    IR THORACENTESIS  09/18/2024    IR THORACENTESIS  09/25/2024    IR THORACENTESIS  10/03/2024    IR THORACENTESIS  10/10/2024    IR THORACENTESIS  10/17/2024    IR THORACENTESIS  10/24/2024    IR THORACENTESIS  11/01/2024    IR THORACENTESIS  11/07/2024    IR THORACENTESIS  11/14/2024    IR THORACENTESIS  11/21/2024    IR THORACENTESIS  11/29/2024    IR  THORACENTESIS  12/05/2024    IR THORACENTESIS  12/12/2024    IR THORACENTESIS  12/19/2024    IR THORACENTESIS  12/26/2024    IR THORACENTESIS  01/03/2025    IR THORACENTESIS  01/09/2025    IR THORACENTESIS  01/16/2025    IR THORACENTESIS  1/23/2025    IR THORACENTESIS  1/30/2025    IR THORACENTESIS  2/5/2025    IR THORACENTESIS  2/11/2025    IR THORACENTESIS  2/20/2025    IR THORACENTESIS  2/27/2025    IR THORACENTESIS  3/6/2025    IR THORACENTESIS  3/13/2025    IR THORACENTESIS  3/26/2025    IR THORACENTESIS  4/1/2025    IR THORACENTESIS  4/10/2025    IR THORACENTESIS  4/16/2025    IR THORACENTESIS  4/23/2025    KNEE SURGERY Left     ACL repair. Date: early 2000s    LUMBAR LAMINECTOMY  04/1999    LYMPH NODE BIOPSY      MAMMO STEREOTACTIC BREAST BIOPSY RIGHT (ALL INC) Right 05/21/2021    MAMMO STEREOTACTIC BREAST BIOPSY RIGHT (ALL INC) EACH ADD Right 05/21/2021    TOTAL ABDOMINAL HYSTERECTOMY  02/05/2008    TUBAL LIGATION  1989    UPPER GASTROINTESTINAL ENDOSCOPY          Social History     Tobacco Use   Smoking Status Every Day    Current packs/day: 1.00    Average packs/day: 1 pack/day for 47.3 years (47.3 ttl pk-yrs)    Types: Cigarettes    Start date: 1/1/1978   Smokeless Tobacco Never        Social History     Substance and Sexual Activity   Alcohol Use Yes    Alcohol/week: 6.0 standard drinks of alcohol    Types: 6 Cans of beer per week    Comment: 7-10 drinks/wk        Social History     Substance and Sexual Activity   Drug Use No        Allergies   Allergen Reactions    Sulfa Antibiotics Shortness Of Breath    Ace Inhibitors Cough and Other (See Comments)     coughing       Current Outpatient Medications   Medication Sig Dispense Refill    albuterol (2.5 mg/3 mL) 0.083 % nebulizer solution Take 3 mL (2.5 mg total) by nebulization every 6 (six) hours as needed for wheezing or shortness of breath 1080 mL 3    albuterol (Proventil HFA) 90 mcg/act inhaler Inhale 2 puffs every 6 (six) hours as needed for wheezing  or shortness of breath 18 g 6    alendronate (Fosamax) 70 mg tablet Take 1 tablet (70 mg total) by mouth every 7 days 12 tablet 3    ALPRAZolam (XANAX) 1 mg tablet Take 1 tablet (1 mg total) by mouth 2 (two) times a day as needed for anxiety 60 tablet 2    anastrozole (ARIMIDEX) 1 mg tablet Take 1 tablet (1 mg total) by mouth daily 90 tablet 3    budesonide-formoterol (Symbicort) 160-4.5 mcg/act inhaler Inhale 2 puffs 2 (two) times a day Rinse mouth after use. 10.2 g 6    furosemide (LASIX) 20 mg tablet Take 1 tablet (20 mg total) by mouth daily 90 tablet 1    lactulose (CHRONULAC) 10 g/15 mL solution Take 15 mL (10 g total) by mouth 2 (two) times a day 240 mL 0    midodrine (PROAMATINE) 10 MG tablet Take 1 tablet (10 mg total) by mouth 3 (three) times a day before meals 90 tablet 3    pantoprazole (PROTONIX) 20 mg tablet Take 1 tablet (20 mg total) by mouth daily 90 tablet 1    Risankizumab-rzaa (SKYRIZI PEN SC) Inject under the skin      spironolactone (ALDACTONE) 25 mg tablet Take 1 tablet (25 mg total) by mouth daily 90 tablet 3    traZODone (DESYREL) 50 mg tablet Take 1 tablet (50 mg total) by mouth daily at bedtime 90 tablet 0     Current Facility-Administered Medications   Medication Dose Route Frequency Provider Last Rate Last Admin    lidocaine (PF) (XYLOCAINE-MPF) 1 % injection    PRN Johnny Corral MD   8 mL at 05/01/25 0853          Objective:    Vitals:    05/01/25 0855   BP: 108/56   Pulse: 82   Resp: 21   SpO2: 97%        Physical Exam  Constitutional:       Appearance: Normal appearance.   Pulmonary:      Effort: Pulmonary effort is normal.   Abdominal:      General: There is distension.   Skin:     General: Skin is dry.           Lab Results   Component Value Date    BNP 63 11/06/2024      Lab Results   Component Value Date    WBC 5.1 04/25/2025    HGB 12.4 04/25/2025    HCT 35.1 04/25/2025    MCV 99.4 04/25/2025     04/25/2025     Lab Results   Component Value Date    INR 1.4 (H) 04/21/2025     INR 1.5 (H) 04/07/2025    INR 1.90 (H) 03/19/2025    PROTIME 14.4 (H) 04/21/2025    PROTIME 15.3 (H) 04/07/2025    PROTIME 22.2 (H) 03/19/2025     Lab Results   Component Value Date    PTT 34 11/05/2024         I have personally reviewed pertinent imaging and laboratory results.     Code Status: Prior  Advance Directive and Living Will:      Power of :    POLST:      This text is generated with voice recognition software. There may be translation, syntax,  or grammatical errors. If you have any questions, please contact the dictating provider.

## 2025-05-01 NOTE — SEDATION DOCUMENTATION
Weekly therapeutic paracentesis yielding 2900ml clear yellow fluid removed. Band aid to site. Tolerated well. AVS reviewed and ambulated home in her usual state of health.

## 2025-05-01 NOTE — BRIEF OP NOTE (RAD/CATH)
INTERVENTIONAL RADIOLOGY PROCEDURE NOTE    Date: 5/1/2025    Procedure:   Procedure Summary       Date: 05/01/25 Room / Location: [facility] Interventional Radiology    Anesthesia Start:  Anesthesia Stop:     Procedure: IR PARACENTESIS Diagnosis:       Alcoholic cirrhosis of liver with ascites (HCC)      Pleural effusion associated with hepatic disorder      (refractory asctites/hepatic hydrothorax)    Scheduled Providers:  Responsible Provider:     Anesthesia Type: Not recorded ASA Status: Not recorded            Preoperative diagnosis:   1. Alcoholic cirrhosis of liver with ascites (HCC)    2. Pleural effusion associated with hepatic disorder         Postoperative diagnosis: Same.    Surgeon: Johnny Corral MD     Assistant: None. No qualified resident was available.    Blood loss: None    Specimens: 2.9 L clear yellow ascites fluid removed.     Findings:   Successful paracentesis.  Focused right thoracic US showed mild pleural effusion.  Patient was asymptomatic and thoracentesis was not performed.    Complications: None immediate.    Anesthesia: local

## 2025-05-01 NOTE — DISCHARGE INSTRUCTIONS
Abdominal Paracentesis     WHAT YOU NEED TO KNOW:   Abdominal paracentesis is a procedure to remove abnormal fluid buildup in your abdomen. Fluid builds up because of liver problems, such as swelling and scarring. Heart failure, kidney disease, a mass, or problems with your pancreas may also cause fluid buildup.     DISCHARGE INSTRUCTIONS:     Follow up with your healthcare provider as directed: Write down your questions so you remember to ask them during your visits.     Wound care: Remove dressing after 24 hours. Leave glue in place.    Return to your normal activities    Contact Interventional Radiology at [phone] (YOGESH PATIENTS: Contact Interventional Radiology at [phone]) (MARGARITA PATIENTS: Contact Interventional Radiology at [phone]) if:  You have a fever and your wound is red and swollen.   You have yellow, green, or bad-smelling discharge coming from your wound.   You have pain or swelling in your abdomen.   You have an upset stomach or you vomit.   You have sudden, sharp pain in your abdomen.   You urinate very little or not at all.   You feel confused and more tired than usual.   Your arm or leg feels warm, tender, and painful. It may look swollen and red.   You suddenly feel lightheaded and have trouble breathing.

## 2025-05-01 NOTE — DISCHARGE INSTRUCTIONS
Thoracentesis   WHAT YOU NEED TO KNOW:   A thoracentesis is a procedure to remove extra fluid or air from between your lungs and your inner chest wall. Air or fluid buildup may make it hard for you to breathe. A thoracentesis allows your lungs to expand fully so you can breathe more easily.  DISCHARGE INSTRUCTIONS:   Medicines:   Pain medicine:  You may be given a prescription medicine to decrease pain. Do not wait until the pain is severe before you take your medicine.    Antibiotics:  This medicine helps fight or prevent an infection.    Take your medicine as directed.  Call your healthcare provider if you think your medicine is not helping or if you have side effects. Tell him if you are allergic to any medicine. Keep a list of the medicines, vitamins, and herbs you take. Include the amounts, and when and why you take them. Bring the list or the pill bottles to follow-up visits. Carry your medicine list with you in case of an emergency.  Follow up with your healthcare provider as directed:  Write down your questions so you remember to ask them during your visits.   Rest:  Rest when you feel it is needed. Slowly start to do more each day. Return to your daily activities as directed.   Do not smoke:  If you smoke, it is never too late to quit. Ask for information about how to stop smoking if you need help.  Contact your healthcare provider if:   You have a fever.    Your puncture site is red, warm, swollen, or draining pus.    You have questions or concerns about your procedure, medicine, or care.    If you have any questions regarding, call the IR department @ [phone].     Seek care immediately or call 911 if:   Blood soaks through your bandage.    There is blood in your spit.

## 2025-05-06 ENCOUNTER — RESULTS FOLLOW-UP (OUTPATIENT)
Dept: OTHER | Facility: HOSPITAL | Age: 65
End: 2025-05-06

## 2025-05-06 DIAGNOSIS — K70.31 ALCOHOLIC CIRRHOSIS OF LIVER WITH ASCITES (HCC): Primary | ICD-10-CM

## 2025-05-06 DIAGNOSIS — E87.1 HYPONATREMIA: Primary | ICD-10-CM

## 2025-05-06 LAB
BUN SERPL-MCNC: 8 MG/DL (ref 7–25)
BUN/CREAT SERPL: ABNORMAL (CALC) (ref 6–22)
CALCIUM SERPL-MCNC: 8.2 MG/DL (ref 8.6–10.4)
CHLORIDE SERPL-SCNC: 91 MMOL/L (ref 98–110)
CO2 SERPL-SCNC: 30 MMOL/L (ref 20–32)
CREAT SERPL-MCNC: 0.65 MG/DL (ref 0.5–1.05)
GFR/BSA.PRED SERPLBLD CYS-BASED-ARV: 98 ML/MIN/1.73M2
GLUCOSE SERPL-MCNC: 93 MG/DL (ref 65–99)
POTASSIUM SERPL-SCNC: 3.1 MMOL/L (ref 3.5–5.3)
SODIUM SERPL-SCNC: 129 MMOL/L (ref 135–146)

## 2025-05-06 RX ORDER — ALBUMIN (HUMAN) 12.5 G/50ML
50 SOLUTION INTRAVENOUS ONCE AS NEEDED
Status: CANCELLED | OUTPATIENT
Start: 2025-05-08

## 2025-05-06 RX ORDER — SODIUM CHLORIDE 9 MG/ML
20 INJECTION, SOLUTION INTRAVENOUS ONCE
Status: CANCELLED | OUTPATIENT
Start: 2025-05-08

## 2025-05-06 RX ORDER — ALBUMIN (HUMAN) 12.5 G/50ML
100 SOLUTION INTRAVENOUS ONCE AS NEEDED
Status: CANCELLED | OUTPATIENT
Start: 2025-05-08

## 2025-05-06 RX ORDER — ALBUMIN (HUMAN) 12.5 G/50ML
75 SOLUTION INTRAVENOUS ONCE AS NEEDED
Status: CANCELLED | OUTPATIENT
Start: 2025-05-08

## 2025-05-06 NOTE — TELEPHONE ENCOUNTER
----- Message from Alexandra Tovar DO sent at 5/6/2025 10:24 AM EDT -----  Potassium remains low. Is she taking a potassium supplement? Has she restart spironolactone? sNa near her baseline. Recommend repeat BMP in 7 days along with magnesium. Thanks.

## 2025-05-06 NOTE — ADDENDUM NOTE
Addended by: DOMENICO DOWNS on: 5/6/2025 01:55 PM     Modules accepted: Orders    
Graciela Sawant(Resident)

## 2025-05-06 NOTE — TELEPHONE ENCOUNTER
Placed call to pt and relayed message below - pt restarted spironolactone, pt does not take any potassium supplements , eats bananas - I reviewed list of potassium foods - pt will have lab work done next week       Potassium remains low. Is she taking a potassium supplement? Has she restart spironolactone? sNa near her baseline. Recommend repeat BMP in 7 days along with magnesium. Thanks.

## 2025-05-08 ENCOUNTER — APPOINTMENT (EMERGENCY)
Dept: CT IMAGING | Facility: HOSPITAL | Age: 65
End: 2025-05-08
Payer: MEDICARE

## 2025-05-08 ENCOUNTER — HOSPITAL ENCOUNTER (EMERGENCY)
Facility: HOSPITAL | Age: 65
Discharge: HOME | End: 2025-05-08
Attending: EMERGENCY MEDICINE
Payer: MEDICARE

## 2025-05-08 ENCOUNTER — HOSPITAL ENCOUNTER (OUTPATIENT)
Dept: INFUSION CENTER | Facility: HOSPITAL | Age: 65
Discharge: HOME | End: 2025-05-08
Attending: INTERNAL MEDICINE

## 2025-05-08 ENCOUNTER — HOSPITAL ENCOUNTER (OUTPATIENT)
Dept: INTERVENTIONAL RADIOLOGY/VASCULAR | Facility: HOSPITAL | Age: 65
Discharge: HOME | End: 2025-05-08
Attending: INTERNAL MEDICINE
Payer: MEDICARE

## 2025-05-08 ENCOUNTER — APPOINTMENT (EMERGENCY)
Dept: RADIOLOGY | Facility: HOSPITAL | Age: 65
End: 2025-05-08
Payer: MEDICARE

## 2025-05-08 VITALS
DIASTOLIC BLOOD PRESSURE: 59 MMHG | SYSTOLIC BLOOD PRESSURE: 124 MMHG | RESPIRATION RATE: 17 BRPM | TEMPERATURE: 98.2 F | HEART RATE: 90 BPM | OXYGEN SATURATION: 97 %

## 2025-05-08 VITALS
RESPIRATION RATE: 18 BRPM | SYSTOLIC BLOOD PRESSURE: 158 MMHG | HEART RATE: 97 BPM | DIASTOLIC BLOOD PRESSURE: 59 MMHG | OXYGEN SATURATION: 97 %

## 2025-05-08 VITALS
DIASTOLIC BLOOD PRESSURE: 53 MMHG | SYSTOLIC BLOOD PRESSURE: 108 MMHG | RESPIRATION RATE: 18 BRPM | OXYGEN SATURATION: 97 % | HEART RATE: 87 BPM

## 2025-05-08 DIAGNOSIS — K70.31 ALCOHOLIC CIRRHOSIS OF LIVER WITH ASCITES (HCC): ICD-10-CM
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
## 2025-05-08 DIAGNOSIS — J91.8 PLEURAL EFFUSION ASSOCIATED WITH HEPATIC DISORDER: ICD-10-CM

## 2025-05-08 DIAGNOSIS — K76.9 PLEURAL EFFUSION ASSOCIATED WITH HEPATIC DISORDER: ICD-10-CM

## 2025-05-08 DIAGNOSIS — S62.306A UNSPECIFIED FRACTURE OF FIFTH METACARPAL BONE, RIGHT HAND, INITIAL ENCOUNTER FOR CLOSED FRACTURE: Primary | ICD-10-CM

## 2025-05-08 PROCEDURE — 72125 CT NECK SPINE W/O DYE: CPT

## 2025-05-08 PROCEDURE — 70450 CT HEAD/BRAIN W/O DYE: CPT

## 2025-05-08 PROCEDURE — 99284 EMERGENCY DEPT VISIT MOD MDM: CPT

## 2025-05-08 PROCEDURE — 32555 ASPIRATE PLEURA W/ IMAGING: CPT

## 2025-05-08 PROCEDURE — 73110 X-RAY EXAM OF WRIST: CPT

## 2025-05-08 PROCEDURE — 49083 ABD PARACENTESIS W/IMAGING: CPT

## 2025-05-08 PROCEDURE — 73130 X-RAY EXAM OF HAND: CPT

## 2025-05-08 PROCEDURE — 71046 X-RAY EXAM CHEST 2 VIEWS: CPT

## 2025-05-08 RX ORDER — OXYCODONE HYDROCHLORIDE 5 MG/1
5 TABLET ORAL ONCE
Refills: 0 | Status: COMPLETED | OUTPATIENT
Start: 2025-05-08 | End: 2025-05-08

## 2025-05-08 RX ORDER — OXYCODONE HYDROCHLORIDE 5 MG/1
5 TABLET ORAL EVERY 6 HOURS PRN
Qty: 8 TABLET | Refills: 0 | Status: SHIPPED | OUTPATIENT
Start: 2025-05-08 | End: 2025-05-18

## 2025-05-08 RX ADMIN — OXYCODONE HYDROCHLORIDE 5 MG: 5 TABLET ORAL at 14:17

## 2025-05-08 NOTE — ED PROVIDER NOTES
Time reflects when diagnosis was documented in both MDM as applicable and the Disposition within this note       Time User Action Codes Description Comment    5/8/2025  2:32 PM Justina Pierce Add [S62.306A] Unspecified fracture of fifth metacarpal bone, right hand, initial encounter for closed fracture     5/8/2025  2:50 PM Justina Pierce Add [W19.XXXA] Fall, initial encounter           ED Disposition       ED Disposition   Discharge    Condition   Stable    Date/Time   u May 8, 2025  2:50 PM    Comment   Jose White discharge to home/self care.                   Assessment & Plan       Medical Decision Making  Differential diagnosis including right hand fracture, right wrist fracture, intracranial hemorrhage  Patient is refusing blood work at this time stating that she had blood work last week.  Discussed with patient purpose of blood work and continues to refuse.  Patient is willing to have CT of head, C-spine, x-rays of her hand and wrist.  Patient initially declining pain medication multiple times when asked but then requested after son arrived.  Patient provided with dose of oxycodone and reports pain is improving.  Patient placed in a ulnar gutter splint and reports that pain is also improving from that.  Patient provided with sling.  Patient aware to follow-up with hand surgery team.  Discussed return precautions to the ED.  Patient provided with small prescription for oxycodone due to limitations of Tylenol and Motrin due to her comorbidities.  Discussed in great detail how to safely take this medication.  Patient lives with her .  Checks x-ray is stable from prior imaging.  Patient has no complaints of shortness of breath, chest pain, chest wall pain, abdominal pain.  No outward signs of trauma on her chest or abdomen.  Reviewed reasons to return to ed.  Patient verbalized understanding of diagnosis and agreement with discharge plan of care as well as understanding of reasons to  return to ed.      Amount and/or Complexity of Data Reviewed  Radiology: ordered.    Risk  Prescription drug management.             Medications   oxyCODONE (ROXICODONE) IR tablet 5 mg (5 mg Oral Given 5/8/25 1417)       ED Risk Strat Scores                    No data recorded        SBIRT 22yo+      Flowsheet Row Most Recent Value   Initial Alcohol Screen: US AUDIT-C     1. How often do you have a drink containing alcohol? 0 Filed at: 05/08/2025 1148   2. How many drinks containing alcohol do you have on a typical day you are drinking?  0 Filed at: 05/08/2025 1148   3a. Male UNDER 65: How often do you have five or more drinks on one occasion? 0 Filed at: 05/08/2025 1148   3b. FEMALE Any Age, or MALE 65+: How often do you have 4 or more drinks on one occassion? 0 Filed at: 05/08/2025 1148   Audit-C Score 0 Filed at: 05/08/2025 1148   CELSO: How many times in the past year have you...    Used an illegal drug or used a prescription medication for non-medical reasons? Never Filed at: 05/08/2025 1148                            History of Present Illness       Chief Complaint   Patient presents with    Arm Injury     Pt was getting her paracentesis done here last Thursday. Door smacked her right arm/hand. Was evaluated last Thursday for it. Bruising and swelling noted to right forearm, wrist and fingers. Pt reports its more painful then before.        Past Medical History:   Diagnosis Date    Alcoholic fatty liver     Anxiety     Asthma     COPD (chronic obstructive pulmonary disease) (HCC)     Elevated liver function tests     GERD (gastroesophageal reflux disease)     GERD without esophagitis     Hyperlipidemia     Hypertension     IBS (irritable bowel syndrome)     Insomnia     Insomnia     Liver disease     Nervousness     Nicotine dependence     Other specified urinary incontinence     RLS (restless legs syndrome)     Wears glasses       Past Surgical History:   Procedure Laterality Date    BREAST BIOPSY Right  05/21/2021    DCIS    BREAST EXCISIONAL BIOPSY Right 11/01/2004    benign    CATARACT EXTRACTION, BILATERAL  08/01/2018    COLONOSCOPY N/A 05/08/2019    Procedure: COLONOSCOPY;  Surgeon: Jacobo Leonardo MD;  Location: [facility] GI LAB;  Service: Gastroenterology    EGD AND COLONOSCOPY N/A 03/19/2018    Procedure: EGD AND COLONOSCOPY;  Surgeon: Jacobo Leonardo MD;  Location: [facility] GI LAB;  Service: Gastroenterology    ESOPHAGOGASTRODUODENOSCOPY N/A 05/08/2019    Procedure: ESOPHAGOGASTRODUODENOSCOPY (EGD);  Surgeon: Jacobo Leonardo MD;  Location: [facility] GI LAB;  Service: Gastroenterology    IR CHEST TUBE PLACEMENT  3/17/2025    IR PARACENTESIS  11/27/2020    IR PARACENTESIS  12/08/2020    IR PARACENTESIS  10/28/2022    IR PARACENTESIS  11/07/2022    IR PARACENTESIS  11/30/2022    IR PARACENTESIS  12/30/2022    IR PARACENTESIS  01/16/2023    IR PARACENTESIS  08/06/2024    IR PARACENTESIS  09/18/2024    IR PARACENTESIS  10/03/2024    IR PARACENTESIS  10/10/2024    IR PARACENTESIS  10/17/2024    IR PARACENTESIS  10/24/2024    IR PARACENTESIS  11/01/2024    IR PARACENTESIS  11/07/2024    IR PARACENTESIS  11/14/2024    IR PARACENTESIS  11/21/2024    IR PARACENTESIS  11/29/2024    IR PARACENTESIS  12/05/2024    IR PARACENTESIS  12/12/2024    IR PARACENTESIS  12/19/2024    IR PARACENTESIS  12/26/2024    IR PARACENTESIS  01/06/2025    IR PARACENTESIS  01/09/2025    IR PARACENTESIS  01/16/2025    IR PARACENTESIS  1/23/2025    IR PARACENTESIS  1/30/2025    IR PARACENTESIS  2/5/2025    IR PARACENTESIS  2/11/2025    IR PARACENTESIS  2/20/2025    IR PARACENTESIS  2/27/2025    IR PARACENTESIS  3/6/2025    IR PARACENTESIS  3/13/2025    IR PARACENTESIS  3/20/2025    IR PARACENTESIS  3/26/2025    IR PARACENTESIS  4/1/2025    IR PARACENTESIS  4/10/2025    IR PARACENTESIS  1/30/2025    IR PARACENTESIS  4/16/2025    IR PARACENTESIS  4/23/2025    IR PARACENTESIS  5/1/2025    IR PARACENTESIS  5/8/2025    IR THORACENTESIS  01/16/2023    IR  THORACENTESIS  03/28/2023    IR THORACENTESIS  07/25/2024    IR THORACENTESIS  08/06/2024    IR THORACENTESIS  08/14/2024    IR THORACENTESIS  08/21/2024    IR THORACENTESIS  08/28/2024    IR THORACENTESIS  09/04/2024    IR THORACENTESIS  09/11/2024    IR THORACENTESIS  09/18/2024    IR THORACENTESIS  09/25/2024    IR THORACENTESIS  10/03/2024    IR THORACENTESIS  10/10/2024    IR THORACENTESIS  10/17/2024    IR THORACENTESIS  10/24/2024    IR THORACENTESIS  11/01/2024    IR THORACENTESIS  11/07/2024    IR THORACENTESIS  11/14/2024    IR THORACENTESIS  11/21/2024    IR THORACENTESIS  11/29/2024    IR THORACENTESIS  12/05/2024    IR THORACENTESIS  12/12/2024    IR THORACENTESIS  12/19/2024    IR THORACENTESIS  12/26/2024    IR THORACENTESIS  01/03/2025    IR THORACENTESIS  01/09/2025    IR THORACENTESIS  01/16/2025    IR THORACENTESIS  1/23/2025    IR THORACENTESIS  1/30/2025    IR THORACENTESIS  2/5/2025    IR THORACENTESIS  2/11/2025    IR THORACENTESIS  2/20/2025    IR THORACENTESIS  2/27/2025    IR THORACENTESIS  3/6/2025    IR THORACENTESIS  3/13/2025    IR THORACENTESIS  3/26/2025    IR THORACENTESIS  4/1/2025    IR THORACENTESIS  4/10/2025    IR THORACENTESIS  4/16/2025    IR THORACENTESIS  4/23/2025    IR THORACENTESIS  5/1/2025    IR THORACENTESIS  5/8/2025    KNEE SURGERY Left     ACL repair. Date: early 2000s    LUMBAR LAMINECTOMY  04/1999    LYMPH NODE BIOPSY      MAMMO STEREOTACTIC BREAST BIOPSY RIGHT (ALL INC) Right 05/21/2021    MAMMO STEREOTACTIC BREAST BIOPSY RIGHT (ALL INC) EACH ADD Right 05/21/2021    TOTAL ABDOMINAL HYSTERECTOMY  02/05/2008    TUBAL LIGATION  1989    UPPER GASTROINTESTINAL ENDOSCOPY        Family History   Problem Relation Age of Onset    Breast cancer Mother 32    No Known Problems Father     No Known Problems Sister     No Known Problems Brother     No Known Problems Maternal Grandmother     No Known Problems Maternal Grandfather     No Known Problems Paternal Grandmother     No  Known Problems Paternal Grandfather     No Known Problems Maternal Aunt     No Known Problems Maternal Aunt     No Known Problems Paternal Aunt     No Known Problems Son     Substance Abuse Neg Hx     Mental illness Neg Hx     Colon cancer Neg Hx     Colon polyps Neg Hx       Social History     Tobacco Use    Smoking status: Every Day     Current packs/day: 1.00     Average packs/day: 1 pack/day for 47.3 years (47.3 ttl pk-yrs)     Types: Cigarettes     Start date: 1/1/1978    Smokeless tobacco: Never   Vaping Use    Vaping status: Never Used   Substance Use Topics    Alcohol use: Yes     Alcohol/week: 6.0 standard drinks of alcohol     Types: 6 Cans of beer per week     Comment: 7-10 drinks/wk    Drug use: No      E-Cigarette/Vaping    E-Cigarette Use Never User       E-Cigarette/Vaping Substances    Nicotine No     THC No     CBD No     Flavoring No     Other No     Unknown No       I have reviewed and agree with the history as documented.     Is a 64-year-old female past medical history of liver cirrhosis room for evaluation of a fall that occurred last week.  Patient reports that she injured her right hand when she was walking through automatic doors and was bumped by the door as it was closing causing her to fall to the ground.  Patient reports that she was not evaluated at that time.  Patient reports that she follows up with her right hand was continue to have pain in his hand.  Neurovascular intact.  Patient has ecchymosis to her right hand, no gross effusion or swelling.  Patient is tender over the fifth metacarpal.  Patient reports pain with moving her 5th and 4th digits.  Patient reports pain at her wrist.  Patient reports that she does not believe she struck her head.  Denies any nausea vomiting headache.  Patient denies prodrome prior to falling.        Review of Systems   Constitutional: Negative.    HENT: Negative.     Eyes: Negative.    Respiratory: Negative.     Cardiovascular: Negative.     Gastrointestinal: Negative.    Endocrine: Negative.    Genitourinary: Negative.    Musculoskeletal:  Positive for arthralgias.   Skin: Negative.    Allergic/Immunologic: Negative.    Neurological: Negative.    Hematological: Negative.    Psychiatric/Behavioral: Negative.     All other systems reviewed and are negative.          Objective       ED Triage Vitals   Temperature Pulse Blood Pressure Respirations SpO2 Patient Position - Orthostatic VS   05/08/25 1119 05/08/25 1119 05/08/25 1119 05/08/25 1119 05/08/25 1119 --   98.2 °F (36.8 °C) 90 124/59 17 97 %       Temp src Heart Rate Source BP Location FiO2 (%) Pain Score    -- 05/08/25 1119 -- -- 05/08/25 1417     Monitor   10 - Worst Possible Pain      Vitals      Date and Time Temp Pulse SpO2 Resp BP Pain Score FACES Pain Rating User   05/08/25 1417 -- -- -- -- -- 10 - Worst Possible Pain -- KB   05/08/25 1119 98.2 °F (36.8 °C) 90 97 % 17 124/59 -- -- LK            Physical Exam  Vitals and nursing note reviewed.   Constitutional:       Appearance: Normal appearance. She is normal weight.   HENT:      Head: Normocephalic.      Right Ear: External ear normal.      Left Ear: External ear normal.      Nose: Nose normal.      Mouth/Throat:      Mouth: Mucous membranes are moist.      Pharynx: Oropharynx is clear.   Eyes:      Extraocular Movements: Extraocular movements intact.      Conjunctiva/sclera: Conjunctivae normal.      Pupils: Pupils are equal, round, and reactive to light.   Cardiovascular:      Rate and Rhythm: Normal rate and regular rhythm.      Pulses: Normal pulses.      Heart sounds: Normal heart sounds.   Pulmonary:      Effort: Pulmonary effort is normal.      Breath sounds: Normal breath sounds.   Abdominal:      General: Abdomen is flat. Bowel sounds are normal.      Palpations: Abdomen is soft.   Musculoskeletal:         General: Tenderness present.      Right forearm: Normal.      Left forearm: Normal.      Right wrist: Normal.      Left wrist:  Normal.      Right hand: Swelling, tenderness and bony tenderness present. No deformity or lacerations. Decreased range of motion. Normal strength. Normal sensation.      Left hand: Normal.        Hands:       Cervical back: Normal range of motion and neck supple.   Skin:     General: Skin is warm.      Capillary Refill: Capillary refill takes less than 2 seconds.   Neurological:      General: No focal deficit present.      Mental Status: She is alert and oriented to person, place, and time. Mental status is at baseline.   Psychiatric:         Mood and Affect: Mood normal.         Behavior: Behavior normal.         Thought Content: Thought content normal.         Judgment: Judgment normal.         Results Reviewed       None            XR chest 2 views   Final Interpretation by Deb Ann MD (05/08 1400)      Chronic right hydropneumothorax   No new consolidation   The study was marked in EPIC for immediate notification.      Resident: Jatin Ayoub I, the attending radiologist, have reviewed the images and agree with the final report above.      Workstation performed: [id number]         XR hand 3+ views RIGHT   Final Interpretation by Bulmaro Nina DO (05/08 1314)      Acute oblique fracture of the fifth metacarpal shaft.         Computerized Assisted Algorithm (CAA) may have been used to analyze all applicable images.         Workstation performed: [id number]         XR wrist 3+ views RIGHT   Final Interpretation by Bulmaro Nina DO (05/08 1314)      Acute oblique fracture of the fifth metacarpal shaft.         Computerized Assisted Algorithm (CAA) may have been used to analyze all applicable images.            Workstation performed: [id number]         CT head without contrast   Final Interpretation by Edgar Tinsley MD (05/08 1245)      No acute intracranial abnormality.      Similar mild chronic microangiopathy.                  Workstation performed: [id number]         CT  spine cervical without contrast   Final Interpretation by Edgar Tinsley MD (05/08 1250)      No acute cervical spine fracture or traumatic malalignment.      Partially imaged moderate-sized right hydropneumothorax status post earlier same day thoracentesis. Recommend follow-up chest radiograph for further evaluation.      Additional chronic/incidental findings as detailed above.      I personally epic secure message this study with WANDA GUZMAN on 5/8/2025 12:49 PM. The study was marked in EPIC for immediate notification.                  Workstation performed: [id number]             Splint application    Date/Time: 5/8/2025 9:41 PM    Performed by: JOSÉ Canales  Authorized by: JOSÉ Canales    Other Assisting Provider: No    Verbal consent obtained?: Yes    Risks and benefits: Risks, benefits and alternatives were discussed    Consent given by:  Patient  Patient states understanding of procedure being performed: Yes    Patient's understanding of procedure matches consent: Yes    Procedure consent matches procedure scheduled: Yes    Required items: Required blood products, implants, devices and special equipment available    Patient identity confirmed:  Verbally with patient  Pre-procedure details:     Sensation:  Normal  Procedure details:     Laterality:  Right    Location:  Hand    Hand:  R hand    Cast type:  Unna boot    Splint composition: static      Supplies:  Ortho-Glass and cotton padding      ED Medication and Procedure Management   Prior to Admission Medications   Prescriptions Last Dose Informant Patient Reported? Taking?   ALPRAZolam (XANAX) 1 mg tablet   No No   Sig: Take 1 tablet (1 mg total) by mouth 2 (two) times a day as needed for anxiety   Risankizumab-rzaa (SKYRIZI PEN SC)  Self Yes No   Sig: Inject under the skin   albuterol (2.5 mg/3 mL) 0.083 % nebulizer solution  Self No No   Sig: Take 3 mL (2.5 mg total) by nebulization every 6 (six) hours as needed for  wheezing or shortness of breath   albuterol (Proventil HFA) 90 mcg/act inhaler  Self No No   Sig: Inhale 2 puffs every 6 (six) hours as needed for wheezing or shortness of breath   alendronate (Fosamax) 70 mg tablet  Self No No   Sig: Take 1 tablet (70 mg total) by mouth every 7 days   anastrozole (ARIMIDEX) 1 mg tablet  Self No No   Sig: Take 1 tablet (1 mg total) by mouth daily   budesonide-formoterol (Symbicort) 160-4.5 mcg/act inhaler  Self No No   Sig: Inhale 2 puffs 2 (two) times a day Rinse mouth after use.   furosemide (LASIX) 20 mg tablet   No No   Sig: Take 1 tablet (20 mg total) by mouth daily   lactulose (CHRONULAC) 10 g/15 mL solution  Self No No   Sig: Take 15 mL (10 g total) by mouth 2 (two) times a day   midodrine (PROAMATINE) 10 MG tablet  Self No No   Sig: Take 1 tablet (10 mg total) by mouth 3 (three) times a day before meals   pantoprazole (PROTONIX) 20 mg tablet  Self No No   Sig: Take 1 tablet (20 mg total) by mouth daily   spironolactone (ALDACTONE) 25 mg tablet   No No   Sig: Take 1 tablet (25 mg total) by mouth daily   traZODone (DESYREL) 50 mg tablet   No No   Sig: Take 1 tablet (50 mg total) by mouth daily at bedtime      Facility-Administered Medications: None     Discharge Medication List as of 5/8/2025  3:07 PM        START taking these medications    Details   oxyCODONE (ROXICODONE) 5 immediate release tablet Take 1 tablet (5 mg total) by mouth every 6 (six) hours as needed for severe pain for up to 10 days Max Daily Amount: 20 mg, Starting Thu 5/8/2025, Until Sun 5/18/2025 at 2359, Normal           CONTINUE these medications which have NOT CHANGED    Details   albuterol (2.5 mg/3 mL) 0.083 % nebulizer solution Take 3 mL (2.5 mg total) by nebulization every 6 (six) hours as needed for wheezing or shortness of breath, Starting Mon 10/28/2024, Normal      albuterol (Proventil HFA) 90 mcg/act inhaler Inhale 2 puffs every 6 (six) hours as needed for wheezing or shortness of breath,  Starting Mon 10/28/2024, Normal      alendronate (Fosamax) 70 mg tablet Take 1 tablet (70 mg total) by mouth every 7 days, Starting Mon 8/26/2024, Normal      ALPRAZolam (XANAX) 1 mg tablet Take 1 tablet (1 mg total) by mouth 2 (two) times a day as needed for anxiety, Starting Wed 4/9/2025, Normal      anastrozole (ARIMIDEX) 1 mg tablet Take 1 tablet (1 mg total) by mouth daily, Starting Mon 11/25/2024, Normal      budesonide-formoterol (Symbicort) 160-4.5 mcg/act inhaler Inhale 2 puffs 2 (two) times a day Rinse mouth after use., Starting Mon 10/28/2024, Normal      furosemide (LASIX) 20 mg tablet Take 1 tablet (20 mg total) by mouth daily, Starting Tue 4/15/2025, Normal      lactulose (CHRONULAC) 10 g/15 mL solution Take 15 mL (10 g total) by mouth 2 (two) times a day, Starting Fri 11/8/2024, Normal      midodrine (PROAMATINE) 10 MG tablet Take 1 tablet (10 mg total) by mouth 3 (three) times a day before meals, Starting Mon 11/4/2024, Normal      pantoprazole (PROTONIX) 20 mg tablet Take 1 tablet (20 mg total) by mouth daily, Starting Fri 11/15/2024, Normal      Risankizumab-rzaa (SKYRIZI PEN SC) Inject under the skin, Historical Med      spironolactone (ALDACTONE) 25 mg tablet Take 1 tablet (25 mg total) by mouth daily, Starting Tue 3/25/2025, Normal      traZODone (DESYREL) 50 mg tablet Take 1 tablet (50 mg total) by mouth daily at bedtime, Starting Mon 4/14/2025, Normal             ED SEPSIS DOCUMENTATION   Time reflects when diagnosis was documented in both MDM as applicable and the Disposition within this note       Time User Action Codes Description Comment    5/8/2025  2:32 PM Justina Pierce [S62.306A] Unspecified fracture of fifth metacarpal bone, right hand, initial encounter for closed fracture     5/8/2025  2:50 PM Justina Pierce [W19.XXXA] Fall, initial encounter                  Justina Pierce, JOSÉ  05/08/25 2148

## 2025-05-08 NOTE — DISCHARGE INSTRUCTIONS
Treat your splint as a cast as a splint.   Ice and elevate. Follow up with the hand surgeon. Return for worsening pain.   Please be aware the medication you are being prescribed oxycodone can be sedating and therefore you should not use with any other sedating medications or alcohol, operate heavy machinery or drive until you know how it affects you.   Do not take with your xanax.

## 2025-05-08 NOTE — ED NOTES
This RN overheard patient telling family member that she needed pain medication, but it can't be Tylenol. RN notified. Ice pack provided to patient for right wrist pain.      Esther Lopez RN  05/08/25 0902

## 2025-05-08 NOTE — DISCHARGE INSTRUCTIONS
Thoracentesis   WHAT YOU NEED TO KNOW:   A thoracentesis is a procedure to remove extra fluid or air from between your lungs and your inner chest wall. Air or fluid buildup may make it hard for you to breathe. A thoracentesis allows your lungs to expand fully so you can breathe more easily.  DISCHARGE INSTRUCTIONS:   Medicines:   Pain medicine:  You may be given a prescription medicine to decrease pain. Do not wait until the pain is severe before you take your medicine.    Antibiotics:  This medicine helps fight or prevent an infection.    Take your medicine as directed.  Call your healthcare provider if you think your medicine is not helping or if you have side effects. Tell him if you are allergic to any medicine. Keep a list of the medicines, vitamins, and herbs you take. Include the amounts, and when and why you take them. Bring the list or the pill bottles to follow-up visits. Carry your medicine list with you in case of an emergency.  Follow up with your healthcare provider as directed:  Write down your questions so you remember to ask them during your visits.   Rest:  Rest when you feel it is needed. Slowly start to do more each day. Return to your daily activities as directed.   Do not smoke:  If you smoke, it is never too late to quit. Ask for information about how to stop smoking if you need help.  Contact your healthcare provider if:   You have a fever.    Your puncture site is red, warm, swollen, or draining pus.    You have questions or concerns about your procedure, medicine, or care.    If you have any questions regarding, call the IR department @ [phone].     Seek care immediately or call 911 if:   Blood soaks through your bandage.    There is blood in your spit.

## 2025-05-08 NOTE — DISCHARGE INSTRUCTIONS
Abdominal Paracentesis     WHAT YOU NEED TO KNOW:   Abdominal paracentesis is a procedure to remove abnormal fluid buildup in your abdomen. Fluid builds up because of liver problems, such as swelling and scarring. Heart failure, kidney disease, a mass, or problems with your pancreas may also cause fluid buildup.     DISCHARGE INSTRUCTIONS:     Follow up with your healthcare provider as directed: Write down your questions so you remember to ask them during your visits.     Wound care: Remove dressing after 24 hours. Leave glue in place.    Return to your normal activities    Contact Interventional Radiology at [phone] ([facility]OG[facility] PATIENTS: Contact Interventional Radiology at [phone]) (MARGARITA PATIENTS: Contact Interventional Radiology at [phone]) if:  You have a fever and your wound is red and swollen.   You have yellow, green, or bad-smelling discharge coming from your wound.   You have pain or swelling in your abdomen.   You have an upset stomach or you vomit.   You have sudden, sharp pain in your abdomen.   You urinate very little or not at all.   You feel confused and more tired than usual.   Your arm or leg feels warm, tender, and painful. It may look swollen and red.   You suddenly feel lightheaded and have trouble breathing.

## 2025-05-08 NOTE — ED NOTES
"When asked if pt was evaluated after incident last Thursday. Pt states that she refused to be seen that day and was offered xray's. Pt reports, \"It didn't hurt as bad last week but it hurts a lot now. That's why I am here.\"      Balbina Kumar RN  05/08/25 [id number]    "

## 2025-05-12 ENCOUNTER — OFFICE VISIT (OUTPATIENT)
Dept: OBGYN CLINIC | Facility: CLINIC | Age: 65
End: 2025-05-12
Payer: MEDICARE

## 2025-05-12 ENCOUNTER — APPOINTMENT (OUTPATIENT)
Dept: RADIOLOGY | Facility: CLINIC | Age: 65
End: 2025-05-12
Attending: ORTHOPAEDIC SURGERY
Payer: MEDICARE

## 2025-05-12 VITALS — HEIGHT: 64 IN | BODY MASS INDEX: 21.68 KG/M2 | WEIGHT: 127 LBS

## 2025-05-12 DIAGNOSIS — S62.306A UNSPECIFIED FRACTURE OF FIFTH METACARPAL BONE, RIGHT HAND, INITIAL ENCOUNTER FOR CLOSED FRACTURE: ICD-10-CM

## 2025-05-12 DIAGNOSIS — S62.306A UNSPECIFIED FRACTURE OF FIFTH METACARPAL BONE, RIGHT HAND, INITIAL ENCOUNTER FOR CLOSED FRACTURE: Primary | ICD-10-CM

## 2025-05-12 PROCEDURE — 99203 OFFICE O/P NEW LOW 30 MIN: CPT | Performed by: ORTHOPAEDIC SURGERY

## 2025-05-12 PROCEDURE — 73130 X-RAY EXAM OF HAND: CPT

## 2025-05-12 NOTE — ASSESSMENT & PLAN NOTE
Two treatment options discussed: Trial of conservative management with volar wrist brace and gustavo tape. Educated patient should work on ROM to avoid stiffness - goal is to make half a fist when seen next. Other treatment option includes surgical intervention - risks of surgery discussed. Unable to determine if surgery is needed due to significant swelling and bruising on exam today.   Gustavo tape and volar splint applied today for immobilization   Educated patient that she should expect a dropped knuckle in the future as well as decreased  strength, and increased risk of arthritis if treated conservatively   Educated to elevate and use ice   Can drink pineapple juice to help minimize bruising - 8 oz daily   Takes 6-8 weeks to heal   Extensive bruising and swelling at this time, will allowing bruising to minimize in the next week and will re-check to determine if surgical intervention is required for fixation   Follow up in 1 week       Orders:  •  Ambulatory Referral to Orthopedic Surgery  •  XR hand 3+ vw right; Future  •  Durable Medical Equipment

## 2025-05-12 NOTE — PROGRESS NOTES
ORTHOPAEDIC HAND, WRIST, AND ELBOW OFFICE  VISIT     Name: Jose White      : 1960      MRN: [id number]  Encounter Provider: Zackery Alan MD  Encounter Date: 2025   Encounter department: [facility]  :  Assessment & Plan  Unspecified fracture of fifth metacarpal bone, right hand, initial encounter for closed fracture    Two treatment options discussed: Trial of conservative management with volar wrist brace and gustavo tape. Educated patient should work on ROM to avoid stiffness - goal is to make half a fist when seen next. Other treatment option includes surgical intervention - risks of surgery discussed. Unable to determine if surgery is needed due to significant swelling and bruising on exam today.   Gustavo tape and volar splint applied today for immobilization   Educated patient that she should expect a dropped knuckle in the future as well as decreased  strength, and increased risk of arthritis if treated conservatively   Educated to elevate and use ice   Can drink pineapple juice to help minimize bruising - 8 oz daily   Takes 6-8 weeks to heal   Extensive bruising and swelling at this time, will allowing bruising to minimize in the next week and will re-check to determine if surgical intervention is required for fixation   Follow up in 1 week       Orders:  •  Ambulatory Referral to Orthopedic Surgery  •  XR hand 3+ vw right; Future  •  Durable Medical Equipment        History of Present Illness     Chief Complaint   Patient presents with   • Right Hand - Fracture     XR 25       Jose White is a 64 y.o. RHD female who presents after a mechanical fall on 25. Patient notes that she was walking when an automatic door started to shut on her, which caused her to fall on the RIGHT hand. Patient waited to be evaluated in the ED on 25 when xrays were taken and revealed 5th metacarpal fx. Patient was placed in a splint and  "immobilized. She continues to experience a great deal of pain and tenderness. Significant swelling and bruising throughout, with very limited ROM of all fingers and thumb.       REVIEW OF SYSTEMS:  General: no fever, no chills  HEENT:  No loss of hearing or eyesight problems  Eyes:  No red eyes  Respiratory:  No coughing, shortness of breath or wheezing  Cardiovascular:  No chest pain, no palpitations  GI:  Abdomen soft nontender, denies nausea  Endocrine:  No muscle weakness, no frequent urination, no excessive thirst  Urinary:  No dysuria, no incontinence  Musculoskeletal: see HPI and PE  SKIN:  No skin rash, no dry skin  Neurological:  No headaches, no confusion  Psychiatric:  No suicide thoughts, no anxiety, no depression  Review of all other systems is negative    Objective   Ht 5' 4\" (1.626 m)   Wt 57.6 kg (127 lb)   BMI 21.80 kg/m²      General: well developed and well nourished, alert, oriented times 3, and appears comfortable  Psychiatric: Normal  HEENT: Trachea Midline, No torticollis  Cardiovascular: No discernable arrhythmia  Pulmonary: No wheezing or stridor  Abdomen: No rebound or guarding  Extremities: No peripheral edema  Skin: No masses, lacerations, fluctation, ulcerations  Neurovascular: Sensation Intact to the Median, Ulnar, Radial Nerve, Motor Intact to the Median, Ulnar, Radial Nerve, and Pulses Intact    Musculoskeletal exam:  RIGHT hand  Extremely limited ROM of all digits secondary to pain and swelling   + TTP over fifth metacarpal   Significant bruising and swelling throughout wrist and hand       STUDIES REVIEWED:  Images were reviewed in PACS and demonstrate: oblique fifth metacarpal shaft fracture       PROCEDURES PERFORMED:  None      Scribe Attestation    I,:  Marietta Bishop PA-C am acting as a scribe while in the presence of the attending physician.:       I,:  Zackery Alan MD personally performed the services described in this documentation    as scribed in my presence.:  "

## 2025-05-14 DIAGNOSIS — K70.31 ALCOHOLIC CIRRHOSIS OF LIVER WITH ASCITES (HCC): Primary | ICD-10-CM

## 2025-05-15 ENCOUNTER — HOSPITAL ENCOUNTER (OUTPATIENT)
Dept: INTERVENTIONAL RADIOLOGY/VASCULAR | Facility: HOSPITAL | Age: 65
Discharge: HOME | End: 2025-05-15
Attending: INTERNAL MEDICINE
Payer: MEDICARE

## 2025-05-15 ENCOUNTER — HOSPITAL ENCOUNTER (OUTPATIENT)
Dept: INFUSION CENTER | Facility: HOSPITAL | Age: 65
Discharge: HOME | End: 2025-05-15
Attending: INTERNAL MEDICINE

## 2025-05-15 VITALS
HEART RATE: 69 BPM | OXYGEN SATURATION: 97 % | DIASTOLIC BLOOD PRESSURE: 58 MMHG | RESPIRATION RATE: 18 BRPM | SYSTOLIC BLOOD PRESSURE: 115 MMHG

## 2025-05-15 VITALS
SYSTOLIC BLOOD PRESSURE: 113 MMHG | OXYGEN SATURATION: 97 % | HEART RATE: 77 BPM | DIASTOLIC BLOOD PRESSURE: 57 MMHG | RESPIRATION RATE: 21 BRPM

## 2025-05-15 DIAGNOSIS — K76.9 PLEURAL EFFUSION ASSOCIATED WITH HEPATIC DISORDER: ICD-10-CM

## 2025-05-15 DIAGNOSIS — K70.31 ALCOHOLIC CIRRHOSIS OF LIVER WITH ASCITES (HCC): ICD-10-CM

## 2025-05-15 DIAGNOSIS — J91.8 PLEURAL EFFUSION ASSOCIATED WITH HEPATIC DISORDER: ICD-10-CM

## 2025-05-15 PROCEDURE — 49083 ABD PARACENTESIS W/IMAGING: CPT

## 2025-05-15 PROCEDURE — 32555 ASPIRATE PLEURA W/ IMAGING: CPT

## 2025-05-15 RX ORDER — LIDOCAINE HYDROCHLORIDE 10 MG/ML
INJECTION, SOLUTION EPIDURAL; INFILTRATION; INTRACAUDAL; PERINEURAL AS NEEDED
Status: COMPLETED | OUTPATIENT
Start: 2025-05-15 | End: 2025-05-15

## 2025-05-15 RX ADMIN — LIDOCAINE HYDROCHLORIDE 8 ML: 10 INJECTION, SOLUTION EPIDURAL; INFILTRATION; INTRACAUDAL at 10:40

## 2025-05-15 NOTE — DISCHARGE INSTRUCTIONS
Thoracentesis   WHAT YOU NEED TO KNOW:   A thoracentesis is a procedure to remove extra fluid or air from between your lungs and your inner chest wall. Air or fluid buildup may make it hard for you to breathe. A thoracentesis allows your lungs to expand fully so you can breathe more easily.  DISCHARGE INSTRUCTIONS:   Medicines:   Pain medicine:  You may be given a prescription medicine to decrease pain. Do not wait until the pain is severe before you take your medicine.    Antibiotics:  This medicine helps fight or prevent an infection.    Take your medicine as directed.  Call your healthcare provider if you think your medicine is not helping or if you have side effects. Tell him if you are allergic to any medicine. Keep a list of the medicines, vitamins, and herbs you take. Include the amounts, and when and why you take them. Bring the list or the pill bottles to follow-up visits. Carry your medicine list with you in case of an emergency.  Follow up with your healthcare provider as directed:  Write down your questions so you remember to ask them during your visits.   Rest:  Rest when you feel it is needed. Slowly start to do more each day. Return to your daily activities as directed.   Do not smoke:  If you smoke, it is never too late to quit. Ask for information about how to stop smoking if you need help.  Contact your healthcare provider if:   You have a fever.    Your puncture site is red, warm, swollen, or draining pus.    You have questions or concerns about your procedure, medicine, or care.    If you have any questions regarding, call the IR department @ [phone].     Seek care immediately or call 911 if:   Blood soaks through your bandage.    There is blood in your spit.

## 2025-05-15 NOTE — SEDATION DOCUMENTATION
Weekly paracentesis yielding 2800ml clear sheryl fluid. Patient tolerated well and home by w/c in her usual state of health. AVS reviewed.

## 2025-05-19 ENCOUNTER — APPOINTMENT (OUTPATIENT)
Dept: RADIOLOGY | Facility: CLINIC | Age: 65
End: 2025-05-19
Attending: ORTHOPAEDIC SURGERY
Payer: MEDICARE

## 2025-05-19 ENCOUNTER — OFFICE VISIT (OUTPATIENT)
Dept: OBGYN CLINIC | Facility: CLINIC | Age: 65
End: 2025-05-19
Payer: MEDICARE

## 2025-05-19 VITALS — BODY MASS INDEX: 21.68 KG/M2 | HEIGHT: 64 IN | WEIGHT: 127 LBS

## 2025-05-19 DIAGNOSIS — S62.306A UNSPECIFIED FRACTURE OF FIFTH METACARPAL BONE, RIGHT HAND, INITIAL ENCOUNTER FOR CLOSED FRACTURE: Primary | ICD-10-CM

## 2025-05-19 DIAGNOSIS — S62.306A UNSPECIFIED FRACTURE OF FIFTH METACARPAL BONE, RIGHT HAND, INITIAL ENCOUNTER FOR CLOSED FRACTURE: ICD-10-CM

## 2025-05-19 PROCEDURE — 99213 OFFICE O/P EST LOW 20 MIN: CPT | Performed by: ORTHOPAEDIC SURGERY

## 2025-05-19 PROCEDURE — 73130 X-RAY EXAM OF HAND: CPT

## 2025-05-19 NOTE — PROGRESS NOTES
ORTHOPAEDIC HAND, WRIST, AND ELBOW OFFICE  VISIT     Name: Jose White      : 1960      MRN: [id number]  Encounter Provider: Zackery Alan MD  Encounter Date: 2025   Encounter department: [facility]  :  Assessment & Plan  Unspecified fracture of fifth metacarpal bone, right hand, initial encounter for closed fracture  DOI 25  X-rays were performed in the office and reviewed, no interval displacement seen on x-ray.   No rotational deformity on PE, ROM, edema and bruising has improved since last visit.   Continue the use of the cock up wrist brace and the use of the gustavo strap.   Discussed she may notice a drop knuckle appearance once the fracture is healed, which is cosmetic.   Follow up in 3 weeks with right hand x-rays.    Orders:  •  XR hand 3+ vw right; Future          General Discussions:  Fracture - Nonoperative Care: The physiology of a fractured bone was discussed with the patient today.  With nondisplaced or minimally displaced fractures, conservative treatment often results in a functional recovery.  Typically, these fractures can be immobilized in either a cast or splint depending on the pattern.  Radiographs are typically taken at intervals throughout the fracture healing to ensure that muscular forces do not cause loss of reduction or alignment.  If the fracture loses its alignment, surgical intervention may be required to stabilize it.  Medical conditions such as diabetes, osteoporosis, vitamin D deficiency, and a history of or exposure to smoking may delay or prevent fracture healing.      History of Present Illness   HPI  Chief Complaint   Patient presents with   • Right Hand - Fracture     XR 25       Jose White is a 64 y.o. RHD female who presents to the office for a follow up regarding a right 5th metacarpal fracture, DOI 25. Overall Jose is doing well. She has been immobilized in a cock up wrist brace and a  "gustavo lopez. She has been working on finger ROM.      REVIEW OF SYSTEMS:  General: no fever, no chills  HEENT:  No loss of hearing or eyesight problems  Eyes:  No red eyes  Respiratory:  No coughing, shortness of breath or wheezing  Cardiovascular:  No chest pain, no palpitations  GI:  Abdomen soft nontender, denies nausea  Endocrine:  No muscle weakness, no frequent urination, no excessive thirst  Urinary:  No dysuria, no incontinence  Musculoskeletal: see HPI and PE  SKIN:  No skin rash, no dry skin  Neurological:  No headaches, no confusion  Psychiatric:  No suicide thoughts, no anxiety, no depression  Review of all other systems is negative    Objective   Ht 5' 4\" (1.626 m)   Wt 57.6 kg (127 lb)   BMI 21.80 kg/m²      General: well developed and well nourished, alert, oriented times 3, and appears comfortable  Psychiatric: Normal  HEENT: Trachea Midline, No torticollis  Cardiovascular: No discernable arrhythmia  Pulmonary: No wheezing or stridor  Abdomen: No rebound or guarding  Extremities: No peripheral edema  Skin: No masses, erythema, lacerations, fluctation, ulcerations  Neurovascular: Sensation Intact to the Median, Ulnar, Radial Nerve, Motor Intact to the Median, Ulnar, Radial Nerve, and Pulses Intact    Musculoskeletal exam:  RIGHT SIDE: Improving ecchymosis and edema, tenderness over fracture site, no obvious rotational deformity, full digital extension       STUDIES REVIEWED:  Images were reviewed in PACS and demonstrate:   X-rays of the right hand demonstrate an oblique small finger metacarpal fracture without interval displacement when compared to previous x-rays.        PROCEDURES PERFORMED:  Procedures  No Procedures performed today      Scribe Attestation    I,:  Carolin Daniels MA am acting as a scribe while in the presence of the attending physician.:       I,:  Zackery Alan MD personally performed the services described in this documentation    as scribed in my presence.:           "

## 2025-05-19 NOTE — ASSESSMENT & PLAN NOTE
DOI 5/1/25  X-rays were performed in the office and reviewed, no interval displacement seen on x-ray.   No rotational deformity on PE, ROM, edema and bruising has improved since last visit.   Continue the use of the cock up wrist brace and the use of the gustavo strap.   Discussed she may notice a drop knuckle appearance once the fracture is healed, which is cosmetic.   Follow up in 3 weeks with right hand x-rays.    Orders:  •  XR hand 3+ vw right; Future

## 2025-05-20 RX ORDER — ALBUMIN (HUMAN) 12.5 G/50ML
100 SOLUTION INTRAVENOUS ONCE AS NEEDED
Status: CANCELLED | OUTPATIENT
Start: 2025-05-22

## 2025-05-20 RX ORDER — ALBUMIN (HUMAN) 12.5 G/50ML
75 SOLUTION INTRAVENOUS ONCE AS NEEDED
Status: CANCELLED | OUTPATIENT
Start: 2025-05-22

## 2025-05-20 RX ORDER — SODIUM CHLORIDE 9 MG/ML
20 INJECTION, SOLUTION INTRAVENOUS ONCE
Status: CANCELLED | OUTPATIENT
Start: 2025-05-22

## 2025-05-20 RX ORDER — ALBUMIN (HUMAN) 12.5 G/50ML
50 SOLUTION INTRAVENOUS ONCE AS NEEDED
Status: CANCELLED | OUTPATIENT
Start: 2025-05-22

## 2025-05-22 ENCOUNTER — HOSPITAL ENCOUNTER (OUTPATIENT)
Dept: INTERVENTIONAL RADIOLOGY/VASCULAR | Facility: HOSPITAL | Age: 65
Discharge: HOME | End: 2025-05-22
Attending: INTERNAL MEDICINE

## 2025-05-22 ENCOUNTER — HOSPITAL ENCOUNTER (OUTPATIENT)
Dept: INTERVENTIONAL RADIOLOGY/VASCULAR | Facility: HOSPITAL | Age: 65
Discharge: HOME | End: 2025-05-22
Attending: INTERNAL MEDICINE
Payer: MEDICARE

## 2025-05-22 ENCOUNTER — HOSPITAL ENCOUNTER (OUTPATIENT)
Dept: INFUSION CENTER | Facility: HOSPITAL | Age: 65
Discharge: HOME | End: 2025-05-22
Attending: INTERNAL MEDICINE

## 2025-05-22 VITALS
DIASTOLIC BLOOD PRESSURE: 59 MMHG | HEART RATE: 76 BPM | SYSTOLIC BLOOD PRESSURE: 108 MMHG | RESPIRATION RATE: 18 BRPM | OXYGEN SATURATION: 99 %

## 2025-05-22 DIAGNOSIS — K70.31 ALCOHOLIC CIRRHOSIS OF LIVER WITH ASCITES (HCC): Primary | ICD-10-CM

## 2025-05-22 DIAGNOSIS — J91.8 PLEURAL EFFUSION ASSOCIATED WITH HEPATIC DISORDER: ICD-10-CM

## 2025-05-22 DIAGNOSIS — K76.9 PLEURAL EFFUSION ASSOCIATED WITH HEPATIC DISORDER: ICD-10-CM

## 2025-05-22 DIAGNOSIS — K70.31 ALCOHOLIC CIRRHOSIS OF LIVER WITH ASCITES (HCC): ICD-10-CM

## 2025-05-22 PROCEDURE — 49083 ABD PARACENTESIS W/IMAGING: CPT

## 2025-05-22 PROCEDURE — 32555 ASPIRATE PLEURA W/ IMAGING: CPT

## 2025-05-22 NOTE — DISCHARGE INSTRUCTIONS

## 2025-05-27 RX ORDER — ALBUMIN (HUMAN) 12.5 G/50ML
75 SOLUTION INTRAVENOUS ONCE AS NEEDED
OUTPATIENT
Start: 2025-05-29

## 2025-05-27 RX ORDER — ALBUMIN (HUMAN) 12.5 G/50ML
50 SOLUTION INTRAVENOUS ONCE AS NEEDED
OUTPATIENT
Start: 2025-05-29

## 2025-05-27 RX ORDER — SODIUM CHLORIDE 9 MG/ML
20 INJECTION, SOLUTION INTRAVENOUS ONCE
OUTPATIENT
Start: 2025-05-29

## 2025-05-27 RX ORDER — ALBUMIN (HUMAN) 12.5 G/50ML
100 SOLUTION INTRAVENOUS ONCE AS NEEDED
OUTPATIENT
Start: 2025-05-29

## 2025-05-28 ENCOUNTER — OFFICE VISIT (OUTPATIENT)
Age: 65
End: 2025-05-28
Payer: MEDICARE

## 2025-05-28 VITALS
BODY MASS INDEX: 22.74 KG/M2 | WEIGHT: 133.2 LBS | HEART RATE: 74 BPM | HEIGHT: 64 IN | SYSTOLIC BLOOD PRESSURE: 120 MMHG | DIASTOLIC BLOOD PRESSURE: 70 MMHG

## 2025-05-28 DIAGNOSIS — R18.8 REFRACTORY ASCITES: ICD-10-CM

## 2025-05-28 DIAGNOSIS — R13.12 OROPHARYNGEAL DYSPHAGIA: ICD-10-CM

## 2025-05-28 DIAGNOSIS — K21.9 GERD WITHOUT ESOPHAGITIS: ICD-10-CM

## 2025-05-28 DIAGNOSIS — I85.00 ESOPHAGEAL VARICES DETERMINED BY ENDOSCOPY (HCC): ICD-10-CM

## 2025-05-28 DIAGNOSIS — K70.31 ALCOHOLIC CIRRHOSIS OF LIVER WITH ASCITES (HCC): ICD-10-CM

## 2025-05-28 DIAGNOSIS — R13.19 ESOPHAGEAL DYSPHAGIA: Primary | ICD-10-CM

## 2025-05-28 PROCEDURE — G2211 COMPLEX E/M VISIT ADD ON: HCPCS | Performed by: INTERNAL MEDICINE

## 2025-05-28 PROCEDURE — 99214 OFFICE O/P EST MOD 30 MIN: CPT | Performed by: INTERNAL MEDICINE

## 2025-05-28 RX ORDER — SODIUM CHLORIDE, SODIUM LACTATE, POTASSIUM CHLORIDE, CALCIUM CHLORIDE 600; 310; 30; 20 MG/100ML; MG/100ML; MG/100ML; MG/100ML
125 INJECTION, SOLUTION INTRAVENOUS CONTINUOUS
OUTPATIENT
Start: 2025-05-28

## 2025-05-28 RX ORDER — PANTOPRAZOLE SODIUM 20 MG/1
20 TABLET, DELAYED RELEASE ORAL DAILY
Qty: 90 TABLET | Refills: 1 | Status: SHIPPED | OUTPATIENT
Start: 2025-05-28

## 2025-05-28 NOTE — ASSESSMENT & PLAN NOTE
Ms. White is a 64 y.o. year old female with cirrhosis secondary to Chronic alcohol abuse which is decompensated with ascites, hepatic hydrothorax lower extremity edema, thrombocytopenia, esophageal varices, hypoalbuminemia and hypoprothrombinemia.     Problem List and Plan:     End Stage Liver Disease: Current MELD-Na Score is 22.  Ascites poor controlled requiring weekly drainage. Last few thoracentesis without any significant pleural fluid.  Strongly advised again to stop drinking.  I explained that she unfortunately is not a transplant candidate at this time due to ongoing alcohol use despite counseling.  She has still not met with the palliative care team.  I did urge her to reconsider meeting with them.  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  And diuretics were also discontinued during her most recent hospitalization.  However, her nephrology team restarted low dose lasix and aldactone.  I will defer to them to manage diuretics and hyponatremia.  I still suggest fluid restriction to 9796-5406 cc/day.  Repeat blood work every 2 weeks for now.  Hepatic Encephalopathy: No previous history of overt hepatic encephalopathy, but has had stage 1 in past. Currently taking lactulose on occasion.  Ms. White and her family/care giver are aware of the signs/symptoms of hepatic encephalopathy and understand to seek immediate medical attention should they arise.  Esophageal Varices: Last EGD done on 12/10/24 showing mild PHG and small varices.  Will repeat EGD now due to dysphagia.  Hepatoma Screening: Abdominal ultrasound done on 2/14/2025 revealing no evidence of liver mass.  Will plan for repeat ultrasound in August.  Nutritional status: Moderate oderate sarcopenia noted.  Encouraged high protein snacking, low salt diet.  If weight loss evident, will refer to nutritional counseling.      Health Maintenance:  Ms. White was counseled to avoid  alcohol and tobacco products.  Ms. White was also advised to avoid raw seafood/oysters and from swimming in unchlorinated neely, particularly from the [place] [facility], due to increased risk of infection from Vibrio Vulnificus.  Ms. White was also instructed to seek immediate medical attention should she develop fevers over 101 F, evidence of GI bleeding (black or bloody stools, bloody or coffee ground emesis) or confusion.  Ms. White was advised to avoid NSAIDs, if possible, due to increase risk of renal dysfunction, and advised that if she should use acetaminophen, dose should not exceed 2 grams/day.  Ms. White was recommend to remain up to date with adult vaccination, including influenza, pneumovax and meningococcus. Inactivated HSV and zoster vaccine is safe and encouraged by PCP.  Ms. White was instructed to call either our office or that of her referring doctor should she develop worsening symptoms related to lower extremity edema, ascites, jaundice or excessive bruising/bleeding.  Orders:    pantoprazole (PROTONIX) 20 mg tablet; Take 1 tablet (20 mg total) by mouth daily

## 2025-05-28 NOTE — PROGRESS NOTES
Name: Jose White      : 1960      MRN: [id number]  Encounter Provider: Vamsi Herrera MD  Encounter Date: 2025   Encounter department: [facility] [address]  :  Assessment & Plan  Alcoholic cirrhosis of liver with ascites (HCC)  Ms. White is a 64 y.o. year old female with cirrhosis secondary to Chronic alcohol abuse which is decompensated with ascites, hepatic hydrothorax lower extremity edema, thrombocytopenia, esophageal varices, hypoalbuminemia and hypoprothrombinemia.     Problem List and Plan:     End Stage Liver Disease: Current MELD-Na Score is 22.  Ascites poor controlled requiring weekly drainage. Last few thoracentesis without any significant pleural fluid.  Strongly advised again to stop drinking.  I explained that she unfortunately is not a transplant candidate at this time due to ongoing alcohol use despite counseling.  She has still not met with the palliative care team.  I did urge her to reconsider meeting with them.  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  And diuretics were also discontinued during her most recent hospitalization.  However, her nephrology team restarted low dose lasix and aldactone.  I will defer to them to manage diuretics and hyponatremia.  I still suggest fluid restriction to 5362-9139 cc/day.  Repeat blood work every 2 weeks for now.  Hepatic Encephalopathy: No previous history of overt hepatic encephalopathy, but has had stage 1 in past. Currently taking lactulose on occasion.  Ms. White and her family/care giver are aware of the signs/symptoms of hepatic encephalopathy and understand to seek immediate medical attention should they arise.  Esophageal Varices: Last EGD done on 12/10/24 showing mild PHG and small varices.  Will repeat EGD now due to dysphagia.  Hepatoma Screening: Abdominal ultrasound done on 2025 revealing no evidence of liver mass.  Will  plan for repeat ultrasound in August.  Nutritional status: Moderate oderate sarcopenia noted.  Encouraged high protein snacking, low salt diet.  If weight loss evident, will refer to nutritional counseling.      Health Maintenance:  Ms. White was counseled to avoid alcohol and tobacco products.  Ms. White was also advised to avoid raw seafood/oysters and from swimming in unchlorinated neely, particularly from the [facility], due to increased risk of infection from Vibrio Vulnificus.  Ms. White was also instructed to seek immediate medical attention should she develop fevers over 101 F, evidence of GI bleeding (black or bloody stools, bloody or coffee ground emesis) or confusion.  Ms. White was advised to avoid NSAIDs, if possible, due to increase risk of renal dysfunction, and advised that if she should use acetaminophen, dose should not exceed 2 grams/day.  Ms. White was recommend to remain up to date with adult vaccination, including influenza, pneumovax and meningococcus. Inactivated HSV and zoster vaccine is safe and encouraged by PCP.  Ms. White was instructed to call either our office or that of her referring doctor should she develop worsening symptoms related to lower extremity edema, ascites, jaundice or excessive bruising/bleeding.  Orders:    pantoprazole (PROTONIX) 20 mg tablet; Take 1 tablet (20 mg total) by mouth daily    Esophageal varices determined by endoscopy (HCC)  See above  Orders:    pantoprazole (PROTONIX) 20 mg tablet; Take 1 tablet (20 mg total) by mouth daily    EGD; Future    Refractory ascites  See above  Orders:    pantoprazole (PROTONIX) 20 mg tablet; Take 1 tablet (20 mg total) by mouth daily    GERD without esophagitis    Orders:    pantoprazole (PROTONIX) 20 mg tablet; Take 1 tablet (20 mg total) by mouth daily    Esophageal dysphagia  H/o Schatzki ring requiring dilation.  Complaining of both oropharyngeal and esophageal dysphagia.  Will plan for  EGD and referral to ENT.  Orders:    Ambulatory Referral to Otolaryngology; Future    Oropharyngeal dysphagia  See above.  Orders:    Ambulatory Referral to Otolaryngology; Future          History of Present Illness   Jose White is a 64 y.o. female with medical history as outlined below including decompensated alcohol-related cirrhosis with refractory ascites and hepatic hydrothorax, hepatic encephalopathy, hyponatremia, sarcopenia, and thrombocytopenia who comes in today for follow-up.    She continues to require paracentesis weekly however the last 2 thoracentesis attempts showed very minimal fluid.    Unfortunately, she continues to drink alcohol at least 3 drinks daily.    She continues to complain of fatigue, weakness, decreased breath as ascites and pleural effusion accumulates.  She denies lower extremity edema or overt encephalopathy.  She takes lactulose occasionally.  She denies abdominal pain.  She complains of left lower back pain radiating down through her left leg into her thigh.    She states she is trying to limit her fluid intake to 2 L/day however it appears that she is not taking into account the amount of beer she is drinking.    HPI    History obtained from: patient and patient's Significant Other    Review of Systems A complete review of systems is negative other than that noted above in the HPI.        Patient Active Problem List   Diagnosis    Fatty liver    Other specified abnormal findings of blood chemistry    Anxiety    Elevated LFTs    GERD without esophagitis    Hyperlipidemia    Hypotension    Single seizure (HCC)    Insomnia    Nicotine dependence    COPD (chronic obstructive pulmonary disease) (HCC)    Alcohol use    Irritable bowel syndrome with diarrhea    Non-seasonal allergic rhinitis due to pollen    Bilateral leg edema    Alcoholic cirrhosis of liver with ascites (HCC)    Thrombocytopenia (HCC)    Anemia    Decompensated hepatic cirrhosis (HCC)    Postprocedural  "pneumothorax of right lung    Ductal carcinoma in situ (DCIS) of right breast    Atypical ductal hyperplasia of right breast    Breast neoplasm, Tis (DCIS), right    Malignant neoplasm of lower-outer quadrant of right breast of female, estrogen receptor positive (HCC)    Recurrent pleural effusion on right    Vaginal atrophy    Dyspareunia, female    Postcoital bleeding    Hepatic encephalopathy (HCC)    Hypoalbuminemia    COVID    Secondary esophageal varices without bleeding (HCC)    Candida esophagitis (HCC)    Greater trochanteric bursitis of both hips    Lumbar spondylosis    Left hip pain    Electrolyte abnormality    Pneumothorax on right    Cigarette nicotine dependence without complication    Other dysphagia    Palliative care patient    [id number] premature ovarian failure    Unspecified fracture of fifth metacarpal bone, right hand, initial encounter for closed fracture       Current Medications[1]      Objective   /70 (BP Location: Left arm, Patient Position: Sitting, Cuff Size: Standard)   Pulse 74   Ht 5' 4\" (1.626 m)   Wt 60.4 kg (133 lb 3.2 oz)   BMI 22.86 kg/m²       Physical Exam  Vitals and nursing note reviewed.   Constitutional:       General: She is not in acute distress.     Appearance: She is well-developed. She is ill-appearing.   HENT:      Head: Normocephalic and atraumatic.      Mouth/Throat:      Mouth: Mucous membranes are moist.     Eyes:      General: No scleral icterus.     Extraocular Movements: Extraocular movements intact.       Cardiovascular:      Rate and Rhythm: Normal rate and regular rhythm.   Pulmonary:      Effort: Pulmonary effort is normal. No respiratory distress.      Breath sounds: Normal breath sounds.   Abdominal:      General: There is distension (Mild).      Palpations: Abdomen is soft. There is no shifting dullness, fluid wave, hepatomegaly or splenomegaly.      Tenderness: There is no abdominal tenderness.     Musculoskeletal:         General: No " swelling. Normal range of motion.      Cervical back: Normal range of motion and neck supple.     Skin:     General: Skin is warm and dry.      Coloration: Skin is not jaundiced.      Findings: Bruising present.     Neurological:      Mental Status: She is alert and oriented to person, place, and time.     Psychiatric:         Mood and Affect: Mood normal.              Lab Results: I personally reviewed relevant lab results.    MELD 3.0: 22 at 5/13/2025 12:08 PM  MELD-Na: 11 at 5/13/2025 12:08 PM  Calculated from:  Serum Creatinine: 0.59 mg/dL (Using min of 1 mg/dL) at 5/13/2025 12:08 PM  Serum Sodium: 126 mmol/L at 5/13/2025 12:08 PM  Total Bilirubin: 1.7 mg/dL at 5/13/2025 12:08 PM  Serum Albumin: 2.4 g/dL at 5/13/2025 12:08 PM  INR(ratio): 1.3 at 5/13/2025 12:08 PM  Age at listing (hypothetical): 64 years  Sex: Female at 5/13/2025 12:08 PM              Results for orders placed during the hospital encounter of 12/10/24    EGD    Impression  One small, tubular grade I varix in the lower third of the esophagus  Mild portal hypertensive gastropathy in the fundus and body of the stomach  Two small angioectasias in the greater curve of the stomach and duodenal bulb, not APC'd given no bleeding  Nodular mucosa in the duodenal bulb involving 2/3's circumference of post-pyloric mucosa; performed cold forceps biopsy for dysplasia screening  The 2nd part of the duodenum appeared normal.      RECOMMENDATION:  Await pathology results  Repeat EGD in 1 yr for variceal surveillance  Avoid alcohol intake            Vamsi Herrera MD      Administrative Statements   I have spent a total time of 41 minutes in caring for this patient on the day of the visit/encounter including Diagnostic results, Prognosis, Risks and benefits of tx options, Instructions for management, Patient and family education, Importance of tx compliance, Risk factor reductions, Impressions, Counseling / Coordination of care, Documenting in the medical record,  Reviewing/placing orders in the medical record (including tests, medications, and/or procedures), and Obtaining or reviewing history  .         [1]   Current Outpatient Medications   Medication Sig Dispense Refill    albuterol (2.5 mg/3 mL) 0.083 % nebulizer solution Take 3 mL (2.5 mg total) by nebulization every 6 (six) hours as needed for wheezing or shortness of breath 1080 mL 3    albuterol (Proventil HFA) 90 mcg/act inhaler Inhale 2 puffs every 6 (six) hours as needed for wheezing or shortness of breath 18 g 6    alendronate (Fosamax) 70 mg tablet Take 1 tablet (70 mg total) by mouth every 7 days 12 tablet 3    ALPRAZolam (XANAX) 1 mg tablet Take 1 tablet (1 mg total) by mouth 2 (two) times a day as needed for anxiety 60 tablet 2    anastrozole (ARIMIDEX) 1 mg tablet Take 1 tablet (1 mg total) by mouth daily 90 tablet 3    budesonide-formoterol (Symbicort) 160-4.5 mcg/act inhaler Inhale 2 puffs 2 (two) times a day Rinse mouth after use. 10.2 g 6    furosemide (LASIX) 20 mg tablet Take 1 tablet (20 mg total) by mouth daily 90 tablet 1    lactulose (CHRONULAC) 10 g/15 mL solution Take 15 mL (10 g total) by mouth 2 (two) times a day 240 mL 0    midodrine (PROAMATINE) 10 MG tablet Take 1 tablet (10 mg total) by mouth 3 (three) times a day before meals 90 tablet 3    pantoprazole (PROTONIX) 20 mg tablet Take 1 tablet (20 mg total) by mouth daily 90 tablet 1    Risankizumab-rzaa (SKYRIZI PEN SC) Inject under the skin      spironolactone (ALDACTONE) 25 mg tablet Take 1 tablet (25 mg total) by mouth daily 90 tablet 3    traZODone (DESYREL) 50 mg tablet Take 1 tablet (50 mg total) by mouth daily at bedtime 90 tablet 0     No current facility-administered medications for this visit.

## 2025-05-28 NOTE — PATIENT INSTRUCTIONS
Scheduled date of EGD(as of today):7/1/25  Physician performing EGD:Dr. Stevenson  Location of EGD:[facility]  Instructions reviewed with patient by: Vesna  Clearances: N/A

## 2025-05-29 ENCOUNTER — HOSPITAL ENCOUNTER (OUTPATIENT)
Dept: INTERVENTIONAL RADIOLOGY/VASCULAR | Facility: HOSPITAL | Age: 65
Discharge: HOME | End: 2025-05-29
Attending: INTERNAL MEDICINE
Payer: MEDICARE

## 2025-05-29 ENCOUNTER — HOSPITAL ENCOUNTER (OUTPATIENT)
Dept: INFUSION CENTER | Facility: HOSPITAL | Age: 65
Discharge: HOME | End: 2025-05-29
Attending: INTERNAL MEDICINE

## 2025-05-29 VITALS — SYSTOLIC BLOOD PRESSURE: 114 MMHG | DIASTOLIC BLOOD PRESSURE: 58 MMHG

## 2025-05-29 DIAGNOSIS — K76.9 PLEURAL EFFUSION ASSOCIATED WITH HEPATIC DISORDER: ICD-10-CM

## 2025-05-29 DIAGNOSIS — J91.8 PLEURAL EFFUSION ASSOCIATED WITH HEPATIC DISORDER: ICD-10-CM

## 2025-05-29 DIAGNOSIS — K70.31 ALCOHOLIC CIRRHOSIS OF LIVER WITH ASCITES (HCC): Primary | ICD-10-CM

## 2025-05-29 DIAGNOSIS — K70.31 ALCOHOLIC CIRRHOSIS OF LIVER WITH ASCITES (HCC): ICD-10-CM

## 2025-05-29 PROCEDURE — 49083 ABD PARACENTESIS W/IMAGING: CPT

## 2025-05-29 RX ORDER — LIDOCAINE HYDROCHLORIDE 10 MG/ML
INJECTION, SOLUTION EPIDURAL; INFILTRATION; INTRACAUDAL; PERINEURAL AS NEEDED
Status: COMPLETED | OUTPATIENT
Start: 2025-05-29 | End: 2025-05-29

## 2025-05-29 RX ADMIN — LIDOCAINE HYDROCHLORIDE 8 ML: 10 INJECTION, SOLUTION EPIDURAL; INFILTRATION; INTRACAUDAL at 10:40

## 2025-05-29 NOTE — SEDATION DOCUMENTATION
Weekly paracentesis yielding 3680ml clear yellow fluid. Band aid to site. Patient tolerated well and home with her  in her usual state of health. AVS reviewed.    Alternatives Discussed Intro (Do Not Add Period): I discussed alternative treatments to Mohs surgery and specifically discussed the risks and benefits of

## 2025-05-29 NOTE — DISCHARGE INSTRUCTIONS
Abdominal Paracentesis     WHAT YOU NEED TO KNOW:   Abdominal paracentesis is a procedure to remove abnormal fluid buildup in your abdomen. Fluid builds up because of liver problems, such as swelling and scarring. Heart failure, kidney disease, a mass, or problems with your pancreas may also cause fluid buildup.     DISCHARGE INSTRUCTIONS:     Follow up with your healthcare provider as directed: Write down your questions so you remember to ask them during your visits.     Wound care: Remove dressing after 24 hours. Leave glue in place.    Return to your normal activities    Contact Interventional Radiology at [phone] ([facility] PATIENTS: Contact Interventional Radiology at [phone]) ([facility] PATIENTS: Contact Interventional Radiology at [phone]) if:  You have a fever and your wound is red and swollen.   You have yellow, green, or bad-smelling discharge coming from your wound.   You have pain or swelling in your abdomen.   You have an upset stomach or you vomit.   You have sudden, sharp pain in your abdomen.   You urinate very little or not at all.   You feel confused and more tired than usual.   Your arm or leg feels warm, tender, and painful. It may look swollen and red.   You suddenly feel lightheaded and have trouble breathing.

## 2025-06-04 ENCOUNTER — RESULTS FOLLOW-UP (OUTPATIENT)
Age: 65
End: 2025-06-04

## 2025-06-05 ENCOUNTER — HOSPITAL ENCOUNTER (OUTPATIENT)
Dept: INTERVENTIONAL RADIOLOGY/VASCULAR | Facility: HOSPITAL | Age: 65
Discharge: HOME | End: 2025-06-05
Attending: INTERNAL MEDICINE
Payer: MEDICARE

## 2025-06-05 ENCOUNTER — APPOINTMENT (OUTPATIENT)
Dept: INTERVENTIONAL RADIOLOGY/VASCULAR | Facility: HOSPITAL | Age: 65
End: 2025-06-05
Attending: INTERNAL MEDICINE
Payer: MEDICARE

## 2025-06-05 VITALS
OXYGEN SATURATION: 98 % | SYSTOLIC BLOOD PRESSURE: 127 MMHG | RESPIRATION RATE: 18 BRPM | HEART RATE: 78 BPM | DIASTOLIC BLOOD PRESSURE: 66 MMHG

## 2025-06-05 DIAGNOSIS — J91.8 PLEURAL EFFUSION ASSOCIATED WITH HEPATIC DISORDER: ICD-10-CM

## 2025-06-05 DIAGNOSIS — K76.9 PLEURAL EFFUSION ASSOCIATED WITH HEPATIC DISORDER: ICD-10-CM

## 2025-06-05 DIAGNOSIS — K70.31 ALCOHOLIC CIRRHOSIS OF LIVER WITH ASCITES (HCC): ICD-10-CM

## 2025-06-05 PROCEDURE — 49083 ABD PARACENTESIS W/IMAGING: CPT

## 2025-06-05 RX ORDER — LIDOCAINE HYDROCHLORIDE 10 MG/ML
INJECTION, SOLUTION EPIDURAL; INFILTRATION; INTRACAUDAL; PERINEURAL AS NEEDED
Status: COMPLETED | OUTPATIENT
Start: 2025-06-05 | End: 2025-06-05

## 2025-06-05 RX ADMIN — LIDOCAINE HYDROCHLORIDE 10 ML: 10 INJECTION, SOLUTION EPIDURAL; INFILTRATION; INTRACAUDAL at 10:41

## 2025-06-05 NOTE — DISCHARGE INSTRUCTIONS
Abdominal Paracentesis     WHAT YOU NEED TO KNOW:   Abdominal paracentesis is a procedure to remove abnormal fluid buildup in your abdomen. Fluid builds up because of liver problems, such as swelling and scarring. Heart failure, kidney disease, a mass, or problems with your pancreas may also cause fluid buildup.     DISCHARGE INSTRUCTIONS:     Follow up with your healthcare provider as directed: Write down your questions so you remember to ask them during your visits.     Wound care: Remove dressing after 24 hours. Leave glue in place.    Return to your normal activities    Contact Interventional Radiology at [phone] ([facility] PATIENTS: Contact Interventional Radiology at [phone]) ([facility] PATIENTS: Contact Interventional Radiology at [phone]) if:  You have a fever and your wound is red and swollen.   You have yellow, green, or bad-smelling discharge coming from your wound.   You have pain or swelling in your abdomen.   You have an upset stomach or you vomit.   You have sudden, sharp pain in your abdomen.   You urinate very little or not at all.   You feel confused and more tired than usual.   Your arm or leg feels warm, tender, and painful. It may look swollen and red.   You suddenly feel lightheaded and have trouble breathing.

## 2025-06-06 ENCOUNTER — TELEPHONE (OUTPATIENT)
Age: 65
End: 2025-06-06

## 2025-06-06 DIAGNOSIS — E87.6 HYPOKALEMIA: ICD-10-CM

## 2025-06-06 DIAGNOSIS — E87.1 HYPONATREMIA: Primary | ICD-10-CM

## 2025-06-06 RX ORDER — POTASSIUM CHLORIDE 1500 MG/1
20 TABLET, EXTENDED RELEASE ORAL DAILY
Qty: 30 TABLET | Refills: 3 | Status: SHIPPED | OUTPATIENT
Start: 2025-06-06 | End: 2025-06-11

## 2025-06-06 NOTE — TELEPHONE ENCOUNTER
Pt spouse , Ulises, called reporting that he would like Dr. Tovar  to review her latest labs because Dr. Herrera from GI noted low potassium and out some suggestions in the lab comments. Please advise.

## 2025-06-06 NOTE — TELEPHONE ENCOUNTER
Placed call to pt spouse and relayed message below - spouse stated Dr. Herrera from GI wanted to d/c lasix and up aldactone vs adding a potassium supplement - Please advise treatment plan - Thank you     Sodium still low but improving -- continue lasix and fluid restriction of 1500cc/h   Potassium low -- would start potassium supplement 20meq daily (I sent script to pharmacy).

## 2025-06-07 NOTE — H&P
-----------------------------  [employer]  -----------------------------  Paula Trammell    YOB: 1965    Generated at: Sat, Jun 7, 2025 7:13 AM CDT    Reporting period: Fri May 9, 2025 - Sat Jun 7, 2025  -----------------------------  Glucose Details    Average glucose: 196 mg/dL    GMI: 8.0%    Standard deviation: 75 mg/dL    Coefficient of Variation: 38.1%  -----------------------------  Time in Range    Very High: 25%    High: 30%    In Range: 43%    Low: 2%    Very Low: <1%    Target Range   mg/dL    -----------------------------  Sensor usage    Days with data: 29/30    Time active: 93%    Avg. calibrations per day: 0.0      [place]  H&P  Name: Honey Mcpherson  MRN: [id number]  Unit/Bed#: ED 09 I Date of Admission: 3/27/2023   Date of Service: 3/27/2023 I Hospital Day: 0      Assessment/Plan   * Hyponatremia  Assessment & Plan  Admitted with sodium of 128  Will order hyponatremia work-up  Place on fluid restriction  Continue on Lasix and spironolactone  Nephrology consult  BMP every 8 hours    Pleural effusion  Assessment & Plan  Patient with moderate size pleural effusion  Patient has recurrent pleural effusion and had thoracentesis done in January 2023 with 1 L of pleural fluid removed  Will consult IR for thoracentesis  Continue on diuretics  Oxygen supplementation as needed    Ductal carcinoma in situ (DCIS) of right breast  Assessment & Plan  Continue on Arimidex    Thrombocytopenia ([facility] [address] )  Assessment & Plan  Patient has history of thrombocytopenia in setting of cirrhosis  Noted with platelets of 638L  Trend CBC    Alcoholic cirrhosis of liver with ascites ([facility] [address] )  Assessment & Plan  Patient with history of alcoholic cirrhosis of liver  Continue on Lasix, spironolactone  We will order ammonia levels in a m  Follows up with hepatology Dr Chiqui Andrade as outpatient    COPD (chronic obstructive pulmonary disease) ([facility] [address] )  Assessment & Plan  Continue on bronchodilators  Not in acute exacerbation    Nicotine dependence  Assessment & Plan  On nicotine patch    Essential hypertension  Assessment & Plan  On Lasix and spironolactone  Monitor vitals as per protocol    GERD without esophagitis  Assessment & Plan  On Protonix      Chief Complaint   Patient presents with   • Shortness of Breath     C/o of SOB, worse on exertion  Pt maintaining o2 sats above 94% on RA while ambulating           HPI:  Komal Cifuentes is a 60 y o  female with history of alcoholic cirrhosis of liver, anemia, thrombocytopenia, ductal carcinoma right breast, GERD, right pleural effusion presented to the emergency department after being sent from PCP office with complaint of worsening shortness of breath, weight gain  Patient had chest x-ray done as outpatient on March 23 which shows moderate pleural effusion  Patient states that she has been feeling shortness of breath on minimal exertion, orthopnea and has weight gain  Denies any nausea, vomiting, abdominal pain, chest pain, palpitation, fever, chills  In ER patient was noticed to have hyponatremia and chest x-ray revealed moderate pleural effusion  She was admitted for IR consult for thoracentesis and treatment of hyponatremia    Historical Information   Past Medical History:   Diagnosis Date   • Alcoholic fatty liver    • Anxiety    • Asthma    • COPD (chronic obstructive pulmonary disease) ([facility] [address] )    • Elevated liver function tests    • GERD (gastroesophageal reflux disease)    • GERD without esophagitis    • Hyperlipidemia    • Hypertension    • IBS (irritable bowel syndrome)    • Insomnia    • Insomnia    • Liver disease    • Nervousness    • Nicotine dependence    • Other specified urinary incontinence    • RLS (restless legs syndrome)    • Wears glasses      Past Surgical History:   Procedure Laterality Date   • BACK SURGERY     • 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   • BREAST EXCISIONAL BIOPSY Right 11/01/2004    benign   • CATARACT EXTRACTION, BILATERAL  08/01/2018   • COLONOSCOPY N/A 5/8/2019    Procedure: COLONOSCOPY;  Surgeon: Nikkie Rose MD;  Location: [facility] GI LAB; Service: Gastroenterology   • EGD AND COLONOSCOPY N/A 3/19/2018    Procedure: EGD AND COLONOSCOPY;  Surgeon: Nikkie Rose MD;  Location: [facility] GI LAB; Service: Gastroenterology   • ESOPHAGOGASTRODUODENOSCOPY N/A 5/8/2019    Procedure: ESOPHAGOGASTRODUODENOSCOPY (EGD); Surgeon: Nikkie Rose MD;  Location: [facility] GI LAB;   Service: Gastroenterology   • IR PARACENTESIS  11/27/2020   • IR PARACENTESIS  12/8/2020   • IR PARACENTESIS  10/28/2022   • IR PARACENTESIS  11/7/2022   • IR PARACENTESIS  11/30/2022   • IR PARACENTESIS  12/30/2022   • IR PARACENTESIS  1/16/2023   • IR THORACENTESIS  1/16/2023   • KNEE SURGERY     • KNEE SURGERY     • LUMBAR LAMINECTOMY  04/1999   • LYMPH NODE BIOPSY     • MAMMO STEREOTACTIC BREAST BIOPSY RIGHT (ALL INC) Right 5/21/2021   • MAMMO STEREOTACTIC BREAST BIOPSY RIGHT (ALL INC) EACH ADD Right 5/21/2021   • TOTAL ABDOMINAL HYSTERECTOMY  02/05/2008   • TUBAL LIGATION     • TUBAL LIGATION  1989   • UPPER GASTROINTESTINAL ENDOSCOPY       Social History   Social History     Substance and Sexual Activity   Alcohol Use Not Currently    Comment: not lately, non alcoholic beer     Social History     Substance and Sexual Activity   Drug Use No     Social History     Tobacco Use   Smoking Status Every Day   • Packs/day: 1 00   • Years: 45 00   • Pack years: 45 00   • Types: Cigarettes   • Start date: 1/1/1978   Smokeless Tobacco Never     Family History   Problem Relation Age of Onset   • Breast cancer Mother    • No Known Problems Father    • No Known Problems Sister    • No Known Problems Brother    • No Known Problems Son    • No Known Problems Maternal Grandmother    • No Known Problems Maternal Grandfather    • No Known Problems Paternal Grandmother    • No Known Problems Paternal Grandfather    • No Known Problems Maternal Aunt    • No Known Problems Maternal Aunt    • No Known Problems Paternal Aunt    • Substance Abuse Neg Hx    • Mental illness Neg Hx        Meds/Allergies   Allergies   Allergen Reactions   • Sulfa Antibiotics Shortness Of Breath   • Ace Inhibitors Cough and Other (See Comments)     coughing       Meds:    Current Facility-Administered Medications:   •  acetaminophen (TYLENOL) tablet 650 mg, 650 mg, Oral, Q6H PRN, Severa Chough, MD  •  albuterol (PROVENTIL HFA,VENTOLIN HFA) inhaler 2 puff, 2 puff, Inhalation, Q6H PRN, Severa Chough, MD  •  albuterol inhalation solution 2 5 mg, 2 5 mg, Nebulization, Q6H PRN, Severa Chough, MD  •  ALPRAZolam (XANAX) tablet 1 mg, 1 mg, Oral, BID PRN, Nadeem Tesfaye MD  •  [START ON 3/28/2023] anastrozole (ARIMIDEX) tablet 1 mg, 1 mg, Oral, Daily, Nadeem Tesfaye MD  •  [START ON 3/28/2023] furosemide (LASIX) tablet 40 mg, 40 mg, Oral, Daily, Nadeem Tesfaye MD  •  heparin (porcine) subcutaneous injection 5,000 Units, 5,000 Units, Subcutaneous, Q8H [address] **AND** Platelet count, , , Once, Nadeem Tesfaye MD  •  midodrine (PROAMATINE) tablet 5 mg, 5 mg, Oral, TID AC, Nadeem Tesfaye MD  •  [START ON 3/28/2023] nicotine (NICODERM CQ) 21 mg/24 hr TD 24 hr patch 1 patch, 1 patch, Transdermal, Daily, Nadeem Tesfaye MD  •  ondansetron (ZOFRAN) injection 4 mg, 4 mg, Intravenous, Q6H PRN, Nadeem Tesfaye MD  •  [START ON 3/28/2023] pantoprazole (PROTONIX) EC tablet 20 mg, 20 mg, Oral, Daily, Nadeem Tesfaye MD  •  [START ON 3/28/2023] spironolactone (ALDACTONE) tablet 100 mg, 100 mg, Oral, Daily, Nadeem Tesfaye MD  •  traZODone (DESYREL) tablet 50 mg, 50 mg, Oral, HS, Nadeem Tesfaye MD    Current Outpatient Medications:   •  albuterol (2 5 mg/3 mL) 0 083 % nebulizer solution, Take 3 mL (2 5 mg total) by nebulization every 6 (six) hours as needed for wheezing or shortness of breath, Disp: 180 mL, Rfl: 5  •  albuterol (Proventil HFA) 90 mcg/act inhaler, Inhale 2 puffs every 6 (six) hours as needed for wheezing or shortness of breath, Disp: 18 g, Rfl: 3  •  ALPRAZolam (XANAX) 1 mg tablet, Take 1 tablet (1 mg total) by mouth 2 (two) times a day as needed for anxiety, Disp: 60 tablet, Rfl: 0  •  anastrozole (ARIMIDEX) 1 mg tablet, Take 1 tablet (1 mg total) by mouth daily, Disp: 90 tablet, Rfl: 1  •  furosemide (LASIX) 40 mg tablet, Take 1 tablet (40 mg total) by mouth daily, Disp: 30 tablet, Rfl: 5  •  guselkumab (TREMFYA) subcutaneous injection, Inject 100 mg under the skin every 6 weeks, Disp: , Rfl:   •  lactulose 20 g/30 mL, Take 45 mL (30 g total) by mouth 2 (two) times a day Titrate dose for an effect of 3 soft, but formed bowel movements per day  You can start with 1 tablespoon per day and slowly increase to effect , Disp: 2700 mL, Rfl: 3  •  midodrine (PROAMATINE) 5 mg tablet, TAKE ONE AND ONE-HALF TABLETS BY MOUTH THREE TIMES A DAY BEFORE MEALS, Disp: 135 tablet, Rfl: 0  •  pantoprazole (PROTONIX) 20 mg tablet, Take 1 tablet (20 mg total) by mouth daily, Disp: 30 tablet, Rfl: 2  •  Promethazine-DM (PHENERGAN-DM) 6 25-15 mg/5 mL oral syrup, Take 5 mL by mouth 4 (four) times a day as needed for cough (Patient not taking: Reported on 3/27/2023), Disp: 118 mL, Rfl: 0  •  spironolactone (ALDACTONE) 100 mg tablet, Take 1 tablet (100 mg total) by mouth daily, Disp: 30 tablet, Rfl: 5  •  traZODone (DESYREL) 50 mg tablet, Take 1 tablet (50 mg total) by mouth daily at bedtime, Disp: 30 tablet, Rfl: 5    (Not in a hospital admission)        Review of Systems   Constitutional: Positive for activity change and fatigue  HENT: Negative  Eyes: Negative  Respiratory: Positive for shortness of breath  Cardiovascular: Negative  Gastrointestinal: Negative  Endocrine: Negative  Genitourinary: Negative  Musculoskeletal: Negative  Skin: Negative  Allergic/Immunologic: Negative  Neurological: Negative  Hematological: Negative  Psychiatric/Behavioral: Negative  Current Vitals:   Blood Pressure: 124/58 (03/27/23 1437)  Pulse: 86 (03/27/23 1437)  Temperature: (!) 97 1 °F (36 2 °C) (03/27/23 1437)  Temp Source: Temporal (03/27/23 1437)  Respirations: 22 (03/27/23 1437)  SpO2: 99 % (03/27/23 1437)  SPO2 RA Rest    Flowsheet Row ED from 3/27/2023 in  [address] Emergency Department   SpO2 99 %   SpO2 Activity At Rest   O2 Device None (Room air)   O2 Flow Rate --        No intake or output data in the 24 hours ending 03/27/23 1812  There is no height or weight on file to calculate BMI  Physical Exam  Vitals and nursing note reviewed  Constitutional:       General: She is not in acute distress  Appearance: She is well-developed  HENT:      Head: Normocephalic and atraumatic  Nose: Nose normal    Eyes:      General: No scleral icterus  Conjunctiva/sclera: Conjunctivae normal       Pupils: Pupils are equal, round, and reactive to light  Neck:      Thyroid: No thyromegaly  Vascular: No JVD  Trachea: No tracheal deviation  Cardiovascular:      Rate and Rhythm: Normal rate and regular rhythm  Heart sounds: Normal heart sounds  No murmur heard  Pulmonary:      Effort: Respiratory distress present  Breath sounds: Normal breath sounds  No wheezing or rales  Chest:      Chest wall: No tenderness  Abdominal:      General: Bowel sounds are normal  There is no distension  Palpations: Abdomen is soft  There is no mass  Tenderness: There is no abdominal tenderness  There is no guarding or rebound  Musculoskeletal:         General: No tenderness or deformity  Normal range of motion  Cervical back: Normal range of motion and neck supple  Lymphadenopathy:      Cervical: No cervical adenopathy  Skin:     General: Skin is warm  Coloration: Skin is not pale  Findings: No erythema or rash  Neurological:      General: No focal deficit present  Mental Status: She is alert and oriented to person, place, and time  Mental status is at baseline  Cranial Nerves: No cranial nerve deficit  Coordination: Coordination normal    Psychiatric:         Behavior: Behavior normal          Thought Content:  Thought content normal          Judgment: Judgment normal          Lab Results:   CBC:   Lab Results   Component Value Date    WBC 5 41 03/27/2023    HGB 12 0 03/27/2023    HCT 34 6 (L) 03/27/2023    MCV 93 03/27/2023     (L) 03/27/2023    MCH 32 3 03/27/2023    MCHC 34 7 03/27/2023    RDW 14 5 03/27/2023    MPV 9 6 03/27/2023    NRBC 0 03/27/2023     CMP:  Lab Results   Component Value Date     07/28/2017    CL 96 03/27/2023     02/09/2023    CO2 26 03/27/2023    CO2 23 02/09/2023    ANIONGAP 12 05/03/2015    BUN 16 03/27/2023    BUN 22 02/09/2023    CREATININE 0 91 03/27/2023    CREATININE 0 69 07/28/2017    GLUCOSE 95 07/28/2017    CALCIUM 8 3 (L) 03/27/2023    CALCIUM 9 2 07/28/2017    AST 38 03/27/2023    AST 49 (H) 02/09/2023    ALT 21 03/27/2023    ALT 28 02/09/2023    ALKPHOS 133 (H) 03/27/2023    ALKPHOS 106 07/28/2017    PROT 7 4 07/28/2017    BILITOT 0 6 07/28/2017    EGFR 67 03/27/2023     Lab Results   Component Value Date    TROPONINI <0 02 02/19/2021     Coagulation:   Lab Results   Component Value Date    INR 1 3 (H) 02/09/2023    INR 1 46 (H) 01/16/2023    INR 1 0 02/16/2016    Urinalysis:  Lab Results   Component Value Date    COLORU Yellow 02/02/2023    COLORU Yellow 11/10/2017    CLARITYU Clear 02/02/2023    CLARITYU Cloudy 11/10/2017    SPECGRAV 1 025 02/02/2023    SPECGRAV 1 005 11/10/2017    PHUR 5 5 02/02/2023    PHUR 5 0 11/10/2017    LEUKOCYTESUR Negative 02/02/2023    LEUKOCYTESUR neg 11/10/2017    NITRITE Negative 02/02/2023    NITRITE neg 11/10/2017    PROTEINUA neg 11/10/2017    GLUCOSEU Negative 02/02/2023    KETONESU Negative 02/02/2023    KETONESU neg 11/10/2017    BILIRUBINUR Negative 02/02/2023    BILIRUBINUR neg 11/10/2017    BLOODU Negative 02/02/2023    BLOODU large 11/10/2017      Amylase: No results found for: AMYLASE  Lipase:   Lab Results   Component Value Date    LIPASE 173 01/16/2023        Imaging: XR chest pa & lateral    Result Date: 3/27/2023  Narrative: CHEST INDICATION:   sob  COMPARISON:  3/23/2023 EXAM PERFORMED/VIEWS:  XR CHEST PA & LATERAL FINDINGS: Cardiomediastinal silhouette appears unremarkable  No significant change in moderate size right pleural effusion  No pneumothorax  No focal consolidation  Right breast surgical clips  Osseous structures appear within normal limits for patient age  Impression: No significant change in moderate size right pleural effusion   Workstation performed: [id number]     XR chest pa & lateral    Result Date: 3/25/2023  Narrative: CHEST INDICATION:   K70 31: Alcoholic cirrhosis of liver with ascites K76 9: Liver disease, unspecified J91 8: Pleural effusion in other conditions classified elsewhere  COMPARISON:  CXR 2/2/2023, chest CT 2/27/2023  EXAM PERFORMED/VIEWS:  XR CHEST PA & LATERAL  DUAL ENERGY SUBTRACTION  FINDINGS: Cardiomediastinal silhouette normal  Moderate right effusion and right base atelectasis  No pneumothorax  Clips in the right chest wall  Upper abdomen normal  Bones normal for age  Impression: Moderate right effusion and right base atelectasis  Workstation performed: [id number]     CT chest wo contrast    Result Date: 3/6/2023  Narrative: CT CHEST WITHOUT IV CONTRAST INDICATION:   R93 89: Abnormal findings on diagnostic imaging of other specified body structures  COMPARISON:  CT chest abdomen pelvis 1/16/2023 TECHNIQUE: CT examination of the chest was performed without intravenous contrast  Axial, sagittal, and coronal 2D reformatted images were created from the source data and submitted for interpretation  Radiation dose length product (DLP) for this visit:  241 45 mGy-cm   This examination, like all CT scans performed in the [facility], was performed utilizing techniques to minimize radiation dose exposure, including the use of iterative  reconstruction and automated exposure control  FINDINGS: LUNGS:  Mild residual subsegmental atelectasis at the right lung base  There is mild patchy groundglass density in the posterior right lower lobe  Lungs are otherwise clear  Mild emphysema  Central airways are patent  PLEURA:  Small right pleural effusion, much decreased from prior  HEART/GREAT VESSELS: Heart size normal   Coronary artery calcification  No thoracic aortic aneurysm  MEDIASTINUM AND DIEGO:  Unremarkable  CHEST WALL AND LOWER NECK:  Postoperative changes in the right breast including a small seroma  "Mild skin thickening of the right breast  VISUALIZED STRUCTURES IN THE UPPER ABDOMEN:  Cirrhotic liver  Recanalized umbilical vein  OSSEOUS STRUCTURES:  No acute fracture or destructive osseous lesion  Impression: Decrease in size of right pleural effusion and improved subsegmental atelectasis at the right lung base  There is mild patchy groundglass opacity in the posterior right lower lobe  Suggest attention to this on follow-up to ensure complete resolution  Stable postoperative changes and skin thickening of right breast  Cirrhotic liver  Workstation performed: [id number]     EKG, Pathology, and Other Studies: I have personally reviewed the results  VTE Pharmacologic Prophylaxis: Heparin  VTE Mechanical Prophylaxis: sequential compression device    Code Status: Level 1 - Full Code    Anticipated Length of Stay:  Patient will be admitted on an Observation basis with an anticipated length of stay of less than 2 midnights  Counseling / Coordination of Care  Total floor / unit time spent today 75 minutes  Greater than 50% of total time was spent with the patient and / or family counseling and / or coordination of care       \"This note has been constructed using a voice recognition system\"      Queta Schumacher MD  3/27/2023, 6:12 PM            "

## 2025-06-09 ENCOUNTER — APPOINTMENT (OUTPATIENT)
Dept: RADIOLOGY | Facility: CLINIC | Age: 65
End: 2025-06-09
Attending: ORTHOPAEDIC SURGERY
Payer: MEDICARE

## 2025-06-09 ENCOUNTER — OFFICE VISIT (OUTPATIENT)
Dept: OBGYN CLINIC | Facility: CLINIC | Age: 65
End: 2025-06-09
Payer: MEDICARE

## 2025-06-09 VITALS — HEIGHT: 64 IN | WEIGHT: 133 LBS | BODY MASS INDEX: 22.71 KG/M2

## 2025-06-09 DIAGNOSIS — M25.50 ARTHRALGIA OF MULTIPLE SITES: Primary | ICD-10-CM

## 2025-06-09 DIAGNOSIS — S62.306A UNSPECIFIED FRACTURE OF FIFTH METACARPAL BONE, RIGHT HAND, INITIAL ENCOUNTER FOR CLOSED FRACTURE: Primary | ICD-10-CM

## 2025-06-09 DIAGNOSIS — S62.306A UNSPECIFIED FRACTURE OF FIFTH METACARPAL BONE, RIGHT HAND, INITIAL ENCOUNTER FOR CLOSED FRACTURE: ICD-10-CM

## 2025-06-09 PROCEDURE — 99213 OFFICE O/P EST LOW 20 MIN: CPT | Performed by: ORTHOPAEDIC SURGERY

## 2025-06-09 PROCEDURE — 73130 X-RAY EXAM OF HAND: CPT

## 2025-06-09 NOTE — PROGRESS NOTES
"    ORTHOPAEDIC HAND, WRIST, AND ELBOW OFFICE  VISIT     Name: Jose White      : 1960      MRN: [id number]  Encounter Provider: Zackery Alan MD  Encounter Date: 2025   Encounter department: [facility]  :  Assessment & Plan  Unspecified fracture of fifth metacarpal bone, right hand, initial encounter for closed fracture  Patient is doing well at this point in time.  Begin therapy for range of motion exercises.  Patient also with some numbness and tingling in the right hand that we will address at the time of therapy.  No formal restrictions.  Follow-up 4 to 6 weeks repeat x-rays right hand.  Orders:  •  XR hand 3+ vw right; Future  •  Ambulatory referral to PT/OT hand therapy; Future          General Discussions:       Operative Discussions:       History of Present Illness   HPI  Chief Complaint   Patient presents with   • Right Hand - Fracture       Jose White is a 65 y.o. female who presents for evaluation right hand small finger metacarpal fracture.  Patient is doing well.  Minimal discomfort.  No fevers chills constitutional symptoms.  She has been compliant with her splint.      REVIEW OF SYSTEMS:  General: no fever, no chills  HEENT:  No loss of hearing or eyesight problems  Eyes:  No red eyes  Respiratory:  No coughing, shortness of breath or wheezing  Cardiovascular:  No chest pain, no palpitations  GI:  Abdomen soft nontender, denies nausea  Endocrine:  No muscle weakness, no frequent urination, no excessive thirst  Urinary:  No dysuria, no incontinence  Musculoskeletal: see HPI and PE  SKIN:  No skin rash, no dry skin  Neurological:  No headaches, no confusion  Psychiatric:  No suicide thoughts, no anxiety, no depression  Review of all other systems is negative    Objective   Ht 5' 4\" (1.626 m)   Wt 60.3 kg (133 lb)   BMI 22.83 kg/m²      General: well developed and well nourished, alert, oriented times 3, and appears " comfortable  Psychiatric: Normal  HEENT: Trachea Midline, No torticollis  Cardiovascular: No discernable arrhythmia  Pulmonary: No wheezing or stridor  Abdomen: No rebound or guarding  Extremities: No peripheral edema  Skin: No masses, erythema, lacerations, fluctation, ulcerations  Neurovascular: Sensation Intact to the Median, Ulnar, Radial Nerve, Motor Intact to the Median, Ulnar, Radial Nerve, and Pulses Intact    Musculoskeletal exam:  Right hand today 70% composite fist formation, no under lapping or overlapping.  No tenderness over the fracture site.  No evidence of atrophy.  No extensor tendon subluxation.      STUDIES REVIEWED:  AP, lateral, and oblique x-rays of the right hand were reviewed by myself and demonstrates healing small finger metacarpal fracture with no change in angulation or shortening.      PROCEDURES PERFORMED:  Procedures  No Procedures performed today

## 2025-06-09 NOTE — TELEPHONE ENCOUNTER
Placed call to pt spouse and advised message below - pt will d/c lasix and monitor fluid - pt would like to know how much to increase aldactone dosing to. Pt will have lab work done through [facility] and  will see IR this week. Pt spouse advised Dr. Herrera is no longer in the network         I am ok with increasing aldactone dosing and d/c lasix.

## 2025-06-09 NOTE — ASSESSMENT & PLAN NOTE
Patient is doing well at this point in time.  Begin therapy for range of motion exercises.  Patient also with some numbness and tingling in the right hand that we will address at the time of therapy.  No formal restrictions.  Follow-up 4 to 6 weeks repeat x-rays right hand.  Orders:  •  XR hand 3+ vw right; Future  •  Ambulatory referral to PT/OT hand therapy; Future

## 2025-06-10 ENCOUNTER — TELEPHONE (OUTPATIENT)
Age: 65
End: 2025-06-10

## 2025-06-10 NOTE — TELEPHONE ENCOUNTER
"Patient calling in regard to spironolactone (ALDACTONE) 25 mg tablet    States \"I was told to increase it to 2 pills and I am going to run out of my prescription so can you send one to [facility] for the increase dose cause I only have like 3 days left\"    Please advise  "

## 2025-06-10 NOTE — TELEPHONE ENCOUNTER
Placed call to pt spouse Ulises and relayed message below - pt will have lab work done next week and pt will monitor BP with increase of aldactone    She may increase aldactone to 50mg daily, d/c lasix, and should not take potassium supplementation. She should monitor BP on this and repeat BMP.

## 2025-06-11 DIAGNOSIS — K70.31 ALCOHOLIC CIRRHOSIS OF LIVER WITH ASCITES (HCC): Chronic | ICD-10-CM

## 2025-06-11 DIAGNOSIS — E87.1 HYPONATREMIA: ICD-10-CM

## 2025-06-11 RX ORDER — SPIRONOLACTONE 50 MG/1
50 TABLET, FILM COATED ORAL DAILY
Qty: 90 TABLET | Refills: 1 | Status: SHIPPED | OUTPATIENT
Start: 2025-06-11

## 2025-06-11 NOTE — TELEPHONE ENCOUNTER
Placed call to pt to relay message below -tried 2x stated call cannot be completed at this time - will send message via my chart     Lasix and potassium discontinued from med list.  Aldactone increased to 50mg daily and sent to pharmacy.

## 2025-06-12 ENCOUNTER — APPOINTMENT (OUTPATIENT)
Dept: INTERVENTIONAL RADIOLOGY/VASCULAR | Facility: HOSPITAL | Age: 65
End: 2025-06-12
Attending: INTERNAL MEDICINE
Payer: MEDICARE

## 2025-06-12 ENCOUNTER — HOSPITAL ENCOUNTER (OUTPATIENT)
Dept: INTERVENTIONAL RADIOLOGY/VASCULAR | Facility: HOSPITAL | Age: 65
Discharge: HOME | End: 2025-06-12
Attending: INTERNAL MEDICINE
Payer: MEDICARE

## 2025-06-12 VITALS
DIASTOLIC BLOOD PRESSURE: 56 MMHG | OXYGEN SATURATION: 98 % | RESPIRATION RATE: 18 BRPM | HEART RATE: 81 BPM | SYSTOLIC BLOOD PRESSURE: 111 MMHG

## 2025-06-12 DIAGNOSIS — J91.8 PLEURAL EFFUSION ASSOCIATED WITH HEPATIC DISORDER: ICD-10-CM

## 2025-06-12 DIAGNOSIS — K76.9 PLEURAL EFFUSION ASSOCIATED WITH HEPATIC DISORDER: ICD-10-CM

## 2025-06-12 DIAGNOSIS — K70.31 ALCOHOLIC CIRRHOSIS OF LIVER WITH ASCITES (HCC): ICD-10-CM

## 2025-06-12 PROCEDURE — 49083 ABD PARACENTESIS W/IMAGING: CPT | Performed by: RADIOLOGY

## 2025-06-12 PROCEDURE — 49083 ABD PARACENTESIS W/IMAGING: CPT

## 2025-06-12 RX ORDER — LIDOCAINE HYDROCHLORIDE 10 MG/ML
INJECTION, SOLUTION EPIDURAL; INFILTRATION; INTRACAUDAL; PERINEURAL AS NEEDED
Status: COMPLETED | OUTPATIENT
Start: 2025-06-12 | End: 2025-06-12

## 2025-06-12 RX ADMIN — LIDOCAINE HYDROCHLORIDE 7 ML: 10 INJECTION, SOLUTION EPIDURAL; INFILTRATION; INTRACAUDAL at 10:50

## 2025-06-12 NOTE — DISCHARGE INSTRUCTIONS
Abdominal Paracentesis     WHAT YOU NEED TO KNOW:   Abdominal paracentesis is a procedure to remove abnormal fluid buildup in your abdomen. Fluid builds up because of liver problems, such as swelling and scarring. Heart failure, kidney disease, a mass, or problems with your pancreas may also cause fluid buildup.     DISCHARGE INSTRUCTIONS:     Follow up with your healthcare provider as directed: Write down your questions so you remember to ask them during your visits.     Wound care: Remove dressing after 24 hours. Leave glue in place.    Return to your normal activities    Contact Interventional Radiology at [phone] ([facility] PATIENTS: Contact Interventional Radiology at [phone]) ([facility] PATIENTS: Contact Interventional Radiology at [phone]) if:  You have a fever and your wound is red and swollen.   You have yellow, green, or bad-smelling discharge coming from your wound.   You have pain or swelling in your abdomen.   You have an upset stomach or you vomit.   You have sudden, sharp pain in your abdomen.   You urinate very little or not at all.   You feel confused and more tired than usual.   Your arm or leg feels warm, tender, and painful. It may look swollen and red.   You suddenly feel lightheaded and have trouble breathing.

## 2025-06-17 ENCOUNTER — EVALUATION (OUTPATIENT)
Dept: OCCUPATIONAL THERAPY | Facility: CLINIC | Age: 65
End: 2025-06-17
Attending: ORTHOPAEDIC SURGERY
Payer: MEDICARE

## 2025-06-17 DIAGNOSIS — S62.306A UNSPECIFIED FRACTURE OF FIFTH METACARPAL BONE, RIGHT HAND, INITIAL ENCOUNTER FOR CLOSED FRACTURE: ICD-10-CM

## 2025-06-17 DIAGNOSIS — K70.31 ALCOHOLIC CIRRHOSIS OF LIVER WITH ASCITES (HCC): Primary | ICD-10-CM

## 2025-06-17 PROCEDURE — 97110 THERAPEUTIC EXERCISES: CPT

## 2025-06-17 PROCEDURE — 97165 OT EVAL LOW COMPLEX 30 MIN: CPT

## 2025-06-17 RX ORDER — ALBUMIN (HUMAN) 12.5 G/50ML
75 SOLUTION INTRAVENOUS ONCE AS NEEDED
Status: CANCELLED | OUTPATIENT
Start: 2025-06-19

## 2025-06-17 RX ORDER — SODIUM CHLORIDE 9 MG/ML
20 INJECTION, SOLUTION INTRAVENOUS ONCE
Status: CANCELLED | OUTPATIENT
Start: 2025-06-19

## 2025-06-17 RX ORDER — ALBUMIN (HUMAN) 12.5 G/50ML
100 SOLUTION INTRAVENOUS ONCE AS NEEDED
Status: CANCELLED | OUTPATIENT
Start: 2025-06-19

## 2025-06-17 RX ORDER — ALBUMIN (HUMAN) 12.5 G/50ML
50 SOLUTION INTRAVENOUS ONCE AS NEEDED
Status: CANCELLED | OUTPATIENT
Start: 2025-06-19

## 2025-06-17 NOTE — PROGRESS NOTES
OT Evaluation     Today's date: 2025  Patient name: Jose White  : 1960  MRN: [id number]  Referring provider: Zackery Alan MD  Dx:   Encounter Diagnosis     ICD-10-CM    1. Unspecified fracture of fifth metacarpal bone, right hand, initial encounter for closed fracture  S62.306A Ambulatory referral to PT/OT hand therapy                     Assessment  Impairments: abnormal coordination, abnormal or restricted ROM, activity intolerance, impaired physical strength, lacks appropriate home exercise program, pain with function and weight-bearing intolerance  Other impairment: Paresthesia thumb, ring, small  Functional limitations: Minimal use of the right hand for self care and light IADLs  Symptom irritability: moderate    Assessment details: Jose White is a 65 y.o., Right HD female referred to hand therapy for a right 5th MC fx and paresthesia right thumb.  Onset of injury 25 due to being knocked down by automatic doors.  Patient presents 25 with impaired ROM, strength, and sensation of the right hand.  Deficits also noted in pain and functional use of the right UE. Patient has been instructed to wear her brace out of the home, but remove it in the home and begin to use the RUE for self care tasks. Patient is a good candidate for OT services to abolish pain and edema and restore ROM, strength, coordination, and sensation for a return to independence in daily tasks.    Barriers to therapy: Fall risk; hx R breast cancer; hypotension, COPD, anxiety  Understanding of Dx/Px/POC: excellent     Prognosis: good    Goals  STGs (4 weeks)  Patient will be independent in HEP of ROM, nerve glides, edema management  Patient will report an average pain level of 4/10  Patient will demonstrate 30 degree improvement in YIP of the digits  Patient will demonstrate active shoulder flexion and abduction to 120  Patient will use the RUE for all self care tasks  LTGs (12 weeks)  Patient will  demonstrate independence in a HEP to maintain ROM, strength, and function at discharge  Patient will report an average pain level of 1-2/10 to be independent in daily tasks  Patient will demonstrate YIP of the digits to WNL  to be independent in self care tasks  Patient will demonstrate AROM of the wrist and forearm WFL to be independent in self care tasks  Patient will demonstrate 5/5 muscle strength in the wrist and forearm to be MI for meal prep  Patient will demonstrate right hand strength within 30% of the left hand to be MI for IADL tasks  Patient will demonstrate resolution of paresthesia in the right hand  Patient will meet functional outcome goals on FOTO      Plan  Patient would benefit from: skilled occupational therapy and custom splinting  Planned modality interventions: ultrasound, thermotherapy: hydrocollator packs and cryotherapy    Planned therapy interventions: activity modification, compression, fine motor coordination training, graded activity, graded exercise, home exercise program, therapeutic exercise, therapeutic activities, stretching, strengthening, patient education, orthotic fitting/training, neuromuscular re-education, manual therapy, IASTM, joint mobilization, kinesiology taping, massage and nerve gliding    Frequency: 1x week  Duration in weeks: 12  Plan of Care beginning date: 6/17/2025  Plan of Care expiration date: 9/12/2025  Treatment plan discussed with: patient and family        Subjective Evaluation    History of Present Illness  Date of onset: 5/1/2025  Mechanism of injury: trauma  Mechanism of injury: Per medical record: Pt was getting her paracentesis done on 5/1/25. Patient reports that she injured her right hand when she was walking through automatic doors and was bumped by the door as it was closing causing her to fall to the ground.  Patient reports that she was not evaluated at that time.  Pt went to the ED on 5/8/25 due to persistent pain. X rays taken that date  indicated fracture of the right 5th metacarpal bone. She was placed in a splint. Follow up with Dr. Alan in orthopedics on 25. He put her in a wrist splint and had her gustavo tape her ring and small fingers. Patient now presents for OT evaluation and treatment.  Patient Goals  Patient goals for therapy: decreased edema, decreased pain, increased motion, increased strength and independence with ADLs/IADLs  Patient goal: Be able to bathe  Pain  Current pain ratin  At best pain ratin  At worst pain ratin  Location: Right 5th MC and wrist  Quality: sharp  Relieving factors: rest and support  Aggravating factors: nothing  Progression: improved    Social Support  Lives with: spouse    Employment status: working (Nurses aide)  Hand dominance: right      Diagnostic Tests  X-ray: abnormal (Healing fx)  Treatments  Previous treatment: immobilization      Objective     Observations     Right Wrist/Hand   Positive for atrophy, edema and Wartenberg's sign.     Additional Observation Details  25: Atrophy in thumb adductor and hypothenar eminence    Tenderness     Additional Tenderness Details  25: Tender at fx site    Neurological Testing     Sensation     Wrist/Hand     Right   Diminished: light touch    Additional Neurological Details  25: Thumb, ring, small diminished protective sensation ( 4.31), WNL index, middle    Active Range of Motion     Right Shoulder   Flexion: 110 degrees   Abduction: 90 degrees   External rotation BTH: WFL  Internal rotation BTB: WFL    Right Elbow   Normal active range of motion  Flexion: WFL  Extension: WFL  Forearm supination: WFL  Forearm pronation: WFL    Right Wrist   Normal active range of motion    Right Thumb   Opposition: 25: WFL  Kapandji score: 10 degrees    Right Digits   Flexion   Index     MCP: 70    PIP: 95    DIP: 60  Middle     MCP: 80    PIP: 105    DIP: 60  Ring     MCP: 70    PIP: 95    DIP: 65  Little     MCP: 55    PIP: 95    DIP:  65  Index: 225 degrees  Middle: 245 degrees  Rin degrees  Small: 215 degrees    Tests     Right Elbow   Negative elbow flexion and Tinel's sign (cubital tunnel).     Right Wrist/Hand   Positive Tinel's sign (medial nerve).     Swelling     Left Wrist/Hand   Little     Proximal: 4.5 cm  Circumference MCP: 19.5 cm    Right Wrist/Hand   Little     Proximal: 4.5 cm  Circumference MCP: 19.5 cm           Precautions: DOI 25. Work ROM and paresthesia per MD. Hypotension, COPD, Anxiety; Hx R breast cancer     POC expires Unit limit Auth  expiration date PT/OT + Visit Limit?   25 BOMN NA BOMN                 Visit/Unit Tracking  AUTH Status:  Date  IE              RE due 25 Used 1               Remaining                     Date 25       HEP MNG; TGE; AROM wrist; wall slides shldr flex, abd       Manuals        Edema massage                                Neuro Re-Ed         MNG distal x10                                                       Ther Ex        TGE x10       AROM wrist all motions X10 ea       Wrist maze        Digit abd/add w/ foam        Towel crunches        AROM shloulder flex, abd w/ dowel        Wall walking shldr flex, abd X5 ea               Ther Activity        Opposition        Translation                        Modalities        MHP 5' pre TE

## 2025-06-19 ENCOUNTER — APPOINTMENT (OUTPATIENT)
Dept: INTERVENTIONAL RADIOLOGY/VASCULAR | Facility: HOSPITAL | Age: 65
End: 2025-06-19
Attending: INTERNAL MEDICINE
Payer: MEDICARE

## 2025-06-19 ENCOUNTER — HOSPITAL ENCOUNTER (OUTPATIENT)
Dept: INTERVENTIONAL RADIOLOGY/VASCULAR | Facility: HOSPITAL | Age: 65
Discharge: HOME | End: 2025-06-19
Attending: INTERNAL MEDICINE
Payer: MEDICARE

## 2025-06-19 ENCOUNTER — HOSPITAL ENCOUNTER (OUTPATIENT)
Dept: INFUSION CENTER | Facility: HOSPITAL | Age: 65
Discharge: HOME | End: 2025-06-19
Attending: INTERNAL MEDICINE
Payer: MEDICARE

## 2025-06-19 VITALS
TEMPERATURE: 97.6 F | SYSTOLIC BLOOD PRESSURE: 143 MMHG | RESPIRATION RATE: 18 BRPM | HEART RATE: 85 BPM | DIASTOLIC BLOOD PRESSURE: 60 MMHG

## 2025-06-19 VITALS
HEART RATE: 80 BPM | DIASTOLIC BLOOD PRESSURE: 58 MMHG | OXYGEN SATURATION: 98 % | RESPIRATION RATE: 18 BRPM | SYSTOLIC BLOOD PRESSURE: 124 MMHG

## 2025-06-19 DIAGNOSIS — K70.31 ALCOHOLIC CIRRHOSIS OF LIVER WITH ASCITES (HCC): Primary | ICD-10-CM

## 2025-06-19 DIAGNOSIS — J91.8 PLEURAL EFFUSION ASSOCIATED WITH HEPATIC DISORDER: ICD-10-CM

## 2025-06-19 DIAGNOSIS — K76.9 PLEURAL EFFUSION ASSOCIATED WITH HEPATIC DISORDER: ICD-10-CM

## 2025-06-19 DIAGNOSIS — K70.31 ALCOHOLIC CIRRHOSIS OF LIVER WITH ASCITES (HCC): ICD-10-CM

## 2025-06-19 PROCEDURE — 49083 ABD PARACENTESIS W/IMAGING: CPT

## 2025-06-19 RX ORDER — SODIUM CHLORIDE 9 MG/ML
20 INJECTION, SOLUTION INTRAVENOUS ONCE
OUTPATIENT
Start: 2025-06-26

## 2025-06-19 RX ORDER — ALBUMIN (HUMAN) 12.5 G/50ML
50 SOLUTION INTRAVENOUS ONCE AS NEEDED
OUTPATIENT
Start: 2025-06-26

## 2025-06-19 RX ORDER — ALBUMIN (HUMAN) 12.5 G/50ML
75 SOLUTION INTRAVENOUS ONCE AS NEEDED
OUTPATIENT
Start: 2025-06-26

## 2025-06-19 RX ORDER — LIDOCAINE HYDROCHLORIDE 10 MG/ML
INJECTION, SOLUTION EPIDURAL; INFILTRATION; INTRACAUDAL; PERINEURAL AS NEEDED
Status: COMPLETED | OUTPATIENT
Start: 2025-06-19 | End: 2025-06-19

## 2025-06-19 RX ORDER — SODIUM CHLORIDE 9 MG/ML
20 INJECTION, SOLUTION INTRAVENOUS ONCE
Status: COMPLETED | OUTPATIENT
Start: 2025-06-19 | End: 2025-06-19

## 2025-06-19 RX ORDER — ALBUMIN (HUMAN) 12.5 G/50ML
50 SOLUTION INTRAVENOUS ONCE AS NEEDED
Status: DISCONTINUED | OUTPATIENT
Start: 2025-06-19 | End: 2025-06-22 | Stop reason: HOSPADM

## 2025-06-19 RX ORDER — ALBUMIN (HUMAN) 12.5 G/50ML
100 SOLUTION INTRAVENOUS ONCE AS NEEDED
OUTPATIENT
Start: 2025-06-26

## 2025-06-19 RX ADMIN — LIDOCAINE HYDROCHLORIDE 10 ML: 10 INJECTION, SOLUTION EPIDURAL; INFILTRATION; INTRACAUDAL at 11:21

## 2025-06-19 RX ADMIN — ALBUMIN (HUMAN) 50 G: 0.25 INJECTION, SOLUTION INTRAVENOUS at 12:20

## 2025-06-19 RX ADMIN — SODIUM CHLORIDE 20 ML/HR: 0.9 INJECTION, SOLUTION INTRAVENOUS at 12:21

## 2025-06-19 NOTE — PROGRESS NOTES
Jose White  tolerated treatment well with no complications.      Jose White is aware of future appt on 7/10 at 12noon.     AVS printed and given to Jose White:  No (Declined by Jose White)

## 2025-06-19 NOTE — DISCHARGE INSTRUCTIONS

## 2025-06-20 ENCOUNTER — TELEPHONE (OUTPATIENT)
Dept: GASTROENTEROLOGY | Facility: CLINIC | Age: 65
End: 2025-06-20

## 2025-06-20 LAB
B BURGDOR IGG SER IA-ACNC: 0.99 INDEX
B BURGDOR IGG+IGM SER QL IA: 1 INDEX
B BURGDOR IGM SER IA-ACNC: 1.41 INDEX

## 2025-06-20 NOTE — TELEPHONE ENCOUNTER
Procedure confirmed: Endoscopy    Via: Spoke with Patient    Instructions given: Given to Patient at Visit    Prep Given: N/A    Provider: Dr. Pete Stevenson    Date: 7-1-25    Location: [place] [address]    Medication holds: No      Diabetic: No    Call the office if there are any questions.       [phone]

## 2025-06-23 ENCOUNTER — TELEPHONE (OUTPATIENT)
Age: 65
End: 2025-06-23

## 2025-06-23 ENCOUNTER — RESULTS FOLLOW-UP (OUTPATIENT)
Dept: FAMILY MEDICINE CLINIC | Facility: CLINIC | Age: 65
End: 2025-06-23

## 2025-06-23 DIAGNOSIS — R76.8 POSITIVE LYME DISEASE SEROLOGY: Primary | ICD-10-CM

## 2025-06-23 RX ORDER — DOXYCYCLINE HYCLATE 100 MG
100 TABLET ORAL 2 TIMES DAILY
Qty: 28 TABLET | Refills: 0 | Status: SHIPPED | OUTPATIENT
Start: 2025-06-23 | End: 2025-07-07

## 2025-06-23 NOTE — TELEPHONE ENCOUNTER
Pt called stating she saw in her MyChart that her BW results were positive for Lymes. I relayed results to patient as per provider message. Pt will obtain Rx for abx from her pharmacy. Pt asking if she needs f/u labs or appt after course of abx is completed. Please call pt to advise, thank you.

## 2025-06-26 ENCOUNTER — APPOINTMENT (OUTPATIENT)
Dept: INTERVENTIONAL RADIOLOGY/VASCULAR | Facility: HOSPITAL | Age: 65
End: 2025-06-26
Attending: INTERNAL MEDICINE
Payer: MEDICARE

## 2025-06-26 ENCOUNTER — HOSPITAL ENCOUNTER (OUTPATIENT)
Dept: INTERVENTIONAL RADIOLOGY/VASCULAR | Facility: HOSPITAL | Age: 65
Discharge: HOME | End: 2025-06-26
Attending: INTERNAL MEDICINE
Payer: MEDICARE

## 2025-06-26 VITALS
HEART RATE: 79 BPM | SYSTOLIC BLOOD PRESSURE: 126 MMHG | RESPIRATION RATE: 17 BRPM | OXYGEN SATURATION: 97 % | DIASTOLIC BLOOD PRESSURE: 60 MMHG

## 2025-06-26 DIAGNOSIS — J91.8 PLEURAL EFFUSION ASSOCIATED WITH HEPATIC DISORDER: ICD-10-CM

## 2025-06-26 DIAGNOSIS — K70.31 ALCOHOLIC CIRRHOSIS OF LIVER WITH ASCITES (HCC): ICD-10-CM

## 2025-06-26 DIAGNOSIS — K76.9 PLEURAL EFFUSION ASSOCIATED WITH HEPATIC DISORDER: ICD-10-CM

## 2025-06-26 PROCEDURE — 49083 ABD PARACENTESIS W/IMAGING: CPT

## 2025-06-26 RX ORDER — LIDOCAINE HYDROCHLORIDE 10 MG/ML
INJECTION, SOLUTION EPIDURAL; INFILTRATION; INTRACAUDAL; PERINEURAL AS NEEDED
Status: COMPLETED | OUTPATIENT
Start: 2025-06-26 | End: 2025-06-26

## 2025-06-26 RX ADMIN — LIDOCAINE HYDROCHLORIDE 8 ML: 10 INJECTION, SOLUTION EPIDURAL; INFILTRATION; INTRACAUDAL at 11:20

## 2025-06-26 NOTE — DISCHARGE INSTRUCTIONS
Abdominal Paracentesis     WHAT YOU NEED TO KNOW:   Abdominal paracentesis is a procedure to remove abnormal fluid buildup in your abdomen. Fluid builds up because of liver problems, such as swelling and scarring. Heart failure, kidney disease, a mass, or problems with your pancreas may also cause fluid buildup.     DISCHARGE INSTRUCTIONS:     Follow up with your healthcare provider as directed: Write down your questions so you remember to ask them during your visits.     Wound care: Remove dressing after 24 hours. Leave glue in place.    Return to your normal activities    Contact Interventional Radiology at [phone] (YOGESH PATIENTS: Contact Interventional Radiology at [phone]) (MARGARITA PATIENTS: Contact Interventional Radiology at [phone]) if:  You have a fever and your wound is red and swollen.   You have yellow, green, or bad-smelling discharge coming from your wound.   You have pain or swelling in your abdomen.   You have an upset stomach or you vomit.   You have sudden, sharp pain in your abdomen.   You urinate very little or not at all.   You feel confused and more tired than usual.   Your arm or leg feels warm, tender, and painful. It may look swollen and red.   You suddenly feel lightheaded and have trouble breathing.

## 2025-06-26 NOTE — SEDATION DOCUMENTATION
Weekly therapeutic paracentesis yielded 5650ml clear yellow fluid. Band aid to site. Patient tolerated well. AVS reviewed and home with her  in her usual state of health.

## 2025-06-30 ENCOUNTER — OFFICE VISIT (OUTPATIENT)
Dept: OCCUPATIONAL THERAPY | Facility: CLINIC | Age: 65
End: 2025-06-30
Attending: ORTHOPAEDIC SURGERY
Payer: MEDICARE

## 2025-06-30 ENCOUNTER — TELEPHONE (OUTPATIENT)
Dept: SURGERY | Facility: HOSPITAL | Age: 65
End: 2025-06-30

## 2025-06-30 DIAGNOSIS — S62.306A UNSPECIFIED FRACTURE OF FIFTH METACARPAL BONE, RIGHT HAND, INITIAL ENCOUNTER FOR CLOSED FRACTURE: Primary | ICD-10-CM

## 2025-06-30 PROCEDURE — 97110 THERAPEUTIC EXERCISES: CPT | Performed by: OCCUPATIONAL THERAPIST

## 2025-06-30 NOTE — PROGRESS NOTES
Daily Note     Today's date: 2025  Patient name: Jose White  : 1960  MRN: [id number]  Referring provider: Zackery Alan MD  Dx:   Encounter Diagnosis     ICD-10-CM    1. Unspecified fracture of fifth metacarpal bone, right hand, initial encounter for closed fracture  S62.306A                      Subjective: My finger feels much better, it is a little sore.   I am able to do everything, but I have been taking it easy.      Objective: See treatment diary below      Assessment: Tolerated treatment well. Patient has full AROM of the 5th digit.  Some soreness in the MCP joint.  Pt. Has weakness with R hand.      Plan: Continue per plan of care.      recautions: DOI 25. Work ROM and paresthesia per MD. Hypotension, COPD, Anxiety; Hx R breast cancer     POC expires Unit limit Auth  expiration date PT/OT + Visit Limit?   25 BOMN NA BOMN                 Visit/Unit Tracking  AUTH Status:  Date  IE              RE due 25 Used 1 2              Remaining                     Date 25      HEP MNG; TGE; AROM wrist; wall slides shldr flex, abd       Manuals        Edema massage                                Neuro Re-Ed         MNG distal x10                                                       Ther Ex        TGE x10 x10      AROM wrist all motions X10 ea PROM SF      Wrist maze        Digit abd/add w/ foam  1 bucket      Towel crunches  3m      AROM shloulder flex, abd w/ dowel        Wall walking shldr flex, abd X5 ea Putty press w. Skinny dowel.      gripping  RPW 2x30      Ther Activity        Opposition        Translation                        Modalities        MHP 5' pre TE 6m

## 2025-06-30 NOTE — PATIENT INSTRUCTIONS
As discussed today:    Medications:  Stop taking spironolactone and furosemide.   You need to limit your fluid intake:  total fluid (all liquids) from 1500 mL to 1800 mL at the most.  PLEASE do not drink any alcohol, lest your liver function continue to worsen.  Laboratory Testing (Listed below):  Please have blood work drawn every 2 weeks.  Radiology/Diagnostic Testing:  Please schedule weekly thoracentesis and paracentesis as needed.  Orders placed for weekly.   Schedule EGD for variceal screening in January.  Avoid alcohol and tobacco products.  Also avoid raw seafood/oysters and avoid swimming/wading in unchlorinated neely, particularly from the [facility], due to increased risk of infection from a bacteria called Vibrio Vulnificus.  Avoid medications called NSAID's (Motrin/Ibuprofen, Naproxen/Naprosyn/Aleve) if possible, due to increase risk of kidney dysfunction, and if you use medications containing acetaminophen (Tylenol, percocet, Endocet, and many cough/cold medications), the dose should not exceed 2 grams/day.  Seek immediate medical attention if you develop fevers over 101 F, have evidence of GI bleeding (black or bloody stools, bloody or coffee ground emesis) or confusion.  Call our office if you develop worsening symptoms related to lower extremity edema, ascites, jaundice or excessive bruising/bleeding.     Scheduled date of EGD(as of today): 01/15/2025  Physician performing EGD: Dr. Vamsi Herrera  Location of EGD: [facility]  Instructions reviewed with patient by: Adia  Clearances: N/A       Additional Notes: Pt advised to use on bump rash for relief. Detail Level: Simple Render Risk Assessment In Note?: no

## 2025-06-30 NOTE — TELEPHONE ENCOUNTER
Pt's  calling for time, advised 0945AM arrival time. Also wanted to confirm pt only sched for EGD, message mentioned colonoscopy and prep Confirmed to pt and  only EGD.

## 2025-07-01 ENCOUNTER — HOSPITAL ENCOUNTER (OUTPATIENT)
Dept: GASTROENTEROLOGY | Facility: HOSPITAL | Age: 65
Setting detail: OUTPATIENT SURGERY
Discharge: HOME | End: 2025-07-01
Attending: INTERNAL MEDICINE
Payer: MEDICARE

## 2025-07-01 ENCOUNTER — ANESTHESIA (OUTPATIENT)
Dept: GASTROENTEROLOGY | Facility: HOSPITAL | Age: 65
End: 2025-07-01
Payer: MEDICARE

## 2025-07-01 ENCOUNTER — ANESTHESIA EVENT (OUTPATIENT)
Dept: GASTROENTEROLOGY | Facility: HOSPITAL | Age: 65
End: 2025-07-01
Payer: MEDICARE

## 2025-07-01 VITALS
SYSTOLIC BLOOD PRESSURE: 138 MMHG | WEIGHT: 133 LBS | DIASTOLIC BLOOD PRESSURE: 65 MMHG | HEIGHT: 64 IN | OXYGEN SATURATION: 95 % | HEART RATE: 91 BPM | TEMPERATURE: 97.2 F | BODY MASS INDEX: 22.71 KG/M2 | RESPIRATION RATE: 20 BRPM

## 2025-07-01 DIAGNOSIS — K21.9 GERD WITHOUT ESOPHAGITIS: Primary | ICD-10-CM

## 2025-07-01 DIAGNOSIS — I85.00 ESOPHAGEAL VARICES DETERMINED BY ENDOSCOPY (HCC): ICD-10-CM

## 2025-07-01 RX ORDER — SODIUM CHLORIDE 9 MG/ML
INJECTION, SOLUTION INTRAVENOUS CONTINUOUS PRN
Status: DISCONTINUED | OUTPATIENT
Start: 2025-07-01 | End: 2025-07-01

## 2025-07-01 RX ORDER — PROPOFOL 10 MG/ML
INJECTION, EMULSION INTRAVENOUS AS NEEDED
Status: DISCONTINUED | OUTPATIENT
Start: 2025-07-01 | End: 2025-07-01

## 2025-07-01 RX ORDER — ONDANSETRON 2 MG/ML
4 INJECTION INTRAMUSCULAR; INTRAVENOUS ONCE AS NEEDED
Status: DISCONTINUED | OUTPATIENT
Start: 2025-07-01 | End: 2025-07-05 | Stop reason: HOSPADM

## 2025-07-01 RX ORDER — ESOMEPRAZOLE MAGNESIUM 40 MG/1
40 CAPSULE, DELAYED RELEASE ORAL
Qty: 180 CAPSULE | Refills: 4 | Status: SHIPPED | OUTPATIENT
Start: 2025-07-01

## 2025-07-01 RX ORDER — SODIUM CHLORIDE, SODIUM LACTATE, POTASSIUM CHLORIDE, CALCIUM CHLORIDE 600; 310; 30; 20 MG/100ML; MG/100ML; MG/100ML; MG/100ML
125 INJECTION, SOLUTION INTRAVENOUS CONTINUOUS
Status: DISCONTINUED | OUTPATIENT
Start: 2025-07-01 | End: 2025-07-05 | Stop reason: HOSPADM

## 2025-07-01 RX ADMIN — PROPOFOL 50 MG: 10 INJECTION, EMULSION INTRAVENOUS at 11:54

## 2025-07-01 RX ADMIN — SODIUM CHLORIDE: 9 INJECTION, SOLUTION INTRAVENOUS at 11:35

## 2025-07-01 RX ADMIN — PROPOFOL 100 MG: 10 INJECTION, EMULSION INTRAVENOUS at 11:50

## 2025-07-01 RX ADMIN — PROPOFOL 50 MG: 10 INJECTION, EMULSION INTRAVENOUS at 11:52

## 2025-07-01 NOTE — H&P
"History and Physical -  [facility]  Jose White 65 y.o. female MRN: [id number]    HPI: Jose White is a 65 y.o. 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    Historical Information   Past Medical History[1]  Past Surgical History[2]  Social History   Social History     Substance and Sexual Activity   Alcohol Use Yes    Alcohol/week: 6.0 standard drinks of alcohol    Types: 6 Cans of beer per week    Comment: 7-10 drinks/wk     Social History     Substance and Sexual Activity   Drug Use No     Tobacco Use History[3]  Family History[4]    Meds/Allergies     Current Medications[5]    Allergies[6]    Objective     /69   Pulse 78   Temp (!) 97.2 °F (36.2 °C) (Temporal)   Resp 18   Ht 5' 4\" (1.626 m)   Wt 60.3 kg (133 lb)   SpO2 97%   BMI 22.83 kg/m²     PHYSICAL EXAM    General Appearance: NAD, cooperative, alert  Eyes: Anicteric  GI:  Soft, non-tender, non-distended; normal bowel sounds; no masses, no organomegaly   Rectal: Deferred until procedure  Musculoskeletal: No edema.  Skin:  No jaundice    ASSESSMENT/PLAN:  This is a 65 y.o. female here for upper endoscopy possible dilation, and she is stable and optimized for her procedure.             [1]   Past Medical History:  Diagnosis Date    Alcoholic fatty liver     Anxiety     Asthma     COPD (chronic obstructive pulmonary disease) (HCC)     Elevated liver function tests     GERD (gastroesophageal reflux disease)     GERD without esophagitis     Hyperlipidemia     Hypertension     IBS (irritable bowel syndrome)     Insomnia     Insomnia     Liver disease     Nervousness     Nicotine dependence     Other specified urinary incontinence     RLS (restless legs syndrome)     Wears glasses    [2]   Past Surgical History:  Procedure Laterality Date    BREAST BIOPSY Right " 05/21/2021    DCIS    BREAST EXCISIONAL BIOPSY Right 11/01/2004    benign    CATARACT EXTRACTION, BILATERAL  08/01/2018    COLONOSCOPY N/A 05/08/2019    Procedure: COLONOSCOPY;  Surgeon: Jacobo Leonardo MD;  Location: [facility];  Service: Gastroenterology    EGD AND COLONOSCOPY N/A 03/19/2018    Procedure: EGD AND COLONOSCOPY;  Surgeon: Jacoob Leonardo MD;  Location: [facility];  Service: Gastroenterology    ESOPHAGOGASTRODUODENOSCOPY N/A 05/08/2019    Procedure: ESOPHAGOGASTRODUODENOSCOPY (EGD);  Surgeon: Jacobo Leonardo MD;  Location: [facility];  Service: Gastroenterology    IR CHEST TUBE PLACEMENT  3/17/2025    IR PARACENTESIS  11/27/2020    IR PARACENTESIS  12/08/2020    IR PARACENTESIS  10/28/2022    IR PARACENTESIS  11/07/2022    IR PARACENTESIS  11/30/2022    IR PARACENTESIS  12/30/2022    IR PARACENTESIS  01/16/2023    IR PARACENTESIS  08/06/2024    IR PARACENTESIS  09/18/2024    IR PARACENTESIS  10/03/2024    IR PARACENTESIS  10/10/2024    IR PARACENTESIS  10/17/2024    IR PARACENTESIS  10/24/2024    IR PARACENTESIS  11/01/2024    IR PARACENTESIS  11/07/2024    IR PARACENTESIS  11/14/2024    IR PARACENTESIS  11/21/2024    IR PARACENTESIS  11/29/2024    IR PARACENTESIS  12/05/2024    IR PARACENTESIS  12/12/2024    IR PARACENTESIS  12/19/2024    IR PARACENTESIS  12/26/2024    IR PARACENTESIS  01/06/2025    IR PARACENTESIS  01/09/2025    IR PARACENTESIS  01/16/2025    IR PARACENTESIS  1/23/2025    IR PARACENTESIS  1/30/2025    IR PARACENTESIS  2/5/2025    IR PARACENTESIS  2/11/2025    IR PARACENTESIS  2/20/2025    IR PARACENTESIS  2/27/2025    IR PARACENTESIS  3/6/2025    IR PARACENTESIS  3/13/2025    IR PARACENTESIS  3/20/2025    IR PARACENTESIS  3/26/2025    IR PARACENTESIS  4/1/2025    IR PARACENTESIS  4/10/2025    IR PARACENTESIS  1/30/2025    IR PARACENTESIS  4/16/2025    IR PARACENTESIS  4/23/2025    IR PARACENTESIS  5/1/2025    IR PARACENTESIS  5/8/2025    IR PARACENTESIS  5/15/2025    IR  PARACENTESIS  5/22/2025    IR PARACENTESIS  5/29/2025    IR PARACENTESIS  6/5/2025    IR PARACENTESIS  6/12/2025    IR PARACENTESIS  6/19/2025    IR PARACENTESIS  6/26/2025    IR THORACENTESIS  01/16/2023    IR THORACENTESIS  03/28/2023    IR THORACENTESIS  07/25/2024    IR THORACENTESIS  08/06/2024    IR THORACENTESIS  08/14/2024    IR THORACENTESIS  08/21/2024    IR THORACENTESIS  08/28/2024    IR THORACENTESIS  09/04/2024    IR THORACENTESIS  09/11/2024    IR THORACENTESIS  09/18/2024    IR THORACENTESIS  09/25/2024    IR THORACENTESIS  10/03/2024    IR THORACENTESIS  10/10/2024    IR THORACENTESIS  10/17/2024    IR THORACENTESIS  10/24/2024    IR THORACENTESIS  11/01/2024    IR THORACENTESIS  11/07/2024    IR THORACENTESIS  11/14/2024    IR THORACENTESIS  11/21/2024    IR THORACENTESIS  11/29/2024    IR THORACENTESIS  12/05/2024    IR THORACENTESIS  12/12/2024    IR THORACENTESIS  12/19/2024    IR THORACENTESIS  12/26/2024    IR THORACENTESIS  01/03/2025    IR THORACENTESIS  01/09/2025    IR THORACENTESIS  01/16/2025    IR THORACENTESIS  1/23/2025    IR THORACENTESIS  1/30/2025    IR THORACENTESIS  2/5/2025    IR THORACENTESIS  2/11/2025    IR THORACENTESIS  2/20/2025    IR THORACENTESIS  2/27/2025    IR THORACENTESIS  3/6/2025    IR THORACENTESIS  3/13/2025    IR THORACENTESIS  3/26/2025    IR THORACENTESIS  4/1/2025    IR THORACENTESIS  4/10/2025    IR THORACENTESIS  4/16/2025    IR THORACENTESIS  4/23/2025    IR THORACENTESIS  5/1/2025    IR THORACENTESIS  5/8/2025    IR THORACENTESIS  5/15/2025    IR THORACENTESIS  5/22/2025    IR THORACENTESIS  5/29/2025    KNEE SURGERY Left     ACL repair. Date: early 2000s    LUMBAR LAMINECTOMY  04/1999    LYMPH NODE BIOPSY      MAMMO STEREOTACTIC BREAST BIOPSY RIGHT (ALL INC) Right 05/21/2021    MAMMO STEREOTACTIC BREAST BIOPSY RIGHT (ALL INC) EACH ADD Right 05/21/2021    TOTAL ABDOMINAL HYSTERECTOMY  02/05/2008    TUBAL LIGATION  1989    UPPER GASTROINTESTINAL ENDOSCOPY      [3]   Social History  Tobacco Use   Smoking Status Every Day    Current packs/day: 1.00    Average packs/day: 1 pack/day for 47.5 years (47.5 ttl pk-yrs)    Types: Cigarettes    Start date: 1/1/1978   Smokeless Tobacco Never   [4]   Family History  Problem Relation Name Age of Onset    Breast cancer Mother Sherita Murphy    No Known Problems Father      No Known Problems Sister      No Known Problems Brother      No Known Problems Maternal Grandmother      No Known Problems Maternal Grandfather      No Known Problems Paternal Grandmother      No Known Problems Paternal Grandfather      No Known Problems Maternal Aunt      No Known Problems Maternal Aunt      No Known Problems Paternal Aunt      No Known Problems Son      Substance Abuse Neg Hx      Mental illness Neg Hx      Colon cancer Neg Hx      Colon polyps Neg Hx     [5]   Current Outpatient Medications:     alendronate (Fosamax) 70 mg tablet    ALPRAZolam (XANAX) 1 mg tablet    anastrozole (ARIMIDEX) 1 mg tablet    budesonide-formoterol (Symbicort) 160-4.5 mcg/act inhaler    doxycycline hyclate (VIBRA-TABS) 100 mg tablet    lactulose (CHRONULAC) 10 g/15 mL solution    midodrine (PROAMATINE) 10 MG tablet    pantoprazole (PROTONIX) 20 mg tablet    Risankizumab-rzaa (SKYRIZI PEN SC)    spironolactone (ALDACTONE) 50 mg tablet    traZODone (DESYREL) 50 mg tablet    albuterol (2.5 mg/3 mL) 0.083 % nebulizer solution    albuterol (Proventil HFA) 90 mcg/act inhaler    Current Facility-Administered Medications:     lactated ringers infusion, 125 mL/hr, Intravenous, Continuous    ondansetron (ZOFRAN) injection 4 mg, 4 mg, Intravenous, Once PRN  [6]   Allergies  Allergen Reactions    Sulfa Antibiotics Shortness Of Breath    Ace Inhibitors Cough and Other (See Comments)     coughing

## 2025-07-01 NOTE — ANESTHESIA PREPROCEDURE EVALUATION
Procedure:  EGD    Relevant Problems   CARDIO   (+) Hyperlipidemia   (+) Secondary esophageal varices without bleeding (HCC)      GI/HEPATIC   (+) Alcoholic cirrhosis of liver with ascites (HCC)   (+) Decompensated hepatic cirrhosis (HCC)   (+) Fatty liver   (+) GERD without esophagitis   (+) Other dysphagia      GYN   (+) Malignant neoplasm of lower-outer quadrant of right breast of female, estrogen receptor positive (HCC)      HEMATOLOGY   (+) Anemia   (+) Thrombocytopenia (HCC)      NEURO/PSYCH   (+) Anxiety   (+) Single seizure (HCC)      PULMONARY   (+) COPD (chronic obstructive pulmonary disease) (HCC)   (+) Pneumothorax on right   (+) Postprocedural pneumothorax of right lung   (+) Recurrent pleural effusion on right        Physical Exam    Airway     Mallampati score: II  TM Distance: >3 FB  Neck ROM: full  Mouth opening: >= 4 cm      Cardiovascular      Dental   No notable dental hx     Pulmonary      Neurological      Other Findings  post-pubertal.      Anesthesia Plan  ASA Score- 3     Anesthesia Type- IV sedation with anesthesia with ASA Monitors.         Additional Monitors:     Airway Plan: natural airway.           Plan Factors-    Chart reviewed.    Patient summary reviewed.                  Induction- intravenous.    Postoperative Plan- .   Monitoring Plan - Monitoring plan - standard ASA monitoring          Informed Consent- Anesthetic plan and risks discussed with patient.  I personally reviewed this patient with the CRNA. Discussed and agreed on the Anesthesia Plan with the CRNA..      NPO Status:  Vitals Value Taken Time   Date of last liquid 06/30/25 07/01/25 10:58   Time of last liquid 2100 07/01/25 10:58   Date of last solid 06/30/25 07/01/25 10:58   Time of last solid 2100 07/01/25 10:58

## 2025-07-01 NOTE — ANESTHESIA POSTPROCEDURE EVALUATION
Post-Op Assessment Note    CV Status:  Stable  Pain Score: 0    Pain management: adequate       Mental Status:  Sleepy   Hydration Status:  Stable   PONV Controlled:  None   Airway Patency:  Patent  Airway: intubated  There is a medical reason for not screening for obstructive sleep apnea and/or for not using two or more mitigation strategies   Post Op Vitals Reviewed: Yes    No anethesia notable event occurred.    Staff: CRNA           Last Filed PACU Vitals:  Vitals Value Taken Time   Temp     Pulse     BP     Resp     SpO2

## 2025-07-02 ENCOUNTER — TELEPHONE (OUTPATIENT)
Age: 65
End: 2025-07-02

## 2025-07-02 DIAGNOSIS — T36.95XA ANTIBIOTIC-INDUCED YEAST INFECTION: Primary | ICD-10-CM

## 2025-07-02 DIAGNOSIS — B37.9 ANTIBIOTIC-INDUCED YEAST INFECTION: Primary | ICD-10-CM

## 2025-07-02 RX ORDER — FLUCONAZOLE 150 MG/1
TABLET ORAL
Qty: 2 TABLET | Refills: 0 | Status: SHIPPED | OUTPATIENT
Start: 2025-07-02 | End: 2025-07-09

## 2025-07-02 NOTE — TELEPHONE ENCOUNTER
Jose called stating that Gricelda prescribed her doxycycline and believes she has developed a yeast infection, stated that she is itchy , swollen and sore in that area and is requesting for something to be sent to the [facility] pharmacy on file.    Please let Jose know when it has been sent to pharmacy and for any further concerns.

## 2025-07-03 ENCOUNTER — APPOINTMENT (OUTPATIENT)
Dept: INTERVENTIONAL RADIOLOGY/VASCULAR | Facility: HOSPITAL | Age: 65
End: 2025-07-03
Attending: INTERNAL MEDICINE
Payer: MEDICARE

## 2025-07-03 ENCOUNTER — HOSPITAL ENCOUNTER (OUTPATIENT)
Dept: INTERVENTIONAL RADIOLOGY/VASCULAR | Facility: HOSPITAL | Age: 65
Discharge: HOME | End: 2025-07-03
Attending: INTERNAL MEDICINE
Payer: MEDICARE

## 2025-07-03 VITALS
OXYGEN SATURATION: 96 % | RESPIRATION RATE: 18 BRPM | SYSTOLIC BLOOD PRESSURE: 117 MMHG | HEART RATE: 84 BPM | DIASTOLIC BLOOD PRESSURE: 59 MMHG

## 2025-07-03 DIAGNOSIS — K76.9 PLEURAL EFFUSION ASSOCIATED WITH HEPATIC DISORDER: ICD-10-CM

## 2025-07-03 DIAGNOSIS — J91.8 PLEURAL EFFUSION ASSOCIATED WITH HEPATIC DISORDER: ICD-10-CM

## 2025-07-03 DIAGNOSIS — K70.31 ALCOHOLIC CIRRHOSIS OF LIVER WITH ASCITES (HCC): ICD-10-CM

## 2025-07-03 PROCEDURE — 49083 ABD PARACENTESIS W/IMAGING: CPT

## 2025-07-03 NOTE — DISCHARGE INSTRUCTIONS

## 2025-07-10 ENCOUNTER — HOSPITAL ENCOUNTER (OUTPATIENT)
Dept: INFUSION CENTER | Facility: HOSPITAL | Age: 65
Discharge: HOME | End: 2025-07-10
Attending: INTERNAL MEDICINE

## 2025-07-10 ENCOUNTER — HOSPITAL ENCOUNTER (OUTPATIENT)
Dept: INTERVENTIONAL RADIOLOGY/VASCULAR | Facility: HOSPITAL | Age: 65
Discharge: HOME | End: 2025-07-10
Attending: INTERNAL MEDICINE
Payer: MEDICARE

## 2025-07-10 VITALS
DIASTOLIC BLOOD PRESSURE: 59 MMHG | HEART RATE: 88 BPM | OXYGEN SATURATION: 97 % | RESPIRATION RATE: 18 BRPM | SYSTOLIC BLOOD PRESSURE: 113 MMHG

## 2025-07-10 DIAGNOSIS — K70.31 ALCOHOLIC CIRRHOSIS OF LIVER WITH ASCITES (HCC): ICD-10-CM

## 2025-07-10 DIAGNOSIS — J91.8 PLEURAL EFFUSION ASSOCIATED WITH HEPATIC DISORDER: ICD-10-CM

## 2025-07-10 DIAGNOSIS — K76.9 PLEURAL EFFUSION ASSOCIATED WITH HEPATIC DISORDER: ICD-10-CM

## 2025-07-10 PROCEDURE — 49083 ABD PARACENTESIS W/IMAGING: CPT

## 2025-07-11 ENCOUNTER — OFFICE VISIT (OUTPATIENT)
Dept: OCCUPATIONAL THERAPY | Facility: CLINIC | Age: 65
End: 2025-07-11
Attending: ORTHOPAEDIC SURGERY
Payer: MEDICARE

## 2025-07-11 DIAGNOSIS — S62.306A UNSPECIFIED FRACTURE OF FIFTH METACARPAL BONE, RIGHT HAND, INITIAL ENCOUNTER FOR CLOSED FRACTURE: Primary | ICD-10-CM

## 2025-07-11 PROCEDURE — 97110 THERAPEUTIC EXERCISES: CPT | Performed by: OCCUPATIONAL THERAPIST

## 2025-07-11 NOTE — PROGRESS NOTES
Daily Note     Today's date: 2025  Patient name: Jose White  : 1960  MRN: [id number]  Referring provider: Zackery Alan MD  Dx:   Encounter Diagnosis     ICD-10-CM    1. Unspecified fracture of fifth metacarpal bone, right hand, initial encounter for closed fracture  S62.306A                      Subjective: My hand is better, I think today will be my last visit.      Objective: See treatment diary below      Assessment: Tolerated treatment well. Patient has full AROM of the hand and good functional use.  Seh will continue to gustavo tape and work on aDduction of the small finger with HEP.  No further skilled hand therapy needed, goals met with tx.    Goals  STGs (4 weeks)  Patient will be independent in HEP of ROM, nerve glides, edema management  Patient will report an average pain level of 4/10  Patient will demonstrate 30 degree improvement in YIP of the digits  Patient will demonstrate active shoulder flexion and abduction to 120  Patient will use the RUE for all self care tasks  LTGs (12 weeks)  Patient will demonstrate independence in a HEP to maintain ROM, strength, and function at discharge  Patient will report an average pain level of 1-2/10 to be independent in daily tasks  Patient will demonstrate YIP of the digits to WNL  to be independent in self care tasks  Patient will demonstrate AROM of the wrist and forearm WFL to be independent in self care tasks  Patient will demonstrate 5/5 muscle strength in the wrist and forearm to be MI for meal prep  Patient will demonstrate right hand strength within 30% of the left hand to be MI for IADL tasks  Patient will demonstrate resolution of paresthesia in the right hand  Patient will meet functional outcome goals on FOTO      Plan: D/C from OT.     recautions: DOI 25. Work ROM and paresthesia per MD. Hypotension, COPD, Anxiety; Hx R breast cancer     POC expires Unit limit Auth  expiration date PT/OT + Visit Limit?   25 BOMN NA BOMN                  Visit/Unit Tracking  AUTH Status:  Date 6/17 IE 6/30 7/11            RE due 8/1/25 Used 1 2              Remaining                     Date 6/17/25 6/30 7/11     HEP MNG; TGE; AROM wrist; wall slides shldr flex, abd       Manuals        Edema massage                                Neuro Re-Ed         MNG distal x10                                                       Ther Ex        TGE x10 x10 x10     AROM wrist all motions X10 ea PROM SF 2m     Wrist maze        Digit abd/add w/ foam  1 bucket 1 bucket     Towel crunches  3m      AROM shloulder flex, abd w/ dowel        Wall walking shldr flex, abd X5 ea Putty press w. Skinny dowel. Putty press     gripping  RPW 2x30 RPW 2x30     Ther Activity        Opposition        Translation                        Modalities        MHP 5' pre TE 6m 6m

## 2025-07-14 ENCOUNTER — OFFICE VISIT (OUTPATIENT)
Dept: GASTROENTEROLOGY | Facility: CLINIC | Age: 65
End: 2025-07-14
Payer: MEDICARE

## 2025-07-14 VITALS
HEIGHT: 64 IN | BODY MASS INDEX: 21.92 KG/M2 | DIASTOLIC BLOOD PRESSURE: 66 MMHG | SYSTOLIC BLOOD PRESSURE: 114 MMHG | WEIGHT: 128.4 LBS

## 2025-07-14 DIAGNOSIS — I85.10 SECONDARY ESOPHAGEAL VARICES WITHOUT BLEEDING (HCC): ICD-10-CM

## 2025-07-14 DIAGNOSIS — E87.1 HYPONATREMIA: ICD-10-CM

## 2025-07-14 DIAGNOSIS — F41.9 ANXIETY: ICD-10-CM

## 2025-07-14 DIAGNOSIS — F10.90 ALCOHOL USE DISORDER: ICD-10-CM

## 2025-07-14 DIAGNOSIS — K76.82 HEPATIC ENCEPHALOPATHY (HCC): ICD-10-CM

## 2025-07-14 DIAGNOSIS — G47.00 INSOMNIA, UNSPECIFIED TYPE: ICD-10-CM

## 2025-07-14 DIAGNOSIS — R18.8 REFRACTORY ASCITES: ICD-10-CM

## 2025-07-14 DIAGNOSIS — K70.31 ALCOHOLIC CIRRHOSIS OF LIVER WITH ASCITES (HCC): Primary | ICD-10-CM

## 2025-07-14 PROCEDURE — 99214 OFFICE O/P EST MOD 30 MIN: CPT | Performed by: FAMILY MEDICINE

## 2025-07-14 RX ORDER — ALPRAZOLAM 1 MG/1
TABLET ORAL
Qty: 60 TABLET | Refills: 2 | Status: SHIPPED | OUTPATIENT
Start: 2025-07-14

## 2025-07-14 RX ORDER — ALBUMIN (HUMAN) 12.5 G/50ML
75 SOLUTION INTRAVENOUS ONCE AS NEEDED
OUTPATIENT
Start: 2025-07-17

## 2025-07-14 RX ORDER — TRAZODONE HYDROCHLORIDE 50 MG/1
50 TABLET ORAL
Qty: 90 TABLET | Refills: 0 | Status: SHIPPED | OUTPATIENT
Start: 2025-07-14 | End: 2025-07-18 | Stop reason: SDUPTHER

## 2025-07-14 RX ORDER — ALBUMIN (HUMAN) 12.5 G/50ML
50 SOLUTION INTRAVENOUS ONCE AS NEEDED
OUTPATIENT
Start: 2025-07-17

## 2025-07-14 RX ORDER — ALBUMIN (HUMAN) 12.5 G/50ML
100 SOLUTION INTRAVENOUS ONCE AS NEEDED
OUTPATIENT
Start: 2025-07-17

## 2025-07-14 RX ORDER — SODIUM CHLORIDE 9 MG/ML
20 INJECTION, SOLUTION INTRAVENOUS ONCE
OUTPATIENT
Start: 2025-07-17

## 2025-07-14 NOTE — ASSESSMENT & PLAN NOTE
Summary: Patient is a 65 y.o. 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 Current MELD-3.0 (21).    She continues to drink despite knowledge of her liver disease. Suspect that this is likely contributing in part to both her abnormal liver tests and refractory ascites. We discussed the basic pathophysiology of cirrhosis, natural progression of disease and potential for hepatic decompensation. She is aware that complete EtOH abstinence is key in helping to slow the progression of disease and prevent decompensating events - especially given she is not currently a transplant candidate due to her EtOH use or ideal TIPS candidate due to her MELD and hx of HE. She politely declined a referral to MAT/Addiction Services.      Otherwise, we discussed her ongoing hyponatremia. She is following with nephrology for management of her diuretics. She is on a 1.5L/day fluid restriction but reports some non-compliance. Reiterated importance of fluid restriction and will TBW with nephrology regarding hyponatremia and possible adjustment of diuretics. Discussed strict ED precautions.     She is otherwise UTD with routine healthcare maintenance. Plan for updated MELD labs on 7/22. Additional management as below.     Ascites & Hyponatremia  - Continue Lasix 20 mg once daily.   - Continue Aldactone 50 mg once daily.   - Communicate with nephrology regarding hyponatremia and adjustment of diuretics.   - Continue fluid restriction of 1.5L daily.   - Continue paracentesis once weekly PRN for comfort.   - Encouraged to adhere to a low-sodium (<2000 mg daily) diet.     Esophageal Varices   - EGD (7/1/2025) with small esophageal varices and PHG.   - Defer NSBB for primary ppx given RA and hypotensions requiring midodrine.   - Repeat EGD x1 years; Next due 7/2026.     Hepatic Encephalopathy   - Continue lactulose to titrate for a goal of 3-4 soft but formed BM  daily.   - Patient aware of signs/sxs's of HE and advised to promptly inform provider if experiencing such.    HCC Screening   - Scheduled for US on 8/5/2025.   - AFP level to be drawn with her next set of routine labs.   - Continue imaging with an AFP level q6 months.     Nutritional Status  - Moderate sarcopenia noted on exam.   - Encouraged nutritional supplement and a high-protein diet in addition to a high-protein snack qHS to prevent prolonged fasting.     Transplant Candidacy   - Defer given ongoing EtOH use.     CRC Screening  - Colonoscopy (3/2018); Repeat x10 years.     Dysphagia   - EGD w/o cause.   - Continue Nexium 40 mg BID.   - Consider esophageal dysmotility study if no improvement on high-dose PPI.   - Could also consider a referral to Speech given ?componenet of oropharyngeal dysphagia.     Orders:  •  CBC and differential; Future  •  Comprehensive metabolic panel; Future  •  Protime-INR; Future  •  AFP tumor marker; Future    Follow-up in 3 months or sooner if necessary.

## 2025-07-14 NOTE — PROGRESS NOTES
Name: Jose White      : 1960      MRN: [id number]  Encounter Provider: Noemí Oconnor PA-C  Encounter Date: 2025   Encounter department: [facility]  :  Assessment & Plan  Alcoholic cirrhosis of liver with ascites (HCC)  Secondary esophageal varices without bleeding (HCC)  Hepatic encephalopathy (HCC)  Refractory ascites  Hyponatremia  Alcohol use disorder    Summary: Patient is a 65 y.o. 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 Current MELD-3.0 (21).    She continues to drink despite knowledge of her liver disease. Suspect that this is likely contributing in part to both her abnormal liver tests and refractory ascites. We discussed the basic pathophysiology of cirrhosis, natural progression of disease and potential for hepatic decompensation. She is aware that complete EtOH abstinence is key in helping to slow the progression of disease and prevent decompensating events - especially given she is not currently a transplant candidate due to her EtOH use or ideal TIPS candidate due to her MELD and hx of HE. She politely declined a referral to MAT/Addiction Services.      Otherwise, we discussed her ongoing hyponatremia. She is following with nephrology for management of her diuretics. She is on a 1.5L/day fluid restriction but reports some non-compliance. Reiterated importance of fluid restriction and will TBW with nephrology regarding hyponatremia and possible adjustment of diuretics. Discussed strict ED precautions.     She is otherwise UTD with routine healthcare maintenance. Plan for updated MELD labs on . Additional management as below.     Ascites & Hyponatremia  - Continue Lasix 20 mg once daily.   - Continue Aldactone 50 mg once daily.   - Communicate with nephrology regarding hyponatremia and adjustment of diuretics.   - Continue fluid restriction  of 1.5L daily.   - Continue paracentesis once weekly PRN for comfort.   - Encouraged to adhere to a low-sodium (<2000 mg daily) diet.     Esophageal Varices   - EGD (7/1/2025) with small esophageal varices and PHG.   - Defer NSBB for primary ppx given RA and hypotensions requiring midodrine.   - Repeat EGD x1 years; Next due 7/2026.     Hepatic Encephalopathy   - Continue lactulose to titrate for a goal of 3-4 soft but formed BM daily.   - Patient aware of signs/sxs's of HE and advised to promptly inform provider if experiencing such.    HCC Screening   - Scheduled for US on 8/5/2025.   - AFP level to be drawn with her next set of routine labs.   - Continue imaging with an AFP level q6 months.     Nutritional Status  - Moderate sarcopenia noted on exam.   - Encouraged nutritional supplement and a high-protein diet in addition to a high-protein snack qHS to prevent prolonged fasting.     Transplant Candidacy   - Defer given ongoing EtOH use.     CRC Screening  - Colonoscopy (3/2018); Repeat x10 years.     Dysphagia   - EGD w/o cause.   - Continue Nexium 40 mg BID.   - Consider esophageal dysmotility study if no improvement on high-dose PPI.   - Could also consider a referral to Speech given ?componenet of oropharyngeal dysphagia.     Orders:  •  CBC and differential; Future  •  Comprehensive metabolic panel; Future  •  Protime-INR; Future  •  AFP tumor marker; Future    Follow-up in 3 months or sooner if necessary.       History of Present Illness   HPI    Jose White is a 65 y.o. female with PMH significant for hypotension on midodrine, HLD, COPD, GERD, IBS-D, history of breast CA, tobacco use and alcohol use disorder who presents today for a follow-up reagrding decompensated EtOH cirrhosis.     Patient is familiar with . [facility] hepatology last seen by Dr. Herrera on 5/28/2025 for the management of decompensated EtOH cirrhosis.  Since her last appointment, she underwent an EGD for both esophageal variceal  screening and evaluation of dysphagia.  She was noted to have small esophageal varices, PHG and a small sliding type I hiatal hernia.  Her pantoprazole was switched to Nexium 40 mg twice daily.    She also had updated labs on 7/8/2025. These showed a slight bump in her liver chemistries (AST 80, ALT 35, alk phos 104, T. bili 2.0) as well as worsening hyponatremia (Na 127). CBC and INR stable. She also tested positive for Lyme's disease on 6/17 and was treated with a 14-day course of doxycycline.    She is requiring weekly LVP although they are decreasing in volume. She has not required a thoracentesis since May 2025. She is taking both Lasix 20 mg and Aldactone 50 mg once daily. She is following with nephrology for management of her diuretics and sodium levels. She is non-adherent with her fluid restriction.     She continues to drink approximately 2 beers and 1/2 glass of wine daily.  No hospitalizations or decompensating events since her last appointment.     MELD 3.0: 21 at 7/8/2025 12:50 PM  MELD-Na: 21 at 7/8/2025 12:50 PM  Calculated from:  Serum Creatinine: 0.61 mg/dL (Using min of 1 mg/dL) at 7/8/2025 12:50 PM  Serum Sodium: 127 mmol/L at 7/8/2025 12:50 PM  Total Bilirubin: 2 mg/dL at 7/8/2025 12:50 PM  Serum Albumin: 2.5 g/dL at 7/8/2025 12:50 PM  INR(ratio): 1.3 at 7/8/2025 12:50 PM  Age at listing (hypothetical): 65 years  Sex: Female at 7/8/2025 12:50 PM      History obtained from: patient    Review of Systems   All other systems reviewed and are negative.    Pertinent Medical History     Medical History Reviewed by provider this encounter:  Tobacco  Allergies  Meds  Problems  Med Hx  Surg Hx  Fam Hx     .  Past Medical History   Past Medical History[1]  Past Surgical History[1]  Family History[1]   reports that she has been smoking cigarettes. She started smoking about 47 years ago. She has a 47.6 pack-year smoking history. She has never used smokeless tobacco. She reports current alcohol use of  "about 6.0 standard drinks of alcohol per week. She reports that she does not use drugs.  Current Outpatient Medications   Medication Instructions   • albuterol (Proventil HFA) 90 mcg/act inhaler 2 puffs, Inhalation, Every 6 hours PRN   • albuterol 2.5 mg, Nebulization, Every 6 hours PRN   • alendronate (FOSAMAX) 70 mg, Oral, Every 7 days   • ALPRAZolam (XANAX) 1 mg tablet TAKE ONE TABLET BY MOUTH TWICE A DAY AS NEEDED FOR ANXIETY   • anastrozole (ARIMIDEX) 1 mg, Oral, Daily   • budesonide-formoterol (Symbicort) 160-4.5 mcg/act inhaler 2 puffs, Inhalation, 2 times daily, Rinse mouth after use.   • doxycycline hyclate (VIBRA-TABS) 100 mg, Oral, 2 times daily   • esomeprazole (NEXIUM) 40 mg, Oral, 2 times daily before meals   • lactulose (CHRONULAC) 10 g, Oral, 2 times daily   • midodrine (PROAMATINE) 10 mg, Oral, 3 times daily before meals   • Risankizumab-rzaa (SKYRIZI PEN SC) Inject under the skin   • spironolactone (ALDACTONE) 50 mg, Oral, Daily   • traZODone (DESYREL) 50 mg, Oral, Daily at bedtime   Allergies[1]   Medications Ordered Prior to Encounter[1]   Social History[1]     Objective   /[birth year]   Ht 5' 4\" (1.626 m)   Wt 58.2 kg (128 lb 6.4 oz)   BMI 22.04 kg/m²      Physical Exam  Vitals and nursing note reviewed.   Constitutional:       General: She is not in acute distress.     Appearance: Normal appearance. She is well-developed. She is not ill-appearing or toxic-appearing.   HENT:      Head: Normocephalic and atraumatic.     Eyes:      General: No scleral icterus.     Conjunctiva/sclera: Conjunctivae normal.     Pulmonary:      Effort: Pulmonary effort is normal.   Abdominal:      General: There is distension.      Palpations: Abdomen is soft. There is no mass.      Tenderness: There is no abdominal tenderness.     Skin:     General: Skin is warm and dry.      Coloration: Skin is not jaundiced.      Findings: No bruising or rash.     Neurological:      Mental Status: She is alert and oriented to " person, place, and time. Mental status is at baseline.     Psychiatric:         Mood and Affect: Mood normal.         Behavior: Behavior normal.                [1]  Past Medical History:  Diagnosis Date   • Alcoholic fatty liver    • Anxiety    • Asthma    • COPD (chronic obstructive pulmonary disease) (HCC)    • Elevated liver function tests    • GERD (gastroesophageal reflux disease)    • GERD without esophagitis    • Hepatitis B    • Hyperlipidemia    • Hypertension    • IBS (irritable bowel syndrome)    • Insomnia    • Insomnia    • Liver disease    • Nervousness    • Nicotine dependence    • Other specified urinary incontinence    • RLS (restless legs syndrome)    • Wears glasses    [1]  Past Surgical History:  Procedure Laterality Date   • 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   • BREAST EXCISIONAL BIOPSY Right 11/01/2004    benign   • CATARACT EXTRACTION, BILATERAL  08/01/2018   • COLONOSCOPY N/A 05/08/2019    Procedure: COLONOSCOPY;  Surgeon: Jacobo Leonardo MD;  Location: [facility];  Service: Gastroenterology   • EGD AND COLONOSCOPY N/A 03/19/2018    Procedure: EGD AND COLONOSCOPY;  Surgeon: Jacobo Leonardo MD;  Location: [facility];  Service: Gastroenterology   • ESOPHAGOGASTRODUODENOSCOPY N/A 05/08/2019    Procedure: ESOPHAGOGASTRODUODENOSCOPY (EGD);  Surgeon: Jacobo Leonardo MD;  Location: [facility];  Service: Gastroenterology   • IR CHEST TUBE PLACEMENT  3/17/2025   • IR PARACENTESIS  11/27/2020   • IR PARACENTESIS  12/08/2020   • IR PARACENTESIS  10/28/2022   • IR PARACENTESIS  11/07/2022   • IR PARACENTESIS  11/30/2022   • IR PARACENTESIS  12/30/2022   • IR PARACENTESIS  01/16/2023   • IR PARACENTESIS  08/06/2024   • IR PARACENTESIS  09/18/2024   • IR PARACENTESIS  10/03/2024   • IR PARACENTESIS  10/10/2024   • IR PARACENTESIS  10/17/2024   • IR PARACENTESIS  10/24/2024   • IR PARACENTESIS  11/01/2024   • IR PARACENTESIS  11/07/2024   • IR PARACENTESIS  11/14/2024   • IR PARACENTESIS   11/21/2024   • IR PARACENTESIS  11/29/2024   • IR PARACENTESIS  12/05/2024   • IR PARACENTESIS  12/12/2024   • IR PARACENTESIS  12/19/2024   • IR PARACENTESIS  12/26/2024   • IR PARACENTESIS  01/06/2025   • IR PARACENTESIS  01/09/2025   • IR PARACENTESIS  01/16/2025   • IR PARACENTESIS  1/23/2025   • IR PARACENTESIS  1/30/2025   • IR PARACENTESIS  2/5/2025   • IR PARACENTESIS  2/11/2025   • IR PARACENTESIS  2/20/2025   • IR PARACENTESIS  2/27/2025   • IR PARACENTESIS  3/6/2025   • IR PARACENTESIS  3/13/2025   • IR PARACENTESIS  3/20/2025   • IR PARACENTESIS  3/26/2025   • IR PARACENTESIS  4/1/2025   • IR PARACENTESIS  4/10/2025   • IR PARACENTESIS  1/30/2025   • IR PARACENTESIS  4/16/2025   • IR PARACENTESIS  4/23/2025   • IR PARACENTESIS  5/1/2025   • IR PARACENTESIS  5/8/2025   • IR PARACENTESIS  5/15/2025   • IR PARACENTESIS  5/22/2025   • IR PARACENTESIS  5/29/2025   • IR PARACENTESIS  6/5/2025   • IR PARACENTESIS  6/12/2025   • IR PARACENTESIS  6/19/2025   • IR PARACENTESIS  6/26/2025   • IR PARACENTESIS  7/3/2025   • IR PARACENTESIS  7/10/2025   • IR PARACENTESIS  7/17/2025   • IR THORACENTESIS  01/16/2023   • IR THORACENTESIS  03/28/2023   • IR THORACENTESIS  07/25/2024   • IR THORACENTESIS  08/06/2024   • IR THORACENTESIS  08/14/2024   • IR THORACENTESIS  08/21/2024   • IR THORACENTESIS  08/28/2024   • IR THORACENTESIS  09/04/2024   • IR THORACENTESIS  09/11/2024   • IR THORACENTESIS  09/18/2024   • IR THORACENTESIS  09/25/2024   • IR THORACENTESIS  10/03/2024   • IR THORACENTESIS  10/10/2024   • IR THORACENTESIS  10/17/2024   • IR THORACENTESIS  10/24/2024   • IR THORACENTESIS  11/01/2024   • IR THORACENTESIS  11/07/2024   • IR THORACENTESIS  11/14/2024   • IR THORACENTESIS  11/21/2024   • IR THORACENTESIS  11/29/2024   • IR THORACENTESIS  12/05/2024   • IR THORACENTESIS  12/12/2024   • IR THORACENTESIS  12/19/2024   • IR THORACENTESIS  12/26/2024   • IR THORACENTESIS  01/03/2025   • IR THORACENTESIS   01/09/2025   • IR THORACENTESIS  01/16/2025   • IR THORACENTESIS  1/23/2025   • IR THORACENTESIS  1/30/2025   • IR THORACENTESIS  2/5/2025   • IR THORACENTESIS  2/11/2025   • IR THORACENTESIS  2/20/2025   • IR THORACENTESIS  2/27/2025   • IR THORACENTESIS  3/6/2025   • IR THORACENTESIS  3/13/2025   • IR THORACENTESIS  3/26/2025   • IR THORACENTESIS  4/1/2025   • IR THORACENTESIS  4/10/2025   • IR THORACENTESIS  4/16/2025   • IR THORACENTESIS  4/23/2025   • IR THORACENTESIS  5/1/2025   • IR THORACENTESIS  5/8/2025   • IR THORACENTESIS  5/15/2025   • IR THORACENTESIS  5/22/2025   • IR THORACENTESIS  5/29/2025   • KNEE SURGERY Left     ACL repair. Date: early 2000s   • LUMBAR LAMINECTOMY  04/1999   • LYMPH NODE BIOPSY     • MAMMO STEREOTACTIC BREAST BIOPSY RIGHT (ALL INC) Right 05/21/2021   • MAMMO STEREOTACTIC BREAST BIOPSY RIGHT (ALL INC) EACH ADD Right 05/21/2021   • TOTAL ABDOMINAL HYSTERECTOMY  02/05/2008   • TUBAL LIGATION  1989   • UPPER GASTROINTESTINAL ENDOSCOPY     [1]  Family History  Problem Relation Name Age of Onset   • Breast cancer Mother Sherita Murphy   • No Known Problems Father     • No Known Problems Sister     • No Known Problems Brother     • No Known Problems Maternal Grandmother     • No Known Problems Maternal Grandfather     • No Known Problems Paternal Grandmother     • No Known Problems Paternal Grandfather     • No Known Problems Maternal Aunt     • No Known Problems Maternal Aunt     • No Known Problems Paternal Aunt     • No Known Problems Son     • Substance Abuse Neg Hx     • Mental illness Neg Hx     • Colon cancer Neg Hx     • Colon polyps Neg Hx     [1]  Allergies  Allergen Reactions   • Sulfa Antibiotics Shortness Of Breath   • Ace Inhibitors Cough and Other (See Comments)     coughing   [1]  Current Outpatient Medications on File Prior to Visit   Medication Sig Dispense Refill   • albuterol (2.5 mg/3 mL) 0.083 % nebulizer solution Take 3 mL (2.5 mg total) by nebulization every 6 (six)  hours as needed for wheezing or shortness of breath 1080 mL 3   • albuterol (Proventil HFA) 90 mcg/act inhaler Inhale 2 puffs every 6 (six) hours as needed for wheezing or shortness of breath 18 g 6   • alendronate (Fosamax) 70 mg tablet Take 1 tablet (70 mg total) by mouth every 7 days 12 tablet 3   • anastrozole (ARIMIDEX) 1 mg tablet Take 1 tablet (1 mg total) by mouth daily 90 tablet 3   • budesonide-formoterol (Symbicort) 160-4.5 mcg/act inhaler Inhale 2 puffs 2 (two) times a day Rinse mouth after use. 10.2 g 6   • esomeprazole (NexIUM) 40 MG capsule Take 1 capsule (40 mg total) by mouth 2 (two) times a day before meals 180 capsule 4   • lactulose (CHRONULAC) 10 g/15 mL solution Take 15 mL (10 g total) by mouth 2 (two) times a day 240 mL 0   • midodrine (PROAMATINE) 10 MG tablet Take 1 tablet (10 mg total) by mouth 3 (three) times a day before meals 90 tablet 3   • Risankizumab-rzaa (SKYRIZI PEN SC) Inject under the skin     • spironolactone (ALDACTONE) 50 mg tablet Take 1 tablet (50 mg total) by mouth daily 90 tablet 1   • [DISCONTINUED] mupirocin (BACTROBAN) 2 % ointment Apply topically 3 (three) times a day (Patient not taking: Reported on 5/12/2025) 22 g 0     No current facility-administered medications on file prior to visit.   [1]  Social History  Tobacco Use   • Smoking status: Every Day     Current packs/day: 1.00     Average packs/day: 1 pack/day for 47.6 years (47.6 ttl pk-yrs)     Types: Cigarettes     Start date: 1/1/1978   • Smokeless tobacco: Never   Vaping Use   • Vaping status: Never Used   Substance and Sexual Activity   • Alcohol use: Yes     Alcohol/week: 6.0 standard drinks of alcohol     Types: 6 Cans of beer per week     Comment: 7-10 drinks/wk   • Drug use: No   • Sexual activity: Yes     Partners: Male

## 2025-07-16 ENCOUNTER — TELEPHONE (OUTPATIENT)
Dept: NEPHROLOGY | Facility: CLINIC | Age: 65
End: 2025-07-16

## 2025-07-16 NOTE — TELEPHONE ENCOUNTER
Called patient and went over the following information:    Patients BMP reviewed and sodium has decreased to 127. Please make sure you are maintaining a fluid restriction of 1.2 L.  Hot days please use ice chips, ice pops along with candy to stimulate mouth comfort.  Patient is for repeat BMP on 722 and once reviewed further recommendations will be forthcoming at that time.    Patient verbally understood and had no further questions for me at this time.

## 2025-07-16 NOTE — TELEPHONE ENCOUNTER
----- Message from JOSÉ Patel sent at 7/15/2025  1:33 PM EDT -----  Please call patient and let her know that BMP reviewed and sodium has decreased to 127.  Please make sure patient is maintaining a fluid restriction of 1.2 L.  Hot days please use ice chips, ice pops along with candy to stimulate mouth comfort.  Patient is for repeat BMP on 722 and once reviewed further recommendations will be forthcoming at that time  jorge luis  ----- Message -----  From: Noemí Oconnor PA-C  Sent: 7/14/2025  10:48 PM EDT  To: JOSÉ Beach; DO Praveen Adams Dr. and Lala,    I was wondering if you are able to assist with this mutual patient. She had labs drawn on 7/8 which showed worsening hyponatremia (127). She is currently taking Lasix 20 mg once daily and Aldactone 50 mg once daily. She is supposed to be following a fluid restriction of 1.5 L daily but reports that she has been going over this with the recent weather. Would you be able to contact her with recommendations in regards to her diuretic therapy if necessary for hyponatremia? I've ordered her for repeat labs to be drawn on 7/22. Thank you in advance for your help!    Best,  Noemí Oconnor

## 2025-07-17 ENCOUNTER — HOSPITAL ENCOUNTER (OUTPATIENT)
Dept: INTERVENTIONAL RADIOLOGY/VASCULAR | Facility: HOSPITAL | Age: 65
Discharge: HOME | End: 2025-07-17
Attending: INTERNAL MEDICINE
Payer: MEDICARE

## 2025-07-17 ENCOUNTER — HOSPITAL ENCOUNTER (OUTPATIENT)
Dept: INFUSION CENTER | Facility: HOSPITAL | Age: 65
Discharge: HOME | End: 2025-07-17
Attending: INTERNAL MEDICINE

## 2025-07-17 VITALS
DIASTOLIC BLOOD PRESSURE: 59 MMHG | SYSTOLIC BLOOD PRESSURE: 102 MMHG | OXYGEN SATURATION: 98 % | RESPIRATION RATE: 17 BRPM | HEART RATE: 83 BPM

## 2025-07-17 DIAGNOSIS — K70.31 ALCOHOLIC CIRRHOSIS OF LIVER WITH ASCITES (HCC): ICD-10-CM

## 2025-07-17 DIAGNOSIS — J91.8 PLEURAL EFFUSION ASSOCIATED WITH HEPATIC DISORDER: ICD-10-CM

## 2025-07-17 DIAGNOSIS — K76.9 PLEURAL EFFUSION ASSOCIATED WITH HEPATIC DISORDER: ICD-10-CM

## 2025-07-17 PROCEDURE — 49083 ABD PARACENTESIS W/IMAGING: CPT

## 2025-07-17 NOTE — SEDATION DOCUMENTATION
3600 mL of clear yellow fluid removed during R sided paracentesis completed by Dr. Post. Patient questions answered and band aid placed on site.

## 2025-07-17 NOTE — DISCHARGE INSTRUCTIONS

## 2025-07-18 ENCOUNTER — OFFICE VISIT (OUTPATIENT)
Dept: FAMILY MEDICINE CLINIC | Facility: CLINIC | Age: 65
End: 2025-07-18
Payer: MEDICARE

## 2025-07-18 VITALS
TEMPERATURE: 97.6 F | HEIGHT: 64 IN | SYSTOLIC BLOOD PRESSURE: 124 MMHG | WEIGHT: 124 LBS | DIASTOLIC BLOOD PRESSURE: 74 MMHG | OXYGEN SATURATION: 98 % | BODY MASS INDEX: 21.17 KG/M2 | HEART RATE: 97 BPM

## 2025-07-18 DIAGNOSIS — G47.00 INSOMNIA, UNSPECIFIED TYPE: ICD-10-CM

## 2025-07-18 DIAGNOSIS — T36.95XA ANTIBIOTIC-INDUCED YEAST INFECTION: ICD-10-CM

## 2025-07-18 DIAGNOSIS — J42 CHRONIC BRONCHITIS, UNSPECIFIED CHRONIC BRONCHITIS TYPE (HCC): ICD-10-CM

## 2025-07-18 DIAGNOSIS — B37.9 ANTIBIOTIC-INDUCED YEAST INFECTION: ICD-10-CM

## 2025-07-18 DIAGNOSIS — K70.31 ALCOHOLIC CIRRHOSIS OF LIVER WITH ASCITES (HCC): Chronic | ICD-10-CM

## 2025-07-18 DIAGNOSIS — Z00.00 MEDICARE ANNUAL WELLNESS VISIT, SUBSEQUENT: Primary | ICD-10-CM

## 2025-07-18 DIAGNOSIS — S40.861A TICK BITE OF RIGHT UPPER ARM, INITIAL ENCOUNTER: ICD-10-CM
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
## 2025-07-18 PROCEDURE — 99214 OFFICE O/P EST MOD 30 MIN: CPT | Performed by: NURSE PRACTITIONER

## 2025-07-18 PROCEDURE — G2211 COMPLEX E/M VISIT ADD ON: HCPCS | Performed by: NURSE PRACTITIONER

## 2025-07-18 PROCEDURE — G0439 PPPS, SUBSEQ VISIT: HCPCS | Performed by: NURSE PRACTITIONER

## 2025-07-18 RX ORDER — TRAZODONE HYDROCHLORIDE 50 MG/1
50 TABLET ORAL
Qty: 100 TABLET | Refills: 2 | Status: SHIPPED | OUTPATIENT
Start: 2025-07-18

## 2025-07-18 RX ORDER — FLUCONAZOLE 150 MG/1
150 TABLET ORAL ONCE
Qty: 1 TABLET | Refills: 0 | Status: SHIPPED | OUTPATIENT
Start: 2025-07-18 | End: 2025-07-18

## 2025-07-18 RX ORDER — DOXYCYCLINE HYCLATE 100 MG
100 TABLET ORAL 2 TIMES DAILY
Qty: 20 TABLET | Refills: 0 | Status: SHIPPED | OUTPATIENT
Start: 2025-07-18 | End: 2025-07-28

## 2025-07-18 NOTE — PROGRESS NOTES
Name: Jose White      : 1960      MRN: [id number]  Encounter Provider: JOSÉ Craig  Encounter Date: 2025   Encounter department: [facility]  :  Assessment & Plan  Medicare annual wellness visit, subsequent         Alcoholic cirrhosis of liver with ascites (HCC)  Follows with GI. Paracentesis weekly.         Chronic bronchitis, unspecified chronic bronchitis type (HCC)  No current symptoms. Continue inhalers. Follows with pulmonology.         Insomnia, unspecified type    Orders:    traZODone (DESYREL) 50 mg tablet; Take 1 tablet (50 mg total) by mouth daily at bedtime    Tick bite of right upper arm, initial encounter  Tick bite right arm erythema present around site. Recent Lyme infection. Will start doxy.    Orders:    doxycycline hyclate (VIBRA-TABS) 100 mg tablet; Take 1 tablet (100 mg total) by mouth 2 (two) times a day for 10 days    Antibiotic-induced yeast infection    Orders:    fluconazole (DIFLUCAN) 150 mg tablet; Take 1 tablet (150 mg total) by mouth once for 1 dose      Urinary Incontinence Plan of Care: counseling topics discussed: practice Kegel (pelvic floor strengthening) exercises, use restroom every 2 hours, limit alcohol, caffeine, spicy foods, and acidic foods and limiting fluid intake 3-4 hours before bed.       Preventive health issues were discussed with patient, and age appropriate screening tests were ordered as noted in patient's After Visit Summary. Personalized health advice and appropriate referrals for health education or preventive services given if needed, as noted in patient's After Visit Summary.    History of Present Illness     Patient presents for medicare wellness and medical management.       Patient Care Team:  JOSÉ Craig as PCP - General (Family Medicine)  MD Nikki Donohue RN Noel Martins, MD as Endoscopist  Candace Bustamante MD as Surgeon (General Surgery)  Alexandra Tovar DO  (Nephrology)  Sahra Brown PA-C (Gastroenterology)  Vamsi Herrera MD (Gastroenterology)  Vamsi Herrera MD (Gastroenterology)  Joshua Basurto MD (Obstetrics and Gynecology)  Kirti hZang MD (Palliative Care)  Noemí Oconnor PA-C as Physician Assistant (Gastroenterology)  Noemí Oconnor PA-C as Physician Assistant (Gastroenterology)    Review of Systems   Constitutional:  Negative for activity change, appetite change and fever.   Eyes:  Negative for visual disturbance.   Respiratory:  Negative for chest tightness, shortness of breath and wheezing.    Cardiovascular:  Negative for chest pain, palpitations and leg swelling.   Gastrointestinal:  Positive for abdominal distention. Negative for abdominal pain.   Genitourinary:  Negative for difficulty urinating.   Neurological:  Negative for dizziness and headaches.   Psychiatric/Behavioral:  Negative for dysphoric mood.      Medical History Reviewed by provider this encounter:  Tobacco  Allergies  Meds  Problems  Med Hx  Surg Hx  Fam Hx       Annual Wellness Visit Questionnaire       Health Risk Assessment:   Patient rates overall health as fair. Patient feels that their physical health rating is slightly worse. Patient is satisfied with their life. Eyesight was rated as same. Hearing was rated as same. Patient feels that their emotional and mental health rating is same. Patients states they are never, rarely angry. Patient states they are sometimes unusually tired/fatigued. Pain experienced in the last 7 days has been some. Patient's pain rating has been 6/10. Patient states that she has experienced no weight loss or gain in last 6 months.     Fall Risk Screening:   In the past year, patient has experienced: no history of falling in past year      Urinary Incontinence Screening:   Patient has leaked urine accidently in the last six months.     Home Safety:  Patient has trouble with stairs inside or outside of their home. Patient has  working smoke alarms and has working carbon monoxide detector. Home safety hazards include: none.     Nutrition:   Current diet is Regular.     Medications:   Patient is not currently taking any over-the-counter supplements. Patient is able to manage medications.     Activities of Daily Living (ADLs)/Instrumental Activities of Daily Living (IADLs):   Walk and transfer into and out of bed and chair?: Yes  Dress and groom yourself?: Yes    Bathe or shower yourself?: Yes    Feed yourself? Yes  Do your laundry/housekeeping?: Yes  Manage your money, pay your bills and track your expenses?: Yes  Make your own meals?: Yes    Do your own shopping?: No    Previous Hospitalizations:   Any hospitalizations or ED visits within the last 12 months?: Yes    How many hospitalizations have you had in the last year?: 1-2    Advance Care Planning:   Living will: No    Durable POA for healthcare: Yes    Advanced directive: No    End of Life Decisions reviewed with patient: Yes    Provider agrees with end of life decisions: Yes      Preventive Screenings      Cardiovascular Screening:    General: Screening Not Indicated and History Lipid Disorder      Diabetes Screening:     General: Screening Current      Colorectal Cancer Screening:     General: Screening Current      Breast Cancer Screening:     General: History Breast Cancer      Cervical Cancer Screening:    General: Screening Not Indicated      Osteoporosis Screening:    General: Screening Current      Lung Cancer Screening:     General: Screening Current      Hepatitis C Screening:    General: Screening Current    Immunizations:  - Immunizations due: Hepatitis A and Hepatitis B  - Risks/benefits immunizations discussed      Screening, Brief Intervention, and Referral to Treatment (SBIRT)     Screening  Typical number of drinks in a day: 3  Typical number of drinks in a week: 21  Interpretation: Low risk drinking behavior.    AUDIT-C Screenin) How often did you have a drink  containing alcohol in the past year? 4 or more times a week  2) How many drinks did you have on a typical day when you were drinking in the past year? 3 to 4  3) How often did you have 6 or more drinks on one occasion in the past year? less than monthly    AUDIT-C Score: 5  Interpretation: Score 3-12 (female): POSITIVE screen for alcohol misuse    AUDIT Screenin) How often during the last year have you found that you were not able to stop drinking once you had started? 0 - never  5) How often during the last year have you failed to do what was normally expected from you because of drinking? 0 - never  6) How often during the last year have you needed a first drink in the morning to get yourself going after a heavy drinking session? 0 - never  7) How often during the last year have you had a feeling of guilt or remorse after drinking? 0 - never  8) How often during the last year have you been unable to remember what happened the night before because you had been drinking? 0 - never  9) Have you or someone else been injured as a result of your drinking? 0 - no  10) Has a relative or friend or a doctor or another health worker been concerned about your drinking or suggested you cut down? 4 - yes, during the last year    AUDIT Score: 9  Interpretation: Harmful or hazardous alcohol consumption    Single Item Drug Screening:  How often have you used an illegal drug (including marijuana) or a prescription medication for non-medical reasons in the past year? never    Single Item Drug Screen Score: 0  Interpretation: Negative screen for possible drug use disorder    Brief Intervention  Health risks of current substance use discussed. Alcohol cessation counseling given.     Other Counseling Topics:   Car/seat belt/driving safety, skin self-exam and regular weightbearing exercise.     Social Drivers of Health     Food Insecurity: Patient Declined (2025)    Nursing - Inadequate Food Risk Classification     Worried  "About Running Out of Food in the Last Year: Never true     Ran Out of Food in the Last Year: Never true     Ran Out of Food in the Last Year: Patient declined   Transportation Needs: No Transportation Needs (7/14/2025)    PRAPARE - Transportation     Lack of Transportation (Medical): No     Lack of Transportation (Non-Medical): No   Housing Stability: Low Risk  (7/14/2025)    Housing Stability Vital Sign     Unable to Pay for Housing in the Last Year: No     Number of Times Moved in the Last Year: 0     Homeless in the Last Year: No   Utilities: Not At Risk (7/14/2025)    [facility] Utilities     Threatened with loss of utilities: No     No results found.    Objective   /74 (BP Location: Right arm, Patient Position: Sitting, Cuff Size: Standard)   Pulse 97   Temp 97.6 °F (36.4 °C) (Tympanic)   Ht 5' 4\" (1.626 m)   Wt 56.2 kg (124 lb)   SpO2 98%   BMI 21.28 kg/m²     Physical Exam  Vitals and nursing note reviewed.   Constitutional:       General: She is not in acute distress.     Appearance: Normal appearance. She is normal weight. She is not ill-appearing, toxic-appearing or diaphoretic.   HENT:      Head: Normocephalic.      Right Ear: Tympanic membrane, ear canal and external ear normal. There is no impacted cerumen.      Left Ear: Tympanic membrane, ear canal and external ear normal. There is no impacted cerumen.      Nose: Nose normal. No congestion or rhinorrhea.      Mouth/Throat:      Mouth: Mucous membranes are moist.      Pharynx: Oropharynx is clear. No oropharyngeal exudate or posterior oropharyngeal erythema.     Eyes:      General: No scleral icterus.        Right eye: No discharge.         Left eye: No discharge.      Extraocular Movements: Extraocular movements intact.      Conjunctiva/sclera: Conjunctivae normal.      Pupils: Pupils are equal, round, and reactive to light.     Neck:      Vascular: No carotid bruit.     Cardiovascular:      Rate and Rhythm: Normal rate and regular rhythm.      " Pulses: Normal pulses.      Heart sounds: Normal heart sounds. No murmur heard.     No friction rub. No gallop.   Pulmonary:      Effort: Pulmonary effort is normal. No respiratory distress.      Breath sounds: Normal breath sounds. No stridor. No wheezing, rhonchi or rales.   Chest:      Chest wall: No tenderness.   Abdominal:      General: Abdomen is flat. Bowel sounds are normal. There is distension.      Palpations: Abdomen is soft. There is no mass.      Tenderness: There is no abdominal tenderness. There is no right CVA tenderness, left CVA tenderness, guarding or rebound.      Hernia: No hernia is present.     Musculoskeletal:         General: No swelling, tenderness, deformity or signs of injury. Normal range of motion.      Cervical back: Normal range of motion and neck supple. No rigidity. No muscular tenderness.      Right lower leg: No edema.      Left lower leg: No edema.   Lymphadenopathy:      Cervical: No cervical adenopathy.     Skin:     General: Skin is warm.      Coloration: Skin is not jaundiced or pale.      Findings: No bruising, erythema, lesion or rash.     Neurological:      General: No focal deficit present.      Mental Status: She is alert and oriented to person, place, and time.      Cranial Nerves: No cranial nerve deficit.      Sensory: No sensory deficit.      Motor: No weakness.      Coordination: Coordination normal.      Gait: Gait normal.      Deep Tendon Reflexes: Reflexes normal.     Psychiatric:         Mood and Affect: Mood normal.         Behavior: Behavior normal.         Thought Content: Thought content normal.         Judgment: Judgment normal.

## 2025-07-18 NOTE — ASSESSMENT & PLAN NOTE
Tick bite right arm erythema present around site. Recent Lyme infection. Will start doxy.    Orders:    doxycycline hyclate (VIBRA-TABS) 100 mg tablet; Take 1 tablet (100 mg total) by mouth 2 (two) times a day for 10 days

## 2025-07-18 NOTE — PATIENT INSTRUCTIONS
Medicare Preventive Visit Patient Instructions  Thank you for completing your Welcome to Medicare Visit or Medicare Annual Wellness Visit today. Your next wellness visit will be due in one year (7/19/2026).  The screening/preventive services that you may require over the next 5-10 years are detailed below. Some tests may not apply to you based off risk factors and/or age. Screening tests ordered at today's visit but not completed yet may show as past due. Also, please note that scanned in results may not display below.  Preventive Screenings:  Service Recommendations Previous Testing/Comments   Colorectal Cancer Screening  * Colonoscopy    * Fecal Occult Blood Test (FOBT)/Fecal Immunochemical Test (FIT)  * Fecal DNA/Cologuard Test  * Flexible Sigmoidoscopy Age: 45-75 years old   Colonoscopy: every 10 years (may be performed more frequently if at higher risk)  OR  FOBT/FIT: every 1 year  OR  Cologuard: every 3 years  OR  Sigmoidoscopy: every 5 years  Screening may be recommended earlier than age 45 if at higher risk for colorectal cancer. Also, an individualized decision between you and your healthcare provider will decide whether screening between the ages of 76-85 would be appropriate. Colonoscopy: 05/08/2019  FOBT/FIT: Not on file  Cologuard: Not on file  Sigmoidoscopy: Not on file    Screening Current     Breast Cancer Screening Age: 40+ years old  Frequency: every 1-2 years  Not required if history of left and right mastectomy Mammogram: 11/26/2024    History Breast Cancer   Cervical Cancer Screening Between the ages of 21-29, pap smear recommended once every 3 years.   Between the ages of 30-65, can perform pap smear with HPV co-testing every 5 years.   Recommendations may differ for women with a history of total hysterectomy, cervical cancer, or abnormal pap smears in past. Pap Smear: 04/16/2025    Screening Not Indicated   Hepatitis C Screening Once for adults born between 1945 and 1965  More frequently in  patients at high risk for Hepatitis C Hep C Antibody: 12/07/2020    Screening Current   Diabetes Screening 1-2 times per year if you're at risk for diabetes or have pre-diabetes Fasting glucose: 102 mg/dL (3/29/2023)  A1C: No results in last 5 years (No results in last 5 years)  Screening Current   Cholesterol Screening Once every 5 years if you don't have a lipid disorder. May order more often based on risk factors. Lipid panel: 09/15/2022    Screening Not Indicated  History Lipid Disorder     Other Preventive Screenings Covered by Medicare:  Abdominal Aortic Aneurysm (AAA) Screening: covered once if your at risk. You're considered to be at risk if you have a family history of AAA.  Lung Cancer Screening: covers low dose CT scan once per year if you meet all of the following conditions: (1) Age 55-77; (2) No signs or symptoms of lung cancer; (3) Current smoker or have quit smoking within the last 15 years; (4) You have a tobacco smoking history of at least 20 pack years (packs per day multiplied by number of years you smoked); (5) You get a written order from a healthcare provider.  Glaucoma Screening: covered annually if you're considered high risk: (1) You have diabetes OR (2) Family history of glaucoma OR (3)  aged 50 and older OR (4)  American aged 65 and older  Osteoporosis Screening: covered every 2 years if you meet one of the following conditions: (1) You're estrogen deficient and at risk for osteoporosis based off medical history and other findings; (2) Have a vertebral abnormality; (3) On glucocorticoid therapy for more than 3 months; (4) Have primary hyperparathyroidism; (5) On osteoporosis medications and need to assess response to drug therapy.   Last bone density test (DXA Scan): 08/20/2024.  HIV Screening: covered annually if you're between the age of 15-65. Also covered annually if you are younger than 15 and older than 65 with risk factors for HIV infection. For pregnant  patients, it is covered up to 3 times per pregnancy.    Immunizations:  Immunization Recommendations   Influenza Vaccine Annual influenza vaccination during flu season is recommended for all persons aged >= 6 months who do not have contraindications   Pneumococcal Vaccine   * Pneumococcal conjugate vaccine = PCV13 (Prevnar 13), PCV15 (Vaxneuvance), PCV20 (Prevnar 20)  * Pneumococcal polysaccharide vaccine = PPSV23 (Pneumovax) Adults 19-65 yo with certain risk factors or if 65+ yo  If never received any pneumonia vaccine: recommend Prevnar 20 (PCV20)  Give PCV20 if previously received 1 dose of PCV13 or PPSV23   Hepatitis B Vaccine 3 dose series if at intermediate or high risk (ex: diabetes, end stage renal disease, liver disease)   Respiratory syncytial virus (RSV) Vaccine - COVERED BY MEDICARE PART D  * RSVPreF3 (Arexvy) CDC recommends that adults 60 years of age and older may receive a single dose of RSV vaccine using shared clinical decision-making (SCDM)   Tetanus (Td) Vaccine - COST NOT COVERED BY MEDICARE PART B Following completion of primary series, a booster dose should be given every 10 years to maintain immunity against tetanus. Td may also be given as tetanus wound prophylaxis.   Tdap Vaccine - COST NOT COVERED BY MEDICARE PART B Recommended at least once for all adults. For pregnant patients, recommended with each pregnancy.   Shingles Vaccine (Shingrix) - COST NOT COVERED BY MEDICARE PART B  2 shot series recommended in those 19 years and older who have or will have weakened immune systems or those 50 years and older     Health Maintenance Due:      Topic Date Due   • Lung Cancer Screening  10/09/2025   • Breast Cancer Screening: Mammogram  11/26/2025   • DXA SCAN  08/20/2027   • Colorectal Cancer Screening  05/08/2029   • Hepatitis C Screening  Completed     Immunizations Due:      Topic Date Due   • Hepatitis A Vaccine (1 of 2 - Risk 2-dose series) Never done   • Hepatitis B Vaccine (1 of 3 - Risk  3-dose series) Never done   • COVID-19 Vaccine (7 - Pfizer risk 2024-25 season) 04/30/2025   • Influenza Vaccine (1) 09/01/2025     Advance Directives   What are advance directives?  Advance directives are legal documents that state your wishes and plans for medical care. These plans are made ahead of time in case you lose your ability to make decisions for yourself. Advance directives can apply to any medical decision, such as the treatments you want, and if you want to donate organs.   What are the types of advance directives?  There are many types of advance directives, and each state has rules about how to use them. You may choose a combination of any of the following:  Living will:  This is a written record of the treatment you want. You can also choose which treatments you do not want, which to limit, and which to stop at a certain time. This includes surgery, medicine, IV fluid, and tube feedings.   Durable power of  for healthcare (DPAHC):  This is a written record that states who you want to make healthcare choices for you when you are unable to make them for yourself. This person, called a proxy, is usually a family member or a friend. You may choose more than 1 proxy.  Do not resuscitate (DNR) order:  A DNR order is used in case your heart stops beating or you stop breathing. It is a request not to have certain forms of treatment, such as CPR. A DNR order may be included in other types of advance directives.  Medical directive:  This covers the care that you want if you are in a coma, near death, or unable to make decisions for yourself. You can list the treatments you want for each condition. Treatment may include pain medicine, surgery, blood transfusions, dialysis, IV or tube feedings, and a ventilator (breathing machine).  Values history:  This document has questions about your views, beliefs, and how you feel and think about life. This information can help others choose the care that you  would choose.  Why are advance directives important?  An advance directive helps you control your care. Although spoken wishes may be used, it is better to have your wishes written down. Spoken wishes can be misunderstood, or not followed. Treatments may be given even if you do not want them. An advance directive may make it easier for your family to make difficult choices about your care.   Urinary Incontinence   Urinary incontinence (UI)  is when you lose control of your bladder. UI develops because your bladder cannot store or empty urine properly. The 3 most common types of UI are stress incontinence, urge incontinence, or both.  Medicines:   May be given to help strengthen your bladder control. Report any side effects of medication to your healthcare provider.  Do pelvic muscle exercises often:  Your pelvic muscles help you stop urinating. Squeeze these muscles tight for 5 seconds, then relax for 5 seconds. Gradually work up to squeezing for 10 seconds. Do 3 sets of 15 repetitions a day, or as directed. This will help strengthen your pelvic muscles and improve bladder control.  Train your bladder:  Go to the bathroom at set times, such as every 2 hours, even if you do not feel the urge to go. You can also try to hold your urine when you feel the urge to go. For example, hold your urine for 5 minutes when you feel the urge to go. As that becomes easier, hold your urine for 10 minutes.   Self-care:   Keep a UI record.  Write down how often you leak urine and how much you leak. Make a note of what you were doing when you leaked urine.  Drink liquids as directed. You may need to limit the amount of liquid you drink to help control your urine leakage. Do not drink any liquid right before you go to bed. Limit or do not have drinks that contain caffeine or alcohol.   Prevent constipation.  Eat a variety of high-fiber foods. Good examples are high-fiber cereals, beans, vegetables, and whole-grain breads. Walking is the  best way to trigger your intestines to have a bowel movement.  Exercise regularly and maintain a healthy weight.  Weight loss and exercise will decrease pressure on your bladder and help you control your leakage.   Use a catheter as directed  to help empty your bladder. A catheter is a tiny, plastic tube that is put into your bladder to drain your urine.   Go to behavior therapy as directed.  Behavior therapy may be used to help you learn to control your urge to urinate.    Cigarette Smoking and Your Health   Risks to your health if you smoke:  Nicotine and other chemicals found in tobacco damage every cell in your body. Even if you are a light smoker, you have an increased risk for cancer, heart disease, and lung disease. If you are pregnant or have diabetes, smoking increases your risk for complications.   Benefits to your health if you stop smoking:   You decrease respiratory symptoms such as coughing, wheezing, and shortness of breath.   You reduce your risk for cancers of the lung, mouth, throat, kidney, bladder, pancreas, stomach, and cervix. If you already have cancer, you increase the benefits of chemotherapy. You also reduce your risk for cancer returning or a second cancer from developing.   You reduce your risk for heart disease, blood clots, heart attack, and stroke.   You reduce your risk for lung infections, and diseases such as pneumonia, asthma, chronic bronchitis, and emphysema.  Your circulation improves. More oxygen can be delivered to your body. If you have diabetes, you lower your risk for complications, such as kidney, artery, and eye diseases. You also lower your risk for nerve damage. Nerve damage can lead to amputations, poor vision, and blindness.  You improve your body's ability to heal and to fight infections.  For more information and support to stop smoking:   [employer]  Phone: 8- 419 - 802-6038  Web Address: www.[employer].Dynis  Alcohol Use and Your Health    Drinking too much can  "harm your health.  Excessive alcohol use leads to about 88,000 death in the [place] each year, and shortens the life of those who diet by almost 30 years.  Further, excessive drinking cost the economy $249 billion in 2010.  Most excessive drinkers are not alcohol dependent.    Excessive alcohol use has immediate effects that increase the risk of many harmful health conditions.  These are most often the result of binge drinking.  Over time, excessive alcohol use can lead to the development of chronic diseases and other series health problems.    What is considered a \"drink\"?        Excessive alcohol use includes:  Binge Drinking: For women, 4 or more drinks consumed on one occasion. For men, 5 or more drinks consumed on one occasion.  Heavy Drinking: For women, 8 or more drinks per week. For men, 15 or more drinks per week  Any alcohol used by pregnant women  Any alcohol used by those under the age of 21 years    If you choose to drink, do so in moderation:  Do not drink at all if you are under the age of 21, or if you are or may be pregnant, or have health problems that could be made worse by drinking.  For women, up to 1 drink per day  For men, up to 2 drinks a day    No one should begin drinking or drink more frequently based on potential health benefits    Short-Term Health Risks:  Injuries: motor vehicle crashes, falls, drownings, burns  Violence: homicide, suicide, sexual assault, intimate partner violence  Alcohol poisoning  Reproductive health: risky sexual behaviors, unintended prengnacy, sexually transmitted diseases, miscarriage, stillbirth, fetal alcohol syndrome    Long-Term Health Risks:  Chronic diseases: high blood pressure, heart disease, stroke, liver disease, digestive problems  Cancers: breast, mouth and throat, liver, colon  Learning and memory problems: dementia, poor school performance  Mental health: depression, anxiety, insomnia  Social problems: lost productivity, family problems, " unemployment  Alcohol dependence    For support and more information:  Substance Abuse and Mental Health Services Administration  [address]  [place] [address]  Web Address: http://www.samhsa.gov    Alcoholics Anonymous        Web Address: http://www.aa.org    https://www.cdc.gov/alcohol/fact-sheets/alcohol-use.htm   © Copyright [employer] 2018 Information is for End User's use only and may not be sold, redistributed or otherwise used for commercial purposes. All illustrations and images included in CareNotes® are the copyrighted property of A.D.A[employer]M., Inc. or "[employer]"

## 2025-07-21 ENCOUNTER — APPOINTMENT (OUTPATIENT)
Dept: RADIOLOGY | Facility: CLINIC | Age: 65
DRG: 433 | End: 2025-07-21
Attending: ORTHOPAEDIC SURGERY
Payer: MEDICARE

## 2025-07-21 ENCOUNTER — OFFICE VISIT (OUTPATIENT)
Dept: OBGYN CLINIC | Facility: CLINIC | Age: 65
End: 2025-07-21
Payer: MEDICARE

## 2025-07-21 ENCOUNTER — PREP FOR PROCEDURE (OUTPATIENT)
Dept: OBGYN CLINIC | Facility: CLINIC | Age: 65
End: 2025-07-21

## 2025-07-21 VITALS — WEIGHT: 127.2 LBS | BODY MASS INDEX: 21.72 KG/M2 | HEIGHT: 64 IN

## 2025-07-21 DIAGNOSIS — S62.356D CLOSED NONDISPLACED FRACTURE OF SHAFT OF FIFTH METACARPAL BONE OF RIGHT HAND WITH ROUTINE HEALING, SUBSEQUENT ENCOUNTER: Primary | ICD-10-CM

## 2025-07-21 DIAGNOSIS — R22.32 FINGER MASS, LEFT: ICD-10-CM

## 2025-07-21 PROCEDURE — 99213 OFFICE O/P EST LOW 20 MIN: CPT | Performed by: ORTHOPAEDIC SURGERY

## 2025-07-21 RX ORDER — LIDOCAINE HYDROCHLORIDE AND EPINEPHRINE 10; 10 MG/ML; UG/ML
20 INJECTION, SOLUTION INFILTRATION; PERINEURAL ONCE
OUTPATIENT
Start: 2025-07-21 | End: 2025-07-21

## 2025-07-21 NOTE — PROGRESS NOTES
ORTHOPAEDIC HAND, WRIST, AND ELBOW OFFICE  VISIT     Name: Jose White      : 1960      MRN: [id number]  Encounter Provider: Zackery Alan MD  Encounter Date: 2025   Encounter department: [facility]  :  Assessment & Plan  Closed nondisplaced fracture of shaft of fifth metacarpal bone of right hand with routine healing, subsequent encounter  X-rays were reviewed with the patient which demonstrate healing of her fifth metacarpal fracture.  She can use her hand as tolerated for activities of daily living  She will follow-up as needed for this issue  Orders:  •  XR hand 3+ vw right; Future    Finger mass, left  Left ring finger dorsal mass  Conservative and operative treatments were discussed with the patient at length  She like to proceed with surgical intervention  Detail consent was obtained for left ring finger mass excision under local anesthesia  Risk benefits to surgery were discussed  She will follow-up after surgery for suture removal  Orders:  •  Case request operating room: Left ring finger mass excision; Standing          History of Present Illness   HPI  Chief Complaint   Patient presents with   • Right Hand - Follow-up, Fracture   • Left Ring Finger - Cyst       Jose White is a 65 y.o. female who presents for evaluation right hand small finger metacarpal fracture.  Patient is doing well.  Minimal discomfort.  No fevers chills constitutional symptoms.  She has been working on her range of motion.  Her main complaint is that her small finger will come out sign.    She also notes a bump over the dorsal aspect of the left ring finger.  She states previously it has gotten bigger and will note some pain and discomfort.      REVIEW OF SYSTEMS:  General: no fever, no chills  HEENT:  No loss of hearing or eyesight problems  Eyes:  No red eyes  Respiratory:  No coughing, shortness of breath or wheezing  Cardiovascular:  No chest pain, no  "palpitations  GI:  Abdomen soft nontender, denies nausea  Endocrine:  No muscle weakness, no frequent urination, no excessive thirst  Urinary:  No dysuria, no incontinence  Musculoskeletal: see HPI and PE  SKIN:  No skin rash, no dry skin  Neurological:  No headaches, no confusion  Psychiatric:  No suicide thoughts, no anxiety, no depression  Review of all other systems is negative    Objective   Ht 5' 4\" (1.626 m)   Wt 57.7 kg (127 lb 3.2 oz)   BMI 21.83 kg/m²      General: well developed and well nourished, alert, oriented times 3, and appears comfortable  Psychiatric: Normal  HEENT: Trachea Midline, No torticollis  Cardiovascular: No discernable arrhythmia  Pulmonary: No wheezing or stridor  Abdomen: No rebound or guarding  Extremities: No peripheral edema  Skin: No masses, erythema, lacerations, fluctation, ulcerations  Neurovascular: Sensation Intact to the Median, Ulnar, Radial Nerve, Motor Intact to the Median, Ulnar, Radial Nerve, and Pulses Intact    Musculoskeletal exam:  Right hand today 70% composite fist formation, no under lapping or overlapping.  No tenderness over the fracture site.  No evidence of atrophy.  No extensor tendon subluxation.    Left ring finger  No erythema edema or ecchymosis noted, skin is warm to touch  Palpable bump appreciated over the dorsal aspect of the middle phalanx which is tender to palpation  She has full range of motion making a full composite fist  Sensation is intact to light touch    STUDIES REVIEWED:  AP, lateral, and oblique x-rays of the right hand were reviewed by myself and demonstrates healing small finger metacarpal fracture with no change in angulation or shortening with callous formation noted.       PROCEDURES PERFORMED:  Procedures  No Procedures performed today    Scribe Attestation    I,:  Jayy Isabel PA-C am acting as a scribe while in the presence of the attending physician.:       I,:  Zackery Alan MD personally performed the services " described in this documentation    as scribed in my presence.:

## 2025-07-23 LAB
AFP-TM SERPL-MCNC: 4.4 NG/ML
ALBUMIN SERPL-MCNC: 2.4 G/DL (ref 3.6–5.1)
ALBUMIN/GLOB SERPL: 0.5 (CALC) (ref 1–2.5)
ALP SERPL-CCNC: 88 U/L (ref 37–153)
ALT SERPL-CCNC: 30 U/L (ref 6–29)
AST SERPL-CCNC: 49 U/L (ref 10–35)
BASOPHILS # BLD AUTO: 50 CELLS/UL (ref 0–200)
BASOPHILS NFR BLD AUTO: 1.1 %
BILIRUB SERPL-MCNC: 1.3 MG/DL (ref 0.2–1.2)
BUN SERPL-MCNC: 9 MG/DL (ref 7–25)
BUN/CREAT SERPL: 19 (CALC) (ref 6–22)
CALCIUM SERPL-MCNC: 8.2 MG/DL (ref 8.6–10.4)
CHLORIDE SERPL-SCNC: 89 MMOL/L (ref 98–110)
CO2 SERPL-SCNC: 28 MMOL/L (ref 20–32)
CREAT SERPL-MCNC: 0.48 MG/DL (ref 0.5–1.05)
EOSINOPHIL # BLD AUTO: 50 CELLS/UL (ref 15–500)
EOSINOPHIL NFR BLD AUTO: 1.1 %
ERYTHROCYTE [DISTWIDTH] IN BLOOD BY AUTOMATED COUNT: 13.7 % (ref 11–15)
GFR/BSA.PRED SERPLBLD CYS-BASED-ARV: 104 ML/MIN/1.73M2
GLOBULIN SER CALC-MCNC: 4.5 G/DL (CALC) (ref 1.9–3.7)
GLUCOSE SERPL-MCNC: 90 MG/DL (ref 65–99)
HCT VFR BLD AUTO: 38 % (ref 35–45)
HGB BLD-MCNC: 13 G/DL (ref 11.7–15.5)
INR PPP: 1.3
LYMPHOCYTES # BLD AUTO: 527 CELLS/UL (ref 850–3900)
LYMPHOCYTES NFR BLD AUTO: 11.7 %
MCH RBC QN AUTO: 35.7 PG (ref 27–33)
MCHC RBC AUTO-ENTMCNC: 34.2 G/DL (ref 32–36)
MCV RBC AUTO: 104.4 FL (ref 80–100)
MONOCYTES # BLD AUTO: 707 CELLS/UL (ref 200–950)
MONOCYTES NFR BLD AUTO: 15.7 %
NEUTROPHILS # BLD AUTO: 3168 CELLS/UL (ref 1500–7800)
NEUTROPHILS NFR BLD AUTO: 70.4 %
PLATELET # BLD AUTO: 112 THOUSAND/UL (ref 140–400)
PMV BLD REES-ECKER: 8.8 FL (ref 7.5–12.5)
POTASSIUM SERPL-SCNC: 3.5 MMOL/L (ref 3.5–5.3)
PROT SERPL-MCNC: 6.9 G/DL (ref 6.1–8.1)
PROTHROMBIN TIME: 13.5 SEC (ref 9–11.5)
RBC # BLD AUTO: 3.64 MILLION/UL (ref 3.8–5.1)
SODIUM SERPL-SCNC: 124 MMOL/L (ref 135–146)
WBC # BLD AUTO: 4.5 THOUSAND/UL (ref 3.8–10.8)

## 2025-07-24 ENCOUNTER — HOSPITAL ENCOUNTER (OUTPATIENT)
Dept: INFUSION CENTER | Facility: HOSPITAL | Age: 65
Discharge: HOME | End: 2025-07-24
Attending: INTERNAL MEDICINE

## 2025-07-24 ENCOUNTER — HOSPITAL ENCOUNTER (INPATIENT)
Facility: HOSPITAL | Age: 65
LOS: 2 days | Discharge: HOME | DRG: 433 | End: 2025-07-26
Attending: EMERGENCY MEDICINE | Admitting: STUDENT IN AN ORGANIZED HEALTH CARE EDUCATION/TRAINING PROGRAM
Payer: MEDICARE

## 2025-07-24 ENCOUNTER — HOSPITAL ENCOUNTER (OUTPATIENT)
Dept: INTERVENTIONAL RADIOLOGY/VASCULAR | Facility: HOSPITAL | Age: 65
Discharge: HOME | End: 2025-07-24
Attending: INTERNAL MEDICINE
Payer: MEDICARE

## 2025-07-24 VITALS
OXYGEN SATURATION: 99 % | HEART RATE: 81 BPM | RESPIRATION RATE: 19 BRPM | SYSTOLIC BLOOD PRESSURE: 113 MMHG | DIASTOLIC BLOOD PRESSURE: 55 MMHG

## 2025-07-24 DIAGNOSIS — K70.31 ALCOHOLIC CIRRHOSIS OF LIVER WITH ASCITES (HCC): ICD-10-CM

## 2025-07-24 DIAGNOSIS — J91.8 PLEURAL EFFUSION ASSOCIATED WITH HEPATIC DISORDER: ICD-10-CM

## 2025-07-24 DIAGNOSIS — K76.9 PLEURAL EFFUSION ASSOCIATED WITH HEPATIC DISORDER: ICD-10-CM

## 2025-07-24 PROBLEM — A69.20 LYME DISEASE: Status: ACTIVE | Noted: 2025-07-24

## 2025-07-24 PROCEDURE — 49083 ABD PARACENTESIS W/IMAGING: CPT

## 2025-07-24 RX ORDER — LIDOCAINE HYDROCHLORIDE 10 MG/ML
INJECTION, SOLUTION EPIDURAL; INFILTRATION; INTRACAUDAL; PERINEURAL AS NEEDED
Status: COMPLETED | OUTPATIENT
Start: 2025-07-24 | End: 2025-07-24

## 2025-07-24 RX ADMIN — LIDOCAINE HYDROCHLORIDE 10 ML: 10 INJECTION, SOLUTION EPIDURAL; INFILTRATION; INTRACAUDAL at 11:46

## 2025-07-24 NOTE — DISCHARGE INSTRUCTIONS

## 2025-07-24 NOTE — SEDATION DOCUMENTATION
3.1 L clear yellow fluid drained from R sided paracentesis done by Dr. Post. AVS reviewed with patient and band aid applied to site. Patient ambulated out.

## 2025-07-25 PROBLEM — D75.89 MACROCYTOSIS: Status: ACTIVE | Noted: 2025-07-25

## 2025-07-25 PROBLEM — R18.8 REFRACTORY ASCITES: Status: ACTIVE | Noted: 2025-07-25

## 2025-07-25 PROBLEM — M62.84 SARCOPENIA: Status: ACTIVE | Noted: 2025-07-25

## 2025-07-25 PROBLEM — E83.42 HYPOMAGNESEMIA: Status: ACTIVE | Noted: 2025-07-25

## 2025-07-25 PROBLEM — D72.819 LEUKOPENIA: Status: ACTIVE | Noted: 2025-07-25

## 2025-07-28 ENCOUNTER — TRANSITIONAL CARE MANAGEMENT (OUTPATIENT)
Dept: FAMILY MEDICINE CLINIC | Facility: CLINIC | Age: 65
End: 2025-07-28

## 2025-07-28 ENCOUNTER — TELEPHONE (OUTPATIENT)
Dept: NEPHROLOGY | Facility: CLINIC | Age: 65
End: 2025-07-28

## 2025-07-28 DIAGNOSIS — E87.1 HYPONATREMIA: Primary | ICD-10-CM

## 2025-07-29 ENCOUNTER — OFFICE VISIT (OUTPATIENT)
Dept: GASTROENTEROLOGY | Facility: CLINIC | Age: 65
End: 2025-07-29
Payer: MEDICARE

## 2025-07-29 VITALS
BODY MASS INDEX: 22.23 KG/M2 | WEIGHT: 130.2 LBS | TEMPERATURE: 98.9 F | HEIGHT: 64 IN | SYSTOLIC BLOOD PRESSURE: 122 MMHG | DIASTOLIC BLOOD PRESSURE: 78 MMHG

## 2025-07-29 DIAGNOSIS — K70.31 ALCOHOLIC CIRRHOSIS OF LIVER WITH ASCITES (HCC): Primary | Chronic | ICD-10-CM

## 2025-07-29 DIAGNOSIS — R53.1 GENERALIZED WEAKNESS: ICD-10-CM

## 2025-07-29 DIAGNOSIS — K58.0 IRRITABLE BOWEL SYNDROME WITH DIARRHEA: ICD-10-CM

## 2025-07-29 DIAGNOSIS — I85.10 SECONDARY ESOPHAGEAL VARICES WITHOUT BLEEDING (HCC): ICD-10-CM

## 2025-07-29 PROCEDURE — 99214 OFFICE O/P EST MOD 30 MIN: CPT | Performed by: INTERNAL MEDICINE

## 2025-07-29 PROCEDURE — G2211 COMPLEX E/M VISIT ADD ON: HCPCS | Performed by: INTERNAL MEDICINE

## 2025-07-29 RX ORDER — FLUCONAZOLE 150 MG/1
1 TABLET ORAL DAILY
COMMUNITY
Start: 2025-07-18 | End: 2025-08-01 | Stop reason: SDUPTHER

## 2025-07-29 RX ORDER — LACTULOSE 10 G/15ML
10 SOLUTION ORAL 2 TIMES DAILY
Qty: 240 ML | Refills: 5 | Status: SHIPPED | OUTPATIENT
Start: 2025-07-29

## 2025-07-30 DIAGNOSIS — K70.31 ALCOHOLIC CIRRHOSIS OF LIVER WITH ASCITES (HCC): Primary | ICD-10-CM

## 2025-07-30 RX ORDER — SODIUM CHLORIDE 9 MG/ML
20 INJECTION, SOLUTION INTRAVENOUS ONCE
OUTPATIENT
Start: 2025-07-31

## 2025-07-30 RX ORDER — ALBUMIN (HUMAN) 12.5 G/50ML
50 SOLUTION INTRAVENOUS ONCE AS NEEDED
OUTPATIENT
Start: 2025-07-31

## 2025-07-30 RX ORDER — ALBUMIN (HUMAN) 12.5 G/50ML
100 SOLUTION INTRAVENOUS ONCE AS NEEDED
OUTPATIENT
Start: 2025-07-31

## 2025-07-30 RX ORDER — ALBUMIN (HUMAN) 12.5 G/50ML
75 SOLUTION INTRAVENOUS ONCE AS NEEDED
OUTPATIENT
Start: 2025-07-31

## 2025-07-31 ENCOUNTER — HOSPITAL ENCOUNTER (OUTPATIENT)
Dept: INTERVENTIONAL RADIOLOGY/VASCULAR | Facility: HOSPITAL | Age: 65
Discharge: HOME | End: 2025-07-31
Attending: INTERNAL MEDICINE | Admitting: INTERNAL MEDICINE
Payer: MEDICARE

## 2025-07-31 ENCOUNTER — HOSPITAL ENCOUNTER (OUTPATIENT)
Dept: INFUSION CENTER | Facility: HOSPITAL | Age: 65
Discharge: HOME | End: 2025-07-31
Attending: INTERNAL MEDICINE

## 2025-07-31 VITALS
SYSTOLIC BLOOD PRESSURE: 112 MMHG | DIASTOLIC BLOOD PRESSURE: 59 MMHG | HEART RATE: 72 BPM | OXYGEN SATURATION: 100 % | RESPIRATION RATE: 17 BRPM

## 2025-07-31 DIAGNOSIS — J91.8 PLEURAL EFFUSION ASSOCIATED WITH HEPATIC DISORDER: ICD-10-CM

## 2025-07-31 DIAGNOSIS — K70.31 ALCOHOLIC CIRRHOSIS OF LIVER WITH ASCITES (HCC): ICD-10-CM

## 2025-07-31 DIAGNOSIS — K76.9 PLEURAL EFFUSION ASSOCIATED WITH HEPATIC DISORDER: ICD-10-CM

## 2025-07-31 PROCEDURE — 49083 ABD PARACENTESIS W/IMAGING: CPT

## 2025-07-31 PROCEDURE — 49083 ABD PARACENTESIS W/IMAGING: CPT | Performed by: INTERNAL MEDICINE

## 2025-08-01 ENCOUNTER — OFFICE VISIT (OUTPATIENT)
Dept: FAMILY MEDICINE CLINIC | Facility: CLINIC | Age: 65
End: 2025-08-01
Payer: MEDICARE

## 2025-08-01 VITALS
HEART RATE: 87 BPM | WEIGHT: 128 LBS | BODY MASS INDEX: 21.85 KG/M2 | TEMPERATURE: 98 F | DIASTOLIC BLOOD PRESSURE: 64 MMHG | SYSTOLIC BLOOD PRESSURE: 116 MMHG | OXYGEN SATURATION: 96 % | HEIGHT: 64 IN
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
## 2025-08-01 DIAGNOSIS — E87.1 HYPONATREMIA: Primary | ICD-10-CM

## 2025-08-01 DIAGNOSIS — S90.862A TICK BITE OF LEFT FOOT, INITIAL ENCOUNTER: ICD-10-CM

## 2025-08-01 DIAGNOSIS — T36.95XA ANTIBIOTIC-INDUCED YEAST INFECTION: ICD-10-CM

## 2025-08-01 DIAGNOSIS — B37.9 ANTIBIOTIC-INDUCED YEAST INFECTION: ICD-10-CM

## 2025-08-01 DIAGNOSIS — M85.89 OSTEOPENIA OF MULTIPLE SITES: ICD-10-CM

## 2025-08-01 DIAGNOSIS — K70.31 ALCOHOLIC CIRRHOSIS OF LIVER WITH ASCITES (HCC): Chronic | ICD-10-CM

## 2025-08-01 PROBLEM — S40.861A TICK BITE OF RIGHT UPPER ARM: Status: RESOLVED | Noted: 2025-07-18 | Resolved: 2025-08-01

## 2025-08-01 PROCEDURE — 99496 TRANSJ CARE MGMT HIGH F2F 7D: CPT | Performed by: NURSE PRACTITIONER

## 2025-08-01 RX ORDER — FLUCONAZOLE 150 MG/1
150 TABLET ORAL ONCE
Qty: 1 TABLET | Refills: 0 | Status: SHIPPED | OUTPATIENT
Start: 2025-08-01 | End: 2025-08-01

## 2025-08-01 RX ORDER — ALENDRONATE SODIUM 70 MG/1
70 TABLET ORAL
Qty: 12 TABLET | Refills: 3 | Status: SHIPPED | OUTPATIENT
Start: 2025-08-01

## 2025-08-01 RX ORDER — DOXYCYCLINE HYCLATE 100 MG
100 TABLET ORAL 2 TIMES DAILY
Qty: 20 TABLET | Refills: 0 | Status: SHIPPED | OUTPATIENT
Start: 2025-08-01 | End: 2025-08-11

## 2025-08-05 ENCOUNTER — HOSPITAL ENCOUNTER (OUTPATIENT)
Dept: ULTRASOUND IMAGING | Facility: HOSPITAL | Age: 65
Discharge: HOME | End: 2025-08-05
Payer: MEDICARE

## 2025-08-05 DIAGNOSIS — D69.6 THROMBOCYTOPENIA (HCC): ICD-10-CM

## 2025-08-05 DIAGNOSIS — K76.82 HEPATIC ENCEPHALOPATHY (HCC): ICD-10-CM

## 2025-08-05 DIAGNOSIS — F10.90 ALCOHOL USE: ICD-10-CM

## 2025-08-05 DIAGNOSIS — K70.31 ALCOHOLIC CIRRHOSIS OF LIVER WITH ASCITES (HCC): ICD-10-CM

## 2025-08-05 DIAGNOSIS — I85.10 SECONDARY ESOPHAGEAL VARICES WITHOUT BLEEDING (HCC): ICD-10-CM

## 2025-08-05 DIAGNOSIS — K76.9 PLEURAL EFFUSION ASSOCIATED WITH HEPATIC DISORDER: ICD-10-CM

## 2025-08-05 DIAGNOSIS — J91.8 PLEURAL EFFUSION ASSOCIATED WITH HEPATIC DISORDER: ICD-10-CM

## 2025-08-05 DIAGNOSIS — R18.8 REFRACTORY ASCITES: ICD-10-CM

## 2025-08-05 PROCEDURE — 76700 US EXAM ABDOM COMPLETE: CPT

## 2025-08-07 ENCOUNTER — TELEPHONE (OUTPATIENT)
Dept: NEPHROLOGY | Facility: HOSPITAL | Age: 65
End: 2025-08-07

## 2025-08-07 ENCOUNTER — TELEPHONE (OUTPATIENT)
Age: 65
End: 2025-08-07

## 2025-08-07 ENCOUNTER — HOSPITAL ENCOUNTER (OUTPATIENT)
Dept: INFUSION CENTER | Facility: HOSPITAL | Age: 65
Discharge: HOME | End: 2025-08-07
Attending: INTERNAL MEDICINE

## 2025-08-07 ENCOUNTER — APPOINTMENT (OUTPATIENT)
Dept: INTERVENTIONAL RADIOLOGY/VASCULAR | Facility: HOSPITAL | Age: 65
End: 2025-08-07
Attending: INTERNAL MEDICINE
Payer: MEDICARE

## 2025-08-07 ENCOUNTER — HOSPITAL ENCOUNTER (OUTPATIENT)
Dept: INTERVENTIONAL RADIOLOGY/VASCULAR | Facility: HOSPITAL | Age: 65
Discharge: HOME | End: 2025-08-07
Attending: INTERNAL MEDICINE
Payer: MEDICARE

## 2025-08-07 VITALS
DIASTOLIC BLOOD PRESSURE: 58 MMHG | SYSTOLIC BLOOD PRESSURE: 116 MMHG | RESPIRATION RATE: 18 BRPM | OXYGEN SATURATION: 99 % | HEART RATE: 85 BPM

## 2025-08-07 DIAGNOSIS — K76.9 PLEURAL EFFUSION ASSOCIATED WITH HEPATIC DISORDER: ICD-10-CM

## 2025-08-07 DIAGNOSIS — J91.8 PLEURAL EFFUSION ASSOCIATED WITH HEPATIC DISORDER: ICD-10-CM

## 2025-08-07 DIAGNOSIS — K70.31 ALCOHOLIC CIRRHOSIS OF LIVER WITH ASCITES (HCC): ICD-10-CM

## 2025-08-07 PROCEDURE — 49083 ABD PARACENTESIS W/IMAGING: CPT

## 2025-08-07 RX ORDER — LIDOCAINE HYDROCHLORIDE 10 MG/ML
INJECTION, SOLUTION EPIDURAL; INFILTRATION; INTRACAUDAL; PERINEURAL AS NEEDED
Status: COMPLETED | OUTPATIENT
Start: 2025-08-07 | End: 2025-08-07

## 2025-08-07 RX ADMIN — LIDOCAINE HYDROCHLORIDE 10 ML: 10 INJECTION, SOLUTION EPIDURAL; INFILTRATION; INTRACAUDAL at 10:55

## 2025-08-08 ENCOUNTER — TELEPHONE (OUTPATIENT)
Age: 65
End: 2025-08-08

## 2025-08-11 ENCOUNTER — TELEPHONE (OUTPATIENT)
Age: 65
End: 2025-08-11

## 2025-08-14 ENCOUNTER — HOSPITAL ENCOUNTER (OUTPATIENT)
Dept: INFUSION CENTER | Facility: HOSPITAL | Age: 65
Discharge: HOME | End: 2025-08-14
Attending: INTERNAL MEDICINE

## 2025-08-14 ENCOUNTER — HOSPITAL ENCOUNTER (OUTPATIENT)
Dept: INTERVENTIONAL RADIOLOGY/VASCULAR | Facility: HOSPITAL | Age: 65
Discharge: HOME | End: 2025-08-14
Attending: INTERNAL MEDICINE
Payer: MEDICARE

## 2025-08-15 ENCOUNTER — TELEPHONE (OUTPATIENT)
Age: 65
End: 2025-08-15

## 2025-08-19 ENCOUNTER — HOSPITAL ENCOUNTER (EMERGENCY)
Facility: HOSPITAL | Age: 65
Discharge: HOME | End: 2025-08-19
Attending: EMERGENCY MEDICINE | Admitting: EMERGENCY MEDICINE
Payer: MEDICARE

## 2025-08-19 VITALS
TEMPERATURE: 97.6 F | OXYGEN SATURATION: 96 % | RESPIRATION RATE: 20 BRPM | SYSTOLIC BLOOD PRESSURE: 114 MMHG | DIASTOLIC BLOOD PRESSURE: 61 MMHG | HEART RATE: 91 BPM

## 2025-08-19 DIAGNOSIS — L08.9 WOUND INFECTION: Primary | ICD-10-CM

## 2025-08-19 DIAGNOSIS — T14.8XXA WOUND INFECTION: Primary | ICD-10-CM

## 2025-08-19 PROCEDURE — 99283 EMERGENCY DEPT VISIT LOW MDM: CPT

## 2025-08-19 PROCEDURE — 99284 EMERGENCY DEPT VISIT MOD MDM: CPT | Performed by: EMERGENCY MEDICINE

## 2025-08-19 RX ORDER — GINSENG 100 MG
1 CAPSULE ORAL ONCE
Status: COMPLETED | OUTPATIENT
Start: 2025-08-19 | End: 2025-08-19

## 2025-08-19 RX ORDER — CEPHALEXIN 500 MG/1
500 CAPSULE ORAL EVERY 6 HOURS SCHEDULED
Qty: 28 CAPSULE | Refills: 0 | Status: SHIPPED | OUTPATIENT
Start: 2025-08-19 | End: 2025-08-26

## 2025-08-19 RX ADMIN — CEPHALEXIN 500 MG: 250 CAPSULE ORAL at 08:05

## 2025-08-19 RX ADMIN — BACITRACIN 1 SMALL APPLICATION: 500 OINTMENT TOPICAL at 08:05

## 2025-08-21 ENCOUNTER — HOSPITAL ENCOUNTER (OUTPATIENT)
Dept: INTERVENTIONAL RADIOLOGY/VASCULAR | Facility: HOSPITAL | Age: 65
Discharge: HOME | End: 2025-08-21
Attending: INTERNAL MEDICINE
Payer: MEDICARE

## 2025-08-21 VITALS
DIASTOLIC BLOOD PRESSURE: 53 MMHG | SYSTOLIC BLOOD PRESSURE: 109 MMHG | RESPIRATION RATE: 18 BRPM | HEART RATE: 73 BPM | OXYGEN SATURATION: 99 %

## 2025-08-21 DIAGNOSIS — J91.8 PLEURAL EFFUSION ASSOCIATED WITH HEPATIC DISORDER: ICD-10-CM

## 2025-08-21 DIAGNOSIS — K76.9 PLEURAL EFFUSION ASSOCIATED WITH HEPATIC DISORDER: ICD-10-CM

## 2025-08-21 DIAGNOSIS — K70.31 ALCOHOLIC CIRRHOSIS OF LIVER WITH ASCITES (HCC): ICD-10-CM

## 2025-08-21 PROCEDURE — 49083 ABD PARACENTESIS W/IMAGING: CPT

## 2025-08-21 RX ORDER — LIDOCAINE HYDROCHLORIDE 10 MG/ML
INJECTION, SOLUTION EPIDURAL; INFILTRATION; INTRACAUDAL; PERINEURAL AS NEEDED
Status: COMPLETED | OUTPATIENT
Start: 2025-08-21 | End: 2025-08-21

## 2025-08-21 RX ADMIN — LIDOCAINE HYDROCHLORIDE 8 ML: 10 INJECTION, SOLUTION EPIDURAL; INFILTRATION; INTRACAUDAL at 10:42
